# Patient Record
Sex: MALE | Race: WHITE | Employment: UNEMPLOYED | URBAN - NONMETROPOLITAN AREA
[De-identification: names, ages, dates, MRNs, and addresses within clinical notes are randomized per-mention and may not be internally consistent; named-entity substitution may affect disease eponyms.]

---

## 2019-06-13 ENCOUNTER — HOSPITAL ENCOUNTER (OUTPATIENT)
Dept: CARDIAC REHAB | Age: 64
Discharge: HOME | End: 2019-06-13

## 2019-08-07 ENCOUNTER — OFFICE VISIT (OUTPATIENT)
Dept: CARDIOLOGY CLINIC | Age: 64
End: 2019-08-07
Payer: COMMERCIAL

## 2019-08-07 VITALS
WEIGHT: 256 LBS | DIASTOLIC BLOOD PRESSURE: 70 MMHG | BODY MASS INDEX: 33.93 KG/M2 | HEIGHT: 73 IN | SYSTOLIC BLOOD PRESSURE: 124 MMHG | HEART RATE: 60 BPM

## 2019-08-07 DIAGNOSIS — I25.5 ISCHEMIC CARDIOMYOPATHY: Primary | ICD-10-CM

## 2019-08-07 PROBLEM — Z82.49 FAMILY HISTORY OF CARDIAC DISORDER: Status: ACTIVE | Noted: 2019-08-07

## 2019-08-07 PROBLEM — I10 ESSENTIAL (PRIMARY) HYPERTENSION: Status: ACTIVE | Noted: 2019-08-07

## 2019-08-07 PROBLEM — E78.5 HYPERLIPIDEMIA: Status: ACTIVE | Noted: 2019-08-07

## 2019-08-07 PROBLEM — Z86.79 HISTORY OF OTHER DISEASES OF THE CIRCULATORY SYSTEM, NOT ELSEWHERE CLASSIFIED: Status: ACTIVE | Noted: 2019-08-07

## 2019-08-07 PROBLEM — I42.9 CARDIOMYOPATHY (HCC): Status: ACTIVE | Noted: 2019-08-07

## 2019-08-07 PROBLEM — R94.39 ABNORMAL CARDIOVASCULAR STRESS TEST: Status: ACTIVE | Noted: 2019-08-07

## 2019-08-07 PROBLEM — Z95.1 POSTSURGICAL AORTOCORONARY BYPASS STATUS: Status: ACTIVE | Noted: 2019-08-07

## 2019-08-07 PROBLEM — C43.9 MELANOMA OF SKIN (HCC): Status: ACTIVE | Noted: 2019-08-07

## 2019-08-07 PROCEDURE — 99204 OFFICE O/P NEW MOD 45 MIN: CPT | Performed by: INTERNAL MEDICINE

## 2019-08-07 PROCEDURE — 1036F TOBACCO NON-USER: CPT | Performed by: INTERNAL MEDICINE

## 2019-08-07 PROCEDURE — G8427 DOCREV CUR MEDS BY ELIG CLIN: HCPCS | Performed by: INTERNAL MEDICINE

## 2019-08-07 PROCEDURE — G8419 CALC BMI OUT NRM PARAM NOF/U: HCPCS | Performed by: INTERNAL MEDICINE

## 2019-08-07 PROCEDURE — 3017F COLORECTAL CA SCREEN DOC REV: CPT | Performed by: INTERNAL MEDICINE

## 2019-08-07 PROCEDURE — G8598 ASA/ANTIPLAT THER USED: HCPCS | Performed by: INTERNAL MEDICINE

## 2019-08-07 RX ORDER — SPIRONOLACTONE 25 MG/1
25 TABLET ORAL DAILY
COMMUNITY
Start: 2019-07-22

## 2019-08-07 RX ORDER — SACUBITRIL AND VALSARTAN 97; 103 MG/1; MG/1
1 TABLET, FILM COATED ORAL 2 TIMES DAILY
COMMUNITY
Start: 2019-07-23

## 2019-08-07 RX ORDER — METOPROLOL SUCCINATE 50 MG/1
50 TABLET, EXTENDED RELEASE ORAL DAILY
Qty: 30 TABLET | Refills: 3 | Status: SHIPPED | OUTPATIENT
Start: 2019-08-07 | End: 2019-12-10 | Stop reason: SDUPTHER

## 2019-08-07 RX ORDER — PANTOPRAZOLE SODIUM 40 MG/1
40 TABLET, DELAYED RELEASE ORAL DAILY
COMMUNITY
Start: 2019-07-15 | End: 2019-09-03 | Stop reason: SDUPTHER

## 2019-08-07 RX ORDER — METOPROLOL SUCCINATE 25 MG/1
25 TABLET, EXTENDED RELEASE ORAL DAILY
COMMUNITY
Start: 2019-05-01 | End: 2019-08-07

## 2019-08-07 RX ORDER — TICAGRELOR 90 MG/1
90 TABLET ORAL
Status: ON HOLD | COMMUNITY
Start: 2019-07-17 | End: 2020-12-17 | Stop reason: SDUPTHER

## 2019-08-07 RX ORDER — ATORVASTATIN CALCIUM 20 MG/1
20 TABLET, FILM COATED ORAL DAILY
COMMUNITY
Start: 2019-07-17 | End: 2020-02-12 | Stop reason: SDUPTHER

## 2019-08-07 RX ORDER — FUROSEMIDE 20 MG/1
20 TABLET ORAL DAILY
COMMUNITY
Start: 2019-07-17

## 2019-08-26 ENCOUNTER — TELEPHONE (OUTPATIENT)
Dept: CARDIOLOGY CLINIC | Age: 64
End: 2019-08-26

## 2019-09-04 RX ORDER — PANTOPRAZOLE SODIUM 40 MG/1
40 TABLET, DELAYED RELEASE ORAL DAILY
Qty: 90 TABLET | Refills: 3 | Status: SHIPPED | OUTPATIENT
Start: 2019-09-04 | End: 2021-01-04 | Stop reason: SDUPTHER

## 2019-10-09 ENCOUNTER — TELEPHONE (OUTPATIENT)
Dept: CARDIOLOGY CLINIC | Age: 64
End: 2019-10-09

## 2019-12-11 RX ORDER — METOPROLOL SUCCINATE 50 MG/1
50 TABLET, EXTENDED RELEASE ORAL DAILY
Qty: 30 TABLET | Refills: 2 | Status: SHIPPED | OUTPATIENT
Start: 2019-12-11 | End: 2020-02-12 | Stop reason: SDUPTHER

## 2020-02-12 ENCOUNTER — OFFICE VISIT (OUTPATIENT)
Dept: CARDIOLOGY CLINIC | Age: 65
End: 2020-02-12
Payer: COMMERCIAL

## 2020-02-12 VITALS
BODY MASS INDEX: 36.23 KG/M2 | HEART RATE: 60 BPM | DIASTOLIC BLOOD PRESSURE: 70 MMHG | SYSTOLIC BLOOD PRESSURE: 110 MMHG | HEIGHT: 73 IN | WEIGHT: 273.4 LBS

## 2020-02-12 PROCEDURE — G8484 FLU IMMUNIZE NO ADMIN: HCPCS | Performed by: INTERNAL MEDICINE

## 2020-02-12 PROCEDURE — 99213 OFFICE O/P EST LOW 20 MIN: CPT | Performed by: INTERNAL MEDICINE

## 2020-02-12 PROCEDURE — 3017F COLORECTAL CA SCREEN DOC REV: CPT | Performed by: INTERNAL MEDICINE

## 2020-02-12 PROCEDURE — G8417 CALC BMI ABV UP PARAM F/U: HCPCS | Performed by: INTERNAL MEDICINE

## 2020-02-12 PROCEDURE — 1036F TOBACCO NON-USER: CPT | Performed by: INTERNAL MEDICINE

## 2020-02-12 PROCEDURE — G8427 DOCREV CUR MEDS BY ELIG CLIN: HCPCS | Performed by: INTERNAL MEDICINE

## 2020-02-12 RX ORDER — ISOSORBIDE MONONITRATE 60 MG/1
60 TABLET, EXTENDED RELEASE ORAL DAILY
COMMUNITY
End: 2020-12-10 | Stop reason: SDUPTHER

## 2020-02-12 RX ORDER — METOPROLOL SUCCINATE 50 MG/1
50 TABLET, EXTENDED RELEASE ORAL DAILY
Qty: 90 TABLET | Refills: 3 | Status: SHIPPED | OUTPATIENT
Start: 2020-02-12 | End: 2021-01-04 | Stop reason: SDUPTHER

## 2020-02-12 RX ORDER — ATORVASTATIN CALCIUM 20 MG/1
20 TABLET, FILM COATED ORAL DAILY
Qty: 90 TABLET | Refills: 3 | Status: ON HOLD | OUTPATIENT
Start: 2020-02-12 | End: 2020-12-16 | Stop reason: ALTCHOICE

## 2020-02-12 RX ORDER — HYDRALAZINE HYDROCHLORIDE 50 MG/1
50 TABLET, FILM COATED ORAL 2 TIMES DAILY
COMMUNITY
End: 2020-12-10 | Stop reason: SDUPTHER

## 2020-02-12 NOTE — PROGRESS NOTES
[address] [place] [address] [address] [place] [address]  Dept: [phone]  Dept Fax: [phone]  Loc: [phone]    Visit Date: 2/12/2020    Mr. Kaur Mcgee is a 59 y.o. male  who presented for:  Chief Complaint   Patient presents with    6 Month Follow-Up    Cardiomyopathy       HPI:   HPI   60 yo M ICM, CAD 2012 - CABG x 4 via stress test, HTN who presents for follow-up. EF 35-40% recently. He is on DAPT. No bleeding. No chest pain, angina, SERRANO, orthopnea, PND, sob at rest, palpitations, LE edema, or syncope. Can do all ADLs. Current Outpatient Medications:     hydrALAZINE (APRESOLINE) 50 MG tablet, Take 50 mg by mouth 2 times daily, Disp: , Rfl:     isosorbide mononitrate (IMDUR) 60 MG extended release tablet, Take 60 mg by mouth daily, Disp: , Rfl:     metoprolol succinate (TOPROL XL) 50 MG extended release tablet, Take 1 tablet by mouth daily, Disp: 30 tablet, Rfl: 2    pantoprazole (PROTONIX) 40 MG tablet, Take 1 tablet by mouth daily, Disp: 90 tablet, Rfl: 3    furosemide (LASIX) 20 MG tablet, Take 20 mg by mouth daily , Disp: , Rfl:     spironolactone (ALDACTONE) 25 MG tablet, Take 25 mg by mouth daily , Disp: , Rfl:     ENTRESTO  MG per tablet, 1 tablet 2 times daily , Disp: , Rfl:     BRILINTA 90 MG TABS tablet, Take 90 mg by mouth , Disp: , Rfl:     atorvastatin (LIPITOR) 20 MG tablet, Take 20 mg by mouth daily , Disp: , Rfl:     escitalopram (LEXAPRO) 20 MG tablet, Take 20 mg by mouth daily, Disp: , Rfl:     aspirin 81 MG tablet, Take 81 mg by mouth daily, Disp: , Rfl:     Multiple Vitamins-Minerals (THERAPEUTIC MULTIVITAMIN-MINERALS) tablet, Take 1 tablet by mouth daily, Disp: , Rfl:     Past Medical History  Jeniffer Diaz  has a past medical history of CAD (coronary artery disease), Depression, GERD (gastroesophageal reflux disease), Hypertension, and Melanoma ([facility] [address]     Social History  Jeniffer Diaz  reports that he has quit smoking. He has quit using smokeless tobacco. He reports current alcohol use. He reports that he does not use drugs. Family History  Marcelline Bloch family history includes Heart Disease in his maternal grandfather; Stroke in his maternal grandfather. There is no family history of bicuspid aortic valve, aneurysms, heart transplant, pacemakers, defibrillators, or sudden cardiac death. Past Surgical History   Past Surgical History:   Procedure Laterality Date    CARDIAC SURGERY      CORONARY ANGIOPLASTY WITH STENT PLACEMENT  05/2019    SKIN CANCER EXCISION         Review of Systems   Constitutional: Negative for chills and fever  HENT: Negative for congestion, sinus pressure, sneezing and sore throat. Eyes: Negative for pain, discharge, redness and itching. Respiratory: Negative for apnea, cough  Gastrointestinal: Negative for blood in stool, constipation, diarrhea   Endocrine: Negative for cold intolerance, heat intolerance, polydipsia. Genitourinary: Negative for dysuria, enuresis, flank pain and hematuria. Musculoskeletal: Negative for arthralgias, joint swelling and neck pain. Neurological: Negative for numbness and headaches. Psychiatric/Behavioral: Negative for agitation, confusion, decreased concentration and dysphoric mood. Objective:     /70   Pulse 60   Ht 6' 1\" (1.854 m)   Wt 273 lb 6.4 oz (124 kg)   BMI 36.07 kg/m²     Wt Readings from Last 3 Encounters:   02/12/20 273 lb 6.4 oz (124 kg)   08/07/19 256 lb (116.1 kg)     BP Readings from Last 3 Encounters:   02/12/20 110/70   08/07/19 124/70       Nursing note and vitals reviewed. Physical Exam   Constitutional: Oriented to person, place, and time. Appears well-developed and well-nourished. HENT:   Head: Normocephalic and atraumatic. Eyes: EOM are normal. Pupils are equal, round, and reactive to light. Neck: Normal range of motion. Neck supple. No JVD present.    Cardiovascular: Normal rate, regular rhythm, normal heart sounds and intact distal pulses. No murmur heard. Pulmonary/Chest: Effort normal and breath sounds normal. No respiratory distress. No wheezes. No rales. Abdominal: Soft. Bowel sounds are normal. No distension. There is no tenderness. Musculoskeletal: Normal range of motion. No edema. Neurological: Alert and oriented to person, place, and time. No cranial nerve deficit. Coordination normal.   Skin: Skin is warm and dry. Psychiatric: Normal mood and affect. No results found for: CKTOTAL, CKMB, CKMBINDEX    No results found for: WBC, RBC, HGB, HCT, MCV, MCH, MCHC, RDW, PLT, MPV    No results found for: NA, K, CL, CO2, BUN, LABALBU, CREATININE, CALCIUM, GFRAA, LABGLOM, GLUCOSE    No results found for: ALKPHOS, ALT, AST, PROT, BILITOT, BILIDIR, IBILI, LABALBU    No results found for: MG    No results found for: INR, PROTIME      No results found for: LABA1C    No results found for: TRIG, HDL, LDLCALC, LDLDIRECT, LABVLDL    No results found for: TSH      Testing Reviewed:      I have individually reviewed the cardiac test below:    ECHO: No results found for this or any previous visit. Assessment/Plan   Chronic LVSF, EF 35-40%, NYHA II  PCI of the LCX/OM, 5/2019 - DAPT  CABG x 4, 2012  HTN  Follows with  [facility], BP and HR well controlled, he is on OMT. Weight mild increase, but no significant volume on exam.  Continue DAPT. Discussed diet/exercise/BP/weight loss/health lifestyle choices/lipids; the patient understands the goals and will try to comply.     Disposition:  1 year         Electronically signed by Darvin Lawson MD   2/12/2020 at 8:20 AM

## 2020-11-24 ENCOUNTER — TELEPHONE (OUTPATIENT)
Dept: CARDIOLOGY CLINIC | Age: 65
End: 2020-11-24

## 2020-11-24 NOTE — TELEPHONE ENCOUNTER
Talked to patient's wife and she states he is more short of breath. He denies having a cough, fever or orthopnea. She states his weight is staying about the same. Appt scheduled on 12/4/2020 at [facility]. She confirmed appt date, time and location.

## 2020-12-04 ENCOUNTER — OFFICE VISIT (OUTPATIENT)
Dept: CARDIOLOGY CLINIC | Age: 65
End: 2020-12-04
Payer: MEDICARE

## 2020-12-04 VITALS
BODY MASS INDEX: 35.52 KG/M2 | HEART RATE: 58 BPM | TEMPERATURE: 98 F | SYSTOLIC BLOOD PRESSURE: 122 MMHG | HEIGHT: 73 IN | WEIGHT: 268 LBS | DIASTOLIC BLOOD PRESSURE: 78 MMHG

## 2020-12-04 PROCEDURE — 3017F COLORECTAL CA SCREEN DOC REV: CPT | Performed by: INTERNAL MEDICINE

## 2020-12-04 PROCEDURE — G8417 CALC BMI ABV UP PARAM F/U: HCPCS | Performed by: INTERNAL MEDICINE

## 2020-12-04 PROCEDURE — 99213 OFFICE O/P EST LOW 20 MIN: CPT | Performed by: INTERNAL MEDICINE

## 2020-12-04 PROCEDURE — 93000 ELECTROCARDIOGRAM COMPLETE: CPT | Performed by: INTERNAL MEDICINE

## 2020-12-04 PROCEDURE — G8484 FLU IMMUNIZE NO ADMIN: HCPCS | Performed by: INTERNAL MEDICINE

## 2020-12-04 PROCEDURE — 1036F TOBACCO NON-USER: CPT | Performed by: INTERNAL MEDICINE

## 2020-12-04 PROCEDURE — 4040F PNEUMOC VAC/ADMIN/RCVD: CPT | Performed by: INTERNAL MEDICINE

## 2020-12-04 PROCEDURE — 1123F ACP DISCUSS/DSCN MKR DOCD: CPT | Performed by: INTERNAL MEDICINE

## 2020-12-04 PROCEDURE — G8427 DOCREV CUR MEDS BY ELIG CLIN: HCPCS | Performed by: INTERNAL MEDICINE

## 2020-12-04 RX ORDER — NITROGLYCERIN 0.4 MG/1
0.4 TABLET SUBLINGUAL EVERY 5 MIN PRN
Qty: 25 TABLET | Refills: 3 | Status: SHIPPED | OUTPATIENT
Start: 2020-12-04 | End: 2021-12-27

## 2020-12-04 NOTE — PROGRESS NOTES
[place]  [address] [address]  Dept: [phone]  Dept Fax: [phone]  Loc: [phone]    Visit Date: 12/4/2020    Mr. Genevieve Dorado is a 72 y.o. male  who presented for:  Chief Complaint   Patient presents with    Follow-up    Shortness of Breath    Cardiomyopathy       HPI:   HPI   73 yo M ICM, CAD 2012 - CABG x 4, EF 35-40% who presents with SSCP, pressure, with exertion, and severe sob. This is new. Worsening. Occurring daily. Lasts 10 minutes, sometimes less. CPAP has helped. No swelling in the legs. Taking all the meds. No bleeding. Has hx of 2 stents in the LCX/OM 5/2019 at [facility]. No left arm pain, jaw pain. No diaphoresis. If he walks, 200 yards he notices the discomfort. Sitting down helps it stop. Not occurring at rest.  Never wakes him up from sleep. He was doing housework and had 3/10 discomfort, at its worst it is 6/10.  2 weeks of symptoms.       Current Outpatient Medications:     hydrALAZINE (APRESOLINE) 50 MG tablet, Take 50 mg by mouth 2 times daily, Disp: , Rfl:     isosorbide mononitrate (IMDUR) 60 MG extended release tablet, Take 60 mg by mouth daily, Disp: , Rfl:     metoprolol succinate (TOPROL XL) 50 MG extended release tablet, Take 1 tablet by mouth daily, Disp: 90 tablet, Rfl: 3    atorvastatin (LIPITOR) 20 MG tablet, Take 1 tablet by mouth daily, Disp: 90 tablet, Rfl: 3    pantoprazole (PROTONIX) 40 MG tablet, Take 1 tablet by mouth daily, Disp: 90 tablet, Rfl: 3    furosemide (LASIX) 20 MG tablet, Take 20 mg by mouth daily , Disp: , Rfl:     spironolactone (ALDACTONE) 25 MG tablet, Take 25 mg by mouth daily , Disp: , Rfl:     ENTRESTO  MG per tablet, 1 tablet 2 times daily , Disp: , Rfl:     BRILINTA 90 MG TABS tablet, Take 90 mg by mouth , Disp: , Rfl:     escitalopram (LEXAPRO) 20 MG tablet, Take 20 mg by mouth daily, Disp: , Rfl:     aspirin 81 MG tablet, Take 81 mg by mouth daily, Disp: , Rfl:     Multiple Vitamins-Minerals (THERAPEUTIC MULTIVITAMIN-MINERALS) tablet, Take 1 tablet by mouth daily, Disp: , Rfl:     Past Medical History  Nilsa Jiang  has a past medical history of CAD (coronary artery disease), Depression, GERD (gastroesophageal reflux disease), Hypertension, and Melanoma ([facility] [address] Social History  Nilsa Jiang  reports that he has quit smoking. He has quit using smokeless tobacco. He reports current alcohol use. He reports that he does not use drugs. Family History  Nilsa Jiang family history includes Heart Disease in his maternal grandfather; Stroke in his maternal grandfather. There is no family history of bicuspid aortic valve, aneurysms, heart transplant, pacemakers, defibrillators, or sudden cardiac death. Past Surgical History   Past Surgical History:   Procedure Laterality Date    CARDIAC SURGERY      CORONARY ANGIOPLASTY WITH STENT PLACEMENT  05/2019    SKIN CANCER EXCISION         Review of Systems   Constitutional: Negative for chills and fever  HENT: Negative for congestion, sinus pressure, sneezing and sore throat. Eyes: Negative for pain, discharge, redness and itching. Respiratory: Negative for apnea, cough  Gastrointestinal: Negative for blood in stool, constipation, diarrhea   Endocrine: Negative for cold intolerance, heat intolerance, polydipsia. Genitourinary: Negative for dysuria, enuresis, flank pain and hematuria. Musculoskeletal: Negative for arthralgias, joint swelling and neck pain. Neurological: Negative for numbness and headaches. Psychiatric/Behavioral: Negative for agitation, confusion, decreased concentration and dysphoric mood.      Objective:     /78   Pulse 58   Temp 98 °F (36.7 °C)   Ht 6' 1\" (1.854 m)   Wt 268 lb (121.6 kg)   BMI 35.36 kg/m²     Wt Readings from Last 3 Encounters:   12/04/20 268 lb (121.6 kg)   02/12/20 273 lb 6.4 oz (124 kg)   08/07/19 256 lb (116.1 kg)     BP Readings from Last 3 Encounters: 12/04/20 122/78   02/12/20 110/70   08/07/19 124/70       Nursing note and vitals reviewed. Physical Exam   Constitutional: Oriented to person, place, and time. Appears well-developed and well-nourished. HENT:   Head: Normocephalic and atraumatic. Eyes: EOM are normal. Pupils are equal, round, and reactive to light. Neck: Normal range of motion. Neck supple. No JVD present. Cardiovascular: Normal rate, regular rhythm, normal heart sounds and intact distal pulses. No murmur heard. Pulmonary/Chest: Effort normal and breath sounds normal. No respiratory distress. No wheezes. No rales. Abdominal: Soft. Bowel sounds are normal. No distension. There is no tenderness. Musculoskeletal: Normal range of motion. No edema. Neurological: Alert and oriented to person, place, and time. No cranial nerve deficit. Coordination normal.   Skin: Skin is warm and dry. Psychiatric: Normal mood and affect. No results found for: CKTOTAL, CKMB, CKMBINDEX    No results found for: WBC, RBC, HGB, HCT, MCV, MCH, MCHC, RDW, PLT, MPV    No results found for: NA, K, CL, CO2, BUN, LABALBU, CREATININE, CALCIUM, GFRAA, LABGLOM, GLUCOSE    No results found for: ALKPHOS, ALT, AST, PROT, BILITOT, BILIDIR, IBILI, LABALBU    No results found for: MG    No results found for: INR, PROTIME      No results found for: LABA1C    No results found for: TRIG, HDL, LDLCALC, LDLDIRECT, LABVLDL    No results found for: TSH      Testing Reviewed:      I have individually reviewed the cardiac test below:    ECHO: No results found for this or any previous visit. Assessment/Plan   Typical Angina - worsening, CCS III  Chronic LVSF, EF 35-40%, NYHA II  PCI of the LCX/OM, 5/2019 - DAPT  CABG x 4, 2012  HTN  Appears to have typical cardiac life-limiting discomfort, he is on OMT, BP and HR well controlled.   R/B/A of cardiac cath discussed due to high pre-test probability of progressive CAD, would forego stress test and proceed directly to cath.  NTG SL prn. The patient was advised on risk/benefits of the new Rx and he agreed to proceed with the medication(s). No heavy exertion. Discussed symptoms needing emergency care.         Disposition:  Cath         Electronically signed by Belinda Estrella MD   12/4/2020 at 8:34 AM EST

## 2020-12-08 ENCOUNTER — HOSPITAL ENCOUNTER (OUTPATIENT)
Age: 65
Discharge: HOME | End: 2020-12-08
Payer: MEDICARE

## 2020-12-08 PROCEDURE — U0003 INFECTIOUS AGENT DETECTION BY NUCLEIC ACID (DNA OR RNA); SEVERE ACUTE RESPIRATORY SYNDROME CORONAVIRUS 2 (SARS-COV-2) (CORONAVIRUS DISEASE [COVID-19]), AMPLIFIED PROBE TECHNIQUE, MAKING USE OF HIGH THROUGHPUT TECHNOLOGIES AS DESCRIBED BY CMS-2020-01-R: HCPCS

## 2020-12-10 LAB — SARS-COV-2: NOT DETECTED

## 2020-12-11 RX ORDER — ISOSORBIDE MONONITRATE 60 MG/1
60 TABLET, EXTENDED RELEASE ORAL DAILY
Qty: 90 TABLET | Refills: 1 | Status: SHIPPED | OUTPATIENT
Start: 2020-12-11 | End: 2021-06-18

## 2020-12-11 RX ORDER — HYDRALAZINE HYDROCHLORIDE 50 MG/1
50 TABLET, FILM COATED ORAL 2 TIMES DAILY
Qty: 180 TABLET | Refills: 1 | Status: SHIPPED | OUTPATIENT
Start: 2020-12-11

## 2020-12-15 ENCOUNTER — PREP FOR PROCEDURE (OUTPATIENT)
Dept: CARDIOLOGY | Age: 65
End: 2020-12-15

## 2020-12-15 RX ORDER — SODIUM CHLORIDE 9 MG/ML
50 INJECTION, SOLUTION INTRAVENOUS CONTINUOUS
Status: CANCELLED | OUTPATIENT
Start: 2020-12-15

## 2020-12-15 RX ORDER — ASPIRIN 325 MG
325 TABLET ORAL ONCE
Status: CANCELLED | OUTPATIENT
Start: 2020-12-15 | End: 2020-12-15

## 2020-12-15 RX ORDER — NITROGLYCERIN 0.4 MG/1
0.4 TABLET SUBLINGUAL EVERY 5 MIN PRN
Status: CANCELLED | OUTPATIENT
Start: 2020-12-15

## 2020-12-15 RX ORDER — SODIUM CHLORIDE 0.9 % (FLUSH) 0.9 %
10 SYRINGE (ML) INJECTION PRN
Status: CANCELLED | OUTPATIENT
Start: 2020-12-15

## 2020-12-15 RX ORDER — SODIUM CHLORIDE 0.9 % (FLUSH) 0.9 %
10 SYRINGE (ML) INJECTION EVERY 12 HOURS SCHEDULED
Status: CANCELLED | OUTPATIENT
Start: 2020-12-15

## 2020-12-15 RX ORDER — DIPHENHYDRAMINE HYDROCHLORIDE 50 MG/ML
50 INJECTION INTRAMUSCULAR; INTRAVENOUS ONCE
Status: CANCELLED | OUTPATIENT
Start: 2020-12-15 | End: 2020-12-15

## 2020-12-16 PROBLEM — I25.10 CAD IN NATIVE ARTERY: Status: ACTIVE | Noted: 2020-12-16

## 2020-12-17 ENCOUNTER — TELEPHONE (OUTPATIENT)
Dept: CARDIOLOGY CLINIC | Age: 65
End: 2020-12-17

## 2020-12-17 NOTE — TELEPHONE ENCOUNTER
[facility], dx: heart stent, dc to home in 2 weeks. No available appts within requested time frame. Please advise.

## 2021-01-04 ENCOUNTER — OFFICE VISIT (OUTPATIENT)
Dept: CARDIOLOGY CLINIC | Age: 66
End: 2021-01-04
Payer: MEDICARE

## 2021-01-04 VITALS
HEIGHT: 73 IN | HEART RATE: 55 BPM | SYSTOLIC BLOOD PRESSURE: 128 MMHG | DIASTOLIC BLOOD PRESSURE: 78 MMHG | WEIGHT: 271.8 LBS | BODY MASS INDEX: 36.02 KG/M2

## 2021-01-04 DIAGNOSIS — Z95.1 HX OF CABG: Primary | ICD-10-CM

## 2021-01-04 DIAGNOSIS — I25.5 ISCHEMIC CARDIOMYOPATHY: ICD-10-CM

## 2021-01-04 PROCEDURE — 1123F ACP DISCUSS/DSCN MKR DOCD: CPT | Performed by: INTERNAL MEDICINE

## 2021-01-04 PROCEDURE — 93000 ELECTROCARDIOGRAM COMPLETE: CPT | Performed by: INTERNAL MEDICINE

## 2021-01-04 PROCEDURE — 1036F TOBACCO NON-USER: CPT | Performed by: INTERNAL MEDICINE

## 2021-01-04 PROCEDURE — 3017F COLORECTAL CA SCREEN DOC REV: CPT | Performed by: INTERNAL MEDICINE

## 2021-01-04 PROCEDURE — 99212 OFFICE O/P EST SF 10 MIN: CPT | Performed by: INTERNAL MEDICINE

## 2021-01-04 PROCEDURE — G8417 CALC BMI ABV UP PARAM F/U: HCPCS | Performed by: INTERNAL MEDICINE

## 2021-01-04 PROCEDURE — G8484 FLU IMMUNIZE NO ADMIN: HCPCS | Performed by: INTERNAL MEDICINE

## 2021-01-04 PROCEDURE — G8427 DOCREV CUR MEDS BY ELIG CLIN: HCPCS | Performed by: INTERNAL MEDICINE

## 2021-01-04 PROCEDURE — 4040F PNEUMOC VAC/ADMIN/RCVD: CPT | Performed by: INTERNAL MEDICINE

## 2021-01-04 RX ORDER — PANTOPRAZOLE SODIUM 40 MG/1
40 TABLET, DELAYED RELEASE ORAL DAILY
Qty: 90 TABLET | Refills: 3 | Status: SHIPPED | OUTPATIENT
Start: 2021-01-04

## 2021-01-04 RX ORDER — METOPROLOL SUCCINATE 50 MG/1
50 TABLET, EXTENDED RELEASE ORAL DAILY
Qty: 90 TABLET | Refills: 3 | Status: SHIPPED | OUTPATIENT
Start: 2021-01-04 | End: 2022-02-14 | Stop reason: SDUPTHER

## 2021-01-04 NOTE — PROGRESS NOTES
[address] [address] [place] [address]  Dept: [phone]  Dept Fax: [phone]  Loc: [phone]    Visit Date: 1/4/2021    Mr. Jia Stratton is a 72 y.o. male  who presented for:  Chief Complaint   Patient presents with    Follow-Up from Hospital     s/p PCI; discuss Brilinta and switching to Plavix    Follow-up     discuss Entresto       HPI:   HPI   73 yo M s/p PCI of SVG to RI with JACQUE presents for follow-up. DAPT without bleeding. No further symptoms feels well. BP and HR well controlled. No chest pain, angina, SERRANO, orthopnea, PND, sob at rest, palpitations, LE edema, or syncope.                 Current Outpatient Medications:     BRILINTA 90 MG TABS tablet, Take 1 tablet by mouth 2 times daily, Disp: 60 tablet, Rfl: 5    melatonin 5 MG TABS tablet, Take 5 mg by mouth nightly, Disp: , Rfl:     hydrALAZINE (APRESOLINE) 50 MG tablet, Take 1 tablet by mouth 2 times daily, Disp: 180 tablet, Rfl: 1    isosorbide mononitrate (IMDUR) 60 MG extended release tablet, Take 1 tablet by mouth daily, Disp: 90 tablet, Rfl: 1    nitroGLYCERIN (NITROSTAT) 0.4 MG SL tablet, Place 1 tablet under the tongue every 5 minutes as needed for Chest pain, Disp: 25 tablet, Rfl: 3    metoprolol succinate (TOPROL XL) 50 MG extended release tablet, Take 1 tablet by mouth daily, Disp: 90 tablet, Rfl: 3    pantoprazole (PROTONIX) 40 MG tablet, Take 1 tablet by mouth daily, Disp: 90 tablet, Rfl: 3    furosemide (LASIX) 20 MG tablet, Take 20 mg by mouth daily , Disp: , Rfl:     spironolactone (ALDACTONE) 25 MG tablet, Take 25 mg by mouth daily , Disp: , Rfl:     ENTRESTO  MG per tablet, 1 tablet 2 times daily , Disp: , Rfl:     escitalopram (LEXAPRO) 20 MG tablet, Take 20 mg by mouth daily, Disp: , Rfl:     aspirin 81 MG tablet, Take 81 mg by mouth daily, Disp: , Rfl:   Multiple Vitamins-Minerals (THERAPEUTIC MULTIVITAMIN-MINERALS) tablet, Take 1 tablet by mouth daily, Disp: , Rfl:     Past Medical History  Genesis Adame  has a past medical history of CAD (coronary artery disease), Depression, GERD (gastroesophageal reflux disease), Hypertension, and Melanoma ([facility] [address] Social History  Genesis Adame  reports that he has quit smoking. He has quit using smokeless tobacco. He reports current alcohol use. He reports that he does not use drugs. Family History  Genesis Adame family history includes Heart Disease in his maternal grandfather; Stroke in his maternal grandfather. There is no family history of bicuspid aortic valve, aneurysms, heart transplant, pacemakers, defibrillators, or sudden cardiac death. Past Surgical History   Past Surgical History:   Procedure Laterality Date    CARDIAC SURGERY      CORONARY ANGIOPLASTY WITH STENT PLACEMENT  05/2019    DIAGNOSTIC CARDIAC CATH LAB PROCEDURE  12/16/2020    with PCI    SKIN CANCER EXCISION         Review of Systems   Constitutional: Negative for chills and fever  HENT: Negative for congestion, sinus pressure, sneezing and sore throat. Eyes: Negative for pain, discharge, redness and itching. Respiratory: Negative for apnea, cough  Gastrointestinal: Negative for blood in stool, constipation, diarrhea   Endocrine: Negative for cold intolerance, heat intolerance, polydipsia. Genitourinary: Negative for dysuria, enuresis, flank pain and hematuria. Musculoskeletal: Negative for arthralgias, joint swelling and neck pain. Neurological: Negative for numbness and headaches. Psychiatric/Behavioral: Negative for agitation, confusion, decreased concentration and dysphoric mood.      Objective:     /78   Pulse 55   Ht 6' 1\" (1.854 m)   Wt 271 lb 12.8 oz (123.3 kg)   BMI 35.86 kg/m²     Wt Readings from Last 3 Encounters:   01/04/21 271 lb 12.8 oz (123.3 kg)   12/16/20 260 lb (117.9 kg)   12/04/20 268 lb (121.6 kg) BP Readings from Last 3 Encounters:   01/04/21 128/78   12/17/20 134/63   12/04/20 122/78       Nursing note and vitals reviewed. Physical Exam   Constitutional: Oriented to person, place, and time. Appears well-developed and well-nourished. HENT:   Head: Normocephalic and atraumatic. Eyes: EOM are normal. Pupils are equal, round, and reactive to light. Neck: Normal range of motion. Neck supple. No JVD present. Cardiovascular: Normal rate, regular rhythm, normal heart sounds and intact distal pulses. No murmur heard. Pulmonary/Chest: Effort normal and breath sounds normal. No respiratory distress. No wheezes. No rales. Abdominal: Soft. Bowel sounds are normal. No distension. There is no tenderness. Musculoskeletal: Normal range of motion. No edema. Neurological: Alert and oriented to person, place, and time. No cranial nerve deficit. Coordination normal.   Skin: Skin is warm and dry. Psychiatric: Normal mood and affect.        No results found for: CKTOTAL, CKMB, CKMBINDEX    Lab Results   Component Value Date    WBC 6.4 12/16/2020    RBC 4.74 12/16/2020    HGB 14.8 12/16/2020    HCT 44.7 12/16/2020    MCV 94.3 12/16/2020    MCH 31.2 12/16/2020    MCHC 33.1 12/16/2020     12/16/2020    MPV 10.4 12/16/2020       Lab Results   Component Value Date     12/17/2020    K 4.6 12/17/2020    K 4.8 12/16/2020     12/17/2020    CO2 27 12/17/2020    BUN 14 12/17/2020    LABALBU 4.3 12/16/2020    CREATININE 1.1 12/17/2020    CALCIUM 9.2 12/17/2020    LABGLOM 67 12/17/2020    GLUCOSE 124 12/17/2020       Lab Results   Component Value Date    ALKPHOS 49 12/16/2020    ALT 13 12/16/2020    AST 15 12/16/2020    PROT 6.1 12/16/2020    BILITOT 0.4 12/16/2020    LABALBU 4.3 12/16/2020       No results found for: MG    Lab Results   Component Value Date    INR 1.01 12/16/2020         No results found for: LABA1C    No results found for: TRIG, HDL, LDLCALC, LDLDIRECT, LABVLDL

## 2021-03-03 ENCOUNTER — TELEPHONE (OUTPATIENT)
Dept: CARDIOLOGY CLINIC | Age: 66
End: 2021-03-03

## 2021-06-18 ENCOUNTER — OFFICE VISIT (OUTPATIENT)
Dept: CARDIOLOGY CLINIC | Age: 66
End: 2021-06-18
Payer: MEDICARE

## 2021-06-18 VITALS
BODY MASS INDEX: 35.65 KG/M2 | DIASTOLIC BLOOD PRESSURE: 82 MMHG | HEART RATE: 68 BPM | SYSTOLIC BLOOD PRESSURE: 130 MMHG | WEIGHT: 269 LBS | HEIGHT: 73 IN

## 2021-06-18 DIAGNOSIS — Z95.1 HX OF CABG: ICD-10-CM

## 2021-06-18 DIAGNOSIS — I25.5 ISCHEMIC CARDIOMYOPATHY: Primary | ICD-10-CM

## 2021-06-18 PROCEDURE — 4040F PNEUMOC VAC/ADMIN/RCVD: CPT | Performed by: INTERNAL MEDICINE

## 2021-06-18 PROCEDURE — 1123F ACP DISCUSS/DSCN MKR DOCD: CPT | Performed by: INTERNAL MEDICINE

## 2021-06-18 PROCEDURE — G8417 CALC BMI ABV UP PARAM F/U: HCPCS | Performed by: INTERNAL MEDICINE

## 2021-06-18 PROCEDURE — 1036F TOBACCO NON-USER: CPT | Performed by: INTERNAL MEDICINE

## 2021-06-18 PROCEDURE — 3017F COLORECTAL CA SCREEN DOC REV: CPT | Performed by: INTERNAL MEDICINE

## 2021-06-18 PROCEDURE — G8427 DOCREV CUR MEDS BY ELIG CLIN: HCPCS | Performed by: INTERNAL MEDICINE

## 2021-06-18 PROCEDURE — 99214 OFFICE O/P EST MOD 30 MIN: CPT | Performed by: INTERNAL MEDICINE

## 2021-06-18 RX ORDER — ISOSORBIDE MONONITRATE 120 MG/1
120 TABLET, EXTENDED RELEASE ORAL DAILY
Qty: 90 TABLET | Refills: 3 | Status: SHIPPED | OUTPATIENT
Start: 2021-06-18 | End: 2022-08-11 | Stop reason: SDUPTHER

## 2021-06-18 NOTE — PROGRESS NOTES
[address] [place] [address]  Dept: [phone]  Dept Fax: [phone]  Loc: [phone]    Visit Date: 6/18/2021    Mr. Nasrin Zelaya is a 72 y.o. male  who presented for:  Chief Complaint   Patient presents with    6 Month Follow-Up       HPI:   HPI   71 yo M s/p PCI of SVG to RI with JACQUE presents for follow-up. Did have an episode of chest pressure 1 month ago that resolved with NTG SL--felt more anxiety. Taking all meds. No bleeding. BP 150s. Yesterday was 120s. No chest pain, angina, SERRANO, orthopnea, PND, sob at rest, palpitations, LE edema, or syncope. [address] 1 yearly.         Current Outpatient Medications:     metoprolol succinate (TOPROL XL) 50 MG extended release tablet, Take 1 tablet by mouth daily, Disp: 90 tablet, Rfl: 3    pantoprazole (PROTONIX) 40 MG tablet, Take 1 tablet by mouth daily, Disp: 90 tablet, Rfl: 3    BRILINTA 90 MG TABS tablet, Take 1 tablet by mouth 2 times daily, Disp: 60 tablet, Rfl: 5    melatonin 5 MG TABS tablet, Take 5 mg by mouth nightly, Disp: , Rfl:     hydrALAZINE (APRESOLINE) 50 MG tablet, Take 1 tablet by mouth 2 times daily, Disp: 180 tablet, Rfl: 1    isosorbide mononitrate (IMDUR) 60 MG extended release tablet, Take 1 tablet by mouth daily, Disp: 90 tablet, Rfl: 1    nitroGLYCERIN (NITROSTAT) 0.4 MG SL tablet, Place 1 tablet under the tongue every 5 minutes as needed for Chest pain, Disp: 25 tablet, Rfl: 3    furosemide (LASIX) 20 MG tablet, Take 20 mg by mouth daily , Disp: , Rfl:     spironolactone (ALDACTONE) 25 MG tablet, Take 25 mg by mouth daily , Disp: , Rfl:     ENTRESTO  MG per tablet, 1 tablet 2 times daily , Disp: , Rfl:     escitalopram (LEXAPRO) 20 MG tablet, Take 20 mg by mouth daily, Disp: , Rfl:     aspirin 81 MG tablet, Take 81 mg by mouth daily, Disp: , Rfl:     Multiple Vitamins-Minerals (THERAPEUTIC MULTIVITAMIN-MINERALS) tablet, Take 1 tablet by mouth daily, Disp: , Rfl:     Past Medical History  Nahid Rangel  has a past medical history of CAD (coronary artery disease), Depression, GERD (gastroesophageal reflux disease), Hypertension, and Melanoma ([facility] [address] Social History  Nahid Rangel  reports that he has quit smoking. He has quit using smokeless tobacco. He reports current alcohol use. He reports that he does not use drugs. Family History  Nahid Rangel family history includes Heart Disease in his maternal grandfather; Stroke in his maternal grandfather. There is no family history of bicuspid aortic valve, aneurysms, heart transplant, pacemakers, defibrillators, or sudden cardiac death. Past Surgical History   Past Surgical History:   Procedure Laterality Date    CARDIAC SURGERY      CORONARY ANGIOPLASTY WITH STENT PLACEMENT  05/2019    DIAGNOSTIC CARDIAC CATH LAB PROCEDURE  12/16/2020    with PCI    SKIN CANCER EXCISION         Review of Systems   Constitutional: Negative for chills and fever  HENT: Negative for congestion, sinus pressure, sneezing and sore throat. Eyes: Negative for pain, discharge, redness and itching. Respiratory: Negative for apnea, cough  Gastrointestinal: Negative for blood in stool, constipation, diarrhea   Endocrine: Negative for cold intolerance, heat intolerance, polydipsia. Genitourinary: Negative for dysuria, enuresis, flank pain and hematuria. Musculoskeletal: Negative for arthralgias, joint swelling and neck pain. Neurological: Negative for numbness and headaches. Psychiatric/Behavioral: Negative for agitation, confusion, decreased concentration and dysphoric mood.      Objective:     BP (!) 150/82   Pulse 68   Ht 6' 1\" (1.854 m)   Wt 269 lb (122 kg)   BMI 35.49 kg/m²     Wt Readings from Last 3 Encounters:   06/18/21 269 lb (122 kg)   01/04/21 271 lb 12.8 oz (123.3 kg)   12/16/20 260 lb (117.9 kg)     BP Readings from Last 3 Encounters:   06/18/21 (!) 150/82   01/04/21 128/78   12/17/20 134/63       Nursing note and vitals reviewed. Physical Exam   Constitutional: Oriented to person, place, and time. Appears well-developed and well-nourished. HENT:   Head: Normocephalic and atraumatic. Eyes: EOM are normal. Pupils are equal, round, and reactive to light. Neck: Normal range of motion. Neck supple. No JVD present. Cardiovascular: Normal rate, regular rhythm, normal heart sounds and intact distal pulses. No murmur heard. Pulmonary/Chest: Effort normal and breath sounds normal. No respiratory distress. No wheezes. No rales. Abdominal: Soft. Bowel sounds are normal. No distension. There is no tenderness. Musculoskeletal: Normal range of motion. No edema. Neurological: Alert and oriented to person, place, and time. No cranial nerve deficit. Coordination normal.   Skin: Skin is warm and dry. Psychiatric: Normal mood and affect.        No results found for: CKTOTAL, CKMB, CKMBINDEX    Lab Results   Component Value Date    WBC 6.4 12/16/2020    RBC 4.74 12/16/2020    HGB 14.8 12/16/2020    HCT 44.7 12/16/2020    MCV 94.3 12/16/2020    MCH 31.2 12/16/2020    MCHC 33.1 12/16/2020     12/16/2020    MPV 10.4 12/16/2020       Lab Results   Component Value Date     12/17/2020    K 4.6 12/17/2020    K 4.8 12/16/2020     12/17/2020    CO2 27 12/17/2020    BUN 14 12/17/2020    LABALBU 4.3 12/16/2020    CREATININE 1.1 12/17/2020    CALCIUM 9.2 12/17/2020    LABGLOM 67 12/17/2020    GLUCOSE 124 12/17/2020       Lab Results   Component Value Date    ALKPHOS 49 12/16/2020    ALT 13 12/16/2020    AST 15 12/16/2020    PROT 6.1 12/16/2020    BILITOT 0.4 12/16/2020    LABALBU 4.3 12/16/2020       No results found for: MG    Lab Results   Component Value Date    INR 1.01 12/16/2020         No results found for: LABA1C    No results found for: TRIG, HDL, LDLCALC, LDLDIRECT, LABVLDL    No results found for: TSH      Testing Reviewed:      I have individually reviewed the cardiac test below:    ECHO: No results found for this or any previous visit. Assessment/Plan   S/p SVG-RI, 12/2020  Chronic LVSF, EF 35-40%, NYHA II  PCI of the LCX/OM, 5/2019 - DAPT  Occluded SVG-OM  CABG x 4, 2012  HTN  Increase Imdur to 120 mg ER q day. Repeat TTE on OMT. Check FLP and baseline labs. DAPT without bleeding. No issues with ADLs. Discussed diet/exercise/BP/weight loss/health lifestyle choices/lipids; the patient understands the goals and will try to comply.     Disposition:  1 year         Electronically signed by Deshawn Rondon MD   6/18/2021 at 8:45 AM EDT

## 2021-06-22 ENCOUNTER — TELEPHONE (OUTPATIENT)
Dept: CARDIOLOGY CLINIC | Age: 66
End: 2021-06-22

## 2021-07-19 ENCOUNTER — HOSPITAL ENCOUNTER (OUTPATIENT)
Dept: NON INVASIVE DIAGNOSTICS | Age: 66
Discharge: HOME | End: 2021-07-19
Payer: MEDICARE

## 2021-07-19 DIAGNOSIS — I25.5 ISCHEMIC CARDIOMYOPATHY: ICD-10-CM

## 2021-07-19 LAB
LV EF: 38 %
LVEF MODALITY: NORMAL

## 2021-07-19 PROCEDURE — 93306 TTE W/DOPPLER COMPLETE: CPT

## 2021-07-23 ENCOUNTER — TELEPHONE (OUTPATIENT)
Dept: CARDIOLOGY CLINIC | Age: 66
End: 2021-07-23

## 2021-07-23 NOTE — TELEPHONE ENCOUNTER
Pts wife Janine Bonilla calling for echo results. Conclusions      Summary   Moderate left ventricular dilation and moderately reduced systolic   function. Ejection fraction was estimated at 35-40%. The aortic valve was trileaflet, calcified, but preserved cuspal   separation. DOPPLER: Transaortic velocity was 2.3 m/s, mean gradient 13   mmHg, and HE 1.5 cm2. There was mild aortic regurgitation.

## 2021-12-27 RX ORDER — NITROGLYCERIN 0.4 MG/1
TABLET SUBLINGUAL
Qty: 25 TABLET | Refills: 0 | Status: SHIPPED | OUTPATIENT
Start: 2021-12-27 | End: 2022-06-03 | Stop reason: SDUPTHER

## 2022-01-19 PROBLEM — R06.02 SHORTNESS OF BREATH: Status: ACTIVE | Noted: 2022-01-19

## 2022-01-19 PROBLEM — K21.9 GASTROESOPHAGEAL REFLUX DISEASE: Status: ACTIVE | Noted: 2022-01-19

## 2022-01-19 PROBLEM — I50.20 SYSTOLIC HEART FAILURE (HCC): Status: ACTIVE | Noted: 2022-01-19

## 2022-02-14 DIAGNOSIS — I25.5 ISCHEMIC CARDIOMYOPATHY: ICD-10-CM

## 2022-02-14 DIAGNOSIS — Z95.1 HX OF CABG: ICD-10-CM

## 2022-02-14 RX ORDER — METOPROLOL SUCCINATE 50 MG/1
50 TABLET, EXTENDED RELEASE ORAL DAILY
Qty: 90 TABLET | Refills: 1 | Status: SHIPPED | OUTPATIENT
Start: 2022-02-14 | End: 2022-08-11 | Stop reason: SDUPTHER

## 2022-02-14 NOTE — TELEPHONE ENCOUNTER
Nelda Blancas called requesting a refill on the following medications:  Requested Prescriptions     Pending Prescriptions Disp Refills    metoprolol succinate (TOPROL XL) 50 MG extended release tablet 90 tablet 3     Sig: Take 1 tablet by mouth daily     Pharmacy verified:[facility]   . pv      Date of last visit:   Date of next visit (if applicable): Visit date not found

## 2022-06-03 ENCOUNTER — OFFICE VISIT (OUTPATIENT)
Dept: CARDIOLOGY CLINIC | Age: 67
End: 2022-06-03
Payer: MEDICARE

## 2022-06-03 VITALS
BODY MASS INDEX: 34.3 KG/M2 | HEART RATE: 57 BPM | DIASTOLIC BLOOD PRESSURE: 72 MMHG | WEIGHT: 260 LBS | SYSTOLIC BLOOD PRESSURE: 114 MMHG

## 2022-06-03 DIAGNOSIS — I25.5 ISCHEMIC CARDIOMYOPATHY: ICD-10-CM

## 2022-06-03 DIAGNOSIS — Z95.1 HX OF CABG: Primary | ICD-10-CM

## 2022-06-03 PROCEDURE — 1036F TOBACCO NON-USER: CPT | Performed by: INTERNAL MEDICINE

## 2022-06-03 PROCEDURE — 1123F ACP DISCUSS/DSCN MKR DOCD: CPT | Performed by: INTERNAL MEDICINE

## 2022-06-03 PROCEDURE — G8417 CALC BMI ABV UP PARAM F/U: HCPCS | Performed by: INTERNAL MEDICINE

## 2022-06-03 PROCEDURE — 93000 ELECTROCARDIOGRAM COMPLETE: CPT | Performed by: INTERNAL MEDICINE

## 2022-06-03 PROCEDURE — G8427 DOCREV CUR MEDS BY ELIG CLIN: HCPCS | Performed by: INTERNAL MEDICINE

## 2022-06-03 PROCEDURE — 99213 OFFICE O/P EST LOW 20 MIN: CPT | Performed by: INTERNAL MEDICINE

## 2022-06-03 PROCEDURE — 3017F COLORECTAL CA SCREEN DOC REV: CPT | Performed by: INTERNAL MEDICINE

## 2022-06-03 RX ORDER — CLOPIDOGREL BISULFATE 75 MG/1
75 TABLET ORAL DAILY
COMMUNITY

## 2022-06-03 RX ORDER — NITROGLYCERIN 0.4 MG/1
TABLET SUBLINGUAL
Qty: 25 TABLET | Refills: 3 | Status: SHIPPED | OUTPATIENT
Start: 2022-06-03

## 2022-06-03 NOTE — PROGRESS NOTES
[address] [place] [address]  Dept: [phone]  Dept Fax: [phone]  Loc: [phone]    Visit Date: 6/3/2022    Mr. Mikayla Melchor is a 77 y.o. male  who presented for:  CAD  CABG    HPI:   HPI   76 yo M s/p PCI of SVG to RI with JACQUE presents for follow-up. Problems sleeping but otherwise no issues. No chest pain, angina, SERRANO, orthopnea, PND, sob at rest, LE edema, or syncope. Meds without issues. No bleeding. He used NTG SL for tremors. Not true chest pain. He may feel as if anxiety is coming on. He cannot describe it. Usually, when he is bending over he feels the jitteriness. Usually rest helps after bending over. Current Outpatient Medications:     clopidogrel (PLAVIX) 75 MG tablet, Take 75 mg by mouth daily, Disp: , Rfl:     metoprolol succinate (TOPROL XL) 50 MG extended release tablet, Take 1 tablet by mouth daily, Disp: 90 tablet, Rfl: 1    nitroGLYCERIN (NITROSTAT) 0.4 MG SL tablet, DISSOLVE ONE TABLET UNDER THE TONGUE EVERY 5 MINUTES AS NEEDED FOR CHEST PAIN.   DO NOT EXCEED A TOTAL OF 3 DOSES IN 15 MINUTES, Disp: 25 tablet, Rfl: 0    isosorbide mononitrate (IMDUR) 120 MG extended release tablet, Take 1 tablet by mouth daily, Disp: 90 tablet, Rfl: 3    pantoprazole (PROTONIX) 40 MG tablet, Take 1 tablet by mouth daily, Disp: 90 tablet, Rfl: 3    melatonin 5 MG TABS tablet, Take 5 mg by mouth nightly, Disp: , Rfl:     hydrALAZINE (APRESOLINE) 50 MG tablet, Take 1 tablet by mouth 2 times daily, Disp: 180 tablet, Rfl: 1    furosemide (LASIX) 20 MG tablet, Take 20 mg by mouth daily , Disp: , Rfl:     spironolactone (ALDACTONE) 25 MG tablet, Take 25 mg by mouth daily , Disp: , Rfl:     ENTRESTO  MG per tablet, 1 tablet 2 times daily , Disp: , Rfl:     aspirin 81 MG tablet, Take 81 mg by mouth daily, Disp: , Rfl:     Multiple Vitamins-Minerals (THERAPEUTIC MULTIVITAMIN-MINERALS) tablet, Take 1 tablet by mouth daily, Disp: , Rfl:     Past Medical History  Kristin Collins  has a past medical history of CAD (coronary artery disease), Depression, GERD (gastroesophageal reflux disease), Hypertension, and Melanoma ([facility] [address] Social History  Kristin Collins  reports that he has quit smoking. He has quit using smokeless tobacco. He reports current alcohol use. He reports that he does not use drugs. Family History  Kristin Collins family history includes Heart Disease in his maternal grandfather; Stroke in his maternal grandfather. There is no family history of bicuspid aortic valve, aneurysms, heart transplant, pacemakers, defibrillators, or sudden cardiac death. Past Surgical History   Past Surgical History:   Procedure Laterality Date    CARDIAC SURGERY      CORONARY ANGIOPLASTY WITH STENT PLACEMENT  05/2019    DIAGNOSTIC CARDIAC CATH LAB PROCEDURE  12/16/2020    with PCI    SKIN CANCER EXCISION         Review of Systems   Constitutional: Negative for chills and fever  HENT: Negative for congestion, sinus pressure, sneezing and sore throat. Eyes: Negative for pain, discharge, redness and itching. Respiratory: Negative for apnea, cough  Gastrointestinal: Negative for blood in stool, constipation, diarrhea   Endocrine: Negative for cold intolerance, heat intolerance, polydipsia. Genitourinary: Negative for dysuria, enuresis, flank pain and hematuria. Musculoskeletal: Negative for arthralgias, joint swelling and neck pain. Neurological: Negative for numbness and headaches. Psychiatric/Behavioral: Negative for agitation, confusion, decreased concentration and dysphoric mood.      Objective:     /72   Pulse 57   Wt 260 lb (117.9 kg)   BMI 34.30 kg/m²     Wt Readings from Last 3 Encounters:   06/03/22 260 lb (117.9 kg)   06/18/21 269 lb (122 kg)   01/04/21 271 lb 12.8 oz (123.3 kg)     BP Readings from Last 3 Encounters:   06/03/22 114/72   06/18/21 130/82   01/04/21 128/78       Nursing note and vitals placed during the hospital encounter of 07/19/21    Echo 2D w doppler w color complete    Narrative  Transthoracic Echocardiography Report (TTE)    Demographics    Patient Name   Daphne Erickson Gender              Male  J    MR #           [id number]       Race                    Ethnicity    Account #      [de-identified]       Room Number    Accession      [id number]      Date of Study       07/19/2021  Number    Date of Birth  1955      Referring Physician Mirela Cornelius MD  Ysabel Loveischer,  ELLI    Age            72 year(s)      Garry Burt MD  Physician    Procedure    Type of Study    TTE procedure:ECHOCARDIOGRAM COMPLETE 2D W DOPPLER W COLOR. Procedure Date  Date: 07/19/2021 Start: 08:13 AM    Study Location: Echo Lab  Technical Quality: Poor visualization due to body habitus. Indications:Cardiomyopathy. Additional Medical History:GERD, Ex Smoker, Chest Pain, Coronary artery  disease, Stent, Cardiomyopathy, Hypertension, Hyperlipidemia, Melanoma. Patient Status: Routine    Height: 73 inches Weight: 269.01 pounds BSA: 2.44 m^2 BMI: 35.49 kg/m^2    BP: 130/82 mmHg    Allergies  - See Epic. Conclusions    Summary  Moderate left ventricular dilation and moderately reduced systolic  function. Ejection fraction was estimated at 35-40%. The aortic valve was trileaflet, calcified, but preserved cuspal  separation. DOPPLER: Transaortic velocity was 2.3 m/s, mean gradient 13  mmHg, and HE 1.5 cm2. There was mild aortic regurgitation. Signature    ----------------------------------------------------------------  Electronically signed by Mirela Cornelius MD (Interpreting  physician) on 07/20/2021 at 04:58 PM  ----------------------------------------------------------------    Findings    Mitral Valve  The mitral valve structure was normal with normal leaflet separation.   DOPPLER: The transmitral velocity was within the normal range with no  evidence for mitral stenosis. There was no evidence of mitral  regurgitation. Aortic Valve  The aortic valve was trileaflet, calcified, but preserved cuspal  separation. DOPPLER: Transaortic velocity was 2.3 m/s, mean gradient 13  mmHg, and HE 1.5 cm2. There was mild aortic regurgitation. Tricuspid Valve  The tricuspid valve structure was normal with normal leaflet separation. DOPPLER: There was no evidence of tricuspid stenosis. There was no  evidence of tricuspid regurgitation. Pulmonic Valve  The pulmonic valve leaflets were not well seen. DOPPLER: The transpulmonic  velocity was within the normal range with no evidence for regurgitation. Left Atrium  Left atrial size was normal.    Left Ventricle  Moderate left ventricular dilation and moderately reduced systolic  function. There was moderate global hypokinesis. Wall thickness was within normal limits. Ejection fraction was estimated at 35-40%. Right Atrium  Right atrial size was normal.    Right Ventricle  The right ventricular size was normal with normal systolic function and  wall thickness. Pericardial Effusion  The pericardium was normal in appearance with no evidence of a pericardial  effusion. Pleural Effusion  No evidence of pleural effusion. Aorta / Great Vessels  IVC size is within normal limits with normal respiratory phasic changes.     M-Mode/2D Measurements & Calculations    LV Diastolic    LV Systolic Dimension: 5 cm AV Cusp Separation: 1.7 cmAO  Dimension: 6.2  LV Volume Diastolic: 289 ml Root Dimension: 4 cmLA Area:  cm              LV Volume Systolic: 380 ml  70.3 cm^2  LV FS:19.4 %    LV EDV/LV EDV Index: 194  LV PW           ml/80 m^2LV ESV/LV ESV  Diastolic: 1.2  Index: 998 ml/48 m^2  cm              EF Calculated: 39.2 %       RV Diastolic Dimension: 3.4 cm  Septum  Diastolic: 1.2  LV Length: 9.1 cm           Ascending Aorta: 3.8 cm  cm                                          LA volume/Index: 65.6 ml  LVOT: 2.1 cm                /27m^2    LV Area  Diastolic: 55.5  cm^2  LV Area  Systolic: 85.0  cm^2    Doppler Measurements & Calculations    MV Peak E-Wave:     AV Peak Velocity: 233    LVOT Peak Velocity: 103 cm/s  76.6 cm/s           cm/s                     LVOT Mean Velocity: 69.1 cm/s  MV Peak A-Wave:     AV Peak Gradient: 21.72  LVOT Peak Gradient: 4  66.2 cm/s           mmHg                     mmHgLVOT Mean Gradient: 2  MV E/A Ratio: 1.16  AV Mean Velocity: 167    mmHg  MV Peak Gradient:   cm/s  2.35 mmHg           AV Mean Gradient: 14     TV Peak E-Wave: 68.1 cm/s  mmHg                     TV Peak A-Wave: 43.7 cm/s  MV Deceleration     AV VTI: 61.4 cm  Time: 254 msec      AV Area                  TV Peak Gradient: 1.86 mmHg  (Continuity):1.49 cm^2   TR Velocity:255 cm/s  TR Gradient:26.01 mmHg  MV E' Septal        LVOT VTI: 26.4 cm        PV Peak Velocity: 57.4 cm/s  Velocity: 10.7 cm/s AV P1/2t: 738 msec       PV Peak Gradient: 1.32 mmHg  MV A' Septal        IVRT: 113 msec  Velocity: 7.7 cm/s  MV E' Lateral  Velocity: 12.5 cm/s AV DVI (VTI): 0.43AV DVI  MV A' Lateral       (Vmax):0.44  Velocity: 11.9 cm/s  E/E' septal: 7.16  E/E' lateral: 6.13    [email or web address]  [id number]       Assessment/Plan   S/p SVG-RI, 12/2020  Chronic LVSF, EF 35-40%, NYHA II  PCI of the LCX/OM, 5/2019 - DAPT  Occluded SVG-OM  CABG x 4, 2012  HTN  Taking all meds, did take NTG SL prn, but not persistent and was an infrequent episode. Not truly chest pain. DAPT on going. He will manage his position changes--may be related to Imdur. He is otherwise on medical therapy. Compression stockings. No bleeding. FLP to be done. Discussed diet/exercise/BP/weight loss/health lifestyle choices/lipids; the patient understands the goals and will try to comply.     Disposition:  1 year         Electronically signed by Oj Correia MD   6/3/2022 at 10:55 AM EDT

## 2022-08-11 DIAGNOSIS — Z95.1 HX OF CABG: ICD-10-CM

## 2022-08-11 DIAGNOSIS — I25.5 ISCHEMIC CARDIOMYOPATHY: ICD-10-CM

## 2022-08-11 RX ORDER — ISOSORBIDE MONONITRATE 120 MG/1
120 TABLET, EXTENDED RELEASE ORAL DAILY
Qty: 90 TABLET | Refills: 3 | Status: SHIPPED | OUTPATIENT
Start: 2022-08-11

## 2022-08-11 RX ORDER — METOPROLOL SUCCINATE 50 MG/1
50 TABLET, EXTENDED RELEASE ORAL DAILY
Qty: 90 TABLET | Refills: 3 | Status: SHIPPED | OUTPATIENT
Start: 2022-08-11

## 2022-08-11 NOTE — TELEPHONE ENCOUNTER
Janene Doctor called requesting a refill on the following medications:  Requested Prescriptions     Pending Prescriptions Disp Refills    isosorbide mononitrate (IMDUR) 120 MG extended release tablet 90 tablet 3     Sig: Take 1 tablet by mouth in the morning. metoprolol succinate (TOPROL XL) 50 MG extended release tablet 90 tablet 1     Sig: Take 1 tablet by mouth in the morning.      Pharmacy verified:  .  [address] [facility] [address], [address] [place] [address]  N [phone] [facility] [phone]    Date of last visit: 06/03/2022  Date of next visit (if applicable): 40/56/3375

## 2022-11-23 ENCOUNTER — APPOINTMENT (OUTPATIENT)
Dept: GENERAL RADIOLOGY | Age: 67
DRG: 291 | End: 2022-11-23
Payer: MEDICARE

## 2022-11-23 ENCOUNTER — HOSPITAL ENCOUNTER (INPATIENT)
Age: 67
LOS: 3 days | Discharge: HOME | DRG: 291 | End: 2022-11-26
Attending: EMERGENCY MEDICINE | Admitting: STUDENT IN AN ORGANIZED HEALTH CARE EDUCATION/TRAINING PROGRAM
Payer: MEDICARE

## 2022-11-23 DIAGNOSIS — I50.9 ACUTE ON CHRONIC CONGESTIVE HEART FAILURE, UNSPECIFIED HEART FAILURE TYPE (HCC): Primary | ICD-10-CM

## 2022-11-23 LAB
ANION GAP SERPL CALCULATED.3IONS-SCNC: 10 MEQ/L (ref 8–16)
BASOPHILS # BLD: 1.2 %
BASOPHILS ABSOLUTE: 0.1 THOU/MM3 (ref 0–0.1)
BUN BLDV-MCNC: 13 MG/DL (ref 7–22)
CALCIUM SERPL-MCNC: 9.1 MG/DL (ref 8.5–10.5)
CHLORIDE BLD-SCNC: 103 MEQ/L (ref 98–111)
CO2: 26 MEQ/L (ref 23–33)
CREAT SERPL-MCNC: 1.1 MG/DL (ref 0.4–1.2)
EOSINOPHIL # BLD: 1.1 %
EOSINOPHILS ABSOLUTE: 0.1 THOU/MM3 (ref 0–0.4)
ERYTHROCYTE [DISTWIDTH] IN BLOOD BY AUTOMATED COUNT: 15.5 % (ref 11.5–14.5)
ERYTHROCYTE [DISTWIDTH] IN BLOOD BY AUTOMATED COUNT: 51.8 FL (ref 35–45)
GFR SERPL CREATININE-BSD FRML MDRD: > 60 ML/MIN/1.73M2
GLUCOSE BLD-MCNC: 109 MG/DL (ref 70–108)
HCT VFR BLD CALC: 44.7 % (ref 42–52)
HEMOGLOBIN: 14.7 GM/DL (ref 14–18)
IMMATURE GRANS (ABS): 0.04 THOU/MM3 (ref 0–0.07)
IMMATURE GRANULOCYTES: 0.6 %
LYMPHOCYTES # BLD: 15.4 %
LYMPHOCYTES ABSOLUTE: 1 THOU/MM3 (ref 1–4.8)
MCH RBC QN AUTO: 30.2 PG (ref 26–33)
MCHC RBC AUTO-ENTMCNC: 32.9 GM/DL (ref 32.2–35.5)
MCV RBC AUTO: 92 FL (ref 80–94)
MONOCYTES # BLD: 6.6 %
MONOCYTES ABSOLUTE: 0.4 THOU/MM3 (ref 0.4–1.3)
NUCLEATED RED BLOOD CELLS: 0 /100 WBC
OSMOLALITY CALCULATION: 278.2 MOSMOL/KG (ref 275–300)
PLATELET # BLD: 411 THOU/MM3 (ref 130–400)
PMV BLD AUTO: 10.7 FL (ref 9.4–12.4)
POTASSIUM REFLEX MAGNESIUM: 3.9 MEQ/L (ref 3.5–5.2)
PRO-BNP: 4255 PG/ML (ref 0–900)
RBC # BLD: 4.86 MILL/MM3 (ref 4.7–6.1)
SEG NEUTROPHILS: 75.1 %
SEGMENTED NEUTROPHILS ABSOLUTE COUNT: 5 THOU/MM3 (ref 1.8–7.7)
SODIUM BLD-SCNC: 139 MEQ/L (ref 135–145)
T4 FREE: 1.17 NG/DL (ref 0.93–1.76)
TROPONIN T: < 0.01 NG/ML
TSH SERPL DL<=0.05 MIU/L-ACNC: 3.69 UIU/ML (ref 0.4–4.2)
WBC # BLD: 6.6 THOU/MM3 (ref 4.8–10.8)

## 2022-11-23 PROCEDURE — 1200000003 HC TELEMETRY R&B

## 2022-11-23 PROCEDURE — 83880 ASSAY OF NATRIURETIC PEPTIDE: CPT

## 2022-11-23 PROCEDURE — 80048 BASIC METABOLIC PNL TOTAL CA: CPT

## 2022-11-23 PROCEDURE — 85025 COMPLETE CBC W/AUTO DIFF WBC: CPT

## 2022-11-23 PROCEDURE — 6360000002 HC RX W HCPCS: Performed by: EMERGENCY MEDICINE

## 2022-11-23 PROCEDURE — 93005 ELECTROCARDIOGRAM TRACING: CPT | Performed by: STUDENT IN AN ORGANIZED HEALTH CARE EDUCATION/TRAINING PROGRAM

## 2022-11-23 PROCEDURE — 36415 COLL VENOUS BLD VENIPUNCTURE: CPT

## 2022-11-23 PROCEDURE — 99223 1ST HOSP IP/OBS HIGH 75: CPT | Performed by: STUDENT IN AN ORGANIZED HEALTH CARE EDUCATION/TRAINING PROGRAM

## 2022-11-23 PROCEDURE — 71045 X-RAY EXAM CHEST 1 VIEW: CPT

## 2022-11-23 PROCEDURE — 99285 EMERGENCY DEPT VISIT HI MDM: CPT

## 2022-11-23 PROCEDURE — 96374 THER/PROPH/DIAG INJ IV PUSH: CPT

## 2022-11-23 PROCEDURE — 84439 ASSAY OF FREE THYROXINE: CPT

## 2022-11-23 PROCEDURE — 6360000002 HC RX W HCPCS

## 2022-11-23 PROCEDURE — 2580000003 HC RX 258

## 2022-11-23 PROCEDURE — 84484 ASSAY OF TROPONIN QUANT: CPT

## 2022-11-23 PROCEDURE — 84443 ASSAY THYROID STIM HORMONE: CPT

## 2022-11-23 RX ORDER — METOPROLOL SUCCINATE 50 MG/1
50 TABLET, EXTENDED RELEASE ORAL DAILY
Status: DISCONTINUED | OUTPATIENT
Start: 2022-11-24 | End: 2022-11-26 | Stop reason: HOSPADM

## 2022-11-23 RX ORDER — SODIUM CHLORIDE 0.9 % (FLUSH) 0.9 %
5-40 SYRINGE (ML) INJECTION EVERY 12 HOURS SCHEDULED
Status: DISCONTINUED | OUTPATIENT
Start: 2022-11-23 | End: 2022-11-26 | Stop reason: HOSPADM

## 2022-11-23 RX ORDER — FUROSEMIDE 10 MG/ML
40 INJECTION INTRAMUSCULAR; INTRAVENOUS ONCE
Status: COMPLETED | OUTPATIENT
Start: 2022-11-23 | End: 2022-11-23

## 2022-11-23 RX ORDER — HYDRALAZINE HYDROCHLORIDE 50 MG/1
50 TABLET, FILM COATED ORAL 2 TIMES DAILY
Status: DISCONTINUED | OUTPATIENT
Start: 2022-11-24 | End: 2022-11-26 | Stop reason: HOSPADM

## 2022-11-23 RX ORDER — FUROSEMIDE 10 MG/ML
40 INJECTION INTRAMUSCULAR; INTRAVENOUS DAILY
Status: DISCONTINUED | OUTPATIENT
Start: 2022-11-23 | End: 2022-11-26 | Stop reason: HOSPADM

## 2022-11-23 RX ORDER — CLONAZEPAM 1 MG/1
0.5 TABLET ORAL NIGHTLY
Status: DISCONTINUED | OUTPATIENT
Start: 2022-11-24 | End: 2022-11-26 | Stop reason: HOSPADM

## 2022-11-23 RX ORDER — ACETAMINOPHEN 650 MG/1
650 SUPPOSITORY RECTAL EVERY 6 HOURS PRN
Status: DISCONTINUED | OUTPATIENT
Start: 2022-11-23 | End: 2022-11-26 | Stop reason: HOSPADM

## 2022-11-23 RX ORDER — ONDANSETRON 2 MG/ML
4 INJECTION INTRAMUSCULAR; INTRAVENOUS EVERY 6 HOURS PRN
Status: DISCONTINUED | OUTPATIENT
Start: 2022-11-23 | End: 2022-11-26 | Stop reason: HOSPADM

## 2022-11-23 RX ORDER — SPIRONOLACTONE 25 MG/1
25 TABLET ORAL DAILY
Status: DISCONTINUED | OUTPATIENT
Start: 2022-11-24 | End: 2022-11-26 | Stop reason: HOSPADM

## 2022-11-23 RX ORDER — ACETAMINOPHEN 325 MG/1
650 TABLET ORAL EVERY 6 HOURS PRN
Status: DISCONTINUED | OUTPATIENT
Start: 2022-11-23 | End: 2022-11-26 | Stop reason: HOSPADM

## 2022-11-23 RX ORDER — ASPIRIN 81 MG/1
81 TABLET, CHEWABLE ORAL DAILY
Status: DISCONTINUED | OUTPATIENT
Start: 2022-11-24 | End: 2022-11-26 | Stop reason: HOSPADM

## 2022-11-23 RX ORDER — SODIUM CHLORIDE 9 MG/ML
INJECTION, SOLUTION INTRAVENOUS PRN
Status: DISCONTINUED | OUTPATIENT
Start: 2022-11-23 | End: 2022-11-26 | Stop reason: HOSPADM

## 2022-11-23 RX ORDER — CLONAZEPAM 0.5 MG/1
0.5 TABLET ORAL NIGHTLY PRN
COMMUNITY

## 2022-11-23 RX ORDER — SODIUM CHLORIDE 0.9 % (FLUSH) 0.9 %
5-40 SYRINGE (ML) INJECTION PRN
Status: DISCONTINUED | OUTPATIENT
Start: 2022-11-23 | End: 2022-11-26 | Stop reason: HOSPADM

## 2022-11-23 RX ORDER — LANOLIN ALCOHOL/MO/W.PET/CERES
4.5 CREAM (GRAM) TOPICAL NIGHTLY
Status: DISCONTINUED | OUTPATIENT
Start: 2022-11-24 | End: 2022-11-26 | Stop reason: HOSPADM

## 2022-11-23 RX ORDER — POLYETHYLENE GLYCOL 3350 17 G/17G
17 POWDER, FOR SOLUTION ORAL DAILY PRN
Status: DISCONTINUED | OUTPATIENT
Start: 2022-11-23 | End: 2022-11-26 | Stop reason: HOSPADM

## 2022-11-23 RX ORDER — MAGNESIUM SULFATE IN WATER 40 MG/ML
2000 INJECTION, SOLUTION INTRAVENOUS PRN
Status: DISCONTINUED | OUTPATIENT
Start: 2022-11-23 | End: 2022-11-26 | Stop reason: HOSPADM

## 2022-11-23 RX ORDER — CLOPIDOGREL BISULFATE 75 MG/1
75 TABLET ORAL DAILY
Status: DISCONTINUED | OUTPATIENT
Start: 2022-11-24 | End: 2022-11-26 | Stop reason: HOSPADM

## 2022-11-23 RX ORDER — PANTOPRAZOLE SODIUM 40 MG/1
40 TABLET, DELAYED RELEASE ORAL DAILY
Status: DISCONTINUED | OUTPATIENT
Start: 2022-11-24 | End: 2022-11-26 | Stop reason: HOSPADM

## 2022-11-23 RX ORDER — ONDANSETRON 4 MG/1
4 TABLET, ORALLY DISINTEGRATING ORAL EVERY 8 HOURS PRN
Status: DISCONTINUED | OUTPATIENT
Start: 2022-11-23 | End: 2022-11-26 | Stop reason: HOSPADM

## 2022-11-23 RX ORDER — ENOXAPARIN SODIUM 100 MG/ML
30 INJECTION SUBCUTANEOUS EVERY 12 HOURS
Status: DISCONTINUED | OUTPATIENT
Start: 2022-11-23 | End: 2022-11-26 | Stop reason: HOSPADM

## 2022-11-23 RX ADMIN — ENOXAPARIN SODIUM 30 MG: 100 INJECTION SUBCUTANEOUS at 21:52

## 2022-11-23 RX ADMIN — FUROSEMIDE 40 MG: 10 INJECTION, SOLUTION INTRAMUSCULAR; INTRAVENOUS at 17:58

## 2022-11-23 RX ADMIN — SODIUM CHLORIDE, PRESERVATIVE FREE 5 ML: 5 INJECTION INTRAVENOUS at 22:08

## 2022-11-23 RX ADMIN — FUROSEMIDE 40 MG: 10 INJECTION, SOLUTION INTRAMUSCULAR; INTRAVENOUS at 21:52

## 2022-11-23 ASSESSMENT — ENCOUNTER SYMPTOMS
CHEST TIGHTNESS: 0
BLOOD IN STOOL: 0
TROUBLE SWALLOWING: 0
ABDOMINAL PAIN: 0
COUGH: 0
SINUS PAIN: 0
SORE THROAT: 0
CONSTIPATION: 0
PHOTOPHOBIA: 0
EYE ITCHING: 0
SHORTNESS OF BREATH: 1
CHOKING: 0
EYE REDNESS: 0
EYE PAIN: 0
WHEEZING: 0
DIARRHEA: 0
STRIDOR: 0
VOMITING: 0
NAUSEA: 0
ABDOMINAL DISTENTION: 0
BACK PAIN: 0

## 2022-11-23 ASSESSMENT — PAIN - FUNCTIONAL ASSESSMENT
PAIN_FUNCTIONAL_ASSESSMENT: NONE - DENIES PAIN

## 2022-11-23 NOTE — ED TRIAGE NOTES
Pt presents to the ED with complaints of SOB. Pt states he was recently in [place] on a hunting trip and since coming home he has been feeling SOB. Pt is prescribed lasix and states he has been taking as prescribed. Pt has +3 pitting edema on lower extremities. Pt upper extremities are swollen. Pt has swelling under eyes.

## 2022-11-23 NOTE — ED PROVIDER NOTES
[place] ENCOUNTER          Pt Name: Martin Rangel  MRN: [id number]  [place] 1955  Date of evaluation: 11/23/2022  Treating Resident Physician: Robert Sheehan MD  Supervising Physician: Maida Monsalve DO    History obtained from chart review and the patient. CHIEF COMPLAINT       Chief Complaint   Patient presents with    Shortness of Breath           HISTORY OF PRESENT ILLNESS    HPI  Martin Rangel is a 79 y.o. male with PMH of CHF, CAD, s/p CABG and 4 stents who presents to the emergency department for evaluation of progressive shortness of breath over the past week. Per patient he recently returned from a hunting trip in [place] on Sunday where he was hunting for elk. Patient described during this time he had progressive dyspnea with exertion. Patient initially stated that he was compliant with his medications but then later admitted he was not completely compliant with his medications while he was hunting. Patient initially thought that shortness of breath was secondary to altitude change from being out in high altitude in [place] but patient has been back for 3 days and the symptoms are not resolved. He stated that he had decreased energy during the trip and was going to bed early every evening. Patient denied any chest pain, nausea, vomiting, bowel changes, urinary changes during this time. The patient has no other acute complaints at this time. REVIEW OF SYSTEMS   Review of Systems   Constitutional:  Positive for unexpected weight change. Negative for appetite change, chills, diaphoresis, fatigue and fever. HENT:  Negative for dental problem, drooling, ear discharge, ear pain, hearing loss, mouth sores, sinus pain, sneezing, sore throat and trouble swallowing. Eyes:  Negative for photophobia, pain, redness and itching. Respiratory:  Positive for shortness of breath.  Negative for cough, choking, chest tightness, wheezing and stridor. Cardiovascular:  Negative for chest pain, palpitations and leg swelling. Gastrointestinal:  Negative for abdominal distention, abdominal pain, blood in stool, constipation, diarrhea, nausea and vomiting. Endocrine: Negative for polydipsia, polyphagia and polyuria. Genitourinary:  Negative for difficulty urinating, dysuria, flank pain, genital sores, penile discharge, testicular pain and urgency. Musculoskeletal:  Negative for back pain, myalgias and neck pain. Skin:  Negative for pallor, rash and wound. Neurological:  Negative for dizziness, tremors, seizures, syncope, numbness and headaches. Psychiatric/Behavioral:  Negative for confusion, dysphoric mood, self-injury and suicidal ideas. The patient is not nervous/anxious and is not hyperactive. PAST MEDICAL AND SURGICAL HISTORY     Past Medical History:   Diagnosis Date    CAD (coronary artery disease)     Depression     GERD (gastroesophageal reflux disease)     Hypertension     Melanoma ([facility] [address]      Past Surgical History:   Procedure Laterality Date    CARDIAC SURGERY      CORONARY ANGIOPLASTY WITH STENT PLACEMENT  05/2019    DIAGNOSTIC CARDIAC CATH LAB PROCEDURE  12/16/2020    with PCI    SKIN CANCER EXCISION           MEDICATIONS   No current facility-administered medications for this encounter. Current Outpatient Medications:     clonazePAM (KLONOPIN) 0.5 MG tablet, Take 0.5 mg by mouth nightly as needed. , Disp: , Rfl:     isosorbide mononitrate (IMDUR) 120 MG extended release tablet, Take 1 tablet by mouth in the morning., Disp: 90 tablet, Rfl: 3    metoprolol succinate (TOPROL XL) 50 MG extended release tablet, Take 1 tablet by mouth in the morning., Disp: 90 tablet, Rfl: 3    clopidogrel (PLAVIX) 75 MG tablet, Take 75 mg by mouth daily, Disp: , Rfl:     nitroGLYCERIN (NITROSTAT) 0.4 MG SL tablet, DISSOLVE ONE TABLET UNDER THE TONGUE EVERY 5 MINUTES AS NEEDED FOR CHEST PAIN.   DO NOT EXCEED A TOTAL OF 3 DOSES IN 15 MINUTES, Disp: 25 tablet, Rfl: 3    pantoprazole (PROTONIX) 40 MG tablet, Take 1 tablet by mouth daily, Disp: 90 tablet, Rfl: 3    melatonin 5 MG TABS tablet, Take 5 mg by mouth nightly, Disp: , Rfl:     hydrALAZINE (APRESOLINE) 50 MG tablet, Take 1 tablet by mouth 2 times daily, Disp: 180 tablet, Rfl: 1    furosemide (LASIX) 20 MG tablet, Take 20 mg by mouth daily , Disp: , Rfl:     spironolactone (ALDACTONE) 25 MG tablet, Take 25 mg by mouth daily , Disp: , Rfl:     ENTRESTO  MG per tablet, 1 tablet 2 times daily , Disp: , Rfl:     aspirin 81 MG tablet, Take 81 mg by mouth daily, Disp: , Rfl:     Multiple Vitamins-Minerals (THERAPEUTIC MULTIVITAMIN-MINERALS) tablet, Take 1 tablet by mouth daily, Disp: , Rfl:       SOCIAL HISTORY     Social History     Social History Narrative    Not on file     Social History     Tobacco Use    Smoking status: Former    Smokeless tobacco: Former   Vaping Use    Vaping Use: Never used   Substance Use Topics    Alcohol use: Yes     Comment: few beers on weekends    Drug use: No         ALLERGIES     Allergies   Allergen Reactions    Lipitor [Atorvastatin]      Hard to walk         FAMILY HISTORY     Family History   Problem Relation Age of Onset    Heart Disease Maternal Grandfather     Stroke Maternal Grandfather          PREVIOUS RECORDS   Previous records reviewed:   PHYSICAL EXAM     ED Triage Vitals [11/23/22 1552]   BP Temp Temp Source Heart Rate Resp SpO2 Height Weight   -- 98.5 °F (36.9 °C) Oral 77 20 96 % 6' 1\" (1.854 m) 250 lb (113.4 kg)     Initial vital signs and nursing assessment reviewed and abnormal from bradycardia . Body mass index is 34.3 kg/m². Pulsoximetry is normal per my interpretation. Additional Vital Signs:  Vitals:    11/23/22 1840   BP:    Pulse: 56   Resp: 18   Temp:    SpO2: 97%       Physical Exam  Vitals and nursing note reviewed. Constitutional:       General: He is not in acute distress. Appearance: Normal appearance. He is obese. He is ill-appearing. He is not toxic-appearing or diaphoretic. HENT:      Head: Normocephalic and atraumatic. Right Ear: External ear normal.      Left Ear: External ear normal.      Nose: Nose normal. No congestion or rhinorrhea. Mouth/Throat:      Mouth: Mucous membranes are moist.      Pharynx: Oropharynx is clear. No oropharyngeal exudate or posterior oropharyngeal erythema. Eyes:      General: No scleral icterus. Right eye: No discharge. Left eye: No discharge. Extraocular Movements: Extraocular movements intact. Conjunctiva/sclera: Conjunctivae normal.      Pupils: Pupils are equal, round, and reactive to light. Comments: Swelling under eyes   Cardiovascular:      Rate and Rhythm: Regular rhythm. Bradycardia present. Pulses: Normal pulses. Heart sounds: Normal heart sounds. No murmur heard. No gallop. Pulmonary:      Effort: Pulmonary effort is normal. No respiratory distress. Breath sounds: No stridor. Examination of the right-lower field reveals rales. Examination of the left-lower field reveals rales. Rales present. No wheezing or rhonchi. Chest:      Chest wall: No tenderness. Abdominal:      General: Bowel sounds are normal. There is no distension. Palpations: Abdomen is soft. Tenderness: There is no abdominal tenderness. There is no right CVA tenderness, left CVA tenderness, guarding or rebound. Musculoskeletal:         General: No swelling, tenderness, deformity or signs of injury. Cervical back: Neck supple. No rigidity or tenderness. Right lower leg: Edema present. Left lower leg: Edema present. Comments: Diffuse swelling of extremities   Lymphadenopathy:      Cervical: No cervical adenopathy. Skin:     General: Skin is warm and dry. Coloration: Skin is not jaundiced or pale. Findings: No bruising, erythema, lesion or rash. Neurological:      General: No focal deficit present.       Mental Status: He is alert and oriented to person, place, and time. Mental status is at baseline. Psychiatric:         Mood and Affect: Mood normal.         Behavior: Behavior normal.         Thought Content: Thought content normal.         Judgment: Judgment normal.           MEDICAL DECISION MAKING   Initial Assessment:   49-year-old male, ED for evaluation of progressive shortness of breath. Initial pressure the patient he showing obvious swelling under his eyelids as well as in his upper extremities and lower extremities. Patient is speaking in complete sentences due to shortness of breath but SPO2 remaining stable. Patient's breath sounds had basilar rales and BNP was elevated with cephalization noted on CXR. All signs pointing to fluid overload secondary to CHF exacerbation. Troponin was negative patient denied any chest pain and no other cardiac symptoms other than shortness of breath noted decreasing likelihood of ACS. Also considered pneumothorax and pericardial tamponade but unlikely due to findings. No leukocytosis, consolidation, fever, tachycardia noted on exam making Pneumonia and sepsis unlikely. Patient was given an IV Lasix 40 mg to help improve symptoms. Shared decision-making was made with patient advising that he should be admitted for IV diuretics and observation until improvement. Patient agreed and patient was admitted to inpatient team.  Discussed case with admitting physician, patient care transferred.   Plan:   CBC, BMP, LFTs, BNP, troponin, ECG, CXR  40 mg IV Lasix  Admit patient to hospitalist        ED RESULTS   Laboratory results:  Labs Reviewed   BASIC METABOLIC PANEL W/ REFLEX TO MG FOR LOW K - Abnormal; Notable for the following components:       Result Value    Glucose 109 (*)     All other components within normal limits   BRAIN NATRIURETIC PEPTIDE - Abnormal; Notable for the following components:    Pro-BNP 4255.0 (*)     All other components within normal limits   CBC WITH AUTO DIFFERENTIAL - Abnormal; Notable for the following components:    RDW-CV 15.5 (*)     RDW-SD 51.8 (*)     Platelets 151 (*)     All other components within normal limits   TROPONIN   GLOMERULAR FILTRATION RATE, ESTIMATED   ANION GAP   OSMOLALITY       Radiologic studies results:  XR CHEST PORTABLE   Final Result   Cardiomegaly            **This report has been created using voice recognition software. It may contain minor errors which are inherent in voice recognition technology. **      Final report electronically signed by Dr. Yanely Clemons on 11/23/2022 4:20 PM          ED Medications administered this visit:   Medications   furosemide (LASIX) injection 40 mg (40 mg IntraVENous Given 11/23/22 2358)         ED COURSE        MEDICATION CHANGES     New Prescriptions    No medications on file         FINAL DISPOSITION     Final diagnoses:   Acute on chronic congestive heart failure, unspecified heart failure type (HCC)     Condition: condition: fair  Dispo: Admit to inpatient      This transcription was electronically signed. Parts of this transcriptions may have been dictated by use of voice recognition software and electronically transcribed, and parts may have been transcribed with the assistance of an ED scribe. The transcription may contain errors not detected in proofreading. Please refer to my supervising physician's documentation if my documentation differs.     Electronically Signed: Elizabeth Bear MD, 11/23/22, 7:56 PM        Elizabeth Bear MD  Resident  11/23/22 1806

## 2022-11-24 PROBLEM — I25.9 ISCHEMIC HEART DISEASE DUE TO CORONARY ARTERY OBSTRUCTION (HCC): Status: ACTIVE | Noted: 2022-11-24

## 2022-11-24 PROBLEM — F41.8 DEPRESSION WITH ANXIETY: Status: ACTIVE | Noted: 2022-11-24

## 2022-11-24 PROBLEM — G47.00 INSOMNIA: Status: ACTIVE | Noted: 2022-11-24

## 2022-11-24 PROBLEM — I24.0 ISCHEMIC HEART DISEASE DUE TO CORONARY ARTERY OBSTRUCTION (HCC): Status: ACTIVE | Noted: 2022-11-24

## 2022-11-24 PROBLEM — I50.23 ACUTE ON CHRONIC HFREF (HEART FAILURE WITH REDUCED EJECTION FRACTION) (HCC): Status: ACTIVE | Noted: 2022-11-24

## 2022-11-24 LAB
ANION GAP SERPL CALCULATED.3IONS-SCNC: 9 MEQ/L (ref 8–16)
BASOPHILS # BLD: 1.2 %
BASOPHILS ABSOLUTE: 0.1 THOU/MM3 (ref 0–0.1)
BUN BLDV-MCNC: 13 MG/DL (ref 7–22)
CALCIUM SERPL-MCNC: 9.2 MG/DL (ref 8.5–10.5)
CHLORIDE BLD-SCNC: 106 MEQ/L (ref 98–111)
CO2: 26 MEQ/L (ref 23–33)
CREAT SERPL-MCNC: 1 MG/DL (ref 0.4–1.2)
EKG ATRIAL RATE: 55 BPM
EKG P AXIS: 16 DEGREES
EKG P-R INTERVAL: 130 MS
EKG Q-T INTERVAL: 462 MS
EKG QRS DURATION: 122 MS
EKG QTC CALCULATION (BAZETT): 441 MS
EKG R AXIS: 54 DEGREES
EKG T AXIS: -27 DEGREES
EKG VENTRICULAR RATE: 55 BPM
EOSINOPHIL # BLD: 2 %
EOSINOPHILS ABSOLUTE: 0.1 THOU/MM3 (ref 0–0.4)
ERYTHROCYTE [DISTWIDTH] IN BLOOD BY AUTOMATED COUNT: 15.4 % (ref 11.5–14.5)
ERYTHROCYTE [DISTWIDTH] IN BLOOD BY AUTOMATED COUNT: 50.5 FL (ref 35–45)
GFR SERPL CREATININE-BSD FRML MDRD: > 60 ML/MIN/1.73M2
GLUCOSE BLD-MCNC: 142 MG/DL (ref 70–108)
HCT VFR BLD CALC: 43.1 % (ref 42–52)
HEMOGLOBIN: 14 GM/DL (ref 14–18)
IMMATURE GRANS (ABS): 0.05 THOU/MM3 (ref 0–0.07)
IMMATURE GRANULOCYTES: 0.8 %
LYMPHOCYTES # BLD: 19 %
LYMPHOCYTES ABSOLUTE: 1.2 THOU/MM3 (ref 1–4.8)
MAGNESIUM: 1.9 MG/DL (ref 1.6–2.4)
MCH RBC QN AUTO: 29.5 PG (ref 26–33)
MCHC RBC AUTO-ENTMCNC: 32.5 GM/DL (ref 32.2–35.5)
MCV RBC AUTO: 90.9 FL (ref 80–94)
MONOCYTES # BLD: 6.9 %
MONOCYTES ABSOLUTE: 0.4 THOU/MM3 (ref 0.4–1.3)
NUCLEATED RED BLOOD CELLS: 0 /100 WBC
PHOSPHORUS: 3.8 MG/DL (ref 2.4–4.7)
PLATELET # BLD: 388 THOU/MM3 (ref 130–400)
PMV BLD AUTO: 10.8 FL (ref 9.4–12.4)
POTASSIUM SERPL-SCNC: 3.9 MEQ/L (ref 3.5–5.2)
RBC # BLD: 4.74 MILL/MM3 (ref 4.7–6.1)
SEG NEUTROPHILS: 70.1 %
SEGMENTED NEUTROPHILS ABSOLUTE COUNT: 4.6 THOU/MM3 (ref 1.8–7.7)
SODIUM BLD-SCNC: 141 MEQ/L (ref 135–145)
TROPONIN T: < 0.01 NG/ML
TROPONIN T: < 0.01 NG/ML
WBC # BLD: 6.5 THOU/MM3 (ref 4.8–10.8)

## 2022-11-24 PROCEDURE — 6370000000 HC RX 637 (ALT 250 FOR IP)

## 2022-11-24 PROCEDURE — 93010 ELECTROCARDIOGRAM REPORT: CPT | Performed by: INTERNAL MEDICINE

## 2022-11-24 PROCEDURE — 84484 ASSAY OF TROPONIN QUANT: CPT

## 2022-11-24 PROCEDURE — 2580000003 HC RX 258

## 2022-11-24 PROCEDURE — 83735 ASSAY OF MAGNESIUM: CPT

## 2022-11-24 PROCEDURE — 36415 COLL VENOUS BLD VENIPUNCTURE: CPT

## 2022-11-24 PROCEDURE — 6360000002 HC RX W HCPCS

## 2022-11-24 PROCEDURE — 84100 ASSAY OF PHOSPHORUS: CPT

## 2022-11-24 PROCEDURE — 1200000003 HC TELEMETRY R&B

## 2022-11-24 PROCEDURE — 85025 COMPLETE CBC W/AUTO DIFF WBC: CPT

## 2022-11-24 PROCEDURE — 99232 SBSQ HOSP IP/OBS MODERATE 35: CPT | Performed by: PHYSICIAN ASSISTANT

## 2022-11-24 PROCEDURE — 80048 BASIC METABOLIC PNL TOTAL CA: CPT

## 2022-11-24 RX ORDER — POTASSIUM CHLORIDE 7.45 MG/ML
10 INJECTION INTRAVENOUS PRN
Status: DISCONTINUED | OUTPATIENT
Start: 2022-11-24 | End: 2022-11-25

## 2022-11-24 RX ORDER — POTASSIUM CHLORIDE 20 MEQ/1
20 TABLET, EXTENDED RELEASE ORAL PRN
Status: DISCONTINUED | OUTPATIENT
Start: 2022-11-24 | End: 2022-11-25

## 2022-11-24 RX ORDER — POTASSIUM CHLORIDE 20 MEQ/1
40 TABLET, EXTENDED RELEASE ORAL PRN
Status: DISCONTINUED | OUTPATIENT
Start: 2022-11-24 | End: 2022-11-25

## 2022-11-24 RX ADMIN — FUROSEMIDE 40 MG: 10 INJECTION, SOLUTION INTRAMUSCULAR; INTRAVENOUS at 08:21

## 2022-11-24 RX ADMIN — HYDRALAZINE HYDROCHLORIDE 50 MG: 50 TABLET, FILM COATED ORAL at 08:21

## 2022-11-24 RX ADMIN — PANTOPRAZOLE SODIUM 40 MG: 40 TABLET, DELAYED RELEASE ORAL at 05:46

## 2022-11-24 RX ADMIN — HYDRALAZINE HYDROCHLORIDE 50 MG: 50 TABLET, FILM COATED ORAL at 19:52

## 2022-11-24 RX ADMIN — ASPIRIN 81 MG 81 MG: 81 TABLET ORAL at 08:21

## 2022-11-24 RX ADMIN — SACUBITRIL AND VALSARTAN 2 TABLET: 49; 51 TABLET, FILM COATED ORAL at 19:52

## 2022-11-24 RX ADMIN — SODIUM CHLORIDE, PRESERVATIVE FREE 5 ML: 5 INJECTION INTRAVENOUS at 19:52

## 2022-11-24 RX ADMIN — SACUBITRIL AND VALSARTAN 2 TABLET: 49; 51 TABLET, FILM COATED ORAL at 09:41

## 2022-11-24 RX ADMIN — ENOXAPARIN SODIUM 30 MG: 100 INJECTION SUBCUTANEOUS at 08:22

## 2022-11-24 RX ADMIN — ENOXAPARIN SODIUM 30 MG: 100 INJECTION SUBCUTANEOUS at 19:50

## 2022-11-24 RX ADMIN — SPIRONOLACTONE 25 MG: 25 TABLET ORAL at 08:21

## 2022-11-24 RX ADMIN — CLONAZEPAM 0.5 MG: 1 TABLET ORAL at 19:51

## 2022-11-24 RX ADMIN — CLOPIDOGREL BISULFATE 75 MG: 75 TABLET ORAL at 08:21

## 2022-11-24 RX ADMIN — SODIUM CHLORIDE, PRESERVATIVE FREE 10 ML: 5 INJECTION INTRAVENOUS at 08:23

## 2022-11-24 RX ADMIN — Medication 4.5 MG: at 19:50

## 2022-11-24 NOTE — ED NOTES
Phone call placed to [address] [place] [address] approved patient transport to room 8A15 in stable condition.      Zachary Litten, LPN  66/29/75 [id number]

## 2022-11-24 NOTE — PROGRESS NOTES
Hospitalist Progress Note      Patient:  Abhijit Kahn    Unit/Bed:8A-15/015-A  YOB: 1955  MRN: [id number]   Acct: [de-identified]   PCP: PA Khan CNP  Date of Admission: 11/23/2022    Assessment/Plan:    HFrEF, acute on chronic: 2/2 missed doses of diuretics. Pt was in [facility] and did not take diuretics, gained 20 pounds he states. IV diuretics ordered. Limited echo ordered. Daily Weights. I&Os. Continue home medications. CAD s/p CABG: Cardiac Stress test in 2012:  [address] on 5/2019: 2 stents in LCx/OM. On ASA 81 mg and Plavix 75 mg  Essential HTN: BP controlled. Continue home medications. GERD: PPI   Depression & Anxiety: Continue home medications. Chief Complaint: Swelling of the legs    Initial H and P:-    Initial H&P \"Kush Dumont is a 79 y.o. male with PMHx of HFrEF and CAD who presents to [address] [place] [address] with shortness of breath. Patient states this past Thursday, 11/17/2022 he was in [facility]. He progressively started to have shortness of breath. He states that he has missed a few doses of his Lasix. He has gained 20 pounds since Thursday. He endorses orthopnea and PND. He denies any chest pain. He does not normally count how much fluid he takes in daily. States yesterday his stomach felt tight. His cardiologist is Dr. Ethan Hyde. He states he is having regular bowel movements. He denies any fever, chills, nausea, vomiting, diarrhea, constipation, hematuria, dysuria, and abdominal pain. ED course: In the ED he was given one-time dose 40 mg IV Lasix. Chest x-ray showed cardiomegaly with some cephalization. \"     Subjective (past 24 hours):   Patient was admitted to the night. Patient is up in the chair doing well. Patient was eager to get home. It was explained to patient that he needs to have further diuresis prior to being discharged.   Patient's legs have improved per patient. Past medical history, family history, social history and allergies reviewed again and is unchanged since admission. ROS (All review of systems completed. Pertinent positives noted. Otherwise All other systems reviewed and negative.)     Medications:  Reviewed    Infusion Medications    sodium chloride       Scheduled Medications    sodium chloride flush  5-40 mL IntraVENous 2 times per day    enoxaparin  30 mg SubCUTAneous Q12H    furosemide  40 mg IntraVENous Daily    aspirin  81 mg Oral Daily    clonazePAM  0.5 mg Oral Nightly    clopidogrel  75 mg Oral Daily    sacubitril-valsartan  2 tablet Oral BID    hydrALAZINE  50 mg Oral BID    melatonin  4.5 mg Oral Nightly    metoprolol succinate  50 mg Oral Daily    pantoprazole  40 mg Oral Daily    spironolactone  25 mg Oral Daily     PRN Meds: potassium chloride **OR** potassium alternative oral replacement **OR** potassium chloride, potassium chloride, sodium chloride flush, sodium chloride, ondansetron **OR** ondansetron, polyethylene glycol, acetaminophen **OR** acetaminophen, magnesium sulfate      Intake/Output Summary (Last 24 hours) at 11/24/2022 1202  Last data filed at 11/24/2022 0549  Gross per 24 hour   Intake 300 ml   Output 1900 ml   Net -1600 ml       Diet:  ADULT DIET; Regular; Low Sodium (2 gm); 2000 ml    Physical Exam:  /72   Pulse 62   Temp 97.4 °F (36.3 °C) (Oral)   Resp 18   Ht 6' 1\" (1.854 m)   Wt 260 lb (117.9 kg)   SpO2 95%   BMI 34.30 kg/m²   General appearance: No apparent distress, appears stated age and cooperative. HEENT: Pupils equal, round, and reactive to light. Conjunctivae/corneas clear. Neck: Supple, with full range of motion. No jugular venous distention. Trachea midline. Respiratory:  Normal respiratory effort. Clear to auscultation, bilaterally without Rales/Wheezes/Rhonchi. Cardiovascular: Regular rate and rhythm with normal S1/S2 without murmurs, rubs or gallops.   Abdomen: Soft, non-tender, non-distended with normal bowel sounds. Musculoskeletal: passive and active ROM x 4 extremities. 1+ pitting edema   Skin: Skin color, texture, turgor normal.  No rashes or lesions. Neurologic:  Neurovascularly intact without any focal sensory/motor deficits. Cranial nerves: II-XII intact, grossly non-focal.  Psychiatric: Alert and oriented, thought content appropriate, normal insight  Capillary Refill: Brisk,< 3 seconds   Peripheral Pulses: +2 palpable, equal bilaterally     Labs:   Recent Labs     11/23/22  1555 11/24/22  0145   WBC 6.6 6.5   HGB 14.7 14.0   HCT 44.7 43.1   * 388     Recent Labs     11/23/22  1555 11/24/22  0145    141   K 3.9 3.9    106   CO2 26 26   BUN 13 13   CREATININE 1.1 1.0   CALCIUM 9.1 9.2   PHOS  --  3.8     No results for input(s): AST, ALT, BILIDIR, BILITOT, ALKPHOS in the last 72 hours. No results for input(s): INR in the last 72 hours. No results for input(s): Paras Bigness in the last 72 hours. Microbiology:    Blood culture #1: No results found for: BC    Blood culture #2:No results found for: Kaylynn John    Organism:No results found for: ORG    No results found for: LABGRAM    MRSA culture only:No results found for: [facility]    Urine culture: No results found for: LABURIN    Respiratory culture: No results found for: CULTRESP    Aerobic and Anaerobic :  No results found for: LABAERO  No results found for: LABANAE    Urinalysis:    No results found for: Clarise Leaver, BACTERIA, RBCUA, BLOODU, SPECGRAV, [address]    Radiology:  XR CHEST PORTABLE   Final Result   Cardiomegaly            **This report has been created using voice recognition software. It may contain minor errors which are inherent in voice recognition technology. **      Final report electronically signed by Dr. Buzz Covarrubias on 11/23/2022 4:20 PM        XR CHEST PORTABLE    Result Date: 11/23/2022  PROCEDURE: XR CHEST PORTABLE CLINICAL INFORMATION: SOB .  TECHNIQUE: Portable upright COMPARISON: No prior study. FINDINGS: Patient is rotated. Heart is enlarged in size. Mediastinum is prominent. No infiltrates, effusions or vascular congestion. Midline sternotomy from presumed prior CABG. EKG leads overlie the chest.     Cardiomegaly **This report has been created using voice recognition software. It may contain minor errors which are inherent in voice recognition technology. ** Final report electronically signed by Dr. Skylar Walter on 11/23/2022 4:20 PM      Electronically signed by ASHLEY Linares on 11/24/2022 at 12:02 PM

## 2022-11-24 NOTE — PLAN OF CARE
Problem: Discharge Planning  Goal: Discharge to home or other facility with appropriate resources  Outcome: Progressing     Problem: ABCDS Injury Assessment  Goal: Absence of physical injury  Outcome: Progressing     Problem: Safety - Adult  Goal: Free from fall injury  Outcome: Progressing   Care plan reviewed with patient. Patient verbalize understanding of the plan of care and contribute to goal setting.

## 2022-11-24 NOTE — PROGRESS NOTES
Pharmacist Review and Automatic Dose Adjustment of Prophylactic Enoxaparin    Reviewed reason(s) for admission/hospital problem list    The reviewing pharmacist has made an adjustment to the ordered enoxaparin dose or converted to UFH per the approved [facility] protocol and table as identified below. Dominic Petersen is a 79 y.o. male. Recent Labs     11/23/22  1555   CREATININE 1.1       Estimated Creatinine Clearance: 88 mL/min (based on SCr of 1.1 mg/dL). Recent Labs     11/23/22  1555   HGB 14.7   HCT 44.7   *     No results for input(s): INR in the last 72 hours.     Height:   Ht Readings from Last 1 Encounters:   11/23/22 6' 1\" (1.854 m)     Weight:  Wt Readings from Last 1 Encounters:   11/23/22 260 lb (117.9 kg)               Plan: Based upon the patient's weight and renal function    Ordered: Enoxaparin 40mg Daily    Changed/converted to    New Order: Enoxaparin 30mg SUBQ BID      Thank you,  Marvin Encarnacion, [facility]  11/23/2022, 8:13 PM

## 2022-11-24 NOTE — H&P
Internal Medicine Resident History and Physical          Patient: Ari Lai  : 1955  MRN: [id number]     Acct: [de-identified]    PCP: PA Macias CNP  Date of Admission: 2022  Date of Service: Pt seen/examined on 22  and Admitted to Inpatient with expected LOS greater than two midnights due to medical therapy. Assessment and Plan:  #Acute on Chronic HFrEF with EF 35 to 40% on  2021 , NYHA Class I 2/2 medication noncompliance: Active  Presented with dyspnea on exertion, orthopnea, and paroxysmal nocturnal dyspnea,  2+ bilateral lower extremity pitting edema and further supported by the physical exam findings of bilateral basilar rales on auscultation. Pro-BNP on 22 was 4255, no baseline available. CXR on 22 demonstrated cardiomegaly. GDMT at home:  Metoprolol, , spironolactone, , and sacubitril/valsartan (Entresto), . Diuretics at home: furosemide, . Plan:   -Hold home medication Lasix 20 mg and start Lasix 40 mg daily  -Continue home medication Entresto, spironolactone, metoprolol  -Lipid panel  -Magnesium  -Order pro-BNP every third day  -Order Limited 2D Echocardiogam  -Strict I&Os, Daily weights, low-sodium diet with 2000 mL fluid restriction  -Keep magnesium > 2 potassium > 4  -Potassium replacement and magnesium replacement protocol in place. #Obstructive CAD s/p CABG x 4 on  : Stable  Cardiac Stress test in :  [facility] on 2019: 2 stents in LCx/OM. On ASA 81 mg and Plavix 75 mg  Plan:  -Continue home meds ASA 81 and Plavix 75 mg    # Controlled  Primary HTN: Active  BP: 145/75 mmHg. HR: 59 beats/min.   Takes hydralazine, metoprolol, Entresto, spironolactone, furosemide at home  Plan:   -Continue home medication hydralazine    #GERD  Plan:   Continue home medication Protonix 40 mg daily    #Depression/anxiety/difficulty sleeping  Plan:   Sitting home medication clozapine and melatonin      =======================================================================      Chief Complaint:  Swelling of the legs    History Of Present Illness:  Cris Caldwell is a 79 y.o. male with PMHx of HFrEF and CAD who presents to [address] [place] [address] with shortness of breath. Patient states this past Thursday, 11/17/2022 he was in [employer]. He progressively started to have shortness of breath. He states that he has missed a few doses of his Lasix. He has gained 20 pounds since Thursday. He endorses orthopnea and PND. He denies any chest pain. He does not normally count how much fluid he takes in daily. States yesterday his stomach felt tight. His cardiologist is Dr. Arturo Ramírez. He states he is having regular bowel movements. He denies any fever, chills, nausea, vomiting, diarrhea, constipation, hematuria, dysuria, and abdominal pain. ED course: In the ED he was given one-time dose 40 mg IV Lasix. Chest x-ray showed cardiomegaly with some cephalization. Past Medical History:        Diagnosis Date    CAD (coronary artery disease)     Depression     GERD (gastroesophageal reflux disease)     Hypertension     Melanoma ([facility] [address]        Past Surgical History:        Procedure Laterality Date    CARDIAC SURGERY      CORONARY ANGIOPLASTY WITH STENT PLACEMENT  05/2019    DIAGNOSTIC CARDIAC CATH LAB PROCEDURE  12/16/2020    with PCI    SKIN CANCER EXCISION         Medications Prior to Admission:   Prior to Admission medications    Medication Sig Start Date End Date Taking? Authorizing Provider   clonazePAM (KLONOPIN) 0.5 MG tablet Take 0.5 mg by mouth nightly as needed. Yes Historical Provider, MD   isosorbide mononitrate (IMDUR) 120 MG extended release tablet Take 1 tablet by mouth in the morning. 8/11/22   Isaac Santacruz MD   metoprolol succinate (TOPROL XL) 50 MG extended release tablet Take 1 tablet by mouth in the morning.  8/11/22   Isaac Santacruz MD   clopidogrel (PLAVIX) 75 MG tablet Take 75 mg by mouth daily    Historical Provider, MD   nitroGLYCERIN (NITROSTAT) 0.4 MG SL tablet DISSOLVE ONE TABLET UNDER THE TONGUE EVERY 5 MINUTES AS NEEDED FOR CHEST PAIN. DO NOT EXCEED A TOTAL OF 3 DOSES IN 15 MINUTES 6/3/22   Camryn Charles MD   pantoprazole (PROTONIX) 40 MG tablet Take 1 tablet by mouth daily 1/4/21   Camryn Charles MD   melatonin 5 MG TABS tablet Take 5 mg by mouth nightly    Historical Provider, MD   hydrALAZINE (APRESOLINE) 50 MG tablet Take 1 tablet by mouth 2 times daily 12/11/20   [facility], APRN - CNP   furosemide (LASIX) 20 MG tablet Take 20 mg by mouth daily  7/17/19   Historical Provider, MD   spironolactone (ALDACTONE) 25 MG tablet Take 25 mg by mouth daily  7/22/19   Historical Provider, MD   ENTRESTO  MG per tablet 1 tablet 2 times daily  7/23/19   Historical Provider, MD   aspirin 81 MG tablet Take 81 mg by mouth daily    Historical Provider, MD   Multiple Vitamins-Minerals (THERAPEUTIC MULTIVITAMIN-MINERALS) tablet Take 1 tablet by mouth daily    Historical Provider, MD       Allergies:  Lipitor [atorvastatin]    Social History:    The patient currently lives at home. Tobacco use:   reports that he has quit smoking. He has quit using smokeless tobacco.  Alcohol use:   reports current alcohol use. Drug use:  reports no history of drug use. Family History:   as follows:      Problem Relation Age of Onset    Heart Disease Maternal Grandfather     Stroke Maternal Grandfather        Review of Systems:   Pertinent positives and negatives as noted in the HPI. Otherwise complete ROS negative. Physical Exam:    /85   Pulse 56   Temp 98.5 °F (36.9 °C) (Oral)   Resp 18   Ht 6' 1\" (1.854 m)   Wt 260 lb (117.9 kg)   SpO2 97%   BMI 34.30 kg/m²       General appearance: No apparent distress, appears stated age. Eyes:  Pupils equal, round, and reactive to light. Conjunctivae/corneas clear.  Eyes appear to be swollen  HENT: Head normal in appearance. External nares normal.  Oral mucosa moist without lesions. Hearing grossly intact. Neck: Supple, with full range of motion. Trachea midline. No gross JVD appreciated. Respiratory:  Normal respiratory effort. Clear to auscultation, bilaterally without rales or wheezes or rhonchi. Mild rales  Cardiovascular: Normal rate, regular rhythm with normal S1/S2 without murmurs. 2+ pitting edema  Abdomen: Soft, non-tender, non-distended with normal bowel sounds. Musculoskeletal: No joint swelling or tenderness. Normal tone. No abnormal movements. Skin: Warm and dry. No rashes or lesions. Neurologic:  No focal sensory/motor deficits in the upper and lower extremities. Cranial nerves:  grossly non-focal 2-12. Psychiatric: Alert and oriented, normal insight and thought content. Capillary Refill: Brisk,< 3 seconds. Peripheral Pulses: +2 palpable, equal bilaterally. Labs:     Recent Labs     11/23/22  1555   WBC 6.6   HGB 14.7   HCT 44.7   *     Recent Labs     11/23/22  1555      K 3.9      CO2 26   BUN 13   CREATININE 1.1   CALCIUM 9.1     No results for input(s): AST, ALT, BILIDIR, BILITOT, ALKPHOS in the last 72 hours. No results for input(s): INR in the last 72 hours. Recent Labs     11/23/22  1555   TROPONINT < 0.010     No results for input(s): PROCAL in the last 72 hours. No results found for: Hernández No, BACTERIA, RBCUA, BLOODU, [address], [address]      Radiology:     XR CHEST PORTABLE   Final Result   Cardiomegaly            **This report has been created using voice recognition software. It may contain minor errors which are inherent in voice recognition technology. **      Final report electronically signed by Dr. Osiris Rainey on 11/23/2022 4:20 PM             EKG:  I have reviewed the EKG with the following interpretation: Sinus bradycardia      PT/OT Eval Status:  will be assessed  Diet: ADULT DIET; Regular;  Low Sodium (2 gm); 2000 ml  DVT prophylaxis: Lovenox  Code Status: Full Code  Disposition: admit to [facility]    Thank you Catheline Romberg, APRN - ELLI for the opportunity to be involved in this patient's care. Electronically signed by Irwin Rutledge DO PGY1 on 11/23/2022 at 7:37 PM.     Case discussed with Attending, Dr. Mariaelena Juarez.

## 2022-11-24 NOTE — ED NOTES
Patient resting in bed. Respirations easy and unlabored. No distress noted. Call light within reach.        Robi Parks RN  11/23/22 2001

## 2022-11-25 LAB
ANION GAP SERPL CALCULATED.3IONS-SCNC: 12 MEQ/L (ref 8–16)
BASOPHILS # BLD: 1.1 %
BASOPHILS ABSOLUTE: 0.1 THOU/MM3 (ref 0–0.1)
BUN BLDV-MCNC: 15 MG/DL (ref 7–22)
CALCIUM SERPL-MCNC: 9.3 MG/DL (ref 8.5–10.5)
CHLORIDE BLD-SCNC: 103 MEQ/L (ref 98–111)
CO2: 26 MEQ/L (ref 23–33)
CREAT SERPL-MCNC: 1 MG/DL (ref 0.4–1.2)
EOSINOPHIL # BLD: 2.3 %
EOSINOPHILS ABSOLUTE: 0.1 THOU/MM3 (ref 0–0.4)
ERYTHROCYTE [DISTWIDTH] IN BLOOD BY AUTOMATED COUNT: 14.8 % (ref 11.5–14.5)
ERYTHROCYTE [DISTWIDTH] IN BLOOD BY AUTOMATED COUNT: 49.7 FL (ref 35–45)
GFR SERPL CREATININE-BSD FRML MDRD: > 60 ML/MIN/1.73M2
GLUCOSE BLD-MCNC: 115 MG/DL (ref 70–108)
HCT VFR BLD CALC: 44.9 % (ref 42–52)
HEMOGLOBIN: 14.4 GM/DL (ref 14–18)
IMMATURE GRANS (ABS): 0.02 THOU/MM3 (ref 0–0.07)
IMMATURE GRANULOCYTES: 0.4 %
LYMPHOCYTES # BLD: 19.2 %
LYMPHOCYTES ABSOLUTE: 1.1 THOU/MM3 (ref 1–4.8)
MAGNESIUM: 1.8 MG/DL (ref 1.6–2.4)
MCH RBC QN AUTO: 29.3 PG (ref 26–33)
MCHC RBC AUTO-ENTMCNC: 32.1 GM/DL (ref 32.2–35.5)
MCV RBC AUTO: 91.4 FL (ref 80–94)
MONOCYTES # BLD: 6.4 %
MONOCYTES ABSOLUTE: 0.4 THOU/MM3 (ref 0.4–1.3)
NUCLEATED RED BLOOD CELLS: 0 /100 WBC
PHOSPHORUS: 4.1 MG/DL (ref 2.4–4.7)
PLATELET # BLD: 341 THOU/MM3 (ref 130–400)
PMV BLD AUTO: 10.8 FL (ref 9.4–12.4)
POTASSIUM SERPL-SCNC: 4 MEQ/L (ref 3.5–5.2)
RBC # BLD: 4.91 MILL/MM3 (ref 4.7–6.1)
SEG NEUTROPHILS: 70.6 %
SEGMENTED NEUTROPHILS ABSOLUTE COUNT: 4 THOU/MM3 (ref 1.8–7.7)
SODIUM BLD-SCNC: 141 MEQ/L (ref 135–145)
WBC # BLD: 5.6 THOU/MM3 (ref 4.8–10.8)

## 2022-11-25 PROCEDURE — 83735 ASSAY OF MAGNESIUM: CPT

## 2022-11-25 PROCEDURE — 2580000003 HC RX 258

## 2022-11-25 PROCEDURE — 84100 ASSAY OF PHOSPHORUS: CPT

## 2022-11-25 PROCEDURE — 1200000003 HC TELEMETRY R&B

## 2022-11-25 PROCEDURE — 93306 TTE W/DOPPLER COMPLETE: CPT

## 2022-11-25 PROCEDURE — 99233 SBSQ HOSP IP/OBS HIGH 50: CPT | Performed by: INTERNAL MEDICINE

## 2022-11-25 PROCEDURE — 6360000002 HC RX W HCPCS

## 2022-11-25 PROCEDURE — 6370000000 HC RX 637 (ALT 250 FOR IP)

## 2022-11-25 PROCEDURE — 85025 COMPLETE CBC W/AUTO DIFF WBC: CPT

## 2022-11-25 PROCEDURE — 80048 BASIC METABOLIC PNL TOTAL CA: CPT

## 2022-11-25 PROCEDURE — 36415 COLL VENOUS BLD VENIPUNCTURE: CPT

## 2022-11-25 PROCEDURE — 6370000000 HC RX 637 (ALT 250 FOR IP): Performed by: INTERNAL MEDICINE

## 2022-11-25 RX ORDER — ISOSORBIDE MONONITRATE 60 MG/1
120 TABLET, EXTENDED RELEASE ORAL DAILY
Status: DISCONTINUED | OUTPATIENT
Start: 2022-11-25 | End: 2022-11-26 | Stop reason: HOSPADM

## 2022-11-25 RX ADMIN — ENOXAPARIN SODIUM 30 MG: 100 INJECTION SUBCUTANEOUS at 20:11

## 2022-11-25 RX ADMIN — SACUBITRIL AND VALSARTAN 2 TABLET: 49; 51 TABLET, FILM COATED ORAL at 07:55

## 2022-11-25 RX ADMIN — ASPIRIN 81 MG 81 MG: 81 TABLET ORAL at 07:53

## 2022-11-25 RX ADMIN — SODIUM CHLORIDE, PRESERVATIVE FREE 10 ML: 5 INJECTION INTRAVENOUS at 07:55

## 2022-11-25 RX ADMIN — SPIRONOLACTONE 25 MG: 25 TABLET ORAL at 07:55

## 2022-11-25 RX ADMIN — HYDRALAZINE HYDROCHLORIDE 50 MG: 50 TABLET, FILM COATED ORAL at 20:07

## 2022-11-25 RX ADMIN — ENOXAPARIN SODIUM 30 MG: 100 INJECTION SUBCUTANEOUS at 07:54

## 2022-11-25 RX ADMIN — SACUBITRIL AND VALSARTAN 2 TABLET: 49; 51 TABLET, FILM COATED ORAL at 20:06

## 2022-11-25 RX ADMIN — FUROSEMIDE 40 MG: 10 INJECTION, SOLUTION INTRAMUSCULAR; INTRAVENOUS at 07:53

## 2022-11-25 RX ADMIN — CLONAZEPAM 0.5 MG: 1 TABLET ORAL at 20:06

## 2022-11-25 RX ADMIN — PANTOPRAZOLE SODIUM 40 MG: 40 TABLET, DELAYED RELEASE ORAL at 05:56

## 2022-11-25 RX ADMIN — ISOSORBIDE MONONITRATE 120 MG: 60 TABLET, EXTENDED RELEASE ORAL at 11:09

## 2022-11-25 RX ADMIN — CLOPIDOGREL BISULFATE 75 MG: 75 TABLET ORAL at 07:54

## 2022-11-25 RX ADMIN — HYDRALAZINE HYDROCHLORIDE 50 MG: 50 TABLET, FILM COATED ORAL at 07:55

## 2022-11-25 RX ADMIN — Medication 4.5 MG: at 20:05

## 2022-11-25 RX ADMIN — SODIUM CHLORIDE, PRESERVATIVE FREE 10 ML: 5 INJECTION INTRAVENOUS at 20:12

## 2022-11-25 NOTE — PLAN OF CARE
Problem: Discharge Planning  Goal: Discharge to home or other facility with appropriate resources  11/25/2022 1135 by Demetri Sheriff RN  Outcome: Progressing  Flowsheets (Taken 11/25/2022 1135)  Discharge to home or other facility with appropriate resources: Identify barriers to discharge with patient and caregiver  Note: Barriers identified. Patient will discharge home with family when medically stable     Problem: ABCDS Injury Assessment  Goal: Absence of physical injury  11/25/2022 1135 by Demetri Sheriff RN  Outcome: Progressing  Flowsheets (Taken 11/25/2022 1135)  Absence of Physical Injury: Implement safety measures based on patient assessment  Note: Safety measures implemented. Patient voices understanding     Problem: Safety - Adult  Goal: Free from fall injury  11/25/2022 1135 by Demetri Sheriff RN  Outcome: Progressing  Flowsheets (Taken 11/25/2022 1135)  Free From Fall Injury: Instruct family/caregiver on patient safety  Note: Patient educated on facility policy for patient safety. Care plan reviewed with patient. Patient verbalize understanding of the plan of care and contribute to goal setting.

## 2022-11-25 NOTE — CARE COORDINATION
11/25/22, 7:55 AM EST      [address]  Admitted: 11/23/2022  Hospital Day: 2    Location: 8A-15/015-A Reason for admit: Heart failure ([facility] [I50.9]  Acute on chronic congestive heart failure, unspecified heart failure type ([facility] [I50.9]    Past Medical History:   Diagnosis Date    CAD (coronary artery disease)     Depression     GERD (gastroesophageal reflux disease)     Hypertension     Melanoma ([facility]        Procedure: No.   Barriers to Discharge: Admitted from ER with SOB. Hx CHF, CAD and CABG and stents. Rales noted and BNP elevated. I&O. Daily wts. 40 mg IV Lasix daily. Aldactone. On Entresto . Echo ordered. PCP: PA Gutierrez - CNP    Readmission Risk              Risk of Unplanned Readmission:  14         Patient's Healthcare Decision Maker: Legal Next of Kin Spouse. Patient Goals/Plan/Treatment Preferences: Met with pt today. He is from home with spouse and typically independent. He has no home services. He does with a c-pap. He has a PCP, he drives, he is insured and no issues getting meds. No discharge needs anticipated. Transportation/Food Security/Housekeeping Addressed: No issues identified. 11/25/22, 11:10 AM EST    Patient goals/plan/ treatment preferences discussed by  and . Patient goals/plan/ treatment preferences reviewed with patient/ family. Patient/ family verbalize understanding of discharge plan and are in agreement with goal/plan/treatment preferences. Understanding was demonstrated using the teach back method. AVS provided by RN at time of discharge, which includes all necessary medical information pertaining to the patients current course of illness, treatment, post-discharge goals of care, and treatment preferences. Services At/After Discharge: None       IMM Letter  IMM Letter given to Patient/Family/Significant other/Guardian/POA/by[de-identified] Evelyn STACK Case Manager.   IMM Letter date given[de-identified] 11/25/22  IMM Letter time given[de-identified] 3754     XOOORLTCCC discharge in next 24 hours. No discharge needs expected.

## 2022-11-25 NOTE — PROGRESS NOTES
Pt was anointed   11/25/22 1818   Encounter Summary   Encounter Overview/Reason  Initial Encounter   Service Provided For: Patient and family together   Referral/Consult From: [facility]   Last Encounter  11/25/22  (Anointed)   Complexity of Encounter Low   Spiritual/Emotional needs   Type Spiritual Support   Rituals, Rites and Sacraments   Type Anointing

## 2022-11-25 NOTE — PROGRESS NOTES
Nutrition Education  Pt admitted with HF, Pt mentions he was  camping in [place] & missed a few doses of Lasix PTA. He also mentions he doesn't add salt to foods but pt & friends were taking turns cooking while camping & some of his friends do cook with salt. Wife sometimes adds salt while cooking. Pt reports good appetite , tends to eat at [employer] [facility] [employer]. Ht\" 6' wt: 247# 9.6oz (112.3kgm) BMI=33.58. Pt mentions had CABG in 2012 & received heart healthy diet education then. Educated  Pt & daughter & law on CHF ( low sodium 2000 milliliter fluid restriction diet)   Learners: Patient and Family  Readiness: Acceptance  Method: Explanation and Handout  Response: Verbalizes Understanding  Contact name and number provided.     Christian Doyle RD, LD  Contact Number: [phone]

## 2022-11-25 NOTE — PROGRESS NOTES
Hospitalist      Patient:  Tea Bah    Unit/Bed:8A-15/015-A  YOB: 1955  MRN: [id number]   Acct: [de-identified]   PCP: PA Flores CNP  Date of Admission: 11/23/2022    Assessment/Plan:    HFrEF, acute on chronic: 2/2 missed doses of diuretics. Pt was in [place] and did not take diuretics, gained 20 pounds he states. IV diuretics ordered. Limited echo ordered. Daily Weights. I&Os. Continue home medications. CAD s/p CABG: Cardiac Stress test in 2012:  [facility] on 5/2019: 2 stents in LCx/OM. On ASA 81 mg and Plavix 75 mg  Essential HTN: BP controlled. Continue home medications. GERD: PPI   Depression & Anxiety: Continue home medications. Chief Complaint: Swelling of the legs    Initial H and P:-    Initial H&P \"Kush Candelario is a 79 y.o. male with PMHx of HFrEF and CAD who presents to [facility] with shortness of breath. Patient states this past Thursday, 11/17/2022 he was in [place]. He progressively started to have shortness of breath. He states that he has missed a few doses of his Lasix. He has gained 20 pounds since Thursday. He endorses orthopnea and PND. He denies any chest pain. He does not normally count how much fluid he takes in daily. States yesterday his stomach felt tight. His cardiologist is Dr. Guy Juarez. He states he is having regular bowel movements. He denies any fever, chills, nausea, vomiting, diarrhea, constipation, hematuria, dysuria, and abdominal pain. ED course: In the ED he was given one-time dose 40 mg IV Lasix. Chest x-ray showed cardiomegaly with some cephalization. \"     11/24  Patient was admitted to the night. Patient is up in the chair doing well. Patient was eager to get home. It was explained to patient that he needs to have further diuresis prior to being discharged. Patient's legs have improved per patient.     11.25.2022 potassium 4.0 creatinine 1.0, magnesium 1.8, glucose 115, troponins negative x3, free T4 1.17, I's and O's -840. We will restart Imdur 120 mg tablet. Limited echo is pending. Past medical history, family history, social history and allergies reviewed again and is unchanged since admission. ROS (All review of systems completed. Pertinent positives noted. Otherwise All other systems reviewed and negative.)     Medications:  Reviewed    Infusion Medications    sodium chloride       Scheduled Medications    sodium chloride flush  5-40 mL IntraVENous 2 times per day    enoxaparin  30 mg SubCUTAneous Q12H    furosemide  40 mg IntraVENous Daily    aspirin  81 mg Oral Daily    clonazePAM  0.5 mg Oral Nightly    clopidogrel  75 mg Oral Daily    sacubitril-valsartan  2 tablet Oral BID    hydrALAZINE  50 mg Oral BID    melatonin  4.5 mg Oral Nightly    metoprolol succinate  50 mg Oral Daily    pantoprazole  40 mg Oral Daily    spironolactone  25 mg Oral Daily     PRN Meds: potassium chloride **OR** potassium alternative oral replacement **OR** potassium chloride, potassium chloride, sodium chloride flush, sodium chloride, ondansetron **OR** ondansetron, polyethylene glycol, acetaminophen **OR** acetaminophen, magnesium sulfate      Intake/Output Summary (Last 24 hours) at 11/25/2022 1001  Last data filed at 11/25/2022 0615  Gross per 24 hour   Intake 1260 ml   Output 500 ml   Net 760 ml       Diet:  ADULT DIET; Regular; Low Sodium (2 gm); 2000 ml    Physical Exam:  BP (!) 147/74   Pulse 58   Temp 98 °F (36.7 °C) (Oral)   Resp 16   Ht 6' (1.829 m)   Wt 247 lb 9.6 oz (112.3 kg)   SpO2 95%   BMI 33.58 kg/m²   General appearance: No apparent distress, appears stated age and cooperative. HEENT: Pupils equal, round, and reactive to light. Conjunctivae/corneas clear. Neck: Supple, with full range of motion. No jugular venous distention. Trachea midline. Respiratory:  Normal respiratory effort.  Clear to auscultation, bilaterally without Rales/Wheezes/Rhonchi. Cardiovascular: Regular rate and rhythm with normal S1/S2 without murmurs, rubs or gallops. Abdomen: Soft, non-tender, non-distended with normal bowel sounds. Musculoskeletal: passive and active ROM x 4 extremities. 1+ pitting edema   Skin: Skin color, texture, turgor normal.  No rashes or lesions. Neurologic:  Neurovascularly intact without any focal sensory/motor deficits. Cranial nerves: II-XII intact, grossly non-focal.  Psychiatric: Alert and oriented, thought content appropriate, normal insight  Capillary Refill: Brisk,< 3 seconds   Peripheral Pulses: +2 palpable, equal bilaterally     Labs:   Recent Labs     11/23/22  1555 11/24/22  0145 11/25/22  0554   WBC 6.6 6.5 5.6   HGB 14.7 14.0 14.4   HCT 44.7 43.1 44.9   * 388 341     Recent Labs     11/23/22  1555 11/24/22  0145 11/25/22  0554    141 141   K 3.9 3.9 4.0    106 103   CO2 26 26 26   BUN 13 13 15   CREATININE 1.1 1.0 1.0   CALCIUM 9.1 9.2 9.3   PHOS  --  3.8 4.1     No results for input(s): AST, ALT, BILIDIR, BILITOT, ALKPHOS in the last 72 hours. No results for input(s): INR in the last 72 hours. No results for input(s): Robert Trace in the last 72 hours. Microbiology:    Blood culture #1: No results found for: BC    Blood culture #2:No results found for: Chuck Miranda    Organism:No results found for: ORG    No results found for: LABGRAM    MRSA culture only:No results found for: [facility]    Urine culture: No results found for: LABURIN    Respiratory culture: No results found for: CULTRESP    Aerobic and Anaerobic :  No results found for: LABAERO  No results found for: LABANAE    Urinalysis:    No results found for: Pavel Bae, BACTERIA, RBCUA, BLOODU, SPECGRAV, [address]    Radiology:  XR CHEST PORTABLE   Final Result   Cardiomegaly            **This report has been created using voice recognition software. It may contain minor errors which are inherent in voice recognition technology. ** Final report electronically signed by Dr. Radha Burgos on 11/23/2022 4:20 PM        XR CHEST PORTABLE    Result Date: 11/23/2022  PROCEDURE: XR CHEST PORTABLE CLINICAL INFORMATION: SOB . TECHNIQUE: Portable upright COMPARISON: No prior study. FINDINGS: Patient is rotated. Heart is enlarged in size. Mediastinum is prominent. No infiltrates, effusions or vascular congestion. Midline sternotomy from presumed prior CABG. EKG leads overlie the chest.     Cardiomegaly **This report has been created using voice recognition software. It may contain minor errors which are inherent in voice recognition technology. ** Final report electronically signed by Dr. Radha Burgos on 11/23/2022 4:20 PM      Electronically signed by Phill Wilkinson DO on 11/25/2022 at 10:01 AM

## 2022-11-25 NOTE — PLAN OF CARE
Problem: Communication  Goal: The ability to communicate needs accurately and effectively will improve  Outcome: PROGRESSING AS EXPECTED   Educated pt on plan of care, scheduled/prn medications. Educated on elevation, heat/cold packs. Educated on NPO at midnight for possible procedure. Pt acknowledges understanding - needs reinforcement on elevation as he refuses d/t pain. Educated on use of call light. Pt able to make needs known.    Problem: Pain Management  Goal: Pain level will decrease to patient's comfort goal  Outcome: PROGRESSING AS EXPECTED   Pt complains of pain to BLE. Pt has prn pain medication available for pain control.    Problem: Discharge Planning  Goal: Discharge to home or other facility with appropriate resources  Outcome: Progressing     Problem: ABCDS Injury Assessment  Goal: Absence of physical injury  Outcome: Progressing     Problem: Safety - Adult  Goal: Free from fall injury  Outcome: Progressing   Care plan reviewed with patient. Patient  verbalize understanding of the plan of care and contribute to goal setting.

## 2022-11-26 VITALS
SYSTOLIC BLOOD PRESSURE: 123 MMHG | OXYGEN SATURATION: 96 % | HEIGHT: 72 IN | TEMPERATURE: 97.9 F | BODY MASS INDEX: 33.4 KG/M2 | WEIGHT: 246.6 LBS | HEART RATE: 55 BPM | DIASTOLIC BLOOD PRESSURE: 62 MMHG | RESPIRATION RATE: 16 BRPM

## 2022-11-26 LAB
ANION GAP SERPL CALCULATED.3IONS-SCNC: 10 MEQ/L (ref 8–16)
BASOPHILS # BLD: 0.9 %
BASOPHILS ABSOLUTE: 0.1 THOU/MM3 (ref 0–0.1)
BUN BLDV-MCNC: 16 MG/DL (ref 7–22)
CALCIUM SERPL-MCNC: 9.4 MG/DL (ref 8.5–10.5)
CHLORIDE BLD-SCNC: 104 MEQ/L (ref 98–111)
CO2: 25 MEQ/L (ref 23–33)
CREAT SERPL-MCNC: 1 MG/DL (ref 0.4–1.2)
EOSINOPHIL # BLD: 2.2 %
EOSINOPHILS ABSOLUTE: 0.1 THOU/MM3 (ref 0–0.4)
ERYTHROCYTE [DISTWIDTH] IN BLOOD BY AUTOMATED COUNT: 14.8 % (ref 11.5–14.5)
ERYTHROCYTE [DISTWIDTH] IN BLOOD BY AUTOMATED COUNT: 50.6 FL (ref 35–45)
GFR SERPL CREATININE-BSD FRML MDRD: > 60 ML/MIN/1.73M2
GLUCOSE BLD-MCNC: 112 MG/DL (ref 70–108)
HCT VFR BLD CALC: 47.1 % (ref 42–52)
HEMOGLOBIN: 15.3 GM/DL (ref 14–18)
IMMATURE GRANS (ABS): 0.02 THOU/MM3 (ref 0–0.07)
IMMATURE GRANULOCYTES: 0.3 %
LYMPHOCYTES # BLD: 18.2 %
LYMPHOCYTES ABSOLUTE: 1.2 THOU/MM3 (ref 1–4.8)
MAGNESIUM: 1.8 MG/DL (ref 1.6–2.4)
MCH RBC QN AUTO: 29.9 PG (ref 26–33)
MCHC RBC AUTO-ENTMCNC: 32.5 GM/DL (ref 32.2–35.5)
MCV RBC AUTO: 92.2 FL (ref 80–94)
MONOCYTES # BLD: 6.4 %
MONOCYTES ABSOLUTE: 0.4 THOU/MM3 (ref 0.4–1.3)
NUCLEATED RED BLOOD CELLS: 0 /100 WBC
PLATELET # BLD: 370 THOU/MM3 (ref 130–400)
PMV BLD AUTO: 10.5 FL (ref 9.4–12.4)
POTASSIUM SERPL-SCNC: 4.3 MEQ/L (ref 3.5–5.2)
PRO-BNP: 2694 PG/ML (ref 0–900)
RBC # BLD: 5.11 MILL/MM3 (ref 4.7–6.1)
SEG NEUTROPHILS: 72 %
SEGMENTED NEUTROPHILS ABSOLUTE COUNT: 4.6 THOU/MM3 (ref 1.8–7.7)
SODIUM BLD-SCNC: 139 MEQ/L (ref 135–145)
WBC # BLD: 6.4 THOU/MM3 (ref 4.8–10.8)

## 2022-11-26 PROCEDURE — 6370000000 HC RX 637 (ALT 250 FOR IP): Performed by: INTERNAL MEDICINE

## 2022-11-26 PROCEDURE — 6370000000 HC RX 637 (ALT 250 FOR IP)

## 2022-11-26 PROCEDURE — 83880 ASSAY OF NATRIURETIC PEPTIDE: CPT

## 2022-11-26 PROCEDURE — 85025 COMPLETE CBC W/AUTO DIFF WBC: CPT

## 2022-11-26 PROCEDURE — 2580000003 HC RX 258

## 2022-11-26 PROCEDURE — 83735 ASSAY OF MAGNESIUM: CPT

## 2022-11-26 PROCEDURE — 6360000002 HC RX W HCPCS

## 2022-11-26 PROCEDURE — 36415 COLL VENOUS BLD VENIPUNCTURE: CPT

## 2022-11-26 PROCEDURE — 80048 BASIC METABOLIC PNL TOTAL CA: CPT

## 2022-11-26 RX ADMIN — SACUBITRIL AND VALSARTAN 2 TABLET: 49; 51 TABLET, FILM COATED ORAL at 08:57

## 2022-11-26 RX ADMIN — FUROSEMIDE 40 MG: 10 INJECTION, SOLUTION INTRAMUSCULAR; INTRAVENOUS at 08:56

## 2022-11-26 RX ADMIN — ENOXAPARIN SODIUM 30 MG: 100 INJECTION SUBCUTANEOUS at 08:56

## 2022-11-26 RX ADMIN — SODIUM CHLORIDE, PRESERVATIVE FREE 10 ML: 5 INJECTION INTRAVENOUS at 08:59

## 2022-11-26 RX ADMIN — HYDRALAZINE HYDROCHLORIDE 50 MG: 50 TABLET, FILM COATED ORAL at 08:57

## 2022-11-26 RX ADMIN — ISOSORBIDE MONONITRATE 120 MG: 60 TABLET, EXTENDED RELEASE ORAL at 08:57

## 2022-11-26 RX ADMIN — CLOPIDOGREL BISULFATE 75 MG: 75 TABLET ORAL at 08:56

## 2022-11-26 RX ADMIN — PANTOPRAZOLE SODIUM 40 MG: 40 TABLET, DELAYED RELEASE ORAL at 06:03

## 2022-11-26 RX ADMIN — ASPIRIN 81 MG 81 MG: 81 TABLET ORAL at 08:56

## 2022-11-26 RX ADMIN — SPIRONOLACTONE 25 MG: 25 TABLET ORAL at 08:58

## 2022-11-26 NOTE — CARE COORDINATION
11/26/22, 2:00 PM EST    DISCHARGE ON GOING EVALUATION    [facility] day: 3  Location: 8A-15/015-A Reason for admit: Heart failure [facility]) [I50.9]  Acute on chronic congestive heart failure, unspecified heart failure type ([facility] [address] [I50.9]   Procedure:   11/25/22  Echo: EF 30 %  Barriers to Discharge: low EF per Dr Sonia Hauser; requires life vest.  PCP: PA Macias CNP  Readmission Risk Score: 8%  Patient Goals/Plan/Treatment Preferences:   Glen RAMIREZ called and updated this  CM that the pt will need Life vest.   Spoke with Anthony Campos at [facility] life vest; all required info faxed. Patient okay to be discharged home and to be fitted with life vest at home per Esperanza RAMIREZ. Home with spouse and declined needs. 11/26/22, 2:03 PM EST    Patient goals/plan/ treatment preferences discussed by  and . Patient goals/plan/ treatment preferences reviewed with patient/ family. Patient/ family verbalize understanding of discharge plan and are in agreement with goal/plan/treatment preferences. Understanding was demonstrated using the teach back method. AVS provided by RN at time of discharge, which includes all necessary medical information pertaining to the patients current course of illness, treatment, post-discharge goals of care, and treatment preferences. Services At/After Discharge: None       IMM Letter  IMM Letter given to Patient/Family/Significant other/Guardian/POA/by[de-identified] Evelyn STACK Case Manager.   IMM Letter date given[de-identified] 11/25/22  IMM Letter time given[de-identified] 9997

## 2022-11-26 NOTE — PROGRESS NOTES
Hospitalist Progress Note    Patient:  Mahsa Paredes      Unit/Bed:8A-15/015-A    YOB: 1955    MRN: [id number]       Acct: [de-identified]     PCP: PA Garland CNP    Date of Admission: 11/23/2022    Assessment/Plan:        Hospital Course:        Chief Complaint: ***      Subjective: 79 y.o. male admitted to the hospitalist service for ***      Medications:    Infusion Medications    sodium chloride       Scheduled Medications    isosorbide mononitrate  120 mg Oral Daily    sodium chloride flush  5-40 mL IntraVENous 2 times per day    enoxaparin  30 mg SubCUTAneous Q12H    furosemide  40 mg IntraVENous Daily    aspirin  81 mg Oral Daily    clonazePAM  0.5 mg Oral Nightly    clopidogrel  75 mg Oral Daily    sacubitril-valsartan  2 tablet Oral BID    hydrALAZINE  50 mg Oral BID    melatonin  4.5 mg Oral Nightly    metoprolol succinate  50 mg Oral Daily    pantoprazole  40 mg Oral Daily    spironolactone  25 mg Oral Daily     PRN Meds: sodium chloride flush, sodium chloride, ondansetron **OR** ondansetron, polyethylene glycol, acetaminophen **OR** acetaminophen, magnesium sulfate      Intake/Output Summary (Last 24 hours) at 11/26/2022 0933  Last data filed at 11/26/2022 0540  Gross per 24 hour   Intake 300 ml   Output 2050 ml   Net -1750 ml       Diet:  ADULT DIET; Regular;  Low Sodium (2 gm); 2000 ml    Review of Systems     Exam:  /69   Pulse 58   Temp 98 °F (36.7 °C) (Oral)   Resp 16   Ht 6' (1.829 m)   Wt 246 lb 9.6 oz (111.9 kg)   SpO2 92%   BMI 33.44 kg/m²     Physical Exam       Labs:   Recent Labs     11/24/22 0145 11/25/22  0554 11/26/22  0633   WBC 6.5 5.6 6.4   HGB 14.0 14.4 15.3   HCT 43.1 44.9 47.1    341 370     Recent Labs     11/24/22 0145 11/25/22  0554 11/26/22  0633    141 139   K 3.9 4.0 4.3    103 104   CO2 26 26 25   BUN 13 15 16   CREATININE 1.0 1.0 1.0   CALCIUM 9.2 9.3 9.4   PHOS 3.8 4.1  --      No results for input(s): AST, ALT, BILIDIR, BILITOT, ALKPHOS in the last 72 hours. No results for input(s): INR in the last 72 hours. No results for input(s): Lattie Edith in the last 72 hours. Urinalysis:    No results found for: Tangela Beans, BACTERIA, RBCUA, BLOODU, Viann Avinash, [address]    Radiology:  XR CHEST PORTABLE   Final Result   Cardiomegaly            **This report has been created using voice recognition software. It may contain minor errors which are inherent in voice recognition technology. **      Final report electronically signed by Dr. Keenan Dumont on 11/23/2022 4:20 PM          Diet: ADULT DIET; Regular;  Low Sodium (2 gm); 2000 ml    DVT prophylaxis: [] Lovenox                                 [] SCDs                                 [] SQ Heparin                                 [] Encourage ambulation           [] Already on Anticoagulation     Disposition:    [] Home       [] TCU       [] Rehab       [] Psych       [] SNF       [] [place]       [] Other-    Code Status: Full Code    PT/OT Eval: ***        Electronically signed by Lucero Amor PA-C on 11/26/2022 at 9:33 AM

## 2022-11-26 NOTE — PLAN OF CARE
Case discussed with Dr. Ravinder Carter regarding cardiology consultation. Patient reports he has follow up with Dr. Del Castillo Aus the first week of February. Dr. Ravinder Carter felt patient was appropriate for further outpatient work up but he does recommend that patient receive a life vest. Patient comfortable being discharged home prior to being fitted for the life vest.  Irving Peter assisting with arranging patient to be fitted for life vest at home. Indications are reduced ejection fraction estimated at 30% and moderately increased left ventricle size.      Electronically signed by Junior Ospina PA-C on 11/26/2022 at 12:50 PM

## 2022-11-29 ENCOUNTER — TELEPHONE (OUTPATIENT)
Dept: CARDIOLOGY CLINIC | Age: 67
End: 2022-11-29

## 2022-11-29 NOTE — TELEPHONE ENCOUNTER
Received IP CHF consult. Called to patient, wife answered.  Pt not available, will have pt call office back

## 2022-12-06 ENCOUNTER — OFFICE VISIT (OUTPATIENT)
Dept: CARDIOLOGY CLINIC | Age: 67
End: 2022-12-06
Payer: MEDICARE

## 2022-12-06 VITALS
SYSTOLIC BLOOD PRESSURE: 130 MMHG | BODY MASS INDEX: 33.4 KG/M2 | DIASTOLIC BLOOD PRESSURE: 78 MMHG | HEIGHT: 73 IN | HEART RATE: 60 BPM | WEIGHT: 252 LBS

## 2022-12-06 DIAGNOSIS — E78.5 HYPERLIPIDEMIA, UNSPECIFIED HYPERLIPIDEMIA TYPE: ICD-10-CM

## 2022-12-06 DIAGNOSIS — I50.20 HFREF (HEART FAILURE WITH REDUCED EJECTION FRACTION) (HCC): ICD-10-CM

## 2022-12-06 DIAGNOSIS — I10 PRIMARY HYPERTENSION: ICD-10-CM

## 2022-12-06 DIAGNOSIS — I50.23 ACUTE ON CHRONIC HFREF (HEART FAILURE WITH REDUCED EJECTION FRACTION) (HCC): ICD-10-CM

## 2022-12-06 DIAGNOSIS — I25.10 CAD IN NATIVE ARTERY: ICD-10-CM

## 2022-12-06 DIAGNOSIS — I25.5 ISCHEMIC CARDIOMYOPATHY: Primary | ICD-10-CM

## 2022-12-06 PROCEDURE — 3078F DIAST BP <80 MM HG: CPT | Performed by: NURSE PRACTITIONER

## 2022-12-06 PROCEDURE — 3074F SYST BP LT 130 MM HG: CPT | Performed by: NURSE PRACTITIONER

## 2022-12-06 PROCEDURE — 1111F DSCHRG MED/CURRENT MED MERGE: CPT | Performed by: NURSE PRACTITIONER

## 2022-12-06 PROCEDURE — 1036F TOBACCO NON-USER: CPT | Performed by: NURSE PRACTITIONER

## 2022-12-06 PROCEDURE — G8484 FLU IMMUNIZE NO ADMIN: HCPCS | Performed by: NURSE PRACTITIONER

## 2022-12-06 PROCEDURE — 3017F COLORECTAL CA SCREEN DOC REV: CPT | Performed by: NURSE PRACTITIONER

## 2022-12-06 PROCEDURE — 99213 OFFICE O/P EST LOW 20 MIN: CPT | Performed by: NURSE PRACTITIONER

## 2022-12-06 PROCEDURE — 1123F ACP DISCUSS/DSCN MKR DOCD: CPT | Performed by: NURSE PRACTITIONER

## 2022-12-06 PROCEDURE — G8417 CALC BMI ABV UP PARAM F/U: HCPCS | Performed by: NURSE PRACTITIONER

## 2022-12-06 PROCEDURE — G8427 DOCREV CUR MEDS BY ELIG CLIN: HCPCS | Performed by: NURSE PRACTITIONER

## 2022-12-06 NOTE — PROGRESS NOTES
[address] [address] [address] [address]  Dept: [phone]  Dept Fax: [phone]  Loc: [phone]    Visit Date: 12/6/2022    Primary Cardiologist: Samantha Ponce MD    Mr. Drooteo Lofton is a 79 y.o. male  who presented for: follow-up      HPI:   HPI   To ED 11/23/22 for SOB. Per ED note:    Per patient he recently returned from a hunting trip in [place] on Sunday where he was hunting for [employer]. Patient described during this time he had progressive dyspnea with exertion. Patient initially stated that he was compliant with his medications but then later admitted he was not completely compliant with his medications while he was hunting. Patient initially thought that shortness of breath was secondary to altitude change from being out in high altitude in [place] but patient has been back for 3 days and the symptoms are not resolved. He stated that he had decreased energy during the trip and was going to bed early every evening. Patient denied any chest pain, nausea, vomiting, bowel changes, urinary changes during this time. The patient has no other acute complaints at this time. Physical assessment (+) rales and peripheral edema. Rx with 40 IV Lasix and admitted. Echo 11/25/22: EF 30% - LV ordered at d/c. Last seen in office on 6/3/22 per Dr. Azul Gillespie. Per office note:  78 yo M s/p PCI of SVG to RI with JACQUE presents for follow-up. Problems sleeping but otherwise no issues. No chest pain, angina, SERRANO, orthopnea, PND, sob at rest, LE edema, or syncope. Meds without issues. No bleeding. He used NTG SL for tremors. Not true chest pain. He may feel as if anxiety is coming on. He cannot describe it. Usually, when he is bending over he feels the jitteriness. Usually rest helps after bending over.     Assessment/Plan   S/p SVG-RI, 12/2020  Chronic LVSF, EF 35-40%, NYHA II  PCI of the LCX/OM, 5/2019 - DAPT  Occluded SVG-OM  CABG x 4, 2012  HTN  Taking all meds, did take NTG SL prn, but not persistent and was an infrequent episode. Not truly chest pain. DAPT on going. He will manage his position changes--may be related to Imdur. He is otherwise on medical therapy. Compression stockings. No bleeding. FLP to be done. Discussed diet/exercise/BP/weight loss/health lifestyle choices/lipids; the patient understands the goals and will try to comply. Disposition: 1 year        Current Outpatient Medications:     clonazePAM (KLONOPIN) 0.5 MG tablet, Take 0.5 mg by mouth nightly as needed. , Disp: , Rfl:     isosorbide mononitrate (IMDUR) 120 MG extended release tablet, Take 1 tablet by mouth in the morning., Disp: 90 tablet, Rfl: 3    metoprolol succinate (TOPROL XL) 50 MG extended release tablet, Take 1 tablet by mouth in the morning., Disp: 90 tablet, Rfl: 3    clopidogrel (PLAVIX) 75 MG tablet, Take 75 mg by mouth daily, Disp: , Rfl:     nitroGLYCERIN (NITROSTAT) 0.4 MG SL tablet, DISSOLVE ONE TABLET UNDER THE TONGUE EVERY 5 MINUTES AS NEEDED FOR CHEST PAIN.   DO NOT EXCEED A TOTAL OF 3 DOSES IN 15 MINUTES, Disp: 25 tablet, Rfl: 3    pantoprazole (PROTONIX) 40 MG tablet, Take 1 tablet by mouth daily, Disp: 90 tablet, Rfl: 3    melatonin 5 MG TABS tablet, Take 5 mg by mouth nightly, Disp: , Rfl:     hydrALAZINE (APRESOLINE) 50 MG tablet, Take 1 tablet by mouth 2 times daily, Disp: 180 tablet, Rfl: 1    furosemide (LASIX) 20 MG tablet, Take 20 mg by mouth daily , Disp: , Rfl:     spironolactone (ALDACTONE) 25 MG tablet, Take 25 mg by mouth daily , Disp: , Rfl:     ENTRESTO  MG per tablet, 1 tablet 2 times daily , Disp: , Rfl:     aspirin 81 MG tablet, Take 81 mg by mouth daily, Disp: , Rfl:     Multiple Vitamins-Minerals (THERAPEUTIC MULTIVITAMIN-MINERALS) tablet, Take 1 tablet by mouth daily, Disp: , Rfl:     Past Medical History  Angel Gallagher  has a past medical history of CAD (coronary artery disease), Depression, GERD (gastroesophageal reflux disease), Hypertension, and Melanoma ([facility] [address] Social History  Jesica Vogt  reports that he has quit smoking. He has quit using smokeless tobacco. He reports current alcohol use. He reports that he does not use drugs. Family History  Jesica Vogt family history includes Heart Disease in his maternal grandfather; Stroke in his maternal grandfather. There is no family history of bicuspid aortic valve, aneurysms, heart transplant, pacemakers, defibrillators, or sudden cardiac death. Past Surgical History   Past Surgical History:   Procedure Laterality Date    CARDIAC SURGERY      CORONARY ANGIOPLASTY WITH STENT PLACEMENT  05/2019    DIAGNOSTIC CARDIAC CATH LAB PROCEDURE  12/16/2020    with PCI    SKIN CANCER EXCISION       Today's visit:   Subjective:  Swelling down - weight stable  Wearing the LifeVest - no shocks  No chest pain or pressure  No issues with breathing - stable  Tolerating meds -compliant  No lightheadedness or dizziness; no syncope or near syncope  No bleeding on Plavix and ASA  CHF clinic appt pending    Review of Systems   Constitutional: Negative for chills and fever  HENT: Negative for congestion, sinus pressure, sneezing and sore throat. Eyes: Negative for pain, discharge, redness and itching. Respiratory: Negative for PND, orthopnea, cough  Gastrointestinal: Negative for blood in stool, constipation, diarrhea   Endocrine: Negative for cold intolerance, heat intolerance, polydipsia. Genitourinary: Negative for dysuria, hematuria. Musculoskeletal: Negative for arthralgias, joint swelling and neck pain. Neurological: Negative for numbness and headaches. Psychiatric/Behavioral: Negative for agitation, confusion, decreased concentration and dysphoric mood.      Objective:     /78   Pulse 60   Ht 6' 1\" (1.854 m)   Wt 252 lb (114.3 kg)   BMI 33.25 kg/m²     Wt Readings from Last 3 Encounters:   12/06/22 252 lb (114.3 kg)   11/25/22 246 lb 9.6 oz (111.9 kg)   06/03/22 260 lb (117.9 kg)     BP Readings from Last 3 Encounters:   12/06/22 130/78   11/26/22 123/62   06/03/22 114/72       Nursing note and vitals reviewed. Physical Exam   Constitutional: Oriented to person, place, and time. Appears well-developed and well-nourished. Appears well, no distress  HENT:   Head: Normocephalic and atraumatic. Eyes: EOM are normal. Pupils are equal, round, and reactive to light. Neck: Normal range of motion. Neck supple. No JVD present. Cardiovascular: Normal rate, regular rhythm, normal heart sounds and intact distal pulses. (+) murmur heard 2nd ICS RSB. Wearing LV. Pulmonary/Chest: Effort normal and breath sounds normal. No respiratory distress. No wheezes. No rales. Abdominal: Soft. Bowel sounds are normal. No distension. There is no tenderness. Musculoskeletal: Normal range of motion. No edema. Neurological: Alert and oriented to person, place, and time. No cranial nerve deficit. Coordination normal.   Skin: Skin is warm and dry. Psychiatric: Normal mood and affect.        No results found for: CKTOTAL, CKMB, CKMBINDEX    Lab Results   Component Value Date/Time    WBC 6.4 11/26/2022 06:33 AM    RBC 5.11 11/26/2022 06:33 AM    HGB 15.3 11/26/2022 06:33 AM    HCT 47.1 11/26/2022 06:33 AM    MCV 92.2 11/26/2022 06:33 AM    MCH 29.9 11/26/2022 06:33 AM    MCHC 32.5 11/26/2022 06:33 AM     11/26/2022 06:33 AM    MPV 10.5 11/26/2022 06:33 AM       Lab Results   Component Value Date/Time     11/26/2022 06:33 AM    K 4.3 11/26/2022 06:33 AM    K 3.9 11/23/2022 03:55 PM     11/26/2022 06:33 AM    CO2 25 11/26/2022 06:33 AM    BUN 16 11/26/2022 06:33 AM    LABALBU 4.3 12/16/2020 11:49 AM    CREATININE 1.0 11/26/2022 06:33 AM    CALCIUM 9.4 11/26/2022 06:33 AM    LABGLOM >60 11/25/2022 06:01 PM    GLUCOSE 112 11/26/2022 06:33 AM       Lab Results   Component Value Date/Time    ALKPHOS 49 12/16/2020 11:49 AM    ALT 13 12/16/2020 11:49 AM    AST 15 12/16/2020 11:49 AM    PROT 6.1 2020 11:49 AM    BILITOT 0.4 2020 11:49 AM    LABALBU 4.3 2020 11:49 AM       Lab Results   Component Value Date/Time    MG 1.8 2022 06:33 AM       Lab Results   Component Value Date    INR 1.01 2020     No results found for: LABA1C    No results found for: TRIG, HDL, LDLCALC, LDLDIRECT, LABVLDL    Lab Results   Component Value Date/Time    TSH 3.690 2022 03:55 PM         Testing Reviewed:      I have individually reviewed the cardiac test below:    EK22 15:50:58 [facility] ROUTINE RECORD  Sinus bradycardia with Premature atrial complexes  Cannot rule out Anterior infarct (cited on or before 16-DEC-2020)  Abnormal ECG  When compared with ECG of 16-DEC-2020 11:57,  Premature atrial complexes are now Present  Confirmed by [facility] MD, [address] [facility] ([id number]) on 2022 3:44:13 PM      Echo: 22:  Summary   Technically difficult examination. Left Ventricular size is Moderately increased . Normal left ventricular wall thickness. There was severe global hypokinesis of the left ventricle. Systolic function was severely reduced. Ejection fraction is visually estimated at 30%. The left atrium is Mildly dilated. Mildly enlarged right atrium size. There is mild-to-moderate aortic stenosis with valve area of 1.3 sq cm. The maximum aortic valve gradient is 23 mmHg, the mean gradient is 12   mmHg, and the peak velocity is 2.4 m/s. Signature    ----------------------------------------------------------------   Electronically signed by Deyanira Man MD (Interpreting   physician) on 2022 at 06:33 PM    ECHO:  21  Summary  Moderate left ventricular dilation and moderately reduced systolic  function. Ejection fraction was estimated at 35-40%. The aortic valve was trileaflet, calcified, but preserved cuspal  separation. DOPPLER: Transaortic velocity was 2.3 m/s, mean gradient 13  mmHg, and HE 1.5 cm2.   There was mild aortic regurgitation. Signature  ----------------------------------------------------------------  Electronically signed by Yaneth Abad MD (Interpreting  physician) on 07/20/2021 at 04:58 PM      Cath: 12/16/20  DATE OF PROCEDURE:  12/16/2020     INDICATION:  CCS class III angina, on optimal medical therapy with  history of CABG and multivessel CAD with chest pain and refractory to  optimal medical therapy. DESCRIPTION OF PROCEDURE:  After informed consent was obtained from the  patient, he was brought to the cardiac catheterization laboratory and  prepped in sterile fashion. Right femoral artery was chosen as the  primary point of access. Preprocedure timeout was completed. After  infiltration of the right inguinal region with 2% lidocaine using  micropuncture and modified Seldinger technique under fluoroscopic  guidance and ultrasound guidance, I was able to insert a 5-Mosotho sheath  in the right femoral artery. We then performed diagnostic coronary  angiography using 5-Mosotho JL-4, 5-Mosotho JR-4, 5-Mosotho IM, and  5-Mosotho multipurpose catheters. CORONARY ANGIOGRAM:  LEFT MAIN:  Patent without any significant obstruction; however, the  distal left main has about 30% stenosis. LAD:   in the mid segment. LCX:  Gives rise to a large circumflex, large OM, has 40% stenosis prior  to the takeoff an occluded vein graft stump seen. The distal OMs are  patent. The AV groove branch is patent. There is a left PDA/OM2 that  is patent. There is a stent in the OM1 branch that is patent. RCA:   ostially. BYPASS ANGIOGRAPHY:  LIMA TO LAD:  Ostium, body, and anastomotic segments of the LIMA to LAD  are patent. The LAD beyond the anastomosis is patent. SVG TO OM:  Occluded. SVG TO RAMUS INTERMEDIUS:  Ostium and body of the vein grafts are  patent. The anastomotic segment of the vein graft has 99% total  occlusion with DONAVAN-1 flow at the distal ramus intermedius.      SVG to RCA:  Ostium and body have diffuse atheromatous disease that are  patent. There is haziness at the distal end of the bypass graft before  anastomosis into the PDA/PLV. INTERVENTION:  Given these findings on presentation, I elected to first  proceed with imaging guidance of the vein graft to the RCA. This  haziness was truly thrombotic and this will need to be stented as this  is a large territory and there is no option or supply to the inferior  wall. I exchanged out for a 6-Bolivian sheath to cannulate the RCA with a  6-Bolivian multipurpose guiding catheter without complication. Heparin IV  was given. ACT was confirmed to be above 250 seconds. I wired the  lesion with a Runthrough wire. I prepped OCT catheter per  's recommendations. I then did a pullback across the vein  graft demonstrating that there was no significant thrombus and this is  just atheromatous disease. There was narrowing of appropriately 50% at  the distal end of the vein graft, but this was not as significant as  what was seen in the ramus intermedius. Given the clinical presentation  of angina, the likely culprit is the ramus intermedius. Therefore, I  elected to stop all intervention of the RCA. At that point, I then  cannulated the vein graft with a 6-Bolivian JR-4 guiding catheter without  complication. I then wired the lesion using a Runthrough wire. I  direct stented the lesion with a 2.0 x 15 Resolute Neville JACQUE at nominal  pressure. I used a 2.25 NC balloon for the distal aspect of the vessel  up to 20 atmospheres and then proximally at the level of vein graft  anastomosis, I used a 3.0 x 8 NC balloon up to 12 atmospheres. Post-PCI  angioplasty demonstrated DONAVAN-3 flow without any perforation,  dissection, distal embolization, side branch loss, guide or  guidewire-induced trauma. No further intervention was undertaken. ESTIMATED BLOOD LOSS:  Less than 50 mL. IMMEDIATE COMPLICATIONS:  None. MEDICATIONS:  See EMR. ACCESS:  6-English Angio-Seal was used for hemostasis. SUMMARY:  Successful PCI of the SVG to ramus intermedius with one  drug-eluting stent; OCT of the SVG to RCA to confirm no thrombosis;  patent LAD; occluded SVG to OM. PLAN:  1. DAPT. 2.  Optimal medical therapy. 3.  Risk factor management. 4.  Routine access site care. 5.  Overnight observation. 6.  IV fluids. 7.  Guideline-directed therapy for CAD. 8.  Follow up with myself in two to four weeks postprocedure. All the above was explained to the patient and the patient's family. They are agreeable and amenable to the plan. Wellington Isidro MD   D: 12/20/2020       Assessment/Plan   S/p SVG-RI, 12/2020  ICMP with drop in EF to 30% 11/2022 after bought of non-compliance with meds while on hunting trip; additional cardiac stress during that time as active in high altitude. Now with LV. No shocks; compliant with meds; still working on compliance with diet and exercise. Hx Chronic LVSF, EF 35-40% 7/2021, NYHA II  PCI of the LCX/OM, 5/2019 - DAPT  Occluded SVG-OM  CABG x 4, 2012  HTN  Taking all meds, working on stricter diet compliance. No anginal sx, euvolemic on exam. Wearing LV consistently; no shocks. Tolerating meds. Discussed diet/exercise/BP/weight loss/health lifestyle choices/lipids; the patient understands the goals and will try to comply. Continue current medications as prescribed. Continue to weigh yourself daily, watch to take in no more than 2000 mg of salt a day. Try to drink 64 ounces of water a day but not more than a total of 2 liters of fluid per day. Call the office with any questions or concerns. Seek help by 911 if the lifevest gives you a shock. Follow-up with your PCP as scheduled. Call the CHF clinic to set up appointment. Follow-up with Dr. Timmy Bar in 3 months as scheduled or sooner if need.      Disposition: 3 months       Electronically signed by Garner Gottron, APRN - CNP   1/5/2023 at 10:55 AM EDT

## 2022-12-06 NOTE — PATIENT INSTRUCTIONS
Continue current medications as prescribed. Continue to weigh yourself daily, watch to take in no more than 2000 mg of salt a day. Try to drink 64 ounces of water a day but not more than a total of 2 liters of fluid per day. Call the office with any questions or concerns. Seek help by 911 if the lifevest gives you a shock. Follow-up with your PCP as scheduled. Call the CHF clinic to set up appointment. Follow-up with Dr. Rich Barry in 3 months as scheduled or sooner if need. You may receive a survey regarding the care you received during your visit. Your input is valuable to us. We encourage you to complete and return your survey. We hope you will choose us in the future for your healthcare needs.

## 2022-12-06 NOTE — PROGRESS NOTES
Hospital follow-up. Life vest in place. He denies having any chest pain. He does have intermittent shortness of breath.

## 2023-01-05 ENCOUNTER — TELEPHONE (OUTPATIENT)
Dept: CARDIOLOGY CLINIC | Age: 68
End: 2023-01-05

## 2023-01-05 NOTE — LETTER
[facility]  [facility]  [address]  [place] [address]  Phone: [phone]  Fax: [phone]    PA Alfaro CNP        January 5, 2023     [address] [facility] [address]      Dear Jet Stroud:    We missed seeing you for a scheduled appointment at [address] [facility] with PA Alfaro CNP on 1/5/2023. We're sorry you were unable to keep your appointment and hope that you are doing well. We ask that you please call 24 hours in advance if you are unable to make your appointment, so that we can give that time to another patient in need. We care about you and the management of your healthcare and want to make sure that you follow up as recommended. To provide quality care and timely appointments to all our patients, you may be dismissed from the practice if you do not show for three (3) scheduled appointments within a 12-month period. We would like to continue treating your healthcare needs. Please call the office to reschedule your appointment, if needed.      Sincerely,        PA Alfaro CNP

## 2023-01-10 DIAGNOSIS — Z95.1 HX OF CABG: ICD-10-CM

## 2023-01-10 RX ORDER — CLOPIDOGREL BISULFATE 75 MG/1
75 TABLET ORAL DAILY
Qty: 90 TABLET | Refills: 3 | Status: SHIPPED | OUTPATIENT
Start: 2023-01-10

## 2023-01-17 LAB
CHOLESTEROL/HDL RATIO: 5.3 RATIO
CHOLESTEROL: 139 MG/DL
HDLC SERPL-MCNC: 26 MG/DL
LDL CHOLESTEROL CALCULATED: 89 MG/DL
LDL/HDL RATIO: 3.4 RATIO
TRIGL SERPL-MCNC: 119 MG/DL
VLDLC SERPL CALC-MCNC: 24 MG/DL

## 2023-02-16 ENCOUNTER — OFFICE VISIT (OUTPATIENT)
Dept: CARDIOLOGY CLINIC | Age: 68
End: 2023-02-16
Payer: MEDICARE

## 2023-02-16 VITALS
SYSTOLIC BLOOD PRESSURE: 132 MMHG | HEART RATE: 68 BPM | HEIGHT: 73 IN | BODY MASS INDEX: 34.72 KG/M2 | DIASTOLIC BLOOD PRESSURE: 78 MMHG | WEIGHT: 262 LBS

## 2023-02-16 DIAGNOSIS — M79.89 SWELLING OF LOWER EXTREMITY: ICD-10-CM

## 2023-02-16 DIAGNOSIS — I25.5 ISCHEMIC CARDIOMYOPATHY: Primary | ICD-10-CM

## 2023-02-16 PROCEDURE — G8484 FLU IMMUNIZE NO ADMIN: HCPCS | Performed by: INTERNAL MEDICINE

## 2023-02-16 PROCEDURE — 3017F COLORECTAL CA SCREEN DOC REV: CPT | Performed by: INTERNAL MEDICINE

## 2023-02-16 PROCEDURE — 1123F ACP DISCUSS/DSCN MKR DOCD: CPT | Performed by: INTERNAL MEDICINE

## 2023-02-16 PROCEDURE — 3078F DIAST BP <80 MM HG: CPT | Performed by: INTERNAL MEDICINE

## 2023-02-16 PROCEDURE — 3075F SYST BP GE 130 - 139MM HG: CPT | Performed by: INTERNAL MEDICINE

## 2023-02-16 PROCEDURE — 99214 OFFICE O/P EST MOD 30 MIN: CPT | Performed by: INTERNAL MEDICINE

## 2023-02-16 PROCEDURE — G8417 CALC BMI ABV UP PARAM F/U: HCPCS | Performed by: INTERNAL MEDICINE

## 2023-02-16 PROCEDURE — G8427 DOCREV CUR MEDS BY ELIG CLIN: HCPCS | Performed by: INTERNAL MEDICINE

## 2023-02-16 PROCEDURE — 1036F TOBACCO NON-USER: CPT | Performed by: INTERNAL MEDICINE

## 2023-02-16 RX ORDER — POTASSIUM CHLORIDE 20 MEQ/1
40 TABLET, EXTENDED RELEASE ORAL DAILY
Qty: 180 TABLET | Refills: 1 | Status: SHIPPED | OUTPATIENT
Start: 2023-02-16

## 2023-02-16 RX ORDER — BUMETANIDE 1 MG/1
1 TABLET ORAL 2 TIMES DAILY
Qty: 30 TABLET | Refills: 3 | Status: SHIPPED | OUTPATIENT
Start: 2023-02-16

## 2023-02-16 NOTE — PROGRESS NOTES
[address] [address]  [place] [address]  Dept: [phone]  Dept Fax: [phone]  Loc: [phone]    Visit Date: 2/16/2023    Mr. Sumit Ramirez is a 79 y.o. male  who presented for:  Chief Complaint   Patient presents with    Follow-up     Swelling in eyes, neck pressure       HPI:   HPI   80 yo M s/p PCI of SVG to RI with JACQUE presents for follow-up. He has noted more andre-orbital swelling. He doubled his water pills but still not improving. He has noted more LE edema. BP stable. Urine output has reduced. He is trying not to eat fried foots. He goes out to eat 3/7 days per week. He is eating salty foods recurrently. He was admitted for CHF down to 240 lbs, but now up to 262 lbs. Current Outpatient Medications:     clopidogrel (PLAVIX) 75 MG tablet, Take 1 tablet by mouth daily, Disp: 90 tablet, Rfl: 3    clonazePAM (KLONOPIN) 0.5 MG tablet, Take 0.5 mg by mouth nightly as needed. , Disp: , Rfl:     isosorbide mononitrate (IMDUR) 120 MG extended release tablet, Take 1 tablet by mouth in the morning., Disp: 90 tablet, Rfl: 3    metoprolol succinate (TOPROL XL) 50 MG extended release tablet, Take 1 tablet by mouth in the morning., Disp: 90 tablet, Rfl: 3    nitroGLYCERIN (NITROSTAT) 0.4 MG SL tablet, DISSOLVE ONE TABLET UNDER THE TONGUE EVERY 5 MINUTES AS NEEDED FOR CHEST PAIN.   DO NOT EXCEED A TOTAL OF 3 DOSES IN 15 MINUTES, Disp: 25 tablet, Rfl: 3    pantoprazole (PROTONIX) 40 MG tablet, Take 1 tablet by mouth daily, Disp: 90 tablet, Rfl: 3    melatonin 5 MG TABS tablet, Take 5 mg by mouth nightly, Disp: , Rfl:     hydrALAZINE (APRESOLINE) 50 MG tablet, Take 1 tablet by mouth 2 times daily, Disp: 180 tablet, Rfl: 1    furosemide (LASIX) 20 MG tablet, Take 20 mg by mouth daily , Disp: , Rfl:     spironolactone (ALDACTONE) 25 MG tablet, Take 25 mg by mouth daily , Disp: , Rfl:     ENTRESTO  MG per tablet, 1 tablet 2 times daily , Disp: , Rfl:     aspirin 81 MG tablet, Take 81 mg by mouth daily, Disp: , Rfl:     Multiple Vitamins-Minerals (THERAPEUTIC MULTIVITAMIN-MINERALS) tablet, Take 1 tablet by mouth daily, Disp: , Rfl:     Past Medical History  Emil Renae  has a past medical history of CAD (coronary artery disease), Depression, GERD (gastroesophageal reflux disease), Hypertension, and Melanoma ([address] Social History  Emil Renae  reports that he has quit smoking. He has quit using smokeless tobacco. He reports current alcohol use. He reports that he does not use drugs. Family History  Emli Renae family history includes Heart Disease in his maternal grandfather; Stroke in his maternal grandfather. There is no family history of bicuspid aortic valve, aneurysms, heart transplant, pacemakers, defibrillators, or sudden cardiac death. Past Surgical History   Past Surgical History:   Procedure Laterality Date    CARDIAC SURGERY      CORONARY ANGIOPLASTY WITH STENT PLACEMENT  05/2019    DIAGNOSTIC CARDIAC CATH LAB PROCEDURE  12/16/2020    with PCI    SKIN CANCER EXCISION         Review of Systems   Constitutional: Negative for chills and fever  HENT: Negative for congestion, sinus pressure, sneezing and sore throat. Eyes: Negative for pain, discharge, redness and itching. Respiratory: Negative for apnea, cough  Gastrointestinal: Negative for blood in stool, constipation, diarrhea   Endocrine: Negative for cold intolerance, heat intolerance, polydipsia. Genitourinary: Negative for dysuria, enuresis, flank pain and hematuria. Musculoskeletal: Negative for arthralgias, joint swelling and neck pain. Neurological: Negative for numbness and headaches. Psychiatric/Behavioral: Negative for agitation, confusion, decreased concentration and dysphoric mood.      Objective:     /78   Pulse 68   Ht 6' 1\" (1.854 m)   Wt 262 lb (118.8 kg)   BMI 34.57 kg/m²     Wt Readings from Last 3 Encounters:   02/16/23 262 lb (118.8 kg)   12/06/22 252 lb (114.3 kg)   11/25/22 246 lb 9.6 oz (111.9 kg)     BP Readings from Last 3 Encounters:   02/16/23 132/78   12/06/22 130/78   11/26/22 123/62       Nursing note and vitals reviewed. Physical Exam   Constitutional: Oriented to person, place, and time. Appears well-developed and well-nourished. HENT:   Head: Normocephalic and atraumatic. Eyes: EOM are normal. Pupils are equal, round, and reactive to light. Periorbital edema bilaterally. Neck: Normal range of motion. Neck supple. No JVD present. Cardiovascular: Normal rate, regular rhythm, normal heart sounds and intact distal pulses. No murmur heard. Pulmonary/Chest: Effort normal and breath sounds normal. No respiratory distress. No wheezes. No rales. Abdominal: Soft. Bowel sounds are normal. No distension. There is no tenderness. Musculoskeletal: Normal range of motion. 4+ edema. Neurological: Alert and oriented to person, place, and time. No cranial nerve deficit. Coordination normal.   Skin: Skin is warm and dry. Psychiatric: Normal mood and affect.        No results found for: CKTOTAL, CKMB, CKMBINDEX    Lab Results   Component Value Date/Time    WBC 6.4 11/26/2022 06:33 AM    RBC 5.11 11/26/2022 06:33 AM    HGB 15.3 11/26/2022 06:33 AM    HCT 47.1 11/26/2022 06:33 AM    MCV 92.2 11/26/2022 06:33 AM    MCH 29.9 11/26/2022 06:33 AM    MCHC 32.5 11/26/2022 06:33 AM     11/26/2022 06:33 AM    MPV 10.5 11/26/2022 06:33 AM       Lab Results   Component Value Date/Time     11/26/2022 06:33 AM    K 4.3 11/26/2022 06:33 AM    K 3.9 11/23/2022 03:55 PM     11/26/2022 06:33 AM    CO2 25 11/26/2022 06:33 AM    BUN 16 11/26/2022 06:33 AM    LABALBU 4.3 12/16/2020 11:49 AM    CREATININE 1.0 11/26/2022 06:33 AM    CALCIUM 9.4 11/26/2022 06:33 AM    LABGLOM >60 11/25/2022 06:01 PM    GLUCOSE 112 11/26/2022 06:33 AM       Lab Results   Component Value Date/Time    ALKPHOS 49 12/16/2020 11:49 AM    ALT 13 12/16/2020 11:49 AM    AST 15 12/16/2020 11:49 AM    PROT 6.1 12/16/2020 11:49 AM    BILITOT 0.4 12/16/2020 11:49 AM    LABALBU 4.3 12/16/2020 11:49 AM       Lab Results   Component Value Date/Time    MG 1.8 11/26/2022 06:33 AM       Lab Results   Component Value Date    INR 1.01 12/16/2020         No results found for: LABA1C    Lab Results   Component Value Date/Time    TRIG 119 01/17/2023 08:37 AM    HDL 26 01/17/2023 08:37 AM    LDLCALC 89 01/17/2023 08:37 AM    LABVLDL 24 01/17/2023 08:37 AM       Lab Results   Component Value Date/Time    TSH 3.690 11/23/2022 03:55 PM         Testing Reviewed:      I have individually reviewed the cardiac test below:    ECHO: No results found for this or any previous visit. Assessment/Plan   Acute CHF, NYHA III  S/p SVG-RI, 12/2020  EF 30% with LifeVest  PCI of the LCX/OM, 5/2019 - DAPT  Occluded SVG-OM  CABG x 4, 2012  HTN  Switch to Bumex 1 mg BID, KCL 40 mEq, BMP 1 week, CHF clinic. Must cut salt/2L of less total per day. Wrap legs, elevate, monitor weights daily. Discussed diet/exercise/BP/weight loss/health lifestyle choices/lipids; the patient understands the goals and will try to comply.       Disposition:  3 months           Electronically signed by Sean Rosario MD   2/16/2023 at 12:27 PM EST

## 2023-02-23 ENCOUNTER — OFFICE VISIT (OUTPATIENT)
Dept: CARDIOLOGY CLINIC | Age: 68
End: 2023-02-23
Payer: MEDICARE

## 2023-02-23 VITALS
DIASTOLIC BLOOD PRESSURE: 60 MMHG | BODY MASS INDEX: 30.16 KG/M2 | HEART RATE: 59 BPM | WEIGHT: 228.6 LBS | OXYGEN SATURATION: 90 % | SYSTOLIC BLOOD PRESSURE: 108 MMHG

## 2023-02-23 DIAGNOSIS — I35.0 MODERATE AORTIC VALVE STENOSIS: ICD-10-CM

## 2023-02-23 DIAGNOSIS — Z95.1 HX OF CABG: ICD-10-CM

## 2023-02-23 DIAGNOSIS — I25.10 CAD IN NATIVE ARTERY: ICD-10-CM

## 2023-02-23 DIAGNOSIS — I50.22 CHF NYHA CLASS II, CHRONIC, SYSTOLIC (HCC): Primary | ICD-10-CM

## 2023-02-23 DIAGNOSIS — I25.5 ISCHEMIC CARDIOMYOPATHY: ICD-10-CM

## 2023-02-23 LAB
ANION GAP SERPL CALC-SCNC: 11 MEQ/L (ref 8–16)
BUN SERPL-MCNC: 22 MG/DL (ref 7–22)
CALCIUM SERPL-MCNC: 10.2 MG/DL (ref 8.5–10.5)
CHLORIDE SERPL-SCNC: 100 MEQ/L (ref 98–111)
CO2 SERPL-SCNC: 27 MEQ/L (ref 23–33)
CREAT SERPL-MCNC: 1.7 MG/DL (ref 0.4–1.2)
GFR SERPL CREATININE-BSD FRML MDRD: 44 ML/MIN/1.73M2
GLUCOSE SERPL-MCNC: 110 MG/DL (ref 70–108)
POTASSIUM SERPL-SCNC: 5.6 MEQ/L (ref 3.5–5.2)
SODIUM SERPL-SCNC: 138 MEQ/L (ref 135–145)

## 2023-02-23 PROCEDURE — 99215 OFFICE O/P EST HI 40 MIN: CPT | Performed by: NURSE PRACTITIONER

## 2023-02-23 PROCEDURE — 1036F TOBACCO NON-USER: CPT | Performed by: NURSE PRACTITIONER

## 2023-02-23 PROCEDURE — G8427 DOCREV CUR MEDS BY ELIG CLIN: HCPCS | Performed by: NURSE PRACTITIONER

## 2023-02-23 PROCEDURE — 3074F SYST BP LT 130 MM HG: CPT | Performed by: NURSE PRACTITIONER

## 2023-02-23 PROCEDURE — G8484 FLU IMMUNIZE NO ADMIN: HCPCS | Performed by: NURSE PRACTITIONER

## 2023-02-23 PROCEDURE — 3017F COLORECTAL CA SCREEN DOC REV: CPT | Performed by: NURSE PRACTITIONER

## 2023-02-23 PROCEDURE — 3078F DIAST BP <80 MM HG: CPT | Performed by: NURSE PRACTITIONER

## 2023-02-23 PROCEDURE — G8417 CALC BMI ABV UP PARAM F/U: HCPCS | Performed by: NURSE PRACTITIONER

## 2023-02-23 PROCEDURE — 1123F ACP DISCUSS/DSCN MKR DOCD: CPT | Performed by: NURSE PRACTITIONER

## 2023-02-23 RX ORDER — BUMETANIDE 1 MG/1
1 TABLET ORAL DAILY
Qty: 30 TABLET | Refills: 5 | Status: SHIPPED | OUTPATIENT
Start: 2023-02-23

## 2023-02-23 ASSESSMENT — ENCOUNTER SYMPTOMS
ABDOMINAL DISTENTION: 0
COUGH: 0
SHORTNESS OF BREATH: 0

## 2023-02-23 NOTE — PATIENT INSTRUCTIONS
You may receive a survey regarding the care you received during your visit. Your input is valuable to us. We encourage you to complete and return your survey. We hope you will choose us in the future for your healthcare needs.     Continue:  Continue current medications  Daily weights and record  Fluid restriction of 2 Liters per day  Limit sodium in diet to around 2179-6146 mg/day  Monitor BP  Activity as tolerated     Call the Heart Failure Clinic for any of the following symptoms: [phone]  Weight gain of 2-3 pounds in 1 day or 5 pounds in 1 week  Increased shortness of breath  Shortness of breath while laying down  Cough  Chest pain  Swelling in feet, ankles or legs  Tenderness or bloating in the abdomen  Fatigue   Decreased appetite or nausea   Confusion

## 2023-02-23 NOTE — PROGRESS NOTES
Heart Failure Clinic       Visit Date: 2/23/2023  Cardiologist:  Dr. Margarito Pelayo  Primary Care Physician: Dr. Renee Galvez, APRN - CNP  Referred by: Harlan Christian is a 79 y.o. male who presents today for:  Chief Complaint   Patient presents with    Congestive Heart Failure     New Patient       HPI:   Delia Green is a 79 y.o. male who presents to the office for a NEW patient visit in the heart failure clinic. Accompanied no one  Lives w/ wife  Retired [employer]    TYPE HF: HFrEF (30%) Down from 35-40% 7/2021)  Cause: ICM  Valves: Mod AS (HE 1.3, mean gradient 13 mmHg)  Device: Lifevest  HX: CAD s/p CABG (4V, 2012), s/p PCI (2019), HTN    Dry Wt:  225-230    Hospitalization:  11/2023 - CHF exac. Up 20#, was on hunting trip and didn't take diuretics. D/c at 240#    OV [place] 2/16 - wt up 262#. Changed to Bumex   Today: feeling better, breathing much improved. SERRANO gone - walked form parking lot w/ no issues. Down 24# in past week. Eating a lot more bananas and potatoes - has been on Entresto and Aldactone for years.       Patient has:  Chest Pain: no  SOB: better  Orthopnea/PND: no  DAVID: no  Edema: better  Fatigue: better  Abdominal bloating: no  Cough: no  Appetite: good  Home weight: down  Home blood pressure:     Past Medical History:   Diagnosis Date    CAD (coronary artery disease)     Depression     GERD (gastroesophageal reflux disease)     Hypertension     Melanoma ([address]      Past Surgical History:   Procedure Laterality Date    CARDIAC SURGERY      CORONARY ANGIOPLASTY WITH STENT PLACEMENT  05/2019    DIAGNOSTIC CARDIAC CATH LAB PROCEDURE  12/16/2020    with PCI    SKIN CANCER EXCISION       Family History   Problem Relation Age of Onset    Heart Disease Maternal Grandfather     Stroke Maternal Grandfather      Social History     Tobacco Use    Smoking status: Former    Smokeless tobacco: Former   Substance Use Topics    Alcohol use: Yes     Comment: few beers on weekends Current Outpatient Medications   Medication Sig Dispense Refill    bumetanide (BUMEX) 1 MG tablet Take 1 tablet by mouth 2 times daily 30 tablet 3    clopidogrel (PLAVIX) 75 MG tablet Take 1 tablet by mouth daily 90 tablet 3    clonazePAM (KLONOPIN) 0.5 MG tablet Take 0.5 mg by mouth nightly as needed. isosorbide mononitrate (IMDUR) 120 MG extended release tablet Take 1 tablet by mouth in the morning. 90 tablet 3    metoprolol succinate (TOPROL XL) 50 MG extended release tablet Take 1 tablet by mouth in the morning. 90 tablet 3    nitroGLYCERIN (NITROSTAT) 0.4 MG SL tablet DISSOLVE ONE TABLET UNDER THE TONGUE EVERY 5 MINUTES AS NEEDED FOR CHEST PAIN. DO NOT EXCEED A TOTAL OF 3 DOSES IN 15 MINUTES 25 tablet 3    pantoprazole (PROTONIX) 40 MG tablet Take 1 tablet by mouth daily 90 tablet 3    melatonin 5 MG TABS tablet Take 5 mg by mouth nightly      hydrALAZINE (APRESOLINE) 50 MG tablet Take 1 tablet by mouth 2 times daily 180 tablet 1    spironolactone (ALDACTONE) 25 MG tablet Take 25 mg by mouth daily       ENTRESTO  MG per tablet 1 tablet 2 times daily       aspirin 81 MG tablet Take 81 mg by mouth daily      Multiple Vitamins-Minerals (THERAPEUTIC MULTIVITAMIN-MINERALS) tablet Take 1 tablet by mouth daily       No current facility-administered medications for this visit. Allergies   Allergen Reactions    Lipitor [Atorvastatin]      Hard to walk       SUBJECTIVE:   Review of Systems   Constitutional:  Negative for activity change, appetite change and fatigue. Respiratory:  Negative for cough and shortness of breath. Cardiovascular:  Negative for chest pain, palpitations and leg swelling. Gastrointestinal:  Negative for abdominal distention. Neurological:  Negative for weakness, light-headedness and headaches. Hematological:  Negative for adenopathy. Psychiatric/Behavioral:  Negative for sleep disturbance.       OBJECTIVE:   Today's Vitals:  /60   Pulse 59   Wt 228 lb 9.6 oz (103.7 kg)   SpO2 90%   BMI 30.16 kg/m²     Physical Exam  Vitals reviewed. Constitutional:       General: He is not in acute distress. Appearance: Normal appearance. He is well-developed. He is not diaphoretic. HENT:      Head: Normocephalic and atraumatic. Eyes:      Conjunctiva/sclera: Conjunctivae normal.   Neck:      Comments: No JVD  Cardiovascular:      Rate and Rhythm: Normal rate and regular rhythm. Heart sounds: Normal heart sounds. No murmur heard. Comments: Lifevest on  Pulmonary:      Effort: Pulmonary effort is normal. No respiratory distress. Breath sounds: Normal breath sounds. No wheezing or rales. Abdominal:      General: Bowel sounds are normal. There is no distension. Palpations: Abdomen is soft. Tenderness: There is no abdominal tenderness. Musculoskeletal:         General: Normal range of motion. Cervical back: Normal range of motion and neck supple. Right lower leg: No edema. Left lower leg: No edema. Skin:     General: Skin is warm and dry. Capillary Refill: Capillary refill takes less than 2 seconds. Neurological:      Mental Status: He is alert and oriented to person, place, and time. Coordination: Coordination normal.   Psychiatric:         Behavior: Behavior normal.         Wt Readings from Last 3 Encounters:   02/23/23 228 lb 9.6 oz (103.7 kg)   02/16/23 262 lb (118.8 kg)   12/06/22 252 lb (114.3 kg)     BP Readings from Last 3 Encounters:   02/23/23 108/60   02/16/23 132/78   12/06/22 130/78     Pulse Readings from Last 3 Encounters:   02/23/23 59   02/16/23 68   12/06/22 60     Body mass index is 30.16 kg/m². ECHO:    Summary   Technically difficult examination. Left Ventricular size is Moderately increased . Normal left ventricular wall thickness. There was severe global hypokinesis of the left ventricle. Systolic function was severely reduced. Ejection fraction is visually estimated at 30%.    The left atrium is Mildly dilated. Mildly enlarged right atrium size. There is mild-to-moderate aortic stenosis with valve area of 1.3 sq cm. The maximum aortic valve gradient is 23 mmHg, the mean gradient is 12   mmHg, and the peak velocity is 2.4 m/s. Signature      ----------------------------------------------------------------   Electronically signed by Latia Hodges MD (Interpreting   physician) on 11/25/2022 at 06:33 PM   ----------------------------------------------------------------    CATH/STRESS:   SUMMARY:  Successful PCI of the SVG to ramus intermedius with one  drug-eluting stent; OCT of the SVG to RCA to confirm no thrombosis;  patent LAD; occluded SVG to OM. PLAN:  1. DAPT. 2.  Optimal medical therapy. 3.  Risk factor management. 4.  Routine access site care. 5.  Overnight observation. 6.  IV fluids. 7.  Guideline-directed therapy for CAD. 8.  Follow up with myself in two to four weeks postprocedure. All the above was explained to the patient and the patient's family. They are agreeable and amenable to the plan.            Latoya Caldwell MD     D: 12/20/2020 14:23:20      Results reviewed:  BNP: No results found for: BNP  CBC:   Lab Results   Component Value Date/Time    WBC 6.4 11/26/2022 06:33 AM    RBC 5.11 11/26/2022 06:33 AM    HGB 15.3 11/26/2022 06:33 AM    HCT 47.1 11/26/2022 06:33 AM     11/26/2022 06:33 AM     CMP:    Lab Results   Component Value Date/Time     02/22/2023 08:06 AM    K 6.3 02/22/2023 08:06 AM    K 3.9 11/23/2022 03:55 PM    CL 98 02/22/2023 08:06 AM    CO2 28 02/22/2023 08:06 AM    BUN 19 02/22/2023 08:06 AM    CREATININE 1.58 02/22/2023 08:06 AM    LABGLOM >60 11/25/2022 06:01 PM    GLUCOSE 103 02/22/2023 08:06 AM    CALCIUM 10.6 02/22/2023 08:06 AM     Hepatic Function Panel:    Lab Results   Component Value Date/Time    ALKPHOS 49 12/16/2020 11:49 AM    ALT 13 12/16/2020 11:49 AM    AST 15 12/16/2020 11:49 AM    PROT 6.1 12/16/2020 11:49 AM    BILITOT 0.4 12/16/2020 11:49 AM    LABALBU 4.3 12/16/2020 11:49 AM     Magnesium:    Lab Results   Component Value Date/Time    MG 2.4 02/22/2023 08:06 AM     PT/INR:    Lab Results   Component Value Date/Time    INR 1.01 12/16/2020 11:49 AM     Lipids:    Lab Results   Component Value Date/Time    TRIG 119 01/17/2023 08:37 AM    HDL 26 01/17/2023 08:37 AM    LDLCALC 89 01/17/2023 08:37 AM    LABVLDL 24 01/17/2023 08:37 AM       ASSESSMENT AND PLAN:      Diagnosis Orders   1. CHF NYHA class II, chronic, systolic (HCC)  Basic Metabolic Panel    Echo limited    Basic Metabolic Panel      2. Ischemic cardiomyopathy        3. CAD in native artery        4. Hx of CABG        5. Moderate aortic valve stenosis          Continue:  GDMT:   ACE/ARB/ARNI - Entresto 97/103 BID   BB - Toprol 50/day   SGLT2 -  none - consider adding  AA - Aldactone  Diuretic - Bumex 1 BID, Kcl 40/day - STOP Kcl  Vasodilator - Hydralazine 50 BID, Imdur 120/day  Other - Plavix    HFrEF (30%), NYHA II   ECHO 11/2022 & Cath 2020 w/ PCI  ICM  CAD s/p CABG (2012)  S/p PCI (last one 2020)  Mod AS - (HE 1.3, mean gradient 13 mmHg)  HTN    Stable, appears Euvolemic - down 24# past week, much improved. Labs yesterday w/ K+ 6.3 - Lasix changed to Bumex last week and Kcl 40/day added. Pt also eating more K+ foods. Has been on Entresto and Aldactone for few years. STOP Kcl   Also avoid bananas, potatoes  Repeat BMP stat today. May need Opal Mitts. BP/HR stable   Continue diet/fluid adherence  Continue daily wts. Continue Lifevest   Repeat ECHO next week to eval EF  Yearly surveillance ECHO for AS. F/U w/ Cardiology  F/U in clinic in 1 year or sooner if needed. ADDENDUM  Repeat labs showing K+ 5.6 - stopped Kcl today and pt to back off on K+ foods. Will hold on Lokelma/Kayexalate. Creat 1.7/BUN 22 = decrease Bumex to 1/day   Repeat BMP next week (here for ECHO)    Tolerating above noted HF meds, no ill side effects noted. Will continue to monitor kidney function and electrolytes. Will optimize as tolerated. Pt is compliant w/ medications. Total visit time of 45 minutes has been spent with patient on education of symptoms, management, medication, and plan of care; as well as review of chart: labs, ECHO, radiology reports, etc.   I personally spent more then 50% of the appt time face to face with the patient. Daily weights  Fluid restriction of 2 Liters per day  Limit sodium in diet to around 9283-0259 mg/day  Monitor BP  Activity as tolerated     Patient was instructed to call the [facility] for any changes in symptoms as noted in AVS.      No follow-ups on file. or sooner if needed     Patient given educational materials - see patient instructions. We discussed the importance of weighing oneself and recording daily. We also discussed the importance of a low sodium diet, higher sodium foods to avoid and better low sodium food options. Patient verbalizes understanding of plan of care using teach back method, and is agreeable to the treatment plan.        Electronically signed by PA Laird CNP on 2/23/2023 at 9:32 AM

## 2023-03-02 ENCOUNTER — HOSPITAL ENCOUNTER (OUTPATIENT)
Dept: NON INVASIVE DIAGNOSTICS | Age: 68
Discharge: HOME | End: 2023-03-02

## 2023-03-02 DIAGNOSIS — I50.22 CHF NYHA CLASS II, CHRONIC, SYSTOLIC (HCC): ICD-10-CM

## 2023-03-02 LAB
LV EF: 33 %
LVEF MODALITY: NORMAL

## 2023-03-03 ENCOUNTER — TELEPHONE (OUTPATIENT)
Dept: CARDIOLOGY CLINIC | Age: 68
End: 2023-03-03

## 2023-03-03 DIAGNOSIS — I35.0 MODERATE AORTIC VALVE STENOSIS: Primary | ICD-10-CM

## 2023-03-03 NOTE — TELEPHONE ENCOUNTER
Patient's wife is calling asking for the results of the echo. No results in patient's chart as of now. Patient is wearing a life vest and is hoping he can return it to [facility]. Please watch for results.

## 2023-03-03 NOTE — TELEPHONE ENCOUNTER
Per Mark Baeza CNP- please see ECHO results; rfer to Dr Kait Gray? Summary   Moderate to severely dilated left ventricular size and severely reduced   systolic function. There was severe global hypokinesis. Wall thickness was within normal limits. Ejection fraction was estimated at 30-35%. The aortic valve was trileaflet with increased thickness, reduced leaflet   mobility, and calcification. Consider complete aortic valve evaluation   with dobutamine if clinically indicated. IVC size is within normal limits with normal respiratory phasic changes.       Signature      ----------------------------------------------------------------   Electronically signed by Nicki Hu MD (Interpreting   physician) on 03/03/2023 at 01:32 PM   ----------------------------------------------------------------

## 2023-03-06 NOTE — TELEPHONE ENCOUNTER
Scheduled doubutamine stress echo on 3/28/23 at 9:30.   Spoke to wife on hippa and mailed instructions

## 2023-03-14 ENCOUNTER — TELEPHONE (OUTPATIENT)
Dept: CARDIOLOGY CLINIC | Age: 68
End: 2023-03-14

## 2023-03-14 DIAGNOSIS — I50.22 CHF NYHA CLASS II, CHRONIC, SYSTOLIC (HCC): Primary | ICD-10-CM

## 2023-03-14 RX ORDER — BUMETANIDE 1 MG/1
1 TABLET ORAL DAILY PRN
Qty: 30 TABLET | Refills: 1 | Status: SHIPPED | OUTPATIENT
Start: 2023-03-14

## 2023-03-14 RX ORDER — HYDRALAZINE HYDROCHLORIDE 25 MG/1
25 TABLET, FILM COATED ORAL 2 TIMES DAILY
Qty: 180 TABLET | Refills: 1 | Status: SHIPPED | OUTPATIENT
Start: 2023-03-14

## 2023-03-14 NOTE — TELEPHONE ENCOUNTER
Labs reviewed.  Improved but still little dry.  Spoke to wife BP has been running low (80-100s).  No energy  Decrease Hydralazine 25 BID  Start Jardiance 10/day - would like to continue optimization/discussed at length.  Pt/Wife agreeable.  Decrease Bumex to PRN - discussed use.   BMP in 2 weeks.

## 2023-03-23 DIAGNOSIS — I25.5 ISCHEMIC CARDIOMYOPATHY: ICD-10-CM

## 2023-03-23 DIAGNOSIS — Z95.1 HX OF CABG: ICD-10-CM

## 2023-03-23 NOTE — TELEPHONE ENCOUNTER
Jane Wang called requesting a refill on the following medications:  Requested Prescriptions     Pending Prescriptions Disp Refills    pantoprazole (PROTONIX) 40 MG tablet 90 tablet 3     Sig: Take 1 tablet by mouth daily     Pharmacy verified:  [facility]  [address] [facility], [address] [place] [address]  N [phone] Uziel [place] [phone]    Date of last visit: 02/16/2023  Date of next visit (if applicable): 0/58/7487

## 2023-03-24 RX ORDER — PANTOPRAZOLE SODIUM 40 MG/1
40 TABLET, DELAYED RELEASE ORAL DAILY
Qty: 90 TABLET | Refills: 3 | Status: SHIPPED | OUTPATIENT
Start: 2023-03-24

## 2023-03-28 ENCOUNTER — HOSPITAL ENCOUNTER (OUTPATIENT)
Dept: NON INVASIVE DIAGNOSTICS | Age: 68
Discharge: HOME | End: 2023-03-28
Payer: MEDICARE

## 2023-03-28 ENCOUNTER — HOSPITAL ENCOUNTER (OUTPATIENT)
Age: 68
Discharge: HOME | End: 2023-03-28
Payer: MEDICARE

## 2023-03-28 DIAGNOSIS — I50.22 CHF NYHA CLASS II, CHRONIC, SYSTOLIC (HCC): ICD-10-CM

## 2023-03-28 DIAGNOSIS — I35.0 MODERATE AORTIC VALVE STENOSIS: ICD-10-CM

## 2023-03-28 LAB
ANION GAP SERPL CALC-SCNC: 6 MEQ/L (ref 8–16)
BUN SERPL-MCNC: 21 MG/DL (ref 7–22)
CALCIUM SERPL-MCNC: 9.6 MG/DL (ref 8.5–10.5)
CHLORIDE SERPL-SCNC: 106 MEQ/L (ref 98–111)
CO2 SERPL-SCNC: 28 MEQ/L (ref 23–33)
CREAT SERPL-MCNC: 1.1 MG/DL (ref 0.4–1.2)
GFR SERPL CREATININE-BSD FRML MDRD: > 60 ML/MIN/1.73M2
GLUCOSE SERPL-MCNC: 90 MG/DL (ref 70–108)
POTASSIUM SERPL-SCNC: 5.2 MEQ/L (ref 3.5–5.2)
SODIUM SERPL-SCNC: 140 MEQ/L (ref 135–145)

## 2023-03-28 PROCEDURE — 36415 COLL VENOUS BLD VENIPUNCTURE: CPT

## 2023-03-28 PROCEDURE — 80048 BASIC METABOLIC PNL TOTAL CA: CPT

## 2023-03-28 PROCEDURE — 93017 CV STRESS TEST TRACING ONLY: CPT | Performed by: INTERNAL MEDICINE

## 2023-03-28 PROCEDURE — 6360000002 HC RX W HCPCS

## 2023-03-28 PROCEDURE — 93351 STRESS TTE COMPLETE: CPT

## 2023-03-30 ENCOUNTER — TELEPHONE (OUTPATIENT)
Dept: CARDIOLOGY CLINIC | Age: 68
End: 2023-03-30

## 2023-03-30 DIAGNOSIS — I50.22 CHF NYHA CLASS II, CHRONIC, SYSTOLIC (HCC): Primary | ICD-10-CM

## 2023-03-30 NOTE — TELEPHONE ENCOUNTER
Results of  Stress echo:   Findings are consistent with moderate aortic stenosis or pseudo severe   stenosis due to low EF.

## 2023-03-31 NOTE — TELEPHONE ENCOUNTER
Spoke with Wellington Calhoun. (Wife on HIPAA)    Wellington Calhoun voiced understanding. Wellington Calhoun is asking if any changes are needed due to pts blood work.

## 2023-04-03 NOTE — TELEPHONE ENCOUNTER
Called and spoke to wife, Andrew Boys is feeling great.  No more dizziness,  better off Jardiance  Taking Bumex PRN   Instructed to continue low K+ diet  Repeat BMP in 1 month

## 2023-04-04 RX ORDER — SPIRONOLACTONE 25 MG/1
25 TABLET ORAL DAILY
Qty: 30 TABLET | Refills: 1 | Status: SHIPPED | OUTPATIENT
Start: 2023-04-04

## 2023-04-04 NOTE — TELEPHONE ENCOUNTER
Abhijit Kahn called requesting a refill on the following medications:  Requested Prescriptions     Pending Prescriptions Disp Refills    spironolactone (ALDACTONE) 25 MG tablet 30 tablet      Sig: Take 1 tablet by mouth daily     Pharmacy verified:[facility]   . [facility]      Date of last visit:   Date of next visit (if applicable): 1/08/1129

## 2023-05-17 LAB
ANION GAP SERPL CALCULATED.3IONS-SCNC: 10 MEQ/L (ref 7–16)
BUN BLDV-MCNC: 14 MG/DL (ref 8–23)
CALCIUM SERPL-MCNC: 9.6 MG/DL (ref 8.5–10.5)
CHLORIDE BLD-SCNC: 102 MEQ/L (ref 95–107)
CO2: 28 MEQ/L (ref 19–31)
CREAT SERPL-MCNC: 1.13 MG/DL (ref 0.8–1.4)
EGFR IF NONAFRICAN AMERICAN: 71 ML/MIN/1.73
GLUCOSE: 115 MG/DL (ref 70–99)
POTASSIUM SERPL-SCNC: 4.8 MEQ/L (ref 3.5–5.4)
SODIUM BLD-SCNC: 140 MEQ/L (ref 133–146)

## 2023-05-30 RX ORDER — SPIRONOLACTONE 25 MG/1
25 TABLET ORAL DAILY
Qty: 30 TABLET | Refills: 0 | Status: SHIPPED | OUTPATIENT
Start: 2023-05-30

## 2023-05-30 NOTE — TELEPHONE ENCOUNTER
Baudilio Mckinney called requesting a refill on the following medications:  Requested Prescriptions     Pending Prescriptions Disp Refills    spironolactone (ALDACTONE) 25 MG tablet 30 tablet 1     Sig: Take 1 tablet by mouth daily     Pharmacy verified:  [facility]      Date of last visit: 02-16-23  Date of next visit (if applicable): 3/1/9686

## 2023-06-01 ENCOUNTER — OFFICE VISIT (OUTPATIENT)
Dept: CARDIOLOGY CLINIC | Age: 68
End: 2023-06-01
Payer: MEDICARE

## 2023-06-01 VITALS
WEIGHT: 232 LBS | DIASTOLIC BLOOD PRESSURE: 72 MMHG | HEART RATE: 60 BPM | SYSTOLIC BLOOD PRESSURE: 120 MMHG | BODY MASS INDEX: 30.75 KG/M2 | HEIGHT: 73 IN

## 2023-06-01 DIAGNOSIS — I25.5 ISCHEMIC CARDIOMYOPATHY: Primary | ICD-10-CM

## 2023-06-01 DIAGNOSIS — I35.0 MODERATE AORTIC VALVE STENOSIS: ICD-10-CM

## 2023-06-01 PROCEDURE — 3017F COLORECTAL CA SCREEN DOC REV: CPT | Performed by: INTERNAL MEDICINE

## 2023-06-01 PROCEDURE — 99214 OFFICE O/P EST MOD 30 MIN: CPT | Performed by: INTERNAL MEDICINE

## 2023-06-01 PROCEDURE — 1123F ACP DISCUSS/DSCN MKR DOCD: CPT | Performed by: INTERNAL MEDICINE

## 2023-06-01 PROCEDURE — G8417 CALC BMI ABV UP PARAM F/U: HCPCS | Performed by: INTERNAL MEDICINE

## 2023-06-01 PROCEDURE — 3074F SYST BP LT 130 MM HG: CPT | Performed by: INTERNAL MEDICINE

## 2023-06-01 PROCEDURE — 3078F DIAST BP <80 MM HG: CPT | Performed by: INTERNAL MEDICINE

## 2023-06-01 PROCEDURE — G8428 CUR MEDS NOT DOCUMENT: HCPCS | Performed by: INTERNAL MEDICINE

## 2023-06-01 PROCEDURE — 1036F TOBACCO NON-USER: CPT | Performed by: INTERNAL MEDICINE

## 2023-06-01 NOTE — PROGRESS NOTES
[address] [facility] [address] [facility] [place] [address]  Dept: [phone]  Dept Fax: [phone]  Loc: [phone]    Visit Date: 6/1/2023    Mr. Salvador Ba is a 79 y.o. male  who presented for:  Chief Complaint   Patient presents with    3 Month Follow-Up    Cardiomyopathy       HPI:   HPI   80 yo M s/p PCI of SVG to RI with JACQUE presents for follow-up. He has lost almost 30-40 lbs. No chest pain, angina, SERRANO, orthopnea, PND, sob at rest, palpitations, LE edema, or syncope. He feels better than prior. BP  well controlled. Current Outpatient Medications:     spironolactone (ALDACTONE) 25 MG tablet, Take 1 tablet by mouth daily, Disp: 30 tablet, Rfl: 0    pantoprazole (PROTONIX) 40 MG tablet, Take 1 tablet by mouth daily, Disp: 90 tablet, Rfl: 3    hydrALAZINE (APRESOLINE) 25 MG tablet, Take 1 tablet by mouth 2 times daily, Disp: 180 tablet, Rfl: 1    bumetanide (BUMEX) 1 MG tablet, Take 1 tablet by mouth daily as needed (for increased swelling or weight gain 3#/day), Disp: 30 tablet, Rfl: 1    clopidogrel (PLAVIX) 75 MG tablet, Take 1 tablet by mouth daily, Disp: 90 tablet, Rfl: 3    clonazePAM (KLONOPIN) 0.5 MG tablet, Take 1 tablet by mouth nightly as needed. , Disp: , Rfl:     isosorbide mononitrate (IMDUR) 120 MG extended release tablet, Take 1 tablet by mouth in the morning., Disp: 90 tablet, Rfl: 3    metoprolol succinate (TOPROL XL) 50 MG extended release tablet, Take 1 tablet by mouth in the morning., Disp: 90 tablet, Rfl: 3    nitroGLYCERIN (NITROSTAT) 0.4 MG SL tablet, DISSOLVE ONE TABLET UNDER THE TONGUE EVERY 5 MINUTES AS NEEDED FOR CHEST PAIN.   DO NOT EXCEED A TOTAL OF 3 DOSES IN 15 MINUTES, Disp: 25 tablet, Rfl: 3    melatonin 5 MG TABS tablet, Take 1 tablet by mouth nightly, Disp: , Rfl:     ENTRESTO  MG per tablet, 1 tablet 2 times daily, Disp: , Rfl:     aspirin 81 MG tablet, Take 1 tablet by mouth daily, Disp: ,

## 2023-06-05 ENCOUNTER — TELEPHONE (OUTPATIENT)
Dept: CARDIOLOGY CLINIC | Age: 68
End: 2023-06-05

## 2023-06-05 NOTE — TELEPHONE ENCOUNTER
Patient wife called in with c/o of upset stomach and throwing up since starting [address] by Dr Fay Nino. Stopped taking medication.

## 2023-06-21 RX ORDER — SPIRONOLACTONE 25 MG/1
TABLET ORAL
Qty: 90 TABLET | Refills: 3 | Status: SHIPPED | OUTPATIENT
Start: 2023-06-21

## 2023-08-07 ENCOUNTER — OFFICE VISIT (OUTPATIENT)
Dept: CARDIOLOGY CLINIC | Age: 68
End: 2023-08-07
Payer: MEDICARE

## 2023-08-07 VITALS
WEIGHT: 237 LBS | BODY MASS INDEX: 31.27 KG/M2 | SYSTOLIC BLOOD PRESSURE: 112 MMHG | DIASTOLIC BLOOD PRESSURE: 62 MMHG | OXYGEN SATURATION: 99 % | HEART RATE: 61 BPM

## 2023-08-07 DIAGNOSIS — Z95.1 HX OF CABG: ICD-10-CM

## 2023-08-07 DIAGNOSIS — I50.22 CHF NYHA CLASS II, CHRONIC, SYSTOLIC (HCC): Primary | ICD-10-CM

## 2023-08-07 DIAGNOSIS — I35.0 MODERATE AORTIC VALVE STENOSIS: ICD-10-CM

## 2023-08-07 DIAGNOSIS — I25.5 ISCHEMIC CARDIOMYOPATHY: ICD-10-CM

## 2023-08-07 PROCEDURE — 3078F DIAST BP <80 MM HG: CPT | Performed by: NURSE PRACTITIONER

## 2023-08-07 PROCEDURE — 3017F COLORECTAL CA SCREEN DOC REV: CPT | Performed by: NURSE PRACTITIONER

## 2023-08-07 PROCEDURE — 1036F TOBACCO NON-USER: CPT | Performed by: NURSE PRACTITIONER

## 2023-08-07 PROCEDURE — G8427 DOCREV CUR MEDS BY ELIG CLIN: HCPCS | Performed by: NURSE PRACTITIONER

## 2023-08-07 PROCEDURE — 99214 OFFICE O/P EST MOD 30 MIN: CPT | Performed by: NURSE PRACTITIONER

## 2023-08-07 PROCEDURE — 3074F SYST BP LT 130 MM HG: CPT | Performed by: NURSE PRACTITIONER

## 2023-08-07 PROCEDURE — G8417 CALC BMI ABV UP PARAM F/U: HCPCS | Performed by: NURSE PRACTITIONER

## 2023-08-07 PROCEDURE — 1123F ACP DISCUSS/DSCN MKR DOCD: CPT | Performed by: NURSE PRACTITIONER

## 2023-08-07 RX ORDER — METOPROLOL SUCCINATE 50 MG/1
50 TABLET, EXTENDED RELEASE ORAL DAILY
Qty: 90 TABLET | Refills: 3 | Status: SHIPPED | OUTPATIENT
Start: 2023-08-07

## 2023-08-07 RX ORDER — NITROGLYCERIN 0.4 MG/1
TABLET SUBLINGUAL
Qty: 25 TABLET | Refills: 3 | Status: SHIPPED | OUTPATIENT
Start: 2023-08-07

## 2023-08-07 RX ORDER — HYDRALAZINE HYDROCHLORIDE 25 MG/1
25 TABLET, FILM COATED ORAL 2 TIMES DAILY
Qty: 180 TABLET | Refills: 3 | Status: SHIPPED | OUTPATIENT
Start: 2023-08-07

## 2023-08-07 RX ORDER — SACUBITRIL AND VALSARTAN 97; 103 MG/1; MG/1
1 TABLET, FILM COATED ORAL 2 TIMES DAILY
Qty: 180 TABLET | Refills: 3 | Status: SHIPPED | OUTPATIENT
Start: 2023-08-07

## 2023-08-07 RX ORDER — ISOSORBIDE MONONITRATE 120 MG/1
120 TABLET, EXTENDED RELEASE ORAL DAILY
Qty: 90 TABLET | Refills: 3 | Status: SHIPPED | OUTPATIENT
Start: 2023-08-07

## 2023-08-07 ASSESSMENT — ENCOUNTER SYMPTOMS
SHORTNESS OF BREATH: 0
COUGH: 0
ABDOMINAL DISTENTION: 0

## 2023-08-07 NOTE — PATIENT INSTRUCTIONS
You may receive a survey regarding the care you received during your visit. Your input is valuable to us. We encourage you to complete and return your survey. We hope you will choose us in the future for your healthcare needs. Your nurses today were Glenis and TRRSP Tooling[employer].   Office hours:   Mon-Thurs 8-4:30  Friday 8-12  Phone: [phone]    Continue:  Continue current medications  Daily weights and record  Fluid restriction of 2 Liters per day  Limit sodium in diet to around 6995-5212 mg/day  Monitor BP  Activity as tolerated     Call the [address] for any of the following symptoms:   Weight gain of 2-3 pounds in 1 day or 5 pounds in 1 week  Increased shortness of breath  Shortness of breath while laying down  Cough  Chest pain  Swelling in feet, ankles or legs  Bloating in abdomen  Fatigue

## 2023-10-30 DIAGNOSIS — Z95.1 HX OF CABG: ICD-10-CM

## 2023-10-30 RX ORDER — NITROGLYCERIN 0.4 MG/1
TABLET SUBLINGUAL
Qty: 25 TABLET | Refills: 0 | Status: SHIPPED | OUTPATIENT
Start: 2023-10-30

## 2023-12-01 ENCOUNTER — TELEPHONE (OUTPATIENT)
Dept: CARDIOLOGY CLINIC | Age: 68
End: 2023-12-01

## 2023-12-01 LAB
ANION GAP SERPL CALCULATED.3IONS-SCNC: 11 MEQ/L (ref 7–16)
BUN BLDV-MCNC: 23 MG/DL (ref 8–23)
CALCIUM SERPL-MCNC: 9.9 MG/DL (ref 8.5–10.5)
CHLORIDE BLD-SCNC: 104 MEQ/L (ref 95–107)
CO2: 26 MEQ/L (ref 19–31)
CREAT SERPL-MCNC: 1.17 MG/DL (ref 0.8–1.4)
EGFR IF NONAFRICAN AMERICAN: 68 ML/MIN/1.73
GLUCOSE: 110 MG/DL (ref 70–99)
POTASSIUM SERPL-SCNC: 4.9 MEQ/L (ref 3.5–5.4)
SODIUM BLD-SCNC: 141 MEQ/L (ref 133–146)

## 2023-12-01 NOTE — TELEPHONE ENCOUNTER
----- Message from [facility], [facility] - CNP sent at 12/1/2023 12:35 PM EST -----  Labs look good. Continue meds the same.

## 2024-01-08 ENCOUNTER — OFFICE VISIT (OUTPATIENT)
Dept: CARDIOLOGY CLINIC | Age: 69
End: 2024-01-08

## 2024-01-08 VITALS
HEIGHT: 73 IN | DIASTOLIC BLOOD PRESSURE: 72 MMHG | WEIGHT: 225.4 LBS | SYSTOLIC BLOOD PRESSURE: 130 MMHG | HEART RATE: 77 BPM | BODY MASS INDEX: 29.87 KG/M2

## 2024-01-08 DIAGNOSIS — Z01.818 PRE-OP EXAM: ICD-10-CM

## 2024-01-08 DIAGNOSIS — R07.9 CHEST PAIN, UNSPECIFIED TYPE: Primary | ICD-10-CM

## 2024-01-08 DIAGNOSIS — I10 PRIMARY HYPERTENSION: ICD-10-CM

## 2024-01-08 DIAGNOSIS — I25.10 CAD IN NATIVE ARTERY: ICD-10-CM

## 2024-01-08 NOTE — PROGRESS NOTES
Pre op clearance for total shoulder replacment with Dr Watts at the end on 02/15. EKG completed today in office. Med list up to date and pharmacy verified. Denies chest pain, SOB, or palpitations.

## 2024-01-08 NOTE — PROGRESS NOTES
[facility] [place] [facility]  [facility] CARDIOLOGY  [address]  [address]  [facility] [address]  Dept: [phone]  Dept Fax: [phone]  Loc: [phone]    Visit Date: 1/8/2024    Mr. Perea is a 68 y.o. male  who presented for:  Chief Complaint   Patient presents with    Cardiac Clearance     Pre op clearance for total shoulder replacment with Dr Watts 02/15/2024         HPI:   HPI   67 yo M s/p PCI of SVG to RI with JACQUE presents for follow-up.  He needs shoulder surgery.  He cannot lift it above his head.  DAPT, no bleeding.  Has used some NTG SL 2 weeks ago.  He used 1.  He used it for tightness.  Has had it twice in the last 3 months.   No chest pain, angina, SERRANO, orthopnea, PND, sob at rest, palpitations, LE edema, or syncope.    Can do all ADLs.   He lost 12-15 lbs purposely.        Current Outpatient Medications:     nitroGLYCERIN (NITROSTAT) 0.4 MG SL tablet, DISSOLVE ONE TABLET UNDER THE TONGUE EVERY 5 MINUTES AS NEEDED FOR CHEST PAIN.  DO NOT EXCEED A TOTAL OF 3 DOSES IN 15 MINUTES, Disp: 25 tablet, Rfl: 0    sacubitril-valsartan (ENTRESTO)  MG per tablet, Take 1 tablet by mouth 2 times daily, Disp: 180 tablet, Rfl: 3    hydrALAZINE (APRESOLINE) 25 MG tablet, Take 1 tablet by mouth 2 times daily, Disp: 180 tablet, Rfl: 3    isosorbide mononitrate (IMDUR) 120 MG extended release tablet, Take 1 tablet by mouth daily, Disp: 90 tablet, Rfl: 3    metoprolol succinate (TOPROL XL) 50 MG extended release tablet, Take 1 tablet by mouth daily, Disp: 90 tablet, Rfl: 3    spironolactone (ALDACTONE) 25 MG tablet, Take 1 tablet by mouth once daily, Disp: 90 tablet, Rfl: 3    pantoprazole (PROTONIX) 40 MG tablet, Take 1 tablet by mouth daily, Disp: 90 tablet, Rfl: 3    bumetanide (BUMEX) 1 MG tablet, Take 1 tablet by mouth daily as needed (for increased swelling or weight gain 3#/day), Disp: 30 tablet, Rfl: 1    clopidogrel (PLAVIX) 75 MG tablet, Take 1 tablet by mouth daily,

## 2024-01-16 ENCOUNTER — HOSPITAL ENCOUNTER (OUTPATIENT)
Age: 69
Discharge: HOME | End: 2024-01-18
Attending: INTERNAL MEDICINE
Payer: MEDICARE

## 2024-01-16 ENCOUNTER — HOSPITAL ENCOUNTER (OUTPATIENT)
Dept: NUCLEAR MEDICINE | Age: 69
Discharge: HOME | End: 2024-01-16
Attending: INTERNAL MEDICINE
Payer: MEDICARE

## 2024-01-16 VITALS — WEIGHT: 225 LBS | HEIGHT: 73 IN | BODY MASS INDEX: 29.82 KG/M2

## 2024-01-16 DIAGNOSIS — R07.9 CHEST PAIN, UNSPECIFIED TYPE: ICD-10-CM

## 2024-01-16 PROCEDURE — 3430000000 HC RX DIAGNOSTIC RADIOPHARMACEUTICAL: Performed by: INTERNAL MEDICINE

## 2024-01-16 PROCEDURE — 6360000002 HC RX W HCPCS: Performed by: INTERNAL MEDICINE

## 2024-01-16 PROCEDURE — A9500 TC99M SESTAMIBI: HCPCS | Performed by: INTERNAL MEDICINE

## 2024-01-16 PROCEDURE — 78452 HT MUSCLE IMAGE SPECT MULT: CPT

## 2024-01-16 PROCEDURE — 93017 CV STRESS TEST TRACING ONLY: CPT

## 2024-01-16 RX ORDER — TETRAKIS(2-METHOXYISOBUTYLISOCYANIDE)COPPER(I) TETRAFLUOROBORATE 1 MG/ML
10.2 INJECTION, POWDER, LYOPHILIZED, FOR SOLUTION INTRAVENOUS
Status: COMPLETED | OUTPATIENT
Start: 2024-01-16 | End: 2024-01-16

## 2024-01-16 RX ORDER — REGADENOSON 0.08 MG/ML
0.4 INJECTION, SOLUTION INTRAVENOUS
Status: COMPLETED | OUTPATIENT
Start: 2024-01-16 | End: 2024-01-16

## 2024-01-16 RX ORDER — TETRAKIS(2-METHOXYISOBUTYLISOCYANIDE)COPPER(I) TETRAFLUOROBORATE 1 MG/ML
34 INJECTION, POWDER, LYOPHILIZED, FOR SOLUTION INTRAVENOUS
Status: COMPLETED | OUTPATIENT
Start: 2024-01-16 | End: 2024-01-16

## 2024-01-16 RX ADMIN — Medication 34 MILLICURIE: at 08:34

## 2024-01-16 RX ADMIN — REGADENOSON 0.4 MG: 0.08 INJECTION, SOLUTION INTRAVENOUS at 08:36

## 2024-01-16 RX ADMIN — Medication 10.2 MILLICURIE: at 07:44

## 2024-01-17 ENCOUNTER — TELEPHONE (OUTPATIENT)
Dept: CARDIOLOGY CLINIC | Age: 69
End: 2024-01-17

## 2024-01-17 LAB
ECHO BSA: 2.29 M2
NUC STRESS EJECTION FRACTION: 28 %
STRESS BASELINE DIAS BP: 61 MMHG
STRESS BASELINE HR: 66 BPM
STRESS BASELINE SYS BP: 110 MMHG
STRESS STAGE 1 BP: NORMAL MMHG
STRESS STAGE 1 DURATION: 1 MIN:SEC
STRESS STAGE 1 HR: 65 BPM
STRESS STAGE 2 BP: NORMAL MMHG
STRESS STAGE 2 DURATION: 1 MIN:SEC
STRESS STAGE 2 HR: 66 BPM
STRESS STAGE 3 BP: NORMAL MMHG
STRESS STAGE 3 DURATION: 1 MIN:SEC
STRESS STAGE 3 HR: 78 BPM
STRESS STAGE RECOVERY 1 BP: NORMAL MMHG
STRESS STAGE RECOVERY 1 DURATION: 1 MIN:SEC
STRESS STAGE RECOVERY 1 HR: 72 BPM
STRESS STAGE RECOVERY 2 BP: NORMAL MMHG
STRESS STAGE RECOVERY 2 DURATION: 1 MIN:SEC
STRESS STAGE RECOVERY 2 HR: 66 BPM
STRESS STAGE RECOVERY 3 BP: NORMAL MMHG
STRESS STAGE RECOVERY 3 DURATION: 1 MIN:SEC
STRESS STAGE RECOVERY 3 HR: 69 BPM
STRESS STAGE RECOVERY 4 BP: NORMAL MMHG
STRESS STAGE RECOVERY 4 DURATION: 1 MIN:SEC
STRESS STAGE RECOVERY 4 HR: 75 BPM
STRESS TARGET HR: 152 BPM
TID: 1.11

## 2024-01-17 NOTE — TELEPHONE ENCOUNTER
High risk but no other option to improve that risk   If he feels stable as compared to prior, proceed

## 2024-01-17 NOTE — TELEPHONE ENCOUNTER
Patient's wife Amy notified and voiced understanding.  They want to proceed with procedure.  Form out for Dr. Davis to sign.

## 2024-01-17 NOTE — TELEPHONE ENCOUNTER
Results of stress test:  tress Combined Conclusion: The study is positive for myocardial infarction in the inferior wall with andre-infarct ischemia. Findings suggest a moderate risk of cardiac events.    Stress Function: Left ventricular function post-stress is abnormal. Global function is severely reduced. Post-stress ejection fraction is 28%. The stress end diastolic cavity size is severely enlarged.    Perfusion Conclusion: TID ratio is 1.11.    ECG: Resting ECG demonstrates atrial fibrillation.    Stress Test: A pharmacological stress test was performed using lexiscan.     This was done for preop clearance for shoulder replacement on 2/15 with Dr. Watts.   Is patient clear??  Form in Dr. Davis's box.

## 2024-01-17 NOTE — TELEPHONE ENCOUNTER
Amy-spouse, on HIPAA called asking for results.   She is aware this has been sent to Dr. Davis to review.   She is asking for a call back with his recommendations at [phone].

## 2024-01-29 PROBLEM — R06.02 SHORTNESS OF BREATH: Status: RESOLVED | Noted: 2022-01-19 | Resolved: 2024-01-29

## 2024-01-29 PROBLEM — I50.20 SYSTOLIC HEART FAILURE (HCC): Status: RESOLVED | Noted: 2022-01-19 | Resolved: 2024-01-29

## 2024-01-29 PROBLEM — I50.9 HEART FAILURE (HCC): Status: RESOLVED | Noted: 2022-11-23 | Resolved: 2024-01-29

## 2024-01-29 PROBLEM — Z86.79 HISTORY OF OTHER DISEASES OF THE CIRCULATORY SYSTEM, NOT ELSEWHERE CLASSIFIED: Status: RESOLVED | Noted: 2019-08-07 | Resolved: 2024-01-29

## 2024-01-29 PROBLEM — R94.39 ABNORMAL CARDIOVASCULAR STRESS TEST: Status: RESOLVED | Noted: 2019-08-07 | Resolved: 2024-01-29

## 2024-01-31 ENCOUNTER — TELEPHONE (OUTPATIENT)
Dept: CARDIOLOGY CLINIC | Age: 69
End: 2024-01-31

## 2024-01-31 NOTE — TELEPHONE ENCOUNTER
Pt's wife (Amy), on HIPAA, wanted to cancel tmrw's appt. States pt was just here for an appt. Let her know that was with Dr. Davis for regular cardiology, and this appt was for the CHF clinic. She stated pt is having shoulder surgery in a couple weeks and that she would like to cancel this appt for the time being, and would call back when ready to r/s.

## 2024-02-09 ENCOUNTER — HOSPITAL ENCOUNTER (OUTPATIENT)
Age: 69
Discharge: HOME | End: 2024-02-09
Payer: MEDICARE

## 2024-02-09 LAB
ABO: NORMAL
ANTIBODY SCREEN: NORMAL
RH FACTOR: NORMAL

## 2024-02-09 PROCEDURE — 36415 COLL VENOUS BLD VENIPUNCTURE: CPT

## 2024-02-09 PROCEDURE — 86850 RBC ANTIBODY SCREEN: CPT

## 2024-02-09 PROCEDURE — 86901 BLOOD TYPING SEROLOGIC RH(D): CPT

## 2024-02-09 PROCEDURE — 86900 BLOOD TYPING SEROLOGIC ABO: CPT

## 2024-02-09 NOTE — H&P
Orthopedic H&P      CHIEF COMPLAINT: Left shoulder    HISTORY OF PRESENT ILLNESS:      The patient is a 68 y.o. male with known left glenohumeral arthritis.  Here today for surgical intervention.  Takes aspirin daily.    Past Medical History:    Past Medical History:   Diagnosis Date    Abnormal cardiovascular stress test 8/7/2019    CAD (coronary artery disease)     Depression     GERD (gastroesophageal reflux disease)     Hypertension     Melanoma (HCC)        Past Surgical History:    Past Surgical History:   Procedure Laterality Date    CARDIAC SURGERY  2010    open heart    CORONARY ANGIOPLASTY WITH STENT PLACEMENT  05/2019    DIAGNOSTIC CARDIAC CATH LAB PROCEDURE  12/16/2020    with PCI    SKIN CANCER EXCISION         Medications Prior to Admission:   Prior to Admission medications    Medication Sig Start Date End Date Taking? Authorizing Provider   nitroGLYCERIN (NITROSTAT) 0.4 MG SL tablet DISSOLVE ONE TABLET UNDER THE TONGUE EVERY 5 MINUTES AS NEEDED FOR CHEST PAIN.  DO NOT EXCEED A TOTAL OF 3 DOSES IN 15 MINUTES  Patient not taking: Reported on 2/7/2024 10/30/23   Mamadou Davis MD   sacubitril-valsartan (ENTRESTO)  MG per tablet Take 1 tablet by mouth 2 times daily 8/7/23   Harleen Vuong APRN - CNP   hydrALAZINE (APRESOLINE) 25 MG tablet Take 1 tablet by mouth 2 times daily 8/7/23   Harleen Vuong APRN - CNP   isosorbide mononitrate (IMDUR) 120 MG extended release tablet Take 1 tablet by mouth daily 8/7/23   Harleen Vuong APRN - CNP   metoprolol succinate (TOPROL XL) 50 MG extended release tablet Take 1 tablet by mouth daily 8/7/23   Harleen Vuong APRN - CNP   pantoprazole (PROTONIX) 40 MG tablet Take 1 tablet by mouth daily 3/24/23   Mamadou Davis MD   bumetanide (BUMEX) 1 MG tablet Take 1 tablet by mouth daily as needed (for increased swelling or weight gain 3#/day) 3/14/23   Harleen Vuong APRN - CNP   clopidogrel (PLAVIX) 75 MG tablet Take 1 tablet by mouth daily 1/10/23   Ryan

## 2024-02-12 ENCOUNTER — APPOINTMENT (OUTPATIENT)
Dept: GENERAL RADIOLOGY | Age: 69
DRG: 483 | End: 2024-02-12
Attending: ORTHOPAEDIC SURGERY
Payer: MEDICARE

## 2024-02-12 ENCOUNTER — ANESTHESIA (OUTPATIENT)
Dept: OPERATING ROOM | Age: 69
DRG: 483 | End: 2024-02-12
Payer: MEDICARE

## 2024-02-12 ENCOUNTER — ANESTHESIA EVENT (OUTPATIENT)
Dept: OPERATING ROOM | Age: 69
DRG: 483 | End: 2024-02-12
Payer: MEDICARE

## 2024-02-12 ENCOUNTER — HOSPITAL ENCOUNTER (INPATIENT)
Age: 69
LOS: 1 days | Discharge: HOME | DRG: 483 | End: 2024-02-13
Attending: ORTHOPAEDIC SURGERY | Admitting: ORTHOPAEDIC SURGERY
Payer: MEDICARE

## 2024-02-12 DIAGNOSIS — G89.18 POSTOPERATIVE PAIN: Primary | ICD-10-CM

## 2024-02-12 PROBLEM — M19.012 GLENOHUMERAL ARTHRITIS, LEFT: Status: ACTIVE | Noted: 2024-02-12

## 2024-02-12 LAB
ANION GAP SERPL CALC-SCNC: 16 MEQ/L (ref 8–16)
BUN SERPL-MCNC: 26 MG/DL (ref 7–22)
CALCIUM SERPL-MCNC: 9.5 MG/DL (ref 8.5–10.5)
CHLORIDE SERPL-SCNC: 106 MEQ/L (ref 98–111)
CO2 SERPL-SCNC: 20 MEQ/L (ref 23–33)
CREAT SERPL-MCNC: 1.4 MG/DL (ref 0.4–1.2)
CREAT UR-MCNC: 265.2 MG/DL
DEPRECATED RDW RBC AUTO: 50.5 FL (ref 35–45)
ERYTHROCYTE [DISTWIDTH] IN BLOOD BY AUTOMATED COUNT: 15 % (ref 11.5–14.5)
GFR SERPL CREATININE-BSD FRML MDRD: 55 ML/MIN/1.73M2
GLUCOSE SERPL-MCNC: 176 MG/DL (ref 70–108)
HCT VFR BLD AUTO: 53.9 % (ref 42–52)
HGB BLD-MCNC: 17.2 GM/DL (ref 14–18)
MAGNESIUM SERPL-MCNC: 2.1 MG/DL (ref 1.6–2.4)
MCH RBC QN AUTO: 29.6 PG (ref 26–33)
MCHC RBC AUTO-ENTMCNC: 31.9 GM/DL (ref 32.2–35.5)
MCV RBC AUTO: 92.8 FL (ref 80–94)
OSMOLALITY UR: 827 MOSMOL/KG (ref 250–750)
PHOSPHATE SERPL-MCNC: 2.3 MG/DL (ref 2.4–4.7)
PLATELET # BLD AUTO: 359 THOU/MM3 (ref 130–400)
PMV BLD AUTO: 10.9 FL (ref 9.4–12.4)
POTASSIUM SERPL-SCNC: 4.4 MEQ/L (ref 3.5–5.2)
RBC # BLD AUTO: 5.81 MILL/MM3 (ref 4.7–6.1)
SODIUM SERPL-SCNC: 142 MEQ/L (ref 135–145)
SODIUM UR-SCNC: 59 MEQ/L
UUN 24H UR-MCNC: 1175 MG/DL
WBC # BLD AUTO: 13.4 THOU/MM3 (ref 4.8–10.8)

## 2024-02-12 PROCEDURE — 2580000003 HC RX 258: Performed by: PHYSICIAN ASSISTANT

## 2024-02-12 PROCEDURE — 84540 ASSAY OF URINE/UREA-N: CPT

## 2024-02-12 PROCEDURE — 6360000002 HC RX W HCPCS

## 2024-02-12 PROCEDURE — 6360000002 HC RX W HCPCS: Performed by: ANESTHESIOLOGY

## 2024-02-12 PROCEDURE — 82570 ASSAY OF URINE CREATININE: CPT

## 2024-02-12 PROCEDURE — 51798 US URINE CAPACITY MEASURE: CPT

## 2024-02-12 PROCEDURE — 0RRK0JZ REPLACEMENT OF LEFT SHOULDER JOINT WITH SYNTHETIC SUBSTITUTE, OPEN APPROACH: ICD-10-PCS | Performed by: ORTHOPAEDIC SURGERY

## 2024-02-12 PROCEDURE — 3600000014 HC SURGERY LEVEL 4 ADDTL 15MIN: Performed by: ORTHOPAEDIC SURGERY

## 2024-02-12 PROCEDURE — 99214 OFFICE O/P EST MOD 30 MIN: CPT | Performed by: INTERNAL MEDICINE

## 2024-02-12 PROCEDURE — 83735 ASSAY OF MAGNESIUM: CPT

## 2024-02-12 PROCEDURE — 83935 ASSAY OF URINE OSMOLALITY: CPT

## 2024-02-12 PROCEDURE — 6370000000 HC RX 637 (ALT 250 FOR IP): Performed by: PHYSICIAN ASSISTANT

## 2024-02-12 PROCEDURE — 7100000000 HC PACU RECOVERY - FIRST 15 MIN: Performed by: ORTHOPAEDIC SURGERY

## 2024-02-12 PROCEDURE — 2720000010 HC SURG SUPPLY STERILE: Performed by: ORTHOPAEDIC SURGERY

## 2024-02-12 PROCEDURE — 7100000001 HC PACU RECOVERY - ADDTL 15 MIN: Performed by: ORTHOPAEDIC SURGERY

## 2024-02-12 PROCEDURE — 3700000001 HC ADD 15 MINUTES (ANESTHESIA): Performed by: ORTHOPAEDIC SURGERY

## 2024-02-12 PROCEDURE — 71045 X-RAY EXAM CHEST 1 VIEW: CPT

## 2024-02-12 PROCEDURE — 80048 BASIC METABOLIC PNL TOTAL CA: CPT

## 2024-02-12 PROCEDURE — 2709999900 HC NON-CHARGEABLE SUPPLY: Performed by: ORTHOPAEDIC SURGERY

## 2024-02-12 PROCEDURE — 6370000000 HC RX 637 (ALT 250 FOR IP): Performed by: INTERNAL MEDICINE

## 2024-02-12 PROCEDURE — 73030 X-RAY EXAM OF SHOULDER: CPT

## 2024-02-12 PROCEDURE — G0378 HOSPITAL OBSERVATION PER HR: HCPCS

## 2024-02-12 PROCEDURE — 93010 ELECTROCARDIOGRAM REPORT: CPT | Performed by: INTERNAL MEDICINE

## 2024-02-12 PROCEDURE — 93005 ELECTROCARDIOGRAM TRACING: CPT | Performed by: INTERNAL MEDICINE

## 2024-02-12 PROCEDURE — C1713 ANCHOR/SCREW BN/BN,TIS/BN: HCPCS | Performed by: ORTHOPAEDIC SURGERY

## 2024-02-12 PROCEDURE — 3600000004 HC SURGERY LEVEL 4 BASE: Performed by: ORTHOPAEDIC SURGERY

## 2024-02-12 PROCEDURE — 84300 ASSAY OF URINE SODIUM: CPT

## 2024-02-12 PROCEDURE — 84100 ASSAY OF PHOSPHORUS: CPT

## 2024-02-12 PROCEDURE — 85027 COMPLETE CBC AUTOMATED: CPT

## 2024-02-12 PROCEDURE — 3700000000 HC ANESTHESIA ATTENDED CARE: Performed by: ORTHOPAEDIC SURGERY

## 2024-02-12 PROCEDURE — 6360000002 HC RX W HCPCS: Performed by: PHYSICIAN ASSISTANT

## 2024-02-12 PROCEDURE — 2500000003 HC RX 250 WO HCPCS

## 2024-02-12 PROCEDURE — 64415 NJX AA&/STRD BRCH PLXS IMG: CPT | Performed by: ANESTHESIOLOGY

## 2024-02-12 PROCEDURE — C1776 JOINT DEVICE (IMPLANTABLE): HCPCS | Performed by: ORTHOPAEDIC SURGERY

## 2024-02-12 PROCEDURE — 36415 COLL VENOUS BLD VENIPUNCTURE: CPT

## 2024-02-12 DEVICE — IMPLANTABLE DEVICE: Type: IMPLANTABLE DEVICE | Site: SHOULDER | Status: FUNCTIONAL

## 2024-02-12 DEVICE — RALLY MV AB BONE CEMENT 40 GRAMS
Type: IMPLANTABLE DEVICE | Site: SHOULDER | Status: FUNCTIONAL
Brand: RALLY

## 2024-02-12 RX ORDER — HYDROCODONE BITARTRATE AND ACETAMINOPHEN 5; 325 MG/1; MG/1
2 TABLET ORAL EVERY 4 HOURS PRN
Status: DISCONTINUED | OUTPATIENT
Start: 2024-02-12 | End: 2024-02-13 | Stop reason: HOSPADM

## 2024-02-12 RX ORDER — METOPROLOL SUCCINATE 50 MG/1
50 TABLET, EXTENDED RELEASE ORAL DAILY
Status: DISCONTINUED | OUTPATIENT
Start: 2024-02-13 | End: 2024-02-12

## 2024-02-12 RX ORDER — ONDANSETRON 2 MG/ML
INJECTION INTRAMUSCULAR; INTRAVENOUS PRN
Status: DISCONTINUED | OUTPATIENT
Start: 2024-02-12 | End: 2024-02-12 | Stop reason: SDUPTHER

## 2024-02-12 RX ORDER — SODIUM CHLORIDE 0.9 % (FLUSH) 0.9 %
5-40 SYRINGE (ML) INJECTION EVERY 12 HOURS SCHEDULED
Status: DISCONTINUED | OUTPATIENT
Start: 2024-02-12 | End: 2024-02-13 | Stop reason: HOSPADM

## 2024-02-12 RX ORDER — NITROGLYCERIN 0.4 MG/1
0.4 TABLET SUBLINGUAL EVERY 5 MIN PRN
Status: DISCONTINUED | OUTPATIENT
Start: 2024-02-12 | End: 2024-02-13 | Stop reason: HOSPADM

## 2024-02-12 RX ORDER — SODIUM CHLORIDE 0.9 % (FLUSH) 0.9 %
5-40 SYRINGE (ML) INJECTION PRN
Status: DISCONTINUED | OUTPATIENT
Start: 2024-02-12 | End: 2024-02-12 | Stop reason: HOSPADM

## 2024-02-12 RX ORDER — GLUCAGON 1 MG/ML
1 KIT INJECTION PRN
Status: DISCONTINUED | OUTPATIENT
Start: 2024-02-12 | End: 2024-02-12 | Stop reason: HOSPADM

## 2024-02-12 RX ORDER — ONDANSETRON 2 MG/ML
4 INJECTION INTRAMUSCULAR; INTRAVENOUS
Status: DISCONTINUED | OUTPATIENT
Start: 2024-02-12 | End: 2024-02-12 | Stop reason: HOSPADM

## 2024-02-12 RX ORDER — MULTIVITAMIN WITH IRON
1 TABLET ORAL DAILY
Status: DISCONTINUED | OUTPATIENT
Start: 2024-02-12 | End: 2024-02-13 | Stop reason: HOSPADM

## 2024-02-12 RX ORDER — OXYCODONE HYDROCHLORIDE 5 MG/1
5 TABLET ORAL
Status: DISCONTINUED | OUTPATIENT
Start: 2024-02-12 | End: 2024-02-12 | Stop reason: HOSPADM

## 2024-02-12 RX ORDER — BUMETANIDE 1 MG/1
1 TABLET ORAL DAILY PRN
Status: DISCONTINUED | OUTPATIENT
Start: 2024-02-12 | End: 2024-02-13 | Stop reason: HOSPADM

## 2024-02-12 RX ORDER — DIPHENHYDRAMINE HYDROCHLORIDE 50 MG/ML
12.5 INJECTION INTRAMUSCULAR; INTRAVENOUS
Status: DISCONTINUED | OUTPATIENT
Start: 2024-02-12 | End: 2024-02-12 | Stop reason: HOSPADM

## 2024-02-12 RX ORDER — SODIUM CHLORIDE 9 MG/ML
INJECTION, SOLUTION INTRAVENOUS PRN
Status: DISCONTINUED | OUTPATIENT
Start: 2024-02-12 | End: 2024-02-13 | Stop reason: HOSPADM

## 2024-02-12 RX ORDER — SODIUM CHLORIDE 0.9 % (FLUSH) 0.9 %
5-40 SYRINGE (ML) INJECTION EVERY 12 HOURS SCHEDULED
Status: DISCONTINUED | OUTPATIENT
Start: 2024-02-12 | End: 2024-02-12 | Stop reason: HOSPADM

## 2024-02-12 RX ORDER — ONDANSETRON 2 MG/ML
4 INJECTION INTRAMUSCULAR; INTRAVENOUS EVERY 6 HOURS PRN
Status: DISCONTINUED | OUTPATIENT
Start: 2024-02-12 | End: 2024-02-13 | Stop reason: HOSPADM

## 2024-02-12 RX ORDER — PROPOFOL 10 MG/ML
INJECTION, EMULSION INTRAVENOUS PRN
Status: DISCONTINUED | OUTPATIENT
Start: 2024-02-12 | End: 2024-02-12 | Stop reason: SDUPTHER

## 2024-02-12 RX ORDER — SODIUM CHLORIDE 9 MG/ML
INJECTION, SOLUTION INTRAVENOUS PRN
Status: DISCONTINUED | OUTPATIENT
Start: 2024-02-12 | End: 2024-02-12 | Stop reason: HOSPADM

## 2024-02-12 RX ORDER — METOPROLOL TARTRATE 1 MG/ML
INJECTION, SOLUTION INTRAVENOUS PRN
Status: DISCONTINUED | OUTPATIENT
Start: 2024-02-12 | End: 2024-02-12 | Stop reason: SDUPTHER

## 2024-02-12 RX ORDER — LIDOCAINE HCL/PF 100 MG/5ML
SYRINGE (ML) INJECTION PRN
Status: DISCONTINUED | OUTPATIENT
Start: 2024-02-12 | End: 2024-02-12 | Stop reason: SDUPTHER

## 2024-02-12 RX ORDER — EPHEDRINE SULFATE/0.9% NACL/PF 50 MG/5 ML
SYRINGE (ML) INTRAVENOUS PRN
Status: DISCONTINUED | OUTPATIENT
Start: 2024-02-12 | End: 2024-02-12 | Stop reason: SDUPTHER

## 2024-02-12 RX ORDER — LABETALOL HYDROCHLORIDE 5 MG/ML
10 INJECTION INTRAVENOUS
Status: DISCONTINUED | OUTPATIENT
Start: 2024-02-12 | End: 2024-02-12 | Stop reason: HOSPADM

## 2024-02-12 RX ORDER — HYDROCODONE BITARTRATE AND ACETAMINOPHEN 5; 325 MG/1; MG/1
1 TABLET ORAL EVERY 4 HOURS PRN
Qty: 30 TABLET | Refills: 0 | Status: SHIPPED | OUTPATIENT
Start: 2024-02-12 | End: 2024-02-17

## 2024-02-12 RX ORDER — PHENYLEPHRINE HCL IN 0.9% NACL 1 MG/10 ML
SYRINGE (ML) INTRAVENOUS PRN
Status: DISCONTINUED | OUTPATIENT
Start: 2024-02-12 | End: 2024-02-12 | Stop reason: SDUPTHER

## 2024-02-12 RX ORDER — SODIUM CHLORIDE 9 MG/ML
INJECTION, SOLUTION INTRAVENOUS CONTINUOUS
Status: DISCONTINUED | OUTPATIENT
Start: 2024-02-12 | End: 2024-02-12 | Stop reason: HOSPADM

## 2024-02-12 RX ORDER — CLONAZEPAM 0.5 MG/1
0.5 TABLET ORAL NIGHTLY PRN
Status: DISCONTINUED | OUTPATIENT
Start: 2024-02-12 | End: 2024-02-13 | Stop reason: HOSPADM

## 2024-02-12 RX ORDER — SODIUM CHLORIDE 0.9 % (FLUSH) 0.9 %
5-40 SYRINGE (ML) INJECTION PRN
Status: DISCONTINUED | OUTPATIENT
Start: 2024-02-12 | End: 2024-02-13 | Stop reason: HOSPADM

## 2024-02-12 RX ORDER — ACETAMINOPHEN 325 MG/1
650 TABLET ORAL ONCE
Status: DISCONTINUED | OUTPATIENT
Start: 2024-02-12 | End: 2024-02-12 | Stop reason: HOSPADM

## 2024-02-12 RX ORDER — FENTANYL CITRATE 50 UG/ML
50 INJECTION, SOLUTION INTRAMUSCULAR; INTRAVENOUS EVERY 5 MIN PRN
Status: DISCONTINUED | OUTPATIENT
Start: 2024-02-12 | End: 2024-02-12 | Stop reason: HOSPADM

## 2024-02-12 RX ORDER — TRANEXAMIC ACID 100 MG/ML
INJECTION, SOLUTION INTRAVENOUS PRN
Status: DISCONTINUED | OUTPATIENT
Start: 2024-02-12 | End: 2024-02-12 | Stop reason: SDUPTHER

## 2024-02-12 RX ORDER — ROCURONIUM BROMIDE 10 MG/ML
INJECTION, SOLUTION INTRAVENOUS PRN
Status: DISCONTINUED | OUTPATIENT
Start: 2024-02-12 | End: 2024-02-12 | Stop reason: SDUPTHER

## 2024-02-12 RX ORDER — IPRATROPIUM BROMIDE AND ALBUTEROL SULFATE 2.5; .5 MG/3ML; MG/3ML
1 SOLUTION RESPIRATORY (INHALATION)
Status: DISCONTINUED | OUTPATIENT
Start: 2024-02-12 | End: 2024-02-12 | Stop reason: HOSPADM

## 2024-02-12 RX ORDER — CLOPIDOGREL BISULFATE 75 MG/1
75 TABLET ORAL DAILY
Status: DISCONTINUED | OUTPATIENT
Start: 2024-02-12 | End: 2024-02-13 | Stop reason: HOSPADM

## 2024-02-12 RX ORDER — HYDRALAZINE HYDROCHLORIDE 25 MG/1
25 TABLET, FILM COATED ORAL 2 TIMES DAILY
Status: DISCONTINUED | OUTPATIENT
Start: 2024-02-12 | End: 2024-02-13 | Stop reason: HOSPADM

## 2024-02-12 RX ORDER — DROPERIDOL 2.5 MG/ML
0.62 INJECTION, SOLUTION INTRAMUSCULAR; INTRAVENOUS
Status: DISCONTINUED | OUTPATIENT
Start: 2024-02-12 | End: 2024-02-12 | Stop reason: HOSPADM

## 2024-02-12 RX ORDER — MEPERIDINE HYDROCHLORIDE 25 MG/ML
12.5 INJECTION INTRAMUSCULAR; INTRAVENOUS; SUBCUTANEOUS ONCE
Status: DISCONTINUED | OUTPATIENT
Start: 2024-02-12 | End: 2024-02-12 | Stop reason: HOSPADM

## 2024-02-12 RX ORDER — MIDAZOLAM HYDROCHLORIDE 1 MG/ML
INJECTION INTRAMUSCULAR; INTRAVENOUS PRN
Status: DISCONTINUED | OUTPATIENT
Start: 2024-02-12 | End: 2024-02-12 | Stop reason: SDUPTHER

## 2024-02-12 RX ORDER — KETOROLAC TROMETHAMINE 30 MG/ML
INJECTION, SOLUTION INTRAMUSCULAR; INTRAVENOUS PRN
Status: DISCONTINUED | OUTPATIENT
Start: 2024-02-12 | End: 2024-02-12 | Stop reason: SDUPTHER

## 2024-02-12 RX ORDER — DEXAMETHASONE SODIUM PHOSPHATE 10 MG/ML
INJECTION, EMULSION INTRAMUSCULAR; INTRAVENOUS PRN
Status: DISCONTINUED | OUTPATIENT
Start: 2024-02-12 | End: 2024-02-12 | Stop reason: SDUPTHER

## 2024-02-12 RX ORDER — PANTOPRAZOLE SODIUM 40 MG/1
40 TABLET, DELAYED RELEASE ORAL
Status: DISCONTINUED | OUTPATIENT
Start: 2024-02-13 | End: 2024-02-13 | Stop reason: HOSPADM

## 2024-02-12 RX ORDER — ASPIRIN 81 MG/1
81 TABLET, CHEWABLE ORAL DAILY
Status: DISCONTINUED | OUTPATIENT
Start: 2024-02-13 | End: 2024-02-13 | Stop reason: HOSPADM

## 2024-02-12 RX ORDER — HYDRALAZINE HYDROCHLORIDE 20 MG/ML
10 INJECTION INTRAMUSCULAR; INTRAVENOUS
Status: DISCONTINUED | OUTPATIENT
Start: 2024-02-12 | End: 2024-02-12 | Stop reason: HOSPADM

## 2024-02-12 RX ORDER — DEXTROSE MONOHYDRATE 100 MG/ML
INJECTION, SOLUTION INTRAVENOUS CONTINUOUS PRN
Status: DISCONTINUED | OUTPATIENT
Start: 2024-02-12 | End: 2024-02-12 | Stop reason: HOSPADM

## 2024-02-12 RX ORDER — ISOSORBIDE MONONITRATE 60 MG/1
120 TABLET, EXTENDED RELEASE ORAL DAILY
Status: DISCONTINUED | OUTPATIENT
Start: 2024-02-13 | End: 2024-02-13 | Stop reason: HOSPADM

## 2024-02-12 RX ORDER — ONDANSETRON 4 MG/1
4 TABLET, ORALLY DISINTEGRATING ORAL EVERY 8 HOURS PRN
Status: DISCONTINUED | OUTPATIENT
Start: 2024-02-12 | End: 2024-02-13 | Stop reason: HOSPADM

## 2024-02-12 RX ORDER — HYDROCODONE BITARTRATE AND ACETAMINOPHEN 5; 325 MG/1; MG/1
1 TABLET ORAL EVERY 4 HOURS PRN
Status: DISCONTINUED | OUTPATIENT
Start: 2024-02-12 | End: 2024-02-13 | Stop reason: HOSPADM

## 2024-02-12 RX ORDER — METOPROLOL SUCCINATE 50 MG/1
50 TABLET, EXTENDED RELEASE ORAL DAILY
Status: DISCONTINUED | OUTPATIENT
Start: 2024-02-12 | End: 2024-02-13 | Stop reason: HOSPADM

## 2024-02-12 RX ORDER — ACETAMINOPHEN 325 MG/1
650 TABLET ORAL EVERY 6 HOURS SCHEDULED
Status: DISCONTINUED | OUTPATIENT
Start: 2024-02-12 | End: 2024-02-13 | Stop reason: HOSPADM

## 2024-02-12 RX ORDER — ROPIVACAINE HYDROCHLORIDE 5 MG/ML
INJECTION, SOLUTION EPIDURAL; INFILTRATION; PERINEURAL
Status: COMPLETED | OUTPATIENT
Start: 2024-02-12 | End: 2024-02-12

## 2024-02-12 RX ADMIN — ACETAMINOPHEN 650 MG: 325 TABLET ORAL at 17:15

## 2024-02-12 RX ADMIN — CEFAZOLIN 2000 MG: 10 INJECTION, POWDER, FOR SOLUTION INTRAVENOUS at 15:06

## 2024-02-12 RX ADMIN — MIDAZOLAM 2 MG: 1 INJECTION INTRAMUSCULAR; INTRAVENOUS at 07:28

## 2024-02-12 RX ADMIN — Medication 25 MG: at 08:17

## 2024-02-12 RX ADMIN — HYDRALAZINE HYDROCHLORIDE 25 MG: 25 TABLET ORAL at 21:16

## 2024-02-12 RX ADMIN — Medication 200 MCG: at 09:04

## 2024-02-12 RX ADMIN — HYDROCODONE BITARTRATE AND ACETAMINOPHEN 1 TABLET: 5; 325 TABLET ORAL at 21:15

## 2024-02-12 RX ADMIN — POTASSIUM & SODIUM PHOSPHATES POWDER PACK 280-160-250 MG 250 MG: 280-160-250 PACK at 21:15

## 2024-02-12 RX ADMIN — Medication 200 MCG: at 07:51

## 2024-02-12 RX ADMIN — Medication 1 TABLET: at 13:20

## 2024-02-12 RX ADMIN — ROCURONIUM BROMIDE 30 MG: 10 INJECTION INTRAVENOUS at 08:45

## 2024-02-12 RX ADMIN — SODIUM CHLORIDE, PRESERVATIVE FREE 10 ML: 5 INJECTION INTRAVENOUS at 21:16

## 2024-02-12 RX ADMIN — ROCURONIUM BROMIDE 20 MG: 10 INJECTION INTRAVENOUS at 09:33

## 2024-02-12 RX ADMIN — PROPOFOL 100 MG: 10 INJECTION, EMULSION INTRAVENOUS at 07:35

## 2024-02-12 RX ADMIN — ONDANSETRON 4 MG: 2 INJECTION INTRAMUSCULAR; INTRAVENOUS at 10:05

## 2024-02-12 RX ADMIN — SUGAMMADEX 200 MG: 100 INJECTION, SOLUTION INTRAVENOUS at 10:32

## 2024-02-12 RX ADMIN — METOPROLOL SUCCINATE 50 MG: 50 TABLET, EXTENDED RELEASE ORAL at 15:07

## 2024-02-12 RX ADMIN — POTASSIUM & SODIUM PHOSPHATES POWDER PACK 280-160-250 MG 250 MG: 280-160-250 PACK at 16:33

## 2024-02-12 RX ADMIN — ROPIVACAINE HYDROCHLORIDE 30 ML: 5 INJECTION, SOLUTION EPIDURAL; INFILTRATION; PERINEURAL at 07:28

## 2024-02-12 RX ADMIN — DEXAMETHASONE SODIUM PHOSPHATE 10 MG: 10 INJECTION, EMULSION INTRAMUSCULAR; INTRAVENOUS at 07:35

## 2024-02-12 RX ADMIN — ROCURONIUM BROMIDE 70 MG: 10 INJECTION INTRAVENOUS at 07:36

## 2024-02-12 RX ADMIN — Medication 200 MCG: at 07:35

## 2024-02-12 RX ADMIN — Medication 40 MG: at 07:35

## 2024-02-12 RX ADMIN — Medication 50 MCG: at 08:06

## 2024-02-12 RX ADMIN — Medication 10 MG: at 08:06

## 2024-02-12 RX ADMIN — Medication 2000 MG: at 07:47

## 2024-02-12 RX ADMIN — CLOPIDOGREL BISULFATE 75 MG: 75 TABLET ORAL at 13:20

## 2024-02-12 RX ADMIN — SODIUM CHLORIDE: 9 INJECTION, SOLUTION INTRAVENOUS at 06:33

## 2024-02-12 RX ADMIN — TRANEXAMIC ACID 1000 MG: 100 INJECTION, SOLUTION INTRAVENOUS at 10:04

## 2024-02-12 RX ADMIN — ACETAMINOPHEN 650 MG: 325 TABLET ORAL at 13:20

## 2024-02-12 RX ADMIN — KETOROLAC TROMETHAMINE 15 MG: 30 INJECTION INTRAMUSCULAR; INTRAVENOUS at 10:05

## 2024-02-12 RX ADMIN — Medication 5 MG: at 08:34

## 2024-02-12 RX ADMIN — Medication 10 MG: at 07:51

## 2024-02-12 RX ADMIN — METOPROLOL TARTRATE 5 MG: 5 INJECTION, SOLUTION INTRAVENOUS at 09:33

## 2024-02-12 NOTE — BRIEF OP NOTE
Brief Postoperative Note      Patient: Kush Perea  YOB: 1955  MRN: [id number]    Date of Procedure: 2/12/2024    Pre-Op Diagnosis Codes:     * Arthritis of left glenohumeral joint [M19.012]    Post-Op Diagnosis: Same       Procedure(s):  Left Anatomic Total Shoulder Arthroplasty    Surgeon(s):  Damon Watts DO    Assistant:  Physician Assistant: Benjamin Lakhani PA-C    Anesthesia: General    Estimated Blood Loss (mL): 300     Complications: None    Specimens:   * No specimens in log *    Implants:  Implant Name Type Inv. Item Serial No.  Lot No. LRB No. Used Action   CEMENT BONE 40GM M VISC AB [facility] - [id number]  CEMENT BONE 40GM M VISC AB [facility] AND [facility] [id number] Left 1 Implanted   COMPONENT MICHI LG 35 DEG LT SHLDR AEQUALIS PERFORM+ CORT[employer] - [id number]  COMPONENT MICHI LG 35 DEG LT SHLDR AEQUALIS PERFORM+ CORTILOC [id number] [facility]  Left 1 Implanted   STEM HUM L82MM 5C 137.5DEG ANG STD PTC [employer] ASCEND FLX - [id number]  STEM HUM L82MM 5C 137.5DEG ANG STD PTC AEQUALIS ASCEND FLX [id number] [employer] [facility]  Left 1 Implanted   HEAD HUM H17MM [id number] OFFSET 1.5MM SHLDR LO CO CHROM SFT - [id number]  HEAD HUM H17MM [id number] OFFSET 1.5MM SHLDR LO CO CHROM SFT [id number] [employer] [facility]  Left 1 Implanted         Drains: * No LDAs found *    Findings: Postop diagnosis confirmed      Electronically signed by Benjamin Lakhani PA-C on 2/12/2024 at 10:32 AM

## 2024-02-12 NOTE — DISCHARGE INSTRUCTIONS
Orthopedic Discharge Instructions:  Weight bearing status: No lifting, pushing or pulling   for the left arm. Keep dressing clean and dry.  Ok to remove sling at least 3x/day for elbow, wrist and hand exercises. Ok for gentle pendulum shoulder exercise only.   Do not remove dressings or splint  Ice (20 minutes on and off 1 hour) and elevate as needed to reduce swelling and throbbing pain.  Starting 3 days after surgery Ok to shower but no soaks or baths. The dressing is water proof but try keep dry as possible in shower. If it feels saturated removed it.   Advance diet as tolerated: begin with clear liquids.  Drink plenty of fluids.  Call the office or come to Emergency Room if signs of infection appear (hot, swollen, red, draining pus, fever)  Take medications as prescribed.  Wean off narcotics (percocet/norco) as soon as possible. Do not take tylenol if still taking narcotics.  Follow up with Dr. Watts in his office in 10-14 days after surgery. Call [phone] ext [phone] to schedule/confirm.

## 2024-02-12 NOTE — ANESTHESIA PRE PROCEDURE
Facility-Administered Medications   Medication Dose Route Frequency Provider Last Rate Last Admin   • 0.9 % sodium chloride infusion   IntraVENous Continuous Benjamin Lakhani PA-C 50 mL/hr at 02/12/24 0733 NoRateChange at 02/12/24 0733   • sodium chloride flush 0.9 % injection 5-40 mL  5-40 mL IntraVENous 2 times per day Benjamin Lakhani PA-C       • sodium chloride flush 0.9 % injection 5-40 mL  5-40 mL IntraVENous PRN Benjamin Lakahni PA-C       • 0.9 % sodium chloride infusion   IntraVENous PRN Benjamin Lakhani PA-C         Facility-Administered Medications Ordered in Other Encounters   Medication Dose Route Frequency Provider Last Rate Last Admin   • midazolam (VERSED) injection   IntraVENous PRN Carlos Alberto, Herrera, APRN - CRNA   2 mg at 02/12/24 0728   • propofol infusion   IntraVENous PRN Carlos Alberto, Herrera, APRN - CRNA   100 mg at 02/12/24 0735   • lidocaine (cardiac) (XYLOCAINE) injection   IntraVENous PRN Carlos Alberto, Herrera, APRN - CRNA   40 mg at 02/12/24 0735   • dexAMETHasone (PF) (DECADRON) injection   IntraVENous PRN Carlos Alberto, Herrera, APRN - CRNA   10 mg at 02/12/24 0735   • rocuronium (ZEMURON) injection   IntraVENous PRN Carlos Alberto, Herrera, APRN - CRNA   70 mg at 02/12/24 0736   • phenylephrine (NAKUL-SYNEPHRINE) 1 MG/10ML prefilled syringe (Push Dose)   IntraVENous PRN Carlos Alberto, Herrera, APRN - CRNA   50 mcg at 02/12/24 0806   • ePHEDrine injection   IntraVENous PRN Carlos Alberto, Herrera, APRN - CRNA   10 mg at 02/12/24 0806       Allergies:    Allergies   Allergen Reactions   • Lipitor [Atorvastatin]      Hard to walk       Problem List:    Patient Active Problem List   Diagnosis Code   • Cardiomyopathy (HCC) I42.9   • Primary hypertension I10   • Family history of cardiac disorder Z82.49   • Hyperlipidemia E78.5   • Melanoma of skin (HCC) C43.9   • Postsurgical aortocoronary bypass status Z95.1   • CAD in native artery I25.10   • Gastroesophageal reflux disease K21.9   • Acute on chronic HFrEF (heart failure

## 2024-02-12 NOTE — DISCHARGE SUMMARY
Orthopaedic Discharge Summary     Patient ID:  Kush Perea  [id number]  68 y.o.  1955    Admit date: 2/12/2024    Discharge date and time: 2/13/2024     Admitting Physician: Damon Watts DO     Discharge Physician: same    Admission Diagnoses: Arthritis of left glenohumeral joint [M19.012]  Glenohumeral arthritis, left [M19.012]    Discharge Diagnoses: Same    Admission Condition: Good    Discharged Condition: good    Indication for Admission: Status post left anatomic total shoulder arthroplasty    Surgical procedure: Left anatomic total shoulder arthroplasty    Consults: cardiology, hospitalist    Significant Diagnostic Studies: None    Hospital Course: Patient was admitted on 2/12/2024 status post left anatomic total shoulder arthroplasty.  Patient was noted to have rate controlled atrial fibrillation in PACU.  Anesthesia service recommended hospitalist consultation.  Upon further review patient was noted to have several runs of paroxysmal SVT.  The cardiology service was consulted on postoperative day 1.  He follows with cardiology Dr. Davis.  Throughout the postoperative period the patient did not complain of any chest pain, pressure or shortness of breath.  Following cardiology evaluation on 2/13/2024 the patient was deemed stable for discharge after adjusting some of his anticoagulation.  It was recommended that he have close follow-up with Dr. Davis.  Patient stable and ready for discharge the evening of 2/13/2024.    Disposition: Home    Patient Instructions:      Medication List        START taking these medications      HYDROcodone-acetaminophen 5-325 MG per tablet  Commonly known as: Norco  Take 1 tablet by mouth every 4 hours as needed for Pain for up to 5 days. Intended supply: 5 days. Take lowest dose possible to manage pain Max Daily Amount: 6 tablets            CONTINUE taking these medications      aspirin 81 MG tablet     bumetanide 1 MG tablet  Commonly known as: BUMEX  Take 1

## 2024-02-12 NOTE — ANESTHESIA PROCEDURE NOTES
Peripheral Block    Patient location during procedure: OR  Reason for block: post-op pain management and at surgeon's request  Start time: 2/12/2024 7:28 AM  Staffing  Performed: anesthesiologist   Anesthesiologist: Henry Viera DO  Performed by: Henry Viera DO  Authorized by: Henry Viera DO    Preanesthetic Checklist  Completed: patient identified, IV checked, site marked, risks and benefits discussed, surgical/procedural consents, equipment checked, pre-op evaluation, timeout performed, anesthesia consent given, oxygen available, monitors applied/VS acknowledged, fire risk safety assessment completed and verbalized and blood product R/B/A discussed and consented  Peripheral Block   Patient position: sitting  Prep: ChloraPrep  Provider prep: mask and sterile gloves  Patient monitoring: responsive to questions, IV access, frequent blood pressure checks and continuous pulse ox  Block type: Brachial plexus  Interscalene  Laterality: left  Injection technique: single-shot  Guidance: ultrasound guided    Needle   Needle type: short-bevel   Needle gauge: 22 G  Needle localization: anatomical landmarks and ultrasound guidance  Test dose: negative  Needle length: 5 cm  Assessment   Injection assessment: negative aspiration for heme, no paresthesia on injection, local visualized surrounding nerve on ultrasound and no intravascular symptoms  Slow fractionated injection: yes  Hemodynamics: stableno  Outcomes: uncomplicated    Medications Administered  ropivacaine (NAROPIN) injection 0.5% - Perineural   30 mL - 2/12/2024 7:28:00 AM

## 2024-02-12 NOTE — ANESTHESIA POSTPROCEDURE EVALUATION
Department of Anesthesiology  Postprocedure Note    Patient: Kush Perea  MRN: [id number]  YOB: 1955  Date of evaluation: 2/12/2024    Procedure Summary       Date: 02/12/24 Room / Location: [facility] OR [address] [place] [address] OR    Anesthesia Start: 0733 Anesthesia Stop: 1044    Procedure: Left Anatomic Total Shoulder Arthroplasty (Left) Diagnosis:       Arthritis of left glenohumeral joint      (Arthritis of left glenohumeral joint [M19.012])    Surgeons: Damon Watts DO Responsible Provider: Henry Viera DO    Anesthesia Type: General ASA Status: 4            Anesthesia Type: General    Jenny Phase I: Jenny Score: 10    Jenny Phase II:      Anesthesia Post Evaluation    Patient location during evaluation: PACU  Patient participation: complete - patient participated  Level of consciousness: awake  Airway patency: patent  Nausea & Vomiting: no nausea  Cardiovascular status: hemodynamically stable  Respiratory status: acceptable  Hydration status: stable  Pain management: adequate    No notable events documented.

## 2024-02-13 VITALS
HEIGHT: 73 IN | SYSTOLIC BLOOD PRESSURE: 98 MMHG | WEIGHT: 231 LBS | DIASTOLIC BLOOD PRESSURE: 61 MMHG | RESPIRATION RATE: 18 BRPM | BODY MASS INDEX: 30.62 KG/M2 | HEART RATE: 68 BPM | TEMPERATURE: 97.3 F | OXYGEN SATURATION: 98 %

## 2024-02-13 PROBLEM — M19.019 GLENOHUMERAL ARTHRITIS: Status: ACTIVE | Noted: 2024-02-13

## 2024-02-13 LAB
ANION GAP SERPL CALC-SCNC: 9 MEQ/L (ref 8–16)
BASOPHILS ABSOLUTE: 0.1 THOU/MM3 (ref 0–0.1)
BASOPHILS NFR BLD AUTO: 0.5 %
BUN SERPL-MCNC: 27 MG/DL (ref 7–22)
CALCIUM SERPL-MCNC: 8.9 MG/DL (ref 8.5–10.5)
CHLORIDE SERPL-SCNC: 105 MEQ/L (ref 98–111)
CO2 SERPL-SCNC: 24 MEQ/L (ref 23–33)
CREAT SERPL-MCNC: 1.3 MG/DL (ref 0.4–1.2)
DEPRECATED RDW RBC AUTO: 49.9 FL (ref 35–45)
EOSINOPHIL NFR BLD AUTO: 0.3 %
EOSINOPHILS ABSOLUTE: 0 THOU/MM3 (ref 0–0.4)
ERYTHROCYTE [DISTWIDTH] IN BLOOD BY AUTOMATED COUNT: 14.7 % (ref 11.5–14.5)
GFR SERPL CREATININE-BSD FRML MDRD: 60 ML/MIN/1.73M2
GLUCOSE SERPL-MCNC: 123 MG/DL (ref 70–108)
HCT VFR BLD AUTO: 46.9 % (ref 42–52)
HGB BLD-MCNC: 14.9 GM/DL (ref 14–18)
IMM GRANULOCYTES # BLD AUTO: 0.07 THOU/MM3 (ref 0–0.07)
IMM GRANULOCYTES NFR BLD AUTO: 0.6 %
LYMPHOCYTES ABSOLUTE: 1.2 THOU/MM3 (ref 1–4.8)
LYMPHOCYTES NFR BLD AUTO: 9.9 %
MCH RBC QN AUTO: 29.3 PG (ref 26–33)
MCHC RBC AUTO-ENTMCNC: 31.8 GM/DL (ref 32.2–35.5)
MCV RBC AUTO: 92.3 FL (ref 80–94)
MONOCYTES ABSOLUTE: 0.9 THOU/MM3 (ref 0.4–1.3)
MONOCYTES NFR BLD AUTO: 7.1 %
NEUTROPHILS NFR BLD AUTO: 81.6 %
NRBC BLD AUTO-RTO: 0 /100 WBC
PLATELET # BLD AUTO: 387 THOU/MM3 (ref 130–400)
PMV BLD AUTO: 11.3 FL (ref 9.4–12.4)
POTASSIUM SERPL-SCNC: 5.2 MEQ/L (ref 3.5–5.2)
RBC # BLD AUTO: 5.08 MILL/MM3 (ref 4.7–6.1)
SEGMENTED NEUTROPHILS ABSOLUTE COUNT: 9.9 THOU/MM3 (ref 1.8–7.7)
SODIUM SERPL-SCNC: 138 MEQ/L (ref 135–145)
WBC # BLD AUTO: 12.1 THOU/MM3 (ref 4.8–10.8)

## 2024-02-13 PROCEDURE — 80048 BASIC METABOLIC PNL TOTAL CA: CPT

## 2024-02-13 PROCEDURE — 97530 THERAPEUTIC ACTIVITIES: CPT

## 2024-02-13 PROCEDURE — 36415 COLL VENOUS BLD VENIPUNCTURE: CPT

## 2024-02-13 PROCEDURE — 6370000000 HC RX 637 (ALT 250 FOR IP): Performed by: PHYSICIAN ASSISTANT

## 2024-02-13 PROCEDURE — 6360000002 HC RX W HCPCS: Performed by: PHYSICIAN ASSISTANT

## 2024-02-13 PROCEDURE — 97165 OT EVAL LOW COMPLEX 30 MIN: CPT

## 2024-02-13 PROCEDURE — G0378 HOSPITAL OBSERVATION PER HR: HCPCS

## 2024-02-13 PROCEDURE — 1200000000 HC SEMI PRIVATE

## 2024-02-13 PROCEDURE — 97535 SELF CARE MNGMENT TRAINING: CPT

## 2024-02-13 PROCEDURE — 6370000000 HC RX 637 (ALT 250 FOR IP): Performed by: INTERNAL MEDICINE

## 2024-02-13 PROCEDURE — 2580000003 HC RX 258: Performed by: PHYSICIAN ASSISTANT

## 2024-02-13 PROCEDURE — 85025 COMPLETE CBC W/AUTO DIFF WBC: CPT

## 2024-02-13 PROCEDURE — 99223 1ST HOSP IP/OBS HIGH 75: CPT | Performed by: INTERNAL MEDICINE

## 2024-02-13 RX ADMIN — ISOSORBIDE MONONITRATE 120 MG: 60 TABLET, EXTENDED RELEASE ORAL at 10:03

## 2024-02-13 RX ADMIN — HYDROCODONE BITARTRATE AND ACETAMINOPHEN 1 TABLET: 5; 325 TABLET ORAL at 10:03

## 2024-02-13 RX ADMIN — POTASSIUM & SODIUM PHOSPHATES POWDER PACK 280-160-250 MG 250 MG: 280-160-250 PACK at 07:52

## 2024-02-13 RX ADMIN — PANTOPRAZOLE SODIUM 40 MG: 40 TABLET, DELAYED RELEASE ORAL at 05:09

## 2024-02-13 RX ADMIN — Medication 1 TABLET: at 07:52

## 2024-02-13 RX ADMIN — CLOPIDOGREL BISULFATE 75 MG: 75 TABLET ORAL at 07:51

## 2024-02-13 RX ADMIN — METOPROLOL SUCCINATE 50 MG: 50 TABLET, EXTENDED RELEASE ORAL at 07:52

## 2024-02-13 RX ADMIN — HYDRALAZINE HYDROCHLORIDE 25 MG: 25 TABLET ORAL at 10:03

## 2024-02-13 RX ADMIN — SODIUM CHLORIDE, PRESERVATIVE FREE 10 ML: 5 INJECTION INTRAVENOUS at 07:53

## 2024-02-13 RX ADMIN — HYDROCODONE BITARTRATE AND ACETAMINOPHEN 1 TABLET: 5; 325 TABLET ORAL at 03:37

## 2024-02-13 RX ADMIN — CEFAZOLIN 2000 MG: 10 INJECTION, POWDER, FOR SOLUTION INTRAVENOUS at 00:17

## 2024-02-13 RX ADMIN — ASPIRIN 81 MG 81 MG: 81 TABLET ORAL at 07:51

## 2024-02-13 NOTE — CARE COORDINATION
Case Management Assessment  Initial Evaluation    Date/Time of Evaluation: 2/13/2024 9:40 AM  Assessment Completed by: Tish Ann RN    If patient is discharged prior to next notation, then this note serves as note for discharge by case management.    Patient Name: Kush Perea                   YOB: 1955  Diagnosis: Arthritis of left glenohumeral joint [M19.012]  Glenohumeral arthritis, left [M19.012]                   Date / Time: 2/12/2024  5:44 AM  [facility]     Patient Admission Status: Observation   Readmission Risk Low 0-14, Mod 15-19), High > 20: No data recorded  Current PCP: Diann Vega APRN - CNP  PCP verified by CM? Yes    Chart Reviewed: Yes      History Provided by: Patient  Patient Orientation: Alert and Oriented    Patient Cognition: Alert    Hospitalization in the last 30 days (Readmission):  No    If yes, Readmission Assessment in CM Navigator will be completed.    Advance Directives:      Code Status: Full Code   Patient's Primary Decision Maker is: Legal Next of Kin      Discharge Planning:    Patient lives with: Spouse/Significant Other Type of Home: House  Primary Care Giver: Self  Patient Support Systems include: Spouse/Significant Other, Children, Family Members   Current Financial resources: Medicare, Other (Comment) ([employer])  Current community resources: None  Current services prior to admission: None            Current DME:              Type of Home Care services:  None    ADLS  Prior functional level: Independent in ADLs/IADLs  Current functional level: Assistance with the following:, Housework, Dressing    Family can provide assistance at DC: Yes  Would you like Case Management to discuss the discharge plan with any other family members/significant others, and if so, who? No  Plans to Return to Present Housing: Yes  Other Identified Issues/Barriers to RETURNING to current housing: none  Potential Assistance needed at discharge: Outpatient

## 2024-02-13 NOTE — PLAN OF CARE
Problem: Discharge Planning  Goal: Discharge to home or other facility with appropriate resources  Outcome: Adequate for Discharge     Problem: Pain  Goal: Verbalizes/displays adequate comfort level or baseline comfort level  Outcome: Adequate for Discharge     Problem: Safety - Adult  Goal: Free from fall injury  Outcome: Adequate for Discharge     Problem: Skin/Tissue Integrity  Goal: Absence of new skin breakdown  Description: 1.  Monitor for areas of redness and/or skin breakdown  2.  Assess vascular access sites hourly  3.  Every 4-6 hours minimum:  Change oxygen saturation probe site  4.  Every 4-6 hours:  If on nasal continuous positive airway pressure, respiratory therapy assess nares and determine need for appliance change or resting period.  Outcome: Adequate for Discharge     Problem: ABCDS Injury Assessment  Goal: Absence of physical injury  Outcome: Adequate for Discharge     Problem: Chronic Conditions and Co-morbidities  Goal: Patient's chronic conditions and co-morbidity symptoms are monitored and maintained or improved  Outcome: Adequate for Discharge     Problem: Cardiovascular - Adult  Goal: Maintains optimal cardiac output and hemodynamic stability  Outcome: Adequate for Discharge  Goal: Absence of cardiac dysrhythmias or at baseline  Outcome: Adequate for Discharge     Problem: Musculoskeletal - Adult  Goal: Return mobility to safest level of function  Outcome: Adequate for Discharge  Goal: Return ADL status to a safe level of function  Outcome: Adequate for Discharge     Problem: Metabolic/Fluid and Electrolytes - Adult  Goal: Electrolytes maintained within normal limits  Outcome: Adequate for Discharge

## 2024-02-13 NOTE — CONSULTS
[facility]'s  Consult    Patient's Name/Date of Birth: Kush Perea / 1955 (68 y.o.)    Date: February 13, 2024     Referring Provider: Damon Watts DO    CHIEF COMPLAINT: Shoulder surgery    CONSULT FOR: SVT/Afib    HPI: This is a pleasant 68 y.o. male presents with postoperative SVT.  Patient underwent left total shoulder arthroplasty secondary to left glenohumeral arthritis.  While in the PACU patient underwent SVT episodes which was asymptomatic.  Patient does have a past medical history of episodic stress-induced atrial fibrillation during stress test in January.  Patient also has a past medical history of CAD status post CABGx4 and PCI (SVG to RI 2020, LCX/OM 2019) currently on aspirin Plavix, HFrEF with an ejection fraction of 28% on Lexiscan on GDMT medication and diuretics.    Echo 3/6/23: DSE done for LF-LG aortic stenosis.   Baseline EF 40-45%, HE 0.80, 12 mmHg mean gradient, max gradient 21 mmHg,   Vmax 2.3 m/s   At 20 mcg/kg/min: HE ~1.0 cm2, mean gradient 14 mmHg, max gradient 24   mmHg, Vmax 2.5 m/s    Findings are consistent with moderate aortic stenosis or pseudo severe   stenosis due to low EF.    Lexiscan 1/17/24: Stress Combined Conclusion: The study is positive for myocardial infarction in the inferior wall with andre-infarct ischemia. Findings suggest a moderate risk of cardiac events.    Stress Function: Left ventricular function post-stress is abnormal. Global function is severely reduced. Post-stress ejection fraction is 28%. The stress end diastolic cavity size is severely enlarged.    Perfusion Conclusion: TID ratio is 1.11.    ECG: Resting ECG demonstrates atrial fibrillation.    Stress Test: A pharmacological stress test was performed using lexiscan.       All labs, EKG's, diagnostic testing and images as well as cardiac cath, stress testing were reviewed during this encounter    Past Medical History:   Diagnosis Date    Abnormal 
hours.  No results for input(s): \"CKTOTAL\", \"TROPONINI\" in the last 72 hours.    Urinalysis:    Lab Results   Component Value Date/Time    BLOODU neg 01/29/2024 10:13 AM    SPECGRAV 1.020 01/29/2024 10:13 AM    GLUCOSEU neg 01/29/2024 10:13 AM       Radiology:   XR SHOULDER LEFT (MIN 2 VIEWS)   Final Result   1. Left shoulder hemiarthroplasty. Postoperative changes of soft tissue gas and soft tissue edema noted            **This report has been created using voice recognition software.  It may contain minor errors which are inherent in voice recognition technology.**      Final report electronically signed by Dr Lissette Seymour on 2/12/2024 11:22 AM        XR SHOULDER LEFT (MIN 2 VIEWS)    Result Date: 2/12/2024  PROCEDURE: XR SHOULDER LEFT (MIN 2 VIEWS) CLINICAL INFORMATION: post shoulder replacement COMPARISON: 2/27/2012 TECHNIQUE: 2 views of the left shoulder FINDINGS: Left shoulder hemiarthroplasty. Postoperative changes of soft tissue gas and soft tissue edema noted No acute fracture or dislocation. Bone mineralization is unremarkable.     1. Left shoulder hemiarthroplasty. Postoperative changes of soft tissue gas and soft tissue edema noted **This report has been created using voice recognition software.  It may contain minor errors which are inherent in voice recognition technology.** Final report electronically signed by Dr Lissette Seymour on 2/12/2024 11:22 AM    EKG:  afib with PVC    Electronically signed by Edward Samano DO on 2/12/2024 at 12:27 PM

## 2024-02-13 NOTE — OP NOTE
[place] [id number]                                OPERATIVE REPORT    PATIENT NAME: GEOFFREY VILLANUEVA                  :        1955  MED REC NO:   [id number]                           ROOM:       0013  ACCOUNT NO:   [id number]                           ADMIT DATE: 2024  PROVIDER:     Damon Watts D.O.    DATE OF PROCEDURE:  2024    PREOPERATIVE DIAGNOSIS:  Left shoulder glenohumeral arthritis with small  partial tear of the supraspinatus and subscapularis.    POSTOPERATIVE DIAGNOSIS:  Left shoulder glenohumeral arthritis,  identifiable tears of the supraspinatus and subscapularis  intraoperatively.    OPERATION PERFORMED:  Left anatomic total shoulder arthroplasty.    SURGEON:  Damon Watts DO    ASSISTANT:  JENNIFER Rodgers, who assisted with positioning,  retraction, closure, and dressing application.    TYPE OF ANESTHESIA:  General and regional.    BLOOD LOSS:  300 mL.    IMPLANTS:  Tornier 5C Standard Flex Stem, 51 x 17 low offset head with  maximum offset pacing posterior and medial, large 35-degree augmented  CortiLoc glenoid.    INDICATION FOR OPERATION:  The patient is a 68-year-old male with  longstanding left shoulder pain.  He had severe glenohumeral arthrosis.   I recommended surgical intervention.  After appropriate medical  clearance, he elected to proceed with surgical intervention.    OPERATIVE SUMMARY:  The patient was met in the preop holding area and  the correct extremity was identified and marked.  Informed consent was  obtained.  A nerve block was performed per the Anesthesia team.  The  patient was escorted to the operative suite and general anesthesia was  administered.  He was prepped and draped in standard surgical fashion.   Time-out was taken; and the correct patient, procedure, and operative  site agreed upon by all who were present.  We made an incision from

## 2024-02-13 NOTE — PROGRESS NOTES
1039- pt to pacu. Pt diaphoretic. VSS. Pt reports sleep with fan. Fan provided. Pt denies pain, nausea, chest pain, pressure, SOB.    1046- pt restingi nbed eyes closed. Reports feeling groggy. Continues to deny pain, nausea CP, pressure of SOB. Pt reports tingling in middle and ring finger. Sensation present.    1054- pt resting in bed eyes closed.    1105- tele reviewed with anesthesia, orders received.  Pt continues to deny CP, pressure, SOB. Pt denies pain, nausea.     1118- pt resting in bed eyes closed. VSS. Pt continues to deny complaint.    1124- pt resting in bed eyes closed. Pt continues to deny complaint.    1134- report called to  nurse no answer.    1140- call to  nurse Owen @[phone]- no answer, will call back    1142- pt meets criteria for discharge for pacu    1145- call back to  nurse Owen no answer update called to sds, family sent to     1150- call to  nurse, no answer. Transport arrives to take pt to  13, call to  charge to find Owen to call for report.     1211- Call back to , report given to Owen.      
Follow all instructions given by your physician  Do not eat or drink anything after midnight prior to surgery(includes water, chewing gum, mints and ice chips)  Sips of water am of surgery with allowed medications  Do not use chewing tobacco after midnight prior to surgery  May brush teeth do not swallow water  Do not smoke, drink alcoholic beverages or use any illicit drugs for 24 hours prior to surgery  Bring insurance info and photo ID  Bring pertinent paperwork with you from Doctor or surgeons's office  Wear clean comfortable, loose-fitting clothing  No make-up, nail polish, jewelry, piercings, or contact lenses to be worn day of surgery  No glue on dentures morning of surgery; you will be asked to remove them for surgery. Case for glasses.  Shower the night before and the morning of surgery with cleansing soap provided or a liquid antibacterial soap, dry with new fresh clean towel after each shower, no lotions, creams or powder.  Clean sheets and pillowcase on bed night before surgery  Bring medications in original bottles, Bring rescue inhalers with you  Bring CPAP/BIPAP machine if you have one ( you may be charged if one is needed in recovery room )    Our pharmacy has a Meds to Beds program where they will deliver any new prescriptions you may have to your room before you leave. Our Pharmacy will clear it through your insurance; for example (same co pay). This enables you to take your new RX as soon as you need when you get home and avoids stop/wait delays on the way home.  Please have a form of payment with you and have someone designated as your Pharmacy contact with their phone # as you may not feel well or still be under the influence of anesthesia.    Please refer to the SSI-Surgical Site Infection Flyer you hopefully received in the mail-together we can prevent infections; signs and symptoms reviewed.  When discharged be sure you understand how to care for your wound and that you have the Dr./office 
If patient is being set up for therapy day of surgery, would like to use [facility] in [place].  
PAT Call Date: 2/7   Surgery Date: 2/15    Surgeon: Stefanie   Surgery: left total shoulder vs reverse    Any Isolation Precautions? No      Type of Isolation Precaution: Not Applicable   Is patient from a nursing home? No  Name of Nursing Home:   Any equipment assist needed for moving patient? No   Type of Equipment: Not Applicable  Patient last weight: 236#     Hard Copy on Chart  In EPIC Pending/Notes   Consent -   Within 30 days; signed, dated & timed by patient and physician  In tracker   [] On Arrival     [] Blood      [] DNR   H&P -   Within 30 days In tracker   [] Physician To Do     Clearance -        1/22  []Medical     [x]Cardiac Davis-high risk     [] Pulmonary    Orders -   Signed and Dated   In tracker   [] Physician To Do   Type & screen   Labs -   Within 3 months  1/22 cbc in tracker   11/30 1/29  [x] CBC-ok    [x] BMP-ok   [x] GFR   [] INR    [] PTT    [x] Urine-ok    [] Liver Enzymes    [x] MRSA Nasal      Others:     Radiology Studies -   Within 1 year  N/a  [] Chest X-Ray   [] MRI    [] CT    [] Vascular   [] US     Pulmonary -     [] DAVID   [] CPAP     Cardiac Workup -   Stress Test, Echo, Cath within 18 months  1/8    1/16  3/29    [x] EKG -cleared   [] Cath                 [x] Stress Test 28%                   [x] Echo/JASON   [] CABG   [] Holter Monitor      [] Pacemaker/ICD        Brand:        Where does patient have checked:         Last check:         Rep Notified:            
Patient educated on how to use incentive spirometer. Patient verbalized understanding and demonstrated proper use. Emphasized importance and usage of device, with coughing and deep breathing every 2 hours while awake.        
Patient educated on how to use incentive spirometer. Patient verbalized understanding and demonstrated proper use. Emphasized importance and usage of device, with coughing and deep breathing every 2 hours while awake.         
Patient oriented to Same Day department and admitted to Same Day Surgery room 15.   Patient verbalized approval for first name, last initial with physician name on unit whiteboard.     Plan of care reviewed with patient.   Patient room whiteboard filled out and discussed with patient and responsible adult.   Patient and responsible adult offered Same Day Welcome Packet to review.    Call light in reach.   Bed in lowest position, locked, with one bed rail up.   SCDs and warming blanket in place.  Appropriate arm bands on patient.   Bathroom offered.   All questions and concerns of patient addressed.        Meds to Beds:   Patient informed of [facility] Meds to Beds program during admission. Patient is agreeable to program.   Contact information for the pharmacy and the Meds to Beds program:   Name: Bill   Relationship to patient:patient   Phone number: in house           
Patient ready for discharge from hospital. AVS provided all questions and concerns addressed. Tele and iv d/c. Medication changes noted in AVS. Patient to be transported home via wife.   
Patient summary and cardiac clearance sent to anesthesia to review.    Dr Srivastava responded back \"OK\".  
[facility]  PHYSICAL THERAPY MISSED TREATMENT NOTE  [facility]    Date: 2024  Patient Name: Kush Perea        MRN: [id number]   : 1955  (68 y.o.)  Gender: male                REASON FOR MISSED TREATMENT:  Missed Treat.  Per communication with OTR, pt is IND-SUP with functional mobility tasks, denies needs for PT services. PT will sign off. Please re-order if change in status.       
Subjective:   No adverse events overnight.  Pain controlled. Numbness and tingling remain from the block, worse to the thumb. Afebrile. Hb 14.9.  Denies any chest pain, chest pressure, shortness of breath.    Objective:   /79   Pulse 79   Temp 97.5 °F (36.4 °C) (Oral)   Resp 17   Ht 1.854 m (6' 1\")   Wt 104.8 kg (231 lb)   SpO2 96%   BMI 30.48 kg/m²     Recent Labs     02/12/24  1358 02/13/24  0341   WBC 13.4* 12.1*   HGB 17.2 14.9   HCT 53.9* 46.9    387       Current Facility-Administered Medications: aspirin chewable tablet 81 mg, 81 mg, Oral, Daily  multivitamin 1 tablet, 1 tablet, Oral, Daily  clonazePAM (KLONOPIN) tablet 0.5 mg, 0.5 mg, Oral, Nightly PRN  clopidogrel (PLAVIX) tablet 75 mg, 75 mg, Oral, Daily  bumetanide (BUMEX) tablet 1 mg, 1 mg, Oral, Daily PRN  pantoprazole (PROTONIX) tablet 40 mg, 40 mg, Oral, QAM AC  [Held by provider] sacubitril-valsartan (ENTRESTO)  MG per tablet 1 tablet (Patient Supplied), 1 tablet, Oral, BID  hydrALAZINE (APRESOLINE) tablet 25 mg, 25 mg, Oral, BID  isosorbide mononitrate (IMDUR) extended release tablet 120 mg, 120 mg, Oral, Daily  nitroGLYCERIN (NITROSTAT) SL tablet 0.4 mg, 0.4 mg, SubLINGual, Q5 Min PRN  sodium chloride flush 0.9 % injection 5-40 mL, 5-40 mL, IntraVENous, 2 times per day  sodium chloride flush 0.9 % injection 5-40 mL, 5-40 mL, IntraVENous, PRN  0.9 % sodium chloride infusion, , IntraVENous, PRN  acetaminophen (TYLENOL) tablet 650 mg, 650 mg, Oral, 4 times per day  ondansetron (ZOFRAN-ODT) disintegrating tablet 4 mg, 4 mg, Oral, Q8H PRN **OR** ondansetron (ZOFRAN) injection 4 mg, 4 mg, IntraVENous, Q6H PRN  HYDROcodone-acetaminophen (NORCO) 5-325 MG per tablet 1 tablet, 1 tablet, Oral, Q4H PRN **OR** HYDROcodone-acetaminophen (NORCO) 5-325 MG per tablet 2 tablet, 2 tablet, Oral, Q4H PRN  metoprolol succinate (TOPROL XL) extended release tablet 50 mg, 50 mg, Oral, Daily  potassium & sodium phosphates (PHOS-NAK) 280-160-250 MG 
Assistance: Independent    Active : Yes  Occupation: Retired  Additional Comments: Indep with all ADLs and mobility without AD.    VISION:Corrected    HEARING:  WFL    COGNITION: WFL    RANGE OF MOTION:  Right Upper Extremity: WFL  Left Upper Extremity:  immobilized in bolster sling    STRENGTH:  Right Upper Extremity: WFL  Left Upper Extremity:  Not Tested    SENSATION:   WFL    ADL:   Grooming: Supervision.  In standing at the sink to wash hands/perform oral care  Upper Extremity Dressing: Minimal Assistance.  Education provided on kenji dressing technique to improve indep with donning and doffing of UB clothing articles. Pt requires moderate A to don without Active movement of left shoulder. Emphasis provided on no AROM at shoulder and to only move shoulder passively enough to clean underarm and to apply bolster sling.   Lower Extremity Dressing: Stand By Assistance.  For underwear and long pants.   Toileting: Supervision.     Toilet Transfer: Supervision.   .    BALANCE:  Sitting Balance:  Modified Independent.    Standing Balance: Supervision. For dynamic balance including reaching    BED MOBILITY:  Supine to Sit: Supervision      TRANSFERS:  Sit to Stand:  Supervision.    Stand to Sit: Supervision.      FUNCTIONAL MOBILITY:  Assistive Device: None  Assist Level:  Supervision.   Distance:  community distances in hallways.     STAIRS: Pt completes x4 steps with single hand rail at Supervision     Activity Tolerance:  Patient tolerance of  treatment: Good treatment tolerance      Modified Power Scale:  Not Applicable    Assessment:  Assessment: Kush Perea is a 68 y.o.male that presents with above new performance deficits secondary to  glenohumeral arthritis. Pt is requiring increased assistance for ADLs, functional mobility, ADL transfers compared to baseline level of function. Skilled OT services is warranted to improve above performance deficits and progress pt towards PLOF. Without OT pt is at

## 2024-02-13 NOTE — PLAN OF CARE
Problem: Discharge Planning  Goal: Discharge to home or other facility with appropriate resources  Outcome: Progressing  Flowsheets (Taken 2/13/2024 0056)  Discharge to home or other facility with appropriate resources:   Identify barriers to discharge with patient and caregiver   Arrange for needed discharge resources and transportation as appropriate   Identify discharge learning needs (meds, wound care, etc)     Problem: Pain  Goal: Verbalizes/displays adequate comfort level or baseline comfort level  Outcome: Progressing  Flowsheets (Taken 2/13/2024 0056)  Verbalizes/displays adequate comfort level or baseline comfort level:   Encourage patient to monitor pain and request assistance   Assess pain using appropriate pain scale   Administer analgesics based on type and severity of pain and evaluate response   Implement non-pharmacological measures as appropriate and evaluate response     Problem: Safety - Adult  Goal: Free from fall injury  Outcome: Progressing  Flowsheets (Taken 2/13/2024 0056)  Free From Fall Injury: Instruct family/caregiver on patient safety     Problem: Skin/Tissue Integrity  Goal: Absence of new skin breakdown  Description: 1.  Monitor for areas of redness and/or skin breakdown  2.  Assess vascular access sites hourly  3.  Every 4-6 hours minimum:  Change oxygen saturation probe site  4.  Every 4-6 hours:  If on nasal continuous positive airway pressure, respiratory therapy assess nares and determine need for appliance change or resting period.  Outcome: Progressing     Problem: ABCDS Injury Assessment  Goal: Absence of physical injury  Outcome: Progressing  Flowsheets (Taken 2/13/2024 0056)  Absence of Physical Injury: Implement safety measures based on patient assessment     Problem: Chronic Conditions and Co-morbidities  Goal: Patient's chronic conditions and co-morbidity symptoms are monitored and maintained or improved  Outcome: Progressing  Flowsheets (Taken 2/13/2024 0056)  Care Plan

## 2024-02-14 LAB
EKG Q-T INTERVAL: 430 MS
EKG QRS DURATION: 124 MS
EKG QTC CALCULATION (BAZETT): 495 MS
EKG R AXIS: 44 DEGREES
EKG T AXIS: -121 DEGREES
EKG VENTRICULAR RATE: 80 BPM

## 2024-02-21 ENCOUNTER — OFFICE VISIT (OUTPATIENT)
Dept: CARDIOLOGY CLINIC | Age: 69
End: 2024-02-21
Payer: MEDICARE

## 2024-02-21 VITALS
WEIGHT: 234 LBS | DIASTOLIC BLOOD PRESSURE: 62 MMHG | BODY MASS INDEX: 31.01 KG/M2 | HEART RATE: 64 BPM | SYSTOLIC BLOOD PRESSURE: 110 MMHG | HEIGHT: 73 IN

## 2024-02-21 DIAGNOSIS — Z95.1 HX OF CABG: ICD-10-CM

## 2024-02-21 DIAGNOSIS — I25.119 ATHEROSCLEROSIS OF NATIVE CORONARY ARTERY OF NATIVE HEART WITH ANGINA PECTORIS (HCC): ICD-10-CM

## 2024-02-21 DIAGNOSIS — D68.69 SECONDARY HYPERCOAGULABLE STATE (HCC): ICD-10-CM

## 2024-02-21 DIAGNOSIS — I48.0 PAROXYSMAL ATRIAL FIBRILLATION (HCC): Primary | ICD-10-CM

## 2024-02-21 PROCEDURE — G8484 FLU IMMUNIZE NO ADMIN: HCPCS | Performed by: NURSE PRACTITIONER

## 2024-02-21 PROCEDURE — 1036F TOBACCO NON-USER: CPT | Performed by: NURSE PRACTITIONER

## 2024-02-21 PROCEDURE — 3078F DIAST BP <80 MM HG: CPT | Performed by: NURSE PRACTITIONER

## 2024-02-21 PROCEDURE — 99214 OFFICE O/P EST MOD 30 MIN: CPT | Performed by: NURSE PRACTITIONER

## 2024-02-21 PROCEDURE — 3074F SYST BP LT 130 MM HG: CPT | Performed by: NURSE PRACTITIONER

## 2024-02-21 PROCEDURE — G8427 DOCREV CUR MEDS BY ELIG CLIN: HCPCS | Performed by: NURSE PRACTITIONER

## 2024-02-21 PROCEDURE — 1123F ACP DISCUSS/DSCN MKR DOCD: CPT | Performed by: NURSE PRACTITIONER

## 2024-02-21 PROCEDURE — 3017F COLORECTAL CA SCREEN DOC REV: CPT | Performed by: NURSE PRACTITIONER

## 2024-02-21 PROCEDURE — G8417 CALC BMI ABV UP PARAM F/U: HCPCS | Performed by: NURSE PRACTITIONER

## 2024-02-21 PROCEDURE — 1111F DSCHRG MED/CURRENT MED MERGE: CPT | Performed by: NURSE PRACTITIONER

## 2024-02-21 RX ORDER — NITROGLYCERIN 0.4 MG/1
TABLET SUBLINGUAL
Qty: 25 TABLET | Refills: 0 | Status: SHIPPED | OUTPATIENT
Start: 2024-02-21

## 2024-02-21 NOTE — PROGRESS NOTES
Follow-up.   He had shoulder surgery last Monday, they state he went into A-fib during surgery.   He denies having any shortness of breath, dizziness or palpitations.   He did have some of those symtoms prior to surgery but he felt it was because they held some of his medications.   He states now he feels good since back on his medications.   He states they stopped Plavix and put him on Eliquis and ASA.     
current medications as prescribed.    Ease back into your normal activities as directed by the Orthopedic surgeon.     See Dr. Andrews as scheduled regarding the atrial fib.     Follow-up with your PCP as scheduled.    Follow-up with Dr. Davis or Danisha CALLOWAY in 3 months as scheduled or sooner if need.     Electronically signed by PA Schwartz CNP   2/28/2024 at 11:44 AM EST

## 2024-02-21 NOTE — PATIENT INSTRUCTIONS
Your nurses today was SEDA Dill   Your provider today was BARRERA Raman  Phone number: [phone]        For referral please contact them by [phone] number      Continue current medications as prescribed.    Ease back into your normal activities as directed by the Orthopedic surgeon.     See Dr. Andrews as scheduled regarding the atrial fib.     Follow-up with your PCP as scheduled.    Follow-up with Dr. Davis or Danisha CALLOWAY in 3 months as scheduled or sooner if need.     You may receive a survey regarding the care you received during your visit.  Your input is valuable to us.  We encourage you to complete and return your survey.  We hope you will choose us in the future for your healthcare needs.

## 2024-03-05 NOTE — PATIENT INSTRUCTIONS
You may receive a survey regarding the care you received during your visit.  Your input is valuable to us.  We encourage you to complete and return your survey.  We hope you will choose us in the future for your healthcare needs.    Thank you for choosing [facility]!    Your Medical Assistant Today:  Vikki REYES      Your RN Today:  Diana REYES  Your Provider for Today: Dr. Andrews  Ph. [phone]              Start Amiodarone: 400 mg twice daily for 1 week, 200 mg twice daily for 1 week, then 200 mg daily thereafter (will go to pharmacy once signed by Dr Andrews)  JASON/ Cardioversion scheduled 3/14/24

## 2024-03-06 ENCOUNTER — OFFICE VISIT (OUTPATIENT)
Dept: CARDIOLOGY CLINIC | Age: 69
End: 2024-03-06
Payer: MEDICARE

## 2024-03-06 VITALS
OXYGEN SATURATION: 91 % | HEART RATE: 97 BPM | WEIGHT: 225 LBS | BODY MASS INDEX: 29.82 KG/M2 | DIASTOLIC BLOOD PRESSURE: 57 MMHG | HEIGHT: 73 IN | SYSTOLIC BLOOD PRESSURE: 92 MMHG

## 2024-03-06 DIAGNOSIS — I48.0 PAROXYSMAL ATRIAL FIBRILLATION (HCC): Primary | ICD-10-CM

## 2024-03-06 PROCEDURE — 1036F TOBACCO NON-USER: CPT | Performed by: INTERNAL MEDICINE

## 2024-03-06 PROCEDURE — 99214 OFFICE O/P EST MOD 30 MIN: CPT | Performed by: INTERNAL MEDICINE

## 2024-03-06 PROCEDURE — 1111F DSCHRG MED/CURRENT MED MERGE: CPT | Performed by: INTERNAL MEDICINE

## 2024-03-06 PROCEDURE — 3074F SYST BP LT 130 MM HG: CPT | Performed by: INTERNAL MEDICINE

## 2024-03-06 PROCEDURE — G8427 DOCREV CUR MEDS BY ELIG CLIN: HCPCS | Performed by: INTERNAL MEDICINE

## 2024-03-06 PROCEDURE — 3017F COLORECTAL CA SCREEN DOC REV: CPT | Performed by: INTERNAL MEDICINE

## 2024-03-06 PROCEDURE — 3078F DIAST BP <80 MM HG: CPT | Performed by: INTERNAL MEDICINE

## 2024-03-06 PROCEDURE — G8417 CALC BMI ABV UP PARAM F/U: HCPCS | Performed by: INTERNAL MEDICINE

## 2024-03-06 PROCEDURE — 1123F ACP DISCUSS/DSCN MKR DOCD: CPT | Performed by: INTERNAL MEDICINE

## 2024-03-06 PROCEDURE — G8484 FLU IMMUNIZE NO ADMIN: HCPCS | Performed by: INTERNAL MEDICINE

## 2024-03-06 PROCEDURE — 93000 ELECTROCARDIOGRAM COMPLETE: CPT | Performed by: INTERNAL MEDICINE

## 2024-03-06 RX ORDER — AMIODARONE HYDROCHLORIDE 200 MG/1
200 TABLET ORAL SEE ADMIN INSTRUCTIONS
Qty: 90 TABLET | Refills: 3 | Status: SHIPPED | OUTPATIENT
Start: 2024-03-06 | End: 2024-03-08 | Stop reason: SINTOL

## 2024-03-06 NOTE — PROGRESS NOTES
daily 180 tablet 3    hydrALAZINE (APRESOLINE) 25 MG tablet Take 1 tablet by mouth 2 times daily 180 tablet 3    isosorbide mononitrate (IMDUR) 120 MG extended release tablet Take 1 tablet by mouth daily 90 tablet 3    metoprolol succinate (TOPROL XL) 50 MG extended release tablet Take 1 tablet by mouth daily 90 tablet 3    pantoprazole (PROTONIX) 40 MG tablet Take 1 tablet by mouth daily 90 tablet 3    bumetanide (BUMEX) 1 MG tablet Take 1 tablet by mouth daily as needed (for increased swelling or weight gain 3#/day) 30 tablet 1    clonazePAM (KLONOPIN) 0.5 MG tablet Take 1 tablet by mouth nightly as needed.      aspirin 81 MG tablet Take 1 tablet by mouth daily      Multiple Vitamins-Minerals (THERAPEUTIC MULTIVITAMIN-MINERALS) tablet Take 1 tablet by mouth daily       No current facility-administered medications for this visit.       Physical Examination:  Vitals:    03/06/24 1127   BP: (!) 92/57   Pulse: 97   SpO2: 91%      Body mass index is 29.69 kg/m².   GENERAL: Alert and oriented. No distress.  EYES: No pallor or icterus.  ENT: No cyanosis.No thyromegaly or cervical LAP.  VESSELS: No jugular venous distension or carotid bruits.  HEART: Irregular S1/S2. No murmur, rub or gallop.  LUNGS: Clear to auscultation.  ABDOMEN: Soft and non-tender.   EXTREMITIES: No lower extremity edema. Left shoulder in splint.   NEUROLOGICAL: Grossly normal.     Laboratory And Diagnostic Data  I have personally reviewed and interpreted the results of the following diagnostic testing    Lab Results   Component Value Date    WBC 12.1 (H) 02/13/2024    HGB 14.9 02/13/2024    HCT 46.9 02/13/2024     02/13/2024    CHOL 136 01/29/2024    TRIG 136 01/29/2024    HDL 23 (L) 01/29/2024    ALT 13 12/16/2020    AST 15 12/16/2020     02/13/2024    K 5.2 02/13/2024     02/13/2024    CREATININE 1.3 (H) 02/13/2024    BUN 27 (H) 02/13/2024    CO2 24 02/13/2024    TSH 3.690 11/23/2022    INR 1.01 12/16/2020       Echocardiogram

## 2024-03-08 ENCOUNTER — TELEPHONE (OUTPATIENT)
Dept: CARDIOLOGY CLINIC | Age: 69
End: 2024-03-08

## 2024-03-08 NOTE — TELEPHONE ENCOUNTER
Wife states not tolerating amiodarone, dizzy, running into walls, fell x2  He's walking sideways     Impression:  Persistent atrial fibrillation.  [id number] score 4.  On apixaban.  Ischemic cardiomyopathy, LVEF 40 to 45%.  NYHA class II  CAD/CABG/PCI.  On aspirin and metoprolol.  Status post left shoulder replacement.      Assessment And Recommendations:  Patient reports having palpitation for a long time.  However postoperatively recently diagnosed to have atrial fibrillation.  Currently rate controlled on metoprolol.  Will benefit with rhythm control.  Discussed options.  He wants to try medication.  Start amiodarone load.  JASON-cardioversion next week.  Risk and benefit discussed.  Questions answered.           **This report has been created using voice recognition software. It may contain minor errors which are inherent in voice recognition technology.**         Electronically signed by January Andrews MD, MRCP, FACC, [facility] on 3/6/2024 at 11:59 AM

## 2024-03-08 NOTE — TELEPHONE ENCOUNTER
Discussed with Juliana, ok to stop amiodarone and ok to offer admission to hospital to start sotalol or offer ablation    Wife is tearful, wants to do ablation~ Ok to schedule?   Also is scheduled for CV/JASON next week Thursday, proceed with CV? Or cancel?  She will stop the amiodarone  med list updated

## 2024-03-11 RX ORDER — APIXABAN 5 MG/1
5 TABLET, FILM COATED ORAL 2 TIMES DAILY
Qty: 60 TABLET | Refills: 0 | OUTPATIENT
Start: 2024-03-11

## 2024-03-11 NOTE — TELEPHONE ENCOUNTER
Call to patient. Spoke with wife. Aware we can move forward with AF ablation. Will keep CV scheduled for 3/14/24.    Shoulder surgery was 2/12/24. 6 weeks after would be 3/25/24. Aware [facility] will be calling for ablation scheduling.     Scheduling sheet to [facility].

## 2024-03-13 ENCOUNTER — HOSPITAL ENCOUNTER (OUTPATIENT)
Dept: CT IMAGING | Age: 69
Discharge: HOME | End: 2024-03-13
Payer: MEDICARE

## 2024-03-13 DIAGNOSIS — R63.4 WEIGHT LOSS: ICD-10-CM

## 2024-03-13 DIAGNOSIS — R22.0 MASS OF SCALP: ICD-10-CM

## 2024-03-13 DIAGNOSIS — R41.3 MEMORY CHANGES: ICD-10-CM

## 2024-03-13 DIAGNOSIS — I25.5 ISCHEMIC CARDIOMYOPATHY: ICD-10-CM

## 2024-03-13 DIAGNOSIS — Z95.1 HX OF CABG: ICD-10-CM

## 2024-03-13 PROCEDURE — 70470 CT HEAD/BRAIN W/O & W/DYE: CPT

## 2024-03-13 PROCEDURE — 6360000004 HC RX CONTRAST MEDICATION: Performed by: FAMILY MEDICINE

## 2024-03-13 RX ADMIN — IOPAMIDOL 75 ML: 755 INJECTION, SOLUTION INTRAVENOUS at 09:02

## 2024-03-13 NOTE — PRE-PROCEDURE INSTRUCTIONS
Spoke with Patient  Procedure is scheduled for Thursday, admission time is 0900  Please arrive 15 minutes early  [facility]  NPO after midnight  Bring drivers license and insurance information  Wear comfortable clean clothes  Shower morning of and night before with liquid antibacterial soap  Remove jewelry     Leave valuables at home such as cash or credit cards  Bring medications in original bottles - do not take diabetic meds days of procedure.  It is OK to take BP and heart meds.  If you take ASA, plavix, effient or brilinta - please take AM of procedure  If your are on anticoagulants such as coumadin/warfarin, eliquis, xarelto or pradaxa - take blood thinners  Made aware of visitors limit to 2 at a time  Follow all instructions given by your physician  Please notify doctor office if you need to cancel or reschedule your procedure   needed at discha

## 2024-03-13 NOTE — TELEPHONE ENCOUNTER
Afib ablation   Patient is to hold Metoprolol 3 days,  Eliquis the evening prior  Please see medications- please advise

## 2024-03-14 ENCOUNTER — HOSPITAL ENCOUNTER (OUTPATIENT)
Age: 69
Discharge: HOME | End: 2024-03-16
Attending: INTERNAL MEDICINE
Payer: MEDICARE

## 2024-03-14 ENCOUNTER — ANESTHESIA EVENT (OUTPATIENT)
Age: 69
End: 2024-03-14
Payer: MEDICARE

## 2024-03-14 ENCOUNTER — ANESTHESIA (OUTPATIENT)
Age: 69
End: 2024-03-14
Payer: MEDICARE

## 2024-03-14 VITALS
DIASTOLIC BLOOD PRESSURE: 60 MMHG | WEIGHT: 217.59 LBS | BODY MASS INDEX: 28.84 KG/M2 | RESPIRATION RATE: 16 BRPM | SYSTOLIC BLOOD PRESSURE: 99 MMHG | TEMPERATURE: 98.3 F | HEART RATE: 60 BPM | OXYGEN SATURATION: 96 % | HEIGHT: 73 IN

## 2024-03-14 VITALS
SYSTOLIC BLOOD PRESSURE: 113 MMHG | RESPIRATION RATE: 11 BRPM | DIASTOLIC BLOOD PRESSURE: 69 MMHG | OXYGEN SATURATION: 96 % | HEART RATE: 57 BPM

## 2024-03-14 DIAGNOSIS — I48.0 PAROXYSMAL ATRIAL FIBRILLATION (HCC): Primary | ICD-10-CM

## 2024-03-14 DIAGNOSIS — I48.0 PAROXYSMAL ATRIAL FIBRILLATION (HCC): ICD-10-CM

## 2024-03-14 PROBLEM — I48.91 A-FIB (HCC): Status: ACTIVE | Noted: 2024-03-14

## 2024-03-14 LAB
APTT PPP: 39.3 SECONDS (ref 22–38)
DEPRECATED RDW RBC AUTO: 46.4 FL (ref 35–45)
ECHO BSA: 2.25 M2
ECHO BSA: 2.25 M2
ECHO MV REGURGITANT ALIASING (NYQUIST) VELOCITY: 76 CM/S
ECHO MV REGURGITANT PEAK GRADIENT: 71 MMHG
ECHO MV REGURGITANT PEAK VELOCITY: 4.2 M/S
ECHO MV REGURGITANT RADIUS PISA: 0.7 CM
ECHO MV REGURGITANT VTIA: 136 CM
EKG Q-T INTERVAL: 418 MS
EKG Q-T INTERVAL: 450 MS
EKG QRS DURATION: 118 MS
EKG QRS DURATION: 124 MS
EKG QTC CALCULATION (BAZETT): 460 MS
EKG QTC CALCULATION (BAZETT): 473 MS
EKG R AXIS: 28 DEGREES
EKG R AXIS: 81 DEGREES
EKG T AXIS: -6 DEGREES
EKG T AXIS: -75 DEGREES
EKG VENTRICULAR RATE: 63 BPM
EKG VENTRICULAR RATE: 77 BPM
ERYTHROCYTE [DISTWIDTH] IN BLOOD BY AUTOMATED COUNT: 14.4 % (ref 11.5–14.5)
HCT VFR BLD AUTO: 44.5 % (ref 42–52)
HGB BLD-MCNC: 14.2 GM/DL (ref 14–18)
INR PPP: 1.36 (ref 0.85–1.13)
MCH RBC QN AUTO: 28.7 PG (ref 26–33)
MCHC RBC AUTO-ENTMCNC: 31.9 GM/DL (ref 32.2–35.5)
MCV RBC AUTO: 89.9 FL (ref 80–94)
PLATELET # BLD AUTO: 447 THOU/MM3 (ref 130–400)
PMV BLD AUTO: 10.3 FL (ref 9.4–12.4)
RBC # BLD AUTO: 4.95 MILL/MM3 (ref 4.7–6.1)
WBC # BLD AUTO: 6.6 THOU/MM3 (ref 4.8–10.8)

## 2024-03-14 PROCEDURE — 92960 CARDIOVERSION ELECTRIC EXT: CPT

## 2024-03-14 PROCEDURE — 7100000011 HC PHASE II RECOVERY - ADDTL 15 MIN

## 2024-03-14 PROCEDURE — 93312 ECHO TRANSESOPHAGEAL: CPT

## 2024-03-14 PROCEDURE — 6370000000 HC RX 637 (ALT 250 FOR IP)

## 2024-03-14 PROCEDURE — 3700000000 HC ANESTHESIA ATTENDED CARE

## 2024-03-14 PROCEDURE — 6360000002 HC RX W HCPCS: Performed by: NURSE ANESTHETIST, CERTIFIED REGISTERED

## 2024-03-14 PROCEDURE — 93005 ELECTROCARDIOGRAM TRACING: CPT | Performed by: INTERNAL MEDICINE

## 2024-03-14 PROCEDURE — 85610 PROTHROMBIN TIME: CPT

## 2024-03-14 PROCEDURE — 2580000003 HC RX 258: Performed by: INTERNAL MEDICINE

## 2024-03-14 PROCEDURE — 7100000010 HC PHASE II RECOVERY - FIRST 15 MIN

## 2024-03-14 PROCEDURE — 2500000003 HC RX 250 WO HCPCS: Performed by: NURSE ANESTHETIST, CERTIFIED REGISTERED

## 2024-03-14 PROCEDURE — 85730 THROMBOPLASTIN TIME PARTIAL: CPT

## 2024-03-14 PROCEDURE — 85027 COMPLETE CBC AUTOMATED: CPT

## 2024-03-14 PROCEDURE — 36415 COLL VENOUS BLD VENIPUNCTURE: CPT

## 2024-03-14 PROCEDURE — 3700000001 HC ADD 15 MINUTES (ANESTHESIA)

## 2024-03-14 RX ORDER — SPIRONOLACTONE 25 MG/1
25 TABLET ORAL DAILY
COMMUNITY

## 2024-03-14 RX ORDER — SODIUM CHLORIDE 9 MG/ML
INJECTION, SOLUTION INTRAVENOUS CONTINUOUS
Status: DISCONTINUED | OUTPATIENT
Start: 2024-03-14 | End: 2024-03-17 | Stop reason: HOSPADM

## 2024-03-14 RX ORDER — LIDOCAINE HYDROCHLORIDE 20 MG/ML
INJECTION, SOLUTION INFILTRATION; PERINEURAL PRN
Status: DISCONTINUED | OUTPATIENT
Start: 2024-03-14 | End: 2024-03-14 | Stop reason: SDUPTHER

## 2024-03-14 RX ORDER — PROPOFOL 10 MG/ML
INJECTION, EMULSION INTRAVENOUS PRN
Status: DISCONTINUED | OUTPATIENT
Start: 2024-03-14 | End: 2024-03-14 | Stop reason: SDUPTHER

## 2024-03-14 RX ORDER — FENTANYL CITRATE 50 UG/ML
INJECTION, SOLUTION INTRAMUSCULAR; INTRAVENOUS PRN
Status: DISCONTINUED | OUTPATIENT
Start: 2024-03-14 | End: 2024-03-14 | Stop reason: SDUPTHER

## 2024-03-14 RX ADMIN — PROPOFOL 30 MG: 10 INJECTION, EMULSION INTRAVENOUS at 11:16

## 2024-03-14 RX ADMIN — PROPOFOL 100 MG: 10 INJECTION, EMULSION INTRAVENOUS at 11:04

## 2024-03-14 RX ADMIN — PROPOFOL 50 MG: 10 INJECTION, EMULSION INTRAVENOUS at 11:07

## 2024-03-14 RX ADMIN — PROPOFOL 100 MG: 10 INJECTION, EMULSION INTRAVENOUS at 11:01

## 2024-03-14 RX ADMIN — FENTANYL CITRATE 50 MCG: 50 INJECTION, SOLUTION INTRAMUSCULAR; INTRAVENOUS at 11:06

## 2024-03-14 RX ADMIN — PHENYLEPHRINE HYDROCHLORIDE 150 MCG: 10 INJECTION INTRAVENOUS at 11:04

## 2024-03-14 RX ADMIN — LIDOCAINE HYDROCHLORIDE 100 MG: 20 INJECTION, SOLUTION INFILTRATION; PERINEURAL at 11:01

## 2024-03-14 RX ADMIN — SODIUM CHLORIDE: 9 INJECTION, SOLUTION INTRAVENOUS at 10:26

## 2024-03-14 NOTE — ANESTHESIA POSTPROCEDURE EVALUATION
Department of Anesthesiology  Postprocedure Note    Patient: Kush Perea  MRN: [id number]  YOB: 1955  Date of evaluation: 3/14/2024    Procedure Summary       Date: 03/14/24 Room / Location: [facility] Cardiac Cath Lab; [facility] Noninvasive Cardiology    Anesthesia Start: 1054 Anesthesia Stop: 1122    Procedure: JASON W/ POSSIBLE CARDIOVERSION (PRN CONTRAST/BUBBLE/3D) Diagnosis: Paroxysmal atrial fibrillation (HCC)    Scheduled Providers: January Andrews MD Responsible Provider: Daniel Davis MD    Anesthesia Type: General ASA Status: 4            Anesthesia Type: General    Jenny Phase I:      Jenny Phase II:      Anesthesia Post Evaluation    Patient location during evaluation: bedside  Patient participation: complete - patient participated  Level of consciousness: awake  Airway patency: patent  Nausea & Vomiting: no vomiting  Cardiovascular status: hemodynamically stable  Respiratory status: acceptable  Hydration status: stable  Pain management: adequate        No notable events documented.

## 2024-03-14 NOTE — H&P
[facility]  HEART CENTER (EP)  [address] [facility] [facility] [address]  H&P and SEDATION/ANALGESIA     Patient demographics:  Date:   3/14/2024  Patient name: Kush Perea  YOB: 1955  Sex: male   MRN:   [id number]    Reason for admission or planned procedure:  JASON/DCCV    Code Status: Full Code    Consent:I have discussed with the patient and/or the patient representative the indication, alternatives, and the possible risks and/or complications of the planned procedure and the anesthesia methods. The patient and/or patient representative appear to understand and agree to proceed.      Brief clinical summary:  69/M with symptomatic AF for JASON/DCCV.     Past Medical History:  Past Medical History:   Diagnosis Date    Abnormal cardiovascular stress test 8/7/2019    CAD (coronary artery disease)     Depression     GERD (gastroesophageal reflux disease)     Hypertension     Melanoma (HCC)        Past Surgical History:  Past Surgical History:   Procedure Laterality Date    CARDIAC SURGERY  2010    open heart    CORONARY ANGIOPLASTY WITH STENT PLACEMENT  05/2019    DIAGNOSTIC CARDIAC CATH LAB PROCEDURE  12/16/2020    with PCI    SHOULDER ARTHROPLASTY Left 2/12/2024    Left Anatomic Total Shoulder Arthroplasty performed by Damon Watts DO at [facility] OR    SKIN CANCER EXCISION          Allergies:   Allergies as of 03/14/2024 - Fully Reviewed 03/12/2024   Allergen Reaction Noted    Lipitor [atorvastatin]  12/16/2020     Additional information:       Medications     Current Outpatient Medications:     escitalopram (LEXAPRO) 10 MG tablet, Take 1 tablet by mouth daily, Disp: 30 tablet, Rfl: 3    nitroGLYCERIN (NITROSTAT) 0.4 MG SL tablet, up to max of 3 total doses. If no relief after 1 dose, call 911., Disp: 25 tablet, Rfl: 0    apixaban (ELIQUIS) 5 MG TABS tablet, Take 1 tablet by mouth 2 times daily, Disp: 60 tablet, Rfl: 0    sacubitril-valsartan (ENTRESTO)  MG per

## 2024-03-14 NOTE — PROGRESS NOTES
0915 Patient admitted to 2E11  Ambulatory for JASON/cardioversion.  Patient NPO since 1730 yesterday, meds with sip of water. Patient accompanied by wife  Vital signs obtained.   Assessment and data collection intiated.   Oriented to room.  Policies and procedures for 2E explained.   All questions answered with no further questions at this time.   Fall prevention and safety precautions discussed with patient.          1047 To cath lab per bed, stable condition.      1130 Care taken over from cath lab  1200 Gag reflux positive ,taking water without difficulty.    1245 6Beats afib shown to Dr Andrews, states patient still can go home.  1300 Discharge instructions given, voices understanding.      1330 Up in room, tolerated activity well.

## 2024-03-14 NOTE — PROGRESS NOTES
0915 Patient admitted to 2E11  Ambulatory for JASON/cardioversion.  Patient NPO since 1730 yesterday, meds with sip of water. Patient accompanied by wife  Vital signs obtained.   Assessment and data collection intiated.   Oriented to room.  Policies and procedures for 2E explained.   All questions answered with no further questions at this time.   Fall prevention and safety precautions discussed with patient.          1047 To cath lab per bed, stable condition.      1130 Care taken over from cath lab  1200 Gag reflux positive ,taking water without difficulty.      *** Discharge instructions given, voices understanding.      ***Up in *** tolerated activity well.     ***Discharged per wheelchair, stable condition.

## 2024-03-14 NOTE — TELEPHONE ENCOUNTER
Procedure: Afib ablation   Date: 04.15.2024  Arrival Time: 10am  Meds to Hold: Eliquis the evening prior,  metoprolol 3 days prior  Please see Medications do you agree?      Instructions verbalized to the patient.  Instructions mailed to the patient.

## 2024-03-14 NOTE — DISCHARGE INSTRUCTIONS
concerns.     IF YOU REPORT TO AN EMERGENCY ROOM, DOCTOR'S OFFICE OR HOSPITAL WITHIN 24 HOURS AFTER YOUR PROCEDURE, BRING THIS SHEET AND YOUR AFTER VISIT SUMMARY WITH YOU AND GIVE IT TO THE PHYSICIAN OR NURSE ATTENDING YOU.

## 2024-03-14 NOTE — ANESTHESIA PRE PROCEDURE
Department of Anesthesiology  Preprocedure Note       Name:  Kush Perea   Age:  68 y.o.  :  1955                                          MRN:  [id number]         Date:  3/14/2024      Surgeon: * No surgeons listed *    Procedure: * No procedures listed *    Medications prior to admission:   Prior to Admission medications    Medication Sig Start Date End Date Taking? Authorizing Provider   spironolactone (ALDACTONE) 25 MG tablet Take 1 tablet by mouth daily   Yes Provider, MD Rob   escitalopram (LEXAPRO) 10 MG tablet Take 1 tablet by mouth daily  Patient not taking: Reported on 3/14/2024 3/12/24   Diann Vega APRN - CNP   nitroGLYCERIN (NITROSTAT) 0.4 MG SL tablet up to max of 3 total doses. If no relief after 1 dose, call 911. 24   Danisha Burnette APRN - CNP   apixaban (ELIQUIS) 5 MG TABS tablet Take 1 tablet by mouth 2 times daily 24   Edmundo Macdonald DO   sacubitril-valsartan (ENTRESTO)  MG per tablet Take 1 tablet by mouth 2 times daily 23   Harleen Vuong APRN - CNP   hydrALAZINE (APRESOLINE) 25 MG tablet Take 1 tablet by mouth 2 times daily 23   Harleen Vuong APRN - CNP   isosorbide mononitrate (IMDUR) 120 MG extended release tablet Take 1 tablet by mouth daily 23   Harleen Vuong APRN - CNP   metoprolol succinate (TOPROL XL) 50 MG extended release tablet Take 1 tablet by mouth daily 23   Harleen Vuong APRN - CNP   pantoprazole (PROTONIX) 40 MG tablet Take 1 tablet by mouth daily 3/24/23   Mamadou Davis MD   bumetanide (BUMEX) 1 MG tablet Take 1 tablet by mouth daily as needed (for increased swelling or weight gain 3#/day) 3/14/23   Harleen Vuong APRN - CNP   clonazePAM (KLONOPIN) 0.5 MG tablet Take 1 tablet by mouth nightly as needed.  Patient not taking: Reported on 3/12/2024    ProviderRob MD   aspirin 81 MG tablet Take 1 tablet by mouth daily    ProviderRob MD   Multiple Vitamins-Minerals (THERAPEUTIC

## 2024-03-15 RX ORDER — PANTOPRAZOLE SODIUM 40 MG/1
40 TABLET, DELAYED RELEASE ORAL DAILY
Qty: 90 TABLET | Refills: 0 | Status: SHIPPED | OUTPATIENT
Start: 2024-03-15

## 2024-03-25 DIAGNOSIS — Z95.1 HX OF CABG: ICD-10-CM

## 2024-03-27 RX ORDER — NITROGLYCERIN 0.4 MG/1
TABLET SUBLINGUAL
Qty: 25 TABLET | Refills: 3 | Status: SHIPPED | OUTPATIENT
Start: 2024-03-27

## 2024-04-12 ENCOUNTER — PREP FOR PROCEDURE (OUTPATIENT)
Dept: CARDIOLOGY | Age: 69
End: 2024-04-12

## 2024-04-12 RX ORDER — APIXABAN 5 MG/1
5 TABLET, FILM COATED ORAL 2 TIMES DAILY
Qty: 60 TABLET | Refills: 11 | Status: SHIPPED | OUTPATIENT
Start: 2024-04-12

## 2024-04-12 RX ORDER — SODIUM CHLORIDE 9 MG/ML
INJECTION, SOLUTION INTRAVENOUS PRN
Status: CANCELLED | OUTPATIENT
Start: 2024-04-12

## 2024-04-12 RX ORDER — SODIUM CHLORIDE 0.9 % (FLUSH) 0.9 %
5-40 SYRINGE (ML) INJECTION EVERY 12 HOURS SCHEDULED
Status: CANCELLED | OUTPATIENT
Start: 2024-04-12

## 2024-04-12 RX ORDER — SODIUM CHLORIDE 0.9 % (FLUSH) 0.9 %
5-40 SYRINGE (ML) INJECTION PRN
Status: CANCELLED | OUTPATIENT
Start: 2024-04-12

## 2024-04-15 ENCOUNTER — ANESTHESIA EVENT (OUTPATIENT)
Age: 69
End: 2024-04-15
Payer: MEDICARE

## 2024-04-15 ENCOUNTER — HOSPITAL ENCOUNTER (OUTPATIENT)
Age: 69
Setting detail: OUTPATIENT SURGERY
Discharge: HOME | End: 2024-04-15
Attending: INTERNAL MEDICINE | Admitting: INTERNAL MEDICINE
Payer: MEDICARE

## 2024-04-15 ENCOUNTER — ANESTHESIA (OUTPATIENT)
Age: 69
End: 2024-04-15
Payer: MEDICARE

## 2024-04-15 VITALS
HEIGHT: 73 IN | SYSTOLIC BLOOD PRESSURE: 141 MMHG | TEMPERATURE: 96.8 F | RESPIRATION RATE: 20 BRPM | HEART RATE: 68 BPM | DIASTOLIC BLOOD PRESSURE: 77 MMHG | OXYGEN SATURATION: 92 % | WEIGHT: 230 LBS | BODY MASS INDEX: 30.48 KG/M2

## 2024-04-15 DIAGNOSIS — I48.91 A-FIB (HCC): ICD-10-CM

## 2024-04-15 LAB
ABO: NORMAL
ACTIVATED CLOTTING TIME: 168 SECONDS (ref 1–150)
ACTIVATED CLOTTING TIME: 255 SECONDS (ref 1–150)
ACTIVATED CLOTTING TIME: 266 SECONDS (ref 1–150)
ACTIVATED CLOTTING TIME: 309 SECONDS (ref 1–150)
ACTIVATED CLOTTING TIME: 347 SECONDS (ref 1–150)
ACTIVATED CLOTTING TIME: 347 SECONDS (ref 1–150)
ANION GAP SERPL CALC-SCNC: 12 MEQ/L (ref 8–16)
ANTIBODY SCREEN: NORMAL
APTT PPP: 38.8 SECONDS (ref 22–38)
BUN SERPL-MCNC: 18 MG/DL (ref 7–22)
CALCIUM SERPL-MCNC: 9.1 MG/DL (ref 8.5–10.5)
CHLORIDE SERPL-SCNC: 105 MEQ/L (ref 98–111)
CO2 SERPL-SCNC: 24 MEQ/L (ref 23–33)
CREAT SERPL-MCNC: 1 MG/DL (ref 0.4–1.2)
DEPRECATED RDW RBC AUTO: 55.8 FL (ref 35–45)
ECHO BSA: 2.32 M2
ERYTHROCYTE [DISTWIDTH] IN BLOOD BY AUTOMATED COUNT: 16.7 % (ref 11.5–14.5)
GFR SERPL CREATININE-BSD FRML MDRD: 82 ML/MIN/1.73M2
GLUCOSE SERPL-MCNC: 109 MG/DL (ref 70–108)
HCT VFR BLD AUTO: 47.3 % (ref 42–52)
HGB BLD-MCNC: 14.9 GM/DL (ref 14–18)
INR PPP: 1.24 (ref 0.85–1.13)
MCH RBC QN AUTO: 29 PG (ref 26–33)
MCHC RBC AUTO-ENTMCNC: 31.5 GM/DL (ref 32.2–35.5)
MCV RBC AUTO: 92 FL (ref 80–94)
PLATELET # BLD AUTO: 396 THOU/MM3 (ref 130–400)
PMV BLD AUTO: 11 FL (ref 9.4–12.4)
POTASSIUM SERPL-SCNC: 5.2 MEQ/L (ref 3.5–5.2)
RBC # BLD AUTO: 5.14 MILL/MM3 (ref 4.7–6.1)
RH FACTOR: NORMAL
SODIUM SERPL-SCNC: 141 MEQ/L (ref 135–145)
WBC # BLD AUTO: 7.5 THOU/MM3 (ref 4.8–10.8)

## 2024-04-15 PROCEDURE — C1766 INTRO/SHEATH,STRBLE,NON-PEEL: HCPCS | Performed by: INTERNAL MEDICINE

## 2024-04-15 PROCEDURE — 93656 COMPRE EP EVAL ABLTJ ATR FIB: CPT | Performed by: INTERNAL MEDICINE

## 2024-04-15 PROCEDURE — 93010 ELECTROCARDIOGRAM REPORT: CPT | Performed by: INTERNAL MEDICINE

## 2024-04-15 PROCEDURE — 85730 THROMBOPLASTIN TIME PARTIAL: CPT

## 2024-04-15 PROCEDURE — 3700000001 HC ADD 15 MINUTES (ANESTHESIA): Performed by: INTERNAL MEDICINE

## 2024-04-15 PROCEDURE — 85027 COMPLETE CBC AUTOMATED: CPT

## 2024-04-15 PROCEDURE — 93657 TX L/R ATRIAL FIB ADDL: CPT | Performed by: INTERNAL MEDICINE

## 2024-04-15 PROCEDURE — 2720000010 HC SURG SUPPLY STERILE: Performed by: INTERNAL MEDICINE

## 2024-04-15 PROCEDURE — 36415 COLL VENOUS BLD VENIPUNCTURE: CPT

## 2024-04-15 PROCEDURE — 2580000003 HC RX 258: Performed by: STUDENT IN AN ORGANIZED HEALTH CARE EDUCATION/TRAINING PROGRAM

## 2024-04-15 PROCEDURE — 80048 BASIC METABOLIC PNL TOTAL CA: CPT

## 2024-04-15 PROCEDURE — C1894 INTRO/SHEATH, NON-LASER: HCPCS | Performed by: INTERNAL MEDICINE

## 2024-04-15 PROCEDURE — 86901 BLOOD TYPING SEROLOGIC RH(D): CPT

## 2024-04-15 PROCEDURE — 7100000000 HC PACU RECOVERY - FIRST 15 MIN: Performed by: INTERNAL MEDICINE

## 2024-04-15 PROCEDURE — 6360000002 HC RX W HCPCS: Performed by: NURSE ANESTHETIST, CERTIFIED REGISTERED

## 2024-04-15 PROCEDURE — 7100000001 HC PACU RECOVERY - ADDTL 15 MIN: Performed by: INTERNAL MEDICINE

## 2024-04-15 PROCEDURE — 7100000010 HC PHASE II RECOVERY - FIRST 15 MIN: Performed by: INTERNAL MEDICINE

## 2024-04-15 PROCEDURE — 3700000000 HC ANESTHESIA ATTENDED CARE: Performed by: INTERNAL MEDICINE

## 2024-04-15 PROCEDURE — C1760 CLOSURE DEV, VASC: HCPCS | Performed by: INTERNAL MEDICINE

## 2024-04-15 PROCEDURE — 93005 ELECTROCARDIOGRAM TRACING: CPT | Performed by: INTERNAL MEDICINE

## 2024-04-15 PROCEDURE — 86900 BLOOD TYPING SEROLOGIC ABO: CPT

## 2024-04-15 PROCEDURE — 86850 RBC ANTIBODY SCREEN: CPT

## 2024-04-15 PROCEDURE — C1759 CATH, INTRA ECHOCARDIOGRAPHY: HCPCS | Performed by: INTERNAL MEDICINE

## 2024-04-15 PROCEDURE — 7100000011 HC PHASE II RECOVERY - ADDTL 15 MIN: Performed by: INTERNAL MEDICINE

## 2024-04-15 PROCEDURE — 6370000000 HC RX 637 (ALT 250 FOR IP): Performed by: NURSE ANESTHETIST, CERTIFIED REGISTERED

## 2024-04-15 PROCEDURE — 85347 COAGULATION TIME ACTIVATED: CPT

## 2024-04-15 PROCEDURE — 2500000003 HC RX 250 WO HCPCS: Performed by: INTERNAL MEDICINE

## 2024-04-15 PROCEDURE — 2500000003 HC RX 250 WO HCPCS: Performed by: NURSE ANESTHETIST, CERTIFIED REGISTERED

## 2024-04-15 PROCEDURE — 93005 ELECTROCARDIOGRAM TRACING: CPT | Performed by: STUDENT IN AN ORGANIZED HEALTH CARE EDUCATION/TRAINING PROGRAM

## 2024-04-15 PROCEDURE — C1732 CATH, EP, DIAG/ABL, 3D/VECT: HCPCS | Performed by: INTERNAL MEDICINE

## 2024-04-15 PROCEDURE — C1730 CATH, EP, 19 OR FEW ELECT: HCPCS | Performed by: INTERNAL MEDICINE

## 2024-04-15 PROCEDURE — 2709999900 HC NON-CHARGEABLE SUPPLY: Performed by: INTERNAL MEDICINE

## 2024-04-15 PROCEDURE — G0269 OCCLUSIVE DEVICE IN VEIN ART: HCPCS | Performed by: INTERNAL MEDICINE

## 2024-04-15 PROCEDURE — 85610 PROTHROMBIN TIME: CPT

## 2024-04-15 RX ORDER — LIDOCAINE HYDROCHLORIDE 20 MG/ML
INJECTION, SOLUTION INTRAVENOUS PRN
Status: DISCONTINUED | OUTPATIENT
Start: 2024-04-15 | End: 2024-04-15 | Stop reason: SDUPTHER

## 2024-04-15 RX ORDER — PROTAMINE SULFATE 10 MG/ML
INJECTION, SOLUTION INTRAVENOUS PRN
Status: DISCONTINUED | OUTPATIENT
Start: 2024-04-15 | End: 2024-04-15 | Stop reason: SDUPTHER

## 2024-04-15 RX ORDER — PROPOFOL 10 MG/ML
INJECTION, EMULSION INTRAVENOUS PRN
Status: DISCONTINUED | OUTPATIENT
Start: 2024-04-15 | End: 2024-04-15 | Stop reason: SDUPTHER

## 2024-04-15 RX ORDER — ATROPINE SULFATE 0.1 MG/ML
INJECTION INTRAVENOUS PRN
Status: DISCONTINUED | OUTPATIENT
Start: 2024-04-15 | End: 2024-04-15 | Stop reason: SDUPTHER

## 2024-04-15 RX ORDER — SODIUM CHLORIDE 0.9 % (FLUSH) 0.9 %
5-40 SYRINGE (ML) INJECTION PRN
Status: DISCONTINUED | OUTPATIENT
Start: 2024-04-15 | End: 2024-04-15 | Stop reason: HOSPADM

## 2024-04-15 RX ORDER — MINERAL OIL AND WHITE PETROLATUM 150; 830 MG/G; MG/G
OINTMENT OPHTHALMIC PRN
Status: DISCONTINUED | OUTPATIENT
Start: 2024-04-15 | End: 2024-04-15 | Stop reason: SDUPTHER

## 2024-04-15 RX ORDER — EPHEDRINE SULFATE/0.9% NACL/PF 25 MG/5 ML
SYRINGE (ML) INTRAVENOUS PRN
Status: DISCONTINUED | OUTPATIENT
Start: 2024-04-15 | End: 2024-04-15 | Stop reason: SDUPTHER

## 2024-04-15 RX ORDER — MIDAZOLAM HYDROCHLORIDE 1 MG/ML
INJECTION INTRAMUSCULAR; INTRAVENOUS PRN
Status: DISCONTINUED | OUTPATIENT
Start: 2024-04-15 | End: 2024-04-15 | Stop reason: SDUPTHER

## 2024-04-15 RX ORDER — ROCURONIUM BROMIDE 10 MG/ML
INJECTION, SOLUTION INTRAVENOUS PRN
Status: DISCONTINUED | OUTPATIENT
Start: 2024-04-15 | End: 2024-04-15 | Stop reason: SDUPTHER

## 2024-04-15 RX ORDER — SODIUM CHLORIDE 9 MG/ML
INJECTION, SOLUTION INTRAVENOUS PRN
Status: DISCONTINUED | OUTPATIENT
Start: 2024-04-15 | End: 2024-04-15 | Stop reason: HOSPADM

## 2024-04-15 RX ORDER — FUROSEMIDE 10 MG/ML
INJECTION INTRAMUSCULAR; INTRAVENOUS PRN
Status: DISCONTINUED | OUTPATIENT
Start: 2024-04-15 | End: 2024-04-15 | Stop reason: SDUPTHER

## 2024-04-15 RX ORDER — SODIUM CHLORIDE 0.9 % (FLUSH) 0.9 %
5-40 SYRINGE (ML) INJECTION EVERY 12 HOURS SCHEDULED
Status: DISCONTINUED | OUTPATIENT
Start: 2024-04-15 | End: 2024-04-15 | Stop reason: HOSPADM

## 2024-04-15 RX ORDER — ONDANSETRON 2 MG/ML
INJECTION INTRAMUSCULAR; INTRAVENOUS PRN
Status: DISCONTINUED | OUTPATIENT
Start: 2024-04-15 | End: 2024-04-15 | Stop reason: SDUPTHER

## 2024-04-15 RX ORDER — FENTANYL CITRATE 50 UG/ML
INJECTION, SOLUTION INTRAMUSCULAR; INTRAVENOUS PRN
Status: DISCONTINUED | OUTPATIENT
Start: 2024-04-15 | End: 2024-04-15 | Stop reason: SDUPTHER

## 2024-04-15 RX ORDER — DEXAMETHASONE SODIUM PHOSPHATE 10 MG/ML
INJECTION, EMULSION INTRAMUSCULAR; INTRAVENOUS PRN
Status: DISCONTINUED | OUTPATIENT
Start: 2024-04-15 | End: 2024-04-15 | Stop reason: SDUPTHER

## 2024-04-15 RX ORDER — HEPARIN SODIUM 1000 [USP'U]/ML
INJECTION, SOLUTION INTRAVENOUS; SUBCUTANEOUS PRN
Status: DISCONTINUED | OUTPATIENT
Start: 2024-04-15 | End: 2024-04-15 | Stop reason: SDUPTHER

## 2024-04-15 RX ADMIN — ONDANSETRON 4 MG: 2 INJECTION INTRAMUSCULAR; INTRAVENOUS at 12:08

## 2024-04-15 RX ADMIN — MIDAZOLAM 2 MG: 1 INJECTION INTRAMUSCULAR; INTRAVENOUS at 11:46

## 2024-04-15 RX ADMIN — HEPARIN SODIUM 5000 UNITS: 1000 INJECTION INTRAVENOUS; SUBCUTANEOUS at 13:25

## 2024-04-15 RX ADMIN — HEPARIN SODIUM 3000 UNITS: 1000 INJECTION INTRAVENOUS; SUBCUTANEOUS at 14:07

## 2024-04-15 RX ADMIN — LIDOCAINE HYDROCHLORIDE 100 MG: 20 INJECTION, SOLUTION INTRAVENOUS at 11:49

## 2024-04-15 RX ADMIN — HEPARIN SODIUM 5000 UNITS: 1000 INJECTION INTRAVENOUS; SUBCUTANEOUS at 12:33

## 2024-04-15 RX ADMIN — HEPARIN SODIUM 5000 UNITS: 1000 INJECTION INTRAVENOUS; SUBCUTANEOUS at 13:47

## 2024-04-15 RX ADMIN — ATROPINE SULFATE 1 MG: 0.1 INJECTION, SOLUTION ENDOTRACHEAL; INTRAMUSCULAR; INTRAVENOUS; SUBCUTANEOUS at 14:40

## 2024-04-15 RX ADMIN — MINERAL OIL AND WHITE PETROLATUM 2 APPLICATOR: 150; 830 OINTMENT OPHTHALMIC at 12:08

## 2024-04-15 RX ADMIN — SUGAMMADEX 200 MG: 100 INJECTION, SOLUTION INTRAVENOUS at 14:44

## 2024-04-15 RX ADMIN — HEPARIN SODIUM 5000 UNITS: 1000 INJECTION INTRAVENOUS; SUBCUTANEOUS at 13:03

## 2024-04-15 RX ADMIN — SODIUM CHLORIDE 75 ML: 9 INJECTION, SOLUTION INTRAVENOUS at 11:11

## 2024-04-15 RX ADMIN — FUROSEMIDE 20 MG: 10 INJECTION, SOLUTION INTRAMUSCULAR; INTRAVENOUS at 14:44

## 2024-04-15 RX ADMIN — EPHEDRINE SULFATE 20 MG: 5 INJECTION INTRAVENOUS at 12:12

## 2024-04-15 RX ADMIN — PROTAMINE SULFATE 100 MG: 10 INJECTION, SOLUTION INTRAVENOUS at 14:32

## 2024-04-15 RX ADMIN — FENTANYL CITRATE 50 MCG: 50 INJECTION, SOLUTION INTRAMUSCULAR; INTRAVENOUS at 13:20

## 2024-04-15 RX ADMIN — FENTANYL CITRATE 50 MCG: 50 INJECTION, SOLUTION INTRAMUSCULAR; INTRAVENOUS at 13:59

## 2024-04-15 RX ADMIN — DEXAMETHASONE SODIUM PHOSPHATE 8 MG: 10 INJECTION, EMULSION INTRAMUSCULAR; INTRAVENOUS at 12:08

## 2024-04-15 RX ADMIN — FENTANYL CITRATE 100 MCG: 50 INJECTION, SOLUTION INTRAMUSCULAR; INTRAVENOUS at 11:49

## 2024-04-15 RX ADMIN — EPHEDRINE SULFATE 10 MG: 5 INJECTION INTRAVENOUS at 12:14

## 2024-04-15 RX ADMIN — ROCURONIUM BROMIDE 50 MG: 10 INJECTION INTRAVENOUS at 11:50

## 2024-04-15 RX ADMIN — SODIUM CHLORIDE: 9 INJECTION, SOLUTION INTRAVENOUS at 13:59

## 2024-04-15 RX ADMIN — EPHEDRINE SULFATE 20 MG: 5 INJECTION INTRAVENOUS at 12:17

## 2024-04-15 RX ADMIN — HEPARIN SODIUM 10000 UNITS: 1000 INJECTION INTRAVENOUS; SUBCUTANEOUS at 12:44

## 2024-04-15 RX ADMIN — PROPOFOL 200 MG: 10 INJECTION, EMULSION INTRAVENOUS at 11:49

## 2024-04-15 ASSESSMENT — PAIN - FUNCTIONAL ASSESSMENT: PAIN_FUNCTIONAL_ASSESSMENT: FACE, LEGS, ACTIVITY, CRY, AND CONSOLABILITY (FLACC)

## 2024-04-15 NOTE — PLAN OF CARE
Problem: Discharge Planning  Goal: Discharge to home or other facility with appropriate resources  4/15/2024 1245 by Yonny Morrissey, RN  Outcome: Adequate for Discharge  4/15/2024 1016 by Yonny Morrissey, RN  Outcome: Progressing     Problem: Pain  Goal: Verbalizes/displays adequate comfort level or baseline comfort level  Outcome: Adequate for Discharge     Problem: ABCDS Injury Assessment  Goal: Absence of physical injury  Outcome: Adequate for Discharge

## 2024-04-15 NOTE — PROCEDURES
lines delivered along the posterior wall. Touch up ablation within the box isolated the left atrial posterior wall. The esophageal temperature was monitored throughout the procedure using Digital Karma esophageal temperature monitoring system .  The ablation was stopped with 0.5 °C rise in esophageal temperature. The maximum recorded esophageal temperature during ablation was 48.4 °C while ablating close to right inferior pulmonary vein. High output pacing was performed to exclude phrenic nerve capture during ablation around right pulmonary veins. Programmed stimulation was performed. Atrial flutter with a cycle length of 180 ms induced during burst atrial pacing. The activation on coronary sinus catheter was proximal to distal. A high density activation map of right atrium was suggestive of typical (counter-clockwise( flutter. Extensive scar noted throughout the right atrium on voltage map.  The flutter terminated during catheter manipulation along the Cavotricuspid isthmus (CTI). The ablation catheter was positioned across the CTI at 6 o'clock position in [place]-45 degree projection. A linear ablation was performed along CTI in n contiguous point by point fashion till bidirectional block across the CTI was achieved.  The pulmonary vein isolation, left atrial posterior wall isolation and bidirectional block of the CTI was confirmed after 20 minutes of waiting.  Programmed stimulation was performed.  Atrial fibrillation induced during burst atrial pacing data pacing cycle length of 230 ms.  Cardioversion was performed by a 360 J synchronized shock.  Post ablation trans annular conduction time was 201 ms. Post ablation, sinus cycle length was 1076 ms,  ms, QRSd 127 ms, QT interval 494 ms, AH 70 ms, HV 65 ms, AV Wenckebach 420 ms and AV node /600 ms. Corrected sinus node recovery time was 547.    At the end of the procedure imaging with ICE showed no left atrial clots or pericardial effusion. Protamine was

## 2024-04-15 NOTE — PLAN OF CARE
Problem: Discharge Planning  Goal: Discharge to home or other facility with appropriate resources  Outcome: Progressing   Pt to have cardiac ablation today

## 2024-04-15 NOTE — ANESTHESIA PRE PROCEDURE
Department of Anesthesiology  Preprocedure Note       Name:  Kush Perea   Age:  68 y.o.  :  1955                                          MRN:  [id number]         Date:  4/15/2024      Surgeon: Surgeon(s):  January Andrews MD    Procedure: Procedure(s):  Ablation A-fib w complete ep study    Medications prior to admission:   Prior to Admission medications    Medication Sig Start Date End Date Taking? Authorizing Provider   ELIQUIS 5 MG TABS tablet Take 1 tablet by mouth twice daily 24   Danisha Burnette APRN - CNP   nitroGLYCERIN (NITROSTAT) 0.4 MG SL tablet up to max of 3 total doses. If no relief after 1 dose, call 911. 3/27/24   Danisha Burnette APRN - CNP   vitamin D (ERGOCALCIFEROL) 1.25 MG (89432 UT) CAPS capsule Take 1 capsule by mouth once a week 3/19/24   Diann Vega APRN - CNP   pantoprazole (PROTONIX) 40 MG tablet Take 1 tablet by mouth once daily 3/15/24   Danisha Burnette APRN - CNP   spironolactone (ALDACTONE) 25 MG tablet Take 1 tablet by mouth daily    Provider, MD Rob   escitalopram (LEXAPRO) 10 MG tablet Take 1 tablet by mouth daily  Patient not taking: Reported on 3/14/2024 3/12/24   Diann Vega APRN - CNP   sacubitril-valsartan (ENTRESTO)  MG per tablet Take 1 tablet by mouth 2 times daily 23   Harleen Vuong APRN - CNP   hydrALAZINE (APRESOLINE) 25 MG tablet Take 1 tablet by mouth 2 times daily 23   Harleen Vuong APRN - CNP   isosorbide mononitrate (IMDUR) 120 MG extended release tablet Take 1 tablet by mouth daily 23   Harleen Vuong APRN - CNP   metoprolol succinate (TOPROL XL) 50 MG extended release tablet Take 1 tablet by mouth daily 23   Harleen Vuong APRN - CNP   bumetanide (BUMEX) 1 MG tablet Take 1 tablet by mouth daily as needed (for increased swelling or weight gain 3#/day) 3/14/23   Harleen Vuong, APRN - CNP   clonazePAM (KLONOPIN) 0.5 MG tablet Take 1 tablet by mouth nightly as needed.  Patient not

## 2024-04-15 NOTE — BRIEF OP NOTE
Brief Postoperative Note    Date:   4/15/2024  Patient name: Kush Perea  YOB: 1955  Sex: male   MRN:   [id number]    PCP: Diann Vega APRN - CNP     Procedure:AF and AFL ablation.     Pre-Op Diagnosis: AF    Post-Op Diagnosis:  AF and AFL.     Surgeon: January Andrews MD, MRCP, FACC, FHRS    Assistant: Geraldine.    Anesthesia/sedation: General.     Estimated Blood Loss (mL): less than 50     Complications: None    Recommendations:  See orders in Epic.  Bed rest for 2 hours.  Watch access site/s for bleeding or swelling.  Hold pressure if bleeding or swelling.  Ambulate 2 hour after suture/sheath removal.  Remove Denis's catheter (if in place) once patient ambulates.  Stop IV fluids once patient starts eating.  Resume home medications as tonight.   Follow up in 4 weeks in EP clinic.           Electronically signed by January Andrews MD, FACC, FHRS on 4/15/2024 at 3:09 PM

## 2024-04-15 NOTE — FLOWSHEET NOTE
Patient admitted to 2E17  Ambulatory for cardiac ablation.  Patient NPO. Patient accompanied by family.  Vital signs obtained.   Assessment and data collection intiated.   Oriented to room.  Policies and procedures for 2E explained.   All questions answered with no further questions at this time.   Fall prevention and safety precautions discussed with patient.     Explained patients right to have family, representative or physician notified of their admission.  Patient has Declined for physician to be notified.  Patient has Declined for family/representative to be notified.

## 2024-04-15 NOTE — PROGRESS NOTES
1500: pt arrives to pacu. Pt on 8L simple mask. OPA in place and removed at bedside. VSS. Respirations unlabored. X3 cath sites; CDI. External ochoa in place  1507: pt placed on 3L nasal cannula  1515: post cath assessment complete  1524: pt resting in bed   1530: pt meets discharge criteria. Post cath assessment complete  1535: report called to 2E [facility] [place]. Pt transported to 2E

## 2024-04-15 NOTE — FLOWSHEET NOTE
Iv fluids stopped iv catheter removed. Telemetry off. Bilateral groin and neck right neck site stable. Pt has voided multiple times post op. Preparing for discharge to home post atrial fib ablation. Questions addressed.

## 2024-04-15 NOTE — FLOWSHEET NOTE
04/15/24 1745   AVS Reviewed   AVS & discharge instructions reviewed with patient and/or representative? Yes   Reviewed instructions with Patient;Other (name and relationship in comment)  (son)   Level of Understanding Questions answered;Verbalized understanding

## 2024-04-15 NOTE — FLOWSHEET NOTE
Received from pacu. Bilateral venous groin sites stable. Right venous internal jugular site stable. 0.9 normal saline cont. Oxygen at 3l/nc/min. Denies pain or needs external male urinary catheter in place. No urine noted. No family present. Resting with easy resp. Pt aware that he can urinate at any time. Pt aware to keep both legs still, not to cross his legs or lift his head.

## 2024-04-15 NOTE — H&P
[facility]  HEART CENTER (EP)  [address] [facility] [address] [facility] [address]  H&P and SEDATION/ANALGESIA     Patient demographics:  Date:   4/15/2024  Patient name: Kush Perea  YOB: 1955  Sex: male   MRN:   [id number]    Reason for admission or planned procedure:  Atrial fibrillation ablation.    Code Status: Full Code    Consent:The risk and benefits of Afib catheter ablation including bleeding, vascular complications, pericardial tamponade requiring emergency pericardiocentesis or emergency sternotomy and placement of pericardial drain or emergency sternotomy, phrenic nerve injury, pulmonary vein stenosis, atrio-esophageal fistula,  stroke, MI, death, potential exposure to radiation and recurrent atrial tachy-arrhythmia requiring further ablations and/or initiation of AAD therapy were discussed with the patient. He verbalized understanding of the discussion. His questions were answered. The patient wishes to proceed.        Brief clinical summary:  68/M with symptomatic persistent atrial fibrillation with HFrEF, improved after cardioversion, elected to have catheter ablation (versus AAD use) for rhythm control. Medcial HX: ICM dx 2021 (LVEF 35-40% => 30% => 40-45%), Atrial fibrillation dx  (2/12/2024) after left shoulder replacement => DCCV 3/14/24, symptomatic sinus bradycardia precluding use of AAD, CAD/CABG x4 (2012) and PCI-LCx/OM (2019) and SVG-RI (12/2020), moderate AS, hypertension, obesity and depression.     Past Medical History:  Past Medical History:   Diagnosis Date    Abnormal cardiovascular stress test 8/7/2019    CAD (coronary artery disease)     Depression     GERD (gastroesophageal reflux disease)     Hypertension     Melanoma (HCC)        Past Surgical History:  Past Surgical History:   Procedure Laterality Date    CARDIAC SURGERY  2010    open heart    CORONARY ANGIOPLASTY WITH STENT PLACEMENT  05/2019    DIAGNOSTIC CARDIAC CATH LAB PROCEDURE

## 2024-04-15 NOTE — DISCHARGE INSTRUCTIONS
strong relaxation. You will be able to hear, speak and follow instructions, but your memory and alertness will be decreased.     You will be able to swallow and breathe on your own. During sedation/analgesia your blood pressure, heart and breathing will be watched closely. After the procedure, you may not remember what was said or done.     You may have the following effects from the medication.  \"           Drowsiness, dizziness, sleepiness or confusion.  \"           Difficulty remembering or delayed reaction times.  \"           Loss of fine muscle control or difficulty with your balance especially while walking.  \"           Difficulty focusing or blurred vision.  You may not be aware of slight changes in your behavior and/or your reaction time because of the medication used during the procedure. Therefore you should follow these instructions.  \"           Have someone responsible help you with your care.  \"           Do not drive for 24 hours.  \"           Do not operate equipment for 24 hours (lawnmowers, power tools, kitchen accessories, stove).  \"           Do not drink any alcoholic beverages for a minimum of 24 hours.  \"           Do not make important personal, legal or business decisions for 24 hours.  \"           You may experience dizziness or lightheadedness. Move slowly and carefully, do not make sudden position changes.  \"           Drink extra amounts of fluids today.  \"           Increase your diet as tolerated (unless you have received specific instructions from your doctor).  \"           If you feel nauseated, continue with liquids until the nausea is gone.  \"           Notify your physician if you have not urinated within 8 hours after the procedure.  \"           Resume your medications unless otherwise instructed.     Contact your physician if you have any questions or concerns.     IF YOU REPORT TO AN EMERGENCY ROOM, DOCTOR'S OFFICE OR HOSPITAL WITHIN 24 HOURS AFTER YOUR PROCEDURE, BRING THIS

## 2024-04-16 ENCOUNTER — TELEPHONE (OUTPATIENT)
Dept: CARDIOLOGY CLINIC | Age: 69
End: 2024-04-16

## 2024-04-16 NOTE — TELEPHONE ENCOUNTER
..POD 1 Atrial fib ablation    Denies any bleeding/drainage/ hematoma at IJ and bilateral groin punctures   Has restarted eliquis     States he's been up every 10 minutes through the night to urinate, \"just a dribble\" each time.  Is getting uncomfortable with it.  States he had trouble urinating post ablation  Recommended he go to ED or urgent care~he might need cathed.  States he is going to try on his own one more time~if he is unsuccessful will go to [facility]

## 2024-04-16 NOTE — ANESTHESIA POSTPROCEDURE EVALUATION
Department of Anesthesiology  Postprocedure Note    Patient: Kush Perea  MRN: [id number]  YOB: 1955  Date of evaluation: 4/16/2024    Procedure Summary       Date: 04/15/24 Room / Location: [facility] CATH LAB  / [facility] CARDIAC CATH LAB    Anesthesia Start: 1145 Anesthesia Stop: 1503    Procedure: Ablation A-fib w complete ep study (Left) Diagnosis:       A-fib (HCC)      (A-fib (HCC) [I48.91])    Providers: January Andrews MD Responsible Provider: Daniel Marnio MD    Anesthesia Type: general ASA Status: 4            Anesthesia Type: No value filed.    Jenny Phase I: Jenny Score: 10    Jenny Phase II:      Anesthesia Post Evaluation    Patient location during evaluation: PACU  Patient participation: complete - patient participated  Level of consciousness: awake and alert  Airway patency: patent  Nausea & Vomiting: no nausea and no vomiting  Cardiovascular status: hemodynamically stable  Respiratory status: acceptable  Hydration status: euvolemic  Pain management: adequate    [facility]  POST-ANESTHESIA NOTE       Name:  Kush Perea                                         Age:  68 y.o.  MRN:  [id number]      Last Vitals:  BP (!) 141/77   Pulse 68   Temp 96.8 °F (36 °C) (Temporal)   Resp 20   Ht 1.854 m (6' 1\")   Wt 104.3 kg (230 lb)   SpO2 92%   BMI 30.34 kg/m²   No data found.    Level of Consciousness:  Awake    Respiratory:  Stable    Oxygen Saturation:  Stable    Cardiovascular:  Stable    Hydration:  Adequate    PONV:  Stable    Post-op Pain:  Adequate analgesia    Post-op Assessment:  No apparent anesthetic complications    Additional Follow-Up / Treatment / Comment:  None    DANIEL MARINO MD  April 16, 2024   7:18 PM      There were no known notable events for this encounter.

## 2024-04-17 LAB
EKG ATRIAL RATE: 44 BPM
EKG ATRIAL RATE: 70 BPM
EKG P AXIS: -5 DEGREES
EKG P AXIS: 54 DEGREES
EKG P-R INTERVAL: 184 MS
EKG P-R INTERVAL: 208 MS
EKG Q-T INTERVAL: 454 MS
EKG Q-T INTERVAL: 508 MS
EKG QRS DURATION: 122 MS
EKG QRS DURATION: 122 MS
EKG QTC CALCULATION (BAZETT): 434 MS
EKG QTC CALCULATION (BAZETT): 490 MS
EKG R AXIS: 110 DEGREES
EKG R AXIS: 112 DEGREES
EKG T AXIS: -117 DEGREES
EKG T AXIS: -57 DEGREES
EKG VENTRICULAR RATE: 44 BPM
EKG VENTRICULAR RATE: 70 BPM

## 2024-04-19 ENCOUNTER — HOSPITAL ENCOUNTER (EMERGENCY)
Age: 69
Discharge: HOME | End: 2024-04-19
Attending: EMERGENCY MEDICINE
Payer: MEDICARE

## 2024-04-19 VITALS
RESPIRATION RATE: 16 BRPM | DIASTOLIC BLOOD PRESSURE: 68 MMHG | BODY MASS INDEX: 30.48 KG/M2 | HEART RATE: 62 BPM | SYSTOLIC BLOOD PRESSURE: 137 MMHG | HEIGHT: 73 IN | WEIGHT: 230 LBS | OXYGEN SATURATION: 99 % | TEMPERATURE: 97.2 F

## 2024-04-19 DIAGNOSIS — R35.0 URINARY FREQUENCY: Primary | ICD-10-CM

## 2024-04-19 LAB
ALBUMIN SERPL BCP-MCNC: 3.7 GM/DL (ref 3.4–5)
ALP SERPL-CCNC: 63 U/L (ref 46–116)
ALT SERPL W P-5'-P-CCNC: 13 U/L (ref 14–63)
ANION GAP SERPL CALC-SCNC: 9 MEQ/L (ref 8–16)
AST SERPL W P-5'-P-CCNC: 13 U/L (ref 15–37)
BASOPHILS # BLD: 0.6 % (ref 0–3)
BASOPHILS ABSOLUTE: 0 THOU/MM3 (ref 0–0.1)
BILIRUB SERPL-MCNC: 1.2 MG/DL (ref 0.2–1)
BILIRUB UR QL STRIP.AUTO: NEGATIVE
BUN SERPL-MCNC: 20 MG/DL (ref 7–18)
CALCIUM SERPL-MCNC: 9.1 MG/DL (ref 8.5–10.1)
CHARACTER UR: CLEAR
CHLORIDE SERPL-SCNC: 107 MEQ/L (ref 98–107)
CO2 SERPL-SCNC: 28 MEQ/L (ref 21–32)
COLOR UR AUTO: YELLOW
CREAT SERPL-MCNC: 1.3 MG/DL (ref 0.6–1.3)
EOSINOPHILS ABSOLUTE: 0.2 THOU/MM3 (ref 0–0.5)
EOSINOPHILS RELATIVE PERCENT: 3.3 % (ref 0–4)
GFR SERPL CREATININE-BSD FRML MDRD: 60 ML/MIN/1.73M2
GLUCOSE SERPL-MCNC: 139 MG/DL (ref 74–106)
GLUCOSE UR QL STRIP.AUTO: NEGATIVE MG/DL
HCT VFR BLD CALC: 44.9 % (ref 42–52)
HEMOGLOBIN: 14.5 GM/DL (ref 14–18)
HGB UR STRIP.AUTO-MCNC: NEGATIVE MG/L
IMMATURE GRANS (ABS): 0.07 THOU/MM3 (ref 0–0.07)
IMMATURE GRANULOCYTES %: 1 %
KETONES UR QL STRIP.AUTO: ABNORMAL
LEUKOCYTE ESTERASE UR QL STRIP.AUTO: NEGATIVE
LYMPHOCYTES # BLD AUTO: 13.7 % (ref 15–47)
LYMPHOCYTES ABSOLUTE: 0.9 THOU/MM3 (ref 1–4.8)
MCH RBC QN AUTO: 29.2 PG (ref 26–32)
MCHC RBC AUTO-ENTMCNC: 32.3 GM/DL (ref 31–35)
MCV RBC AUTO: 90.5 FL (ref 80–94)
MONOCYTES: 0.3 THOU/MM3 (ref 0.3–1.3)
MONOCYTES: 4 % (ref 0–12)
NITRITE UR QL STRIP.AUTO: NEGATIVE
PDW BLD-RTO: 16.3 % (ref 11.5–14.9)
PH UR STRIP.AUTO: 6 [PH] (ref 5–9)
PLATELET # BLD AUTO: 368 THOU/MM3 (ref 130–400)
PMV BLD AUTO: 11 FL (ref 9.4–12.4)
POTASSIUM SERPL-SCNC: 4.1 MEQ/L (ref 3.5–5.1)
PROT SERPL-MCNC: 6.7 GM/DL (ref 6.4–8.2)
PROT UR STRIP.AUTO-MCNC: NEGATIVE MG/DL
RBC # BLD: 4.96 MILL/MM3 (ref 4.5–6.1)
REFLEX TO URINE C & S: ABNORMAL
SEG NEUTROPHILS: 77.8 % (ref 43–75)
SEGMENTED NEUTROPHILS ABSOLUTE COUNT: 5.1 THOU/MM3 (ref 1.8–7.7)
SODIUM SERPL-SCNC: 144 MEQ/L (ref 136–145)
SP GR UR STRIP.AUTO: 1.02 (ref 1–1.03)
UROBILINOGEN UR STRIP-ACNC: 2 EU/DL (ref 0–1)
WBC # BLD: 6.6 THOU/MM3 (ref 4.8–10.8)

## 2024-04-19 PROCEDURE — 85025 COMPLETE CBC W/AUTO DIFF WBC: CPT

## 2024-04-19 PROCEDURE — 80053 COMPREHEN METABOLIC PANEL: CPT

## 2024-04-19 PROCEDURE — 36415 COLL VENOUS BLD VENIPUNCTURE: CPT

## 2024-04-19 PROCEDURE — 81001 URINALYSIS AUTO W/SCOPE: CPT

## 2024-04-19 PROCEDURE — 99283 EMERGENCY DEPT VISIT LOW MDM: CPT

## 2024-04-19 RX ORDER — TAMSULOSIN HYDROCHLORIDE 0.4 MG/1
0.4 CAPSULE ORAL DAILY
Qty: 10 CAPSULE | Refills: 0 | Status: SHIPPED | OUTPATIENT
Start: 2024-04-19

## 2024-04-19 RX ORDER — SULFAMETHOXAZOLE AND TRIMETHOPRIM 800; 160 MG/1; MG/1
1 TABLET ORAL 2 TIMES DAILY
Qty: 14 TABLET | Refills: 0 | Status: SHIPPED | OUTPATIENT
Start: 2024-04-19 | End: 2024-04-26

## 2024-04-19 ASSESSMENT — PAIN - FUNCTIONAL ASSESSMENT
PAIN_FUNCTIONAL_ASSESSMENT: NONE - DENIES PAIN
PAIN_FUNCTIONAL_ASSESSMENT: NONE - DENIES PAIN

## 2024-04-19 NOTE — DISCHARGE INSTRUCTIONS
Take Flomax to help pass urine better.  Take Bactrim twice a day to treat possible infection in your bladder, prostate or urinary tract.  Monitor for worsening symptoms or progression.  Follow-up with primary care

## 2024-04-19 NOTE — ED NOTES
Pt pink, warm and dry, breathing with ease. Prescriptions explained, pt states understanding. AVS reviewed. Denies questions or concerns. Pt remains alert and oriented. Pt discharged in stable condition.

## 2024-04-19 NOTE — ED PROVIDER NOTES
tablet by mouth 2 times daily for 7 days, Disp-14 tablet, R-0Normal             (Please note that portions of this note were completed with a voice recognition program.  Efforts were made to edit the dictations but occasionally words are mis-transcribed.)    Douglas Cole MD (electronically signed)  Attending Emergency Physician                      Douglas Cole MD  04/19/24 6650

## 2024-04-22 ENCOUNTER — NURSE ONLY (OUTPATIENT)
Dept: CARDIOLOGY CLINIC | Age: 69
End: 2024-04-22

## 2024-04-22 NOTE — PROGRESS NOTES
S/p afib ablation     .Pt here post afib ablation for incision check.     Rt side of neck without any redness, hard knots and no drainage or swelling       Rt groin area without any hard knots , no swelling and no drainage    Lt side of groin I had DR Andrews assess. Area is open. Pt lt all of his bandages on and he never took them off prior to his visit today. Pt informed that he did bleed a lot after the procedure and they had to hold pressure for awhile     Area cleaned with chlora prep and bandaid applied. Informed pt to take the banaid off daily and keep an eye on the lt groin   No new orders   Does have a large amount of bruisin to the lower lt abd. ,groin and upper thigh region with no hard knots    Pt did resume his meds and said he feels pretty good

## 2024-05-22 ENCOUNTER — OFFICE VISIT (OUTPATIENT)
Dept: CARDIOLOGY CLINIC | Age: 69
End: 2024-05-22
Payer: MEDICARE

## 2024-05-22 VITALS
HEART RATE: 68 BPM | BODY MASS INDEX: 30.62 KG/M2 | SYSTOLIC BLOOD PRESSURE: 130 MMHG | WEIGHT: 231 LBS | DIASTOLIC BLOOD PRESSURE: 62 MMHG | HEIGHT: 73 IN

## 2024-05-22 VITALS
HEART RATE: 51 BPM | OXYGEN SATURATION: 98 % | BODY MASS INDEX: 30.62 KG/M2 | SYSTOLIC BLOOD PRESSURE: 146 MMHG | WEIGHT: 231 LBS | HEIGHT: 73 IN | DIASTOLIC BLOOD PRESSURE: 75 MMHG

## 2024-05-22 DIAGNOSIS — Z98.890 S/P ABLATION OF ATRIAL FIBRILLATION: Primary | ICD-10-CM

## 2024-05-22 DIAGNOSIS — I48.0 PAROXYSMAL ATRIAL FIBRILLATION (HCC): Primary | ICD-10-CM

## 2024-05-22 DIAGNOSIS — Z86.79 S/P ABLATION OF ATRIAL FIBRILLATION: Primary | ICD-10-CM

## 2024-05-22 DIAGNOSIS — I10 PRIMARY HYPERTENSION: ICD-10-CM

## 2024-05-22 DIAGNOSIS — I25.5 ISCHEMIC CARDIOMYOPATHY: ICD-10-CM

## 2024-05-22 DIAGNOSIS — Z95.1 HX OF CABG: ICD-10-CM

## 2024-05-22 DIAGNOSIS — I48.0 PAROXYSMAL ATRIAL FIBRILLATION (HCC): ICD-10-CM

## 2024-05-22 DIAGNOSIS — I25.10 CORONARY ARTERY DISEASE INVOLVING NATIVE CORONARY ARTERY OF NATIVE HEART WITHOUT ANGINA PECTORIS: ICD-10-CM

## 2024-05-22 PROCEDURE — 3017F COLORECTAL CA SCREEN DOC REV: CPT | Performed by: INTERNAL MEDICINE

## 2024-05-22 PROCEDURE — 99214 OFFICE O/P EST MOD 30 MIN: CPT | Performed by: NURSE PRACTITIONER

## 2024-05-22 PROCEDURE — 3078F DIAST BP <80 MM HG: CPT | Performed by: INTERNAL MEDICINE

## 2024-05-22 PROCEDURE — 1036F TOBACCO NON-USER: CPT | Performed by: INTERNAL MEDICINE

## 2024-05-22 PROCEDURE — 3017F COLORECTAL CA SCREEN DOC REV: CPT | Performed by: NURSE PRACTITIONER

## 2024-05-22 PROCEDURE — 99214 OFFICE O/P EST MOD 30 MIN: CPT | Performed by: INTERNAL MEDICINE

## 2024-05-22 PROCEDURE — 3077F SYST BP >= 140 MM HG: CPT | Performed by: INTERNAL MEDICINE

## 2024-05-22 PROCEDURE — G8427 DOCREV CUR MEDS BY ELIG CLIN: HCPCS | Performed by: INTERNAL MEDICINE

## 2024-05-22 PROCEDURE — G8427 DOCREV CUR MEDS BY ELIG CLIN: HCPCS | Performed by: NURSE PRACTITIONER

## 2024-05-22 PROCEDURE — 3075F SYST BP GE 130 - 139MM HG: CPT | Performed by: NURSE PRACTITIONER

## 2024-05-22 PROCEDURE — 3078F DIAST BP <80 MM HG: CPT | Performed by: NURSE PRACTITIONER

## 2024-05-22 PROCEDURE — 1123F ACP DISCUSS/DSCN MKR DOCD: CPT | Performed by: INTERNAL MEDICINE

## 2024-05-22 PROCEDURE — 93000 ELECTROCARDIOGRAM COMPLETE: CPT | Performed by: INTERNAL MEDICINE

## 2024-05-22 PROCEDURE — G8417 CALC BMI ABV UP PARAM F/U: HCPCS | Performed by: NURSE PRACTITIONER

## 2024-05-22 PROCEDURE — 1036F TOBACCO NON-USER: CPT | Performed by: NURSE PRACTITIONER

## 2024-05-22 PROCEDURE — G8417 CALC BMI ABV UP PARAM F/U: HCPCS | Performed by: INTERNAL MEDICINE

## 2024-05-22 PROCEDURE — 1123F ACP DISCUSS/DSCN MKR DOCD: CPT | Performed by: NURSE PRACTITIONER

## 2024-05-22 NOTE — PROGRESS NOTES
C/o 3 dizzy spells since ablation, one was while driving-he had to pull over  
mouth daily 10 capsule 0    ELIQUIS 5 MG TABS tablet Take 1 tablet by mouth twice daily 60 tablet 11    nitroGLYCERIN (NITROSTAT) 0.4 MG SL tablet up to max of 3 total doses. If no relief after 1 dose, call 911. 25 tablet 3    vitamin D (ERGOCALCIFEROL) 1.25 MG (28651 UT) CAPS capsule Take 1 capsule by mouth once a week 12 capsule 3    pantoprazole (PROTONIX) 40 MG tablet Take 1 tablet by mouth once daily 90 tablet 0    spironolactone (ALDACTONE) 25 MG tablet Take 1 tablet by mouth daily      sacubitril-valsartan (ENTRESTO)  MG per tablet Take 1 tablet by mouth 2 times daily 180 tablet 3    hydrALAZINE (APRESOLINE) 25 MG tablet Take 1 tablet by mouth 2 times daily 180 tablet 3    isosorbide mononitrate (IMDUR) 120 MG extended release tablet Take 1 tablet by mouth daily 90 tablet 3    metoprolol succinate (TOPROL XL) 50 MG extended release tablet Take 1 tablet by mouth daily 90 tablet 3    bumetanide (BUMEX) 1 MG tablet Take 1 tablet by mouth daily as needed (for increased swelling or weight gain 3#/day) 30 tablet 1    aspirin 81 MG tablet Take 1 tablet by mouth daily      Multiple Vitamins-Minerals (THERAPEUTIC MULTIVITAMIN-MINERALS) tablet Take 1 tablet by mouth daily      escitalopram (LEXAPRO) 10 MG tablet Take 1 tablet by mouth daily (Patient not taking: Reported on 3/14/2024) 30 tablet 3    clonazePAM (KLONOPIN) 0.5 MG tablet Take 1 tablet by mouth nightly as needed. (Patient not taking: Reported on 3/12/2024)       No current facility-administered medications for this visit.       Physical Examination:  Vitals:    05/22/24 0759   BP: (!) 146/75   Pulse: 51   SpO2: 98%        Body mass index is 30.48 kg/m².   GENERAL: Alert and oriented. No distress.  EYES: No pallor or icterus.  ENT: No cyanosis.No thyromegaly or cervical LAP.  VESSELS: No jugular venous distension or carotid bruits.  HEART: Irregular S1/S2. No murmur, rub or gallop.  LUNGS: Clear to auscultation.  ABDOMEN: Soft and non-tender.

## 2024-05-22 NOTE — PATIENT INSTRUCTIONS
Decrease the Toprol from 50 mg to 25 mg daily.    Continue other current medications as prescribed.    Call if still not feeling well the medication change or if blood pressure seeming to still want to run call the office.    Stay as active as you can.     Eat heart healthy diet.     Stay well hydrated; especially if out in the heat.     Could take Bumex as 0.5 mg daily instead of 1 mg as needed if you feel this is more convenient.     Follow-up with your PCP as scheduled.    Follow-up with Danisha CALLOWAY on 1/8/2025 as scheduled or sooner if need.

## 2024-05-22 NOTE — PROGRESS NOTES
3 month follow up.  Patient reports experiencing 3 episodes of dizzy spells within the last 4 days.  Patient states he has been feeling better than he has in 8 months since having an ablation.  Denies chest pain, palpitations, shortness of breath, and edema.

## 2024-05-22 NOTE — PROGRESS NOTES
[facility] PHYSICIANS [place] [facility]  [facility] CARDIOLOGY  [address]  [address]  [facility] [address]  Dept: [phone]  Dept Fax: [phone]  Loc: [phone]    Visit Date: 5/22/2024    Mr. Perea is a 68 y.o. male  who presented for: 3 month follow-up    Primary Cardiologist: Maamdou Davis MD    Chief Complaint   Patient presents with    Follow-up     3 month follow up.       HPI:   HPI   4/15/24: afib ablation    3/14/24 JASON with DC-CV: conversion to SR    Evaluation per Dr. Andrews 3/6/2024:  Impression:  Persistent atrial fibrillation.  [id number] score 4.  On apixaban.  Ischemic cardiomyopathy, LVEF 40 to 45%.  NYHA class II  CAD/CABG/PCI.  On aspirin and metoprolol.  Status post left shoulder replacement.      Assessment And Recommendations:  Patient reports having palpitation for a long time.  However postoperatively recently diagnosed to have atrial fibrillation.  Currently rate controlled on metoprolol.  Will benefit with rhythm control.  Discussed options.  He wants to try medication.  Start amiodarone load.  JASON-cardioversion next week.  Risk and benefit discussed.  Questions answered.      Last seen in office on 2/21/24 per writer. Per office note:  Assessment/Plan   Ischemic CHF, NYHA I  S/p SVG-RI, 12/2020  EF 30% with LifeVest  PCI of the LCX/OM, 5/2019 - DAPT  Occluded SVG-OM  CABG x 4, 2012  HTN  Moderate stenosis by DSE  Intermittent chest pain, CCS 2-3  Shoulder surgery (L); with afib post     Remains in atrial fib with ventricular rate controlled.  No bleeding on Eliquis.  No stroke symptoms.  No anginal symptoms or evidence of decompensated heart failure.  Discussed options of treatment with atrial fib.  Will refer to EP for further evaluation and definitive treatment.  Patient instructions:  Continue current medications as prescribed.   Ease back into your normal activities as directed by the Orthopedic surgeon.    See Dr. Andrews as scheduled regarding the atrial

## 2024-06-09 DIAGNOSIS — Z95.1 HX OF CABG: ICD-10-CM

## 2024-06-09 DIAGNOSIS — I25.5 ISCHEMIC CARDIOMYOPATHY: ICD-10-CM

## 2024-06-11 RX ORDER — PANTOPRAZOLE SODIUM 40 MG/1
40 TABLET, DELAYED RELEASE ORAL DAILY
Qty: 90 TABLET | Refills: 2 | Status: SHIPPED | OUTPATIENT
Start: 2024-06-11

## 2024-06-19 RX ORDER — SPIRONOLACTONE 25 MG/1
25 TABLET ORAL DAILY
Qty: 90 TABLET | Refills: 2 | Status: SHIPPED | OUTPATIENT
Start: 2024-06-19

## 2024-07-01 DIAGNOSIS — Z95.1 HX OF CABG: ICD-10-CM

## 2024-07-02 RX ORDER — NITROGLYCERIN 0.4 MG/1
TABLET SUBLINGUAL
Qty: 25 TABLET | Refills: 3 | Status: SHIPPED | OUTPATIENT
Start: 2024-07-02

## 2024-07-08 NOTE — TELEPHONE ENCOUNTER
Patient does not want to follow with chf clinic. Wife states dr. Davis instructed to start taking bumex one-half tab daily. Medication not updated

## 2024-07-09 RX ORDER — BUMETANIDE 1 MG/1
0.5 TABLET ORAL DAILY
Qty: 90 TABLET | Refills: 1 | Status: SHIPPED | OUTPATIENT
Start: 2024-07-09

## 2024-07-30 ENCOUNTER — TELEPHONE (OUTPATIENT)
Dept: CARDIOLOGY CLINIC | Age: 69
End: 2024-07-30

## 2024-07-30 DIAGNOSIS — I48.0 PAROXYSMAL ATRIAL FIBRILLATION (HCC): Primary | ICD-10-CM

## 2024-07-30 NOTE — TELEPHONE ENCOUNTER
Pts wife Amy calling, she is requesting an alternative to Eliquis. Pt has been having dizzy spells. They noticed them months ago when he first started the Eliquis, but thought they would give it time. He has continued with the spells, seem more frequent now. Asked her about BP, wife states BP is \"always fine.\"     They are okay with Coumadin as an alternative. Amy is checking with PCP to see if she will manage.    Okay to change?

## 2024-07-31 RX ORDER — WARFARIN SODIUM 5 MG/1
5 TABLET ORAL DAILY
Qty: 30 TABLET | Refills: 0 | Status: SHIPPED | OUTPATIENT
Start: 2024-07-31

## 2024-07-31 NOTE — TELEPHONE ENCOUNTER
Mamadou Davis MD   to Vikki Hoff RN       7/30/24  6:25 PM  Switch to Coumadin and refer to Coumadin clinic start 5 mg  Goal inr 2-3    Diann, can you please billie this pt, pt state she talked to the office and they said ok to manage

## 2024-08-05 ENCOUNTER — APPOINTMENT (OUTPATIENT)
Dept: CARDIOLOGY | Facility: CLINIC | Age: 69
End: 2024-08-05

## 2024-08-05 VITALS
HEART RATE: 46 BPM | WEIGHT: 268 LBS | SYSTOLIC BLOOD PRESSURE: 112 MMHG | HEIGHT: 73 IN | OXYGEN SATURATION: 97 % | BODY MASS INDEX: 35.52 KG/M2 | DIASTOLIC BLOOD PRESSURE: 65 MMHG

## 2024-08-05 DIAGNOSIS — R00.1 BRADYCARDIA: ICD-10-CM

## 2024-08-05 DIAGNOSIS — I48.0 PAROXYSMAL ATRIAL FIBRILLATION (MULTI): ICD-10-CM

## 2024-08-05 DIAGNOSIS — I25.5 ISCHEMIC CARDIOMYOPATHY: ICD-10-CM

## 2024-08-05 DIAGNOSIS — I50.20 HFREF (HEART FAILURE WITH REDUCED EJECTION FRACTION) (MULTI): Primary | ICD-10-CM

## 2024-08-05 RX ORDER — ASPIRIN 325 MG
50000 TABLET, DELAYED RELEASE (ENTERIC COATED) ORAL
COMMUNITY

## 2024-08-05 RX ORDER — NITROGLYCERIN 0.4 MG/1
0.4 TABLET SUBLINGUAL EVERY 5 MIN PRN
COMMUNITY

## 2024-08-05 RX ORDER — BUMETANIDE 1 MG/1
0.5 TABLET ORAL DAILY
COMMUNITY

## 2024-08-05 RX ORDER — SACUBITRIL AND VALSARTAN 97; 103 MG/1; MG/1
1 TABLET, FILM COATED ORAL 2 TIMES DAILY
COMMUNITY

## 2024-08-05 RX ORDER — HYDRALAZINE HYDROCHLORIDE 25 MG/1
25 TABLET, FILM COATED ORAL 2 TIMES DAILY
COMMUNITY

## 2024-08-05 RX ORDER — METOPROLOL SUCCINATE 50 MG/1
50 TABLET, EXTENDED RELEASE ORAL DAILY
COMMUNITY

## 2024-08-05 RX ORDER — ASPIRIN 81 MG/1
81 TABLET ORAL DAILY
COMMUNITY

## 2024-08-05 RX ORDER — SPIRONOLACTONE 25 MG/1
25 TABLET ORAL DAILY
COMMUNITY

## 2024-08-05 RX ORDER — BISMUTH SUBSALICYLATE 262 MG
1 TABLET,CHEWABLE ORAL DAILY
COMMUNITY

## 2024-08-05 RX ORDER — ISOSORBIDE MONONITRATE 120 MG/1
120 TABLET, EXTENDED RELEASE ORAL DAILY
Qty: 90 TABLET | Refills: 1 | Status: SHIPPED | OUTPATIENT
Start: 2024-08-05

## 2024-08-05 RX ORDER — ISOSORBIDE MONONITRATE 120 MG/1
120 TABLET, EXTENDED RELEASE ORAL DAILY
COMMUNITY

## 2024-08-05 RX ORDER — WARFARIN SODIUM 5 MG/1
5 TABLET ORAL EVERY EVENING
COMMUNITY

## 2024-08-05 RX ORDER — ESCITALOPRAM OXALATE 10 MG/1
10 TABLET ORAL DAILY
COMMUNITY

## 2024-08-05 RX ORDER — PANTOPRAZOLE SODIUM 40 MG/1
40 TABLET, DELAYED RELEASE ORAL
COMMUNITY

## 2024-08-05 NOTE — PATIENT INSTRUCTIONS
It was a pleasure seeing you today. Please contact myself or my team with any questions.     To reach Dr. Washington' office please call [phone] ([place]).   Fax: [phone]   To schedule an appointment call [phone]     If you have any questions or need cardiac medication refills, please call the Heart Failure office at [phone], option [phone]. You may also contact the  Heart Failure Nursing team via email at [email or web address] (Please include your name and date of birth).         1) Continue your current medications  2) I would recommend a holter monitor to look for any arrhythmias. I will reach out to Dr. Varma. Based on that, they may discuss a pacemaker.

## 2024-08-05 NOTE — PROGRESS NOTES
[facility] Advanced Heart Failure Clinic  Primary Care Physician: No primary care provider on file.  Referring Provider/Cardiologist: self (brother)   General Cardiologist: Kojo ([facility])     Date of Visit: 08/05/2024  4:40 PM EDT  Location of visit: [address]     HPI:   Mr. Meraz is a 68M with a PMHx sig for CAD s/p 4V CABG (2012) and PCI (LCx/OM; 5/2019), stage C systolic HF/ICM/HFrEF with moderate LV dysfunction, AF s/p RFA (PVI and CTI; 4/2024) on OAC therapy, HTN, dyslipidemia, and VIV using CPAP who returns to the Advanced Heart Failure clinic for ongoing evaluation and management of his cardiomyopathy.     Interval hx:  Here with his wife today. Since last seen he reports he was diagnosed with AF and underwent RFA (PVI and CTI) in April 2024. Since then he has noted 3 episodes of dizziness with near syncope. He is currently not driving due to these ongoing symptoms. His last episode was ~1 week ago. His beta blocker dose as decreased in the setting of ongoing bradycardia.     He currently denies chest pain, pressure, SOB/RAMOS, PND, orthopnea, or LE edema.       Hospitalizations: March 2024 Shoulder surgery, Cardioversion, Ablation         PM/SHx:   CAD s/p 4V CABG (2012) and PCI (LCx/OM; 5/2019), stage C systolic HF/ICM/HFrEF with moderate LV dysfunction, AF s/p RFA (PVI and CTI; 4/2024) on OAC therapy, HTN, dyslipidemia, VIV using CPAP    SocHx:   , lives with his wife in [place]  Works as a contractor  Former smoker (15 pack year hx), occasional ETOH, denies illicits    FamHx:   Brother-HTN, CHF  Mother-HTN, CVA  Sister-HTN, DM II  Father-bone and prostate cancer         Current Outpatient Medications   Medication Sig Dispense Refill    aspirin 81 mg EC tablet Take 1 tablet (81 mg) by mouth once daily.      bumetanide (Bumex) 1 mg tablet Take 0.5 tablets (0.5 mg) by mouth once daily.      cholecalciferol (Vitamin D-3) 50,000 unit capsule Take 1 capsule  "(50,000 Units) by mouth 1 (one) time per week.      escitalopram (Lexapro) 10 mg tablet Take 1 tablet (10 mg) by mouth once daily.      hydrALAZINE (Apresoline) 25 mg tablet Take 1 tablet (25 mg) by mouth 2 times a day.      isosorbide mononitrate ER (Imdur) 120 mg 24 hr tablet Take 1 tablet (120 mg) by mouth once daily. Do not crush or chew.      metoprolol succinate XL (Toprol-XL) 50 mg 24 hr tablet Take 1 tablet (50 mg) by mouth once daily. Do not crush or chew.      multivitamin tablet Take 1 tablet by mouth once daily.      nitroglycerin (Nitrostat) 0.4 mg SL tablet Place 1 tablet (0.4 mg) under the tongue every 5 minutes if needed for chest pain.      pantoprazole (ProtoNix) 40 mg EC tablet Take 1 tablet (40 mg) by mouth once daily in the morning. Take before meals. Do not crush, chew, or split.      sacubitriL-valsartan (Entresto)  mg tablet Take 1 tablet by mouth 2 times a day.      spironolactone (Aldactone) 25 mg tablet Take 1 tablet (25 mg) by mouth once daily.      warfarin (Coumadin) 5 mg tablet Take 1 tablet (5 mg) by mouth once daily in the evening. Take as directed per After Visit Summary.       No current facility-administered medications for this visit.       Allergies   Allergen Reactions    Eliquis [Apixaban] Dizziness    Jardiance [Empagliflozin] Dizziness         Visit Vitals  /65 (BP Location: Left arm, Patient Position: Sitting)   Pulse (!) 46   Ht 1.854 m (6' 1\")   Wt 122 kg (268 lb)   SpO2 97%   BMI 35.36 kg/m²   Smoking Status Former   BSA 2.51 m²        Physical Exam:  On exam Mr. Meraz appears his stated age, is alert and oriented x3, and in no acute distress. His sclera are anicteric and his oropharynx has moist mucous membranes. His neck is supple and without thyromegaly. The JVP is ~8 cm of water above the right atrium. His cardiac exam has a bradycardic irregular rhythm with occasional early systoles and distant heart sounds, normal S1, S2. No S3/4. There are no " murmurs. His lungs are clear to auscultation bilaterally and there is no dullness to percussion. His abdomen is soft, nontender with normoactive bowel sounds. There is no HJR or hepatomegaly. The extremities are warm and without edema. The skin is dry. There is no rash present. The distal pulses are 2+ in all four extremities. His mood and affect are appropriate for todays encounter.      Cardiac Labs/Diagnostics:  ECG (8/5/24):  Sinus bradycardia (HR 49), PACs, IVCD    BRIANA (3/29/23):    Left Ventricle: Moderately reduced left ventricular systolic function with a visually estimated EF of 35 - 40%. Left ventricle size is normal. Normal wall thickness. Unable to assess wall motion.     Right Ventricle: Moderately reduced systolic function.     Aortic Valve: Trileaflet valve. Mildly thickened left, right and noncoronary cusps. Moderately calcified right cusp. Mild regurgitation with an anteromedial eccentrically directed jet and may underestimate   severity.    Mitral Valve: Mildly thickened leaflet, at the anterior and posterior leaflets. Moderate regurgitation with a centrally directed jet.     Tricuspid Valve: Moderate regurgitation with an anteromedial eccentrically directed jet and may underestimate severity.     Left Atrium: No left atrial appendage thrombus noted.     Interatrial Septum: Agitated saline study was negative with and without provocation.     ECG (1/31/22):  Sinus bradycardia (HR 54), inferior infarct    Echo (7/20/21):  LVEF 35-40%, mild AI    Echo (2/3/20):  1. The left ventricular systolic function is mildly decreased with a 40-45% estimated ejection fraction.  2. Basal and mid inferior wall is abnormal.  3. Abnormal septal motion consistent with post-operative status.  4. Spectral Doppler shows an impaired relaxation pattern of left ventricular diastolic filling.  5. Moderately enlarged right ventricle.  6. Aortic valve sclerosis.  7. There is mild aortic valve regurgitation.  8. There is global  hypokinesis of the left ventricle with minor regional variations.    Echo (7/15/19):  LVEF 35-40%, LVIDD 6  No MR    ECG (5/2/19):  Sinus rhythm (HR 67), septal infarct,     Cardiac cath (5/21/19):  LAD: 99% mid stenosis  RI: 100% occluded  LCx: 75% mid stenosis  RCA: 100% occluded  LIMA to LAD: patent  SVG to OM: occluded  SVG to RI: patent  SVG to RCA: patent    Echo (4/9/19):  LVEF 25%  LVIDD 7.5  Mild MR      Impression/Plan:  Mr. Meraz is a 68M with a PMHx sig for CAD s/p 4V CABG (2012) and PCI (LCx/OM; 5/2019), stage C systolic HF/ICM/HFrEF with moderate LV dysfunction, AF s/p RFA (PVI and CTI; 4/2024) on OAC therapy, HTN, dyslipidemia, and VIV using CPAP who returns to the Advanced Heart Failure clinic for ongoing evaluation and management of his cardiomyopathy. At the current time he has functional class II symptoms and appears euvolemic on exam.     1) Stage C chronic systolic HF/ICM/HFrEF with moderate LV dysfunction (LVEF 35-40%; 3/2023) currently without an ICD   Stable HFrEF. No hospitalizations. Allergy to SGLT2.   -c/w metoprolol succinate 25 mg daily, entresto 97/103 mg bid, hydralazine 25 mg bid, isosorbide mononitrate 120 mg daily, spironolactone 25 mg daily, bumex 0.5 mg daily    2) Dizziness/bradycardia  3 episodes of near syncope after his recent AF RFA; has stopped driving as a result. ECG with sinus olu. Metoprolol previously decreased by his local EP.    -recommend a holter monitor locally; called and discussed with Dr. Varma        F/U: prn at /[facility]       ____________________________________________________________  Steven Washington DO  Section of Advanced Heart Failure and Cardiac Transplantation  Division of Cardiovascular Medicine  [facility]  [facility]

## 2024-08-06 ENCOUNTER — TELEPHONE (OUTPATIENT)
Dept: CARDIOLOGY CLINIC | Age: 69
End: 2024-08-06

## 2024-08-06 DIAGNOSIS — R00.1 BRADYCARDIA: Primary | ICD-10-CM

## 2024-08-06 NOTE — TELEPHONE ENCOUNTER
Patient having bradycardia issues with HR running in the 48-49.  Patient has been having some issues with weakness and dizziness.     Verbal Order from Dr. Davis- Hold Metoprolol for now and place 14 day event monitor.   Amy-wife, on HIPAA notified.   Medlist updated.     Order given to scheduling.     Please agree.

## 2024-08-12 ENCOUNTER — TELEPHONE (OUTPATIENT)
Age: 69
End: 2024-08-12

## 2024-08-12 NOTE — TELEPHONE ENCOUNTER
Called and spoke to patient's wife. Patient started Coumadin 5 mg daily on 7/31. Patient stopped Eliquis. Informed her that patient needs to be scheduled ASAP for INR check. Patient's wife is agreeable to come on 8/14 when they are coming to the hospital already.     Valerie Rodriguez, PharmD, BCPS  8/12/2024  12:08 PM

## 2024-08-12 NOTE — TELEPHONE ENCOUNTER
Referred to [facility] Medication Management Anticoagulation Clinic ([facility]) for Coumadin management by Dr. Davis for afib, goal INR 2.0-3.0, therapy duration indefinite, initial referral 08/12/24.    Patient is transitioning from Eliquis due to side effects of dizzy spells.     Called and spoke to patient's wife.  Patient started Coumadin 5 mg daily on 7/31.  Patient stopped Eliquis.  Informed her that patient needs to be scheduled ASAP for INR check.  Patient's wife is agreeable to come on 8/14 when they are coming to the hospital already.     Valerie Rodriguez, PharmD, BCPS  8/12/2024  12:05 PM

## 2024-08-14 ENCOUNTER — ANTI-COAG VISIT (OUTPATIENT)
Age: 69
End: 2024-08-14
Payer: MEDICARE

## 2024-08-14 DIAGNOSIS — Z51.81 ENCOUNTER FOR THERAPEUTIC DRUG MONITORING: ICD-10-CM

## 2024-08-14 DIAGNOSIS — I48.91 ATRIAL FIBRILLATION, UNSPECIFIED TYPE (HCC): Primary | ICD-10-CM

## 2024-08-14 DIAGNOSIS — Z79.01 LONG TERM (CURRENT) USE OF ANTICOAGULANTS: ICD-10-CM

## 2024-08-14 LAB — POC INR: 1.4 (ref 0.8–1.2)

## 2024-08-14 PROCEDURE — 85610 PROTHROMBIN TIME: CPT | Performed by: PHARMACIST

## 2024-08-14 PROCEDURE — 99203 OFFICE O/P NEW LOW 30 MIN: CPT | Performed by: PHARMACIST

## 2024-08-14 NOTE — PROGRESS NOTES
Medication Management Clinic  [facility]  AnticoagulationClinic  [phone] (phone)  [phone] (fax)     Patient presents to the Anticoagulation clinic today for assessment and initial dosing of warfarin.  Patient has a diagnosis of Afib and has been placed on Coumadin with a goal INR 2.0-3.0.  Pt started Coumadin 7/31/24 - Coumadin clinic received consult on 8/12/24.     Kush Perea was given full education today in the clinic including written materials, supplemental oral instructions, and all questions were answered.  Specifically, Kush was instructed to notfiy the Anticoagulation clinic immediately if there is any unusual bleeding, such as throwing or coughing up blood, bleeding from the nose, mouth, ears, or pink-red tinge in the urine or if the stools contain bright red blood or are black and tarry.       In addition, Kush was informed to notify the clinic about any and all medicine changes, including prescriptions, “over-the-counter” or nonprescription medicines, vitamins, herbs, supplements, creams, rubs, eye or ear drops, and injections, whether to be used short- or long-term, within 24 hours.  The patient was also instructed to let all other physicians and pharmacists know that warfarin was started as a double-check against drug interactions.  The patient was further instructed on the effects of vitamin K containing foods on warfarin and the importance of consistency was stressed.  Kush was also advised to avoid large amounts of alcohol, grapefruit juice or cranberry juice while on warfarin.      HPI:    Medication changes: none  Tablet strength per patient: 5 mg  Patient reported dosing regimen over last 1 week: none  Missed doses in the last 1-2 weeks: none  Extra doses in the last 1-2 weeks: none  Any problems with bleeding/bruising? Yes, bleeding with cuts  Any recent falls? No   Any signs or symptoms of DVT/PE or stroke? No  Alcohol use: occasional  Tobacco use:

## 2024-08-20 ENCOUNTER — ANTI-COAG VISIT (OUTPATIENT)
Age: 69
End: 2024-08-20
Payer: MEDICARE

## 2024-08-20 DIAGNOSIS — Z79.01 LONG TERM (CURRENT) USE OF ANTICOAGULANTS: ICD-10-CM

## 2024-08-20 DIAGNOSIS — I48.91 ATRIAL FIBRILLATION, UNSPECIFIED TYPE (HCC): Primary | ICD-10-CM

## 2024-08-20 DIAGNOSIS — Z51.81 ENCOUNTER FOR THERAPEUTIC DRUG MONITORING: ICD-10-CM

## 2024-08-20 LAB — POC INR: 1.3 (ref 0.8–1.2)

## 2024-08-20 PROCEDURE — 85610 PROTHROMBIN TIME: CPT

## 2024-08-20 PROCEDURE — 99212 OFFICE O/P EST SF 10 MIN: CPT

## 2024-08-20 NOTE — PROGRESS NOTES
Medication Management Clinic  [facility]  Anticoagulation Clinic  [phone] (phone)  [phone] (fax)    Mr. Kush Perea is a 68 y.o.  male with history of Afib who presents today for anticoagulation monitoring and adjustment.    Wife fills pill box and uses calendar to do so.   No missed or extra doses.  Patient denies s/s bleeding/bruising/swelling/SOB  No blood in urine or stool.  No dietary changes.  No changes in medication/OTC agents/Herbals.  No change in alcohol use or tobacco use.  No change in activity level.  Patient denies falls.  No vomiting/diarrhea or acute illness.   No Procedures scheduled in the future at this time.    Assessment:   Lab Results   Component Value Date    INR 1.30 (H) 08/20/2024    INR 1.40 (H) 08/14/2024    INR 1.24 (H) 04/15/2024     INR subtherapeutic   Recent Labs     08/20/24  1055   INR 1.30*      Plan:  Coumadin 10 mg x1 then Coumadin 7.5 mg x2 days.  Recheck INR in 3 days.   Patient reminded to call the Anticoagulation Clinic with signs or symptoms of bleeding or with any medication changes. Patient given instructions utilizing the teach back method.   Plan discussed and reviewed with Alicia Fragoso PharmD.    After visit summary printed and reviewed with patient.      Discharged ambulatory in no apparent distress.  Geraldine Alonzo PharmD 8/20/2024 11:45 AM     For Pharmacy Admin Tracking Only    Intervention Detail: Dose Adjustment: 1, reason: Therapy Optimization  Total # of Interventions Recommended: 1  Total # of Interventions Accepted: 1  Time Spent (min): 20

## 2024-08-23 ENCOUNTER — ANTI-COAG VISIT (OUTPATIENT)
Age: 69
End: 2024-08-23
Payer: MEDICARE

## 2024-08-23 ENCOUNTER — HOSPITAL ENCOUNTER (OUTPATIENT)
Age: 69
End: 2024-08-23
Attending: INTERNAL MEDICINE
Payer: MEDICARE

## 2024-08-23 DIAGNOSIS — Z51.81 ENCOUNTER FOR THERAPEUTIC DRUG MONITORING: ICD-10-CM

## 2024-08-23 DIAGNOSIS — Z79.01 LONG TERM (CURRENT) USE OF ANTICOAGULANTS: ICD-10-CM

## 2024-08-23 DIAGNOSIS — I48.91 ATRIAL FIBRILLATION, UNSPECIFIED TYPE (HCC): Primary | ICD-10-CM

## 2024-08-23 DIAGNOSIS — R00.1 BRADYCARDIA: ICD-10-CM

## 2024-08-23 LAB — POC INR: 1.4 (ref 0.8–1.2)

## 2024-08-23 PROCEDURE — 85610 PROTHROMBIN TIME: CPT

## 2024-08-23 PROCEDURE — 99212 OFFICE O/P EST SF 10 MIN: CPT

## 2024-08-23 PROCEDURE — 93270 REMOTE 30 DAY ECG REV/REPORT: CPT

## 2024-08-23 NOTE — PROGRESS NOTES
Medication Management Clinic  [facility]  Anticoagulation Clinic  [phone] (phone)  [phone] (fax)    Mr. Kush Perea is a 68 y.o.  male with history of Afib who presents today for anticoagulation monitoring and adjustment.    Patient verifies current dosing regimen and tablet strength.  No missed or extra doses.  Patient denies s/s bleeding/bruising/swelling/SOB  No blood in urine or stool.  No dietary changes. Has a salad a week currently, eats a lot of corn and tomatoes due to season.   No changes in medication/OTC agents/Herbals.  No change in alcohol use or tobacco use. Averages around 2 beers a week.   No change in activity level. 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   Patient denies falls.  Had a cold beginning of week.  No Procedures scheduled in the future at this time.    Assessment:   Lab Results   Component Value Date    INR 1.40 (H) 08/23/2024    INR 1.30 (H) 08/20/2024    INR 1.40 (H) 08/14/2024     INR subtherapeutic   Recent Labs     08/23/24  1008   INR 1.40*      Plan:  Coumadin 10 mg daily x3 days.  Recheck INR in 3 days.  Patient reminded to call the Anticoagulation Clinic with any signs or symptoms of bleeding or with any medication changes.  Patient given instructions utilizing the teach back method.   Plan discussed and reviewed with Alicia Fragoso PharmD.    After visit summary printed and reviewed with patient.      Discharged ambulatory in no apparent distress.  Geraldine Alonzo PharmD 8/23/2024 10:43 AM     For Pharmacy Admin Tracking Only    Intervention Detail: Dose Adjustment: 1, reason: Therapy Optimization  Total # of Interventions Recommended: 1  Total # of Interventions Accepted: 1  Time Spent (min): 20

## 2024-08-26 ENCOUNTER — ANTI-COAG VISIT (OUTPATIENT)
Age: 69
End: 2024-08-26
Payer: MEDICARE

## 2024-08-26 DIAGNOSIS — Z51.81 ENCOUNTER FOR THERAPEUTIC DRUG MONITORING: ICD-10-CM

## 2024-08-26 DIAGNOSIS — I48.91 ATRIAL FIBRILLATION, UNSPECIFIED TYPE (HCC): Primary | ICD-10-CM

## 2024-08-26 DIAGNOSIS — Z79.01 LONG TERM (CURRENT) USE OF ANTICOAGULANTS: ICD-10-CM

## 2024-08-26 LAB — POC INR: 1.4 (ref 0.8–1.2)

## 2024-08-26 PROCEDURE — 85610 PROTHROMBIN TIME: CPT | Performed by: PHARMACIST

## 2024-08-26 PROCEDURE — 99213 OFFICE O/P EST LOW 20 MIN: CPT | Performed by: PHARMACIST

## 2024-08-26 RX ORDER — WARFARIN SODIUM 5 MG/1
TABLET ORAL
Qty: 90 TABLET | Refills: 0 | Status: SHIPPED | OUTPATIENT
Start: 2024-08-26

## 2024-08-26 NOTE — PROGRESS NOTES
Medication Management Clinic  [facility]  Anticoagulation Clinic  [phone] (phone)  [phone] (fax)    Mr. Kush Perea is a 68 y.o.  male with history of Afib who presents today for anticoagulation monitoring and adjustment.    Patient verifies current dosing regimen and tablet strength.  No missed or extra doses.  Patient denies s/s bleeding/bruising/swelling/SOB  No blood in urine or stool.  No dietary changes.  No changes in medication/OTC agents/Herbals.  Takes Centrum Silver (which does contain vitamin K), but reports he has been taking this for years  No change in alcohol use or tobacco use.  No change in activity level.  Patient denies falls.  No vomiting/diarrhea or acute illness.   No Procedures scheduled in the future at this time.  Patient states that he needs more refills sent in to his pharmacy.  Patient states that since starting warfarin, he has had increased joint and back pain.    Assessment:   Lab Results   Component Value Date    INR 1.40 (H) 2024    INR 1.40 (H) 2024    INR 1.30 (H) 2024     INR subtherapeutic   Recent Labs     24  0821   INR 1.40*     Patient interview completed and discussed with pharmacist by Kunal CevallosD Candidate .    Plan:  Coumadin 15 mg today and tomorrow, then take Coumadin 12.5 mg on Wednesday.  Recheck INR in 3 day(s).  Patient reminded to call the Anticoagulation Clinic with any signs or symptoms of bleeding or with any medication changes.  Patient given instructions utilizing the teach back method.    After visit summary printed and reviewed with patient.      Discharged ambulatory in no apparent distress.    For Pharmacy Admin Tracking Only    Intervention Detail: Adherence Monitorin, Dose Adjustment: 1, reason: Therapy Optimization, and Refill(s) Provided  Total # of Interventions Recommended: 3  Total # of Interventions Accepted: 3  Time Spent (min): 20    Betsy You, KunalD,  [facility]  8/26/2024  8:53 AM

## 2024-08-29 ENCOUNTER — APPOINTMENT (OUTPATIENT)
Age: 69
End: 2024-08-29
Payer: MEDICARE

## 2024-08-30 ENCOUNTER — ANTI-COAG VISIT (OUTPATIENT)
Age: 69
End: 2024-08-30
Payer: MEDICARE

## 2024-08-30 DIAGNOSIS — Z79.01 LONG TERM (CURRENT) USE OF ANTICOAGULANTS: ICD-10-CM

## 2024-08-30 DIAGNOSIS — Z51.81 ENCOUNTER FOR THERAPEUTIC DRUG MONITORING: ICD-10-CM

## 2024-08-30 DIAGNOSIS — I48.91 ATRIAL FIBRILLATION, UNSPECIFIED TYPE (HCC): Primary | ICD-10-CM

## 2024-08-30 LAB — POC INR: 1.9 (ref 0.8–1.2)

## 2024-08-30 PROCEDURE — 85610 PROTHROMBIN TIME: CPT | Performed by: PHARMACIST

## 2024-08-30 PROCEDURE — 99211 OFF/OP EST MAY X REQ PHY/QHP: CPT | Performed by: PHARMACIST

## 2024-08-30 NOTE — PROGRESS NOTES
Medication Management Clinic  [facility]  Anticoagulation Clinic  [phone] (phone)  [phone] (fax)    Mr. Kush Perea is a 68 y.o.  male with history of Afib who presents today for anticoagulation monitoring and adjustment.    Patient verifies current dosing regimen and tablet strength.  No missed or extra doses. Missed 8/29  Patient denies s/s bleeding/bruising/swelling/SOB   No blood in urine or stool.  No dietary changes. 0-1 salad/wk baseline; had salad yesterday    No changes in medication/OTC agents/Herbals.  No change in alcohol use or tobacco use.  No change in activity level.  Patient denies falls.  No vomiting/diarrhea or acute illness.   No Procedures scheduled in the future at this time.    Assessment:   Lab Results   Component Value Date    INR 1.90 (H) 08/30/2024    INR 1.40 (H) 08/26/2024    INR 1.40 (H) 08/23/2024     INR subtherapeutic   Recent Labs     08/30/24  1013   INR 1.90*       Plan:  Coumadin 15mg today then Coumadin 12.5mg daily.  Recheck INR in 1 week(s).  Patient reminded to call the Anticoagulation Clinic with any signs or symptoms of bleeding or with any medication changes.  Patient given instructions utilizing the teach back method.        After visit summary printed and reviewed with patient.      Discharged ambulatory in no apparent distress.    Donnie Núñez [facility], PharmD, BCPS  Clinical Pharmacy Specialist  8/30/2024 10:40 AM    For Pharmacy Admin Tracking Only    Intervention Detail: Dose Adjustment: 1, reason: Therapy Optimization  Total # of Interventions Recommended: 1  Total # of Interventions Accepted: 1  Time Spent (min): 20

## 2024-09-04 RX ORDER — HYDRALAZINE HYDROCHLORIDE 25 MG/1
25 TABLET, FILM COATED ORAL 2 TIMES DAILY
Qty: 180 TABLET | Refills: 3 | Status: SHIPPED | OUTPATIENT
Start: 2024-09-04

## 2024-09-05 ENCOUNTER — ANTI-COAG VISIT (OUTPATIENT)
Age: 69
End: 2024-09-05
Payer: MEDICARE

## 2024-09-05 DIAGNOSIS — Z51.81 ENCOUNTER FOR THERAPEUTIC DRUG MONITORING: ICD-10-CM

## 2024-09-05 DIAGNOSIS — I48.91 ATRIAL FIBRILLATION, UNSPECIFIED TYPE (HCC): Primary | ICD-10-CM

## 2024-09-05 DIAGNOSIS — Z79.01 LONG TERM (CURRENT) USE OF ANTICOAGULANTS: ICD-10-CM

## 2024-09-05 LAB — POC INR: 2.2 (ref 0.8–1.2)

## 2024-09-05 PROCEDURE — 99211 OFF/OP EST MAY X REQ PHY/QHP: CPT

## 2024-09-05 PROCEDURE — 85610 PROTHROMBIN TIME: CPT

## 2024-09-05 NOTE — PROGRESS NOTES
Medication Management Clinic  [facility]  Anticoagulation Clinic  [phone] (phone)  [phone] (fax)    Mr. Kush Perea is a 68 y.o.  male with history of Afib who presents today for anticoagulation monitoring and adjustment.    Patient verifies current dosing regimen and tablet strength.  No missed or extra doses.  Patient denies s/s bleeding/bruising/swelling/SOB  No blood in urine or stool.  No dietary changes.  No changes in medication/OTC agents/Herbals.  No change in alcohol use or tobacco use.  No change in activity level.  Patient denies falls.  No vomiting/diarrhea or acute illness.   No Procedures scheduled in the future at this time.    Assessment:   Lab Results   Component Value Date    INR 2.20 (H) 09/05/2024    INR 1.90 (H) 08/30/2024    INR 1.40 (H) 08/26/2024     INR therapeutic   Recent Labs     09/05/24  0808   INR 2.20*     Plan:  Continue Coumadin 12.5 mg daily.  Recheck INR in 1 week(s).  Patient reminded to call the Anticoagulation Clinic with any signs or symptoms of bleeding or with any medication changes.  Patient given instructions utilizing the teach back method.    After visit summary printed and reviewed with patient.      Discharged ambulatory in no apparent distress.    For Pharmacy Admin Tracking Only    Time Spent (min): 20

## 2024-09-12 ENCOUNTER — ANTI-COAG VISIT (OUTPATIENT)
Age: 69
End: 2024-09-12
Payer: MEDICARE

## 2024-09-12 DIAGNOSIS — Z51.81 ENCOUNTER FOR THERAPEUTIC DRUG MONITORING: ICD-10-CM

## 2024-09-12 DIAGNOSIS — I48.91 ATRIAL FIBRILLATION, UNSPECIFIED TYPE (HCC): Primary | ICD-10-CM

## 2024-09-12 DIAGNOSIS — Z79.01 LONG TERM (CURRENT) USE OF ANTICOAGULANTS: ICD-10-CM

## 2024-09-12 LAB — POC INR: 3.3 (ref 0.8–1.2)

## 2024-09-12 PROCEDURE — 85610 PROTHROMBIN TIME: CPT

## 2024-09-12 PROCEDURE — 99212 OFFICE O/P EST SF 10 MIN: CPT

## 2024-09-19 ENCOUNTER — ANTI-COAG VISIT (OUTPATIENT)
Age: 69
End: 2024-09-19
Payer: MEDICARE

## 2024-09-19 DIAGNOSIS — Z51.81 ENCOUNTER FOR THERAPEUTIC DRUG MONITORING: ICD-10-CM

## 2024-09-19 DIAGNOSIS — Z79.01 LONG TERM (CURRENT) USE OF ANTICOAGULANTS: ICD-10-CM

## 2024-09-19 DIAGNOSIS — I48.91 ATRIAL FIBRILLATION, UNSPECIFIED TYPE (HCC): Primary | ICD-10-CM

## 2024-09-19 LAB — POC INR: 2.4 (ref 0.8–1.2)

## 2024-09-19 PROCEDURE — 99211 OFF/OP EST MAY X REQ PHY/QHP: CPT

## 2024-09-19 PROCEDURE — 85610 PROTHROMBIN TIME: CPT

## 2024-09-19 RX ORDER — WARFARIN SODIUM 5 MG/1
TABLET ORAL
Qty: 90 TABLET | Refills: 1 | Status: SHIPPED | OUTPATIENT
Start: 2024-09-19

## 2024-10-02 ENCOUNTER — TELEPHONE (OUTPATIENT)
Dept: CARDIOLOGY CLINIC | Age: 69
End: 2024-10-02

## 2024-10-02 DIAGNOSIS — R00.0 TACHYARRHYTHMIA: Primary | ICD-10-CM

## 2024-10-02 NOTE — TELEPHONE ENCOUNTER
Results of event monitor:  0.1 % Atrial fibrillation/flutter  6 % Ventricular ectopy - recurrent episodes of NSVT with maximum lasting up to 41 seconds  7 % Supraventricular ectopy  No high grade AVB or pauses - most severe AVB was 1st degree  No symptoms recorded during the monitoring period    Send to Danisha once she returns.

## 2024-10-03 ENCOUNTER — TELEPHONE (OUTPATIENT)
Dept: CARDIOLOGY CLINIC | Age: 69
End: 2024-10-03

## 2024-10-03 ENCOUNTER — ANTI-COAG VISIT (OUTPATIENT)
Age: 69
End: 2024-10-03
Payer: MEDICARE

## 2024-10-03 DIAGNOSIS — I48.91 ATRIAL FIBRILLATION, UNSPECIFIED TYPE (HCC): Primary | ICD-10-CM

## 2024-10-03 DIAGNOSIS — R53.83 FATIGUE, UNSPECIFIED TYPE: Primary | ICD-10-CM

## 2024-10-03 DIAGNOSIS — Z79.01 LONG TERM (CURRENT) USE OF ANTICOAGULANTS: ICD-10-CM

## 2024-10-03 DIAGNOSIS — Z51.81 ENCOUNTER FOR THERAPEUTIC DRUG MONITORING: ICD-10-CM

## 2024-10-03 LAB — POC INR: 1.9 (ref 0.8–1.2)

## 2024-10-03 PROCEDURE — 99211 OFF/OP EST MAY X REQ PHY/QHP: CPT

## 2024-10-03 PROCEDURE — 85610 PROTHROMBIN TIME: CPT
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
## 2024-10-03 NOTE — TELEPHONE ENCOUNTER
Wife called up concerned with patient taking coumadin  Reports he is not tolerating at all- tired, ache, fatigue  Reports he was on eliquis before- made him feel weird-dizzy  120/75 hr 65-75    Advise?

## 2024-10-05 LAB
A/G RATIO: 2.8 RATIO (ref 1–2.5)
ALBUMIN: 4.4 G/DL (ref 3.5–5.2)
ALK PHOSPHATASE: 62 U/L (ref 39–118)
ALT SERPL-CCNC: 10 U/L (ref 5–41)
AST SERPL-CCNC: 14 U/L (ref 9–50)
BASOPHILS ABSOLUTE: 0.09 K/UL (ref 0–0.2)
BASOPHILS RELATIVE PERCENT: 1 % (ref 0–2)
BILIRUB SERPL-MCNC: 0.9 MG/DL
BUN BLDV-MCNC: 22 MG/DL (ref 8–23)
CALCIUM SERPL-MCNC: 9.6 MG/DL (ref 8.6–10.5)
CHLORIDE BLD-SCNC: 103 MMOL/L (ref 96–107)
CO2: 24 MMOL/L (ref 18–32)
CREAT SERPL-MCNC: 1.5 MG/DL (ref 0.67–1.3)
EGFR IF NONAFRICAN AMERICAN: 50 ML/MIN/1.73M2
EOSINOPHILS ABSOLUTE: 0.12 K/UL (ref 0–0.8)
EOSINOPHILS RELATIVE PERCENT: 1.4 % (ref 0–5)
GLOBULIN: 1.6 G/DL (ref 1.8–3.8)
GLUCOSE: 125 MG/DL (ref 70–100)
HCT VFR BLD CALC: 49.4 % (ref 39–52)
HEMOGLOBIN: 15.8 G/DL (ref 13–18)
IMMATURE GRANS (ABS): 0.07 K/UL (ref 0–0.06)
IMMATURE GRANULOCYTES %: 0.8 % (ref 0–2)
LYMPHOCYTES ABSOLUTE: 0.95 K/UL (ref 0.9–5.2)
LYMPHOCYTES RELATIVE PERCENT: 10.8 % (ref 20–45)
MCH RBC QN AUTO: 27.3 PG (ref 26–32)
MCHC RBC AUTO-ENTMCNC: 32 G/DL (ref 32–35)
MCV RBC AUTO: 86 FL (ref 75–100)
MONOCYTES ABSOLUTE: 0.46 K/UL (ref 0.1–1)
MONOCYTES RELATIVE PERCENT: 5.2 % (ref 0–13)
NEUTROPHILS ABSOLUTE: 7.09 K/UL (ref 1.9–8)
NEUTROPHILS RELATIVE PERCENT: 80.8 % (ref 45–75)
PDW BLD-RTO: 17.3 % (ref 11.2–14.8)
PLATELET # BLD: 478 THOUS/CMM (ref 140–440)
POTASSIUM SERPL-SCNC: 4.7 MMOL/L (ref 3.5–5.4)
RBC # BLD: 5.78 MILL/CMM (ref 4.4–6.1)
SODIUM BLD-SCNC: 141 MMOL/L (ref 135–148)
TOTAL PROTEIN: 6 G/DL (ref 6–8.3)
WBC # BLD: 8.8 THDS/CMM (ref 3.6–11)

## 2024-10-08 RX ORDER — METOPROLOL SUCCINATE 25 MG/1
12.5 TABLET, EXTENDED RELEASE ORAL DAILY
Qty: 30 TABLET | Refills: 3 | Status: SHIPPED | OUTPATIENT
Start: 2024-10-08

## 2024-10-08 NOTE — TELEPHONE ENCOUNTER
Restart Toprol XL at 12.5 mg daily and continue to monitor heart rate and sx. Currently not on any rate control meds. I am seeing him in follow-up on 10/14. Please let him know.   Thank you.   Danisha Burnette, APRN - CNP

## 2024-10-14 ENCOUNTER — OFFICE VISIT (OUTPATIENT)
Dept: CARDIOLOGY CLINIC | Age: 69
End: 2024-10-14

## 2024-10-14 VITALS
WEIGHT: 238.4 LBS | SYSTOLIC BLOOD PRESSURE: 110 MMHG | DIASTOLIC BLOOD PRESSURE: 60 MMHG | HEART RATE: 76 BPM | BODY MASS INDEX: 32.29 KG/M2 | HEIGHT: 72 IN

## 2024-10-14 DIAGNOSIS — R06.02 SHORTNESS OF BREATH: ICD-10-CM

## 2024-10-14 DIAGNOSIS — R10.10 PAIN OF UPPER ABDOMEN: ICD-10-CM

## 2024-10-14 DIAGNOSIS — I48.0 PAROXYSMAL ATRIAL FIBRILLATION (HCC): ICD-10-CM

## 2024-10-14 DIAGNOSIS — R00.0 TACHYARRHYTHMIA: ICD-10-CM

## 2024-10-14 DIAGNOSIS — I25.5 ISCHEMIC CARDIOMYOPATHY: ICD-10-CM

## 2024-10-14 DIAGNOSIS — R06.09 DOE (DYSPNEA ON EXERTION): ICD-10-CM

## 2024-10-14 DIAGNOSIS — R19.5 ABNORMAL STOOL CALIBER: ICD-10-CM

## 2024-10-14 DIAGNOSIS — R63.5 WEIGHT GAIN: ICD-10-CM

## 2024-10-14 DIAGNOSIS — R68.89 DECREASED EXERCISE TOLERANCE: ICD-10-CM

## 2024-10-14 DIAGNOSIS — R14.0 ABDOMINAL BLOATING: ICD-10-CM

## 2024-10-14 DIAGNOSIS — R53.83 FATIGUE, UNSPECIFIED TYPE: ICD-10-CM

## 2024-10-14 DIAGNOSIS — I50.20 HFREF (HEART FAILURE WITH REDUCED EJECTION FRACTION) (HCC): Primary | ICD-10-CM

## 2024-10-14 RX ORDER — METOPROLOL SUCCINATE 25 MG/1
12.5 TABLET, EXTENDED RELEASE ORAL DAILY
Qty: 30 TABLET | Refills: 3 | Status: ON HOLD
Start: 2024-10-14

## 2024-10-14 RX ORDER — BUMETANIDE 1 MG/1
0.5 TABLET ORAL DAILY
Qty: 90 TABLET | Refills: 1 | Status: ON HOLD | OUTPATIENT
Start: 2024-10-14

## 2024-10-14 NOTE — PATIENT INSTRUCTIONS
Restart the Toprol XL at 12.5 mg daily to control the heart rate and rhythm a little better.     Take the Bumex as just 0.5 mg daily.     Try to drink at least 32 ounces of water a day.     Have the CT study done to check your abdomen.     See Dr. Davis to discuss the WATCHMAN as scheduled.     Continue other current medications as prescribed.    Stay as active as you can - rest as you need with the breathing.     Eat heart healthy diet.     Follow-up with your PCP as scheduled.    Follow-up with Dr. Davis as scheduled or sooner if need.     Follow-up with Danisha CALLOWAY on 1/8/2025 as scheduled.

## 2024-10-14 NOTE — PROGRESS NOTES
Rounded; normal tone bowel sounds; active. Semi-Soft with mild firmness over mid abdomen. Mild distension. There is no tenderness.   Musculoskeletal: Normal range of motion. No edema.   Neurological: Alert and oriented to person, place, and time. No cranial nerve deficit. Coordination normal.   Skin: Skin is warm and dry.   Psychiatric: Normal mood and affect.       No results found for: \"CKTOTAL\", \"CKMB\", \"CKMBINDEX\"    Lab Results   Component Value Date/Time    WBC 8.8 10/04/2024 10:15 AM    RBC 5.78 10/04/2024 10:15 AM    HGB 15.8 10/04/2024 10:15 AM    HCT 49.4 10/04/2024 10:15 AM    MCV 86 10/04/2024 10:15 AM    MCH 27.3 10/04/2024 10:15 AM    MCHC 32.0 10/04/2024 10:15 AM    RDW 17.3 10/04/2024 10:15 AM     10/04/2024 10:15 AM    MPV 11.0 2024 10:42 AM       Lab Results   Component Value Date/Time     10/04/2024 10:15 AM    K 4.7 10/04/2024 10:15 AM    K 3.9 2022 03:55 PM     10/04/2024 10:15 AM    CO2 24 10/04/2024 10:15 AM    BUN 22 10/04/2024 10:15 AM    CREATININE 1.50 10/04/2024 10:15 AM    CALCIUM 9.6 10/04/2024 10:15 AM    LABGLOM 60 2024 10:42 AM    LABGLOM 82 04/15/2024 10:30 AM    GLUCOSE 125 10/04/2024 10:15 AM       Lab Results   Component Value Date/Time    ALKPHOS 62 10/04/2024 10:15 AM    ALKPHOS 63 2024 10:42 AM    ALT 10 10/04/2024 10:15 AM    AST 14 10/04/2024 10:15 AM    BILITOT 0.9 10/04/2024 10:15 AM       Lab Results   Component Value Date/Time    MG 2.1 2024 01:58 PM       Lab Results   Component Value Date    INR 1.90 (H) 10/03/2024    INR 2.40 (H) 2024    INR 3.30 (H) 2024         No results found for: \"LABA1C\"    Lab Results   Component Value Date/Time    TRIG 136 2024 10:40 AM    HDL 23 2024 10:40 AM       Lab Results   Component Value Date/Time    TSH 3.690 2022 03:55 PM         Testing Reviewed:      I have individually reviewed the cardiac test below:  EK2024      EK2024    Event

## 2024-10-15 PROBLEM — R06.09 DOE (DYSPNEA ON EXERTION): Status: ACTIVE | Noted: 2024-10-14

## 2024-10-15 PROBLEM — R14.0 ABDOMINAL BLOATING: Status: ACTIVE | Noted: 2024-10-14

## 2024-10-15 PROBLEM — R63.5 WEIGHT GAIN: Status: ACTIVE | Noted: 2024-10-14

## 2024-10-15 PROBLEM — I50.20 HFREF (HEART FAILURE WITH REDUCED EJECTION FRACTION) (HCC): Status: ACTIVE | Noted: 2024-10-14

## 2024-10-17 ENCOUNTER — PREP FOR PROCEDURE (OUTPATIENT)
Dept: CARDIOLOGY | Age: 69
End: 2024-10-17

## 2024-10-17 ENCOUNTER — HOSPITAL ENCOUNTER (OUTPATIENT)
Dept: CT IMAGING | Age: 69
Discharge: HOME | End: 2024-10-17
Attending: NURSE PRACTITIONER
Payer: MEDICARE

## 2024-10-17 ENCOUNTER — ANTI-COAG VISIT (OUTPATIENT)
Age: 69
End: 2024-10-17
Payer: MEDICARE

## 2024-10-17 DIAGNOSIS — R19.5 ABNORMAL STOOL CALIBER: ICD-10-CM

## 2024-10-17 DIAGNOSIS — Z51.81 ENCOUNTER FOR THERAPEUTIC DRUG MONITORING: ICD-10-CM

## 2024-10-17 DIAGNOSIS — I48.91 ATRIAL FIBRILLATION, UNSPECIFIED TYPE (HCC): Primary | ICD-10-CM

## 2024-10-17 DIAGNOSIS — R14.0 ABDOMINAL BLOATING: ICD-10-CM

## 2024-10-17 DIAGNOSIS — R10.10 PAIN OF UPPER ABDOMEN: ICD-10-CM

## 2024-10-17 DIAGNOSIS — Z79.01 LONG TERM (CURRENT) USE OF ANTICOAGULANTS: ICD-10-CM

## 2024-10-17 LAB — POC INR: 2.4 (ref 0.8–1.2)

## 2024-10-17 PROCEDURE — 74176 CT ABD & PELVIS W/O CONTRAST: CPT

## 2024-10-17 PROCEDURE — 85610 PROTHROMBIN TIME: CPT

## 2024-10-17 PROCEDURE — 99211 OFF/OP EST MAY X REQ PHY/QHP: CPT

## 2024-10-17 RX ORDER — ASPIRIN 325 MG
325 TABLET ORAL ONCE
Status: CANCELLED | OUTPATIENT
Start: 2024-10-17 | End: 2024-10-17

## 2024-10-17 RX ORDER — SODIUM CHLORIDE 0.9 % (FLUSH) 0.9 %
5-40 SYRINGE (ML) INJECTION EVERY 12 HOURS SCHEDULED
Status: CANCELLED | OUTPATIENT
Start: 2024-10-17

## 2024-10-17 RX ORDER — SODIUM CHLORIDE 0.9 % (FLUSH) 0.9 %
5-40 SYRINGE (ML) INJECTION PRN
Status: CANCELLED | OUTPATIENT
Start: 2024-10-17

## 2024-10-17 RX ORDER — SODIUM CHLORIDE 9 MG/ML
INJECTION, SOLUTION INTRAVENOUS PRN
Status: CANCELLED | OUTPATIENT
Start: 2024-10-17

## 2024-10-17 RX ORDER — NITROGLYCERIN 0.4 MG/1
0.4 TABLET SUBLINGUAL EVERY 5 MIN PRN
Status: CANCELLED | OUTPATIENT
Start: 2024-10-17

## 2024-10-17 RX ORDER — SODIUM CHLORIDE 9 MG/ML
INJECTION, SOLUTION INTRAVENOUS CONTINUOUS
Status: CANCELLED | OUTPATIENT
Start: 2024-10-17

## 2024-10-17 NOTE — PROGRESS NOTES
Medication Management Clinic  [facility]  Anticoagulation Clinic  [phone] (phone)  [phone] (fax)    Mr. Kush Perea is a 69 y.o.  male with history of Afib who presents today for anticoagulation monitoring and adjustment.    Patient has been holding Coumadin for the past 2 days. He has a right heart catheterization tomorrow 10/18/24. Saw cardiology 10/14/24  Patient is having shortness of breath and abdominal bloating  Patient denies s/s bleeding/bruising  No blood in urine or stool.  No dietary changes.  No changes in medication/OTC agents/Herbals.  No change in alcohol use or tobacco use.  No change in activity level.  Patient denies falls.  No vomiting/diarrhea or acute illness.   Right heart catheterization tomorrow. Holding Coumadin 3 days prior.    Assessment:   Lab Results   Component Value Date    INR 2.40 (H) 10/17/2024    INR 1.90 (H) 10/03/2024    INR 2.40 (H) 09/19/2024     INR therapeutic   Recent Labs     10/17/24  1537   INR 2.40*     Plan:  HOLD Coumadin tonight then continue Coumadin 10 mg MoWeFr and 12.5 mg SuTuThSa.  Recheck INR in 1 week.  Patient reminded to call the Anticoagulation Clinic with any signs or symptoms of bleeding or with any medication changes.  Patient given instructions utilizing the teach back method.    After visit summary printed and reviewed with patient.      Discharged ambulatory in no apparent distress.    For Pharmacy Admin Tracking Only  Time Spent (min): 20

## 2024-10-18 ENCOUNTER — APPOINTMENT (OUTPATIENT)
Age: 69
DRG: 286 | End: 2024-10-18
Attending: INTERNAL MEDICINE
Payer: MEDICARE

## 2024-10-18 ENCOUNTER — APPOINTMENT (OUTPATIENT)
Dept: GENERAL RADIOLOGY | Age: 69
DRG: 286 | End: 2024-10-18
Attending: INTERNAL MEDICINE
Payer: MEDICARE

## 2024-10-18 ENCOUNTER — HOSPITAL ENCOUNTER (INPATIENT)
Age: 69
LOS: 6 days | Discharge: ANOTHER HOSPITAL | DRG: 286 | End: 2024-10-24
Attending: INTERNAL MEDICINE | Admitting: INTERNAL MEDICINE
Payer: MEDICARE

## 2024-10-18 ENCOUNTER — TELEPHONE (OUTPATIENT)
Dept: CARDIOLOGY CLINIC | Age: 69
End: 2024-10-18

## 2024-10-18 DIAGNOSIS — R06.09 DOE (DYSPNEA ON EXERTION): ICD-10-CM

## 2024-10-18 DIAGNOSIS — I50.21 ACUTE SYSTOLIC CHF (CONGESTIVE HEART FAILURE), NYHA CLASS 4 (HCC): Primary | ICD-10-CM

## 2024-10-18 DIAGNOSIS — I47.20 VENTRICULAR TACHYARRHYTHMIA (HCC): ICD-10-CM

## 2024-10-18 DIAGNOSIS — R14.0 ABDOMINAL BLOATING: ICD-10-CM

## 2024-10-18 DIAGNOSIS — I50.20 HFREF (HEART FAILURE WITH REDUCED EJECTION FRACTION) (HCC): ICD-10-CM

## 2024-10-18 DIAGNOSIS — I42.9 CARDIOMYOPATHY, UNSPECIFIED TYPE (HCC): ICD-10-CM

## 2024-10-18 DIAGNOSIS — R63.5 WEIGHT GAIN: ICD-10-CM

## 2024-10-18 PROBLEM — I50.9 LOW OUTPUT HEART FAILURE (HCC): Status: ACTIVE | Noted: 2024-10-18

## 2024-10-18 LAB
ABO: NORMAL
ANION GAP SERPL CALC-SCNC: 12 MEQ/L (ref 8–16)
ANTIBODY SCREEN: NORMAL
APTT PPP: 43.3 SECONDS (ref 22–38)
BDY SITE: ABNORMAL
BUN SERPL-MCNC: 34 MG/DL (ref 7–22)
CALCIUM SERPL-MCNC: 9.5 MG/DL (ref 8.5–10.5)
CHLORIDE SERPL-SCNC: 103 MEQ/L (ref 98–111)
CHOLEST SERPL-MCNC: 120 MG/DL (ref 100–199)
CO2 SERPL-SCNC: 26 MEQ/L (ref 23–33)
COLLECTED BY:: ABNORMAL
CREAT SERPL-MCNC: 1.4 MG/DL (ref 0.4–1.2)
DEPRECATED RDW RBC AUTO: 55.5 FL (ref 35–45)
ECHO BSA: 2.31 M2
EKG Q-T INTERVAL: 386 MS
EKG QRS DURATION: 118 MS
EKG QTC CALCULATION (BAZETT): 472 MS
EKG R AXIS: 94 DEGREES
EKG T AXIS: -71 DEGREES
EKG VENTRICULAR RATE: 90 BPM
ERYTHROCYTE [DISTWIDTH] IN BLOOD BY AUTOMATED COUNT: 19.1 % (ref 11.5–14.5)
GFR SERPL CREATININE-BSD FRML MDRD: 54 ML/MIN/1.73M2
GLUCOSE SERPL-MCNC: 113 MG/DL (ref 70–108)
HCT VFR BLD AUTO: 49.9 % (ref 42–52)
HDLC SERPL-MCNC: 21 MG/DL
HGB BLD-MCNC: 15.8 GM/DL (ref 14–18)
INR PPP: 1.93 (ref 0.85–1.13)
LDLC SERPL CALC-MCNC: 77 MG/DL
MCH RBC QN AUTO: 27.2 PG (ref 26–33)
MCHC RBC AUTO-ENTMCNC: 31.7 GM/DL (ref 32.2–35.5)
MCV RBC AUTO: 85.9 FL (ref 80–94)
PLATELET # BLD AUTO: 490 THOU/MM3 (ref 130–400)
PMV BLD AUTO: 11.5 FL (ref 9.4–12.4)
POTASSIUM SERPL-SCNC: 4.6 MEQ/L (ref 3.5–5.2)
RBC # BLD AUTO: 5.81 MILL/MM3 (ref 4.7–6.1)
RH FACTOR: NORMAL
SAO2 % BLD: 61 % (ref 94–97)
SODIUM SERPL-SCNC: 141 MEQ/L (ref 135–145)
TRIGL SERPL-MCNC: 108 MG/DL (ref 0–199)
WBC # BLD AUTO: 10.1 THOU/MM3 (ref 4.8–10.8)

## 2024-10-18 PROCEDURE — 6360000002 HC RX W HCPCS: Performed by: INTERNAL MEDICINE

## 2024-10-18 PROCEDURE — 2580000003 HC RX 258: Performed by: INTERNAL MEDICINE

## 2024-10-18 PROCEDURE — 86900 BLOOD TYPING SEROLOGIC ABO: CPT

## 2024-10-18 PROCEDURE — 36415 COLL VENOUS BLD VENIPUNCTURE: CPT

## 2024-10-18 PROCEDURE — 93503 INSERT/PLACE HEART CATHETER: CPT | Performed by: INTERNAL MEDICINE

## 2024-10-18 PROCEDURE — 7100000010 HC PHASE II RECOVERY - FIRST 15 MIN: Performed by: INTERNAL MEDICINE

## 2024-10-18 PROCEDURE — 93312 ECHO TRANSESOPHAGEAL: CPT

## 2024-10-18 PROCEDURE — 4A023N6 MEASUREMENT OF CARDIAC SAMPLING AND PRESSURE, RIGHT HEART, PERCUTANEOUS APPROACH: ICD-10-PCS | Performed by: INTERNAL MEDICINE

## 2024-10-18 PROCEDURE — 86850 RBC ANTIBODY SCREEN: CPT

## 2024-10-18 PROCEDURE — 7100000011 HC PHASE II RECOVERY - ADDTL 15 MIN: Performed by: INTERNAL MEDICINE

## 2024-10-18 PROCEDURE — 2500000003 HC RX 250 WO HCPCS: Performed by: INTERNAL MEDICINE

## 2024-10-18 PROCEDURE — 82810 BLOOD GASES O2 SAT ONLY: CPT

## 2024-10-18 PROCEDURE — 93451 RIGHT HEART CATH: CPT | Performed by: INTERNAL MEDICINE

## 2024-10-18 PROCEDURE — 2709999900 HC NON-CHARGEABLE SUPPLY: Performed by: INTERNAL MEDICINE

## 2024-10-18 PROCEDURE — C1887 CATHETER, GUIDING: HCPCS | Performed by: INTERNAL MEDICINE

## 2024-10-18 PROCEDURE — 71045 X-RAY EXAM CHEST 1 VIEW: CPT

## 2024-10-18 PROCEDURE — 6370000000 HC RX 637 (ALT 250 FOR IP): Performed by: INTERNAL MEDICINE

## 2024-10-18 PROCEDURE — 85730 THROMBOPLASTIN TIME PARTIAL: CPT

## 2024-10-18 PROCEDURE — 99291 CRITICAL CARE FIRST HOUR: CPT | Performed by: INTERNAL MEDICINE

## 2024-10-18 PROCEDURE — 85027 COMPLETE CBC AUTOMATED: CPT

## 2024-10-18 PROCEDURE — 80048 BASIC METABOLIC PNL TOTAL CA: CPT

## 2024-10-18 PROCEDURE — 36556 INSERT NON-TUNNEL CV CATH: CPT | Performed by: INTERNAL MEDICINE

## 2024-10-18 PROCEDURE — 93503 INSERT/PLACE HEART CATHETER: CPT

## 2024-10-18 PROCEDURE — 2000000000 HC ICU R&B

## 2024-10-18 PROCEDURE — 93005 ELECTROCARDIOGRAM TRACING: CPT | Performed by: PHYSICIAN ASSISTANT

## 2024-10-18 PROCEDURE — 93010 ELECTROCARDIOGRAM REPORT: CPT | Performed by: INTERNAL MEDICINE

## 2024-10-18 PROCEDURE — B24BZZ4 ULTRASONOGRAPHY OF HEART WITH AORTA, TRANSESOPHAGEAL: ICD-10-PCS | Performed by: INTERNAL MEDICINE

## 2024-10-18 PROCEDURE — 99152 MOD SED SAME PHYS/QHP 5/>YRS: CPT | Performed by: INTERNAL MEDICINE

## 2024-10-18 PROCEDURE — 80061 LIPID PANEL: CPT

## 2024-10-18 PROCEDURE — 86901 BLOOD TYPING SEROLOGIC RH(D): CPT

## 2024-10-18 PROCEDURE — 85610 PROTHROMBIN TIME: CPT

## 2024-10-18 PROCEDURE — C1894 INTRO/SHEATH, NON-LASER: HCPCS | Performed by: INTERNAL MEDICINE

## 2024-10-18 RX ORDER — ASPIRIN 81 MG/1
81 TABLET ORAL DAILY
Status: DISCONTINUED | OUTPATIENT
Start: 2024-10-19 | End: 2024-10-24 | Stop reason: HOSPADM

## 2024-10-18 RX ORDER — POLYETHYLENE GLYCOL 3350 17 G/17G
17 POWDER, FOR SOLUTION ORAL DAILY PRN
Status: DISCONTINUED | OUTPATIENT
Start: 2024-10-18 | End: 2024-10-24 | Stop reason: HOSPADM

## 2024-10-18 RX ORDER — SODIUM CHLORIDE 0.9 % (FLUSH) 0.9 %
5-40 SYRINGE (ML) INJECTION PRN
Status: DISCONTINUED | OUTPATIENT
Start: 2024-10-18 | End: 2024-10-18 | Stop reason: SDUPTHER

## 2024-10-18 RX ORDER — SODIUM CHLORIDE 0.9 % (FLUSH) 0.9 %
5-40 SYRINGE (ML) INJECTION EVERY 12 HOURS SCHEDULED
Status: DISCONTINUED | OUTPATIENT
Start: 2024-10-18 | End: 2024-10-18 | Stop reason: SDUPTHER

## 2024-10-18 RX ORDER — NOREPINEPHRINE BITARTRATE 0.06 MG/ML
1-100 INJECTION, SOLUTION INTRAVENOUS CONTINUOUS
Status: DISCONTINUED | OUTPATIENT
Start: 2024-10-18 | End: 2024-10-19

## 2024-10-18 RX ORDER — NITROGLYCERIN 0.4 MG/1
0.4 TABLET SUBLINGUAL EVERY 5 MIN PRN
Status: DISCONTINUED | OUTPATIENT
Start: 2024-10-18 | End: 2024-10-18

## 2024-10-18 RX ORDER — ASPIRIN 325 MG
325 TABLET ORAL ONCE
Status: DISCONTINUED | OUTPATIENT
Start: 2024-10-18 | End: 2024-10-18

## 2024-10-18 RX ORDER — SODIUM CHLORIDE 9 MG/ML
INJECTION, SOLUTION INTRAVENOUS PRN
Status: DISCONTINUED | OUTPATIENT
Start: 2024-10-18 | End: 2024-10-24 | Stop reason: HOSPADM

## 2024-10-18 RX ORDER — PANTOPRAZOLE SODIUM 40 MG/1
40 TABLET, DELAYED RELEASE ORAL DAILY
Status: DISCONTINUED | OUTPATIENT
Start: 2024-10-19 | End: 2024-10-24 | Stop reason: HOSPADM

## 2024-10-18 RX ORDER — SODIUM CHLORIDE 0.9 % (FLUSH) 0.9 %
5-40 SYRINGE (ML) INJECTION EVERY 12 HOURS SCHEDULED
Status: DISCONTINUED | OUTPATIENT
Start: 2024-10-18 | End: 2024-10-24 | Stop reason: HOSPADM

## 2024-10-18 RX ORDER — ACETAMINOPHEN 325 MG/1
650 TABLET ORAL EVERY 6 HOURS PRN
Status: DISCONTINUED | OUTPATIENT
Start: 2024-10-18 | End: 2024-10-24 | Stop reason: HOSPADM

## 2024-10-18 RX ORDER — SODIUM CHLORIDE 9 MG/ML
INJECTION, SOLUTION INTRAVENOUS PRN
Status: DISCONTINUED | OUTPATIENT
Start: 2024-10-18 | End: 2024-10-18

## 2024-10-18 RX ORDER — ONDANSETRON 4 MG/1
4 TABLET, ORALLY DISINTEGRATING ORAL EVERY 8 HOURS PRN
Status: DISCONTINUED | OUTPATIENT
Start: 2024-10-18 | End: 2024-10-24 | Stop reason: HOSPADM

## 2024-10-18 RX ORDER — ENOXAPARIN SODIUM 100 MG/ML
30 INJECTION SUBCUTANEOUS EVERY 12 HOURS
Status: DISCONTINUED | OUTPATIENT
Start: 2024-10-18 | End: 2024-10-19 | Stop reason: SDUPTHER

## 2024-10-18 RX ORDER — ERGOCALCIFEROL 1.25 MG/1
50000 CAPSULE, LIQUID FILLED ORAL WEEKLY
Status: DISCONTINUED | OUTPATIENT
Start: 2024-10-18 | End: 2024-10-24 | Stop reason: HOSPADM

## 2024-10-18 RX ORDER — MULTIVITAMIN WITH IRON
1 TABLET ORAL DAILY
Status: DISCONTINUED | OUTPATIENT
Start: 2024-10-19 | End: 2024-10-24 | Stop reason: HOSPADM

## 2024-10-18 RX ORDER — FENTANYL CITRATE 50 UG/ML
INJECTION, SOLUTION INTRAMUSCULAR; INTRAVENOUS PRN
Status: DISCONTINUED | OUTPATIENT
Start: 2024-10-18 | End: 2024-10-18 | Stop reason: HOSPADM

## 2024-10-18 RX ORDER — ACETAMINOPHEN 650 MG/1
650 SUPPOSITORY RECTAL EVERY 6 HOURS PRN
Status: DISCONTINUED | OUTPATIENT
Start: 2024-10-18 | End: 2024-10-24 | Stop reason: HOSPADM

## 2024-10-18 RX ORDER — SODIUM CHLORIDE 9 MG/ML
INJECTION, SOLUTION INTRAVENOUS CONTINUOUS
Status: DISCONTINUED | OUTPATIENT
Start: 2024-10-18 | End: 2024-10-18

## 2024-10-18 RX ORDER — MILRINONE LACTATE 0.2 MG/ML
.0625-.75 INJECTION, SOLUTION INTRAVENOUS CONTINUOUS
Status: DISCONTINUED | OUTPATIENT
Start: 2024-10-18 | End: 2024-10-22

## 2024-10-18 RX ORDER — ACETAMINOPHEN 325 MG/1
650 TABLET ORAL EVERY 4 HOURS PRN
Status: DISCONTINUED | OUTPATIENT
Start: 2024-10-18 | End: 2024-10-18 | Stop reason: SDUPTHER

## 2024-10-18 RX ORDER — SODIUM CHLORIDE 450 MG/100ML
INJECTION, SOLUTION INTRAVENOUS CONTINUOUS
Status: DISCONTINUED | OUTPATIENT
Start: 2024-10-18 | End: 2024-10-18

## 2024-10-18 RX ORDER — ATROPINE SULFATE 0.4 MG/ML
0.5 INJECTION, SOLUTION INTRAVENOUS
Status: ACTIVE | OUTPATIENT
Start: 2024-10-18 | End: 2024-10-19

## 2024-10-18 RX ORDER — ONDANSETRON 2 MG/ML
4 INJECTION INTRAMUSCULAR; INTRAVENOUS EVERY 6 HOURS PRN
Status: DISCONTINUED | OUTPATIENT
Start: 2024-10-18 | End: 2024-10-24 | Stop reason: HOSPADM

## 2024-10-18 RX ORDER — MIDAZOLAM HYDROCHLORIDE 1 MG/ML
INJECTION, SOLUTION INTRAMUSCULAR; INTRAVENOUS PRN
Status: DISCONTINUED | OUTPATIENT
Start: 2024-10-18 | End: 2024-10-18 | Stop reason: HOSPADM

## 2024-10-18 RX ORDER — SODIUM CHLORIDE 0.9 % (FLUSH) 0.9 %
5-40 SYRINGE (ML) INJECTION PRN
Status: DISCONTINUED | OUTPATIENT
Start: 2024-10-18 | End: 2024-10-24 | Stop reason: HOSPADM

## 2024-10-18 RX ORDER — ESCITALOPRAM OXALATE 10 MG/1
10 TABLET ORAL DAILY
Status: DISCONTINUED | OUTPATIENT
Start: 2024-10-19 | End: 2024-10-24 | Stop reason: HOSPADM

## 2024-10-18 RX ORDER — LIDOCAINE HCL/PF 100 MG/5ML
SYRINGE (ML) INJECTION PRN
Status: DISCONTINUED | OUTPATIENT
Start: 2024-10-18 | End: 2024-10-18 | Stop reason: HOSPADM

## 2024-10-18 RX ADMIN — BUMETANIDE 0.5 MG/HR: 0.25 INJECTION INTRAMUSCULAR; INTRAVENOUS at 17:23

## 2024-10-18 RX ADMIN — SODIUM CHLORIDE, PRESERVATIVE FREE 10 ML: 5 INJECTION INTRAVENOUS at 20:16

## 2024-10-18 RX ADMIN — ENOXAPARIN SODIUM 30 MG: 100 INJECTION SUBCUTANEOUS at 20:16

## 2024-10-18 RX ADMIN — ERGOCALCIFEROL 50000 UNITS: 1.25 CAPSULE ORAL at 19:01

## 2024-10-18 RX ADMIN — SODIUM CHLORIDE: 4.5 INJECTION, SOLUTION INTRAVENOUS at 12:43

## 2024-10-18 RX ADMIN — MILRINONE LACTATE 0.13 MCG/KG/MIN: 0.2 INJECTION, SOLUTION INTRAVENOUS at 17:01

## 2024-10-18 RX ADMIN — Medication 5 MCG/MIN: at 20:45

## 2024-10-18 NOTE — BRIEF OP NOTE
[facility]  Sedation/Analgesia Post Sedation Record    Pt Name: Kush Perea  Account number: [id number]  MRN: [id number]  YOB: 1955  Procedure Performed By: Mamadou Davis MD MD FACC, FSCAI, RPVI  Primary Care Physician: Diann Vega, APRN - CNP  Date: 10/18/2024    POST-PROCEDURE    Physicians/Assistants: Mamadou Davis MD, ELLA, [facility], RPVI    Procedure Performed: RHC    Sedation/Anesthesia: Versed/ Fentanyl and 2% xylocaine local anesthesia.      Estimated Blood Loss: < 50 ml.     Specimens Removed: None         Disposition of Specimen: N/A        Complications: No Immediate Complications.       Post-procedure Diagnosis/Findings:       Volume overload  CHF  Admit to ICU for SGC guided diuresis  Dr. London accepting      Electronically signed by Mamadou Davis MD on 10/18/24 at 2:30 PM EDT   Interventional Cardiology

## 2024-10-18 NOTE — PROGRESS NOTES
1130 Patient admitted to 2E12  Ambulatory for rt heart cath, JASON.  Patient NPO. Patient accompanied by wife, sister, son.  Vital signs obtained.   Assessment and data collection intiated.   Oriented to room.  Policies and procedures for 2E explained.   All questions answered with no further questions at this time.   Fall prevention and safety precautions discussed with patient.         1131 Care plan reviewed with patient and family.  Patient and family verbalize understanding of the plan of care and contribute to goal setting.       1257 Spoke with Anne in cath lab to relay information to Dr Davis, bun, creat, plt, INR, aFIB, ct SCAN RESULTS.   1330 Dr Davis in to see patient.   1332 To cath lab per bed, stable condition.     1425 Care taken over from cath lab, right brachial site stable . Patient reinstructed on bedrest.    1519 Cristiano, ICU nurse called report.   1540 To ICU -03 per bed with nurse, IV, oxygen and portable monitor, stable condition. Family has belongings, family in ICU waiting room, Nurse Cristiano aware family here.

## 2024-10-18 NOTE — PROCEDURES
PROCEDURE NOTE  Date: 10/18/2024   Name: Kush Perea  YOB: 1955    Procedures      SWAN SADAF CATHETER    Risks and benefits to the procedure were discussed.  Alternatives and their risks were discussed as well.    Indication:  decompensated heart failure    Complications:  none    Procedure:  After timeout was taken and informed consent was obtained, and after Cordis catheter was placed sterilely.  Patient was prepped and utilizing sterile gloves, sterile gown, sterile towels, in addition to mask and hair net.  Cordis sheath was attached.  Pennington-Sadaf was flushed through all 3 ports and verification of transducer occurred.  Pennington-Sadaf with placed in the sterile sheath.  Pennington-Sadaf catheter was advanced to 25 cm.  Balloon was inflated.  Pennington-Sadaf catheter was advanced watching waveforms from the right atrium to the right ventricle to the pulmonary artery.  Pressures were recorded by the nurse.  Catheter was placed in the pulmonary artery by waveforms and secured at 65 centimeters.  There were no complications.  Electronically signed by Kush London MD.

## 2024-10-18 NOTE — TELEPHONE ENCOUNTER
Please review CT abdomen pelvis-         IMPRESSION:  1. Hepatosplenomegaly with lobulated contour of the liver parenchyma suggestive  parenchymal liver disease. Characterization of the liver parenchyma is limited  without IV contrast. A questionable liver lesion in the right hepatic lobe  inferiorly measures 26 x 26 mm (series 601, image 31). Correlate with liver  function tests. Contrast-enhanced MRI of the liver utilizing a liver lesion  protocol may be obtained for further characterization.     2. Evaluation of the bowel is limited due to under distention. No bowel  obstruction is identified. Thickening of the rectosigmoid colon may be  artifactual related to under distention. No bowel obstruction is identified. No  free air is observed. Correlate with colonoscopy.     3. Mild prostatomegaly. Correlate with PSA levels. Bladder wall thickening may  be artifactual related to under distention. Correlate with urinalysis.     4. A 9 mm hyperdense cyst in the left kidney is incompletely characterized  without IV contrast (series 601, image 37). Further evaluation with renal  ultrasound may be considered. Chronic findings are discussed.        **This report has been created using voice recognition software.  It may contain  minor errors which are inherent in voice recognition technology.         BMP/CBC/Type and Screen/EKG

## 2024-10-18 NOTE — PROCEDURES
PROCEDURE NOTE  Date: 10/18/2024   Name: Kush Perea  YOB: 1955    Procedures      Central Line Placement: Risks and benefits to the procedure were discussed.  Alternatives and their risks were discussed as well.  Patient was placed in the attention position.  Patient was placed in Trendelenburg position.  Patient was prepped using Chlorhexidene prep.  Patient was draped utilizing sterile gloves, hair net, mask, sterile gown and sterile OR towels.  Maximum barrier precautions were utilized.  Utilizing the right subclavian approach, patient received 5 cc of 1% lidocaine.  Utilizing an introducer needle, it was placed subcutaneously advanced under the clavicle and leveled flat.  It was subsequently advanced toward the thoracic inlet, until venous return was obtained.  A guide wire was placed through the introducer needle.  The introducer needle was subsequently withdrawn leaving the guide wire in place.  Utilizing a scalpel, a small incision was made in the skin.  A punch dilator was placed over the guide wire and subsequently withdrawn leaving the guide wire in place.  A cordis catheter was subsequently placed over the guide wire.  The guide wire was subsequently withdrawn leaving this catheter in place.  All ports had good venous return and were subsequently flushed with saline.  The catheter was secured using 2.0 silk.  Secondary cleansing with chlorhexidine prep was subsequently placed. Tegaderm with bio- patch dressing was utilized for secondary securing and protection.  There were no complications.    EBL:   Less than 5 mL      Indication:  decompensated heart failure    Electronically signed by Kush London MD

## 2024-10-19 PROBLEM — I95.9 HYPOTENSION: Status: ACTIVE | Noted: 2024-10-19

## 2024-10-19 PROBLEM — I50.21 ACUTE SYSTOLIC CHF (CONGESTIVE HEART FAILURE), NYHA CLASS 4 (HCC): Status: ACTIVE | Noted: 2022-11-24

## 2024-10-19 LAB
ANION GAP SERPL CALC-SCNC: 14 MEQ/L (ref 8–16)
ANION GAP SERPL CALC-SCNC: 15 MEQ/L (ref 8–16)
APTT PPP: 45.2 SECONDS (ref 22–38)
APTT PPP: 64.4 SECONDS (ref 22–38)
BASE EXCESS BLDA CALC-SCNC: 3.3 MMOL/L (ref -2–3)
BASOPHILS ABSOLUTE: 0.2 THOU/MM3 (ref 0–0.1)
BASOPHILS NFR BLD AUTO: 1.5 %
BUN SERPL-MCNC: 27 MG/DL (ref 7–22)
BUN SERPL-MCNC: 31 MG/DL (ref 7–22)
CALCIUM SERPL-MCNC: 9.2 MG/DL (ref 8.5–10.5)
CALCIUM SERPL-MCNC: 9.4 MG/DL (ref 8.5–10.5)
CHLORIDE SERPL-SCNC: 101 MEQ/L (ref 98–111)
CHLORIDE SERPL-SCNC: 96 MEQ/L (ref 98–111)
CO2 SERPL-SCNC: 22 MEQ/L (ref 23–33)
CO2 SERPL-SCNC: 26 MEQ/L (ref 23–33)
COLLECTED BY:: ABNORMAL
CREAT SERPL-MCNC: 1.1 MG/DL (ref 0.4–1.2)
CREAT SERPL-MCNC: 1.3 MG/DL (ref 0.4–1.2)
DEPRECATED RDW RBC AUTO: 52.8 FL (ref 35–45)
DEPRECATED RDW RBC AUTO: 53.3 FL (ref 35–45)
DEVICE: ABNORMAL
ECHO BSA: 2.31 M2
EKG ATRIAL RATE: 65 BPM
EKG Q-T INTERVAL: 300 MS
EKG Q-T INTERVAL: 300 MS
EKG Q-T INTERVAL: 334 MS
EKG QRS DURATION: 126 MS
EKG QRS DURATION: 126 MS
EKG QRS DURATION: 164 MS
EKG QTC CALCULATION (BAZETT): 404 MS
EKG QTC CALCULATION (BAZETT): 485 MS
EKG QTC CALCULATION (BAZETT): 485 MS
EKG R AXIS: 38 DEGREES
EKG R AXIS: 54 DEGREES
EKG R AXIS: 66 DEGREES
EKG T AXIS: -134 DEGREES
EKG T AXIS: -60 DEGREES
EKG T AXIS: -74 DEGREES
EKG VENTRICULAR RATE: 109 BPM
EKG VENTRICULAR RATE: 127 BPM
EKG VENTRICULAR RATE: 157 BPM
EOSINOPHIL NFR BLD AUTO: 1.9 %
EOSINOPHILS ABSOLUTE: 0.2 THOU/MM3 (ref 0–0.4)
ERYTHROCYTE [DISTWIDTH] IN BLOOD BY AUTOMATED COUNT: 17.7 % (ref 11.5–14.5)
ERYTHROCYTE [DISTWIDTH] IN BLOOD BY AUTOMATED COUNT: 18.5 % (ref 11.5–14.5)
GFR SERPL CREATININE-BSD FRML MDRD: 59 ML/MIN/1.73M2
GFR SERPL CREATININE-BSD FRML MDRD: 73 ML/MIN/1.73M2
GLUCOSE SERPL-MCNC: 108 MG/DL (ref 70–108)
GLUCOSE SERPL-MCNC: 163 MG/DL (ref 70–108)
HCO3 BLDA-SCNC: 28 MMOL/L (ref 23–28)
HCT VFR BLD AUTO: 45.4 % (ref 42–52)
HCT VFR BLD AUTO: 47.9 % (ref 42–52)
HGB BLD-MCNC: 14.7 GM/DL (ref 14–18)
HGB BLD-MCNC: 15.5 GM/DL (ref 14–18)
IMM GRANULOCYTES # BLD AUTO: 0.1 THOU/MM3 (ref 0–0.07)
IMM GRANULOCYTES NFR BLD AUTO: 0.8 %
INR PPP: 1.84 (ref 0.85–1.13)
LYMPHOCYTES ABSOLUTE: 1.7 THOU/MM3 (ref 1–4.8)
LYMPHOCYTES NFR BLD AUTO: 13.3 %
MAGNESIUM SERPL-MCNC: 2 MG/DL (ref 1.6–2.4)
MAGNESIUM SERPL-MCNC: 2.1 MG/DL (ref 1.6–2.4)
MCH RBC QN AUTO: 27.4 PG (ref 26–33)
MCH RBC QN AUTO: 27.6 PG (ref 26–33)
MCHC RBC AUTO-ENTMCNC: 32.4 GM/DL (ref 32.2–35.5)
MCHC RBC AUTO-ENTMCNC: 32.4 GM/DL (ref 32.2–35.5)
MCV RBC AUTO: 84.6 FL (ref 80–94)
MCV RBC AUTO: 85.2 FL (ref 80–94)
MONOCYTES ABSOLUTE: 0.7 THOU/MM3 (ref 0.4–1.3)
MONOCYTES NFR BLD AUTO: 5.8 %
NEUTROPHILS ABSOLUTE: 9.9 THOU/MM3 (ref 1.8–7.7)
NEUTROPHILS NFR BLD AUTO: 76.7 %
NRBC BLD AUTO-RTO: 0 /100 WBC
PCO2 TEMP ADJ BLDMV: 40 MMHG (ref 41–51)
PH BLDMV: 7.44 [PH] (ref 7.31–7.41)
PHOSPHATE SERPL-MCNC: 4 MG/DL (ref 2.4–4.7)
PLATELET # BLD AUTO: 419 THOU/MM3 (ref 130–400)
PLATELET # BLD AUTO: 668 THOU/MM3 (ref 130–400)
PMV BLD AUTO: 11.4 FL (ref 9.4–12.4)
PMV BLD AUTO: 11.4 FL (ref 9.4–12.4)
PO2 BLDMV: 30 MMHG (ref 25–40)
POTASSIUM SERPL-SCNC: 3.6 MEQ/L (ref 3.5–5.2)
POTASSIUM SERPL-SCNC: 4.2 MEQ/L (ref 3.5–5.2)
RBC # BLD AUTO: 5.33 MILL/MM3 (ref 4.7–6.1)
RBC # BLD AUTO: 5.66 MILL/MM3 (ref 4.7–6.1)
SAO2 % BLDMV: 60 %
SITE: ABNORMAL
SODIUM SERPL-SCNC: 136 MEQ/L (ref 135–145)
SODIUM SERPL-SCNC: 138 MEQ/L (ref 135–145)
VENTILATION MODE VENT: ABNORMAL
WBC # BLD AUTO: 12.9 THOU/MM3 (ref 4.8–10.8)
WBC # BLD AUTO: 8.8 THOU/MM3 (ref 4.8–10.8)

## 2024-10-19 PROCEDURE — 93010 ELECTROCARDIOGRAM REPORT: CPT | Performed by: INTERNAL MEDICINE

## 2024-10-19 PROCEDURE — 36415 COLL VENOUS BLD VENIPUNCTURE: CPT

## 2024-10-19 PROCEDURE — 2000000000 HC ICU R&B

## 2024-10-19 PROCEDURE — 85610 PROTHROMBIN TIME: CPT

## 2024-10-19 PROCEDURE — 2500000003 HC RX 250 WO HCPCS: Performed by: STUDENT IN AN ORGANIZED HEALTH CARE EDUCATION/TRAINING PROGRAM

## 2024-10-19 PROCEDURE — 80048 BASIC METABOLIC PNL TOTAL CA: CPT

## 2024-10-19 PROCEDURE — 6360000002 HC RX W HCPCS: Performed by: INTERNAL MEDICINE

## 2024-10-19 PROCEDURE — 93325 DOPPLER ECHO COLOR FLOW MAPG: CPT | Performed by: INTERNAL MEDICINE

## 2024-10-19 PROCEDURE — 6360000002 HC RX W HCPCS: Performed by: STUDENT IN AN ORGANIZED HEALTH CARE EDUCATION/TRAINING PROGRAM

## 2024-10-19 PROCEDURE — 93312 ECHO TRANSESOPHAGEAL: CPT | Performed by: INTERNAL MEDICINE

## 2024-10-19 PROCEDURE — 2700000000 HC OXYGEN THERAPY PER DAY

## 2024-10-19 PROCEDURE — 99233 SBSQ HOSP IP/OBS HIGH 50: CPT | Performed by: INTERNAL MEDICINE

## 2024-10-19 PROCEDURE — 2580000003 HC RX 258: Performed by: INTERNAL MEDICINE

## 2024-10-19 PROCEDURE — 99232 SBSQ HOSP IP/OBS MODERATE 35: CPT | Performed by: NURSE PRACTITIONER

## 2024-10-19 PROCEDURE — 2500000003 HC RX 250 WO HCPCS

## 2024-10-19 PROCEDURE — 85730 THROMBOPLASTIN TIME PARTIAL: CPT

## 2024-10-19 PROCEDURE — 85027 COMPLETE CBC AUTOMATED: CPT

## 2024-10-19 PROCEDURE — 84100 ASSAY OF PHOSPHORUS: CPT

## 2024-10-19 PROCEDURE — 94761 N-INVAS EAR/PLS OXIMETRY MLT: CPT

## 2024-10-19 PROCEDURE — 6370000000 HC RX 637 (ALT 250 FOR IP)

## 2024-10-19 PROCEDURE — 82803 BLOOD GASES ANY COMBINATION: CPT

## 2024-10-19 PROCEDURE — 93005 ELECTROCARDIOGRAM TRACING: CPT | Performed by: STUDENT IN AN ORGANIZED HEALTH CARE EDUCATION/TRAINING PROGRAM

## 2024-10-19 PROCEDURE — 85025 COMPLETE CBC W/AUTO DIFF WBC: CPT

## 2024-10-19 PROCEDURE — 2580000003 HC RX 258: Performed by: STUDENT IN AN ORGANIZED HEALTH CARE EDUCATION/TRAINING PROGRAM

## 2024-10-19 PROCEDURE — 83735 ASSAY OF MAGNESIUM: CPT

## 2024-10-19 PROCEDURE — 93320 DOPPLER ECHO COMPLETE: CPT | Performed by: INTERNAL MEDICINE

## 2024-10-19 PROCEDURE — 6370000000 HC RX 637 (ALT 250 FOR IP): Performed by: INTERNAL MEDICINE

## 2024-10-19 RX ORDER — MIDAZOLAM HYDROCHLORIDE 1 MG/ML
2 INJECTION, SOLUTION INTRAMUSCULAR; INTRAVENOUS ONCE
Status: DISCONTINUED | OUTPATIENT
Start: 2024-10-19 | End: 2024-10-22

## 2024-10-19 RX ORDER — FENTANYL CITRATE 50 UG/ML
INJECTION, SOLUTION INTRAMUSCULAR; INTRAVENOUS
Status: DISCONTINUED
Start: 2024-10-19 | End: 2024-10-19 | Stop reason: WASHOUT

## 2024-10-19 RX ORDER — MAGNESIUM SULFATE IN WATER 40 MG/ML
2000 INJECTION, SOLUTION INTRAVENOUS ONCE
Status: COMPLETED | OUTPATIENT
Start: 2024-10-19 | End: 2024-10-19

## 2024-10-19 RX ORDER — HEPARIN SODIUM 10000 [USP'U]/100ML
5-30 INJECTION, SOLUTION INTRAVENOUS CONTINUOUS
Status: DISCONTINUED | OUTPATIENT
Start: 2024-10-19 | End: 2024-10-23

## 2024-10-19 RX ORDER — ADENOSINE 3 MG/ML
INJECTION, SOLUTION INTRAVENOUS
Status: DISPENSED
Start: 2024-10-19 | End: 2024-10-19

## 2024-10-19 RX ORDER — HEPARIN SODIUM 1000 [USP'U]/ML
2000 INJECTION, SOLUTION INTRAVENOUS; SUBCUTANEOUS PRN
Status: DISCONTINUED | OUTPATIENT
Start: 2024-10-19 | End: 2024-10-23

## 2024-10-19 RX ORDER — MIDAZOLAM HYDROCHLORIDE 1 MG/ML
INJECTION, SOLUTION INTRAMUSCULAR; INTRAVENOUS
Status: DISCONTINUED
Start: 2024-10-19 | End: 2024-10-19 | Stop reason: WASHOUT

## 2024-10-19 RX ORDER — ADENOSINE 3 MG/ML
6 INJECTION, SOLUTION INTRAVENOUS ONCE
Status: DISCONTINUED | OUTPATIENT
Start: 2024-10-19 | End: 2024-10-22

## 2024-10-19 RX ORDER — HEPARIN SODIUM 1000 [USP'U]/ML
4000 INJECTION, SOLUTION INTRAVENOUS; SUBCUTANEOUS PRN
Status: DISCONTINUED | OUTPATIENT
Start: 2024-10-19 | End: 2024-10-23

## 2024-10-19 RX ORDER — WARFARIN SODIUM 7.5 MG/1
15 TABLET ORAL ONCE
Status: COMPLETED | OUTPATIENT
Start: 2024-10-19 | End: 2024-10-19

## 2024-10-19 RX ORDER — HEPARIN SODIUM 1000 [USP'U]/ML
4000 INJECTION, SOLUTION INTRAVENOUS; SUBCUTANEOUS ONCE
Status: DISCONTINUED | OUTPATIENT
Start: 2024-10-19 | End: 2024-10-19

## 2024-10-19 RX ORDER — FENTANYL CITRATE 50 UG/ML
50 INJECTION, SOLUTION INTRAMUSCULAR; INTRAVENOUS ONCE
Status: DISCONTINUED | OUTPATIENT
Start: 2024-10-19 | End: 2024-10-22

## 2024-10-19 RX ADMIN — VASOPRESSIN 0.03 UNITS/MIN: 20 INJECTION INTRAVENOUS at 13:42

## 2024-10-19 RX ADMIN — MAGNESIUM SULFATE HEPTAHYDRATE 2000 MG: 40 INJECTION, SOLUTION INTRAVENOUS at 00:46

## 2024-10-19 RX ADMIN — Medication 1 TABLET: at 13:29

## 2024-10-19 RX ADMIN — AMIODARONE HYDROCHLORIDE 150 MG: 1.5 INJECTION, SOLUTION INTRAVENOUS at 00:52

## 2024-10-19 RX ADMIN — HEPARIN SODIUM 9 UNITS/KG/HR: 10000 INJECTION, SOLUTION INTRAVENOUS at 04:07

## 2024-10-19 RX ADMIN — ASPIRIN 81 MG: 81 TABLET, COATED ORAL at 13:29

## 2024-10-19 RX ADMIN — HEPARIN SODIUM 12 UNITS/KG/HR: 10000 INJECTION, SOLUTION INTRAVENOUS at 23:36

## 2024-10-19 RX ADMIN — AMIODARONE HYDROCHLORIDE 1 MG/MIN: 1.8 INJECTION, SOLUTION INTRAVENOUS at 01:02

## 2024-10-19 RX ADMIN — WARFARIN SODIUM 15 MG: 7.5 TABLET ORAL at 17:52

## 2024-10-19 RX ADMIN — HEPARIN SODIUM 2000 UNITS: 1000 INJECTION INTRAVENOUS; SUBCUTANEOUS at 11:15

## 2024-10-19 RX ADMIN — SODIUM CHLORIDE, PRESERVATIVE FREE 10 ML: 5 INJECTION INTRAVENOUS at 22:03

## 2024-10-19 RX ADMIN — SODIUM CHLORIDE: 9 INJECTION, SOLUTION INTRAVENOUS at 17:36

## 2024-10-19 RX ADMIN — ESCITALOPRAM OXALATE 10 MG: 10 TABLET ORAL at 16:38

## 2024-10-19 RX ADMIN — AMIODARONE HYDROCHLORIDE 0.5 MG/MIN: 1.8 INJECTION, SOLUTION INTRAVENOUS at 19:29

## 2024-10-19 RX ADMIN — BUMETANIDE 0.5 MG/HR: 0.25 INJECTION INTRAMUSCULAR; INTRAVENOUS at 17:49

## 2024-10-19 RX ADMIN — SODIUM CHLORIDE, PRESERVATIVE FREE 10 ML: 5 INJECTION INTRAVENOUS at 09:56

## 2024-10-19 RX ADMIN — PANTOPRAZOLE SODIUM 40 MG: 40 TABLET, DELAYED RELEASE ORAL at 05:19

## 2024-10-19 RX ADMIN — MILRINONE LACTATE 0.38 MCG/KG/MIN: 0.2 INJECTION, SOLUTION INTRAVENOUS at 19:26

## 2024-10-19 RX ADMIN — ONDANSETRON 4 MG: 4 TABLET, ORALLY DISINTEGRATING ORAL at 18:16

## 2024-10-19 RX ADMIN — VASOPRESSIN 0.03 UNITS/MIN: 20 INJECTION INTRAVENOUS at 03:45

## 2024-10-19 RX ADMIN — AMIODARONE HYDROCHLORIDE 0.5 MG/MIN: 1.8 INJECTION, SOLUTION INTRAVENOUS at 07:04

## 2024-10-19 RX ADMIN — MILRINONE LACTATE 0.12 MCG/KG/MIN: 0.2 INJECTION, SOLUTION INTRAVENOUS at 06:49

## 2024-10-19 NOTE — PLAN OF CARE
Problem: ABCDS Injury Assessment  Goal: Absence of physical injury  Outcome: Progressing     Problem: Discharge Planning  Goal: Discharge to home or other facility with appropriate resources  Outcome: Progressing  Flowsheets (Taken 10/18/2024 1600 by Damon Gonzalez RN)  Discharge to home or other facility with appropriate resources:   Identify barriers to discharge with patient and caregiver   Arrange for needed discharge resources and transportation as appropriate   Identify discharge learning needs (meds, wound care, etc)   Refer to discharge planning if patient needs post-hospital services based on physician order or complex needs related to functional status, cognitive ability or social support system     Problem: Safety - Adult  Goal: Free from fall injury  Outcome: Progressing     Problem: Respiratory - Adult  Goal: Achieves optimal ventilation and oxygenation  Outcome: Progressing     Problem: Cardiovascular - Adult  Goal: Maintains optimal cardiac output and hemodynamic stability  Outcome: Progressing  Goal: Absence of cardiac dysrhythmias or at baseline  Outcome: Progressing     Problem: Metabolic/Fluid and Electrolytes - Adult  Goal: Electrolytes maintained within normal limits  Outcome: Progressing  Goal: Hemodynamic stability and optimal renal function maintained  Outcome: Progressing  Goal: Glucose maintained within prescribed range  Outcome: Progressing     Problem: Hematologic - Adult  Goal: Maintains hematologic stability  Outcome: Progressing

## 2024-10-19 NOTE — H&P
[facility]  Sedation/Analgesia History & Physical    Pt Name: Kush Perea  Account number: [id number]  MRN: [id number]  YOB: 1955  Provider Performing Procedure: Mamadou Davis MD MD  Referring Provider: Mamadou Davis MD   Primary Care Physician: Diann Vega, APRN - CNP  Date: 10/18/2024    PRE-PROCEDURE    Code Status: FULL CODE  Brief History/Pre-Procedure Diagnosis:   SOB  CHF    Consent: : I have discussed with the patient risks, benefits, and alternatives (and relevant risks, benefits, and side effects related to alternatives or not receiving care), and likelihood of the success.   The patient and/or representative appear to understand and agree to proceed.  The discussion encompasses risks, benefits, and side effects related to the alternatives and the risks related to not receiving the proposed care, treatment, and services.     The indication, risks and benefits of the procedure and possible therapeutic consequences and alternatives were discussed with the patient. The patient was given the opportunity to ask questions and to have them answered to his/her satisfaction. Risks of the procedure include but are not limited to the following: Bleeding, hematoma including retroperitoneal hemmorhage, infection, pain and discomfort, injury to the aorta and other blood vessels, rhythm disturbance, low blood pressure, myocardial infarction, stroke, kidney damage/failure, myocardial perforation, allergic reactions to sedatives/contrast material, loss of pulse/vascular compromise requiring surgery, aneurysm/pseudoaneurysm formation, possible loss of a limb/hand/leg, needing blood transfusion, requiring emergent open heart surgery or emergent coronary intervention, the need for intubation/respiratory support, the requirement for defibrillation/cardioversion, and death. Alternatives to and omission of the suggested procedure were discussed. The patient had no further 
smoker with a 15 pack year smoking history, occasional alcohol intake, denies illicit drug use.  FHX: Hypertension, heart failure, DM 2, CVA  Allergies: Amiodarone, Jardiance, Lipitor  Medications:     sodium chloride      bumetanide (BUMEX) 12.5 mg in sodium chloride 0.9 % 125 mL infusion 0.5 mg/hr (10/18/24 1723)    milrinone 0.25 mcg/kg/min (10/18/24 1809)    norepinephrine 5 mcg/min (10/18/24 2045)      sodium chloride flush  5-40 mL IntraVENous 2 times per day    [START ON 10/19/2024] aspirin  81 mg Oral Daily    [START ON 10/19/2024] multivitamin  1 tablet Oral Daily    vitamin D  50,000 Units Oral Weekly    [START ON 10/19/2024] pantoprazole  40 mg Oral Daily    [START ON 10/19/2024] escitalopram  10 mg Oral Daily    enoxaparin  30 mg SubCUTAneous Q12H       Vital Signs:   T: 97.2: P: 88 RR: 18 B/P: 110/73: O2 Sat: 98% 2 L NC: I/O: Pending GCS: 15  Body mass index is 32.08 kg/m²..    General:   Obese appearing male laying in bed  HEENT:  normocephalic and atraumatic.  No scleral icterus. PERR  Neck: supple.  No Thyromegaly.  Lungs: clear to auscultation.  No retractions  Cardiac: RRR.  No JVD.  Abdomen: soft.  Nontender.  Extremities:  No clubbing, cyanosis, or edema x 4.    Vasculature: capillary refill < 3 seconds. Palpable dorsalis pedis pulses.  Skin:  warm and dry.  Psych:  Alert and oriented x3.  Affect appropriate  Lymph:  No supraclavicular adenopathy.  Neurologic:  No focal deficit. No seizures.    Data: (All radiographs, tracings, PFTs, and imaging are personally viewed and interpreted unless otherwise noted).   Sodium 141 potassium 4.6 chloride 103 CO2 26 BUN 34 creatinine 1.4 glucose 113 calcium 9.5  WBC 10.1 hemoglobin 15.8 hematocrit 49.9 platelet 490  CXR demonstrated right mid and lower lung atelectasis/infiltrate with moderate stable cardiomegaly.    CC time 35 minutes.  Time was discontiguous and does not include procedures.  Electronically signed by Gilbert Garcia MD PGY2

## 2024-10-19 NOTE — PROCEDURES
PROCEDURE NOTE  Date: 10/19/2024   Name: Kush Perea  YOB: 1955    Procedures    3 12 lead EKG completed. Results handed to RN

## 2024-10-19 NOTE — CARE COORDINATION
10/19/24 1877   Service Assessment   Patient Orientation Alert and Oriented   Cognition Alert   History Provided By Patient   Primary Caregiver Self   Accompanied By/Relationship 2 sons and DIL   Support Systems Spouse/Significant Other;Children;Family Members   Patient's Healthcare Decision Maker is: Legal Next of Kin  (States has HCPOA paperwork; wife is HCPOA)   PCP Verified by CM Yes   Prior Functional Level Independent in ADLs/IADLs   Current Functional Level Independent in ADLs/IADLs   Can patient return to prior living arrangement Yes   Ability to make needs known: Good   Family able to assist with home care needs: Yes   Would you like for me to discuss the discharge plan with any other family members/significant others, and if so, who? No   Financial Resources Medicare  ([employer] 2nd insurance)   Community Resources Other (Comment)  ([facility])   Social/Functional History   Active  Yes   Discharge Planning   Type of Residence House   Living Arrangements Spouse/Significant Other   Current Services Prior To Admission None   Potential Assistance Needed Home Care   DME Ordered? No   Potential Assistance Purchasing Medications No   Type of Home Care Services None   Patient expects to be discharged to: House   Services At/After Discharge   Confirm Follow Up Transport Family   Condition of Participation: Discharge Planning   The Plan for Transition of Care is related to the following treatment goals: \"Be able to breathe and walk out of here.\"     Patient Goals/Plan/Treatment Preferences: Home with wife. Did not use any DME PTA. Monitor for needs as course progresses.     If patient is discharged prior to next notation, then this note serves as note for discharge by case management.

## 2024-10-19 NOTE — PROGRESS NOTES
IntraVENous 2 times per day    aspirin  81 mg Oral Daily    multivitamin  1 tablet Oral Daily    vitamin D  50,000 Units Oral Weekly    pantoprazole  40 mg Oral Daily    escitalopram  10 mg Oral Daily      amiodarone 0.5 mg/min (10/19/24 0704)    VASOpressin 20 Units in sodium chloride 0.9 % 100 mL infusion 0.03 Units/min (10/19/24 0619)    heparin (PORCINE) Infusion 12 Units/kg/hr (10/19/24 1118)    sodium chloride      bumetanide (BUMEX) 12.5 mg in sodium chloride 0.9 % 125 mL infusion 0.5 mg/hr (10/19/24 0619)    milrinone 0.375 mcg/kg/min (10/19/24 1302)     heparin (porcine), 4,000 Units, PRN  heparin (porcine), 2,000 Units, PRN  sodium chloride flush, 5-40 mL, PRN  sodium chloride, , PRN  atropine, 0.5 mg, Once PRN  ondansetron, 4 mg, Q8H PRN   Or  ondansetron, 4 mg, Q6H PRN  polyethylene glycol, 17 g, Daily PRN  acetaminophen, 650 mg, Q6H PRN   Or  acetaminophen, 650 mg, Q6H PRN        Diagnostics:    Echo:   Left Ventricle Severely reduced left ventricular systolic function with a visually estimated EF of 10 -15%. Left ventricle is severely dilated. Normal wall thickness. Severe global hypokinesis present. Indeterminate diastolic function.   Left Atrium Left atrium is severely dilated. Normal sized appendage. No left atrial appendage thrombus noted.   Right Ventricle Right ventricle is severely dilated. Lead present in the right ventricle. Severely reduced systolic function.   Right Atrium Right atrium is severely dilated.   Aortic Valve Bicuspid valve with commisural fusion of the right and left cusps. Mild regurgitation. No stenosis.   Mitral Valve Severe annular dilation. Mild regurgitation. No stenosis noted.   Tricuspid Valve Severe annular dilation. Moderate regurgitation. No stenosis noted.   Pulmonic Valve The pulmonic valve visualization is suboptimal but appears to be functioning normally. No regurgitation. No stenosis noted.   Aorta Normal sized ascending aorta.   Pericardium No pericardial  effusion.       Stress: 1/16/24    Stress Combined Conclusion: The study is positive for myocardial infarction in the inferior wall with andre-infarct ischemia. Findings suggest a moderate risk of cardiac events.    Stress Function: Left ventricular function post-stress is abnormal. Global function is severely reduced. Post-stress ejection fraction is 28%. The stress end diastolic cavity size is severely enlarged.    Perfusion Conclusion: TID ratio is 1.11.    ECG: Resting ECG demonstrates atrial fibrillation.    Stress Test: A pharmacological stress test was performed using lexiscan.      Right Heart Cath: 10/18/24    Elevated intracardiac pressures    CI = 1.7 (low-output)      Left heart cath   12/20/2020  SUMMARY:  Successful PCI of the SVG to ramus intermedius with one  drug-eluting stent; OCT of the SVG to RCA to confirm no thrombosis;  patent LAD; occluded SVG to OM.     PLAN:  1.  DAPT.  2.  Optimal medical therapy.  3.  Risk factor management.  4.  Routine access site care.  5.  Overnight observation.  6.  IV fluids.  7.  Guideline-directed therapy for CAD.  8.  Follow up with myself in two to four weeks postprocedure.     All the above was explained to the patient and the patient's family.   They are agreeable and amenable to the plan.           EULA MARINO MD       Lab Data:    Cardiac Enzymes:  No results for input(s): \"CKTOTAL\", \"CKMB\", \"CKMBINDEX\", \"TROPONINI\" in the last 72 hours.    CBC:   Lab Results   Component Value Date/Time    WBC 8.8 10/19/2024 04:00 AM    RBC 5.33 10/19/2024 04:00 AM    HGB 14.7 10/19/2024 04:00 AM    HCT 45.4 10/19/2024 04:00 AM     10/19/2024 04:00 AM       CMP:    Lab Results   Component Value Date/Time     10/19/2024 12:05 AM    K 4.2 10/19/2024 12:05 AM    K 3.9 11/23/2022 03:55 PM     10/19/2024 12:05 AM    CO2 22 10/19/2024 12:05 AM    BUN 31 10/19/2024 12:05 AM    CREATININE 1.1 10/19/2024 12:05 AM    AGRATIO 2.8 10/04/2024 10:15 AM    LABGLOM 73

## 2024-10-19 NOTE — SIGNIFICANT EVENT
68 yo M w/PMHx of HTN, DAVID on CPAP, CAD s/p CABG x 4 (2012; PCI of the LCX/OM, 5/2019; S/p SVG-RI, 12/2020; Occluded SVG-OM), Moderate AS per DSE, Persistent A-fib s/p DCCV 3/14/24 -> PVI, PWI and CTI ablation (4/16/24), Symptomatic sinus bradycardia precluding use of AAD, and Ischemic CHF (NYHA II - III, EF 30% w/LifeVest; improved to 35 - 40% per JASON 3/2024) -> s/p JASON w/RHC showing CI 1.7 w/elevated intracardiac pressures (lower EF?) -> PAC placed for guided mgmt. w/Milrinone / Bumex gtt + Levophed gtt. -> called to bedside at ~0040 for HR in 160's, vagal attempt unsuccessful, labs still pending, STAT EKG ordered, Milrinone paused, Adenosine 6mg ordered -> appeared NCT on monitor, but EKG shows WCT, pt remained relatively Asx w/only mild flushing described -> w/H/o A-fib and LBBB / IVCD on previous EKGs, decided to proceed w/2g Mg f/b Amio bolus + gtt. (See Nursing note for additional details of multiple self-converting events prior to the Amio) -> pt HR slowed and he has maintained thus far in upper 90's - low 100's; discussed possible DCCV if HR goes back up despite Amio gtt -> agreeable if needed as he has had before. Pt is on Warfarin OP w/INR 1.93 and pharmacy currently on to assist dosing. Bumex gtt continued, Milrinone / Levo paused w/hopes to resume slowly once HR maintaining.       Initial EKG prior to adjusting mgmt:           Repeat EKG after Mg + Amio bolus given:           Electronically signed by Arthur Wild MD on 10/19/24 at 1:37 AM EDT

## 2024-10-19 NOTE — PROGRESS NOTES
0040 Patient in SVT rate of 160s. Dr. Wild called to bedside. Patient attached to defibrillator. Attempting vagal maneuvers- unsuccessful. EKG called.    0042 Milrinone paused per Dr. Wild at bedside.   Preparing to administer 2 grams of magnesium.    0044 EKG at bedside. 6mg of adenosine prepared.    0046 Patient self-converted at this time. New EKG obtained.    0049 Patient back in SVT rate of 160s. See orders for amiodarone. Allergies reviewed with provider and patient. Proceed with amiodarone administration. Pharmacy called and clarified.     0052 See MAR for start of amiodarone bolus.    0053 Levophed paused at this time.    0100 Dr. Wild discussing potential need for cardioversion with patient.    0102 Patient self-converted back to A-fib with frequent PVCs. No cardioversion at this time.    0105 New EKG obtained. Patient remains in a-fib on amiodarone drip. No further orders at this time.

## 2024-10-19 NOTE — PROGRESS NOTES
spoke with Dr. Mamadou Davis MD from cardiology service regarding management plan.  Patient family updated.  Kush Perea educated about my impression and plan. He verbalizes understanding.    Electronically signed by   Maliha Wren MD on 10/19/2024 at 12:34 PM

## 2024-10-19 NOTE — PROGRESS NOTES
Warfarin Pharmacy Consult Note    Kush Perea is a 69 y.o. male for whom pharmacy has been asked to manage warfarin therapy.     Consulting Provider: Dr. Garcia  Indication: Atrial fibrillation/Atrial flutter  Target INR: 2.0-3.0   Warfarin dose prior to admission: 10 mg MWF and 12.5 mg TuThSaSu  Outpatient warfarin provider: [facility] Medication Management    Recent Labs     10/18/24  1131 10/19/24  0005 10/19/24  0400   HGB 15.8 15.5 14.7   * 668* 419*     Recent Labs     10/17/24  1537 10/18/24  1131 10/19/24  0400   INR 2.40* 1.93* 1.84*     Concurrent anticoagulants/antiplatelets: aspirin, heparin drip  Significant warfarin drug-drug interactions: amiodarone drip, lexapro, multivitamin    Date INR Warfarin Dose   10/19/24 1.84 15 mg                                   INR will be monitored routinely until therapeutic INR is achieved.    Pharmacy will continue to follow. Thank you for the consult,     Carlos Cazares, KunalD, BCPS  10/19/2024  11:59 AM

## 2024-10-20 LAB
ANION GAP SERPL CALC-SCNC: 11 MEQ/L (ref 8–16)
ANION GAP SERPL CALC-SCNC: 13 MEQ/L (ref 8–16)
ANION GAP SERPL CALC-SCNC: 14 MEQ/L (ref 8–16)
APTT PPP: 40.7 SECONDS (ref 22–38)
APTT PPP: 58.6 SECONDS (ref 22–38)
APTT PPP: 63.6 SECONDS (ref 22–38)
APTT PPP: 63.8 SECONDS (ref 22–38)
BUN SERPL-MCNC: 23 MG/DL (ref 7–22)
BUN SERPL-MCNC: 24 MG/DL (ref 7–22)
BUN SERPL-MCNC: 24 MG/DL (ref 7–22)
CALCIUM SERPL-MCNC: 9.2 MG/DL (ref 8.5–10.5)
CALCIUM SERPL-MCNC: 9.3 MG/DL (ref 8.5–10.5)
CALCIUM SERPL-MCNC: 9.5 MG/DL (ref 8.5–10.5)
CHLORIDE SERPL-SCNC: 92 MEQ/L (ref 98–111)
CHLORIDE SERPL-SCNC: 95 MEQ/L (ref 98–111)
CHLORIDE SERPL-SCNC: 97 MEQ/L (ref 98–111)
CO2 SERPL-SCNC: 28 MEQ/L (ref 23–33)
CO2 SERPL-SCNC: 28 MEQ/L (ref 23–33)
CO2 SERPL-SCNC: 29 MEQ/L (ref 23–33)
CREAT SERPL-MCNC: 1.4 MG/DL (ref 0.4–1.2)
CREAT SERPL-MCNC: 1.5 MG/DL (ref 0.4–1.2)
CREAT SERPL-MCNC: 1.5 MG/DL (ref 0.4–1.2)
DEPRECATED RDW RBC AUTO: 52.3 FL (ref 35–45)
ERYTHROCYTE [DISTWIDTH] IN BLOOD BY AUTOMATED COUNT: 17.9 % (ref 11.5–14.5)
GFR SERPL CREATININE-BSD FRML MDRD: 50 ML/MIN/1.73M2
GFR SERPL CREATININE-BSD FRML MDRD: 50 ML/MIN/1.73M2
GFR SERPL CREATININE-BSD FRML MDRD: 54 ML/MIN/1.73M2
GLUCOSE SERPL-MCNC: 156 MG/DL (ref 70–108)
GLUCOSE SERPL-MCNC: 156 MG/DL (ref 70–108)
GLUCOSE SERPL-MCNC: 160 MG/DL (ref 70–108)
HCT VFR BLD AUTO: 46.3 % (ref 42–52)
HGB BLD-MCNC: 14.8 GM/DL (ref 14–18)
INR PPP: 1.64 (ref 0.85–1.13)
MAGNESIUM SERPL-MCNC: 1.9 MG/DL (ref 1.6–2.4)
MCH RBC QN AUTO: 27.3 PG (ref 26–33)
MCHC RBC AUTO-ENTMCNC: 32 GM/DL (ref 32.2–35.5)
MCV RBC AUTO: 85.4 FL (ref 80–94)
PHOSPHATE SERPL-MCNC: 4.7 MG/DL (ref 2.4–4.7)
PLATELET # BLD AUTO: 410 THOU/MM3 (ref 130–400)
PMV BLD AUTO: 11.4 FL (ref 9.4–12.4)
POTASSIUM SERPL-SCNC: 3.3 MEQ/L (ref 3.5–5.2)
POTASSIUM SERPL-SCNC: 3.3 MEQ/L (ref 3.5–5.2)
POTASSIUM SERPL-SCNC: 3.6 MEQ/L (ref 3.5–5.2)
RBC # BLD AUTO: 5.42 MILL/MM3 (ref 4.7–6.1)
SODIUM SERPL-SCNC: 134 MEQ/L (ref 135–145)
SODIUM SERPL-SCNC: 136 MEQ/L (ref 135–145)
SODIUM SERPL-SCNC: 137 MEQ/L (ref 135–145)
WBC # BLD AUTO: 9.3 THOU/MM3 (ref 4.8–10.8)

## 2024-10-20 PROCEDURE — 6370000000 HC RX 637 (ALT 250 FOR IP): Performed by: PHARMACIST

## 2024-10-20 PROCEDURE — 85610 PROTHROMBIN TIME: CPT

## 2024-10-20 PROCEDURE — 6370000000 HC RX 637 (ALT 250 FOR IP): Performed by: INTERNAL MEDICINE

## 2024-10-20 PROCEDURE — 85027 COMPLETE CBC AUTOMATED: CPT

## 2024-10-20 PROCEDURE — 36415 COLL VENOUS BLD VENIPUNCTURE: CPT

## 2024-10-20 PROCEDURE — 99233 SBSQ HOSP IP/OBS HIGH 50: CPT | Performed by: INTERNAL MEDICINE

## 2024-10-20 PROCEDURE — 36620 INSERTION CATHETER ARTERY: CPT

## 2024-10-20 PROCEDURE — 2500000003 HC RX 250 WO HCPCS: Performed by: STUDENT IN AN ORGANIZED HEALTH CARE EDUCATION/TRAINING PROGRAM

## 2024-10-20 PROCEDURE — 83735 ASSAY OF MAGNESIUM: CPT

## 2024-10-20 PROCEDURE — 6360000002 HC RX W HCPCS: Performed by: INTERNAL MEDICINE

## 2024-10-20 PROCEDURE — 84100 ASSAY OF PHOSPHORUS: CPT

## 2024-10-20 PROCEDURE — 2580000003 HC RX 258: Performed by: INTERNAL MEDICINE

## 2024-10-20 PROCEDURE — 80048 BASIC METABOLIC PNL TOTAL CA: CPT

## 2024-10-20 PROCEDURE — 2580000003 HC RX 258: Performed by: STUDENT IN AN ORGANIZED HEALTH CARE EDUCATION/TRAINING PROGRAM

## 2024-10-20 PROCEDURE — 2000000000 HC ICU R&B

## 2024-10-20 PROCEDURE — 6370000000 HC RX 637 (ALT 250 FOR IP)

## 2024-10-20 PROCEDURE — 99232 SBSQ HOSP IP/OBS MODERATE 35: CPT | Performed by: NURSE PRACTITIONER

## 2024-10-20 PROCEDURE — 03HY32Z INSERTION OF MONITORING DEVICE INTO UPPER ARTERY, PERCUTANEOUS APPROACH: ICD-10-PCS | Performed by: INTERNAL MEDICINE

## 2024-10-20 PROCEDURE — 6360000002 HC RX W HCPCS

## 2024-10-20 PROCEDURE — 6360000002 HC RX W HCPCS: Performed by: STUDENT IN AN ORGANIZED HEALTH CARE EDUCATION/TRAINING PROGRAM

## 2024-10-20 PROCEDURE — 85730 THROMBOPLASTIN TIME PARTIAL: CPT

## 2024-10-20 RX ORDER — DOPAMINE HYDROCHLORIDE 160 MG/100ML
1-20 INJECTION, SOLUTION INTRAVENOUS CONTINUOUS
Status: DISCONTINUED | OUTPATIENT
Start: 2024-10-20 | End: 2024-10-20

## 2024-10-20 RX ORDER — WARFARIN SODIUM 7.5 MG/1
15 TABLET ORAL ONCE
Status: COMPLETED | OUTPATIENT
Start: 2024-10-20 | End: 2024-10-20

## 2024-10-20 RX ORDER — ADENOSINE 3 MG/ML
6 INJECTION, SOLUTION INTRAVENOUS ONCE
Status: DISCONTINUED | OUTPATIENT
Start: 2024-10-20 | End: 2024-10-22

## 2024-10-20 RX ORDER — DOPAMINE HYDROCHLORIDE 160 MG/100ML
1-20 INJECTION, SOLUTION INTRAVENOUS CONTINUOUS
Status: DISCONTINUED | OUTPATIENT
Start: 2024-10-20 | End: 2024-10-22

## 2024-10-20 RX ORDER — POTASSIUM CHLORIDE 29.8 MG/ML
20 INJECTION INTRAVENOUS PRN
Status: DISCONTINUED | OUTPATIENT
Start: 2024-10-20 | End: 2024-10-24 | Stop reason: HOSPADM

## 2024-10-20 RX ORDER — POTASSIUM CHLORIDE 1500 MG/1
40 TABLET, EXTENDED RELEASE ORAL ONCE
Status: COMPLETED | OUTPATIENT
Start: 2024-10-20 | End: 2024-10-20

## 2024-10-20 RX ORDER — POTASSIUM CHLORIDE 7.45 MG/ML
10 INJECTION INTRAVENOUS PRN
Status: DISCONTINUED | OUTPATIENT
Start: 2024-10-20 | End: 2024-10-24 | Stop reason: HOSPADM

## 2024-10-20 RX ORDER — MAGNESIUM SULFATE 1 G/100ML
1000 INJECTION INTRAVENOUS ONCE
Status: COMPLETED | OUTPATIENT
Start: 2024-10-20 | End: 2024-10-20

## 2024-10-20 RX ADMIN — VASOPRESSIN 0.03 UNITS/MIN: 20 INJECTION INTRAVENOUS at 02:10

## 2024-10-20 RX ADMIN — AMIODARONE HYDROCHLORIDE 0.5 MG/MIN: 1.8 INJECTION, SOLUTION INTRAVENOUS at 06:48

## 2024-10-20 RX ADMIN — MAGNESIUM SULFATE HEPTAHYDRATE 1000 MG: 1 INJECTION, SOLUTION INTRAVENOUS at 17:57

## 2024-10-20 RX ADMIN — ONDANSETRON 4 MG: 2 INJECTION INTRAMUSCULAR; INTRAVENOUS at 05:17

## 2024-10-20 RX ADMIN — SODIUM CHLORIDE, PRESERVATIVE FREE 10 ML: 5 INJECTION INTRAVENOUS at 11:00

## 2024-10-20 RX ADMIN — MILRINONE LACTATE 0.38 MCG/KG/MIN: 0.2 INJECTION, SOLUTION INTRAVENOUS at 02:14

## 2024-10-20 RX ADMIN — HEPARIN SODIUM 2000 UNITS: 1000 INJECTION INTRAVENOUS; SUBCUTANEOUS at 00:25

## 2024-10-20 RX ADMIN — Medication 1 TABLET: at 10:00

## 2024-10-20 RX ADMIN — MILRINONE LACTATE 0.5 MCG/KG/MIN: 0.2 INJECTION, SOLUTION INTRAVENOUS at 09:59

## 2024-10-20 RX ADMIN — POTASSIUM CHLORIDE 40 MEQ: 1500 TABLET, EXTENDED RELEASE ORAL at 17:34

## 2024-10-20 RX ADMIN — WARFARIN SODIUM 15 MG: 7.5 TABLET ORAL at 17:34

## 2024-10-20 RX ADMIN — DOPAMINE HYDROCHLORIDE IN DEXTROSE 2.5 MCG/KG/MIN: 1.6 INJECTION, SOLUTION INTRAVENOUS at 12:31

## 2024-10-20 RX ADMIN — ESCITALOPRAM OXALATE 10 MG: 10 TABLET ORAL at 10:00

## 2024-10-20 RX ADMIN — POTASSIUM CHLORIDE 20 MEQ: 29.8 INJECTION, SOLUTION INTRAVENOUS at 07:32

## 2024-10-20 RX ADMIN — MILRINONE LACTATE 0.5 MCG/KG/MIN: 0.2 INJECTION, SOLUTION INTRAVENOUS at 17:12

## 2024-10-20 RX ADMIN — ASPIRIN 81 MG: 81 TABLET, COATED ORAL at 10:00

## 2024-10-20 RX ADMIN — AMIODARONE HYDROCHLORIDE 0.5 MG/MIN: 1.8 INJECTION, SOLUTION INTRAVENOUS at 23:09

## 2024-10-20 RX ADMIN — PANTOPRAZOLE SODIUM 40 MG: 40 TABLET, DELAYED RELEASE ORAL at 05:17

## 2024-10-20 RX ADMIN — POTASSIUM CHLORIDE 20 MEQ: 29.8 INJECTION, SOLUTION INTRAVENOUS at 06:41

## 2024-10-20 RX ADMIN — MILRINONE LACTATE 0.5 MCG/KG/MIN: 0.2 INJECTION, SOLUTION INTRAVENOUS at 23:10

## 2024-10-20 RX ADMIN — SODIUM CHLORIDE, PRESERVATIVE FREE 10 ML: 5 INJECTION INTRAVENOUS at 20:45

## 2024-10-20 RX ADMIN — BUMETANIDE 0.5 MG/HR: 0.25 INJECTION INTRAMUSCULAR; INTRAVENOUS at 18:57

## 2024-10-20 RX ADMIN — VASOPRESSIN 0.03 UNITS/MIN: 20 INJECTION INTRAVENOUS at 14:46

## 2024-10-20 NOTE — PROGRESS NOTES
Cardiology Progress Note      Patient:  Kush Perea  YOB: 1955  MRN: [id number]   Acct: [id number]  Admit Date:  10/18/2024  Primary Cardiologist: Mamadou Davis MD      Chief Complaint:   Pt presented for RHC to determine fluid status      Subjective (Events in last 24 hours):   Pt in bed   Sates feeling improved with breathing   Abd not as tight Le no swelling as before   He states he can actually fall asleep now    Tele AFB HR 79 occ pvc  BP stable but maintained with vasopressin 0.03 units/ min    Pt on amiodarone gtt DT NSVT / /AFB RVR last jagjit  (was on levophed at time- off now)     On bumex gtt 0.5 mg\hr  Milrinone gtt 0.375 mcg/kg/min    Last SG readings 1241  CO 4.76  CI 2.08  CVP 18  PA 42/19 (29) WD 19     Diuresed 3350 / overnight   So far today 2L off         10/20/24  Pt sleeping soundly   Per nursing staff - 6-8 beats NSVT overnight -- potassium 3.3 this am    Co 5.4  CI 2.44  PAP 44/31 (31)  WG 22  CVP 15    Bp supported with vasopressin 0.03 mcg/ min   BP running /    Milrinone 0.5 mcg/ min / hr   Bumex 0.5 mg\hr    Objective:   /64   Pulse 85   Temp 97.5 °F (36.4 °C) (Core)   Resp 23   Ht 1.829 m (6')   Wt 98.4 kg (216 lb 14.9 oz)   SpO2 95%   BMI 29.42 kg/m²        TELEMETRY: AFB  HR 79    Physical Exam:  General Appearance: alert and oriented to person, place and time, in no acute distress  Cardiovascular: irregular rhythm, normal S1 and S2, + murmurs, rubs, clicks, or gallops, distal pulses intact,  Pulmonary/Chest: clear to auscultation bilaterally- no wheezes, rales or rhonchi, normal air movement, no respiratory distress  Abdomen: soft, non-tender, non-distended, normal bowel sounds, no masses Extremities: no cyanosis, clubbing or edema, pulses present    Skin: warm and dry, no rash or erythema  Musculoskeletal: normal range of motion, no joint swelling, deformity or tenderness  Neurological: alert, oriented, normal speech, no focal findings or     Lab Results   Component Value Date/Time     10/20/2024 05:20 AM    K 3.3 10/20/2024 05:20 AM    K 3.9 11/23/2022 03:55 PM    CL 95 10/20/2024 05:20 AM    CO2 28 10/20/2024 05:20 AM    BUN 24 10/20/2024 05:20 AM    CREATININE 1.4 10/20/2024 05:20 AM    AGRATIO 2.8 10/04/2024 10:15 AM    LABGLOM 54 10/20/2024 05:20 AM    LABGLOM 82 04/15/2024 10:30 AM    GLUCOSE 156 10/20/2024 05:20 AM    GLUCOSE 125 10/04/2024 10:15 AM    CALCIUM 9.2 10/20/2024 05:20 AM       Hepatic Function Panel:    Lab Results   Component Value Date/Time    ALKPHOS 62 10/04/2024 10:15 AM    ALKPHOS 63 04/19/2024 10:42 AM    ALT 10 10/04/2024 10:15 AM    AST 14 10/04/2024 10:15 AM    BILITOT 0.9 10/04/2024 10:15 AM       Magnesium:    Lab Results   Component Value Date/Time    MG 1.9 10/20/2024 05:20 AM       PT/INR:    Lab Results   Component Value Date/Time    INR 1.64 10/20/2024 05:20 AM       HgBA1c:  No results found for: \"LABA1C\"    FLP:    Lab Results   Component Value Date/Time    TRIG 108 10/18/2024 11:31 AM    HDL 21 10/18/2024 11:31 AM       TSH:    Lab Results   Component Value Date/Time    TSH 3.690 11/23/2022 03:55 PM         Assessment:    Acute decompensated chf [facility] 4 / low CO failure   - continue milrinone   - continue bumex   - continue vasopressin for BP       ICDMP EF 10-15%   - worsened     NSVT / AFB RVR overnight   - on iv amiodarone   - on coumadin --> looking at LAAO procedure        Moderate AS by DSE     Hx AFB ablation 4/15/24   - hx CV 3/15/24    Hx CABG x4 2012  Last cath 12/2020  - Successful PCI of the SVG to ramus intermedius with one  drug-eluting stent; OCT of the SVG to RCA to confirm no thrombosis; patent LAD; occluded SVG to OM.        Plan:  Pt states feeling improved   Continue current gtts for now   Pt may need pump / transfer   Follow          Electronically signed by PA Gilliam CNP on 10/20/2024 at 9:42 AM

## 2024-10-20 NOTE — PROGRESS NOTES
CRITICAL CARE PROGRESS NOTE      Patient:  Kush Perea    Unit/Bed:4D-08/008-A  YOB: 1955  MRN: [id number]   PCP: Diann Vega APRN - CNP  Date of Admission: 10/18/2024  Chief Complaint:-Shortness of breath    Assessment and Plan:    Acute on chronic systolic HFrEF: TTE 10/18 EF 10-15%, LV severely dilated, global hypokinesis, RV severely dilated, severely reduced systolic function.  Aortic valve bicuspid.  Severe annular dilation MV, TV.  LA, RA severely dilated. S/p RHC 10/18, admitted to ICU for [place] guided diuresis.  Cardiac index 1.7.  CVC, [place] Sadaf placed 10/18.  Holding home Toprol  Telemetry  Strict I's and O's/daily weight/fluid restriction  Milrinone drip  Bumex 0.5 mg/h drip  Vasopressin pressor support; avoid levophed due to his arrhythmogenicity  Sustained ventricular tachycardia: Noted overnight unresponsive to mg/Amio bolus./19 normal sinus with periodic PVCs.  Dopamine drip started 10/19 due to poor cardiac index; caused patient to go into flutter; dopamine drip discontinued  K > 4 , Mg >2  Lifepak at bedside  Amiodarone drip  Persistent atrial fibrillation:  [id number] Score: 3 on warfarin.  S/p ablation 3/15/24  Heparin drip  Amiodarone drip  Hyponatremia: Etiology unknown, possibly due to acute on chronic HF R EF  Pending serum osmolality, urine studies  CKD: Holding home ARB in the setting of hypotension  Hypertension: Holding home antihypertensives in the setting of hypotension  Hyperlipidemia: Not on home statin, lipid panel WNL  DAVID: Not on home CPAP  Specified mood disorder: Escitalopram  GI prophylaxis: Protonix 40 mg p.o. daily  DVT prophylaxis: Heparin drip    INITIAL H AND P AND ICU COURSE:  69-year-old male with a past medical history of ischemic CHF NYHA I status post SVG-RI 12/2020 with EF 30% with LifeVest, CAD status post 4V CABG (2012) and PCI (LCx/OM; 5/2019), stage C systolic HF/ICM/HFrEF with moderate LV dysfunction, AF s/p RFA (PVI and CTI;

## 2024-10-20 NOTE — PLAN OF CARE
Problem: ABCDS Injury Assessment  Goal: Absence of physical injury  Outcome: Progressing     Problem: Discharge Planning  Goal: Discharge to home or other facility with appropriate resources  Outcome: Progressing     Problem: Safety - Adult  Goal: Free from fall injury  Outcome: Progressing     Problem: Respiratory - Adult  Goal: Achieves optimal ventilation and oxygenation  Outcome: Progressing     Problem: Cardiovascular - Adult  Goal: Maintains optimal cardiac output and hemodynamic stability  Outcome: Progressing  Goal: Absence of cardiac dysrhythmias or at baseline  Outcome: Progressing     Problem: Metabolic/Fluid and Electrolytes - Adult  Goal: Electrolytes maintained within normal limits  Outcome: Progressing  Goal: Hemodynamic stability and optimal renal function maintained  Outcome: Progressing  Goal: Glucose maintained within prescribed range  Outcome: Progressing     Problem: Hematologic - Adult  Goal: Maintains hematologic stability  Outcome: Progressing     Problem: Chronic Conditions and Co-morbidities  Goal: Patient's chronic conditions and co-morbidity symptoms are monitored and maintained or improved  Outcome: Progressing

## 2024-10-20 NOTE — PROGRESS NOTES
Warfarin Pharmacy Consult Note    Warfarin Indication: Atrial fibrillation/Atrial flutter  Target INR: 2.0-3.0  Dose prior to admission: 10mg MWF, 12.5mg TThSS    Recent Labs     10/18/24  1131 10/19/24  0005 10/19/24  0400   HGB 15.8 15.5 14.7   * 668* 419*     Recent Labs     10/18/24  1131 10/19/24  0400 10/20/24  0520   INR 1.93* 1.84* 1.64*     Concurrent anticoagulants/antiplatelets: aspirin, heparin drip  Significant warfarin drug-drug interactions: amiodarone drip, lexapro, multivitamin    Date INR Warfarin Dose   10/19/24 1.84 15 mg   10/20/24 1.64 15 mg                                Monitoring:                   INR will be monitored daily until therapeutic INR is achieved.    **Please contact pharmacy for discharge instructions when indicated**    Bharti Caban, Pharm.D., BCPS, BCCCP 10/20/2024 10:19 AM

## 2024-10-20 NOTE — FLOWSHEET NOTE
10/20/24 0600   Rhythm Interpretation   Pulse 86   Cardiac Monitor   Telemetry Box Number [id number]   Telemetry Monitor Alarm Parameters per unit standards   Anti-Embolism   Anti-Embolism Devices Pneumatic compression devices, below knee   Laterality Bilateral   Anti-Embolism Intervention Medication;Off (comment)   Treatment Team Notification   Reason for Communication Medication concern  (K 3.3, no replacement orders.)   Name of Team Member Notified Dr. Barraza   Treatment Team Role Resident   Method of Communication Face to face   Response See orders   Notification Time 0600

## 2024-10-20 NOTE — PROCEDURES
PROCEDURE NOTE  Date: 10/20/2024   Name: Kush Perea  YOB: 1955    Insert Arterial Line    Date/Time: 10/20/2024 2:39 PM    Performed by: Donnie Barraza DO  Authorized by: Donnie Barraza DO  Consent: Verbal consent obtained. Written consent not obtained.  Risks and benefits: risks, benefits and alternatives were discussed  Consent given by: patient  Patient understanding: patient states understanding of the procedure being performed  Patient consent: the patient's understanding of the procedure matches consent given  Procedure consent: procedure consent matches procedure scheduled  Relevant documents: relevant documents present and verified  Test results: test results available and properly labeled  Site marked: the operative site was marked  Imaging studies: imaging studies available  Patient identity confirmed: verbally with patient  Time out: Immediately prior to procedure a \"time out\" was called to verify the correct patient, procedure, equipment, support staff and site/side marked as required.  Preparation: Patient was prepped and draped in the usual sterile fashion.  Indications: hemodynamic monitoring  Location: left radial    Anesthesia:  Local Anesthetic: lidocaine 1% with epinephrine    Sedation:  Patient sedated: no    Yaya's test normal: yes  Needle gauge: 18  Seldinger technique: Seldinger technique used  Number of attempts: 1  Post-procedure: line sutured and dressing applied  Post-procedure CMS: normal  Patient tolerance: patient tolerated the procedure well with no immediate complications

## 2024-10-21 ENCOUNTER — APPOINTMENT (OUTPATIENT)
Dept: GENERAL RADIOLOGY | Age: 69
DRG: 286 | End: 2024-10-21
Attending: INTERNAL MEDICINE
Payer: MEDICARE

## 2024-10-21 LAB
ANION GAP SERPL CALC-SCNC: 11 MEQ/L (ref 8–16)
ANION GAP SERPL CALC-SCNC: 15 MEQ/L (ref 8–16)
ANION GAP SERPL CALC-SCNC: 9 MEQ/L (ref 8–16)
APTT PPP: 47.5 SECONDS (ref 22–38)
APTT PPP: 70.5 SECONDS (ref 22–38)
APTT PPP: 88.2 SECONDS (ref 22–38)
BASE EXCESS BLDA CALC-SCNC: 10.8 MMOL/L (ref -2–3)
BUN SERPL-MCNC: 20 MG/DL (ref 7–22)
CALCIUM SERPL-MCNC: 8.9 MG/DL (ref 8.5–10.5)
CALCIUM SERPL-MCNC: 9.2 MG/DL (ref 8.5–10.5)
CALCIUM SERPL-MCNC: 9.4 MG/DL (ref 8.5–10.5)
CHLORIDE SERPL-SCNC: 94 MEQ/L (ref 98–111)
CHLORIDE SERPL-SCNC: 96 MEQ/L (ref 98–111)
CHLORIDE SERPL-SCNC: 96 MEQ/L (ref 98–111)
CO2 SERPL-SCNC: 27 MEQ/L (ref 23–33)
CO2 SERPL-SCNC: 29 MEQ/L (ref 23–33)
CO2 SERPL-SCNC: 32 MEQ/L (ref 23–33)
COLLECTED BY:: ABNORMAL
CREAT SERPL-MCNC: 1.3 MG/DL (ref 0.4–1.2)
CREAT SERPL-MCNC: 1.4 MG/DL (ref 0.4–1.2)
CREAT SERPL-MCNC: 1.4 MG/DL (ref 0.4–1.2)
CREAT UR-MCNC: 30.2 MG/DL
DEPRECATED RDW RBC AUTO: 51.5 FL (ref 35–45)
DEVICE: ABNORMAL
ERYTHROCYTE [DISTWIDTH] IN BLOOD BY AUTOMATED COUNT: 17.2 % (ref 11.5–14.5)
GFR SERPL CREATININE-BSD FRML MDRD: 54 ML/MIN/1.73M2
GFR SERPL CREATININE-BSD FRML MDRD: 54 ML/MIN/1.73M2
GFR SERPL CREATININE-BSD FRML MDRD: 59 ML/MIN/1.73M2
GLUCOSE SERPL-MCNC: 136 MG/DL (ref 70–108)
GLUCOSE SERPL-MCNC: 151 MG/DL (ref 70–108)
GLUCOSE SERPL-MCNC: 153 MG/DL (ref 70–108)
HCO3 BLDA-SCNC: 36 MMOL/L (ref 23–28)
HCT VFR BLD AUTO: 42.7 % (ref 42–52)
HGB BLD-MCNC: 13.8 GM/DL (ref 14–18)
INR PPP: 1.79 (ref 0.85–1.13)
MAGNESIUM SERPL-MCNC: 1.8 MG/DL (ref 1.6–2.4)
MAGNESIUM SERPL-MCNC: 1.9 MG/DL (ref 1.6–2.4)
MCH RBC QN AUTO: 27 PG (ref 26–33)
MCHC RBC AUTO-ENTMCNC: 32.3 GM/DL (ref 32.2–35.5)
MCV RBC AUTO: 83.4 FL (ref 80–94)
ORIGINAL SAMPLE NUMBER: NORMAL
ORIGINAL SAMPLE NUMBER: NORMAL
OSMOLALITY SERPL: NORMAL MOSMOL/KG (ref 275–295)
OSMOLALITY UR: NORMAL MOSMOL/KG (ref 250–750)
PCO2 TEMP ADJ BLDMV: 48 MMHG (ref 41–51)
PH BLDMV: 7.48 [PH] (ref 7.31–7.41)
PHOSPHATE SERPL-MCNC: 4 MG/DL (ref 2.4–4.7)
PLATELET # BLD AUTO: 339 THOU/MM3 (ref 130–400)
PMV BLD AUTO: 11.3 FL (ref 9.4–12.4)
PO2 BLDMV: 37 MMHG (ref 25–40)
POTASSIUM SERPL-SCNC: 3.1 MEQ/L (ref 3.5–5.2)
POTASSIUM SERPL-SCNC: 3.7 MEQ/L (ref 3.5–5.2)
POTASSIUM SERPL-SCNC: 4 MEQ/L (ref 3.5–5.2)
RBC # BLD AUTO: 5.12 MILL/MM3 (ref 4.7–6.1)
REFERENCE LOCATION: NORMAL
REFERENCE LOCATION: NORMAL
REFERENCE RANGE: NORMAL
REFERENCE RANGE: NORMAL
SAO2 % BLDMV: 74 %
SITE: ABNORMAL
SODIUM SERPL-SCNC: 136 MEQ/L (ref 135–145)
SODIUM SERPL-SCNC: 136 MEQ/L (ref 135–145)
SODIUM SERPL-SCNC: 137 MEQ/L (ref 135–145)
SODIUM UR-SCNC: 117 MEQ/L
TEST RESULTS WITH UNITS: NORMAL
TEST RESULTS WITH UNITS: NORMAL
TEST(S) BEING PERFORMED: NORMAL
TEST(S) BEING PERFORMED: NORMAL
UUN 24H UR-MCNC: 178 MG/DL
VENTILATION MODE VENT: ABNORMAL
WBC # BLD AUTO: 7.5 THOU/MM3 (ref 4.8–10.8)

## 2024-10-21 PROCEDURE — 71045 X-RAY EXAM CHEST 1 VIEW: CPT

## 2024-10-21 PROCEDURE — 2000000000 HC ICU R&B

## 2024-10-21 PROCEDURE — 6360000002 HC RX W HCPCS: Performed by: INTERNAL MEDICINE

## 2024-10-21 PROCEDURE — 83735 ASSAY OF MAGNESIUM: CPT

## 2024-10-21 PROCEDURE — 94660 CPAP INITIATION&MGMT: CPT

## 2024-10-21 PROCEDURE — 6370000000 HC RX 637 (ALT 250 FOR IP): Performed by: INTERNAL MEDICINE

## 2024-10-21 PROCEDURE — 99232 SBSQ HOSP IP/OBS MODERATE 35: CPT | Performed by: NURSE PRACTITIONER

## 2024-10-21 PROCEDURE — 84100 ASSAY OF PHOSPHORUS: CPT

## 2024-10-21 PROCEDURE — 84540 ASSAY OF URINE/UREA-N: CPT

## 2024-10-21 PROCEDURE — 6370000000 HC RX 637 (ALT 250 FOR IP)

## 2024-10-21 PROCEDURE — 6370000000 HC RX 637 (ALT 250 FOR IP): Performed by: NURSE PRACTITIONER

## 2024-10-21 PROCEDURE — 2580000003 HC RX 258: Performed by: STUDENT IN AN ORGANIZED HEALTH CARE EDUCATION/TRAINING PROGRAM

## 2024-10-21 PROCEDURE — 6370000000 HC RX 637 (ALT 250 FOR IP): Performed by: PHARMACIST

## 2024-10-21 PROCEDURE — 85730 THROMBOPLASTIN TIME PARTIAL: CPT

## 2024-10-21 PROCEDURE — 6360000002 HC RX W HCPCS: Performed by: STUDENT IN AN ORGANIZED HEALTH CARE EDUCATION/TRAINING PROGRAM

## 2024-10-21 PROCEDURE — 6360000002 HC RX W HCPCS

## 2024-10-21 PROCEDURE — 36415 COLL VENOUS BLD VENIPUNCTURE: CPT

## 2024-10-21 PROCEDURE — 99291 CRITICAL CARE FIRST HOUR: CPT | Performed by: INTERNAL MEDICINE

## 2024-10-21 PROCEDURE — 82803 BLOOD GASES ANY COMBINATION: CPT

## 2024-10-21 PROCEDURE — 83930 ASSAY OF BLOOD OSMOLALITY: CPT

## 2024-10-21 PROCEDURE — 80048 BASIC METABOLIC PNL TOTAL CA: CPT

## 2024-10-21 PROCEDURE — 85610 PROTHROMBIN TIME: CPT

## 2024-10-21 PROCEDURE — 2500000003 HC RX 250 WO HCPCS: Performed by: STUDENT IN AN ORGANIZED HEALTH CARE EDUCATION/TRAINING PROGRAM

## 2024-10-21 PROCEDURE — 82570 ASSAY OF URINE CREATININE: CPT

## 2024-10-21 PROCEDURE — 85027 COMPLETE CBC AUTOMATED: CPT

## 2024-10-21 PROCEDURE — 84300 ASSAY OF URINE SODIUM: CPT

## 2024-10-21 PROCEDURE — 2580000003 HC RX 258: Performed by: INTERNAL MEDICINE

## 2024-10-21 PROCEDURE — 83935 ASSAY OF URINE OSMOLALITY: CPT

## 2024-10-21 RX ORDER — LANOLIN ALCOHOL/MO/W.PET/CERES
400 CREAM (GRAM) TOPICAL ONCE
Status: COMPLETED | OUTPATIENT
Start: 2024-10-21 | End: 2024-10-21

## 2024-10-21 RX ORDER — AMIODARONE HYDROCHLORIDE 200 MG/1
200 TABLET ORAL 2 TIMES DAILY
Status: DISCONTINUED | OUTPATIENT
Start: 2024-10-21 | End: 2024-10-24 | Stop reason: HOSPADM

## 2024-10-21 RX ORDER — MAGNESIUM SULFATE IN WATER 40 MG/ML
2000 INJECTION, SOLUTION INTRAVENOUS ONCE
Status: COMPLETED | OUTPATIENT
Start: 2024-10-21 | End: 2024-10-21

## 2024-10-21 RX ORDER — POTASSIUM CHLORIDE 1500 MG/1
40 TABLET, EXTENDED RELEASE ORAL
Status: DISCONTINUED | OUTPATIENT
Start: 2024-10-21 | End: 2024-10-22

## 2024-10-21 RX ORDER — MIDODRINE HYDROCHLORIDE 10 MG/1
20 TABLET ORAL
Status: DISCONTINUED | OUTPATIENT
Start: 2024-10-21 | End: 2024-10-24 | Stop reason: HOSPADM

## 2024-10-21 RX ORDER — WARFARIN SODIUM 7.5 MG/1
15 TABLET ORAL ONCE
Status: COMPLETED | OUTPATIENT
Start: 2024-10-21 | End: 2024-10-21

## 2024-10-21 RX ADMIN — AMIODARONE HYDROCHLORIDE 200 MG: 200 TABLET ORAL at 21:10

## 2024-10-21 RX ADMIN — AMIODARONE HYDROCHLORIDE 200 MG: 200 TABLET ORAL at 10:29

## 2024-10-21 RX ADMIN — VASOPRESSIN 0.03 UNITS/MIN: 20 INJECTION INTRAVENOUS at 13:49

## 2024-10-21 RX ADMIN — MIDODRINE HYDROCHLORIDE 20 MG: 10 TABLET ORAL at 16:56

## 2024-10-21 RX ADMIN — BUMETANIDE 0.5 MG/HR: 0.25 INJECTION INTRAMUSCULAR; INTRAVENOUS at 21:12

## 2024-10-21 RX ADMIN — WARFARIN SODIUM 15 MG: 7.5 TABLET ORAL at 16:56

## 2024-10-21 RX ADMIN — PANTOPRAZOLE SODIUM 40 MG: 40 TABLET, DELAYED RELEASE ORAL at 05:03

## 2024-10-21 RX ADMIN — SODIUM CHLORIDE, PRESERVATIVE FREE 10 ML: 5 INJECTION INTRAVENOUS at 21:10

## 2024-10-21 RX ADMIN — POTASSIUM CHLORIDE 20 MEQ: 29.8 INJECTION, SOLUTION INTRAVENOUS at 06:24

## 2024-10-21 RX ADMIN — AMIODARONE HYDROCHLORIDE 0.5 MG/MIN: 1.8 INJECTION, SOLUTION INTRAVENOUS at 05:05

## 2024-10-21 RX ADMIN — POTASSIUM CHLORIDE 20 MEQ: 29.8 INJECTION, SOLUTION INTRAVENOUS at 07:07

## 2024-10-21 RX ADMIN — HEPARIN SODIUM 20 UNITS/KG/HR: 10000 INJECTION, SOLUTION INTRAVENOUS at 05:09

## 2024-10-21 RX ADMIN — POTASSIUM CHLORIDE 40 MEQ: 1500 TABLET, EXTENDED RELEASE ORAL at 10:12

## 2024-10-21 RX ADMIN — VASOPRESSIN 0.03 UNITS/MIN: 20 INJECTION INTRAVENOUS at 02:19

## 2024-10-21 RX ADMIN — Medication 1 TABLET: at 08:46

## 2024-10-21 RX ADMIN — ASPIRIN 81 MG: 81 TABLET, COATED ORAL at 08:46

## 2024-10-21 RX ADMIN — Medication 400 MG: at 08:46

## 2024-10-21 RX ADMIN — MAGNESIUM SULFATE HEPTAHYDRATE 2000 MG: 40 INJECTION, SOLUTION INTRAVENOUS at 18:21

## 2024-10-21 RX ADMIN — ESCITALOPRAM OXALATE 10 MG: 10 TABLET ORAL at 08:46

## 2024-10-21 RX ADMIN — MILRINONE LACTATE 0.5 MCG/KG/MIN: 0.2 INJECTION, SOLUTION INTRAVENOUS at 05:04

## 2024-10-21 RX ADMIN — MILRINONE LACTATE 0.5 MCG/KG/MIN: 0.2 INJECTION, SOLUTION INTRAVENOUS at 12:31

## 2024-10-21 RX ADMIN — HEPARIN SODIUM 2000 UNITS: 1000 INJECTION INTRAVENOUS; SUBCUTANEOUS at 00:17

## 2024-10-21 RX ADMIN — HEPARIN SODIUM 20 UNITS/KG/HR: 10000 INJECTION, SOLUTION INTRAVENOUS at 17:42

## 2024-10-21 NOTE — PROGRESS NOTES
visually estimated EF of 10 -15%. Left ventricle is severely dilated. Normal wall thickness. Severe global hypokinesis present. Indeterminate diastolic function.   Left Atrium Left atrium is severely dilated. Normal sized appendage. No left atrial appendage thrombus noted.   Right Ventricle Right ventricle is severely dilated. Lead present in the right ventricle. Severely reduced systolic function.   Right Atrium Right atrium is severely dilated.   Aortic Valve Bicuspid valve with commisural fusion of the right and left cusps. Mild regurgitation. No stenosis.   Mitral Valve Severe annular dilation. Mild regurgitation. No stenosis noted.   Tricuspid Valve Severe annular dilation. Moderate regurgitation. No stenosis noted.   Pulmonic Valve The pulmonic valve visualization is suboptimal but appears to be functioning normally. No regurgitation. No stenosis noted.   Aorta Normal sized ascending aorta.   Pericardium No pericardial effusion.       Stress: 1/16/24    Stress Combined Conclusion: The study is positive for myocardial infarction in the inferior wall with andre-infarct ischemia. Findings suggest a moderate risk of cardiac events.    Stress Function: Left ventricular function post-stress is abnormal. Global function is severely reduced. Post-stress ejection fraction is 28%. The stress end diastolic cavity size is severely enlarged.    Perfusion Conclusion: TID ratio is 1.11.    ECG: Resting ECG demonstrates atrial fibrillation.    Stress Test: A pharmacological stress test was performed using lexiscan.      Right Heart Cath: 10/18/24    Elevated intracardiac pressures    CI = 1.7 (low-output)      Left heart cath   12/20/2020  SUMMARY:  Successful PCI of the SVG to ramus intermedius with one  drug-eluting stent; OCT of the SVG to RCA to confirm no thrombosis;  patent LAD; occluded SVG to OM.     PLAN:  1.  DAPT.  2.  Optimal medical therapy.  3.  Risk factor management.  4.  Routine access site care.  5.   prior echo     NSVT / AFB RVR overnight -- persistent AFB CVR   - on iv amiodarone   - on coumadin --> looking at LAAO procedure    - on heparin gtt      Moderate AS by DSE     Hx AFB ablation 4/15/24   - hx CV 3/15/24    Hx CABG x4 2012  Last cath 12/2020  - Successful PCI of the SVG to ramus intermedius with one  drug-eluting stent; OCT of the SVG to RCA to confirm no thrombosis; patent LAD; occluded SVG to OM.        Plan:  Pt states feeling much improved   Continue current gtts for now - will continue to try and wean milrinone   Pt may need pump / transfer to [facility]  Change to po amiodarone  Echo am -limited    Follow          Electronically signed by PA Gilliam - CNP on 10/21/2024 at 9:10 AM

## 2024-10-21 NOTE — PROGRESS NOTES
Warfarin Pharmacy Consult Note    Warfarin Indication: Atrial fibrillation/Atrial flutter  Target INR: 2.0-3.0  Dose prior to admission: 10mg MWF, 12.5mg TThSS    Recent Labs     10/19/24  0400 10/20/24  1300 10/21/24  0510   HGB 14.7 14.8 13.8*   * 410* 339     Recent Labs     10/19/24  0400 10/20/24  0520 10/21/24  0510   INR 1.84* 1.64* 1.79*     Concurrent anticoagulants/antiplatelets: aspirin, heparin drip  Significant warfarin drug-drug interactions: amiodarone drip, lexapro, multivitamin    Date INR Warfarin Dose   10/19/24 1.84 15 mg   10/20/24 1.64 15 mg   10/21/24 1.79 15 mg                           Monitoring:                   INR will be monitored daily until therapeutic INR is achieved.    **Please contact pharmacy for discharge instructions when indicated**    Bharti Caban, Pharm.D., BCPS, BCCCP 10/21/2024 7:37 AM

## 2024-10-21 NOTE — PROGRESS NOTES
CRITICAL CARE PROGRESS NOTE      Patient:  Kush Perea    Unit/Bed:4D-08/008-A  YOB: 1955  MRN: [id number]   PCP: Diann Vega APRN - CNP  Date of Admission: 10/18/2024  Chief Complaint:-Shortness of breath    Assessment and Plan:    Acute on chronic systolic HFrEF: TTE 10/18 EF 10-15%, LV severely dilated, global hypokinesis, RV severely dilated, severely reduced systolic function.  Aortic valve bicuspid.  Severe annular dilation MV, TV.  LA, RA severely dilated. S/p RHC 10/18, admitted to ICU for [place] guided diuresis.  Cardiac index 1.7.  CVC, [place] Sadaf placed 10/18.  Holding home Toprol. Cardiology following  Telemetry  Strict I's and O's/daily weight/fluid restriction  Milrinone drip, wean as tolerated  Bumex 0.5 mg/h drip  Vasopressin pressor support; avoid levophed due to arrhythmogenicity  Sustained ventricular tachycardia: Noted 10/19 overnight unresponsive to Mg/Amio bolus, 10/19 normal sinus with periodic PVCs.  Dopamine drip started 10/19 due to poor cardiac index; caused patient to go into flutter; dopamine drip discontinued  K > 4 , Mg >2  Lifepak at bedside  Amiodarone drip  Persistent atrial fibrillation:  [id number] Score: 3 on warfarin.  S/p ablation 3/15/24  Heparin drip  Amiodarone drip  TAHMINA: FeNA 38.3% intrinsic disease. Urine sodium 117, urine urea 117, urine nitrogen 178, urine creatinine 30.2.  Ongoing TAHMINA from concurrent Bumex drip.  Hypokalemia: Likely due to ongoing loop diuretic drip.  Electrolyte replacement as needed.  Hyponatremia, resolved: Etiology hypovolemic, due to renal losses. Possibly due to acute on chronic HFrEF,  urine sodium 117, urine urea 117, urine nitrogen 178, urine creatinine 30.2, pending urine osmolality, serum osmolality.  CKD: Holding home ARB in the setting of hypotension  Hypertension: Holding home antihypertensives in the setting of hypotension  Hyperlipidemia: Not on home statin, lipid panel WNL  DAVID: Not on home

## 2024-10-21 NOTE — FLOWSHEET NOTE
10/21/24 0640   Treatment Team Notification   Reason for Communication Evaluate   Name of Team Member Notified Dr. Davis/ELLI Meyer   Treatment Team Role Consulting Provider   Method of Communication Face to face   Response See orders   Notification Time 0640

## 2024-10-21 NOTE — PROGRESS NOTES
10/21/24 0907   Encounter Summary   Encounter Overview/Reason Attempted Encounter   Service Provided For Patient   Referral/Consult From Rounding   Support System Family members   Last Encounter  10/21/24  (N/R)   Complexity of Encounter Low   Begin Time 0902   End Time  0907   Total Time Calculated 5 min   Assessment/Intervention/Outcome   Assessment Unable to assess   Intervention Prayer (assurance of)/[place]     In my encounter with the 69  yr old patient, I attempted to see the patient in ICU, but the patient was unresponsive at this time. No family was present in the room.  I offered a prayer at the pt's side. A  will attempt to see the patient at a later time as a follow up.

## 2024-10-21 NOTE — PROGRESS NOTES
A/O.  Denies any chest pain or SOB.  Does c/o being tired- enc to rest as needed. 1020- Family at bedside- updated on pt condition and POC. 1200- Dr Davis informed of last hemodynamic numbers- orders received.  1228- Dopamine drip started.  1240-- Assisted up to bedside chair/2 staff.- did well with moving.  1333- Pt noted to be in Afib RVR-140--1335 Dr Davis at bedside. Pt trying vagal maneuver.  1342- Pt converted back to A-fib 80-90.  Denied feeling any chest pain with episode.  1820- Remains in chair- no complaints.

## 2024-10-21 NOTE — CARE COORDINATION
Case Management Assessment Initial Evaluation    Date/Time of Evaluation: 10/21/2024 3:06 PM  Assessment Completed by: Maryellen Nunez RN    If patient is discharged prior to next notation, then this note serves as note for discharge by case management.    Patient Name: Kush Perea                   YOB: 1955  Diagnosis: HFrEF (heart failure with reduced ejection fraction) (HCC) [I50.20]  SERRANO (dyspnea on exertion) [R06.09]  Weight gain [R63.5]  Abdominal bloating [R14.0]  Cardiomyopathy (HCC) [I42.9]  Low output heart failure (HCC) [I50.9]                   Date / Time: 10/18/2024 11:14 AM  Location: [address] [place] [address]     Patient Admission Status: Inpatient   Readmission Risk Low 0-14, Mod 15-19), High > 20: Readmission Risk Score: 11    Current PCP: Diann Vega APRN - CNP  Health Care Decision Makers:   Primary Decision Maker: Amy Perea - [facility] - [phone]    Additional Case Management Notes: Presented to 2E for scheduled cardiac cath. Found to have CHF with volume overload. Admitted to ICU for SGC guided diuresis and primacor drip. On 10/19 he went into Afib RVR rate 160's, vagal maneuver failed, levo stopped, amio bolus and drip initiated. Rate slowed to 100's, and later in morning on 10/19 was in 70's. On 10/20 was noted that had 6-8 beats non-sustained VTach overnight. Today, RN reports unsuccessful in attempts to wean primacor drip. Plan to continue to diurese. CI 1.9 today. Plan for ECHO tomorrow. May need primacor (Milrinone) pump; which would require transfer to tertiary center.     Sats 98% on 3L O2. Afebrile. Afib 70's. Ox4. Telemetry, SGC, n/v checks, wound care, SCDs. Bumex @ 0.5 mg/hr, heparin drip, primacor @ 0.5 mcg/kg/min, vasopressin @ 0.03 units/min, amio, asa, lexapro, midodrine 20 mg tid, prn zofran, protonix, K+, vit D, coumadin - pharmacy dosing, Electrolyte replacement protocols. BUN 34 - now 20, Creat 1.4, INR 1.93 - now 1.79.     Procedures:   10/18 Cardiac

## 2024-10-22 ENCOUNTER — APPOINTMENT (OUTPATIENT)
Age: 69
DRG: 286 | End: 2024-10-22
Attending: INTERNAL MEDICINE
Payer: MEDICARE

## 2024-10-22 ENCOUNTER — APPOINTMENT (OUTPATIENT)
Dept: GENERAL RADIOLOGY | Age: 69
DRG: 286 | End: 2024-10-22
Attending: INTERNAL MEDICINE
Payer: MEDICARE

## 2024-10-22 LAB
ANION GAP SERPL CALC-SCNC: 11 MEQ/L (ref 8–16)
ANION GAP SERPL CALC-SCNC: 13 MEQ/L (ref 8–16)
APTT PPP: 163.8 SECONDS (ref 22–38)
APTT PPP: 62.4 SECONDS (ref 22–38)
APTT PPP: 78.5 SECONDS (ref 22–38)
BASE EXCESS BLDA CALC-SCNC: 7.2 MMOL/L (ref -2–3)
BUN SERPL-MCNC: 20 MG/DL (ref 7–22)
BUN SERPL-MCNC: 20 MG/DL (ref 7–22)
CALCIUM SERPL-MCNC: 9.2 MG/DL (ref 8.5–10.5)
CALCIUM SERPL-MCNC: 9.3 MG/DL (ref 8.5–10.5)
CHLORIDE SERPL-SCNC: 93 MEQ/L (ref 98–111)
CHLORIDE SERPL-SCNC: 93 MEQ/L (ref 98–111)
CO2 SERPL-SCNC: 29 MEQ/L (ref 23–33)
CO2 SERPL-SCNC: 33 MEQ/L (ref 23–33)
COLLECTED BY:: ABNORMAL
CREAT SERPL-MCNC: 1.4 MG/DL (ref 0.4–1.2)
CREAT SERPL-MCNC: 1.4 MG/DL (ref 0.4–1.2)
DEPRECATED RDW RBC AUTO: 50.2 FL (ref 35–45)
DEVICE: ABNORMAL
ECHO AV CUSP MM: 1.7 CM
ECHO BSA: 2.33 M2
ECHO LA DIAMETER INDEX: 2.25 CM/M2
ECHO LA DIAMETER: 4.9 CM
ECHO LV EDV A2C: 200 ML
ECHO LV EDV A4C: 220 ML
ECHO LV EDV INDEX A4C: 101 ML/M2
ECHO LV EDV NDEX A2C: 92 ML/M2
ECHO LV EJECTION FRACTION A2C: 13 %
ECHO LV EJECTION FRACTION A4C: 12 %
ECHO LV EJECTION FRACTION BIPLANE: 15 % (ref 55–100)
ECHO LV ESV A2C: 175 ML
ECHO LV ESV A4C: 194 ML
ECHO LV ESV INDEX A2C: 80 ML/M2
ECHO LV ESV INDEX A4C: 89 ML/M2
ECHO LV FRACTIONAL SHORTENING: 6 % (ref 28–44)
ECHO LV INTERNAL DIMENSION DIASTOLE INDEX: 3.21 CM/M2
ECHO LV INTERNAL DIMENSION DIASTOLIC: 7 CM (ref 4.2–5.9)
ECHO LV INTERNAL DIMENSION SYSTOLIC INDEX: 3.03 CM/M2
ECHO LV INTERNAL DIMENSION SYSTOLIC: 6.6 CM
ECHO LV IVSD: 0.8 CM (ref 0.6–1)
ECHO LV MASS 2D: 244.4 G (ref 88–224)
ECHO LV MASS INDEX 2D: 112.1 G/M2 (ref 49–115)
ECHO LV POSTERIOR WALL DIASTOLIC: 0.8 CM (ref 0.6–1)
ECHO LV RELATIVE WALL THICKNESS RATIO: 0.23
ECHO RV INTERNAL DIMENSION: 3.1 CM
ECHO RV TAPSE: 1.1 CM (ref 1.7–?)
ERYTHROCYTE [DISTWIDTH] IN BLOOD BY AUTOMATED COUNT: 17.6 % (ref 11.5–14.5)
GFR SERPL CREATININE-BSD FRML MDRD: 54 ML/MIN/1.73M2
GFR SERPL CREATININE-BSD FRML MDRD: 54 ML/MIN/1.73M2
GLUCOSE SERPL-MCNC: 108 MG/DL (ref 70–108)
GLUCOSE SERPL-MCNC: 125 MG/DL (ref 70–108)
HCO3 BLDA-SCNC: 33 MMOL/L (ref 23–28)
HCT VFR BLD AUTO: 44.5 % (ref 42–52)
HGB BLD-MCNC: 14.6 GM/DL (ref 14–18)
INR PPP: 2.25 (ref 0.85–1.13)
MAGNESIUM SERPL-MCNC: 2 MG/DL (ref 1.6–2.4)
MCH RBC QN AUTO: 27.1 PG (ref 26–33)
MCHC RBC AUTO-ENTMCNC: 32.8 GM/DL (ref 32.2–35.5)
MCV RBC AUTO: 82.7 FL (ref 80–94)
PCO2 TEMP ADJ BLDMV: 47 MMHG (ref 41–51)
PH BLDMV: 7.45 [PH] (ref 7.31–7.41)
PHOSPHATE SERPL-MCNC: 3.7 MG/DL (ref 2.4–4.7)
PLATELET # BLD AUTO: 397 THOU/MM3 (ref 130–400)
PMV BLD AUTO: 11.4 FL (ref 9.4–12.4)
PO2 BLDMV: 32 MMHG (ref 25–40)
POTASSIUM SERPL-SCNC: 3.2 MEQ/L (ref 3.5–5.2)
POTASSIUM SERPL-SCNC: 3.7 MEQ/L (ref 3.5–5.2)
RBC # BLD AUTO: 5.38 MILL/MM3 (ref 4.7–6.1)
SAO2 % BLDMV: 64 %
SITE: ABNORMAL
SODIUM SERPL-SCNC: 135 MEQ/L (ref 135–145)
SODIUM SERPL-SCNC: 137 MEQ/L (ref 135–145)
VENTILATION MODE VENT: ABNORMAL
WBC # BLD AUTO: 8.7 THOU/MM3 (ref 4.8–10.8)

## 2024-10-22 PROCEDURE — 93321 DOPPLER ECHO F-UP/LMTD STD: CPT | Performed by: INTERNAL MEDICINE

## 2024-10-22 PROCEDURE — 6370000000 HC RX 637 (ALT 250 FOR IP)

## 2024-10-22 PROCEDURE — 2000000000 HC ICU R&B

## 2024-10-22 PROCEDURE — 6370000000 HC RX 637 (ALT 250 FOR IP): Performed by: INTERNAL MEDICINE

## 2024-10-22 PROCEDURE — 6360000002 HC RX W HCPCS: Performed by: NURSE PRACTITIONER

## 2024-10-22 PROCEDURE — 93308 TTE F-UP OR LMTD: CPT | Performed by: INTERNAL MEDICINE

## 2024-10-22 PROCEDURE — 6360000002 HC RX W HCPCS: Performed by: INTERNAL MEDICINE

## 2024-10-22 PROCEDURE — 83735 ASSAY OF MAGNESIUM: CPT

## 2024-10-22 PROCEDURE — 6360000002 HC RX W HCPCS: Performed by: STUDENT IN AN ORGANIZED HEALTH CARE EDUCATION/TRAINING PROGRAM

## 2024-10-22 PROCEDURE — 2700000000 HC OXYGEN THERAPY PER DAY

## 2024-10-22 PROCEDURE — 94660 CPAP INITIATION&MGMT: CPT

## 2024-10-22 PROCEDURE — 6370000000 HC RX 637 (ALT 250 FOR IP): Performed by: NURSE PRACTITIONER

## 2024-10-22 PROCEDURE — 85610 PROTHROMBIN TIME: CPT

## 2024-10-22 PROCEDURE — 85027 COMPLETE CBC AUTOMATED: CPT

## 2024-10-22 PROCEDURE — 5A09457 ASSISTANCE WITH RESPIRATORY VENTILATION, 24-96 CONSECUTIVE HOURS, CONTINUOUS POSITIVE AIRWAY PRESSURE: ICD-10-PCS | Performed by: INTERNAL MEDICINE

## 2024-10-22 PROCEDURE — 93308 TTE F-UP OR LMTD: CPT

## 2024-10-22 PROCEDURE — 84100 ASSAY OF PHOSPHORUS: CPT

## 2024-10-22 PROCEDURE — 2580000003 HC RX 258: Performed by: NURSE PRACTITIONER

## 2024-10-22 PROCEDURE — 82803 BLOOD GASES ANY COMBINATION: CPT

## 2024-10-22 PROCEDURE — 6360000002 HC RX W HCPCS

## 2024-10-22 PROCEDURE — 2580000003 HC RX 258: Performed by: INTERNAL MEDICINE

## 2024-10-22 PROCEDURE — 85730 THROMBOPLASTIN TIME PARTIAL: CPT

## 2024-10-22 PROCEDURE — 99291 CRITICAL CARE FIRST HOUR: CPT | Performed by: INTERNAL MEDICINE

## 2024-10-22 PROCEDURE — 94761 N-INVAS EAR/PLS OXIMETRY MLT: CPT

## 2024-10-22 PROCEDURE — 36415 COLL VENOUS BLD VENIPUNCTURE: CPT

## 2024-10-22 PROCEDURE — 71045 X-RAY EXAM CHEST 1 VIEW: CPT

## 2024-10-22 PROCEDURE — 99232 SBSQ HOSP IP/OBS MODERATE 35: CPT | Performed by: NURSE PRACTITIONER

## 2024-10-22 PROCEDURE — 87086 URINE CULTURE/COLONY COUNT: CPT

## 2024-10-22 PROCEDURE — 6370000000 HC RX 637 (ALT 250 FOR IP): Performed by: PHARMACIST

## 2024-10-22 PROCEDURE — 80048 BASIC METABOLIC PNL TOTAL CA: CPT

## 2024-10-22 PROCEDURE — 93325 DOPPLER ECHO COLOR FLOW MAPG: CPT | Performed by: INTERNAL MEDICINE

## 2024-10-22 RX ORDER — POTASSIUM CHLORIDE 1500 MG/1
20 TABLET, EXTENDED RELEASE ORAL
Status: DISCONTINUED | OUTPATIENT
Start: 2024-10-22 | End: 2024-10-24 | Stop reason: HOSPADM

## 2024-10-22 RX ORDER — MILRINONE LACTATE 0.2 MG/ML
.0625-.75 INJECTION, SOLUTION INTRAVENOUS CONTINUOUS
Status: DISCONTINUED | OUTPATIENT
Start: 2024-10-22 | End: 2024-10-23

## 2024-10-22 RX ORDER — SPIRONOLACTONE 25 MG/1
25 TABLET ORAL DAILY
Status: DISCONTINUED | OUTPATIENT
Start: 2024-10-22 | End: 2024-10-24 | Stop reason: HOSPADM

## 2024-10-22 RX ORDER — WARFARIN SODIUM 7.5 MG/1
7.5 TABLET ORAL
Status: COMPLETED | OUTPATIENT
Start: 2024-10-22 | End: 2024-10-22

## 2024-10-22 RX ORDER — BUMETANIDE 1 MG/1
1 TABLET ORAL 2 TIMES DAILY
Status: DISCONTINUED | OUTPATIENT
Start: 2024-10-22 | End: 2024-10-24 | Stop reason: HOSPADM

## 2024-10-22 RX ORDER — CALCIUM CARBONATE 500 MG/1
500 TABLET, CHEWABLE ORAL 3 TIMES DAILY PRN
Status: DISCONTINUED | OUTPATIENT
Start: 2024-10-22 | End: 2024-10-24 | Stop reason: HOSPADM

## 2024-10-22 RX ADMIN — SODIUM CHLORIDE, PRESERVATIVE FREE 10 ML: 5 INJECTION INTRAVENOUS at 08:47

## 2024-10-22 RX ADMIN — AMIODARONE HYDROCHLORIDE 200 MG: 200 TABLET ORAL at 08:47

## 2024-10-22 RX ADMIN — BUMETANIDE 0.5 MG/HR: 0.25 INJECTION INTRAMUSCULAR; INTRAVENOUS at 17:50

## 2024-10-22 RX ADMIN — ANTACID TABLETS 500 MG: 500 TABLET, CHEWABLE ORAL at 20:26

## 2024-10-22 RX ADMIN — POTASSIUM CHLORIDE 20 MEQ: 29.8 INJECTION, SOLUTION INTRAVENOUS at 05:11

## 2024-10-22 RX ADMIN — SODIUM CHLORIDE, PRESERVATIVE FREE 10 ML: 5 INJECTION INTRAVENOUS at 20:27

## 2024-10-22 RX ADMIN — ASPIRIN 81 MG: 81 TABLET, COATED ORAL at 08:47

## 2024-10-22 RX ADMIN — POTASSIUM CHLORIDE 20 MEQ: 1500 TABLET, EXTENDED RELEASE ORAL at 08:47

## 2024-10-22 RX ADMIN — HEPARIN SODIUM 20 UNITS/KG/HR: 10000 INJECTION, SOLUTION INTRAVENOUS at 04:19

## 2024-10-22 RX ADMIN — HEPARIN SODIUM 16 UNITS/KG/HR: 10000 INJECTION, SOLUTION INTRAVENOUS at 21:18

## 2024-10-22 RX ADMIN — MIDODRINE HYDROCHLORIDE 20 MG: 10 TABLET ORAL at 11:58

## 2024-10-22 RX ADMIN — WARFARIN SODIUM 7.5 MG: 7.5 TABLET ORAL at 17:48

## 2024-10-22 RX ADMIN — MILRINONE LACTATE 0.12 MCG/KG/MIN: 0.2 INJECTION, SOLUTION INTRAVENOUS at 23:19

## 2024-10-22 RX ADMIN — MIDODRINE HYDROCHLORIDE 20 MG: 10 TABLET ORAL at 17:48

## 2024-10-22 RX ADMIN — PANTOPRAZOLE SODIUM 40 MG: 40 TABLET, DELAYED RELEASE ORAL at 05:11

## 2024-10-22 RX ADMIN — MILRINONE LACTATE 0.5 MCG/KG/MIN: 0.2 INJECTION, SOLUTION INTRAVENOUS at 01:18

## 2024-10-22 RX ADMIN — Medication 1 TABLET: at 08:47

## 2024-10-22 RX ADMIN — MIDODRINE HYDROCHLORIDE 20 MG: 10 TABLET ORAL at 08:47

## 2024-10-22 RX ADMIN — BUMETANIDE 0.5 MG/HR: 0.25 INJECTION INTRAMUSCULAR; INTRAVENOUS at 08:57

## 2024-10-22 RX ADMIN — AMIODARONE HYDROCHLORIDE 200 MG: 200 TABLET ORAL at 20:26

## 2024-10-22 RX ADMIN — POTASSIUM CHLORIDE 20 MEQ: 29.8 INJECTION, SOLUTION INTRAVENOUS at 04:33

## 2024-10-22 RX ADMIN — ESCITALOPRAM OXALATE 10 MG: 10 TABLET ORAL at 08:47

## 2024-10-22 NOTE — PROGRESS NOTES
Warfarin Pharmacy Consult Note    Warfarin Indication: Atrial fibrillation/Atrial flutter  Target INR: 2.0-3.0  Dose prior to admission: 10mg MWF, 12.5mg TThSS    Recent Labs     10/20/24  1300 10/21/24  0510 10/22/24  0430   HGB 14.8 13.8* 14.6   * [phone]     Recent Labs     10/20/24  0520 10/21/24  0510 10/22/24  0350   INR 1.64* 1.79* 2.25*     Concurrent anticoagulants/antiplatelets: aspirin, heparin drip  Significant warfarin drug-drug interactions: amiodarone, escitalopram    Date INR Warfarin Dose   10/19/24 1.84 15 mg   10/20/24 1.64 15 mg   10/21/24 1.79 15 mg   10/22/24 2.25 7.5 mg                    Monitoring:                   INR will be monitored daily until therapeutic INR is achieved.    **Please contact pharmacy for discharge instructions when indicated**    Sweetie Griffin, PharmD, BCPS, BCCCP  10/22/2024 3:03 PM

## 2024-10-22 NOTE — PROGRESS NOTES
CRITICAL CARE PROGRESS NOTE      Patient:  Kush Perea    Unit/Bed:4D-08/008-A  YOB: 1955  MRN: [id number]   PCP: Diann Vega APRN - CNP  Date of Admission: 10/18/2024  Chief Complaint:-Shortness of breath    Assessment and Plan:    Acute on chronic systolic HFrEF: TTE 10/18 EF 10-15%, LV severely dilated, global hypokinesis, RV severely dilated, severely reduced systolic function.  Aortic valve bicuspid.  Severe annular dilation MV, TV.  LA, RA severely dilated. S/p RHC 10/18, admitted to ICU for [place] guided diuresis.  CVC, [place] Sadaf placed 10/18.  Holding home Toprol. Cardiology following  Telemetry  Strict I's and O's/daily weight/fluid restriction  Milrinone drip, wean as tolerated  Bumex 0.5 mg/h drip  Vasopressin pressor support; avoid levophed due to arrhythmogenicity  Titrate drips with goal PCWP of 12  Repeat limited echo  Sustained ventricular tachycardia: Noted 10/19 overnight unresponsive to Mg/Amio bolus, 10/19 normal sinus with periodic PVCs.  Dopamine drip started 10/19 due to poor cardiac index; caused patient to go into flutter; dopamine drip discontinued  K > 4 , Mg >2  Lifepak at bedside  P.o. amiodarone  Persistent atrial fibrillation:  [id number] Score: 3 on warfarin.  S/p ablation 3/15/24  Heparin drip  P.o. amiodarone  TAHMINA: FeNA 38.3% intrinsic disease. Urine sodium 117, urine urea 117, urine nitrogen 178, urine creatinine 30.2.  Ongoing TAHMINA from concurrent Bumex drip.  Hypokalemia: Likely due to ongoing loop diuretic drip.  Electrolyte replacement as needed.  Hyponatremia, resolved: Etiology hypovolemic, due to renal losses. Possibly due to acute on chronic HFrEF,  urine sodium 117, urine urea 117, urine nitrogen 178, urine creatinine 30.2, pending urine osmolality, serum osmolality.  CKD: Holding home ARB in the setting of hypotension  Hypertension: Holding home antihypertensives in the setting of hypotension  Hyperlipidemia: Not on home statin, lipid

## 2024-10-22 NOTE — PROGRESS NOTES
Cardiology Progress Note      Patient:  Kush Perea  YOB: 1955  MRN: [id number]   Acct: [id number]  Admit Date:  10/18/2024  Primary Cardiologist: Mamadou Davis MD      Chief Complaint:   Pt presented for RHC to determine fluid status      Subjective (Events in last 24 hours):   Pt in bed   Sates feeling improved with breathing   Abd not as tight Le no swelling as before   He states he can actually fall asleep now    Tele AFB HR 79 occ pvc  BP stable but maintained with vasopressin 0.03 units/ min    Pt on amiodarone gtt DT NSVT / /AFB RVR last jagjit  (was on levophed at time- off now)     On bumex gtt 0.5 mg\hr  Milrinone gtt 0.375 mcg/kg/min    Last SG readings 1241  CO 4.76  CI 2.08  CVP 18  PA 42/19 (29) WD 19     Diuresed 3350 / overnight   So far today 2L off         10/20/24  Pt sleeping soundly   Per nursing staff - 6-8 beats NSVT overnight -- potassium 3.3 this am    Co 5.4  CI 2.44  PAP 44/31 (31)  WG 22  CVP 15    Bp supported with vasopressin 0.03 mcg/ min   BP running /    Milrinone 0.5 mcg/ min / hr   Bumex 0.5 mg\hr        10/21/2024  Pt in bed states feeling much better   Has lost 16 pounds so far   Tele AFB HR 70-80's    Co dropped to 1.5 with decrease of milrinone from 0.5 to 0.375 -- back to 0.5 mcg/kg/min    Tele AFB CVR - NSVT none    Remains on vasopressin 0.03 mcg /min  Amio 0.5 mg  Bumex 0.5m g/hr    Did discuss with pt the possibility of needed milrinone pump - and transfer to another facility - he states he's ok if that what is needed     CVP 12  PAP 37/17   Ci 1.52     10/22/2024  Pt up in chair   Milrinone gtt off this am   Pressor of vasopressin turned off   Diurese for CVP 5    Pt has no cardiac co - he states he is feeling quite well   Echo pending     Tele AF CVR  BP stable       Objective:   /79   Pulse 71   Temp 97.2 °F (36.2 °C) (Core)   Resp 11   Ht 1.829 m (6')   Wt 95.3 kg (210 lb 1.6 oz)   SpO2 98%   BMI 28.49 kg/m²        TELEMETRY: AFB  estimated EF of 10 -15%. Left ventricle is severely dilated. Normal wall thickness. Severe global hypokinesis present. Indeterminate diastolic function.   Left Atrium Left atrium is severely dilated. Normal sized appendage. No left atrial appendage thrombus noted.   Right Ventricle Right ventricle is severely dilated. Lead present in the right ventricle. Severely reduced systolic function.   Right Atrium Right atrium is severely dilated.   Aortic Valve Bicuspid valve with commisural fusion of the right and left cusps. Mild regurgitation. No stenosis.   Mitral Valve Severe annular dilation. Mild regurgitation. No stenosis noted.   Tricuspid Valve Severe annular dilation. Moderate regurgitation. No stenosis noted.   Pulmonic Valve The pulmonic valve visualization is suboptimal but appears to be functioning normally. No regurgitation. No stenosis noted.   Aorta Normal sized ascending aorta.   Pericardium No pericardial effusion.       Stress: 1/16/24    Stress Combined Conclusion: The study is positive for myocardial infarction in the inferior wall with andre-infarct ischemia. Findings suggest a moderate risk of cardiac events.    Stress Function: Left ventricular function post-stress is abnormal. Global function is severely reduced. Post-stress ejection fraction is 28%. The stress end diastolic cavity size is severely enlarged.    Perfusion Conclusion: TID ratio is 1.11.    ECG: Resting ECG demonstrates atrial fibrillation.    Stress Test: A pharmacological stress test was performed using lexiscan.      Right Heart Cath: 10/18/24    Elevated intracardiac pressures    CI = 1.7 (low-output)      Left heart cath   12/20/2020  SUMMARY:  Successful PCI of the SVG to ramus intermedius with one  drug-eluting stent; OCT of the SVG to RCA to confirm no thrombosis;  patent LAD; occluded SVG to OM.     PLAN:  1.  DAPT.  2.  Optimal medical therapy.  3.  Risk factor management.  4.  Routine access site care.  5.  Overnight

## 2024-10-22 NOTE — FLOWSHEET NOTE
10/22/24 0345   Rhythm Interpretation   Pulse 77   Treatment Team Notification   Reason for Communication Evaluate   Name of Team Member Notified Dr. Garcia   Treatment Team Role Resident   Method of Communication Face to face   Response See orders   Notification Time 0345       Patient noted to have a few runs of vtach (8 and 9 beats).  Okay given to draw morning labs.

## 2024-10-23 ENCOUNTER — APPOINTMENT (OUTPATIENT)
Dept: GENERAL RADIOLOGY | Age: 69
DRG: 286 | End: 2024-10-23
Attending: INTERNAL MEDICINE
Payer: MEDICARE

## 2024-10-23 LAB
ANION GAP SERPL CALC-SCNC: 11 MEQ/L (ref 8–16)
ANION GAP SERPL CALC-SCNC: 9 MEQ/L (ref 8–16)
APTT PPP: 66.7 SECONDS (ref 22–38)
BASE EXCESS BLDA CALC-SCNC: 11.4 MMOL/L (ref -2–3)
BASE EXCESS BLDA CALC-SCNC: 9.5 MMOL/L (ref -2–3)
BUN SERPL-MCNC: 20 MG/DL (ref 7–22)
BUN SERPL-MCNC: 20 MG/DL (ref 7–22)
CALCIUM SERPL-MCNC: 9.3 MG/DL (ref 8.5–10.5)
CALCIUM SERPL-MCNC: 9.4 MG/DL (ref 8.5–10.5)
CHLORIDE SERPL-SCNC: 92 MEQ/L (ref 98–111)
CHLORIDE SERPL-SCNC: 95 MEQ/L (ref 98–111)
CO2 SERPL-SCNC: 31 MEQ/L (ref 23–33)
CO2 SERPL-SCNC: 34 MEQ/L (ref 23–33)
COLLECTED BY:: ABNORMAL
COLLECTED BY:: ABNORMAL
CREAT SERPL-MCNC: 1.3 MG/DL (ref 0.4–1.2)
CREAT SERPL-MCNC: 1.4 MG/DL (ref 0.4–1.2)
DEPRECATED RDW RBC AUTO: 51.1 FL (ref 35–45)
DEVICE: ABNORMAL
DEVICE: ABNORMAL
ERYTHROCYTE [DISTWIDTH] IN BLOOD BY AUTOMATED COUNT: 18.4 % (ref 11.5–14.5)
FIO2 ON VENT O2 ANALYZER: 1 %
GFR SERPL CREATININE-BSD FRML MDRD: 54 ML/MIN/1.73M2
GFR SERPL CREATININE-BSD FRML MDRD: 59 ML/MIN/1.73M2
GLUCOSE SERPL-MCNC: 115 MG/DL (ref 70–108)
GLUCOSE SERPL-MCNC: 145 MG/DL (ref 70–108)
HCO3 BLDA-SCNC: 35 MMOL/L (ref 23–28)
HCO3 BLDA-SCNC: 37 MMOL/L (ref 23–28)
HCT VFR BLD AUTO: 49.3 % (ref 42–52)
HGB BLD-MCNC: 16.2 GM/DL (ref 14–18)
INR PPP: 2.96 (ref 0.85–1.13)
MAGNESIUM SERPL-MCNC: 2 MG/DL (ref 1.6–2.4)
MCH RBC QN AUTO: 27.3 PG (ref 26–33)
MCHC RBC AUTO-ENTMCNC: 32.9 GM/DL (ref 32.2–35.5)
MCV RBC AUTO: 83 FL (ref 80–94)
PCO2 TEMP ADJ BLDMV: 49 MMHG (ref 41–51)
PCO2 TEMP ADJ BLDMV: 50 MMHG (ref 41–51)
PH BLDMV: 7.47 [PH] (ref 7.31–7.41)
PH BLDMV: 7.48 [PH] (ref 7.31–7.41)
PLATELET # BLD AUTO: 490 THOU/MM3 (ref 130–400)
PMV BLD AUTO: 11.1 FL (ref 9.4–12.4)
PO2 BLDMV: 33 MMHG (ref 25–40)
PO2 BLDMV: 34 MMHG (ref 25–40)
POTASSIUM SERPL-SCNC: 3.4 MEQ/L (ref 3.5–5.2)
POTASSIUM SERPL-SCNC: 4.3 MEQ/L (ref 3.5–5.2)
RBC # BLD AUTO: 5.94 MILL/MM3 (ref 4.7–6.1)
SAO2 % BLDMV: 66 %
SAO2 % BLDMV: 68 %
SITE: ABNORMAL
SITE: ABNORMAL
SODIUM SERPL-SCNC: 135 MEQ/L (ref 135–145)
SODIUM SERPL-SCNC: 137 MEQ/L (ref 135–145)
VENTILATION MODE VENT: ABNORMAL
VENTILATION MODE VENT: ABNORMAL
WBC # BLD AUTO: 10.4 THOU/MM3 (ref 4.8–10.8)

## 2024-10-23 PROCEDURE — 94660 CPAP INITIATION&MGMT: CPT

## 2024-10-23 PROCEDURE — 82803 BLOOD GASES ANY COMBINATION: CPT

## 2024-10-23 PROCEDURE — 6370000000 HC RX 637 (ALT 250 FOR IP): Performed by: INTERNAL MEDICINE

## 2024-10-23 PROCEDURE — 83735 ASSAY OF MAGNESIUM: CPT

## 2024-10-23 PROCEDURE — 94761 N-INVAS EAR/PLS OXIMETRY MLT: CPT

## 2024-10-23 PROCEDURE — 99291 CRITICAL CARE FIRST HOUR: CPT | Performed by: INTERNAL MEDICINE

## 2024-10-23 PROCEDURE — 71045 X-RAY EXAM CHEST 1 VIEW: CPT

## 2024-10-23 PROCEDURE — 6360000002 HC RX W HCPCS: Performed by: INTERNAL MEDICINE

## 2024-10-23 PROCEDURE — 2580000003 HC RX 258: Performed by: INTERNAL MEDICINE

## 2024-10-23 PROCEDURE — 6370000000 HC RX 637 (ALT 250 FOR IP): Performed by: NURSE PRACTITIONER

## 2024-10-23 PROCEDURE — 2000000000 HC ICU R&B

## 2024-10-23 PROCEDURE — 80048 BASIC METABOLIC PNL TOTAL CA: CPT

## 2024-10-23 PROCEDURE — 99232 SBSQ HOSP IP/OBS MODERATE 35: CPT | Performed by: NURSE PRACTITIONER

## 2024-10-23 PROCEDURE — 85027 COMPLETE CBC AUTOMATED: CPT

## 2024-10-23 PROCEDURE — 36415 COLL VENOUS BLD VENIPUNCTURE: CPT

## 2024-10-23 PROCEDURE — 85730 THROMBOPLASTIN TIME PARTIAL: CPT

## 2024-10-23 PROCEDURE — 6360000002 HC RX W HCPCS: Performed by: NURSE PRACTITIONER

## 2024-10-23 PROCEDURE — 6360000002 HC RX W HCPCS

## 2024-10-23 PROCEDURE — 2580000003 HC RX 258: Performed by: NURSE PRACTITIONER

## 2024-10-23 PROCEDURE — 2700000000 HC OXYGEN THERAPY PER DAY

## 2024-10-23 PROCEDURE — 85610 PROTHROMBIN TIME: CPT

## 2024-10-23 RX ORDER — MILRINONE LACTATE 0.2 MG/ML
.0625-.75 INJECTION, SOLUTION INTRAVENOUS CONTINUOUS
Status: DISCONTINUED | OUTPATIENT
Start: 2024-10-23 | End: 2024-10-23

## 2024-10-23 RX ORDER — MILRINONE LACTATE 0.2 MG/ML
.0625-.75 INJECTION, SOLUTION INTRAVENOUS CONTINUOUS
Status: DISCONTINUED | OUTPATIENT
Start: 2024-10-23 | End: 2024-10-24

## 2024-10-23 RX ADMIN — MIDODRINE HYDROCHLORIDE 20 MG: 10 TABLET ORAL at 11:45

## 2024-10-23 RX ADMIN — MIDODRINE HYDROCHLORIDE 20 MG: 10 TABLET ORAL at 16:29

## 2024-10-23 RX ADMIN — SODIUM CHLORIDE, PRESERVATIVE FREE 10 ML: 5 INJECTION INTRAVENOUS at 08:49

## 2024-10-23 RX ADMIN — POTASSIUM CHLORIDE 20 MEQ: 29.8 INJECTION, SOLUTION INTRAVENOUS at 08:00

## 2024-10-23 RX ADMIN — MILRINONE LACTATE 0.38 MCG/KG/MIN: 0.2 INJECTION, SOLUTION INTRAVENOUS at 20:56

## 2024-10-23 RX ADMIN — MILRINONE LACTATE 0.25 MCG/KG/MIN: 0.2 INJECTION, SOLUTION INTRAVENOUS at 20:38

## 2024-10-23 RX ADMIN — ASPIRIN 81 MG: 81 TABLET, COATED ORAL at 08:49

## 2024-10-23 RX ADMIN — POTASSIUM CHLORIDE 20 MEQ: 1500 TABLET, EXTENDED RELEASE ORAL at 08:49

## 2024-10-23 RX ADMIN — BUMETANIDE 0.5 MG/HR: 0.25 INJECTION INTRAMUSCULAR; INTRAVENOUS at 20:40

## 2024-10-23 RX ADMIN — ESCITALOPRAM OXALATE 10 MG: 10 TABLET ORAL at 08:49

## 2024-10-23 RX ADMIN — MILRINONE LACTATE 0.5 MCG/KG/MIN: 0.2 INJECTION, SOLUTION INTRAVENOUS at 10:17

## 2024-10-23 RX ADMIN — AMIODARONE HYDROCHLORIDE 200 MG: 200 TABLET ORAL at 08:49

## 2024-10-23 RX ADMIN — Medication 1 TABLET: at 08:49

## 2024-10-23 RX ADMIN — ACETAZOLAMIDE SODIUM 500 MG: 500 INJECTION, POWDER, LYOPHILIZED, FOR SOLUTION INTRAVENOUS at 15:51

## 2024-10-23 RX ADMIN — MIDODRINE HYDROCHLORIDE 20 MG: 10 TABLET ORAL at 08:49

## 2024-10-23 RX ADMIN — PANTOPRAZOLE SODIUM 40 MG: 40 TABLET, DELAYED RELEASE ORAL at 08:49

## 2024-10-23 RX ADMIN — POTASSIUM CHLORIDE 20 MEQ: 29.8 INJECTION, SOLUTION INTRAVENOUS at 07:12

## 2024-10-23 NOTE — PLAN OF CARE
Problem: ABCDS Injury Assessment  Goal: Absence of physical injury  10/23/2024 1258 by Hernan Gibson RN  Outcome: Progressing  10/23/2024 0242 by Sweetie Ann RN  Outcome: Progressing     Problem: Discharge Planning  Goal: Discharge to home or other facility with appropriate resources  10/23/2024 1258 by Hernan Gibson RN  Outcome: Progressing  10/23/2024 0242 by Sweetie Ann RN  Outcome: Progressing     Problem: Safety - Adult  Goal: Free from fall injury  10/23/2024 1258 by Hernan Gibson RN  Outcome: Progressing  10/23/2024 0242 by Sweetie Ann RN  Outcome: Progressing     Problem: Respiratory - Adult  Goal: Achieves optimal ventilation and oxygenation  10/23/2024 1258 by Hernan Gibson RN  Outcome: Progressing  10/23/2024 0242 by Sweetie Ann RN  Outcome: Progressing  Flowsheets (Taken 10/22/2024 1515 by Eden Thompson RCP)  Achieves optimal ventilation and oxygenation:   Assess for changes in respiratory status   Respiratory therapy support as indicated     Problem: Cardiovascular - Adult  Goal: Maintains optimal cardiac output and hemodynamic stability  10/23/2024 1258 by Hernan Gibson RN  Outcome: Progressing  10/23/2024 0242 by Sweetie Ann RN  Outcome: Progressing  Goal: Absence of cardiac dysrhythmias or at baseline  10/23/2024 1258 by Hernan Gibson RN  Outcome: Progressing  10/23/2024 0242 by Sweetie Ann RN  Outcome: Progressing     Problem: Metabolic/Fluid and Electrolytes - Adult  Goal: Electrolytes maintained within normal limits  10/23/2024 1258 by Hernan Gibson RN  Outcome: Progressing  10/23/2024 0242 by Sweetie Ann RN  Outcome: Progressing  Goal: Hemodynamic stability and optimal renal function maintained  10/23/2024 1258 by Hernan Gibson RN  Outcome: Progressing  10/23/2024 0242 by Sweetie Ann RN  Outcome: Progressing  Goal: Glucose maintained within prescribed range  10/23/2024 1258 by Hernan Gibson RN  Outcome:

## 2024-10-23 NOTE — PROGRESS NOTES
Cardiology Progress Note      Patient:  Kush Perea  YOB: 1955  MRN: [id number]   Acct: [id number]  Admit Date:  10/18/2024  Primary Cardiologist: Mamadou Davis MD      Chief Complaint:   Pt presented for RHC to determine fluid status      Subjective (Events in last 24 hours):   Pt in bed   Sates feeling improved with breathing   Abd not as tight Le no swelling as before   He states he can actually fall asleep now    Tele AFB HR 79 occ pvc  BP stable but maintained with vasopressin 0.03 units/ min    Pt on amiodarone gtt DT NSVT / /AFB RVR last jagjit  (was on levophed at time- off now)     On bumex gtt 0.5 mg\hr  Milrinone gtt 0.375 mcg/kg/min    Last SG readings 1241  CO 4.76  CI 2.08  CVP 18  PA 42/19 (29) WD 19     Diuresed 3350 / overnight   So far today 2L off         10/20/24  Pt sleeping soundly   Per nursing staff - 6-8 beats NSVT overnight -- potassium 3.3 this am    Co 5.4  CI 2.44  PAP 44/31 (31)  WG 22  CVP 15    Bp supported with vasopressin 0.03 mcg/ min   BP running /    Milrinone 0.5 mcg/ min / hr   Bumex 0.5 mg\hr        10/21/2024  Pt in bed states feeling much better   Has lost 16 pounds so far   Tele AFB HR 70-80's    Co dropped to 1.5 with decrease of milrinone from 0.5 to 0.375 -- back to 0.5 mcg/kg/min    Tele AFB CVR - NSVT none    Remains on vasopressin 0.03 mcg /min  Amio 0.5 mg  Bumex 0.5m g/hr    Did discuss with pt the possibility of needed milrinone pump - and transfer to another facility - he states he's ok if that what is needed     CVP 12  PAP 37/17   Ci 1.52       10/22/2024  Pt up in chair   Milrinone gtt off this am   Pressor of vasopressin turned off   Diurese for CVP 5    Pt has no cardiac co - he states he is feeling quite well   Echo pending     Tele AF CVR  BP stable       10/23/24  Pt in bed   Last jagjit had to restart milrinone gtt for CI 1.6  This am milrinone gtt up to 0.5 mcg/ kg/min  CI 1.9    CVP 4-5   PAP 32/12 (20)      Tele AFB CVR   BP

## 2024-10-23 NOTE — PLAN OF CARE
Problem: ABCDS Injury Assessment  Goal: Absence of physical injury  Outcome: Progressing     Problem: Discharge Planning  Goal: Discharge to home or other facility with appropriate resources  Outcome: Progressing     Problem: Safety - Adult  Goal: Free from fall injury  Outcome: Progressing     Problem: Respiratory - Adult  Goal: Achieves optimal ventilation and oxygenation  Outcome: Progressing  Flowsheets (Taken 10/22/2024 1515 by Eden Thompson RCP)  Achieves optimal ventilation and oxygenation:   Assess for changes in respiratory status   Respiratory therapy support as indicated     Problem: Cardiovascular - Adult  Goal: Maintains optimal cardiac output and hemodynamic stability  Outcome: Progressing  Goal: Absence of cardiac dysrhythmias or at baseline  Outcome: Progressing     Problem: Metabolic/Fluid and Electrolytes - Adult  Goal: Electrolytes maintained within normal limits  Outcome: Progressing  Goal: Hemodynamic stability and optimal renal function maintained  Outcome: Progressing  Goal: Glucose maintained within prescribed range  Outcome: Progressing     Problem: Hematologic - Adult  Goal: Maintains hematologic stability  Outcome: Progressing     Problem: Chronic Conditions and Co-morbidities  Goal: Patient's chronic conditions and co-morbidity symptoms are monitored and maintained or improved  Outcome: Progressing

## 2024-10-23 NOTE — PROGRESS NOTES
mg Oral Daily    potassium chloride  20 mEq Oral Daily with breakfast    amiodarone  200 mg Oral BID    midodrine  20 mg Oral TID     warfarin placeholder: dosing by pharmacy   Other RX Placeholder    sodium chloride flush  5-40 mL IntraVENous 2 times per day    aspirin  81 mg Oral Daily    multivitamin  1 tablet Oral Daily    vitamin D  50,000 Units Oral Weekly    pantoprazole  40 mg Oral Daily    escitalopram  10 mg Oral Daily     Continuous Infusions:   bumetanide (BUMEX) 12.5 mg in sodium chloride 0.9 % 125 mL infusion 0.5 mg/hr (10/23/24 0722)    milrinone 0.5 mcg/kg/min (10/23/24 0722)    heparin (PORCINE) Infusion 16 Units/kg/hr (10/23/24 0722)    sodium chloride 20 mL/hr at 10/23/24 0722       PHYSICAL EXAMINATION:  T: 97.7.  P: 71. RR: 11. B/P: 110/62. O2 Sat: 98.    I/O:     10/22: +1917 / -5700 = -3782  10/21: +3433 / -5775 = -2341  10/20: +2743 / -5250 = -2498  10/19: +2319 / -6100 = -3780  Body mass index is 27.78 kg/m².  Weight 94.8 kg  GCS:   15    General:   Acutely ill-appearing  male  HEENT:  normocephalic and atraumatic.  No scleral icterus. PERR  Neck: supple.  No Thyromegaly.  Lungs: clear to auscultation.  No retractions  Cardiac: RRR.  No JVD.  Abdomen: soft.  Nontender.  Extremities:  No clubbing, cyanosis, or edema x 4.    Vasculature: capillary refill < 3 seconds. Palpable dorsalis pedis pulses.  Skin:  warm and dry.  Scattered ecchymoses on bilateral arms  Psych:  Alert and oriented x3.  Affect appropriate  Lymph:  No supraclavicular adenopathy.  Neurologic:  No focal deficit. No seizures.    Data: (All radiographs, tracings, PFTs, and imaging are personally viewed and interpreted unless otherwise noted).   , K3.4, CL 96, CO2 to 31, 20 BUN, creatinine 1.3, AG 9.0, magnesium 2.0, glucose 115, calcium 9.3  CBC 10.4, Hb 16.2, hematocrit 49.3, MCV 83, platelets 490 hyponatremia resolved  Chest x-ray right mid lower lung atelectasis/infiltrates 10/18    Seen with  multidisciplinary ICU team .  Meets Continued ICU Level Care Criteria:    [x] Yes   [] No - Transfer Planned to listed location:  [] HOSPITALIST CONTACTED-      Case and plan discussed with Dr. Lonodn.  Electronically signed by Donnie PAEZ DO  CRITICAL CARE SPECIALIST  Patient seen by me including key components of medical care.  Case discussed with resident physician.  Weaning milrinone.  Suspect patient will need assessment for cardiac transplant vs LVAD.  Italicized bold font, if present,  represents changes to the note made by me.  CC time 35 minutes.  Time was discontiguous. Time does not include procedure. Time does include my direct assessment of the patient and coordination of care.  Time represents more than 50% of the time involved with patient care by the CC team.  Electronically signed by Kush London MD.

## 2024-10-23 NOTE — PROGRESS NOTES
Warfarin Pharmacy Consult Note    Warfarin Indication: Atrial fibrillation/Atrial flutter  Target INR: 2.0-3.0  Dose prior to admission: 10mg MWF, 12.5mg TThSS    Recent Labs     10/21/24  0510 10/22/24  0430 10/23/24  0500   HGB 13.8* 14.6 16.2    397 490*     Recent Labs     10/21/24  0510 10/22/24  0350 10/23/24  0500   INR 1.79* 2.25* 2.96*     Concurrent anticoagulants/antiplatelets: aspirin, Significant warfarin drug-drug interactions: amiodarone, escitalopram    Date INR Warfarin Dose   10/19/24 1.84 15 mg   10/20/24 1.64 15 mg   10/21/24 1.79 15 mg   10/22/24 2.25 7.5 mg   10/23/24 2.96 Hold                Stop heparin drip.    Monitoring:                   INR will be monitored daily until therapeutic INR is achieved.    **Please contact pharmacy for discharge instructions when indicated**    Sweetie Griffin, PharmD, BCPS, BCCCP  10/22/2024 3:03 PM

## 2024-10-23 NOTE — CARE COORDINATION
10/23/24, 2:35 PM EDT    DISCHARGE ON GOING EVALUATION    [facility] day: 5  Location: -08/008-A Reason for admit: HFrEF (heart failure with reduced ejection fraction) (HCC) [I50.20]  SERRANO (dyspnea on exertion) [R06.09]  Weight gain [R63.5]  Abdominal bloating [R14.0]  Cardiomyopathy (HCC) [I42.9]  Low output heart failure (HCC) [I50.9]     Procedures:   10/18 Cardiac Cath: Elevated intracardiac pressures; CI 1.7  10/19 SGC: Right subclavian  10/19 JASON: EF 10-15%; LV severely dilated; severe global hypokinesis; RV severely dilated, mild aortic and mitral regurg; mod tricuspid regurg; RA and LA severely dilated  10/22 Echo with EF 10-15%    Imaging since last note:   10/22 CXR: 1. Unchanged position of [place]-Sadaf catheter with distal tip overlying the   distal aspect of the right main pulmonary artery. Remaining examination is   unchanged.  10/23 CXR: No interval change     Barriers to Discharge: Echo yesterday still with EF 10-15%. Weaned off primacor yesterday and CI was 2. However, later last evening, CI dropped and primacor was restarted. Plan to try weaning primacor drip off again; if fails again, will plan to transfer to [facility]. Ochoa placed d/t retention.     Sats 97% on 1L O2. Afebrile. Afib 80's. Ox4. Telemetry, SGC, n/v checks, wound care, ochoa, SCDs. Bumex @ 0.5 mg/hr, primacor @ 0.375 mcg/kg/min, vasopressin @ 0.03 units/min, amio, asa, lexapro, midodrine 20 mg tid, prn zofran, protonix, K+, vit D, coumadin - pharmacy dosing, Electrolyte replacement protocols. Plan for 500 mg IV diamox x1 today. Urine sent for culture. Creat 1.4. INR 2.96.     PCP: Diann Vega, PA - CNP  Readmission Risk Score: 10.3    Patient Goals/Plan/Treatment Preferences: Home with wife. Monitor for needs as course progresses.

## 2024-10-24 ENCOUNTER — APPOINTMENT (OUTPATIENT)
Dept: RADIOLOGY | Facility: HOSPITAL | Age: 69
DRG: 001 | End: 2024-10-24
Payer: MEDICARE

## 2024-10-24 ENCOUNTER — APPOINTMENT (OUTPATIENT)
Age: 69
End: 2024-10-24
Payer: MEDICARE

## 2024-10-24 ENCOUNTER — APPOINTMENT (OUTPATIENT)
Dept: CARDIOLOGY | Facility: HOSPITAL | Age: 69
DRG: 001 | End: 2024-10-24
Payer: MEDICARE

## 2024-10-24 ENCOUNTER — APPOINTMENT (OUTPATIENT)
Dept: GENERAL RADIOLOGY | Age: 69
DRG: 286 | End: 2024-10-24
Attending: INTERNAL MEDICINE
Payer: MEDICARE

## 2024-10-24 ENCOUNTER — HOSPITAL ENCOUNTER (INPATIENT)
Facility: HOSPITAL | Age: 69
End: 2024-10-24
Attending: STUDENT IN AN ORGANIZED HEALTH CARE EDUCATION/TRAINING PROGRAM | Admitting: NURSE PRACTITIONER
Payer: MEDICARE

## 2024-10-24 VITALS
SYSTOLIC BLOOD PRESSURE: 124 MMHG | HEIGHT: 72 IN | DIASTOLIC BLOOD PRESSURE: 56 MMHG | RESPIRATION RATE: 21 BRPM | HEART RATE: 94 BPM | WEIGHT: 204.81 LBS | BODY MASS INDEX: 27.74 KG/M2 | OXYGEN SATURATION: 96 % | TEMPERATURE: 98.8 F

## 2024-10-24 DIAGNOSIS — I50.9 HEART FAILURE, UNSPECIFIED: ICD-10-CM

## 2024-10-24 DIAGNOSIS — I50.84 ACC/AHA STAGE D HEART FAILURE: ICD-10-CM

## 2024-10-24 DIAGNOSIS — Z86.79 HISTORY OF CHRONIC ATRIAL FIBRILLATION: ICD-10-CM

## 2024-10-24 DIAGNOSIS — I25.5 ISCHEMIC CARDIOMYOPATHY: ICD-10-CM

## 2024-10-24 DIAGNOSIS — Z95.811 LVAD (LEFT VENTRICULAR ASSIST DEVICE) PRESENT (MULTI): ICD-10-CM

## 2024-10-24 DIAGNOSIS — I73.9 PERIPHERAL VASCULAR DISEASE, UNSPECIFIED (CMS-HCC): ICD-10-CM

## 2024-10-24 DIAGNOSIS — I50.43 ACUTE ON CHRONIC COMBINED SYSTOLIC (CONGESTIVE) AND DIASTOLIC (CONGESTIVE) HEART FAILURE: ICD-10-CM

## 2024-10-24 DIAGNOSIS — Z01.818 ENCOUNTER FOR PRE-TRANSPLANT EVALUATION FOR HEART TRANSPLANT: ICD-10-CM

## 2024-10-24 DIAGNOSIS — I50.43 ACUTE ON CHRONIC HEART FAILURE WITH REDUCED EJECTION FRACTION AND DIASTOLIC DYSFUNCTION: Primary | ICD-10-CM

## 2024-10-24 DIAGNOSIS — I42.0 DILATED CARDIOMYOPATHY (MULTI): ICD-10-CM

## 2024-10-24 DIAGNOSIS — I71.40 ABDOMINAL AORTIC ANEURYSM, WITHOUT RUPTURE, UNSPECIFIED (CMS-HCC): ICD-10-CM

## 2024-10-24 DIAGNOSIS — I50.84 HEART FAILURE, ACC/AHA STAGE D: ICD-10-CM

## 2024-10-24 DIAGNOSIS — R09.89 OTHER SPECIFIED SYMPTOMS AND SIGNS INVOLVING THE CIRCULATORY AND RESPIRATORY SYSTEMS: ICD-10-CM

## 2024-10-24 DIAGNOSIS — R57.0 CARDIOGENIC SHOCK (MULTI): ICD-10-CM

## 2024-10-24 LAB
ANION GAP BLDMV CALCULATED.4IONS-SCNC: 7 MMO/L (ref 10–25)
ANION GAP SERPL CALC-SCNC: 13 MEQ/L (ref 8–16)
ANION GAP SERPL CALC-SCNC: 19 MEQ/L (ref 8–16)
BACTERIA UR CULT: NORMAL
BASE EXCESS BLDMV CALC-SCNC: 8.2 MMOL/L (ref -2–3)
BODY TEMPERATURE: 37 DEGREES CELSIUS
BUN SERPL-MCNC: 22 MG/DL (ref 7–22)
BUN SERPL-MCNC: 23 MG/DL (ref 7–22)
CA-I BLD ISE-SCNC: 1.11 MMOL/L (ref 1.12–1.32)
CA-I BLDMV-SCNC: 1.15 MMOL/L (ref 1.1–1.33)
CALCIUM SERPL-MCNC: 9.2 MG/DL (ref 8.5–10.5)
CALCIUM SERPL-MCNC: 9.8 MG/DL (ref 8.5–10.5)
CHLORIDE BLD-SCNC: 92 MMOL/L (ref 98–107)
CHLORIDE SERPL-SCNC: 93 MEQ/L (ref 98–111)
CHLORIDE SERPL-SCNC: 94 MEQ/L (ref 98–111)
CO2 SERPL-SCNC: 25 MEQ/L (ref 23–33)
CO2 SERPL-SCNC: 30 MEQ/L (ref 23–33)
CREAT SERPL-MCNC: 1.5 MG/DL (ref 0.4–1.2)
CREAT SERPL-MCNC: 1.6 MG/DL (ref 0.4–1.2)
DEPRECATED RDW RBC AUTO: 48.6 FL (ref 35–45)
ERYTHROCYTE [DISTWIDTH] IN BLOOD BY AUTOMATED COUNT: 18.4 % (ref 11.5–14.5)
GFR SERPL CREATININE-BSD FRML MDRD: 46 ML/MIN/1.73M2
GFR SERPL CREATININE-BSD FRML MDRD: 50 ML/MIN/1.73M2
GLUCOSE BLD-MCNC: 149 MG/DL (ref 74–99)
GLUCOSE SERPL-MCNC: 114 MG/DL (ref 70–108)
GLUCOSE SERPL-MCNC: 124 MG/DL (ref 70–108)
HCO3 BLDMV-SCNC: 32.8 MMOL/L (ref 22–26)
HCT VFR BLD AUTO: 50 % (ref 42–52)
HCT VFR BLD EST: 53 % (ref 41–52)
HGB BLD-MCNC: 16.5 GM/DL (ref 14–18)
HGB BLDMV-MCNC: 17.8 G/DL (ref 13.5–17.5)
INHALED O2 CONCENTRATION: 21 %
INR PPP: 3 (ref 0.85–1.13)
LACTATE BLDMV-SCNC: 0.9 MMOL/L (ref 0.4–2)
MAGNESIUM SERPL-MCNC: 2 MG/DL (ref 1.6–2.4)
MCH RBC QN AUTO: 27 PG (ref 26–33)
MCHC RBC AUTO-ENTMCNC: 33 GM/DL (ref 32.2–35.5)
MCV RBC AUTO: 81.7 FL (ref 80–94)
OXYHGB MFR BLDMV: 62.7 % (ref 45–75)
PCO2 BLDMV: 43 MM HG (ref 41–51)
PH BLDMV: 7.49 PH (ref 7.33–7.43)
PHOSPHATE SERPL-MCNC: 4.2 MG/DL (ref 2.4–4.7)
PLATELET # BLD AUTO: 576 THOU/MM3 (ref 130–400)
PMV BLD AUTO: 10.5 FL (ref 9.4–12.4)
PO2 BLDMV: 39 MM HG (ref 35–45)
POTASSIUM BLDMV-SCNC: 4 MMOL/L (ref 3.5–5.3)
POTASSIUM SERPL-SCNC: 3.7 MEQ/L (ref 3.5–5.2)
POTASSIUM SERPL-SCNC: 3.7 MEQ/L (ref 3.5–5.2)
RBC # BLD AUTO: 6.12 MILL/MM3 (ref 4.7–6.1)
SAO2 % BLDMV: 65 % (ref 45–75)
SODIUM BLDMV-SCNC: 128 MMOL/L (ref 136–145)
SODIUM SERPL-SCNC: 136 MEQ/L (ref 135–145)
SODIUM SERPL-SCNC: 138 MEQ/L (ref 135–145)
WBC # BLD AUTO: 11 THOU/MM3 (ref 4.8–10.8)

## 2024-10-24 PROCEDURE — 83880 ASSAY OF NATRIURETIC PEPTIDE: CPT | Performed by: NURSE PRACTITIONER

## 2024-10-24 PROCEDURE — 80048 BASIC METABOLIC PNL TOTAL CA: CPT

## 2024-10-24 PROCEDURE — 6370000000 HC RX 637 (ALT 250 FOR IP): Performed by: INTERNAL MEDICINE

## 2024-10-24 PROCEDURE — 6360000002 HC RX W HCPCS

## 2024-10-24 PROCEDURE — 83735 ASSAY OF MAGNESIUM: CPT

## 2024-10-24 PROCEDURE — 85018 HEMOGLOBIN: CPT | Performed by: NURSE PRACTITIONER

## 2024-10-24 PROCEDURE — 2580000003 HC RX 258: Performed by: INTERNAL MEDICINE

## 2024-10-24 PROCEDURE — 82607 VITAMIN B-12: CPT | Performed by: NURSE PRACTITIONER

## 2024-10-24 PROCEDURE — 83540 ASSAY OF IRON: CPT | Performed by: NURSE PRACTITIONER

## 2024-10-24 PROCEDURE — 84443 ASSAY THYROID STIM HORMONE: CPT | Performed by: NURSE PRACTITIONER

## 2024-10-24 PROCEDURE — 85610 PROTHROMBIN TIME: CPT

## 2024-10-24 PROCEDURE — 94761 N-INVAS EAR/PLS OXIMETRY MLT: CPT

## 2024-10-24 PROCEDURE — 6370000000 HC RX 637 (ALT 250 FOR IP): Performed by: PHARMACIST

## 2024-10-24 PROCEDURE — 93005 ELECTROCARDIOGRAM TRACING: CPT

## 2024-10-24 PROCEDURE — 93010 ELECTROCARDIOGRAM REPORT: CPT | Performed by: INTERNAL MEDICINE

## 2024-10-24 PROCEDURE — 71045 X-RAY EXAM CHEST 1 VIEW: CPT

## 2024-10-24 PROCEDURE — 2700000000 HC OXYGEN THERAPY PER DAY

## 2024-10-24 PROCEDURE — 36415 COLL VENOUS BLD VENIPUNCTURE: CPT | Performed by: NURSE PRACTITIONER

## 2024-10-24 PROCEDURE — 83735 ASSAY OF MAGNESIUM: CPT | Performed by: NURSE PRACTITIONER

## 2024-10-24 PROCEDURE — 82330 ASSAY OF CALCIUM: CPT

## 2024-10-24 PROCEDURE — 85027 COMPLETE CBC AUTOMATED: CPT

## 2024-10-24 PROCEDURE — 85025 COMPLETE CBC W/AUTO DIFF WBC: CPT | Performed by: NURSE PRACTITIONER

## 2024-10-24 PROCEDURE — 37799 UNLISTED PX VASCULAR SURGERY: CPT | Performed by: NURSE PRACTITIONER

## 2024-10-24 PROCEDURE — 84132 ASSAY OF SERUM POTASSIUM: CPT | Performed by: NURSE PRACTITIONER

## 2024-10-24 PROCEDURE — 82728 ASSAY OF FERRITIN: CPT | Performed by: NURSE PRACTITIONER

## 2024-10-24 PROCEDURE — 84100 ASSAY OF PHOSPHORUS: CPT | Performed by: NURSE PRACTITIONER

## 2024-10-24 PROCEDURE — 84439 ASSAY OF FREE THYROXINE: CPT | Performed by: NURSE PRACTITIONER

## 2024-10-24 PROCEDURE — 83036 HEMOGLOBIN GLYCOSYLATED A1C: CPT | Performed by: NURSE PRACTITIONER

## 2024-10-24 PROCEDURE — 99291 CRITICAL CARE FIRST HOUR: CPT | Performed by: INTERNAL MEDICINE

## 2024-10-24 PROCEDURE — 94660 CPAP INITIATION&MGMT: CPT

## 2024-10-24 PROCEDURE — 2020000001 HC ICU ROOM DAILY

## 2024-10-24 PROCEDURE — 99291 CRITICAL CARE FIRST HOUR: CPT | Performed by: NURSE PRACTITIONER

## 2024-10-24 PROCEDURE — 36415 COLL VENOUS BLD VENIPUNCTURE: CPT

## 2024-10-24 PROCEDURE — 83605 ASSAY OF LACTIC ACID: CPT | Performed by: NURSE PRACTITIONER

## 2024-10-24 PROCEDURE — 6370000000 HC RX 637 (ALT 250 FOR IP): Performed by: NURSE PRACTITIONER

## 2024-10-24 PROCEDURE — 84100 ASSAY OF PHOSPHORUS: CPT

## 2024-10-24 PROCEDURE — 82330 ASSAY OF CALCIUM: CPT | Performed by: NURSE PRACTITIONER

## 2024-10-24 PROCEDURE — 85610 PROTHROMBIN TIME: CPT | Performed by: NURSE PRACTITIONER

## 2024-10-24 PROCEDURE — 82746 ASSAY OF FOLIC ACID SERUM: CPT | Performed by: NURSE PRACTITIONER

## 2024-10-24 RX ORDER — ISOSORBIDE MONONITRATE 30 MG/1
120 TABLET, EXTENDED RELEASE ORAL DAILY
Status: DISCONTINUED | OUTPATIENT
Start: 2024-10-25 | End: 2024-10-25

## 2024-10-24 RX ORDER — MILRINONE LACTATE 0.2 MG/ML
.0625-.75 INJECTION, SOLUTION INTRAVENOUS CONTINUOUS
Status: DISCONTINUED | OUTPATIENT
Start: 2024-10-24 | End: 2024-10-24 | Stop reason: HOSPADM

## 2024-10-24 RX ORDER — PANTOPRAZOLE SODIUM 40 MG/1
40 TABLET, DELAYED RELEASE ORAL
Status: DISCONTINUED | OUTPATIENT
Start: 2024-10-25 | End: 2024-11-12

## 2024-10-24 RX ORDER — MILRINONE LACTATE 0.2 MG/ML
.0625-.75 INJECTION, SOLUTION INTRAVENOUS CONTINUOUS
Status: DISCONTINUED | OUTPATIENT
Start: 2024-10-24 | End: 2024-10-24

## 2024-10-24 RX ORDER — METOPROLOL SUCCINATE 25 MG/1
50 TABLET, EXTENDED RELEASE ORAL DAILY
Status: DISCONTINUED | OUTPATIENT
Start: 2024-10-25 | End: 2024-11-12

## 2024-10-24 RX ORDER — SPIRONOLACTONE 25 MG/1
25 TABLET ORAL DAILY
Status: DISCONTINUED | OUTPATIENT
Start: 2024-10-25 | End: 2024-11-12

## 2024-10-24 RX ORDER — NITROGLYCERIN 0.4 MG/1
0.4 TABLET SUBLINGUAL EVERY 5 MIN PRN
Status: DISCONTINUED | OUTPATIENT
Start: 2024-10-24 | End: 2024-11-12

## 2024-10-24 RX ORDER — HYDRALAZINE HYDROCHLORIDE 25 MG/1
25 TABLET, FILM COATED ORAL 2 TIMES DAILY
Status: DISCONTINUED | OUTPATIENT
Start: 2024-10-24 | End: 2024-10-25

## 2024-10-24 RX ORDER — BUMETANIDE 0.5 MG/1
0.5 TABLET ORAL DAILY
Status: DISCONTINUED | OUTPATIENT
Start: 2024-10-25 | End: 2024-10-25

## 2024-10-24 RX ORDER — ASPIRIN 325 MG
50000 TABLET, DELAYED RELEASE (ENTERIC COATED) ORAL
Status: DISCONTINUED | OUTPATIENT
Start: 2024-10-27 | End: 2024-11-12

## 2024-10-24 RX ORDER — ESCITALOPRAM OXALATE 20 MG/1
10 TABLET ORAL DAILY
Status: DISCONTINUED | OUTPATIENT
Start: 2024-10-25 | End: 2024-11-12

## 2024-10-24 RX ORDER — MILRINONE LACTATE 0.2 MG/ML
0.62 INJECTION, SOLUTION INTRAVENOUS CONTINUOUS
Status: DISCONTINUED | OUTPATIENT
Start: 2024-10-24 | End: 2024-10-25

## 2024-10-24 RX ORDER — ASPIRIN 81 MG/1
81 TABLET ORAL DAILY
Status: DISCONTINUED | OUTPATIENT
Start: 2024-10-25 | End: 2024-11-12

## 2024-10-24 RX ADMIN — WARFARIN SODIUM 12.5 MG: 10 TABLET ORAL at 19:44

## 2024-10-24 RX ADMIN — SODIUM CHLORIDE: 9 INJECTION, SOLUTION INTRAVENOUS at 01:35

## 2024-10-24 RX ADMIN — SODIUM CHLORIDE, PRESERVATIVE FREE 10 ML: 5 INJECTION INTRAVENOUS at 01:13

## 2024-10-24 RX ADMIN — MILRINONE LACTATE 0.5 MCG/KG/MIN: 0.2 INJECTION, SOLUTION INTRAVENOUS at 12:28

## 2024-10-24 RX ADMIN — MILRINONE LACTATE 0.5 MCG/KG/MIN: 0.2 INJECTION, SOLUTION INTRAVENOUS at 04:55

## 2024-10-24 RX ADMIN — SODIUM CHLORIDE, PRESERVATIVE FREE 10 ML: 5 INJECTION INTRAVENOUS at 10:00

## 2024-10-24 RX ADMIN — AMIODARONE HYDROCHLORIDE 200 MG: 200 TABLET ORAL at 01:14

## 2024-10-24 RX ADMIN — PANTOPRAZOLE SODIUM 40 MG: 40 TABLET, DELAYED RELEASE ORAL at 09:16

## 2024-10-24 RX ADMIN — MILRINONE LACTATE 0.62 MCG/KG/MIN: 0.2 INJECTION, SOLUTION INTRAVENOUS at 19:36

## 2024-10-24 RX ADMIN — MIDODRINE HYDROCHLORIDE 20 MG: 10 TABLET ORAL at 12:23

## 2024-10-24 RX ADMIN — AMIODARONE HYDROCHLORIDE 200 MG: 200 TABLET ORAL at 19:37

## 2024-10-24 RX ADMIN — MIDODRINE HYDROCHLORIDE 20 MG: 10 TABLET ORAL at 09:17

## 2024-10-24 RX ADMIN — AMIODARONE HYDROCHLORIDE 200 MG: 200 TABLET ORAL at 09:17

## 2024-10-24 RX ADMIN — Medication 1 TABLET: at 09:16

## 2024-10-24 RX ADMIN — ASPIRIN 81 MG: 81 TABLET, COATED ORAL at 09:17

## 2024-10-24 RX ADMIN — POTASSIUM CHLORIDE 20 MEQ: 1500 TABLET, EXTENDED RELEASE ORAL at 09:16

## 2024-10-24 RX ADMIN — MIDODRINE HYDROCHLORIDE 20 MG: 10 TABLET ORAL at 19:45

## 2024-10-24 RX ADMIN — ESCITALOPRAM OXALATE 10 MG: 10 TABLET ORAL at 09:17

## 2024-10-24 ASSESSMENT — PAIN - FUNCTIONAL ASSESSMENT: PAIN_FUNCTIONAL_ASSESSMENT: 0-10

## 2024-10-24 ASSESSMENT — ACTIVITIES OF DAILY LIVING (ADL)
BATHING: INDEPENDENT
DRESSING YOURSELF: INDEPENDENT
TOILETING: INDEPENDENT
LACK_OF_TRANSPORTATION: NO
PATIENT'S MEMORY ADEQUATE TO SAFELY COMPLETE DAILY ACTIVITIES?: YES
GROOMING: INDEPENDENT
WALKS IN HOME: INDEPENDENT
HEARING - LEFT EAR: FUNCTIONAL
FEEDING YOURSELF: INDEPENDENT
JUDGMENT_ADEQUATE_SAFELY_COMPLETE_DAILY_ACTIVITIES: YES
ASSISTIVE_DEVICE: WALKER
HEARING - RIGHT EAR: FUNCTIONAL
ADEQUATE_TO_COMPLETE_ADL: YES

## 2024-10-24 ASSESSMENT — COGNITIVE AND FUNCTIONAL STATUS - GENERAL
STANDING UP FROM CHAIR USING ARMS: A LITTLE
WALKING IN HOSPITAL ROOM: A LITTLE
MOVING TO AND FROM BED TO CHAIR: A LITTLE
MOBILITY SCORE: 19
DAILY ACTIVITIY SCORE: 24
PATIENT BASELINE BEDBOUND: NO
CLIMB 3 TO 5 STEPS WITH RAILING: A LOT

## 2024-10-24 ASSESSMENT — PAIN SCALES - GENERAL: PAINLEVEL_OUTOF10: 0 - NO PAIN

## 2024-10-24 NOTE — PROGRESS NOTES
Up to chair with 2 assist- did well with activity.  Denies any chest pain or SOB.  1500- Resting in chair with eyes closed- no distress noted.

## 2024-10-24 NOTE — DISCHARGE SUMMARY
grossly intact.   Neck: Supple, with full range of motion. Trachea midline.  No gross JVD appreciated.  Respiratory:  Normal respiratory effort. Clear to auscultation, bilaterally without rales or wheezes or rhonchi.  Cardiovascular: Normal rate, regular rhythm with normal S1/S2 without murmurs.  No lower extremity edema.   Abdomen: Soft, non-tender, non-distended with normal bowel sounds.  Musculoskeletal: There is no joint swelling or tenderness. Normal tone. No abnormal movements.   Skin: Warm and dry. No rashes or lesions.  Neurologic:  No focal sensory/motor deficits in the upper and lower extremities. Cranial nerves:  grossly non-focal 2-12.     Psychiatric: Alert and oriented, normal insight and thought content.   Capillary Refill: Brisk,< 3 seconds.  Peripheral Pulses: +2 palpable, equal bilaterally.       Labs: For convenience the most recent labs are provided:  CBC:    Lab Results   Component Value Date/Time    WBC 11.0 10/24/2024 06:00 AM    HGB 16.5 10/24/2024 06:00 AM    HCT 50.0 10/24/2024 06:00 AM     10/24/2024 06:00 AM     Renal:    Lab Results   Component Value Date/Time     10/24/2024 06:00 AM    K 3.7 10/24/2024 06:00 AM    K 4.3 10/23/2024 10:18 AM    CL 94 10/24/2024 06:00 AM    CO2 25 10/24/2024 06:00 AM    BUN 23 10/24/2024 06:00 AM    CREATININE 1.6 10/24/2024 06:00 AM    CALCIUM 9.8 10/24/2024 06:00 AM    PHOS 4.2 10/24/2024 01:16 AM     Liver:   Lab Results   Component Value Date/Time    AST 14 10/04/2024 10:15 AM    ALT 10 10/04/2024 10:15 AM         Significant Diagnostic Studies    Radiology:   XR CHEST PORTABLE   Final Result   Impression:   [place]-Sadaf catheter satisfactory.   Stable advanced cardiac silhouette enlargement.      This document has been electronically signed by: Bhaskar Ya MD on    10/24/2024 03:07 AM      XR CHEST PORTABLE   Final Result   1. No interval change.      This document has been electronically signed by: Wali Regan DO on    10/23/2024  was prepared at least partially through the use of voice recognition software. Although effort is taken to assure the accuracy of this document, it is possible that grammatical, syntax,  or spelling errors may occur.

## 2024-10-24 NOTE — PROGRESS NOTES
D/w patient and family  Continues to have low CI and cannot wean inotropes   Patient wants to transfer to [facility] in [place], [place] where his brother had heart transplant under the care of Dr Rock.    Will initiate transfer tonight  D/shazia RN and ICU team    Mamadou Davis MD  Interventional Cardiology

## 2024-10-24 NOTE — PROGRESS NOTES
Cardiology Progress Note      Patient:  Kush Perea  YOB: 1955  MRN: [id number]   Acct: [id number]  Admit Date:  10/18/2024  Primary Cardiologist: Mamadou Davis MD      Subjective (Events in last 24 hours):   Pt awake, alert. NAD. Denies cp or sob. Is fatigued.   D/w aptient about current situation. He is waiting on bed availability at  in [place].     Continues on midodrine 20 mg TID.     CO 3.92, CI 1.77  PAP 29/12/18  PAWP 12  CVP 6  SVRI 2299    Milrinone at 0.5, bumex 0.5, off pressors    Objective:   /63   Pulse 82   Temp 97.9 °F (36.6 °C) (Core)   Resp 16   Ht 1.829 m (6')   Wt 92.9 kg (204 lb 12.9 oz)   SpO2 93%   BMI 27.78 kg/m²      Vss--off pressors currently  TELEMETRY:afib cvr     Physical Exam:  General Appearance: alert and oriented to person, place and time, in no acute distress  Cardiovascular: normal rate, irregularly irregular rhythm, normal S1 and S2, no murmurs, rubs, clicks, or gallops, distal pulses intact, no carotid bruits, no JVD  Pulmonary/Chest: clear to auscultation bilaterally- no wheezes, rales or rhonchi, normal air movement, no respiratory distress  Abdomen: soft, non-tender, non-distended, normal bowel sounds, no masses   Extremities: no cyanosis, clubbing or edema, pulse   Skin: warm and dry, no rash or erythema  Neurological: alert, oriented, normal speech, no focal findings or movement disorder noted    Medications:    warfarin  12.5 mg Oral Once    [Held by provider] bumetanide  1 mg Oral BID    [Held by provider] spironolactone  25 mg Oral Daily    potassium chloride  20 mEq Oral Daily with breakfast    amiodarone  200 mg Oral BID    midodrine  20 mg Oral TID     warfarin placeholder: dosing by pharmacy   Other RX Placeholder    sodium chloride flush  5-40 mL IntraVENous 2 times per day    aspirin  81 mg Oral Daily    multivitamin  1 tablet Oral Daily    vitamin D  50,000 Units Oral Weekly    pantoprazole  40 mg Oral Daily     cusps. Mild regurgitation. No stenosis.   Mitral Valve Severe annular dilation. Mild regurgitation. No stenosis noted.   Tricuspid Valve Severe annular dilation. Moderate regurgitation. No stenosis noted.   Pulmonic Valve The pulmonic valve visualization is suboptimal but appears to be functioning normally. No regurgitation. No stenosis noted.   Aorta Normal sized ascending aorta.   Pericardium No pericardial effusion.         Stress: 1/16/24    Stress Combined Conclusion: The study is positive for myocardial infarction in the inferior wall with andre-infarct ischemia. Findings suggest a moderate risk of cardiac events.    Stress Function: Left ventricular function post-stress is abnormal. Global function is severely reduced. Post-stress ejection fraction is 28%. The stress end diastolic cavity size is severely enlarged.    Perfusion Conclusion: TID ratio is 1.11.    ECG: Resting ECG demonstrates atrial fibrillation.    Stress Test: A pharmacological stress test was performed using lexiscan.        Right Heart Cath: 10/18/24    Elevated intracardiac pressures    CI = 1.7 (low-output)        Left heart cath   12/20/2020  SUMMARY:  Successful PCI of the SVG to ramus intermedius with one  drug-eluting stent; OCT of the SVG to RCA to confirm no thrombosis;  patent LAD; occluded SVG to OM.     PLAN:  1.  DAPT.  2.  Optimal medical therapy.  3.  Risk factor management.  4.  Routine access site care.  5.  Overnight observation.  6.  IV fluids.  7.  Guideline-directed therapy for CAD.  8.  Follow up with myself in two to four weeks postprocedure.     All the above was explained to the patient and the patient's family.   They are agreeable and amenable to the plan.           EULA MARINO MD    Lab Data:    Cardiac Enzymes:  No results for input(s): \"CKTOTAL\", \"CKMB\", \"CKMBINDEX\", \"TROPONINI\" in the last 72 hours.    CBC:   Lab Results   Component Value Date/Time    WBC 11.0 10/24/2024 06:00 AM    RBC 6.12 10/24/2024 06:00

## 2024-10-24 NOTE — PLAN OF CARE
Problem: ABCDS Injury Assessment  Goal: Absence of physical injury  10/23/2024 2142 by Cheo Hutchison RN  Outcome: Progressing  Flowsheets (Taken 10/23/2024 2142)  Absence of Physical Injury: Implement safety measures based on patient assessment     Problem: Discharge Planning  Goal: Discharge to home or other facility with appropriate resources  10/23/2024 2142 by Cheo Hutchison RN  Outcome: Progressing  Flowsheets (Taken 10/18/2024 1600 by Damon Gonzalez, RN)  Discharge to home or other facility with appropriate resources:   Identify barriers to discharge with patient and caregiver   Arrange for needed discharge resources and transportation as appropriate   Identify discharge learning needs (meds, wound care, etc)   Refer to discharge planning if patient needs post-hospital services based on physician order or complex needs related to functional status, cognitive ability or social support system     Problem: Safety - Adult  Goal: Free from fall injury  10/23/2024 2142 by Cheo Hutchison RN  Outcome: Progressing  Flowsheets (Taken 10/23/2024 2142)  Free From Fall Injury:   Instruct family/caregiver on patient safety   Based on caregiver fall risk screen, instruct family/caregiver to ask for assistance with transferring infant if caregiver noted to have fall risk factors     Problem: Respiratory - Adult  Goal: Achieves optimal ventilation and oxygenation  10/23/2024 2142 by Cheo Hutchison RN  Outcome: Progressing  Flowsheets (Taken 10/22/2024 1515 by Eden Thompson RCP)  Achieves optimal ventilation and oxygenation:   Assess for changes in respiratory status   Respiratory therapy support as indicated     Problem: Cardiovascular - Adult  Goal: Maintains optimal cardiac output and hemodynamic stability  10/23/2024 2142 by Cheo Hutchison RN  Outcome: Progressing  Flowsheets (Taken 10/23/2024 2142)  Maintains optimal cardiac output and hemodynamic stability:   Monitor blood pressure and heart rate   Assess for signs of decreased

## 2024-10-24 NOTE — PROGRESS NOTES
CRITICAL CARE PROGRESS NOTE      Patient:  Kush Perea    Unit/Bed:4D-08/008-A  YOB: 1955  MRN: [id number]   PCP: Diann Vega APRN - CNP  Date of Admission: 10/18/2024  Chief Complaint:-Shortness of breath    Assessment and Plan:    Acute on chronic systolic HFrEF: TTE 10/18 EF 10-15%, LV severely dilated, global hypokinesis, RV severely dilated, severely reduced systolic function.  Aortic valve bicuspid.  Severe annular dilation MV, TV.  LA, RA severely dilated. S/p RHC 10/18, admitted to ICU for Townsend guided diuresis.  CVC, Townsend Sadaf placed 10/18.  Holding home Toprol. Cardiology following.  Trial off milrinone on 10/22, CI 1.6.  Repeat TTE 10/22 no change from 10/18 TTE.  Off vasopressin 10/22.  Despite multiple trials of weaning of milrinone, cardiac index has not improved past 1.6; HFrEF is unable to be managed medically and will require higher level of care  Telemetry  Strict I's and O's/daily weight/fluid restriction  Milrinone drip, wean as tolerated  Bumex 0.5 mg/h drip  Titrate drips with goal PCWP of 12  Patient to be transferred to [facility] with Dr. Rock for evaluation for need for cardiac transplant/LVAD  Sustained ventricular tachycardia: Noted 10/19 overnight unresponsive to Mg/Amio bolus, 10/19 normal sinus with periodic PVCs.  Dopamine drip started 10/19 due to poor cardiac index; caused patient to go into flutter; dopamine drip discontinued.   K > 4 , Mg >2  Lifepak at bedside  P.o. amiodarone  Persistent atrial fibrillation:  [id number] Score: 3 on warfarin.  S/p ablation 3/15/24  Heparin drip  P.o. amiodarone  Urinary retention: Etiology due to midodrine, prostatomegaly.  10/17 CTAP mildly enlarged prostate with mass effect on posterior wall of urinary bladder.  No history of BPH.  Not on home medication.  10/22 extreme pain with palpation suprapubic area, penis, patient had urinary hesitancy.  Straight cath x 600 cc with some blood clots noted, Denis  and imaging are personally viewed and interpreted unless otherwise noted).   BMP sodium 136, K3.7, CL 93, CO2 30, BUN 22, CR 1.5, AG 13, serum 1.1, EGFR 50, magnesium 2.0, phosphorus 4.2, calcium 9.2  WBC 11.0, hemoglobin 16.5, hematocrit 50.0, platelet 576   Elevations in CBC likely due to overdiuresis causing artificially elevated values   Chest x-ray right mid lower lung atelectasis/infiltrates 10/18    Seen with multidisciplinary ICU team .  Meets Continued ICU Level Care Criteria:    [x] Yes   [] No - Transfer Planned to listed location:  [] HOSPITALIST CONTACTED-      Case and plan discussed with Dr. London.  Electronically signed by Donnie PAEZ DO  CRITICAL CARE SPECIALIST

## 2024-10-24 NOTE — PROGRESS NOTES
Warfarin Pharmacy Consult Note    Warfarin Indication: Atrial fibrillation/Atrial flutter  Target INR: 2.0-3.0  Dose prior to admission: 10mg MWF, 12.5mg TThSS    Recent Labs     10/22/24  0430 10/23/24  0500 10/24/24  0600   HGB 14.6 16.2 16.5    490* 576*     Recent Labs     10/22/24  0350 10/23/24  0500 10/24/24  0600   INR 2.25* 2.96* 3.00*     Concurrent anticoagulants/antiplatelets: aspirin, Significant warfarin drug-drug interactions: amiodarone (new), escitalopram    Date INR Warfarin Dose   10/19/24 1.84 15 mg   10/20/24 1.64 15 mg   10/21/24 1.79 15 mg   10/22/24 2.25 7.5 mg   10/23/24 2.96 Hold    10/24/24 3.00 12.5 mg          Monitoring:                   INR will be monitored daily until therapeutic INR is achieved.    **Please contact pharmacy for discharge instructions when indicated**    Sweetie Griffin, PharmD, BCPS, BCCCP  10/24/2024 10:47 AM

## 2024-10-25 ENCOUNTER — APPOINTMENT (OUTPATIENT)
Dept: CARDIOLOGY | Facility: HOSPITAL | Age: 69
End: 2024-10-25
Payer: MEDICARE

## 2024-10-25 PROBLEM — K21.9 GERD (GASTROESOPHAGEAL REFLUX DISEASE): Status: ACTIVE | Noted: 2024-10-25

## 2024-10-25 PROBLEM — Z86.79 HISTORY OF CHRONIC ATRIAL FIBRILLATION: Status: ACTIVE | Noted: 2024-10-25

## 2024-10-25 PROBLEM — N17.9 AKI (ACUTE KIDNEY INJURY) (CMS-HCC): Status: ACTIVE | Noted: 2024-10-25

## 2024-10-25 PROBLEM — G47.33 OBSTRUCTIVE SLEEP APNEA: Status: ACTIVE | Noted: 2024-10-25

## 2024-10-25 PROBLEM — D72.829 LEUKOCYTOSIS: Status: ACTIVE | Noted: 2024-10-25

## 2024-10-25 PROBLEM — I25.5 ISCHEMIC CARDIOMYOPATHY: Status: ACTIVE | Noted: 2024-10-25

## 2024-10-25 PROBLEM — I50.84: Status: ACTIVE | Noted: 2024-10-25

## 2024-10-25 LAB
ALBUMIN SERPL BCP-MCNC: 4.4 G/DL (ref 3.4–5)
ALP SERPL-CCNC: 76 U/L (ref 33–136)
ALT SERPL W P-5'-P-CCNC: 13 U/L (ref 10–52)
ANION GAP BLDMV CALCULATED.4IONS-SCNC: 5 MMO/L (ref 10–25)
ANION GAP BLDMV CALCULATED.4IONS-SCNC: 7 MMO/L (ref 10–25)
ANION GAP BLDMV CALCULATED.4IONS-SCNC: 9 MMO/L (ref 10–25)
ANION GAP BLDMV CALCULATED.4IONS-SCNC: 9 MMO/L (ref 10–25)
ANION GAP SERPL CALC-SCNC: 15 MMOL/L (ref 10–20)
ANION GAP SERPL CALC-SCNC: 16 MMOL/L (ref 10–20)
ANION GAP SERPL CALC-SCNC: 19 MMOL/L (ref 10–20)
AORTIC VALVE MEAN GRADIENT: 10 MMHG
AORTIC VALVE PEAK VELOCITY: 2.01 M/S
APPEARANCE UR: CLEAR
APTT PPP: 53 SECONDS (ref 27–38)
AST SERPL W P-5'-P-CCNC: 15 U/L (ref 9–39)
AV PEAK GRADIENT: 16.2 MMHG
AVA (PEAK VEL): 2.32 CM2
AVA (VTI): 2.37 CM2
BASE EXCESS BLDMV CALC-SCNC: 4.6 MMOL/L (ref -2–3)
BASE EXCESS BLDMV CALC-SCNC: 5.1 MMOL/L (ref -2–3)
BASE EXCESS BLDMV CALC-SCNC: 7.5 MMOL/L (ref -2–3)
BASE EXCESS BLDMV CALC-SCNC: 8.3 MMOL/L (ref -2–3)
BASOPHILS # BLD AUTO: 0.15 X10*3/UL (ref 0–0.1)
BASOPHILS NFR BLD AUTO: 1 %
BILIRUB SERPL-MCNC: 2.3 MG/DL (ref 0–1.2)
BILIRUB UR STRIP.AUTO-MCNC: NEGATIVE MG/DL
BNP SERPL-MCNC: 1995 PG/ML (ref 0–99)
BODY TEMPERATURE: 37 DEGREES CELSIUS
BUN SERPL-MCNC: 28 MG/DL (ref 6–23)
BUN SERPL-MCNC: 29 MG/DL (ref 6–23)
BUN SERPL-MCNC: 31 MG/DL (ref 6–23)
CA-I BLD-SCNC: 1.08 MMOL/L (ref 1.1–1.33)
CA-I BLDMV-SCNC: 1.09 MMOL/L (ref 1.1–1.33)
CA-I BLDMV-SCNC: 1.15 MMOL/L (ref 1.1–1.33)
CA-I BLDMV-SCNC: 1.17 MMOL/L (ref 1.1–1.33)
CA-I BLDMV-SCNC: 1.18 MMOL/L (ref 1.1–1.33)
CALCIUM SERPL-MCNC: 10.1 MG/DL (ref 8.6–10.6)
CALCIUM SERPL-MCNC: 9.9 MG/DL (ref 8.6–10.6)
CALCIUM SERPL-MCNC: 9.9 MG/DL (ref 8.6–10.6)
CHLORIDE BLD-SCNC: 93 MMOL/L (ref 98–107)
CHLORIDE BLD-SCNC: 94 MMOL/L (ref 98–107)
CHLORIDE BLD-SCNC: 96 MMOL/L (ref 98–107)
CHLORIDE BLD-SCNC: 97 MMOL/L (ref 98–107)
CHLORIDE SERPL-SCNC: 90 MMOL/L (ref 98–107)
CHLORIDE SERPL-SCNC: 93 MMOL/L (ref 98–107)
CHLORIDE SERPL-SCNC: 93 MMOL/L (ref 98–107)
CO2 SERPL-SCNC: 29 MMOL/L (ref 21–32)
CO2 SERPL-SCNC: 30 MMOL/L (ref 21–32)
CO2 SERPL-SCNC: 31 MMOL/L (ref 21–32)
COLOR UR: YELLOW
CREAT SERPL-MCNC: 1.88 MG/DL (ref 0.5–1.3)
CREAT SERPL-MCNC: 2.01 MG/DL (ref 0.5–1.3)
CREAT SERPL-MCNC: 2.22 MG/DL (ref 0.5–1.3)
EGFRCR SERPLBLD CKD-EPI 2021: 31 ML/MIN/1.73M*2
EGFRCR SERPLBLD CKD-EPI 2021: 35 ML/MIN/1.73M*2
EGFRCR SERPLBLD CKD-EPI 2021: 38 ML/MIN/1.73M*2
EJECTION FRACTION: 18 %
EOSINOPHIL # BLD AUTO: 0.18 X10*3/UL (ref 0–0.7)
EOSINOPHIL NFR BLD AUTO: 1.2 %
ERYTHROCYTE [DISTWIDTH] IN BLOOD BY AUTOMATED COUNT: 17.3 % (ref 11.5–14.5)
EST. AVERAGE GLUCOSE BLD GHB EST-MCNC: 134 MG/DL
FERRITIN SERPL-MCNC: 76 NG/ML (ref 20–300)
FOLATE SERPL-MCNC: 23.5 NG/ML
GLUCOSE BLD-MCNC: 119 MG/DL (ref 74–99)
GLUCOSE BLD-MCNC: 125 MG/DL (ref 74–99)
GLUCOSE BLD-MCNC: 130 MG/DL (ref 74–99)
GLUCOSE BLD-MCNC: 239 MG/DL (ref 74–99)
GLUCOSE SERPL-MCNC: 112 MG/DL (ref 74–99)
GLUCOSE SERPL-MCNC: 132 MG/DL (ref 74–99)
GLUCOSE SERPL-MCNC: 210 MG/DL (ref 74–99)
GLUCOSE UR STRIP.AUTO-MCNC: NORMAL MG/DL
HBA1C MFR BLD: 6.3 %
HCO3 BLDMV-SCNC: 29.2 MMOL/L (ref 22–26)
HCO3 BLDMV-SCNC: 29.9 MMOL/L (ref 22–26)
HCO3 BLDMV-SCNC: 32 MMOL/L (ref 22–26)
HCO3 BLDMV-SCNC: 32.8 MMOL/L (ref 22–26)
HCT VFR BLD AUTO: 50.8 % (ref 41–52)
HCT VFR BLD EST: 49 % (ref 41–52)
HCT VFR BLD EST: 51 % (ref 41–52)
HCT VFR BLD EST: 51 % (ref 41–52)
HCT VFR BLD EST: 52 % (ref 41–52)
HGB BLD-MCNC: 17.5 G/DL (ref 13.5–17.5)
HGB BLDMV-MCNC: 16.4 G/DL (ref 13.5–17.5)
HGB BLDMV-MCNC: 16.9 G/DL (ref 13.5–17.5)
HGB BLDMV-MCNC: 17.1 G/DL (ref 13.5–17.5)
HGB BLDMV-MCNC: 17.3 G/DL (ref 13.5–17.5)
IMM GRANULOCYTES # BLD AUTO: 0.13 X10*3/UL (ref 0–0.7)
IMM GRANULOCYTES NFR BLD AUTO: 0.9 % (ref 0–0.9)
INHALED O2 CONCENTRATION: 21 %
INR PPP: 2.7 (ref 0.9–1.1)
IRON SATN MFR SERPL: 17 % (ref 25–45)
IRON SERPL-MCNC: 63 UG/DL (ref 35–150)
KETONES UR STRIP.AUTO-MCNC: NEGATIVE MG/DL
LACTATE BLDMV-SCNC: 0.6 MMOL/L (ref 0.4–2)
LACTATE BLDMV-SCNC: 0.7 MMOL/L (ref 0.4–2)
LACTATE BLDMV-SCNC: 0.8 MMOL/L (ref 0.4–2)
LACTATE BLDMV-SCNC: 2.1 MMOL/L (ref 0.4–2)
LACTATE SERPL-SCNC: 0.8 MMOL/L (ref 0.4–2)
LEFT ATRIUM VOLUME AREA LENGTH INDEX BSA: 41.5 ML/M2
LEFT VENTRICLE INTERNAL DIMENSION DIASTOLE: 7.7 CM (ref 3.5–6)
LEFT VENTRICULAR OUTFLOW TRACT DIAMETER: 2.6 CM
LEUKOCYTE ESTERASE UR QL STRIP.AUTO: NEGATIVE
LYMPHOCYTES # BLD AUTO: 1.6 X10*3/UL (ref 1.2–4.8)
LYMPHOCYTES NFR BLD AUTO: 11.1 %
MAGNESIUM SERPL-MCNC: 2.22 MG/DL (ref 1.6–2.4)
MCH RBC QN AUTO: 27.3 PG (ref 26–34)
MCHC RBC AUTO-ENTMCNC: 34.4 G/DL (ref 32–36)
MCV RBC AUTO: 79 FL (ref 80–100)
MONOCYTES # BLD AUTO: 0.97 X10*3/UL (ref 0.1–1)
MONOCYTES NFR BLD AUTO: 6.7 %
NEUTROPHILS # BLD AUTO: 11.38 X10*3/UL (ref 1.2–7.7)
NEUTROPHILS NFR BLD AUTO: 79.1 %
NITRITE UR QL STRIP.AUTO: NEGATIVE
NRBC BLD-RTO: 0 /100 WBCS (ref 0–0)
OXYHGB MFR BLDMV: 67.3 % (ref 45–75)
OXYHGB MFR BLDMV: 68.6 % (ref 45–75)
OXYHGB MFR BLDMV: 69.6 % (ref 45–75)
OXYHGB MFR BLDMV: 73.1 % (ref 45–75)
PCO2 BLDMV: 42 MM HG (ref 41–51)
PCO2 BLDMV: 43 MM HG (ref 41–51)
PH BLDMV: 7.45 PH (ref 7.33–7.43)
PH BLDMV: 7.45 PH (ref 7.33–7.43)
PH BLDMV: 7.48 PH (ref 7.33–7.43)
PH BLDMV: 7.49 PH (ref 7.33–7.43)
PH UR STRIP.AUTO: 7.5 [PH]
PHOSPHATE SERPL-MCNC: 4.5 MG/DL (ref 2.5–4.9)
PHOSPHATE SERPL-MCNC: 4.6 MG/DL (ref 2.5–4.9)
PHOSPHATE SERPL-MCNC: 4.8 MG/DL (ref 2.5–4.9)
PLATELET # BLD AUTO: 742 X10*3/UL (ref 150–450)
PO2 BLDMV: 42 MM HG (ref 35–45)
PO2 BLDMV: 43 MM HG (ref 35–45)
PO2 BLDMV: 44 MM HG (ref 35–45)
PO2 BLDMV: 47 MM HG (ref 35–45)
POTASSIUM BLDMV-SCNC: 3.4 MMOL/L (ref 3.5–5.3)
POTASSIUM BLDMV-SCNC: 3.5 MMOL/L (ref 3.5–5.3)
POTASSIUM BLDMV-SCNC: 3.6 MMOL/L (ref 3.5–5.3)
POTASSIUM BLDMV-SCNC: 3.7 MMOL/L (ref 3.5–5.3)
POTASSIUM SERPL-SCNC: 3.3 MMOL/L (ref 3.5–5.3)
POTASSIUM SERPL-SCNC: 3.4 MMOL/L (ref 3.5–5.3)
POTASSIUM SERPL-SCNC: 4.6 MMOL/L (ref 3.5–5.3)
PROT SERPL-MCNC: 6.8 G/DL (ref 6.4–8.2)
PROT UR STRIP.AUTO-MCNC: NEGATIVE MG/DL
PROTHROMBIN TIME: 30.5 SECONDS (ref 9.8–12.8)
RBC # BLD AUTO: 6.42 X10*6/UL (ref 4.5–5.9)
RBC # UR STRIP.AUTO: ABNORMAL /UL
RBC #/AREA URNS AUTO: >20 /HPF
RIGHT VENTRICLE PEAK SYSTOLIC PRESSURE: 21 MMHG
SAO2 % BLDMV: 69 % (ref 45–75)
SAO2 % BLDMV: 71 % (ref 45–75)
SAO2 % BLDMV: 72 % (ref 45–75)
SAO2 % BLDMV: 75 % (ref 45–75)
SODIUM BLDMV-SCNC: 128 MMOL/L (ref 136–145)
SODIUM BLDMV-SCNC: 130 MMOL/L (ref 136–145)
SODIUM BLDMV-SCNC: 130 MMOL/L (ref 136–145)
SODIUM BLDMV-SCNC: 131 MMOL/L (ref 136–145)
SODIUM SERPL-SCNC: 134 MMOL/L (ref 136–145)
SODIUM SERPL-SCNC: 134 MMOL/L (ref 136–145)
SODIUM SERPL-SCNC: 137 MMOL/L (ref 136–145)
SP GR UR STRIP.AUTO: 1.01
T4 FREE SERPL-MCNC: 1.44 NG/DL (ref 0.78–1.48)
TIBC SERPL-MCNC: 374 UG/DL (ref 240–445)
TRICUSPID ANNULAR PLANE SYSTOLIC EXCURSION: 1 CM
TSH SERPL-ACNC: 8.47 MIU/L (ref 0.44–3.98)
UIBC SERPL-MCNC: 311 UG/DL (ref 110–370)
UROBILINOGEN UR STRIP.AUTO-MCNC: ABNORMAL MG/DL
VIT B12 SERPL-MCNC: 768 PG/ML (ref 211–911)
WBC # BLD AUTO: 14.4 X10*3/UL (ref 4.4–11.3)
WBC #/AREA URNS AUTO: ABNORMAL /HPF

## 2024-10-25 PROCEDURE — 81001 URINALYSIS AUTO W/SCOPE: CPT | Performed by: NURSE PRACTITIONER

## 2024-10-25 PROCEDURE — 2500000002 HC RX 250 W HCPCS SELF ADMINISTERED DRUGS (ALT 637 FOR MEDICARE OP, ALT 636 FOR OP/ED)

## 2024-10-25 PROCEDURE — 2500000004 HC RX 250 GENERAL PHARMACY W/ HCPCS (ALT 636 FOR OP/ED)

## 2024-10-25 PROCEDURE — 2500000001 HC RX 250 WO HCPCS SELF ADMINISTERED DRUGS (ALT 637 FOR MEDICARE OP): Performed by: NURSE PRACTITIONER

## 2024-10-25 PROCEDURE — 93306 TTE W/DOPPLER COMPLETE: CPT

## 2024-10-25 PROCEDURE — 87081 CULTURE SCREEN ONLY: CPT | Performed by: NURSE PRACTITIONER

## 2024-10-25 PROCEDURE — 37799 UNLISTED PX VASCULAR SURGERY: CPT | Performed by: NURSE PRACTITIONER

## 2024-10-25 PROCEDURE — 2020000001 HC ICU ROOM DAILY

## 2024-10-25 PROCEDURE — 2500000004 HC RX 250 GENERAL PHARMACY W/ HCPCS (ALT 636 FOR OP/ED): Performed by: NURSE PRACTITIONER

## 2024-10-25 PROCEDURE — 2500000002 HC RX 250 W HCPCS SELF ADMINISTERED DRUGS (ALT 637 FOR MEDICARE OP, ALT 636 FOR OP/ED): Performed by: NURSE PRACTITIONER

## 2024-10-25 PROCEDURE — 2500000001 HC RX 250 WO HCPCS SELF ADMINISTERED DRUGS (ALT 637 FOR MEDICARE OP)

## 2024-10-25 PROCEDURE — 82330 ASSAY OF CALCIUM: CPT | Performed by: NURSE PRACTITIONER

## 2024-10-25 PROCEDURE — 99291 CRITICAL CARE FIRST HOUR: CPT

## 2024-10-25 PROCEDURE — 84132 ASSAY OF SERUM POTASSIUM: CPT | Performed by: NURSE PRACTITIONER

## 2024-10-25 PROCEDURE — 36415 COLL VENOUS BLD VENIPUNCTURE: CPT | Performed by: NURSE PRACTITIONER

## 2024-10-25 PROCEDURE — 99291 CRITICAL CARE FIRST HOUR: CPT | Performed by: STUDENT IN AN ORGANIZED HEALTH CARE EDUCATION/TRAINING PROGRAM

## 2024-10-25 PROCEDURE — 84132 ASSAY OF SERUM POTASSIUM: CPT

## 2024-10-25 PROCEDURE — 93306 TTE W/DOPPLER COMPLETE: CPT | Performed by: INTERNAL MEDICINE

## 2024-10-25 RX ORDER — POTASSIUM CHLORIDE 20 MEQ/1
40 TABLET, EXTENDED RELEASE ORAL ONCE
Status: COMPLETED | OUTPATIENT
Start: 2024-10-25 | End: 2024-10-25

## 2024-10-25 RX ORDER — MILRINONE LACTATE 0.2 MG/ML
0.12 INJECTION, SOLUTION INTRAVENOUS CONTINUOUS
Status: DISCONTINUED | OUTPATIENT
Start: 2024-10-25 | End: 2024-11-12

## 2024-10-25 RX ORDER — SACUBITRIL AND VALSARTAN 97; 103 MG/1; MG/1
1 TABLET, FILM COATED ORAL 2 TIMES DAILY
Qty: 180 TABLET | Refills: 0 | Status: SHIPPED | OUTPATIENT
Start: 2024-10-25

## 2024-10-25 RX ORDER — BUMETANIDE 0.25 MG/ML
2 INJECTION, SOLUTION INTRAMUSCULAR; INTRAVENOUS ONCE
Status: COMPLETED | OUTPATIENT
Start: 2024-10-25 | End: 2024-10-25

## 2024-10-25 RX ORDER — HYDRALAZINE HYDROCHLORIDE 50 MG/1
50 TABLET, FILM COATED ORAL 3 TIMES DAILY
Status: COMPLETED | OUTPATIENT
Start: 2024-10-25 | End: 2024-10-26

## 2024-10-25 RX ORDER — HYDRALAZINE HYDROCHLORIDE 25 MG/1
25 TABLET, FILM COATED ORAL ONCE
Status: COMPLETED | OUTPATIENT
Start: 2024-10-25 | End: 2024-10-25

## 2024-10-25 RX ORDER — AMOXICILLIN 250 MG
2 CAPSULE ORAL 2 TIMES DAILY
Status: DISCONTINUED | OUTPATIENT
Start: 2024-10-25 | End: 2024-11-12

## 2024-10-25 RX ORDER — ISOSORBIDE DINITRATE 10 MG/1
10 TABLET ORAL
Status: COMPLETED | OUTPATIENT
Start: 2024-10-25 | End: 2024-10-26

## 2024-10-25 RX ORDER — POTASSIUM CHLORIDE 20 MEQ/1
40 TABLET, EXTENDED RELEASE ORAL ONCE
Status: DISCONTINUED | OUTPATIENT
Start: 2024-10-25 | End: 2024-10-25

## 2024-10-25 RX ORDER — POLYETHYLENE GLYCOL 3350 17 G/17G
17 POWDER, FOR SOLUTION ORAL DAILY PRN
Status: DISCONTINUED | OUTPATIENT
Start: 2024-10-25 | End: 2024-11-12

## 2024-10-25 SDOH — SOCIAL STABILITY: SOCIAL INSECURITY: WITHIN THE LAST YEAR, HAVE YOU BEEN HUMILIATED OR EMOTIONALLY ABUSED IN OTHER WAYS BY YOUR PARTNER OR EX-PARTNER?: NO

## 2024-10-25 SDOH — ECONOMIC STABILITY: HOUSING INSECURITY: IN THE LAST 12 MONTHS, WAS THERE A TIME WHEN YOU WERE NOT ABLE TO PAY THE MORTGAGE OR RENT ON TIME?: NO

## 2024-10-25 SDOH — SOCIAL STABILITY: SOCIAL INSECURITY: WITHIN THE LAST YEAR, HAVE YOU BEEN AFRAID OF YOUR PARTNER OR EX-PARTNER?: NO

## 2024-10-25 SDOH — SOCIAL STABILITY: SOCIAL INSECURITY: DO YOU FEEL ANYONE HAS EXPLOITED OR TAKEN ADVANTAGE OF YOU FINANCIALLY OR OF YOUR PERSONAL PROPERTY?: NO

## 2024-10-25 SDOH — SOCIAL STABILITY: SOCIAL INSECURITY
WITHIN THE LAST YEAR, HAVE YOU BEEN KICKED, HIT, SLAPPED, OR OTHERWISE PHYSICALLY HURT BY YOUR PARTNER OR EX-PARTNER?: NO

## 2024-10-25 SDOH — ECONOMIC STABILITY: INCOME INSECURITY: IN THE PAST 12 MONTHS HAS THE ELECTRIC, GAS, OIL, OR WATER COMPANY THREATENED TO SHUT OFF SERVICES IN YOUR HOME?: NO

## 2024-10-25 SDOH — ECONOMIC STABILITY: FOOD INSECURITY: WITHIN THE PAST 12 MONTHS, THE FOOD YOU BOUGHT JUST DIDN'T LAST AND YOU DIDN'T HAVE MONEY TO GET MORE.: NEVER TRUE

## 2024-10-25 SDOH — ECONOMIC STABILITY: HOUSING INSECURITY: AT ANY TIME IN THE PAST 12 MONTHS, WERE YOU HOMELESS OR LIVING IN A SHELTER (INCLUDING NOW)?: NO

## 2024-10-25 SDOH — SOCIAL STABILITY: SOCIAL INSECURITY: HAS ANYONE EVER THREATENED TO HURT YOUR FAMILY OR YOUR PETS?: NO

## 2024-10-25 SDOH — SOCIAL STABILITY: SOCIAL INSECURITY: DO YOU FEEL UNSAFE GOING BACK TO THE PLACE WHERE YOU ARE LIVING?: NO

## 2024-10-25 SDOH — ECONOMIC STABILITY: TRANSPORTATION INSECURITY: IN THE PAST 12 MONTHS, HAS LACK OF TRANSPORTATION KEPT YOU FROM MEDICAL APPOINTMENTS OR FROM GETTING MEDICATIONS?: NO

## 2024-10-25 SDOH — SOCIAL STABILITY: SOCIAL INSECURITY
WITHIN THE LAST YEAR, HAVE YOU BEEN RAPED OR FORCED TO HAVE ANY KIND OF SEXUAL ACTIVITY BY YOUR PARTNER OR EX-PARTNER?: NO

## 2024-10-25 SDOH — ECONOMIC STABILITY: FOOD INSECURITY: WITHIN THE PAST 12 MONTHS, YOU WORRIED THAT YOUR FOOD WOULD RUN OUT BEFORE YOU GOT THE MONEY TO BUY MORE.: NEVER TRUE

## 2024-10-25 SDOH — SOCIAL STABILITY: SOCIAL INSECURITY: ARE YOU OR HAVE YOU BEEN THREATENED OR ABUSED PHYSICALLY, EMOTIONALLY, OR SEXUALLY BY ANYONE?: NO

## 2024-10-25 SDOH — SOCIAL STABILITY: SOCIAL INSECURITY: DOES ANYONE TRY TO KEEP YOU FROM HAVING/CONTACTING OTHER FRIENDS OR DOING THINGS OUTSIDE YOUR HOME?: NO

## 2024-10-25 SDOH — ECONOMIC STABILITY: FOOD INSECURITY: HOW HARD IS IT FOR YOU TO PAY FOR THE VERY BASICS LIKE FOOD, HOUSING, MEDICAL CARE, AND HEATING?: NOT HARD AT ALL

## 2024-10-25 SDOH — SOCIAL STABILITY: SOCIAL INSECURITY: ABUSE: ADULT

## 2024-10-25 SDOH — SOCIAL STABILITY: SOCIAL INSECURITY: ARE THERE ANY APPARENT SIGNS OF INJURIES/BEHAVIORS THAT COULD BE RELATED TO ABUSE/NEGLECT?: NO

## 2024-10-25 SDOH — SOCIAL STABILITY: SOCIAL INSECURITY: HAVE YOU HAD THOUGHTS OF HARMING ANYONE ELSE?: NO

## 2024-10-25 SDOH — SOCIAL STABILITY: SOCIAL INSECURITY: WERE YOU ABLE TO COMPLETE ALL THE BEHAVIORAL HEALTH SCREENINGS?: YES

## 2024-10-25 SDOH — ECONOMIC STABILITY: HOUSING INSECURITY: IN THE PAST 12 MONTHS, HOW MANY TIMES HAVE YOU MOVED WHERE YOU WERE LIVING?: 1

## 2024-10-25 SDOH — SOCIAL STABILITY: SOCIAL INSECURITY: HAVE YOU HAD ANY THOUGHTS OF HARMING ANYONE ELSE?: NO

## 2024-10-25 ASSESSMENT — PATIENT HEALTH QUESTIONNAIRE - PHQ9
2. FEELING DOWN, DEPRESSED OR HOPELESS: NOT AT ALL
SUM OF ALL RESPONSES TO PHQ9 QUESTIONS 1 & 2: 0
1. LITTLE INTEREST OR PLEASURE IN DOING THINGS: NOT AT ALL

## 2024-10-25 ASSESSMENT — LIFESTYLE VARIABLES
HOW OFTEN DO YOU HAVE A DRINK CONTAINING ALCOHOL: 2-4 TIMES A MONTH
HOW OFTEN DO YOU HAVE 6 OR MORE DRINKS ON ONE OCCASION: NEVER
HOW MANY STANDARD DRINKS CONTAINING ALCOHOL DO YOU HAVE ON A TYPICAL DAY: 1 OR 2
AUDIT-C TOTAL SCORE: 2
SKIP TO QUESTIONS 9-10: 1
AUDIT-C TOTAL SCORE: 2

## 2024-10-25 ASSESSMENT — ENCOUNTER SYMPTOMS
MUSCULOSKELETAL NEGATIVE: 1
NEUROLOGICAL NEGATIVE: 1
FREQUENCY: 1
EYES NEGATIVE: 1
FATIGUE: 1
ALLERGIC/IMMUNOLOGIC NEGATIVE: 1
PSYCHIATRIC NEGATIVE: 1
GASTROINTESTINAL NEGATIVE: 1
DYSURIA: 1
DIFFICULTY URINATING: 1
SHORTNESS OF BREATH: 1
HEMATOLOGIC/LYMPHATIC NEGATIVE: 1
ENDOCRINE NEGATIVE: 1

## 2024-10-25 ASSESSMENT — PAIN SCALES - GENERAL
PAINLEVEL_OUTOF10: 0 - NO PAIN

## 2024-10-25 ASSESSMENT — COGNITIVE AND FUNCTIONAL STATUS - GENERAL
TURNING FROM BACK TO SIDE WHILE IN FLAT BAD: A LITTLE
DAILY ACTIVITIY SCORE: 21
MOBILITY SCORE: 18
DRESSING REGULAR UPPER BODY CLOTHING: A LITTLE
CLIMB 3 TO 5 STEPS WITH RAILING: A LOT
TOILETING: A LITTLE
MOVING TO AND FROM BED TO CHAIR: A LITTLE
WALKING IN HOSPITAL ROOM: A LITTLE
HELP NEEDED FOR BATHING: A LITTLE
STANDING UP FROM CHAIR USING ARMS: A LITTLE

## 2024-10-25 ASSESSMENT — COLUMBIA-SUICIDE SEVERITY RATING SCALE - C-SSRS
2. HAVE YOU ACTUALLY HAD ANY THOUGHTS OF KILLING YOURSELF?: NO
1. IN THE PAST MONTH, HAVE YOU WISHED YOU WERE DEAD OR WISHED YOU COULD GO TO SLEEP AND NOT WAKE UP?: NO
6. HAVE YOU EVER DONE ANYTHING, STARTED TO DO ANYTHING, OR PREPARED TO DO ANYTHING TO END YOUR LIFE?: NO

## 2024-10-25 ASSESSMENT — PAIN - FUNCTIONAL ASSESSMENT
PAIN_FUNCTIONAL_ASSESSMENT: 0-10

## 2024-10-25 ASSESSMENT — ACTIVITIES OF DAILY LIVING (ADL): LACK_OF_TRANSPORTATION: NO

## 2024-10-25 NOTE — PROGRESS NOTES
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  He has been hemodynamically stabilized on inotropic therapy, but the team managing him at the external facility was unable to wean inotropes.  He will be evaluated for potentially reversible causes of his heart failure decompensation.  Left heart catheterization could not be obtained today, but is planned for 10/28/2024.  If no obstructive coronary lesion and continued challenges with weaning inotropes, adv HF rx may be needed    This critically ill patient continues to be at-risk for clinically significant deterioration / failure due to the above mentioned dysfunctional, unstable organ systems.  I have personally identified and managed all complex critical care issues to prevent aforementioned clinical deterioration.  Critical care time is spent at bedside and/or the immediate area and has included, but is not limited to, the review of diagnostic tests, labs, radiographs, serial assessments of hemodynamics, respiratory status, ventilatory management, and family updates.  Time spent in procedures and teaching are reported separately.    Critical care time: 45  minutes

## 2024-10-25 NOTE — INTERVAL H&P NOTE
H&P reviewed. The patient was examined and there are no changes to the H&P. Patient reports chest pain 1 week ago upon arrival to the hospital and it has since improved with diuresis. Reports RAMOS and mild orthopnea but believes he can lay flat for the procedure.

## 2024-10-25 NOTE — CONSULTS
Nutrition Initial Assessment:   Nutrition Assessment    Reason for Assessment: Provider consult order    Patient is a 69 y.o. male presenting to an OSH on 10/18 after a RHC.  Pt with SOB, fluid overload and KENYA.  Transferred to [facility] for advanced therapy evaluation.  Currently on bumex and milrinone drips.    Past medical history includes CAD s/p 4V CABG (2012) and PCI (LCx/OM; 5/2019), stage D systolic HF/ICM/HFrEF with LVEF 10-15%( 10/22/24 TTE), AF s/p RFA (PVI and CTI; 4/2024) on OAC therapy, HTN, dyslipidemia, depression, anxiety and VIV using CPAP     Nutrition History:  Food and Nutrient History: Met with patient.  He reports a poor to fair appetite and intake PTA for a few weeks to a few months.  Felt as though he was eating less at meals as he was getting full quickly. He had gained a lot of weight but it was fluid weight and says that his doctors took all of that fluid weight off of him and his weight is back down into his normal range.  He has not tried anything Boost/Ensure before and wants to try working on his meals post-cath first and then will think about Ensure or Boost.  Denies regular nausea issues.  He is regular with his bowels.  No troiuble chewing or swallowing.       Anthropometrics:  Height: 182.9 cm (6')   Weight: 93.5 kg (206 lb 2.1 oz)   BMI (Calculated): 27.95  IBW/kg (Dietitian Calculated): 81 kg  Percent of IBW: 115 %     Weight History:     10/24/24: 93.5kg  8/5/24: 122kg  1/31/22: 119kg    Says he is normally between 91kg-95.5kg.  Current weight falls within this range.  Concerned that he was 119kgs in 2022 and 122kg in August 2024 (fairly consistent) and now he is down 23% from this weight in two months.  Suspect mostly fluid losses but also fat/muscle losses.     Weight Change %:       Nutrition Focused Physical Exam Findings:  Pt says he feels that he has less muscle mass on him now than previously.  He used to be active and use heavy equipment when feeling well.  He can no longer  "perform these types of jobs.     Subcutaneous Fat Loss:   Orbital Fat Pads: Well nourished (slightly bulging fat pads)  Buccal Fat Pads: Well nourished (full, rounded cheeks)  Triceps: Mild-Moderate (less than ample fat tissue)  Muscle Wasting:  Temporalis: Mild-Moderate (slight depression)  Pectoralis (Clavicular Region): Mild-Moderate (some protrusion of clavicle)  Deltoid/Trapezius: Well nourished (rounded appearance at arm, shoulder, neck)  Quadriceps: Well nourished (well developed, well rounded)  Gastrocnemius: Mild-Moderate (not well developed muscle)  Edema:  Edema Location: B/L LE edema  Physical Findings:  Skin: Negative    Nutrition Significant Labs:  A1C:  Lab Results   Component Value Date    HGBA1C 6.3 (H) 10/24/2024   , BG POCT trend:    , Renal Lab Trend:   Results from last 7 days   Lab Units 10/24/24  2328   POTASSIUM mmol/L 4.6   PHOSPHORUS mg/dL 4.8   SODIUM mmol/L 134*   MAGNESIUM mg/dL 2.22   EGFR mL/min/1.73m*2 38*   BUN mg/dL 28*   CREATININE mg/dL 1.88*    , Vit D: No results found for: \"VITD25\"     Nutrition Specific Medications:  Scheduled medications  aspirin, 81 mg, oral, Daily  [START ON 10/27/2024] cholecalciferol, 50,000 Units, oral, Every Sunday  escitalopram, 10 mg, oral, Daily  hydrALAZINE, 25 mg, oral, BID  isosorbide dinitrate, 10 mg, oral, TID  [Held by provider] metoprolol succinate XL, 50 mg, oral, Daily  pantoprazole, 40 mg, oral, Daily before breakfast  [Held by provider] sacubitriL-valsartan 97 mg-103 mg, 1 tablet, oral, BID  sennosides-docusate sodium, 2 tablet, oral, BID  spironolactone, 25 mg, oral, Daily      Continuous medications  bumetanide, 1 mg/hr, Last Rate: 1 mg/hr (10/25/24 0934)  milrinone, 0.5 mcg/kg/min, Last Rate: 0.5 mcg/kg/min (10/25/24 0934)      PRN medications  PRN medications: alteplase, nitroglycerin, oxygen, polyethylene glycol     I/O:   Last BM Date: 10/24/24; Stool Appearance: Soft, Formed (10/24/24 2300)        Dietary Orders (From admission, " "onward)       Start     Ordered    10/25/24 0947  NPO Diet; Effective now  Diet effective now         10/25/24 0947    10/24/24 2342  May Participate in Room Service  ( ROOM SERVICE MAY PARTICIPATE)  Once        Question:  .  Answer:  Yes    10/24/24 2341                     Estimated Needs:   Total Energy Estimated Needs (kCal):  (9621-9926)  Method for Estimating Needs: MSJ= 1743  Total Protein Estimated Needs (g):  (100-115)  Method for Estimating Needs: 1.2-1.4 x 81kg            Nutrition Diagnosis   Malnutrition Diagnosis  Patient has Malnutrition Diagnosis: Yes  Diagnosis Status: New  Malnutrition Diagnosis: Moderate malnutrition related to acute disease or injury  As Evidenced by: pt reports a poor to fair PO intake for weeks to months PTA along with moderate to severe fat and muscle loss on physical exam; he is down in weight by 23% in two months-- mostly fluids but suspect also has a component of true fat/muscle loss        May be a higher degree of malnutrition but difficult to determine at this time.  Will monitor.     Nutrition Interventions/Recommendations         Nutrition Prescription:   Regular diet once pt is post-cath  Check Vitamin D level  Consider MVI with mineral supplement once daily.           Nutrition Interventions:    None at this time.  Pt would like to hold off on having anything like Ensure or Boost ordered.  RDN will monitor.        Nutrition Education:   Pt reports that he does not use the salt shaker.  His wife does the shopping and cooking and they do little to no processed foods.  They tend to eat a lot of meat-- he gets \"half a cow\" at a time from a local farmer.        Nutrition Monitoring and Evaluation   Food/Nutrient Related History Monitoring  Monitoring and Evaluation Plan: Energy intake  Criteria: PO diet to meet >75% estimated nutrition needs         Time Spent (min): 60 minutes            "

## 2024-10-25 NOTE — PROGRESS NOTES
[facility]  HFICU PROGRESS NOTE    Fahad Meraz/[id number]    Admit Date: 10/24/2024  Hospital Length of Stay: 1   ICU Length of Stay: 15h   Primary Service:   Primary HF Cardiologist:   Referring:    INTERVAL EVENTS / PERTINENT ROS:     Patient transferred from [facility] last evening with SWAN catheter in place and Milrinone gtt infusing at 0.625 mcg/kg/min. Telemetry shows atrial fibrillation with frequent ectopy (PVC's) and self-limiting VTACH on 0.625 mcg/kg/min Milrinone gtt. Milrinone gtt decreased to 0.5 mcg/kg/min in the setting of increased ectopy. He denies headache, dizziness, vision changes, chest pain, palpitations, shortness of breath, cough, ABD pain, ABD swelling, constipation, diarrhea, nausea, or vomiting.     Daily hemodynamics: B/P: 145/78 (97); CVP: 4; PAP: 39/17 (25); PCWP: 12; SVR: 1754; CI: 1.96 / CO: 4.24; SvO2: 71% on Milrinone @ 0.5 mcg/kg/min; PO hydralazine 25 mg BID; PO Isodil 10 mg TID; PO Spironolactone 25 mg daily; Bumex gtt  - Thermos re-evaluated (CI: 2.1 / CO: 4.5).     Plan:    - Increase hydralazine to 50 mg TID   - Hold diuresis for now given CVP of 4 and worsening creatinine  - Repeat afternoon renal function panel to determine diuresis plan  - Consider EP consult after ischemia evaluation  - Coronary angiogram scheduled for Monday 10/28 as priority (discussed with interventional cardiology)  - Continue with Q 6 hour hemodynamics.   - Complete Echocardiogram today     MEDICATIONS  Infusions:  bumetanide, Last Rate: 1 mg/hr (10/25/24 0934)  milrinone, Last Rate: 0.5 mcg/kg/min (10/25/24 0934)      Scheduled:  aspirin, 81 mg, Daily  [START ON 10/27/2024] cholecalciferol, 50,000 Units, Every Sunday  escitalopram, 10 mg, Daily  hydrALAZINE, 50 mg, TID  isosorbide dinitrate, 10 mg, TID  [Held by provider] metoprolol succinate XL, 50 mg, Daily  pantoprazole, 40 mg, Daily before breakfast  [Held by provider] sacubitriL-valsartan 97 mg-103 mg, 1  tablet, BID  sennosides-docusate sodium, 2 tablet, BID  spironolactone, 25 mg, Daily      PRN:  alteplase, 2 mg, PRN  nitroglycerin, 0.4 mg, q5 min PRN  oxygen, , Continuous PRN - O2/gases  polyethylene glycol, 17 g, Daily PRN      Invasive Hemodynamics:    Most Recent Range Past 24hrs   BP (Art) 107/59 Arterial Line BP 1  Min: 84/72  Max: 151/78   MAP(Art) 73 mmHg Arterial Line MAP 1 (mmHg)   Min: 46 mmHg  Max: 99 mmHg   RA/CVP   No data recorded   PA 29/16 PAP  Min: 22/10  Max: 62/30   PA(mean) 21 mmHg PAP (Mean)  Min: 15 mmHg  Max: 44 mmHg   PCWP 12 mmHg PCWP (mmHg)  Min: 12 mmHg  Max: 20 mmHg   CO 4.5 L/min CO (L/min)  Min: 4.2 L/min  Max: 4.6 L/min   CI 2.1 L/min/m2 CI (L/min/m2)  Min: 2 L/min/m2  Max: 2.1 L/min/m2   Mixed Venous 71 % SVO2 (%)  Min: 65 %  Max: 72 %   SVR  1754 (dyne*sec)/cm5 SVR (dyne*sec)/cm5  Min: 1083 (dyne*sec)/cm5  Max: 1754 (dyne*sec)/cm5    (dyne*sec)/cm5 PVR (dyne*sec)/cm5  Min: 72 (dyne*sec)/cm5  Max: 245 (dyne*sec)/cm5     PHYSICAL EXAM:   Visit Vitals  BP 96/56   Pulse 83   Temp 35.6 °C (96.1 °F)   Resp 13   Ht 1.829 m (6')   Wt 93.5 kg (206 lb 2.1 oz)   SpO2 93%   BMI 27.96 kg/m²   Smoking Status Former   BSA 2.18 m²       Wt Readings from Last 5 Encounters:   10/24/24 93.5 kg (206 lb 2.1 oz)   10/24/24 92.5 kg (204 lb)   08/05/24 122 kg (268 lb)   01/31/22 119 kg (262 lb)   02/18/20 123 kg (270 lb 2 oz)       INTAKE/OUTPUT:  I/O last 3 completed shifts:  In: 110.8 (1.2 mL/kg) [I.V.:110.8 (1.2 mL/kg)]  Out: 1155 (12.4 mL/kg) [Urine:1155 (0.3 mL/kg/hr)]  Weight: 93.5 kg      Physical Exam  HENT:      Head: Normocephalic.      Nose: Nose normal.      Mouth/Throat:      Mouth: Mucous membranes are moist.   Eyes:      Pupils: Pupils are equal, round, and reactive to light.   Neck:      Vascular: JVD present.   Cardiovascular:      Rate and Rhythm: Normal rate. Rhythm irregular.      Comments: Permanent Atrial Fibrillation  Pulmonary:      Effort: Pulmonary effort is normal.       "Breath sounds: Normal breath sounds.   Abdominal:      Palpations: Abdomen is soft.   Musculoskeletal:         General: Normal range of motion.      Cervical back: Normal range of motion.   Skin:     Capillary Refill: Capillary refill takes less than 2 seconds.   Neurological:      Mental Status: He is alert and oriented to person, place, and time.   Psychiatric:         Mood and Affect: Mood normal.         Behavior: Behavior normal.         Thought Content: Thought content normal.         Judgment: Judgment normal.         DATA:  CMP:  Results from last 7 days   Lab Units 10/25/24  1221 10/24/24  2328   SODIUM mmol/L 137 134*   POTASSIUM mmol/L 3.4* 4.6   CHLORIDE mmol/L 93* 90*   CO2 mmol/L 31 30   ANION GAP mmol/L 16 19   BUN mg/dL 29* 28*   CREATININE mg/dL 2.01* 1.88*   EGFR mL/min/1.73m*2 35* 38*   MAGNESIUM mg/dL  --  2.22   ALBUMIN g/dL 4.4 4.4   ALT U/L  --  13   AST U/L  --  15   BILIRUBIN TOTAL mg/dL  --  2.3*     CBC:  Results from last 7 days   Lab Units 10/24/24  2328   WBC AUTO x10*3/uL 14.4*   HEMOGLOBIN g/dL 17.5   HEMATOCRIT % 50.8   PLATELETS AUTO x10*3/uL 742*   MCV fL 79*     COAG:   Results from last 7 days   Lab Units 10/24/24  2328   INR  2.7*     ABO: No results found for: \"ABO\"  HEME/ENDO:  Results from last 7 days   Lab Units 10/24/24  2328   FERRITIN ng/mL 76   IRON SATURATION % 17*   TSH mIU/L 8.47*   HEMOGLOBIN A1C % 6.3*      CARDIAC:   Results from last 7 days   Lab Units 10/24/24  2328   BNP pg/mL 1,995*       ASSESSMENT AND PLAN:     Mr. Meraz is a 69 M with a PMHx sig for CAD s/p 4V CABG (2012) and PCI (LCx/OM; 5/2019), stage D systolic HF/ICM/HFrEF with LVEF 10-15%( 10/22/24 TTE), AF s/p RFA (PVI and CTI; 4/2024) on OAC therapy, HTN, dyslipidemia, depression, anxiety and VIV using CPAP who was admitted to OSH ICU, s/p RHC on 10/18/24.  Per patient, he had been experienced worsening SOB and fluids overload for 2-3 weeks. Patient was on milrinone drip and diurese with SG guided. " However his KENYA worsening, and not able to wean milrinone drip. Decision was made to transfer him to HF ICU [facility] [place] for possible advanced therapy consideration.      Neuro:     # Hx of Depression/Anxiety  - Serial neuro and pain assessments  - PO Tylenol PRN for pain  - PT/OT Consult, OOB to chair  - CAM ICU score every shift  - Sleep/wake cycle normalization  - c/w home Lexapro and Clonazepam     # Physical Status  -Overweight:  BMI: 27.9   -Reduced Mobility: acute decompensate heart failure and ICU stay      #Substance abuse  -Alcohol abuse/Alcohol dependence: NO   -Tobacco use/Nicotine Dependence: NO      Cardiovascular:     # Stage D, ICM, decompensate acute on chronic heart failure,  HFrEF 10-15% ( from 10/22/24 ECHO)   #Hx of CAD s/p 4V CABG 2012     -BRIANA (3/29/23): Left Ventricle: Moderately reduced left ventricular systolic function with a visually estimated EF of 35 - 40%.  - TTE (10/22/24): LVEF 10 -15%, right ventricle moderately dilated, moderately reduced systolic function. Mild MR  - admit weight (10/24): 93.5 KG   - admit BNP (10/24):  1995  - Home meds include Metoprolol succinate 25 mg daily, Entresto 97/103 mg bid, Hydralazine 25 mg bid, Isosorbide mononitrate 120 mg daily, Spironolactone 25 mg daily, Bumex 0.5 mg daily----allergy to SGLT2.  - ASA  - Decrease Milrinone drip to 0.5 mcg/kg/min, initiated OSH  - [facility] [place] # when arrival (10/25): Bp 96/50 (65), CVP 5, PA 36/20 (22), PCWP 18, CO/CI 4.4/2.1, SVR 1083, SVO2 65 % on Milrinone drip at 0.625 mcg/kg/min, hydral 25 mg TID.   - To wean Milrinone drip as able  - Stop Bumex drip, will spot diurese   - Increase hydralazine to 50 mg TID   - Will start Isordil 10 mg TID Am   - C/w Rodrigo 25 mg daily   - Hold BB   - Daily standing weights, 2gm sodium diet, 2L fluid restriction, strict I&Os     #CAD   -LHC (12/20/2020):  Successful PCI of the SVG to ramus intermedius with one drug-eluting stent; OCT of the SVG to RCA to confirm no thrombosis; patent LAD;  occluded SVG to OM.  -Meds: C/w ASA.  -Consider to add Lipitor      # Hx of AFib s/p RFA (PVI and CTI; 4/2024)  -NO Device noted  -Was on home Coumadin for 2 months, prior to that was on Eliquis,  patient reported with Dizziness while on Eliquis      # Dizziness/bradycardia  -Had 3 episodes of near syncope after his recent AF RFA; has stopped driving as a result. ECG with sinus olu. Metoprolol previously decreased by his local EP.    -Hold BB due to decompensate HF      #Electrolyte Disturbances  -Keep K>4, Mag >2     Pulmonary:   # PMH of VIV  -c/w home CPAP  -Monitor and maintain SpO2 > 92%     GI:  # H/x of  GERD  - Bowel regimen: Senna, Miralax PRN  - PPI     #  Bilirubinemia  - T maryuri (10/24): 2.3, ALT, AST: normal   - CT C/A/P (10/17/24): Hepatomegaly and hepatic steatosis are present. The liver has a lobulated contour. A questionable lesion in the right hepatic lobe inferiorly measures 26 mm.     # Urinary retention  - CT C/A/P (10/17/24): the prostate gland is mildly enlarged with mass effect on the posterior wall of the urinary bladder. The prostate gland measures 4.3 x 6.0 cm.   - C/o urinary difficulty, s/p FC OSH --> Removed prior admission        :  #KENYA/ CKD ?  in the setting of cardio-renal syndrome  -Baseline BUN/Cr: Unclear,  OSH ICU: Craetinine: 1.3-1.6   -Admit BUN/Cr (10/24): 28/1.88  -I/Os  -avoid hypotension and nephrotoxic agents     Heme:  #Anemia in the setting of iron deficiency  - Lab (10/24): H/H: 17.5/50.8,  Iron study: 63/374/17%, Ferritin: 76,  folate: 23.5, B12: 768   - NO iron replacement needed as H/H high     # Coagulopathy due to on home Coumadin  - INR  (10/24): 2.7, Hold home Coumadin   - To initiate heparin drip if INR <2      Endo:  #DM  - Euglycemic  - hgbA1c (10/24):  6.3     #Thyroid  -TSH (10/24):  8.47, T4: 1.44      ID:  # leukocytosis  - WBC (10/24): 14.4   -afebrile, nontoxic   -trend temps q4h       PHYSICAL AND OCCUPATIONAL THERAPY: Ordered    LINES:  PIVs    A-line (OSH) 10/20 - current  Right Subclavian Cordis / PA catheter (OSH) 10/18 - current    DVT: INR: 2.7 (home Warfarin now held) - Initiate heparin gtt when INR below 2.0  VAP BUNDLE: NA  CENTRAL LINE BUNDLE: Ordered   ULCER PPX: Pantoprazole 40 mg daily  GLYCEMIC CONTROL: NA - A1C: 6.3  BOWEL CARE: Senna / Miralax  INDWELLING CATHETER: NA  NUTRITION: Adult diet Cardiac; 70 gm fat; 2 - 3 grams Sodium; 1500 mL fluid      EMERGENCY CONTACT: Extended Emergency Contact Information  Primary Emergency Contact: KtTanvi  Home Phone: [phone]  Relation: Spouse  FAMILY UPDATE: Wife and son at the bedside  CODE STATUS: Full Code  DISPO: Maintain in the HFICU    Patient seen and assessed with Dr. Aakash STEEN personally spent 60 minutes of critical care time directly and personally managing the patient exclusive of separately billable procedures   _________________________________________________  Shon Polanco, APRN-CNP

## 2024-10-25 NOTE — CARE PLAN
Problem: Heart Failure  Goal: Improved gas exchange this shift  Outcome: Progressing  Goal: Improved urinary output this shift  Outcome: Progressing  Goal: Reduction in peripheral edema within 24 hours  Outcome: Progressing  Goal: Report improvement of dyspnea/breathlessness this shift  Outcome: Progressing  Goal: Weight from fluid excess reduced over 2-3 days, then stabilize  Outcome: Progressing  Goal: Increase self care and/or family involvement in 24 hours  Outcome: Progressing     Problem: Skin  Goal: Decreased wound size/increased tissue granulation at next dressing change  Outcome: Progressing  Goal: Participates in plan/prevention/treatment measures  Outcome: Progressing  Goal: Prevent/manage excess moisture  Outcome: Progressing  Goal: Prevent/minimize sheer/friction injuries  Outcome: Progressing  Goal: Promote/optimize nutrition  Outcome: Progressing  Goal: Promote skin healing  Outcome: Progressing   The patient's goals for the shift include  remain comfortable and safe    The clinical goals for the shift include PT to remain HDS during shift

## 2024-10-25 NOTE — H&P
[facility]  HFICU HISTORY AND PHYSICAL     Fahad Meraz/[id number]    Admit Date: 10/24/2024  Hospital Length of Stay: 1   ICU Length of Stay: 5h   Primary Service:   Primary HF Cardiologist:   Referring: Kojo ([facility]; [place])    HPI:     Mr. Meraz is a 69 M with a PMHx sig for CAD s/p 4V CABG (2012) and PCI (LCx/OM; 5/2019), stage D systolic HF/ICM/HFrEF with LVEF 10-15%( 10/22/24 TTE), AF s/p RFA (PVI and CTI; 4/2024) on OAC therapy, HTN, dyslipidemia, depression, anxiety and VIV using CPAP who was admitted to OSH ICU, s/p RHC on 10/18/24.  Per patient, he had been experienced worsening SOB and fluids overload for 2-3 weeks. Patient was on milrinone drip and diurese with SG guided. However his KENYA worsening, and not able to wean milrinone drip. [place] [facility] [place]     Of note, patient reports he was diagnosed with AF and underwent RFA (PVI and CTI) in April 2024. Since then he has noted 3 episodes of dizziness with near syncope. He is currently not driving due to these ongoing symptoms. His beta blocker dose as decreased in the setting of ongoing bradycardia. He has no ICD, he was wearing life vest at home. Hospitalizations: March 2024 Shoulder surgery, Cardioversion, Ablation.    Upon presentation to HFICU, patient awake , alert, slightly with hard of hearing. He denies of chest pain, SOB, denies of N/V/D, dizziness or lightheadedness.  Denies of fever, chills or sick contact.  He said the [facility] did take him ouf 30 pounds of fluids weight, He appears close to euvolemic.  BP  125/68 (86),  HR 80-90's, a fib with a lots ectopics.         Cardiac Tests:  Echo: 10/22/2024  Left Ventricle Severely reduced left ventricular systolic function with a visually estimated EF of 10 -15%. Left ventricle is severely dilated. Normal wall thickness. Severe global hypokinesis present. Indeterminate diastolic  function.   Left Atrium Left atrium is moderately dilated.   Right Ventricle Right ventricle is moderately dilated. Moderately reduced systolic function.   Right Atrium Right atrium is mildly dilated.   Aortic Valve Not well visualized.   Mitral Valve Valve structure is normal.   Tricuspid Valve Valve structure is normal.   Pulmonic Valve Valve structure is normal.   Aorta Normal sized aortic root.   IVC/Hepatic Veins IVC was not well visualized.   Pericardium No pericardial effusion.     10/19/2024  Left Ventricle Severely reduced left ventricular systolic function with a visually estimated EF of 10 -15%. Left ventricle is severely dilated. Normal wall thickness. Severe global hypokinesis present. Indeterminate diastolic function.   Left Atrium Left atrium is severely dilated. Normal sized appendage. No left atrial appendage thrombus noted.   Right Ventricle Right ventricle is severely dilated. Lead present in the right ventricle. Severely reduced systolic function.   Right Atrium Right atrium is severely dilated.   Aortic Valve Bicuspid valve with commisural fusion of the right and left cusps. Mild regurgitation. No stenosis.   Mitral Valve Severe annular dilation. Mild regurgitation. No stenosis noted.   Tricuspid Valve Severe annular dilation. Moderate regurgitation. No stenosis noted.   Pulmonic Valve The pulmonic valve visualization is suboptimal but appears to be functioning normally. No regurgitation. No stenosis noted.   Aorta Normal sized ascending aorta.   Pericardium No pericardial effusion.     Stress: 1/16/24  Stress Combined Conclusion: The study is positive for myocardial infarction in the inferior wall with ricky-infarct ischemia. Findings suggest a moderate risk of cardiac events.  Stress Function: Left ventricular function post-stress is abnormal. Global function is severely reduced. Post-stress ejection fraction is 28%. The stress end diastolic cavity size is severely enlarged.  Perfusion  Conclusion: TID ratio is 1.11.  ECG: Resting ECG demonstrates atrial fibrillation.  Stress Test: A pharmacological stress test was performed using lexiscan.    Right Heart Cath: 10/18/24  Elevated intracardiac pressures  CI = 1.7 (low-output)    Left heart cath 12/20/2020  SUMMARY: Successful PCI of the SVG to ramus intermedius with one  drug-eluting stent; OCT of the SVG to RCA to confirm no thrombosis;  patent LAD; occluded SVG to OM.    PM/SHx:   CAD s/p 4V CABG (2012) and PCI (LCx/OM; 5/2019), stage C systolic HF/ICM/HFrEF with moderate LV dysfunction, AF s/p RFA (PVI and CTI; 4/2024) on OAC therapy, HTN, dyslipidemia, VIV using CPAP     SocHx:   , lives with his wife in [place]  Works as a contractor  Former smoker (15 pack year hx), occasional ETOH, denies illicits     FamHx:   Brother-HTN, CHF  Mother-HTN, CVA  Sister-HTN, DM II  Father-bone and prostate cancer     Social History:  Social History     Socioeconomic History    Marital status:      Spouse name: Not on file    Number of children: Not on file    Years of education: Not on file    Highest education level: Not on file   Occupational History    Not on file   Tobacco Use    Smoking status: Former     Types: Cigarettes    Smokeless tobacco: Never   Substance and Sexual Activity    Alcohol use: Not on file    Drug use: Not on file    Sexual activity: Not on file   Other Topics Concern    Not on file   Social History Narrative    Not on file     Social Drivers of Health     Financial Resource Strain: Low Risk  (10/25/2024)    Overall Financial Resource Strain (CARDIA)     Difficulty of Paying Living Expenses: Not hard at all   Food Insecurity: No Food Insecurity (10/25/2024)    Hunger Vital Sign     Worried About Running Out of Food in the Last Year: Never true     Ran Out of Food in the Last Year: Never true   Transportation Needs: No Transportation Needs (10/25/2024)    PRAPARE - Transportation     Lack of Transportation (Medical):  No     Lack of Transportation (Non-Medical): No   Physical Activity: Sufficiently Active (1/29/2024)    Received from [facility]    Exercise Vital Sign     Days of Exercise per Week: 7 days     Minutes of Exercise per Session: 150+ min   Stress: Not on file   Social Connections: Not on file   Intimate Partner Violence: Not At Risk (10/25/2024)    Humiliation, Afraid, Rape, and Kick questionnaire     Fear of Current or Ex-Partner: No     Emotionally Abused: No     Physically Abused: No     Sexually Abused: No   Housing Stability: Low Risk  (10/25/2024)    Housing Stability Vital Sign     Unable to Pay for Housing in the Last Year: No     Number of Times Moved in the Last Year: 1     Homeless in the Last Year: No       Allergies:  Allergies   Allergen Reactions    Eliquis [Apixaban] Dizziness    Jardiance [Empagliflozin] Dizziness       Prior to Admission Meds:  Medications Prior to Admission   Medication Sig Dispense Refill Last Dose/Taking    aspirin 81 mg EC tablet Take 1 tablet (81 mg) by mouth once daily.       bumetanide (Bumex) 1 mg tablet Take 0.5 tablets (0.5 mg) by mouth once daily.       cholecalciferol (Vitamin D-3) 50,000 unit capsule Take 1 capsule (50,000 Units) by mouth 1 (one) time per week.       escitalopram (Lexapro) 10 mg tablet Take 1 tablet (10 mg) by mouth once daily.       hydrALAZINE (Apresoline) 25 mg tablet Take 1 tablet (25 mg) by mouth 2 times a day.       isosorbide mononitrate ER (Imdur) 120 mg 24 hr tablet Take 1 tablet (120 mg) by mouth once daily. Do not crush or chew.       metoprolol succinate XL (Toprol-XL) 50 mg 24 hr tablet Take 1 tablet (50 mg) by mouth once daily. Do not crush or chew.       multivitamin tablet Take 1 tablet by mouth once daily.       nitroglycerin (Nitrostat) 0.4 mg SL tablet Place 1 tablet (0.4 mg) under the tongue every 5 minutes if needed for chest pain.       pantoprazole (ProtoNix) 40 mg EC tablet Take 1 tablet (40 mg) by mouth  once daily in the morning. Take before meals. Do not crush, chew, or split.       sacubitriL-valsartan (Entresto)  mg tablet Take 1 tablet by mouth 2 times a day.       spironolactone (Aldactone) 25 mg tablet Take 1 tablet (25 mg) by mouth once daily.       warfarin (Coumadin) 5 mg tablet Take 1 tablet (5 mg) by mouth once daily in the evening. Take as directed per After Visit Summary.          Current Medications:  Infusions:  bumetanide, Last Rate: 0.5 mg/hr (10/25/24 0200)  milrinone, Last Rate: 0.625 mcg/kg/min (10/25/24 0321)      Scheduled:  aspirin, 81 mg, Daily  [Held by provider] bumetanide, 0.5 mg, Daily  [START ON 10/27/2024] cholecalciferol, 50,000 Units, Every Sunday  escitalopram, 10 mg, Daily  hydrALAZINE, 25 mg, BID  isosorbide dinitrate, 10 mg, TID  [Held by provider] isosorbide mononitrate ER, 120 mg, Daily  [Held by provider] metoprolol succinate XL, 50 mg, Daily  pantoprazole, 40 mg, Daily before breakfast  [Held by provider] sacubitriL-valsartan 97 mg-103 mg, 1 tablet, BID  sennosides-docusate sodium, 2 tablet, BID  spironolactone, 25 mg, Daily      PRN:  alteplase, 2 mg, PRN  nitroglycerin, 0.4 mg, q5 min PRN  oxygen, , Continuous PRN - O2/gases  polyethylene glycol, 17 g, Daily PRN          Invasive Hemodynamics:    Most Recent Range Past 24hrs   BP (Art) 132/65 Arterial Line BP 1  Min: 88/48  Max: 132/65   MAP(Art) 68 mmHg Arterial Line MAP 1 (mmHg)   Min: 61 mmHg  Max: 86 mmHg   RA/CVP   No data recorded   PA 61/28 PAP  Min: 30/12  Max: 61/28   PA(mean) 40 mmHg PAP (Mean)  Min: 20 mmHg  Max: 40 mmHg   PCWP 18 mmHg PCWP (mmHg)  Min: 18 mmHg  Max: 18 mmHg   CO 4.4 L/min CO (L/min)  Min: 4.4 L/min  Max: 4.4 L/min   CI 2.1 L/min/m2 CI (L/min/m2)  Min: 2.1 L/min/m2  Max: 2.1 L/min/m2   Mixed Venous 65 % SVO2 (%)  Min: 65 %  Max: 65 %   SVR  1083 (dyne*sec)/cm5 SVR (dyne*sec)/cm5  Min: 1083 (dyne*sec)/cm5  Max: 1083 (dyne*sec)/cm5   PVR 72 (dyne*sec)/cm5 PVR (dyne*sec)/cm5  Min: 72  (dyne*sec)/cm5  Max: 72 (dyne*sec)/cm5       PHYSICAL EXAM:   Visit Vitals  /89   Pulse 91   Temp 36.2 °C (97.2 °F) (Temporal)   Resp 16   Ht 1.829 m (6')   Wt 93.5 kg (206 lb 2.1 oz)   SpO2 91%   BMI 27.96 kg/m²   Smoking Status Former   BSA 2.18 m²       Wt Readings from Last 5 Encounters:   10/24/24 93.5 kg (206 lb 2.1 oz)   10/24/24 92.5 kg (204 lb)   08/05/24 122 kg (268 lb)   01/31/22 119 kg (262 lb)   02/18/20 123 kg (270 lb 2 oz)       INTAKE/OUTPUT:  No intake/output data recorded.     Physical Exam  Constitutional:       Appearance: Normal appearance.   HENT:      Head: Normocephalic and atraumatic.      Mouth/Throat:      Mouth: Mucous membranes are moist.   Eyes:      Extraocular Movements: Extraocular movements intact.      Pupils: Pupils are equal, round, and reactive to light.   Cardiovascular:      Rate and Rhythm: Tachycardia present. Rhythm irregular.      Heart sounds: Murmur heard.      Comments: 's, a fib.   Pulmonary:      Effort: Pulmonary effort is normal.      Breath sounds: Normal breath sounds.   Abdominal:      General: Bowel sounds are normal.      Palpations: Abdomen is soft.   Genitourinary:     Comments: Difficult to urinate, with difficulty and burning sensation  Musculoskeletal:      Cervical back: Normal range of motion and neck supple.      Right lower leg: Edema present.      Left lower leg: Edema present.      Comments: BLE 1 + edema, luke warm   Skin:     Capillary Refill: Capillary refill takes 2 to 3 seconds.      Findings: Bruising and lesion present.   Neurological:      General: No focal deficit present.      Mental Status: He is alert and oriented to person, place, and time.      Comments: Slightly hard of hearing    Psychiatric:         Mood and Affect: Mood normal.         Behavior: Behavior normal.         Review of Systems   Constitutional:  Positive for fatigue.   HENT: Negative.     Eyes: Negative.    Respiratory:  Positive for shortness of breath.   "  Cardiovascular:  Positive for leg swelling.   Gastrointestinal: Negative.    Endocrine: Negative.    Genitourinary:  Positive for difficulty urinating, dysuria and frequency.   Musculoskeletal: Negative.    Skin: Negative.    Allergic/Immunologic: Negative.    Neurological: Negative.    Hematological: Negative.    Psychiatric/Behavioral: Negative.         DATA:  CMP:  Recent Labs     10/24/24  2328   *   K 4.6   CL 90*   CO2 30   ANIONGAP 19   BUN 28*   CREATININE 1.88*   EGFR 38*   MG 2.22     Recent Labs     10/24/24  2328   ALBUMIN 4.4   ALT 13   AST 15   BILITOT 2.3*     CBC:  Recent Labs     10/24/24  2328   WBC 14.4*   HGB 17.5   HCT 50.8   *   MCV 79*     COAG:   Recent Labs     10/24/24  2328   INR 2.7*     ABO: No results for input(s): \"ABO\" in the last 60003 hours.  HEME/ENDO:  Recent Labs     10/24/24  2328   FERRITIN 76   IRONSAT 17*   TSH 8.47*   HGBA1C 6.3*      CARDIAC:   Recent Labs     10/24/24  2328   BNP 1,995*     Recent Labs     10/24/24  2332   LACMX 0.9   SO2MV 65   O2CMX 62.7     No results for input(s): \"TACROLIMUS\", \"SIROLIMUS\", \"CYCLOSPORINE\" in the last 49932 hours.      ASSESSMENT AND PLAN:    Mr. Meraz is a 69 M with a PMHx sig for CAD s/p 4V CABG (2012) and PCI (LCx/OM; 5/2019), stage D systolic HF/ICM/HFrEF with LVEF 10-15%( 10/22/24 TTE), AF s/p RFA (PVI and CTI; 4/2024) on OAC therapy, HTN, dyslipidemia, depression, anxiety and VIV using CPAP who was admitted to OSH ICU, s/p RHC on 10/18/24.  Per patient, he had been experienced worsening SOB and fluids overload for 2-3 weeks. Patient was on milrinone drip and diurese with SG guided. However his KENYA worsening, and not able to wean milrinone drip. [place] [facility] [place].     Neuro:    # Hx of Depression/Anxiety  - Serial neuro and pain assessments  - PO Tylenol PRN for pain  - PT/OT Consult, OOB to chair  - CAM ICU score every shift  - Sleep/wake " cycle normalization  - c/w home Lexapro and Clonazepam    # Physical Status  -Overweight:  BMI: 27.9   -Reduced Mobility: acute decompensate heart failure and ICU stay      #Substance abuse  -Alcohol abuse/Alcohol dependence: NO   -Tobacco use/Nicotine Dependence: NO     Cardiovascular:    # Stage D, ICM, decompensate acute on chronic heart failure,  HFrEF 10-15% ( from 10/22/24 ECHO)   #Hx of CAD s/p 4V CABG 2012    -BRIANA (3/29/23): Left Ventricle: Moderately reduced left ventricular systolic function with a visually estimated EF of 35 - 40%.  - TTE (10/22/24): LVEF 10 -15%, right ventricle moderately dilated, moderately reduced systolic function. Mild MR  - admit weight (10/24): 93.5 KG   - admit BNP (10/24):  1995  - Home meds include Metoprolol succinate 25 mg daily, Entresto 97/103 mg bid, Hydralazine 25 mg bid, Isosorbide mononitrate 120 mg daily, Spironolactone 25 mg daily, Bumex 0.5 mg daily----allergy to SGLT2.  - ASA  - C/w Milrinone drip at 0.625 mcg/kg/min, initiated OSH  - [facility] [place] ([place]): Bp 96/50 (65), CVP 5, PA 36/20 (22), PCWP 18, CO/CI 4.4/2.1, SVR 1083, SVO2 65 % on Milrinone drip at 0.625 mcg/kg/min, hydral 25 mg TID.   - To wean Milrinone drip as able  - Stop Bumex drip, will spot diurese   - C/w Hydral 25 mg TID  - Will start Isordil 10 mg TID Am   - C/w [place] 25 mg daily   - Hold BB   - Daily standing weights, 2gm sodium diet, 2L fluid restriction, strict I&Os    #CAD   -[facility] (12/20/2020):  Successful PCI of the SVG to ramus intermedius with one drug-eluting stent; OCT of the SVG to RCA to confirm no thrombosis; patent LAD; occluded SVG to OM.  -Meds: C/w ASA.  -Consider to add Lipitor      # Hx of AFib s/p RFA (PVI and CTI; 4/2024)  -NO Device noted  -Was on home Coumadin fo r2 months, prior to that was on Eliquis,  patient reported with Dizziness while on Eliquis     # Dizziness/bradycardia  -Had 3 episodes of near syncope after his recent AF RFA; has stopped driving as a result.  ECG with sinus olu. Metoprolol previously decreased by his local EP.    -Hold BB due to decompensate HF     #Electrolyte Disturbances  -Keep K>4, Mag >2    Pulmonary:   # PMH of VIV  -c/w home CPAP  -Monitor and maintain SpO2 > 92%    GI:  # H/x of  GERD  - Bowel regimen: Senna, Miralax PRN  - PPI    #  Bilirubinemia  - T maryuri (10/24): 2.3, ALT, AST: normal   - CT C/A/P (10/17/24): Hepatomegaly and hepatic steatosis are present. The liver has a lobulated contour. A questionable lesion in the right hepatic lobe inferiorly measures 26 mm.    # Urinary retention  - CT C/A/P (10/17/24): the prostate gland is mildly enlarged with mass effect on the posterior wall of the urinary bladder. The prostate gland measures 4.3 x 6.0 cm.   - C/o urinary difficulty, s/p FC OSH --> Removed prior admission      :  #KENYA/ CKD ?  in the setting of cardio-renal syndrome  -Baseline BUN/Cr: Unclear,  OSH ICU: Craetinine: 1.3-1.6   -Admit BUN/Cr (10/24): 28/1.88  -I/Os  -avoid hypotension and nephrotoxic agents    Heme:  #Anemia in the setting of iron deficiency  - Lab (10/24): H/H: 17.5/50.8,  Iron study: 63/374/17%, Ferritin: 76,  folate: 23.5, B12: 768   - NO iron replacement needed as H/H high    # Coagulopathy due to on home Coumadin  - INR  (10/24): 2.7, Hold home Coumadin   - To initiate heparin drip if INR <2      Endo:  #DM  - Euglycemic  - hgbA1c (10/24):  6.3    #Thyroid  -TSH (10/24):  8.47, T4: pending      ID:  # leukocytosis  - WBC (10/24): 14.4   -afebrile, nontoxic   -trend temps q4h    PHYSICAL AND OCCUPATIONAL THERAPY:    LINES:  PIVs   A line: OSH, 10/20/24  R subclavian  Cordis/SG:  OSH, 10/18/24      DVT:  PAS Sequence, Heparin drip if INR<2  VAP BUNDLE: NA   CENTRAL LINE BUNDLE: Ordered   ULCER PPX:  PPI  GLYCEMIC CONTROL:  BOWEL CARE: Senna, Miralax  INDWELLING CATHETER:  NA  NUTRITION: Adult diet Cardiac; 70 gm fat; 2 - 3 grams Sodium; 2000 mL fluid      EMERGENCY CONTACT: Extended Emergency Contact  Information  Primary Emergency Contact: Tanvi Meraz  Home Phone: [phone]  Relation: Spouse  FAMILY UPDATE:  CODE STATUS: Full Code  DISPO:   HF ICU    Dr. Finn aware of patient's admission     I personally spent 74 minutes of critical care time directly and personally managing the patient exclusive of separately billable procedures   _________________________________________________  BARBARA De

## 2024-10-25 NOTE — PROGRESS NOTES
Pt transferred to [facility] via [facility]. Pt transported  via stretcher with current meds: milrinone at .625, Bumex at 0.5 , and NS at 20 . Hendricks and art line intact and transduced. Pt VSS. Family in room at time of transport.

## 2024-10-25 NOTE — PLAN OF CARE
Problem: ABCDS Injury Assessment  Goal: Absence of physical injury  Outcome: Adequate for Discharge     Problem: Discharge Planning  Goal: Discharge to home or other facility with appropriate resources  Outcome: Adequate for Discharge     Problem: Safety - Adult  Goal: Free from fall injury  Outcome: Adequate for Discharge     Problem: Respiratory - Adult  Goal: Achieves optimal ventilation and oxygenation  Outcome: Adequate for Discharge     Problem: Cardiovascular - Adult  Goal: Maintains optimal cardiac output and hemodynamic stability  Outcome: Adequate for Discharge  Goal: Absence of cardiac dysrhythmias or at baseline  Outcome: Adequate for Discharge     Problem: Metabolic/Fluid and Electrolytes - Adult  Goal: Electrolytes maintained within normal limits  Outcome: Adequate for Discharge  Goal: Hemodynamic stability and optimal renal function maintained  Outcome: Adequate for Discharge  Goal: Glucose maintained within prescribed range  Outcome: Adequate for Discharge     Problem: Hematologic - Adult  Goal: Maintains hematologic stability  Outcome: Adequate for Discharge     Problem: Chronic Conditions and Co-morbidities  Goal: Patient's chronic conditions and co-morbidity symptoms are monitored and maintained or improved  Outcome: Adequate for Discharge

## 2024-10-26 ENCOUNTER — APPOINTMENT (OUTPATIENT)
Dept: RADIOLOGY | Facility: HOSPITAL | Age: 69
End: 2024-10-26
Payer: MEDICARE

## 2024-10-26 LAB
ALBUMIN SERPL BCP-MCNC: 4.2 G/DL (ref 3.4–5)
ALBUMIN SERPL BCP-MCNC: 4.4 G/DL (ref 3.4–5)
ANION GAP BLDMV CALCULATED.4IONS-SCNC: 8 MMO/L (ref 10–25)
ANION GAP BLDMV CALCULATED.4IONS-SCNC: 8 MMO/L (ref 10–25)
ANION GAP BLDMV CALCULATED.4IONS-SCNC: 9 MMO/L (ref 10–25)
ANION GAP BLDMV CALCULATED.4IONS-SCNC: 9 MMO/L (ref 10–25)
ANION GAP SERPL CALC-SCNC: 18 MMOL/L (ref 10–20)
ANION GAP SERPL CALC-SCNC: 19 MMOL/L (ref 10–20)
APTT PPP: 43 SECONDS (ref 27–38)
BASE EXCESS BLDMV CALC-SCNC: 3.3 MMOL/L (ref -2–3)
BASE EXCESS BLDMV CALC-SCNC: 4.1 MMOL/L (ref -2–3)
BASE EXCESS BLDMV CALC-SCNC: 4.1 MMOL/L (ref -2–3)
BASE EXCESS BLDMV CALC-SCNC: 5 MMOL/L (ref -2–3)
BODY TEMPERATURE: 37 DEGREES CELSIUS
BUN SERPL-MCNC: 33 MG/DL (ref 6–23)
BUN SERPL-MCNC: 35 MG/DL (ref 6–23)
CA-I BLDMV-SCNC: 1.11 MMOL/L (ref 1.1–1.33)
CA-I BLDMV-SCNC: 1.2 MMOL/L (ref 1.1–1.33)
CA-I BLDMV-SCNC: 1.23 MMOL/L (ref 1.1–1.33)
CA-I BLDMV-SCNC: 1.23 MMOL/L (ref 1.1–1.33)
CALCIUM SERPL-MCNC: 9.3 MG/DL (ref 8.6–10.6)
CALCIUM SERPL-MCNC: 9.8 MG/DL (ref 8.6–10.6)
CHLORIDE BLD-SCNC: 94 MMOL/L (ref 98–107)
CHLORIDE BLD-SCNC: 96 MMOL/L (ref 98–107)
CHLORIDE BLD-SCNC: 97 MMOL/L (ref 98–107)
CHLORIDE BLD-SCNC: 98 MMOL/L (ref 98–107)
CHLORIDE SERPL-SCNC: 95 MMOL/L (ref 98–107)
CHLORIDE SERPL-SCNC: 95 MMOL/L (ref 98–107)
CO2 SERPL-SCNC: 24 MMOL/L (ref 21–32)
CO2 SERPL-SCNC: 26 MMOL/L (ref 21–32)
CREAT SERPL-MCNC: 2.01 MG/DL (ref 0.5–1.3)
CREAT SERPL-MCNC: 2.03 MG/DL (ref 0.5–1.3)
EGFRCR SERPLBLD CKD-EPI 2021: 35 ML/MIN/1.73M*2
EGFRCR SERPLBLD CKD-EPI 2021: 35 ML/MIN/1.73M*2
ERYTHROCYTE [DISTWIDTH] IN BLOOD BY AUTOMATED COUNT: 16.8 % (ref 11.5–14.5)
GLUCOSE BLD-MCNC: 135 MG/DL (ref 74–99)
GLUCOSE BLD-MCNC: 136 MG/DL (ref 74–99)
GLUCOSE BLD-MCNC: 149 MG/DL (ref 74–99)
GLUCOSE BLD-MCNC: 159 MG/DL (ref 74–99)
GLUCOSE SERPL-MCNC: 114 MG/DL (ref 74–99)
GLUCOSE SERPL-MCNC: 131 MG/DL (ref 74–99)
HCO3 BLDMV-SCNC: 27.8 MMOL/L (ref 22–26)
HCO3 BLDMV-SCNC: 28.5 MMOL/L (ref 22–26)
HCO3 BLDMV-SCNC: 28.5 MMOL/L (ref 22–26)
HCO3 BLDMV-SCNC: 29.1 MMOL/L (ref 22–26)
HCT VFR BLD AUTO: 45.5 % (ref 41–52)
HCT VFR BLD EST: 47 % (ref 41–52)
HCT VFR BLD EST: 48 % (ref 41–52)
HCT VFR BLD EST: 49 % (ref 41–52)
HCT VFR BLD EST: 50 % (ref 41–52)
HGB BLD-MCNC: 16 G/DL (ref 13.5–17.5)
HGB BLDMV-MCNC: 15.6 G/DL (ref 13.5–17.5)
HGB BLDMV-MCNC: 16 G/DL (ref 13.5–17.5)
HGB BLDMV-MCNC: 16.2 G/DL (ref 13.5–17.5)
HGB BLDMV-MCNC: 16.6 G/DL (ref 13.5–17.5)
INHALED O2 CONCENTRATION: 21 %
INHALED O2 CONCENTRATION: 28 %
INR PPP: 2.1 (ref 0.9–1.1)
LACTATE BLDMV-SCNC: 0.7 MMOL/L (ref 0.4–2)
LACTATE BLDMV-SCNC: 1.7 MMOL/L (ref 0.4–2)
MAGNESIUM SERPL-MCNC: 2.49 MG/DL (ref 1.6–2.4)
MCH RBC QN AUTO: 27.1 PG (ref 26–34)
MCHC RBC AUTO-ENTMCNC: 35.2 G/DL (ref 32–36)
MCV RBC AUTO: 77 FL (ref 80–100)
NRBC BLD-RTO: 0 /100 WBCS (ref 0–0)
OXYHGB MFR BLDMV: 70.5 % (ref 45–75)
OXYHGB MFR BLDMV: 71.3 % (ref 45–75)
OXYHGB MFR BLDMV: 71.3 % (ref 45–75)
OXYHGB MFR BLDMV: 71.8 % (ref 45–75)
PCO2 BLDMV: 40 MM HG (ref 41–51)
PCO2 BLDMV: 41 MM HG (ref 41–51)
PH BLDMV: 7.44 PH (ref 7.33–7.43)
PH BLDMV: 7.45 PH (ref 7.33–7.43)
PH BLDMV: 7.45 PH (ref 7.33–7.43)
PH BLDMV: 7.47 PH (ref 7.33–7.43)
PHOSPHATE SERPL-MCNC: 4.4 MG/DL (ref 2.5–4.9)
PHOSPHATE SERPL-MCNC: 4.9 MG/DL (ref 2.5–4.9)
PLATELET # BLD AUTO: 614 X10*3/UL (ref 150–450)
PO2 BLDMV: 42 MM HG (ref 35–45)
PO2 BLDMV: 42 MM HG (ref 35–45)
PO2 BLDMV: 45 MM HG (ref 35–45)
PO2 BLDMV: 46 MM HG (ref 35–45)
POTASSIUM BLDMV-SCNC: 3.7 MMOL/L (ref 3.5–5.3)
POTASSIUM BLDMV-SCNC: 3.7 MMOL/L (ref 3.5–5.3)
POTASSIUM BLDMV-SCNC: 4.1 MMOL/L (ref 3.5–5.3)
POTASSIUM BLDMV-SCNC: 4.2 MMOL/L (ref 3.5–5.3)
POTASSIUM SERPL-SCNC: 4.4 MMOL/L (ref 3.5–5.3)
POTASSIUM SERPL-SCNC: 4.5 MMOL/L (ref 3.5–5.3)
PROTHROMBIN TIME: 23.9 SECONDS (ref 9.8–12.8)
RBC # BLD AUTO: 5.91 X10*6/UL (ref 4.5–5.9)
SAO2 % BLDMV: 73 % (ref 45–75)
SAO2 % BLDMV: 74 % (ref 45–75)
SODIUM BLDMV-SCNC: 128 MMOL/L (ref 136–145)
SODIUM BLDMV-SCNC: 129 MMOL/L (ref 136–145)
SODIUM BLDMV-SCNC: 130 MMOL/L (ref 136–145)
SODIUM BLDMV-SCNC: 130 MMOL/L (ref 136–145)
SODIUM SERPL-SCNC: 133 MMOL/L (ref 136–145)
SODIUM SERPL-SCNC: 135 MMOL/L (ref 136–145)
STAPHYLOCOCCUS SPEC CULT: NORMAL
UFH PPP CHRO-ACNC: 0.1 IU/ML
UFH PPP CHRO-ACNC: 0.1 IU/ML
WBC # BLD AUTO: 11.6 X10*3/UL (ref 4.4–11.3)

## 2024-10-26 PROCEDURE — 2500000001 HC RX 250 WO HCPCS SELF ADMINISTERED DRUGS (ALT 637 FOR MEDICARE OP)

## 2024-10-26 PROCEDURE — 84132 ASSAY OF SERUM POTASSIUM: CPT

## 2024-10-26 PROCEDURE — 84100 ASSAY OF PHOSPHORUS: CPT

## 2024-10-26 PROCEDURE — 71045 X-RAY EXAM CHEST 1 VIEW: CPT

## 2024-10-26 PROCEDURE — 83735 ASSAY OF MAGNESIUM: CPT

## 2024-10-26 PROCEDURE — 82435 ASSAY OF BLOOD CHLORIDE: CPT | Performed by: NURSE PRACTITIONER

## 2024-10-26 PROCEDURE — 85520 HEPARIN ASSAY: CPT

## 2024-10-26 PROCEDURE — 2500000004 HC RX 250 GENERAL PHARMACY W/ HCPCS (ALT 636 FOR OP/ED)

## 2024-10-26 PROCEDURE — 37799 UNLISTED PX VASCULAR SURGERY: CPT

## 2024-10-26 PROCEDURE — 99291 CRITICAL CARE FIRST HOUR: CPT

## 2024-10-26 PROCEDURE — 85027 COMPLETE CBC AUTOMATED: CPT

## 2024-10-26 PROCEDURE — 2020000001 HC ICU ROOM DAILY

## 2024-10-26 PROCEDURE — 99291 CRITICAL CARE FIRST HOUR: CPT | Performed by: STUDENT IN AN ORGANIZED HEALTH CARE EDUCATION/TRAINING PROGRAM

## 2024-10-26 PROCEDURE — 2500000001 HC RX 250 WO HCPCS SELF ADMINISTERED DRUGS (ALT 637 FOR MEDICARE OP): Performed by: NURSE PRACTITIONER

## 2024-10-26 PROCEDURE — 2500000002 HC RX 250 W HCPCS SELF ADMINISTERED DRUGS (ALT 637 FOR MEDICARE OP, ALT 636 FOR OP/ED): Performed by: NURSE PRACTITIONER

## 2024-10-26 PROCEDURE — 85610 PROTHROMBIN TIME: CPT

## 2024-10-26 PROCEDURE — 84132 ASSAY OF SERUM POTASSIUM: CPT | Performed by: NURSE PRACTITIONER

## 2024-10-26 PROCEDURE — 2500000004 HC RX 250 GENERAL PHARMACY W/ HCPCS (ALT 636 FOR OP/ED): Performed by: NURSE PRACTITIONER

## 2024-10-26 PROCEDURE — 71045 X-RAY EXAM CHEST 1 VIEW: CPT | Performed by: RADIOLOGY

## 2024-10-26 PROCEDURE — 36415 COLL VENOUS BLD VENIPUNCTURE: CPT | Performed by: NURSE PRACTITIONER

## 2024-10-26 PROCEDURE — 37799 UNLISTED PX VASCULAR SURGERY: CPT | Performed by: NURSE PRACTITIONER

## 2024-10-26 RX ORDER — HEPARIN SODIUM 10000 [USP'U]/100ML
0-4000 INJECTION, SOLUTION INTRAVENOUS CONTINUOUS
Status: DISPENSED | OUTPATIENT
Start: 2024-10-26 | End: 2024-11-04

## 2024-10-26 RX ORDER — DAPAGLIFLOZIN 10 MG/1
10 TABLET, FILM COATED ORAL EVERY 24 HOURS
Status: DISCONTINUED | OUTPATIENT
Start: 2024-10-26 | End: 2024-10-31

## 2024-10-26 ASSESSMENT — PAIN SCALES - GENERAL
PAINLEVEL_OUTOF10: 0 - NO PAIN

## 2024-10-26 ASSESSMENT — PAIN - FUNCTIONAL ASSESSMENT
PAIN_FUNCTIONAL_ASSESSMENT: 0-10

## 2024-10-26 NOTE — PROGRESS NOTES
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 He was hospitalized at an external facility with progressive volume overload and exertional dyspnea.  He has been hemodynamically stabilized on inotropic therapy, but the team managing him at the external facility was unable to wean inotropes.  He will be evaluated for potentially reversible causes of his heart failure decompensation.  Left heart catheterization  is planned for 10/28/2024.  If no obstructive coronary lesion and continued challenges with weaning inotropes, adv HF rx may be needed.     Trialling the addition of HF GDMT today.    This critically ill patient continues to be at-risk for clinically significant deterioration / failure due to the above mentioned dysfunctional, unstable organ systems.  I have personally identified and managed all complex critical care issues to prevent aforementioned clinical deterioration.  Critical care time is spent at bedside and/or the immediate area and has included, but is not limited to, the review of diagnostic tests, labs, radiographs, serial assessments of hemodynamics, respiratory status, ventilatory management, and family updates.  Time spent in procedures and teaching are reported separately.     Critical care time: 30   minutes

## 2024-10-26 NOTE — PROGRESS NOTES
[facility]  HFICU PROGRESS NOTE    Fahad Meraz/[id number]    Admit Date: 10/24/2024  Hospital Length of Stay: 2   ICU Length of Stay: 1d 14h   Primary Service:   Primary HF Cardiologist:   Referring:    INTERVAL EVENTS / PERTINENT ROS:     No acute events overnight. Telemetry reviewed showing atrial fibrillation with frequent PVC's. He appears euvolemic on physical exam. He denies headache, dizziness, vision changes, chest pain, palpitations, shortness of breath, cough, ABD pain, ABD swelling, constipation, diarrhea, nausea, or vomiting.     Daily hemodynamics: 10/26/24: B/P: 114/53 (70); CVP: 9; PAP: 39/20 (26); PCWP: 18; SVR: 922; CI: 2.5 / CO: 5.3; SvO2: 74% on IV Milrinone 0.5 mcg/kg/min; PO hydralazine 50 mg TID; PO Isosorbide 10 mg TID; PO Spironolactone 25 mg daily    Plan:    - Initiate dapagliflozin 10 mg daily  - Continue w/ hydralazine 50 mg TID and Isordil 10 TID -> Last doses at 2100 10/26, then hold for Entresto initiation tomorrow  - Initiate Entresto 24 / 26 mg BID -> First dose 10/27 @ 0900    - Initiate heparin gtt given INR down-trending to 2.1 -> low intensity protocol w/o PRN bolus  - Continue with Q 6 hour hemodynamics  - Recheck afternoon labs at 1400 hrs   - May consider EP consult post ischemic evaluation   - Coronary angiogram scheduled for Monday 10/28 as priority (discussed with interventional cardiology)    MEDICATIONS  Infusions:  [Held by provider] bumetanide, Last Rate: Stopped (10/25/24 1400)  heparin, Last Rate: 1,000 Units/hr (10/26/24 1127)  milrinone, Last Rate: 0.5 mcg/kg/min (10/26/24 0629)      Scheduled:  aspirin, 81 mg, Daily  [START ON 10/27/2024] cholecalciferol, 50,000 Units, Every Sunday  dapagliflozin propanediol, 10 mg, q24h  escitalopram, 10 mg, Daily  hydrALAZINE, 50 mg, TID  isosorbide dinitrate, 10 mg, TID  [Held by provider] metoprolol succinate XL, 50 mg, Daily  pantoprazole, 40 mg, Daily before breakfast  perflutren protein A  microsphere, 0.5 mL, Once in imaging  [START ON 10/27/2024] sacubitriL-valsartan, 1 tablet, BID  sennosides-docusate sodium, 2 tablet, BID  spironolactone, 25 mg, Daily  sulfur hexafluoride microsphr, 2 mL, Once in imaging      PRN:  alteplase, 2 mg, PRN  nitroglycerin, 0.4 mg, q5 min PRN  oxygen, , Continuous PRN - O2/gases  polyethylene glycol, 17 g, Daily PRN      Invasive Hemodynamics:    Most Recent Range Past 24hrs   BP (Art) 126/66 Arterial Line BP 1  Min: 83/68  Max: 142/76   MAP(Art) 82 mmHg Arterial Line MAP 1 (mmHg)   Min: 62 mmHg  Max: 95 mmHg   RA/CVP   No data recorded   PA 39/20 PAP  Min: 24/15  Max: 39/20   PA(mean) 26 mmHg PAP (Mean)  Min: 17 mmHg  Max: 31 mmHg   PCWP 18 mmHg PCWP (mmHg)  Min: 8 mmHg  Max: 18 mmHg   CO 5.3 L/min CO (L/min)  Min: 4.3 L/min  Max: 5.7 L/min   CI 2.5 L/min/m2 CI (L/min/m2)  Min: 2 L/min/m2  Max: 2.7 L/min/m2   Mixed Venous 74 % SVO2 (%)  Min: 69 %  Max: 75 %   SVR  922 (dyne*sec)/cm5 SVR (dyne*sec)/cm5  Min: 811 (dyne*sec)/cm5  Max: 1201 (dyne*sec)/cm5    (dyne*sec)/cm5 PVR (dyne*sec)/cm5  Min: 113 (dyne*sec)/cm5  Max: 199 (dyne*sec)/cm5     PHYSICAL EXAM:   Visit Vitals  BP 96/56   Pulse 92   Temp 36.4 °C (97.5 °F) (Temporal)   Resp 23   Ht 1.829 m (6')   Wt 89.5 kg (197 lb 5 oz)   SpO2 99%   BMI 26.76 kg/m²   Smoking Status Former   BSA 2.13 m²       Wt Readings from Last 5 Encounters:   10/26/24 89.5 kg (197 lb 5 oz)   10/24/24 92.5 kg (204 lb)   08/05/24 122 kg (268 lb)   01/31/22 119 kg (262 lb)   02/18/20 123 kg (270 lb 2 oz)       INTAKE/OUTPUT:  I/O last 3 completed shifts:  In: 850.9 (9.5 mL/kg) [P.O.:360; I.V.:490.9 (5.5 mL/kg)]  Out: 3605 (40.3 mL/kg) [Urine:3605 (1.1 mL/kg/hr)]  Weight: 89.5 kg      Physical Exam  HENT:      Head: Normocephalic.      Nose: Nose normal.      Mouth/Throat:      Mouth: Mucous membranes are moist.   Eyes:      Pupils: Pupils are equal, round, and reactive to light.   Neck:      Vascular: JVD present.   Cardiovascular:      " Rate and Rhythm: Normal rate. Rhythm irregular.      Comments: Permanent Atrial Fibrillation  Pulmonary:      Effort: Pulmonary effort is normal.      Breath sounds: Normal breath sounds.   Abdominal:      Palpations: Abdomen is soft.   Musculoskeletal:         General: Normal range of motion.      Cervical back: Normal range of motion.   Skin:     Capillary Refill: Capillary refill takes less than 2 seconds.   Neurological:      Mental Status: He is alert and oriented to person, place, and time.   Psychiatric:         Mood and Affect: Mood normal.         Behavior: Behavior normal.         Thought Content: Thought content normal.         Judgment: Judgment normal.       DATA:  CMP:  Results from last 7 days   Lab Units 10/26/24  0513 10/25/24  1740 10/25/24  1221 10/24/24  2328   SODIUM mmol/L 133* 134* 137 134*   POTASSIUM mmol/L 4.5 3.3* 3.4* 4.6   CHLORIDE mmol/L 95* 93* 93* 90*   CO2 mmol/L 24 29 31 30   ANION GAP mmol/L 19 15 16 19   BUN mg/dL 33* 31* 29* 28*   CREATININE mg/dL 2.03* 2.22* 2.01* 1.88*   EGFR mL/min/1.73m*2 35* 31* 35* 38*   MAGNESIUM mg/dL 2.49*  --   --  2.22   ALBUMIN g/dL 4.2 4.4 4.4 4.4   ALT U/L  --   --   --  13   AST U/L  --   --   --  15   BILIRUBIN TOTAL mg/dL  --   --   --  2.3*     CBC:  Results from last 7 days   Lab Units 10/26/24  0513 10/24/24  2328   WBC AUTO x10*3/uL 11.6* 14.4*   HEMOGLOBIN g/dL 16.0 17.5   HEMATOCRIT % 45.5 50.8   PLATELETS AUTO x10*3/uL 614* 742*   MCV fL 77* 79*     COAG:   Results from last 7 days   Lab Units 10/26/24  0513 10/24/24  2328   INR  2.1* 2.7*     ABO: No results found for: \"ABO\"  HEME/ENDO:  Results from last 7 days   Lab Units 10/24/24  2328   FERRITIN ng/mL 76   IRON SATURATION % 17*   TSH mIU/L 8.47*   HEMOGLOBIN A1C % 6.3*      CARDIAC:   Results from last 7 days   Lab Units 10/24/24  2328   BNP pg/mL 1,995*       ASSESSMENT AND PLAN:     Mr. Meraz is a 69 M with a PMHx sig for CAD s/p 4V CABG (2012) and PCI (LCx/OM; 5/2019), stage D " systolic HF/ICM/HFrEF with LVEF 10-15%( 10/22/24 TTE), AF s/p RFA (PVI and CTI; 4/2024) on OAC therapy, HTN, dyslipidemia, depression, anxiety and VIV using CPAP who was admitted to OSH ICU, s/p RHC on 10/18/24.  Per patient, he had been experienced worsening SOB and fluids overload for 2-3 weeks. Patient was on milrinone drip and diurese with SG guided. However his KENYA worsening, and not able to wean milrinone drip. Decision was made to transfer him to HF ICU [facility] for possible advanced therapy consideration.      Neuro:     # Hx of Depression/Anxiety  - Serial neuro and pain assessments  - PO Tylenol PRN for pain  - PT/OT Consult, OOB to chair  - CAM ICU score every shift  - Sleep/wake cycle normalization  - c/w home Lexapro and Clonazepam     # Physical Status  -Overweight:  BMI: 27.9   -Reduced Mobility: acute decompensate heart failure and ICU stay      #Substance abuse  -Alcohol abuse/Alcohol dependence: NO   -Tobacco use/Nicotine Dependence: NO      Cardiovascular:     # Stage D, ICM, decompensate acute on chronic heart failure,  HFrEF 10-15% ( from 10/22/24 ECHO)   #Hx of CAD s/p 4V CABG 2012     -BRIANA (3/29/23): Left Ventricle: Moderately reduced left ventricular systolic function with a visually estimated EF of 35 - 40%.  - TTE (10/22/24): LVEF 10 -15%, right ventricle moderately dilated, moderately reduced systolic function. Mild MR  - admit weight (10/24): 93.5 KG   - admit BNP (10/24):  1995  - Home meds include Metoprolol succinate 25 mg daily, Entresto 97/103 mg bid, Hydralazine 25 mg bid, Isosorbide mononitrate 120 mg daily, Spironolactone 25 mg daily, Bumex 0.5 mg daily----allergy to SGLT2.  - ASA  - Decrease Milrinone drip to 0.5 mcg/kg/min, initiated OSH  - [facility] [place] arrival (10/25): Bp 96/50 (65), CVP 5, PA 36/20 (22), PCWP 18, CO/CI 4.4/2.1, SVR 1083, SVO2 65 % on Milrinone drip at 0.625 mcg/kg/min, hydral 25 mg TID.   - Daily hemodynamics: 10/26: B/P: 114/53 (70); CVP: 9; PAP: 39/20 (26); PCWP:  18; SVR: 922; CI: 2.5 / CO: 5.3; SvO2: 74% on IV Milrinone 0.5 mcg/kg/min; PO hydralazine 50 mg TID; PO Isosorbide 10 mg TID; PO Spironolactone 25 mg daily  - To wean Milrinone drip as able  - Stop Bumex drip, will spot diurese   - C/w hydralazine to 50 mg TID (last dose 10/26 @ 2100)  - C/w start Isordil 10 mg TID Am (last dose 10/26 @ 2100)  - Initiate Entresto 24 / 26 mg BID on 10/27 - first dose at 0900    - Initiate dapagliflozin 10 mg daily   - C/w [place] 25 mg daily   - Hold BB   - Daily standing weights, 2gm sodium diet, 2L fluid restriction, strict I&Os     #CAD   -[facility] (12/20/2020):  Successful PCI of the SVG to ramus intermedius with one drug-eluting stent; OCT of the SVG to RCA to confirm no thrombosis; patent LAD; occluded SVG to OM.  -Meds: C/w ASA.  -Consider to add Lipitor      # Hx of AFib s/p RFA (PVI and CTI; 4/2024)  -NO Device noted  -Was on home Coumadin for 2 months, prior to that was on Eliquis,  patient reported with Dizziness while on Eliquis      # Dizziness/bradycardia  -Had 3 episodes of near syncope after his recent AF RFA; has stopped driving as a result. ECG with sinus olu. Metoprolol previously decreased by his local EP.    -Hold BB due to decompensate HF      #Electrolyte Disturbances  -Keep K>4, Mag >2     Pulmonary:   # PMH of VIV  -c/w home CPAP  -Monitor and maintain SpO2 > 92%     GI:  # H/x of  GERD  - Bowel regimen: Senna, Miralax PRN  - PPI     #  Bilirubinemia  - T maryuri (10/24): 2.3, ALT, AST: normal   - CT C/A/P (10/17/24): Hepatomegaly and hepatic steatosis are present. The liver has a lobulated contour. A questionable lesion in the right hepatic lobe inferiorly measures 26 mm.     # Urinary retention  - CT C/A/P (10/17/24): the prostate gland is mildly enlarged with mass effect on the posterior wall of the urinary bladder. The prostate gland measures 4.3 x 6.0 cm.   - C/o urinary difficulty, s/p FC OSH --> Removed prior admission        :  #KENYA/ CKD ?  in the setting  of cardio-renal syndrome  -Baseline BUN/Cr: Unclear,  OSH ICU: Craetinine: 1.3-1.6   -Admit BUN/Cr (10/24): 28/1.88  -I/Os  -avoid hypotension and nephrotoxic agents     Heme:  #Anemia in the setting of iron deficiency  - Lab (10/24): H/H: 17.5/50.8,  Iron study: 63/374/17%, Ferritin: 76,  folate: 23.5, B12: 768   - NO iron replacement needed as H/H high     # Coagulopathy due to on home Coumadin  - INR  (10/24): 2.7, Hold home Coumadin   - Initiated heparin gtt (A-fib) given INR decreasing - 10/26: 2.1 INR      Endo:  #DM  - Euglycemic  - hgbA1c (10/24):  6.3     #Thyroid  -TSH (10/24):  8.47, T4: 1.44      ID:  # leukocytosis - Improving  - WBC (10/26):  11.6  -afebrile, nontoxic   -trend temps q4h       PHYSICAL AND OCCUPATIONAL THERAPY: Ordered    LINES:  PIVs   A-line (OSH) 10/20 - current  Right Subclavian Cordis / PA catheter (OSH) 10/18 - current    DVT: INR: 2.1 (home Warfarin now held) - Initiated heparin gtt  VAP BUNDLE: NA  CENTRAL LINE BUNDLE: Ordered   ULCER PPX: Pantoprazole 40 mg daily  GLYCEMIC CONTROL: NA - A1C: 6.3  BOWEL CARE: Senna / Miralax  INDWELLING CATHETER: NA  NUTRITION: Adult diet Cardiac; 70 gm fat; 2 - 3 grams Sodium; 1500 mL fluid      EMERGENCY CONTACT: Extended Emergency Contact Information  Primary Emergency Contact: Tanvi Meraz  Home Phone: [phone]  Relation: Spouse  FAMILY UPDATE: Wife and son at the bedside  CODE STATUS: Full Code  DISPO: Maintain in the HFICU    Patient seen and assessed with Dr. Finn    I personally spent 60 minutes of critical care time directly and personally managing the patient exclusive of separately billable procedures   _________________________________________________  Shon Polanco, APRN-CNP

## 2024-10-27 ENCOUNTER — APPOINTMENT (OUTPATIENT)
Dept: RADIOLOGY | Facility: HOSPITAL | Age: 69
End: 2024-10-27
Payer: MEDICARE

## 2024-10-27 LAB
ALBUMIN SERPL BCP-MCNC: 4.4 G/DL (ref 3.4–5)
ANION GAP BLDMV CALCULATED.4IONS-SCNC: 4 MMO/L (ref 10–25)
ANION GAP BLDMV CALCULATED.4IONS-SCNC: 7 MMO/L (ref 10–25)
ANION GAP BLDMV CALCULATED.4IONS-SCNC: 8 MMO/L (ref 10–25)
ANION GAP BLDMV CALCULATED.4IONS-SCNC: 9 MMO/L (ref 10–25)
ANION GAP SERPL CALC-SCNC: 16 MMOL/L (ref 10–20)
APTT PPP: 60 SECONDS (ref 27–38)
BASE EXCESS BLDMV CALC-SCNC: 1.9 MMOL/L (ref -2–3)
BASE EXCESS BLDMV CALC-SCNC: 1.9 MMOL/L (ref -2–3)
BASE EXCESS BLDMV CALC-SCNC: 2.4 MMOL/L (ref -2–3)
BASE EXCESS BLDMV CALC-SCNC: 3.9 MMOL/L (ref -2–3)
BODY TEMPERATURE: 37 DEGREES CELSIUS
BUN SERPL-MCNC: 38 MG/DL (ref 6–23)
CA-I BLDMV-SCNC: 1.18 MMOL/L (ref 1.1–1.33)
CA-I BLDMV-SCNC: 1.22 MMOL/L (ref 1.1–1.33)
CA-I BLDMV-SCNC: 1.23 MMOL/L (ref 1.1–1.33)
CA-I BLDMV-SCNC: 1.26 MMOL/L (ref 1.1–1.33)
CALCIUM SERPL-MCNC: 9.6 MG/DL (ref 8.6–10.6)
CHLORIDE BLD-SCNC: 100 MMOL/L (ref 98–107)
CHLORIDE BLD-SCNC: 101 MMOL/L (ref 98–107)
CHLORIDE BLD-SCNC: 98 MMOL/L (ref 98–107)
CHLORIDE BLD-SCNC: 98 MMOL/L (ref 98–107)
CHLORIDE SERPL-SCNC: 94 MMOL/L (ref 98–107)
CO2 SERPL-SCNC: 25 MMOL/L (ref 21–32)
CREAT SERPL-MCNC: 2.05 MG/DL (ref 0.5–1.3)
EGFRCR SERPLBLD CKD-EPI 2021: 34 ML/MIN/1.73M*2
ERYTHROCYTE [DISTWIDTH] IN BLOOD BY AUTOMATED COUNT: 17 % (ref 11.5–14.5)
GLUCOSE BLD-MCNC: 120 MG/DL (ref 74–99)
GLUCOSE BLD-MCNC: 121 MG/DL (ref 74–99)
GLUCOSE BLD-MCNC: 153 MG/DL (ref 74–99)
GLUCOSE BLD-MCNC: 163 MG/DL (ref 74–99)
GLUCOSE SERPL-MCNC: 139 MG/DL (ref 74–99)
HCO3 BLDMV-SCNC: 26.4 MMOL/L (ref 22–26)
HCO3 BLDMV-SCNC: 26.6 MMOL/L (ref 22–26)
HCO3 BLDMV-SCNC: 26.6 MMOL/L (ref 22–26)
HCO3 BLDMV-SCNC: 29.2 MMOL/L (ref 22–26)
HCT VFR BLD AUTO: 46.9 % (ref 41–52)
HCT VFR BLD EST: 46 % (ref 41–52)
HCT VFR BLD EST: 47 % (ref 41–52)
HCT VFR BLD EST: 48 % (ref 41–52)
HCT VFR BLD EST: 48 % (ref 41–52)
HGB BLD-MCNC: 15.5 G/DL (ref 13.5–17.5)
HGB BLDMV-MCNC: 15.2 G/DL (ref 13.5–17.5)
HGB BLDMV-MCNC: 15.8 G/DL (ref 13.5–17.5)
HGB BLDMV-MCNC: 16 G/DL (ref 13.5–17.5)
HGB BLDMV-MCNC: 16 G/DL (ref 13.5–17.5)
INHALED O2 CONCENTRATION: 21 %
INHALED O2 CONCENTRATION: 28 %
INR PPP: 1.9 (ref 0.9–1.1)
LACTATE BLDMV-SCNC: 0.7 MMOL/L (ref 0.4–2)
LACTATE BLDMV-SCNC: 0.7 MMOL/L (ref 0.4–2)
LACTATE BLDMV-SCNC: 0.8 MMOL/L (ref 0.4–2)
LACTATE BLDMV-SCNC: 1.5 MMOL/L (ref 0.4–2)
MAGNESIUM SERPL-MCNC: 2.44 MG/DL (ref 1.6–2.4)
MCH RBC QN AUTO: 26.7 PG (ref 26–34)
MCHC RBC AUTO-ENTMCNC: 33 G/DL (ref 32–36)
MCV RBC AUTO: 81 FL (ref 80–100)
NRBC BLD-RTO: 0 /100 WBCS (ref 0–0)
OXYHGB MFR BLDMV: 71.1 % (ref 45–75)
OXYHGB MFR BLDMV: 71.7 % (ref 45–75)
OXYHGB MFR BLDMV: 72 % (ref 45–75)
OXYHGB MFR BLDMV: 72.4 % (ref 45–75)
PCO2 BLDMV: 38 MM HG (ref 41–51)
PCO2 BLDMV: 41 MM HG (ref 41–51)
PCO2 BLDMV: 41 MM HG (ref 41–51)
PCO2 BLDMV: 45 MM HG (ref 41–51)
PH BLDMV: 7.42 PH (ref 7.33–7.43)
PH BLDMV: 7.45 PH (ref 7.33–7.43)
PHOSPHATE SERPL-MCNC: 4.3 MG/DL (ref 2.5–4.9)
PLATELET # BLD AUTO: 616 X10*3/UL (ref 150–450)
PO2 BLDMV: 44 MM HG (ref 35–45)
PO2 BLDMV: 45 MM HG (ref 35–45)
PO2 BLDMV: 46 MM HG (ref 35–45)
PO2 BLDMV: 46 MM HG (ref 35–45)
POTASSIUM BLDMV-SCNC: 3.5 MMOL/L (ref 3.5–5.3)
POTASSIUM BLDMV-SCNC: 3.6 MMOL/L (ref 3.5–5.3)
POTASSIUM BLDMV-SCNC: 3.8 MMOL/L (ref 3.5–5.3)
POTASSIUM BLDMV-SCNC: 3.8 MMOL/L (ref 3.5–5.3)
POTASSIUM SERPL-SCNC: 4.1 MMOL/L (ref 3.5–5.3)
PROTHROMBIN TIME: 21.7 SECONDS (ref 9.8–12.8)
RBC # BLD AUTO: 5.81 X10*6/UL (ref 4.5–5.9)
SAO2 % BLDMV: 73 % (ref 45–75)
SAO2 % BLDMV: 74 % (ref 45–75)
SAO2 % BLDMV: 74 % (ref 45–75)
SAO2 % BLDMV: 75 % (ref 45–75)
SODIUM BLDMV-SCNC: 128 MMOL/L (ref 136–145)
SODIUM BLDMV-SCNC: 129 MMOL/L (ref 136–145)
SODIUM BLDMV-SCNC: 131 MMOL/L (ref 136–145)
SODIUM BLDMV-SCNC: 132 MMOL/L (ref 136–145)
SODIUM SERPL-SCNC: 131 MMOL/L (ref 136–145)
UFH PPP CHRO-ACNC: 0.2 IU/ML
UFH PPP CHRO-ACNC: 0.3 IU/ML
UFH PPP CHRO-ACNC: 0.3 IU/ML
WBC # BLD AUTO: 10.9 X10*3/UL (ref 4.4–11.3)

## 2024-10-27 PROCEDURE — 85027 COMPLETE CBC AUTOMATED: CPT

## 2024-10-27 PROCEDURE — 37799 UNLISTED PX VASCULAR SURGERY: CPT

## 2024-10-27 PROCEDURE — 82435 ASSAY OF BLOOD CHLORIDE: CPT | Performed by: NURSE PRACTITIONER

## 2024-10-27 PROCEDURE — 2500000002 HC RX 250 W HCPCS SELF ADMINISTERED DRUGS (ALT 637 FOR MEDICARE OP, ALT 636 FOR OP/ED): Performed by: NURSE PRACTITIONER

## 2024-10-27 PROCEDURE — 85520 HEPARIN ASSAY: CPT

## 2024-10-27 PROCEDURE — 2500000001 HC RX 250 WO HCPCS SELF ADMINISTERED DRUGS (ALT 637 FOR MEDICARE OP): Performed by: NURSE PRACTITIONER

## 2024-10-27 PROCEDURE — 99291 CRITICAL CARE FIRST HOUR: CPT

## 2024-10-27 PROCEDURE — 80069 RENAL FUNCTION PANEL: CPT

## 2024-10-27 PROCEDURE — 2500000001 HC RX 250 WO HCPCS SELF ADMINISTERED DRUGS (ALT 637 FOR MEDICARE OP)

## 2024-10-27 PROCEDURE — 2500000004 HC RX 250 GENERAL PHARMACY W/ HCPCS (ALT 636 FOR OP/ED)

## 2024-10-27 PROCEDURE — 85610 PROTHROMBIN TIME: CPT

## 2024-10-27 PROCEDURE — 83735 ASSAY OF MAGNESIUM: CPT

## 2024-10-27 PROCEDURE — 37799 UNLISTED PX VASCULAR SURGERY: CPT | Performed by: NURSE PRACTITIONER

## 2024-10-27 PROCEDURE — 99291 CRITICAL CARE FIRST HOUR: CPT | Performed by: STUDENT IN AN ORGANIZED HEALTH CARE EDUCATION/TRAINING PROGRAM

## 2024-10-27 PROCEDURE — 71045 X-RAY EXAM CHEST 1 VIEW: CPT

## 2024-10-27 PROCEDURE — 84132 ASSAY OF SERUM POTASSIUM: CPT | Performed by: NURSE PRACTITIONER

## 2024-10-27 PROCEDURE — 71045 X-RAY EXAM CHEST 1 VIEW: CPT | Performed by: RADIOLOGY

## 2024-10-27 PROCEDURE — 2500000004 HC RX 250 GENERAL PHARMACY W/ HCPCS (ALT 636 FOR OP/ED): Performed by: NURSE PRACTITIONER

## 2024-10-27 PROCEDURE — 2020000001 HC ICU ROOM DAILY

## 2024-10-27 RX ORDER — BUMETANIDE 0.25 MG/ML
1 INJECTION, SOLUTION INTRAMUSCULAR; INTRAVENOUS ONCE
Status: COMPLETED | OUTPATIENT
Start: 2024-10-27 | End: 2024-10-27

## 2024-10-27 ASSESSMENT — PAIN - FUNCTIONAL ASSESSMENT
PAIN_FUNCTIONAL_ASSESSMENT: 0-10

## 2024-10-27 ASSESSMENT — PAIN SCALES - GENERAL
PAINLEVEL_OUTOF10: 0 - NO PAIN

## 2024-10-27 NOTE — PROGRESS NOTES
[facility]  HFICU PROGRESS NOTE    Fahad Meraz/[id number]    Admit Date: 10/24/2024  Hospital Length of Stay: 3   ICU Length of Stay: 2d 15h   Primary Service:   Primary HF Cardiologist:   Referring:    INTERVAL EVENTS / PERTINENT ROS:     No acute events overnight. He denies headache, dizziness, vision changes, chest pain, palpitations, shortness of breath, cough, ABD pain, ABD swelling, constipation, diarrhea, nausea, or vomiting. Telemetry reviewed showing atrial fibrillation with frequent PVC's. He appears euvolemic on physical exam.  MAP's overnight between 65 - 75 mmHg. Cardiac support continues with Milrinone gtt at 0.5 mcg/kg/min. Chest Xray reviewed with stable appearance of pulmonary congestion and SWAN in appropriate position. He is having daily BM's. He made 1.3 liters of urine yesterday while holding diuresis. He received 1 mg Bumex IVP this morning for CVP of 11. No Heme / ID concerns.      Daily hemodynamics: 10/27/24: B/P: 124/66 (82); CVP: 3; PAP: 15/9 (11); PCWP: 9; SVR: 1201; CI: 2.5 / CO: 5.3; SvO2: 75% on IV Milrinone 0.5 mcg/kg/min; PO Entresto 24/26 mg BID; PO Spironolactone 25 mg daily; PO dapagliflozin 10 mg daily    Plan:    - Initiated Entresto 24 / 26 mg BID -> First dose 10/27 @ 0900    - Continue with Q 6 hour hemodynamics  - Recheck afternoon labs at 1800 hrs   - May consider EP consult post ischemic evaluation   - Coronary angiogram scheduled for Monday 10/28 as priority (discussed with interventional cardiology)  - NPO at midnight for coronary angiogram - Replace PA cath while in lab   - Genetic consult outpatient - collaborate with Dr. Washington    MEDICATIONS  Infusions:  heparin, Last Rate: 1,600 Units/hr (10/27/24 1200)  milrinone, Last Rate: 0.5 mcg/kg/min (10/27/24 1200)      Scheduled:  aspirin, 81 mg, Daily  cholecalciferol, 50,000 Units, Every Sunday  dapagliflozin propanediol, 10 mg, q24h  escitalopram, 10 mg, Daily  [Held by provider]  metoprolol succinate XL, 50 mg, Daily  pantoprazole, 40 mg, Daily before breakfast  perflutren protein A microsphere, 0.5 mL, Once in imaging  sacubitriL-valsartan, 1 tablet, BID  sennosides-docusate sodium, 2 tablet, BID  spironolactone, 25 mg, Daily  sulfur hexafluoride microsphr, 2 mL, Once in imaging      PRN:  alteplase, 2 mg, PRN  nitroglycerin, 0.4 mg, q5 min PRN  oxygen, , Continuous PRN - O2/gases  polyethylene glycol, 17 g, Daily PRN      Invasive Hemodynamics:    Most Recent Range Past 24hrs   BP (Art) 102/46 Arterial Line BP 1  Min: 95/54  Max: 135/75   MAP(Art) 62 mmHg Arterial Line MAP 1 (mmHg)   Min: 62 mmHg  Max: 93 mmHg   RA/CVP   No data recorded   PA 10/3 PAP  Min: 10/3  Max: 46/20   PA(mean) 6 mmHg PAP (Mean)  Min: 6 mmHg  Max: 30 mmHg   PCWP 9 mmHg PCWP (mmHg)  Min: 9 mmHg  Max: 9 mmHg   CO 5.3 L/min CO (L/min)  Min: 5.1 L/min  Max: 5.3 L/min   CI 2.5 L/min/m2 CI (L/min/m2)  Min: 2.4 L/min/m2  Max: 2.5 L/min/m2   Mixed Venous 74 % SVO2 (%)  Min: 73 %  Max: 74 %   SVR  1201 (dyne*sec)/cm5 SVR (dyne*sec)/cm5  Min: 946 (dyne*sec)/cm5  Max: 1201 (dyne*sec)/cm5   PVR 30 (dyne*sec)/cm5 PVR (dyne*sec)/cm5  Min: 30 (dyne*sec)/cm5  Max: 30 (dyne*sec)/cm5     PHYSICAL EXAM:   Visit Vitals  BP 96/56   Pulse 73   Temp 35.8 °C (96.4 °F) (Temporal)   Resp 15   Ht 1.829 m (6')   Wt 89.5 kg (197 lb 5 oz)   SpO2 96%   BMI 26.76 kg/m²   Smoking Status Former   BSA 2.13 m²       Wt Readings from Last 5 Encounters:   10/26/24 89.5 kg (197 lb 5 oz)   10/24/24 92.5 kg (204 lb)   08/05/24 122 kg (268 lb)   01/31/22 119 kg (262 lb)   02/18/20 123 kg (270 lb 2 oz)       INTAKE/OUTPUT:  I/O last 3 completed shifts:  In: 1446.9 (16.2 mL/kg) [P.O.:720; I.V.:726.9 (8.1 mL/kg)]  Out: 2100 (23.5 mL/kg) [Urine:2100 (0.7 mL/kg/hr)]  Weight: 89.5 kg      Physical Exam  HENT:      Head: Normocephalic.      Nose: Nose normal.      Mouth/Throat:      Mouth: Mucous membranes are moist.   Eyes:      Pupils: Pupils are equal, round,  "and reactive to light.   Neck:      Vascular: JVD present.   Cardiovascular:      Rate and Rhythm: Normal rate. Rhythm irregular.      Comments: Permanent Atrial Fibrillation  Pulmonary:      Effort: Pulmonary effort is normal.      Breath sounds: Normal breath sounds.   Abdominal:      Palpations: Abdomen is soft.   Musculoskeletal:         General: Normal range of motion.      Cervical back: Normal range of motion.   Skin:     Capillary Refill: Capillary refill takes less than 2 seconds.   Neurological:      Mental Status: He is alert and oriented to person, place, and time.   Psychiatric:         Mood and Affect: Mood normal.         Behavior: Behavior normal.         Thought Content: Thought content normal.         Judgment: Judgment normal.         DATA:  CMP:  Results from last 7 days   Lab Units 10/27/24  0141 10/26/24  1503 10/26/24  0513 10/25/24  1740 10/25/24  1221 10/24/24  2328   SODIUM mmol/L 131* 135* 133* 134* 137 134*   POTASSIUM mmol/L 4.1 4.4 4.5 3.3* 3.4* 4.6   CHLORIDE mmol/L 94* 95* 95* 93* 93* 90*   CO2 mmol/L 25 26 24 29 31 30   ANION GAP mmol/L 16 18 19 15 16 19   BUN mg/dL 38* 35* 33* 31* 29* 28*   CREATININE mg/dL 2.05* 2.01* 2.03* 2.22* 2.01* 1.88*   EGFR mL/min/1.73m*2 34* 35* 35* 31* 35* 38*   MAGNESIUM mg/dL 2.44*  --  2.49*  --   --  2.22   ALBUMIN g/dL 4.4 4.4 4.2 4.4 4.4 4.4   ALT U/L  --   --   --   --   --  13   AST U/L  --   --   --   --   --  15   BILIRUBIN TOTAL mg/dL  --   --   --   --   --  2.3*     CBC:  Results from last 7 days   Lab Units 10/27/24  0141 10/26/24  0513 10/24/24  2328   WBC AUTO x10*3/uL 10.9 11.6* 14.4*   HEMOGLOBIN g/dL 15.5 16.0 17.5   HEMATOCRIT % 46.9 45.5 50.8   PLATELETS AUTO x10*3/uL 616* 614* 742*   MCV fL 81 77* 79*     COAG:   Results from last 7 days   Lab Units 10/27/24  0141 10/26/24  0513 10/24/24  2328   INR  1.9* 2.1* 2.7*     ABO: No results found for: \"ABO\"  HEME/ENDO:  Results from last 7 days   Lab Units 10/24/24  2328   FERRITIN ng/mL " 76   IRON SATURATION % 17*   TSH mIU/L 8.47*   HEMOGLOBIN A1C % 6.3*      CARDIAC:   Results from last 7 days   Lab Units 10/24/24  2328   BNP pg/mL 1,995*       ASSESSMENT AND PLAN:     Mr. Meraz is a 69 M with a PMHx sig for CAD s/p 4V CABG (2012) and PCI (LCx/OM; 5/2019), stage D systolic HF/ICM/HFrEF with LVEF 10-15%( 10/22/24 TTE), AF s/p RFA (PVI and CTI; 4/2024) on OAC therapy, HTN, dyslipidemia, depression, anxiety and VIV using CPAP who was admitted to OSH ICU, s/p RHC on 10/18/24.  Per patient, he had been experienced worsening SOB and fluids overload for 2-3 weeks. Patient was on milrinone drip and diurese with SG guided. However his KENYA worsening, and not able to wean milrinone drip. Decision was made to transfer him to HF ICU [facility] [place] for possible advanced therapy consideration. 10/26: Tolerated afterload reduction with hydralazine / Isordil. 10/27: Discontinued hydralazine / Isordil and initiated 24 / 26 mg Entresto BID and tolerating.      Neuro:     # Hx of Depression/Anxiety  - Serial neuro and pain assessments  - PO Tylenol PRN for pain  - PT/OT Consult, OOB to chair  - CAM ICU score every shift  - Sleep/wake cycle normalization  - c/w home Lexapro and Clonazepam     # Physical Status  -Overweight:  BMI: 27.9   -Reduced Mobility: acute decompensate heart failure and ICU stay      #Substance abuse  -Alcohol abuse/Alcohol dependence: NO   -Tobacco use/Nicotine Dependence: NO      Cardiovascular:     # Stage D, ICM, decompensate acute on chronic heart failure,  HFrEF 10-15% ( from 10/22/24 ECHO)   #Hx of CAD s/p 4V CABG 2012     -BRIANA (3/29/23): Left Ventricle: Moderately reduced left ventricular systolic function with a visually estimated EF of 35 - 40%.  - TTE (10/22/24): LVEF 10 -15%, right ventricle moderately dilated, moderately reduced systolic function. Mild MR  - admit weight (10/24): 93.5 KG   - admit BNP (10/24):  1995  - Home meds include Metoprolol succinate 25 mg daily, Entresto 97/103 mg  bid, Hydralazine 25 mg bid, Isosorbide mononitrate 120 mg daily, Spironolactone 25 mg daily, Bumex 0.5 mg daily----allergy to SGLT2.  - ASA  - C/w Milrinone drip @ 0.5 mcg/kg/min, initiated OSH  - [facility] [place] # when arrival (10/25): Bp 96/50 (65), CVP 5, PA 36/20 (22), PCWP 18, CO/CI 4.4/2.1, SVR 1083, SVO2 65 % on Milrinone drip at 0.625 mcg/kg/min, hydral 25 mg TID.   - Daily hemodynamics: 10/26: B/P: 114/53 (70); CVP: 9; PAP: 39/20 (26); PCWP: 18; SVR: 922; CI: 2.5 / CO: 5.3; SvO2: 74% on IV Milrinone 0.5 mcg/kg/min; PO hydralazine 50 mg TID; PO Isosorbide 10 mg TID; PO Spironolactone 25 mg daily  - To wean Milrinone drip as able  - 1 mg Bumex IVP for spot diurese 10/27   - Discontinue hydralazine to 50 mg TID (last dose 10/26 @ 2100)  - Discontinue Isordil 10 mg TID Am (last dose 10/26 @ 2100)  - Initiate Entresto 24 / 26 mg BID on 10/27 - first dose at 0900    - Initiate dapagliflozin 10 mg daily 10/26  - C/w [place] 25 mg daily   - Hold BB   - Daily standing weights, 2gm sodium diet, 2L fluid restriction, strict I&Os     #CAD   -[facility] (12/20/2020):  Successful PCI of the SVG to ramus intermedius with one drug-eluting stent; OCT of the SVG to RCA to confirm no thrombosis; patent LAD; occluded SVG to OM.  -Meds: C/w ASA.  -Consider to add Lipitor      # Hx of AFib s/p RFA (PVI and CTI; 4/2024)  -NO Device noted  -Was on home Coumadin for 2 months, prior to that was on Eliquis,  patient reported with Dizziness while on Eliquis      # Dizziness/bradycardia  -Had 3 episodes of near syncope after his recent AF RFA; has stopped driving as a result. ECG with sinus olu. Metoprolol previously decreased by his local EP.    -Hold BB due to decompensate HF      #Electrolyte Disturbances  -Keep K>4, Mag >2     Pulmonary:   # PMH of VIV  -c/w home CPAP  -Monitor and maintain SpO2 > 92%     GI:  # H/x of  GERD  - Bowel regimen: Senna, Miralax PRN  - PPI     #  Bilirubinemia  - T maryuri (10/24): 2.3, ALT, AST: normal   - CT C/A/P  (10/17/24): Hepatomegaly and hepatic steatosis are present. The liver has a lobulated contour. A questionable lesion in the right hepatic lobe inferiorly measures 26 mm.     # Urinary retention  - CT C/A/P (10/17/24): the prostate gland is mildly enlarged with mass effect on the posterior wall of the urinary bladder. The prostate gland measures 4.3 x 6.0 cm.   - C/o urinary difficulty, s/p FC OSH --> Removed prior admission        :  #KENYA/ CKD ?  in the setting of cardio-renal syndrome  -Baseline BUN/Cr: Unclear,  OSH ICU: Creatinine: 1.3-1.6   -Admit BUN/Cr (10/24): 28/1.88  -I/Os  -avoid hypotension and nephrotoxic agents     Heme:  #Anemia in the setting of iron deficiency  - Lab (10/24): H/H: 17.5/50.8,  Iron study: 63/374/17%, Ferritin: 76,  folate: 23.5, B12: 768   - NO iron replacement needed as H/H high     # Coagulopathy due to on home Coumadin  - INR  (10/27): 1.9, Hold home Coumadin   - C/w heparin gtt (A-fib)      Endo:  #DM  - Euglycemic  - hgbA1c (10/24):  6.3     #Thyroid  -TSH (10/24):  8.47, T4: 1.44      ID:  # leukocytosis - Resolved  - WBC (10/26):  10.9  -afebrile, nontoxic   -trend temps q4h       PHYSICAL AND OCCUPATIONAL THERAPY: Ordered    LINES:  PIVs   A-line (OSH) 10/20 - current  Right Subclavian Cordis / PA catheter (OSH) 10/18 - current    DVT: INR: 2.1 (home Warfarin now held) - Initiated heparin gtt  VAP BUNDLE: NA  CENTRAL LINE BUNDLE: Ordered   ULCER PPX: Pantoprazole 40 mg daily  GLYCEMIC CONTROL: NA - A1C: 6.3  BOWEL CARE: Senna / Miralax  INDWELLING CATHETER: NA  NUTRITION: Adult diet Cardiac; 70 gm fat; 2 - 3 grams Sodium; 1500 mL fluid      EMERGENCY CONTACT: Extended Emergency Contact Information  Primary Emergency Contact: Tanvi Meraz  Home Phone: [phone]  Relation: Spouse  FAMILY UPDATE: Wife and son at the bedside  CODE STATUS: Full Code  DISPO: Maintain in the HFICU    Patient seen and assessed with Dr. Finn    I personally spent 60 minutes of critical care  time directly and personally managing the patient exclusive of separately billable procedures   _________________________________________________  MAYTE Alberto-ERNESTO

## 2024-10-27 NOTE — PROGRESS NOTES
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 He was hospitalized at an external facility with progressive volume overload and exertional dyspnea.  He has been hemodynamically stabilized on inotropic therapy, but the team managing him at the external facility was unable to wean inotropes.  He will be evaluated for potentially reversible causes of his heart failure decompensation.  Left heart catheterization  is planned for 10/28/2024.  If no obstructive coronary lesion and continued challenges with weaning inotropes, adv HF rx may be needed. We discussed this today.    His son also asked about a potential genetic component, cited an extensive family h/o heart failure. OP referral to Cardiogenetics.     Trialling the addition of HF GDMT with [facility]  today.     This critically ill patient continues to be at-risk for clinically significant deterioration / failure due to the above mentioned dysfunctional, unstable organ systems.  I have personally identified and managed all complex critical care issues to prevent aforementioned clinical deterioration.  Critical care time is spent at bedside and/or the immediate area and has included, but is not limited to, the review of diagnostic tests, labs, radiographs, serial assessments of hemodynamics, respiratory status, ventilatory management, and family updates.   Time spent in procedures and teaching are reported separately.     Critical care time: 35   minutes

## 2024-10-27 NOTE — CARE PLAN
The patient's goals for the shift include      The clinical goals for the shift include pt will remain HDS throughout the shift    Problem: Heart Failure  Goal: Improved gas exchange this shift  Outcome: Progressing  Goal: Improved urinary output this shift  Outcome: Progressing  Goal: Reduction in peripheral edema within 24 hours  Outcome: Progressing  Goal: Report improvement of dyspnea/breathlessness this shift  Outcome: Progressing  Goal: Weight from fluid excess reduced over 2-3 days, then stabilize  Outcome: Progressing  Goal: Increase self care and/or family involvement in 24 hours  Outcome: Progressing     Problem: Skin  Goal: Decreased wound size/increased tissue granulation at next dressing change  Outcome: Progressing  Goal: Participates in plan/prevention/treatment measures  Outcome: Progressing  Goal: Prevent/manage excess moisture  Outcome: Progressing  Goal: Prevent/minimize sheer/friction injuries  Outcome: Progressing  Goal: Promote/optimize nutrition  Outcome: Progressing  Goal: Promote skin healing  Outcome: Progressing

## 2024-10-27 NOTE — CARE PLAN
Problem: Heart Failure  Goal: Improved urinary output this shift  Outcome: Progressing  Goal: Report improvement of dyspnea/breathlessness this shift  Outcome: Progressing  Goal: Increase self care and/or family involvement in 24 hours  Outcome: Progressing     Problem: Skin  Goal: Decreased wound size/increased tissue granulation at next dressing change  Outcome: Progressing  Goal: Participates in plan/prevention/treatment measures  Outcome: Progressing  Goal: Prevent/manage excess moisture  Outcome: Progressing  Goal: Prevent/minimize sheer/friction injuries  Outcome: Progressing  Goal: Promote/optimize nutrition  Outcome: Progressing  Goal: Promote skin healing  Outcome: Progressing

## 2024-10-28 ENCOUNTER — APPOINTMENT (OUTPATIENT)
Dept: RADIOLOGY | Facility: HOSPITAL | Age: 69
End: 2024-10-28
Payer: MEDICARE

## 2024-10-28 ENCOUNTER — APPOINTMENT (OUTPATIENT)
Dept: RADIOLOGY | Facility: HOSPITAL | Age: 69
DRG: 001 | End: 2024-10-28
Payer: MEDICARE

## 2024-10-28 VITALS
TEMPERATURE: 96.6 F | HEART RATE: 80 BPM | WEIGHT: 197.31 LBS | BODY MASS INDEX: 26.73 KG/M2 | DIASTOLIC BLOOD PRESSURE: 56 MMHG | HEIGHT: 72 IN | OXYGEN SATURATION: 95 % | SYSTOLIC BLOOD PRESSURE: 96 MMHG | RESPIRATION RATE: 15 BRPM

## 2024-10-28 LAB
ALBUMIN SERPL BCP-MCNC: 4.1 G/DL (ref 3.4–5)
ALBUMIN SERPL BCP-MCNC: 4.3 G/DL (ref 3.4–5)
ANION GAP BLDMV CALCULATED.4IONS-SCNC: 7 MMO/L (ref 10–25)
ANION GAP BLDMV CALCULATED.4IONS-SCNC: 9 MMO/L (ref 10–25)
ANION GAP SERPL CALC-SCNC: 13 MMOL/L (ref 10–20)
ANION GAP SERPL CALC-SCNC: 17 MMOL/L (ref 10–20)
APTT PPP: 70 SECONDS (ref 27–38)
APTT PPP: 71 SECONDS (ref 27–38)
ATRIAL RATE: 111 BPM
BASE EXCESS BLDMV CALC-SCNC: -0.5 MMOL/L (ref -2–3)
BASE EXCESS BLDMV CALC-SCNC: -0.5 MMOL/L (ref -2–3)
BASE EXCESS BLDMV CALC-SCNC: 0.2 MMOL/L (ref -2–3)
BASE EXCESS BLDMV CALC-SCNC: 1.9 MMOL/L (ref -2–3)
BODY TEMPERATURE: 37 DEGREES CELSIUS
BUN SERPL-MCNC: 33 MG/DL (ref 6–23)
BUN SERPL-MCNC: 34 MG/DL (ref 6–23)
CA-I BLDMV-SCNC: 1.26 MMOL/L (ref 1.1–1.33)
CA-I BLDMV-SCNC: 1.27 MMOL/L (ref 1.1–1.33)
CA-I BLDMV-SCNC: 1.28 MMOL/L (ref 1.1–1.33)
CA-I BLDMV-SCNC: 1.28 MMOL/L (ref 1.1–1.33)
CALCIUM SERPL-MCNC: 8.9 MG/DL (ref 8.6–10.6)
CALCIUM SERPL-MCNC: 9.4 MG/DL (ref 8.6–10.6)
CHLORIDE BLD-SCNC: 101 MMOL/L (ref 98–107)
CHLORIDE BLD-SCNC: 99 MMOL/L (ref 98–107)
CHLORIDE SERPL-SCNC: 100 MMOL/L (ref 98–107)
CHLORIDE SERPL-SCNC: 98 MMOL/L (ref 98–107)
CO2 SERPL-SCNC: 24 MMOL/L (ref 21–32)
CO2 SERPL-SCNC: 25 MMOL/L (ref 21–32)
CREAT SERPL-MCNC: 1.61 MG/DL (ref 0.5–1.3)
CREAT SERPL-MCNC: 1.68 MG/DL (ref 0.5–1.3)
EGFRCR SERPLBLD CKD-EPI 2021: 44 ML/MIN/1.73M*2
EGFRCR SERPLBLD CKD-EPI 2021: 46 ML/MIN/1.73M*2
ERYTHROCYTE [DISTWIDTH] IN BLOOD BY AUTOMATED COUNT: 16.4 % (ref 11.5–14.5)
ERYTHROCYTE [DISTWIDTH] IN BLOOD BY AUTOMATED COUNT: 16.8 % (ref 11.5–14.5)
GLUCOSE BLD-MCNC: 127 MG/DL (ref 74–99)
GLUCOSE BLD-MCNC: 134 MG/DL (ref 74–99)
GLUCOSE BLD-MCNC: 156 MG/DL (ref 74–99)
GLUCOSE BLD-MCNC: 169 MG/DL (ref 74–99)
GLUCOSE SERPL-MCNC: 105 MG/DL (ref 74–99)
GLUCOSE SERPL-MCNC: 84 MG/DL (ref 74–99)
HCO3 BLDMV-SCNC: 24.8 MMOL/L (ref 22–26)
HCO3 BLDMV-SCNC: 24.8 MMOL/L (ref 22–26)
HCO3 BLDMV-SCNC: 25.4 MMOL/L (ref 22–26)
HCO3 BLDMV-SCNC: 27.3 MMOL/L (ref 22–26)
HCT VFR BLD AUTO: 46.6 % (ref 41–52)
HCT VFR BLD AUTO: 46.8 % (ref 41–52)
HCT VFR BLD EST: 48 % (ref 41–52)
HCT VFR BLD EST: 48 % (ref 41–52)
HCT VFR BLD EST: 49 % (ref 41–52)
HCT VFR BLD EST: 49 % (ref 41–52)
HGB BLD-MCNC: 15.6 G/DL (ref 13.5–17.5)
HGB BLD-MCNC: 15.9 G/DL (ref 13.5–17.5)
HGB BLDMV-MCNC: 15.9 G/DL (ref 13.5–17.5)
HGB BLDMV-MCNC: 16.1 G/DL (ref 13.5–17.5)
HGB BLDMV-MCNC: 16.2 G/DL (ref 13.5–17.5)
HGB BLDMV-MCNC: 16.3 G/DL (ref 13.5–17.5)
INHALED O2 CONCENTRATION: 21 %
INR PPP: 1.4 (ref 0.9–1.1)
INR PPP: 1.5 (ref 0.9–1.1)
LACTATE BLDMV-SCNC: 0.7 MMOL/L (ref 0.4–2)
LACTATE BLDMV-SCNC: 0.8 MMOL/L (ref 0.4–2)
LACTATE BLDMV-SCNC: 1.2 MMOL/L (ref 0.4–2)
LACTATE BLDMV-SCNC: 1.6 MMOL/L (ref 0.4–2)
MAGNESIUM SERPL-MCNC: 2.56 MG/DL (ref 1.6–2.4)
MAGNESIUM SERPL-MCNC: 2.65 MG/DL (ref 1.6–2.4)
MCH RBC QN AUTO: 27.4 PG (ref 26–34)
MCH RBC QN AUTO: 27.5 PG (ref 26–34)
MCHC RBC AUTO-ENTMCNC: 33.5 G/DL (ref 32–36)
MCHC RBC AUTO-ENTMCNC: 34 G/DL (ref 32–36)
MCV RBC AUTO: 81 FL (ref 80–100)
MCV RBC AUTO: 82 FL (ref 80–100)
NRBC BLD-RTO: 0 /100 WBCS (ref 0–0)
NRBC BLD-RTO: 0 /100 WBCS (ref 0–0)
OXYHGB MFR BLDMV: 68.7 % (ref 45–75)
OXYHGB MFR BLDMV: 70 % (ref 45–75)
OXYHGB MFR BLDMV: 70.2 % (ref 45–75)
OXYHGB MFR BLDMV: 73.6 % (ref 45–75)
PCO2 BLDMV: 42 MM HG (ref 41–51)
PCO2 BLDMV: 44 MM HG (ref 41–51)
PH BLDMV: 7.38 PH (ref 7.33–7.43)
PH BLDMV: 7.38 PH (ref 7.33–7.43)
PH BLDMV: 7.39 PH (ref 7.33–7.43)
PH BLDMV: 7.4 PH (ref 7.33–7.43)
PHOSPHATE SERPL-MCNC: 3.4 MG/DL (ref 2.5–4.9)
PHOSPHATE SERPL-MCNC: 3.9 MG/DL (ref 2.5–4.9)
PLATELET # BLD AUTO: 545 X10*3/UL (ref 150–450)
PLATELET # BLD AUTO: 584 X10*3/UL (ref 150–450)
PO2 BLDMV: 44 MM HG (ref 35–45)
PO2 BLDMV: 45 MM HG (ref 35–45)
PO2 BLDMV: 46 MM HG (ref 35–45)
PO2 BLDMV: 46 MM HG (ref 35–45)
POTASSIUM BLDMV-SCNC: 3.8 MMOL/L (ref 3.5–5.3)
POTASSIUM BLDMV-SCNC: 4.2 MMOL/L (ref 3.5–5.3)
POTASSIUM BLDMV-SCNC: 4.3 MMOL/L (ref 3.5–5.3)
POTASSIUM BLDMV-SCNC: 4.5 MMOL/L (ref 3.5–5.3)
POTASSIUM SERPL-SCNC: 3.9 MMOL/L (ref 3.5–5.3)
POTASSIUM SERPL-SCNC: 4.4 MMOL/L (ref 3.5–5.3)
PROTHROMBIN TIME: 16.3 SECONDS (ref 9.8–12.8)
PROTHROMBIN TIME: 16.5 SECONDS (ref 9.8–12.8)
Q ONSET: 218 MS
QRS COUNT: 18 BEATS
QRS DURATION: 130 MS
QT INTERVAL: 302 MS
QTC CALCULATION(BAZETT): 410 MS
QTC FREDERICIA: 370 MS
R AXIS: 134 DEGREES
RBC # BLD AUTO: 5.68 X10*6/UL (ref 4.5–5.9)
RBC # BLD AUTO: 5.81 X10*6/UL (ref 4.5–5.9)
SAO2 % BLDMV: 71 % (ref 45–75)
SAO2 % BLDMV: 72 % (ref 45–75)
SAO2 % BLDMV: 72 % (ref 45–75)
SAO2 % BLDMV: 76 % (ref 45–75)
SODIUM BLDMV-SCNC: 129 MMOL/L (ref 136–145)
SODIUM BLDMV-SCNC: 130 MMOL/L (ref 136–145)
SODIUM BLDMV-SCNC: 130 MMOL/L (ref 136–145)
SODIUM BLDMV-SCNC: 131 MMOL/L (ref 136–145)
SODIUM SERPL-SCNC: 134 MMOL/L (ref 136–145)
SODIUM SERPL-SCNC: 135 MMOL/L (ref 136–145)
T AXIS: -40 DEGREES
T OFFSET: 369 MS
UFH PPP CHRO-ACNC: 0.3 IU/ML
UFH PPP CHRO-ACNC: 0.4 IU/ML
VENTRICULAR RATE: 111 BPM
WBC # BLD AUTO: 9.4 X10*3/UL (ref 4.4–11.3)
WBC # BLD AUTO: 9.6 X10*3/UL (ref 4.4–11.3)

## 2024-10-28 PROCEDURE — 2500000004 HC RX 250 GENERAL PHARMACY W/ HCPCS (ALT 636 FOR OP/ED): Performed by: NURSE PRACTITIONER

## 2024-10-28 PROCEDURE — 85520 HEPARIN ASSAY: CPT

## 2024-10-28 PROCEDURE — 84132 ASSAY OF SERUM POTASSIUM: CPT

## 2024-10-28 PROCEDURE — 85027 COMPLETE CBC AUTOMATED: CPT

## 2024-10-28 PROCEDURE — 76937 US GUIDE VASCULAR ACCESS: CPT | Performed by: INTERNAL MEDICINE

## 2024-10-28 PROCEDURE — 2020000001 HC ICU ROOM DAILY

## 2024-10-28 PROCEDURE — 83735 ASSAY OF MAGNESIUM: CPT

## 2024-10-28 PROCEDURE — 2500000002 HC RX 250 W HCPCS SELF ADMINISTERED DRUGS (ALT 637 FOR MEDICARE OP, ALT 636 FOR OP/ED)

## 2024-10-28 PROCEDURE — 99153 MOD SED SAME PHYS/QHP EA: CPT | Performed by: INTERNAL MEDICINE

## 2024-10-28 PROCEDURE — 93460 R&L HRT ART/VENTRICLE ANGIO: CPT | Performed by: INTERNAL MEDICINE

## 2024-10-28 PROCEDURE — 2720000007 HC OR 272 NO HCPCS: Performed by: INTERNAL MEDICINE

## 2024-10-28 PROCEDURE — 93456 R HRT CORONARY ARTERY ANGIO: CPT | Performed by: INTERNAL MEDICINE

## 2024-10-28 PROCEDURE — 84132 ASSAY OF SERUM POTASSIUM: CPT | Performed by: NURSE PRACTITIONER

## 2024-10-28 PROCEDURE — 99152 MOD SED SAME PHYS/QHP 5/>YRS: CPT | Performed by: INTERNAL MEDICINE

## 2024-10-28 PROCEDURE — 37799 UNLISTED PX VASCULAR SURGERY: CPT

## 2024-10-28 PROCEDURE — 2500000001 HC RX 250 WO HCPCS SELF ADMINISTERED DRUGS (ALT 637 FOR MEDICARE OP): Performed by: NURSE PRACTITIONER

## 2024-10-28 PROCEDURE — 2500000004 HC RX 250 GENERAL PHARMACY W/ HCPCS (ALT 636 FOR OP/ED)

## 2024-10-28 PROCEDURE — 37799 UNLISTED PX VASCULAR SURGERY: CPT | Performed by: NURSE PRACTITIONER

## 2024-10-28 PROCEDURE — 2550000001 HC RX 255 CONTRASTS: Performed by: INTERNAL MEDICINE

## 2024-10-28 PROCEDURE — 99291 CRITICAL CARE FIRST HOUR: CPT

## 2024-10-28 PROCEDURE — 2500000004 HC RX 250 GENERAL PHARMACY W/ HCPCS (ALT 636 FOR OP/ED): Performed by: INTERNAL MEDICINE

## 2024-10-28 PROCEDURE — 2500000001 HC RX 250 WO HCPCS SELF ADMINISTERED DRUGS (ALT 637 FOR MEDICARE OP)

## 2024-10-28 PROCEDURE — 85018 HEMOGLOBIN: CPT | Performed by: NURSE PRACTITIONER

## 2024-10-28 PROCEDURE — 71045 X-RAY EXAM CHEST 1 VIEW: CPT | Performed by: RADIOLOGY

## 2024-10-28 PROCEDURE — B2151ZZ FLUOROSCOPY OF LEFT HEART USING LOW OSMOLAR CONTRAST: ICD-10-PCS | Performed by: INTERNAL MEDICINE

## 2024-10-28 PROCEDURE — C1894 INTRO/SHEATH, NON-LASER: HCPCS | Performed by: INTERNAL MEDICINE

## 2024-10-28 PROCEDURE — 85610 PROTHROMBIN TIME: CPT

## 2024-10-28 PROCEDURE — 93503 INSERT/PLACE HEART CATHETER: CPT | Performed by: INTERNAL MEDICINE

## 2024-10-28 PROCEDURE — C1751 CATH, INF, PER/CENT/MIDLINE: HCPCS | Performed by: INTERNAL MEDICINE

## 2024-10-28 PROCEDURE — 2500000002 HC RX 250 W HCPCS SELF ADMINISTERED DRUGS (ALT 637 FOR MEDICARE OP, ALT 636 FOR OP/ED): Performed by: NURSE PRACTITIONER

## 2024-10-28 PROCEDURE — 71045 X-RAY EXAM CHEST 1 VIEW: CPT

## 2024-10-28 PROCEDURE — 82435 ASSAY OF BLOOD CHLORIDE: CPT | Performed by: NURSE PRACTITIONER

## 2024-10-28 PROCEDURE — 85730 THROMBOPLASTIN TIME PARTIAL: CPT

## 2024-10-28 PROCEDURE — 4A023N8 MEASUREMENT OF CARDIAC SAMPLING AND PRESSURE, BILATERAL, PERCUTANEOUS APPROACH: ICD-10-PCS | Performed by: INTERNAL MEDICINE

## 2024-10-28 PROCEDURE — B2111ZZ FLUOROSCOPY OF MULTIPLE CORONARY ARTERIES USING LOW OSMOLAR CONTRAST: ICD-10-PCS | Performed by: INTERNAL MEDICINE

## 2024-10-28 PROCEDURE — 99291 CRITICAL CARE FIRST HOUR: CPT | Performed by: INTERNAL MEDICINE

## 2024-10-28 RX ORDER — POTASSIUM CHLORIDE 20 MEQ/1
40 TABLET, EXTENDED RELEASE ORAL ONCE
Status: COMPLETED | OUTPATIENT
Start: 2024-10-28 | End: 2024-10-28

## 2024-10-28 RX ORDER — LIDOCAINE HYDROCHLORIDE 10 MG/ML
INJECTION, SOLUTION EPIDURAL; INFILTRATION; INTRACAUDAL; PERINEURAL AS NEEDED
Status: DISCONTINUED | OUTPATIENT
Start: 2024-10-28 | End: 2024-10-28 | Stop reason: HOSPADM

## 2024-10-28 RX ORDER — FENTANYL CITRATE 50 UG/ML
INJECTION, SOLUTION INTRAMUSCULAR; INTRAVENOUS AS NEEDED
Status: DISCONTINUED | OUTPATIENT
Start: 2024-10-28 | End: 2024-10-28 | Stop reason: HOSPADM

## 2024-10-28 RX ORDER — MIDAZOLAM HYDROCHLORIDE 1 MG/ML
INJECTION, SOLUTION INTRAMUSCULAR; INTRAVENOUS AS NEEDED
Status: DISCONTINUED | OUTPATIENT
Start: 2024-10-28 | End: 2024-10-28 | Stop reason: HOSPADM

## 2024-10-28 RX ORDER — BISACODYL 10 MG/1
10 SUPPOSITORY RECTAL DAILY PRN
Status: DISCONTINUED | OUTPATIENT
Start: 2024-10-28 | End: 2024-11-12

## 2024-10-28 ASSESSMENT — PAIN - FUNCTIONAL ASSESSMENT
PAIN_FUNCTIONAL_ASSESSMENT: 0-10

## 2024-10-28 ASSESSMENT — PAIN SCALES - GENERAL
PAINLEVEL_OUTOF10: 0 - NO PAIN

## 2024-10-28 NOTE — POST-PROCEDURE NOTE
Physician Transition of Care Summary  Invasive Cardiovascular Lab    Procedure Date: 10/28/2024  Attending:    * Jed Lindsay - Primary  Resident/Fellow/Other Assistant: Surgeons and Role:     * Rodríguez Patten MD - Fellow     * Alpesh Nichols MD - Fellow    Indications:   Pre-op Diagnosis      * Ischemic cardiomyopathy [I25.5]     * History of chronic atrial fibrillation [Z86.79]    Post-procedure diagnosis:   Post-op Diagnosis     * Ischemic cardiomyopathy [I25.5]     * History of chronic atrial fibrillation [Z86.79]    Procedure(s):   Left & Right Heart Cath w Angiography & LV  87814 - NE R & L HRT CATH WINJX HRT ART& L VENTR IMG        Procedure Findings:   Coronary angiogram and grafts:   Right dominant system.   LM: Mild 20% distal disease.   LAD: Occluded with Patent LIMA   RI: Occluded  Lcx: Patent proximal Lcx stent and OM1 stent, otherwise mild non-obstructive disease  RCA: proximal 100% occlusion     Grafts:  LIMA-LAD: Patent   SVG-OM, SVG-RCA: Occluded  SVG-RI: Unable to engage but likely occluded      Right heart hemodynamics:  RA 17   RV 39/13, RVEDP 17   PA 37/22, mean PAP 28   CO/CI 5.2/2.5  PA sat 71%    Description of the Procedure:   Arterial Access: Right common femoral artery  Closure; Manual pressure     Venous: Previously placed swan sheath was exchanged to a new swan sheath over a short Jwire and was sutured in place.      Complications:   None    Stents/Implants:   Implants       No implant documentation for this case.            Anticoagulation/Antiplatelet Plan:   Per primary team    Estimated Blood Loss:   5 mL    Anesthesia: Moderate Sedation Anesthesia Staff: No anesthesia staff entered.    Any Specimen(s) Removed:   No specimens collected during this procedure.    Disposition:   HFICU      Electronically signed by: Rodríguez Patten MD, 10/28/2024 12:22 PM

## 2024-10-28 NOTE — PROGRESS NOTES
Occupational Therapy    Communication Note    Patient Name: Fahad Meraz  MRN: [id number]  Today's Date: 10/28/2024   Room: 11/11A    Discipline: Occupational Therapy      Missed Visit: Yes  Missed Visit Reason:  (Patient leaving unit for heart cath; will attempt OT next visit as appropriate.)      10/28/24 at 9:27 AM   Thuy Pop OT   Rehab Office: [phone]

## 2024-10-28 NOTE — PROGRESS NOTES
[facility]  HFICU PROGRESS NOTE    Fahad Meraz/[id number]    Admit Date: 10/24/2024  Hospital Length of Stay: 4   ICU Length of Stay: 3d 17h   Primary Service:   Primary HF Cardiologist: Dr. Washington  Referring:    INTERVAL EVENTS / PERTINENT ROS:     No acute events overnight. He denies headache, dizziness, vision changes, chest pain, palpitations, shortness of breath, cough, ABD pain, ABD swelling, diarrhea, nausea, or vomiting. He has not had a BM since Saturday. Telemetry reviewed showing atrial fibrillation with frequent PVC's. He appears euvolemic on physical exam.  MAP's overnight between 60 - 70 mmHg. Cardiac support continues with Milrinone gtt decreased to 0.375 mcg/kg/min this early afternoon. Chest Xray reviewed with stable appearance of pulmonary congestion and SWAN in appropriate position. Chest X-ray s/p PA catheter replacement in satisfactory position. Urine output over the previous 24 hours 3.4 liters after 1 mg Bumex IVP yesterday.     Daily hemodynamics: 10/28/24: B/P: 110/54 (71); CVP: 7; PAP: 36/17 (23); PCWP: 16; SVR: 914; CI: 2.6 / CO: 5.6; SvO2: 76% on IV Milrinone 0.5 mcg/kg/min; PO Entresto 24/26 mg BID; PO Spironolactone 25 mg daily; PO dapagliflozin 10 mg daily    Plan:    - Escalate bowel regimen  - Continue with Q 6 hour hemodynamics (1800 to see if tolerating lower dose of 0.375 mcg/kg/min milrinone)  - C/w Entresto @ 24 / 26 mg BID   - Hold diuresis today given CVP 7 and borderline blood pressures   - Genetic consult outpatient - collaborate with Dr. Washington   - Team to review coronary angiogram imaging with CT surgery and discuss utility of further imaging (e.g., cardiac MRI) and/or coronary interventions.   - Hold restarting heparin gtt today and will reassess tomorrow morning per Dr. Jonatan Neri  - ConsiderEP consult post ischemic evaluation       MEDICATIONS  Infusions:  heparin, Last Rate: Stopped (10/28/24 0947)  milrinone, Last Rate: 0.375  mcg/kg/min (10/28/24 1500)      Scheduled:  aspirin, 81 mg, Daily  cholecalciferol, 50,000 Units, Every Sunday  dapagliflozin propanediol, 10 mg, q24h  escitalopram, 10 mg, Daily  [Held by provider] metoprolol succinate XL, 50 mg, Daily  pantoprazole, 40 mg, Daily before breakfast  perflutren protein A microsphere, 0.5 mL, Once in imaging  potassium chloride CR, 40 mEq, Once  sacubitriL-valsartan, 1 tablet, BID  sennosides-docusate sodium, 2 tablet, BID  spironolactone, 25 mg, Daily  sulfur hexafluoride microsphr, 2 mL, Once in imaging      PRN:  alteplase, 2 mg, PRN  bisacodyl, 10 mg, Daily PRN  nitroglycerin, 0.4 mg, q5 min PRN  oxygen, , Continuous PRN - O2/gases  polyethylene glycol, 17 g, Daily PRN      Invasive Hemodynamics:    Most Recent Range Past 24hrs   BP (Art) 90/46 Arterial Line BP 1  Min: 90/46  Max: 121/59   MAP(Art) 59 mmHg Arterial Line MAP 1 (mmHg)   Min: 57 mmHg  Max: 78 mmHg   RA/CVP   No data recorded   PA 33/19 PAP  Min: 9/4  Max: 44/18   PA(mean) 24 mmHg PAP (Mean)  Min: 6 mmHg  Max: 30 mmHg   PCWP 16 mmHg PCWP (mmHg)  Min: 10 mmHg  Max: 19 mmHg   CO 5.6 L/min CO (L/min)  Min: 5.1 L/min  Max: 5.9 L/min   CI 2.62 L/min/m2 CI (L/min/m2)  Min: 2.4 L/min/m2  Max: 2.8 L/min/m2   Mixed Venous 76 % SVO2 (%)  Min: 72 %  Max: 76 %   SVR  914 (dyne*sec)/cm5 SVR (dyne*sec)/cm5  Min: 831 (dyne*sec)/cm5  Max: 1061 (dyne*sec)/cm5    (dyne*sec)/cm5 PVR (dyne*sec)/cm5  Min: 48 (dyne*sec)/cm5  Max: 136 (dyne*sec)/cm5     PHYSICAL EXAM:   Visit Vitals  BP 88/75   Pulse 80   Temp 36.3 °C (97.3 °F) (Temporal)   Resp 14   Ht 1.829 m (6')   Wt 92 kg (202 lb 13.2 oz)   SpO2 96%   BMI 27.51 kg/m²   Smoking Status Former   BSA 2.16 m²       Wt Readings from Last 5 Encounters:   10/28/24 92 kg (202 lb 13.2 oz)   10/24/24 92.5 kg (204 lb)   08/05/24 122 kg (268 lb)   01/31/22 119 kg (262 lb)   02/18/20 123 kg (270 lb 2 oz)       INTAKE/OUTPUT:  I/O last 3 completed shifts:  In: 1771.4 (19.8 mL/kg) [P.O.:720;  I.V.:1051.4 (11.7 mL/kg)]  Out: 4000 (44.7 mL/kg) [Urine:4000 (1.2 mL/kg/hr)]  Weight: 89.5 kg      Physical Exam  HENT:      Head: Normocephalic.      Nose: Nose normal.      Mouth/Throat:      Mouth: Mucous membranes are moist.   Eyes:      Pupils: Pupils are equal, round, and reactive to light.   Neck:      Vascular: JVD present.   Cardiovascular:      Rate and Rhythm: Normal rate. Rhythm irregular.      Comments: Permanent Atrial Fibrillation  Pulmonary:      Effort: Pulmonary effort is normal.      Breath sounds: Normal breath sounds.   Abdominal:      Palpations: Abdomen is soft.   Musculoskeletal:         General: Normal range of motion.      Cervical back: Normal range of motion.   Skin:     Capillary Refill: Capillary refill takes less than 2 seconds.   Neurological:      Mental Status: He is alert and oriented to person, place, and time.   Psychiatric:         Mood and Affect: Mood normal.         Behavior: Behavior normal.         Thought Content: Thought content normal.         Judgment: Judgment normal.         DATA:  CMP:  Results from last 7 days   Lab Units 10/28/24  0919 10/28/24  0618 10/27/24  0141 10/26/24  1503 10/26/24  0513 10/25/24  1740 10/25/24  1221 10/24/24  2328   SODIUM mmol/L 134* 135* 131* 135* 133* 134* 137 134*   POTASSIUM mmol/L 3.9 4.4 4.1 4.4 4.5 3.3* 3.4* 4.6   CHLORIDE mmol/L 100 98 94* 95* 95* 93* 93* 90*   CO2 mmol/L 25 24 25 26 24 29 31 30   ANION GAP mmol/L 13 17 16 18 19 15 16 19   BUN mg/dL 34* 33* 38* 35* 33* 31* 29* 28*   CREATININE mg/dL 1.68* 1.61* 2.05* 2.01* 2.03* 2.22* 2.01* 1.88*   EGFR mL/min/1.73m*2 44* 46* 34* 35* 35* 31* 35* 38*   MAGNESIUM mg/dL 2.56* 2.65* 2.44*  --  2.49*  --   --  2.22   ALBUMIN g/dL 4.1 4.3 4.4 4.4 4.2 4.4 4.4 4.4   ALT U/L  --   --   --   --   --   --   --  13   AST U/L  --   --   --   --   --   --   --  15   BILIRUBIN TOTAL mg/dL  --   --   --   --   --   --   --  2.3*     CBC:  Results from last 7 days   Lab Units 10/28/24  0919  "10/28/24  0618 10/27/24  0141 10/26/24  0513 10/24/24  2328   WBC AUTO x10*3/uL 9.6 9.4 10.9 11.6* 14.4*   HEMOGLOBIN g/dL 15.9 15.6 15.5 16.0 17.5   HEMATOCRIT % 46.8 46.6 46.9 45.5 50.8   PLATELETS AUTO x10*3/uL 584* 545* 616* 614* 742*   MCV fL 81 82 81 77* 79*     COAG:   Results from last 7 days   Lab Units 10/28/24  0919 10/28/24  0618 10/27/24  0141 10/26/24  0513 10/24/24  2328   INR  1.4* 1.5* 1.9* 2.1* 2.7*     ABO: No results found for: \"ABO\"  HEME/ENDO:  Results from last 7 days   Lab Units 10/24/24  2328   FERRITIN ng/mL 76   IRON SATURATION % 17*   TSH mIU/L 8.47*   HEMOGLOBIN A1C % 6.3*      CARDIAC:   Results from last 7 days   Lab Units 10/24/24  2328   BNP pg/mL 1,995*       ASSESSMENT AND PLAN:     Mr. Meraz is a 69 M with a PMHx sig for CAD s/p 4V CABG (2012) and PCI (LCx/OM; 5/2019), stage D systolic HF/ICM/HFrEF with LVEF 10-15%( 10/22/24 TTE), AF s/p RFA (PVI and CTI; 4/2024) on OAC therapy, HTN, dyslipidemia, depression, anxiety and VIV using CPAP who was admitted to OSH ICU, s/p RHC on 10/18/24.  Per patient, he had been experienced worsening SOB and fluids overload for 2-3 weeks. Patient was on milrinone drip and diurese with SG guided. However his KENYA worsening, and not able to wean milrinone drip. Decision was made to transfer him to HF ICU [facility] for possible advanced therapy consideration. 10/26: Tolerated afterload reduction with hydralazine / Isordil. 10/27: Discontinued hydralazine / Isordil and initiated 24 / 26 mg Entresto BID and tolerating. 10/28: Coronary angiogram: Right dominant system; LM: Mild 20% distal disease; LAD: Occluded with Patent LIMA; RI: Occluded; Lcx: Patent proximal Lcx stent and OM1 stent, otherwise mild non-obstructive disease; RCA: proximal 100% occlusion. Grafts: LIMA-LAD: Patent; SVG-OM, SVG-RCA: Occluded; SVG-RI: Unable to engage but likely occluded    Coronary angiogram results:     Procedure Findings:   Coronary angiogram and grafts:   Right dominant " system.   LM: Mild 20% distal disease.   LAD: Occluded with Patent LIMA   RI: Occluded  Lcx: Patent proximal Lcx stent and OM1 stent, otherwise mild non-obstructive disease  RCA: proximal 100% occlusion     Grafts:  LIMA-LAD: Patent   SVG-OM, SVG-RCA: Occluded  SVG-RI: Unable to engage but likely occluded      Right heart hemodynamics:  RA 17   RV 39/13, RVEDP 17   PA 37/22, mean PAP 28   CO/CI 5.2/2.5  PA sat 71%    Description of the Procedure:   Arterial Access: Right common femoral artery  Closure; Manual pressure     Venous: Previously placed swan sheath was exchanged to a new swan sheath over a short Jwire and was sutured in place.        Neuro:     # Hx of Depression/Anxiety  - Serial neuro and pain assessments  - PO Tylenol PRN for pain  - PT/OT Consult, OOB to chair  - CAM ICU score every shift  - Sleep/wake cycle normalization  - c/w home Lexapro and Clonazepam     # Physical Status  -Overweight:  BMI: 27.9   -Reduced Mobility: acute decompensate heart failure and ICU stay      #Substance abuse  -Alcohol abuse/Alcohol dependence: NO   -Tobacco use/Nicotine Dependence: NO      Cardiovascular:     # Stage D, ICM, decompensate acute on chronic heart failure,  HFrEF 10-15% ( from 10/22/24 ECHO)   #Hx of CAD s/p 4V CABG 2012     -BRIANA (3/29/23): Left Ventricle: Moderately reduced left ventricular systolic function with a visually estimated EF of 35 - 40%.  - TTE (10/22/24): LVEF 10 -15%, right ventricle moderately dilated, moderately reduced systolic function. Mild MR  - admit weight (10/24): 93.5 KG   - admit BNP (10/24):  1995  - Home meds include Metoprolol succinate 25 mg daily, Entresto 97/103 mg bid, Hydralazine 25 mg bid, Isosorbide mononitrate 120 mg daily, Spironolactone 25 mg daily, Bumex 0.5 mg daily----allergy to SGLT2.  - ASA  - C/w Milrinone drip @ 0.5 mcg/kg/min, initiated OSH  - SGC # when arrival (10/25): Bp 96/50 (65), CVP 5, PA 36/20 (22), PCWP 18, CO/CI 4.4/2.1, SVR 1083, SVO2 65 % on  Milrinone drip at 0.625 mcg/kg/min, hydral 25 mg TID.   - Daily hemodynamics: 10/26: B/P: 114/53 (70); CVP: 9; PAP: 39/20 (26); PCWP: 18; SVR: 922; CI: 2.5 / CO: 5.3; SvO2: 74% on IV Milrinone 0.5 mcg/kg/min; PO hydralazine 50 mg TID; PO Isosorbide 10 mg TID; PO Spironolactone 25 mg daily  - To wean Milrinone drip as able  - Spot diurese based on daily assessment   - Discontinue hydralazine to 50 mg TID (last dose 10/26 @ 2100)  - Discontinue Isordil 10 mg TID Am (last dose 10/26 @ 2100)  - Initiate Entresto 24 / 26 mg BID on 10/27 - first dose at 0900    - Initiate dapagliflozin 10 mg daily 10/26  - C/w Rodrigo 25 mg daily   - Hold BB   - Daily standing weights, 2gm sodium diet, 2L fluid restriction, strict I&Os     #CAD   -[facility] (12/20/2020):  Successful PCI of the SVG to ramus intermedius with one drug-eluting stent; OCT of the SVG to RCA to confirm no thrombosis; patent LAD; occluded SVG to OM.  -Meds: C/w ASA.  -Consider to add Lipitor      # Hx of AFib s/p RFA (PVI and CTI; 4/2024)  -NO Device noted  -Was on home Coumadin for 2 months, prior to that was on Eliquis,  patient reported with Dizziness while on Eliquis      # Dizziness/bradycardia  -Had 3 episodes of near syncope after his recent AF RFA; has stopped driving as a result. ECG with sinus olu. Metoprolol previously decreased by his local EP.    -Hold BB due to decompensate HF      #Electrolyte Disturbances  -Keep K>4, Mag >2     Pulmonary:   # PMH of VIV  -c/w home CPAP  -Monitor and maintain SpO2 > 92%     GI:  # H/x of  GERD  - Bowel regimen: Senna, Miralax PRN  - PPI     #  Bilirubinemia  - T maryuri (10/24): 2.3, ALT, AST: normal   - CT C/A/P (10/17/24): Hepatomegaly and hepatic steatosis are present. The liver has a lobulated contour. A questionable lesion in the right hepatic lobe inferiorly measures 26 mm.     # Urinary retention  - CT C/A/P (10/17/24): the prostate gland is mildly enlarged with mass effect on the posterior wall of the urinary  bladder. The prostate gland measures 4.3 x 6.0 cm.   - C/o urinary difficulty, s/p FC OSH --> Removed prior admission        :  #KENYA/ CKD ?  in the setting of cardio-renal syndrome  -Baseline BUN/Cr: Unclear,  OSH ICU: Creatinine: 1.3-1.6   -Admit BUN/Cr (10/24): 28/1.88  -Daily BUN/Creat: 10/28: 34 / 1.68   -I/Os  -avoid hypotension and nephrotoxic agents     Heme:  #Anemia in the setting of iron deficiency  - Lab (10/24): H/H: 17.5/50.8,  Iron study: 63/374/17%, Ferritin: 76,  folate: 23.5, B12: 768   - NO iron replacement needed as H/H high     # Coagulopathy due to on home Coumadin  - INR  (10/28): 1.4, Hold home Coumadin   - Holding heparin gtt (A-fib) s/p coronary angiogram per Dr. Jonatan Neri - will reassess 10/29 during rounds       Endo:  #DM  - Euglycemic  - hgbA1c (10/24):  6.3     #Thyroid  -TSH (10/24):  8.47, T4: 1.44      ID:  # leukocytosis - Resolved  - WBC (10/26):  9.6   -afebrile, nontoxic   -trend temps q4h       PHYSICAL AND OCCUPATIONAL THERAPY: Ordered    LINES:  PIVs   A-line (OSH) 10/20 - current  Right Subclavian Cordis / PA catheter (OSH) 10/18 - 10/28  Right subclavian cordis: Previously placed swan sheath was exchanged to a new swan sheath over a short Jwire and was sutured in place. 10/28 - current    DVT: INR: 1.4 (10/28) (home Warfarin now held) - Initiated heparin gtt (currently held)  VAP BUNDLE: NA  CENTRAL LINE BUNDLE: Ordered   ULCER PPX: Pantoprazole 40 mg daily  GLYCEMIC CONTROL: NA - A1C: 6.3  BOWEL CARE: Senna / Miralax  INDWELLING CATHETER: NA  NUTRITION: Adult diet Cardiac; 70 gm fat; 2 - 3 grams Sodium      EMERGENCY CONTACT: Extended Emergency Contact Information  Primary Emergency Contact: Tanvi Meraz  Home Phone: [phone]  Relation: Spouse  FAMILY UPDATE: Wife and son at the bedside  CODE STATUS: Full Code  DISPO: Maintain in the HFICU    Patient seen and assessed with Dr. Finn    I personally spent 60 minutes of critical care time directly and personally  managing the patient exclusive of separately billable procedures   _________________________________________________  BARBARA Alberto

## 2024-10-28 NOTE — PROGRESS NOTES
Physical Therapy                 Therapy Communication Note    Patient Name: Fahad Meraz  MRN: [id number]  Department: [facility] HFICU  Room: 11/11-A  Today's Date: 10/28/2024     Discipline: Physical Therapy    Missed Visit Reason: Missed Visit Reason:  (Pt on bedrest from cath lab.)    Missed Time: Attempt    Comment:

## 2024-10-28 NOTE — PROGRESS NOTES
HFICU Attending Note    Principal Problem:    Acute on chronic heart failure with reduced ejection fraction and diastolic dysfunction  Active Problems:    KENYA (acute kidney injury) (CMS-HCC)    History of chronic atrial fibrillation    Ischemic cardiomyopathy    GERD (gastroesophageal reflux disease)    Leukocytosis    Heart failure, ACC/AHA stage D    Obstructive sleep apnea    Worsening systolic function in a patient with known Ischemic CM   LHC showing occluded grafts   Will review images tomorrow with CT surgery   His LV is dilated and he appears to be inotropes dependent , will need to initiate advanced therapies consideration if he is agreable       This critically ill patient continues to be at-risk for clinically significant deterioration / failure due to the above mentioned dysfunctional, unstable organ systems.  I have personally identified and managed all complex critical care issues to prevent aforementioned clinical deterioration.  Critical care time is spent at bedside and/or the immediate area and has included, but is not limited to, the review of diagnostic tests, labs, radiographs, serial assessments of hemodynamics, respiratory status, ventilatory management, and family updates.  Time spent in procedures and teaching are reported separately.    Critical care time: 35____ minutes

## 2024-10-29 ENCOUNTER — APPOINTMENT (OUTPATIENT)
Dept: RADIOLOGY | Facility: HOSPITAL | Age: 69
End: 2024-10-29
Payer: MEDICARE

## 2024-10-29 LAB
ALBUMIN SERPL BCP-MCNC: 4.2 G/DL (ref 3.4–5)
ANION GAP BLDMV CALCULATED.4IONS-SCNC: 10 MMO/L (ref 10–25)
ANION GAP BLDMV CALCULATED.4IONS-SCNC: 8 MMO/L (ref 10–25)
ANION GAP SERPL CALC-SCNC: 16 MMOL/L (ref 10–20)
APTT PPP: 37 SECONDS (ref 27–38)
BASE EXCESS BLDMV CALC-SCNC: -0.5 MMOL/L (ref -2–3)
BASE EXCESS BLDMV CALC-SCNC: -0.7 MMOL/L (ref -2–3)
BASE EXCESS BLDMV CALC-SCNC: -1.3 MMOL/L (ref -2–3)
BASE EXCESS BLDMV CALC-SCNC: -2.5 MMOL/L (ref -2–3)
BODY TEMPERATURE: 37 DEGREES CELSIUS
BUN SERPL-MCNC: 32 MG/DL (ref 6–23)
CA-I BLDMV-SCNC: 1.26 MMOL/L (ref 1.1–1.33)
CA-I BLDMV-SCNC: 1.28 MMOL/L (ref 1.1–1.33)
CALCIUM SERPL-MCNC: 9.2 MG/DL (ref 8.6–10.6)
CHLORIDE BLD-SCNC: 102 MMOL/L (ref 98–107)
CHLORIDE BLD-SCNC: 102 MMOL/L (ref 98–107)
CHLORIDE BLD-SCNC: 103 MMOL/L (ref 98–107)
CHLORIDE BLD-SCNC: 103 MMOL/L (ref 98–107)
CHLORIDE SERPL-SCNC: 102 MMOL/L (ref 98–107)
CO2 SERPL-SCNC: 22 MMOL/L (ref 21–32)
CREAT SERPL-MCNC: 1.49 MG/DL (ref 0.5–1.3)
EGFRCR SERPLBLD CKD-EPI 2021: 50 ML/MIN/1.73M*2
ERYTHROCYTE [DISTWIDTH] IN BLOOD BY AUTOMATED COUNT: 16.9 % (ref 11.5–14.5)
GLUCOSE BLD-MCNC: 111 MG/DL (ref 74–99)
GLUCOSE BLD-MCNC: 114 MG/DL (ref 74–99)
GLUCOSE BLD-MCNC: 129 MG/DL (ref 74–99)
GLUCOSE BLD-MCNC: 174 MG/DL (ref 74–99)
GLUCOSE SERPL-MCNC: 114 MG/DL (ref 74–99)
HCO3 BLDMV-SCNC: 22.5 MMOL/L (ref 22–26)
HCO3 BLDMV-SCNC: 23.7 MMOL/L (ref 22–26)
HCO3 BLDMV-SCNC: 24.2 MMOL/L (ref 22–26)
HCO3 BLDMV-SCNC: 24.2 MMOL/L (ref 22–26)
HCT VFR BLD AUTO: 46 % (ref 41–52)
HCT VFR BLD EST: 46 % (ref 41–52)
HCT VFR BLD EST: 46 % (ref 41–52)
HCT VFR BLD EST: 47 % (ref 41–52)
HCT VFR BLD EST: 47 % (ref 41–52)
HGB BLD-MCNC: 15.3 G/DL (ref 13.5–17.5)
HGB BLDMV-MCNC: 15.2 G/DL (ref 13.5–17.5)
HGB BLDMV-MCNC: 15.4 G/DL (ref 13.5–17.5)
HGB BLDMV-MCNC: 15.8 G/DL (ref 13.5–17.5)
HGB BLDMV-MCNC: 15.8 G/DL (ref 13.5–17.5)
INHALED O2 CONCENTRATION: 21 %
INR PPP: 1.3 (ref 0.9–1.1)
LACTATE BLDMV-SCNC: 0.7 MMOL/L (ref 0.4–2)
LACTATE BLDMV-SCNC: 0.8 MMOL/L (ref 0.4–2)
LACTATE BLDMV-SCNC: 1 MMOL/L (ref 0.4–2)
LACTATE BLDMV-SCNC: 1.5 MMOL/L (ref 0.4–2)
MAGNESIUM SERPL-MCNC: 2.49 MG/DL (ref 1.6–2.4)
MCH RBC QN AUTO: 27.4 PG (ref 26–34)
MCHC RBC AUTO-ENTMCNC: 33.3 G/DL (ref 32–36)
MCV RBC AUTO: 82 FL (ref 80–100)
NRBC BLD-RTO: 0 /100 WBCS (ref 0–0)
OXYHGB MFR BLDMV: 69 % (ref 45–75)
OXYHGB MFR BLDMV: 70.4 % (ref 45–75)
OXYHGB MFR BLDMV: 71.5 % (ref 45–75)
OXYHGB MFR BLDMV: 72.7 % (ref 45–75)
PCO2 BLDMV: 39 MM HG (ref 41–51)
PCO2 BLDMV: 39 MM HG (ref 41–51)
PCO2 BLDMV: 40 MM HG (ref 41–51)
PCO2 BLDMV: 40 MM HG (ref 41–51)
PH BLDMV: 7.37 PH (ref 7.33–7.43)
PH BLDMV: 7.38 PH (ref 7.33–7.43)
PH BLDMV: 7.39 PH (ref 7.33–7.43)
PH BLDMV: 7.4 PH (ref 7.33–7.43)
PHOSPHATE SERPL-MCNC: 3.9 MG/DL (ref 2.5–4.9)
PLATELET # BLD AUTO: 594 X10*3/UL (ref 150–450)
PO2 BLDMV: 42 MM HG (ref 35–45)
PO2 BLDMV: 42 MM HG (ref 35–45)
PO2 BLDMV: 45 MM HG (ref 35–45)
PO2 BLDMV: 45 MM HG (ref 35–45)
POTASSIUM BLDMV-SCNC: 4 MMOL/L (ref 3.5–5.3)
POTASSIUM BLDMV-SCNC: 4.2 MMOL/L (ref 3.5–5.3)
POTASSIUM BLDMV-SCNC: 4.4 MMOL/L (ref 3.5–5.3)
POTASSIUM BLDMV-SCNC: 4.7 MMOL/L (ref 3.5–5.3)
POTASSIUM SERPL-SCNC: 4.6 MMOL/L (ref 3.5–5.3)
PROTHROMBIN TIME: 15 SECONDS (ref 9.8–12.8)
RBC # BLD AUTO: 5.58 X10*6/UL (ref 4.5–5.9)
SAO2 % BLDMV: 71 % (ref 45–75)
SAO2 % BLDMV: 72 % (ref 45–75)
SAO2 % BLDMV: 73 % (ref 45–75)
SAO2 % BLDMV: 75 % (ref 45–75)
SODIUM BLDMV-SCNC: 130 MMOL/L (ref 136–145)
SODIUM SERPL-SCNC: 135 MMOL/L (ref 136–145)
UFH PPP CHRO-ACNC: 0.2 IU/ML
UFH PPP CHRO-ACNC: 0.3 IU/ML
WBC # BLD AUTO: 10.7 X10*3/UL (ref 4.4–11.3)

## 2024-10-29 PROCEDURE — 2500000004 HC RX 250 GENERAL PHARMACY W/ HCPCS (ALT 636 FOR OP/ED)

## 2024-10-29 PROCEDURE — 85520 HEPARIN ASSAY: CPT

## 2024-10-29 PROCEDURE — 82947 ASSAY GLUCOSE BLOOD QUANT: CPT

## 2024-10-29 PROCEDURE — 37799 UNLISTED PX VASCULAR SURGERY: CPT

## 2024-10-29 PROCEDURE — 2500000001 HC RX 250 WO HCPCS SELF ADMINISTERED DRUGS (ALT 637 FOR MEDICARE OP): Performed by: NURSE PRACTITIONER

## 2024-10-29 PROCEDURE — 84132 ASSAY OF SERUM POTASSIUM: CPT

## 2024-10-29 PROCEDURE — 97166 OT EVAL MOD COMPLEX 45 MIN: CPT | Mod: GO

## 2024-10-29 PROCEDURE — 85027 COMPLETE CBC AUTOMATED: CPT

## 2024-10-29 PROCEDURE — 2500000002 HC RX 250 W HCPCS SELF ADMINISTERED DRUGS (ALT 637 FOR MEDICARE OP, ALT 636 FOR OP/ED): Performed by: NURSE PRACTITIONER

## 2024-10-29 PROCEDURE — 37799 UNLISTED PX VASCULAR SURGERY: CPT | Performed by: NURSE PRACTITIONER

## 2024-10-29 PROCEDURE — 84295 ASSAY OF SERUM SODIUM: CPT | Performed by: NURSE PRACTITIONER

## 2024-10-29 PROCEDURE — 71045 X-RAY EXAM CHEST 1 VIEW: CPT

## 2024-10-29 PROCEDURE — 71045 X-RAY EXAM CHEST 1 VIEW: CPT | Performed by: RADIOLOGY

## 2024-10-29 PROCEDURE — 97162 PT EVAL MOD COMPLEX 30 MIN: CPT | Mod: GP

## 2024-10-29 PROCEDURE — 99291 CRITICAL CARE FIRST HOUR: CPT | Performed by: INTERNAL MEDICINE

## 2024-10-29 PROCEDURE — 85610 PROTHROMBIN TIME: CPT

## 2024-10-29 PROCEDURE — 83735 ASSAY OF MAGNESIUM: CPT

## 2024-10-29 PROCEDURE — 2500000004 HC RX 250 GENERAL PHARMACY W/ HCPCS (ALT 636 FOR OP/ED): Performed by: STUDENT IN AN ORGANIZED HEALTH CARE EDUCATION/TRAINING PROGRAM

## 2024-10-29 PROCEDURE — 84132 ASSAY OF SERUM POTASSIUM: CPT | Performed by: NURSE PRACTITIONER

## 2024-10-29 PROCEDURE — 2500000001 HC RX 250 WO HCPCS SELF ADMINISTERED DRUGS (ALT 637 FOR MEDICARE OP)

## 2024-10-29 PROCEDURE — 97535 SELF CARE MNGMENT TRAINING: CPT | Mod: GO

## 2024-10-29 PROCEDURE — 97530 THERAPEUTIC ACTIVITIES: CPT | Mod: GP

## 2024-10-29 PROCEDURE — 2020000001 HC ICU ROOM DAILY

## 2024-10-29 RX ORDER — HYDRALAZINE HYDROCHLORIDE 25 MG/1
25 TABLET, FILM COATED ORAL 3 TIMES DAILY
Status: DISCONTINUED | OUTPATIENT
Start: 2024-10-29 | End: 2024-11-01

## 2024-10-29 RX ORDER — ISOSORBIDE DINITRATE 10 MG/1
10 TABLET ORAL
Status: DISCONTINUED | OUTPATIENT
Start: 2024-10-29 | End: 2024-10-30

## 2024-10-29 RX ORDER — BUMETANIDE 0.25 MG/ML
1 INJECTION, SOLUTION INTRAMUSCULAR; INTRAVENOUS ONCE
Status: COMPLETED | OUTPATIENT
Start: 2024-10-29 | End: 2024-10-29

## 2024-10-29 ASSESSMENT — PAIN - FUNCTIONAL ASSESSMENT
PAIN_FUNCTIONAL_ASSESSMENT: 0-10

## 2024-10-29 ASSESSMENT — COGNITIVE AND FUNCTIONAL STATUS - GENERAL
DRESSING REGULAR UPPER BODY CLOTHING: A LITTLE
DRESSING REGULAR LOWER BODY CLOTHING: A LOT
STANDING UP FROM CHAIR USING ARMS: A LITTLE
DAILY ACTIVITIY SCORE: 19
CLIMB 3 TO 5 STEPS WITH RAILING: A LITTLE
TOILETING: A LITTLE
WALKING IN HOSPITAL ROOM: A LITTLE
MOBILITY SCORE: 20
MOVING TO AND FROM BED TO CHAIR: A LITTLE
HELP NEEDED FOR BATHING: A LITTLE

## 2024-10-29 ASSESSMENT — PAIN SCALES - GENERAL
PAINLEVEL_OUTOF10: 0 - NO PAIN

## 2024-10-29 ASSESSMENT — ACTIVITIES OF DAILY LIVING (ADL)
ADL_ASSISTANCE: INDEPENDENT
HOME_MANAGEMENT_TIME_ENTRY: 10
ADL_ASSISTANCE: INDEPENDENT
BATHING_ASSISTANCE: MINIMAL

## 2024-10-29 NOTE — CARE PLAN
The clinical goals for the shift include pt will remain hds throughout the shift      Problem: Heart Failure  Goal: Reduction in peripheral edema within 24 hours  Outcome: Progressing  Goal: Increase self care and/or family involvement in 24 hours  Outcome: Progressing     Problem: Skin  Goal: Prevent/minimize sheer/friction injuries  Outcome: Progressing  Flowsheets (Taken 10/28/2024 6590)  Prevent/minimize sheer/friction injuries: Turn/reposition every 2 hours/use positioning/transfer devices

## 2024-10-29 NOTE — PROGRESS NOTES
Occupational Therapy    Evaluation/Treatment    Patient Name: Fahad Meraz  MRN: [id number]  Department: [facility] HFICU  Room: 11/11-A  Today's Date: 10/29/24  Time Calculation  Start Time: 1210  Stop Time: 1243  Time Calculation (min): 33 min     Assessment:  OT Assessment: Patient with deficits in ADLs/functional mobility, will benefit from OT services to address deficits and increase functional independence.  Prognosis: Excellent  Barriers to Discharge: Other (Comment) (medical acuity)  End of Session Communication: Bedside nurse  End of Session Patient Position: Up in chair, Alarm off, not on at start of session  OT Assessment Results: Decreased ADL status, Decreased endurance, Decreased functional mobility, Decreased IADLs  Prognosis: Excellent  Barriers to Discharge: Other (Comment) (medical acuity)  Plan:  Treatment Interventions: ADL retraining, Functional transfer training, Endurance training, Patient/family training, Equipment evaluation/education, Neuromuscular reeducation, Compensatory technique education  OT Frequency: 2 times per week  OT Discharge Recommendations: No OT needed after discharge  OT Recommended Transfer Status: Stand by assist  OT - OK to Discharge: Yes  Treatment Interventions: ADL retraining, Functional transfer training, Endurance training, Patient/family training, Equipment evaluation/education, Neuromuscular reeducation, Compensatory technique education    Subjective   General:   OT Received On: 10/29/24  General  Reason for Referral: ADHF, possible advanced therapy consideration  Past Medical History Relevant to Rehab: CAD s/p 4V CABG (2012) and PCI (LCx/OM; 5/2019), stage D systolic HF/ICM/HFrEF with LVEF 10-15%( 10/22/24 TTE), AF s/p RFA (PVI and CTI; 4/2024) on OAC therapy, HTN, dyslipidemia, depression, anxiety and VIV using CPAP  Family/Caregiver Present: Yes  Caregiver Feedback: patient's wife and son present and supportive  Co-Treatment: PT  Co-Treatment Reason: to maximize  patient's activity tolerance and timing for initiation of therapy services  Prior to Session Communication: Bedside nurse  Patient Position Received: Up in chair, Alarm off, not on at start of session  General Comment: patient pleasant and agreeable to therapy; [place], 0.375 Milrinone, Heparin gtt  Precautions:  Hearing/Visual Limitations: hearing is WFL  Medical Precautions: Cardiac precautions, Fall precautions    Vital Signs:  Pre: /69, HR 91, SpO2 99%  Post: BP 96/64, HR 84, SpO2 99%    Pain:  Pain Assessment  Pain Assessment: 0-10  0-10 (Numeric) Pain Score: 0 - No pain    Objective   Cognition:  Overall Cognitive Status: Within Functional Limits  Arousal/Alertness: Appropriate responses to stimuli  Orientation Level: Oriented X4 (except did not know exact date)  Following Commands: Follows all commands and directions without difficulty        Michael Agitation Sedation Scale  Michael Agitation Sedation Scale (RASS): Alert and calm  Home Living:  Type of Home: House  Lives With: Spouse  Home Adaptive Equipment:  (walking sticks, canes, FWW)  Home Layout: Able to live on main level with bedroom/bathroom  Home Access:  (2 ALVARADO with grab bar)  Bathroom Shower/Tub: Walk-in shower  Bathroom Equipment:  (grab bars, raised toilet seat, shower chair)  Prior Function:  Level of [place]: Independent with ADLs and functional transfers, Independent with homemaking with ambulation  Receives Help From: Family  ADL Assistance: Independent  Homemaking Assistance: Independent  Ambulatory Assistance: Independent  Vocational:  (raises and trains hunting dogs)  Hand Dominance: Right  Prior Function Comments: (+) drives shorter/local distances     ADL:  Eating Assistance: Independent  Eating Deficit: Setup  Grooming Assistance: Independent  Grooming Deficit:  (in sitting)  Bathing Assistance: Minimal  Bathing Deficit:  (anticipated)  UE Dressing Assistance: Stand by  LE Dressing Assistance: Moderate  Toileting Assistance  with Device: Stand by  Toileting Deficit:  (anticipated)  Activities of Daily Living: TREATMENT: LE Dressing  LE Dressing: Yes  Pants Level of Assistance: Moderate assistance, Setup  LE Dressing Where Assessed: Recliner  LE Dressing Comments: increased assistance for threading feet through pant legs, patient able to pull pants up over hips in standing with CGA for balance     Activity Tolerance:  Early Mobility/Exercise Safety Screen: Proceed with mobilization - No exclusion criteria met     Bed Mobility/Transfers: Bed Mobility  Bed Mobility: No    Transfers  Transfer: Yes  Transfer 1  Transfer From 1: Sit to, Stand to  Transfer to 1: Sit, Stand  Technique 1: Sit to stand, Stand to sit  Transfer Level of Assistance 1: Close supervision  Trials/Comments 1: x2 STS from chair; functional mobility household distances with no AD and CGA    Toilet Transfers  Toilet Transfers Comments: TREATMENT: patient completed toilet transfer in bathroom with min cue for use of grab bar and SBA, CGA for functional mobility in/out of bathroom without AD  Sitting Balance:  Static Sitting Balance  Static Sitting-Level of Assistance: Independent  Dynamic Sitting Balance  Dynamic Sitting-Level of Assistance: Distant supervision  Standing Balance:  Static Standing Balance  Static Standing-Level of Assistance: Close supervision  Dynamic Standing Balance  Dynamic Standing-Level of Assistance: Contact guard     Vision:Vision - Basic Assessment  Current Vision: Wears glasses all the time  Sensation:  Light Touch: No apparent deficits  Strength:  Strength Comments: (B)  WFL, (B) elbows >/= 3/5, (L) shoulder 3-/5, (R) shoulder at least 3-/5, not formally tested 2/2 [place]o     Extremities: ELENO JOHANSEN :  (shoulder 0-90 degrees 2/2 Crapo, distal joints WFL) and CLYDE TANG:  (shoulder ~0-90 degrees 2/2 previous shoulder replacement surgery, distal joints WFL)    Outcome Measures: [facility] Daily Activity  Putting on and taking off regular lower body  clothing: A lot  Bathing (including washing, rinsing, drying): A little  Putting on and taking off regular upper body clothing: A little  Toileting, which includes using toilet, bedpan or urinal: A little  Taking care of personal grooming such as brushing teeth: None  Eating Meals: None  Daily Activity - Total Score: 19    , Confusion Assessment Method-ICU (CAM-ICU)  Feature 1: Acute Onset or Fluctuating Course: Negative  Feature 3: Altered Level of Consciousness: Negative  Overall CAM-ICU: Negative  , and E = Exercise and Early Mobility  Early Mobility/Exercise Safety Screen: Proceed with mobilization - No exclusion criteria met  ICU Mobility Scale: Walking with assistance of 1 person    Education Documentation  Body Mechanics, taught by Thuy Pop, OT at 10/29/2024  1:40 PM.  Learner: Patient  Readiness: Acceptance  Method: Explanation  Response: Verbalizes Understanding  Comment: safe hand placement for transfers    ADL Training, taught by Thuy Pop OT at 10/29/2024  1:40 PM.  Learner: Patient  Readiness: Acceptance  Method: Explanation  Response: Verbalizes Understanding  Comment: safe hand placement for transfers    Education Comments  No comments found.    Goals:  Encounter Problems       Encounter Problems (Active)       ADLs       Patient with complete upper body dressing with independent level of assistance donning and doffing all UE clothes with no adaptive equipment. (Progressing)       Start:  10/29/24    Expected End:  11/12/24            Patient with complete lower body dressing with independent level of assistance donning and doffing all LE clothes  with PRN adaptive equipment. (Progressing)       Start:  10/29/24    Expected End:  11/12/24            Patient will complete daily grooming tasks with independent level of assistance and PRN adaptive equipment while standing. (Progressing)       Start:  10/29/24    Expected End:  11/12/24            Patient will complete toileting including  hygiene clothing management/hygiene with independent level of assistance. (Progressing)       Start:  10/29/24    Expected End:  11/12/24            Patient will perform basic IADLs/simulated household tasks with mod (I). (Progressing)       Start:  10/29/24    Expected End:  11/12/24               COGNITION/SAFETY       Patient will score WFL on standardized cognitive assessment within reasonable time frame (Progressing)       Start:  10/29/24    Expected End:  11/12/24               EXERCISE/STRENGTHENING       Patient will participate in >15 minutes of activity with <2 rest breaks to increase tolerance for ADL/functional mobility performance. (Progressing)       Start:  10/29/24    Expected End:  11/12/24               MOBILITY       Patient will perform Functional mobility Household distances/Community Distances with independent level of assistance and least restrictive device in order to improve safety and functional mobility. (Progressing)       Start:  10/29/24    Expected End:  11/12/24               TRANSFERS       Patient will perform bed mobility independent level of assistance in order to improve safety and independence with mobility (Progressing)       Start:  10/29/24    Expected End:  11/12/24            Patient will complete functional transfers with least restrictive device with independent level of assistance. (Progressing)       Start:  10/29/24    Expected End:  11/12/24               Thuy Pop OTR/L  Inpatient Occupational Therapist   Rehab Office: [phone]

## 2024-10-29 NOTE — PROGRESS NOTES
Physical Therapy    Physical Therapy Evaluation & Treatment    Patient Name: Fahad Meraz  MRN: [id number]  Department: [facility] HFICU  Room: 11/11-A  Today's Date: 10/29/2024   Time Calculation  Start Time: 1210  Stop Time: 1243  Time Calculation (min): 33 min    Assessment/Plan   PT Assessment  PT Assessment Results: Decreased strength, Decreased endurance, Impaired balance, Decreased mobility  Rehab Prognosis: Excellent  Barriers to Discharge: medical acuity  End of Session Communication: Bedside nurse  End of Session Patient Position: Up in chair, Alarm off, not on at start of session   IP OR SWING BED PT PLAN  Inpatient or Swing Bed: Inpatient  PT Plan  Treatment/Interventions: Bed mobility, Transfer training, Gait training, Stair training, Balance training, Strengthening, Endurance training, Range of motion, Therapeutic exercise, Therapeutic activity  PT Plan: Ongoing PT  PT Frequency: 4 times per week  PT Discharge Recommendations: Other (comment) (outpatient cardiac rehab)  PT Recommended Transfer Status: Contact guard  PT - OK to Discharge: Yes    Subjective     General Visit Information:  General  Reason for Referral: ADHF, possible advanced therapy consideration  Past Medical History Relevant to Rehab: CAD s/p 4V CABG (2012) and PCI (LCx/OM; 5/2019), stage D systolic HF/ICM/HFrEF with LVEF 10-15%( 10/22/24 TTE), AF s/p RFA (PVI and CTI; 4/2024) on OAC therapy, HTN, dyslipidemia, depression, anxiety and VIV using CPAP  Family/Caregiver Present: Yes  Caregiver Feedback: patient's wife and son present and supportive  Co-Treatment: OT  Co-Treatment Reason: to maximize patient's activity tolerance and timing around initiation of therapy services  Prior to Session Communication: Bedside nurse  Patient Position Received: Up in chair, Alarm off, not on at start of session  General Comment: Pt pleasant and motivated for therapy.    Home Living:  Home Living  Type of Home: House  Lives With: Spouse (wife who is  home and able to assist as needed)  Home Adaptive Equipment: Walker rolling or standard, Cane (walking sticks)  Home Layout: Able to live on main level with bedroom/bathroom  Home Access: Stairs to enter with rails  Entrance Stairs-Rails:  (grab bar)  Entrance Stairs-Number of Steps: 2  Bathroom Shower/Tub: Walk-in shower  Bathroom Toilet:  (raised toilet seat)  Bathroom Equipment: Grab bars in shower, Shower chair with back    Prior Level of Function:  Prior Function Per Pt/Caregiver Report  Level of [place]: Independent with ADLs and functional transfers, Independent with homemaking with ambulation  Receives Help From: Family  ADL Assistance: Independent  Homemaking Assistance: Independent  Ambulatory Assistance: Independent (Community ambulator, no AD; denies falls. 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)  Leisure: likes to train hunting dogs  Hand Dominance: Right  Prior Function Comments: (+) drives shorter/local distances    Precautions:  Precautions  Hearing/Visual Limitations: hearing is WFL  Medical Precautions: Cardiac precautions, Fall precautions    Lines/Tubes:   Telemetry   [place]-cassandra catheter   Heparin gtt   Milrinone .375 mcg     Vital Signs     Vitals Session Pre PT During PT Post PT   Heart Rate 89 90s-100 93   Resp 16  15   SpO2 100 >/= 96% 99   /69  96/64     Objective     Pain:  Pain Assessment  Pain Assessment: 0-10  0-10 (Numeric) Pain Score: 0 - No pain    Cognition:  Cognition  Overall Cognitive Status: Within Functional Limits  Arousal/Alertness: Appropriate responses to stimuli  Orientation Level: Oriented X4 (except pt did not know the exact date)  Following Commands: Follows all commands and directions without difficulty    General Assessments:     Activity Tolerance  Early  Mobility/Exercise Safety Screen: Proceed with mobilization - No exclusion criteria met  Rate of Perceived Dyspnea (RPD): Restin/10; With ambulation: 2/10  Rate of Perceived Exertion (RPE): Restin/20; With ambulation:     Sensation  Light Touch:  (Intact light touch sensation)    Strength  Strength Comments: B LE strength 5/5 throughout  Strength  Strength Comments: B LE strength 5/5 throughout    Static Sitting Balance  Static Sitting-Balance Support: Feet supported, No upper extremity supported  Static Sitting-Level of Assistance: Independent  Dynamic Sitting Balance  Dynamic Sitting-Balance Support: Feet supported, No upper extremity supported  Dynamic Sitting-Level of Assistance: Independent    Static Standing Balance  Static Standing-Balance Support: No upper extremity supported  Static Standing-Level of Assistance: Close supervision  Dynamic Standing Balance  Dynamic Standing-Balance Support: No upper extremity supported  Dynamic Standing-Level of Assistance: Contact guard    Functional Assessments:  Bed Mobility  Bed Mobility: No    Transfers  Transfer: Yes  Transfer 1  Technique 1: Sit to stand, Stand to sit  Transfer Level of Assistance 1: Close supervision  Trials/Comments 1: pt does not require use of UEs to complete transfers safely    Ambulation/Gait Training  Ambulation/Gait Training Performed: Yes  Ambulation/Gait Training 1  Surface 1: Level tile  Device 1: No device  Assistance 1: Contact guard  Quality of Gait 1: Forward flexed posture, Decreased step length, Wide base of support (rigid posture and decreased knee FLEX during swing phase)  Comments/Distance (ft) 1: 150 ft    Extremity/Trunk Assessments:  RLE   RLE : Within Functional Limits  LLE   LLE : Within Functional Limits    Treatments:     Therapeutic Activity  Therapeutic Activity Performed: Yes  Therapeutic Activity 1: Pt completed sit<->stand with no assistive device and close supervision x 2 trials.  Therapeutic Activity 2: Pt  ambulated 10 feet with no Assistive device and CGA with forward flexed posture and wide NILDA with decreased step length.    Outcome Measures:  [facility] Basic Mobility  Turning from your back to your side while in a flat bed without using bedrails: None  Moving from lying on your back to sitting on the side of a flat bed without using bedrails: None  Moving to and from bed to chair (including a wheelchair): A little  Standing up from a chair using your arms (e.g. wheelchair or bedside chair): A little  To walk in hospital room: A little  Climbing 3-5 steps with railing: A little  Basic Mobility - Total Score: 20    Confusion Assessment Method-ICU (CAM-ICU)  Feature 1: Acute Onset or Fluctuating Course: Negative  Overall CAM-ICU: Negative    FSS-ICU  Ambulation: Walks >/ or equal to 150 feet with minimal assistance x1  Rolling: Complete independence  Sitting: Complete independence  Transfer Sit-to-Stand: Supervision or set-up only  Transfer Supine-to-Sit: Complete independence  Total Score: 30    Early Mobility/Exercise Safety Screen: Proceed with mobilization - No exclusion criteria met  ICU Mobility Scale: Walking with assistance of 1 person [8]  E = Exercise and Early Mobility  Early Mobility/Exercise Safety Screen: Proceed with mobilization - No exclusion criteria met  ICU Mobility Scale: Walking with assistance of 1 person  [place]  Sitting Balance: Steady, safe  Arises: Able without using arms  Attempts to Arise: Able to arise, one attempt  Immediate Standing Balance (First 5 Seconds): Steady without walker or other support  Standing Balance: Narrow stance without support  Nudged: Steady without walker or other support  Eyes Closed: Steady  Turned 360 Degrees: Steadiness: Steady  Turned 360 Degrees: Continuity of Steps: Continuous  Sitting Down: Safe, smooth motion  Balance Score: 16  Initiation of Gait: No hesitancy  Step Height: R Swing Foot: Right foot complete clears floor  Step Length: R Swing Foot: Passes left  stance foot  Step Height: L Swing Foot: Left foot complete clears floor  Step Length: L Swing Foot: Passes right stance foot  Step Symmetry: Right and left step appear equal  Step Continuity: Steps appear continuous  Path: Mild/moderate deviation or uses walking aid  Trunk: No sway but flexion of knees or back or spreads arms out while walking  Walking Time: Heels apart  Gait Score: 9  Total Score: 25  Score indicates low risk of falls    Other Measures  5x Sit to Stand: completed from recliner without UE assist: 14/64 seconds, RPD 1/10, RPE 9/20.  6 min Walk Test: Deferred this date 2/2 limited time    Encounter Problems       Encounter Problems (Active)       Balance       Pt will score >45 on VEGA for safe community ambulation (Progressing)       Start:  10/29/24    Expected End:  11/12/24               Mobility       Patient will ambulate >1000 ft with LRAD and supervision With stable vitals and RPD </= 3/10 and RPE </= 13/20 for improved functional mobility.   (Progressing)       Start:  10/29/24    Expected End:  11/12/24            Pt will complete 6MWT with no assistive device and supervision and achieve >700 ft With stable vitals and RPD </= 3/10 and RPE </= 13/20 for improved functional mobility.   (Progressing)       Start:  10/29/24    Expected End:  11/12/24               PT Transfers       Patient will perform bed mobility indep (maintenance 2/2 prolonged hospital stay anticipated) (Progressing)       Start:  10/29/24    Expected End:  11/12/24            Patient will transfer sit to and from stand indep (Progressing)       Start:  10/29/24    Expected End:  11/12/24            Pt will complete 5XSTS from recliner without UE assist <12 seconds With stable vitals and RPD </= 3/10 and RPE </= 13/20 for improved functional mobility.   (Progressing)       Start:  10/29/24    Expected End:  11/12/24                 Education Documentation  Handouts, taught by Amanda Bain, PT at 10/29/2024  6:14  PM.  Learner: Patient  Readiness: Acceptance  Method: Explanation  Response: Verbalizes Understanding  Comment: RPD and RPE scales    Mobility Training, taught by Amanda Bain PT at 10/29/2024  6:14 PM.  Learner: Patient  Readiness: Acceptance  Method: Explanation  Response: Verbalizes Understanding  Comment: mobility precautions and importance    Education Comments  No comments found.    Signed by Amanda Bain DPT    - - -

## 2024-10-29 NOTE — PROGRESS NOTES
[facility]  HFICU PROGRESS NOTE    Fahad Meraz/[id number]    Admit Date: 10/24/2024  Hospital Length of Stay: 5   ICU Length of Stay: 4d 13h   Primary Service:   Primary HF Cardiologist: Dr. Washington  Referring:    INTERVAL EVENTS / PERTINENT ROS:     No acute events overnight. He denies headache, dizziness, vision changes, chest pain, palpitations, shortness of breath, cough, ABD pain, ABD swelling, diarrhea, nausea, or vomiting. He has not had a BM since Saturday. Telemetry reviewed showing atrial fibrillation with frequent PVC's. He appears euvolemic on physical exam.  MAP's overnight between 60 - 70 mmHg. Cardiac support continues with Milrinone gtt @ 0.375 mcg/kg/min. Chest Xray reviewed with stable appearance of pulmonary congestion and SWAN in appropriate position. Chest X-ray s/p PA catheter replacement in satisfactory position. Urine output over the previous 24 hours 1.8L, net negative 1.3L, was on diuretic holiday.      Plan:  - Continue with Q 6 hour hemodynamics   - will diurese today with Bumex 1 mg IV today and assess response   - will hold entresto and re-start hydralazine 25 mg TID and Isrodil 10 mg TID as pt likely will be undergoing advanced therapies eval (incase ends up getting surgery/VAD in near future)  - Genetic consult outpatient - collaborate with Dr. Washington   - Team to review coronary angiogram imaging with CT surgery and discuss utility of further imaging (e.g., cardiac MRI) and/or coronary interventions.   - Resumed heparin gtt today.         MEDICATIONS  Infusions:  heparin, Last Rate: 1,600 Units/hr (10/29/24 1200)  milrinone, Last Rate: 0.375 mcg/kg/min (10/29/24 1200)      Scheduled:  aspirin, 81 mg, Daily  cholecalciferol, 50,000 Units, Every Sunday  dapagliflozin propanediol, 10 mg, q24h  escitalopram, 10 mg, Daily  hydrALAZINE, 25 mg, TID  isosorbide dinitrate, 10 mg, TID  [Held by provider] metoprolol succinate XL, 50 mg, Daily  pantoprazole, 40  mg, Daily before breakfast  perflutren protein A microsphere, 0.5 mL, Once in imaging  sennosides-docusate sodium, 2 tablet, BID  spironolactone, 25 mg, Daily  sulfur hexafluoride microsphr, 2 mL, Once in imaging      PRN:  alteplase, 2 mg, PRN  bisacodyl, 10 mg, Daily PRN  nitroglycerin, 0.4 mg, q5 min PRN  oxygen, , Continuous PRN - O2/gases  polyethylene glycol, 17 g, Daily PRN      Invasive Hemodynamics:    Most Recent Range Past 24hrs   BP (Art) 111/62 Arterial Line BP 1  Min: 79/46  Max: 111/62   MAP(Art) 78 mmHg Arterial Line MAP 1 (mmHg)   Min: 56 mmHg  Max: 78 mmHg   RA/CVP   No data recorded   PA 35/17 PAP  Min: 28/10  Max: 44/24   PA(mean) 23 mmHg PAP (Mean)  Min: 16 mmHg  Max: 31 mmHg   PCWP 16 mmHg PCWP (mmHg)  Min: 8 mmHg  Max: 21 mmHg   CO 5.16 L/min CO (L/min)  Min: 4.5 L/min  Max: 6.2 L/min   CI 2.41 L/min/m2 CI (L/min/m2)  Min: 2.1 L/min/m2  Max: 2.9 L/min/m2   Mixed Venous 71 % SVO2 (%)  Min: 71 %  Max: 76 %   SVR  1116 (dyne*sec)/cm5 SVR (dyne*sec)/cm5  Min: 775 (dyne*sec)/cm5  Max: 1116 (dyne*sec)/cm5    (dyne*sec)/cm5 PVR (dyne*sec)/cm5  Min: 89 (dyne*sec)/cm5  Max: 180 (dyne*sec)/cm5     PHYSICAL EXAM:   Visit Vitals  /69 (BP Location: Right arm, Patient Position: Lying)   Pulse 75   Temp 36.1 °C (97 °F) (Temporal)   Resp 15   Ht 1.829 m (6')   Wt 91 kg (200 lb 9.9 oz)   SpO2 97%   BMI 27.21 kg/m²   Smoking Status Former   BSA 2.15 m²       Wt Readings from Last 5 Encounters:   10/29/24 91 kg (200 lb 9.9 oz)   10/24/24 92.5 kg (204 lb)   08/05/24 122 kg (268 lb)   01/31/22 119 kg (262 lb)   02/18/20 123 kg (270 lb 2 oz)       INTAKE/OUTPUT:  I/O last 3 completed shifts:  In: 818 (9 mL/kg) [P.O.:120; I.V.:698 (7.7 mL/kg)]  Out: 2705 (29.7 mL/kg) [Urine:2700 (0.8 mL/kg/hr); Blood:5]  Weight: 91 kg      Physical Exam  HENT:      Head: Normocephalic.      Nose: Nose normal.      Mouth/Throat:      Mouth: Mucous membranes are moist.   Eyes:      Pupils: Pupils are equal, round, and  reactive to light.   Neck:      Vascular: JVD present.   Cardiovascular:      Rate and Rhythm: Normal rate. Rhythm irregular.      Comments: Permanent Atrial Fibrillation  Pulmonary:      Effort: Pulmonary effort is normal.      Breath sounds: Normal breath sounds.   Abdominal:      Palpations: Abdomen is soft.   Musculoskeletal:         General: Normal range of motion.      Cervical back: Normal range of motion.   Skin:     Capillary Refill: Capillary refill takes less than 2 seconds.   Neurological:      Mental Status: He is alert and oriented to person, place, and time.   Psychiatric:         Mood and Affect: Mood normal.         Behavior: Behavior normal.         Thought Content: Thought content normal.         Judgment: Judgment normal.         DATA:  CMP:  Results from last 7 days   Lab Units 10/29/24  0454 10/28/24  0919 10/28/24  0618 10/27/24  0141 10/26/24  1503 10/26/24  0513 10/25/24  1740 10/25/24  1221 10/24/24  2328   SODIUM mmol/L 135* 134* 135* 131* 135* 133* 134* 137 134*   POTASSIUM mmol/L 4.6 3.9 4.4 4.1 4.4 4.5 3.3* 3.4* 4.6   CHLORIDE mmol/L 102 100 98 94* 95* 95* 93* 93* 90*   CO2 mmol/L 22 25 24 25 26 24 29 31 30   ANION GAP mmol/L 16 13 17 16 18 19 15 16 19   BUN mg/dL 32* 34* 33* 38* 35* 33* 31* 29* 28*   CREATININE mg/dL 1.49* 1.68* 1.61* 2.05* 2.01* 2.03* 2.22* 2.01* 1.88*   EGFR mL/min/1.73m*2 50* 44* 46* 34* 35* 35* 31* 35* 38*   MAGNESIUM mg/dL 2.49* 2.56* 2.65* 2.44*  --  2.49*  --   --  2.22   ALBUMIN g/dL 4.2 4.1 4.3 4.4 4.4 4.2 4.4 4.4 4.4   ALT U/L  --   --   --   --   --   --   --   --  13   AST U/L  --   --   --   --   --   --   --   --  15   BILIRUBIN TOTAL mg/dL  --   --   --   --   --   --   --   --  2.3*     CBC:  Results from last 7 days   Lab Units 10/29/24  0454 10/28/24  0919 10/28/24  0618 10/27/24  0141 10/26/24  0513 10/24/24  2328   WBC AUTO x10*3/uL 10.7 9.6 9.4 10.9 11.6* 14.4*   HEMOGLOBIN g/dL 15.3 15.9 15.6 15.5 16.0 17.5   HEMATOCRIT % 46.0 46.8 46.6 46.9 45.5  "50.8   PLATELETS AUTO x10*3/uL 594* 584* 545* 616* 614* 742*   MCV fL 82 81 82 81 77* 79*     COAG:   Results from last 7 days   Lab Units 10/29/24  0454 10/28/24  0919 10/28/24  0618 10/27/24  0141 10/26/24  0513 10/24/24  2328   INR  1.3* 1.4* 1.5* 1.9* 2.1* 2.7*     ABO: No results found for: \"ABO\"  HEME/ENDO:  Results from last 7 days   Lab Units 10/24/24  2328   FERRITIN ng/mL 76   IRON SATURATION % 17*   TSH mIU/L 8.47*   HEMOGLOBIN A1C % 6.3*      CARDIAC:   Results from last 7 days   Lab Units 10/24/24  2328   BNP pg/mL 1,995*       ASSESSMENT AND PLAN:     Mr. Meraz is a 69 M with a PMHx sig for CAD s/p 4V CABG (2012) and PCI (LCx/OM; 5/2019), stage D systolic HF/ICM/HFrEF with LVEF 10-15%( 10/22/24 TTE), AF s/p RFA (PVI and CTI; 4/2024) on OAC therapy, HTN, dyslipidemia, depression, anxiety and VIV using CPAP who was admitted to OSH ICU, s/p RHC on 10/18/24.  Per patient, he had been experienced worsening SOB and fluids overload for 2-3 weeks. Patient was on milrinone drip and diurese with SG guided. However his KENYA worsening, and not able to wean milrinone drip. Decision was made to transfer him to HF ICU [facility] [place] for possible advanced therapy consideration. 10/26: Tolerated afterload reduction with hydralazine / Isordil. 10/27: Discontinued hydralazine / Isordil and initiated 24 / 26 mg Entresto BID and tolerating. 10/28: Coronary angiogram: Right dominant system; LM: Mild 20% distal disease; LAD: Occluded with Patent LIMA; RI: Occluded; Lcx: Patent proximal Lcx stent and OM1 stent, otherwise mild non-obstructive disease; RCA: proximal 100% occlusion. Grafts: LIMA-LAD: Patent; SVG-OM, SVG-RCA: Occluded; SVG-RI: Unable to engage but likely occluded    Coronary angiogram results:     Procedure Findings:   Coronary angiogram and grafts:   Right dominant system.   LM: Mild 20% distal disease.   LAD: Occluded with Patent LIMA   RI: Occluded  Lcx: Patent proximal Lcx stent and OM1 stent, otherwise mild " non-obstructive disease  RCA: proximal 100% occlusion     Grafts:  LIMA-LAD: Patent   SVG-OM, SVG-RCA: Occluded  SVG-RI: Unable to engage but likely occluded      Right heart hemodynamics:  RA 17   RV 39/13, RVEDP 17   PA 37/22, mean PAP 28   CO/CI 5.2/2.5  PA sat 71%    Description of the Procedure:   Arterial Access: Right common femoral artery  Closure; Manual pressure     Venous: Previously placed swan sheath was exchanged to a new swan sheath over a short Jwire and was sutured in place.        Neuro:     # Hx of Depression/Anxiety  - Serial neuro and pain assessments  - PO Tylenol PRN for pain  - PT/OT Consult, OOB to chair  - CAM ICU score every shift  - Sleep/wake cycle normalization  - c/w home Lexapro and Clonazepam     # Physical Status  -Overweight:  BMI: 27.9   -Reduced Mobility: acute decompensate heart failure and ICU stay      #Substance abuse  -Alcohol abuse/Alcohol dependence: NO   -Tobacco use/Nicotine Dependence: NO      Cardiovascular:     # Stage D, ICM, decompensate acute on chronic heart failure,  HFrEF 10-15% ( from 10/22/24 ECHO)   #Hx of CAD s/p 4V CABG 2012     -BRIANA (3/29/23): Left Ventricle: Moderately reduced left ventricular systolic function with a visually estimated EF of 35 - 40%.  - TTE (10/22/24): LVEF 10 -15%, right ventricle moderately dilated, moderately reduced systolic function. Mild MR  - admit weight (10/24): 93.5 KG   - admit BNP (10/24):  1995  - Home meds include Metoprolol succinate 25 mg daily, Entresto 97/103 mg bid, Hydralazine 25 mg bid, Isosorbide mononitrate 120 mg daily, Spironolactone 25 mg daily, Bumex 0.5 mg daily----allergy to SGLT2.  - ASA  - C/w Milrinone drip @ 0.375 mcg/kg/min, initiated OSH  - SGC # on arrival (10/25): Bp 96/50 (65), CVP 5, PA 36/20 (22), PCWP 18, CO/CI 4.4/2.1, SVR 1083, SVO2 65 % on Milrinone drip at 0.625 mcg/kg/min, hydral 25 mg TID.   - Daily hemodynamics: 10/29: B/P: 102/72 (81); CVP 9, PAP: 35/17 (23); SVR: 1116; CI: 2.4/ CO: 5.2;  SvO2: 71% on IV Milrinone 0.375 mcg/kg/min; PO Entresto 24/26 mg bid; PO Spironolactone 25 mg daily  - To wean Milrinone drip as able  - will give 1 dose of bumex 1 mg IV today    - Stopped entresto 24/26 mg BID as likely will be going advanced therapy eval. Will restart hydralzine 25 mg TID and Isordil 10 mg TID   - cont dapagliflozin 10 mg daily.   - C/w Rodrigo 25 mg daily   - Hold BB   - Daily standing weights, 2gm sodium diet, 2L fluid restriction, strict I&Os  - EP consult before discharge to eval for ICD placement for primary prevention.   -Team to review coronary angiogram imaging with CT surgery and discuss utility of further imaging (e.g., cardiac MRI) and/or coronary interventions.      #CAD   -[facility] (12/20/2020):  Successful PCI of the SVG to ramus intermedius with one drug-eluting stent; OCT of the SVG to RCA to confirm no thrombosis; patent LAD; occluded SVG to OM.  -Meds: C/w ASA.  -Consider to add Lipitor      # Hx of AFib s/p RFA (PVI and CTI; 4/2024)  -NO Device noted  -Was on home Coumadin for 2 months, prior to that was on Eliquis,  patient reported with Dizziness while on Eliquis      # Dizziness/bradycardia  -Had 3 episodes of near syncope after his recent AF RFA; has stopped driving as a result. ECG with sinus olu. Metoprolol previously decreased by his local EP.    -Hold BB due to decompensate HF      #Electrolyte Disturbances  -Keep K>4, Mag >2     Pulmonary:   # PMH of VIV  -c/w home CPAP  -Monitor and maintain SpO2 > 92%     GI:  # H/x of  GERD  - Bowel regimen: Senna, Miralax PRN  - PPI     #  Bilirubinemia  - T maryuri (10/24): 2.3, ALT, AST: normal   - CT C/A/P (10/17/24): Hepatomegaly and hepatic steatosis are present. The liver has a lobulated contour. A questionable lesion in the right hepatic lobe inferiorly measures 26 mm.     # Urinary retention  - CT C/A/P (10/17/24): the prostate gland is mildly enlarged with mass effect on the posterior wall of the urinary bladder. The prostate  gland measures 4.3 x 6.0 cm.   - C/o urinary difficulty, s/p FC OSH --> Removed prior admission        :  #KENYA/ CKD ?  in the setting of cardio-renal syndrome  -Baseline BUN/Cr: Unclear,  OSH ICU: Creatinine: 1.3-1.6   -Admit BUN/Cr (10/24): 28/1.88  -Daily BUN/Creat: 10/28: 34 / 1.68   -I/Os  -avoid hypotension and nephrotoxic agents     Heme:  #Anemia in the setting of iron deficiency  - Lab (10/24): H/H: 17.5/50.8,  Iron study: 63/374/17%, Ferritin: 76,  folate: 23.5, B12: 768   - NO iron replacement needed as H/H high     # Coagulopathy due to on home Coumadin  - INR  (10/28): 1.4, Hold home Coumadin   - Holding heparin gtt (A-fib) s/p coronary angiogram per Dr. Jonatan Neri - will reassess 10/29 during rounds       Endo:  #DM  - Euglycemic  - hgbA1c (10/24):  6.3     #Thyroid  -TSH (10/24):  8.47, T4: 1.44      ID:  # leukocytosis - Resolved  - WBC (10/26):  9.6   -afebrile, nontoxic   -trend temps q4h       PHYSICAL AND OCCUPATIONAL THERAPY: Ordered    LINES:  PIVs   A-line (OSH) 10/20 - current  Right Subclavian Cordis / PA catheter (OSH) 10/18 - 10/28  Right subclavian cordis: Previously placed swan sheath was exchanged to a new swan sheath over a short Jwire and was sutured in place. 10/28 - current    DVT: INR: 1.4 (10/28) (home Warfarin now held) - Initiated heparin gtt  VAP BUNDLE: NA  CENTRAL LINE BUNDLE: Ordered   ULCER PPX: Pantoprazole 40 mg daily  GLYCEMIC CONTROL: NA - A1C: 6.3  BOWEL CARE: Senna / Miralax  INDWELLING CATHETER: NA  NUTRITION: Adult diet Cardiac; 70 gm fat; 2 - 3 grams Sodium      EMERGENCY CONTACT: Extended Emergency Contact Information  Primary Emergency Contact: Tanvi Meraz  Home Phone: [phone]  Relation: Spouse  FAMILY UPDATE:   CODE STATUS: Full Code  DISPO: Maintain in the HFICU    Patient seen and assessed with Dr. Jonatan Mcmahon MD, MS, FACP   Heart Failure Fellow,  [facility]

## 2024-10-30 ENCOUNTER — APPOINTMENT (OUTPATIENT)
Dept: RADIOLOGY | Facility: HOSPITAL | Age: 69
End: 2024-10-30
Payer: MEDICARE

## 2024-10-30 ENCOUNTER — HOSPITAL ENCOUNTER (INPATIENT)
Age: 69
End: 2024-10-30
Attending: THORACIC SURGERY (CARDIOTHORACIC VASCULAR SURGERY) | Admitting: THORACIC SURGERY (CARDIOTHORACIC VASCULAR SURGERY)
Payer: MEDICARE

## 2024-10-30 ENCOUNTER — DOCUMENTATION (OUTPATIENT)
Dept: TRANSPLANT | Facility: HOSPITAL | Age: 69
End: 2024-10-30
Payer: MEDICARE

## 2024-10-30 LAB
ALBUMIN SERPL BCP-MCNC: 4 G/DL (ref 3.4–5)
ANION GAP BLDMV CALCULATED.4IONS-SCNC: 8 MMO/L (ref 10–25)
ANION GAP BLDMV CALCULATED.4IONS-SCNC: 9 MMO/L (ref 10–25)
ANION GAP SERPL CALC-SCNC: 12 MMOL/L (ref 10–20)
APTT PPP: 68 SECONDS (ref 27–38)
BASE EXCESS BLDMV CALC-SCNC: -0.9 MMOL/L (ref -2–3)
BASE EXCESS BLDMV CALC-SCNC: -1.6 MMOL/L (ref -2–3)
BODY TEMPERATURE: 37 DEGREES CELSIUS
BUN SERPL-MCNC: 33 MG/DL (ref 6–23)
CA-I BLDMV-SCNC: 1.22 MMOL/L (ref 1.1–1.33)
CA-I BLDMV-SCNC: 1.26 MMOL/L (ref 1.1–1.33)
CA-I BLDMV-SCNC: 1.29 MMOL/L (ref 1.1–1.33)
CA-I BLDMV-SCNC: 1.29 MMOL/L (ref 1.1–1.33)
CALCIUM SERPL-MCNC: 9.6 MG/DL (ref 8.6–10.6)
CHLORIDE BLD-SCNC: 101 MMOL/L (ref 98–107)
CHLORIDE BLD-SCNC: 101 MMOL/L (ref 98–107)
CHLORIDE BLD-SCNC: 102 MMOL/L (ref 98–107)
CHLORIDE BLD-SCNC: 102 MMOL/L (ref 98–107)
CHLORIDE SERPL-SCNC: 102 MMOL/L (ref 98–107)
CO2 SERPL-SCNC: 23 MMOL/L (ref 21–32)
CREAT SERPL-MCNC: 1.43 MG/DL (ref 0.5–1.3)
EGFRCR SERPLBLD CKD-EPI 2021: 53 ML/MIN/1.73M*2
ERYTHROCYTE [DISTWIDTH] IN BLOOD BY AUTOMATED COUNT: 16.8 % (ref 11.5–14.5)
GLUCOSE BLD-MCNC: 106 MG/DL (ref 74–99)
GLUCOSE BLD-MCNC: 106 MG/DL (ref 74–99)
GLUCOSE BLD-MCNC: 122 MG/DL (ref 74–99)
GLUCOSE BLD-MCNC: 124 MG/DL (ref 74–99)
GLUCOSE SERPL-MCNC: 109 MG/DL (ref 74–99)
HCO3 BLDMV-SCNC: 23 MMOL/L (ref 22–26)
HCO3 BLDMV-SCNC: 23 MMOL/L (ref 22–26)
HCO3 BLDMV-SCNC: 23.7 MMOL/L (ref 22–26)
HCO3 BLDMV-SCNC: 24.3 MMOL/L (ref 22–26)
HCT VFR BLD AUTO: 44.8 % (ref 41–52)
HCT VFR BLD EST: 45 % (ref 41–52)
HCT VFR BLD EST: 45 % (ref 41–52)
HCT VFR BLD EST: 47 % (ref 41–52)
HCT VFR BLD EST: 48 % (ref 41–52)
HGB BLD-MCNC: 14.7 G/DL (ref 13.5–17.5)
HGB BLDMV-MCNC: 15 G/DL (ref 13.5–17.5)
HGB BLDMV-MCNC: 15 G/DL (ref 13.5–17.5)
HGB BLDMV-MCNC: 15.5 G/DL (ref 13.5–17.5)
HGB BLDMV-MCNC: 16 G/DL (ref 13.5–17.5)
INHALED O2 CONCENTRATION: 21 %
INR PPP: 1.3 (ref 0.9–1.1)
LACTATE BLDMV-SCNC: 0.6 MMOL/L (ref 0.4–2)
LACTATE BLDMV-SCNC: 0.7 MMOL/L (ref 0.4–2)
LACTATE BLDMV-SCNC: 0.9 MMOL/L (ref 0.4–2)
LACTATE BLDMV-SCNC: 1 MMOL/L (ref 0.4–2)
MAGNESIUM SERPL-MCNC: 2.27 MG/DL (ref 1.6–2.4)
MCH RBC QN AUTO: 26.9 PG (ref 26–34)
MCHC RBC AUTO-ENTMCNC: 32.8 G/DL (ref 32–36)
MCV RBC AUTO: 82 FL (ref 80–100)
NRBC BLD-RTO: 0 /100 WBCS (ref 0–0)
OXYHGB MFR BLDMV: 65.7 % (ref 45–75)
OXYHGB MFR BLDMV: 68.1 % (ref 45–75)
OXYHGB MFR BLDMV: 70.3 % (ref 45–75)
OXYHGB MFR BLDMV: 71.3 % (ref 45–75)
PCO2 BLDMV: 38 MM HG (ref 41–51)
PCO2 BLDMV: 38 MM HG (ref 41–51)
PCO2 BLDMV: 41 MM HG (ref 41–51)
PCO2 BLDMV: 41 MM HG (ref 41–51)
PH BLDMV: 7.37 PH (ref 7.33–7.43)
PH BLDMV: 7.38 PH (ref 7.33–7.43)
PH BLDMV: 7.39 PH (ref 7.33–7.43)
PH BLDMV: 7.39 PH (ref 7.33–7.43)
PHOSPHATE SERPL-MCNC: 3.8 MG/DL (ref 2.5–4.9)
PLATELET # BLD AUTO: 509 X10*3/UL (ref 150–450)
PO2 BLDMV: 40 MM HG (ref 35–45)
PO2 BLDMV: 43 MM HG (ref 35–45)
PO2 BLDMV: 43 MM HG (ref 35–45)
PO2 BLDMV: 44 MM HG (ref 35–45)
POTASSIUM BLDMV-SCNC: 4.1 MMOL/L (ref 3.5–5.3)
POTASSIUM BLDMV-SCNC: 4.7 MMOL/L (ref 3.5–5.3)
POTASSIUM BLDMV-SCNC: 5.1 MMOL/L (ref 3.5–5.3)
POTASSIUM BLDMV-SCNC: 5.3 MMOL/L (ref 3.5–5.3)
POTASSIUM SERPL-SCNC: 4 MMOL/L (ref 3.5–5.3)
PROTHROMBIN TIME: 14.7 SECONDS (ref 9.8–12.8)
RBC # BLD AUTO: 5.47 X10*6/UL (ref 4.5–5.9)
SAO2 % BLDMV: 68 % (ref 45–75)
SAO2 % BLDMV: 70 % (ref 45–75)
SAO2 % BLDMV: 72 % (ref 45–75)
SAO2 % BLDMV: 73 % (ref 45–75)
SODIUM BLDMV-SCNC: 128 MMOL/L (ref 136–145)
SODIUM BLDMV-SCNC: 128 MMOL/L (ref 136–145)
SODIUM BLDMV-SCNC: 130 MMOL/L (ref 136–145)
SODIUM BLDMV-SCNC: 130 MMOL/L (ref 136–145)
SODIUM SERPL-SCNC: 133 MMOL/L (ref 136–145)
UFH PPP CHRO-ACNC: 0.4 IU/ML
WBC # BLD AUTO: 11.1 X10*3/UL (ref 4.4–11.3)

## 2024-10-30 PROCEDURE — 2500000002 HC RX 250 W HCPCS SELF ADMINISTERED DRUGS (ALT 637 FOR MEDICARE OP, ALT 636 FOR OP/ED): Performed by: NURSE PRACTITIONER

## 2024-10-30 PROCEDURE — 85520 HEPARIN ASSAY: CPT

## 2024-10-30 PROCEDURE — 85027 COMPLETE CBC AUTOMATED: CPT

## 2024-10-30 PROCEDURE — 83735 ASSAY OF MAGNESIUM: CPT

## 2024-10-30 PROCEDURE — 2500000004 HC RX 250 GENERAL PHARMACY W/ HCPCS (ALT 636 FOR OP/ED): Performed by: NURSE PRACTITIONER

## 2024-10-30 PROCEDURE — 85610 PROTHROMBIN TIME: CPT

## 2024-10-30 PROCEDURE — 2500000001 HC RX 250 WO HCPCS SELF ADMINISTERED DRUGS (ALT 637 FOR MEDICARE OP)

## 2024-10-30 PROCEDURE — 36591 DRAW BLOOD OFF VENOUS DEVICE: CPT | Performed by: NURSE PRACTITIONER

## 2024-10-30 PROCEDURE — 37799 UNLISTED PX VASCULAR SURGERY: CPT

## 2024-10-30 PROCEDURE — 84132 ASSAY OF SERUM POTASSIUM: CPT | Performed by: NURSE PRACTITIONER

## 2024-10-30 PROCEDURE — 80069 RENAL FUNCTION PANEL: CPT

## 2024-10-30 PROCEDURE — 99291 CRITICAL CARE FIRST HOUR: CPT | Performed by: INTERNAL MEDICINE

## 2024-10-30 PROCEDURE — 37799 UNLISTED PX VASCULAR SURGERY: CPT | Performed by: NURSE PRACTITIONER

## 2024-10-30 PROCEDURE — 2500000001 HC RX 250 WO HCPCS SELF ADMINISTERED DRUGS (ALT 637 FOR MEDICARE OP): Performed by: NURSE PRACTITIONER

## 2024-10-30 PROCEDURE — 2500000004 HC RX 250 GENERAL PHARMACY W/ HCPCS (ALT 636 FOR OP/ED): Performed by: STUDENT IN AN ORGANIZED HEALTH CARE EDUCATION/TRAINING PROGRAM

## 2024-10-30 PROCEDURE — 99291 CRITICAL CARE FIRST HOUR: CPT

## 2024-10-30 PROCEDURE — 2500000004 HC RX 250 GENERAL PHARMACY W/ HCPCS (ALT 636 FOR OP/ED)

## 2024-10-30 PROCEDURE — 2020000001 HC ICU ROOM DAILY

## 2024-10-30 PROCEDURE — 71045 X-RAY EXAM CHEST 1 VIEW: CPT | Performed by: RADIOLOGY

## 2024-10-30 PROCEDURE — 85018 HEMOGLOBIN: CPT | Performed by: NURSE PRACTITIONER

## 2024-10-30 PROCEDURE — 71045 X-RAY EXAM CHEST 1 VIEW: CPT

## 2024-10-30 RX ORDER — ISOSORBIDE DINITRATE 10 MG/1
10 TABLET ORAL ONCE
Status: COMPLETED | OUTPATIENT
Start: 2024-10-30 | End: 2024-10-30

## 2024-10-30 RX ORDER — BUMETANIDE 0.25 MG/ML
1 INJECTION, SOLUTION INTRAMUSCULAR; INTRAVENOUS ONCE
Status: COMPLETED | OUTPATIENT
Start: 2024-10-30 | End: 2024-10-30

## 2024-10-30 RX ORDER — POTASSIUM CHLORIDE 20 MEQ/1
40 TABLET, EXTENDED RELEASE ORAL ONCE
Status: COMPLETED | OUTPATIENT
Start: 2024-10-30 | End: 2024-10-30

## 2024-10-30 RX ORDER — ISOSORBIDE DINITRATE 10 MG/1
20 TABLET ORAL
Status: DISCONTINUED | OUTPATIENT
Start: 2024-10-30 | End: 2024-11-12

## 2024-10-30 ASSESSMENT — PAIN - FUNCTIONAL ASSESSMENT
PAIN_FUNCTIONAL_ASSESSMENT: 0-10
PAIN_FUNCTIONAL_ASSESSMENT: 0-10

## 2024-10-30 ASSESSMENT — PAIN SCALES - GENERAL
PAINLEVEL_OUTOF10: 0 - NO PAIN
PAINLEVEL_OUTOF10: 0 - NO PAIN

## 2024-10-30 NOTE — PROGRESS NOTES
[facility]  HFICU PROGRESS NOTE    Fahad Meraz/[id number]    Admit Date: 10/24/2024  Hospital Length of Stay: 6   ICU Length of Stay: 5d 16h   Primary Service:   Primary HF Cardiologist: Dr. Washington  Referring:    INTERVAL EVENTS / PERTINENT ROS:     No acute events overnight. He denies headache, dizziness, vision changes, chest pain, palpitations, shortness of breath, cough, ABD pain, ABD swelling, diarrhea, nausea, or vomiting. . Telemetry reviewed showing atrial fibrillation with frequent PVC's. Mild hypervolemia on physical exam (JVP to jaw line).  MAP's overnight between 70 - 90 mmHg. Cardiac support continues with Milrinone gtt @ 0.25 mcg/kg/min. Milrinone gtt decreased from 0.375 to 0.25 mcg/kg/min this morning. Chest Xray reviewed with stable appearance of pulmonary congestion and SWAN location. Urine output over the previous 24 hours 2 Liters, net negative 800 mL. Coronary angiogram shows occluded grafts x 2 (SVG - RCA / SVG - OM); however, multidisciplinary team discussion, including cardiac surgery, is no revascularization options. Email sent to initiate advanced heart failure therapies evaluation.     Plan:  - Continue with Q 6 hour hemodynamics   - Diurese today with Bumex 1 mg IV today  - Continue hydralazine / Isordil and up-titrate as able to safely reduce SVR / increase cardiac output.   - Genetic consult outpatient - collaborate with Dr. Washington   - Initiate advanced heart failure therapies evaluation when consent is obtained.       MEDICATIONS  Infusions:  heparin, Last Rate: 1,800 Units/hr (10/30/24 0700)  milrinone, Last Rate: 0.25 mcg/kg/min (10/30/24 1314)      Scheduled:  aspirin, 81 mg, Daily  cholecalciferol, 50,000 Units, Every Sunday  dapagliflozin propanediol, 10 mg, q24h  escitalopram, 10 mg, Daily  hydrALAZINE, 25 mg, TID  isosorbide dinitrate, 20 mg, TID  [Held by provider] metoprolol succinate XL, 50 mg, Daily  pantoprazole, 40 mg, Daily before  breakfast  perflutren protein A microsphere, 0.5 mL, Once in imaging  sennosides-docusate sodium, 2 tablet, BID  spironolactone, 25 mg, Daily  sulfur hexafluoride microsphr, 2 mL, Once in imaging      PRN:  alteplase, 2 mg, PRN  bisacodyl, 10 mg, Daily PRN  nitroglycerin, 0.4 mg, q5 min PRN  oxygen, , Continuous PRN - O2/gases  polyethylene glycol, 17 g, Daily PRN      Invasive Hemodynamics:    Most Recent Range Past 24hrs   BP (Art) 111/62 No data recorded   MAP(Art) 78 mmHg No data recorded   RA/CVP   No data recorded   PA 35/20 PAP  Min: 25/8  Max: 41/19   PA(mean) 25 mmHg PAP (Mean)  Min: 15 mmHg  Max: 29 mmHg   PCWP 14 mmHg PCWP (mmHg)  Min: 14 mmHg  Max: 14 mmHg   CO 6 L/min CO (L/min)  Min: 4.14 L/min  Max: 6 L/min   CI 2.82 L/min/m2 CI (L/min/m2)  Min: 1.94 L/min/m2  Max: 2.82 L/min/m2   Mixed Venous 73 % SVO2 (%)  Min: 72 %  Max: 73 %   SVR  906 (dyne*sec)/cm5 SVR (dyne*sec)/cm5  Min: 906 (dyne*sec)/cm5  Max: 1096 (dyne*sec)/cm5    (dyne*sec)/cm5 PVR (dyne*sec)/cm5  Min: 120 (dyne*sec)/cm5  Max: 165 (dyne*sec)/cm5     PHYSICAL EXAM:   Visit Vitals  BP 92/65   Pulse 81   Temp 35.9 °C (96.6 °F) (Temporal)   Resp 14   Ht 1.829 m (6')   Wt 94 kg (207 lb 3.7 oz)   SpO2 95%   BMI 28.11 kg/m²   Smoking Status Former   BSA 2.19 m²       Wt Readings from Last 5 Encounters:   10/30/24 94 kg (207 lb 3.7 oz)   10/24/24 92.5 kg (204 lb)   08/05/24 122 kg (268 lb)   01/31/22 119 kg (262 lb)   02/18/20 123 kg (270 lb 2 oz)       INTAKE/OUTPUT:  I/O last 3 completed shifts:  In: 1319.1 (14 mL/kg) [P.O.:600; I.V.:719.1 (7.6 mL/kg)]  Out: 2675 (28.5 mL/kg) [Urine:2675 (0.8 mL/kg/hr)]  Weight: 94 kg      Physical Exam  HENT:      Head: Normocephalic.      Nose: Nose normal.      Mouth/Throat:      Mouth: Mucous membranes are moist.   Eyes:      Pupils: Pupils are equal, round, and reactive to light.   Neck:      Vascular: JVD present.   Cardiovascular:      Rate and Rhythm: Normal rate. Rhythm irregular.      Comments:  Permanent Atrial Fibrillation  Pulmonary:      Effort: Pulmonary effort is normal.      Breath sounds: Normal breath sounds.   Abdominal:      Palpations: Abdomen is soft.   Musculoskeletal:         General: Normal range of motion.      Cervical back: Normal range of motion.   Skin:     Capillary Refill: Capillary refill takes less than 2 seconds.   Neurological:      Mental Status: He is alert and oriented to person, place, and time.   Psychiatric:         Mood and Affect: Mood normal.         Behavior: Behavior normal.         Thought Content: Thought content normal.         Judgment: Judgment normal.         DATA:  CMP:  Results from last 7 days   Lab Units 10/30/24  0131 10/29/24  0454 10/28/24  0919 10/28/24  0618 10/27/24  0141 10/26/24  1503 10/26/24  0513 10/25/24  1740 10/25/24  1221 10/24/24  2328   SODIUM mmol/L 133* 135* 134* 135* 131* 135* 133* 134* 137 134*   POTASSIUM mmol/L 4.0 4.6 3.9 4.4 4.1 4.4 4.5 3.3* 3.4* 4.6   CHLORIDE mmol/L 102 102 100 98 94* 95* 95* 93* 93* 90*   CO2 mmol/L 23 22 25 24 25 26 24 29 31 30   ANION GAP mmol/L 12 16 13 17 16 18 19 15 16 19   BUN mg/dL 33* 32* 34* 33* 38* 35* 33* 31* 29* 28*   CREATININE mg/dL 1.43* 1.49* 1.68* 1.61* 2.05* 2.01* 2.03* 2.22* 2.01* 1.88*   EGFR mL/min/1.73m*2 53* 50* 44* 46* 34* 35* 35* 31* 35* 38*   MAGNESIUM mg/dL 2.27 2.49* 2.56* 2.65* 2.44*  --  2.49*  --   --  2.22   ALBUMIN g/dL 4.0 4.2 4.1 4.3 4.4 4.4 4.2 4.4 4.4 4.4   ALT U/L  --   --   --   --   --   --   --   --   --  13   AST U/L  --   --   --   --   --   --   --   --   --  15   BILIRUBIN TOTAL mg/dL  --   --   --   --   --   --   --   --   --  2.3*     CBC:  Results from last 7 days   Lab Units 10/30/24  0131 10/29/24  0454 10/28/24  0919 10/28/24  0618 10/27/24  0141 10/26/24  0513 10/24/24  2328   WBC AUTO x10*3/uL 11.1 10.7 9.6 9.4 10.9 11.6* 14.4*   HEMOGLOBIN g/dL 14.7 15.3 15.9 15.6 15.5 16.0 17.5   HEMATOCRIT % 44.8 46.0 46.8 46.6 46.9 45.5 50.8   PLATELETS AUTO x10*3/uL 509*  "594* 584* 545* 616* 614* 742*   MCV fL 82 82 81 82 81 77* 79*     COAG:   Results from last 7 days   Lab Units 10/30/24  0131 10/29/24  0454 10/28/24  0919 10/28/24  0618 10/27/24  0141 10/26/24  0513 10/24/24  2328   INR  1.3* 1.3* 1.4* 1.5* 1.9* 2.1* 2.7*     ABO: No results found for: \"ABO\"  HEME/ENDO:  Results from last 7 days   Lab Units 10/24/24  2328   FERRITIN ng/mL 76   IRON SATURATION % 17*   TSH mIU/L 8.47*   HEMOGLOBIN A1C % 6.3*      CARDIAC:   Results from last 7 days   Lab Units 10/24/24  2328   BNP pg/mL 1,995*       ASSESSMENT AND PLAN:     Mr. Meraz is a 69 M with a PMHx sig for CAD s/p 4V CABG (2012) and PCI (LCx/OM; 5/2019), stage D systolic HF/ICM/HFrEF with LVEF 10-15%( 10/22/24 TTE), AF s/p RFA (PVI and CTI; 4/2024) on OAC therapy, HTN, dyslipidemia, depression, anxiety and VIV using CPAP who was admitted to OSH ICU, s/p RHC on 10/18/24.  Per patient, he had been experienced worsening SOB and fluids overload for 2-3 weeks. Patient was on milrinone drip and diurese with SG guided. However his KENYA worsening, and not able to wean milrinone drip. Decision was made to transfer him to HF ICU [facility] for possible advanced therapy consideration. 10/26: Tolerated afterload reduction with hydralazine / Isordil. 10/27: Discontinued hydralazine / Isordil and initiated 24 / 26 mg Entresto BID and tolerating. 10/28: Coronary angiogram: Right dominant system; LM: Mild 20% distal disease; LAD: Occluded with Patent LIMA; RI: Occluded; Lcx: Patent proximal Lcx stent and OM1 stent, otherwise mild non-obstructive disease; RCA: proximal 100% occlusion. Grafts: LIMA-LAD: Patent; SVG-OM, SVG-RCA: Occluded; SVG-RI: Unable to engage but likely occluded. 10/29: Entresto discontinued and hydral / Isordil initiated 2/2 AHF therapies evaluation. 10/30: Email sent to initiate advanced heart failure evaluation. Down-titrated Milrinone gtt from 0.375 mcg/kg/min to 0.25 mcg/kg/min. Up-titrating hydralazine / Isordil for " afterload reduction.       Neuro:     # Hx of Depression/Anxiety  - Serial neuro and pain assessments  - PO Tylenol PRN for pain  - PT/OT Consult, OOB to chair  - CAM ICU score every shift  - Sleep/wake cycle normalization  - c/w home Lexapro and Clonazepam     # Physical Status  -Overweight:  BMI: 27.9   -Reduced Mobility: acute decompensated heart failure and ICU stay      #Substance abuse  -Alcohol abuse/Alcohol dependence: NO   -Tobacco use/Nicotine Dependence: NO      Cardiovascular:     # Stage D, ICM, decompensate acute on chronic heart failure,  HFrEF 10-15% ( from 10/22/24 ECHO)   #Hx of CAD s/p 4V CABG 2012     -BRIANA (3/29/23): Left Ventricle: Moderately reduced left ventricular systolic function with a visually estimated EF of 35 - 40%.  - TTE (10/22/24): LVEF 10 -15%, right ventricle moderately dilated, moderately reduced systolic function. Mild MR  - admit weight (10/24): 93.5 KG   - admit BNP (10/24):  1995  - Home meds include Metoprolol succinate 25 mg daily, Entresto 97/103 mg bid, Hydralazine 25 mg bid, Isosorbide mononitrate 120 mg daily, Spironolactone 25 mg daily, Bumex 0.5 mg daily----allergy to SGLT2.  - ASA  - C/w Milrinone drip @ 0.25 mcg/kg/min, initiated OSH  - [facility] # on arrival (10/25): Bp 96/50 (65), CVP 5, PA 36/20 (22), PCWP 18, CO/CI 4.4/2.1, SVR 1083, SVO2 65 % on Milrinone drip at 0.625 mcg/kg/min, hydral 25 mg TID.   - Daily hemodynamics: 10/29: B/P: 102/72 (81); CVP 9, PAP: 35/17 (23); SVR: 1116; CI: 2.4/ CO: 5.2; SvO2: 71% on IV Milrinone 0.375 mcg/kg/min; PO Entresto 24/26 mg bid; PO Spironolactone 25 mg daily  - To wean Milrinone drip as able (current dose 0.25 mcg/kg/min)  - Bumex 1 mg IV x 1 today    - Stopped entresto 24/26 mg BID as likely will be going advanced therapy eval.   - C/w hydralzine 25 mg TID  - Increase Isordil to 20 mg TID   - C/w dapagliflozin 10 mg daily.   - C/w Antonito 25 mg daily   - Hold BB   - Daily standing weights, 2gm sodium diet, 2L fluid restriction,  strict I&Os  - EP consult before discharge to eval for ICD placement for primary prevention.   - Coronary angiogram multidisciplinary discussion, including CT surgery, no revascularization options.  - Advanced Heart Failure Therapies Evaluation initiated 10/30/2024     #CAD   -[facility] [place] (12/20/2020):  Successful PCI of the SVG to ramus intermedius with one drug-eluting stent; OCT of the SVG to RCA to confirm no thrombosis; patent LAD; occluded SVG to OM.  -Meds: C/w ASA.  -Consider to add Lipitor      # Hx of AFib s/p RFA (PVI and CTI; 4/2024)  -NO Device noted  -Was on home Coumadin for 2 months, prior to that was on Eliquis,  patient reported with Dizziness while on Eliquis      # Dizziness/bradycardia  -Had 3 episodes of near syncope after his recent AF RFA; has stopped driving as a result. ECG with sinus olu. Metoprolol previously decreased by his local EP.    -Hold BB due to decompensate HF      #Electrolyte Disturbances  -Keep K>4, Mag >2     Pulmonary:   # PMH of VIV  -c/w home CPAP  -Monitor and maintain SpO2 > 92%     GI:  # H/x of  GERD  - Bowel regimen: Senna, Miralax PRN  - PPI     #  Bilirubinemia  - T maryuri (10/24): 2.3, ALT, AST: normal   - CT C/A/P (10/17/24): Hepatomegaly and hepatic steatosis are present. The liver has a lobulated contour. A questionable lesion in the right hepatic lobe inferiorly measures 26 mm.     # Urinary retention  - CT C/A/P (10/17/24): the prostate gland is mildly enlarged with mass effect on the posterior wall of the urinary bladder. The prostate gland measures 4.3 x 6.0 cm.   - C/o urinary difficulty, s/p FC OSH --> Removed prior admission        :  #KENYA/ CKD ?  in the setting of cardio-renal syndrome  -Baseline BUN/Cr: Unclear,  OSH ICU: Creatinine: 1.3-1.6   -Admit BUN/Cr (10/24): 28/1.88  -Daily BUN/Creat: 10/30: 33 / 1.43   -I/Os  -avoid hypotension and nephrotoxic agents     Heme:  #Anemia in the setting of iron deficiency  - Lab (10/24): H/H: 17.5/50.8,  Iron study:  63/374/17%, Ferritin: 76,  folate: 23.5, B12: 768   - NO iron replacement needed as H/H high     #Coagulopathy due to on home Coumadin  - INR  (10/30): 1.3, Hold home Coumadin   - Restarted Heparin gtt 10/29     Endo:  #DM  - Euglycemic  - hgbA1c (10/24):  6.3     #Thyroid  -TSH (10/24):  8.47, T4: 1.44      ID:  # leukocytosis - Resolved  - WBC (10/26):  9.6   -afebrile, nontoxic   -trend temps q4h       PHYSICAL AND OCCUPATIONAL THERAPY: Ordered    LINES:  PIVs   A-line (OSH) 10/20 - current  Right Subclavian Cordis / PA catheter (OSH) 10/18 - 10/28  Right subclavian cordis: Previously placed swan sheath was exchanged to a new swan sheath over a short Jwire and was sutured in place. 10/28 - current    DVT: INR: 1.3 (10/30) (home Warfarin now held) - Re-Initiated heparin gtt 10/29  VAP BUNDLE: NA  CENTRAL LINE BUNDLE: Ordered   ULCER PPX: Pantoprazole 40 mg daily  GLYCEMIC CONTROL: NA - A1C: 6.3  BOWEL CARE: Senna / Miralax  INDWELLING CATHETER: NA  NUTRITION: Adult diet Cardiac; 70 gm fat; 2 - 3 grams Sodium      EMERGENCY CONTACT: Extended Emergency Contact Information  Primary Emergency Contact: Tanvi Meraz Phone: [phone]  Relation: Spouse  FAMILY UPDATE:   CODE STATUS: Full Code  DISPO: Maintain in the HFICU    Patient seen and assessed with Dr. Jonatan moya    I personally spent 60 minutes of critical care time directly and personally managing the patient exclusive of separately billable procedures   _________________________________________________  MAYTE Alberto-CNP

## 2024-10-30 NOTE — PROGRESS NOTES
HFICU Attending Note    Principal Problem:    Acute on chronic heart failure with reduced ejection fraction and diastolic dysfunction  Active Problems:    KENYA (acute kidney injury) (CMS-HCC)    History of chronic atrial fibrillation    Ischemic cardiomyopathy    GERD (gastroesophageal reflux disease)    Leukocytosis    Heart failure, ACC/AHA stage D    Obstructive sleep apnea    Worsening systolic function in a patient with known Ischemic CM   LHC showing occluded grafts , no revascularization options   Will initiate advanced therapies evaluation   Will attempt to wean down inotropes as hemodynamics allow         This critically ill patient continues to be at-risk for clinically significant deterioration / failure due to the above mentioned dysfunctional, unstable organ systems.  I have personally identified and managed all complex critical care issues to prevent aforementioned clinical deterioration.  Critical care time is spent at bedside and/or the immediate area and has included, but is not limited to, the review of diagnostic tests, labs, radiographs, serial assessments of hemodynamics, respiratory status, ventilatory management, and family updates.  Time spent in procedures and teaching are reported separately.    Critical care time: 35____ minutes

## 2024-10-30 NOTE — CARE PLAN
The patient's goals for the shift include  to sleep    The clinical goals for the shift include stable hemodynamics with no decompensation      Problem: Heart Failure  Goal: Improved gas exchange this shift  Outcome: Progressing  Goal: Improved urinary output this shift  Outcome: Progressing  Goal: Reduction in peripheral edema within 24 hours  Outcome: Progressing  Goal: Report improvement of dyspnea/breathlessness this shift  Outcome: Progressing  Goal: Weight from fluid excess reduced over 2-3 days, then stabilize  Outcome: Progressing  Goal: Increase self care and/or family involvement in 24 hours  Outcome: Progressing     Problem: Skin  Goal: Decreased wound size/increased tissue granulation at next dressing change  Outcome: Progressing  Flowsheets (Taken 10/29/2024 2215)  Decreased wound size/increased tissue granulation at next dressing change:   Protective dressings over bony prominences   Promote sleep for wound healing  Goal: Participates in plan/prevention/treatment measures  Outcome: Progressing  Flowsheets (Taken 10/29/2024 2215)  Participates in plan/prevention/treatment measures:   Elevate heels   Increase activity/out of bed for meals  Goal: Prevent/manage excess moisture  Outcome: Progressing  Flowsheets (Taken 10/29/2024 2215)  Prevent/manage excess moisture:   Moisturize dry skin   Cleanse incontinence/protect with barrier cream   Follow provider orders for dressing changes   Monitor for/manage infection if present  Goal: Prevent/minimize sheer/friction injuries  Outcome: Progressing  Flowsheets (Taken 10/29/2024 2215)  Prevent/minimize sheer/friction injuries:   Use pull sheet   Increase activity/out of bed for meals   Turn/reposition every 2 hours/use positioning/transfer devices   HOB 30 degrees or less  Goal: Promote/optimize nutrition  Outcome: Progressing  Flowsheets (Taken 10/29/2024 2215)  Promote/optimize nutrition:   Consume > 50% meals/supplements   Offer water/supplements/favorite  foods   Monitor/record intake including meals  Goal: Promote skin healing  Outcome: Progressing  Flowsheets (Taken 10/29/2024 2215)  Promote skin healing:   Turn/reposition every 2 hours/use positioning/transfer devices   Protective dressings over bony prominences   Assess skin/pad under line(s)/device(s)   Rotate device position/do not position patient on device     Problem: Pain - Adult  Goal: Verbalizes/displays adequate comfort level or baseline comfort level  Outcome: Progressing     Problem: Safety - Adult  Goal: Free from fall injury  Outcome: Progressing     Problem: Discharge Planning  Goal: Discharge to home or other facility with appropriate resources  Outcome: Progressing     Problem: Chronic Conditions and Co-morbidities  Goal: Patient's chronic conditions and co-morbidity symptoms are monitored and maintained or improved  Outcome: Progressing     Problem: Fall/Injury  Goal: Not fall by end of shift  Outcome: Progressing  Goal: Be free from injury by end of the shift  Outcome: Progressing  Goal: Verbalize understanding of personal risk factors for fall in the hospital  Outcome: Progressing  Goal: Verbalize understanding of risk factor reduction measures to prevent injury from fall in the home  Outcome: Progressing  Goal: Use assistive devices by end of the shift  Outcome: Progressing  Goal: Pace activities to prevent fatigue by end of the shift  Outcome: Progressing

## 2024-10-31 ENCOUNTER — APPOINTMENT (OUTPATIENT)
Dept: RADIOLOGY | Facility: HOSPITAL | Age: 69
DRG: 001 | End: 2024-10-31
Payer: MEDICARE

## 2024-10-31 ENCOUNTER — DOCUMENTATION (OUTPATIENT)
Dept: TRANSPLANT | Facility: HOSPITAL | Age: 69
End: 2024-10-31
Payer: MEDICARE

## 2024-10-31 ENCOUNTER — LAB REQUISITION (OUTPATIENT)
Dept: LAB | Facility: CLINIC | Age: 69
DRG: 001 | End: 2024-10-31
Payer: MEDICARE

## 2024-10-31 ENCOUNTER — TELEPHONE (OUTPATIENT)
Dept: CARDIOLOGY CLINIC | Age: 69
End: 2024-10-31

## 2024-10-31 DIAGNOSIS — I50.9 HEART FAILURE, UNSPECIFIED: ICD-10-CM

## 2024-10-31 LAB
ABO GROUP (TYPE) IN BLOOD: NORMAL
ALBUMIN SERPL BCP-MCNC: 4.1 G/DL (ref 3.4–5)
ALBUMIN SERPL BCP-MCNC: 4.3 G/DL (ref 3.4–5)
ALP SERPL-CCNC: 76 U/L (ref 33–136)
ALT SERPL W P-5'-P-CCNC: 14 U/L (ref 10–52)
ANION GAP BLDMV CALCULATED.4IONS-SCNC: 10 MMO/L (ref 10–25)
ANION GAP BLDMV CALCULATED.4IONS-SCNC: 10 MMO/L (ref 10–25)
ANION GAP BLDMV CALCULATED.4IONS-SCNC: 11 MMO/L (ref 10–25)
ANION GAP SERPL CALC-SCNC: 14 MMOL/L (ref 10–20)
ANION GAP SERPL CALC-SCNC: 14 MMOL/L (ref 10–20)
APPEARANCE UR: CLEAR
APTT PPP: 84 SECONDS (ref 27–38)
APTT PPP: 85 SECONDS (ref 27–38)
AST SERPL W P-5'-P-CCNC: 16 U/L (ref 9–39)
B-HCG SERPL-ACNC: <3 MIU/ML
BASE EXCESS BLDMV CALC-SCNC: -1 MMOL/L (ref -2–3)
BASE EXCESS BLDMV CALC-SCNC: -1.6 MMOL/L (ref -2–3)
BASE EXCESS BLDMV CALC-SCNC: -2.6 MMOL/L (ref -2–3)
BILIRUB SERPL-MCNC: 1.4 MG/DL (ref 0–1.2)
BILIRUB UR STRIP.AUTO-MCNC: NEGATIVE MG/DL
BNP SERPL-MCNC: 755 PG/ML (ref 0–99)
BODY TEMPERATURE: 37 DEGREES CELSIUS
BUN SERPL-MCNC: 29 MG/DL (ref 6–23)
BUN SERPL-MCNC: 30 MG/DL (ref 6–23)
CA-I BLDMV-SCNC: 1.23 MMOL/L (ref 1.1–1.33)
CA-I BLDMV-SCNC: 1.25 MMOL/L (ref 1.1–1.33)
CA-I BLDMV-SCNC: 1.25 MMOL/L (ref 1.1–1.33)
CALCIUM SERPL-MCNC: 9.3 MG/DL (ref 8.6–10.6)
CALCIUM SERPL-MCNC: 9.4 MG/DL (ref 8.6–10.6)
CARDIOLIPIN IGA SERPL-ACNC: <0.5 APL U/ML
CARDIOLIPIN IGG SER IA-ACNC: <1.6 GPL U/ML
CARDIOLIPIN IGM SER IA-ACNC: 0.3 MPL U/ML
CHLORIDE BLD-SCNC: 100 MMOL/L (ref 98–107)
CHLORIDE BLD-SCNC: 100 MMOL/L (ref 98–107)
CHLORIDE BLD-SCNC: 102 MMOL/L (ref 98–107)
CHLORIDE SERPL-SCNC: 100 MMOL/L (ref 98–107)
CHLORIDE SERPL-SCNC: 101 MMOL/L (ref 98–107)
CMV IGG AVIDITY SERPL IA-RTO: REACTIVE %
CO2 SERPL-SCNC: 24 MMOL/L (ref 21–32)
CO2 SERPL-SCNC: 25 MMOL/L (ref 21–32)
COLOR UR: ABNORMAL
CREAT SERPL-MCNC: 1.29 MG/DL (ref 0.5–1.3)
CREAT SERPL-MCNC: 1.35 MG/DL (ref 0.5–1.3)
EBV EA IGG SER QL: NEGATIVE
EBV NA AB SER QL: POSITIVE
EBV VCA IGG SER IA-ACNC: POSITIVE
EBV VCA IGM SER IA-ACNC: NEGATIVE
EGFRCR SERPLBLD CKD-EPI 2021: 57 ML/MIN/1.73M*2
EGFRCR SERPLBLD CKD-EPI 2021: 60 ML/MIN/1.73M*2
ERYTHROCYTE [DISTWIDTH] IN BLOOD BY AUTOMATED COUNT: 16.9 % (ref 11.5–14.5)
GLUCOSE BLD-MCNC: 121 MG/DL (ref 74–99)
GLUCOSE BLD-MCNC: 130 MG/DL (ref 74–99)
GLUCOSE BLD-MCNC: 147 MG/DL (ref 74–99)
GLUCOSE SERPL-MCNC: 104 MG/DL (ref 74–99)
GLUCOSE SERPL-MCNC: 95 MG/DL (ref 74–99)
GLUCOSE UR STRIP.AUTO-MCNC: ABNORMAL MG/DL
HAV AB SER QL IA: REACTIVE
HBV CORE AB SER QL: NONREACTIVE
HBV SURFACE AB SER-ACNC: <3.1 MIU/ML
HBV SURFACE AG SERPL QL IA: NONREACTIVE
HCO3 BLDMV-SCNC: 21.8 MMOL/L (ref 22–26)
HCO3 BLDMV-SCNC: 23.5 MMOL/L (ref 22–26)
HCO3 BLDMV-SCNC: 23.7 MMOL/L (ref 22–26)
HCT VFR BLD AUTO: 43.6 % (ref 41–52)
HCT VFR BLD EST: 44 % (ref 41–52)
HCV AB SER QL: NONREACTIVE
HERPES SIMPLEX VIRUS 1 IGG: >8 INDEX
HERPES SIMPLEX VIRUS 2 IGG: <0.2 INDEX
HGB BLD-MCNC: 14.5 G/DL (ref 13.5–17.5)
HGB BLDMV-MCNC: 14.7 G/DL (ref 13.5–17.5)
HGB BLDMV-MCNC: 14.8 G/DL (ref 13.5–17.5)
HGB BLDMV-MCNC: 14.8 G/DL (ref 13.5–17.5)
HIV 1+2 AB+HIV1 P24 AG SERPL QL IA: NONREACTIVE
HLA CLS I TYP PNL BLD/T DONR HIGH RES: NORMAL
HLA RESULTS: NORMAL
HLA-A+B+C AB NFR SER: NORMAL %
HLA-DP+DQ+DR AB NFR SER: NORMAL %
HLA-DP2 QL: NORMAL
HLA-DQB1 HIGH RES: NORMAL
HLA-DRB1 HIGH RES: NORMAL
IGA SERPL-MCNC: 104 MG/DL (ref 70–400)
IGG SERPL-MCNC: 733 MG/DL (ref 700–1600)
IGG1 SER-MCNC: 656 MG/DL (ref 490–1140)
IGG2 SER-MCNC: 129 MG/DL (ref 150–640)
IGG3 SER-MCNC: 40 MG/DL (ref 11–85)
IGG4 SER-MCNC: 17 MG/DL (ref 3–200)
IGM SERPL-MCNC: 113 MG/DL (ref 40–230)
INHALED O2 CONCENTRATION: 21 %
INR PPP: 1.2 (ref 0.9–1.1)
INR PPP: 1.3 (ref 0.9–1.1)
KETONES UR STRIP.AUTO-MCNC: NEGATIVE MG/DL
LACTATE BLDMV-SCNC: 0.8 MMOL/L (ref 0.4–2)
LACTATE BLDMV-SCNC: 0.8 MMOL/L (ref 0.4–2)
LACTATE BLDMV-SCNC: 1.1 MMOL/L (ref 0.4–2)
LDH SERPL L TO P-CCNC: 187 U/L (ref 84–246)
LEUKOCYTE ESTERASE UR QL STRIP.AUTO: NEGATIVE
MAGNESIUM SERPL-MCNC: 2.37 MG/DL (ref 1.6–2.4)
MCH RBC QN AUTO: 27.4 PG (ref 26–34)
MCHC RBC AUTO-ENTMCNC: 33.3 G/DL (ref 32–36)
MCV RBC AUTO: 82 FL (ref 80–100)
MEV IGG SER QL IA: POSITIVE
MUMPS IGG ANTIBODY INDEX: >8 IA
MUV IGG SER IA-ACNC: POSITIVE
NITRITE UR QL STRIP.AUTO: NEGATIVE
NRBC BLD-RTO: 0 /100 WBCS (ref 0–0)
OXYHGB MFR BLDMV: 69.4 % (ref 45–75)
OXYHGB MFR BLDMV: 71 % (ref 45–75)
OXYHGB MFR BLDMV: 71.7 % (ref 45–75)
PCO2 BLDMV: 36 MM HG (ref 41–51)
PCO2 BLDMV: 38 MM HG (ref 41–51)
PCO2 BLDMV: 41 MM HG (ref 41–51)
PH BLDMV: 7.37 PH (ref 7.33–7.43)
PH BLDMV: 7.39 PH (ref 7.33–7.43)
PH BLDMV: 7.4 PH (ref 7.33–7.43)
PH UR STRIP.AUTO: 6 [PH]
PHOSPHATE SERPL-MCNC: 4.1 MG/DL (ref 2.5–4.9)
PLATELET # BLD AUTO: 527 X10*3/UL (ref 150–450)
PO2 BLDMV: 41 MM HG (ref 35–45)
PO2 BLDMV: 44 MM HG (ref 35–45)
PO2 BLDMV: 47 MM HG (ref 35–45)
POTASSIUM BLDMV-SCNC: 4.4 MMOL/L (ref 3.5–5.3)
POTASSIUM BLDMV-SCNC: 4.5 MMOL/L (ref 3.5–5.3)
POTASSIUM BLDMV-SCNC: 4.7 MMOL/L (ref 3.5–5.3)
POTASSIUM SERPL-SCNC: 4.1 MMOL/L (ref 3.5–5.3)
POTASSIUM SERPL-SCNC: 4.9 MMOL/L (ref 3.5–5.3)
PREALB SERPL-MCNC: 25.1 MG/DL (ref 18–40)
PROT SERPL-MCNC: 6.9 G/DL (ref 6.4–8.2)
PROT UR STRIP.AUTO-MCNC: NEGATIVE MG/DL
PROTHROMBIN TIME: 14 SECONDS (ref 9.8–12.8)
PROTHROMBIN TIME: 14.1 SECONDS (ref 9.8–12.8)
PSA SERPL-MCNC: 10.14 NG/ML
RBC # BLD AUTO: 5.29 X10*6/UL (ref 4.5–5.9)
RBC # UR STRIP.AUTO: NEGATIVE /UL
RH FACTOR (ANTIGEN D): NORMAL
RUBEOLA IGG ANTIBODY INDEX: 6.1 IA
RUBV IGG SERPL IA-ACNC: 6.7 IA
RUBV IGG SERPL QL IA: POSITIVE
SAO2 % BLDMV: 71 % (ref 45–75)
SAO2 % BLDMV: 73 % (ref 45–75)
SAO2 % BLDMV: 74 % (ref 45–75)
SODIUM BLDMV-SCNC: 129 MMOL/L (ref 136–145)
SODIUM BLDMV-SCNC: 129 MMOL/L (ref 136–145)
SODIUM BLDMV-SCNC: 130 MMOL/L (ref 136–145)
SODIUM SERPL-SCNC: 134 MMOL/L (ref 136–145)
SODIUM SERPL-SCNC: 135 MMOL/L (ref 136–145)
SP GR UR STRIP.AUTO: 1.01
T3 SERPL-MCNC: 69 NG/DL (ref 60–200)
T4 SERPL-MCNC: 10.2 UG/DL (ref 4.5–11.1)
TREPONEMA PALLIDUM IGG+IGM AB [PRESENCE] IN SERUM OR PLASMA BY IMMUNOASSAY: NONREACTIVE
TSH SERPL-ACNC: 4.39 MIU/L (ref 0.44–3.98)
UFH PPP CHRO-ACNC: 0.4 IU/ML
UROBILINOGEN UR STRIP.AUTO-MCNC: NORMAL MG/DL
VARICELLA ZOSTER IGG INDEX: 1.8 IA
VZV IGG SER QL IA: POSITIVE
WBC # BLD AUTO: 10.9 X10*3/UL (ref 4.4–11.3)

## 2024-10-31 PROCEDURE — 86765 RUBEOLA ANTIBODY: CPT

## 2024-10-31 PROCEDURE — 84436 ASSAY OF TOTAL THYROXINE: CPT

## 2024-10-31 PROCEDURE — 87522 HEPATITIS C REVRS TRNSCRPJ: CPT

## 2024-10-31 PROCEDURE — 83615 LACTATE (LD) (LDH) ENZYME: CPT

## 2024-10-31 PROCEDURE — 86780 TREPONEMA PALLIDUM: CPT

## 2024-10-31 PROCEDURE — 2500000004 HC RX 250 GENERAL PHARMACY W/ HCPCS (ALT 636 FOR OP/ED): Performed by: STUDENT IN AN ORGANIZED HEALTH CARE EDUCATION/TRAINING PROGRAM

## 2024-10-31 PROCEDURE — 71046 X-RAY EXAM CHEST 2 VIEWS: CPT

## 2024-10-31 PROCEDURE — 85027 COMPLETE CBC AUTOMATED: CPT

## 2024-10-31 PROCEDURE — 71250 CT THORAX DX C-: CPT

## 2024-10-31 PROCEDURE — 2500000004 HC RX 250 GENERAL PHARMACY W/ HCPCS (ALT 636 FOR OP/ED)

## 2024-10-31 PROCEDURE — 99291 CRITICAL CARE FIRST HOUR: CPT | Performed by: INTERNAL MEDICINE

## 2024-10-31 PROCEDURE — 37799 UNLISTED PX VASCULAR SURGERY: CPT

## 2024-10-31 PROCEDURE — 2500000001 HC RX 250 WO HCPCS SELF ADMINISTERED DRUGS (ALT 637 FOR MEDICARE OP)

## 2024-10-31 PROCEDURE — 86735 MUMPS ANTIBODY: CPT

## 2024-10-31 PROCEDURE — 86803 HEPATITIS C AB TEST: CPT

## 2024-10-31 PROCEDURE — 2500000001 HC RX 250 WO HCPCS SELF ADMINISTERED DRUGS (ALT 637 FOR MEDICARE OP): Performed by: NURSE PRACTITIONER

## 2024-10-31 PROCEDURE — 86704 HEP B CORE ANTIBODY TOTAL: CPT

## 2024-10-31 PROCEDURE — 36415 COLL VENOUS BLD VENIPUNCTURE: CPT

## 2024-10-31 PROCEDURE — 81003 URINALYSIS AUTO W/O SCOPE: CPT

## 2024-10-31 PROCEDURE — 86901 BLOOD TYPING SEROLOGIC RH(D): CPT

## 2024-10-31 PROCEDURE — 99291 CRITICAL CARE FIRST HOUR: CPT

## 2024-10-31 PROCEDURE — 86317 IMMUNOASSAY INFECTIOUS AGENT: CPT

## 2024-10-31 PROCEDURE — 2020000001 HC ICU ROOM DAILY

## 2024-10-31 PROCEDURE — 83880 ASSAY OF NATRIURETIC PEPTIDE: CPT

## 2024-10-31 PROCEDURE — 86664 EPSTEIN-BARR NUCLEAR ANTIGEN: CPT

## 2024-10-31 PROCEDURE — 84702 CHORIONIC GONADOTROPIN TEST: CPT

## 2024-10-31 PROCEDURE — 70355 PANORAMIC X-RAY OF JAWS: CPT

## 2024-10-31 PROCEDURE — 36591 DRAW BLOOD OFF VENOUS DEVICE: CPT | Performed by: NURSE PRACTITIONER

## 2024-10-31 PROCEDURE — 86645 CMV ANTIBODY IGM: CPT

## 2024-10-31 PROCEDURE — 86147 CARDIOLIPIN ANTIBODY EA IG: CPT

## 2024-10-31 PROCEDURE — 83735 ASSAY OF MAGNESIUM: CPT

## 2024-10-31 PROCEDURE — 86787 VARICELLA-ZOSTER ANTIBODY: CPT

## 2024-10-31 PROCEDURE — 86778 TOXOPLASMA ANTIBODY IGM: CPT

## 2024-10-31 PROCEDURE — 86481 TB AG RESPONSE T-CELL SUSP: CPT

## 2024-10-31 PROCEDURE — 84480 ASSAY TRIIODOTHYRONINE (T3): CPT

## 2024-10-31 PROCEDURE — 85305 CLOT INHIBIT PROT S TOTAL: CPT

## 2024-10-31 PROCEDURE — 87340 HEPATITIS B SURFACE AG IA: CPT

## 2024-10-31 PROCEDURE — 84132 ASSAY OF SERUM POTASSIUM: CPT

## 2024-10-31 PROCEDURE — 85520 HEPARIN ASSAY: CPT

## 2024-10-31 PROCEDURE — 82330 ASSAY OF CALCIUM: CPT | Performed by: NURSE PRACTITIONER

## 2024-10-31 PROCEDURE — 86708 HEPATITIS A ANTIBODY: CPT

## 2024-10-31 PROCEDURE — 85302 CLOT INHIBIT PROT C ANTIGEN: CPT

## 2024-10-31 PROCEDURE — 87389 HIV-1 AG W/HIV-1&-2 AB AG IA: CPT

## 2024-10-31 PROCEDURE — 84153 ASSAY OF PSA TOTAL: CPT

## 2024-10-31 PROCEDURE — 82784 ASSAY IGA/IGD/IGG/IGM EACH: CPT

## 2024-10-31 PROCEDURE — 84132 ASSAY OF SERUM POTASSIUM: CPT | Performed by: NURSE PRACTITIONER

## 2024-10-31 PROCEDURE — 2500000002 HC RX 250 W HCPCS SELF ADMINISTERED DRUGS (ALT 637 FOR MEDICARE OP, ALT 636 FOR OP/ED): Performed by: NURSE PRACTITIONER

## 2024-10-31 PROCEDURE — 84134 ASSAY OF PREALBUMIN: CPT

## 2024-10-31 PROCEDURE — 86706 HEP B SURFACE ANTIBODY: CPT

## 2024-10-31 PROCEDURE — 81379 HLA I TYPING COMPLETE HR: CPT | Mod: OUT

## 2024-10-31 PROCEDURE — 86695 HERPES SIMPLEX TYPE 1 TEST: CPT

## 2024-10-31 PROCEDURE — 85610 PROTHROMBIN TIME: CPT

## 2024-10-31 PROCEDURE — 71045 X-RAY EXAM CHEST 1 VIEW: CPT

## 2024-10-31 PROCEDURE — 71045 X-RAY EXAM CHEST 1 VIEW: CPT | Performed by: RADIOLOGY

## 2024-10-31 PROCEDURE — 86644 CMV ANTIBODY: CPT

## 2024-10-31 PROCEDURE — 86832 HLA CLASS I HIGH DEFIN QUAL: CPT | Mod: OUT

## 2024-10-31 PROCEDURE — 80069 RENAL FUNCTION PANEL: CPT | Mod: CCI

## 2024-10-31 PROCEDURE — 71250 CT THORAX DX C-: CPT | Performed by: RADIOLOGY

## 2024-10-31 PROCEDURE — 80307 DRUG TEST PRSMV CHEM ANLYZR: CPT

## 2024-10-31 PROCEDURE — 84443 ASSAY THYROID STIM HORMONE: CPT

## 2024-10-31 RX ORDER — POTASSIUM CHLORIDE 20 MEQ/1
20 TABLET, EXTENDED RELEASE ORAL ONCE
Status: COMPLETED | OUTPATIENT
Start: 2024-10-31 | End: 2024-11-01

## 2024-10-31 RX ORDER — BUMETANIDE 0.25 MG/ML
1 INJECTION, SOLUTION INTRAMUSCULAR; INTRAVENOUS ONCE
Status: COMPLETED | OUTPATIENT
Start: 2024-10-31 | End: 2024-10-31

## 2024-10-31 ASSESSMENT — PAIN SCALES - GENERAL
PAINLEVEL_OUTOF10: 0 - NO PAIN

## 2024-10-31 ASSESSMENT — PAIN - FUNCTIONAL ASSESSMENT
PAIN_FUNCTIONAL_ASSESSMENT: 0-10

## 2024-10-31 NOTE — TELEPHONE ENCOUNTER
Follow for decision on if patient needs WATCHMAN eval. Patient currently admitted with [facility].

## 2024-10-31 NOTE — PROGRESS NOTES
[facility]  HFICU PROGRESS NOTE    Fahad Meraz/[id number]    Admit Date: 10/24/2024  Hospital Length of Stay: 7   ICU Length of Stay: 6d 16h   Primary Service:   Primary HF Cardiologist: Dr. Washington  Referring:    INTERVAL EVENTS / PERTINENT ROS:     No acute events overnight. He denies headache, dizziness, vision changes, chest pain, palpitations, shortness of breath, cough, ABD pain, ABD swelling, diarrhea, nausea, or vomiting. . Telemetry reviewed showing atrial fibrillation with frequent PVC's. Mild hypervolemia on physical exam (JVP to jaw line).  MAP's overnight between 70 - 85 mmHg. Cardiac support continues with Milrinone gtt @ 0.375 mcg/kg/min. Milrinone gtt wean not tolerated yesterday at 0.25 mcg/kg/min (Low indices); therefore increased back to 0.375 mcg/kg/min. Chest Xray reviewed with stable appearance of pulmonary congestion and SWAN location. Urine output over the previous 24 hours 2 Liters, net negative ~ 800 mL. Weight is down 0.5 kgs. He is inotrope dependent at this point and consent obtained along with initiation of advanced heart failure therapies evaluation.      Plan:  - Continue with Q 6 hour hemodynamics   - Diurese today with Bumex 1 mg IV today  - Continue hydralazine / Isordil and up-titrate as able to safely reduce SVR / increase cardiac output.   - Genetic consult outpatient - collaborate with Dr. Washington   - Initiate advanced heart failure therapies evaluation  - HOLD Farxiga in preparation of advanced therapies (transplant / LVAD)         MEDICATIONS  Infusions:  heparin, Last Rate: 1,800 Units/hr (10/31/24 1513)  milrinone, Last Rate: 0.375 mcg/kg/min (10/31/24 1513)      Scheduled:  aspirin, 81 mg, Daily  cholecalciferol, 50,000 Units, Every Sunday  [Held by provider] dapagliflozin propanediol, 10 mg, q24h  escitalopram, 10 mg, Daily  hydrALAZINE, 25 mg, TID  isosorbide dinitrate, 20 mg, TID  [Held by provider] metoprolol succinate XL, 50 mg,  Daily  pantoprazole, 40 mg, Daily before breakfast  perflutren protein A microsphere, 0.5 mL, Once in imaging  sennosides-docusate sodium, 2 tablet, BID  spironolactone, 25 mg, Daily  sulfur hexafluoride microsphr, 2 mL, Once in imaging      PRN:  alteplase, 2 mg, PRN  bisacodyl, 10 mg, Daily PRN  nitroglycerin, 0.4 mg, q5 min PRN  oxygen, , Continuous PRN - O2/gases  polyethylene glycol, 17 g, Daily PRN      Invasive Hemodynamics:    Most Recent Range Past 24hrs   BP (Art) 111/62 No data recorded   MAP(Art) 78 mmHg No data recorded   RA/CVP   No data recorded   PA 35/11 PAP  Min: 22/10  Max: 47/23   PA(mean) 20 mmHg PAP (Mean)  Min: 15 mmHg  Max: 31 mmHg   PCWP 17 mmHg PCWP (mmHg)  Min: 11 mmHg  Max: 17 mmHg   CO 6 L/min CO (L/min)  Min: 5.3 L/min  Max: 6 L/min   CI 2.8 L/min/m2 CI (L/min/m2)  Min: 2.5 L/min/m2  Max: 2.8 L/min/m2   Mixed Venous 74 % SVO2 (%)  Min: 72 %  Max: 74 %   SVR  872 (dyne*sec)/cm5 SVR (dyne*sec)/cm5  Min: 872 (dyne*sec)/cm5  Max: 1036 (dyne*sec)/cm5   PVR 94 (dyne*sec)/cm5 PVR (dyne*sec)/cm5  Min: 94 (dyne*sec)/cm5  Max: 122 (dyne*sec)/cm5     PHYSICAL EXAM:   Visit Vitals  /57   Pulse 85   Temp 36.7 °C (98.1 °F)   Resp 21   Ht 1.829 m (6')   Wt 93.5 kg (206 lb 2.1 oz)   SpO2 94%   BMI 27.96 kg/m²   Smoking Status Former   BSA 2.18 m²       Wt Readings from Last 5 Encounters:   10/31/24 93.5 kg (206 lb 2.1 oz)   10/24/24 92.5 kg (204 lb)   08/05/24 122 kg (268 lb)   01/31/22 119 kg (262 lb)   02/18/20 123 kg (270 lb 2 oz)       INTAKE/OUTPUT:  I/O last 3 completed shifts:  In: 935.7 (10 mL/kg) [P.O.:490; I.V.:445.7 (4.8 mL/kg)]  Out: 2025 (21.7 mL/kg) [Urine:2025 (0.6 mL/kg/hr)]  Weight: 93.5 kg      Physical Exam  HENT:      Head: Normocephalic.      Nose: Nose normal.      Mouth/Throat:      Mouth: Mucous membranes are moist.   Eyes:      Pupils: Pupils are equal, round, and reactive to light.   Neck:      Vascular: JVD present.   Cardiovascular:      Rate and Rhythm: Normal rate.  Rhythm irregular.      Comments: Permanent Atrial Fibrillation  Pulmonary:      Effort: Pulmonary effort is normal.      Breath sounds: Normal breath sounds.   Abdominal:      Palpations: Abdomen is soft.   Musculoskeletal:         General: Normal range of motion.      Cervical back: Normal range of motion.   Skin:     Capillary Refill: Capillary refill takes less than 2 seconds.   Neurological:      Mental Status: He is alert and oriented to person, place, and time.   Psychiatric:         Mood and Affect: Mood normal.         Behavior: Behavior normal.         Thought Content: Thought content normal.         Judgment: Judgment normal.         DATA:  CMP:  Results from last 7 days   Lab Units 10/31/24  1249 10/31/24  0509 10/30/24  0131 10/29/24  0454 10/28/24  0919 10/28/24  0618 10/27/24  0141 10/26/24  1503 10/26/24  0513 10/25/24  1740 10/25/24  1221 10/24/24  2328   SODIUM mmol/L 134* 135* 133* 135* 134* 135* 131* 135* 133* 134*   < > 134*   POTASSIUM mmol/L 4.1 4.9 4.0 4.6 3.9 4.4 4.1 4.4 4.5 3.3*   < > 4.6   CHLORIDE mmol/L 100 101 102 102 100 98 94* 95* 95* 93*   < > 90*   CO2 mmol/L 24 25 23 22 25 24 25 26 24 29   < > 30   ANION GAP mmol/L 14 14 12 16 13 17 16 18 19 15   < > 19   BUN mg/dL 29* 30* 33* 32* 34* 33* 38* 35* 33* 31*   < > 28*   CREATININE mg/dL 1.29 1.35* 1.43* 1.49* 1.68* 1.61* 2.05* 2.01* 2.03* 2.22*   < > 1.88*   EGFR mL/min/1.73m*2 60* 57* 53* 50* 44* 46* 34* 35* 35* 31*   < > 38*   MAGNESIUM mg/dL  --  2.37 2.27 2.49* 2.56* 2.65* 2.44*  --  2.49*  --   --  2.22   ALBUMIN g/dL 4.3 4.1 4.0 4.2 4.1 4.3 4.4 4.4 4.2 4.4   < > 4.4   ALT U/L 14  --   --   --   --   --   --   --   --   --   --  13   AST U/L 16  --   --   --   --   --   --   --   --   --   --  15   BILIRUBIN TOTAL mg/dL 1.4*  --   --   --   --   --   --   --   --   --   --  2.3*    < > = values in this interval not displayed.     CBC:  Results from last 7 days   Lab Units 10/31/24  0509 10/30/24  0131 10/29/24  0454 10/28/24  0919  10/28/24  0618 10/27/24  0141 10/26/24  0513 10/24/24  2328   WBC AUTO x10*3/uL 10.9 11.1 10.7 9.6 9.4 10.9 11.6* 14.4*   HEMOGLOBIN g/dL 14.5 14.7 15.3 15.9 15.6 15.5 16.0 17.5   HEMATOCRIT % 43.6 44.8 46.0 46.8 46.6 46.9 45.5 50.8   PLATELETS AUTO x10*3/uL 527* 509* 594* 584* 545* 616* 614* 742*   MCV fL 82 82 82 81 82 81 77* 79*     COAG:   Results from last 7 days   Lab Units 10/31/24  1249 10/31/24  0509 10/30/24  0131 10/29/24  0454 10/28/24  0919 10/28/24  0618 10/27/24  0141 10/26/24  0513   INR  1.3* 1.2* 1.3* 1.3* 1.4* 1.5* 1.9* 2.1*     ABO:   ABO TYPE   Date Value Ref Range Status   10/31/2024 A  Final     HEME/ENDO:  Results from last 7 days   Lab Units 10/31/24  1249 10/24/24  2328   FERRITIN ng/mL  --  76   IRON SATURATION %  --  17*   TSH mIU/L 4.39* 8.47*   HEMOGLOBIN A1C %  --  6.3*      CARDIAC:   Results from last 7 days   Lab Units 10/31/24  1249 10/24/24  2328   LD U/L 187  --    BNP pg/mL 755* 1,995*       ASSESSMENT AND PLAN:     Mr. Meraz is a 69 M with a PMHx sig for CAD s/p 4V CABG (2012) and PCI (LCx/OM; 5/2019), stage D systolic HF/ICM/HFrEF with LVEF 10-15%( 10/22/24 TTE), AF s/p RFA (PVI and CTI; 4/2024) on OAC therapy, HTN, dyslipidemia, depression, anxiety and VIV using CPAP who was admitted to OSH ICU, s/p RHC on 10/18/24.  Per patient, he had been experienced worsening SOB and fluids overload for 2-3 weeks. Patient was on milrinone drip and diurese with SG guided. However his KENYA worsening, and not able to wean milrinone drip. Decision was made to transfer him to HF ICU [facility] for possible advanced therapy consideration. 10/26: Tolerated afterload reduction with hydralazine / Isordil. 10/27: Discontinued hydralazine / Isordil and initiated 24 / 26 mg Entresto BID and tolerating. 10/28: Coronary angiogram: Right dominant system; LM: Mild 20% distal disease; LAD: Occluded with Patent LIMA; RI: Occluded; Lcx: Patent proximal Lcx stent and OM1 stent, otherwise mild non-obstructive  disease; RCA: proximal 100% occlusion. Grafts: LIMA-LAD: Patent; SVG-OM, SVG-RCA: Occluded; SVG-RI: Unable to engage but likely occluded. 10/29: Entresto discontinued and hydral / Isordil initiated 2/2 AHF therapies evaluation. 10/30: Email sent to initiate advanced heart failure evaluation. Did not tolerate down-titration of Milrinone gtt from 0.375 mcg/kg/min to 0.25 mcg/kg/min.  Increased back to 0.375 mcg/kg/min 2/2 poor cardiac indices. 10/31: Up-titrating hydralazine / Isordil for afterload reduction.     Neuro:     # Hx of Depression/Anxiety  - Serial neuro and pain assessments  - PO Tylenol PRN for pain  - PT/OT Consult, OOB to chair  - CAM ICU score every shift  - Sleep/wake cycle normalization  - c/w home Lexapro and Clonazepam     # Physical Status  -Overweight:  BMI: 27.9   -Reduced Mobility: acute decompensated heart failure and ICU stay      #Substance abuse  -Alcohol abuse/Alcohol dependence: NO   -Tobacco use/Nicotine Dependence: NO      Cardiovascular:     # Stage D, ICM, decompensated acute on chronic heart failure,  HFrEF 10-15% ( from 10/22/24 ECHO)   #Hx of CAD s/p 4V CABG 2012     -BRIANA (3/29/23): Left Ventricle: Moderately reduced left ventricular systolic function with a visually estimated EF of 35 - 40%.  - TTE (10/22/24): LVEF 10 -15%, right ventricle moderately dilated, moderately reduced systolic function. Mild MR  - admit weight (10/24): 93.5 KG   - admit BNP (10/24):  1995  - Home meds include Metoprolol succinate 25 mg daily, Entresto 97/103 mg bid, Hydralazine 25 mg bid, Isosorbide mononitrate 120 mg daily, Spironolactone 25 mg daily, Bumex 0.5 mg daily----allergy to SGLT2.  - ASA  - C/w Milrinone drip @ 0.375 mcg/kg/min, initiated OSH  - SGC # on arrival (10/25): Bp 96/50 (65), CVP 5, PA 36/20 (22), PCWP 18, CO/CI 4.4/2.1, SVR 1083, SVO2 65 % on Milrinone drip at 0.625 mcg/kg/min, hydral 25 mg TID.   - Daily hemodynamics: 10/29: B/P: 102/72 (81); CVP 9, PAP: 35/17 (23); SVR: 1116; CI:  2.4/ CO: 5.2; SvO2: 71% on IV Milrinone 0.375 mcg/kg/min; PO Entresto 24/26 mg bid; PO Spironolactone 25 mg daily  - Continue Milrinone drip at 0.375 mcg/kg/min - Inotrope dependent   - Bumex 1 mg IV x 1 today (10/31)  - Stopped entresto 24/26 mg BID as likely will be going advanced therapy eval.   - Increase hydralzine to 50 mg TID  - Increase Isordil to 20 mg TID   - Discontinue dapagliflozin 10 mg daily in preparation for AHF therapies (LVAD / OHT)   - C/w Rodrigo 25 mg daily for now    - Hold BB   - Daily standing weights, 2gm sodium diet, 2L fluid restriction, strict I&Os  - Coronary angiogram multidisciplinary discussion, including CT surgery, no revascularization options.  - Advanced Heart Failure Therapies Evaluation initiated 10/30/2024     LVAD AND Transplant:   -Email sent on 10/30/2024 - consent on 10/31/2024  -Labs:      10/31/2024    -RHC:       10/25/2024  -LHC     10/25/2024  -CPET     NA  -ECHO     10/25/2024  -CT chest    10/31/2024  -US abdomen/aorta/iliac/IVC  PENDING  -Carotid US    PENDING  -SITA (lower)    PENDING  -2 view CXR:     PENDING  -Device interrogation   NA - No device  -PFTs:      Scheduled for 11/01/2024  -Colonoscopy/Occult stool  Last completed per OSH records: 12/01/2015 - Consult placed 10/31/2024  -LVAD/TX education:    10/30/2024  -CT surgery consult   Not completed  -Palliative consult   Consult placed 10/31/2024  -Nutrition consult    Consult placed 10/31/2024  -Dental consult/Orthopantogram  Consult placed / Orthopantogram ordered & PENDING  -Physical and Occupational Therapy  Following       #CAD   -C (12/20/2020):  Successful PCI of the SVG to ramus intermedius with one drug-eluting stent; OCT of the SVG to RCA to confirm no thrombosis; patent LAD; occluded SVG to OM.  -Meds: C/w ASA.     # Hx of AFib s/p RFA (PVI and CTI; 4/2024)  -NO Device noted  -Was on home Coumadin for 2 months, prior to that was on Eliquis,  patient reported with Dizziness while on Eliquis      #  Dizziness/bradycardia  -Had 3 episodes of near syncope after his recent AF RFA; has stopped driving as a result. ECG with sinus olu. Metoprolol previously decreased by his local EP.    -Hold BB due to decompensate HF      #Electrolyte Disturbances  -Keep K>4, Mag >2     Pulmonary:   # PMH of VIV  -c/w home CPAP  -Monitor and maintain SpO2 > 92%     GI:  # H/x of  GERD  - Bowel regimen: Senna, Miralax PRN  - PPI     #  Bilirubinemia  - T maryuri (10/24): 2.3, ALT, AST: normal   - CT C/A/P (10/17/24): Hepatomegaly and hepatic steatosis are present. The liver has a lobulated contour. A questionable lesion in the right hepatic lobe inferiorly measures 26 mm.     # Urinary retention  - CT C/A/P (10/17/24): the prostate gland is mildly enlarged with mass effect on the posterior wall of the urinary bladder. The prostate gland measures 4.3 x 6.0 cm.   - C/o urinary difficulty, s/p FC OSH --> Removed prior admission        :  #KENYA/ CKD ?  in the setting of cardio-renal syndrome  -Baseline BUN/Cr: Unclear,  OSH ICU: Creatinine: 1.3-1.6   -Admit BUN/Cr (10/24): 28/1.88  -Daily BUN/Creat: 10/31: 30 / 1.35   -I/Os  -avoid hypotension and nephrotoxic agents     Heme:  #Anemia in the setting of iron deficiency  - Lab (10/24): H/H: 17.5/50.8,  Iron study: 63/374/17%, Ferritin: 76,  folate: 23.5, B12: 768   - NO iron replacement needed as H/H high     #Coagulopathy due to on home Coumadin  - INR  (10/30): 1.3, Hold home Coumadin   - Restarted Heparin gtt 10/29     Endo:  #DM  - Euglycemic  - hgbA1c (10/24):  6.3     #Thyroid  -TSH (10/24):  8.47, T4: 1.44      ID:  # leukocytosis - Resolved  - WBC (10/26):  9.6   -afebrile, nontoxic   -trend temps q4h       PHYSICAL AND OCCUPATIONAL THERAPY: Ordered    LINES:  PIVs   A-line (OSH) 10/20 - current  Right Subclavian Cordis / PA catheter (OSH) 10/18 - 10/28  Right subclavian cordis: Previously placed swan sheath was exchanged to a new swan sheath over a short Jwire and was sutured in  place. 10/28 - current    DVT: INR: 1.3 (10/30) (home Warfarin now held) - Re-Initiated heparin gtt 10/29  VAP BUNDLE: NA  CENTRAL LINE BUNDLE: Ordered   ULCER PPX: Pantoprazole 40 mg daily  GLYCEMIC CONTROL: NA - A1C: 6.3  BOWEL CARE: Senna / Miralax  INDWELLING CATHETER: NA  NUTRITION: Adult diet Cardiac; 70 gm fat; 2 - 3 grams Sodium      EMERGENCY CONTACT: Extended Emergency Contact Information  Primary Emergency Contact: Tanvi Meraz  Home Phone: [phone]  Relation: Spouse  FAMILY UPDATE:   CODE STATUS: Full Code  DISPO: Maintain in the HFICU    Patient seen and assessed with Dr. Lomas    I personally spent 60 minutes of critical care time directly and personally managing the patient exclusive of separately billable procedures   _________________________________________________  Shon Polanco, APRN-CNP

## 2024-10-31 NOTE — PROGRESS NOTES
HFICU Attending Note    Principal Problem:    Acute on chronic heart failure with reduced ejection fraction and diastolic dysfunction  Active Problems:    KENYA (acute kidney injury) (CMS-HCC)    History of chronic atrial fibrillation    Ischemic cardiomyopathy    GERD (gastroesophageal reflux disease)    Leukocytosis    Heart failure, ACC/AHA stage D    Obstructive sleep apnea    Worsening systolic function in a patient with known Ischemic CM   LHC showing occluded grafts , no revascularization options   On milrinone at 0.375 with acceptable hemodynamics   Will be presented for LVAD /transplantation consideration on Tuesday , he is agreable to be considered for both   Conversation occurred with his family at his bedside           This critically ill patient continues to be at-risk for clinically significant deterioration / failure due to the above mentioned dysfunctional, unstable organ systems.  I have personally identified and managed all complex critical care issues to prevent aforementioned clinical deterioration.  Critical care time is spent at bedside and/or the immediate area and has included, but is not limited to, the review of diagnostic tests, labs, radiographs, serial assessments of hemodynamics, respiratory status, ventilatory management, and family updates.  Time spent in procedures and teaching are reported separately.    Critical care time: 35____ minutes

## 2024-11-01 ENCOUNTER — APPOINTMENT (OUTPATIENT)
Dept: RESPIRATORY THERAPY | Facility: HOSPITAL | Age: 69
End: 2024-11-01
Payer: MEDICARE

## 2024-11-01 ENCOUNTER — APPOINTMENT (OUTPATIENT)
Dept: RADIOLOGY | Facility: HOSPITAL | Age: 69
DRG: 001 | End: 2024-11-01
Payer: MEDICARE

## 2024-11-01 LAB
ALBUMIN SERPL BCP-MCNC: 4.1 G/DL (ref 3.4–5)
ALBUMIN SERPL BCP-MCNC: 4.1 G/DL (ref 3.4–5)
ANION GAP BLDMV CALCULATED.4IONS-SCNC: 10 MMO/L (ref 10–25)
ANION GAP BLDMV CALCULATED.4IONS-SCNC: 10 MMO/L (ref 10–25)
ANION GAP BLDMV CALCULATED.4IONS-SCNC: 11 MMO/L (ref 10–25)
ANION GAP BLDMV CALCULATED.4IONS-SCNC: 11 MMO/L (ref 10–25)
ANION GAP BLDMV CALCULATED.4IONS-SCNC: 9 MMO/L (ref 10–25)
ANION GAP SERPL CALC-SCNC: 14 MMOL/L (ref 10–20)
ANION GAP SERPL CALC-SCNC: 15 MMOL/L (ref 10–20)
APTT PPP: 61 SECONDS (ref 27–38)
BASE EXCESS BLDMV CALC-SCNC: -1.2 MMOL/L (ref -2–3)
BASE EXCESS BLDMV CALC-SCNC: -1.2 MMOL/L (ref -2–3)
BASE EXCESS BLDMV CALC-SCNC: -1.7 MMOL/L (ref -2–3)
BASE EXCESS BLDMV CALC-SCNC: -2.5 MMOL/L (ref -2–3)
BASE EXCESS BLDMV CALC-SCNC: 0.1 MMOL/L (ref -2–3)
BODY TEMPERATURE: 37 DEGREES CELSIUS
BUN SERPL-MCNC: 28 MG/DL (ref 6–23)
BUN SERPL-MCNC: 29 MG/DL (ref 6–23)
CA-I BLDMV-SCNC: 1.17 MMOL/L (ref 1.1–1.33)
CA-I BLDMV-SCNC: 1.18 MMOL/L (ref 1.1–1.33)
CA-I BLDMV-SCNC: 1.22 MMOL/L (ref 1.1–1.33)
CA-I BLDMV-SCNC: 1.26 MMOL/L (ref 1.1–1.33)
CA-I BLDMV-SCNC: 1.27 MMOL/L (ref 1.1–1.33)
CALCIUM SERPL-MCNC: 9.2 MG/DL (ref 8.6–10.6)
CALCIUM SERPL-MCNC: 9.3 MG/DL (ref 8.6–10.6)
CHLORIDE BLD-SCNC: 100 MMOL/L (ref 98–107)
CHLORIDE BLD-SCNC: 101 MMOL/L (ref 98–107)
CHLORIDE BLD-SCNC: 101 MMOL/L (ref 98–107)
CHLORIDE BLD-SCNC: 102 MMOL/L (ref 98–107)
CHLORIDE BLD-SCNC: 99 MMOL/L (ref 98–107)
CHLORIDE SERPL-SCNC: 101 MMOL/L (ref 98–107)
CHLORIDE SERPL-SCNC: 101 MMOL/L (ref 98–107)
CO2 SERPL-SCNC: 21 MMOL/L (ref 21–32)
CO2 SERPL-SCNC: 23 MMOL/L (ref 21–32)
CREAT SERPL-MCNC: 1.24 MG/DL (ref 0.5–1.3)
CREAT SERPL-MCNC: 1.3 MG/DL (ref 0.5–1.3)
EGFRCR SERPLBLD CKD-EPI 2021: 59 ML/MIN/1.73M*2
EGFRCR SERPLBLD CKD-EPI 2021: 63 ML/MIN/1.73M*2
ERYTHROCYTE [DISTWIDTH] IN BLOOD BY AUTOMATED COUNT: 16.7 % (ref 11.5–14.5)
GLUCOSE BLD-MCNC: 109 MG/DL (ref 74–99)
GLUCOSE BLD-MCNC: 117 MG/DL (ref 74–99)
GLUCOSE BLD-MCNC: 122 MG/DL (ref 74–99)
GLUCOSE BLD-MCNC: 127 MG/DL (ref 74–99)
GLUCOSE BLD-MCNC: 127 MG/DL (ref 74–99)
GLUCOSE SERPL-MCNC: 111 MG/DL (ref 74–99)
GLUCOSE SERPL-MCNC: 92 MG/DL (ref 74–99)
HCO3 BLDMV-SCNC: 21.7 MMOL/L (ref 22–26)
HCO3 BLDMV-SCNC: 22.7 MMOL/L (ref 22–26)
HCO3 BLDMV-SCNC: 23 MMOL/L (ref 22–26)
HCO3 BLDMV-SCNC: 23.6 MMOL/L (ref 22–26)
HCO3 BLDMV-SCNC: 24.7 MMOL/L (ref 22–26)
HCT VFR BLD AUTO: 41.6 % (ref 41–52)
HCT VFR BLD EST: 42 % (ref 41–52)
HCT VFR BLD EST: 42 % (ref 41–52)
HCT VFR BLD EST: 43 % (ref 41–52)
HCT VFR BLD EST: 43 % (ref 41–52)
HCT VFR BLD EST: 45 % (ref 41–52)
HCV RNA SERPL NAA+PROBE-ACNC: NOT DETECTED K[IU]/ML
HCV RNA SERPL NAA+PROBE-LOG IU: NORMAL {LOG_IU}/ML
HGB BLD-MCNC: 13.9 G/DL (ref 13.5–17.5)
HGB BLDMV-MCNC: 13.9 G/DL (ref 13.5–17.5)
HGB BLDMV-MCNC: 14 G/DL (ref 13.5–17.5)
HGB BLDMV-MCNC: 14.2 G/DL (ref 13.5–17.5)
HGB BLDMV-MCNC: 14.2 G/DL (ref 13.5–17.5)
HGB BLDMV-MCNC: 15 G/DL (ref 13.5–17.5)
HLA RESULTS: NORMAL
HLA-A+B+C AB NFR SER: NORMAL %
HLA-DP+DQ+DR AB NFR SER: NORMAL %
HOLD SPECIMEN: NORMAL
INHALED O2 CONCENTRATION: 21 %
INR PPP: 1.3 (ref 0.9–1.1)
LACTATE BLDMV-SCNC: 0.5 MMOL/L (ref 0.4–2)
LACTATE BLDMV-SCNC: 0.7 MMOL/L (ref 0.4–2)
LACTATE BLDMV-SCNC: 0.7 MMOL/L (ref 0.4–2)
LACTATE BLDMV-SCNC: 0.8 MMOL/L (ref 0.4–2)
LACTATE BLDMV-SCNC: 0.9 MMOL/L (ref 0.4–2)
MAGNESIUM SERPL-MCNC: 1.96 MG/DL (ref 1.6–2.4)
MAGNESIUM SERPL-MCNC: 2.11 MG/DL (ref 1.6–2.4)
MCH RBC QN AUTO: 27.2 PG (ref 26–34)
MCHC RBC AUTO-ENTMCNC: 33.4 G/DL (ref 32–36)
MCV RBC AUTO: 81 FL (ref 80–100)
NRBC BLD-RTO: 0 /100 WBCS (ref 0–0)
OXYHGB MFR BLDMV: 65.3 % (ref 45–75)
OXYHGB MFR BLDMV: 68.1 % (ref 45–75)
OXYHGB MFR BLDMV: 69 % (ref 45–75)
OXYHGB MFR BLDMV: 70.2 % (ref 45–75)
OXYHGB MFR BLDMV: 70.7 % (ref 45–75)
PCO2 BLDMV: 35 MM HG (ref 41–51)
PCO2 BLDMV: 35 MM HG (ref 41–51)
PCO2 BLDMV: 38 MM HG (ref 41–51)
PCO2 BLDMV: 39 MM HG (ref 41–51)
PCO2 BLDMV: 39 MM HG (ref 41–51)
PH BLDMV: 7.39 PH (ref 7.33–7.43)
PH BLDMV: 7.39 PH (ref 7.33–7.43)
PH BLDMV: 7.4 PH (ref 7.33–7.43)
PH BLDMV: 7.41 PH (ref 7.33–7.43)
PH BLDMV: 7.42 PH (ref 7.33–7.43)
PHOSPHATE SERPL-MCNC: 3.5 MG/DL (ref 2.5–4.9)
PHOSPHATE SERPL-MCNC: 3.5 MG/DL (ref 2.5–4.9)
PLATELET # BLD AUTO: 511 X10*3/UL (ref 150–450)
PO2 BLDMV: 39 MM HG (ref 35–45)
PO2 BLDMV: 41 MM HG (ref 35–45)
PO2 BLDMV: 42 MM HG (ref 35–45)
PO2 BLDMV: 42 MM HG (ref 35–45)
PO2 BLDMV: 43 MM HG (ref 35–45)
POTASSIUM BLDMV-SCNC: 3.7 MMOL/L (ref 3.5–5.3)
POTASSIUM BLDMV-SCNC: 4.2 MMOL/L (ref 3.5–5.3)
POTASSIUM BLDMV-SCNC: 4.3 MMOL/L (ref 3.5–5.3)
POTASSIUM BLDMV-SCNC: 4.4 MMOL/L (ref 3.5–5.3)
POTASSIUM BLDMV-SCNC: 4.6 MMOL/L (ref 3.5–5.3)
POTASSIUM SERPL-SCNC: 4.3 MMOL/L (ref 3.5–5.3)
POTASSIUM SERPL-SCNC: 4.3 MMOL/L (ref 3.5–5.3)
PROTHROMBIN TIME: 15.2 SECONDS (ref 9.8–12.8)
RBC # BLD AUTO: 5.11 X10*6/UL (ref 4.5–5.9)
SAO2 % BLDMV: 68 % (ref 45–75)
SAO2 % BLDMV: 70 % (ref 45–75)
SAO2 % BLDMV: 71 % (ref 45–75)
SAO2 % BLDMV: 72 % (ref 45–75)
SAO2 % BLDMV: 73 % (ref 45–75)
SODIUM BLDMV-SCNC: 129 MMOL/L (ref 136–145)
SODIUM BLDMV-SCNC: 129 MMOL/L (ref 136–145)
SODIUM BLDMV-SCNC: 130 MMOL/L (ref 136–145)
SODIUM SERPL-SCNC: 133 MMOL/L (ref 136–145)
SODIUM SERPL-SCNC: 134 MMOL/L (ref 136–145)
UFH PPP CHRO-ACNC: 0.3 IU/ML
WBC # BLD AUTO: 11.6 X10*3/UL (ref 4.4–11.3)

## 2024-11-01 PROCEDURE — 97116 GAIT TRAINING THERAPY: CPT | Mod: GP

## 2024-11-01 PROCEDURE — 85027 COMPLETE CBC AUTOMATED: CPT

## 2024-11-01 PROCEDURE — 71045 X-RAY EXAM CHEST 1 VIEW: CPT | Performed by: RADIOLOGY

## 2024-11-01 PROCEDURE — 86832 HLA CLASS I HIGH DEFIN QUAL: CPT

## 2024-11-01 PROCEDURE — 2500000004 HC RX 250 GENERAL PHARMACY W/ HCPCS (ALT 636 FOR OP/ED)

## 2024-11-01 PROCEDURE — 84132 ASSAY OF SERUM POTASSIUM: CPT

## 2024-11-01 PROCEDURE — 94726 PLETHYSMOGRAPHY LUNG VOLUMES: CPT | Performed by: INTERNAL MEDICINE

## 2024-11-01 PROCEDURE — 76770 US EXAM ABDO BACK WALL COMP: CPT

## 2024-11-01 PROCEDURE — 84132 ASSAY OF SERUM POTASSIUM: CPT | Performed by: NURSE PRACTITIONER

## 2024-11-01 PROCEDURE — 83735 ASSAY OF MAGNESIUM: CPT

## 2024-11-01 PROCEDURE — 97130 THER IVNTJ EA ADDL 15 MIN: CPT | Mod: GO

## 2024-11-01 PROCEDURE — 2500000001 HC RX 250 WO HCPCS SELF ADMINISTERED DRUGS (ALT 637 FOR MEDICARE OP)

## 2024-11-01 PROCEDURE — 2500000001 HC RX 250 WO HCPCS SELF ADMINISTERED DRUGS (ALT 637 FOR MEDICARE OP): Performed by: NURSE PRACTITIONER

## 2024-11-01 PROCEDURE — 2500000004 HC RX 250 GENERAL PHARMACY W/ HCPCS (ALT 636 FOR OP/ED): Performed by: NURSE PRACTITIONER

## 2024-11-01 PROCEDURE — 71045 X-RAY EXAM CHEST 1 VIEW: CPT

## 2024-11-01 PROCEDURE — 97530 THERAPEUTIC ACTIVITIES: CPT | Mod: GO

## 2024-11-01 PROCEDURE — 85520 HEPARIN ASSAY: CPT

## 2024-11-01 PROCEDURE — 85610 PROTHROMBIN TIME: CPT

## 2024-11-01 PROCEDURE — 99497 ADVNCD CARE PLAN 30 MIN: CPT

## 2024-11-01 PROCEDURE — 94729 DIFFUSING CAPACITY: CPT | Performed by: INTERNAL MEDICINE

## 2024-11-01 PROCEDURE — 83735 ASSAY OF MAGNESIUM: CPT | Performed by: NURSE PRACTITIONER

## 2024-11-01 PROCEDURE — 37799 UNLISTED PX VASCULAR SURGERY: CPT

## 2024-11-01 PROCEDURE — 99291 CRITICAL CARE FIRST HOUR: CPT | Performed by: NURSE PRACTITIONER

## 2024-11-01 PROCEDURE — 99291 CRITICAL CARE FIRST HOUR: CPT | Performed by: INTERNAL MEDICINE

## 2024-11-01 PROCEDURE — 2020000001 HC ICU ROOM DAILY

## 2024-11-01 PROCEDURE — 80069 RENAL FUNCTION PANEL: CPT

## 2024-11-01 PROCEDURE — 2500000002 HC RX 250 W HCPCS SELF ADMINISTERED DRUGS (ALT 637 FOR MEDICARE OP, ALT 636 FOR OP/ED): Performed by: NURSE PRACTITIONER

## 2024-11-01 PROCEDURE — 97129 THER IVNTJ 1ST 15 MIN: CPT | Mod: GO

## 2024-11-01 PROCEDURE — 82810 BLOOD GASES O2 SAT ONLY: CPT | Performed by: NURSE PRACTITIONER

## 2024-11-01 PROCEDURE — 99498 ADVNCD CARE PLAN ADDL 30 MIN: CPT

## 2024-11-01 PROCEDURE — 94010 BREATHING CAPACITY TEST: CPT | Performed by: INTERNAL MEDICINE

## 2024-11-01 PROCEDURE — 76770 US EXAM ABDO BACK WALL COMP: CPT | Performed by: RADIOLOGY

## 2024-11-01 PROCEDURE — 94726 PLETHYSMOGRAPHY LUNG VOLUMES: CPT

## 2024-11-01 PROCEDURE — 99222 1ST HOSP IP/OBS MODERATE 55: CPT | Performed by: PATHOLOGY

## 2024-11-01 PROCEDURE — 99223 1ST HOSP IP/OBS HIGH 75: CPT

## 2024-11-01 PROCEDURE — 2500000004 HC RX 250 GENERAL PHARMACY W/ HCPCS (ALT 636 FOR OP/ED): Performed by: STUDENT IN AN ORGANIZED HEALTH CARE EDUCATION/TRAINING PROGRAM

## 2024-11-01 PROCEDURE — 97110 THERAPEUTIC EXERCISES: CPT | Mod: GP

## 2024-11-01 RX ORDER — SODIUM CHLORIDE, SODIUM LACTATE, POTASSIUM CHLORIDE, CALCIUM CHLORIDE 600; 310; 30; 20 MG/100ML; MG/100ML; MG/100ML; MG/100ML
10 INJECTION, SOLUTION INTRAVENOUS CONTINUOUS
Status: DISPENSED | OUTPATIENT
Start: 2024-11-01 | End: 2024-11-02

## 2024-11-01 RX ORDER — HYDRALAZINE HYDROCHLORIDE 50 MG/1
50 TABLET, FILM COATED ORAL 3 TIMES DAILY
Status: DISCONTINUED | OUTPATIENT
Start: 2024-11-01 | End: 2024-11-01

## 2024-11-01 RX ORDER — LANOLIN ALCOHOL/MO/W.PET/CERES
400 CREAM (GRAM) TOPICAL DAILY
Status: DISCONTINUED | OUTPATIENT
Start: 2024-11-01 | End: 2024-11-01

## 2024-11-01 RX ORDER — BUMETANIDE 0.25 MG/ML
2 INJECTION, SOLUTION INTRAMUSCULAR; INTRAVENOUS ONCE
Status: COMPLETED | OUTPATIENT
Start: 2024-11-01 | End: 2024-11-01

## 2024-11-01 RX ORDER — LANOLIN ALCOHOL/MO/W.PET/CERES
800 CREAM (GRAM) TOPICAL 2 TIMES DAILY
Status: DISCONTINUED | OUTPATIENT
Start: 2024-11-02 | End: 2024-11-12

## 2024-11-01 RX ORDER — HYDRALAZINE HYDROCHLORIDE 50 MG/1
50 TABLET, FILM COATED ORAL 3 TIMES DAILY
Status: DISCONTINUED | OUTPATIENT
Start: 2024-11-01 | End: 2024-11-02

## 2024-11-01 ASSESSMENT — COGNITIVE AND FUNCTIONAL STATUS - GENERAL
MOBILITY SCORE: 21
DAILY ACTIVITIY SCORE: 20
TOILETING: A LITTLE
HELP NEEDED FOR BATHING: A LITTLE
DRESSING REGULAR LOWER BODY CLOTHING: A LOT
TOILETING: A LITTLE
CLIMB 3 TO 5 STEPS WITH RAILING: A LOT
MOVING TO AND FROM BED TO CHAIR: A LITTLE
DAILY ACTIVITIY SCORE: 19
DRESSING REGULAR UPPER BODY CLOTHING: A LITTLE
HELP NEEDED FOR BATHING: A LITTLE
DRESSING REGULAR LOWER BODY CLOTHING: A LITTLE
DRESSING REGULAR UPPER BODY CLOTHING: A LITTLE

## 2024-11-01 ASSESSMENT — PAIN SCALES - GENERAL
PAINLEVEL_OUTOF10: 0 - NO PAIN

## 2024-11-01 ASSESSMENT — PAIN - FUNCTIONAL ASSESSMENT
PAIN_FUNCTIONAL_ASSESSMENT: 0-10

## 2024-11-01 NOTE — PROGRESS NOTES
"ENCOUNTER    Visit Type Initial Visit  Location: [facility] - HFICU Bed 11    Barriers to Communication / Understanding: none    Accompanied By: wife Courtney, sister Chitra + her /Pt's KEVIN    Organ For Transplant: heart    Device For Implant: VAD    Ethnicity:  White    ADLs: independent    Instrumental ADLs: independent    Level of Activity: active, but would like to be more active, difficulty with stairs    DME: previously used CPAP, but lost a significant amount of weight and no longer needed it for ~ last 1 year    Knowledge of Health: hx of melanoma 14+ years ago    Why Do You Have End Stage Organ Disease: heart is \"wore out\", had open heart surgery 2012     Knowledge of Transplant / VAD:  Yes Patient Is Able To Make An Informed Decision    Patient Understands the Risks of Transplant / VAD  Yes Rejection  Yes Infection Yes Complications  Yes Death      Patient Understands Recovery and Follow-Up from Transplant / VAD  Yes Length Of State Yes Appointments  Yes Labs  Yes Rehabilitation    Patient Has Identified Goals of Transplant / VAD: \"get up and get back at it\"    Overall Compliance     Compliance With Medications: takes about 9 total  Managed By: weekly pillbox, wife was helping him with it and then eventually just took it over   Understanding Of Medication: good, wife knows for sure  Compliance With Appointments: gets text, puts on calendar for wife   How Does Patient Handle Health Problems: wait and see, go to ER if bad    Heart    Cardiac Rehab: completed ~ 2012     Fluid Restriction: 1.5L, sways from time to time     Anticoagulation Service: coumadin clinic [place], usually once a week    SOCIAL HISTORY    : no     Education: 12th grade    Literate: yes    Computer literate: yes, basics    Internet access: yes    Sources of Income: social security/family business    Patient's Current Employment    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, hard physical labor, 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, now " "does more errand running, office type tasks    Will patient have paid status from employment during recovery: yes    Spouse / SO Current Employment: retired from sales field, also receiving social security    Will spouse / SO have paid status from employment during recovery: receiving social security    Other Sources of Income: N/A    Does patient have financial concerns: no     Is patient able to meet current monthly expenses: yes    Resources: not receiving any SNAP, Section 8, Utility assist    Patient was provided information on transplant fundraising: Yes    Insurance: Medicare + Aetna supplement, had it for a while, no anticipated changes    FAMILY SYSTEM    : yes  How Lon years    Describe Relationship: positive  Spouse / SO Name: Courtney  Age: 70   Health: no issues  Other Caregiver Responsibilities: none, just has grandkids at their house when they want him   Spouse / Significant others reaction to donation: whatever he wants, she'll support it, \"he's the one that has to live with it\"    Children:  Oldest son Aj - 49 - works for [employer] - 1 hour from Pt's house- least contact, will be here tomorrow, stay through Thursday   [facility] 43 - few miles away - works - pretty regular contact  Nafisa - male - took over [employer] business - lives on 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, age 37   Giulia manuel - age 34 - works as manager [employer] home in [place] - has one daughter  MELVIN Alcaraz ( to their son Nafisa) - works in hospital     Parents:  Raised By Both Biological Parents, both     Siblings:  Chitra (and her ) at bedside - 3rd youngest -age 64 retired in 2018, live 1/2 mile from Pt, live on same road - no other major caregiving, normally drive, ride bike    Amauri 63 - just had heart transplant earlier this year also at   Sister Toshia youngest - 61   Viry \"hospital groupie\" - 66   Oldest Lindsay just turned - 70  All live in same town     Support & Recovery Plan:  Yes " Adequate    Primary Support:  Name: Courtney  Phone: [phone]   Age: 70  Relationship to Patient: wife  If employed, can they take time off work: N/A, retired  If so, is it paid time off: receiving social security  If not, will this impact your finances: N/A, no changes  Did they attend education classes: yes  Do they have other caregiver responsibilities (child or eldercare): no, watches grandkids sometimes but only when they want to  Do they have their own conditions which may prevent them from providing care for you (Medical, psychological, physical limitations): no  Are they available on short notice: yes, lives with Pt  Are they reliable: yes  Are they responsible: yes  Are they able to understand and process new information: yes  Do they have reliable transportation or will you allow them to use your vehicle: yes, drives and has her own personal vehicle  Are they currently involved in your care: yes    Secondary Support:  Name: Giulia Connolly  Phone: [phone]   Age: 34  Relationship to Patient: daughter  If employed, can they take time off work: yes, 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  If so, is it paid time off: some  If not, will this impact your finances: no  Did they attend education classes: no  Do they have other caregiver responsibilities (child or eldercare): yes, 1 daughter, but her  also assists  Do they have their own conditions which may prevent them from providing care for you (Medical, psychological, physical limitations): no  Are they available on short notice: yes  Are they reliable: yes  Are they responsible: yes  Are they able to understand and process new information: yes  Do they have reliable transportation or will you allow them to use your vehicle: yes, has DL and her own vehicle  Are they currently involved in your care: yes, some  Comments    Housing:  Adequate: owns home since 2010  Type of Home: 1 step to enter, 2 story, bedroom downstairs, bathroom downstairs  [facility]  Pets: Lots of outside coondogs, training dogs  Does Patient Feel Safe in Home: yes    Transportation:  Adequate  # Licensed Drivers in the Home: 2 - Pt and wife  Does Patient Drive: yes  # Reliable Vehicles: 2+  Does Patient use Public Transportation: no  Does Patient use Medical Transportation: no    MENTAL HEALTH  The patient reports their mood: states no formal dx, but has depression, on Lexapro, was off for a while, back on it ~ 1 year now, thinks it helps him    Reported Mental Health Diagnosis:   Family History of Mental Health Concerns: none known  What are patient psychosocial stressors: just health     Cognition: always had trouble with names, some brain fog lately, nothing concerning or significant to Pt/wife    Mental Health Meds  Sleep Meds: not anymore  Pain Meds: none  OTC Meds: baby aspirin  Past Medications: got dizzy with one, changed it up, better - on Eliquis     Counseling: no, but stated if needed it, he would probably go     IOP: none  Has patient ever been hospitalized for mental health reasons No    Suicide Assessment:  History of Suicide Ideation: no  History of Suicide Attempts: no  History of Suicidal Ideation in the past 3 months: no     Patient's Reported Trauma History: none    What are patient's coping behaviors: go out to the dog pen, hunting mushrooms, get out in the woods     Anabaptism / Spirituality: Shinto     Attitude toward interviewer: appropriate    Eye Contact Patient maintained good eye contact throughout appointment    Appearance The patient was neatly groomed, appropriately dressed and adequately nourished    Affect Appropriate    Thought Process Appropriate    Substance Use /Abuse History:     Current Tobacco User: NO  Former Tobacco User: used smokeless tobacco 15+ years ago    Current Alcohol User: yes, few beers on weekends  Former Alcohol User: yes, same as above, never daily, never requiring chemical dependency counseling    Current Illegal /  Unprescribed Drug User: NO  Former Illegal / Unprescribed Drug User: NO    Prescription Drug Abuse:  Has patient experienced feelings of addiction: NO  Has patient experienced symptoms of withdrawal: NO  Has patient experienced any side effects? e.g.  hallucinations or delusions: NO    LEGAL ISSUES:   None, reports no criminal hx    Citizenship: [place] citizen    Advance Directives:  States he has HPOA appointing wife, will bring to     IMPRESSION    Pt is 69 year old male being evaluated for heart transplant and LVAD on inpatient basis. Pt resides ~ 3 hrs from [facility] in [place], [place] with his wife Courtney of 41 years and many outside hunting dogs Pt trains as a hobby. 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 manual labor due to heart issues. Pt's wife is retired from a career in sales and both Pt and wife are receiving social security. Pt reports no financial concerns. Pt is insured via Medicare A with Aetna supplement with no anticipated changes going into 2025.    Pt's wife Courtney would be his primary support with 34 year-old daughter Giulia Mcdonald being secondary support. Jermaine Mcdonald works near Pt's home as a manager of a SNF with ability to take time off to assist Pt. Pt has 3 other adult children who all live locally including one son who is  to a nurse. Pt has 4 adult siblings all living within just a few miles of Pt and willing to assist including brother Amauri who underwent heart transplant earlier this year and is doing well.     Pt reports no formal mental health diagnosis, but that he does take Lexapro for depression and feels it helps him. Pt denied any history of counseling or psychiatry but inidcated willingness to participate if needed or was recommended. Pt has no history of hspitalizations related to mental health and denied any current or historic suicidal ideations. Pt's coping behaviors are spending  time outside including training his dogs, getting out in the woods, and hunting mushrooms. Pt denied any history of smoking cigarettes, but did use smokeless tobacco 15+ years ago, none currently. Pt describes current and fomer alcohol use as social in nature, having a few beers on the weekends at times, never daily or rising to level of requiring any substance abuse intervention. Pt denied ilicit substance use of any kind current or former. Pt denies any criminal hx.       PLAN    Pt is SIPAT 10 indicating good candidate for transplant and is a low psychosocial risk. Pt has robust support and good understanding of transplant and LVAD process, particularly in light of brother who recently underwent transplant. SW will continue to be available to Pt and family throughout evaluation process and present to selection committee when applicable.     JOE Rm  Transplant/LVAD

## 2024-11-01 NOTE — CONSULTS
[facility]   [facility]  INITIAL CONSULT NOTE     Consult requested by: Service: Heart failure    Reason for Consult: Requesting Colonoscopy     Admission Chief Complaint: shortness of breath and fluid overload      SUBJECTIVE     HPI: Fahad Meraz is a 69 y.o. male w/PMH of HFrEF (EF 10-15% 10/24), Afib s/p RFA on AC, CAD s/p 4V CABF (2012) and PCI (5/2019), HTN, HLD, Depression, and VIV on CPAP who is admitted for shortness of breath and volume overload.  GI service is consulted for colonoscopy evaluation.     Mr Meraz denies abdominal pain, change in bowel movements, melena, hematochezia, nausea, vomiting, fever, chills, dysphagia, odynophagia or jaundice. He has no family history of colon cancer.     Today, he is afebrile and HDS (on milrinone) with HR 80s and MAP 74. Pertinent labs include Na 134, K 4.3, Cr 1.3, Alk phos 76, ALT 14, AST 16, T bili 1.4, INR 1.3, WBC 11.6K, Hgb 13.9 and platelets 511K. CT a/p (OSH 10/17) reveals hepatomegaly and 26x26 mm right liver lesion.     ROS: Complete review of systems obtained, negative unless otherwise indicated above.     Allergies   Allergen Reactions    Eliquis [Apixaban] Dizziness    Jardiance [Empagliflozin] Dizziness     No past medical history on file.  Past Surgical History:   Procedure Laterality Date    CARDIAC CATHETERIZATION N/A 10/28/2024    Procedure: Left & Right Heart Cath w Angiography & LV;  Surgeon: Jed Lindsay MD;  Location: [place] [address] Cardiac Cath Lab;  Service: Cardiovascular;  Laterality: N/A;  Patient needs ischemia evaluation - Already has PA catheter in place     No family history on file.  Social History     Social History Narrative    Not on file     [unfilled]    Medications:  Scheduled medications  aspirin, 81 mg, oral, Daily  bumetanide, 2 mg, intravenous, Once  cholecalciferol, 50,000 Units, oral, Every Sunday  escitalopram, 10 mg, oral, Daily  hydrALAZINE, 25 mg, oral,  TID  isosorbide dinitrate, 20 mg, oral, TID  [Held by provider] metoprolol succinate XL, 50 mg, oral, Daily  pantoprazole, 40 mg, oral, Daily before breakfast  perflutren protein A microsphere, 0.5 mL, intravenous, Once in imaging  sennosides-docusate sodium, 2 tablet, oral, BID  spironolactone, 25 mg, oral, Daily  sulfur hexafluoride microsphr, 2 mL, intravenous, Once in imaging      Continuous medications  heparin, 0-4,000 Units/hr, Last Rate: 1,800 Units/hr (11/01/24 0400)  milrinone, 0.375 mcg/kg/min, Last Rate: 0.375 mcg/kg/min (11/01/24 0549)      PRN medications  PRN medications: alteplase, bisacodyl, nitroglycerin, oxygen, polyethylene glycol       EXAM     Vital signs:  [unfilled]    Physical Exam  Eyes:      General: No scleral icterus.     Pupils: Pupils are equal, round, and reactive to light.   Cardiovascular:      Rate and Rhythm: Normal rate and regular rhythm.      Pulses: Normal pulses.   Pulmonary:      Effort: Pulmonary effort is normal.   Abdominal:      General: Abdomen is flat. There is no distension.      Palpations: Abdomen is soft.      Tenderness: There is no abdominal tenderness. There is no guarding.   Skin:     General: Skin is warm.      Capillary Refill: Capillary refill takes less than 2 seconds.   Neurological:      General: No focal deficit present.      Mental Status: He is alert and oriented to person, place, and time. Mental status is at baseline.   Psychiatric:         Mood and Affect: Mood normal.         Thought Content: Thought content normal.         Judgment: Judgment normal.             DATA                                                                            Labs     Lab Results   Component Value Date    WBC 11.6 (H) 11/01/2024    WBC 10.9 10/31/2024    WBC 11.1 10/30/2024    HGB 13.9 11/01/2024    HGB 14.5 10/31/2024    HGB 14.7 10/30/2024    MCV 81 11/01/2024    MCV 82 10/31/2024    MCV 82 10/30/2024     (H) 11/01/2024     (H) 10/31/2024      (H) 10/30/2024       Lab Results   Component Value Date    GLUCOSE 111 (H) 11/01/2024    CALCIUM 9.2 11/01/2024     (L) 11/01/2024    K 4.3 11/01/2024    CO2 23 11/01/2024     11/01/2024    BUN 29 (H) 11/01/2024    CREATININE 1.30 11/01/2024       Lab Results   Component Value Date    ALT 14 10/31/2024    ALT 13 10/24/2024    AST 16 10/31/2024    AST 15 10/24/2024    ALKPHOS 76 10/31/2024    ALKPHOS 76 10/24/2024    BILITOT 1.4 (H) 10/31/2024    BILITOT 2.3 (H) 10/24/2024                                                                                  Imaging                                                                                        GI Procedures       ASSESSMENT / PLAN                  Assessment and Recommendations:   Fahad Meraz is a 69 y.o. male w/PMH of HFrEF (EF 10-15% 10/24), Afib s/p RFA on AC, CAD s/p 4V CABF (2012) and PCI (5/2019), HTN, HLD, Depression, and VIV on CPAP who is admitted for shortness of breath and volume overload.  GI service is consulted for colonoscopy evaluation.     #Advanced heart failure evaluation  #HFrEF  Mr Meraz is being evaluated for advanced heart failure therapies and requires colonoscopy as part of his evaluation. He had a colonoscopy done in December 2015 which revealed 3 colonic polyps (non adenomatous). He has no family history of colon cancer. He denies any GI symptoms and his abdominal exam is unrevealing.     #Liver lesion   His CT a/p (10/17) done at OSH revealed 2.6 x 2.6 cm lesion in the right hepatic lobe with hepatomegaly and peritoneal thickening. Reviewed liver lesion with in house radiologist who were unable to rule out non benign lesions due to non contrasted CT scan. Mr Meraz has no clear risk factors for liver malignancy.  His liver synthetic function is normal and despite having an enlarged spleen, he has no clear evidence of portal hypertension.     Plan:   - Will plan to do Colonoscopy on 11/4   - Please keep him on CLD on  11/3   - Please start bowel preparation (Golytely 4L) and administer on 11/3 per order set protocol   - Please stop heparin gtt @ 4 am on 11/4 prior to procedure   - Please order CT liver with contrast triple phase  to evaluate liver lesion       [facility] per primary team.   ------------------------------------------------------------------------  Jareth Vargas MD   Gastroenterology Fellow    After 5PM and on Weekends, please page on-call fellow.    To be discussed with service attending Dr. Dickey   Final recommendations pending attending attestation.

## 2024-11-01 NOTE — SIGNIFICANT EVENT
FICKS     11/01/24 1320   Vitals   Heart Rate 89   Resp 20   Hemodynamic Monitoring   PAP 48/26   PAP (Mean) 33 mmHg   CVP (mmHg) 14 mmHg   PCWP (mmHg) 15 mmHg   SVO2 (%) 68 %   ESTEBAN Yes   Thermo No   CO (L/min) 4.8 L/min   CI (L/min/m2) 2.22 L/min/m2   SVR (dyne*sec)/cm5 1333 (dyne*sec)/cm5   SVRI (dyne*sec)/cm5 2879 (dyne*sec)/cm5   PVR (dyne*sec)/cm5 317 (dyne*sec)/cm5   SV (mL) 56.5 mL   SVI 26.1 mL/m2   Medical Gas Therapy   SpO2 99 %

## 2024-11-01 NOTE — SIGNIFICANT EVENT
FICKS     11/01/24 1805   Vitals   Heart Rate 93   Resp 16   /66   MAP (mmHg) 82   Hemodynamic Monitoring   PAP 40/22   PAP (Mean) 28 mmHg   CVP (mmHg) 11 mmHg   PCWP (mmHg) 18 mmHg   SVO2 (%) 70 %   ESTEBAN Yes   Thermo No   CO (L/min) 4.9 L/min   CI (L/min/m2) 2.27 L/min/m2   SVR (dyne*sec)/cm5 1159 (dyne*sec)/cm5   SVRI (dyne*sec)/cm5 2503 (dyne*sec)/cm5   PVR (dyne*sec)/cm5 163 (dyne*sec)/cm5   SV (mL) 52.7 mL   SVI 24.4 mL/m2   Medical Gas Therapy   SpO2 95 %

## 2024-11-01 NOTE — PROGRESS NOTES
[facility]  HFICU PROGRESS NOTE    Fahad Meraz/[id number]    Admit Date: 10/24/2024  Hospital Length of Stay: 8   ICU Length of Stay: 7d 14h   Primary Service:   Primary HF Cardiologist: Dr. Washington  Referring:    INTERVAL EVENTS / PERTINENT ROS:     No acute events overnight. Continues with workup for advance therapies. Denies any c/o dyspnea, pain or discomfort. PFT's to be completed today. Remains on milrinone 0.375mcg/kg/min. Attempted to wean milrinone, did not tolerate does adjustment (low CI). Remains in Afib w/ PVC's. CVP remains elevated. Chest Xray reviewed. Continues w/ diuresis.      Plan:  - Diurese today with Bumex 2 mg IV today  - Increase hydralazine 50mg TID  - If CI improves with reduce in SVR, decrease milrinone 0.5mcg/kg/min  - Genetic consult outpatient - collaborate with Dr. Washington   - Initiate advanced heart failure therapies evaluation  - HOLD Farxiga in preparation of advanced therapies (transplant / LVAD)     MEDICATIONS  Infusions:  heparin, Last Rate: 1,800 Units/hr (11/01/24 1038)  lactated Ringer's, Last Rate: 10 mL/hr (11/01/24 1240)  milrinone, Last Rate: 0.375 mcg/kg/min (11/01/24 1240)      Scheduled:  aspirin, 81 mg, Daily  cholecalciferol, 50,000 Units, Every Sunday  escitalopram, 10 mg, Daily  hydrALAZINE, 50 mg, TID  isosorbide dinitrate, 20 mg, TID  [Held by provider] metoprolol succinate XL, 50 mg, Daily  pantoprazole, 40 mg, Daily before breakfast  perflutren protein A microsphere, 0.5 mL, Once in imaging  sennosides-docusate sodium, 2 tablet, BID  spironolactone, 25 mg, Daily  sulfur hexafluoride microsphr, 2 mL, Once in imaging      PRN:  alteplase, 2 mg, PRN  bisacodyl, 10 mg, Daily PRN  nitroglycerin, 0.4 mg, q5 min PRN  oxygen, , Continuous PRN - O2/gases  polyethylene glycol, 17 g, Daily PRN      Invasive Hemodynamics:    Most Recent Range Past 24hrs   BP (Art) 111/62 No data recorded   MAP(Art) 78 mmHg No data recorded   RA/CVP   No  data recorded   PA 48/26 PAP  Min: 29/18  Max: 55/37   PA(mean) 33 mmHg PAP (Mean)  Min: 22 mmHg  Max: 44 mmHg   PCWP 15 mmHg PCWP (mmHg)  Min: 14 mmHg  Max: 15 mmHg   CO 4.8 L/min CO (L/min)  Min: 4.8 L/min  Max: 6 L/min   CI 2.22 L/min/m2 CI (L/min/m2)  Min: 2.22 L/min/m2  Max: 2.8 L/min/m2   Mixed Venous 68 % SVO2 (%)  Min: 68 %  Max: 73 %   SVR  1333 (dyne*sec)/cm5 SVR (dyne*sec)/cm5  Min: 994 (dyne*sec)/cm5  Max: 1333 (dyne*sec)/cm5    (dyne*sec)/cm5 PVR (dyne*sec)/cm5  Min: 148 (dyne*sec)/cm5  Max: 317 (dyne*sec)/cm5     PHYSICAL EXAM:   Visit Vitals  /60   Pulse 89   Temp 36.3 °C (97.3 °F)   Resp 20   Ht 1.829 m (6')   Wt 93.5 kg (206 lb 2.1 oz) Comment: from this am   SpO2 99%   BMI 27.96 kg/m²   Smoking Status Former   BSA 2.18 m²       Wt Readings from Last 5 Encounters:   10/31/24 93.5 kg (206 lb 2.1 oz)   10/24/24 92.5 kg (204 lb)   08/05/24 122 kg (268 lb)   01/31/22 119 kg (262 lb)   02/18/20 123 kg (270 lb 2 oz)       INTAKE/OUTPUT:  I/O last 3 completed shifts:  In: 1787.5 (19.1 mL/kg) [P.O.:480; I.V.:1307.5 (14 mL/kg)]  Out: 2876 (30.8 mL/kg) [Urine:2875 (0.9 mL/kg/hr); Stool:1]  Weight: 93.5 kg      Physical Exam  HENT:      Head: Normocephalic.      Nose: Nose normal.      Mouth/Throat:      Mouth: Mucous membranes are moist.   Eyes:      Pupils: Pupils are equal, round, and reactive to light.   Neck:      Vascular: JVD present.   Cardiovascular:      Rate and Rhythm: Normal rate. Rhythm irregular.      Heart sounds: Murmur (holosystolic) heard.      Comments: Permanent Atrial Fibrillation  Pulmonary:      Effort: Pulmonary effort is normal.      Breath sounds: Normal breath sounds.      Comments: Rt subclavian SGC  Abdominal:      Palpations: Abdomen is soft.   Musculoskeletal:         General: Normal range of motion.      Cervical back: Normal range of motion.   Skin:     Capillary Refill: Capillary refill takes less than 2 seconds.   Neurological:      Mental Status: He is alert  and oriented to person, place, and time.   Psychiatric:         Mood and Affect: Mood normal.         Behavior: Behavior normal.         Thought Content: Thought content normal.         Judgment: Judgment normal.         DATA:  CMP:  Results from last 7 days   Lab Units 11/01/24  0317 10/31/24  1249 10/31/24  0509 10/30/24  0131 10/29/24  0454 10/28/24  0919 10/28/24  0618 10/27/24  0141 10/26/24  1503 10/26/24  0513   SODIUM mmol/L 134* 134* 135* 133* 135* 134* 135* 131* 135* 133*   POTASSIUM mmol/L 4.3 4.1 4.9 4.0 4.6 3.9 4.4 4.1 4.4 4.5   CHLORIDE mmol/L 101 100 101 102 102 100 98 94* 95* 95*   CO2 mmol/L 23 24 25 23 22 25 24 25 26 24   ANION GAP mmol/L 14 14 14 12 16 13 17 16 18 19   BUN mg/dL 29* 29* 30* 33* 32* 34* 33* 38* 35* 33*   CREATININE mg/dL 1.30 1.29 1.35* 1.43* 1.49* 1.68* 1.61* 2.05* 2.01* 2.03*   EGFR mL/min/1.73m*2 59* 60* 57* 53* 50* 44* 46* 34* 35* 35*   MAGNESIUM mg/dL 2.11  --  2.37 2.27 2.49* 2.56* 2.65* 2.44*  --  2.49*   ALBUMIN g/dL 4.1 4.3 4.1 4.0 4.2 4.1 4.3 4.4 4.4 4.2   ALT U/L  --  14  --   --   --   --   --   --   --   --    AST U/L  --  16  --   --   --   --   --   --   --   --    BILIRUBIN TOTAL mg/dL  --  1.4*  --   --   --   --   --   --   --   --      CBC:  Results from last 7 days   Lab Units 11/01/24  0317 10/31/24  0509 10/30/24  0131 10/29/24  0454 10/28/24  0919 10/28/24  0618 10/27/24  0141 10/26/24  0513   WBC AUTO x10*3/uL 11.6* 10.9 11.1 10.7 9.6 9.4 10.9 11.6*   HEMOGLOBIN g/dL 13.9 14.5 14.7 15.3 15.9 15.6 15.5 16.0   HEMATOCRIT % 41.6 43.6 44.8 46.0 46.8 46.6 46.9 45.5   PLATELETS AUTO x10*3/uL 511* 527* 509* 594* 584* 545* 616* 614*   MCV fL 81 82 82 82 81 82 81 77*     COAG:   Results from last 7 days   Lab Units 11/01/24  0317 10/31/24  1249 10/31/24  0509 10/30/24  0131 10/29/24  0454 10/28/24  0919 10/28/24  0618 10/27/24  0141   INR  1.3* 1.3* 1.2* 1.3* 1.3* 1.4* 1.5* 1.9*     ABO:   ABO TYPE   Date Value Ref Range Status   10/31/2024 A  Final      HEME/ENDO:  Results from last 7 days   Lab Units 10/31/24  1249   TSH mIU/L 4.39*      CARDIAC:   Results from last 7 days   Lab Units 10/31/24  1249   LD U/L 187   BNP pg/mL 755*       ASSESSMENT AND PLAN:     Mr. Meraz is a 69 M with a PMHx sig for CAD s/p 4V CABG (2012) and PCI (LCx/OM; 5/2019), stage D systolic HF/ICM/HFrEF with LVEF 10-15%( 10/22/24 TTE), AF s/p RFA (PVI and CTI; 4/2024) on OAC therapy, HTN, dyslipidemia, depression, anxiety and VIV using CPAP who was admitted to OSH ICU, s/p RHC on 10/18/24.  Per patient, he had been experienced worsening SOB and fluids overload for 2-3 weeks. Patient was on milrinone drip and diurese with SG guided. However his KENYA worsening, and not able to wean milrinone drip. Decision was made to transfer him to HF ICU [facility] for possible advanced therapy consideration. 10/26: Tolerated afterload reduction with hydralazine / Isordil. 10/27: Discontinued hydralazine / Isordil and initiated 24 / 26 mg Entresto BID and tolerating. 10/28: Coronary angiogram: Right dominant system; LM: Mild 20% distal disease; LAD: Occluded with Patent LIMA; RI: Occluded; Lcx: Patent proximal Lcx stent and OM1 stent, otherwise mild non-obstructive disease; RCA: proximal 100% occlusion. Grafts: LIMA-LAD: Patent; SVG-OM, SVG-RCA: Occluded; SVG-RI: Unable to engage but likely occluded. 10/29: Entresto discontinued and hydral / Isordil initiated 2/2 AHF therapies evaluation. 10/30: Email sent to initiate advanced heart failure evaluation. Did not tolerate down-titration of Milrinone gtt from 0.375 mcg/kg/min to 0.25 mcg/kg/min.  Increased back to 0.375 mcg/kg/min 2/2 poor cardiac indices. 10/31: Up-titrating hydralazine / Isordil for afterload reduction.     Neuro:     # Hx of Depression/Anxiety  - Serial neuro and pain assessments  - PO Tylenol PRN for pain  - PT/OT Consult, OOB to chair  - CAM ICU score every shift  - Sleep/wake cycle normalization  - c/w home Lexapro and Clonazepam     #  Physical Status  -Overweight:  BMI: 27.9   -Reduced Mobility: acute decompensated heart failure and ICU stay      #Substance abuse  -Alcohol abuse/Alcohol dependence: NO   -Tobacco use/Nicotine Dependence: NO      Cardiovascular:     # Stage D, ICM, decompensated acute on chronic heart failure,  HFrEF 10-15% ( from 10/22/24 ECHO)   #Hx of CAD s/p 4V CABG 2012     -BRIANA (3/29/23): Left Ventricle: Moderately reduced left ventricular systolic function with a visually estimated EF of 35 - 40%.  - TTE (10/22/24): LVEF 10 -15%, right ventricle moderately dilated, moderately reduced systolic function. Mild MR  - admit weight (10/24): 93.5 KG   - admit BNP (10/24):  1995  - Home meds include Metoprolol succinate 25 mg daily, Entresto 97/103 mg bid, Hydralazine 25 mg bid, Isosorbide mononitrate 120 mg daily, Spironolactone 25 mg daily, Bumex 0.5 mg daily----allergy to SGLT2.  - ASA  - C/w Milrinone drip @ 0.375 mcg/kg/min, initiated OSH  - SGC # on arrival (10/25): Bp 96/50 (65), CVP 5, PA 36/20 (22), PCWP 18, CO/CI 4.4/2.1, SVR 1083, SVO2 65 % on Milrinone drip at 0.625 mcg/kg/min, hydral 25 mg TID.   - Daily hemodynamics: 11/1: B/P: 113/83 (94); CVP 14, PAP: 48/26 (33); SVR: 1333; CI: 2.2/ CO: 4.8; SvO2: 68 on IV Milrinone 0.375 mcg/kg/min; Hydralazine 25mg PO TID; Isordil 20mg PO TID  - Continue Milrinone drip at 0.375 mcg/kg/min - Inotrope dependent   - Bumex 2 mg IV x 1 today (11/1)  - Stopped entresto 24/26 mg BID as likely will be going advanced therapy eval.   - Increase hydralzine to 50 mg TID  - c/w Isordil to 20 mg TID   - Discontinue dapagliflozin 10 mg daily in preparation for AHF therapies (LVAD / OHT)   - C/w Rodrigo 25 mg daily for now    - Hold BB   - Daily standing weights, 2gm sodium diet, 2L fluid restriction, strict I&Os  - Coronary angiogram multidisciplinary discussion, including CT surgery, no revascularization options.  - Advanced Heart Failure Therapies Evaluation initiated 10/30/2024     LVAD AND  Transplant:   -Email sent on 10/30/2024 - consent on 10/31/2024  -Labs:      10/31/2024    -RHC:       10/25/2024  -LHC     10/25/2024  -CPET     NA  -ECHO     10/25/2024    CT chest (10/31): Dilated ascending AO 4.3cm. Moderate calcifications of the aorta and branch vessels. Severe coronary calcifications. Moderate cardiomegaly. Moderate aortic valve calcifications. Lesion inferior rt hepatic lobe (4.8cm). SGC in right pulmonary artery. Main pulmonary artery mildly dilated.         -US abdomen/aorta/iliac/IVC  PENDING  -Carotid US    PENDING  -SITA (lower)    PENDING  -2 view CXR:     PENDING  -Device interrogation   NA - No device  -PFTs:      Complete, results pending  -Colonoscopy/Occult stool  Last completed per OSH records: 12/01/2015 - Consult placed 10/31/2024  -LVAD/TX education:    10/30/2024  -CT surgery consult   Not completed  -Palliative consult   Consult placed 10/31/2024  -Nutrition consult    Consult placed 10/31/2024  -Dental consult/Orthopantogram  Consult placed / Orthopantogram ordered & PENDING  -Physical and Occupational Therapy  Following       #CAD   -LHC (12/20/2020):  Successful PCI of the SVG to ramus intermedius with one drug-eluting stent; OCT of the SVG to RCA to confirm no thrombosis; patent LAD; occluded SVG to OM.  -Meds: C/w ASA.     # Hx of AFib s/p RFA (PVI and CTI; 4/2024)  -NO device noted  -Was on home Coumadin for 2 months, prior to that was on Eliquis,  patient reported with Dizziness while on Eliquis      # Dizziness/bradycardia  -Had 3 episodes of near syncope after his recent AF RFA; has stopped driving as a result. ECG with sinus olu. Metoprolol previously decreased by his local EP.    -Hold BB due to decompensate HF      #Electrolyte Disturbances  -Keep K>4, Mag >2     Pulmonary:   # PMH of VIV  -c/w home CPAP  -Monitor and maintain SpO2 > 92%     GI:  # H/x of  GERD  - Bowel regimen: Senna, Miralax PRN  - PPI     #  Bilirubinemia  - T maryuri (10/24): 2.3, ALT, AST:  normal   - CT C/A/P (10/17/24): Hepatomegaly and hepatic steatosis are present. The liver has a lobulated contour. A questionable lesion in the right hepatic lobe inferiorly measures 26 mm.     # Urinary retention  - CT C/A/P (10/17/24): the prostate gland is mildly enlarged with mass effect on the posterior wall of the urinary bladder. The prostate gland measures 4.3 x 6.0 cm.   - C/o urinary difficulty, s/p FC OSH --> Removed prior admission        :  #KENYA/ CKD ?  in the setting of cardio-renal syndrome  -Baseline BUN/Cr: Unclear,  OSH ICU: Creatinine: 1.3-1.6   -Admit BUN/Cr (10/24): 28/1.88  -Daily BUN/Creat: 11/1: 29 / 1.3  -I/Os  -avoid hypotension and nephrotoxic agents     Heme:  #Anemia in the setting of iron deficiency  - Lab (10/24): H/H: 17.5/50.8,  Iron study: 63/374/17%, Ferritin: 76,  folate: 23.5, B12: 768   - NO iron replacement needed as H/H high     #Coagulopathy due to on home Coumadin  - INR  (10/30): 1.3, Hold home Coumadin   - Restarted Heparin gtt 10/29  - Heparin Xa 0.3     Endo:  #DM  - Euglycemic  - hgbA1c (10/24):  6.3     #Thyroid  -TSH (10/24):  8.47, T4: 1.44      ID:  # leukocytosis - Resolved  - WBC (10/26):  9.6   -afebrile, nontoxic   -trend temps q4h       PHYSICAL AND OCCUPATIONAL THERAPY: Ordered    LINES:  PIVs   A-line (OSH) 10/20 - current  Right Subclavian Cordis / PA catheter (OSH) 10/18 - 10/28  Right subclavian cordis: Previously placed swan sheath was exchanged to a new swan sheath over a short Jwire and was sutured in place. 10/28 - current    DVT: INR: 1.3 (10/30) (home Warfarin now held) - Re-Initiated heparin gtt 10/29  VAP BUNDLE: NA  CENTRAL LINE BUNDLE: Ordered   ULCER PPX: Pantoprazole 40 mg daily  GLYCEMIC CONTROL: NA - A1C: 6.3  BOWEL CARE: Senna / Miralax  INDWELLING CATHETER: NA  NUTRITION: Adult diet Cardiac; 70 gm fat; 2 - 3 grams Sodium      EMERGENCY CONTACT: Extended Emergency Contact Information  Primary Emergency Contact: Tanvi Meraz  Home Phone:  [phone]  Relation: Spouse  FAMILY UPDATE:   CODE STATUS: Full Code  DISPO: Maintain in the HFICU    Patient seen and assessed with Dr. Cesilia STEEN personally spent 60 minutes of critical care time directly and personally managing the patient exclusive of separately billable procedures   _________________________________________________  Shon Polanco APRN-CNP

## 2024-11-01 NOTE — CONSULTS
Inpatient consult to Palliative Care  Consult performed by: BARBARA Nix  Consult ordered by: BARBARA Alberto          Palliative Medicine Consult  Complex medical decision making, symptom management, patient/family support    History obtained from chart review including ED note, H&P, patient's daily progress notes, review of lab/test results, and discussion with primary team and bedside RN.    Subjective    History of Present Illness  Mr. Fahad Meraz is a 69 M with a PMHx sig for CAD s/p 4V CABG (2012) and PCI (LCx/OM; 5/2019), stage D systolic HF/ICM/HFrEF with LVEF 10-15%( 10/22/24 TTE), AF s/p RFA (PVI and CTI; 4/2024) on OAC therapy, HTN, dyslipidemia, depression, anxiety and VIV using CPAP who was admitted to OSH ICU, s/p RHC on 10/18/24. Per patient, he had been experienced worsening SOB and fluids overload for 2-3 weeks. Patient was on milrinone drip and diurese with SG guided. However his KENYA worsening, and not able to wean milrinone drip. Decision was made to transfer him to HF ICU [facility] [place] possible advanced therapy consideration.     Introduction to Palliative Medicine  Met with pt at bedside, sister Lindsay and KEVIN Asher.    Patient alert and oriented, has capacity to make their own medical decisions at this time.     Staff present: Poornima Inman CNP.    Palliative Medicine was introduced as a specialty service for patients with serious illness to help with symptom management, improve quality of life, assist with goals of care conversations, navigate complex decision making, and provide support to patients and families. Support and empathy was provided throughout the encounter. Provided reflective listening and presence.     Symptoms  Pain: denies  Dyspnea: denies  Fatigue: denies  Insomnia: denies  Drowsiness: denies  Constipation: denies  Nausea: denies  Appetite: has been on and off, usually good though  Anxiety: somewhat about needing sx  Depression: denies    Palliative  "Medicine Social History:  The patient is  to spouse Ira for over 40 years, they have 3 children and 8 great/grandchildren. 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 Pt finds demarco in being with his grand kids, family, and working with the dogs. Pt loves the outdoors and hunting and traveling. Pt spoke about his travels to [place] and family questioned what pt was cooking for dinner- later finding out he served bear. Pt was chopping wood only a few days ago. Coping methods are his family and his own unique michelet. Denies any safety concerns.    Objective    Last Recorded Vitals  /61   Pulse 86   Temp 36.3 °C (97.3 °F)   Resp 13   Ht 1.829 m (6')   Wt 93.5 kg (206 lb 2.1 oz) Comment: from this am  SpO2 94%   BMI 27.96 kg/m²      Physical Exam  Constitutional:       Appearance: He is normal weight.   HENT:      Head: Normocephalic.      Mouth/Throat:      Pharynx: Oropharynx is clear.   Cardiovascular:      Rate and Rhythm: Rhythm irregular.      Pulses: Normal pulses.   Pulmonary:      Effort: Pulmonary effort is normal.      Breath sounds: Normal breath sounds.   Abdominal:      General: Bowel sounds are normal.      Palpations: Abdomen is soft.   Musculoskeletal:         General: Normal range of motion.      Right lower leg: Edema present.      Left lower leg: Edema present.   Skin:     General: Skin is warm and dry.      Comments: Scattered scabs on the arms bilaterally   Neurological:      General: No focal deficit present.      Mental Status: He is alert.   Psychiatric:         Attention and Perception: Attention normal.         Mood and Affect: Affect is tearful.         Speech: Speech normal.         Behavior: Behavior normal. Behavior is cooperative.         Thought Content: Thought content normal.         " Cognition and Memory: Cognition normal.         Judgment: Judgment normal.          Relevant Results  Results for orders placed or performed during the hospital encounter of 10/24/24 (from the past 24 hours)   BLOOD GAS MIXED VENOUS FULL PANEL   Result Value Ref Range    POCT pH, Mixed 7.39 7.33 - 7.43 pH    POCT pCO2, Mixed 36 (L) 41 - 51 mm Hg    POCT pO2, Mixed 44 35 - 45 mm Hg    POCT SO2, Mixed 73 45 - 75 %    POCT Oxy Hemoglobin, Mixed 71.0 45.0 - 75.0 %    POCT Hematocrit Calculated, Mixed 44.0 41.0 - 52.0 %    POCT Sodium, Mixed 129 (L) 136 - 145 mmol/L    POCT Potassium, Mixed 4.4 3.5 - 5.3 mmol/L    POCT Chloride, Mixed 102 98 - 107 mmol/L    POCT Ionized Calcium, Mixed 1.23 1.10 - 1.33 mmol/L    POCT Glucose, Mixed 130 (H) 74 - 99 mg/dL    POCT Lactate, Mixed 1.1 0.4 - 2.0 mmol/L    POCT Base Excess, Mixed -2.6 (L) -2.0 - 3.0 mmol/L    POCT HCO3 Calculated, Mixed 21.8 (L) 22.0 - 26.0 mmol/L    POCT Hemoglobin, Mixed 14.8 13.5 - 17.5 g/dL    POCT Anion Gap, Mixed 10 10 - 25 mmo/L    Patient Temperature 37.0 degrees Celsius    FiO2 21 %   Abo/Rh   Result Value Ref Range    ABO TYPE A     Rh TYPE POS    Anti-Cardiolipin Antibody (IgA, IgG, and IgM)   Result Value Ref Range    Anticardiolipin IgA <0.5 <20.0 APL U/mL    Anticardiolipin IgG <1.6 <20.0 GPL U/mL    Anticardiolipin IgM 0.3 <20.0 MPL U/mL   B-Type Natriuretic Peptide   Result Value Ref Range     (H) 0 - 99 pg/mL   Coagulation Screen   Result Value Ref Range    Protime 14.1 (H) 9.8 - 12.8 seconds    INR 1.3 (H) 0.9 - 1.1    aPTT 85 (H) 27 - 38 seconds   Comprehensive Metabolic Panel   Result Value Ref Range    Glucose 95 74 - 99 mg/dL    Sodium 134 (L) 136 - 145 mmol/L    Potassium 4.1 3.5 - 5.3 mmol/L    Chloride 100 98 - 107 mmol/L    Bicarbonate 24 21 - 32 mmol/L    Anion Gap 14 10 - 20 mmol/L    Urea Nitrogen 29 (H) 6 - 23 mg/dL    Creatinine 1.29 0.50 - 1.30 mg/dL    eGFR 60 (L) >60 mL/min/1.73m*2    Calcium 9.3 8.6 - 10.6 mg/dL     Albumin 4.3 3.4 - 5.0 g/dL    Alkaline Phosphatase 76 33 - 136 U/L    Total Protein 6.9 6.4 - 8.2 g/dL    AST 16 9 - 39 U/L    Bilirubin, Total 1.4 (H) 0.0 - 1.2 mg/dL    ALT 14 10 - 52 U/L   Cytomegalovirus IgG   Result Value Ref Range    Cytomegalovirus IgG Reactive (A) Nonreactive   Carlito-Barr Virus Antibody Panel   Result Value Ref Range    EBV VCA, IgG  Positive (A) Negative    EBV VCA, IgM  Negative Negative    EBV Early Antigen Antibody, IgG Negative Negative    EBV Nuclear Antigen Antibody, IgG Positive (A) Negative   Hepatitis A Antibody, Total   Result Value Ref Range    Hepatitis A  AB-Total Reactive (A) Nonreactive   Hepatitis B Core Antibody, Total   Result Value Ref Range    Hepatitis B Core AB- Total Nonreactive Nonreactive   Hepatitis B Surface Antibody   Result Value Ref Range    Hepatitis B Surface AB <3.1 <10.0 mIU/mL   Hepatitis B Surface Antigen   Result Value Ref Range    Hepatitis B Surface AG Nonreactive Nonreactive   Hepatitis C Antibody   Result Value Ref Range    Hepatitis C AB Nonreactive Nonreactive   HIV 1/2 Antigen/Antibody Screen with Reflex to Confirmation   Result Value Ref Range    HIV 1/2 Antigen/Antibody Screen with Reflex to Confirmation Nonreactive Nonreactive   HSV1 IgG and HSV2 IgG   Result Value Ref Range    HSV 1, IgG >8.0 (H) <0.9 INDEX    HSV 2, IgG <0.2 <0.9 INDEX   Human Chorionic Gonadotropin, Serum Quantitative   Result Value Ref Range    HCG, Beta-Quantitative <3 <5 mIU/mL   IgM   Result Value Ref Range    IgM 113 40 - 230 mg/dL   IgA   Result Value Ref Range    IgA 104 70 - 400 mg/dL   IgG Subclasses (1, 2, 3, and 4)   Result Value Ref Range    IgG 1 656 490 - 1,140 mg/dL    IgG 2 129 (L) 150 - 640 mg/dL    IgG 3 40 11 - 85 mg/dL    IgG 4 17 3 - 200 mg/dL    IgG 733 700 - 1,600 mg/dL   Lactate Dehydrogenase   Result Value Ref Range     84 - 246 U/L   Mumps Antibody, IgG   Result Value Ref Range    Mumps, IgG Positive (A) Negative    Mumps, IgG Index >8.0  (H) <=0.8 IA   Prealbumin   Result Value Ref Range    Prealbumin 25.1 18.0 - 40.0 mg/dL   Prostate Specific Antigen, Screen   Result Value Ref Range    Prostate Specific Antigen,Screen 10.14 (H) <=4.00 ng/mL   Rubella Antibody, IgG   Result Value Ref Range    Rubella, IgG Positive Negative    Rubella, IgG Index 6.7 <=0.7 IA IA   Rubeola Antibody, IgG   Result Value Ref Range    Rubeola, IgG Positive     Rubeola, IgG Index 6.1 (H) <=0.8 IA   Syphilis Screen with Reflex   Result Value Ref Range    Syphilis Total Ab Nonreactive Nonreactive   Thyroid Stimulating Hormone   Result Value Ref Range    Thyroid Stimulating Hormone 4.39 (H) 0.44 - 3.98 mIU/L   Triiodothyronine, Total   Result Value Ref Range    Triiodothyronine 69 60 - 200 ng/dL   Thyroxine, Total   Result Value Ref Range    Thyroxine 10.2 4.5 - 11.1 ug/dL   Varicella Zoster Antibody, IgG   Result Value Ref Range    Varicella Zoster, IgG Positive (A) Negative    Varicella Zoster, IgG Index 1.8 (H) <=0.8 IA   HLA New Non-Kidney Solid Organ Evaluation Panel   Result Value Ref Range    HLA-A,B,C High Resolution Typing      HLA-DRB1 High Resolution Typing      HLA-DQB1 High Resolution Typing      HLA-DPB1 High Resolution Typing      HLA Results Collection only     HLA Class I SP AB ID, HD      HLA Class II SP AB ID, HD     Urinalysis with Reflex Culture and Microscopic   Result Value Ref Range    Color, Urine Light-Yellow Light-Yellow, Yellow, Dark-Yellow    Appearance, Urine Clear Clear    Specific Gravity, Urine 1.009 1.005 - 1.035    pH, Urine 6.0 5.0, 5.5, 6.0, 6.5, 7.0, 7.5, 8.0    Protein, Urine NEGATIVE NEGATIVE, 10 (TRACE), 20 (TRACE) mg/dL    Glucose, Urine 300 (3+) (A) Normal mg/dL    Blood, Urine NEGATIVE NEGATIVE    Ketones, Urine NEGATIVE NEGATIVE mg/dL    Bilirubin, Urine NEGATIVE NEGATIVE    Urobilinogen, Urine Normal Normal mg/dL    Nitrite, Urine NEGATIVE NEGATIVE    Leukocyte Esterase, Urine NEGATIVE NEGATIVE   Extra Urine Gray Tube   Result  Value Ref Range    Extra Tube Hold for add-ons.    BLOOD GAS MIXED VENOUS FULL PANEL   Result Value Ref Range    POCT pH, Mixed 7.40 7.33 - 7.43 pH    POCT pCO2, Mixed 38 (L) 41 - 51 mm Hg    POCT pO2, Mixed 41 35 - 45 mm Hg    POCT SO2, Mixed 71 45 - 75 %    POCT Oxy Hemoglobin, Mixed 69.4 45.0 - 75.0 %    POCT Hematocrit Calculated, Mixed 44.0 41.0 - 52.0 %    POCT Sodium, Mixed 130 (L) 136 - 145 mmol/L    POCT Potassium, Mixed 4.7 3.5 - 5.3 mmol/L    POCT Chloride, Mixed 100 98 - 107 mmol/L    POCT Ionized Calcium, Mixed 1.25 1.10 - 1.33 mmol/L    POCT Glucose, Mixed 147 (H) 74 - 99 mg/dL    POCT Lactate, Mixed 0.8 0.4 - 2.0 mmol/L    POCT Base Excess, Mixed -1.0 -2.0 - 3.0 mmol/L    POCT HCO3 Calculated, Mixed 23.5 22.0 - 26.0 mmol/L    POCT Hemoglobin, Mixed 14.7 13.5 - 17.5 g/dL    POCT Anion Gap, Mixed 11 10 - 25 mmo/L    Patient Temperature 37.0 degrees Celsius    FiO2 21 %   BLOOD GAS MIXED VENOUS FULL PANEL   Result Value Ref Range    POCT pH, Mixed 7.39 7.33 - 7.43 pH    POCT pCO2, Mixed 38 (L) 41 - 51 mm Hg    POCT pO2, Mixed 43 35 - 45 mm Hg    POCT SO2, Mixed 73 45 - 75 %    POCT Oxy Hemoglobin, Mixed 70.7 45.0 - 75.0 %    POCT Hematocrit Calculated, Mixed 42.0 41.0 - 52.0 %    POCT Sodium, Mixed 130 (L) 136 - 145 mmol/L    POCT Potassium, Mixed 4.3 3.5 - 5.3 mmol/L    POCT Chloride, Mixed 101 98 - 107 mmol/L    POCT Ionized Calcium, Mixed 1.22 1.10 - 1.33 mmol/L    POCT Glucose, Mixed 127 (H) 74 - 99 mg/dL    POCT Lactate, Mixed 0.9 0.4 - 2.0 mmol/L    POCT Base Excess, Mixed -1.7 -2.0 - 3.0 mmol/L    POCT HCO3 Calculated, Mixed 23.0 22.0 - 26.0 mmol/L    POCT Hemoglobin, Mixed 14.0 13.5 - 17.5 g/dL    POCT Anion Gap, Mixed 10 10 - 25 mmo/L    Patient Temperature 37.0 degrees Celsius    FiO2 21 %   CBC   Result Value Ref Range    WBC 11.6 (H) 4.4 - 11.3 x10*3/uL    nRBC 0.0 0.0 - 0.0 /100 WBCs    RBC 5.11 4.50 - 5.90 x10*6/uL    Hemoglobin 13.9 13.5 - 17.5 g/dL    Hematocrit 41.6 41.0 - 52.0 %    MCV  81 80 - 100 fL    MCH 27.2 26.0 - 34.0 pg    MCHC 33.4 32.0 - 36.0 g/dL    RDW 16.7 (H) 11.5 - 14.5 %    Platelets 511 (H) 150 - 450 x10*3/uL   Renal function panel   Result Value Ref Range    Glucose 111 (H) 74 - 99 mg/dL    Sodium 134 (L) 136 - 145 mmol/L    Potassium 4.3 3.5 - 5.3 mmol/L    Chloride 101 98 - 107 mmol/L    Bicarbonate 23 21 - 32 mmol/L    Anion Gap 14 10 - 20 mmol/L    Urea Nitrogen 29 (H) 6 - 23 mg/dL    Creatinine 1.30 0.50 - 1.30 mg/dL    eGFR 59 (L) >60 mL/min/1.73m*2    Calcium 9.2 8.6 - 10.6 mg/dL    Phosphorus 3.5 2.5 - 4.9 mg/dL    Albumin 4.1 3.4 - 5.0 g/dL   Magnesium   Result Value Ref Range    Magnesium 2.11 1.60 - 2.40 mg/dL   Coagulation Screen   Result Value Ref Range    Protime 15.2 (H) 9.8 - 12.8 seconds    INR 1.3 (H) 0.9 - 1.1    aPTT 61 (H) 27 - 38 seconds   Heparin Assay, UFH   Result Value Ref Range    Heparin Unfractionated 0.3 See Comment Below for Therapeutic Ranges IU/mL   BLOOD GAS MIXED VENOUS FULL PANEL   Result Value Ref Range    POCT pH, Mixed 7.41 7.33 - 7.43 pH    POCT pCO2, Mixed 39 (L) 41 - 51 mm Hg    POCT pO2, Mixed 42 35 - 45 mm Hg    POCT SO2, Mixed 72 45 - 75 %    POCT Oxy Hemoglobin, Mixed 70.2 45.0 - 75.0 %    POCT Hematocrit Calculated, Mixed 43.0 41.0 - 52.0 %    POCT Sodium, Mixed 129 (L) 136 - 145 mmol/L    POCT Potassium, Mixed 4.4 3.5 - 5.3 mmol/L    POCT Chloride, Mixed 100 98 - 107 mmol/L    POCT Ionized Calcium, Mixed 1.26 1.10 - 1.33 mmol/L    POCT Glucose, Mixed 127 (H) 74 - 99 mg/dL    POCT Lactate, Mixed 0.7 0.4 - 2.0 mmol/L    POCT Base Excess, Mixed 0.1 -2.0 - 3.0 mmol/L    POCT HCO3 Calculated, Mixed 24.7 22.0 - 26.0 mmol/L    POCT Hemoglobin, Mixed 14.2 13.5 - 17.5 g/dL    POCT Anion Gap, Mixed 9 (L) 10 - 25 mmo/L    Patient Temperature 37.0 degrees Celsius    FiO2 21 %      XR chest 1 view  Narrative: Interpreted By:  Olvin Farias,   STUDY:  XR CHEST 1 VIEW; 10/30/2024 7:54 am      INDICATION:  Signs/Symptoms:ADHF.       COMPARISON:  10/29/2024.      ACCESSION NUMBER(S):  [id number]      ORDERING CLINICIAN:  EULA WRIGHT      FINDINGS:          CARDIOMEDIASTINAL SILHOUETTE:  Cardiomegaly versus pericardial effusion. Patient is status post  median sternotomy. Kimberly-Faviola catheter overlies the right main  pulmonary artery. LUNGS:  Lungs are clear of focal airspace disease.      ABDOMEN:  No remarkable upper abdominal findings.      BONES:  No acute osseous changes. Patient is status post left TSA.      Impression: 1.  Stable positioning of Kimberly-Faviola catheter with tip overlying the  right main pulmonary artery.          Signed by: Olvin Farias 10/31/2024 9:08 AM  Dictation workstation:   [id number]  XR chest 1 view  Narrative: Interpreted By:  Olvin Farias,   STUDY:  XR CHEST 1 VIEW; 10/29/2024 8:02 am      INDICATION:  Signs/Symptoms:PA catheter postition evaluation.      COMPARISON:  10/28/2024.      ACCESSION NUMBER(S):  [id number]      ORDERING CLINICIAN:  LANDON GARCES      FINDINGS:          CARDIOMEDIASTINAL SILHOUETTE:  Cardiomegaly versus pericardial effusion. Patient is status post  median sternotomy. Kimberly-Faviola catheter overlies the right main  pulmonary artery.      LUNGS:  Lungs are clear of focal airspace disease or edema.      ABDOMEN:  No remarkable upper abdominal findings.      BONES:  No acute osseous changes.      Impression: 1.  Kimberly-Faviola catheter overlies the right main pulmonary artery.  Cardiomegaly pericardial effusion. No focal airspace disease.          Signed by: Olvin Farias 10/31/2024 9:06 AM  Dictation workstation:   [id number]     Encounter Date: 10/24/24   Electrocardiogram, 12-lead PRN ACS symptoms   Result Value    Ventricular Rate 111    Atrial Rate 111    QRS Duration 130    QT Interval 302    QTC Calculation(Bazett) 410    R Axis 134    T Axis -40    QRS Count 18    Q Onset 218    T Offset 369    QTC Fredericia 370    Narrative    Atrial fibrillation with occasional Premature  ventricular complexes and Fusion complexes  Nonspecific intraventricular block  Cannot rule out Anteroseptal infarct , age undetermined  T wave abnormality, consider inferolateral ischemia  Abnormal ECG  No previous ECGs available  Confirmed by Lucas Kerns ([id number]) on 10/28/2024 12:58:57 PM        Allergies  Eliquis [apixaban] and Jardiance [empagliflozin]    Scheduled medications  aspirin, 81 mg, oral, Daily  cholecalciferol, 50,000 Units, oral, Every Sunday  escitalopram, 10 mg, oral, Daily  hydrALAZINE, 25 mg, oral, TID  isosorbide dinitrate, 20 mg, oral, TID  [Held by provider] metoprolol succinate XL, 50 mg, oral, Daily  pantoprazole, 40 mg, oral, Daily before breakfast  perflutren protein A microsphere, 0.5 mL, intravenous, Once in imaging  sennosides-docusate sodium, 2 tablet, oral, BID  spironolactone, 25 mg, oral, Daily  sulfur hexafluoride microsphr, 2 mL, intravenous, Once in imaging      Continuous medications  heparin, 0-4,000 Units/hr, Last Rate: 1,800 Units/hr (11/01/24 0831)  milrinone, 0.375 mcg/kg/min, Last Rate: 0.375 mcg/kg/min (11/01/24 0830)      PRN medications  PRN medications: alteplase, bisacodyl, nitroglycerin, oxygen, polyethylene glycol     Assessment/Plan    Mr. Fahad Meraz is a 69 M with a PMHx sig for CAD s/p 4V CABG (2012) and PCI (LCx/OM; 5/2019), stage D systolic HF/ICM/HFrEF with LVEF 10-15%( 10/22/24 TTE), AF s/p RFA (PVI and CTI; 4/2024) on OAC therapy, HTN, dyslipidemia, depression, anxiety and VIV using CPAP who was admitted to OSH ICU, s/p RHC on 10/18/24. Per patient, he had been experienced worsening SOB and fluids overload for 2-3 weeks. Patient was on milrinone drip and diurese with SG guided. However his KENYA worsening, and not able to wean milrinone drip. Decision [place] [facility] [place].     11/1: Palliative consulted for pt/family support and advanced therapies eval.     Palliative Performance Scale (PPS):  60    ----------------------------------------------------------------------------------------------------------------------------------------------------------------------------------------------------------------------------------------------------------------------------------------------------------------------------------------------------------------------  Advanced Care Planning  Patient and/or family consented to a voluntary Advanced Care Planning meeting.   Serious Illness Assessment and Counseling:  Life Limiting Disease: HFrEF and inotrope dependent, work up for cardiac advanced therapies posing threat to life or function.     Disease Specific Information Provided/Prognosis Discussed: Patient's current clinical condition, including diagnosis, prognosis, and management plan were discussed.   Counseling provided on HF/weakness, milrinone, and AT eval.    Understanding/Overall Impression: Patient expressing fair understanding of overall health status and severity of illness.     Goals/Hopes: Discussion ensued about patient's goals for their medical care going forward. Allowed patient time to talk about his/her current quality of life, disease course/progression, and symptom and treatment burden. Discussed his goal is for OHT vs the LVAD as he finds the OHT to be less maintenance. His brother also just had OHT in August.    Fears/Worries/Concerns: death    Patient's Perception of Functional Status: Good.    Minimal Acceptable Quality of Life/Maximal [place] Tolerable for the Possibility of More Time: Counseling provided on the concept of MAO/Maximal [place]. Patient expressing that they would never want to be in a health state where they were dependent on other's for ADLs/toileting needs, bed bound, lacking mobility and independence. Patient deems that this would not be an acceptable quality of life.  Pt is willing to undergo OHT for a chance for more time and improved QOL.     Advanced Directives:  Surrogate  "Health Care Decision Maker: Tanvi Meraz (Spouse)  [phone]   HPOA: not on file  Living will: not on file       Code Status: Decision to keep code status FULL CODE at this time.       I spent 54 minutes in providing separately identifiable ACP services with the patient and/or surrogate decision maker in a voluntary conversation discussing the patient's wishes and goals as detailed in the above note.   ----------------------------------------------------------------------------------------------------------------------------------------------------------------------------------------------------------------------------------------------------------------------------------------------------------------------------------------------------------------------    #Complex Medical Decision Making  #Goals of Care  #Advanced Care Planning  - Code status: FULL CODE  - Surrogate decision maker: Tanvi Meraz (Spouse)  [phone]   - Goals are mix of survival and time and improved quality of life    - 11/1: Preparedness Plan:  I had an extensive conversation with the patient in the presence of his sister and brother in law . Palliative care was introduced them and it was explained what a preparedness plan entails. All questions were addressed and answered. Patient gave a verbal understanding of the answers provided.     Goals/expectations of OHT/ LVAD implantation: more time, continue his \"bucket list\", improved QOL. Pt expressed he prefers the OHT as it is \"less maintenance\" and described LVAD as \"new machinery on an only part\", making it less desirable.     Hemodialysis: Patient verbalized understanding that preexisting renal disease or renal injury during/after OHT/VAD implantation puts patients at risk for dialysis dependent renal failure. PT EXPRESSED HE WOULD NEED TO THINK ABOUT POSSIBLY BEING ON HD FOR THE REMAINDER OF HIS TIME, IF THAT IS A QOL HE WOULD WANT.    ICH/stroke: Patient is aware of risk for bleeding or " stroke. PT HESITANT ABOUT A STROKE AND WORRIES ABOUT A DETRIMENTAL STROKE THAT COULD LEAVE HIM WORSE THAN BEFORE.    LVAD failure: Patient verbalized She/He would HAVE TO THINK ABOUT ACCEPTING heroic measures, compressions and intubation, in the event of LVAD failure.     LVAD infection/need for long term antibiotics: Patient is aware of the risk for infection of driveline, device, and blood. Pt is ACCEPTING to being treated with long term antibiotics if this were to occur .    Artifical nutrition and hydration: Patient would ACCEPT a long term feeding tube in the event complications would occur that would require placement.    Blood transfusions: Patient ACCEPTS receiving blood transfusions if needed.     Organ Donation: Patient verbalized she/he would LIKE to participate in organ donation in the event this would become a possibility.     Mechanical ventilation: Patient ACCEPTS TO undergo short term ventilation. She/He expressed she/he would ACCEPT long term mechanical ventilation/trach placement as this would greatly affect her/his quality of life.     Postoperative rehab plan: Patient states she/he has been informed about what her/his post op period would look like. PT IS AGREEABLE TO REHABILITATION PRIOR TO GOING HOME IF INDICATED.    Spiritual preferences: NONE. SCRIPTURE.    If device implantation over time does not help pt meet her/his goals of care and complications affect her/his view on quality of life, pt was asked if she/he would be comfortable in discontinuing therapy. Pt stated THAT IT DEPENDED ON THE SITUATION.      #HFrEF  - EF 10-15%  - Appears milrinone dependent.   - Advanced therapies work up in progress.  - Pt wishes for OHT vs LVAD as OHT aligns best with his lifestyle and goals. His brother just received a heart in August and is doing very well.  - Possible genetic component etiology?       #Psychosocial Support  #Anxiety  - Ongoing pt and family support.   - Brother Amauri is a great resource  for pt as he had OHT in August.   - Music Therapy- ORDERED  - Spiritual Care Support- NOTIFIED  - Pet Therapy- DECLINED      Plan of Care discussed with: Updated primary and bedside RN on goals of care decision, medication adjustments, and code status     Medical Decision Making was high level due to high complexity of problems, extensive data review, and high risk of management/treatment.     - HFrEF and inotrope dependent, work up for cardiac advanced therapies posing threat to life or function.   - Reviewed external notes from   - Reviews results from  which were used in decision making for   - Recommended the following tests:   - Assessment required independent historian: primary  - Independent interpretation of test: labs and imaging  - Discussion of management with: primary   - Drug therapy requiring intensive monitoring for toxicity: meds and renal impairment  - Decision regarding elective major surgery with identified patient or procedure risk factors:   - Decision regarding emergency major surgery: AT eval- pt hoping for OHT.  - Decision regarding hospitalization or escalation of hospital-level care:   - Decision not to resuscitate or to de-escalate care because of poor prognosis:   - Parenteral controlled substances: milrinone, hep    Thank you for allowing us to participate in the care of this patient. Palliative will continue to follow as needed. Palliative medicine is available Monday-Friday, 8a-6p. Please contact team with any questions or concerns.  Team pager [phone] (weekdays)  Elis Inman CNP (on EPIC secure chat)

## 2024-11-01 NOTE — PROGRESS NOTES
Occupational Therapy    Occupational Therapy Treatment    Name: Fahad Meraz  MRN: [id number]  Department: [facility] HFICU  Room: 11/11-A  Date: 11/01/24  Time Calculation  Start Time: 0958  Stop Time: 1036  Time Calculation (min): 38 min    Assessment:  End of Session Communication: Bedside nurse  End of Session Patient Position: Alarm off, not on at start of session (patient sitting EOB, RN in room preparing patient to go off floor for PFTs)  Plan:  Treatment Interventions: ADL retraining, Functional transfer training, Endurance training, Patient/family training, Equipment evaluation/education, Neuromuscular reeducation, Compensatory technique education  OT Frequency: 2 times per week  OT Discharge Recommendations: No OT needed after discharge  OT Recommended Transfer Status: Stand by assist  OT - OK to Discharge: Yes    Subjective   Previous Visit Info:  OT Last Visit  OT Received On: 11/01/24  General:  General  Reason for Referral: patient is now an advanced therapy work-up  Family/Caregiver Present: No  Prior to Session Communication: Bedside nurse  Patient Position Received: Bed, 3 rail up, Alarm off, not on at start of session  General Comment: patient pleasant and agreeable to OT  Precautions:  Medical Precautions: Cardiac precautions, Fall precautions  Precautions Comment: LVAD/OHT work-up    Pain Assessment:  Pain Assessment  Pain Assessment: 0-10  0-10 (Numeric) Pain Score: 0 - No pain     Objective   Cognition:  Overall Cognitive Status: Within Functional Limits  Arousal/Alertness: Appropriate responses to stimuli  Orientation Level: Oriented X4  Following Commands: Follows all commands and directions without difficulty     Bed Mobility/Transfers: Bed Mobility  Bed Mobility: Yes  Bed Mobility 1  Bed Mobility 1: Supine to sitting  Level of Assistance 1: Distant supervision  Bed Mobility Comments 1: HOB elevated 30 degrees    Transfers  Transfer: Yes  Transfer 1  Transfer From 1: Bed to, Stand to  Transfer  to 1: Stand, Bed  Technique 1: Sit to stand, Stand to sit  Transfer Level of Assistance 1: Close supervision  Trials/Comments 1: x3 STS during session     Therapy/Activity: Therapeutic Activity  Therapeutic Activity Performed: Yes  Therapeutic Activity 1: Patient sat EOB x30 minutes with (S) while completing MOCA and gri strength testing.     Cognitive Skill Development:  Cognitive Skill Development  Cognitive Skill Development Activity 1: Patient completed MOCA version 8.2  Domain breakdown:  Visuospatial/Executive: 2/5  Naming: 3/3  Attention: 4/6  Language: 1/3  Abstraction: 1/2  Delayed Recall: 3/5 (MIS=12/15)  Orientation: 6/6  Add 1 Point if </=12 yr Education: 1  Total: 21/30     (Severity ranges for MOCA 26-30 normal, 18-25 Mild Cognitive Impairment, 10-17 Moderate Cognitive Impairment, 10 or less Severe Cognitive Impairment).       Strength:  Strength  Strength Comments: Completed (B)  strength testing using dynamometer: (R)  = 30.3 kg (avg of 3 trials), (L)  = 34 kg (avg of 3 trials); patient's (B)  are within normal for males ages 65-69 however is at lower end of normal.    Outcome Measures:  [facility] Daily Activity  Putting on and taking off regular lower body clothing: A lot  Bathing (including washing, rinsing, drying): A little  Putting on and taking off regular upper body clothing: A little  Toileting, which includes using toilet, bedpan or urinal: A little  Taking care of personal grooming such as brushing teeth: None  Eating Meals: None  Daily Activity - Total Score: 19      , Confusion Assessment Method-ICU (CAM-ICU)  Feature 1: Acute Onset or Fluctuating Course: Negative  Feature 3: Altered Level of Consciousness: Negative  Overall CAM-ICU: Negative  , and E = Exercise and Early Mobility  ICU Mobility Scale: Standing    Education Documentation  Body Mechanics, taught by Thuy Pop OT at 11/1/2024 12:48 PM.  Learner: Patient  Readiness: Acceptance  Method:  Explanation  Response: Verbalizes Understanding    Education Comments  No comments found.    Goals:  Encounter Problems       Encounter Problems (Active)       ADLs       Patient with complete upper body dressing with independent level of assistance donning and doffing all UE clothes with no adaptive equipment. (Not Progressing)       Start:  10/29/24    Expected End:  11/12/24            Patient with complete lower body dressing with independent level of assistance donning and doffing all LE clothes  with PRN adaptive equipment. (Not Progressing)       Start:  10/29/24    Expected End:  11/12/24            Patient will complete daily grooming tasks with independent level of assistance and PRN adaptive equipment while standing. (Not Progressing)       Start:  10/29/24    Expected End:  11/12/24            Patient will complete toileting including hygiene clothing management/hygiene with independent level of assistance. (Not Progressing)       Start:  10/29/24    Expected End:  11/12/24            Patient will perform basic IADLs/simulated household tasks with mod (I). (Not Progressing)       Start:  10/29/24    Expected End:  11/12/24               COGNITION/SAFETY       Patient will score WFL on standardized cognitive assessment within reasonable time frame (Progressing)       Start:  10/29/24    Expected End:  11/12/24               EXERCISE/STRENGTHENING       Patient will participate in >15 minutes of activity with <2 rest breaks to increase tolerance for ADL/functional mobility performance. (Progressing)       Start:  10/29/24    Expected End:  11/12/24               MOBILITY       Patient will perform Functional mobility Household distances/Community Distances with independent level of assistance and least restrictive device in order to improve safety and functional mobility. (Not Progressing)       Start:  10/29/24    Expected End:  11/12/24               TRANSFERS       Patient will perform bed mobility  independent level of assistance in order to improve safety and independence with mobility (Progressing)       Start:  10/29/24    Expected End:  11/12/24            Patient will complete functional transfers with least restrictive device with independent level of assistance. (Progressing)       Start:  10/29/24    Expected End:  11/12/24               Thuy Pop OTR/L  Inpatient Occupational Therapist   Rehab Office: [phone]

## 2024-11-01 NOTE — PROGRESS NOTES
HFICU Attending Note    Principal Problem:    Acute on chronic heart failure with reduced ejection fraction and diastolic dysfunction  Active Problems:    KENYA (acute kidney injury) (CMS-HCC)    History of chronic atrial fibrillation    Ischemic cardiomyopathy    GERD (gastroesophageal reflux disease)    Leukocytosis    Heart failure, ACC/AHA stage D    Obstructive sleep apnea    Worsening systolic function in a patient with known Ischemic CM   LHC showing occluded grafts , no revascularization options   On milrinone at 0.375 with acceptable hemodynamics   Will be presented for LVAD /transplantation consideration on Tuesday , he is agreable to be considered for both   Appreciate the GI team's recommendations and noted findings on the CT scan he had   Will wait for their input on what further testing they recommend we perform to clarify liver findings             This critically ill patient continues to be at-risk for clinically significant deterioration / failure due to the above mentioned dysfunctional, unstable organ systems.  I have personally identified and managed all complex critical care issues to prevent aforementioned clinical deterioration.  Critical care time is spent at bedside and/or the immediate area and has included, but is not limited to, the review of diagnostic tests, labs, radiographs, serial assessments of hemodynamics, respiratory status, ventilatory management, and family updates.  Time spent in procedures and teaching are reported separately.    Critical care time: 35____ minutes

## 2024-11-02 ENCOUNTER — APPOINTMENT (OUTPATIENT)
Dept: RADIOLOGY | Facility: HOSPITAL | Age: 69
DRG: 001 | End: 2024-11-02
Payer: MEDICARE

## 2024-11-02 ENCOUNTER — APPOINTMENT (OUTPATIENT)
Dept: RADIOLOGY | Facility: HOSPITAL | Age: 69
End: 2024-11-02
Payer: MEDICARE

## 2024-11-02 LAB
ALBUMIN SERPL BCP-MCNC: 4.1 G/DL (ref 3.4–5)
ALBUMIN SERPL BCP-MCNC: 4.5 G/DL (ref 3.4–5)
ANION GAP BLDMV CALCULATED.4IONS-SCNC: 10 MMO/L (ref 10–25)
ANION GAP BLDMV CALCULATED.4IONS-SCNC: 11 MMO/L (ref 10–25)
ANION GAP SERPL CALC-SCNC: 15 MMOL/L (ref 10–20)
ANION GAP SERPL CALC-SCNC: 18 MMOL/L (ref 10–20)
APTT PPP: 50 SECONDS (ref 27–38)
BASE EXCESS BLDMV CALC-SCNC: -2.6 MMOL/L (ref -2–3)
BASE EXCESS BLDMV CALC-SCNC: 0.5 MMOL/L (ref -2–3)
BASE EXCESS BLDMV CALC-SCNC: 0.7 MMOL/L (ref -2–3)
BASE EXCESS BLDMV CALC-SCNC: 0.9 MMOL/L (ref -2–3)
BASE EXCESS BLDMV CALC-SCNC: 0.9 MMOL/L (ref -2–3)
BODY TEMPERATURE: 37 DEGREES CELSIUS
BUN SERPL-MCNC: 34 MG/DL (ref 6–23)
BUN SERPL-MCNC: 38 MG/DL (ref 6–23)
CA-I BLDMV-SCNC: 1.14 MMOL/L (ref 1.1–1.33)
CA-I BLDMV-SCNC: 1.24 MMOL/L (ref 1.1–1.33)
CA-I BLDMV-SCNC: 1.25 MMOL/L (ref 1.1–1.33)
CA-I BLDMV-SCNC: 1.27 MMOL/L (ref 1.1–1.33)
CA-I BLDMV-SCNC: 1.28 MMOL/L (ref 1.1–1.33)
CALCIUM SERPL-MCNC: 9.1 MG/DL (ref 8.6–10.6)
CALCIUM SERPL-MCNC: 9.6 MG/DL (ref 8.6–10.6)
CHLORIDE BLD-SCNC: 102 MMOL/L (ref 98–107)
CHLORIDE BLD-SCNC: 95 MMOL/L (ref 98–107)
CHLORIDE BLD-SCNC: 96 MMOL/L (ref 98–107)
CHLORIDE BLD-SCNC: 96 MMOL/L (ref 98–107)
CHLORIDE BLD-SCNC: 97 MMOL/L (ref 98–107)
CHLORIDE SERPL-SCNC: 94 MMOL/L (ref 98–107)
CHLORIDE SERPL-SCNC: 99 MMOL/L (ref 98–107)
CMV IGM SERPL-ACNC: 16.9 AU/ML
CO2 SERPL-SCNC: 24 MMOL/L (ref 21–32)
CO2 SERPL-SCNC: 24 MMOL/L (ref 21–32)
CREAT SERPL-MCNC: 1.45 MG/DL (ref 0.5–1.3)
CREAT SERPL-MCNC: 1.56 MG/DL (ref 0.5–1.3)
EGFRCR SERPLBLD CKD-EPI 2021: 48 ML/MIN/1.73M*2
EGFRCR SERPLBLD CKD-EPI 2021: 52 ML/MIN/1.73M*2
ERYTHROCYTE [DISTWIDTH] IN BLOOD BY AUTOMATED COUNT: 16.3 % (ref 11.5–14.5)
GLUCOSE BLD-MCNC: 106 MG/DL (ref 74–99)
GLUCOSE BLD-MCNC: 113 MG/DL (ref 74–99)
GLUCOSE BLD-MCNC: 136 MG/DL (ref 74–99)
GLUCOSE BLD-MCNC: 138 MG/DL (ref 74–99)
GLUCOSE BLD-MCNC: 150 MG/DL (ref 74–99)
GLUCOSE SERPL-MCNC: 111 MG/DL (ref 74–99)
GLUCOSE SERPL-MCNC: 125 MG/DL (ref 74–99)
HCO3 BLDMV-SCNC: 21.8 MMOL/L (ref 22–26)
HCO3 BLDMV-SCNC: 25.3 MMOL/L (ref 22–26)
HCO3 BLDMV-SCNC: 25.3 MMOL/L (ref 22–26)
HCO3 BLDMV-SCNC: 25.4 MMOL/L (ref 22–26)
HCO3 BLDMV-SCNC: 25.4 MMOL/L (ref 22–26)
HCT VFR BLD AUTO: 39.4 % (ref 41–52)
HCT VFR BLD EST: 41 % (ref 41–52)
HCT VFR BLD EST: 42 % (ref 41–52)
HCT VFR BLD EST: 44 % (ref 41–52)
HCT VFR BLD EST: 44 % (ref 41–52)
HCT VFR BLD EST: 46 % (ref 41–52)
HGB BLD-MCNC: 14 G/DL (ref 13.5–17.5)
HGB BLDMV-MCNC: 13.8 G/DL (ref 13.5–17.5)
HGB BLDMV-MCNC: 14.1 G/DL (ref 13.5–17.5)
HGB BLDMV-MCNC: 14.5 G/DL (ref 13.5–17.5)
HGB BLDMV-MCNC: 14.8 G/DL (ref 13.5–17.5)
HGB BLDMV-MCNC: 15.4 G/DL (ref 13.5–17.5)
INHALED O2 CONCENTRATION: 21 %
INR PPP: 1.3 (ref 0.9–1.1)
LACTATE BLDMV-SCNC: 0.4 MMOL/L (ref 0.4–2)
LACTATE BLDMV-SCNC: 0.5 MMOL/L (ref 0.4–2)
LACTATE BLDMV-SCNC: 0.7 MMOL/L (ref 0.4–2)
LACTATE BLDMV-SCNC: 0.9 MMOL/L (ref 0.4–2)
LACTATE BLDMV-SCNC: 1 MMOL/L (ref 0.4–2)
MAGNESIUM SERPL-MCNC: 2.06 MG/DL (ref 1.6–2.4)
MAGNESIUM SERPL-MCNC: 2.07 MG/DL (ref 1.6–2.4)
MCH RBC QN AUTO: 27.6 PG (ref 26–34)
MCHC RBC AUTO-ENTMCNC: 35.5 G/DL (ref 32–36)
MCV RBC AUTO: 78 FL (ref 80–100)
NIL(NEG) CONTROL SPOT COUNT: NORMAL
NRBC BLD-RTO: 0 /100 WBCS (ref 0–0)
OXYHGB MFR BLDMV: 57.2 % (ref 45–75)
OXYHGB MFR BLDMV: 65.7 % (ref 45–75)
OXYHGB MFR BLDMV: 66.2 % (ref 45–75)
OXYHGB MFR BLDMV: 70.1 % (ref 45–75)
OXYHGB MFR BLDMV: 70.8 % (ref 45–75)
PANEL A SPOT COUNT: 0
PANEL B SPOT COUNT: 0
PCO2 BLDMV: 36 MM HG (ref 41–51)
PCO2 BLDMV: 39 MM HG (ref 41–51)
PCO2 BLDMV: 39 MM HG (ref 41–51)
PCO2 BLDMV: 40 MM HG (ref 41–51)
PCO2 BLDMV: 41 MM HG (ref 41–51)
PH BLDMV: 7.39 PH (ref 7.33–7.43)
PH BLDMV: 7.4 PH (ref 7.33–7.43)
PH BLDMV: 7.41 PH (ref 7.33–7.43)
PH BLDMV: 7.42 PH (ref 7.33–7.43)
PH BLDMV: 7.42 PH (ref 7.33–7.43)
PHOSPHATE SERPL-MCNC: 4 MG/DL (ref 2.5–4.9)
PHOSPHATE SERPL-MCNC: 4.5 MG/DL (ref 2.5–4.9)
PLATELET # BLD AUTO: 562 X10*3/UL (ref 150–450)
PO2 BLDMV: 35 MM HG (ref 35–45)
PO2 BLDMV: 39 MM HG (ref 35–45)
PO2 BLDMV: 40 MM HG (ref 35–45)
PO2 BLDMV: 43 MM HG (ref 35–45)
PO2 BLDMV: 44 MM HG (ref 35–45)
POS CONTROL SPOT COUNT: NORMAL
POTASSIUM BLDMV-SCNC: 3.5 MMOL/L (ref 3.5–5.3)
POTASSIUM BLDMV-SCNC: 4.4 MMOL/L (ref 3.5–5.3)
POTASSIUM BLDMV-SCNC: 4.5 MMOL/L (ref 3.5–5.3)
POTASSIUM BLDMV-SCNC: 4.5 MMOL/L (ref 3.5–5.3)
POTASSIUM BLDMV-SCNC: 4.6 MMOL/L (ref 3.5–5.3)
POTASSIUM SERPL-SCNC: 4.4 MMOL/L (ref 3.5–5.3)
POTASSIUM SERPL-SCNC: 4.9 MMOL/L (ref 3.5–5.3)
PROTHROMBIN TIME: 15.1 SECONDS (ref 9.8–12.8)
RBC # BLD AUTO: 5.08 X10*6/UL (ref 4.5–5.9)
SAO2 % BLDMV: 59 % (ref 45–75)
SAO2 % BLDMV: 68 % (ref 45–75)
SAO2 % BLDMV: 68 % (ref 45–75)
SAO2 % BLDMV: 72 % (ref 45–75)
SAO2 % BLDMV: 73 % (ref 45–75)
SODIUM BLDMV-SCNC: 126 MMOL/L (ref 136–145)
SODIUM BLDMV-SCNC: 127 MMOL/L (ref 136–145)
SODIUM BLDMV-SCNC: 127 MMOL/L (ref 136–145)
SODIUM BLDMV-SCNC: 128 MMOL/L (ref 136–145)
SODIUM BLDMV-SCNC: 131 MMOL/L (ref 136–145)
SODIUM SERPL-SCNC: 131 MMOL/L (ref 136–145)
SODIUM SERPL-SCNC: 134 MMOL/L (ref 136–145)
T GONDII IGM SER-ACNC: <3 AU/ML
T-SPOT. TB INTERPRETATION: NEGATIVE
UFH PPP CHRO-ACNC: 0.1 IU/ML
UFH PPP CHRO-ACNC: 0.2 IU/ML
UFH PPP CHRO-ACNC: 0.2 IU/ML
UFH PPP CHRO-ACNC: 0.3 IU/ML
URATE SERPL-MCNC: 6.9 MG/DL (ref 4–7.5)
WBC # BLD AUTO: 10.8 X10*3/UL (ref 4.4–11.3)

## 2024-11-02 PROCEDURE — 2500000001 HC RX 250 WO HCPCS SELF ADMINISTERED DRUGS (ALT 637 FOR MEDICARE OP)

## 2024-11-02 PROCEDURE — 85520 HEPARIN ASSAY: CPT

## 2024-11-02 PROCEDURE — 2500000004 HC RX 250 GENERAL PHARMACY W/ HCPCS (ALT 636 FOR OP/ED): Performed by: NURSE PRACTITIONER

## 2024-11-02 PROCEDURE — 71045 X-RAY EXAM CHEST 1 VIEW: CPT | Performed by: RADIOLOGY

## 2024-11-02 PROCEDURE — 71045 X-RAY EXAM CHEST 1 VIEW: CPT

## 2024-11-02 PROCEDURE — 36415 COLL VENOUS BLD VENIPUNCTURE: CPT

## 2024-11-02 PROCEDURE — 2020000001 HC ICU ROOM DAILY

## 2024-11-02 PROCEDURE — 83605 ASSAY OF LACTIC ACID: CPT | Performed by: NURSE PRACTITIONER

## 2024-11-02 PROCEDURE — 84132 ASSAY OF SERUM POTASSIUM: CPT

## 2024-11-02 PROCEDURE — 83735 ASSAY OF MAGNESIUM: CPT | Performed by: NURSE PRACTITIONER

## 2024-11-02 PROCEDURE — 2500000002 HC RX 250 W HCPCS SELF ADMINISTERED DRUGS (ALT 637 FOR MEDICARE OP, ALT 636 FOR OP/ED): Performed by: NURSE PRACTITIONER

## 2024-11-02 PROCEDURE — 2550000001 HC RX 255 CONTRASTS: Performed by: INTERNAL MEDICINE

## 2024-11-02 PROCEDURE — 74160 CT ABDOMEN W/CONTRAST: CPT

## 2024-11-02 PROCEDURE — 85027 COMPLETE CBC AUTOMATED: CPT

## 2024-11-02 PROCEDURE — 36415 COLL VENOUS BLD VENIPUNCTURE: CPT | Performed by: NURSE PRACTITIONER

## 2024-11-02 PROCEDURE — 80069 RENAL FUNCTION PANEL: CPT

## 2024-11-02 PROCEDURE — 2500000001 HC RX 250 WO HCPCS SELF ADMINISTERED DRUGS (ALT 637 FOR MEDICARE OP): Performed by: NURSE PRACTITIONER

## 2024-11-02 PROCEDURE — 37799 UNLISTED PX VASCULAR SURGERY: CPT | Performed by: NURSE PRACTITIONER

## 2024-11-02 PROCEDURE — 74160 CT ABDOMEN W/CONTRAST: CPT | Performed by: RADIOLOGY

## 2024-11-02 PROCEDURE — 99291 CRITICAL CARE FIRST HOUR: CPT | Performed by: INTERNAL MEDICINE

## 2024-11-02 PROCEDURE — 84550 ASSAY OF BLOOD/URIC ACID: CPT | Performed by: NURSE PRACTITIONER

## 2024-11-02 PROCEDURE — 99291 CRITICAL CARE FIRST HOUR: CPT | Performed by: NURSE PRACTITIONER

## 2024-11-02 PROCEDURE — 2500000004 HC RX 250 GENERAL PHARMACY W/ HCPCS (ALT 636 FOR OP/ED): Performed by: STUDENT IN AN ORGANIZED HEALTH CARE EDUCATION/TRAINING PROGRAM

## 2024-11-02 PROCEDURE — 2500000004 HC RX 250 GENERAL PHARMACY W/ HCPCS (ALT 636 FOR OP/ED)

## 2024-11-02 PROCEDURE — 83735 ASSAY OF MAGNESIUM: CPT

## 2024-11-02 PROCEDURE — 84132 ASSAY OF SERUM POTASSIUM: CPT | Performed by: NURSE PRACTITIONER

## 2024-11-02 PROCEDURE — 85610 PROTHROMBIN TIME: CPT

## 2024-11-02 RX ORDER — BUMETANIDE 0.25 MG/ML
2.5 INJECTION, SOLUTION INTRAMUSCULAR; INTRAVENOUS ONCE
Status: COMPLETED | OUTPATIENT
Start: 2024-11-02 | End: 2024-11-02

## 2024-11-02 ASSESSMENT — PAIN - FUNCTIONAL ASSESSMENT
PAIN_FUNCTIONAL_ASSESSMENT: 0-10

## 2024-11-02 ASSESSMENT — PAIN SCALES - GENERAL
PAINLEVEL_OUTOF10: 0 - NO PAIN

## 2024-11-02 NOTE — PROGRESS NOTES
Physical Therapy    Physical Therapy Treatment    Patient Name: Fahad Meraz  MRN: [id number]  Department: [facility] HFICU    Room: 11/11-A  Today's Date: 11/1/2024  Time Calculation  Start Time: 1340  Stop Time: 1425  Time Calculation (min): 45 min         Assessment/Plan   PT Assessment  End of Session Communication: Bedside nurse  End of Session Patient Position: Up in chair, Alarm off, not on at start of session     PT Plan  Treatment/Interventions: Bed mobility, Transfer training, Gait training, Stair training, Balance training, Strengthening, Endurance training, Range of motion, Therapeutic exercise, Therapeutic activity  PT Plan: Ongoing PT  PT Frequency: 4 times per week  PT Discharge Recommendations: Other (comment) (outpatient cardiac rehab)  PT Recommended Transfer Status: Contact guard  PT - OK to Discharge: Yes      General Visit Information:   PT  Visit  PT Received On: 11/01/24  Response to Previous Treatment: Patient with no complaints from previous session.  General  Patient Position Received: Bed, 3 rail up, Alarm off, not on at start of session  General Comment: patient pleasant and agreeable to PT. Planned to complete 6MWT today; however, pt reporting having some R great toe pain, feels like the beginning of a gout flare up, and was ambulating slower due to this issue. Will defer 6MWT until he can walk his normal tommy, to obtain an accurate measure of distance. HF team NP alerted of toe pain.      Precautions:  Precautions  Hearing/Visual Limitations: hearing is WFL  Medical Precautions: Cardiac precautions, Fall precautions     Lines/Tubes:   Telemetry   [place]-cassandra catheter   Heparin gtt   Milrinone .375 mcg            Vital Signs       Vitals Session Pre PT During PT Post PT   Heart Rate 88 HR max 105 75   Resp 17   17   SpO2 92  95   /78   131/80         Objective  Pain:  Pain Assessment  Pain Assessment: 0-10  0-10 (Numeric) Pain Score: 3/10 R great toe      Cognition:  Cognition  Overall Cognitive Status: Within Functional Limits  Arousal/Alertness: Appropriate responses to stimuli  Orientation Level: Oriented X4   Following Commands: Follows all commands and directions without difficulty     General Assessments:  Activity Tolerance  Early Mobility/Exercise Safety Screen: Proceed with mobilization - No exclusion criteria met  Rate of Perceived Dyspnea (RPD): .5 /10  Rate of Perceived Exertion (RPE): 7/20        Static Sitting Balance  Static Sitting-Balance Support: Feet supported, No upper extremity supported  Static Sitting-Level of Assistance: Independent  Dynamic Sitting Balance  Dynamic Sitting-Balance Support: Feet supported, No upper extremity supported  Dynamic Sitting-Level of Assistance: Independent     Static Standing Balance  Static Standing-Balance Support: No upper extremity supported  Static Standing-Level of Assistance: Close supervision  Dynamic Standing Balance  Dynamic Standing-Balance Support: No upper extremity supported  Dynamic Standing-Level of Assistance: Contact guard     Functional Assessments:  Bed Mobility  Bed Mobility: Yes  Supine To Sit with close supervision     Transfers  Transfer: Yes  Transfer 1  Technique 1: Sit to stand, Stand to sit  Transfer Level of Assistance 1: Close supervision  Trials/Comments 1: pt does not require use of UEs to complete transfers safely     Ambulation/Gait Training  Ambulation/Gait Training Performed: Yes  Ambulation/Gait Training 1  Surface 1: Level tile  Device 1: No device, pt managed IV pole  Assistance 1: Contact guard  Quality of Gait 1: Forward flexed posture, Decreased step length, Wide base of support , slowed tommy with decr stance R LE 2/2 pt report of R great toe pain  Comments/Distance (ft) 1: 350 ft       Treatments:  Therapeutic Activity  Therapeutic Activity Performed: Yes  Therapeutic Exercise 1: warm up: AP x 15, LAQ x 15 ea  Therapeutic Exercise 2: Squat to chair level with  unilateral UE assist x 10 reps, 2 sets with focus on eccentric control     Outcome Measures:  [facility] Basic Mobility  Turning from your back to your side while in a flat bed without using bedrails: None  Moving from lying on your back to sitting on the side of a flat bed without using bedrails: None  Moving to and from bed to chair (including a wheelchair): A little  Standing up from a chair using your arms (e.g. wheelchair or bedside chair): A little  To walk in hospital room: A little  Climbing 3-5 steps with railing: A little  Basic Mobility - Total Score: 20     Confusion Assessment Method-ICU (CAM-ICU)  Feature 1: Acute Onset or Fluctuating Course: Negative  Overall CAM-ICU: Negative     FSS-ICU  Ambulation: Walks >/ or equal to 150 feet with minimal assistance x1  Rolling: Complete independence  Sitting: Complete independence  Transfer Sit-to-Stand: Supervision or set-up only  Transfer Supine-to-Sit: Complete independence  Total Score: 30     Early Mobility/Exercise Safety Screen: Proceed with mobilization - No exclusion criteria met  ICU Mobility Scale: Walking with assistance of 1 person [8]  E = Exercise and Early Mobility  Early Mobility/Exercise Safety Screen: Proceed with mobilization - No exclusion criteria met  ICU Mobility Scale: Walking with assistance of 1 person    Education Documentation  Mobility Training, taught by Robina Friedman, PT at 11/1/2024 10:21 PM.  Learner: Patient  Readiness: Acceptance  Method: Explanation  Response: Verbalizes Understanding    Education Comments  No comments found.        OP EDUCATION:       Encounter Problems       Encounter Problems (Active)       Balance       Pt will score >45 on VEGA for safe community ambulation (Progressing)       Start:  10/29/24    Expected End:  11/12/24               Mobility       Patient will ambulate >1000 ft with LRAD and supervision With stable vitals and RPD </= 3/10 and RPE </= 13/20 for improved functional mobility.   (Progressing)        Start:  10/29/24    Expected End:  11/12/24            Pt will complete 6MWT with no assistive device and supervision and achieve >700 ft With stable vitals and RPD </= 3/10 and RPE </= 13/20 for improved functional mobility.   (Progressing)       Start:  10/29/24    Expected End:  11/12/24               PT Transfers       Patient will perform bed mobility indep (maintenance 2/2 prolonged hospital stay anticipated) (Progressing)       Start:  10/29/24    Expected End:  11/12/24            Patient will transfer sit to and from stand indep (Progressing)       Start:  10/29/24    Expected End:  11/12/24            Pt will complete 5XSTS from recliner without UE assist <12 seconds With stable vitals and RPD </= 3/10 and RPE </= 13/20 for improved functional mobility.   (Progressing)       Start:  10/29/24    Expected End:  11/12/24               Pain - Adult

## 2024-11-02 NOTE — PROGRESS NOTES
[facility]  HFICU PROGRESS NOTE    Fahad Meraz/[id number]    Admit Date: 10/24/2024  Hospital Length of Stay: 9   ICU Length of Stay: 8d 11h   Primary Service:   Primary HF Cardiologist: Dr. Washington  Referring:    INTERVAL EVENTS / PERTINENT ROS:     No acute events overnight. Continues with workup for advance therapies. CT liver w/ contrast pending. Denies any c/o dyspnea, chest pain,  or discomfort. Intermittent rt toe pain he is concerned my be gout -- uric acid negative. Remains on milrinone 0.375mcg/kg/min. Telemetry reviewed, remains in Afib w/ PVC's. CVP remains elevated.      Plan:  - Bumex 2.5mg IV x 1  - c/w Hydra 50 TID / Isordil 20 TID  - c/w Milrinone 0.375mcg/lg/min until post CT w/ contrast  - Genetic consult outpatient - collaborate with Dr. Washington   - Initiate advanced heart failure therapies evaluation  - HOLD Farxiga in preparation of advanced therapies (transplant / LVAD)     MEDICATIONS  Infusions:  heparin, Last Rate: 2,000 Units/hr (11/02/24 0641)  lactated Ringer's, Last Rate: 10 mL/hr (11/02/24 0600)  milrinone, Last Rate: 0.375 mcg/kg/min (11/02/24 0635)      Scheduled:  aspirin, 81 mg, Daily  cholecalciferol, 50,000 Units, Every Sunday  escitalopram, 10 mg, Daily  hydrALAZINE, 50 mg, TID  isosorbide dinitrate, 20 mg, TID  magnesium oxide, 800 mg, BID  [Held by provider] metoprolol succinate XL, 50 mg, Daily  pantoprazole, 40 mg, Daily before breakfast  perflutren protein A microsphere, 0.5 mL, Once in imaging  sennosides-docusate sodium, 2 tablet, BID  spironolactone, 25 mg, Daily  sulfur hexafluoride microsphr, 2 mL, Once in imaging      PRN:  alteplase, 2 mg, PRN  bisacodyl, 10 mg, Daily PRN  nitroglycerin, 0.4 mg, q5 min PRN  oxygen, , Continuous PRN - O2/gases  polyethylene glycol, 17 g, Daily PRN      Invasive Hemodynamics:    Most Recent Range Past 24hrs   BP (Art) 111/62 No data recorded   MAP(Art) 78 mmHg No data recorded   RA/CVP   No data  recorded   PA 36/16 PAP  Min: 30/17  Max: 52/41   PA(mean) 23 mmHg PAP (Mean)  Min: 21 mmHg  Max: 44 mmHg   PCWP 17 mmHg PCWP (mmHg)  Min: 6 mmHg  Max: 18 mmHg   CO 6 L/min CO (L/min)  Min: 4.8 L/min  Max: 6 L/min   CI 2.8 L/min/m2 CI (L/min/m2)  Min: 2.22 L/min/m2  Max: 2.8 L/min/m2   Mixed Venous 73 % SVO2 (%)  Min: 68 %  Max: 73 %   SVR  874 (dyne*sec)/cm5 SVR (dyne*sec)/cm5  Min: 874 (dyne*sec)/cm5  Max: 1333 (dyne*sec)/cm5    (dyne*sec)/cm5 PVR (dyne*sec)/cm5  Min: 121 (dyne*sec)/cm5  Max: 317 (dyne*sec)/cm5     PHYSICAL EXAM:   Visit Vitals  /80   Pulse 96   Temp 36.5 °C (97.7 °F) (Temporal)   Resp 16   Ht 1.829 m (6')   Wt 91 kg (200 lb 9.9 oz)   SpO2 95%   BMI 27.21 kg/m²   Smoking Status Former   BSA 2.15 m²       Wt Readings from Last 5 Encounters:   11/02/24 91 kg (200 lb 9.9 oz)   10/24/24 92.5 kg (204 lb)   08/05/24 122 kg (268 lb)   01/31/22 119 kg (262 lb)   02/18/20 123 kg (270 lb 2 oz)       INTAKE/OUTPUT:  I/O last 3 completed shifts:  In: 1786 (19.6 mL/kg) [P.O.:480; I.V.:1306 (14.4 mL/kg)]  Out: 3000 (33 mL/kg) [Urine:3000 (0.9 mL/kg/hr)]  Weight: 91 kg      Physical Exam  HENT:      Head: Normocephalic.      Nose: Nose normal.      Mouth/Throat:      Mouth: Mucous membranes are moist.   Eyes:      Pupils: Pupils are equal, round, and reactive to light.   Neck:      Vascular: JVD present.   Cardiovascular:      Rate and Rhythm: Normal rate. Rhythm irregular.      Heart sounds: Murmur (holosystolic) heard.      Comments: Permanent Atrial Fibrillation  Pulmonary:      Effort: Pulmonary effort is normal.      Breath sounds: Normal breath sounds.      Comments: Rt subclavian SGC  Abdominal:      Palpations: Abdomen is soft.   Musculoskeletal:         General: Normal range of motion.      Cervical back: Normal range of motion.   Skin:     Capillary Refill: Capillary refill takes less than 2 seconds.   Neurological:      Mental Status: He is alert and oriented to person, place, and time.    Psychiatric:         Mood and Affect: Mood normal.         Behavior: Behavior normal.         Thought Content: Thought content normal.         Judgment: Judgment normal.         DATA:  CMP:  Results from last 7 days   Lab Units 11/02/24 0506 11/01/24  1533 11/01/24  0317 10/31/24  1249 10/31/24  0509 10/30/24  0131 10/29/24  0454 10/28/24  0919 10/28/24  0618 10/27/24  0141   SODIUM mmol/L 134* 133* 134* 134* 135* 133* 135* 134* 135* 131*   POTASSIUM mmol/L 4.4 4.3 4.3 4.1 4.9 4.0 4.6 3.9 4.4 4.1   CHLORIDE mmol/L 99 101 101 100 101 102 102 100 98 94*   CO2 mmol/L 24 21 23 24 25 23 22 25 24 25   ANION GAP mmol/L 15 15 14 14 14 12 16 13 17 16   BUN mg/dL 34* 28* 29* 29* 30* 33* 32* 34* 33* 38*   CREATININE mg/dL 1.45* 1.24 1.30 1.29 1.35* 1.43* 1.49* 1.68* 1.61* 2.05*   EGFR mL/min/1.73m*2 52* 63 59* 60* 57* 53* 50* 44* 46* 34*   MAGNESIUM mg/dL 2.07 1.96 2.11  --  2.37 2.27 2.49* 2.56* 2.65* 2.44*   ALBUMIN g/dL 4.1 4.1 4.1 4.3 4.1 4.0 4.2 4.1 4.3 4.4   ALT U/L  --   --   --  14  --   --   --   --   --   --    AST U/L  --   --   --  16  --   --   --   --   --   --    BILIRUBIN TOTAL mg/dL  --   --   --  1.4*  --   --   --   --   --   --      CBC:  Results from last 7 days   Lab Units 11/02/24  0506 11/01/24  0317 10/31/24  0509 10/30/24  0131 10/29/24  0454 10/28/24  0919 10/28/24  0618 10/27/24  0141   WBC AUTO x10*3/uL 10.8 11.6* 10.9 11.1 10.7 9.6 9.4 10.9   HEMOGLOBIN g/dL 14.0 13.9 14.5 14.7 15.3 15.9 15.6 15.5   HEMATOCRIT % 39.4* 41.6 43.6 44.8 46.0 46.8 46.6 46.9   PLATELETS AUTO x10*3/uL 562* 511* 527* 509* 594* 584* 545* 616*   MCV fL 78* 81 82 82 82 81 82 81     COAG:   Results from last 7 days   Lab Units 11/02/24  0506 11/01/24  0317 10/31/24  1249 10/31/24  0509 10/30/24  0131 10/29/24  0454 10/28/24  0919 10/28/24  0618   INR  1.3* 1.3* 1.3* 1.2* 1.3* 1.3* 1.4* 1.5*     ABO:   ABO TYPE   Date Value Ref Range Status   10/31/2024 A  Final     HEME/ENDO:  Results from last 7 days   Lab Units  10/31/24  1249   TSH mIU/L 4.39*      CARDIAC:   Results from last 7 days   Lab Units 10/31/24  1249   LD U/L 187   BNP pg/mL 755*       ASSESSMENT AND PLAN:     Mr. Meraz is a 69 M with a PMHx sig for CAD s/p 4V CABG (2012) and PCI (LCx/OM; 5/2019), stage D systolic HF/ICM/HFrEF with LVEF 10-15%( 10/22/24 TTE), AF s/p RFA (PVI and CTI; 4/2024) on OAC therapy, HTN, dyslipidemia, depression, anxiety and VIV using CPAP who was admitted to OSH ICU, s/p RHC on 10/18/24.  Per patient, he had been experienced worsening SOB and fluids overload for 2-3 weeks. Patient was on milrinone drip and diurese with SG guided. However his KENYA worsening, and not able to wean milrinone drip. Decision was made to transfer him to HF ICU [facility] for possible advanced therapy consideration. 10/26: Tolerated afterload reduction with hydralazine / Isordil. 10/27: Discontinued hydralazine / Isordil and initiated 24 / 26 mg Entresto BID and tolerating. 10/28: Coronary angiogram: Right dominant system; LM: Mild 20% distal disease; LAD: Occluded with Patent LIMA; RI: Occluded; Lcx: Patent proximal Lcx stent and OM1 stent, otherwise mild non-obstructive disease; RCA: proximal 100% occlusion. Grafts: LIMA-LAD: Patent; SVG-OM, SVG-RCA: Occluded; SVG-RI: Unable to engage but likely occluded. 10/29: Entresto discontinued and hydral / Isordil initiated 2/2 AHF therapies evaluation. 10/30: Email sent to initiate advanced heart failure evaluation. Did not tolerate down-titration of Milrinone gtt from 0.375 mcg/kg/min to 0.25 mcg/kg/min.  Increased back to 0.375 mcg/kg/min 2/2 poor cardiac indices. 10/31: Up-titrating hydralazine / Isordil for afterload reduction.     Neuro:     # Hx of Depression/Anxiety  - Serial neuro and pain assessments  - PO Tylenol PRN for pain  - PT/OT Consult, OOB to chair  - CAM ICU score every shift  - Sleep/wake cycle normalization  - c/w home Lexapro and Clonazepam     # Physical Status  -Overweight:  BMI: 27.9   -Reduced  Mobility: acute decompensated heart failure and ICU stay      #Substance abuse  -Alcohol abuse/Alcohol dependence: NO   -Tobacco use/Nicotine Dependence: NO      Cardiovascular:     # Stage D, ICM, decompensated acute on chronic heart failure,  HFrEF 10-15% ( from 10/22/24 ECHO)   #Hx of CAD s/p 4V CABG 2012   -BRIANA (3/29/23): Left Ventricle: Moderately reduced left ventricular systolic function with a visually estimated EF of 35 - 40%.  - TTE (10/22/24): LVEF 10 -15%, right ventricle moderately dilated, moderately reduced systolic function. Mild MR  - admit weight (10/24): 93.5 KG -->(11/2) 91kg  - admit BNP (10/24):  1995  - Home meds include Metoprolol succinate 25 mg daily, Entresto 97/103 mg bid, Hydralazine 25 mg bid, Isosorbide mononitrate 120 mg daily, Spironolactone 25 mg daily, Bumex 0.5 mg daily----allergy to SGLT2.  - ASA  - C/w Milrinone drip @ 0.375 mcg/kg/min, initiated OSH  - SGC # on arrival (10/25): /80 (89), CVP 11, PA 36/20 (26), PCWP 17, CO/CI 5.9/2.7, , SVO2 73% on Milrinone drip at 0.375 mcg/kg/min, hydral 50 mg TID.   - Daily hemodynamics: 11/2: B/P: 113/83 (94); CVP 14, PAP: 48/26 (33); SVR: 1333; CI: 2.2/ CO: 4.8; SvO2: 68 on IV Milrinone 0.375 mcg/kg/min; Hydralazine 25mg PO TID; Isordil 20mg PO TID  - Continue Milrinone drip at 0.375 mcg/kg/min - at this point appears to be Inotrope dependent. After CT liver w/ contrast is completed, will attempt to wean milrinone.   - Bumex 2.5 mg IV x 1 today (11/2)  - Stopped entresto 24/26 mg BID as likely will be going advanced therapy eval.   - Increase hydralzine to 50 mg TID  - c/w Isordil to 20 mg TID   - Discontinue dapagliflozin 10 mg daily in preparation for AHF therapies (LVAD / OHT)   - C/w Rodrigo 25 mg daily for now    - Hold BB   - Daily standing weights, 2gm sodium diet, 2L fluid restriction, strict I&Os  - Coronary angiogram multidisciplinary discussion, including CT surgery, no revascularization options.  - Advanced Heart  Failure Therapies Evaluation initiated 10/30/2024     LVAD AND Transplant:   -Email sent on 10/30/2024 - consent on 10/31/2024  -Labs:      10/31/2024    -RHC:       10/25/2024  -LHC     10/25/2024  -CPET     NA  -ECHO     10/25/2024    CT chest (10/31): Dilated ascending AO 4.3cm. Moderate calcifications of the aorta and branch vessels. Severe coronary calcifications. Moderate cardiomegaly. Moderate aortic valve calcifications. Lesion inferior rt hepatic lobe (4.8cm). SGC in right pulmonary artery. Main pulmonary artery mildly dilated.         -US abdomen/aorta/iliac/IVC  PENDING  -Carotid US    PENDING  -SITA (lower)    PENDING  -2 view CXR:     PENDING  -Device interrogation   NA - No device  -PFTs:      Complete, results pending  -Colonoscopy/Occult stool  Last completed per OSH records: 12/01/2015 - Consult placed 10/31/2024  -LVAD/TX education:    10/30/2024  -CT surgery consult   Not completed  -Palliative consult   Consult placed 10/31/2024  -Nutrition consult    Consult placed 10/31/2024  -Dental consult/Orthopantogram  Consult placed / Orthopantogram ordered & PENDING  -Physical and Occupational Therapy  Following       #CAD   -[facility] (12/20/2020):  Successful PCI of the SVG to ramus intermedius with one drug-eluting stent; OCT of the SVG to RCA to confirm no thrombosis; patent LAD; occluded SVG to OM.  -Meds: C/w ASA.     # Hx of AFib s/p RFA (PVI and CTI; 4/2024)  -NO device noted  -Was on home Coumadin for 2 months, prior to that was on Eliquis,  patient reported with Dizziness while on Eliquis      # Dizziness/bradycardia  -Had 3 episodes of near syncope after his recent AF RFA; has stopped driving as a result. ECG with sinus olu. Metoprolol previously decreased by his local EP.    -Hold BB due to decompensate HF      #Electrolyte Disturbances  -Keep K>4, Mag >2     Pulmonary:   # PMH of VIV  -c/w home CPAP  -Monitor and maintain SpO2 > 92%     GI:  # H/x of  GERD  - Bowel regimen: Senna, Miralax PRN  -  PPI     #  Bilirubinemia  - T maryuri (10/24): 2.3, ALT, AST: normal   - CT C/A/P (10/17/24): Hepatomegaly and hepatic steatosis are present. The liver has a lobulated contour. A questionable lesion in the right hepatic lobe inferiorly measures 26 mm.  - CT Liver w/ contrast (11/2) -- pending     # Urinary retention  - CT C/A/P (10/17/24): the prostate gland is mildly enlarged with mass effect on the posterior wall of the urinary bladder. The prostate gland measures 4.3 x 6.0 cm.   - C/o urinary difficulty, s/p FC OSH --> Removed prior admission        :  #KENYA/ CKD ?  in the setting of cardio-renal syndrome  -Baseline BUN/Cr: Unclear,  OSH ICU: Creatinine: 1.3-1.6   -Admit BUN/Cr (10/24): 28/1.88  -Daily BUN/Creat: 11/2: 34 / 1.45  -I/Os  -avoid hypotension and nephrotoxic agents     Heme:  #Anemia in the setting of iron deficiency  - Lab (10/24): H/H: 17.5/50.8,  Iron study: 63/374/17%, Ferritin: 76,  folate: 23.5, B12: 768   - NO iron replacement needed as H/H high     #Coagulopathy due to on home Coumadin  - INR  (10/30): 1.3, Hold home Coumadin   - Restarted Heparin gtt 10/29  - Heparin Xa 0.1     Endo:  #DM  - Euglycemic  - hgbA1c (10/24):  6.3     #Thyroid  -TSH (10/24):  8.47, T4: 1.44      ID:  # leukocytosis - Resolved  - WBC (10/26):  9.6   -afebrile, nontoxic   -trend temps q4h       PHYSICAL AND OCCUPATIONAL THERAPY: Ordered    LINES:  PIVs   A-line (OSH) 10/20 - current  Right Subclavian Cordis / PA catheter (OSH) 10/18 - 10/28  Right subclavian cordis: Previously placed swan sheath was exchanged to a new swan sheath over a short Jwire and was sutured in place. 10/28 - current    DVT: INR: 1.3 (11/2) (home Warfarin now held) - Re-Initiated heparin gtt 10/29  VAP BUNDLE: NA  CENTRAL LINE BUNDLE: Ordered   ULCER PPX: Pantoprazole 40 mg daily  GLYCEMIC CONTROL: NA - A1C: 6.3  BOWEL CARE: Senna / Miralax  INDWELLING CATHETER: NA  NUTRITION: Adult diet Cardiac; 70 gm fat; 2 - 3 grams Sodium      EMERGENCY  CONTACT: Extended Emergency Contact Information  Primary Emergency Contact: Tanvi Meraz Phone: [phone]  Relation: Spouse  FAMILY UPDATE:   CODE STATUS: Full Code  DISPO: Maintain in the HFICU    Patient seen and assessed with Dr. Cesilia STEEN personally spent 60 minutes of critical care time directly and personally managing the patient exclusive of separately billable procedures   _________________________________________________  Shon Polanco, APRN-CNP

## 2024-11-02 NOTE — PROGRESS NOTES
HFICU Attending Note    Principal Problem:    Acute on chronic heart failure with reduced ejection fraction and diastolic dysfunction  Active Problems:    KENYA (acute kidney injury) (CMS-HCC)    History of chronic atrial fibrillation    Ischemic cardiomyopathy    GERD (gastroesophageal reflux disease)    Leukocytosis    Heart failure, ACC/AHA stage D    Obstructive sleep apnea    Worsening systolic function in a patient with known Ischemic CM   LHC showing occluded grafts , no revascularization options   On milrinone at 0.375 with acceptable hemodynamics   Will be presented for LVAD /transplantation consideration on Tuesday , he is agreable to be considered for both   Appreciate the GI team's recommendations and noted findings on the CT scan   Recommended CTA   Colonoscopy on Monday             This critically ill patient continues to be at-risk for clinically significant deterioration / failure due to the above mentioned dysfunctional, unstable organ systems.  I have personally identified and managed all complex critical care issues to prevent aforementioned clinical deterioration.  Critical care time is spent at bedside and/or the immediate area and has included, but is not limited to, the review of diagnostic tests, labs, radiographs, serial assessments of hemodynamics, respiratory status, ventilatory management, and family updates.  Time spent in procedures and teaching are reported separately.    Critical care time: 35____ minutes

## 2024-11-03 ENCOUNTER — APPOINTMENT (OUTPATIENT)
Dept: RADIOLOGY | Facility: HOSPITAL | Age: 69
End: 2024-11-03
Payer: MEDICARE

## 2024-11-03 VITALS
WEIGHT: 201.72 LBS | RESPIRATION RATE: 14 BRPM | HEIGHT: 72 IN | DIASTOLIC BLOOD PRESSURE: 72 MMHG | HEART RATE: 82 BPM | BODY MASS INDEX: 27.32 KG/M2 | OXYGEN SATURATION: 99 % | SYSTOLIC BLOOD PRESSURE: 106 MMHG | TEMPERATURE: 96.8 F

## 2024-11-03 LAB
ALBUMIN SERPL BCP-MCNC: 4.1 G/DL (ref 3.4–5)
AMPHETAMINES SERPL QL SCN: NEGATIVE NG/ML
ANION GAP BLDMV CALCULATED.4IONS-SCNC: 11 MMO/L (ref 10–25)
ANION GAP BLDMV CALCULATED.4IONS-SCNC: 7 MMO/L (ref 10–25)
ANION GAP BLDMV CALCULATED.4IONS-SCNC: 9 MMO/L (ref 10–25)
ANION GAP SERPL CALC-SCNC: 17 MMOL/L (ref 10–20)
ANNOTATION COMMENT IMP: NORMAL
APTT PPP: 93 SECONDS (ref 27–38)
BARBITURATES SERPL QL SCN: NEGATIVE NG/ML
BASE EXCESS BLDMV CALC-SCNC: -2.4 MMOL/L (ref -2–3)
BASE EXCESS BLDMV CALC-SCNC: 1 MMOL/L (ref -2–3)
BASE EXCESS BLDMV CALC-SCNC: 1.2 MMOL/L (ref -2–3)
BENZODIAZ SERPL QL SCN: NEGATIVE NG/ML
BODY TEMPERATURE: 37 DEGREES CELSIUS
BUN SERPL-MCNC: 42 MG/DL (ref 6–23)
BUPRENORPHINE SERPL-MCNC: NEGATIVE NG/ML
CA-I BLDMV-SCNC: 1.1 MMOL/L (ref 1.1–1.33)
CA-I BLDMV-SCNC: 1.23 MMOL/L (ref 1.1–1.33)
CA-I BLDMV-SCNC: 1.27 MMOL/L (ref 1.1–1.33)
CALCIUM SERPL-MCNC: 9.3 MG/DL (ref 8.6–10.6)
CANNABINOIDS SERPL QL SCN: NEGATIVE NG/ML
CHLORIDE BLD-SCNC: 102 MMOL/L (ref 98–107)
CHLORIDE BLD-SCNC: 97 MMOL/L (ref 98–107)
CHLORIDE BLD-SCNC: 97 MMOL/L (ref 98–107)
CHLORIDE SERPL-SCNC: 97 MMOL/L (ref 98–107)
CO2 SERPL-SCNC: 23 MMOL/L (ref 21–32)
COCAINE SERPL QL SCN: NEGATIVE NG/ML
CREAT SERPL-MCNC: 1.41 MG/DL (ref 0.5–1.3)
EGFRCR SERPLBLD CKD-EPI 2021: 54 ML/MIN/1.73M*2
ERYTHROCYTE [DISTWIDTH] IN BLOOD BY AUTOMATED COUNT: 16.8 % (ref 11.5–14.5)
GLUCOSE BLD-MCNC: 127 MG/DL (ref 74–99)
GLUCOSE BLD-MCNC: 142 MG/DL (ref 74–99)
GLUCOSE BLD-MCNC: 80 MG/DL (ref 74–99)
GLUCOSE SERPL-MCNC: 116 MG/DL (ref 74–99)
HCO3 BLDMV-SCNC: 21.6 MMOL/L (ref 22–26)
HCO3 BLDMV-SCNC: 26 MMOL/L (ref 22–26)
HCO3 BLDMV-SCNC: 26.6 MMOL/L (ref 22–26)
HCT VFR BLD AUTO: 41.4 % (ref 41–52)
HCT VFR BLD EST: 41 % (ref 41–52)
HCT VFR BLD EST: 41 % (ref 41–52)
HCT VFR BLD EST: 43 % (ref 41–52)
HGB BLD-MCNC: 13.4 G/DL (ref 13.5–17.5)
HGB BLDMV-MCNC: 13.6 G/DL (ref 13.5–17.5)
HGB BLDMV-MCNC: 13.8 G/DL (ref 13.5–17.5)
HGB BLDMV-MCNC: 14.2 G/DL (ref 13.5–17.5)
INHALED O2 CONCENTRATION: 21 %
INR PPP: 1.4 (ref 0.9–1.1)
LACTATE BLDMV-SCNC: 0.4 MMOL/L (ref 0.4–2)
LACTATE BLDMV-SCNC: 0.5 MMOL/L (ref 0.4–2)
LACTATE BLDMV-SCNC: 1.3 MMOL/L (ref 0.4–2)
MAGNESIUM SERPL-MCNC: 2.22 MG/DL (ref 1.6–2.4)
MCH RBC QN AUTO: 27.3 PG (ref 26–34)
MCHC RBC AUTO-ENTMCNC: 32.4 G/DL (ref 32–36)
MCV RBC AUTO: 85 FL (ref 80–100)
METHADONE SERPL QL SCN: NEGATIVE NG/ML
METHAMPHET SERPL QL: NEGATIVE NG/ML
MGC ASCENT PFT - FEV1 - PRE: 3
MGC ASCENT PFT - FEV1 - PREDICTED: 3.32
MGC ASCENT PFT - FVC - PRE: 4
MGC ASCENT PFT - FVC - PREDICTED: 4.41
NRBC BLD-RTO: 0 /100 WBCS (ref 0–0)
OPIATES SERPL QL SCN: NEGATIVE NG/ML
OXYCODONE SERPL QL: NEGATIVE NG/ML
OXYHGB MFR BLDMV: 67.7 % (ref 45–75)
OXYHGB MFR BLDMV: 71.3 % (ref 45–75)
OXYHGB MFR BLDMV: 72.3 % (ref 45–75)
PCO2 BLDMV: 34 MM HG (ref 41–51)
PCO2 BLDMV: 42 MM HG (ref 41–51)
PCO2 BLDMV: 44 MM HG (ref 41–51)
PCP SERPL QL SCN: NEGATIVE NG/ML
PH BLDMV: 7.39 PH (ref 7.33–7.43)
PH BLDMV: 7.4 PH (ref 7.33–7.43)
PH BLDMV: 7.41 PH (ref 7.33–7.43)
PHOSPHATE SERPL-MCNC: 5 MG/DL (ref 2.5–4.9)
PLATELET # BLD AUTO: 504 X10*3/UL (ref 150–450)
PO2 BLDMV: 41 MM HG (ref 35–45)
PO2 BLDMV: 45 MM HG (ref 35–45)
PO2 BLDMV: 47 MM HG (ref 35–45)
POTASSIUM BLDMV-SCNC: 3.6 MMOL/L (ref 3.5–5.3)
POTASSIUM BLDMV-SCNC: 3.9 MMOL/L (ref 3.5–5.3)
POTASSIUM BLDMV-SCNC: 4.3 MMOL/L (ref 3.5–5.3)
POTASSIUM SERPL-SCNC: 4.4 MMOL/L (ref 3.5–5.3)
PROT C AG ACT/NOR PPP IA: 89 % (ref 63–153)
PROT S AG ACT/NOR PPP IA: 96 % (ref 84–134)
PROTHROMBIN TIME: 15.6 SECONDS (ref 9.8–12.8)
RBC # BLD AUTO: 4.9 X10*6/UL (ref 4.5–5.9)
SAO2 % BLDMV: 70 % (ref 45–75)
SAO2 % BLDMV: 73 % (ref 45–75)
SAO2 % BLDMV: 75 % (ref 45–75)
SODIUM BLDMV-SCNC: 126 MMOL/L (ref 136–145)
SODIUM BLDMV-SCNC: 129 MMOL/L (ref 136–145)
SODIUM BLDMV-SCNC: 131 MMOL/L (ref 136–145)
SODIUM SERPL-SCNC: 133 MMOL/L (ref 136–145)
UFH PPP CHRO-ACNC: 0.4 IU/ML
UFH PPP CHRO-ACNC: 0.5 IU/ML
WBC # BLD AUTO: 8.7 X10*3/UL (ref 4.4–11.3)

## 2024-11-03 PROCEDURE — 37799 UNLISTED PX VASCULAR SURGERY: CPT | Performed by: NURSE PRACTITIONER

## 2024-11-03 PROCEDURE — 99291 CRITICAL CARE FIRST HOUR: CPT | Performed by: INTERNAL MEDICINE

## 2024-11-03 PROCEDURE — 2500000004 HC RX 250 GENERAL PHARMACY W/ HCPCS (ALT 636 FOR OP/ED): Performed by: NURSE PRACTITIONER

## 2024-11-03 PROCEDURE — 2500000004 HC RX 250 GENERAL PHARMACY W/ HCPCS (ALT 636 FOR OP/ED)

## 2024-11-03 PROCEDURE — 83605 ASSAY OF LACTIC ACID: CPT | Performed by: NURSE PRACTITIONER

## 2024-11-03 PROCEDURE — 85520 HEPARIN ASSAY: CPT

## 2024-11-03 PROCEDURE — 80069 RENAL FUNCTION PANEL: CPT

## 2024-11-03 PROCEDURE — 71045 X-RAY EXAM CHEST 1 VIEW: CPT

## 2024-11-03 PROCEDURE — 36415 COLL VENOUS BLD VENIPUNCTURE: CPT

## 2024-11-03 PROCEDURE — 2500000001 HC RX 250 WO HCPCS SELF ADMINISTERED DRUGS (ALT 637 FOR MEDICARE OP): Performed by: NURSE PRACTITIONER

## 2024-11-03 PROCEDURE — 83735 ASSAY OF MAGNESIUM: CPT

## 2024-11-03 PROCEDURE — 85027 COMPLETE CBC AUTOMATED: CPT

## 2024-11-03 PROCEDURE — 2500000001 HC RX 250 WO HCPCS SELF ADMINISTERED DRUGS (ALT 637 FOR MEDICARE OP)

## 2024-11-03 PROCEDURE — 71045 X-RAY EXAM CHEST 1 VIEW: CPT | Performed by: RADIOLOGY

## 2024-11-03 PROCEDURE — 2500000002 HC RX 250 W HCPCS SELF ADMINISTERED DRUGS (ALT 637 FOR MEDICARE OP, ALT 636 FOR OP/ED): Performed by: NURSE PRACTITIONER

## 2024-11-03 PROCEDURE — 99291 CRITICAL CARE FIRST HOUR: CPT | Performed by: NURSE PRACTITIONER

## 2024-11-03 PROCEDURE — 2020000001 HC ICU ROOM DAILY

## 2024-11-03 PROCEDURE — 85610 PROTHROMBIN TIME: CPT

## 2024-11-03 PROCEDURE — 2500000004 HC RX 250 GENERAL PHARMACY W/ HCPCS (ALT 636 FOR OP/ED): Performed by: STUDENT IN AN ORGANIZED HEALTH CARE EDUCATION/TRAINING PROGRAM

## 2024-11-03 PROCEDURE — 84132 ASSAY OF SERUM POTASSIUM: CPT | Performed by: NURSE PRACTITIONER

## 2024-11-03 RX ORDER — POLYETHYLENE GLYCOL 3350, SODIUM CHLORIDE, SODIUM BICARBONATE, POTASSIUM CHLORIDE 420; 11.2; 5.72; 1.48 G/4L; G/4L; G/4L; G/4L
4000 POWDER, FOR SOLUTION ORAL ONCE
Status: COMPLETED | OUTPATIENT
Start: 2024-11-03 | End: 2024-11-03

## 2024-11-03 ASSESSMENT — PAIN - FUNCTIONAL ASSESSMENT
PAIN_FUNCTIONAL_ASSESSMENT: 0-10

## 2024-11-03 ASSESSMENT — PAIN SCALES - GENERAL
PAINLEVEL_OUTOF10: 0 - NO PAIN

## 2024-11-03 NOTE — PROGRESS NOTES
HFICU Attending Note    Principal Problem:    Acute on chronic heart failure with reduced ejection fraction and diastolic dysfunction  Active Problems:    KENYA (acute kidney injury) (CMS-HCC)    History of chronic atrial fibrillation    Ischemic cardiomyopathy    GERD (gastroesophageal reflux disease)    Leukocytosis    Heart failure, ACC/AHA stage D    Obstructive sleep apnea    Worsening systolic function in a patient with known Ischemic CM   LHC showing occluded grafts , no revascularization options   On milrinone at 0.375 with acceptable hemodynamics   Will be presented for LVAD /transplantation consideration on Tuesday , he is agreable to be considered for both   Appreciate the GI team's recommendations and noted findings on the CT scan   Recommended CTA , performed and results pending   Colonoscopy on Monday   Good hemodynamics today , will wean down milrinone to 0.25 if tolerated           This critically ill patient continues to be at-risk for clinically significant deterioration / failure due to the above mentioned dysfunctional, unstable organ systems.  I have personally identified and managed all complex critical care issues to prevent aforementioned clinical deterioration.  Critical care time is spent at bedside and/or the immediate area and has included, but is not limited to, the review of diagnostic tests, labs, radiographs, serial assessments of hemodynamics, respiratory status, ventilatory management, and family updates.  Time spent in procedures and teaching are reported separately.    Critical care time: 35____ minutes

## 2024-11-03 NOTE — PROGRESS NOTES
[facility]  HFICU PROGRESS NOTE    Fahad Meraz/[id number]    Admit Date: 10/24/2024  Hospital Length of Stay: 10   ICU Length of Stay: 9d 15h   Primary Service:   Primary HF Cardiologist: Dr. Washington  Referring:    INTERVAL EVENTS / PERTINENT ROS:     No acute events overnight. Continues with workup for advance therapies. Denies any c/o dyspnea or discomfort. Overall, feeling much better than on admit. Colonoscopy Monday, 11/4. Currently on CLD and will start bowel prep this evening. Discussed w/ GI service. Heparin gtt off @ 8am. Telemetry reviewed, remains in afib w/ PVC's. CO/CI improved with afterload reduction. Plan to reduce milrinone 0.25mcg/kg/min. Holding diuresis d/t bowel prep.      Plan:  - Hold diuresis  - c/w Hydra 75 TID / Isordil 20 TID  - Reduce Milrinone 0.25mcg/kg/min  - CLD/bowel prep this evening  - Stop Heparin gtt 8am (11/4) for colonoscopy  - Genetic consult outpatient - collaborate with Dr. Washington   - Initiate advanced heart failure therapies evaluation  - HOLD Farxiga in preparation of advanced therapies (transplant / LVAD)     MEDICATIONS  Infusions:  heparin, Last Rate: 24 Units/hr (11/03/24 1103)  milrinone, Last Rate: 0.375 mcg/kg/min (11/03/24 0941)      Scheduled:  aspirin, 81 mg, Daily  cholecalciferol, 50,000 Units, Every Sunday  escitalopram, 10 mg, Daily  hydrALAZINE, 75 mg, TID  isosorbide dinitrate, 20 mg, TID  magnesium oxide, 800 mg, BID  [Held by provider] metoprolol succinate XL, 50 mg, Daily  pantoprazole, 40 mg, Daily before breakfast  perflutren protein A microsphere, 0.5 mL, Once in imaging  polyethylene glycol-electrolytes, 4,000 mL, Once  sennosides-docusate sodium, 2 tablet, BID  spironolactone, 25 mg, Daily  sulfur hexafluoride microsphr, 2 mL, Once in imaging      PRN:  alteplase, 2 mg, PRN  bisacodyl, 10 mg, Daily PRN  nitroglycerin, 0.4 mg, q5 min PRN  oxygen, , Continuous PRN - O2/gases  polyethylene glycol, 17 g, Daily  PRN      Invasive Hemodynamics:    Most Recent Range Past 24hrs   BP (Art) 111/62 No data recorded   MAP(Art) 78 mmHg No data recorded   RA/CVP   No data recorded   PA 32/14 PAP  Min: 32/14  Max: 49/25   PA(mean) 20 mmHg PAP (Mean)  Min: 20 mmHg  Max: 44 mmHg   PCWP 13 mmHg PCWP (mmHg)  Min: 13 mmHg  Max: 20 mmHg   CO 6.6 L/min CO (L/min)  Min: 5.2 L/min  Max: 6.6 L/min   CI 3.1 L/min/m2 CI (L/min/m2)  Min: 2.4 L/min/m2  Max: 3.1 L/min/m2   Mixed Venous 75 % SVO2 (%)  Min: 68 %  Max: 75 %   SVR  771 (dyne*sec)/cm5 SVR (dyne*sec)/cm5  Min: 771 (dyne*sec)/cm5  Max: 1026 (dyne*sec)/cm5    (dyne*sec)/cm5 PVR (dyne*sec)/cm5  Min: 108 (dyne*sec)/cm5  Max: 163 (dyne*sec)/cm5     PHYSICAL EXAM:   Visit Vitals  /75   Pulse 87   Temp 36 °C (96.8 °F) (Temporal)   Resp 14   Ht 1.829 m (6')   Wt 91.5 kg (201 lb 11.5 oz)   SpO2 97%   BMI 27.36 kg/m²   Smoking Status Former   BSA 2.16 m²       Wt Readings from Last 5 Encounters:   11/03/24 91.5 kg (201 lb 11.5 oz)   10/24/24 92.5 kg (204 lb)   08/05/24 122 kg (268 lb)   01/31/22 119 kg (262 lb)   02/18/20 123 kg (270 lb 2 oz)       INTAKE/OUTPUT:  I/O last 3 completed shifts:  In: 1763.9 (19.3 mL/kg) [I.V.:1763.9 (19.3 mL/kg)]  Out: 3075 (33.6 mL/kg) [Urine:3075 (0.9 mL/kg/hr)]  Weight: 91.5 kg      Physical Exam  HENT:      Head: Normocephalic.      Nose: Nose normal.      Mouth/Throat:      Mouth: Mucous membranes are moist.   Eyes:      Pupils: Pupils are equal, round, and reactive to light.   Cardiovascular:      Rate and Rhythm: Normal rate. Rhythm irregular.      Heart sounds: Murmur (holosystolic) heard.      Comments: Permanent Atrial Fibrillation  Pulmonary:      Effort: Pulmonary effort is normal.      Breath sounds: Normal breath sounds.      Comments: Rt subclavian SGC  Abdominal:      Palpations: Abdomen is soft.   Musculoskeletal:         General: Normal range of motion.      Cervical back: Normal range of motion.   Skin:     Capillary Refill: Capillary  refill takes less than 2 seconds.   Neurological:      Mental Status: He is alert and oriented to person, place, and time.   Psychiatric:         Mood and Affect: Mood normal.         Behavior: Behavior normal.         Thought Content: Thought content normal.         Judgment: Judgment normal.         DATA:  CMP:  Results from last 7 days   Lab Units 11/03/24  0242 11/02/24  1658 11/02/24  0506 11/01/24  1533 11/01/24  0317 10/31/24  1249 10/31/24  0509 10/30/24  0131 10/29/24  0454 10/28/24  0919 10/28/24  0618   SODIUM mmol/L 133* 131* 134* 133* 134* 134* 135* 133* 135* 134* 135*   POTASSIUM mmol/L 4.4 4.9 4.4 4.3 4.3 4.1 4.9 4.0 4.6 3.9 4.4   CHLORIDE mmol/L 97* 94* 99 101 101 100 101 102 102 100 98   CO2 mmol/L 23 24 24 21 23 24 25 23 22 25 24   ANION GAP mmol/L 17 18 15 15 14 14 14 12 16 13 17   BUN mg/dL 42* 38* 34* 28* 29* 29* 30* 33* 32* 34* 33*   CREATININE mg/dL 1.41* 1.56* 1.45* 1.24 1.30 1.29 1.35* 1.43* 1.49* 1.68* 1.61*   EGFR mL/min/1.73m*2 54* 48* 52* 63 59* 60* 57* 53* 50* 44* 46*   MAGNESIUM mg/dL 2.22 2.06 2.07 1.96 2.11  --  2.37 2.27 2.49* 2.56* 2.65*   ALBUMIN g/dL 4.1 4.5 4.1 4.1 4.1 4.3 4.1 4.0 4.2 4.1 4.3   ALT U/L  --   --   --   --   --  14  --   --   --   --   --    AST U/L  --   --   --   --   --  16  --   --   --   --   --    BILIRUBIN TOTAL mg/dL  --   --   --   --   --  1.4*  --   --   --   --   --      CBC:  Results from last 7 days   Lab Units 11/03/24  0242 11/02/24  0506 11/01/24 0317 10/31/24  0509 10/30/24  0131 10/29/24  0454 10/28/24  0919 10/28/24  0618   WBC AUTO x10*3/uL 8.7 10.8 11.6* 10.9 11.1 10.7 9.6 9.4   HEMOGLOBIN g/dL 13.4* 14.0 13.9 14.5 14.7 15.3 15.9 15.6   HEMATOCRIT % 41.4 39.4* 41.6 43.6 44.8 46.0 46.8 46.6   PLATELETS AUTO x10*3/uL 504* 562* 511* 527* 509* 594* 584* 545*   MCV fL 85 78* 81 82 82 82 81 82     COAG:   Results from last 7 days   Lab Units 11/03/24  0242 11/02/24  0506 11/01/24  0317 10/31/24  1249 10/31/24  0509 10/30/24  0131 10/29/24  0454  "10/28/24  0919   INR  1.4* 1.3* 1.3* 1.3* 1.2* 1.3* 1.3* 1.4*     ABO:   No results found for: \"ABO\"    HEME/ENDO:  Results from last 7 days   Lab Units 10/31/24  1249   TSH mIU/L 4.39*      CARDIAC:   Results from last 7 days   Lab Units 10/31/24  1249   LD U/L 187   BNP pg/mL 755*       ASSESSMENT AND PLAN:     Mr. Meraz is a 69 M with a PMHx sig for CAD s/p 4V CABG (2012) and PCI (LCx/OM; 5/2019), stage D systolic HF/ICM/HFrEF with LVEF 10-15%( 10/22/24 TTE), AF s/p RFA (PVI and CTI; 4/2024) on OAC therapy, HTN, dyslipidemia, depression, anxiety and VIV using CPAP who was admitted to OSH ICU, s/p RHC on 10/18/24.  Per patient, he had been experienced worsening SOB and fluids overload for 2-3 weeks. Patient was on milrinone drip and diurese with SG guided. However his KENYA worsening, and not able to wean milrinone drip. Decision was made to transfer him to HF ICU [facility] for possible advanced therapy consideration. 10/26: Tolerated afterload reduction with hydralazine / Isordil. 10/27: Discontinued hydralazine / Isordil and initiated 24 / 26 mg Entresto BID and tolerating. 10/28: Coronary angiogram: Right dominant system; LM: Mild 20% distal disease; LAD: Occluded with Patent LIMA; RI: Occluded; Lcx: Patent proximal Lcx stent and OM1 stent, otherwise mild non-obstructive disease; RCA: proximal 100% occlusion. Grafts: LIMA-LAD: Patent; SVG-OM, SVG-RCA: Occluded; SVG-RI: Unable to engage but likely occluded. 10/29: Entresto discontinued and hydral / Isordil initiated 2/2 AHF therapies evaluation. 10/30: Email sent to initiate advanced heart failure evaluation. Did not tolerate down-titration of Milrinone gtt from 0.375 mcg/kg/min to 0.25 mcg/kg/min.  Increased back to 0.375 mcg/kg/min 2/2 poor cardiac indices. 10/31: Up-titrating hydralazine / Isordil for afterload reduction.     Neuro:     # Hx of Depression/Anxiety  - Serial neuro and pain assessments  - PO Tylenol PRN for pain  - PT/OT Consult, OOB to chair  - CAM " ICU score every shift  - Sleep/wake cycle normalization  - c/w home Lexapro and Clonazepam     # Physical Status  -Overweight:  BMI: 27.9   -Reduced Mobility: acute decompensated heart failure and ICU stay      #Substance abuse  -Alcohol abuse/Alcohol dependence: NO   -Tobacco use/Nicotine Dependence: NO      Cardiovascular:     # Stage D, ICM, decompensated acute on chronic heart failure,  HFrEF 10-15% ( from 10/22/24 ECHO)   #Hx of CAD s/p 4V CABG 2012   -BRIANA (3/29/23): Left Ventricle: Moderately reduced left ventricular systolic function with a visually estimated EF of 35 - 40%.  - TTE (10/22/24): LVEF 10 -15%, right ventricle moderately dilated, moderately reduced systolic function. Mild MR  - admit weight (10/24): 93.5 KG -->(11/3) 91.5kg  - admit BNP (10/24):  1995  - Home meds include Metoprolol succinate 25 mg daily, Entresto 97/103 mg bid, Hydralazine 25 mg bid, Isosorbide mononitrate 120 mg daily, Spironolactone 25 mg daily, Bumex 0.5 mg daily----allergy to SGLT2.  - ASA  - Reduce milrinone 0.25mcg/kg/min (11/3)  - SGC # on arrival (10/25): /80 (89), CVP 11, PA 36/20 (26), PCWP 17, CO/CI 5.9/2.7, , SVO2 73% on Milrinone drip at 0.375 mcg/kg/min, hydral 50 mg TID.   - Daily hemodynamics: 11/3: B/P: 91/62 (72); CVP 8, PAP: 33/15 (22); SVR: 771; CI: 6.6/ CO: 3.1; SvO2: 75 on IV Milrinone 0.375 mcg/kg/min; Hydralazine 75mg PO TID; Isordil 20mg PO TID  - Reduce  Milrinone drip at 0.25 mcg/kg/min - suspect he is Inotrope dependent, attempt wean w/ better afterload reduction.   - Holding diuresis (11/3) - CLD/bowel prep for colonoscopy 11/4  - Stopped entresto 24/26 mg BID as likely will be going advanced therapy eval.   - c/w hydralazine 75mg TID  - c/w Isordil to 20 mg TID   - Discontinue dapagliflozin 10 mg daily in preparation for AHF therapies (LVAD / OHT)   - C/w [place] 25 mg daily for now    - Hold BB   - Daily standing weights, 2gm sodium diet, 2L fluid restriction, strict I&Os  - Coronary  angiogram multidisciplinary discussion, including CT surgery, no revascularization options.  - Advanced Heart Failure Therapies Evaluation initiated 10/30/2024     LVAD AND Transplant:   -Email sent on 10/30/2024 - consent on 10/31/2024  -Labs:      10/31/2024    -RHC:       10/25/2024  -LHC     10/25/2024  -CPET     NA  -ECHO     10/25/2024    CT chest (10/31): Dilated ascending AO 4.3cm. Moderate calcifications of the aorta and branch vessels. Severe coronary calcifications. Moderate cardiomegaly. Moderate aortic valve calcifications. Lesion inferior rt hepatic lobe (4.8cm). SGC in right pulmonary artery. Main pulmonary artery mildly dilated.         -US abdomen/aorta/iliac/IVC  PENDING  -Carotid US    PENDING  -SITA (lower)    PENDING  -2 view CXR:     PENDING  -Device interrogation   NA - No device  -PFTs:      Complete, results pending  -Colonoscopy/Occult stool        Last completed per OSH: 12/1/15                                             Colonoscopy scheduled 11/4  -LVAD/TX education:    10/30/2024  -CT surgery consult   Not completed  -Palliative consult   Consult placed 10/31/2024  -Nutrition consult    Consult placed 10/31/2024  -Dental consult/Orthopantogram  Consult placed / Orthopantogram ordered & PENDING  -Physical and Occupational Therapy  Following       #CAD   -[facility] (12/20/2020):  Successful PCI of the SVG to ramus intermedius with one drug-eluting stent; OCT of the SVG to RCA to confirm no thrombosis; patent LAD; occluded SVG to OM.  -Meds: C/w ASA.     # Hx of AFib s/p RFA (PVI and CTI; 4/2024)  -NO device noted  -Was on home Coumadin for 2 months, prior to that was on Eliquis,  patient reported with Dizziness while on Eliquis      # Dizziness/bradycardia  -Had 3 episodes of near syncope after his recent AF RFA; has stopped driving as a result. ECG with sinus olu. Metoprolol previously decreased by his local EP.    -Hold BB due to decompensate HF      #Electrolyte Disturbances  -Keep K>4, Mag  >2     Pulmonary:   # PMH of VIV  -c/w home CPAP  -Monitor and maintain SpO2 > 92%     GI:  # H/x of  GERD  - Colonoscopy (11/4) for advanced therapies workup. CLD/ bowel prep started 11/3. Heparin gtt off 8am for procedure (GI requires heparin off 4hrs. Discussed with Dr. Berg).  - Bowel regimen: Senna, Miralax PRN  - PPI     #  Bilirubinemia  - T maryuri (10/24): 2.3, ALT, AST: normal   - CT C/A/P (10/17/24): Hepatomegaly and hepatic steatosis are present. The liver has a lobulated contour. A questionable lesion in the right hepatic lobe inferiorly measures 26 mm.  - CT Liver w/ contrast (11/2) -- Results pending     # Urinary retention  - CT C/A/P (10/17/24): the prostate gland is mildly enlarged with mass effect on the posterior wall of the urinary bladder. The prostate gland measures 4.3 x 6.0 cm.   - C/o urinary difficulty, s/p FC OSH --> Removed prior admission      :  #KENYA/ CKD ?  in the setting of cardio-renal syndrome  -Baseline BUN/Cr: Unclear,  OSH ICU: Creatinine: 1.3-1.6   -Admit BUN/Cr (10/24): 28/1.88  -Daily BUN/Creat: 11/3 42 / 1.4  -I/Os  -avoid hypotension and nephrotoxic agents     Heme:  #Anemia in the setting of iron deficiency  - Lab (10/24): H/H: 17.5/50.8,  Iron study: 63/374/17%, Ferritin: 76,  folate: 23.5, B12: 768   - NO iron replacement needed as H/H high     #Coagulopathy due to on home Coumadin  - INR  (10/30): 1.3, Hold home Coumadin   - Restarted Heparin gtt 10/29  - Hold heparin gtt 8a, 11/4 for colonoscopy. Discussed with GI 11/3  - Heparin Xa 0.4 (11/3)     Endo:  #DM  - Euglycemic  - hgbA1c (10/24):  6.3     #Thyroid  -TSH (10/24):  8.47, T4: 1.44      ID:  # leukocytosis - Resolved  - WBC (10/26):  9.6   -afebrile, nontoxic   -trend temps q4h       PHYSICAL AND OCCUPATIONAL THERAPY: Ordered    LINES:  PIVs   A-line (OSH) 10/20 - current  Right Subclavian Cordis / PA catheter (OSH) 10/18 - 10/28  Right subclavian cordis: Previously placed swan sheath was exchanged to a new  swan sheath over a short Jwire and was sutured in place. 10/28 - current    DVT: INR: 1.4 (11/3) (home Warfarin now held) - Re-Initiated heparin gtt 10/29  VAP BUNDLE: NA  CENTRAL LINE BUNDLE: Ordered   ULCER PPX: Pantoprazole 40 mg daily  GLYCEMIC CONTROL: NA - A1C: 6.3  BOWEL CARE: Senna / Miralax  INDWELLING CATHETER: NA  NUTRITION: Adult diet Clear Liquid      EMERGENCY CONTACT: Extended Emergency Contact Information  Primary Emergency Contact: Tanvi Meraz  Home Phone: [phone]  Relation: Spouse  Secondary Emergency Contact: Subhash Meraz  Mobile Phone: [phone]  Relation: Son  FAMILY UPDATE:   CODE STATUS: Full Code  DISPO: Maintain in the HFICU    Patient seen and assessed with Dr. Lomas    I personally spent 60 minutes of critical care time directly and personally managing the patient exclusive of separately billable procedures   _________________________________________________  Shon Polanco, APRN-CNP

## 2024-11-04 ENCOUNTER — APPOINTMENT (OUTPATIENT)
Dept: GASTROENTEROLOGY | Facility: HOSPITAL | Age: 69
DRG: 001 | End: 2024-11-04
Payer: MEDICARE

## 2024-11-04 ENCOUNTER — APPOINTMENT (OUTPATIENT)
Dept: VASCULAR MEDICINE | Facility: HOSPITAL | Age: 69
DRG: 001 | End: 2024-11-04
Payer: MEDICARE

## 2024-11-04 ENCOUNTER — APPOINTMENT (OUTPATIENT)
Dept: VASCULAR MEDICINE | Facility: HOSPITAL | Age: 69
End: 2024-11-04
Payer: MEDICARE

## 2024-11-04 ENCOUNTER — APPOINTMENT (OUTPATIENT)
Dept: RADIOLOGY | Facility: HOSPITAL | Age: 69
DRG: 001 | End: 2024-11-04
Payer: MEDICARE

## 2024-11-04 PROBLEM — N17.9 AKI (ACUTE KIDNEY INJURY) (CMS-HCC): Status: RESOLVED | Noted: 2024-10-25 | Resolved: 2024-11-04

## 2024-11-04 PROBLEM — D72.829 LEUKOCYTOSIS: Status: RESOLVED | Noted: 2024-10-25 | Resolved: 2024-11-04

## 2024-11-04 PROBLEM — K21.9 GERD (GASTROESOPHAGEAL REFLUX DISEASE): Status: RESOLVED | Noted: 2024-10-25 | Resolved: 2024-11-04

## 2024-11-04 PROBLEM — R57.0 CARDIOGENIC SHOCK (MULTI): Status: ACTIVE | Noted: 2024-11-04

## 2024-11-04 PROBLEM — I50.84: Status: RESOLVED | Noted: 2024-10-25 | Resolved: 2024-11-04

## 2024-11-04 LAB
ALBUMIN SERPL BCP-MCNC: 4.3 G/DL (ref 3.4–5)
ANION GAP BLDMV CALCULATED.4IONS-SCNC: 10 MMO/L (ref 10–25)
ANION GAP BLDMV CALCULATED.4IONS-SCNC: 11 MMO/L (ref 10–25)
ANION GAP BLDMV CALCULATED.4IONS-SCNC: 12 MMO/L (ref 10–25)
ANION GAP BLDMV CALCULATED.4IONS-SCNC: 9 MMO/L (ref 10–25)
ANION GAP SERPL CALC-SCNC: 16 MMOL/L (ref 10–20)
BASE EXCESS BLDA CALC-SCNC: -0.5 MMOL/L (ref -2–3)
BASE EXCESS BLDMV CALC-SCNC: 0.5 MMOL/L (ref -2–3)
BASE EXCESS BLDMV CALC-SCNC: 1.3 MMOL/L (ref -2–3)
BASE EXCESS BLDMV CALC-SCNC: 1.5 MMOL/L (ref -2–3)
BASE EXCESS BLDMV CALC-SCNC: 1.7 MMOL/L (ref -2–3)
BODY TEMPERATURE: 37 DEGREES CELSIUS
BUN SERPL-MCNC: 31 MG/DL (ref 6–23)
CA-I BLDMV-SCNC: 1.21 MMOL/L (ref 1.1–1.33)
CA-I BLDMV-SCNC: 1.23 MMOL/L (ref 1.1–1.33)
CA-I BLDMV-SCNC: 1.24 MMOL/L (ref 1.1–1.33)
CA-I BLDMV-SCNC: 1.28 MMOL/L (ref 1.1–1.33)
CALCIUM SERPL-MCNC: 9.5 MG/DL (ref 8.6–10.6)
CHLORIDE BLD-SCNC: 95 MMOL/L (ref 98–107)
CHLORIDE BLD-SCNC: 97 MMOL/L (ref 98–107)
CHLORIDE BLD-SCNC: 97 MMOL/L (ref 98–107)
CHLORIDE BLD-SCNC: 99 MMOL/L (ref 98–107)
CHLORIDE SERPL-SCNC: 95 MMOL/L (ref 98–107)
CO2 SERPL-SCNC: 26 MMOL/L (ref 21–32)
CREAT SERPL-MCNC: 1.16 MG/DL (ref 0.5–1.3)
EGFRCR SERPLBLD CKD-EPI 2021: 68 ML/MIN/1.73M*2
ERYTHROCYTE [DISTWIDTH] IN BLOOD BY AUTOMATED COUNT: 16.4 % (ref 11.5–14.5)
GLUCOSE BLD-MCNC: 102 MG/DL (ref 74–99)
GLUCOSE BLD-MCNC: 107 MG/DL (ref 74–99)
GLUCOSE BLD-MCNC: 111 MG/DL (ref 74–99)
GLUCOSE BLD-MCNC: 157 MG/DL (ref 74–99)
GLUCOSE SERPL-MCNC: 105 MG/DL (ref 74–99)
HCO3 BLDA-SCNC: 23.1 MMOL/L (ref 22–26)
HCO3 BLDMV-SCNC: 25.4 MMOL/L (ref 22–26)
HCO3 BLDMV-SCNC: 25.9 MMOL/L (ref 22–26)
HCO3 BLDMV-SCNC: 26.6 MMOL/L (ref 22–26)
HCO3 BLDMV-SCNC: 26.6 MMOL/L (ref 22–26)
HCT VFR BLD AUTO: 40.6 % (ref 41–52)
HCT VFR BLD EST: 41 % (ref 41–52)
HCT VFR BLD EST: 42 % (ref 41–52)
HCT VFR BLD EST: 42 % (ref 41–52)
HCT VFR BLD EST: 43 % (ref 41–52)
HGB BLD-MCNC: 13.8 G/DL (ref 13.5–17.5)
HGB BLDMV-MCNC: 13.7 G/DL (ref 13.5–17.5)
HGB BLDMV-MCNC: 13.9 G/DL (ref 13.5–17.5)
HGB BLDMV-MCNC: 14.1 G/DL (ref 13.5–17.5)
HGB BLDMV-MCNC: 14.4 G/DL (ref 13.5–17.5)
INHALED O2 CONCENTRATION: 21 %
LACTATE BLDMV-SCNC: 0.5 MMOL/L (ref 0.4–2)
LACTATE BLDMV-SCNC: 0.5 MMOL/L (ref 0.4–2)
LACTATE BLDMV-SCNC: 0.6 MMOL/L (ref 0.4–2)
LACTATE BLDMV-SCNC: 0.9 MMOL/L (ref 0.4–2)
MAGNESIUM SERPL-MCNC: 2.25 MG/DL (ref 1.6–2.4)
MCH RBC QN AUTO: 27.4 PG (ref 26–34)
MCHC RBC AUTO-ENTMCNC: 34 G/DL (ref 32–36)
MCV RBC AUTO: 81 FL (ref 80–100)
NRBC BLD-RTO: 0 /100 WBCS (ref 0–0)
OXYHGB MFR BLDA: 95.6 % (ref 94–98)
OXYHGB MFR BLDMV: 64 % (ref 45–75)
OXYHGB MFR BLDMV: 64.8 % (ref 45–75)
OXYHGB MFR BLDMV: 65.4 % (ref 45–75)
OXYHGB MFR BLDMV: 70.1 % (ref 45–75)
PCO2 BLDA: 34 MM HG (ref 38–42)
PCO2 BLDMV: 40 MM HG (ref 41–51)
PCO2 BLDMV: 41 MM HG (ref 41–51)
PCO2 BLDMV: 42 MM HG (ref 41–51)
PCO2 BLDMV: 43 MM HG (ref 41–51)
PH BLDA: 7.44 PH (ref 7.38–7.42)
PH BLDMV: 7.4 PH (ref 7.33–7.43)
PH BLDMV: 7.4 PH (ref 7.33–7.43)
PH BLDMV: 7.41 PH (ref 7.33–7.43)
PH BLDMV: 7.42 PH (ref 7.33–7.43)
PHOSPHATE SERPL-MCNC: 4.3 MG/DL (ref 2.5–4.9)
PLATELET # BLD AUTO: 584 X10*3/UL (ref 150–450)
PO2 BLDA: 87 MM HG (ref 85–95)
PO2 BLDMV: 39 MM HG (ref 35–45)
PO2 BLDMV: 39 MM HG (ref 35–45)
PO2 BLDMV: 40 MM HG (ref 35–45)
PO2 BLDMV: 43 MM HG (ref 35–45)
POTASSIUM BLDMV-SCNC: 4.3 MMOL/L (ref 3.5–5.3)
POTASSIUM BLDMV-SCNC: 4.4 MMOL/L (ref 3.5–5.3)
POTASSIUM BLDMV-SCNC: 4.4 MMOL/L (ref 3.5–5.3)
POTASSIUM BLDMV-SCNC: 4.5 MMOL/L (ref 3.5–5.3)
POTASSIUM SERPL-SCNC: 4.5 MMOL/L (ref 3.5–5.3)
RBC # BLD AUTO: 5.03 X10*6/UL (ref 4.5–5.9)
SAO2 % BLDA: 98 % (ref 94–100)
SAO2 % BLDMV: 65 % (ref 45–75)
SAO2 % BLDMV: 66 % (ref 45–75)
SAO2 % BLDMV: 67 % (ref 45–75)
SAO2 % BLDMV: 72 % (ref 45–75)
SODIUM BLDMV-SCNC: 127 MMOL/L (ref 136–145)
SODIUM BLDMV-SCNC: 128 MMOL/L (ref 136–145)
SODIUM BLDMV-SCNC: 130 MMOL/L (ref 136–145)
SODIUM BLDMV-SCNC: 131 MMOL/L (ref 136–145)
SODIUM SERPL-SCNC: 132 MMOL/L (ref 136–145)
UFH PPP CHRO-ACNC: 0.3 IU/ML
UFH PPP CHRO-ACNC: 0.5 IU/ML
WBC # BLD AUTO: 8.6 X10*3/UL (ref 4.4–11.3)

## 2024-11-04 PROCEDURE — 82805 BLOOD GASES W/O2 SATURATION: CPT | Performed by: STUDENT IN AN ORGANIZED HEALTH CARE EDUCATION/TRAINING PROGRAM

## 2024-11-04 PROCEDURE — 2500000001 HC RX 250 WO HCPCS SELF ADMINISTERED DRUGS (ALT 637 FOR MEDICARE OP)

## 2024-11-04 PROCEDURE — 36600 WITHDRAWAL OF ARTERIAL BLOOD: CPT

## 2024-11-04 PROCEDURE — 2500000001 HC RX 250 WO HCPCS SELF ADMINISTERED DRUGS (ALT 637 FOR MEDICARE OP): Performed by: NURSE PRACTITIONER

## 2024-11-04 PROCEDURE — 93979 VASCULAR STUDY: CPT | Performed by: STUDENT IN AN ORGANIZED HEALTH CARE EDUCATION/TRAINING PROGRAM

## 2024-11-04 PROCEDURE — 2020000001 HC ICU ROOM DAILY

## 2024-11-04 PROCEDURE — 37799 UNLISTED PX VASCULAR SURGERY: CPT | Performed by: NURSE PRACTITIONER

## 2024-11-04 PROCEDURE — 85027 COMPLETE CBC AUTOMATED: CPT

## 2024-11-04 PROCEDURE — 2500000004 HC RX 250 GENERAL PHARMACY W/ HCPCS (ALT 636 FOR OP/ED): Performed by: NURSE PRACTITIONER

## 2024-11-04 PROCEDURE — 2500000002 HC RX 250 W HCPCS SELF ADMINISTERED DRUGS (ALT 637 FOR MEDICARE OP, ALT 636 FOR OP/ED): Performed by: NURSE PRACTITIONER

## 2024-11-04 PROCEDURE — 71045 X-RAY EXAM CHEST 1 VIEW: CPT

## 2024-11-04 PROCEDURE — 2500000004 HC RX 250 GENERAL PHARMACY W/ HCPCS (ALT 636 FOR OP/ED)

## 2024-11-04 PROCEDURE — 88305 TISSUE EXAM BY PATHOLOGIST: CPT | Performed by: PATHOLOGY

## 2024-11-04 PROCEDURE — 84132 ASSAY OF SERUM POTASSIUM: CPT | Performed by: NURSE PRACTITIONER

## 2024-11-04 PROCEDURE — 85520 HEPARIN ASSAY: CPT

## 2024-11-04 PROCEDURE — 99291 CRITICAL CARE FIRST HOUR: CPT

## 2024-11-04 PROCEDURE — 99232 SBSQ HOSP IP/OBS MODERATE 35: CPT | Performed by: STUDENT IN AN ORGANIZED HEALTH CARE EDUCATION/TRAINING PROGRAM

## 2024-11-04 PROCEDURE — 93880 EXTRACRANIAL BILAT STUDY: CPT

## 2024-11-04 PROCEDURE — 93880 EXTRACRANIAL BILAT STUDY: CPT | Performed by: STUDENT IN AN ORGANIZED HEALTH CARE EDUCATION/TRAINING PROGRAM

## 2024-11-04 PROCEDURE — 37799 UNLISTED PX VASCULAR SURGERY: CPT

## 2024-11-04 PROCEDURE — 0DBK8ZX EXCISION OF ASCENDING COLON, VIA NATURAL OR ARTIFICIAL OPENING ENDOSCOPIC, DIAGNOSTIC: ICD-10-PCS | Performed by: STUDENT IN AN ORGANIZED HEALTH CARE EDUCATION/TRAINING PROGRAM

## 2024-11-04 PROCEDURE — 71045 X-RAY EXAM CHEST 1 VIEW: CPT | Performed by: RADIOLOGY

## 2024-11-04 PROCEDURE — 99291 CRITICAL CARE FIRST HOUR: CPT | Performed by: INTERNAL MEDICINE

## 2024-11-04 PROCEDURE — 93979 VASCULAR STUDY: CPT

## 2024-11-04 PROCEDURE — 83735 ASSAY OF MAGNESIUM: CPT

## 2024-11-04 PROCEDURE — 88305 TISSUE EXAM BY PATHOLOGIST: CPT | Mod: TC,SUR | Performed by: INTERNAL MEDICINE

## 2024-11-04 PROCEDURE — 45385 COLONOSCOPY W/LESION REMOVAL: CPT | Performed by: INTERNAL MEDICINE

## 2024-11-04 PROCEDURE — 84132 ASSAY OF SERUM POTASSIUM: CPT

## 2024-11-04 PROCEDURE — 74176 CT ABD & PELVIS W/O CONTRAST: CPT

## 2024-11-04 RX ORDER — HEPARIN SODIUM 10000 [USP'U]/100ML
0-4000 INJECTION, SOLUTION INTRAVENOUS CONTINUOUS
Status: DISCONTINUED | OUTPATIENT
Start: 2024-11-04 | End: 2024-11-12

## 2024-11-04 RX ORDER — FENTANYL CITRATE 50 UG/ML
50 INJECTION, SOLUTION INTRAMUSCULAR; INTRAVENOUS ONCE
Status: COMPLETED | OUTPATIENT
Start: 2024-11-04 | End: 2024-11-04

## 2024-11-04 RX ORDER — FENTANYL CITRATE 50 UG/ML
25 INJECTION, SOLUTION INTRAMUSCULAR; INTRAVENOUS ONCE
Status: COMPLETED | OUTPATIENT
Start: 2024-11-04 | End: 2024-11-04

## 2024-11-04 RX ORDER — FENTANYL CITRATE 50 UG/ML
100 INJECTION, SOLUTION INTRAMUSCULAR; INTRAVENOUS ONCE
Status: DISCONTINUED | OUTPATIENT
Start: 2024-11-04 | End: 2024-11-04

## 2024-11-04 RX ORDER — HYDRALAZINE HYDROCHLORIDE 100 MG/1
100 TABLET, FILM COATED ORAL 3 TIMES DAILY
Status: DISCONTINUED | OUTPATIENT
Start: 2024-11-04 | End: 2024-11-12

## 2024-11-04 RX ORDER — FENTANYL CITRATE 50 UG/ML
INJECTION, SOLUTION INTRAMUSCULAR; INTRAVENOUS
Status: COMPLETED
Start: 2024-11-04 | End: 2024-11-04

## 2024-11-04 RX ORDER — MIDAZOLAM HYDROCHLORIDE 1 MG/ML
4 INJECTION INTRAMUSCULAR; INTRAVENOUS ONCE
Status: DISCONTINUED | OUTPATIENT
Start: 2024-11-04 | End: 2024-11-04

## 2024-11-04 RX ORDER — MIDAZOLAM HYDROCHLORIDE 1 MG/ML
INJECTION INTRAMUSCULAR; INTRAVENOUS
Status: COMPLETED
Start: 2024-11-04 | End: 2024-11-04

## 2024-11-04 RX ORDER — MIDAZOLAM HYDROCHLORIDE 1 MG/ML
2 INJECTION INTRAMUSCULAR; INTRAVENOUS ONCE
Status: COMPLETED | OUTPATIENT
Start: 2024-11-04 | End: 2024-11-04

## 2024-11-04 ASSESSMENT — PAIN SCALES - GENERAL
PAINLEVEL_OUTOF10: 0 - NO PAIN
PAINLEVEL_OUTOF10: 0 - NO PAIN
PAINLEVEL_OUTOF10: 2
PAINLEVEL_OUTOF10: 0 - NO PAIN
PAINLEVEL_OUTOF10: 0 - NO PAIN

## 2024-11-04 ASSESSMENT — PAIN - FUNCTIONAL ASSESSMENT
PAIN_FUNCTIONAL_ASSESSMENT: 0-10

## 2024-11-04 NOTE — PROGRESS NOTES
Social Work Note   HFICU Treatment Plan: Patient was admitted on 10/24 here for advance therapy evaluation LVAD vs Heart transplant   - Additional information: Not a candidate for advance therapy  -Support System: Spouse, Tanvi  is listed as NOK   -Payee: Medicare and [employer] supplement    -Planned Disposition: Rehab recommendation - Outpatient cardiac rehab  - Social barriers to discharge: None noted at this time.   ANGELA Gilbert, LSW

## 2024-11-04 NOTE — PROGRESS NOTES
[facility]  [facility] [place]  CONSULT FOLLOW-UP     Reason For Consult  Requesting Colonoscopy     SUBJECTIVE     He is doing well this morning and has no complains. He finished the bowel preparation and says his last stool was liquid and clear.    EXAM     Last Recorded Vitals  Blood pressure 109/75, pulse 87, temperature 36.3 °C (97.3 °F), resp. rate 14, height 1.829 m (6'), weight 93.5 kg (206 lb 2.1 oz), SpO2 94%.      Intake/Output Summary (Last 24 hours) at 11/4/2024 1001  Last data filed at 11/4/2024 0900  Gross per 24 hour   Intake 4488.09 ml   Output 750 ml   Net 3738.09 ml       Physical Exam   Eyes:      General: No scleral icterus.     Pupils: Pupils are equal, round, and reactive to light.   Cardiovascular:      Rate and Rhythm: Normal rate and regular rhythm.      Pulses: Normal pulses.   Pulmonary:      Effort: Pulmonary effort is normal.   Abdominal:      General: Abdomen is flat. There is no distension.      Palpations: Abdomen is soft.      Tenderness: There is no abdominal tenderness. There is no guarding.   Skin:     General: Skin is warm.      Capillary Refill: Capillary refill takes less than 2 seconds.   Neurological:      General: No focal deficit present.      Mental Status: He is alert and oriented to person, place, and time. Mental status is at baseline.   Psychiatric:         Mood and Affect: Mood normal.         Thought Content: Thought content normal.         Judgment: Judgment normal.     OBJECTIVE                                                                              Medications             Current Facility-Administered Medications:     alteplase (Cathflo Activase) injection 2 mg, 2 mg, intra-catheter, PRN, BARBARA De    aspirin EC tablet 81 mg, 81 mg, oral, Daily, BARBARA De, 81 mg at 11/04/24 0831    bisacodyl (Dulcolax) suppository 10 mg, 10 mg, rectal, Daily PRN, BARBARA Alberto, 10 mg at 10/28/24  1232    cholecalciferol (Vitamin D-3) capsule 50,000 Units, 50,000 Units, oral, Every Sunday, BARBARA De, 50,000 Units at 11/03/24 0854    escitalopram (Lexapro) tablet 10 mg, 10 mg, oral, Daily, BARBARA De, 10 mg at 11/04/24 0834    hydrALAZINE (Apresoline) tablet 75 mg, 75 mg, oral, TID, MAYTE Taylor-CNP, 75 mg at 11/04/24 0831    isosorbide dinitrate (Isordil) tablet 20 mg, 20 mg, oral, TID, BARBARA Alberto, 20 mg at 11/04/24 0832    magnesium oxide (Mag-Ox) tablet 800 mg, 800 mg, oral, BID, BARBARA De, 800 mg at 11/04/24 0831    [Held by provider] metoprolol succinate XL (Toprol-XL) 24 hr tablet 50 mg, 50 mg, oral, Daily, BARBARA De    milrinone (Primacor) 20 mg in dextrose 5% 100 mL (0.2 mg/mL) infusion (premix), 0.125 mcg/kg/min, intravenous, Continuous, BARBARA Taylor, Last Rate: 3.47 mL/hr at 11/04/24 0400, 0.125 mcg/kg/min at 11/04/24 0400    nitroglycerin (Nitrostat) SL tablet 0.4 mg, 0.4 mg, sublingual, q5 min PRN, BARBARA De    oxygen (O2) therapy, , inhalation, Continuous PRN - O2/gases, BARBARA De    pantoprazole (ProtoNix) EC tablet 40 mg, 40 mg, oral, Daily before breakfast, BARBARA De, 40 mg at 11/03/24 0656    perflutren protein A microsphere (Optison) injection 0.5 mL, 0.5 mL, intravenous, Once in imaging, BARBARA Alberto    polyethylene glycol (Glycolax, Miralax) packet 17 g, 17 g, oral, Daily PRN, BARBARA De    sennosides-docusate sodium (Romina-Colace) 8.6-50 mg per tablet 2 tablet, 2 tablet, oral, BID, BARBARA De, 2 tablet at 11/03/24 0855    spironolactone (Aldactone) tablet 25 mg, 25 mg, oral, Daily, BARBARA De, 25 mg at 11/04/24 0831    sulfur hexafluoride microsphr (Lumason) injection 24.28 mg, 2 mL, intravenous, Once in imaging, BARBARA Alberto                                                                            Labs      Results for orders placed or performed during the hospital encounter of 10/24/24 (from the past 24 hours)   BLOOD GAS MIXED VENOUS FULL PANEL   Result Value Ref Range    POCT pH, Mixed 7.40 7.33 - 7.43 pH    POCT pCO2, Mixed 42 41 - 51 mm Hg    POCT pO2, Mixed 41 35 - 45 mm Hg    POCT SO2, Mixed 70 45 - 75 %    POCT Oxy Hemoglobin, Mixed 67.7 45.0 - 75.0 %    POCT Hematocrit Calculated, Mixed 41.0 41.0 - 52.0 %    POCT Sodium, Mixed 126 (L) 136 - 145 mmol/L    POCT Potassium, Mixed 4.3 3.5 - 5.3 mmol/L    POCT Chloride, Mixed 97 (L) 98 - 107 mmol/L    POCT Ionized Calcium, Mixed 1.23 1.10 - 1.33 mmol/L    POCT Glucose, Mixed 142 (H) 74 - 99 mg/dL    POCT Lactate, Mixed 1.3 0.4 - 2.0 mmol/L    POCT Base Excess, Mixed 1.0 -2.0 - 3.0 mmol/L    POCT HCO3 Calculated, Mixed 26.0 22.0 - 26.0 mmol/L    POCT Hemoglobin, Mixed 13.8 13.5 - 17.5 g/dL    POCT Anion Gap, Mixed 7 (L) 10 - 25 mmo/L    Patient Temperature 37.0 degrees Celsius    FiO2 21 %   BLOOD GAS MIXED VENOUS FULL PANEL   Result Value Ref Range    POCT pH, Mixed 7.41 7.33 - 7.43 pH    POCT pCO2, Mixed 34 (L) 41 - 51 mm Hg    POCT pO2, Mixed 45 35 - 45 mm Hg    POCT SO2, Mixed 73 45 - 75 %    POCT Oxy Hemoglobin, Mixed 71.3 45.0 - 75.0 %    POCT Hematocrit Calculated, Mixed 41.0 41.0 - 52.0 %    POCT Sodium, Mixed 131 (L) 136 - 145 mmol/L    POCT Potassium, Mixed 3.6 3.5 - 5.3 mmol/L    POCT Chloride, Mixed 102 98 - 107 mmol/L    POCT Ionized Calcium, Mixed 1.10 1.10 - 1.33 mmol/L    POCT Glucose, Mixed 80 74 - 99 mg/dL    POCT Lactate, Mixed 0.5 0.4 - 2.0 mmol/L    POCT Base Excess, Mixed -2.4 (L) -2.0 - 3.0 mmol/L    POCT HCO3 Calculated, Mixed 21.6 (L) 22.0 - 26.0 mmol/L    POCT Hemoglobin, Mixed 13.6 13.5 - 17.5 g/dL    POCT Anion Gap, Mixed 11 10 - 25 mmo/L    Patient Temperature 37.0 degrees Celsius    FiO2 21 %   BLOOD GAS MIXED VENOUS FULL PANEL   Result Value Ref Range    POCT pH, Mixed 7.40 7.33 - 7.43 pH    POCT pCO2, Mixed 41 41 - 51 mm Hg    POCT  pO2, Mixed 39 35 - 45 mm Hg    POCT SO2, Mixed 66 45 - 75 %    POCT Oxy Hemoglobin, Mixed 64.8 45.0 - 75.0 %    POCT Hematocrit Calculated, Mixed 43.0 41.0 - 52.0 %    POCT Sodium, Mixed 127 (L) 136 - 145 mmol/L    POCT Potassium, Mixed 4.4 3.5 - 5.3 mmol/L    POCT Chloride, Mixed 95 (L) 98 - 107 mmol/L    POCT Ionized Calcium, Mixed 1.21 1.10 - 1.33 mmol/L    POCT Glucose, Mixed 102 (H) 74 - 99 mg/dL    POCT Lactate, Mixed 0.6 0.4 - 2.0 mmol/L    POCT Base Excess, Mixed 0.5 -2.0 - 3.0 mmol/L    POCT HCO3 Calculated, Mixed 25.4 22.0 - 26.0 mmol/L    POCT Hemoglobin, Mixed 14.4 13.5 - 17.5 g/dL    POCT Anion Gap, Mixed 11 10 - 25 mmo/L    Patient Temperature 37.0 degrees Celsius    FiO2 21 %   CBC   Result Value Ref Range    WBC 8.6 4.4 - 11.3 x10*3/uL    nRBC 0.0 0.0 - 0.0 /100 WBCs    RBC 5.03 4.50 - 5.90 x10*6/uL    Hemoglobin 13.8 13.5 - 17.5 g/dL    Hematocrit 40.6 (L) 41.0 - 52.0 %    MCV 81 80 - 100 fL    MCH 27.4 26.0 - 34.0 pg    MCHC 34.0 32.0 - 36.0 g/dL    RDW 16.4 (H) 11.5 - 14.5 %    Platelets 584 (H) 150 - 450 x10*3/uL   Renal function panel   Result Value Ref Range    Glucose 105 (H) 74 - 99 mg/dL    Sodium 132 (L) 136 - 145 mmol/L    Potassium 4.5 3.5 - 5.3 mmol/L    Chloride 95 (L) 98 - 107 mmol/L    Bicarbonate 26 21 - 32 mmol/L    Anion Gap 16 10 - 20 mmol/L    Urea Nitrogen 31 (H) 6 - 23 mg/dL    Creatinine 1.16 0.50 - 1.30 mg/dL    eGFR 68 >60 mL/min/1.73m*2    Calcium 9.5 8.6 - 10.6 mg/dL    Phosphorus 4.3 2.5 - 4.9 mg/dL    Albumin 4.3 3.4 - 5.0 g/dL   Magnesium   Result Value Ref Range    Magnesium 2.25 1.60 - 2.40 mg/dL   Heparin Assay, UFH   Result Value Ref Range    Heparin Unfractionated 0.5 See Comment Below for Therapeutic Ranges IU/mL   BLOOD GAS MIXED VENOUS FULL PANEL   Result Value Ref Range    POCT pH, Mixed 7.41 7.33 - 7.43 pH    POCT pCO2, Mixed 42 41 - 51 mm Hg    POCT pO2, Mixed 39 35 - 45 mm Hg    POCT SO2, Mixed 65 45 - 75 %    POCT Oxy Hemoglobin, Mixed 64.0 45.0 - 75.0 %     POCT Hematocrit Calculated, Mixed 42.0 41.0 - 52.0 %    POCT Sodium, Mixed 128 (L) 136 - 145 mmol/L    POCT Potassium, Mixed 4.4 3.5 - 5.3 mmol/L    POCT Chloride, Mixed 97 (L) 98 - 107 mmol/L    POCT Ionized Calcium, Mixed 1.23 1.10 - 1.33 mmol/L    POCT Glucose, Mixed 111 (H) 74 - 99 mg/dL    POCT Lactate, Mixed 0.5 0.4 - 2.0 mmol/L    POCT Base Excess, Mixed 1.7 -2.0 - 3.0 mmol/L    POCT HCO3 Calculated, Mixed 26.6 (H) 22.0 - 26.0 mmol/L    POCT Hemoglobin, Mixed 14.1 13.5 - 17.5 g/dL    POCT Anion Gap, Mixed 9 (L) 10 - 25 mmo/L    Patient Temperature 37.0 degrees Celsius    FiO2 21 %                                                                            Imaging     11/2/2024 CT liver:  IMPRESSION:  1. 4.4 cm ovoid heterogeneous hypodense lesion inferiorly within  hepatic segments V/VI does not demonstrate change in appearance or  attenuation on serial postcontrast imaging and is felt most likely to  be a complex cyst. A couple additional too small to characterize  hypodense lesions are also likely cysts. No solid enhancing lesion is  evident. No morphologic evidence of cirrhosis.  2. Splenomegaly.  3. Incidental findings include pancreatic and renal simple and  complex attenuation lesions as well, for which initial follow-up by  contrast MRI is recommended on a routine outpatient basis.                                                                         GI Procedures     11/4/2024 colonoscopy:  Final report is pending.    2015 colonoscopy:      ASSESSMENT / PLAN     ASSESSMENT/PLAN:    Fahad Meraz is a 69 y.o. male w/ PMH of HFrEF (EF 10-15% 10/24/2024), Afib s/p RFA on AC, CAD s/p 4V CABG (2012) and PCI (5/2019), HTN, DLD, VIV on CPAP, and depression, who is admitted for shortness of breath and volume overload. GI service is consulted for colonoscopy evaluation.      He is being evaluated for advanced heart failure therapies and requires colonoscopy as part of his evaluation. He had a colonoscopy  done in December 2015 which revealed 3 colonic polyps (non adenomatous). He has no family history of colon cancer. He denies any GI symptoms and his abdominal exam is unrevealing. 11/4/2024 S/p colonoscopy w/ one polyp removed in the ascending colon (5-6 mm); additionally hemorrhoids.    Additionally, the patient had a CT liver to evaluate a liver lesion noted on outpatient CT w/o contrast, which on CT liver was described as a complex cyst. Incidentally a pancreatic and renal simple and complex attenuation lesions were noted that require MRI on an outpatient basis.     Plan:   - Pathology pending.  - Repeat surveillance colonoscopy in 7 years.  - Please make sure the patient has outpatient MRI to follow up on incidental pancreatic and renal lesions.    The above recommendations were communicated to the [facility] team.    Patient was seen and discussed with Dr. Dickey.    Gastroenterology will sign off.  During weekday hours of 7am-5pm please do not hesitate to contact me on [employer] Chat or page [phone], if there are any further questions between the weekday hours of 7am-5pm.   After hours, on weekends, and on holidays, please [place] [phone]. Thank you.    Honey Ramirez MD  PGY-5 Gastroenterology and Hepatology Fellow  [facility]

## 2024-11-04 NOTE — PROGRESS NOTES
[facility]  HFICU PROGRESS NOTE    Fahad Meraz/[id number]    Admit Date: 10/24/2024  Hospital Length of Stay: 11   ICU Length of Stay: 10d 14h   Primary Service:   Primary HF Cardiologist: Dr. Washington  Referring:    INTERVAL EVENTS / PERTINENT ROS:     No acute events overnight. Continues with workup for advance therapies. Denies any c/o dyspnea or discomfort. Overall, feeling much better than on admit. Colonoscopy today. Heparin gtt off @ 8am. Telemetry reviewed, remains in afib w/ PVC's.     Plan:  - Hold diuresis  - Up titrate hydral/iso as tolerated  - Colonoscopy today  - Have imaging studies completed   - Plan for discussion tomorrow at committee     MEDICATIONS  Infusions:  milrinone, Last Rate: 0.125 mcg/kg/min (11/04/24 0400)      Scheduled:  aspirin, 81 mg, Daily  cholecalciferol, 50,000 Units, Every Sunday  escitalopram, 10 mg, Daily  hydrALAZINE, 75 mg, TID  isosorbide dinitrate, 20 mg, TID  magnesium oxide, 800 mg, BID  [Held by provider] metoprolol succinate XL, 50 mg, Daily  pantoprazole, 40 mg, Daily before breakfast  perflutren protein A microsphere, 0.5 mL, Once in imaging  sennosides-docusate sodium, 2 tablet, BID  spironolactone, 25 mg, Daily  sulfur hexafluoride microsphr, 2 mL, Once in imaging      PRN:  alteplase, 2 mg, PRN  bisacodyl, 10 mg, Daily PRN  nitroglycerin, 0.4 mg, q5 min PRN  oxygen, , Continuous PRN - O2/gases  polyethylene glycol, 17 g, Daily PRN      Invasive Hemodynamics:    Most Recent Range Past 24hrs   BP (Art) 111/62 No data recorded   MAP(Art) 78 mmHg No data recorded   RA/CVP   No data recorded   PA 34/21 PAP  Min: 30/19  Max: 50/19   PA(mean) 25 mmHg PAP (Mean)  Min: 20 mmHg  Max: 37 mmHg   PCWP 15 mmHg PCWP (mmHg)  Min: 15 mmHg  Max: 15 mmHg   CO 4.5 L/min CO (L/min)  Min: 4.5 L/min  Max: 6.3 L/min   CI 2.1 L/min/m2 CI (L/min/m2)  Min: 2.1 L/min/m2  Max: 2.9 L/min/m2   Mixed Venous 74 % SVO2 (%)  Min: 70 %  Max: 74 %   SVR  1475  (dyne*sec)/cm5 SVR (dyne*sec)/cm5  Min: 1003 (dyne*sec)/cm5  Max: 1509 (dyne*sec)/cm5    (dyne*sec)/cm5 PVR (dyne*sec)/cm5  Min: 215 (dyne*sec)/cm5  Max: 216 (dyne*sec)/cm5     PHYSICAL EXAM:   Visit Vitals  BP 99/64   Pulse 86   Temp 36.3 °C (97.3 °F)   Resp 14   Ht 1.829 m (6')   Wt 93.5 kg (206 lb 2.1 oz)   SpO2 98%   BMI 27.96 kg/m²   Smoking Status Former   BSA 2.18 m²       Wt Readings from Last 5 Encounters:   11/04/24 93.5 kg (206 lb 2.1 oz)   10/24/24 92.5 kg (204 lb)   08/05/24 122 kg (268 lb)   01/31/22 119 kg (262 lb)   02/18/20 123 kg (270 lb 2 oz)       INTAKE/OUTPUT:  I/O last 3 completed shifts:  In: 4990.1 (53.4 mL/kg) [P.O.:4000; I.V.:990.1 (10.6 mL/kg)]  Out: 1175 (12.6 mL/kg) [Urine:1175 (0.3 mL/kg/hr)]  Weight: 93.5 kg      Physical Exam  HENT:      Head: Normocephalic.      Nose: Nose normal.      Mouth/Throat:      Mouth: Mucous membranes are moist.   Eyes:      Pupils: Pupils are equal, round, and reactive to light.   Cardiovascular:      Rate and Rhythm: Normal rate. Rhythm irregular.      Heart sounds: Murmur (holosystolic) heard.      Comments: Permanent Atrial Fibrillation  Pulmonary:      Effort: Pulmonary effort is normal.      Breath sounds: Normal breath sounds.      Comments: Rt subclavian SGC  Abdominal:      Palpations: Abdomen is soft.   Musculoskeletal:         General: Normal range of motion.      Cervical back: Normal range of motion.   Skin:     Capillary Refill: Capillary refill takes less than 2 seconds.   Neurological:      Mental Status: He is alert and oriented to person, place, and time.   Psychiatric:         Mood and Affect: Mood normal.         Behavior: Behavior normal.         Thought Content: Thought content normal.         Judgment: Judgment normal.         DATA:  CMP:  Results from last 7 days   Lab Units 11/04/24  0419 11/03/24  0242 11/02/24  1658 11/02/24  0506 11/01/24  1533 11/01/24  0317 10/31/24  1249 10/31/24  0509 10/30/24  0131 10/29/24  0454  "  SODIUM mmol/L 132* 133* 131* 134* 133* 134* 134* 135* 133* 135*   POTASSIUM mmol/L 4.5 4.4 4.9 4.4 4.3 4.3 4.1 4.9 4.0 4.6   CHLORIDE mmol/L 95* 97* 94* 99 101 101 100 101 102 102   CO2 mmol/L 26 23 24 24 21 23 24 25 23 22   ANION GAP mmol/L 16 17 18 15 15 14 14 14 12 16   BUN mg/dL 31* 42* 38* 34* 28* 29* 29* 30* 33* 32*   CREATININE mg/dL 1.16 1.41* 1.56* 1.45* 1.24 1.30 1.29 1.35* 1.43* 1.49*   EGFR mL/min/1.73m*2 68 54* 48* 52* 63 59* 60* 57* 53* 50*   MAGNESIUM mg/dL 2.25 2.22 2.06 2.07 1.96 2.11  --  2.37 2.27 2.49*   ALBUMIN g/dL 4.3 4.1 4.5 4.1 4.1 4.1 4.3 4.1 4.0 4.2   ALT U/L  --   --   --   --   --   --  14  --   --   --    AST U/L  --   --   --   --   --   --  16  --   --   --    BILIRUBIN TOTAL mg/dL  --   --   --   --   --   --  1.4*  --   --   --      CBC:  Results from last 7 days   Lab Units 11/04/24  0419 11/03/24  0242 11/02/24  0506 11/01/24  0317 10/31/24  0509 10/30/24  0131 10/29/24  0454   WBC AUTO x10*3/uL 8.6 8.7 10.8 11.6* 10.9 11.1 10.7   HEMOGLOBIN g/dL 13.8 13.4* 14.0 13.9 14.5 14.7 15.3   HEMATOCRIT % 40.6* 41.4 39.4* 41.6 43.6 44.8 46.0   PLATELETS AUTO x10*3/uL 584* 504* 562* 511* 527* 509* 594*   MCV fL 81 85 78* 81 82 82 82     COAG:   Results from last 7 days   Lab Units 11/03/24  0242 11/02/24  0506 11/01/24  0317 10/31/24  1249 10/31/24  0509 10/30/24  0131 10/29/24  0454   INR  1.4* 1.3* 1.3* 1.3* 1.2* 1.3* 1.3*     ABO:   No results found for: \"ABO\"    HEME/ENDO:  Results from last 7 days   Lab Units 10/31/24  1249   TSH mIU/L 4.39*      CARDIAC:   Results from last 7 days   Lab Units 10/31/24  1249   LD U/L 187   BNP pg/mL 755*       ASSESSMENT AND PLAN:     Mr. Meraz is a 69 M with a PMHx sig for CAD s/p 4V CABG (2012) and PCI (LCx/OM; 5/2019), stage D systolic HF/ICM/HFrEF with LVEF 10-15%( 10/22/24 TTE), AF s/p RFA (PVI and CTI; 4/2024) on OAC therapy, HTN, dyslipidemia, depression, anxiety and VIV using CPAP who was admitted to OSH ICU, s/p RHC on 10/18/24.  Per " patient, he had been experienced worsening SOB and fluids overload for 2-3 weeks. Patient was on milrinone drip and diurese with SG guided. However his KENYA worsening, and not able to wean milrinone drip. Decision was made [place] [facility] [place]. 10/26: Tolerated afterload reduction with hydralazine / Isordil. 10/27: Discontinued hydralazine / Isordil and initiated 24 / 26 mg Entresto BID and tolerating. 10/28: Coronary angiogram: Right dominant system; LM: Mild 20% distal disease; LAD: Occluded with Patent LIMA; RI: Occluded; Lcx: Patent proximal Lcx stent and OM1 stent, otherwise mild non-obstructive disease; RCA: proximal 100% occlusion. Grafts: LIMA-LAD: Patent; SVG-OM, SVG-RCA: Occluded; SVG-RI: Unable to engage but likely occluded. Advanced therapies evaluation was initiated on 10/30 with plans for presentation on 11/5. Attempted to wean milrinone - currently at 0.125 mcg/kg/min and uptitrating afterload reduction as tolerated.       Neuro:  # Hx of Depression/Anxiety  - Serial neuro and pain assessments  - PO Tylenol PRN for pain  - PT/OT Consult, OOB to chair  - CAM ICU score every shift  - Sleep/wake cycle normalization  - c/w home Lexapro and Clonazepam     # Physical Status  -Overweight:  BMI: 27.9   -Reduced Mobility: acute decompensated heart failure and ICU stay      #Substance abuse  -Alcohol abuse/Alcohol dependence: NO   -Tobacco use/Nicotine Dependence: NO      Cardiovascular:  # Stage D, ICM, decompensated acute on chronic heart failure,  HFrEF 10-15% ( from 10/22/24 ECHO)   #Hx of CAD s/p 4V CABG 2012   -BRIANA (3/29/23): Left Ventricle: Moderately reduced left ventricular systolic function with a visually estimated EF of 35 - 40%.  - TTE (10/22/24): LVEF 10 -15%, right ventricle moderately dilated, moderately reduced systolic function. Mild MR  - admit weight (10/24): 93.5 KG -->(11/3) 91.5kg  - admit BNP (10/24):  1995  - Home meds include  Metoprolol succinate 25 mg daily, Entresto 97/103 mg bid, Hydralazine 25 mg bid, Isosorbide mononitrate 120 mg daily, Spironolactone 25 mg daily, Bumex 0.5 mg daily----allergy to SGLT2.  - ASA  - Reduce milrinone 0.25mcg/kg/min (11/3)  - SGC # on arrival (10/25): /80 (89), CVP 11, PA 36/20 (26), PCWP 17, CO/CI 5.9/2.7, , SVO2 73% on Milrinone drip at 0.375 mcg/kg/min, hydral 50 mg TID.   - Daily hemodynamics: 11/4: MAP 84, CVP 11, PAP: 43/21 (29); SVR: 1475 CI: 4.5/ CO: 2.1; SvO2: 65% on IV Milrinone 0.125 mcg/kg/min; Hydralazine 75mg PO TID; Isordil 20mg PO TID  - C/w milrinone 0.125 mcg/kg/min  - Holding diuresis (11/3) - CLD/bowel prep for colonoscopy 11/4  - c/w hydralazine 75mg TID  - c/w Isordil to 20 mg TID   - Discontinue dapagliflozin 10 mg daily in preparation for AHF therapies (LVAD / OHT)   - C/w Rodrigo 25 mg daily for now    - Hold BB   - Daily standing weights, 2gm sodium diet, 2L fluid restriction, strict I&Os  - Coronary angiogram multidisciplinary discussion, including CT surgery, no revascularization options.  - Advanced Heart Failure Therapies Evaluation initiated 10/30/2024     LVAD AND Transplant:   -Email sent on 10/30/2024 - consent on 10/31/2024  -Labs:      10/31/2024    -RHC:       10/25/2024  -LHC     10/25/2024  -CPET     NA  -ECHO     10/25/2024    CT chest (10/31): Dilated ascending AO 4.3cm. Moderate calcifications of the aorta and branch vessels. Severe coronary calcifications. Moderate cardiomegaly. Moderate aortic valve calcifications. Lesion inferior rt hepatic lobe (4.8cm). SGC in right pulmonary artery. Main pulmonary artery mildly dilated.         -US abdomen/aorta/iliac/IVC  PENDING  -Carotid US    PENDING  -SITA (lower)    PENDING  -2 view CXR:     10/31/2024  -Device interrogation   NA - No device  -PFTs:      Complete 11/1  -Colonoscopy/Occult stool        Last completed per OSH: 12/1/15                                             Colonoscopy scheduled  11/4  -LVAD/TX education:    10/30/2024  -CT surgery consult   Not completed  -Palliative consult   Consult placed 10/31/2024  -Nutrition consult    Consult placed 10/31/2024  -Dental consult/Orthopantogram  10/31/2024 - cleared 11/4/2024  -Physical and Occupational Therapy  Following       #CAD   -[facility] (12/20/2020):  Successful PCI of the SVG to ramus intermedius with one drug-eluting stent; OCT of the SVG to RCA to confirm no thrombosis; patent LAD; occluded SVG to OM.  -Meds: C/w ASA.     # Hx of AFib s/p RFA (PVI and CTI; 4/2024)  -NO device noted  -Was on home Coumadin for 2 months, prior to that was on Eliquis,  patient reported with Dizziness while on Eliquis      # Dizziness/bradycardia  -Had 3 episodes of near syncope after his recent AF RFA; has stopped driving as a result. ECG with sinus olu. Metoprolol previously decreased by his local EP.    -Hold BB due to decompensate HF      #Electrolyte Disturbances  -Keep K>4, Mag >2     Pulmonary:   # PMH of VIV  -c/w home CPAP  -Monitor and maintain SpO2 > 92%     GI:  # H/x of  GERD  - Colonoscopy (11/4) for advanced therapies workup. CLD/ bowel prep started 11/3. Heparin gtt off 8am for procedure (GI requires heparin off 4hrs. Discussed with Dr. Berg).  - Bowel regimen: Senna, Miralax PRN  - PPI     #  Bilirubinemia  - T maryuri (10/24): 2.3, ALT, AST: normal   - CT C/A/P (10/17/24): Hepatomegaly and hepatic steatosis are present. The liver has a lobulated contour. A questionable lesion in the right hepatic lobe inferiorly measures 26 mm.  - CT Liver w/ contrast (11/2) Incidental findings include pancreatic and renal simple and complex attenuation lesions as well     # Urinary retention  - CT C/A/P (10/17/24): the prostate gland is mildly enlarged with mass effect on the posterior wall of the urinary bladder. The prostate gland measures 4.3 x 6.0 cm.   - C/o urinary difficulty, s/p FC OSH --> Removed prior admission      :  #KENYA/ CKD ?  in the setting of  cardio-renal syndrome  -Baseline BUN/Cr: Unclear,  OSH ICU: Creatinine: 1.3-1.6   -Admit BUN/Cr (10/24): 28/1.88  -Daily BUN/Creat: 11/4 31 / 1.16  -I/Os  -avoid hypotension and nephrotoxic agents     Heme:  #Anemia in the setting of iron deficiency  - Lab (10/24): H/H: 17.5/50.8,  Iron study: 63/374/17%, Ferritin: 76,  folate: 23.5, B12: 768   - NO iron replacement needed as H/H high     #Coagulopathy due to on home Coumadin  - INR  (10/30): 1.3, Hold home Coumadin   - Restarted Heparin gtt 10/29  - Hold heparin gtt 8a, 11/4 for colonoscopy. Discussed with GI 11/3  - Heparin Xa 0.4 (11/3)     Endo:  #DM  - Euglycemic  - hgbA1c (10/24):  6.3     #Thyroid  -TSH (10/24):  8.47, T4: 1.44      ID:  # leukocytosis - Resolved  - WBC (10/26):  9.6   - Afebrile, nontoxic   - Trend temps q4h       PHYSICAL AND OCCUPATIONAL THERAPY: Ordered    LINES:  PIVs   A-line (OSH) 10/20 - current  Right Subclavian Cordis / PA catheter (OSH) 10/18 - 10/28  Right subclavian cordis: Previously placed swan sheath was exchanged to a new swan sheath over a short Jwire and was sutured in place. 10/28 - current    DVT: Heparin gtt   VAP BUNDLE: NA  CENTRAL LINE BUNDLE: Ordered   ULCER PPX: Pantoprazole 40 mg daily  GLYCEMIC CONTROL: NA - A1C: 6.3  BOWEL CARE: Senna / Miralax  INDWELLING CATHETER: NA  NUTRITION: Adult diet Clear Liquid      EMERGENCY CONTACT: Extended Emergency Contact Information  Primary Emergency Contact: Tanvi Meraz  Home Phone: [phone]  Relation: Spouse  Secondary Emergency Contact: Subhash Meraz  Mobile Phone: [phone]  Relation: Son  FAMILY UPDATE: Family at bedside   CODE STATUS: Full Code  DISPO: Maintain in the HFICU    Patient seen and assessed with Dr. Romero    I personally spent 60 minutes of critical care time directly and personally managing the patient exclusive of separately billable procedures   _________________________________________________  MAYTE Morrison-ERNESTO

## 2024-11-05 ENCOUNTER — COMMITTEE REVIEW (OUTPATIENT)
Dept: TRANSPLANT | Facility: HOSPITAL | Age: 69
End: 2024-11-05

## 2024-11-05 ENCOUNTER — APPOINTMENT (OUTPATIENT)
Dept: CARDIOLOGY | Facility: HOSPITAL | Age: 69
End: 2024-11-05
Payer: MEDICARE

## 2024-11-05 ENCOUNTER — APPOINTMENT (OUTPATIENT)
Dept: RADIOLOGY | Facility: HOSPITAL | Age: 69
DRG: 001 | End: 2024-11-05
Payer: MEDICARE

## 2024-11-05 ENCOUNTER — APPOINTMENT (OUTPATIENT)
Dept: VASCULAR MEDICINE | Facility: HOSPITAL | Age: 69
DRG: 001 | End: 2024-11-05
Payer: MEDICARE

## 2024-11-05 ENCOUNTER — DOCUMENTATION (OUTPATIENT)
Dept: TRANSPLANT | Facility: HOSPITAL | Age: 69
End: 2024-11-05
Payer: MEDICARE

## 2024-11-05 LAB
ALBUMIN SERPL BCP-MCNC: 3.9 G/DL (ref 3.4–5)
ANION GAP BLDMV CALCULATED.4IONS-SCNC: 12 MMO/L (ref 10–25)
ANION GAP BLDMV CALCULATED.4IONS-SCNC: 9 MMO/L (ref 10–25)
ANION GAP BLDMV CALCULATED.4IONS-SCNC: 9 MMO/L (ref 10–25)
ANION GAP SERPL CALC-SCNC: 12 MMOL/L (ref 10–20)
BASE EXCESS BLDMV CALC-SCNC: 0 MMOL/L (ref -2–3)
BASE EXCESS BLDMV CALC-SCNC: 0.7 MMOL/L (ref -2–3)
BASE EXCESS BLDMV CALC-SCNC: 1.3 MMOL/L (ref -2–3)
BODY TEMPERATURE: 37 DEGREES CELSIUS
BUN SERPL-MCNC: 24 MG/DL (ref 6–23)
CA-I BLDMV-SCNC: 1.28 MMOL/L (ref 1.1–1.33)
CALCIUM SERPL-MCNC: 9.4 MG/DL (ref 8.6–10.6)
CHLORIDE BLD-SCNC: 100 MMOL/L (ref 98–107)
CHLORIDE BLD-SCNC: 100 MMOL/L (ref 98–107)
CHLORIDE BLD-SCNC: 101 MMOL/L (ref 98–107)
CHLORIDE SERPL-SCNC: 101 MMOL/L (ref 98–107)
CO2 SERPL-SCNC: 28 MMOL/L (ref 21–32)
CREAT SERPL-MCNC: 1.15 MG/DL (ref 0.5–1.3)
EGFRCR SERPLBLD CKD-EPI 2021: 69 ML/MIN/1.73M*2
EJECTION FRACTION APICAL 4 CHAMBER: 14.1
EJECTION FRACTION: 18 %
ERYTHROCYTE [DISTWIDTH] IN BLOOD BY AUTOMATED COUNT: 16.6 % (ref 11.5–14.5)
GLUCOSE BLD-MCNC: 113 MG/DL (ref 74–99)
GLUCOSE BLD-MCNC: 127 MG/DL (ref 74–99)
GLUCOSE BLD-MCNC: 137 MG/DL (ref 74–99)
GLUCOSE SERPL-MCNC: 133 MG/DL (ref 74–99)
HCO3 BLDMV-SCNC: 24.8 MMOL/L (ref 22–26)
HCO3 BLDMV-SCNC: 26 MMOL/L (ref 22–26)
HCO3 BLDMV-SCNC: 27.2 MMOL/L (ref 22–26)
HCT VFR BLD AUTO: 39.8 % (ref 41–52)
HCT VFR BLD EST: 40 % (ref 41–52)
HCT VFR BLD EST: 42 % (ref 41–52)
HCT VFR BLD EST: 42 % (ref 41–52)
HGB BLD-MCNC: 13.2 G/DL (ref 13.5–17.5)
HGB BLDMV-MCNC: 13.4 G/DL (ref 13.5–17.5)
HGB BLDMV-MCNC: 13.9 G/DL (ref 13.5–17.5)
HGB BLDMV-MCNC: 14 G/DL (ref 13.5–17.5)
INHALED O2 CONCENTRATION: 21 %
LACTATE BLDMV-SCNC: 0.6 MMOL/L (ref 0.4–2)
LACTATE BLDMV-SCNC: 0.8 MMOL/L (ref 0.4–2)
LACTATE BLDMV-SCNC: 1 MMOL/L (ref 0.4–2)
LEFT VENTRICLE INTERNAL DIMENSION DIASTOLE: 8.21 CM (ref 3.5–6)
LEFT VENTRICULAR OUTFLOW TRACT DIAMETER: 2.31 CM
MAGNESIUM SERPL-MCNC: 2.26 MG/DL (ref 1.6–2.4)
MCH RBC QN AUTO: 27.1 PG (ref 26–34)
MCHC RBC AUTO-ENTMCNC: 33.2 G/DL (ref 32–36)
MCV RBC AUTO: 82 FL (ref 80–100)
NRBC BLD-RTO: 0 /100 WBCS (ref 0–0)
OXYHGB MFR BLDMV: 63.9 % (ref 45–75)
OXYHGB MFR BLDMV: 66.7 % (ref 45–75)
OXYHGB MFR BLDMV: 69.6 % (ref 45–75)
PCO2 BLDMV: 40 MM HG (ref 41–51)
PCO2 BLDMV: 43 MM HG (ref 41–51)
PCO2 BLDMV: 47 MM HG (ref 41–51)
PH BLDMV: 7.37 PH (ref 7.33–7.43)
PH BLDMV: 7.39 PH (ref 7.33–7.43)
PH BLDMV: 7.4 PH (ref 7.33–7.43)
PHOSPHATE SERPL-MCNC: 4 MG/DL (ref 2.5–4.9)
PLATELET # BLD AUTO: 532 X10*3/UL (ref 150–450)
PO2 BLDMV: 40 MM HG (ref 35–45)
PO2 BLDMV: 41 MM HG (ref 35–45)
PO2 BLDMV: 44 MM HG (ref 35–45)
POTASSIUM BLDMV-SCNC: 4.1 MMOL/L (ref 3.5–5.3)
POTASSIUM BLDMV-SCNC: 4.6 MMOL/L (ref 3.5–5.3)
POTASSIUM BLDMV-SCNC: 4.7 MMOL/L (ref 3.5–5.3)
POTASSIUM SERPL-SCNC: 3.9 MMOL/L (ref 3.5–5.3)
RBC # BLD AUTO: 4.87 X10*6/UL (ref 4.5–5.9)
RIGHT VENTRICLE FREE WALL PEAK S': 6 CM/S
RIGHT VENTRICLE PEAK SYSTOLIC PRESSURE: 42.9 MMHG
SAO2 % BLDMV: 65 % (ref 45–75)
SAO2 % BLDMV: 68 % (ref 45–75)
SAO2 % BLDMV: 71 % (ref 45–75)
SODIUM BLDMV-SCNC: 131 MMOL/L (ref 136–145)
SODIUM BLDMV-SCNC: 132 MMOL/L (ref 136–145)
SODIUM BLDMV-SCNC: 132 MMOL/L (ref 136–145)
SODIUM SERPL-SCNC: 137 MMOL/L (ref 136–145)
TRICUSPID ANNULAR PLANE SYSTOLIC EXCURSION: 0.9 CM
UFH PPP CHRO-ACNC: 0.4 IU/ML
UFH PPP CHRO-ACNC: 0.5 IU/ML
WBC # BLD AUTO: 7.7 X10*3/UL (ref 4.4–11.3)

## 2024-11-05 PROCEDURE — 93308 TTE F-UP OR LMTD: CPT | Performed by: INTERNAL MEDICINE

## 2024-11-05 PROCEDURE — 85027 COMPLETE CBC AUTOMATED: CPT

## 2024-11-05 PROCEDURE — 2500000001 HC RX 250 WO HCPCS SELF ADMINISTERED DRUGS (ALT 637 FOR MEDICARE OP)

## 2024-11-05 PROCEDURE — 37799 UNLISTED PX VASCULAR SURGERY: CPT

## 2024-11-05 PROCEDURE — 93321 DOPPLER ECHO F-UP/LMTD STD: CPT

## 2024-11-05 PROCEDURE — 93325 DOPPLER ECHO COLOR FLOW MAPG: CPT | Performed by: INTERNAL MEDICINE

## 2024-11-05 PROCEDURE — 2500000002 HC RX 250 W HCPCS SELF ADMINISTERED DRUGS (ALT 637 FOR MEDICARE OP, ALT 636 FOR OP/ED)

## 2024-11-05 PROCEDURE — 93922 UPR/L XTREMITY ART 2 LEVELS: CPT | Performed by: SURGERY

## 2024-11-05 PROCEDURE — 2020000001 HC ICU ROOM DAILY

## 2024-11-05 PROCEDURE — 71045 X-RAY EXAM CHEST 1 VIEW: CPT

## 2024-11-05 PROCEDURE — 2500000004 HC RX 250 GENERAL PHARMACY W/ HCPCS (ALT 636 FOR OP/ED): Performed by: NURSE PRACTITIONER

## 2024-11-05 PROCEDURE — 37799 UNLISTED PX VASCULAR SURGERY: CPT | Performed by: NURSE PRACTITIONER

## 2024-11-05 PROCEDURE — 93321 DOPPLER ECHO F-UP/LMTD STD: CPT | Performed by: INTERNAL MEDICINE

## 2024-11-05 PROCEDURE — 99291 CRITICAL CARE FIRST HOUR: CPT

## 2024-11-05 PROCEDURE — 93922 UPR/L XTREMITY ART 2 LEVELS: CPT

## 2024-11-05 PROCEDURE — 84132 ASSAY OF SERUM POTASSIUM: CPT

## 2024-11-05 PROCEDURE — 2500000001 HC RX 250 WO HCPCS SELF ADMINISTERED DRUGS (ALT 637 FOR MEDICARE OP): Performed by: NURSE PRACTITIONER

## 2024-11-05 PROCEDURE — 2500000004 HC RX 250 GENERAL PHARMACY W/ HCPCS (ALT 636 FOR OP/ED)

## 2024-11-05 PROCEDURE — 97116 GAIT TRAINING THERAPY: CPT | Mod: GP

## 2024-11-05 PROCEDURE — 83735 ASSAY OF MAGNESIUM: CPT

## 2024-11-05 PROCEDURE — 71045 X-RAY EXAM CHEST 1 VIEW: CPT | Performed by: RADIOLOGY

## 2024-11-05 PROCEDURE — 84132 ASSAY OF SERUM POTASSIUM: CPT | Performed by: NURSE PRACTITIONER

## 2024-11-05 PROCEDURE — 99291 CRITICAL CARE FIRST HOUR: CPT | Performed by: INTERNAL MEDICINE

## 2024-11-05 PROCEDURE — 85520 HEPARIN ASSAY: CPT

## 2024-11-05 PROCEDURE — 97530 THERAPEUTIC ACTIVITIES: CPT | Mod: GP

## 2024-11-05 PROCEDURE — 2500000002 HC RX 250 W HCPCS SELF ADMINISTERED DRUGS (ALT 637 FOR MEDICARE OP, ALT 636 FOR OP/ED): Performed by: NURSE PRACTITIONER

## 2024-11-05 RX ORDER — POTASSIUM CHLORIDE 20 MEQ/1
20 TABLET, EXTENDED RELEASE ORAL ONCE
Status: COMPLETED | OUTPATIENT
Start: 2024-11-05 | End: 2024-11-05

## 2024-11-05 RX ORDER — POTASSIUM CHLORIDE 20 MEQ/1
40 TABLET, EXTENDED RELEASE ORAL ONCE
Status: COMPLETED | OUTPATIENT
Start: 2024-11-05 | End: 2024-11-05

## 2024-11-05 RX ORDER — BUMETANIDE 0.25 MG/ML
2.5 INJECTION, SOLUTION INTRAMUSCULAR; INTRAVENOUS ONCE
Status: COMPLETED | OUTPATIENT
Start: 2024-11-05 | End: 2024-11-05

## 2024-11-05 ASSESSMENT — PAIN SCALES - GENERAL
PAINLEVEL_OUTOF10: 0 - NO PAIN

## 2024-11-05 ASSESSMENT — COGNITIVE AND FUNCTIONAL STATUS - GENERAL
WALKING IN HOSPITAL ROOM: A LITTLE
STANDING UP FROM CHAIR USING ARMS: A LITTLE
CLIMB 3 TO 5 STEPS WITH RAILING: A LITTLE
MOVING TO AND FROM BED TO CHAIR: A LITTLE
TURNING FROM BACK TO SIDE WHILE IN FLAT BAD: A LITTLE
MOBILITY SCORE: 19

## 2024-11-05 ASSESSMENT — PAIN - FUNCTIONAL ASSESSMENT
PAIN_FUNCTIONAL_ASSESSMENT: 0-10

## 2024-11-05 NOTE — PROGRESS NOTES
HFICU Attending Note    Principal Problem:    Cardiogenic shock (Multi)  Active Problems:    Acute on chronic heart failure with reduced ejection fraction and diastolic dysfunction    Ischemic cardiomyopathy    History of chronic atrial fibrillation    Obstructive sleep apnea    69-year-old male with ischemic cardiomyopathy, left heart catheterization showing occluded grafts with no coronary revascularization options, who was admitted with shortness of breath and fluid retention consistent with acutely decompensated heart failure.  During his treatment course he was found to have hemodynamics consistent with cardiogenic shock and inotrope therapy was initiated.  To date multiple attempts have been made to try to wean the inotrope off, but have failed due to recurrent worsening hemodynamics.    On exam today he appears stable, he is conversant without distress, JVD is noted, extremities are warm.    He is currently undergoing evaluation for advanced heart failure therapies and he is due to be presented to the advanced heart failure therapeutics committee tomorrow morning.    This critically ill patient continues to be at-risk for clinically significant deterioration / failure due to the above mentioned dysfunctional, unstable organ systems.  I have personally identified and managed all complex critical care issues to prevent aforementioned clinical deterioration.  Critical care time is spent at bedside and/or the immediate area and has included, but is not limited to, the review of diagnostic tests, labs, radiographs, serial assessments of hemodynamics, respiratory status, ventilatory management, and family updates.  Time spent in procedures and teaching are reported separately.    Critical care time: __30__ minutes     Kenisha Romero MD, MPH  Advanced Heart Failure and Transplant Cardiology  [facility]  [facility]

## 2024-11-05 NOTE — CONSULTS
Reason For Consult  Paged for dental consult.    History Of Present Illness  Fahad Meraz is a 69 y.o. male presenting with necessary pre-operative clearance.       Past Medical History  He has no past medical history on file.    Surgical History  He has a past surgical history that includes Cardiac catheterization (N/A, 10/28/2024).     Social History  He reports that he has quit smoking. His smoking use included cigarettes. He has never used smokeless tobacco. No history on file for alcohol use and drug use.    Family History  No family history on file.     Allergies  Eliquis [apixaban] and Jardiance [empagliflozin]    Review of Systems  Please see encounter     Last Recorded Vitals  Blood pressure 106/72, pulse 84, temperature 35.9 °C (96.6 °F), resp. rate 17, height 1.829 m (6'), weight 93.5 kg (206 lb 2.1 oz), SpO2 98%.      Relevant Results  Interpreted By:  Rosita Beltran,   STUDY:  XR PANOREX  INDICATION:  Signs/Symptoms:LVAD / OHT evaluation  COMPARISON:  None.  ACCESSION NUMBER(S):  [id number]  ORDERING CLINICIAN:  LANDON GARCES  FINDINGS:  Single panoramic radiograph of the mandible was obtained. Edentulous patient. Punctate radiopaque densities projecting in the right aspect of the mouth of uncertain clinical significance and etiology. Bony defects projecting in the inferior aspect of the bilateral mandibular angles likely artifactual from technique. Paranasal sinuses are clear. There is no evidence of mandibular dislocation or fracture.  IMPRESSION:  Edentulous patient. Bony defects projecting in the inferior aspect of the bilateral mandibular angles likely artifactual from technique.  Signed by: Rosita Beltran 11/1/2024 10:19 AM  Dictation workstation:   [id number]     Assessment/Plan   Upon clinical examination, no abnormal findings were noted. Pt denied any mandible soreness or general pain. Pt is cleared for surgery.        I spent 10 minutes in the professional and overall care of this  patient.  Gladis Ibrahim, DMD

## 2024-11-05 NOTE — LETTER
November 8, 2024    Fahad Meraz  [address]  [facility] [address]      Dear Mr. Meraz:    Our multi-disciplinary transplant team completed a review of your medical records on 11/5/2024.  I regret to inform you that the decision was not to proceed with placing you on the [facility] ([facility]) waiting list for a Heart transplant.    Our transplant program consists of surgeons and medical doctors who provide coverage 365 days a year, 24 hours a day.     If you have any questions or concerns regarding your insurance coverage or billing issues, a  is available to speak with you.     It is important to keep us updated of any major changes in your medical condition, contact information and health insurance coverage.     Please don't hesitate to contact us at Dept: [phone] with any questions or concerns. We look forward to working with you through this process.      Sincerely,      Kenisha Romero MD MPH          The [facility] Toll-free Patient Services Line:  Your Resource for Organ Transplant Information    If you have a question regarding your own medical care, you always should call your transplant hospital first. However, for general organ transplant-related information, you should call the [facility] ([facility]) toll-free patient services line at [phone].  Anyone, including potential transplant candidates, candidates, recipients, family members, friends, living donors, and donor family members, can call this number to:    Talk about organ donation, living donation, the transplant process, the donation process, and transplant policies.  Get a free patient information kit with helpful booklets, waiting list and transplant information, and a list of all transplant hospitals.  Ask questions about the Organ Procurement and Transplantation Network (OPTN) web site (http://optn.transplant.hrsa.gov/), the [facility] Web site (http://unos.org/), or  the UNOS web site for living donors and transplant recipients. (http://www.transplantliving.org/).  Learn how [facility] and the OPTN can help you.  Talk about any concerns that you may have with a transplant hospital.    [facility] is a not-for-profit organization that provides the administrative services for the national OPTN under federal contract to the Health Resources and Services Administration (HRSA), an agency under the U.S. Department of Health and Human Services (HHS).    [facility] and the OPTN are responsible for:    Providing educational material for patients, the public, and professionals.  Raising awareness of the need for donated organs and tissue.  Writing organ transplant policy with help from transplant professionals, transplant patients, transplant candidates, donor families, living donors, and the public.  Coordinating organ procurement, matching, and placement.  Collecting information about every organ transplant and donation that occurs in the [place].    Remember, you should contact your transplant hospital directly if you have questions or concerns about your own medical care including medical records, work-up progress, and test results.    [facility] is not your transplant hospital, and staff at [facility] will not be able to transfer you to your transplant hospital, so keep your transplant hospital’s phone number handy.    However, while you research your transplant needs and learn as much as you can about transplantation and donation, we welcome your call to our toll-free patient services line at [phone].      [facility] PIL Final Rev 1-

## 2024-11-05 NOTE — COMMITTEE REVIEW
Presentation for Listing: Evaluation Date: 10/31/2024   Committee Review Date: 11/5/2024   Organ being evaluated for: Heart     Transplant Phase:  Evaluation   Transplant Status: Active     Referring Physician:   Dr. Lomas  Transplant Physician:   N/a    Primary Diagnosis:  ICM, Stage D Systolic HF/ HFrEF 10-15%    Committee Members:   Advanced Heart Failure and Transplant Cardiology Nakul Perkins; Romelia Lomas; Kenisha Romero; Olvin Drummond; Mikaela Winchester; Tyler Oropeza; Nathaniel Oswald; Lorenza Valerio   Cardiothoracic Surgery Mike Madrigal; Skyla Cobian   Financial Counseling and Assistance Services Tyler Schaefer   Intensive Care Thuy Colunga Dominique Anne   Quality Briana Liu    Herberth Reddy; Elisabet Garcia   Transplant Tyler Timmons; Carmen Israel; Vanita Moreland; Kavita Watters; Lakeshia Bonilla   [facility]       Committee Review Decision: Declined     Committee Discussion Details:   The candidate's evaluation was presented and discussed at the Heart Transplant Selection Committee meeting. After review of the candidate's diagnosis and the evaluations of the multidisciplinary team members, it was the consensus of the Selection Committee that the candidate does not meet Heart Selection Criteria at this time and is not a candidate for Heart transplant listing at our center.     Committee Recommendations:  Patient declined as Heart Transplant Candidate related to results from Evaluation. Patient had significant PAD, Elevated PSA level, Age approaching 70, as well as patient verbalized he would be more in favor of LVAD vs Transplant. Patient was approved for LVAD.     Lab Results   Component Value Date    HEPBSAB <3.1 10/31/2024         There is no immunization history on file for this patient.

## 2024-11-05 NOTE — PROGRESS NOTES
Pharmacist Pre-Transplant Candidate Screening Note     Current Facility-Administered Medications on File Prior to Visit   Medication Dose Route Frequency Provider Last Rate Last Admin    alteplase (Cathflo Activase) injection 2 mg  2 mg intra-catheter PRN BARBARA De        aspirin EC tablet 81 mg  81 mg oral Daily BARBARA De   81 mg at 11/04/24 0831    bisacodyl (Dulcolax) suppository 10 mg  10 mg rectal Daily PRN BARBARA Alberto   10 mg at 10/28/24 1232    cholecalciferol (Vitamin D-3) capsule 50,000 Units  50,000 Units oral Every Sunday BARBARA De   50,000 Units at 11/03/24 0854    escitalopram (Lexapro) tablet 10 mg  10 mg oral Daily BARBARA De   10 mg at 11/04/24 0834    [COMPLETED] fentaNYL PF (Sublimaze) injection 25 mcg  25 mcg intravenous Once BARBARA Morrison   25 mcg at 11/04/24 1600    [COMPLETED] fentaNYL PF (Sublimaze) injection 50 mcg  50 mcg intravenous Once BARBARA Morrison   50 mcg at 11/04/24 1600    heparin 25,000 Units in dextrose 5% 250 mL (100 Units/mL) infusion (premix)  0-4,000 Units/hr intravenous Continuous BARBARA Morrison 24 mL/hr at 11/05/24 0600 2,400 Units/hr at 11/05/24 0600    hydrALAZINE (Apresoline) tablet 100 mg  100 mg oral TID BARBARA Morrison   100 mg at 11/04/24 2003    isosorbide dinitrate (Isordil) tablet 20 mg  20 mg oral TID BARBARA Alberto   20 mg at 11/04/24 1908    magnesium oxide (Mag-Ox) tablet 800 mg  800 mg oral BID BARBARA De   800 mg at 11/04/24 2003    [Held by provider] metoprolol succinate XL (Toprol-XL) 24 hr tablet 50 mg  50 mg oral Daily BARBARA De        [COMPLETED] midazolam (Versed) injection 2 mg  2 mg intravenous Once BARBARA Morrison   2 mg at 11/04/24 1557    milrinone (Primacor) 20 mg in dextrose 5% 100 mL (0.2 mg/mL) infusion (premix)  0.125 mcg/kg/min intravenous Continuous BARBARA Taylor 3.47  mL/hr at 11/05/24 0600 0.125 mcg/kg/min at 11/05/24 0600    nitroglycerin (Nitrostat) SL tablet 0.4 mg  0.4 mg sublingual q5 min PRN BARBARA De        oxygen (O2) therapy   inhalation Continuous PRN - O2/gases BARBARA De        pantoprazole (ProtoNix) EC tablet 40 mg  40 mg oral Daily before breakfast BARBARA De   40 mg at 11/03/24 0656    perflutren protein A microsphere (Optison) injection 0.5 mL  0.5 mL intravenous Once in imaging BARBARA Alberto        polyethylene glycol (Glycolax, Miralax) packet 17 g  17 g oral Daily PRN BARBARA De        potassium chloride CR (Klor-Con M20) ER tablet 20 mEq  20 mEq oral Once BARBARA Morrison        sennosides-docusate sodium (Romina-Colace) 8.6-50 mg per tablet 2 tablet  2 tablet oral BID BARBARA De   2 tablet at 11/03/24 0855    spironolactone (Aldactone) tablet 25 mg  25 mg oral Daily BARBARA De   25 mg at 11/04/24 0831    sulfur hexafluoride microsphr (Lumason) injection 24.28 mg  2 mL intravenous Once in imaging BARBARA Alberto         Current Outpatient Medications on File Prior to Visit   Medication Sig Dispense Refill    aspirin 81 mg EC tablet Take 1 tablet (81 mg) by mouth once daily.      bumetanide (Bumex) 1 mg tablet Take 0.5 tablets (0.5 mg) by mouth once daily.      cholecalciferol (Vitamin D-3) 50,000 unit capsule Take 1 capsule (50,000 Units) by mouth 1 (one) time per week.      escitalopram (Lexapro) 10 mg tablet Take 1 tablet (10 mg) by mouth once daily.      hydrALAZINE (Apresoline) 25 mg tablet Take 1 tablet (25 mg) by mouth 2 times a day.      isosorbide mononitrate ER (Imdur) 120 mg 24 hr tablet Take 1 tablet (120 mg) by mouth once daily. Do not crush or chew.      metoprolol succinate XL (Toprol-XL) 50 mg 24 hr tablet Take 1 tablet (50 mg) by mouth once daily. Do not crush or chew.      multivitamin tablet Take 1 tablet by mouth once daily.      nitroglycerin  (Nitrostat) 0.4 mg SL tablet Place 1 tablet (0.4 mg) under the tongue every 5 minutes if needed for chest pain.      pantoprazole (ProtoNix) 40 mg EC tablet Take 1 tablet (40 mg) by mouth once daily in the morning. Take before meals. Do not crush, chew, or split.      sacubitriL-valsartan (Entresto)  mg tablet Take 1 tablet by mouth 2 times a day.      spironolactone (Aldactone) 25 mg tablet Take 1 tablet (25 mg) by mouth once daily.      warfarin (Coumadin) 5 mg tablet Take 1 tablet (5 mg) by mouth once daily in the evening. Take as directed per After Visit Summary.         The patient's reported medications have been reviewed. Based on the above medication list, there are no pharmacologic contraindications to heart transplantation.    Maureen Walls, PharmD, BCTXP  Clinical Pharmacy Specialist - Solid Organ Transplant

## 2024-11-05 NOTE — PROGRESS NOTES
HFICU Attending Note    Principal Problem:    Cardiogenic shock (Multi)  Active Problems:    Acute on chronic heart failure with reduced ejection fraction and diastolic dysfunction    Ischemic cardiomyopathy    History of chronic atrial fibrillation    Obstructive sleep apnea    69WM with Stage D HFrEF now stabilized with inotrope infusion. He has biventricular dyfunction. His case was presented to the Advanced heart failure therapeutics committee and he was found to possibly be a candidate for LVAD. Surgical evaluation for this ongoing.     On exam he is conversant and interactive, no LE edema, RRR.    Supportive care and evaluation for LVAD ongoing.    This critically ill patient continues to be at-risk for clinically significant deterioration / failure due to the above mentioned dysfunctional, unstable organ systems.  I have personally identified and managed all complex critical care issues to prevent aforementioned clinical deterioration.  Critical care time is spent at bedside and/or the immediate area and has included, but is not limited to, the review of diagnostic tests, labs, radiographs, serial assessments of hemodynamics, respiratory status, ventilatory management, and family updates.  Time spent in procedures and teaching are reported separately.    Critical care time: __35__ minutes     Kenisha Romero MD, MPH  Advanced Heart Failure and Transplant Cardiology  [facility]  [facility]

## 2024-11-05 NOTE — PROGRESS NOTES
Physical Therapy    Physical Therapy Treatment    Patient Name: Fahad Meraz  MRN: [id number]  Department: [facility] HFICU  Room: 11/11-A  Today's Date: 11/5/2024  Time Calculation  Start Time: 1424  Stop Time: 1448  Time Calculation (min): 24 min    Assessment/Plan   PT Assessment  Barriers to Discharge: medical acuity  End of Session Communication: Bedside nurse  End of Session Patient Position: Up in chair, Alarm off, not on at start of session     PT Plan  Treatment/Interventions: Bed mobility, Transfer training, Gait training, Stair training, Balance training, Strengthening, Endurance training, Range of motion, Therapeutic exercise, Therapeutic activity  PT Plan: Ongoing PT  PT Frequency: 4 times per week  PT Discharge Recommendations: Other (comment) (outpatient cardiac rehab)  PT Recommended Transfer Status: Contact guard  PT - OK to Discharge: Yes    General Visit Information:   PT  Visit  PT Received On: 11/05/24  Response to Previous Treatment: Patient with no complaints from previous session.  General  Family/Caregiver Present: Yes  Caregiver Feedback: Family present and supportive  Prior to Session Communication: Bedside nurse  Patient Position Received: Alarm off, not on at start of session, Up in chair  General Comment: Pt pleasant and cooperative with therapy. Motivated and did well with activity tolerance.    Subjective     Precautions:  Precautions  Medical Precautions: Cardiac precautions, Fall precautions    Objective     Lines/Tubes:   Telemetry   Heparin gtt   Milrinone .125 mcg     Vital Signs     Vitals Session Pre PT During PT Post PT   Heart Rate 81 120s 106   BP  150/81      Pain:  Pain Assessment  Pain Assessment: 0-10  0-10 (Numeric) Pain Score: 0 - No pain    Cognition:  Cognition  Overall Cognitive Status: Within Functional Limits  Arousal/Alertness: Appropriate responses to stimuli  Orientation Level: Oriented X4  Following Commands: Follows all commands and directions without  difficulty    Activity Tolerance:  Activity Tolerance  Early Mobility/Exercise Safety Screen: Proceed with mobilization - No exclusion criteria met    Treatments:  Therapeutic Exercise  Therapeutic Exercise Performed: Yes  Therapeutic Exercise Activity 1: Pt completed air squats x 10 reps x 2 sets with 1 minute standing rest break in between trials with cues for paired breathing. No UE support with supervision.    Ambulation/Gait Training  Ambulation/Gait Training Performed: Yes  Ambulation/Gait Training 1  Surface 1: Level tile  Device 1: No device  Assistance 1: Close supervision  Quality of Gait 1: Forward flexed posture (quick tommy)  Comments/Distance (ft) 1: 60 ft; 6MWT; 150 ft  Transfers  Transfer: Yes  Transfer 1  Transfer From 1: Sit to  Transfer to 1: Stand  Technique 1: Sit to stand  Transfer Level of Assistance 1: Close supervision  Trials/Comments 1: pt does not require use of UEs to complete transfer; x 3 trials  Transfers 2  Transfer From 2: Stand to  Transfer to 2: Sit  Technique 2: Stand to sit  Transfer Level of Assistance 2: Close supervision  Trials/Comments 2: x 3 trials; does not require use of UEs to complete transfer    Outcome Measures:  [facility] Basic Mobility  Turning from your back to your side while in a flat bed without using bedrails: None  Moving from lying on your back to sitting on the side of a flat bed without using bedrails: A little  Moving to and from bed to chair (including a wheelchair): A little  Standing up from a chair using your arms (e.g. wheelchair or bedside chair): A little  To walk in hospital room: A little  Climbing 3-5 steps with railing: A little  Basic Mobility - Total Score: 19    Confusion Assessment Method-ICU (CAM-ICU)  Feature 1: Acute Onset or Fluctuating Course: Negative  Overall CAM-ICU: Negative    FSS-ICU  Ambulation: Walks >/ or equal to 150 feet with supervision  Rolling: Complete independence  Sitting: Complete independence  Transfer Sit-to-Stand:  Supervision or set-up only  Transfer Supine-to-Sit: Complete independence  Total Score: 31    Early Mobility/Exercise Safety Screen: Proceed with mobilization - No exclusion criteria met  ICU Mobility Scale: Walking with assistance of 1 person [8]  E = Exercise and Early Mobility  Early Mobility/Exercise Safety Screen: Proceed with mobilization - No exclusion criteria met  ICU Mobility Scale: Walking with assistance of 1 person    Other Measures  6 min Walk Test: Completed in 6MWT in 85 ft hallway with no rest breaks and no distractions with no assistive device- ambulated total of 1,232 ft. RPD majority of the time 1/10, but peaked at 2/10 just after completion. Increase in PVCs with ambulation, but no symptoms.    Education Documentation  Mobility Training, taught by Amanda Bain PT at 11/5/2024  5:16 PM.  Learner: Patient  Readiness: Acceptance  Method: Explanation  Response: Verbalizes Understanding  Comment: mobility precautions    Education Comments  No comments found.      Encounter Problems       Encounter Problems (Active)       Balance       Pt will score >45 on VEGA for safe community ambulation (Progressing)       Start:  10/29/24    Expected End:  11/12/24               Mobility       Patient will ambulate >1000 ft with LRAD and supervision With stable vitals and RPD </= 3/10 and RPE </= 13/20 for improved functional mobility.   (Met)       Start:  10/29/24    Expected End:  11/12/24    Resolved:  11/05/24    Updated to: Patient will ambulate >2500 ft with LRAD and supervision With stable vitals and RPD </= 3/10 and RPE </= 13/20 for improved functional mobility.    Update reason: Progression         Pt will complete 6MWT with no assistive device and supervision and achieve >700 ft With stable vitals and RPD </= 3/10 and RPE </= 13/20 for improved functional mobility.   (Met)       Start:  10/29/24    Expected End:  11/12/24    Resolved:  11/05/24    Updated to: Pt will complete 6MWT with no  assistive device and supervision and achieve >1300 ft With stable vitals and RPD </= 3/10 and RPE </= 13/20 for improved functional mobility.    Update reason: Progression         Patient will ambulate >2500 ft with LRAD and supervision With stable vitals and RPD </= 3/10 and RPE </= 13/20 for improved functional mobility. (Progressing)       Start:  11/05/24    Expected End:  11/12/24                Pt will complete 6MWT with no assistive device and supervision and achieve >1300 ft With stable vitals and RPD </= 3/10 and RPE </= 13/20 for improved functional mobility. (Progressing)       Start:  11/05/24    Expected End:  11/12/24                   PT Transfers       Patient will perform bed mobility indep (maintenance 2/2 prolonged hospital stay anticipated) (Progressing)       Start:  10/29/24    Expected End:  11/12/24            Patient will transfer sit to and from stand indep (Progressing)       Start:  10/29/24    Expected End:  11/12/24            Pt will complete 5XSTS from recliner without UE assist <12 seconds With stable vitals and RPD </= 3/10 and RPE </= 13/20 for improved functional mobility.   (Progressing)       Start:  10/29/24    Expected End:  11/12/24               Pain - Adult            Signed by Amanda Bain DPT

## 2024-11-05 NOTE — LETTER
November 5, 2024    Fahad Meraz  [address]  [facility] [id number]      Dear Mr. Meraz:    Your evaluation for transplantation was completed by our multi-disciplinary transplant team on 11/5/2024. I regret to inform you that the decision was not to proceed with placing you on the [facility] ([facility]) waiting list for a heart transplant.    Issues were identified by the committee that would jeopardize a successful transplant outcome. The reason(s) leading to this decision are:    Significant PAD which increase risk of transplant surgery/outcomes.  Overall Age - Risks of transplantation/immunosuppression.  Patient choice- Voiced more in favor of LVAD vs Transplant due to quality of life.     A copy of this letter has been sent to your healthcare providers so they are also aware of our decision.  Please be in touch with your healthcare provider to make arrangements for your ongoing care.     If you have any questions regarding the decision, please call us at [phone].         Sincerely,      Kenisha Romero MD MPH    CC: No Recipients

## 2024-11-05 NOTE — PROGRESS NOTES
[facility]  HFICU PROGRESS NOTE    Fahad Meraz/[id number]    Admit Date: 10/24/2024  Hospital Length of Stay: 12   ICU Length of Stay: 11d 15h   Primary Service: HFICU   Primary HF Cardiologist: Dr. Washington  Referring: Dr Washington     INTERVAL EVENTS / PERTINENT ROS:     No acute events overnight. Patient discussed in advanced therapies committee.     Plan:  - ECHO to assess RV function   - Patient not approved for transplant, contingent for LVAD but needs to be seen by CTS first    MEDICATIONS  Infusions:  heparin, Last Rate: 2,400 Units/hr (11/05/24 0600)  milrinone, Last Rate: 0.125 mcg/kg/min (11/05/24 0600)      Scheduled:  aspirin, 81 mg, Daily  cholecalciferol, 50,000 Units, Every Sunday  escitalopram, 10 mg, Daily  hydrALAZINE, 100 mg, TID  isosorbide dinitrate, 20 mg, TID  magnesium oxide, 800 mg, BID  [Held by provider] metoprolol succinate XL, 50 mg, Daily  pantoprazole, 40 mg, Daily before breakfast  perflutren protein A microsphere, 0.5 mL, Once in imaging  sennosides-docusate sodium, 2 tablet, BID  spironolactone, 25 mg, Daily  sulfur hexafluoride microsphr, 2 mL, Once in imaging      PRN:  alteplase, 2 mg, PRN  bisacodyl, 10 mg, Daily PRN  nitroglycerin, 0.4 mg, q5 min PRN  oxygen, , Continuous PRN - O2/gases  polyethylene glycol, 17 g, Daily PRN      Invasive Hemodynamics:    Most Recent Range Past 24hrs   BP (Art) 111/62 No data recorded   MAP(Art) 78 mmHg No data recorded   RA/CVP   No data recorded   PA 33/20 PAP  Min: 28/13  Max: 53/33   PA(mean) 25 mmHg PAP (Mean)  Min: 20 mmHg  Max: 41 mmHg   PCWP 14 mmHg PCWP (mmHg)  Min: 12 mmHg  Max: 14 mmHg   CO 5 L/min CO (L/min)  Min: 5 L/min  Max: 6 L/min   CI 2.3 L/min/m2 CI (L/min/m2)  Min: 2.3 L/min/m2  Max: 2.8 L/min/m2   Mixed Venous 71 % SVO2 (%)  Min: 71 %  Max: 72 %   SVR  973 (dyne*sec)/cm5 SVR (dyne*sec)/cm5  Min: 948 (dyne*sec)/cm5  Max: 973 (dyne*sec)/cm5    (dyne*sec)/cm5 PVR (dyne*sec)/cm5  Min: 153  (dyne*sec)/cm5  Max: 187 (dyne*sec)/cm5     PHYSICAL EXAM:   Visit Vitals  /72   Pulse 84   Temp 35.9 °C (96.6 °F)   Resp 17   Ht 1.829 m (6')   Wt 93.5 kg (206 lb 2.1 oz)   SpO2 98%   BMI 27.96 kg/m²   Smoking Status Former   BSA 2.18 m²       Wt Readings from Last 5 Encounters:   11/04/24 93.5 kg (206 lb 2.1 oz)   10/24/24 92.5 kg (204 lb)   08/05/24 122 kg (268 lb)   01/31/22 119 kg (262 lb)   02/18/20 123 kg (270 lb 2 oz)       INTAKE/OUTPUT:  I/O last 3 completed shifts:  In: 4099.5 (43.8 mL/kg) [P.O.:3240; I.V.:859.5 (9.2 mL/kg)]  Out: 2575 (27.5 mL/kg) [Urine:2575 (0.8 mL/kg/hr)]  Weight: 93.5 kg      Physical Exam  HENT:      Head: Normocephalic.      Nose: Nose normal.      Mouth/Throat:      Mouth: Mucous membranes are moist.   Eyes:      Pupils: Pupils are equal, round, and reactive to light.   Cardiovascular:      Rate and Rhythm: Normal rate. Rhythm irregular.      Heart sounds: Murmur (holosystolic) heard.      Comments: Permanent Atrial Fibrillation  Pulmonary:      Effort: Pulmonary effort is normal.      Breath sounds: Normal breath sounds.      Comments: Rt subclavian SGC  Abdominal:      Palpations: Abdomen is soft.   Musculoskeletal:         General: Normal range of motion.      Cervical back: Normal range of motion.   Skin:     Capillary Refill: Capillary refill takes less than 2 seconds.   Neurological:      Mental Status: He is alert and oriented to person, place, and time.   Psychiatric:         Mood and Affect: Mood normal.         Behavior: Behavior normal.         Thought Content: Thought content normal.         Judgment: Judgment normal.         DATA:  CMP:  Results from last 7 days   Lab Units 11/05/24  0522 11/04/24  0419 11/03/24  0242 11/02/24  1658 11/02/24  0506 11/01/24  1533 11/01/24  0317 10/31/24  1249 10/31/24  0509 10/30/24  0131   SODIUM mmol/L 137 132* 133* 131* 134* 133* 134* 134* 135* 133*   POTASSIUM mmol/L 3.9 4.5 4.4 4.9 4.4 4.3 4.3 4.1 4.9 4.0   CHLORIDE mmol/L  "101 95* 97* 94* 99 101 101 100 101 102   CO2 mmol/L 28 26 23 24 24 21 23 24 25 23   ANION GAP mmol/L 12 16 17 18 15 15 14 14 14 12   BUN mg/dL 24* 31* 42* 38* 34* 28* 29* 29* 30* 33*   CREATININE mg/dL 1.15 1.16 1.41* 1.56* 1.45* 1.24 1.30 1.29 1.35* 1.43*   EGFR mL/min/1.73m*2 69 68 54* 48* 52* 63 59* 60* 57* 53*   MAGNESIUM mg/dL 2.26 2.25 2.22 2.06 2.07 1.96 2.11  --  2.37 2.27   ALBUMIN g/dL 3.9 4.3 4.1 4.5 4.1 4.1 4.1 4.3 4.1 4.0   ALT U/L  --   --   --   --   --   --   --  14  --   --    AST U/L  --   --   --   --   --   --   --  16  --   --    BILIRUBIN TOTAL mg/dL  --   --   --   --   --   --   --  1.4*  --   --      CBC:  Results from last 7 days   Lab Units 11/05/24  0522 11/04/24 0419 11/03/24 0242 11/02/24  0506 11/01/24  0317 10/31/24  0509 10/30/24  0131   WBC AUTO x10*3/uL 7.7 8.6 8.7 10.8 11.6* 10.9 11.1   HEMOGLOBIN g/dL 13.2* 13.8 13.4* 14.0 13.9 14.5 14.7   HEMATOCRIT % 39.8* 40.6* 41.4 39.4* 41.6 43.6 44.8   PLATELETS AUTO x10*3/uL 532* 584* 504* 562* 511* 527* 509*   MCV fL 82 81 85 78* 81 82 82     COAG:   Results from last 7 days   Lab Units 11/03/24 0242 11/02/24  0506 11/01/24  0317 10/31/24  1249 10/31/24  0509 10/30/24  0131   INR  1.4* 1.3* 1.3* 1.3* 1.2* 1.3*     ABO:   No results found for: \"ABO\"    HEME/ENDO:  Results from last 7 days   Lab Units 10/31/24  1249   TSH mIU/L 4.39*      CARDIAC:   Results from last 7 days   Lab Units 10/31/24  1249   LD U/L 187   BNP pg/mL 755*       ASSESSMENT AND PLAN:     Mr. Meraz is a 69 M with a PMHx sig for CAD s/p 4V CABG (2012) and PCI (LCx/OM; 5/2019), stage D systolic HF/ICM/HFrEF with LVEF 10-15%( 10/22/24 TTE), AF s/p RFA (PVI and CTI; 4/2024) on OAC therapy, HTN, dyslipidemia, depression, anxiety and VIV using CPAP who was admitted to OSH ICU, s/p RHC on 10/18/24.  Per patient, he had been experienced worsening SOB and fluids overload for 2-3 weeks. Patient was on milrinone drip and diurese with SG guided. However his KENYA worsening, and " not able to wean milrinone drip. Decision was made to transfer him to HF ICU [facility] for possible advanced therapy consideration. 10/26: Tolerated afterload reduction with hydralazine / Isordil. 10/27: Discontinued hydralazine / Isordil and initiated 24 / 26 mg Entresto BID and tolerating. 10/28: Coronary angiogram: Right dominant system; LM: Mild 20% distal disease; LAD: Occluded with Patent LIMA; RI: Occluded; Lcx: Patent proximal Lcx stent and OM1 stent, otherwise mild non-obstructive disease; RCA: proximal 100% occlusion. Grafts: LIMA-LAD: Patent; SVG-OM, SVG-RCA: Occluded; SVG-RI: Unable to engage but likely occluded. Advanced therapies evaluation was initiated on 10/30 with plans for presentation on 11/5. Attempted to wean milrinone - currently at 0.125 mcg/kg/min and uptitrating afterload reduction as tolerated. Discussed in advanced therapeutics committee on 11/5 and was denied for transplantation, and contingent for LVAd after being seen by CTS.       Neuro:  # Hx of Depression/Anxiety  - Serial neuro and pain assessments  - PO Tylenol PRN for pain  - PT/OT Consult, OOB to chair  - CAM ICU score every shift  - Sleep/wake cycle normalization  - c/w home Lexapro and Clonazepam     # Physical Status  -Overweight:  BMI: 27.9   -Reduced Mobility: acute decompensated heart failure and ICU stay      #Substance abuse  -Alcohol abuse/Alcohol dependence: NO   -Tobacco use/Nicotine Dependence: NO      Cardiovascular:  # Stage D, ICM, decompensated acute on chronic heart failure,  HFrEF 10-15% ( from 10/22/24 ECHO)   #Hx of CAD s/p 4V CABG 2012   -BRIANA (3/29/23): Left Ventricle: Moderately reduced left ventricular systolic function with a visually estimated EF of 35 - 40%.  - TTE (10/22/24): LVEF 10 -15%, right ventricle moderately dilated, moderately reduced systolic function. Mild MR  - Limited ECHO ordered to reassess RV function 11/5   - admit weight (10/24): 93.5 KG -->(11/3) 91.5kg  - admit BNP (10/24):  1995  - Home  meds include Metoprolol succinate 25 mg daily, Entresto 97/103 mg bid, Hydralazine 25 mg bid, Isosorbide mononitrate 120 mg daily, Spironolactone 25 mg daily, Bumex 0.5 mg daily----allergy to SGLT2.  - ASA  - Reduce milrinone 0.25mcg/kg/min (11/3)  - SGC # on arrival (10/25): /80 (89), CVP 11, PA 36/20 (26), PCWP 17, CO/CI 5.9/2.7, , SVO2 73% on Milrinone drip at 0.375 mcg/kg/min, hydral 50 mg TID.   - Daily hemodynamics: 11/5: MAP 76, CVP 10, PAP: 38/18 (25); SVR: 973 CO: 5.8/ CI: 2.7 ESTEBAN CO: 5/ CI: 2.3 THERMO; SvO2: 71% on IV Milrinone 0.125 mcg/kg/min; Hydralazine 100 mg PO TID; Isordil 20mg PO TID  - C/w milrinone 0.125 mcg/kg/min  - Holding diuresis   - c/w hydralazine 100 mg TID  - c/w Isordil to 20 mg TID   - Discontinue dapagliflozin 10 mg daily in preparation for AHF therapies (LVAD / OHT)   - C/w [place] 25 mg daily    - Hold BB   - Daily standing weights, 2gm sodium diet, 2L fluid restriction, strict I&Os  - Coronary angiogram multidisciplinary discussion, including CT surgery, no revascularization options.  - Advanced Heart Failure Therapies Evaluation initiated 10/30/2024  - Discussed in advanced therapeutics committee on 11/5 and was denied for transplantation, and contingent for LVAd after being seen by CTS    #CAD   -[facility] (12/20/2020):  Successful PCI of the SVG to ramus intermedius with one drug-eluting stent; OCT of the SVG to RCA to confirm no thrombosis; patent LAD; occluded SVG to OM.  -Meds: C/w ASA.     # Hx of AFib s/p RFA (PVI and CTI; 4/2024)  -NO device noted  -Was on home Coumadin for 2 months, prior to that was on Eliquis,  patient reported with Dizziness while on Eliquis      # Dizziness/bradycardia  -Had 3 episodes of near syncope after his recent AF RFA; has stopped driving as a result. ECG with sinus olu. Metoprolol previously decreased by his local EP.    -Hold BB due to decompensated HF      #Electrolyte Disturbances  -Keep K>4, Mag >2     Pulmonary:   # PMH of  VIV  -c/w home CPAP  -Monitor and maintain SpO2 > 92%     GI:  # H/x of  GERD  - Colonoscopy (11/4) - removed 1 polyp pending biopsy  - Bowel regimen: Senna, Miralax PRN  - PPI     #  Bilirubinemia  - T maryuri (10/24): 2.3, ALT, AST: normal   - CT C/A/P (10/17/24): Hepatomegaly and hepatic steatosis are present. The liver has a lobulated contour. A questionable lesion in the right hepatic lobe inferiorly measures 26 mm.  - CT Liver w/ contrast (11/2) Incidental findings include pancreatic and renal simple and complex attenuation lesions as well     # Urinary retention  - CT C/A/P (10/17/24): the prostate gland is mildly enlarged with mass effect on the posterior wall of the urinary bladder. The prostate gland measures 4.3 x 6.0 cm.   - C/o urinary difficulty, s/p FC OSH --> Removed prior admission      :  #KENYA/ CKD ?  in the setting of cardio-renal syndrome  -Baseline BUN/Cr: Unclear,  OSH ICU: Creatinine: 1.3-1.6   -Admit BUN/Cr (10/24): 28/1.88  -Daily BUN/Creat: 11/4 31 / 1.16  -I/Os  -avoid hypotension and nephrotoxic agents     Heme:  #Anemia in the setting of iron deficiency  - Lab (10/24): H/H: 17.5/50.8,  Iron study: 63/374/17%, Ferritin: 76,  folate: 23.5, B12: 768   - NO iron replacement needed as H/H high     #Coagulopathy due to on home Coumadin  - INR  (10/30): 1.3, Hold home Coumadin   - C/w Heparin gtt      Endo:  #DM  - Euglycemic  - hgbA1c (10/24):  6.3     #Thyroid  -TSH (10/24):  8.47, T4: 1.44      ID:  # leukocytosis - Resolved  - WBC (10/26):  9.6   - Afebrile, nontoxic   - Trend temps q4h       PHYSICAL AND OCCUPATIONAL THERAPY: Ordered    LINES:  PIVs   A-line (OSH) 10/20 - current  Right Subclavian Cordis / PA catheter (OSH) 10/18 - 10/28  Right subclavian cordis: Previously placed swan sheath was exchanged to a new swan sheath over a short Jwire and was sutured in place. 10/28 - current    DVT: Heparin gtt   VAP BUNDLE: NA  CENTRAL LINE BUNDLE: Ordered   ULCER PPX: Pantoprazole 40 mg  daily  GLYCEMIC CONTROL: NA - A1C: 6.3  BOWEL CARE: Senna / Miralax  INDWELLING CATHETER: NA  NUTRITION: Adult diet Regular; 2000 mL fluid      EMERGENCY CONTACT: Extended Emergency Contact Information  Primary Emergency Contact: Tanvi Meraz  Home Phone: [phone]  Relation: Spouse  Secondary Emergency Contact: KtSubhash  Mobile Phone: [phone]  Relation: Son  FAMILY UPDATE: Family at bedside   CODE STATUS: Full Code  DISPO: Maintain in the HFICU    Patient seen and assessed with Dr. Romero    I personally spent 60 minutes of critical care time directly and personally managing the patient exclusive of separately billable procedures   _________________________________________________  Thuy Colunga, APRN-CNP

## 2024-11-06 ENCOUNTER — DOCUMENTATION (OUTPATIENT)
Dept: TRANSPLANT | Facility: HOSPITAL | Age: 69
End: 2024-11-06
Payer: MEDICARE

## 2024-11-06 ENCOUNTER — APPOINTMENT (OUTPATIENT)
Dept: RADIOLOGY | Facility: HOSPITAL | Age: 69
DRG: 001 | End: 2024-11-06
Payer: MEDICARE

## 2024-11-06 PROBLEM — Z79.899 RECEIVING INOTROPIC MEDICATION: Status: ACTIVE | Noted: 2024-11-06

## 2024-11-06 PROBLEM — R57.0 CARDIOGENIC SHOCK (MULTI): Status: RESOLVED | Noted: 2024-11-04 | Resolved: 2024-11-06

## 2024-11-06 LAB
ALBUMIN SERPL BCP-MCNC: 4 G/DL (ref 3.4–5)
ALBUMIN SERPL BCP-MCNC: 4.2 G/DL (ref 3.4–5)
ANION GAP BLDMV CALCULATED.4IONS-SCNC: 10 MMO/L (ref 10–25)
ANION GAP BLDMV CALCULATED.4IONS-SCNC: 8 MMO/L (ref 10–25)
ANION GAP SERPL CALC-SCNC: 14 MMOL/L (ref 10–20)
ANION GAP SERPL CALC-SCNC: 16 MMOL/L (ref 10–20)
BASE EXCESS BLDMV CALC-SCNC: 1 MMOL/L (ref -2–3)
BASE EXCESS BLDMV CALC-SCNC: 1.2 MMOL/L (ref -2–3)
BODY TEMPERATURE: 37 DEGREES CELSIUS
BODY TEMPERATURE: 37 DEGREES CELSIUS
BUN SERPL-MCNC: 24 MG/DL (ref 6–23)
BUN SERPL-MCNC: 24 MG/DL (ref 6–23)
CA-I BLDMV-SCNC: 1.3 MMOL/L (ref 1.1–1.33)
CA-I BLDMV-SCNC: 1.31 MMOL/L (ref 1.1–1.33)
CALCIUM SERPL-MCNC: 10 MG/DL (ref 8.6–10.6)
CALCIUM SERPL-MCNC: 9.4 MG/DL (ref 8.6–10.6)
CHLORIDE BLD-SCNC: 100 MMOL/L (ref 98–107)
CHLORIDE BLD-SCNC: 101 MMOL/L (ref 98–107)
CHLORIDE SERPL-SCNC: 100 MMOL/L (ref 98–107)
CHLORIDE SERPL-SCNC: 100 MMOL/L (ref 98–107)
CO2 SERPL-SCNC: 23 MMOL/L (ref 21–32)
CO2 SERPL-SCNC: 26 MMOL/L (ref 21–32)
CREAT SERPL-MCNC: 1.32 MG/DL (ref 0.5–1.3)
CREAT SERPL-MCNC: 1.33 MG/DL (ref 0.5–1.3)
EGFRCR SERPLBLD CKD-EPI 2021: 58 ML/MIN/1.73M*2
EGFRCR SERPLBLD CKD-EPI 2021: 58 ML/MIN/1.73M*2
ERYTHROCYTE [DISTWIDTH] IN BLOOD BY AUTOMATED COUNT: 16.6 % (ref 11.5–14.5)
GLUCOSE BLD-MCNC: 100 MG/DL (ref 74–99)
GLUCOSE BLD-MCNC: 122 MG/DL (ref 74–99)
GLUCOSE SERPL-MCNC: 119 MG/DL (ref 74–99)
GLUCOSE SERPL-MCNC: 91 MG/DL (ref 74–99)
HCO3 BLDMV-SCNC: 26 MMOL/L (ref 22–26)
HCO3 BLDMV-SCNC: 26.6 MMOL/L (ref 22–26)
HCT VFR BLD AUTO: 40.1 % (ref 41–52)
HCT VFR BLD EST: 41 % (ref 41–52)
HCT VFR BLD EST: 42 % (ref 41–52)
HGB BLD-MCNC: 13.2 G/DL (ref 13.5–17.5)
HGB BLDMV-MCNC: 13.7 G/DL (ref 13.5–17.5)
HGB BLDMV-MCNC: 14.1 G/DL (ref 13.5–17.5)
INHALED O2 CONCENTRATION: 21 %
INHALED O2 CONCENTRATION: 21 %
LACTATE BLDMV-SCNC: 0.7 MMOL/L (ref 0.4–2)
LACTATE BLDMV-SCNC: 0.8 MMOL/L (ref 0.4–2)
MAGNESIUM SERPL-MCNC: 2.04 MG/DL (ref 1.6–2.4)
MCH RBC QN AUTO: 27.3 PG (ref 26–34)
MCHC RBC AUTO-ENTMCNC: 32.9 G/DL (ref 32–36)
MCV RBC AUTO: 83 FL (ref 80–100)
NRBC BLD-RTO: 0 /100 WBCS (ref 0–0)
OXYHGB MFR BLDMV: 63.1 % (ref 45–75)
OXYHGB MFR BLDMV: 69.5 % (ref 45–75)
PCO2 BLDMV: 41 MM HG (ref 41–51)
PCO2 BLDMV: 45 MM HG (ref 41–51)
PH BLDMV: 7.38 PH (ref 7.33–7.43)
PH BLDMV: 7.41 PH (ref 7.33–7.43)
PHOSPHATE SERPL-MCNC: 3.7 MG/DL (ref 2.5–4.9)
PHOSPHATE SERPL-MCNC: 3.8 MG/DL (ref 2.5–4.9)
PLATELET # BLD AUTO: 509 X10*3/UL (ref 150–450)
PO2 BLDMV: 39 MM HG (ref 35–45)
PO2 BLDMV: 45 MM HG (ref 35–45)
POTASSIUM BLDMV-SCNC: 4.5 MMOL/L (ref 3.5–5.3)
POTASSIUM BLDMV-SCNC: 4.8 MMOL/L (ref 3.5–5.3)
POTASSIUM SERPL-SCNC: 4.3 MMOL/L (ref 3.5–5.3)
POTASSIUM SERPL-SCNC: 4.5 MMOL/L (ref 3.5–5.3)
RBC # BLD AUTO: 4.84 X10*6/UL (ref 4.5–5.9)
SAO2 % BLDMV: 65 % (ref 45–75)
SAO2 % BLDMV: 71 % (ref 45–75)
SODIUM BLDMV-SCNC: 131 MMOL/L (ref 136–145)
SODIUM BLDMV-SCNC: 131 MMOL/L (ref 136–145)
SODIUM SERPL-SCNC: 134 MMOL/L (ref 136–145)
SODIUM SERPL-SCNC: 136 MMOL/L (ref 136–145)
UFH PPP CHRO-ACNC: 0.5 IU/ML
WBC # BLD AUTO: 8.5 X10*3/UL (ref 4.4–11.3)

## 2024-11-06 PROCEDURE — 2500000004 HC RX 250 GENERAL PHARMACY W/ HCPCS (ALT 636 FOR OP/ED): Performed by: NURSE PRACTITIONER

## 2024-11-06 PROCEDURE — 2500000004 HC RX 250 GENERAL PHARMACY W/ HCPCS (ALT 636 FOR OP/ED)

## 2024-11-06 PROCEDURE — 82435 ASSAY OF BLOOD CHLORIDE: CPT

## 2024-11-06 PROCEDURE — 84132 ASSAY OF SERUM POTASSIUM: CPT

## 2024-11-06 PROCEDURE — 84132 ASSAY OF SERUM POTASSIUM: CPT | Performed by: NURSE PRACTITIONER

## 2024-11-06 PROCEDURE — 83735 ASSAY OF MAGNESIUM: CPT

## 2024-11-06 PROCEDURE — 71045 X-RAY EXAM CHEST 1 VIEW: CPT

## 2024-11-06 PROCEDURE — 97116 GAIT TRAINING THERAPY: CPT | Mod: GP

## 2024-11-06 PROCEDURE — 2500000001 HC RX 250 WO HCPCS SELF ADMINISTERED DRUGS (ALT 637 FOR MEDICARE OP)

## 2024-11-06 PROCEDURE — 85027 COMPLETE CBC AUTOMATED: CPT

## 2024-11-06 PROCEDURE — 97530 THERAPEUTIC ACTIVITIES: CPT | Mod: GP

## 2024-11-06 PROCEDURE — 71045 X-RAY EXAM CHEST 1 VIEW: CPT | Performed by: RADIOLOGY

## 2024-11-06 PROCEDURE — 1200000002 HC GENERAL ROOM WITH TELEMETRY DAILY

## 2024-11-06 PROCEDURE — 2500000002 HC RX 250 W HCPCS SELF ADMINISTERED DRUGS (ALT 637 FOR MEDICARE OP, ALT 636 FOR OP/ED): Performed by: NURSE PRACTITIONER

## 2024-11-06 PROCEDURE — 37799 UNLISTED PX VASCULAR SURGERY: CPT | Performed by: NURSE PRACTITIONER

## 2024-11-06 PROCEDURE — 85520 HEPARIN ASSAY: CPT

## 2024-11-06 PROCEDURE — 99291 CRITICAL CARE FIRST HOUR: CPT | Performed by: INTERNAL MEDICINE

## 2024-11-06 PROCEDURE — 2500000001 HC RX 250 WO HCPCS SELF ADMINISTERED DRUGS (ALT 637 FOR MEDICARE OP): Performed by: NURSE PRACTITIONER

## 2024-11-06 PROCEDURE — 99291 CRITICAL CARE FIRST HOUR: CPT

## 2024-11-06 RX ORDER — COLCHICINE 0.6 MG/1
0.6 TABLET ORAL ONCE
Status: COMPLETED | OUTPATIENT
Start: 2024-11-06 | End: 2024-11-06

## 2024-11-06 RX ORDER — COLCHICINE 0.6 MG/1
1.2 TABLET ORAL ONCE
Status: COMPLETED | OUTPATIENT
Start: 2024-11-06 | End: 2024-11-06

## 2024-11-06 ASSESSMENT — PAIN DESCRIPTION - DESCRIPTORS
DESCRIPTORS: SHARP
DESCRIPTORS: SHARP

## 2024-11-06 ASSESSMENT — PAIN SCALES - GENERAL
PAINLEVEL_OUTOF10: 0 - NO PAIN
PAINLEVEL_OUTOF10: 4
PAINLEVEL_OUTOF10: 0 - NO PAIN
PAINLEVEL_OUTOF10: 0 - NO PAIN
PAINLEVEL_OUTOF10: 5 - MODERATE PAIN
PAINLEVEL_OUTOF10: 4
PAINLEVEL_OUTOF10: 5 - MODERATE PAIN
PAINLEVEL_OUTOF10: 0 - NO PAIN

## 2024-11-06 ASSESSMENT — COGNITIVE AND FUNCTIONAL STATUS - GENERAL
MOBILITY SCORE: 20
STANDING UP FROM CHAIR USING ARMS: A LITTLE
MOVING TO AND FROM BED TO CHAIR: A LITTLE
WALKING IN HOSPITAL ROOM: A LITTLE
CLIMB 3 TO 5 STEPS WITH RAILING: A LITTLE

## 2024-11-06 ASSESSMENT — PAIN - FUNCTIONAL ASSESSMENT
PAIN_FUNCTIONAL_ASSESSMENT: 0-10

## 2024-11-06 NOTE — PROGRESS NOTES
[facility]  HFICU PROGRESS NOTE    Fahad Meraz/[id number]    Admit Date: 10/24/2024  Hospital Length of Stay: 13   ICU Length of Stay: 12d 13h   Primary Service: HFICU   Primary HF Cardiologist: Dr. Washington  Referring: Dr Washington     INTERVAL EVENTS / PERTINENT ROS:     No acute events overnight. Dr Mclain agreeable to LVAD implantation.     Plan:  - Awaiting LVAD implant date   - Remove swan and transfer to floor     MEDICATIONS  Infusions:  heparin, Last Rate: 2,400 Units/hr (11/06/24 1000)  milrinone, Last Rate: 0.125 mcg/kg/min (11/06/24 1000)      Scheduled:  aspirin, 81 mg, Daily  cholecalciferol, 50,000 Units, Every Sunday  escitalopram, 10 mg, Daily  hydrALAZINE, 100 mg, TID  isosorbide dinitrate, 20 mg, TID  magnesium oxide, 800 mg, BID  [Held by provider] metoprolol succinate XL, 50 mg, Daily  pantoprazole, 40 mg, Daily before breakfast  perflutren protein A microsphere, 0.5 mL, Once in imaging  sennosides-docusate sodium, 2 tablet, BID  spironolactone, 25 mg, Daily  sulfur hexafluoride microsphr, 2 mL, Once in imaging      PRN:  alteplase, 2 mg, PRN  bisacodyl, 10 mg, Daily PRN  nitroglycerin, 0.4 mg, q5 min PRN  oxygen, , Continuous PRN - O2/gases  polyethylene glycol, 17 g, Daily PRN      Invasive Hemodynamics:    Most Recent Range Past 24hrs   BP (Art) 111/62 No data recorded   MAP(Art) 78 mmHg No data recorded   RA/CVP   No data recorded   PA 53/34 PAP  Min: 17/5  Max: 55/23   PA(mean) 38 mmHg PAP (Mean)  Min: 10 mmHg  Max: 39 mmHg   PCWP 12 mmHg PCWP (mmHg)  Min: 12 mmHg  Max: 14 mmHg   CO 5.6 L/min CO (L/min)  Min: 4.4 L/min  Max: 5.6 L/min   CI 2.6 L/min/m2 CI (L/min/m2)  Min: 2 L/min/m2  Max: 2.6 L/min/m2   Mixed Venous 71 % SVO2 (%)  Min: 68 %  Max: 71 %   SVR  1120 (dyne*sec)/cm5 SVR (dyne*sec)/cm5  Min: 1042 (dyne*sec)/cm5  Max: 1341 (dyne*sec)/cm5    (dyne*sec)/cm5 PVR (dyne*sec)/cm5  Min: 144 (dyne*sec)/cm5  Max: 164 (dyne*sec)/cm5     PHYSICAL  EXAM:   Visit Vitals  /76   Pulse 81   Temp 36.3 °C (97.3 °F)   Resp 15   Ht 1.829 m (6')   Wt 92.5 kg (203 lb 14.8 oz)   SpO2 100%   BMI 27.66 kg/m²   Smoking Status Former   BSA 2.17 m²       Wt Readings from Last 5 Encounters:   11/06/24 92.5 kg (203 lb 14.8 oz)   10/24/24 92.5 kg (204 lb)   08/05/24 122 kg (268 lb)   01/31/22 119 kg (262 lb)   02/18/20 123 kg (270 lb 2 oz)       INTAKE/OUTPUT:  I/O last 3 completed shifts:  In: 1003.2 (10.7 mL/kg) [I.V.:1003.2 (10.7 mL/kg)]  Out: 3125 (33.4 mL/kg) [Urine:3125 (0.9 mL/kg/hr)]  Weight: 93.5 kg      Physical Exam  HENT:      Head: Normocephalic.      Nose: Nose normal.      Mouth/Throat:      Mouth: Mucous membranes are moist.   Eyes:      Pupils: Pupils are equal, round, and reactive to light.   Cardiovascular:      Rate and Rhythm: Normal rate. Rhythm irregular.      Heart sounds: Murmur (holosystolic) heard.      Comments: Permanent Atrial Fibrillation  Pulmonary:      Effort: Pulmonary effort is normal.      Breath sounds: Normal breath sounds.      Comments: Rt subclavian SGC  Abdominal:      Palpations: Abdomen is soft.   Musculoskeletal:         General: Normal range of motion.      Cervical back: Normal range of motion.   Skin:     Capillary Refill: Capillary refill takes less than 2 seconds.   Neurological:      Mental Status: He is alert and oriented to person, place, and time.   Psychiatric:         Mood and Affect: Mood normal.         Behavior: Behavior normal.         Thought Content: Thought content normal.         Judgment: Judgment normal.         DATA:  CMP:  Results from last 7 days   Lab Units 11/06/24  0513 11/05/24  0522 11/04/24  0419 11/03/24  0242 11/02/24  1658 11/02/24  0506 11/01/24  1533 11/01/24  0317 10/31/24  1249 10/31/24  0509   SODIUM mmol/L 136 137 132* 133* 131* 134* 133* 134* 134* 135*   POTASSIUM mmol/L 4.3 3.9 4.5 4.4 4.9 4.4 4.3 4.3 4.1 4.9   CHLORIDE mmol/L 100 101 95* 97* 94* 99 101 101 100 101   CO2 mmol/L 26 28 26  "23 24 24 21 23 24 25   ANION GAP mmol/L 14 12 16 17 18 15 15 14 14 14   BUN mg/dL 24* 24* 31* 42* 38* 34* 28* 29* 29* 30*   CREATININE mg/dL 1.32* 1.15 1.16 1.41* 1.56* 1.45* 1.24 1.30 1.29 1.35*   EGFR mL/min/1.73m*2 58* 69 68 54* 48* 52* 63 59* 60* 57*   MAGNESIUM mg/dL 2.04 2.26 2.25 2.22 2.06 2.07 1.96 2.11  --  2.37   ALBUMIN g/dL 4.0 3.9 4.3 4.1 4.5 4.1 4.1 4.1 4.3 4.1   ALT U/L  --   --   --   --   --   --   --   --  14  --    AST U/L  --   --   --   --   --   --   --   --  16  --    BILIRUBIN TOTAL mg/dL  --   --   --   --   --   --   --   --  1.4*  --      CBC:  Results from last 7 days   Lab Units 11/06/24  0513 11/05/24  0522 11/04/24  0419 11/03/24 0242 11/02/24  0506 11/01/24  0317 10/31/24  0509   WBC AUTO x10*3/uL 8.5 7.7 8.6 8.7 10.8 11.6* 10.9   HEMOGLOBIN g/dL 13.2* 13.2* 13.8 13.4* 14.0 13.9 14.5   HEMATOCRIT % 40.1* 39.8* 40.6* 41.4 39.4* 41.6 43.6   PLATELETS AUTO x10*3/uL 509* 532* 584* 504* 562* 511* 527*   MCV fL 83 82 81 85 78* 81 82     COAG:   Results from last 7 days   Lab Units 11/03/24 0242 11/02/24  0506 11/01/24 0317 10/31/24  1249 10/31/24  0509   INR  1.4* 1.3* 1.3* 1.3* 1.2*     ABO:   No results found for: \"ABO\"    HEME/ENDO:  Results from last 7 days   Lab Units 10/31/24  1249   TSH mIU/L 4.39*      CARDIAC:   Results from last 7 days   Lab Units 10/31/24  1249   LD U/L 187   BNP pg/mL 755*       ASSESSMENT AND PLAN:     Mr. Meraz is a 69 M with a PMHx sig for CAD s/p 4V CABG (2012) and PCI (LCx/OM; 5/2019), stage D systolic HF/ICM/HFrEF with LVEF 10-15%( 10/22/24 TTE), AF s/p RFA (PVI and CTI; 4/2024) on OAC therapy, HTN, dyslipidemia, depression, anxiety and VIV using CPAP who was admitted to OSH ICU, s/p RHC on 10/18/24.  Per patient, he had been experienced worsening SOB and fluids overload for 2-3 weeks. Patient was on milrinone drip and diurese with SG guided. However his KENYA worsening, and not able to wean milrinone drip. Decision was made to transfer [place] HF [place] [facility] [place] possible advanced therapy consideration. 10/26: Tolerated afterload reduction with hydralazine / Isordil. 10/27: Discontinued hydralazine / Isordil and initiated 24 / 26 mg Entresto BID and tolerating. 10/28: Coronary angiogram: Right dominant system; LM: Mild 20% distal disease; LAD: Occluded with Patent LIMA; RI: Occluded; Lcx: Patent proximal Lcx stent and OM1 stent, otherwise mild non-obstructive disease; RCA: proximal 100% occlusion. Grafts: LIMA-LAD: Patent; SVG-OM, SVG-RCA: Occluded; SVG-RI: Unable to engage but likely occluded. Advanced therapies evaluation was initiated on 10/30 with plans for presentation on 11/5. Attempted to wean milrinone - currently at 0.125 mcg/kg/min and uptitrating afterload reduction as tolerated. Discussed in advanced therapeutics committee on 11/5 and was denied for transplantation, and approved for LVAD after Dr Mclain agreeable to surgery.       Neuro:  # Hx of Depression/Anxiety  - Serial neuro and pain assessments  - PO Tylenol PRN for pain  - PT/OT Consult, OOB to chair  - CAM ICU score every shift  - Sleep/wake cycle normalization  - c/w home Lexapro and Clonazepam     # Physical Status  -Overweight:  BMI: 27.9   -Reduced Mobility: acute decompensated heart failure and ICU stay      #Substance abuse  -Alcohol abuse/Alcohol dependence: NO   -Tobacco use/Nicotine Dependence: NO      Cardiovascular:  # Stage D, ICM, decompensated acute on chronic heart failure,  HFrEF 10-15% ( from 10/22/24 ECHO)   #Hx of CAD s/p 4V CABG 2012   -BRIANA (3/29/23): Left Ventricle: Moderately reduced left ventricular systolic function with a visually estimated EF of 35 - 40%.  - TTE (10/22/24): LVEF 10 -15%, right ventricle moderately dilated, moderately reduced systolic function. Mild MR  - Limited ECHO ordered to reassess RV function 11/5 - severe RVSF   - SGC # on arrival (10/25): /80 (89), CVP 11, PA 36/20 (26), PCWP 17, CO/CI 5.9/2.7, , SVO2 73% on Milrinone drip at 0.375  mcg/kg/min, hydral 50 mg TID.   - Daily hemodynamics: 11/6: MAP 84, CVP 6, PAP: 30/16 (22); PAWP 12, SVR: 1120 CO: 5.57/ CI: 2.58 ESTEBAN CO: 4.38/ CI: 2.03 THERMO; SvO2: 71% on IV Milrinone 0.125 mcg/kg/min; Hydralazine 100 mg PO TID; Isordil 20mg PO TID  - C/w milrinone 0.125 mcg/kg/min  - Holding diuresis   - c/w hydralazine 100 mg TID  - c/w Isordil to 20 mg TID    - C/w [place] 25 mg daily    - Daily standing weights, 2gm sodium diet, 2L fluid restriction, strict I&Os  - Coronary angiogram multidisciplinary discussion, including CT surgery, no revascularization options.  - Advanced Heart Failure Therapies Evaluation initiated 10/30/2024  - Discussed in advanced therapeutics committee on 11/5 and was denied for transplantation, and contingent for LVAD after being seen by CTS -> approved 11/6 per Dr Mclain agreeable to implantation     #CAD   -[facility] (12/20/2020):  Successful PCI of the SVG to ramus intermedius with one drug-eluting stent; OCT of the SVG to RCA to confirm no thrombosis; patent LAD; occluded SVG to OM.  -Meds: C/w ASA.     # Hx of AFib s/p RFA (PVI and CTI; 4/2024)  -NO device noted  -Was on home Coumadin for 2 months, prior to that was on Eliquis,  patient reported with Dizziness while on Eliquis      # Dizziness/bradycardia  -Had 3 episodes of near syncope after his recent AF RFA; has stopped driving as a result. ECG with sinus olu. Metoprolol previously decreased by his local EP.    -Hold BB due to decompensated HF      #Electrolyte Disturbances  -Keep K>4, Mag >2     Pulmonary:   # PMH of VIV  -c/w home CPAP  -Monitor and maintain SpO2 > 92%     GI:  # H/x of  GERD  - Colonoscopy (11/4) - removed 1 polyp pending biopsy  - Bowel regimen: Senna, Miralax PRN  - PPI     #  Bilirubinemia  - T maryuri (10/24): 2.3, ALT, AST: normal   - CT C/A/P (10/17/24): Hepatomegaly and hepatic steatosis are present. The liver has a lobulated contour. A questionable lesion in the right hepatic lobe inferiorly measures  26 mm.  - CT Liver w/ contrast (11/2) Incidental findings include pancreatic and renal simple and complex attenuation lesions as well     # Urinary retention  - CT C/A/P (10/17/24): the prostate gland is mildly enlarged with mass effect on the posterior wall of the urinary bladder. The prostate gland measures 4.3 x 6.0 cm.   - C/o urinary difficulty, s/p FC OSH --> Removed prior admission      :  #KENYA/ CKD ?  in the setting of cardio-renal syndrome  -Baseline BUN/Cr: Unclear,  OSH ICU: Creatinine: 1.3-1.6   -Admit BUN/Cr (10/24): 28/1.88  -I/Os  -avoid hypotension and nephrotoxic agents     Heme:  #Anemia in the setting of iron deficiency  - Lab (10/24): H/H: 17.5/50.8,  Iron study: 63/374/17%, Ferritin: 76,  folate: 23.5, B12: 768   - NO iron replacement needed as H/H high     #Coagulopathy due to on home Coumadin  - INR  (10/30): 1.3, Hold home Coumadin   - C/w Heparin gtt      Endo:  #DM  - Euglycemic  - hgbA1c (10/24):  6.3     #Thyroid  -TSH (10/24):  8.47, T4: 1.44      ID:  # leukocytosis - Resolved  - WBC (10/26):  9.6   - Afebrile, nontoxic   - Trend temps q4h       PHYSICAL AND OCCUPATIONAL THERAPY: Ordered    LINES:  PIVs   A-line (OSH) 10/20 - current  Right Subclavian Cordis / PA catheter (OSH) 10/18 - 10/28  Right subclavian cordis: Previously placed swan sheath was exchanged to a new swan sheath over a short Jwire and was sutured in place. 10/28 - current    DVT: Heparin gtt   VAP BUNDLE: NA  CENTRAL LINE BUNDLE: Ordered   ULCER PPX: Pantoprazole 40 mg daily  GLYCEMIC CONTROL: NA - A1C: 6.3  BOWEL CARE: Senna / Miralax  INDWELLING CATHETER: NA  NUTRITION: Adult diet Regular; 2000 mL fluid      EMERGENCY CONTACT: Extended Emergency Contact Information  Primary Emergency Contact: Tanvi Meraz  Home Phone: [phone]  Relation: Spouse  Secondary Emergency Contact: Subhash Meraz  Mobile Phone: [phone]  Relation: Son  FAMILY UPDATE: Family at bedside   CODE STATUS: Full Code  DISPO: Transfer to  floor     Patient seen and assessed with Dr. Romero    I personally spent 60 minutes of critical care time directly and personally managing the patient exclusive of separately billable procedures   _________________________________________________  Thuy Colunga, APRN-CNP

## 2024-11-06 NOTE — PROGRESS NOTES
Physical Therapy    Physical Therapy Treatment    Patient Name: Fahad Meraz  MRN: [id number]  Department: [facility] HFICU  Room: 11/11-A  Today's Date: 11/6/2024  Time Calculation  Start Time: 1636  Stop Time: 1703  Time Calculation (min): 27 min    Assessment/Plan   PT Assessment  Barriers to Discharge: medical acuity  End of Session Communication: Bedside nurse  End of Session Patient Position: Up in chair, Alarm off, not on at start of session     PT Plan  Treatment/Interventions: Bed mobility, Transfer training, Gait training, Stair training, Balance training, Strengthening, Endurance training, Range of motion, Therapeutic exercise, Therapeutic activity  PT Plan: Ongoing PT  PT Frequency: 4 times per week  PT Discharge Recommendations: Other (comment) (outpatient cardiac rehab)  PT Recommended Transfer Status: Contact guard  PT - OK to Discharge: Yes    General Visit Information:   PT  Visit  PT Received On: 11/06/24  Response to Previous Treatment: Patient with no complaints from previous session.  General  Family/Caregiver Present: Yes  Caregiver Feedback: Family in room and supportive  Prior to Session Communication: Bedside nurse  Patient Position Received: Up in chair, Alarm off, not on at start of session  General Comment: Pt pleasant and motivated for therapy.    Subjective     Precautions:  Precautions  Medical Precautions: Cardiac precautions    Lines/Tubes:   Telemetry   Heparin gtt   Milrinone .125 mcg     Vital Signs     Vitals Session Pre PT During PT Post PT   Heart Rate 85 90s-100s 107   Resp 13  15   SpO2 97  100   /75  129/88     Objective     Pain:  Pain Assessment  Pain Assessment: 0-10  0-10 (Numeric) Pain Score:  (pt reporting R toe gout pain- did not impact pt's participation)    Cognition:  Cognition  Overall Cognitive Status: Within Functional Limits  Arousal/Alertness: Appropriate responses to stimuli  Orientation Level: Oriented X4  Following Commands: Follows all commands and  directions without difficulty    Activity Tolerance:  Activity Tolerance  Early Mobility/Exercise Safety Screen: Proceed with mobilization - No exclusion criteria met    Treatments:     Therapeutic Activity  Therapeutic Activity Performed: Yes  Therapeutic Activity 1: Pt completed recumbent bike activity x 5 minutes at ~30-40 rpm with 5 R. Pt reporting minimal SOB with activity.    Ambulation/Gait Training  Ambulation/Gait Training Performed: Yes  Ambulation/Gait Training 1  Surface 1: Level tile  Device 1: No device  Assistance 1: Close supervision  Quality of Gait 1: Forward flexed posture, Wide base of support, Antalgic  Comments/Distance (ft) 1: 500 ft x 2 with bike activity in between  Transfers  Transfer: Yes  Transfer 1  Transfer From 1: Sit to  Transfer to 1: Stand  Technique 1: Sit to stand  Transfer Level of Assistance 1: Contact guard  Trials/Comments 1: x 2 trials  Transfers 2  Transfer From 2: Stand to  Transfer to 2: Sit  Technique 2: Stand to sit  Transfer Level of Assistance 2: Close supervision  Trials/Comments 2: x 2 trials    Outcome Measures:  [facility] Basic Mobility  Turning from your back to your side while in a flat bed without using bedrails: None  Moving from lying on your back to sitting on the side of a flat bed without using bedrails: None  Moving to and from bed to chair (including a wheelchair): A little  Standing up from a chair using your arms (e.g. wheelchair or bedside chair): A little  To walk in hospital room: A little  Climbing 3-5 steps with railing: A little  Basic Mobility - Total Score: 20    Confusion Assessment Method-ICU (CAM-ICU)  Feature 1: Acute Onset or Fluctuating Course: Negative  Overall CAM-ICU: Negative    FSS-ICU  Ambulation: Walks >/ or equal to 150 feet with minimal assistance x1  Rolling: Complete independence  Sitting: Complete independence  Transfer Sit-to-Stand: Supervision or set-up only  Transfer Supine-to-Sit: Complete independence  Total Score: 30    Early  Mobility/Exercise Safety Screen: Proceed with mobilization - No exclusion criteria met  ICU Mobility Scale: Walking with assistance of 1 person [8]  E = Exercise and Early Mobility  Early Mobility/Exercise Safety Screen: Proceed with mobilization - No exclusion criteria met  ICU Mobility Scale: Walking with assistance of 1 person    Education Documentation  Mobility Training, taught by Amanad Bain, PT at 11/6/2024  5:49 PM.  Learner: Patient  Readiness: Acceptance  Method: Explanation  Response: Verbalizes Understanding  Comment: mobility precautions, ambulating 2-3x/day    Education Comments  No comments found.      Encounter Problems       Encounter Problems (Active)       Balance       Pt will score >45 on VEGA for safe community ambulation (Progressing)       Start:  10/29/24    Expected End:  11/12/24               Mobility       Patient will ambulate >1000 ft with LRAD and supervision With stable vitals and RPD </= 3/10 and RPE </= 13/20 for improved functional mobility.   (Met)       Start:  10/29/24    Expected End:  11/12/24    Resolved:  11/05/24    Updated to: Patient will ambulate >2500 ft with LRAD and supervision With stable vitals and RPD </= 3/10 and RPE </= 13/20 for improved functional mobility.    Update reason: Progression         Pt will complete 6MWT with no assistive device and supervision and achieve >700 ft With stable vitals and RPD </= 3/10 and RPE </= 13/20 for improved functional mobility.   (Met)       Start:  10/29/24    Expected End:  11/12/24    Resolved:  11/05/24    Updated to: Pt will complete 6MWT with no assistive device and supervision and achieve >1300 ft With stable vitals and RPD </= 3/10 and RPE </= 13/20 for improved functional mobility.    Update reason: Progression         Patient will ambulate >2500 ft with LRAD and supervision With stable vitals and RPD </= 3/10 and RPE </= 13/20 for improved functional mobility. (Progressing)       Start:  11/05/24    Expected  End:  11/12/24                Pt will complete 6MWT with no assistive device and supervision and achieve >1300 ft With stable vitals and RPD </= 3/10 and RPE </= 13/20 for improved functional mobility. (Progressing)       Start:  11/05/24    Expected End:  11/12/24                   PT Transfers       Patient will perform bed mobility indep (maintenance 2/2 prolonged hospital stay anticipated) (Progressing)       Start:  10/29/24    Expected End:  11/12/24            Patient will transfer sit to and from stand indep (Progressing)       Start:  10/29/24    Expected End:  11/12/24            Pt will complete 5XSTS from recliner without UE assist <12 seconds With stable vitals and RPD </= 3/10 and RPE </= 13/20 for improved functional mobility.   (Progressing)       Start:  10/29/24    Expected End:  11/12/24               Pain - Adult            Signed by Amanda Bain DPT

## 2024-11-06 NOTE — COMMITTEE REVIEW
Presentation for Listing: Evaluation Date: 10/31/2024   Committee Review Date: 11/5/2024   Organ being evaluated for: Heart     Transplant Phase:  Evaluation   Transplant Status: Active     Referring Physician:   Dr. Lomas       Primary Diagnosis:   ICM, Stage D Systolic HF/ HFrEF 10-15%    Committee Members:   Advanced Heart Failure and Transplant Cardiology Nakul Perkins; Romelia Lomas; Kenisha Romero; Olvin Drummond; Mikaela Winchester; Tyler Oropeza; Nathaniel Oswald; Lorenza Valerio   Cardiothoracic Surgery Mike Madrigal; Skyla Cobian   Financial Counseling and Assistance Services Tyler Schaefer   Intensive Care Thuy Colunga   Nutrition Martine Sethi   Quality Briana Liu    Herberth Reddy; Elisabet Garcia   Transplant Tyler Timmons; Carmen Israel; Vanita Moreland; Kavita Watters; Lakeshia Bonilla   Transplant Surgery JasSuhas Ortiz       Committee Review Decision: Declined    Committee Discussion Details:   The candidate's evaluation was presented and discussed at the Advanced Heart Failure Therapeutics Committee. After review of the candidate's diagnosis and the evaluations of the multidisciplinary team members, it was the consensus of the Committee that the candidate does not meet Heart Selection Criteria at this time and is not a candidate for Heart transplant listing at our center.     Committee Recommendations:     Patient declined as Heart Transplant Candidate related to results from Evaluation. Patient had significant PAD, Elevated PSA level, Age approaching 70, as well as patient verbalized he would be more in favor of LVAD vs Transplant. Patient was approved for LVAD.     Approved for LVAD:  Barriers to transplant: PAD, PSA level, Age  INTERMACS profile: 2  Shared-care with another institution?: No  The patient meets at least one of the following indications for LVAD:  Stage D, NYHA class IV with a poor prognosis for two year survival:  YES  Poor performance on cardiopulmonary stress test (peak MVO2<15 mL/kg/min or <50% predicted for age/gender/weight): Not completed- Inpatient  Other diseases or conditions as noted in barriers to transplant above: NO   Poor quality of life and/or inability to perform normal daily functions: YES  Inotropic support (mention inotrope name): YES/(Milrinone)            Is the patient on temporary MCS (mention device type): NO                 Lab Results   Component Value Date    HEPBSAB <3.1 10/31/2024         There is no immunization history on file for this patient.

## 2024-11-06 NOTE — SIGNIFICANT EVENT
Closing swan numbers:  /69 (80), CVP 8, PAP 40/20 (28), PAWP 15, CO/CI ESTEBAN 4.4/2.05, CO/CI Thermo 4.93/2.28, SVR 1308, SVO2 65% on milrinone 0.125, hydralazine 100 mg, and isosorbide 20 mg

## 2024-11-07 LAB
ALBUMIN SERPL BCP-MCNC: 3.8 G/DL (ref 3.4–5)
ANION GAP SERPL CALC-SCNC: 15 MMOL/L (ref 10–20)
BUN SERPL-MCNC: 25 MG/DL (ref 6–23)
CALCIUM SERPL-MCNC: 9.6 MG/DL (ref 8.6–10.6)
CHLORIDE SERPL-SCNC: 101 MMOL/L (ref 98–107)
CO2 SERPL-SCNC: 24 MMOL/L (ref 21–32)
CREAT SERPL-MCNC: 1.33 MG/DL (ref 0.5–1.3)
EGFRCR SERPLBLD CKD-EPI 2021: 58 ML/MIN/1.73M*2
ERYTHROCYTE [DISTWIDTH] IN BLOOD BY AUTOMATED COUNT: 16.7 % (ref 11.5–14.5)
GLUCOSE SERPL-MCNC: 125 MG/DL (ref 74–99)
HCT VFR BLD AUTO: 39.7 % (ref 41–52)
HGB BLD-MCNC: 13.1 G/DL (ref 13.5–17.5)
MAGNESIUM SERPL-MCNC: 2.04 MG/DL (ref 1.6–2.4)
MCH RBC QN AUTO: 27.5 PG (ref 26–34)
MCHC RBC AUTO-ENTMCNC: 33 G/DL (ref 32–36)
MCV RBC AUTO: 83 FL (ref 80–100)
NRBC BLD-RTO: 0 /100 WBCS (ref 0–0)
PHOSPHATE SERPL-MCNC: 4 MG/DL (ref 2.5–4.9)
PLATELET # BLD AUTO: 535 X10*3/UL (ref 150–450)
POTASSIUM SERPL-SCNC: 4.3 MMOL/L (ref 3.5–5.3)
RBC # BLD AUTO: 4.76 X10*6/UL (ref 4.5–5.9)
SODIUM SERPL-SCNC: 136 MMOL/L (ref 136–145)
UFH PPP CHRO-ACNC: 0.5 IU/ML
WBC # BLD AUTO: 8.1 X10*3/UL (ref 4.4–11.3)

## 2024-11-07 PROCEDURE — 99233 SBSQ HOSP IP/OBS HIGH 50: CPT

## 2024-11-07 PROCEDURE — 85520 HEPARIN ASSAY: CPT

## 2024-11-07 PROCEDURE — 2500000004 HC RX 250 GENERAL PHARMACY W/ HCPCS (ALT 636 FOR OP/ED)

## 2024-11-07 PROCEDURE — 2500000001 HC RX 250 WO HCPCS SELF ADMINISTERED DRUGS (ALT 637 FOR MEDICARE OP)

## 2024-11-07 PROCEDURE — 36415 COLL VENOUS BLD VENIPUNCTURE: CPT

## 2024-11-07 PROCEDURE — 85027 COMPLETE CBC AUTOMATED: CPT

## 2024-11-07 PROCEDURE — 83735 ASSAY OF MAGNESIUM: CPT

## 2024-11-07 PROCEDURE — 1100000001 HC PRIVATE ROOM DAILY

## 2024-11-07 PROCEDURE — 99497 ADVNCD CARE PLAN 30 MIN: CPT

## 2024-11-07 PROCEDURE — 99232 SBSQ HOSP IP/OBS MODERATE 35: CPT | Performed by: INTERNAL MEDICINE

## 2024-11-07 PROCEDURE — 80069 RENAL FUNCTION PANEL: CPT

## 2024-11-07 PROCEDURE — 2500000002 HC RX 250 W HCPCS SELF ADMINISTERED DRUGS (ALT 637 FOR MEDICARE OP, ALT 636 FOR OP/ED)

## 2024-11-07 ASSESSMENT — PAIN - FUNCTIONAL ASSESSMENT: PAIN_FUNCTIONAL_ASSESSMENT: 0-10

## 2024-11-07 ASSESSMENT — COGNITIVE AND FUNCTIONAL STATUS - GENERAL
WALKING IN HOSPITAL ROOM: A LITTLE
CLIMB 3 TO 5 STEPS WITH RAILING: A LITTLE
TOILETING: A LITTLE
DAILY ACTIVITIY SCORE: 21
DRESSING REGULAR LOWER BODY CLOTHING: A LITTLE
STANDING UP FROM CHAIR USING ARMS: A LITTLE
MOBILITY SCORE: 20
DRESSING REGULAR UPPER BODY CLOTHING: A LITTLE
MOVING TO AND FROM BED TO CHAIR: A LITTLE

## 2024-11-07 ASSESSMENT — PAIN SCALES - GENERAL
PAINLEVEL_OUTOF10: 0 - NO PAIN
PAINLEVEL_OUTOF10: 0 - NO PAIN

## 2024-11-07 NOTE — CARE PLAN
The patient's goals for the shift include  to sleep    The clinical goals for the shift include pt will remain hemodynamically stable throughout the shift      Problem: Heart Failure  Goal: Improved gas exchange this shift  Outcome: Progressing  Goal: Improved urinary output this shift  Outcome: Progressing  Goal: Reduction in peripheral edema within 24 hours  Outcome: Progressing  Goal: Report improvement of dyspnea/breathlessness this shift  Outcome: Progressing  Goal: Weight from fluid excess reduced over 2-3 days, then stabilize  Outcome: Progressing  Goal: Increase self care and/or family involvement in 24 hours  Outcome: Progressing     Problem: Skin  Goal: Decreased wound size/increased tissue granulation at next dressing change  Outcome: Progressing  Flowsheets (Taken 10/31/2024 2229 by Samantha Lerma RN)  Decreased wound size/increased tissue granulation at next dressing change: Promote sleep for wound healing  Goal: Participates in plan/prevention/treatment measures  Outcome: Progressing  Flowsheets (Taken 10/31/2024 2229 by Samantha Lerma RN)  Participates in plan/prevention/treatment measures:   Elevate heels   Discuss with provider PT/OT consult   Increase activity/out of bed for meals  Goal: Prevent/manage excess moisture  Outcome: Progressing  Flowsheets (Taken 10/31/2024 2229 by Samantha Lerma, RN)  Prevent/manage excess moisture:   Cleanse incontinence/protect with barrier cream   Moisturize dry skin   Monitor for/manage infection if present  Goal: Prevent/minimize sheer/friction injuries  Outcome: Progressing  Flowsheets (Taken 11/6/2024 2309)  Prevent/minimize sheer/friction injuries:   Complete micro-shifts as needed if patient unable. Adjust patient position to relieve pressure points, not a full turn   Increase activity/out of bed for meals   Use pull sheet  Goal: Promote/optimize nutrition  Outcome: Progressing  Flowsheets (Taken 11/6/2024 2309)  Promote/optimize nutrition:   Consume > 50%  meals/supplements   Discuss with provider if NPO > 2 days   Monitor/record intake including meals  Goal: Promote skin healing  Outcome: Progressing  Flowsheets (Taken 10/31/2024 2229 by Samantha Lerma RN)  Promote skin healing:   Assess skin/pad under line(s)/device(s)   Ensure correct size (line/device) and apply per  instructions   Protective dressings over bony prominences   Rotate device position/do not position patient on device     Problem: Pain - Adult  Goal: Verbalizes/displays adequate comfort level or baseline comfort level  Outcome: Progressing     Problem: Safety - Adult  Goal: Free from fall injury  Outcome: Progressing     Problem: Discharge Planning  Goal: Discharge to home or other facility with appropriate resources  Outcome: Progressing     Problem: Chronic Conditions and Co-morbidities  Goal: Patient's chronic conditions and co-morbidity symptoms are monitored and maintained or improved  Outcome: Progressing     Problem: Fall/Injury  Goal: Not fall by end of shift  Outcome: Progressing  Goal: Be free from injury by end of the shift  Outcome: Progressing  Goal: Verbalize understanding of personal risk factors for fall in the hospital  Outcome: Progressing  Goal: Verbalize understanding of risk factor reduction measures to prevent injury from fall in the home  Outcome: Progressing  Goal: Use assistive devices by end of the shift  Outcome: Progressing  Goal: Pace activities to prevent fatigue by end of the shift  Outcome: Progressing

## 2024-11-07 NOTE — PROGRESS NOTES
Fahad Meraz is a 69 y.o. male on day 14 of admission presenting with Acute on chronic heart failure with reduced ejection fraction and diastolic dysfunction.      Palliative Medicine following for:  Complex medical decision making, symptom management, patient/family support    History obtained from chart review including ED note, H&P, patient's daily progress notes, review of lab/test results, and discussion with primary team and bedside RN.    Subjective    History of Present Illness  Mr. Fahad Meraz is a 69 M with a PMHx sig for CAD s/p 4V CABG (2012) and PCI (LCx/OM; 5/2019), stage D systolic HF/ICM/HFrEF with LVEF 10-15%( 10/22/24 TTE), AF s/p RFA (PVI and CTI; 4/2024) on OAC therapy, HTN, dyslipidemia, depression, anxiety and VIV using CPAP who was admitted to OSH ICU, s/p RHC on 10/18/24. Per patient, he had been experienced worsening SOB and fluids overload for 2-3 weeks. Patient was on milrinone drip and diurese with SG guided. However his KENYA worsening, and not able to wean milrinone drip. Decision was made to transfer him [place] ICU [facility] [place] possible advanced therapy consideration. Pt approved for LVAD.      Symptoms  Pain: Right toe being treated for gout  Dyspnea: only with walking long distances quickly  Fatigue: denies  Insomnia: denies  Drowsiness: denies  Constipation: denies  Nausea: denies  Appetite: great  Anxiety: about a date for the LVAD so his family can prepare  Depression: denies    Objective    Last Recorded Vitals  /66   Pulse 65   Temp 36.2 °C (97.2 °F)   Resp 20   Ht 1.829 m (6')   Wt 92.5 kg (203 lb 14.8 oz)   SpO2 98%   BMI 27.66 kg/m²      Physical Exam   Constitutional:       Appearance: He is normal weight.   HENT:      Head: Normocephalic.      Mouth/Throat:      Pharynx: Oropharynx is clear.   Cardiovascular:      Rate and Rhythm: Rhythm irregular.      Pulses: Normal pulses.   Pulmonary:      Effort: Pulmonary effort is normal.      Breath sounds: Normal breath  sounds.   Abdominal:      General: Bowel sounds are normal.      Palpations: Abdomen is soft.   Musculoskeletal:         General: Normal range of motion.    Skin:     General: Skin is warm and dry.      Comments: Scattered scabs on the arms bilaterally   Neurological:      General: No focal deficit present.      Mental Status: He is alert.   Psychiatric:         Attention and Perception: Attention normal.         Mood and Affect: Appropriate     Speech: Speech normal.         Behavior: Behavior normal. Behavior is cooperative.         Thought Content: Thought content normal.         Cognition and Memory: Cognition normal.         Judgment: Judgment normal.       Relevant Results   Results for orders placed or performed during the hospital encounter of 10/24/24 (from the past 24 hours)   CBC   Result Value Ref Range    WBC 8.1 4.4 - 11.3 x10*3/uL    nRBC 0.0 0.0 - 0.0 /100 WBCs    RBC 4.76 4.50 - 5.90 x10*6/uL    Hemoglobin 13.1 (L) 13.5 - 17.5 g/dL    Hematocrit 39.7 (L) 41.0 - 52.0 %    MCV 83 80 - 100 fL    MCH 27.5 26.0 - 34.0 pg    MCHC 33.0 32.0 - 36.0 g/dL    RDW 16.7 (H) 11.5 - 14.5 %    Platelets 535 (H) 150 - 450 x10*3/uL   Renal function panel   Result Value Ref Range    Glucose 125 (H) 74 - 99 mg/dL    Sodium 136 136 - 145 mmol/L    Potassium 4.3 3.5 - 5.3 mmol/L    Chloride 101 98 - 107 mmol/L    Bicarbonate 24 21 - 32 mmol/L    Anion Gap 15 10 - 20 mmol/L    Urea Nitrogen 25 (H) 6 - 23 mg/dL    Creatinine 1.33 (H) 0.50 - 1.30 mg/dL    eGFR 58 (L) >60 mL/min/1.73m*2    Calcium 9.6 8.6 - 10.6 mg/dL    Phosphorus 4.0 2.5 - 4.9 mg/dL    Albumin 3.8 3.4 - 5.0 g/dL   Magnesium   Result Value Ref Range    Magnesium 2.04 1.60 - 2.40 mg/dL   Heparin Assay, UFH   Result Value Ref Range    Heparin Unfractionated 0.5 See Comment Below for Therapeutic Ranges IU/mL        Allergies  Eliquis [apixaban] and Jardiance [empagliflozin]  Medications  Scheduled medications  aspirin, 81 mg, oral, Daily  cholecalciferol,  50,000 Units, oral, Every Sunday  escitalopram, 10 mg, oral, Daily  hydrALAZINE, 100 mg, oral, TID  isosorbide dinitrate, 20 mg, oral, TID  magnesium oxide, 800 mg, oral, BID  [Held by provider] metoprolol succinate XL, 50 mg, oral, Daily  pantoprazole, 40 mg, oral, Daily before breakfast  sennosides-docusate sodium, 2 tablet, oral, BID  spironolactone, 25 mg, oral, Daily      Continuous medications  heparin, 0-4,000 Units/hr, Last Rate: 24 Units/hr (11/07/24 1503)  milrinone, 0.125 mcg/kg/min, Last Rate: 0.125 mcg/kg/min (11/06/24 9728)      PRN medications  PRN medications: bisacodyl, nitroglycerin, oxygen, polyethylene glycol     Assessment/Plan    Mr. Fahad Meraz is a 69 M with a PMHx sig for CAD s/p 4V CABG (2012) and PCI (LCx/OM; 5/2019), stage D systolic HF/ICM/HFrEF with LVEF 10-15%( 10/22/24 TTE), AF s/p RFA (PVI and CTI; 4/2024) on OAC therapy, HTN, dyslipidemia, depression, anxiety and VIV using CPAP who was admitted to OSH ICU, s/p RHC on 10/18/24. Per patient, he had been experienced worsening SOB and fluids overload for 2-3 weeks. Patient was on milrinone drip and diurese with SG guided. However his KENYA worsening, and not able to wean milrinone drip. Decision [place] [facility] [place]. Pt approved for LVAD.     11/1: Palliative consulted for pt/family support and advanced therapies eval.     11/7: Supportive visit to discuss pt's feelings on LVAD approval (vs OHT) and coping and supports. Pt very positive and motivated. Pt reports feeling great mentally and physically yet anxious about when LVAD will be placed so family can prepare.    Palliative Performance Scale (PPS):  "70-80    ----------------------------------------------------------------------------------------------------------------------------------------------------------------------------------------------------------------------------------------------------------------------------------------------------------------------------------------------------------------------      I spent 20 minutes in providing separately identifiable ACP services with the patient and/or surrogate decision maker in a voluntary conversation discussing the patient's wishes and goals as detailed in the above note.   ----------------------------------------------------------------------------------------------------------------------------------------------------------------------------------------------------------------------------------------------------------------------------------------------------------------------------------------------------------------------       #Complex Medical Decision Making  #Goals of Care  #Advanced Care Planning  - Code status: FULL CODE  - Surrogate decision maker: Tanvi Meraz (Spouse)  [phone]   - Goals are mix of survival and time and improved quality of life     - 11/1: Preparedness Plan:  I had an extensive conversation with the patient in the presence of his sister and brother in law . Palliative care was introduced them and it was explained what a preparedness plan entails. All questions were addressed and answered. Patient gave a verbal understanding of the answers provided.      Goals/expectations of OHT/ LVAD implantation: more time, continue his \"bucket list\", improved QOL. Pt expressed he prefers the OHT as it is \"less maintenance\" and described LVAD as \"new machinery on an only part\", making it less desirable.      Hemodialysis: Patient verbalized understanding that preexisting renal disease or renal injury during/after OHT/VAD implantation puts patients at risk for dialysis dependent renal " "failure. PT EXPRESSED HE WOULD NEED TO THINK ABOUT POSSIBLY BEING ON HD FOR THE REMAINDER OF HIS TIME, IF THAT IS A QOL HE WOULD WANT.     ICH/stroke: Patient is aware of risk for bleeding or stroke. PT HESITANT ABOUT A STROKE AND WORRIES ABOUT A DETRIMENTAL STROKE THAT COULD LEAVE HIM WORSE THAN BEFORE.     LVAD failure: Patient verbalized She/He would HAVE TO THINK ABOUT ACCEPTING heroic measures, compressions and intubation, in the event of LVAD failure.      LVAD infection/need for long term antibiotics: Patient is aware of the risk for infection of driveline, device, and blood. Pt is ACCEPTING to being treated with long term antibiotics if this were to occur .     Artifical nutrition and hydration: Patient would ACCEPT a long term feeding tube in the event complications would occur that would require placement.     Blood transfusions: Patient ACCEPTS receiving blood transfusions if needed.      Organ Donation: Patient verbalized she/he would LIKE to participate in organ donation in the event this would become a possibility.      Mechanical ventilation: Patient ACCEPTS TO undergo short term ventilation. She/He expressed she/he would ACCEPT long term mechanical ventilation/trach placement as this would greatly affect her/his quality of life.      Postoperative rehab plan: Patient states she/he has been informed about what her/his post op period would look like. PT IS AGREEABLE TO REHABILITATION PRIOR TO GOING HOME IF INDICATED.     Spiritual preferences: NONE. SCRIPTURE.     If device implantation over time does not help pt meet her/his goals of care and complications affect her/his view on quality of life, pt was asked if she/he would be comfortable in discontinuing therapy. Pt stated THAT IT DEPENDED ON THE SITUATION.    11/7: Met with pt and family at bedside. Discussed approval for LVAD only and feelings r/t to this option. Pt states he is \"making do\" with LVAD option to help him reach his goal of a prolonged " life, being with his family longer, and improved QOL. Pt states that he currently is feeling great and realizes it's more than likely r/t to the medicine. Explains how he has been walking the unit without SOB.   Provided pt and family positive presence and ongoing motivation that he is doing everything right within his abilities.       #HFrEF  - EF 10-15%  - Appears milrinone dependent.   - Advanced therapies work up - not a candidate for OHT but is a candidate for LVAD.  - Pt wished for OHT vs LVAD as OHT aligns best with his lifestyle and goals. His brother just received a heart in August and is doing very well. Pt coping well and optimistic with LVAD opportunity.        #Psychosocial Support  #Anxiety  - Ongoing pt and family support.   - Music Therapy- ORDERED  - Spiritual Care Support- NOTIFIED  - Awaiting date for LVAD placement so family can prepare.        Plan of Care discussed with: Updated primary and bedside RN on goals of care decision, medication adjustments, and code status      Medical Decision Making was high level due to high complexity of problems, extensive data review, and high risk of management/treatment.     - HFrEF and inotrope dependent, work up for cardiac advanced therapies posing threat to life or function.   - Reviewed external notes from   - Reviews results from  which were used in decision making for   - Recommended the following tests:   - Assessment required independent historian: nursing.  - Independent interpretation of test: labs and imaging  - Discussion of management with: primary   - Drug therapy requiring intensive monitoring for toxicity: meds and renal impairment, heparin.  - Decision regarding elective major surgery with identified patient or procedure risk factors:   - Decision regarding emergency major surgery: agreeable to LVAD, realignment with his preferences  - Decision regarding hospitalization or escalation of hospital-level care:   - Decision not to resuscitate or  to de-escalate care because of poor prognosis:   - Parenteral controlled substances: milrinone, hep    Thank you for allowing us to care for this patient. Palliative Team will continue to follow as needed. Please contact team with any questions or concerns.   Team pager [phone] (weekdays)    MAYTE Nix-CNP

## 2024-11-07 NOTE — PROGRESS NOTES
Nutrition Follow Up Assessment  Nutrition Assessment         Patient is a 69 y.o. male who is day 14 of admission for Acute on chronic heart failure with reduced ejection fraction and diastolic dysfunction.     10/28 s/p Left & Right Heart Cath w Angiography & LV.     Nutrition History:  Food and Nutrient History: During visit pt reports he has a good appetite. Denies loss of appetite at this time. States he ate 100% of breakfast this morning. Reports is consuing x3 meals per day and >75% of meals. Endorses constipation. No other GI issues noted. Declines ONS at this time.    Anthropometrics:  Height: 182.9 cm (6')   Weight: 92.5 kg (203 lb 14.8 oz)   BMI (Calculated): 27.65  IBW/kg (Dietitian Calculated): 81 kg  Percent of IBW: 114 %       Admission Weight Trend:  Date/Time Weight   11/06/24 0800 92.5 kg (203 lb 14.8 oz)   11/04/24 0500 93.5 kg (206 lb 2.1 oz)   11/03/24 0600 91.5 kg (201 lb 11.5 oz)   11/02/24 0500 91 kg (200 lb 9.9 oz)   10/31/24 0600 93.5 kg (206 lb 2.1 oz)   10/30/24 0600 94 kg (207 lb 3.7 oz)   10/29/24 0600 91 kg (200 lb 9.9 oz)   10/28/24 0900 92 kg (202 lb 13.2 oz)   10/26/24 0800 89.5 kg (197 lb 5 oz)   10/26/24 0600 89.5 kg (197 lb 5 oz)   10/24/24 2330 93.5 kg (206 lb 2.1 oz)     Weight Change %:  Weight History / % Weight Change: Suspect wt fluctuations during admission due to fluid changs.    Nutrition Focused Physical Exam Findings:  defer: full NFPE completed on initial assessment.  Edema:  Edema:  (RUE/LUE non-pitting, RLE/LLE +1)  Physical Findings:  Skin: Positive (ecchymosis)    Nutrition Significant Labs:  CBC Trend:   Results from last 7 days   Lab Units 11/07/24  0738 11/06/24  0513 11/05/24  0522 11/04/24  0419   WBC AUTO x10*3/uL 8.1 8.5 7.7 8.6   RBC AUTO x10*6/uL 4.76 4.84 4.87 5.03   HEMOGLOBIN g/dL 13.1* 13.2* 13.2* 13.8   HEMATOCRIT % 39.7* 40.1* 39.8* 40.6*   MCV fL 83 83 82 81   PLATELETS AUTO x10*3/uL 535* 509* 532* 584*    , BMP Trend:   Results from last 7 days    Lab Units 11/07/24  0738 11/06/24  1245 11/06/24  0513 11/05/24  0522   GLUCOSE mg/dL 125* 91 119* 133*   CALCIUM mg/dL 9.6 10.0 9.4 9.4   SODIUM mmol/L 136 134* 136 137   POTASSIUM mmol/L 4.3 4.5 4.3 3.9   CO2 mmol/L 24 23 26 28   CHLORIDE mmol/L 101 100 100 101   BUN mg/dL 25* 24* 24* 24*   CREATININE mg/dL 1.33* 1.33* 1.32* 1.15   Renal Lab Trend:   Results from last 7 days   Lab Units 11/07/24  0738 11/06/24  1245 11/06/24 0513 11/05/24 0522   POTASSIUM mmol/L 4.3 4.5 4.3 3.9   PHOSPHORUS mg/dL 4.0 3.7 3.8 4.0   SODIUM mmol/L 136 134* 136 137   MAGNESIUM mg/dL 2.04  --  2.04 2.26   EGFR mL/min/1.73m*2 58* 58* 58* 69   BUN mg/dL 25* 24* 24* 24*   CREATININE mg/dL 1.33* 1.33* 1.32* 1.15        Nutrition Specific Medications:  Scheduled medications  aspirin, 81 mg, oral, Daily  cholecalciferol, 50,000 Units, oral, Every Sunday  escitalopram, 10 mg, oral, Daily  hydrALAZINE, 100 mg, oral, TID  isosorbide dinitrate, 20 mg, oral, TID  magnesium oxide, 800 mg, oral, BID  [Held by provider] metoprolol succinate XL, 50 mg, oral, Daily  pantoprazole, 40 mg, oral, Daily before breakfast  sennosides-docusate sodium, 2 tablet, oral, BID  spironolactone, 25 mg, oral, Daily      Continuous medications  heparin, 0-4,000 Units/hr, Last Rate: 2,400 Units/hr (11/06/24 2327)  milrinone, 0.125 mcg/kg/min, Last Rate: 0.125 mcg/kg/min (11/06/24 2327)      PRN medications  PRN medications: bisacodyl, nitroglycerin, oxygen, polyethylene glycol     I/O:   Last BM Date: 11/05/24; Stool Appearance: Liquid (11/04/24 0120)    Dietary Orders (From admission, onward)       Start     Ordered    11/07/24 0632  May Participate in Room Service  ( ROOM SERVICE MAY PARTICIPATE)  Once        Question:  .  Answer:  Yes    11/07/24 0631 11/07/24 0303  Adult diet Cardiac; 70 gm fat; 2 - 3 grams Sodium; 2000 mL fluid  Diet effective now        Question Answer Comment   Diet type Cardiac    Fat restriction: 70 gm fat    Sodium restriction: 2 - 3  grams Sodium    Dietary fluid restriction / 24h: 2000 mL fluid        11/07/24 0306                     Estimated Needs:   Total Energy Estimated Needs (kCal):  (6816-1777)  Method for Estimating Needs: BHM=8737 (SF 1.3)  Total Protein Estimated Needs (g):  (100-115)  Method for Estimating Needs: 1.2-1.4 x 81kg  Total Fluid Estimated Needs (mL):  (1mL/kcal or per team)           Nutrition Diagnosis   Malnutrition Diagnosis  Patient has Malnutrition Diagnosis: Yes  Diagnosis Status: Declining  Malnutrition Diagnosis: Moderate malnutrition related to acute disease or injury  As Evidenced by: pt reports a poor to fair PO intake for weeks to months PTA along with moderate to severe fat and muscle loss on physical exam; he is down in weight by 23% in two months-- mostly fluids but suspect also has a component of true fat/muscle loss            Nutrition Interventions/Recommendations         Nutrition Prescription:  Can continue with cardiac diet.  Fluid restriction per team.        Nutrition Interventions:   Food and/or Nutrient Delivery Interventions  Interventions: Meals and snacks, Medical food supplement  Goal: consume >75% of meals       Nutrition Education:   N/A       Nutrition Monitoring and Evaluation   Food/Nutrient Related History Monitoring  Monitoring and Evaluation Plan: Energy intake  Criteria: meet >75% of estimated energy needs    Body Composition/Growth/Weight History  Monitoring and Evaluation Plan: Weight  Criteria: stable weight    Biochemical Data, Medical Tests and Procedures  Monitoring and Evaluation Plan: Electrolyte/renal panel, Glucose/endocrine profile  Criteria: WNL              Time Spent (min): 45 minutes

## 2024-11-07 NOTE — PROGRESS NOTES
ADVANCED HEART FAILURE PROGRESS NOTE      HF Cardiologist: Dr. Washington   VAD Coordinator:     Planning VAD this admission     Subjective     HPI:  Fahad Meraz is a very pleasant 69 y.o. male w/ a PMHx sig for CAD s/p 4V CABG (2012) and PCI (LCx/OM; 5/2019), stage D systolic HF/ICM/HFrEF with LVEF 10-15%( 10/22/24 TTE), AF s/p RFA (PVI and CTI; 4/2024) on OAC therapy, HTN, dyslipidemia, depression, anxiety and VIV using CPAP who was admitted to OSH ICU, s/p RHC on 10/18/24.  Per patient, he had been experienced worsening SOB and fluids overload for 2-3 weeks. Patient was on milrinone drip and underwent SGC diuresis. However his KENYA worsened, and were not able to wean milrinone drip. Decision was made to transfer him to HF ICU [facility] for possible advanced therapy consideration. Advanced therapies evaluation was initiated on 10/30. In the meantime, HFICU attempted to wean milrinone - currently at 0.125 mcg/kg/min and uptitrating afterload reduction as tolerated. Discussed in advanced therapeutics committee on 11/5 and was denied for transplantation, and approved for LVAD after Dr Mclain agreeable to surgery. He was transferred to the floor on 11/6 to await VAD implantation.    Principal Problem:    Acute on chronic heart failure with reduced ejection fraction and diastolic dysfunction  Active Problems:    Ischemic cardiomyopathy    Receiving inotropic medication       LOS: 14 days     Interval Events:   NAEO, transferred to the floor last night from HFICU. He reports no SOB, RAMOS, chest pain, or orthopnea.      Objective   Vitals:   Vitals:    11/07/24 0008 11/07/24 0044 11/07/24 0416 11/07/24 0732   BP:  108/65 123/77 107/69   Pulse:  86 72 83   Resp:  18  20   Temp: 36.7 °C (98.1 °F) 36.4 °C (97.5 °F) 36.5 °C (97.7 °F) 36.4 °C (97.5 °F)   TempSrc:  Temporal     SpO2:  95% 96% 94%   Weight:       Height:         Wt Readings from Last 5 Encounters:   11/06/24 92.5 kg (203 lb 14.8 oz)   10/24/24 92.5 kg (204 lb)    08/05/24 122 kg (268 lb)   01/31/22 119 kg (262 lb)   02/18/20 123 kg (270 lb 2 oz)     Hemodynamic parameters for last 24 hours:  PAP: (36-53)/(17-34) 36/17  CVP:  [8 mmHg] 8 mmHg  PCWP:  [15 mmHg] 15 mmHg  CO:  [4.4 L/min] 4.4 L/min  CI:  [2.1 L/min/m2] 2.1 L/min/m2  Intake/Output for last 24 hours:    Intake/Output Summary (Last 24 hours) at 11/7/2024 0950  Last data filed at 11/6/2024 2300  Gross per 24 hour   Intake 512.05 ml   Output 540 ml   Net -27.95 ml      Physical exam:  Physical Exam  Constitutional:       Appearance: He is obese.   Cardiovascular:      Rate and Rhythm: Normal rate and regular rhythm.      Pulses: Normal pulses.      Heart sounds: Normal heart sounds. No murmur heard.     No friction rub. No gallop.   Pulmonary:      Effort: Pulmonary effort is normal. No respiratory distress.      Breath sounds: Normal breath sounds. No stridor. No wheezing or rales.   Abdominal:      General: Abdomen is flat. There is no distension.      Palpations: Abdomen is soft. There is no mass.      Tenderness: There is no abdominal tenderness. There is no guarding.   Musculoskeletal:      Right lower leg: No edema.      Left lower leg: No edema.   Neurological:      General: No focal deficit present.      Mental Status: He is alert and oriented to person, place, and time.   Psychiatric:         Mood and Affect: Mood normal.         Behavior: Behavior normal.        Driveline:      Labs:   @[facility]@    Imaging:   XR chest 1 view    Result Date: 11/7/2024  Interpreted By:  Tuan Platt, STUDY: XR CHEST 1 VIEW;  11/6/2024 9:04 am   INDICATION: Signs/Symptoms:SGC.   COMPARISON: Chest radiograph dated 11/6/2024   ACCESSION NUMBER(S): [id number]   ORDERING CLINICIAN: PAYAL KOHLER   FINDINGS: AP radiograph of the chest     The swans Faviola catheter is positioned within the main pulmonary artery. There has been previous median sternotomy.   The heart is enlarged and stable. Pulmonary aeration is similar previous  study. No new infiltrates, consolidations or effusions are noted.       1. No acute cardiopulmonary pathology       Signed by: Tuan Platt 11/7/2024 9:24 AM Dictation workstation:   [id number]    Transthoracic Echo (TTE) Limited    Result Date: 11/5/2024   [facility], [address] [place] [address]                Tel [phone] and Fax [phone] TRANSTHORACIC ECHOCARDIOGRAM REPORT  Patient Name:       RJ Webb Physician:    [id number] Mira Mayer MD Study Date:         11/5/2024           Ordering Provider:    [id number] PAYAL KOHLER MRN/PID:            [id number]            Fellow: Accession#:         [id number]        Nurse: Date of Birth/Age:  1955 / 69     Sonographer:          Maureen estrada RDCS Gender assigned at                     Additional Staff: Birth: Height:             182.88 cm           Admit Date:           10/24/2024 Weight:             93.44 kg            Admission Status:     Inpatient - STAT BSA / BMI:          2.16 m2 / 27.94     Encounter#:           [id number]                     kg/m2 Blood Pressure:     106/72 mmHg         Department Location:  [address] Study Type:    TRANSTHORACIC ECHO (TTE) LIMITED Diagnosis/ICD: Heart failure, unspecified-I50.9 Indication:    RV assessment CPT Code:      Echo Limited-09383; Doppler Limited-19597; Color Doppler-95572 Patient History: Pertinent History: Negative bubble peformed on prior echo; Known 15-20% EF;                    Cardiogenic shock; Acute on chronic heart failure with                    reduced EF and diastolic dysfunction; ICM; Hx of chronic                    A-Fib; VIV. Study Detail: The following Echo studies were performed: 2D, M-Mode, Doppler and               color flow. Technically  challenging study due to body habitus.               Definity used as a contrast agent for endocardial border               definition. Total contrast used for this procedure was 2 mL via IV               push.  PHYSICIAN INTERPRETATION: Left Ventricle: Left ventricular ejection fraction is severely decreased, calculated by Loera's biplane at 18%. There is severely increased eccentric left ventricular hypertrophy. There is global hypokinesis of the left ventricle with minor regional variations. The left ventricular cavity size is severely dilated. There is normal septal and normal posterior left ventricular wall thickness. Left ventricular diastolic filling was not assessed. There is no definite left ventricular thrombus visualized. Left Atrium: The left atrium is severely dilated. Right Ventricle: The right ventricle is severely enlarged. There is severely reduced right ventricular systolic function. A device is visualized in the right ventricle. Right Atrium: The right atrium is severely dilated. There is a device visualized in the right atrium. Aortic Valve: The aortic valve was not well visualized. There is mild aortic valve cusp calcification. Aortic valve regurgitation was not assessed. Mitral Valve: The mitral valve is normal in structure. Mitral valve regurgitation was not assessed. Tricuspid Valve: The tricuspid valve is structurally normal. There is mild tricuspid regurgitation. The Doppler estimated RVSP is mildly elevated at 42.9 mmHg. Pulmonic Valve: The pulmonic valve was not assessed. Pulmonic valve regurgitation was not assessed. Pericardium: Trivial pericardial effusion. Aorta: The aortic root is abnormal. The aortic root appears mildly dilated and measures 4.20 cm. There is mild dilatation of the ascending aorta. There is mild dilatation of the aortic root. Systemic Veins: The inferior vena cava appears dilated, with IVC inspiratory collapse less than 50%. In comparison to the previous  echocardiogram(s): Compared with the prior exam from 10/25/2024, there was already a severely dilated LV with severely reduced function. The RV remains dilated with severely reduced function. Valvular function not assessed on today's exam.  CONCLUSIONS:  1. Left ventricular ejection fraction is severely decreased, calculated by Loera's biplane at 18%.  2. There is global hypokinesis of the left ventricle with minor regional variations.  3. Left ventricular cavity size is severely dilated.  4. No left ventricular thrombus visualized.  5. There is severely increased eccentric left ventricular hypertrophy.  6. There is severely reduced right ventricular systolic function.  7. Severely enlarged right ventricle.  8. The left atrium is severely dilated.  9. The right atrium is severely dilated. 10. Mitral valve regurgitation was not assessed. 11. Mildly elevated right ventricular systolic pressure. 12. Mildly dilated aortic root. 13. Compared with the prior exam from 10/25/2024, there was already a severely dilated LV with severely reduced function. The RV remains dilated with severely reduced function. Valvular function not assessed on today's exam. QUANTITATIVE DATA SUMMARY:  2D MEASUREMENTS:           Normal Ranges: Ao Root d:       4.20 cm   (2.0-3.7cm) IVSd:            0.87 cm   (0.6-1.1cm) LVPWd:           0.78 cm   (0.6-1.1cm) LVIDd:           8.21 cm   (3.9-5.9cm) LVIDs:           8.04 cm LV Mass Index:   157 g/m2 LVEDV Index:     138 ml/m2 LV % FS          2.0 %  LA VOLUME:                  Normal Ranges: LA Volume Index: 59.9 ml/m2  RA VOLUME BY A/L METHOD:          Normal Ranges: RA Area A4C:             36.8 cm2  AORTA MEASUREMENTS:         Normal Ranges: Ao Sinus, d:        4.20 cm (2.1-3.5cm) Asc Ao, d:          4.20 cm (2.1-3.4cm)  LV SYSTOLIC FUNCTION BY 2D PLANIMETRY (MOD):                      Normal Ranges: EF-A4C View:    14 % (>=55%) EF-A2C View:    21 % EF-Biplane:     18 % LV EF Reported: 18 %   AORTIC VALVE:          Normal Ranges: LVOT Diameter: 2.31 cm (1.8-2.4cm)  RIGHT VENTRICLE: RV Basal 6.10 cm RV Mid   4.10 cm RV Major 9.1 cm TAPSE:   9.0 mm RV s'    0.06 m/s  TRICUSPID VALVE/RVSP:          Normal Ranges: Peak TR Velocity:     2.64 m/s RV Syst Pressure:     43 mmHg  (< 30mmHg) IVC Diam:             3.00 cm  AORTA: Asc Ao Diam 4.23 cm  [id number] Mira Mayer MD Electronically signed on 11/5/2024 at 2:36:55 PM  ** Final **       Notable Studies:   EKG:  Encounter Date: 10/24/24   Electrocardiogram, 12-lead PRN ACS symptoms   Result Value    Ventricular Rate 111    Atrial Rate 111    QRS Duration 130    QT Interval 302    QTC Calculation(Bazett) 410    R Axis 134    T Axis -40    QRS Count 18    Q Onset 218    T Offset 369    QTC Fredericia 370    Narrative    Atrial fibrillation with occasional Premature ventricular complexes and Fusion complexes  Nonspecific intraventricular block  Cannot rule out Anteroseptal infarct , age undetermined  T wave abnormality, consider inferolateral ischemia  Abnormal ECG  No previous ECGs available  Confirmed by Lucas Kerns ([id number]) on 10/28/2024 12:58:57 PM       Echo:  Transthoracic Echo (TTE) Limited    Result Date: 11/5/2024   [facility], [address] [place] [address]                Tel [phone] and Fax [phone] TRANSTHORACIC ECHOCARDIOGRAM REPORT  Patient Name:       RJ Webb Physician:    [id number] Mira Mayer MD Study Date:         11/5/2024           Ordering Provider:    [id number] PAYAL KOHLER MRN/PID:            [id number]            Fellow: Accession#:         [id number]        Nurse: Date of Birth/Age:  1955 / 69     Sonographer:          Maureen estrada                                     RDBLANQUITA Gender assigned at  M                   Additional Staff: Birth:  Height:             182.88 cm           Admit Date:           10/24/2024 Weight:             93.44 kg            Admission Status:     Inpatient - STAT BSA / BMI:          2.16 m2 / 27.94     Encounter#:           [id number]                     kg/m2 Blood Pressure:     106/72 mmHg         Department Location:  [address] Study Type:    TRANSTHORACIC ECHO (TTE) LIMITED Diagnosis/ICD: Heart failure, unspecified-I50.9 Indication:    RV assessment CPT Code:      Echo Limited-73715; Doppler Limited-39080; Color Doppler-47717 Patient History: Pertinent History: Negative bubble peformed on prior echo; Known 15-20% EF;                    Cardiogenic shock; Acute on chronic heart failure with                    reduced EF and diastolic dysfunction; ICM; Hx of chronic                    A-Fib; VIV. Study Detail: The following Echo studies were performed: 2D, M-Mode, Doppler and               color flow. Technically challenging study due to body habitus.               Definity used as a contrast agent for endocardial border               definition. Total contrast used for this procedure was 2 mL via IV               push.  PHYSICIAN INTERPRETATION: Left Ventricle: Left ventricular ejection fraction is severely decreased, calculated by Loera's biplane at 18%. There is severely increased eccentric left ventricular hypertrophy. There is global hypokinesis of the left ventricle with minor regional variations. The left ventricular cavity size is severely dilated. There is normal septal and normal posterior left ventricular wall thickness. Left ventricular diastolic filling was not assessed. There is no definite left ventricular thrombus visualized. Left Atrium: The left atrium is severely dilated. Right Ventricle: The right ventricle is severely enlarged. There is severely reduced right ventricular systolic function. A device is visualized in the right ventricle. Right Atrium: The right atrium is severely dilated. There is  a device visualized in the right atrium. Aortic Valve: The aortic valve was not well visualized. There is mild aortic valve cusp calcification. Aortic valve regurgitation was not assessed. Mitral Valve: The mitral valve is normal in structure. Mitral valve regurgitation was not assessed. Tricuspid Valve: The tricuspid valve is structurally normal. There is mild tricuspid regurgitation. The Doppler estimated RVSP is mildly elevated at 42.9 mmHg. Pulmonic Valve: The pulmonic valve was not assessed. Pulmonic valve regurgitation was not assessed. Pericardium: Trivial pericardial effusion. Aorta: The aortic root is abnormal. The aortic root appears mildly dilated and measures 4.20 cm. There is mild dilatation of the ascending aorta. There is mild dilatation of the aortic root. Systemic Veins: The inferior vena cava appears dilated, with IVC inspiratory collapse less than 50%. In comparison to the previous echocardiogram(s): Compared with the prior exam from 10/25/2024, there was already a severely dilated LV with severely reduced function. The RV remains dilated with severely reduced function. Valvular function not assessed on today's exam.  CONCLUSIONS:  1. Left ventricular ejection fraction is severely decreased, calculated by Loera's biplane at 18%.  2. There is global hypokinesis of the left ventricle with minor regional variations.  3. Left ventricular cavity size is severely dilated.  4. No left ventricular thrombus visualized.  5. There is severely increased eccentric left ventricular hypertrophy.  6. There is severely reduced right ventricular systolic function.  7. Severely enlarged right ventricle.  8. The left atrium is severely dilated.  9. The right atrium is severely dilated. 10. Mitral valve regurgitation was not assessed. 11. Mildly elevated right ventricular systolic pressure. 12. Mildly dilated aortic root. 13. Compared with the prior exam from 10/25/2024, there was already a severely dilated LV with  severely reduced function. The RV remains dilated with severely reduced function. Valvular function not assessed on today's exam. QUANTITATIVE DATA SUMMARY:  2D MEASUREMENTS:           Normal Ranges: Ao Root d:       4.20 cm   (2.0-3.7cm) IVSd:            0.87 cm   (0.6-1.1cm) LVPWd:           0.78 cm   (0.6-1.1cm) LVIDd:           8.21 cm   (3.9-5.9cm) LVIDs:           8.04 cm LV Mass Index:   157 g/m2 LVEDV Index:     138 ml/m2 LV % FS          2.0 %  LA VOLUME:                  Normal Ranges: LA Volume Index: 59.9 ml/m2  RA VOLUME BY A/L METHOD:          Normal Ranges: RA Area A4C:             36.8 cm2  AORTA MEASUREMENTS:         Normal Ranges: Ao Sinus, d:        4.20 cm (2.1-3.5cm) Asc Ao, d:          4.20 cm (2.1-3.4cm)  LV SYSTOLIC FUNCTION BY 2D PLANIMETRY (MOD):                      Normal Ranges: EF-A4C View:    14 % (>=55%) EF-A2C View:    21 % EF-Biplane:     18 % LV EF Reported: 18 %  AORTIC VALVE:          Normal Ranges: LVOT Diameter: 2.31 cm (1.8-2.4cm)  RIGHT VENTRICLE: RV Basal 6.10 cm RV Mid   4.10 cm RV Major 9.1 cm TAPSE:   9.0 mm RV s'    0.06 m/s  TRICUSPID VALVE/RVSP:          Normal Ranges: Peak TR Velocity:     2.64 m/s RV Syst Pressure:     43 mmHg  (< 30mmHg) IVC Diam:             3.00 cm  AORTA: Asc Ao Diam 4.23 cm  [id number] Mira Mayer MD Electronically signed on 11/5/2024 at 2:36:55 PM  ** Final **     Transthoracic Echo (TTE) Complete    Result Date: 10/25/2024   [facility], [address] [place] [address]                Tel [phone] and Fax [phone] TRANSTHORACIC ECHOCARDIOGRAM REPORT  Patient Name:      RJ Webb Physician:    [id number] Fawad hCicas MD Study Date:        10/25/2024           Ordering Provider:    [id number] LANDON GARCES MRN/PID:           [id number]             Fellow: Accession#:         [id number]         Nurse: Date of Birth/Age: 1955 / 69      Sonographer:          Nikkie estrada                                      RDCS Gender:            M                    Additional Staff: Height:            182.88 cm            Admit Date:           10/24/2024 Weight:            93.44 kg             Admission Status:     Inpatient -                                                               Routine BSA / BMI:         2.16 m2 / 27.94      Encounter#:           [id number]                    kg/m2 Blood Pressure:    113/62 mmHg          Department Location:  [address] Study Type:    TRANSTHORACIC ECHO (TTE) COMPLETE Diagnosis/ICD: Acute on chronic combined systolic (congestive) and diastolic                (congestive) heart failure (CHF)-I50.43; Ischemic                cardiomyopathy-I25.5 Indication:    Stage D heart failure CPT Code:      Echo Complete w Full Doppler-13908 Patient History: Pertinent History: CAD s/p 4V CABG and PCI, HF/ICM/HFrEF, HTN, DLD, VIV,                    bicuspid AV, afib. Study Detail: The following Echo studies were performed: 2D, M-Mode, Doppler and               color flow. Definity used as a contrast agent for endocardial               border definition and agitated saline used as a contrast agent for               intraseptal flow evaluation. Unable to obtain suprasternal notch               view. Patient's heart rhythm is atrial fibrillation.  PHYSICIAN INTERPRETATION: Left Ventricle: Left ventricular ejection fraction is severely decreased, by visual estimate at 15-20%. There is global hypokinesis of the left ventricle with minor regional variations. The left ventricular cavity size is severely dilated. Abnormal (paradoxical) septal motion consistent with post-operative status. Left ventricular diastolic filling was not assessed. There is no definite left ventricular thrombus visualized. Left Atrium: The left atrium is moderately  dilated. There is no evidence of a patent foramen ovale. A bubble study using agitated saline was performed. Bubble study is negative. Right Ventricle: The right ventricle is moderately enlarged. There is severely reduced right ventricular systolic function. A device is visualized in the right ventricle. Right Atrium: The right atrium is moderately dilated. There is a device visualized in the right atrium. Aortic Valve: The aortic valve is structurally normal. There is mild to moderate aortic valve cusp calcification. The aortic valve dimensionless index is 0.57. There is trace aortic valve regurgitation. The peak instantaneous gradient of the aortic valve is 16.2 mmHg. The mean gradient of the aortic valve is 10.0 mmHg. Mitral Valve: The mitral valve is normal in structure. There is mild mitral annular calcification. There is mild mitral valve regurgitation. Tricuspid Valve: The tricuspid valve is structurally normal. There is trace tricuspid regurgitation. The Doppler estimated RVSP is within normal limits at 21.0 mmHg. Pulmonic Valve: The pulmonic valve is structurally normal. There is no indication of pulmonic valve regurgitation. Pericardium: Trivial pericardial effusion. Aorta: The aortic root is normal. Systemic Veins: The inferior vena cava was not well visualized. In comparison to the previous echocardiogram(s): Compared with study dated 2/3/2020, LVEF is now severely reduced, Previoulsy reported LVEF is 40-45% with some regional hypokinesis. Primary team is aware because of a recent BRIANA which showed similar findinds.  CONCLUSIONS:  1. Left ventricular ejection fraction is severely decreased, by visual estimate at 15-20%.  2. There is global hypokinesis of the left ventricle with minor regional variations.  3. Left ventricular cavity size is severely dilated.  4. Abnormal septal motion consistent with post-operative status.  5. No left ventricular thrombus visualized.  6. There is severely reduced right  ventricular systolic function.  7. Moderately enlarged right ventricle.  8. The left atrium is moderately dilated.  9. The right atrium is moderately dilated. 10. Right ventricular systolic pressure is within normal limits. 11. There is no evidence of a patent foramen ovale. 12. Compared with study dated 2/3/2020, LVEF is now severely reduced, Previoulsy reported LVEF is 40-45% with some regional hypokinesis. Primary team is aware because of a recent BRIANA which showed similar findinds. QUANTITATIVE DATA SUMMARY:  2D MEASUREMENTS:          Normal Ranges: Ao Root d:       4.00 cm  (2.0-3.7cm) LAs:             5.00 cm  (2.7-4.0cm) IVSd:            0.90 cm  (0.6-1.1cm) LVPWd:           0.90 cm  (0.6-1.1cm) LVIDd:           7.70 cm  (3.9-5.9cm) LVIDs:           6.70 cm LV Mass Index:   155 g/m2 LV % FS          13.0 %  LA VOLUME:                    Normal Ranges: LA Vol A4C:        77.3 ml    (22+/-6mL/m2) LA Vol A2C:        97.8 ml LA Vol BP:         89.5 ml LA Vol Index A4C:  35.8ml/m2 LA Vol Index A2C:  45.3 ml/m2 LA Vol Index BP:   41.5 ml/m2 LA Area A4C:       25.0 cm2 LA Area A2C:       27.3 cm2 LA Major Axis A4C: 6.9 cm LA Major Axis A2C: 6.5 cm LA Volume Index:   41.5 ml/m2  RA VOLUME BY A/L METHOD:          Normal Ranges: RA Area A4C:             25.3 cm2  LV SYSTOLIC FUNCTION BY 2D PLANIMETRY (MOD):                      Normal Ranges: EF-Visual:      18 % LV EF Reported: 18 %  AORTIC VALVE:                      Normal Ranges: AoV Vmax:                2.01 m/s  (<=1.7m/s) AoV Peak P.2 mmHg (<20mmHg) AoV Mean PG:             10.0 mmHg (1.7-11.5mmHg) LVOT Max Jude:            1.12 m/s  (<=1.1m/s) AoV VTI:                 36.30 cm  (18-25cm) LVOT VTI:                20.70 cm LVOT Diameter:           2.60 cm   (1.8-2.4cm) AoV Area, VTI:           2.37 cm2  (2.5-5.5cm2) AoV Area,Vmax:           2.32 cm2  (2.5-4.5cm2) AoV Dimensionless Index: 0.57  RIGHT VENTRICLE: RV Basal 4.70 cm RV Mid   3.60 cm RV  Major 8.0 cm TAPSE:   10.1 mm  TRICUSPID VALVE/RVSP:          Normal Ranges: Peak TR Velocity:     2.12 m/s RV Syst Pressure:     21 mmHg  (< 30mmHg) IVC Diam:             1.90 cm  PULMONIC VALVE:          Normal Ranges: PV Accel Time:  85 msec  (>120ms) PV Max Jude:     0.9 m/s  (0.6-0.9m/s) PV Max P.9 mmHg  [id number] Fawad Chicas MD Electronically signed on 10/25/2024 at 3:00:23 PM  ** Final **       Meds:  Scheduled medications  aspirin, 81 mg, oral, Daily  cholecalciferol, 50,000 Units, oral, Every   escitalopram, 10 mg, oral, Daily  hydrALAZINE, 100 mg, oral, TID  isosorbide dinitrate, 20 mg, oral, TID  magnesium oxide, 800 mg, oral, BID  [Held by provider] metoprolol succinate XL, 50 mg, oral, Daily  pantoprazole, 40 mg, oral, Daily before breakfast  sennosides-docusate sodium, 2 tablet, oral, BID  spironolactone, 25 mg, oral, Daily      Continuous medications  heparin, 0-4,000 Units/hr, Last Rate: 2,400 Units/hr (24)  milrinone, 0.125 mcg/kg/min, Last Rate: 0.125 mcg/kg/min (24)      PRN medications  PRN medications: bisacodyl, nitroglycerin, oxygen, polyethylene glycol     Assessment/Plan   Assessment & Plan   Fahad Meraz is a very pleasant 69 y.o. male w/ a PMHx sig for CAD s/p 4V CABG () and PCI (LCx/OM; 2019), stage D systolic HF/ICM/HFrEF with LVEF 10-15%( 10/22/24 TTE), AF s/p RFA (PVI and CTI; 2024) on OAC therapy, HTN, dyslipidemia, depression, anxiety and VIV using CPAP who was admitted to OSH ICU, s/p RHC on 10/18/24.  Per patient, he had been experienced worsening SOB and fluids overload for 2-3 weeks. Patient was on milrinone drip and underwent SGC diuresis. However his KENYA worsened, and were not able to wean milrinone drip. Decision was made to transfer him to HF ICU [facility] for possible advanced therapy consideration. Advanced therapies evaluation was initiated on 10/30. In the meantime, HFICU attempted to wean milrinone - currently at 0.125 mcg/kg/min  and uptitrating afterload reduction as tolerated. Discussed in advanced therapeutics committee on 11/5 and was denied for transplantation, and approved for LVAD after Dr Mclain agreeable to surgery. He was transferred to the floor on 11/6 to await VAD implantation.    Neuro:  # Hx of Depression/Anxiety  - Serial neuro and pain assessments  - PO Tylenol PRN for pain  - PT/OT Consult, OOB to chair  - c/w home Lexapro and Clonazepam     # Physical Status  -Overweight:  BMI: 27.9   -Reduced Mobility: acute decompensated heart failure and ICU stay      #Substance abuse  -Alcohol abuse/Alcohol dependence: NO   -Tobacco use/Nicotine Dependence: NO      Cardiovascular:  # Stage D, ICM, decompensated acute on chronic heart failure,  HFrEF 10-15% ( from 10/22/24 ECHO)   #Hx of CAD s/p 4V CABG 2012   -BRIANA (3/29/23): Left Ventricle: Moderately reduced left ventricular systolic function with a visually estimated EF of 35 - 40%.  - TTE (10/22/24): LVEF 10 -15%, right ventricle moderately dilated, moderately reduced systolic function. Mild MR  - Limited ECHO ordered to reassess RV function 11/5 - severe RVSF   - SGC # on arrival (10/25): /80 (89), CVP 11, PA 36/20 (26), PCWP 17, CO/CI 5.9/2.7, , SVO2 73% on Milrinone drip at 0.375 mcg/kg/min, hydral 50 mg TID.   - Closing #s: 11/6: /69 (80), CVP 8, PAP 40/20 (28), PAWP 15, CO/CI ESTEBAN 4.4/2.05, CO/CI Thermo 4.93/2.28, SVR 1308, SVO2 65% on milrinone 0.125, hydralazine 100 mg, and isosorbide 20 mg mcg/kg/min.  - C/w milrinone 0.125 mcg/kg/min  - Holding diuresis  - c/w hydralazine 100 mg TID  - c/w Isordil to 20 mg TID    - C/w Rodrigo 25 mg daily    - Daily standing weights, 2gm sodium diet, 2L fluid restriction, strict I&Os  - Coronary angiogram multidisciplinary discussion, including CT surgery, no revascularization options.  - Advanced Heart Failure Therapies Evaluation initiated 10/30/2024  - Discussed in advanced therapeutics committee on 11/5 and was denied  for transplantation, and contingent for LVAD after being seen by CTS -> approved 11/6 per Dr Mclain agreeable to implantation with OR date on 11/12     #CAD   -[facility] (12/20/2020):  Successful PCI of the SVG to ramus intermedius with one drug-eluting stent; OCT of the SVG to RCA to confirm no thrombosis; patent LAD; occluded SVG to OM.  -Meds: C/w ASA.     # Hx of AFib s/p RFA (PVI and CTI; 4/2024)  -NO device noted  -Was on home Coumadin for 2 months, prior to that was on Eliquis,  patient reported with Dizziness while on Eliquis   - Continue heparin gtt pending surgery.     # Dizziness/bradycardia  -Had 3 episodes of near syncope after his recent AF RFA; has stopped driving as a result. ECG with sinus olu. Metoprolol previously decreased by his local EP.    -Hold BB due to decompensated HF      #Electrolyte Disturbances  -Keep K>4, Mag >2     Pulmonary:   # PMH of VIV  -c/w home CPAP  -Monitor and maintain SpO2 > 92%     GI:  # H/x of  GERD  - Colonoscopy (11/4) - removed 1 polyp pending biopsy  - Bowel regimen: Senna, Miralax PRN  - PPI     #  Bilirubinemia  - T maryuri (10/24): 2.3, ALT, AST: normal   - CT C/A/P (10/17/24): Hepatomegaly and hepatic steatosis are present. The liver has a lobulated contour. A questionable lesion in the right hepatic lobe inferiorly measures 26 mm.  - CT Liver w/ contrast (11/2) Incidental findings include pancreatic and renal simple and complex attenuation lesions as well     # Urinary retention  - CT C/A/P (10/17/24): the prostate gland is mildly enlarged with mass effect on the posterior wall of the urinary bladder. The prostate gland measures 4.3 x 6.0 cm.   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  -Baseline BUN/Cr: 1.3-1.6   -Admit BUN/Cr (10/24): 28/1.88, now improved  -I/Os  -avoid hypotension and nephrotoxic agents     Heme:  #Anemia in the setting of iron deficiency  - Lab (10/24): H/H:  17.5/50.8,  Iron study: 63/374/17%, Ferritin: 76,  folate: 23.5, B12: 768   - NO iron replacement needed as H/H high     #Coagulopathy due to on home Coumadin  - INR  (10/30): 1.3, Hold home Coumadin   - C/w Heparin gtt      Endo:  #DM  - Euglycemic  - hgbA1c (10/24):  6.3     #Thyroid  -TSH (10/24):  8.47, T4: 1.44      ID:  # leukocytosis - Resolved  - WBC (10/26):  9.6   - Afebrile, nontoxic   - Trend temps q4h        PHYSICAL AND OCCUPATIONAL THERAPY: Ordered     LINES:  PIVs   A-line (OSH) 10/20 - current  Right Subclavian Cordis / PA catheter (OSH) 10/18 - 10/28  Right subclavian cordis: Previously placed swan sheath was exchanged to a new swan sheath over a short Jwire and was sutured in place. 10/28 - current    PROPHYLAXIS:  - DVT: SCD's, Coumadin/Heparin   - GI:  Pantoprazole 40 mg daily    CODE STATUS: Full    DISPO PLANNING/ SOCIAL:  - Disposition expectation:  CTICU after LVAD implantation. OR date TBD  - Barriers to discharge: LVAD      Patient examined and discussed with attending physician Dr. Nick STEEN spent 30 minutes in the professional and overall care of this patient.     ____________________________________________________________  Michael Tiwari MD

## 2024-11-07 NOTE — CARE PLAN
Patient is admitted to hospital for a preoperative optimization for implanting a left ventricular assist device (LVAD). I discussed that LVAD therapy is a major cardiac surgery which has significant risks and benefits. The risks include, but are not limited to, right ventricular failure which may require mechanical circulatory support, bleeding, clotting, neurologic events, prolonged ICU stay, need for re-operation, respiratory or renal failure, pump failure, or death.  We discussed that there are several surgical approaches to implantation. The surgical team will pick the best approach suited for the patient. I have reviewed all pertinent surgical history, imaging, and other diagnostic testing. The patient had the opportunity to ask questions, which were answered. This assessment was provided to the Advanced Heart Failure committee to assist in their decision making for this patient. The patient is aware and in agreement. A separate surgical consent will be obtained prior to implant if the patient is deemed a candidate and wishes to proceed. I spoke to the patient in great details about the operation, its risks and complications and the patient agrees to proceed. I discussed the specifics of surgery with him.  I explained that due to the fact that he has had previous cardiac surgery the approach will most likely involve left thoracotomy with outflow graft to the descending thoracic aorta.    Thank you for involving me in his care

## 2024-11-07 NOTE — PROGRESS NOTES
HFICU Attending Note    Principal Problem:    Acute on chronic heart failure with reduced ejection fraction and diastolic dysfunction  Active Problems:    Ischemic cardiomyopathy    Receiving inotropic medication    69WM with Stage D HFrEF now stabilized with inotrope infusion. He has biventricular dyfunction. His case was presented to the Advanced heart failure therapeutics committee and he was found to possibly be a candidate for LVAD. Surgical evaluation for this ongoing.      On exam he is conversant and interactive, no LE edema, RRR.     Stable end organ function on inotropic support.    Patient met with Dr. Hill today.     Supportive care ongoing. Plan for LVAD likely next week.    This critically ill patient continues to be at-risk for clinically significant deterioration / failure due to the above mentioned dysfunctional, unstable organ systems.  I have personally identified and managed all complex critical care issues to prevent aforementioned clinical deterioration.  Critical care time is spent at bedside and/or the immediate area and has included, but is not limited to, the review of diagnostic tests, labs, radiographs, serial assessments of hemodynamics, respiratory status, ventilatory management, and family updates.  Time spent in procedures and teaching are reported separately.    Critical care time: __35__ minutes     Kenisha Romero MD, MPH  Advanced Heart Failure and Transplant Cardiology  [facility]  [facility]

## 2024-11-07 NOTE — CARE PLAN
The clinical goals for the shift include pt will remain hemodynamically stable throughout the shift

## 2024-11-08 LAB
ALBUMIN SERPL BCP-MCNC: 4.2 G/DL (ref 3.4–5)
ANION GAP SERPL CALC-SCNC: 14 MMOL/L (ref 10–20)
BUN SERPL-MCNC: 25 MG/DL (ref 6–23)
CALCIUM SERPL-MCNC: 9.8 MG/DL (ref 8.6–10.6)
CHLORIDE SERPL-SCNC: 102 MMOL/L (ref 98–107)
CO2 SERPL-SCNC: 23 MMOL/L (ref 21–32)
CREAT SERPL-MCNC: 1.34 MG/DL (ref 0.5–1.3)
EGFRCR SERPLBLD CKD-EPI 2021: 57 ML/MIN/1.73M*2
ERYTHROCYTE [DISTWIDTH] IN BLOOD BY AUTOMATED COUNT: 17 % (ref 11.5–14.5)
GLUCOSE SERPL-MCNC: 112 MG/DL (ref 74–99)
HCT VFR BLD AUTO: 40.1 % (ref 41–52)
HGB BLD-MCNC: 13 G/DL (ref 13.5–17.5)
MAGNESIUM SERPL-MCNC: 2.08 MG/DL (ref 1.6–2.4)
MCH RBC QN AUTO: 27.1 PG (ref 26–34)
MCHC RBC AUTO-ENTMCNC: 32.4 G/DL (ref 32–36)
MCV RBC AUTO: 84 FL (ref 80–100)
NRBC BLD-RTO: 0 /100 WBCS (ref 0–0)
PHOSPHATE SERPL-MCNC: 4 MG/DL (ref 2.5–4.9)
PLATELET # BLD AUTO: 494 X10*3/UL (ref 150–450)
POTASSIUM SERPL-SCNC: 4.4 MMOL/L (ref 3.5–5.3)
RBC # BLD AUTO: 4.79 X10*6/UL (ref 4.5–5.9)
SODIUM SERPL-SCNC: 135 MMOL/L (ref 136–145)
UFH PPP CHRO-ACNC: 0.4 IU/ML
WBC # BLD AUTO: 8.2 X10*3/UL (ref 4.4–11.3)

## 2024-11-08 PROCEDURE — 97116 GAIT TRAINING THERAPY: CPT | Mod: GP,CQ

## 2024-11-08 PROCEDURE — 99232 SBSQ HOSP IP/OBS MODERATE 35: CPT | Performed by: INTERNAL MEDICINE

## 2024-11-08 PROCEDURE — 85027 COMPLETE CBC AUTOMATED: CPT

## 2024-11-08 PROCEDURE — 2500000004 HC RX 250 GENERAL PHARMACY W/ HCPCS (ALT 636 FOR OP/ED)

## 2024-11-08 PROCEDURE — 2500000001 HC RX 250 WO HCPCS SELF ADMINISTERED DRUGS (ALT 637 FOR MEDICARE OP)

## 2024-11-08 PROCEDURE — 85520 HEPARIN ASSAY: CPT

## 2024-11-08 PROCEDURE — 83735 ASSAY OF MAGNESIUM: CPT

## 2024-11-08 PROCEDURE — 80069 RENAL FUNCTION PANEL: CPT

## 2024-11-08 PROCEDURE — 36415 COLL VENOUS BLD VENIPUNCTURE: CPT

## 2024-11-08 PROCEDURE — 2500000002 HC RX 250 W HCPCS SELF ADMINISTERED DRUGS (ALT 637 FOR MEDICARE OP, ALT 636 FOR OP/ED)

## 2024-11-08 PROCEDURE — 1100000001 HC PRIVATE ROOM DAILY

## 2024-11-08 ASSESSMENT — PAIN DESCRIPTION - DESCRIPTORS: DESCRIPTORS: ACHING

## 2024-11-08 ASSESSMENT — COGNITIVE AND FUNCTIONAL STATUS - GENERAL
TOILETING: A LITTLE
WALKING IN HOSPITAL ROOM: A LITTLE
DAILY ACTIVITIY SCORE: 21
CLIMB 3 TO 5 STEPS WITH RAILING: A LITTLE
MOBILITY SCORE: 20
DRESSING REGULAR LOWER BODY CLOTHING: A LITTLE
DRESSING REGULAR UPPER BODY CLOTHING: A LITTLE
WALKING IN HOSPITAL ROOM: A LITTLE
DRESSING REGULAR UPPER BODY CLOTHING: A LITTLE
TOILETING: A LITTLE
MOVING TO AND FROM BED TO CHAIR: A LITTLE
CLIMB 3 TO 5 STEPS WITH RAILING: A LITTLE
STANDING UP FROM CHAIR USING ARMS: A LITTLE
MOVING TO AND FROM BED TO CHAIR: A LITTLE
DRESSING REGULAR LOWER BODY CLOTHING: A LITTLE
MOBILITY SCORE: 20
CLIMB 3 TO 5 STEPS WITH RAILING: A LITTLE
MOBILITY SCORE: 20
WALKING IN HOSPITAL ROOM: A LITTLE
MOVING TO AND FROM BED TO CHAIR: A LITTLE
STANDING UP FROM CHAIR USING ARMS: A LITTLE
STANDING UP FROM CHAIR USING ARMS: A LITTLE
DAILY ACTIVITIY SCORE: 21

## 2024-11-08 ASSESSMENT — KANSAS CITY CARDIOMYOPATHY QUESTIONNAIRE (KCCQ12)
2. OVER THE PAST 2 WEEKS, HOW MANY TIMES DID YOU HAVE SWELLING IN YOUR FEET, ANKLES OR LEGS WHEN YOU WOKE UP IN THE MORNING: EVERY MORNING
4. OVER THE PAST 2 WEEKS, ON AVERAGE, HOW MANY TIMES HAS SHORTNESS OF BREATH LIMITED YOUR ABILITY TO DO WHAT YOU WANTED: AT LEAST ONCE A DAY
3. OVER THE PAST 2 WEEKS, ON AVERAGE, HOW MANY TIMES HAS FATIGUE LIMITED YOUR ABILITY TO DO WHAT YOU WANTED: AT LEAST ONCE A DAY
6. OVER THE PAST 2 WEEKS, HOW MUCH HAS YOUR HEART FAILURE LIMITED YOUR ENJOYMENT OF LIFE: IT HAS MODERATELY LIMITED MY ENJOYMENT OF LIFE
5. OVER THE PAST 2 WEEKS, ON AVERAGE, HOW MANY TIMES HAVE YOU BEEN FORCED TO SLEEP SITTING UP IN A CHAIR OR WITH AT LEAST 3 PILLOWS TO PROP YOU UP BECAUSE OF SHORTNESS OF BREATH?: EVERY NIGHT
1C. OVER THE PAST 2 WEEKS, HOW MUCH WERE YOU LIMITED BY HEART FAILURE SYMPTOMS (SHORTNESS OF BREATH OR FATIGUE) WHEN HURRYING OR JOGGING (AS IF TO CATCH A BUS): EXTREMELY LIMITED
8B. OVER THE PAST 2 WEEKS, ON AVERAGE, HOW HAS HEART FAILURE LIMITED YOU ABILITY TO WORK OR DO HOUSEHOLD CHORES: MODERATELY LIMITED
1B. OVER THE PAST 2 WEEKS, HOW MUCH WERE YOU LIMITED BY HEART FAILURE SYMPTOMS (SHORTNESS OF BREATH OR FATIGUE) WHEN WALKING 1 BLOCK ON LEVEL GROUND: QUITE A BIT LIMITED
7. IF YOU HAD TO SPEND THE REST OF YOUR LIFE WITH YOUR HEART FAILURE THE WAY IT IS RIGHT NOW, HOW WOULD YOU FEEL ABOUT THIS?: MOSTLY SATISFIED
8A. OVER THE PAST 2 WEEKS, ON AVERAGE, HOW HAS HEART FAILURE LIMITED YOU ABILITY TO DO HOBBIES OR RECREATIONAL ACTIVITIES: MODERATELY LIMITED
DID THE PATIENT COMPLETE A KCCQ FORM: YES
8C. OVER THE PAST 2 WEEKS, ON AVERAGE, HOW HAS HEART FAILURE LIMITED YOU ABILITY TO VISIT FAMILY AND FRIENDS OUR OF YOUR HOME: SLIGHTLY LIMITED
1A. OVER THE PAST 2 WEEKS, HOW MUCH WERE YOU LIMITED BY HEART FAILURE SYMPTOMS (SHORTNESS OF BREATH OR FATIGUE) WHEN SHOWERING OR BATHING: SLIGHTLY LIMITED

## 2024-11-08 ASSESSMENT — PAIN - FUNCTIONAL ASSESSMENT: PAIN_FUNCTIONAL_ASSESSMENT: 0-10

## 2024-11-08 ASSESSMENT — LIFESTYLE VARIABLES
CURRENT_SMOKER: NO
CURRENT_SMOKER_ECIG: NO

## 2024-11-08 ASSESSMENT — ACTIVITIES OF DAILY LIVING (ADL): EFFECT OF PAIN ON DAILY ACTIVITIES: DID NOT LIMIT PARTICIPATION

## 2024-11-08 ASSESSMENT — PAIN SCALES - GENERAL
PAINLEVEL_OUTOF10: 1
PAINLEVEL_OUTOF10: 0 - NO PAIN

## 2024-11-08 NOTE — PROGRESS NOTES
Physical Therapy    Physical Therapy Treatment    Patient Name: Fahad Meraz  MRN: [id number]  Department: [place] [address]  Room: [facility]  Today's Date: 11/8/2024  Time Calculation  Start Time: 1330  Stop Time: 1353  Time Calculation (min): 23 min         Assessment/Plan   PT Assessment  End of Session Communication: Bedside nurse  End of Session Patient Position: Up in chair, Alarm off, not on at start of session     PT Plan  Treatment/Interventions: Bed mobility, Transfer training, Gait training, Stair training, Balance training, Strengthening, Endurance training, Range of motion, Therapeutic exercise, Therapeutic activity  PT Plan: Ongoing PT  PT Frequency: 4 times per week  PT Discharge Recommendations: Other (comment) (outpatient cardiac rehab)  PT Recommended Transfer Status: Contact guard  PT - OK to Discharge: Yes      General Visit Information:   PT  Visit  PT Received On: 11/08/24  Response to Previous Treatment: Patient with no complaints from previous session.  General  Reason for Referral: patient is now an advanced therapy work-up  Past Medical History Relevant to Rehab: CAD s/p 4V CABG (2012) and PCI (LCx/OM; 5/2019), stage D systolic HF/ICM/HFrEF with LVEF 10-15%( 10/22/24 TTE), AF s/p RFA (PVI and CTI; 4/2024) on OAC therapy, HTN, dyslipidemia, depression, anxiety and VIV using CPAP  Family/Caregiver Present: Yes  Caregiver Feedback: Family in room and supportive  Prior to Session Communication: Bedside nurse  Patient Position Received: Up in chair, Alarm off, not on at start of session  General Comment: Pt pleasant and motivated for therapy.    Subjective   Precautions:  Precautions  Hearing/Visual Limitations: hearing is WFL  Medical Precautions: Cardiac precautions  Precautions Comment: LVAD/OHT work-up    Vital Signs (Past 2hrs)        Date/Time Vitals Session Patient Position Pulse Resp SpO2 BP MAP (mmHg)    11/08/24 1330 --  Sitting  85  --  95 %  105/66  --                         Objective    Pain:  Pain Assessment  Pain Assessment: 0-10  0-10 (Numeric) Pain Score: 1  Pain Type: Acute pain  Pain Location: Toe (Comment which one) (great toe 2/2 gout)  Pain Orientation: Left  Pain Descriptors: Aching  Pain Frequency: Intermittent  Pain Onset: Sudden  Clinical Progression: Rapidly improving  Effect of Pain on Daily Activities: did not limit participation  Pain Interventions: Medication (See MAR), Repositioned, Ambulation/increased activity  Cognition:  Cognition  Overall Cognitive Status: Within Functional Limits  Arousal/Alertness: Appropriate responses to stimuli  Orientation Level: Oriented X4     Postural Control:  Static Sitting Balance  Static Sitting-Balance Support: Feet supported, No upper extremity supported  Static Sitting-Level of Assistance: Independent  Dynamic Sitting Balance  Dynamic Sitting-Balance Support: Feet supported, No upper extremity supported  Dynamic Sitting-Level of Assistance: Independent  Static Standing Balance  Static Standing-Balance Support: No upper extremity supported  Static Standing-Level of Assistance: Close supervision  Dynamic Standing Balance  Dynamic Standing-Balance Support: No upper extremity supported  Dynamic Standing-Level of Assistance: Contact guard    Activity Tolerance:  Activity Tolerance  Endurance: Tolerates 10 - 20 min exercise with multiple rests  Treatments:     Therapeutic Activity  Therapeutic Activity Performed: Yes    Bed Mobility  Bed Mobility: No (Start and finish session in chair)    Ambulation/Gait Training  Ambulation/Gait Training Performed: Yes  Ambulation/Gait Training 1  Surface 1: Level tile  Device 1: IV Pole  Assistance 1: Close supervision  Quality of Gait 1: Inconsistent stride length, Forward flexed posture, Decreased step length  Comments/Distance (ft) 1: 750 ft (vitals stable throughout)  Transfers  Transfer: Yes  Transfer 1  Transfer From 1: Sit to  Transfer to 1: Stand  Technique 1: Sit to stand  Transfer Level of Assistance  1: Contact guard  Transfers 2  Transfer From 2: Stand to  Transfer to 2: Sit  Technique 2: Stand to sit  Transfer Level of Assistance 2: Close supervision    Outcome Measures:  [facility] Basic Mobility  Turning from your back to your side while in a flat bed without using bedrails: None  Moving from lying on your back to sitting on the side of a flat bed without using bedrails: None  Moving to and from bed to chair (including a wheelchair): A little  Standing up from a chair using your arms (e.g. wheelchair or bedside chair): A little  To walk in hospital room: A little  Climbing 3-5 steps with railing: A little  Basic Mobility - Total Score: 20    Education Documentation  Handouts, taught by Agusto Zurita PTA at 11/8/2024  2:09 PM.  Learner: Patient  Readiness: Acceptance  Method: Explanation  Response: Verbalizes Understanding    Mobility Training, taught by Agusto Zurita PTA at 11/8/2024  2:09 PM.  Learner: Patient  Readiness: Acceptance  Method: Explanation  Response: Verbalizes Understanding    Education Comments  No comments found.           Encounter Problems       Encounter Problems (Active)       Balance       Pt will score >45 on VEGA for safe community ambulation (Progressing)       Start:  10/29/24    Expected End:  11/12/24               Mobility       Patient will ambulate >1000 ft with LRAD and supervision With stable vitals and RPD </= 3/10 and RPE </= 13/20 for improved functional mobility.   (Met)       Start:  10/29/24    Expected End:  11/12/24    Resolved:  11/05/24    Updated to: Patient will ambulate >2500 ft with LRAD and supervision With stable vitals and RPD </= 3/10 and RPE </= 13/20 for improved functional mobility.    Update reason: Progression         Pt will complete 6MWT with no assistive device and supervision and achieve >700 ft With stable vitals and RPD </= 3/10 and RPE </= 13/20 for improved functional mobility.   (Met)       Start:  10/29/24    Expected End:  11/12/24    Resolved:   11/05/24    Updated to: Pt will complete 6MWT with no assistive device and supervision and achieve >1300 ft With stable vitals and RPD </= 3/10 and RPE </= 13/20 for improved functional mobility.    Update reason: Progression         Patient will ambulate >2500 ft with LRAD and supervision With stable vitals and RPD </= 3/10 and RPE </= 13/20 for improved functional mobility. (Progressing)       Start:  11/05/24    Expected End:  11/12/24                Pt will complete 6MWT with no assistive device and supervision and achieve >1300 ft With stable vitals and RPD </= 3/10 and RPE </= 13/20 for improved functional mobility. (Progressing)       Start:  11/05/24    Expected End:  11/12/24                   PT Transfers       Patient will perform bed mobility indep (maintenance 2/2 prolonged hospital stay anticipated) (Progressing)       Start:  10/29/24    Expected End:  11/12/24            Patient will transfer sit to and from stand indep (Progressing)       Start:  10/29/24    Expected End:  11/12/24            Pt will complete 5XSTS from recliner without UE assist <12 seconds With stable vitals and RPD </= 3/10 and RPE </= 13/20 for improved functional mobility.   (Progressing)       Start:  10/29/24    Expected End:  11/12/24               Pain - Adult

## 2024-11-08 NOTE — PROGRESS NOTES
ADVANCED HEART FAILURE PROGRESS NOTE      HF Cardiologist: Dr. Washington   VAD Coordinator:     Planning VAD this admission     Subjective     HPI:  Fahad Meraz is a very pleasant 69 y.o. male w/ a PMHx sig for CAD s/p 4V CABG (2012) and PCI (LCx/OM; 5/2019), stage D systolic HF/ICM/HFrEF with LVEF 10-15%( 10/22/24 TTE), AF s/p RFA (PVI and CTI; 4/2024) on OAC therapy, HTN, dyslipidemia, depression, anxiety and VIV using CPAP who was admitted to OSH ICU, s/p RHC on 10/18/24.  Per patient, he had been experienced worsening SOB and fluids overload for 2-3 weeks. Patient was on milrinone drip and underwent SGC diuresis. However his KENYA worsened, and were not able to wean milrinone drip. Decision was made to transfer him to HF ICU [facility] [place] possible advanced therapy consideration. Advanced therapies evaluation was initiated on 10/30. In the meantime, HFICU attempted to wean milrinone - currently at 0.125 mcg/kg/min and uptitrating afterload reduction as tolerated. Discussed in advanced therapeutics committee on 11/5 and was denied for transplantation, and approved for LVAD after Dr Mclain agreeable to surgery. He was transferred to the floor on 11/6 to await VAD implantation.    Principal Problem:    Acute on chronic heart failure with reduced ejection fraction and diastolic dysfunction  Active Problems:    Ischemic cardiomyopathy    Receiving inotropic medication       LOS: 15 days     Interval Events:   NAEO. He had a question regarding whether he would remain in afib post LVAD implantation, which was discussed with him and his family. Otherwise no new symptoms or concerns today    Plan: Transfer to ICU tomorrow for swan and optimization prior to LVAD implantation on Tuesday.      Objective   Vitals:   Vitals:    11/08/24 0348 11/08/24 0600 11/08/24 0757 11/08/24 1330   BP: 107/70  105/66 105/66   BP Location:    Left arm   Pulse: 79  85 85   Resp: 19      Temp: 36.4 °C (97.5 °F)  36.4 °C (97.5 °F)     TempSrc:       SpO2: 96%  95% 95%   Weight:  93.4 kg (205 lb 14.6 oz)     Height:         Wt Readings from Last 5 Encounters:   11/08/24 93.4 kg (205 lb 14.6 oz)   10/24/24 92.5 kg (204 lb)   08/05/24 122 kg (268 lb)   01/31/22 119 kg (262 lb)   02/18/20 123 kg (270 lb 2 oz)     Hemodynamic parameters for last 24 hours:     Intake/Output for last 24 hours:    Intake/Output Summary (Last 24 hours) at 11/8/2024 1523  Last data filed at 11/8/2024 0942  Gross per 24 hour   Intake 734 ml   Output 1700 ml   Net -966 ml      Physical exam:  Physical Exam  Constitutional:       Appearance: He is obese.   Cardiovascular:      Rate and Rhythm: Normal rate and regular rhythm.      Pulses: Normal pulses.      Heart sounds: Normal heart sounds. No murmur heard.     No friction rub. No gallop.   Pulmonary:      Effort: Pulmonary effort is normal. No respiratory distress.      Breath sounds: Normal breath sounds. No stridor. No wheezing or rales.   Abdominal:      General: Abdomen is flat. There is no distension.      Palpations: Abdomen is soft. There is no mass.      Tenderness: There is no abdominal tenderness. There is no guarding.   Musculoskeletal:      Right lower leg: No edema.      Left lower leg: No edema.   Neurological:      General: No focal deficit present.      Mental Status: He is alert and oriented to person, place, and time.   Psychiatric:         Mood and Affect: Mood normal.         Behavior: Behavior normal.        Driveline:      Labs:   @Fincon@    Imaging:   No results found.     Notable Studies:   EKG:  Encounter Date: 10/24/24   Electrocardiogram, 12-lead PRN ACS symptoms   Result Value    Ventricular Rate 111    Atrial Rate 111    QRS Duration 130    QT Interval 302    QTC Calculation(Bazett) 410    R Axis 134    T Axis -40    QRS Count 18    Q Onset 218    T Offset 369    QTC Fredericia 370    Narrative    Atrial fibrillation with occasional Premature ventricular complexes and Fusion  complexes  Nonspecific intraventricular block  Cannot rule out Anteroseptal infarct , age undetermined  T wave abnormality, consider inferolateral ischemia  Abnormal ECG  No previous ECGs available  Confirmed by Lucas Kerns ([id number]) on 10/28/2024 12:58:57 PM       Echo:  Transthoracic Echo (TTE) Limited    Result Date: 11/5/2024   [facility], [address] [place] [address]                Tel [phone] and Fax [phone] TRANSTHORACIC ECHOCARDIOGRAM REPORT  Patient Name:       RJ Webb Physician:    [id number] Mira Mayer MD Study Date:         11/5/2024           Ordering Provider:    [id number] PAYAL KOHLER MRN/PID:            [id number]            Fellow: Accession#:         [id number]        Nurse: Date of Birth/Age:  1955 / 69     Sonographer:          Maureen estrada RDCS Gender assigned at  M                   Additional Staff: Birth: Height:             182.88 cm           Admit Date:           10/24/2024 Weight:             93.44 kg            Admission Status:     Inpatient - STAT BSA / BMI:          2.16 m2 / 27.94     Encounter#:           [id number]                     kg/m2 Blood Pressure:     106/72 mmHg         Department Location:  [address] Study Type:    TRANSTHORACIC ECHO (TTE) LIMITED Diagnosis/ICD: Heart failure, unspecified-I50.9 Indication:    RV assessment CPT Code:      Echo Limited-04368; Doppler Limited-09171; Color Doppler-77001 Patient History: Pertinent History: Negative bubble peformed on prior echo; Known 15-20% EF;                    Cardiogenic shock; Acute on chronic heart failure with                    reduced EF and diastolic dysfunction; ICM; Hx of chronic                    A-Fib; VIV. Study Detail: The following Echo studies were  performed: 2D, M-Mode, Doppler and               color flow. Technically challenging study due to body habitus.               Definity used as a contrast agent for endocardial border               definition. Total contrast used for this procedure was 2 mL via IV               push.  PHYSICIAN INTERPRETATION: Left Ventricle: Left ventricular ejection fraction is severely decreased, calculated by Loera's biplane at 18%. There is severely increased eccentric left ventricular hypertrophy. There is global hypokinesis of the left ventricle with minor regional variations. The left ventricular cavity size is severely dilated. There is normal septal and normal posterior left ventricular wall thickness. Left ventricular diastolic filling was not assessed. There is no definite left ventricular thrombus visualized. Left Atrium: The left atrium is severely dilated. Right Ventricle: The right ventricle is severely enlarged. There is severely reduced right ventricular systolic function. A device is visualized in the right ventricle. Right Atrium: The right atrium is severely dilated. There is a device visualized in the right atrium. Aortic Valve: The aortic valve was not well visualized. There is mild aortic valve cusp calcification. Aortic valve regurgitation was not assessed. Mitral Valve: The mitral valve is normal in structure. Mitral valve regurgitation was not assessed. Tricuspid Valve: The tricuspid valve is structurally normal. There is mild tricuspid regurgitation. The Doppler estimated RVSP is mildly elevated at 42.9 mmHg. Pulmonic Valve: The pulmonic valve was not assessed. Pulmonic valve regurgitation was not assessed. Pericardium: Trivial pericardial effusion. Aorta: The aortic root is abnormal. The aortic root appears mildly dilated and measures 4.20 cm. There is mild dilatation of the ascending aorta. There is mild dilatation of the aortic root. Systemic Veins: The inferior vena cava appears dilated, with IVC  inspiratory collapse less than 50%. In comparison to the previous echocardiogram(s): Compared with the prior exam from 10/25/2024, there was already a severely dilated LV with severely reduced function. The RV remains dilated with severely reduced function. Valvular function not assessed on today's exam.  CONCLUSIONS:  1. Left ventricular ejection fraction is severely decreased, calculated by Loera's biplane at 18%.  2. There is global hypokinesis of the left ventricle with minor regional variations.  3. Left ventricular cavity size is severely dilated.  4. No left ventricular thrombus visualized.  5. There is severely increased eccentric left ventricular hypertrophy.  6. There is severely reduced right ventricular systolic function.  7. Severely enlarged right ventricle.  8. The left atrium is severely dilated.  9. The right atrium is severely dilated. 10. Mitral valve regurgitation was not assessed. 11. Mildly elevated right ventricular systolic pressure. 12. Mildly dilated aortic root. 13. Compared with the prior exam from 10/25/2024, there was already a severely dilated LV with severely reduced function. The RV remains dilated with severely reduced function. Valvular function not assessed on today's exam. QUANTITATIVE DATA SUMMARY:  2D MEASUREMENTS:           Normal Ranges: Ao Root d:       4.20 cm   (2.0-3.7cm) IVSd:            0.87 cm   (0.6-1.1cm) LVPWd:           0.78 cm   (0.6-1.1cm) LVIDd:           8.21 cm   (3.9-5.9cm) LVIDs:           8.04 cm LV Mass Index:   157 g/m2 LVEDV Index:     138 ml/m2 LV % FS          2.0 %  LA VOLUME:                  Normal Ranges: LA Volume Index: 59.9 ml/m2  RA VOLUME BY A/L METHOD:          Normal Ranges: RA Area A4C:             36.8 cm2  AORTA MEASUREMENTS:         Normal Ranges: Ao Sinus, d:        4.20 cm (2.1-3.5cm) Asc Ao, d:          4.20 cm (2.1-3.4cm)  LV SYSTOLIC FUNCTION BY 2D PLANIMETRY (MOD):                      Normal Ranges: EF-A4C View:    14 % (>=55%)  EF-A2C View:    21 % EF-Biplane:     18 % LV EF Reported: 18 %  AORTIC VALVE:          Normal Ranges: LVOT Diameter: 2.31 cm (1.8-2.4cm)  RIGHT VENTRICLE: RV Basal 6.10 cm RV Mid   4.10 cm RV Major 9.1 cm TAPSE:   9.0 mm RV s'    0.06 m/s  TRICUSPID VALVE/RVSP:          Normal Ranges: Peak TR Velocity:     2.64 m/s RV Syst Pressure:     43 mmHg  (< 30mmHg) IVC Diam:             3.00 cm  AORTA: Asc Ao Diam 4.23 cm  [id number] Mira Mayer MD Electronically signed on 11/5/2024 at 2:36:55 PM  ** Final **       Meds:  Scheduled medications  aspirin, 81 mg, oral, Daily  cholecalciferol, 50,000 Units, oral, Every Sunday  escitalopram, 10 mg, oral, Daily  hydrALAZINE, 100 mg, oral, TID  isosorbide dinitrate, 20 mg, oral, TID  magnesium oxide, 800 mg, oral, BID  [Held by provider] metoprolol succinate XL, 50 mg, oral, Daily  pantoprazole, 40 mg, oral, Daily before breakfast  sennosides-docusate sodium, 2 tablet, oral, BID  spironolactone, 25 mg, oral, Daily      Continuous medications  heparin, 0-4,000 Units/hr, Last Rate: 2,400 Units/hr (11/08/24 0654)  milrinone, 0.125 mcg/kg/min, Last Rate: 0.125 mcg/kg/min (11/08/24 0034)      PRN medications  PRN medications: bisacodyl, nitroglycerin, oxygen, polyethylene glycol     Assessment/Plan   Assessment & Plan   Fahad Meraz is a very pleasant 69 y.o. male w/ a PMHx sig for CAD s/p 4V CABG (2012) and PCI (LCx/OM; 5/2019), stage D systolic HF/ICM/HFrEF with LVEF 10-15%( 10/22/24 TTE), AF s/p RFA (PVI and CTI; 4/2024) on OAC therapy, HTN, dyslipidemia, depression, anxiety and VIV using CPAP who was admitted to OSH ICU, s/p RHC on 10/18/24.  Per patient, he had been experienced worsening SOB and fluids overload for 2-3 weeks. Patient was on milrinone drip and underwent SGC diuresis. However his KENYA worsened, and were not able to wean milrinone drip. Decision was made to transfer him to HF ICU [facility] [place] for possible advanced therapy consideration. Advanced therapies evaluation was initiated  on 10/30. In the meantime, HFICU attempted to wean milrinone - currently at 0.125 mcg/kg/min and uptitrating afterload reduction as tolerated. Discussed in advanced therapeutics committee on 11/5 and was denied for transplantation, and approved for LVAD after Dr Mclain agreeable to surgery. He was transferred to the floor on 11/6 to await VAD implantation.    Neuro:  # Hx of Depression/Anxiety  - Serial neuro and pain assessments  - PO Tylenol PRN for pain  - PT/OT Consult, OOB to chair  - c/w home Lexapro and Clonazepam     # Physical Status  -Overweight:  BMI: 27.9   -Reduced Mobility: acute decompensated heart failure and ICU stay      #Substance abuse  -Alcohol abuse/Alcohol dependence: NO   -Tobacco use/Nicotine Dependence: NO      Cardiovascular:  # Stage D, ICM, decompensated acute on chronic heart failure,  HFrEF 10-15% ( from 10/22/24 ECHO)   #Hx of CAD s/p 4V CABG 2012   -BRIANA (3/29/23): Left Ventricle: Moderately reduced left ventricular systolic function with a visually estimated EF of 35 - 40%.  - TTE (10/22/24): LVEF 10 -15%, right ventricle moderately dilated, moderately reduced systolic function. Mild MR  - Limited ECHO ordered to reassess RV function 11/5 - severe RVSF   - SGC # on arrival (10/25): /80 (89), CVP 11, PA 36/20 (26), PCWP 17, CO/CI 5.9/2.7, , SVO2 73% on Milrinone drip at 0.375 mcg/kg/min, hydral 50 mg TID.   - Closing #s: 11/6: /69 (80), CVP 8, PAP 40/20 (28), PAWP 15, CO/CI ESTEBAN 4.4/2.05, CO/CI Thermo 4.93/2.28, SVR 1308, SVO2 65% on milrinone 0.125, hydralazine 100 mg, and isosorbide 20 mg mcg/kg/min.  - C/w milrinone 0.125 mcg/kg/min  - Holding diuresis  - c/w hydralazine 100 mg TID  - c/w Isordil to 20 mg TID    - C/w Rodrgio 25 mg daily    - Daily standing weights, 2gm sodium diet, 2L fluid restriction, strict I&Os  - Coronary angiogram multidisciplinary discussion, including CT surgery, no revascularization options.  - Discussed in advanced therapeutics  committee on 11/5 and was denied for transplantation, and approved for LVAD implantation on Tuesday 11/12.  - Will plan to transfer to HFICU tomorrow for swan guided optimization.     #CAD   -[facility] (12/20/2020):  Successful PCI of the SVG to ramus intermedius with one drug-eluting stent; OCT of the SVG to RCA to confirm no thrombosis; patent LAD; occluded SVG to OM.  -Meds: C/w ASA.     # Hx of AFib s/p RFA (PVI and CTI; 4/2024)  -NO device noted  -Was on home Coumadin for 2 months, prior to that was on Eliquis,  patient reported with Dizziness while on Eliquis   - Continue heparin gtt pending surgery.     # Dizziness/bradycardia  -Had 3 episodes of near syncope after his recent AF RFA; has stopped driving as a result. ECG with sinus olu. Metoprolol previously decreased by his local EP.    -Hold BB due to decompensated HF      #Electrolyte Disturbances  -Keep K>4, Mag >2     Pulmonary:   # PMH of VIV  -c/w home CPAP  -Monitor and maintain SpO2 > 92%     GI:  # H/x of  GERD  - Colonoscopy (11/4) - removed 1 polyp pending biopsy  - Bowel regimen: Senna, Miralax PRN  - PPI     #  Bilirubinemia  - T maryuri (10/24): 2.3, ALT, AST: normal   - CT C/A/P (10/17/24): Hepatomegaly and hepatic steatosis are present. The liver has a lobulated contour. A questionable lesion in the right hepatic lobe inferiorly measures 26 mm.  - CT Liver w/ contrast (11/2) Incidental findings include pancreatic and renal simple and complex attenuation lesions as well     # Urinary retention  - CT C/A/P (10/17/24): the prostate gland is mildly enlarged with mass effect on the posterior wall of the urinary bladder. The prostate gland measures 4.3 x 6.0 cm.   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  -Baseline BUN/Cr: 1.3-1.6   -Admit BUN/Cr (10/24): 28/1.88, now improved  -I/Os  -avoid hypotension and nephrotoxic agents     Heme:  #Anemia in the setting of iron  deficiency  - Lab (10/24): H/H: 17.5/50.8,  Iron study: 63/374/17%, Ferritin: 76,  folate: 23.5, B12: 768   - NO iron replacement needed as H/H high     #Coagulopathy due to on home Coumadin  - INR  (10/30): 1.3, Hold home Coumadin   - C/w Heparin gtt      Endo:  #DM  - Euglycemic  - hgbA1c (10/24):  6.3     #Thyroid  -TSH (10/24):  8.47, T4: 1.44      ID:  # leukocytosis - Resolved  - WBC (10/26):  9.6   - Afebrile, nontoxic   - Trend temps q4h        PHYSICAL AND OCCUPATIONAL THERAPY: Ordered     LINES:  PIVs   A-line (OSH) 10/20 - current  Right Subclavian Cordis / PA catheter (OSH) 10/18 - 10/28  Right subclavian cordis: Previously placed swan sheath was exchanged to a new swan sheath over a short Jwire and was sutured in place. 10/28 - current    PROPHYLAXIS:  - DVT: SCD's, Coumadin/Heparin   - GI:  Pantoprazole 40 mg daily    CODE STATUS: Full    DISPO PLANNING/ SOCIAL:  - Disposition expectation:  CTICU after LVAD implantation. OR date TBD  - Barriers to discharge: LVAD      Patient examined and discussed with attending physician Dr. Romero     I spent 30 minutes in the professional and overall care of this patient.     ____________________________________________________________  Michael Tiwari MD

## 2024-11-08 NOTE — CARE PLAN
Problem: Pain - Adult  Goal: Verbalizes/displays adequate comfort level or baseline comfort level  Outcome: Progressing     Problem: Safety - Adult  Goal: Free from fall injury  Outcome: Progressing     Problem: Discharge Planning  Goal: Discharge to home or other facility with appropriate resources  Outcome: Progressing     Problem: Chronic Conditions and Co-morbidities  Goal: Patient's chronic conditions and co-morbidity symptoms are monitored and maintained or improved  Outcome: Progressing   The patient's goals for the shift include      The clinical goals for the shift include Pt. will not have any cardiac complaints for remainder of shift.    Over the shift, the patient did make progress toward the following goals.

## 2024-11-08 NOTE — PROGRESS NOTES
SW met with Pt at bedside on LT7 to complete pre-LVAD [facility] social work assessments including KCCQ12 and Pre-Euroqol. Pt had many family visitors who stepped out for completion of assessments, but indicated various of them would be around all day to keep Pt company. Pt stated he is feeling well going into anticipated LVAD implantation for next week. Pt did say he had some night terrors a few nights ago in the hospital, but this is not uncommon for him and he attributes more to being on many medications, not related to duress/stress. Pt had no questions for SW at this time. SW will continue to follow throughout LVAD admission and then see Pt on outpatient basis after discharge.     JOE Rm  Transplant/LVAD

## 2024-11-09 ENCOUNTER — APPOINTMENT (OUTPATIENT)
Dept: RADIOLOGY | Facility: HOSPITAL | Age: 69
DRG: 001 | End: 2024-11-09
Payer: MEDICARE

## 2024-11-09 LAB
ALBUMIN SERPL BCP-MCNC: 4.2 G/DL (ref 3.4–5)
ALBUMIN SERPL BCP-MCNC: 4.2 G/DL (ref 3.4–5)
ANION GAP BLDMV CALCULATED.4IONS-SCNC: 11 MMO/L (ref 10–25)
ANION GAP BLDMV CALCULATED.4IONS-SCNC: 12 MMO/L (ref 10–25)
ANION GAP SERPL CALC-SCNC: 14 MMOL/L (ref 10–20)
ANION GAP SERPL CALC-SCNC: 16 MMOL/L (ref 10–20)
BASE EXCESS BLDMV CALC-SCNC: 0.1 MMOL/L (ref -2–3)
BASE EXCESS BLDMV CALC-SCNC: 0.3 MMOL/L (ref -2–3)
BODY TEMPERATURE: 37 DEGREES CELSIUS
BODY TEMPERATURE: 37 DEGREES CELSIUS
BUN SERPL-MCNC: 24 MG/DL (ref 6–23)
BUN SERPL-MCNC: 25 MG/DL (ref 6–23)
CA-I BLDMV-SCNC: 1.26 MMOL/L (ref 1.1–1.33)
CA-I BLDMV-SCNC: 1.28 MMOL/L (ref 1.1–1.33)
CALCIUM SERPL-MCNC: 9.6 MG/DL (ref 8.6–10.6)
CALCIUM SERPL-MCNC: 9.9 MG/DL (ref 8.6–10.6)
CHLORIDE BLD-SCNC: 101 MMOL/L (ref 98–107)
CHLORIDE BLD-SCNC: 99 MMOL/L (ref 98–107)
CHLORIDE SERPL-SCNC: 101 MMOL/L (ref 98–107)
CHLORIDE SERPL-SCNC: 99 MMOL/L (ref 98–107)
CO2 SERPL-SCNC: 24 MMOL/L (ref 21–32)
CO2 SERPL-SCNC: 25 MMOL/L (ref 21–32)
CREAT SERPL-MCNC: 1.24 MG/DL (ref 0.5–1.3)
CREAT SERPL-MCNC: 1.45 MG/DL (ref 0.5–1.3)
EGFRCR SERPLBLD CKD-EPI 2021: 52 ML/MIN/1.73M*2
EGFRCR SERPLBLD CKD-EPI 2021: 63 ML/MIN/1.73M*2
ERYTHROCYTE [DISTWIDTH] IN BLOOD BY AUTOMATED COUNT: 16.7 % (ref 11.5–14.5)
ERYTHROCYTE [DISTWIDTH] IN BLOOD BY AUTOMATED COUNT: 16.8 % (ref 11.5–14.5)
GLUCOSE BLD MANUAL STRIP-MCNC: 100 MG/DL (ref 74–99)
GLUCOSE BLD-MCNC: 104 MG/DL (ref 74–99)
GLUCOSE BLD-MCNC: 118 MG/DL (ref 74–99)
GLUCOSE SERPL-MCNC: 101 MG/DL (ref 74–99)
GLUCOSE SERPL-MCNC: 93 MG/DL (ref 74–99)
HCO3 BLDMV-SCNC: 24.7 MMOL/L (ref 22–26)
HCO3 BLDMV-SCNC: 25.4 MMOL/L (ref 22–26)
HCT VFR BLD AUTO: 40.1 % (ref 41–52)
HCT VFR BLD AUTO: 41.4 % (ref 41–52)
HCT VFR BLD EST: 41 % (ref 41–52)
HCT VFR BLD EST: 41 % (ref 41–52)
HGB BLD-MCNC: 13.1 G/DL (ref 13.5–17.5)
HGB BLD-MCNC: 13.2 G/DL (ref 13.5–17.5)
HGB BLDMV-MCNC: 13.5 G/DL (ref 13.5–17.5)
HGB BLDMV-MCNC: 13.8 G/DL (ref 13.5–17.5)
INHALED O2 CONCENTRATION: 21 %
INHALED O2 CONCENTRATION: 21 %
LACTATE BLDMV-SCNC: 0.5 MMOL/L (ref 0.4–2)
LACTATE BLDMV-SCNC: 0.7 MMOL/L (ref 0.4–2)
MAGNESIUM SERPL-MCNC: 2.03 MG/DL (ref 1.6–2.4)
MAGNESIUM SERPL-MCNC: 2.07 MG/DL (ref 1.6–2.4)
MCH RBC QN AUTO: 27 PG (ref 26–34)
MCH RBC QN AUTO: 27.6 PG (ref 26–34)
MCHC RBC AUTO-ENTMCNC: 31.6 G/DL (ref 32–36)
MCHC RBC AUTO-ENTMCNC: 32.9 G/DL (ref 32–36)
MCV RBC AUTO: 84 FL (ref 80–100)
MCV RBC AUTO: 85 FL (ref 80–100)
NRBC BLD-RTO: 0 /100 WBCS (ref 0–0)
NRBC BLD-RTO: 0 /100 WBCS (ref 0–0)
OXYHGB MFR BLDMV: 63.4 % (ref 45–75)
OXYHGB MFR BLDMV: 68.9 % (ref 45–75)
PCO2 BLDMV: 39 MM HG (ref 41–51)
PCO2 BLDMV: 42 MM HG (ref 41–51)
PH BLDMV: 7.39 PH (ref 7.33–7.43)
PH BLDMV: 7.41 PH (ref 7.33–7.43)
PHOSPHATE SERPL-MCNC: 3.8 MG/DL (ref 2.5–4.9)
PHOSPHATE SERPL-MCNC: 3.9 MG/DL (ref 2.5–4.9)
PLATELET # BLD AUTO: 471 X10*3/UL (ref 150–450)
PLATELET # BLD AUTO: 533 X10*3/UL (ref 150–450)
PO2 BLDMV: 38 MM HG (ref 35–45)
PO2 BLDMV: 43 MM HG (ref 35–45)
POTASSIUM BLDMV-SCNC: 4.7 MMOL/L (ref 3.5–5.3)
POTASSIUM BLDMV-SCNC: 4.8 MMOL/L (ref 3.5–5.3)
POTASSIUM SERPL-SCNC: 4.4 MMOL/L (ref 3.5–5.3)
POTASSIUM SERPL-SCNC: 4.6 MMOL/L (ref 3.5–5.3)
RBC # BLD AUTO: 4.78 X10*6/UL (ref 4.5–5.9)
RBC # BLD AUTO: 4.86 X10*6/UL (ref 4.5–5.9)
SAO2 % BLDMV: 65 % (ref 45–75)
SAO2 % BLDMV: 70 % (ref 45–75)
SODIUM BLDMV-SCNC: 132 MMOL/L (ref 136–145)
SODIUM BLDMV-SCNC: 132 MMOL/L (ref 136–145)
SODIUM SERPL-SCNC: 134 MMOL/L (ref 136–145)
SODIUM SERPL-SCNC: 136 MMOL/L (ref 136–145)
UFH PPP CHRO-ACNC: 0.4 IU/ML
WBC # BLD AUTO: 7.5 X10*3/UL (ref 4.4–11.3)
WBC # BLD AUTO: 8.7 X10*3/UL (ref 4.4–11.3)

## 2024-11-09 PROCEDURE — 93503 INSERT/PLACE HEART CATHETER: CPT | Performed by: INTERNAL MEDICINE

## 2024-11-09 PROCEDURE — 2500000001 HC RX 250 WO HCPCS SELF ADMINISTERED DRUGS (ALT 637 FOR MEDICARE OP)

## 2024-11-09 PROCEDURE — 84100 ASSAY OF PHOSPHORUS: CPT

## 2024-11-09 PROCEDURE — 2500000002 HC RX 250 W HCPCS SELF ADMINISTERED DRUGS (ALT 637 FOR MEDICARE OP, ALT 636 FOR OP/ED)

## 2024-11-09 PROCEDURE — 82947 ASSAY GLUCOSE BLOOD QUANT: CPT

## 2024-11-09 PROCEDURE — 02HV33Z INSERTION OF INFUSION DEVICE INTO SUPERIOR VENA CAVA, PERCUTANEOUS APPROACH: ICD-10-PCS | Performed by: STUDENT IN AN ORGANIZED HEALTH CARE EDUCATION/TRAINING PROGRAM

## 2024-11-09 PROCEDURE — 2020000001 HC ICU ROOM DAILY

## 2024-11-09 PROCEDURE — 99232 SBSQ HOSP IP/OBS MODERATE 35: CPT | Performed by: INTERNAL MEDICINE

## 2024-11-09 PROCEDURE — 36415 COLL VENOUS BLD VENIPUNCTURE: CPT

## 2024-11-09 PROCEDURE — 85027 COMPLETE CBC AUTOMATED: CPT | Performed by: STUDENT IN AN ORGANIZED HEALTH CARE EDUCATION/TRAINING PROGRAM

## 2024-11-09 PROCEDURE — 2500000001 HC RX 250 WO HCPCS SELF ADMINISTERED DRUGS (ALT 637 FOR MEDICARE OP): Performed by: STUDENT IN AN ORGANIZED HEALTH CARE EDUCATION/TRAINING PROGRAM

## 2024-11-09 PROCEDURE — 71045 X-RAY EXAM CHEST 1 VIEW: CPT

## 2024-11-09 PROCEDURE — 37799 UNLISTED PX VASCULAR SURGERY: CPT | Performed by: STUDENT IN AN ORGANIZED HEALTH CARE EDUCATION/TRAINING PROGRAM

## 2024-11-09 PROCEDURE — 85027 COMPLETE CBC AUTOMATED: CPT

## 2024-11-09 PROCEDURE — 2500000004 HC RX 250 GENERAL PHARMACY W/ HCPCS (ALT 636 FOR OP/ED)

## 2024-11-09 PROCEDURE — 83735 ASSAY OF MAGNESIUM: CPT

## 2024-11-09 PROCEDURE — 71045 X-RAY EXAM CHEST 1 VIEW: CPT | Performed by: RADIOLOGY

## 2024-11-09 PROCEDURE — 2500000004 HC RX 250 GENERAL PHARMACY W/ HCPCS (ALT 636 FOR OP/ED): Performed by: STUDENT IN AN ORGANIZED HEALTH CARE EDUCATION/TRAINING PROGRAM

## 2024-11-09 PROCEDURE — 83735 ASSAY OF MAGNESIUM: CPT | Performed by: STUDENT IN AN ORGANIZED HEALTH CARE EDUCATION/TRAINING PROGRAM

## 2024-11-09 PROCEDURE — 85520 HEPARIN ASSAY: CPT

## 2024-11-09 PROCEDURE — 84132 ASSAY OF SERUM POTASSIUM: CPT | Performed by: STUDENT IN AN ORGANIZED HEALTH CARE EDUCATION/TRAINING PROGRAM

## 2024-11-09 RX ORDER — ACETAMINOPHEN 325 MG/1
650 TABLET ORAL EVERY 6 HOURS PRN
Status: DISCONTINUED | OUTPATIENT
Start: 2024-11-09 | End: 2024-11-12

## 2024-11-09 RX ORDER — MIDAZOLAM HYDROCHLORIDE 1 MG/ML
INJECTION INTRAMUSCULAR; INTRAVENOUS
Status: COMPLETED
Start: 2024-11-09 | End: 2024-11-09

## 2024-11-09 RX ORDER — FENTANYL CITRATE 50 UG/ML
25 INJECTION, SOLUTION INTRAMUSCULAR; INTRAVENOUS ONCE
Status: COMPLETED | OUTPATIENT
Start: 2024-11-09 | End: 2024-11-09

## 2024-11-09 RX ORDER — FENTANYL CITRATE 50 UG/ML
INJECTION, SOLUTION INTRAMUSCULAR; INTRAVENOUS
Status: COMPLETED
Start: 2024-11-09 | End: 2024-11-09

## 2024-11-09 RX ORDER — MIDAZOLAM HYDROCHLORIDE 1 MG/ML
1 INJECTION INTRAMUSCULAR; INTRAVENOUS ONCE
Status: COMPLETED | OUTPATIENT
Start: 2024-11-09 | End: 2024-11-09

## 2024-11-09 ASSESSMENT — COGNITIVE AND FUNCTIONAL STATUS - GENERAL
DAILY ACTIVITIY SCORE: 24
MOBILITY SCORE: 24

## 2024-11-09 ASSESSMENT — PAIN - FUNCTIONAL ASSESSMENT
PAIN_FUNCTIONAL_ASSESSMENT: 0-10
PAIN_FUNCTIONAL_ASSESSMENT: 0-10

## 2024-11-09 ASSESSMENT — PAIN SCALES - GENERAL
PAINLEVEL_OUTOF10: 0 - NO PAIN
PAINLEVEL_OUTOF10: 0 - NO PAIN

## 2024-11-09 NOTE — PROGRESS NOTES
HFICU Attending Note    Principal Problem:    Acute on chronic heart failure with reduced ejection fraction and diastolic dysfunction  Active Problems:    Ischemic cardiomyopathy    Receiving inotropic medication    Transferred back to HFICU for optimization prior to LVAD later this week. On exam he is stable, extremities warm without edema, he is alert and oriented. Plan is to proceed with placement of neck sheath and swan catheter.    Kenisha Romero MD, MPH  Advanced Heart Failure and Transplant Cardiology  [facility]

## 2024-11-09 NOTE — CARE PLAN
The patient's goals for the shift include      The clinical goals for the shift include Pt will sleep a minimum of 4 hours by the end of this shift      Problem: Heart Failure  Goal: Improved gas exchange this shift  Outcome: Progressing  Goal: Improved urinary output this shift  Outcome: Progressing  Goal: Reduction in peripheral edema within 24 hours  Outcome: Progressing  Goal: Report improvement of dyspnea/breathlessness this shift  Outcome: Progressing  Goal: Weight from fluid excess reduced over 2-3 days, then stabilize  Outcome: Progressing  Goal: Increase self care and/or family involvement in 24 hours  Outcome: Progressing

## 2024-11-09 NOTE — H&P
[facility]  HFICU HISTORY AND PHYSICAL     Fahad Meraz/[id number]    Admit Date: 10/24/2024  Hospital Length of Stay: 16   ICU Length of Stay: 5m   Primary Service: HFICU  Primary HF Cardiologist: Dr. Washington    HPI:   Fahad Meraz is a very pleasant 69 y.o. male w/ a PMHx sig for CAD s/p 4V CABG (2012) and PCI (LCx/OM; 5/2019), stage D systolic HF/ICM/HFrEF with LVEF 10-15%( 10/22/24 TTE), AF s/p RFA (PVI and CTI; 4/2024) on OAC therapy, HTN, dyslipidemia, depression, anxiety and VIV using CPAP who was admitted to OSH ICU, s/p C on 10/18/24.  Per patient, he had been experienced worsening SOB and fluids overload for 2-3 weeks. Patient was on milrinone drip and underwent SGC diuresis. However his KENYA worsened, and were not able to wean milrinone drip. Decision was made to transfer him [place] HF [place] [facility] [place] possible advanced therapy consideration. Advanced therapies evaluation was initiated on 10/30. In the meantime, HFICU attempted to wean milrinone - currently at 0.125 mcg/kg/min and uptitrating afterload reduction as tolerated. Discussed in advanced therapeutics committee on 11/5 and was denied for transplantation, and approved for LVAD after Dr Mclain agreeable to surgery. He was transferred to the floor on 11/6 to await VAD implantation. On the floor, he was met by Dr. Hill regarding planning for VAD. Current plan is for OR on Tuesday 11/12. He was transferred back to the HFICU on 11/9 for Dickson placement and optimization prior to OR.    On arrival to HFICU, the patient was in good spirits and denied any concerns such as dyspnea or chest pain. Vitals on arrival were BP 95/67, HR 95, R 15, SpO2 99% RA, T AF. A Dickson-Faviola catheter was placed at 82 cm, however coiling in the RA was observed on CXR. The catheter was then retracted to 30 cm with an RA tracing observed, then advanced with slight counterclockwise torque, then saw expected progression of RV then PA waveform. The  catheter was locked at 62 cm.    Cardiac Tests:  TTE limited 11/5/2024   1. Left ventricular ejection fraction is severely decreased, calculated by Loera's biplane at 18%.   2. There is global hypokinesis of the left ventricle with minor regional variations.   3. Left ventricular cavity size is severely dilated.   4. No left ventricular thrombus visualized.   5. There is severely increased eccentric left ventricular hypertrophy.   6. There is severely reduced right ventricular systolic function.   7. Severely enlarged right ventricle.   8. The left atrium is severely dilated.   9. The right atrium is severely dilated.  10. Mitral valve regurgitation was not assessed.  11. Mildly elevated right ventricular systolic pressure.  12. Mildly dilated aortic root.  13. Compared with the prior exam from 10/25/2024, there was already a severely dilated LV with severely reduced function. The RV remains dilated with severely reduced function. Valvular function not assessed on today's exam.    TTE 10/25/2024   1. Left ventricular ejection fraction is severely decreased, by visual estimate at 15-20%.   2. There is global hypokinesis of the left ventricle with minor regional variations.   3. Left ventricular cavity size is severely dilated.   4. Abnormal septal motion consistent with post-operative status.   5. No left ventricular thrombus visualized.   6. There is severely reduced right ventricular systolic function.   7. Moderately enlarged right ventricle.   8. The left atrium is moderately dilated.   9. The right atrium is moderately dilated.  10. Right ventricular systolic pressure is within normal limits.  11. There is no evidence of a patent foramen ovale.  12. Compared with study dated 2/3/2020, LVEF is now severely reduced, Previoulsy reported LVEF is 40-45% with some regional hypokinesis. Primary team is aware because of a recent BRIANA which showed similar findinds.    Stress: 1/16/24  Stress Combined Conclusion: The  study is positive for myocardial infarction in the inferior wall with ricky-infarct ischemia. Findings suggest a moderate risk of cardiac events.  Stress Function: Left ventricular function post-stress is abnormal. Global function is severely reduced. Post-stress ejection fraction is 28%. The stress end diastolic cavity size is severely enlarged.  Perfusion Conclusion: TID ratio is 1.11.  ECG: Resting ECG demonstrates atrial fibrillation.  Stress Test: A pharmacological stress test was performed using lexiscan.    Right Heart Cath: 10/18/24  Elevated intracardiac pressures  CI = 1.7 (low-output)    Left heart cath 12/20/2020  SUMMARY: Successful PCI of the SVG to ramus intermedius with one  drug-eluting stent; OCT of the SVG to RCA to confirm no thrombosis;  patent LAD; occluded SVG to OM.     PM/SHx:   CAD s/p 4V CABG (2012) and PCI (LCx/OM; 5/2019), stage C systolic HF/ICM/HFrEF with moderate LV dysfunction, AF s/p RFA (PVI and CTI; 4/2024) on OAC therapy, HTN, dyslipidemia, VIV using CPAP     SocHx:   , lives with his wife in [place]  Works as a contractor  Former smoker (15 pack year hx), occasional ETOH, denies illicits     FamHx:   Brother-HTN, CHF  Mother-HTN, CVA  Sister-HTN, DM II  Father-bone and prostate cancer     Past Surgical History:  Past Surgical History:   Procedure Laterality Date    CARDIAC CATHETERIZATION N/A 10/28/2024    Procedure: Left & Right Heart Cath w Angiography & LV;  Surgeon: Jed Lindsay MD;  Location: [place] [address] Cardiac Cath Lab;  Service: Cardiovascular;  Laterality: N/A;  Patient needs ischemia evaluation - Already has PA catheter in place       Family History:  No family history on file.    Social History:  Social History     Socioeconomic History    Marital status:      Spouse name: Not on file    Number of children: Not on file    Years of education: Not on file    Highest education level: Not on file   Occupational History    Not on file   Tobacco Use     Smoking status: Former     Types: Cigarettes    Smokeless tobacco: Never   Substance and Sexual Activity    Alcohol use: Not on file    Drug use: Not on file    Sexual activity: Not on file   Other Topics Concern    Not on file   Social History Narrative    Not on file     Social Drivers of Health     Financial Resource Strain: Low Risk  (10/25/2024)    Overall Financial Resource Strain (CARDIA)     Difficulty of Paying Living Expenses: Not hard at all   Food Insecurity: No Food Insecurity (10/25/2024)    Hunger Vital Sign     Worried About Running Out of Food in the Last Year: Never true     Ran Out of Food in the Last Year: Never true   Transportation Needs: No Transportation Needs (10/25/2024)    PRAPARE - Transportation     Lack of Transportation (Medical): No     Lack of Transportation (Non-Medical): No   Physical Activity: Sufficiently Active (1/29/2024)    Received from [facility]    Exercise Vital Sign     Days of Exercise per Week: 7 days     Minutes of Exercise per Session: 150+ min   Stress: Not on file   Social Connections: Not on file   Intimate Partner Violence: Not At Risk (10/25/2024)    Humiliation, Afraid, Rape, and Kick questionnaire     Fear of Current or Ex-Partner: No     Emotionally Abused: No     Physically Abused: No     Sexually Abused: No   Housing Stability: Low Risk  (10/25/2024)    Housing Stability Vital Sign     Unable to Pay for Housing in the Last Year: No     Number of Times Moved in the Last Year: 1     Homeless in the Last Year: No       Allergies:  Allergies   Allergen Reactions    Eliquis [Apixaban] Dizziness    Jardiance [Empagliflozin] Dizziness       Prior to Admission Meds:  Medications Prior to Admission   Medication Sig Dispense Refill Last Dose/Taking    aspirin 81 mg EC tablet Take 1 tablet (81 mg) by mouth once daily.       bumetanide (Bumex) 1 mg tablet Take 0.5 tablets (0.5 mg) by mouth once daily.       cholecalciferol (Vitamin D-3) 50,000  unit capsule Take 1 capsule (50,000 Units) by mouth 1 (one) time per week.       escitalopram (Lexapro) 10 mg tablet Take 1 tablet (10 mg) by mouth once daily.       hydrALAZINE (Apresoline) 25 mg tablet Take 1 tablet (25 mg) by mouth 2 times a day.       isosorbide mononitrate ER (Imdur) 120 mg 24 hr tablet Take 1 tablet (120 mg) by mouth once daily. Do not crush or chew.       metoprolol succinate XL (Toprol-XL) 50 mg 24 hr tablet Take 1 tablet (50 mg) by mouth once daily. Do not crush or chew.       multivitamin tablet Take 1 tablet by mouth once daily.       nitroglycerin (Nitrostat) 0.4 mg SL tablet Place 1 tablet (0.4 mg) under the tongue every 5 minutes if needed for chest pain.       pantoprazole (ProtoNix) 40 mg EC tablet Take 1 tablet (40 mg) by mouth once daily in the morning. Take before meals. Do not crush, chew, or split.       sacubitriL-valsartan (Entresto)  mg tablet Take 1 tablet by mouth 2 times a day.       spironolactone (Aldactone) 25 mg tablet Take 1 tablet (25 mg) by mouth once daily.       warfarin (Coumadin) 5 mg tablet Take 1 tablet (5 mg) by mouth once daily in the evening. Take as directed per After Visit Summary.          Current Medications:  Infusions:  heparin, Last Rate: 2,400 Units/hr (11/09/24 0229)  milrinone, Last Rate: 0.125 mcg/kg/min (11/09/24 0845)      Scheduled:  aspirin, 81 mg, Daily  cholecalciferol, 50,000 Units, Every Sunday  escitalopram, 10 mg, Daily  hydrALAZINE, 100 mg, TID  isosorbide dinitrate, 20 mg, TID  magnesium oxide, 800 mg, BID  [Held by provider] metoprolol succinate XL, 50 mg, Daily  pantoprazole, 40 mg, Daily before breakfast  sennosides-docusate sodium, 2 tablet, BID  spironolactone, 25 mg, Daily      PRN:  bisacodyl, 10 mg, Daily PRN  nitroglycerin, 0.4 mg, q5 min PRN  oxygen, , Continuous PRN - O2/gases  polyethylene glycol, 17 g, Daily PRN          Invasive Hemodynamics:    Most Recent Range Past 24hrs   BP (Art) 111/62 No data recorded    MAP(Art) 78 mmHg No data recorded   RA/CVP   No data recorded   PA 36/17 No data recorded   PA(mean) 25 mmHg No data recorded   PCWP 15 mmHg No data recorded   CO 4.4 L/min No data recorded   CI 2.1 L/min/m2 No data recorded   Mixed Venous 65 % No data recorded   SVR  1120 (dyne*sec)/cm5 No data recorded    (dyne*sec)/cm5 No data recorded     MCS:   Heart Mate III:     Most Recent Range Past 24hrs   Flow   No data recorded   Speed   No data recorded   Power   No data recorded   PI   No data recorded     ECMO:     Most Recent Range Past 24hrs   Flow   No data recorded   Speed   No data recorded   Sweep   No data recorded     Impella:      Most Recent Range Past 24hrs   Performance Level   No data recorded   Flow (L/min)   No data recorded   Motor Current   No data recorded   Placement Signal    Placement OK could not be evaluated. This SmartLink does not work with rows of the type: Custom List   Purge (mmHg)   No data recorded   Purge rate (mL/hr)   No data recorded     VENT:    Most Recent Range Past 24hrs   Mode      FiO2   No data recorded   Rate   No data recorded   Vt    No data recorded   PEEP   No data recorded     PHYSICAL EXAM:   Visit Vitals  /77   Pulse 76   Temp 34.8 °C (94.6 °F) (Temporal)   Resp 18   Ht 1.829 m (6')   Wt 93.4 kg (206 lb)   SpO2 97%   BMI 27.94 kg/m²   Smoking Status Former   BSA 2.18 m²       Wt Readings from Last 5 Encounters:   11/09/24 93.4 kg (206 lb)   10/24/24 92.5 kg (204 lb)   08/05/24 122 kg (268 lb)   01/31/22 119 kg (262 lb)   02/18/20 123 kg (270 lb 2 oz)       INTAKE/OUTPUT:  I/O last 3 completed shifts:  In: 2044 (22 mL/kg) [P.O.:1910; I.V.:134 (1.4 mL/kg)]  Out: 2925 (31.5 mL/kg) [Urine:2925 (0.9 mL/kg/hr)]  Weight: 92.9 kg      Physical Exam  GEN: Lying in hospital bed, NAD  HEENT: MMM, anicteric sclera  CV: RRR, nl S1/2, no mrg  RESP: Breathing comfortably in RA, CTAB  ABD: Soft, NTND  EXT: WWP, trace LE edema  NEURO: AOx3, no FND  PSYCH: appropriate  affect      DATA:  CMP:  Recent Labs     11/09/24  0748 11/08/24  0729 11/07/24  0738 11/06/24  1245 11/06/24  0513 11/05/24  0522 11/04/24  0419 11/03/24  0242 11/02/24  1658 11/02/24  0506 11/01/24  1533    135* 136 134* 136 137 132* 133* 131* 134* 133*   K 4.4 4.4 4.3 4.5 4.3 3.9 4.5 4.4 4.9 4.4 4.3    102 101 100 100 101 95* 97* 94* 99 101   CO2 25 23 24 23 26 28 26 23 24 24 21   ANIONGAP 14 14 15 16 14 12 16 17 18 15 15   BUN 24* 25* 25* 24* 24* 24* 31* 42* 38* 34* 28*   CREATININE 1.45* 1.34* 1.33* 1.33* 1.32* 1.15 1.16 1.41* 1.56* 1.45* 1.24   EGFR 52* 57* 58* 58* 58* 69 68 54* 48* 52* 63   MG 2.03 2.08 2.04  --  2.04 2.26 2.25 2.22 2.06 2.07 1.96     Recent Labs     11/09/24  0748 11/08/24  0729 11/07/24  0738 11/06/24  1245 11/06/24  0513 11/05/24  0522 11/04/24  0419 11/03/24  0242 11/01/24  0317 10/31/24  1249 10/25/24  1221 10/24/24  2328   ALBUMIN 4.2 4.2 3.8 4.2 4.0 3.9 4.3 4.1   < > 4.3   < > 4.4   ALT  --   --   --   --   --   --   --   --   --  14  --  13   AST  --   --   --   --   --   --   --   --   --  16  --  15   BILITOT  --   --   --   --   --   --   --   --   --  1.4*  --  2.3*    < > = values in this interval not displayed.     CBC:  Recent Labs     11/09/24  0748 11/08/24  0729 11/07/24  0738 11/06/24  0513 11/05/24  0522 11/04/24  0419 11/03/24  0242 11/02/24  0506   WBC 7.5 8.2 8.1 8.5 7.7 8.6 8.7 10.8   HGB 13.2* 13.0* 13.1* 13.2* 13.2* 13.8 13.4* 14.0   HCT 40.1* 40.1* 39.7* 40.1* 39.8* 40.6* 41.4 39.4*   * 494* 535* 509* 532* 584* 504* 562*   MCV 84 84 83 83 82 81 85 78*     COAG:   Recent Labs     11/03/24  0242 11/02/24  0506 11/01/24  0317 10/31/24  1249 10/31/24  0509 10/30/24  0131 10/29/24  0454 10/28/24  0919   INR 1.4* 1.3* 1.3* 1.3* 1.2* 1.3* 1.3* 1.4*     ABO:   Recent Labs     10/31/24  1249   ABO A     HEME/ENDO:  Recent Labs     10/31/24  1249 10/24/24  2328   FERRITIN  --  76   IRONSAT  --  17*   TSH 4.39* 8.47*   HGBA1C  --  6.3*      CARDIAC:   Recent  "Labs     10/31/24  1249 10/24/24  2328     --    * 1,995*     Recent Labs     11/06/24  1245 11/06/24  0513 11/05/24  2322 11/05/24  1741 11/05/24  0522   LACMX 0.7 0.8 1.0 0.8 0.6   SO2MV 65 71 68 65 71   O2CMX 63.1 69.5 66.7 63.9 69.6     No results for input(s): \"TACROLIMUS\", \"SIROLIMUS\", \"CYCLOSPORINE\" in the last 36305 hours.      ASSESSMENT AND PLAN:   Fahad Meraz is a very pleasant 69 y.o. male w/ a PMHx sig for CAD s/p 4V CABG (2012) and PCI (LCx/OM; 5/2019), stage D systolic HF/ICM/HFrEF with LVEF 10-15%( 10/22/24 TTE), AF s/p RFA (PVI and CTI; 4/2024) on OAC therapy, HTN, dyslipidemia, depression, anxiety and VIV using CPAP who was admitted to OSH ICU, s/p RHC on 10/18/24.  Per patient, he had been experienced worsening SOB and fluids overload for 2-3 weeks. Patient was on milrinone drip and underwent SGC diuresis. However his KENYA worsened, and were not able to wean milrinone drip. Decision was made to transfer him to HF ICU [facility] for possible advanced therapy consideration. Advanced therapies evaluation was initiated on 10/30. In the meantime, HFICU attempted to wean milrinone - currently at 0.125 mcg/kg/min and uptitrating afterload reduction as tolerated. Discussed in advanced therapeutics committee on 11/5 and was denied for transplantation, and approved for LVAD after Dr Mclain agreeable to surgery. He was transferred to the floor on 11/6 to await VAD implantation.     Neuro:  # Hx of Depression/Anxiety  - Serial neuro and pain assessments  - PO Tylenol PRN for pain  - PT/OT Consult, OOB to chair  - c/w home Lexapro and Clonazepam     #Substance abuse  -Alcohol abuse/Alcohol dependence: NO   -Tobacco use/Nicotine Dependence: NO      Cardiovascular:  # Stage D, ICM, decompensated acute on chronic heart failure,  HFrEF 10-15% ( from 10/22/24 ECHO)   #Hx of CAD s/p 4V CABG 2012   -BRIANA (3/29/23): Left Ventricle: Moderately reduced left ventricular systolic function with a visually " estimated EF of 35 - 40%.  - TTE (10/22/24): LVEF 10 -15%, right ventricle moderately dilated, moderately reduced systolic function. Mild MR  - Limited ECHO ordered to reassess RV function 11/5 - severe RVSF   - SGC # on arrival (11/9): TBD  - Follow up CXR tonight, then obtain full SGC measurements to make decision on diuresis/afterload reduction  - C/w milrinone 0.125 mcg/kg/min  - c/w hydralazine 100 mg TID  - c/w Isordil to 20 mg TID    - C/w Rodrigo 25 mg daily    - Daily standing weights, 2gm sodium diet, 2L fluid restriction, strict I&Os  - Coronary angiogram multidisciplinary discussion, including CT surgery, no revascularization options.  - Discussed in advanced therapeutics committee on 11/5 and was denied for transplantation, and approved for LVAD implantation on Tuesday 11/12.     #CAD   -LHC (12/20/2020):  Successful PCI of the SVG to ramus intermedius with one drug-eluting stent; OCT of the SVG to RCA to confirm no thrombosis; patent LAD; occluded SVG to OM.  -Meds: C/w ASA.     # Hx of AFib s/p RFA (PVI and CTI; 4/2024)  -NO device noted  -Was on home Coumadin for 2 months, prior to that was on Eliquis,  patient reported with Dizziness while on Eliquis   - Continue heparin gtt pending surgery.     # Dizziness/bradycardia  -Had 3 episodes of near syncope after his recent AF RFA; has stopped driving as a result. ECG with sinus olu. Metoprolol previously decreased by his local EP.    -Hold BB due to decompensated HF      #Electrolyte Disturbances  -Keep K>4, Mag >2     Pulmonary:   # PMH of VIV  -c/w home CPAP  -Monitor and maintain SpO2 > 92%     GI:  # H/x of  GERD  - Colonoscopy (11/4) - removed 1 polyp pending biopsy  - Bowel regimen: Senna, Miralax PRN  - PPI     #  Bilirubinemia  - T maryuri (10/31): 1.4, ALT, AST: normal   - CT C/A/P (10/17/24): Hepatomegaly and hepatic steatosis are present. The liver has a lobulated contour. A questionable lesion in the right hepatic lobe inferiorly measures 26  mm.  - CT Liver w/ contrast (11/2) Incidental findings include pancreatic and renal simple and complex attenuation lesions as well     # Urinary retention  - CT C/A/P (10/17/24): the prostate gland is mildly enlarged with mass effect on the posterior wall of the urinary bladder. The prostate gland measures 4.3 x 6.0 cm.   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  -Baseline BUN/Cr: 1.3-1.6   -Admit BUN/Cr (10/24): 28/1.88, now improved  -I/Os  -avoid hypotension and nephrotoxic agents     Heme:  #Anemia in the setting of iron deficiency  - Lab (10/24): H/H: 17.5/50.8,  Iron study: 63/374/17%, Ferritin: 76,  folate: 23.5, B12: 768   - NO iron replacement needed as H/H high     Endo:  #DM  - Euglycemic  - hgbA1c (10/24):  6.3     #Thyroid  -TSH (10/24):  8.47, T4: 1.44      ID:  # leukocytosis - Resolved  - WBC (10/26):  9.6   - Afebrile, nontoxic   - Trend temps q4h        PHYSICAL AND OCCUPATIONAL THERAPY: Ordered     LINES:  PIVs   A-line (OSH) 10/20 - current  Right Subclavian Cordis / PA catheter (OSH) 10/18 - 10/28  RIJ PA catheter - 11/9-current     PROPHYLAXIS:  - DVT: SCD's, Coumadin/Heparin   - GI:  Pantoprazole 40 mg daily     CODE STATUS: Full     DISPO PLANNING/ SOCIAL:  - Disposition expectation:  CTICU after LVAD implantation. OR date 11/12  - Barriers to discharge: LVAD    Patient seen and assessed with Dr. Romero    I personally spent 60 minutes of critical care time directly and personally managing the patient exclusive of separately billable procedures   _________________________________________________  Christ Stanford MD

## 2024-11-10 ENCOUNTER — APPOINTMENT (OUTPATIENT)
Dept: RADIOLOGY | Facility: HOSPITAL | Age: 69
DRG: 001 | End: 2024-11-10
Payer: MEDICARE

## 2024-11-10 VITALS
DIASTOLIC BLOOD PRESSURE: 76 MMHG | BODY MASS INDEX: 27.71 KG/M2 | HEIGHT: 72 IN | HEART RATE: 87 BPM | SYSTOLIC BLOOD PRESSURE: 93 MMHG | OXYGEN SATURATION: 96 % | WEIGHT: 204.59 LBS | RESPIRATION RATE: 13 BRPM | TEMPERATURE: 96.3 F

## 2024-11-10 LAB
ALBUMIN SERPL BCP-MCNC: 4 G/DL (ref 3.4–5)
ALBUMIN SERPL BCP-MCNC: 4.1 G/DL (ref 3.4–5)
ANION GAP BLDMV CALCULATED.4IONS-SCNC: 10 MMO/L (ref 10–25)
ANION GAP BLDMV CALCULATED.4IONS-SCNC: 8 MMO/L (ref 10–25)
ANION GAP BLDMV CALCULATED.4IONS-SCNC: 8 MMO/L (ref 10–25)
ANION GAP BLDMV CALCULATED.4IONS-SCNC: 9 MMO/L (ref 10–25)
ANION GAP SERPL CALC-SCNC: 14 MMOL/L (ref 10–20)
ANION GAP SERPL CALC-SCNC: 14 MMOL/L (ref 10–20)
BASE EXCESS BLDMV CALC-SCNC: 0.7 MMOL/L (ref -2–3)
BASE EXCESS BLDMV CALC-SCNC: 1.5 MMOL/L (ref -2–3)
BASE EXCESS BLDMV CALC-SCNC: 2.8 MMOL/L (ref -2–3)
BASE EXCESS BLDMV CALC-SCNC: 3.7 MMOL/L (ref -2–3)
BODY TEMPERATURE: 37 DEGREES CELSIUS
BUN SERPL-MCNC: 24 MG/DL (ref 6–23)
BUN SERPL-MCNC: 26 MG/DL (ref 6–23)
CA-I BLDMV-SCNC: 1.21 MMOL/L (ref 1.1–1.33)
CA-I BLDMV-SCNC: 1.27 MMOL/L (ref 1.1–1.33)
CA-I BLDMV-SCNC: 1.28 MMOL/L (ref 1.1–1.33)
CA-I BLDMV-SCNC: 1.31 MMOL/L (ref 1.1–1.33)
CALCIUM SERPL-MCNC: 9.8 MG/DL (ref 8.6–10.6)
CALCIUM SERPL-MCNC: 9.8 MG/DL (ref 8.6–10.6)
CHLORIDE BLD-SCNC: 100 MMOL/L (ref 98–107)
CHLORIDE BLD-SCNC: 100 MMOL/L (ref 98–107)
CHLORIDE BLD-SCNC: 101 MMOL/L (ref 98–107)
CHLORIDE BLD-SCNC: 104 MMOL/L (ref 98–107)
CHLORIDE SERPL-SCNC: 100 MMOL/L (ref 98–107)
CHLORIDE SERPL-SCNC: 99 MMOL/L (ref 98–107)
CO2 SERPL-SCNC: 25 MMOL/L (ref 21–32)
CO2 SERPL-SCNC: 26 MMOL/L (ref 21–32)
CREAT SERPL-MCNC: 1.2 MG/DL (ref 0.5–1.3)
CREAT SERPL-MCNC: 1.4 MG/DL (ref 0.5–1.3)
EGFRCR SERPLBLD CKD-EPI 2021: 54 ML/MIN/1.73M*2
EGFRCR SERPLBLD CKD-EPI 2021: 65 ML/MIN/1.73M*2
ERYTHROCYTE [DISTWIDTH] IN BLOOD BY AUTOMATED COUNT: 16.9 % (ref 11.5–14.5)
GLUCOSE BLD-MCNC: 108 MG/DL (ref 74–99)
GLUCOSE BLD-MCNC: 111 MG/DL (ref 74–99)
GLUCOSE BLD-MCNC: 123 MG/DL (ref 74–99)
GLUCOSE BLD-MCNC: 147 MG/DL (ref 74–99)
GLUCOSE SERPL-MCNC: 115 MG/DL (ref 74–99)
GLUCOSE SERPL-MCNC: 129 MG/DL (ref 74–99)
HCO3 BLDMV-SCNC: 25.9 MMOL/L (ref 22–26)
HCO3 BLDMV-SCNC: 26 MMOL/L (ref 22–26)
HCO3 BLDMV-SCNC: 27.2 MMOL/L (ref 22–26)
HCO3 BLDMV-SCNC: 28.5 MMOL/L (ref 22–26)
HCT VFR BLD AUTO: 41.2 % (ref 41–52)
HCT VFR BLD EST: 38 % (ref 41–52)
HCT VFR BLD EST: 41 % (ref 41–52)
HCT VFR BLD EST: 41 % (ref 41–52)
HCT VFR BLD EST: 42 % (ref 41–52)
HGB BLD-MCNC: 13.2 G/DL (ref 13.5–17.5)
HGB BLDMV-MCNC: 12.5 G/DL (ref 13.5–17.5)
HGB BLDMV-MCNC: 13.6 G/DL (ref 13.5–17.5)
HGB BLDMV-MCNC: 13.8 G/DL (ref 13.5–17.5)
HGB BLDMV-MCNC: 14.1 G/DL (ref 13.5–17.5)
INHALED O2 CONCENTRATION: 21 %
LACTATE BLDMV-SCNC: 0.7 MMOL/L (ref 0.4–2)
LACTATE BLDMV-SCNC: 0.8 MMOL/L (ref 0.4–2)
LACTATE BLDMV-SCNC: 0.8 MMOL/L (ref 0.4–2)
LACTATE BLDMV-SCNC: 1.6 MMOL/L (ref 0.4–2)
MAGNESIUM SERPL-MCNC: 2.22 MG/DL (ref 1.6–2.4)
MAGNESIUM SERPL-MCNC: 2.47 MG/DL (ref 1.6–2.4)
MCH RBC QN AUTO: 27.1 PG (ref 26–34)
MCHC RBC AUTO-ENTMCNC: 32 G/DL (ref 32–36)
MCV RBC AUTO: 85 FL (ref 80–100)
NRBC BLD-RTO: 0 /100 WBCS (ref 0–0)
OXYHGB MFR BLDMV: 63.4 % (ref 45–75)
OXYHGB MFR BLDMV: 67.4 % (ref 45–75)
OXYHGB MFR BLDMV: 69.7 % (ref 45–75)
OXYHGB MFR BLDMV: 71.8 % (ref 45–75)
PCO2 BLDMV: 39 MM HG (ref 41–51)
PCO2 BLDMV: 40 MM HG (ref 41–51)
PCO2 BLDMV: 43 MM HG (ref 41–51)
PCO2 BLDMV: 43 MM HG (ref 41–51)
PH BLDMV: 7.39 PH (ref 7.33–7.43)
PH BLDMV: 7.43 PH (ref 7.33–7.43)
PH BLDMV: 7.43 PH (ref 7.33–7.43)
PH BLDMV: 7.44 PH (ref 7.33–7.43)
PHOSPHATE SERPL-MCNC: 3.7 MG/DL (ref 2.5–4.9)
PHOSPHATE SERPL-MCNC: 3.9 MG/DL (ref 2.5–4.9)
PLATELET # BLD AUTO: 495 X10*3/UL (ref 150–450)
PO2 BLDMV: 41 MM HG (ref 35–45)
PO2 BLDMV: 43 MM HG (ref 35–45)
PO2 BLDMV: 43 MM HG (ref 35–45)
PO2 BLDMV: 44 MM HG (ref 35–45)
POTASSIUM BLDMV-SCNC: 4.3 MMOL/L (ref 3.5–5.3)
POTASSIUM BLDMV-SCNC: 4.4 MMOL/L (ref 3.5–5.3)
POTASSIUM BLDMV-SCNC: 4.8 MMOL/L (ref 3.5–5.3)
POTASSIUM BLDMV-SCNC: 4.9 MMOL/L (ref 3.5–5.3)
POTASSIUM SERPL-SCNC: 4.2 MMOL/L (ref 3.5–5.3)
POTASSIUM SERPL-SCNC: 4.8 MMOL/L (ref 3.5–5.3)
RBC # BLD AUTO: 4.87 X10*6/UL (ref 4.5–5.9)
SAO2 % BLDMV: 65 % (ref 45–75)
SAO2 % BLDMV: 69 % (ref 45–75)
SAO2 % BLDMV: 71 % (ref 45–75)
SAO2 % BLDMV: 74 % (ref 45–75)
SODIUM BLDMV-SCNC: 132 MMOL/L (ref 136–145)
SODIUM BLDMV-SCNC: 134 MMOL/L (ref 136–145)
SODIUM SERPL-SCNC: 134 MMOL/L (ref 136–145)
SODIUM SERPL-SCNC: 135 MMOL/L (ref 136–145)
UFH PPP CHRO-ACNC: 0.5 IU/ML
WBC # BLD AUTO: 7.7 X10*3/UL (ref 4.4–11.3)

## 2024-11-10 PROCEDURE — 71045 X-RAY EXAM CHEST 1 VIEW: CPT

## 2024-11-10 PROCEDURE — 84132 ASSAY OF SERUM POTASSIUM: CPT | Performed by: STUDENT IN AN ORGANIZED HEALTH CARE EDUCATION/TRAINING PROGRAM

## 2024-11-10 PROCEDURE — 2500000001 HC RX 250 WO HCPCS SELF ADMINISTERED DRUGS (ALT 637 FOR MEDICARE OP): Performed by: STUDENT IN AN ORGANIZED HEALTH CARE EDUCATION/TRAINING PROGRAM

## 2024-11-10 PROCEDURE — 83735 ASSAY OF MAGNESIUM: CPT | Performed by: STUDENT IN AN ORGANIZED HEALTH CARE EDUCATION/TRAINING PROGRAM

## 2024-11-10 PROCEDURE — 71045 X-RAY EXAM CHEST 1 VIEW: CPT | Performed by: RADIOLOGY

## 2024-11-10 PROCEDURE — 2500000002 HC RX 250 W HCPCS SELF ADMINISTERED DRUGS (ALT 637 FOR MEDICARE OP, ALT 636 FOR OP/ED): Performed by: STUDENT IN AN ORGANIZED HEALTH CARE EDUCATION/TRAINING PROGRAM

## 2024-11-10 PROCEDURE — 2020000001 HC ICU ROOM DAILY

## 2024-11-10 PROCEDURE — 37799 UNLISTED PX VASCULAR SURGERY: CPT | Performed by: STUDENT IN AN ORGANIZED HEALTH CARE EDUCATION/TRAINING PROGRAM

## 2024-11-10 PROCEDURE — 2500000004 HC RX 250 GENERAL PHARMACY W/ HCPCS (ALT 636 FOR OP/ED): Performed by: NURSE PRACTITIONER

## 2024-11-10 PROCEDURE — 85520 HEPARIN ASSAY: CPT | Performed by: STUDENT IN AN ORGANIZED HEALTH CARE EDUCATION/TRAINING PROGRAM

## 2024-11-10 PROCEDURE — 85027 COMPLETE CBC AUTOMATED: CPT | Performed by: STUDENT IN AN ORGANIZED HEALTH CARE EDUCATION/TRAINING PROGRAM

## 2024-11-10 PROCEDURE — 99291 CRITICAL CARE FIRST HOUR: CPT | Performed by: INTERNAL MEDICINE

## 2024-11-10 PROCEDURE — 2500000004 HC RX 250 GENERAL PHARMACY W/ HCPCS (ALT 636 FOR OP/ED): Performed by: STUDENT IN AN ORGANIZED HEALTH CARE EDUCATION/TRAINING PROGRAM

## 2024-11-10 RX ORDER — MAGNESIUM SULFATE HEPTAHYDRATE 40 MG/ML
2 INJECTION, SOLUTION INTRAVENOUS ONCE
Status: COMPLETED | OUTPATIENT
Start: 2024-11-10 | End: 2024-11-10

## 2024-11-10 RX ORDER — POTASSIUM CHLORIDE 20 MEQ/1
20 TABLET, EXTENDED RELEASE ORAL ONCE
Status: COMPLETED | OUTPATIENT
Start: 2024-11-10 | End: 2024-11-10

## 2024-11-10 RX ORDER — FUROSEMIDE 10 MG/ML
40 INJECTION INTRAMUSCULAR; INTRAVENOUS ONCE
Status: COMPLETED | OUTPATIENT
Start: 2024-11-10 | End: 2024-11-10

## 2024-11-10 ASSESSMENT — PAIN - FUNCTIONAL ASSESSMENT
PAIN_FUNCTIONAL_ASSESSMENT: 0-10

## 2024-11-10 ASSESSMENT — PAIN SCALES - GENERAL
PAINLEVEL_OUTOF10: 0 - NO PAIN

## 2024-11-10 NOTE — CARE PLAN
CO and CI have improved overnight. Milrinone working effectively at increased dose. Heparin remains therapeutic. Multiple runs of VT over night longest 19 beats. Provider aware 2g mag administered.    Problem: Heart Failure  Goal: Improved gas exchange this shift  Outcome: Progressing  Flowsheets (Taken 11/10/2024 0638)  Improved gas exchange this shift: Assist with pulmonary hygiene and secretion clearance

## 2024-11-10 NOTE — PROGRESS NOTES
[facility]  HFICU PROGRESS NOTE    Fahad Meraz/[id number]    Admit Date: 10/24/2024  Hospital Length of Stay: 17   ICU Length of Stay: 20h   Primary Service: HFICU  Primary HF Cardiologist: Dr. Washington    INTERVAL EVENTS / PERTINENT ROS:   - SGC placed yesterday, borderline CI 2.1, CVP 12-13  - milrinone increased to 0.25 overnight, resultant increase in ectopy/NSVT, but remains HDS and no sustained VT  - this AM, IV lasix 40 mg was given for goal NN 1-2L  - he feels well overall but does feel intermittent palpitations, denies lightheadedness, dyspnea, chest pain    MEDICATIONS  Infusions:  heparin, Last Rate: 2,400 Units/hr (11/10/24 0600)  milrinone, Last Rate: 0.25 mcg/kg/min (11/10/24 0600)      Scheduled:  aspirin, 81 mg, Daily  cholecalciferol, 50,000 Units, Every Sunday  escitalopram, 10 mg, Daily  hydrALAZINE, 100 mg, TID  isosorbide dinitrate, 20 mg, TID  magnesium oxide, 800 mg, BID  [Held by provider] metoprolol succinate XL, 50 mg, Daily  pantoprazole, 40 mg, Daily before breakfast  sennosides-docusate sodium, 2 tablet, BID  spironolactone, 25 mg, Daily      PRN:  acetaminophen, 650 mg, q6h PRN  bisacodyl, 10 mg, Daily PRN  nitroglycerin, 0.4 mg, q5 min PRN  oxygen, , Continuous PRN - O2/gases  polyethylene glycol, 17 g, Daily PRN      Invasive Hemodynamics:    Most Recent Range Past 24hrs   BP (Art) 111/62 No data recorded   MAP(Art) 78 mmHg No data recorded   RA/CVP   No data recorded   PA 37/24 PAP  Min: 27/16  Max: 52/27   PA(mean) 30 mmHg PAP (Mean)  Min: 20 mmHg  Max: 36 mmHg   PCWP 13 mmHg PCWP (mmHg)  Min: 13 mmHg  Max: 30 mmHg   CO 6.1 L/min CO (L/min)  Min: 4.6 L/min  Max: 6.3 L/min   CI 2.8 L/min/m2 CI (L/min/m2)  Min: 2.1 L/min/m2  Max: 2.9 L/min/m2   Mixed Venous 65 % No data recorded   SVR  1300 (dyne*sec)/cm5 SVR (dyne*sec)/cm5  Min: 1300 (dyne*sec)/cm5  Max: 1300 (dyne*sec)/cm5   PVR 88 (dyne*sec)/cm5 PVR (dyne*sec)/cm5  Min: 88 (dyne*sec)/cm5  Max: 88  (dyne*sec)/cm5     MCS:   Heart Mate III:     Most Recent Range Past 24hrs   Flow   No data recorded   Speed   No data recorded   Power      PI   No data recorded     ECMO:     Most Recent Range Past 24hrs   Flow   No data recorded   Speed   No data recorded   Sweep   No data recorded     Impella:      Most Recent Range Past 24hrs   Performance Level   No data recorded   Flow (L/min)   No data recorded   Motor Current   No data recorded   Placement Signal    Placement OK could not be evaluated. This SmartLink does not work with rows of the type: Custom List   Purge (mmHg)   No data recorded   Purge rate (mL/hr)   No data recorded     VENT:    Most Recent Range Past 24hrs   Mode      FiO2   No data recorded   Rate   No data recorded   Vt    No data recorded   PEEP   No data recorded     PHYSICAL EXAM:   Visit Vitals  /71   Pulse 92   Temp 36.5 °C (97.7 °F) (Temporal)   Resp 11   Ht 1.829 m (6')   Wt 92.8 kg (204 lb 9.4 oz)   SpO2 99%   BMI 27.75 kg/m²   Smoking Status Former   BSA 2.17 m²       Wt Readings from Last 5 Encounters:   11/10/24 92.8 kg (204 lb 9.4 oz)   10/24/24 92.5 kg (204 lb)   08/05/24 122 kg (268 lb)   01/31/22 119 kg (262 lb)   02/18/20 123 kg (270 lb 2 oz)       INTAKE/OUTPUT:  I/O last 3 completed shifts:  In: 1002.6 (10.8 mL/kg) [P.O.:100; I.V.:902.6 (9.7 mL/kg)]  Out: 3400 (36.6 mL/kg) [Urine:3400 (1 mL/kg/hr)]  Weight: 92.8 kg      Physical Exam  GEN: Lying in hospital bed, NAD  HEENT: MMM, anicteric sclera.  Right internal jugular SGC in place  CV: Irregular rhythm, nl S1/2, no mrg  RESP: Breathing comfortably in RA, CTAB  ABD: Soft, NTND  EXT: WWP, no LE edema  NEURO: AOx3, no FND  PSYCH: appropriate affect    DATA:  CMP:  Results from last 7 days   Lab Units 11/10/24  0436 11/09/24  1826 11/09/24  0748 11/08/24  0729 11/07/24  0738 11/06/24  1245 11/06/24  0513 11/05/24  0522 11/04/24  0419   SODIUM mmol/L 135* 134* 136 135* 136 134* 136 137 132*   POTASSIUM mmol/L 4.2 4.6 4.4 4.4 4.3  "4.5 4.3 3.9 4.5   CHLORIDE mmol/L 100 99 101 102 101 100 100 101 95*   CO2 mmol/L 25 24 25 23 24 23 26 28 26   ANION GAP mmol/L 14 16 14 14 15 16 14 12 16   BUN mg/dL 24* 25* 24* 25* 25* 24* 24* 24* 31*   CREATININE mg/dL 1.20 1.24 1.45* 1.34* 1.33* 1.33* 1.32* 1.15 1.16   EGFR mL/min/1.73m*2 65 63 52* 57* 58* 58* 58* 69 68   MAGNESIUM mg/dL 2.47* 2.07 2.03 2.08 2.04  --  2.04 2.26 2.25   ALBUMIN g/dL 4.1 4.2 4.2 4.2 3.8 4.2 4.0 3.9 4.3     CBC:  Results from last 7 days   Lab Units 11/10/24  0436 11/09/24  1826 11/09/24  0748 11/08/24  0729 11/07/24  0738 11/06/24  0513 11/05/24  0522 11/04/24  0419   WBC AUTO x10*3/uL 7.7 8.7 7.5 8.2 8.1 8.5 7.7 8.6   HEMOGLOBIN g/dL 13.2* 13.1* 13.2* 13.0* 13.1* 13.2* 13.2* 13.8   HEMATOCRIT % 41.2 41.4 40.1* 40.1* 39.7* 40.1* 39.8* 40.6*   PLATELETS AUTO x10*3/uL 495* 533* 471* 494* 535* 509* 532* 584*   MCV fL 85 85 84 84 83 83 82 81     COAG:     ABO: No results found for: \"ABO\"  HEME/ENDO:     CARDIAC:       No lab exists for component: \"CK\", \"CKMBP\"    ASSESSMENT AND PLAN:   Fahad Meraz is a very pleasant 69 y.o. male w/ a PMHx sig for CAD s/p 4V CABG (2012) and PCI (LCx/OM; 5/2019), stage D systolic HF/ICM/HFrEF with LVEF 10-15%( 10/22/24 TTE), AF s/p RFA (PVI and CTI; 4/2024) on OAC therapy, HTN, dyslipidemia, depression, anxiety and VIV using CPAP who was admitted to OSH ICU, s/p RHC on 10/18/24.  Per patient, he had been experienced worsening SOB and fluids overload for 2-3 weeks. Patient was on milrinone drip and underwent SGC diuresis. However his KENYA worsened, and were not able to wean milrinone drip. [place] [facility] [place]. Advanced therapies evaluation was initiated on 10/30. In the meantime, HFICU attempted to wean milrinone - currently at 0.125 mcg/kg/min and uptitrating afterload reduction as tolerated. Discussed in advanced therapeutics committee on 11/5 and was denied for transplantation, " and approved for LVAD after Dr Mclain agreeable to surgery. He was transferred to the floor on 11/6 to await VAD implantation.    Neuro:  # Hx of Depression/Anxiety  - Serial neuro and pain assessments  - PO Tylenol PRN for pain  - PT/OT Consult, OOB to chair  - c/w home Lexapro and Clonazepam     #Substance abuse  -Alcohol abuse/Alcohol dependence: NO   -Tobacco use/Nicotine Dependence: NO      Cardiovascular:  # Stage D, ICM, decompensated acute on chronic heart failure,  HFrEF 10-15% ( from 10/22/24 ECHO)   #Hx of CAD s/p 4V CABG 2012   -BRIANA (3/29/23): Left Ventricle: Moderately reduced left ventricular systolic function with a visually estimated EF of 35 - 40%.  - TTE (10/22/24): LVEF 10 -15%, right ventricle moderately dilated, moderately reduced systolic function. Mild MR  - Limited ECHO ordered to reassess RV function 11/5 - severe RVSF   - SGC # on arrival (11/9): /72, MAP 82, CVP 12, PA 52/27 (36), PCWP 30, CO 4.6, CI 2.1, PVR 88, SVR 1300  - Decrease milrinone back to 0.125 mcg/kg/min given increased ectopy/NSVT  - c/w hydralazine 100 mg TID  - c/w Isordil to 20 mg TID    - C/w Darrow 25 mg daily   - Continue holding home metoprolol  - Daily standing weights, 2gm sodium diet, 2L fluid restriction, strict I&Os  - Volume management:  --- CVP 13 and PA diastolic 17 this a.m.  --- Goal net -1 to 1.5 L today  --- IV Lasix 40 mg as needed (received x 1 dose this morning)  - Coronary angiogram multidisciplinary discussion, including CT surgery, no revascularization options.  - Discussed in advanced therapeutics committee on 11/5 and was denied for transplantation, and approved for LVAD implantation on Tuesday 11/12.     #CAD   -[facility] (12/20/2020):  Successful PCI of the SVG to ramus intermedius with one drug-eluting stent; OCT of the SVG to RCA to confirm no thrombosis; patent LAD; occluded SVG to OM.  -Meds: C/w ASA.     # Hx of AFib s/p RFA (PVI and CTI; 4/2024)  -NO device noted  -Was on home Coumadin for  2 months, prior to that was on Eliquis,  patient reported with Dizziness while on Eliquis   - Continue heparin gtt pending surgery.     # Dizziness/bradycardia  -Had 3 episodes of near syncope after his recent AF RFA; has stopped driving as a result. ECG with sinus olu. Metoprolol previously decreased by his local EP.    -Hold BB due to decompensated HF      #Electrolyte Disturbances  -Keep K>4, Mag >2     Pulmonary:   # PMH of VIV  -c/w home CPAP  -Monitor and maintain SpO2 > 92%     GI:  # H/x of  GERD  - Colonoscopy (11/4) - removed 1 polyp pending biopsy  - Bowel regimen: Senna, Miralax PRN  - PPI     #  Bilirubinemia  - T maryuri (10/31): 1.4, ALT, AST: normal   - CT C/A/P (10/17/24): Hepatomegaly and hepatic steatosis are present. The liver has a lobulated contour. A questionable lesion in the right hepatic lobe inferiorly measures 26 mm.  - CT Liver w/ contrast (11/2) Incidental findings include pancreatic and renal simple and complex attenuation lesions as well     # Urinary retention  - CT C/A/P (10/17/24): the prostate gland is mildly enlarged with mass effect on the posterior wall of the urinary bladder. The prostate gland measures 4.3 x 6.0 cm.   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  -Baseline BUN/Cr: 1.3-1.6   -Admit BUN/Cr (10/24): 28/1.88, now improved  -I/Os  -avoid hypotension and nephrotoxic agents     Heme:  #Anemia in the setting of iron deficiency  - Lab (10/24): H/H: 17.5/50.8,  Iron study: 63/374/17%, Ferritin: 76,  folate: 23.5, B12: 768   - NO iron replacement needed as H/H high     Endo:  #DM  - Euglycemic  - hgbA1c (10/24):  6.3     #Thyroid  -TSH (10/24):  8.47, T4: 1.44      ID:  # leukocytosis - Resolved  - Afebrile, nontoxic   - Trend temps q4h        PHYSICAL AND OCCUPATIONAL THERAPY: Ordered     LINES:  PIVs   A-line (OSH) 10/20 - current  Right Subclavian Cordis / PA catheter (OSH) 10/18 -  10/28  JADIEL PA catheter - 11/9-current     PROPHYLAXIS:  - DVT: SCD's, Coumadin/Heparin   - GI:  Pantoprazole 40 mg daily     CODE STATUS: Full     DISPO PLANNING/ SOCIAL:  - Disposition expectation:  CTICU after LVAD implantation. OR date 11/12  - Barriers to discharge: LVAD     Patient seen and assessed with Dr. Romero    I personally spent 60 minutes of critical care time directly and personally managing the patient exclusive of separately billable procedures   _________________________________________________  Christ Stanford MD

## 2024-11-11 ENCOUNTER — ANESTHESIA EVENT (OUTPATIENT)
Dept: OPERATING ROOM | Facility: HOSPITAL | Age: 69
End: 2024-11-11
Payer: MEDICARE

## 2024-11-11 ENCOUNTER — APPOINTMENT (OUTPATIENT)
Dept: RADIOLOGY | Facility: HOSPITAL | Age: 69
DRG: 001 | End: 2024-11-11
Payer: MEDICARE

## 2024-11-11 PROBLEM — I21.9 MI (MYOCARDIAL INFARCTION) (MULTI): Status: ACTIVE | Noted: 2024-11-11

## 2024-11-11 PROBLEM — I50.9 CHF (CONGESTIVE HEART FAILURE): Status: ACTIVE | Noted: 2024-11-11

## 2024-11-11 PROBLEM — K21.9 GASTROESOPHAGEAL REFLUX DISEASE: Status: ACTIVE | Noted: 2024-11-11

## 2024-11-11 PROBLEM — N17.9 ACUTE KIDNEY INJURY (NONTRAUMATIC) (CMS-HCC): Status: ACTIVE | Noted: 2024-11-11

## 2024-11-11 PROBLEM — I10 HTN (HYPERTENSION): Status: ACTIVE | Noted: 2024-11-11

## 2024-11-11 PROBLEM — I25.10 CAD (CORONARY ARTERY DISEASE): Status: ACTIVE | Noted: 2024-11-11

## 2024-11-11 LAB
ABO GROUP (TYPE) IN BLOOD: NORMAL
ALBUMIN SERPL BCP-MCNC: 4.1 G/DL (ref 3.4–5)
ANION GAP BLDMV CALCULATED.4IONS-SCNC: 10 MMO/L (ref 10–25)
ANION GAP BLDMV CALCULATED.4IONS-SCNC: 12 MMO/L (ref 10–25)
ANION GAP BLDMV CALCULATED.4IONS-SCNC: 6 MMO/L (ref 10–25)
ANION GAP BLDMV CALCULATED.4IONS-SCNC: 9 MMO/L (ref 10–25)
ANION GAP SERPL CALC-SCNC: 15 MMOL/L (ref 10–20)
ANTIBODY SCREEN: NORMAL
BASE EXCESS BLDMV CALC-SCNC: -2.8 MMOL/L (ref -2–3)
BASE EXCESS BLDMV CALC-SCNC: 0.5 MMOL/L (ref -2–3)
BASE EXCESS BLDMV CALC-SCNC: 1.7 MMOL/L (ref -2–3)
BASE EXCESS BLDMV CALC-SCNC: 1.9 MMOL/L (ref -2–3)
BODY TEMPERATURE: 37 DEGREES CELSIUS
BUN SERPL-MCNC: 26 MG/DL (ref 6–23)
CA-I BLDMV-SCNC: 1.19 MMOL/L (ref 1.1–1.33)
CA-I BLDMV-SCNC: 1.22 MMOL/L (ref 1.1–1.33)
CA-I BLDMV-SCNC: 1.29 MMOL/L (ref 1.1–1.33)
CA-I BLDMV-SCNC: 1.31 MMOL/L (ref 1.1–1.33)
CALCIUM SERPL-MCNC: 10.1 MG/DL (ref 8.6–10.6)
CHLORIDE BLD-SCNC: 100 MMOL/L (ref 98–107)
CHLORIDE BLD-SCNC: 103 MMOL/L (ref 98–107)
CHLORIDE BLD-SCNC: 106 MMOL/L (ref 98–107)
CHLORIDE BLD-SCNC: 99 MMOL/L (ref 98–107)
CHLORIDE SERPL-SCNC: 98 MMOL/L (ref 98–107)
CO2 SERPL-SCNC: 27 MMOL/L (ref 21–32)
CREAT SERPL-MCNC: 1.52 MG/DL (ref 0.5–1.3)
EGFRCR SERPLBLD CKD-EPI 2021: 49 ML/MIN/1.73M*2
ERYTHROCYTE [DISTWIDTH] IN BLOOD BY AUTOMATED COUNT: 17 % (ref 11.5–14.5)
GLUCOSE BLD-MCNC: 102 MG/DL (ref 74–99)
GLUCOSE BLD-MCNC: 119 MG/DL (ref 74–99)
GLUCOSE BLD-MCNC: 122 MG/DL (ref 74–99)
GLUCOSE BLD-MCNC: 135 MG/DL (ref 74–99)
GLUCOSE SERPL-MCNC: 113 MG/DL (ref 74–99)
HCO3 BLDMV-SCNC: 21.9 MMOL/L (ref 22–26)
HCO3 BLDMV-SCNC: 25.4 MMOL/L (ref 22–26)
HCO3 BLDMV-SCNC: 25.8 MMOL/L (ref 22–26)
HCO3 BLDMV-SCNC: 26.6 MMOL/L (ref 22–26)
HCT VFR BLD AUTO: 42 % (ref 41–52)
HCT VFR BLD EST: 35 % (ref 41–52)
HCT VFR BLD EST: 40 % (ref 41–52)
HCT VFR BLD EST: 41 % (ref 41–52)
HCT VFR BLD EST: 43 % (ref 41–52)
HGB BLD-MCNC: 13.3 G/DL (ref 13.5–17.5)
HGB BLDMV-MCNC: 11.5 G/DL (ref 13.5–17.5)
HGB BLDMV-MCNC: 13.3 G/DL (ref 13.5–17.5)
HGB BLDMV-MCNC: 13.7 G/DL (ref 13.5–17.5)
HGB BLDMV-MCNC: 14.2 G/DL (ref 13.5–17.5)
INHALED O2 CONCENTRATION: 21 %
LABORATORY COMMENT REPORT: NORMAL
LACTATE BLDMV-SCNC: 0.8 MMOL/L (ref 0.4–2)
LACTATE BLDMV-SCNC: 0.8 MMOL/L (ref 0.4–2)
LACTATE BLDMV-SCNC: 0.9 MMOL/L (ref 0.4–2)
LACTATE BLDMV-SCNC: 1.1 MMOL/L (ref 0.4–2)
MAGNESIUM SERPL-MCNC: 2.14 MG/DL (ref 1.6–2.4)
MCH RBC QN AUTO: 26.6 PG (ref 26–34)
MCHC RBC AUTO-ENTMCNC: 31.7 G/DL (ref 32–36)
MCV RBC AUTO: 84 FL (ref 80–100)
NRBC BLD-RTO: 0 /100 WBCS (ref 0–0)
OXYHGB MFR BLDMV: 65.4 % (ref 45–75)
OXYHGB MFR BLDMV: 65.6 % (ref 45–75)
OXYHGB MFR BLDMV: 67.7 % (ref 45–75)
OXYHGB MFR BLDMV: 68.2 % (ref 45–75)
PATH REPORT.FINAL DX SPEC: NORMAL
PATH REPORT.GROSS SPEC: NORMAL
PATH REPORT.TOTAL CANCER: NORMAL
PCO2 BLDMV: 37 MM HG (ref 41–51)
PCO2 BLDMV: 38 MM HG (ref 41–51)
PCO2 BLDMV: 41 MM HG (ref 41–51)
PCO2 BLDMV: 41 MM HG (ref 41–51)
PH BLDMV: 7.38 PH (ref 7.33–7.43)
PH BLDMV: 7.4 PH (ref 7.33–7.43)
PH BLDMV: 7.42 PH (ref 7.33–7.43)
PH BLDMV: 7.44 PH (ref 7.33–7.43)
PHOSPHATE SERPL-MCNC: 4.4 MG/DL (ref 2.5–4.9)
PLATELET # BLD AUTO: 495 X10*3/UL (ref 150–450)
PO2 BLDMV: 40 MM HG (ref 35–45)
PO2 BLDMV: 41 MM HG (ref 35–45)
PO2 BLDMV: 42 MM HG (ref 35–45)
PO2 BLDMV: 43 MM HG (ref 35–45)
POTASSIUM BLDMV-SCNC: 4.1 MMOL/L (ref 3.5–5.3)
POTASSIUM BLDMV-SCNC: 4.7 MMOL/L (ref 3.5–5.3)
POTASSIUM BLDMV-SCNC: 4.8 MMOL/L (ref 3.5–5.3)
POTASSIUM BLDMV-SCNC: 5 MMOL/L (ref 3.5–5.3)
POTASSIUM SERPL-SCNC: 5.1 MMOL/L (ref 3.5–5.3)
RBC # BLD AUTO: 5 X10*6/UL (ref 4.5–5.9)
RH FACTOR (ANTIGEN D): NORMAL
SAO2 % BLDMV: 67 % (ref 45–75)
SAO2 % BLDMV: 67 % (ref 45–75)
SAO2 % BLDMV: 70 % (ref 45–75)
SAO2 % BLDMV: 70 % (ref 45–75)
SODIUM BLDMV-SCNC: 130 MMOL/L (ref 136–145)
SODIUM BLDMV-SCNC: 130 MMOL/L (ref 136–145)
SODIUM BLDMV-SCNC: 131 MMOL/L (ref 136–145)
SODIUM BLDMV-SCNC: 136 MMOL/L (ref 136–145)
SODIUM SERPL-SCNC: 135 MMOL/L (ref 136–145)
UFH PPP CHRO-ACNC: 0.5 IU/ML
WBC # BLD AUTO: 10.1 X10*3/UL (ref 4.4–11.3)

## 2024-11-11 PROCEDURE — 84132 ASSAY OF SERUM POTASSIUM: CPT | Performed by: STUDENT IN AN ORGANIZED HEALTH CARE EDUCATION/TRAINING PROGRAM

## 2024-11-11 PROCEDURE — 71045 X-RAY EXAM CHEST 1 VIEW: CPT

## 2024-11-11 PROCEDURE — 2020000001 HC ICU ROOM DAILY

## 2024-11-11 PROCEDURE — 99232 SBSQ HOSP IP/OBS MODERATE 35: CPT | Performed by: INTERNAL MEDICINE

## 2024-11-11 PROCEDURE — 2500000001 HC RX 250 WO HCPCS SELF ADMINISTERED DRUGS (ALT 637 FOR MEDICARE OP): Performed by: STUDENT IN AN ORGANIZED HEALTH CARE EDUCATION/TRAINING PROGRAM

## 2024-11-11 PROCEDURE — 2500000002 HC RX 250 W HCPCS SELF ADMINISTERED DRUGS (ALT 637 FOR MEDICARE OP, ALT 636 FOR OP/ED): Performed by: STUDENT IN AN ORGANIZED HEALTH CARE EDUCATION/TRAINING PROGRAM

## 2024-11-11 PROCEDURE — 37799 UNLISTED PX VASCULAR SURGERY: CPT | Performed by: STUDENT IN AN ORGANIZED HEALTH CARE EDUCATION/TRAINING PROGRAM

## 2024-11-11 PROCEDURE — 99291 CRITICAL CARE FIRST HOUR: CPT | Performed by: INTERNAL MEDICINE

## 2024-11-11 PROCEDURE — 86901 BLOOD TYPING SEROLOGIC RH(D): CPT | Performed by: STUDENT IN AN ORGANIZED HEALTH CARE EDUCATION/TRAINING PROGRAM

## 2024-11-11 PROCEDURE — 85520 HEPARIN ASSAY: CPT | Performed by: STUDENT IN AN ORGANIZED HEALTH CARE EDUCATION/TRAINING PROGRAM

## 2024-11-11 PROCEDURE — 85027 COMPLETE CBC AUTOMATED: CPT | Performed by: STUDENT IN AN ORGANIZED HEALTH CARE EDUCATION/TRAINING PROGRAM

## 2024-11-11 PROCEDURE — 86923 COMPATIBILITY TEST ELECTRIC: CPT

## 2024-11-11 PROCEDURE — 71045 X-RAY EXAM CHEST 1 VIEW: CPT | Performed by: RADIOLOGY

## 2024-11-11 PROCEDURE — 2500000004 HC RX 250 GENERAL PHARMACY W/ HCPCS (ALT 636 FOR OP/ED): Performed by: STUDENT IN AN ORGANIZED HEALTH CARE EDUCATION/TRAINING PROGRAM

## 2024-11-11 PROCEDURE — 2500000001 HC RX 250 WO HCPCS SELF ADMINISTERED DRUGS (ALT 637 FOR MEDICARE OP)

## 2024-11-11 PROCEDURE — 83735 ASSAY OF MAGNESIUM: CPT | Performed by: STUDENT IN AN ORGANIZED HEALTH CARE EDUCATION/TRAINING PROGRAM

## 2024-11-11 RX ORDER — VANCOMYCIN HYDROCHLORIDE 1 G/20ML
INJECTION, POWDER, LYOPHILIZED, FOR SOLUTION INTRAVENOUS DAILY PRN
Status: DISCONTINUED | OUTPATIENT
Start: 2024-11-11 | End: 2024-11-12

## 2024-11-11 RX ORDER — VANCOMYCIN HYDROCHLORIDE 500 MG/100ML
500 INJECTION, SOLUTION INTRAVENOUS DAILY PRN
Status: DISCONTINUED | OUTPATIENT
Start: 2024-11-12 | End: 2024-11-12

## 2024-11-11 RX ORDER — TRAZODONE HYDROCHLORIDE 50 MG/1
25 TABLET ORAL NIGHTLY PRN
Status: DISCONTINUED | OUTPATIENT
Start: 2024-11-11 | End: 2024-11-12

## 2024-11-11 RX ORDER — VANCOMYCIN HYDROCHLORIDE 1 G/200ML
1000 INJECTION, SOLUTION INTRAVENOUS DAILY PRN
Status: DISCONTINUED | OUTPATIENT
Start: 2024-11-12 | End: 2024-11-12

## 2024-11-11 SDOH — HEALTH STABILITY: MENTAL HEALTH: CURRENT SMOKER: 0

## 2024-11-11 ASSESSMENT — PAIN DESCRIPTION - ORIENTATION: ORIENTATION: LEFT

## 2024-11-11 ASSESSMENT — PAIN SCALES - GENERAL
PAINLEVEL_OUTOF10: 0 - NO PAIN
PAINLEVEL_OUTOF10: 3

## 2024-11-11 ASSESSMENT — PAIN - FUNCTIONAL ASSESSMENT
PAIN_FUNCTIONAL_ASSESSMENT: 0-10

## 2024-11-11 ASSESSMENT — PAIN DESCRIPTION - LOCATION: LOCATION: CHEST

## 2024-11-11 NOTE — ANESTHESIA PREPROCEDURE EVALUATION
Patient: Fahad Meraz    Procedure Information       Date/Time: 11/12/24 0650    Procedure: HEARTMATE 3 INSERTION W/ THORACOTOMY - HEARTMATE 3 INSERTION W/ THORACOTOMY    Location: [facility] OR 18 / Virtual [facility] OR    Surgeons: Suhas Hill MD PhD            Relevant Problems   Anesthesia  Anxiety related to waking up with ETT in place postoperatively      Cardiac  S/p 4-vessel CABG in 2012 and PCI to LCx and OM in 2019  Atrial fibrillation s/pRFA 4/2024   (+) CAD (coronary artery disease)   (+) CHF (congestive heart failure)   (+) HTN (hypertension)   (+) MI (myocardial infarction) (Multi)      Pulmonary   (+) Obstructive sleep apnea      Neuro (within normal limits)      GI   (+) Gastroesophageal reflux disease      /Renal   (+) Acute kidney injury (nontraumatic) (CMS-HCC)      Liver (within normal limits)      Endocrine (within normal limits)      Hematology (within normal limits)      Musculoskeletal (within normal limits)      HEENT (within normal limits)      ID (within normal limits)      GYN (within normal limits)      Circulatory   (+) Acute on chronic heart failure with reduced ejection fraction and diastolic dysfunction   (+) Ischemic cardiomyopathy      Cardiac and Vasculature   (+) History of chronic atrial fibrillation     TTE 11/5/2024  CONCLUSIONS:   1. Left ventricular ejection fraction is severely decreased, calculated by Loera's biplane at 18%.   2. There is global hypokinesis of the left ventricle with minor regional variations.   3. Left ventricular cavity size is severely dilated.   4. No left ventricular thrombus visualized.   5. There is severely increased eccentric left ventricular hypertrophy.   6. There is severely reduced right ventricular systolic function.   7. Severely enlarged right ventricle.   8. The left atrium is severely dilated.   9. The right atrium is severely dilated.  10. Mitral valve regurgitation was not assessed.  11. Mildly elevated right ventricular  systolic pressure.  12. Mildly dilated aortic root.  13. Compared with the prior exam from 10/25/2024, there was already a severely dilated LV with severely reduced function. The RV remains dilated with severely reduced function. Valvular function not assessed on today's exam.    [facility]/C 10/28/24  CONCLUSIONS:   1. Diffuse severe native coronary artery disease.   2. Patent proximal LCx and ostial-prox OM 1 ARIELLE.   3. Patent LIMA-LAD. Other grafts are occluded: SVG-OM, SVG-RI, SVG- RCA.   4. Elevated right and left sided filling pressures with preserved cardiac output and index while on ionotropic support with 0.375 mcg/kg/min of milrinone .   5. Further work-up and management per advanced heart failure team.    R/L Carotid US  CONCLUSIONS:  Right Carotid: Findings are consistent with less than 50% stenosis of the right proximal internal carotid artery. Right external carotid artery appears patent with no evidence of stenosis. The right vertebral artery is patent with antegrade flow. No evidence of hemodynamically significant stenosis in the right subclavian artery.  Left Carotid: Findings are consistent with less than 50% stenosis of the left proximal internal carotid artery. Left external carotid artery appears patent with no evidence of stenosis. The mid left vertebral artery demonstrates no flow. The origin of the vertebral artery appears patent. No evidence of hemodynamically significant stenosis in the left subclavian artery.    Stress test 1/16/24  Stress Combined Conclusion: The study is positive for myocardial infarction in the inferior wall with ricky-infarct ischemia. Findings suggest a moderate risk of cardiac events.  Stress Function: Left ventricular function post-stress is abnormal. Global function is severely reduced. Post-stress ejection fraction is 28%. The stress end diastolic cavity size is severely enlarged.  Perfusion Conclusion: TID ratio is 1.11.  ECG: Resting ECG demonstrates atrial  fibrillation.  Stress Test: A pharmacological stress test was performed using lexiscan.     Clinical information reviewed:    Allergies   Problems              NPO Detail:  No data recorded     Physical Exam    Airway  Mallampati: II  TM distance: >3 FB  Neck ROM: full     Cardiovascular   Rhythm: regular  Rate: normal     Dental   (+) upper dentures, lower dentures     Pulmonary   Breath sounds clear to auscultation     Abdominal - normal exam           Anesthesia Plan    History of general anesthesia?: yes  History of complications of general anesthesia?: no    ASA 4     general   (Plan GA by MESFIN, art line, PA cath, BRIANA.)  The patient is not a current smoker.  Patient was previously instructed to abstain from smoking on day of procedure.  Patient did not smoke on day of procedure.    intravenous induction   Postoperative administration of opioids is intended.  Trial extubation is planned.  Anesthetic plan and risks discussed with patient and spouse.  Use of blood products discussed with patient and spouse who consented to blood products.

## 2024-11-11 NOTE — PROGRESS NOTES
[facility]  HFICU PROGRESS NOTE    Fahad Meraz/[id number]    Admit Date: 10/24/2024  Hospital Length of Stay: 18   ICU Length of Stay: 2d 3h   Primary Service: HFICU  Primary HF Cardiologist: Dr. [place]       INTERVAL EVENTS / PERTINENT ROS:   No acute events overnight. Tele with Multiple rounds of NSVT and PVCs, milrinone now 0.125 gtt and hemodynamics stable. Pt reports mild LUQ pain, not related to exertion, with no associated SOB, diaphoresis or nausea/dizziness. He reports it started when he extended his L arm across the table to grab the TV remote. Appear MSK, better during rounds. will monitor.     Plan:  Plan for LVAD tomorrow. NPO after midnight.   Holding diuretics today as CVP 6 and slight bump in Cr to 1.5 today after lasix 40 mg IV yesterday    MEDICATIONS  Infusions:  heparin, Last Rate: 2,400 Units/hr (11/11/24 1300)  milrinone, Last Rate: 0.125 mcg/kg/min (11/11/24 1300)      Scheduled:  [Held by provider] aspirin, 81 mg, Daily  cholecalciferol, 50,000 Units, Every Sunday  escitalopram, 10 mg, Daily  hydrALAZINE, 100 mg, TID  isosorbide dinitrate, 20 mg, TID  magnesium oxide, 800 mg, BID  [Held by provider] metoprolol succinate XL, 50 mg, Daily  pantoprazole, 40 mg, Daily before breakfast  sennosides-docusate sodium, 2 tablet, BID  spironolactone, 25 mg, Daily      PRN:  acetaminophen, 650 mg, q6h PRN  bisacodyl, 10 mg, Daily PRN  nitroglycerin, 0.4 mg, q5 min PRN  oxygen, , Continuous PRN - O2/gases  polyethylene glycol, 17 g, Daily PRN      Invasive Hemodynamics:    Most Recent Range Past 24hrs   BP (Art) 111/62 No data recorded   MAP(Art) 78 mmHg No data recorded   RA/CVP  6 mmHg No data recorded   PA 39/18 PAP  Min: 33/14  Max: 51/26   PA(mean) 25 mmHg PAP (Mean)  Min: 21 mmHg  Max: 35 mmHg   PCWP 14 mmHg PCWP (mmHg)  Min: 14 mmHg  Max: 14 mmHg   CO 4.9 L/min CO (L/min)  Min: 4.9 L/min  Max: 6 L/min   CI 2.3 L/min/m2 CI (L/min/m2)  Min: 2.3 L/min/m2  Max: 2.8  L/min/m2   Mixed Venous 67 % SVO2 (%)  Min: 67 %  Max: 70 %   SVR  1068 (dyne*sec)/cm5 SVR (dyne*sec)/cm5  Min: 921 (dyne*sec)/cm5  Max: 1178 (dyne*sec)/cm5    (dyne*sec)/cm5 PVR (dyne*sec)/cm5  Min: 170 (dyne*sec)/cm5  Max: 170 (dyne*sec)/cm5     MCS: NONE     PHYSICAL EXAM:   Visit Vitals  /71   Pulse 87   Temp 35.1 °C (95.2 °F)   Resp 16   Ht 1.829 m (6')   Wt 92.6 kg (204 lb 2.3 oz)   SpO2 96%   BMI 27.69 kg/m²   Smoking Status Former   BSA 2.17 m²       Wt Readings from Last 5 Encounters:   11/11/24 92.6 kg (204 lb 2.3 oz)   10/24/24 92.5 kg (204 lb)   08/05/24 122 kg (268 lb)   01/31/22 119 kg (262 lb)   02/18/20 123 kg (270 lb 2 oz)       INTAKE/OUTPUT:  I/O last 3 completed shifts:  In: 1321.1 (14.3 mL/kg) [P.O.:240; I.V.:1081.1 (11.7 mL/kg)]  Out: 4200 (45.4 mL/kg) [Urine:4200 (1.3 mL/kg/hr)]  Weight: 92.6 kg      Physical Exam  Constitutional:       Appearance: Normal appearance. He is normal weight. He is not ill-appearing.   Eyes:      Extraocular Movements: Extraocular movements intact.      Conjunctiva/sclera: Conjunctivae normal.   Cardiovascular:      Rate and Rhythm: Normal rate and regular rhythm.      Heart sounds: Murmur heard.   Pulmonary:      Effort: Pulmonary effort is normal.      Breath sounds: Normal breath sounds.   Abdominal:      General: Abdomen is flat.      Palpations: Abdomen is soft.   Musculoskeletal:      Right lower leg: Edema present.      Left lower leg: Edema present.   Neurological:      General: No focal deficit present.      Mental Status: He is alert and oriented to person, place, and time. Mental status is at baseline.   Psychiatric:         Mood and Affect: Mood normal.         Behavior: Behavior normal.         Thought Content: Thought content normal.         DATA:  CMP:  Results from last 7 days   Lab Units 11/11/24  0523 11/10/24  1808 11/10/24  0436 11/09/24  1826 11/09/24  0748 11/08/24  0729 11/07/24  0738 11/06/24  1245 11/06/24  0513 11/05/24  0522  "  SODIUM mmol/L 135* 134* 135* 134* 136 135* 136 134* 136 137   POTASSIUM mmol/L 5.1 4.8 4.2 4.6 4.4 4.4 4.3 4.5 4.3 3.9   CHLORIDE mmol/L 98 99 100 99 101 102 101 100 100 101   CO2 mmol/L 27 26 25 24 25 23 24 23 26 28   ANION GAP mmol/L 15 14 14 16 14 14 15 16 14 12   BUN mg/dL 26* 26* 24* 25* 24* 25* 25* 24* 24* 24*   CREATININE mg/dL 1.52* 1.40* 1.20 1.24 1.45* 1.34* 1.33* 1.33* 1.32* 1.15   EGFR mL/min/1.73m*2 49* 54* 65 63 52* 57* 58* 58* 58* 69   MAGNESIUM mg/dL 2.14 2.22 2.47* 2.07 2.03 2.08 2.04  --  2.04 2.26   ALBUMIN g/dL 4.1 4.0 4.1 4.2 4.2 4.2 3.8 4.2 4.0 3.9     CBC:  Results from last 7 days   Lab Units 11/11/24  0523 11/10/24  0436 11/09/24  1826 11/09/24  0748 11/08/24  0729 11/07/24  0738 11/06/24  0513 11/05/24  0522   WBC AUTO x10*3/uL 10.1 7.7 8.7 7.5 8.2 8.1 8.5 7.7   HEMOGLOBIN g/dL 13.3* 13.2* 13.1* 13.2* 13.0* 13.1* 13.2* 13.2*   HEMATOCRIT % 42.0 41.2 41.4 40.1* 40.1* 39.7* 40.1* 39.8*   PLATELETS AUTO x10*3/uL 495* 495* 533* 471* 494* 535* 509* 532*   MCV fL 84 85 85 84 84 83 83 82     COAG:     ABO: No results found for: \"ABO\"  HEME/ENDO:     CARDIAC:       No lab exists for component: \"CK\", \"CKMBP\"    ASSESSMENT AND PLAN:     Fahad Meraz is a very pleasant 69 y.o. male w/ a PMHx sig for CAD s/p 4V CABG (2012) and PCI (LCx/OM; 5/2019), stage D systolic HF/ICM/HFrEF with LVEF 10-15%( 10/22/24 TTE), AF s/p RFA (PVI and CTI; 4/2024) on OAC therapy, HTN, dyslipidemia, depression, anxiety and VIV using CPAP who was admitted to OSH ICU, s/p RHC on 10/18/24.  Per patient, he had been experienced worsening SOB and fluids overload for 2-3 weeks. Patient was on milrinone drip and underwent SGC diuresis. However his KENYA worsened, and were not able to wean milrinone drip. Decision was made to transfer him to HF ICU [facility] for possible advanced therapy consideration. Advanced therapies evaluation was initiated on 10/30. In the meantime, HFICU attempted to wean milrinone - currently at 0.125 " mcg/kg/min and uptitrating afterload reduction as tolerated. Discussed in advanced therapeutics committee on 11/5 and was denied for transplantation, and approved for LVAD after Dr Mclain agreeable to surgery. He was transferred to the floor on 11/6 to await VAD implantation. Now readmitted to HFICU as of 11/09 for pre-OR swan guided optimization. LVAD surgery planned for 11/12.      Neuro:  # Hx of Depression/Anxiety  - Serial neuro and pain assessments  - PO Tylenol PRN for pain  - PT/OT Consult, OOB to chair  - c/w home Lexapro and Clonazepam     #Substance abuse  -Alcohol abuse/Alcohol dependence: NO   -Tobacco use/Nicotine Dependence: NO      Cardiovascular:  # Stage D, ICM, decompensated acute on chronic heart failure,  HFrEF 10-15% ( from 10/22/24 ECHO)   #Hx of CAD s/p 4V CABG 2012   -BRIANA (3/29/23): Left Ventricle: Moderately reduced left ventricular systolic function with a visually estimated EF of 35 - 40%.  - TTE (10/22/24): LVEF 10 -15%, right ventricle moderately dilated, moderately reduced systolic function. Mild MR  - Limited ECHO ordered to reassess RV function 11/5 - severe RVSF   - SGC # on arrival (11/9): /72, MAP 82, CVP 12, PA 52/27 (36), PCWP 30, CO 4.6, CI 2.1, PVR 88, SVR 1300  - Decrease milrinone back to 0.125 mcg/kg/min given increased ectopy/NSVT  - c/w hydralazine 100 mg TID ---> hold 11/12 am for surgery   - c/w Isordil to 20 mg TID  ---> hold 11/12 am for surgery   - C/w Rodrigo 25 mg daily   - Continue holding home metoprolol  - Daily standing weights, 2gm sodium diet, 2L fluid restriction, strict I&Os  - Volume management:  --- CVP 6, PA diastolic 18 and PCWP 14 this morning.   --- holding diuretics today   - Coronary angiogram multidisciplinary discussion, including CT surgery, no revascularization options.  - Discussed in advanced therapeutics committee on 11/5 and was denied for transplantation, and approved for LVAD implantation on Tuesday 11/12.     #CAD   -[facility]  (12/20/2020):  Successful PCI of the SVG to ramus intermedius with one drug-eluting stent; OCT of the SVG to RCA to confirm no thrombosis; patent LAD; occluded SVG to OM.  -Meds: C/w ASA.---> hold 11/12 am for surgery      # Hx of AFib s/p RFA (PVI and CTI; 4/2024)  -NO device noted  -Was on home Coumadin for 2 months, prior to that was on Eliquis,  patient reported with Dizziness while on Eliquis   - Continue heparin gtt pending surgery.     # Dizziness/bradycardia  -Had 3 episodes of near syncope after his recent AF RFA; has stopped driving as a result. ECG with sinus olu. Metoprolol previously decreased by his local EP.    -Hold BB due to decompensated HF      #Electrolyte Disturbances  -Keep K>4, Mag >2     Pulmonary:   # PMH of VIV  -c/w home CPAP  -Monitor and maintain SpO2 > 92%     GI:  # H/x of  GERD  - Colonoscopy (11/4) - removed 1 polyp pending biopsy  - Bowel regimen: Senna, Miralax PRN  - PPI     #  Bilirubinemia  - T maryuri (10/31): 1.4, ALT, AST: normal   - CT C/A/P (10/17/24): Hepatomegaly and hepatic steatosis are present. The liver has a lobulated contour. A questionable lesion in the right hepatic lobe inferiorly measures 26 mm.  - CT Liver w/ contrast (11/2) Incidental findings include pancreatic and renal simple and complex attenuation lesions as well     # Urinary retention  - CT C/A/P (10/17/24): the prostate gland is mildly enlarged with mass effect on the posterior wall of the urinary bladder. The prostate gland measures 4.3 x 6.0 cm.   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  -Baseline BUN/Cr: 1.3-1.6   -Admit BUN/Cr (10/24): 28/1.88, now improved and recently in 1.2-1.5 range   -I/Os  -avoid hypotension and nephrotoxic agents     Heme: none   - Lab (10/24): H/H: 17.5/50.8,  Iron study: 63/374/17%, Ferritin: 76,  folate: 23.5, B12: 768   - NO iron replacement needed as H/H high     Endo:  #DM  -  Euglycemic  - hgbA1c (10/24):  6.3     #Thyroid  -TSH (10/24):  8.47, T4: 1.44      ID:  # leukocytosis - Resolved  - Afebrile, nontoxic   - Trend temps q4h        PHYSICAL AND OCCUPATIONAL THERAPY: Ordered     LINES:  PIVs   A-line (OSH) 10/20 - current  Right Subclavian Cordis / PA catheter (OSH) 10/18 - 10/28  RIJ PA catheter - 11/9-current     PROPHYLAXIS:  - DVT: SCD's, Coumadin/Heparin   - GI:  Pantoprazole 40 mg daily     CODE STATUS: Full     DISPO PLANNING/ SOCIAL:  - Disposition expectation:  CTICU after LVAD implantation. OR date 11/12  - Barriers to discharge: LVAD    NUTRITION: Adult diet Cardiac; 70 gm fat; 2 - 3 grams Sodium; 2000 mL fluid  NPO Diet; Effective midnight      EMERGENCY CONTACT: Extended Emergency Contact Information  Primary Emergency Contact: Tanvi Meraz  Home Phone: [phone]  Relation: Spouse  Secondary Emergency Contact: Subhash Meraz  Mobile Phone: [phone]  Relation: Son  FAMILY UPDATE: at bedside  CODE STATUS: Full Code    Patient seen and assessed with Dr. Abril Mcmahon MD, MS, FACP   Heart Failure Fellow,  [facility]      We reviewed rhc from today, cvp remains less than 8, PA pressures are controlled, CI >2.2, he appears optimized for surgery, plan for DT LVAD tomorrow, renal function is acceptable    I saw and evaluated the patient. I personally obtained the key and critical portions of the history and physical exam or was physically present for key and critical portions performed by the resident/fellow. I reviewed the resident/fellow's documentation and discussed the patient with the resident/fellow. I agree with the resident/fellow's medical decision making as documented in the note.      HFICU Attending Note        This critically ill patient continues to be at-risk for clinically significant deterioration / failure due to the above mentioned dysfunctional, unstable organ systems.  I have personally identified and managed all complex critical care issues  to prevent aforementioned clinical deterioration.  Critical care time is spent at bedside and/or the immediate area and has included, but is not limited to, the review of diagnostic tests, labs, radiographs, serial assessments of hemodynamics, respiratory status, ventilatory management, and family updates.  Time spent in procedures and teaching are reported separately.     Critical care time: ____ minutes

## 2024-11-11 NOTE — CARE PLAN
The patient's goals for the shift include      The clinical goals for the shift include pt will remain HDS for the remainder of the shift      Problem: Heart Failure  Goal: Improved gas exchange this shift  Outcome: Progressing  Goal: Improved urinary output this shift  Outcome: Progressing  Goal: Reduction in peripheral edema within 24 hours  Outcome: Progressing  Goal: Report improvement of dyspnea/breathlessness this shift  Outcome: Progressing  Goal: Weight from fluid excess reduced over 2-3 days, then stabilize  Outcome: Progressing  Goal: Increase self care and/or family involvement in 24 hours  Outcome: Progressing     Problem: Skin  Goal: Decreased wound size/increased tissue granulation at next dressing change  Outcome: Progressing  Goal: Participates in plan/prevention/treatment measures  Outcome: Progressing  Goal: Prevent/manage excess moisture  Outcome: Progressing  Goal: Prevent/minimize sheer/friction injuries  Outcome: Progressing  Goal: Promote/optimize nutrition  Outcome: Progressing  Goal: Promote skin healing  Outcome: Progressing     Problem: Pain - Adult  Goal: Verbalizes/displays adequate comfort level or baseline comfort level  Outcome: Progressing     Problem: Safety - Adult  Goal: Free from fall injury  Outcome: Progressing     Problem: Discharge Planning  Goal: Discharge to home or other facility with appropriate resources  Outcome: Progressing     Problem: Chronic Conditions and Co-morbidities  Goal: Patient's chronic conditions and co-morbidity symptoms are monitored and maintained or improved  Outcome: Progressing     Problem: Fall/Injury  Goal: Not fall by end of shift  Outcome: Progressing  Goal: Be free from injury by end of the shift  Outcome: Progressing  Goal: Verbalize understanding of personal risk factors for fall in the hospital  Outcome: Progressing  Goal: Verbalize understanding of risk factor reduction measures to prevent injury from fall in the home  Outcome:  Progressing  Goal: Use assistive devices by end of the shift  Outcome: Progressing  Goal: Pace activities to prevent fatigue by end of the shift  Outcome: Progressing

## 2024-11-12 ENCOUNTER — APPOINTMENT (OUTPATIENT)
Dept: RADIOLOGY | Facility: HOSPITAL | Age: 69
DRG: 001 | End: 2024-11-12
Payer: MEDICARE

## 2024-11-12 ENCOUNTER — HOSPITAL ENCOUNTER (INPATIENT)
Dept: OPERATING ROOM | Facility: HOSPITAL | Age: 69
Discharge: HOME | DRG: 001 | End: 2024-11-12
Payer: MEDICARE

## 2024-11-12 ENCOUNTER — ANESTHESIA (OUTPATIENT)
Dept: OPERATING ROOM | Facility: HOSPITAL | Age: 69
End: 2024-11-12
Payer: MEDICARE

## 2024-11-12 ENCOUNTER — APPOINTMENT (OUTPATIENT)
Dept: CARDIOLOGY | Facility: HOSPITAL | Age: 69
DRG: 001 | End: 2024-11-12
Payer: MEDICARE

## 2024-11-12 LAB
ACT BLD: 118 SEC (ref 82–174)
ACT BLD: 131 SEC (ref 82–174)
ACT BLD: 137 SEC (ref 82–174)
ACT BLD: 348 SEC (ref 82–174)
ACT BLD: 374 SEC (ref 82–174)
ACT BLD: 384 SEC (ref 82–174)
ACT BLD: 407 SEC (ref 82–174)
ACT BLD: 467 SEC (ref 82–174)
ACT BLD: 495 SEC (ref 82–174)
ALBUMIN SERPL BCP-MCNC: 3.7 G/DL (ref 3.4–5)
ALBUMIN SERPL BCP-MCNC: 3.8 G/DL (ref 3.4–5)
ALBUMIN SERPL BCP-MCNC: 3.9 G/DL (ref 3.4–5)
ALP SERPL-CCNC: 59 U/L (ref 33–136)
ALT SERPL W P-5'-P-CCNC: 15 U/L (ref 10–52)
ANION GAP BLDA CALCULATED.4IONS-SCNC: 11 MMO/L (ref 10–25)
ANION GAP BLDA CALCULATED.4IONS-SCNC: 12 MMO/L (ref 10–25)
ANION GAP BLDA CALCULATED.4IONS-SCNC: 13 MMO/L (ref 10–25)
ANION GAP BLDA CALCULATED.4IONS-SCNC: 13 MMO/L (ref 10–25)
ANION GAP BLDA CALCULATED.4IONS-SCNC: 14 MMO/L (ref 10–25)
ANION GAP BLDA CALCULATED.4IONS-SCNC: 15 MMO/L (ref 10–25)
ANION GAP BLDA CALCULATED.4IONS-SCNC: 16 MMO/L (ref 10–25)
ANION GAP BLDA CALCULATED.4IONS-SCNC: 17 MMO/L (ref 10–25)
ANION GAP BLDA CALCULATED.4IONS-SCNC: 17 MMO/L (ref 10–25)
ANION GAP BLDMV CALCULATED.4IONS-SCNC: 12 MMO/L (ref 10–25)
ANION GAP BLDMV CALCULATED.4IONS-SCNC: 13 MMO/L (ref 10–25)
ANION GAP BLDMV CALCULATED.4IONS-SCNC: 14 MMO/L (ref 10–25)
ANION GAP BLDMV CALCULATED.4IONS-SCNC: 9 MMO/L (ref 10–25)
ANION GAP BLDV CALCULATED.4IONS-SCNC: 13 MMOL/L (ref 10–25)
ANION GAP SERPL CALC-SCNC: 13 MMOL/L (ref 10–20)
ANION GAP SERPL CALC-SCNC: 19 MMOL/L (ref 10–20)
APTT PPP: 33 SECONDS (ref 27–38)
AST SERPL W P-5'-P-CCNC: 37 U/L (ref 9–39)
BASE EXCESS BLDA CALC-SCNC: -1.3 MMOL/L (ref -2–3)
BASE EXCESS BLDA CALC-SCNC: -1.3 MMOL/L (ref -2–3)
BASE EXCESS BLDA CALC-SCNC: -1.5 MMOL/L (ref -2–3)
BASE EXCESS BLDA CALC-SCNC: -1.8 MMOL/L (ref -2–3)
BASE EXCESS BLDA CALC-SCNC: -2.2 MMOL/L (ref -2–3)
BASE EXCESS BLDA CALC-SCNC: -2.4 MMOL/L (ref -2–3)
BASE EXCESS BLDA CALC-SCNC: -2.5 MMOL/L (ref -2–3)
BASE EXCESS BLDA CALC-SCNC: -2.5 MMOL/L (ref -2–3)
BASE EXCESS BLDA CALC-SCNC: -2.6 MMOL/L (ref -2–3)
BASE EXCESS BLDA CALC-SCNC: -3.2 MMOL/L (ref -2–3)
BASE EXCESS BLDA CALC-SCNC: -3.5 MMOL/L (ref -2–3)
BASE EXCESS BLDA CALC-SCNC: -3.5 MMOL/L (ref -2–3)
BASE EXCESS BLDA CALC-SCNC: 0.7 MMOL/L (ref -2–3)
BASE EXCESS BLDA CALC-SCNC: 2 MMOL/L (ref -2–3)
BASE EXCESS BLDMV CALC-SCNC: -1.8 MMOL/L (ref -2–3)
BASE EXCESS BLDMV CALC-SCNC: -2 MMOL/L (ref -2–3)
BASE EXCESS BLDMV CALC-SCNC: -2.2 MMOL/L (ref -2–3)
BASE EXCESS BLDMV CALC-SCNC: 0.1 MMOL/L (ref -2–3)
BASE EXCESS BLDV CALC-SCNC: -2.3 MMOL/L (ref -2–3)
BILIRUB DIRECT SERPL-MCNC: 0.3 MG/DL (ref 0–0.3)
BILIRUB SERPL-MCNC: 0.9 MG/DL (ref 0–1.2)
BLOOD EXPIRATION DATE: NORMAL
BODY TEMPERATURE: 37 DEGREES CELSIUS
BUN SERPL-MCNC: 24 MG/DL (ref 6–23)
BUN SERPL-MCNC: 24 MG/DL (ref 6–23)
CA-I BLD-SCNC: 1.17 MMOL/L (ref 1.1–1.33)
CA-I BLDA-SCNC: 1.07 MMOL/L (ref 1.1–1.33)
CA-I BLDA-SCNC: 1.08 MMOL/L (ref 1.1–1.33)
CA-I BLDA-SCNC: 1.11 MMOL/L (ref 1.1–1.33)
CA-I BLDA-SCNC: 1.17 MMOL/L (ref 1.1–1.33)
CA-I BLDA-SCNC: 1.17 MMOL/L (ref 1.1–1.33)
CA-I BLDA-SCNC: 1.19 MMOL/L (ref 1.1–1.33)
CA-I BLDA-SCNC: 1.2 MMOL/L (ref 1.1–1.33)
CA-I BLDA-SCNC: 1.21 MMOL/L (ref 1.1–1.33)
CA-I BLDA-SCNC: 1.21 MMOL/L (ref 1.1–1.33)
CA-I BLDA-SCNC: 1.22 MMOL/L (ref 1.1–1.33)
CA-I BLDA-SCNC: 1.23 MMOL/L (ref 1.1–1.33)
CA-I BLDA-SCNC: 1.23 MMOL/L (ref 1.1–1.33)
CA-I BLDA-SCNC: 1.24 MMOL/L (ref 1.1–1.33)
CA-I BLDA-SCNC: 1.24 MMOL/L (ref 1.1–1.33)
CA-I BLDA-SCNC: 1.3 MMOL/L (ref 1.1–1.33)
CA-I BLDA-SCNC: 1.32 MMOL/L (ref 1.1–1.33)
CA-I BLDMV-SCNC: 1.2 MMOL/L (ref 1.1–1.33)
CA-I BLDMV-SCNC: 1.2 MMOL/L (ref 1.1–1.33)
CA-I BLDMV-SCNC: 1.21 MMOL/L (ref 1.1–1.33)
CA-I BLDMV-SCNC: 1.25 MMOL/L (ref 1.1–1.33)
CA-I BLDV-SCNC: 1.18 MMOL/L (ref 1.1–1.33)
CALCIUM SERPL-MCNC: 9.5 MG/DL (ref 8.6–10.6)
CALCIUM SERPL-MCNC: 9.6 MG/DL (ref 8.6–10.6)
CFT FORM KAOLIN IND BLD RES TEG: 1.2 MIN (ref 0.8–2.1)
CFT FORM KAOLIN IND BLD RES TEG: 1.2 MIN (ref 0.8–2.1)
CFT FORM KAOLIN IND BLD RES TEG: 1.3 MIN (ref 0.8–2.1)
CHLORIDE BLD-SCNC: 101 MMOL/L (ref 98–107)
CHLORIDE BLD-SCNC: 102 MMOL/L (ref 98–107)
CHLORIDE BLD-SCNC: 103 MMOL/L (ref 98–107)
CHLORIDE BLD-SCNC: 104 MMOL/L (ref 98–107)
CHLORIDE BLDA-SCNC: 100 MMOL/L (ref 98–107)
CHLORIDE BLDA-SCNC: 101 MMOL/L (ref 98–107)
CHLORIDE BLDA-SCNC: 101 MMOL/L (ref 98–107)
CHLORIDE BLDA-SCNC: 102 MMOL/L (ref 98–107)
CHLORIDE BLDA-SCNC: 103 MMOL/L (ref 98–107)
CHLORIDE BLDA-SCNC: 97 MMOL/L (ref 98–107)
CHLORIDE BLDA-SCNC: 97 MMOL/L (ref 98–107)
CHLORIDE BLDA-SCNC: 98 MMOL/L (ref 98–107)
CHLORIDE BLDA-SCNC: 99 MMOL/L (ref 98–107)
CHLORIDE BLDA-SCNC: 99 MMOL/L (ref 98–107)
CHLORIDE BLDV-SCNC: 98 MMOL/L (ref 98–107)
CHLORIDE SERPL-SCNC: 101 MMOL/L (ref 98–107)
CHLORIDE SERPL-SCNC: 101 MMOL/L (ref 98–107)
CLOT ANGLE.KAOLIN INDUCED BLD RES TEG: 73 DEG (ref 63–78)
CLOT ANGLE.KAOLIN INDUCED BLD RES TEG: 74 DEG (ref 63–78)
CLOT ANGLE.KAOLIN INDUCED BLD RES TEG: 74 DEG (ref 63–78)
CLOT INIT KAO IND P HEP NEUT BLD RES TEG: 11.2 MIN (ref 4.6–9.1)
CLOT INIT KAO IND P HEP NEUT BLD RES TEG: 6.9 MIN (ref 4.6–9.1)
CLOT INIT KAO IND P HEP NEUT BLD RES TEG: 7.7 MIN (ref 4.3–8.3)
CLOT INIT KAO IND P HEP NEUT BLD RES TEG: 8.5 MIN (ref 4.3–8.3)
CLOT INIT KAO IND P HEP NEUT BLD RES TEG: 8.5 MIN (ref 4.3–8.3)
CLOT INIT KAO IND P HEP NEUT BLD RES TEG: 9.9 MIN (ref 4.6–9.1)
CO2 SERPL-SCNC: 23 MMOL/L (ref 21–32)
CO2 SERPL-SCNC: 26 MMOL/L (ref 21–32)
COHGB MFR BLDA: 1.2 %
COHGB MFR BLDA: 1.5 %
COHGB MFR BLDA: 1.5 %
COHGB MFR BLDA: 1.6 %
COHGB MFR BLDA: 1.7 %
COHGB MFR BLDA: 2.2 %
COHGB MFR BLDV: 1.6 %
CREAT SERPL-MCNC: 1.33 MG/DL (ref 0.5–1.3)
CREAT SERPL-MCNC: 1.73 MG/DL (ref 0.5–1.3)
DISPENSE STATUS: NORMAL
DO-HGB MFR BLDA: 0 % (ref 0–5)
DO-HGB MFR BLDA: 0 % (ref 0–5)
DO-HGB MFR BLDA: 0.2 % (ref 0–5)
DO-HGB MFR BLDA: 0.5 % (ref 0–5)
DO-HGB MFR BLDA: 0.9 % (ref 0–5)
DO-HGB MFR BLDA: 1.2 % (ref 0–5)
DO-HGB MFR BLDA: 2 % (ref 0–5)
DO-HGB MFR BLDA: 3.3 % (ref 0–5)
EGFRCR SERPLBLD CKD-EPI 2021: 42 ML/MIN/1.73M*2
EGFRCR SERPLBLD CKD-EPI 2021: 58 ML/MIN/1.73M*2
ERYTHROCYTE [DISTWIDTH] IN BLOOD BY AUTOMATED COUNT: 16.8 % (ref 11.5–14.5)
ERYTHROCYTE [DISTWIDTH] IN BLOOD BY AUTOMATED COUNT: 16.8 % (ref 11.5–14.5)
ERYTHROCYTE [DISTWIDTH] IN BLOOD BY AUTOMATED COUNT: 16.9 % (ref 11.5–14.5)
FIBRINOGEN BLD CALC-MCNC: 487 MG/DL (ref 278–581)
FIBRINOGEN BLD CALC-MCNC: 518 MG/DL (ref 278–581)
FIBRINOGEN BLD CALC-MCNC: 529 MG/DL (ref 278–581)
FIBRINOGEN PPP-MCNC: 301 MG/DL (ref 200–400)
GLUCOSE BLD MANUAL STRIP-MCNC: 160 MG/DL (ref 74–99)
GLUCOSE BLD-MCNC: 112 MG/DL (ref 74–99)
GLUCOSE BLD-MCNC: 167 MG/DL (ref 74–99)
GLUCOSE BLD-MCNC: 188 MG/DL (ref 74–99)
GLUCOSE BLD-MCNC: 230 MG/DL (ref 74–99)
GLUCOSE BLDA-MCNC: 109 MG/DL (ref 74–99)
GLUCOSE BLDA-MCNC: 147 MG/DL (ref 74–99)
GLUCOSE BLDA-MCNC: 147 MG/DL (ref 74–99)
GLUCOSE BLDA-MCNC: 150 MG/DL (ref 74–99)
GLUCOSE BLDA-MCNC: 159 MG/DL (ref 74–99)
GLUCOSE BLDA-MCNC: 160 MG/DL (ref 74–99)
GLUCOSE BLDA-MCNC: 169 MG/DL (ref 74–99)
GLUCOSE BLDA-MCNC: 175 MG/DL (ref 74–99)
GLUCOSE BLDA-MCNC: 183 MG/DL (ref 74–99)
GLUCOSE BLDA-MCNC: 200 MG/DL (ref 74–99)
GLUCOSE BLDA-MCNC: 204 MG/DL (ref 74–99)
GLUCOSE BLDA-MCNC: 210 MG/DL (ref 74–99)
GLUCOSE BLDA-MCNC: 220 MG/DL (ref 74–99)
GLUCOSE BLDA-MCNC: 229 MG/DL (ref 74–99)
GLUCOSE BLDA-MCNC: 232 MG/DL (ref 74–99)
GLUCOSE BLDA-MCNC: 233 MG/DL (ref 74–99)
GLUCOSE BLDV-MCNC: 159 MG/DL (ref 74–99)
GLUCOSE SERPL-MCNC: 111 MG/DL (ref 74–99)
GLUCOSE SERPL-MCNC: 152 MG/DL (ref 74–99)
HCO3 BLDA-SCNC: 21.6 MMOL/L (ref 22–26)
HCO3 BLDA-SCNC: 21.7 MMOL/L (ref 22–26)
HCO3 BLDA-SCNC: 21.7 MMOL/L (ref 22–26)
HCO3 BLDA-SCNC: 22.1 MMOL/L (ref 22–26)
HCO3 BLDA-SCNC: 22.1 MMOL/L (ref 22–26)
HCO3 BLDA-SCNC: 22.4 MMOL/L (ref 22–26)
HCO3 BLDA-SCNC: 22.5 MMOL/L (ref 22–26)
HCO3 BLDA-SCNC: 22.9 MMOL/L (ref 22–26)
HCO3 BLDA-SCNC: 23.1 MMOL/L (ref 22–26)
HCO3 BLDA-SCNC: 23.2 MMOL/L (ref 22–26)
HCO3 BLDA-SCNC: 23.2 MMOL/L (ref 22–26)
HCO3 BLDA-SCNC: 24 MMOL/L (ref 22–26)
HCO3 BLDA-SCNC: 24.3 MMOL/L (ref 22–26)
HCO3 BLDA-SCNC: 24.8 MMOL/L (ref 22–26)
HCO3 BLDA-SCNC: 25.4 MMOL/L (ref 22–26)
HCO3 BLDA-SCNC: 27.8 MMOL/L (ref 22–26)
HCO3 BLDMV-SCNC: 23.1 MMOL/L (ref 22–26)
HCO3 BLDMV-SCNC: 23.7 MMOL/L (ref 22–26)
HCO3 BLDMV-SCNC: 23.7 MMOL/L (ref 22–26)
HCO3 BLDMV-SCNC: 24.7 MMOL/L (ref 22–26)
HCO3 BLDV-SCNC: 24.6 MMOL/L (ref 22–26)
HCT VFR BLD AUTO: 38.9 % (ref 41–52)
HCT VFR BLD AUTO: 39.5 % (ref 41–52)
HCT VFR BLD AUTO: 42.9 % (ref 41–52)
HCT VFR BLD EST: 37 % (ref 41–52)
HCT VFR BLD EST: 37 % (ref 41–52)
HCT VFR BLD EST: 38 % (ref 41–52)
HCT VFR BLD EST: 39 % (ref 41–52)
HCT VFR BLD EST: 39 % (ref 41–52)
HCT VFR BLD EST: 40 % (ref 41–52)
HCT VFR BLD EST: 41 % (ref 41–52)
HCT VFR BLD EST: 42 % (ref 41–52)
HCT VFR BLD EST: 43 % (ref 41–52)
HCT VFR BLD EST: 44 % (ref 41–52)
HGB BLD-MCNC: 12.8 G/DL (ref 13.5–17.5)
HGB BLD-MCNC: 12.9 G/DL (ref 13.5–17.5)
HGB BLD-MCNC: 14.1 G/DL (ref 13.5–17.5)
HGB BLDA-MCNC: 12.2 G/DL (ref 13.5–17.5)
HGB BLDA-MCNC: 12.4 G/DL (ref 13.5–17.5)
HGB BLDA-MCNC: 12.4 G/DL (ref 13.5–17.5)
HGB BLDA-MCNC: 12.5 G/DL (ref 13.5–17.5)
HGB BLDA-MCNC: 12.5 G/DL (ref 13.5–17.5)
HGB BLDA-MCNC: 12.6 G/DL (ref 13.5–17.5)
HGB BLDA-MCNC: 12.8 G/DL (ref 13.5–17.5)
HGB BLDA-MCNC: 12.8 G/DL (ref 13.5–17.5)
HGB BLDA-MCNC: 13 G/DL (ref 13.5–17.5)
HGB BLDA-MCNC: 13 G/DL (ref 13.5–17.5)
HGB BLDA-MCNC: 13.1 G/DL (ref 13.5–17.5)
HGB BLDA-MCNC: 13.3 G/DL (ref 13.5–17.5)
HGB BLDA-MCNC: 13.5 G/DL (ref 13.5–17.5)
HGB BLDA-MCNC: 13.7 G/DL (ref 13.5–17.5)
HGB BLDA-MCNC: 13.9 G/DL (ref 13.5–17.5)
HGB BLDA-MCNC: 13.9 G/DL (ref 13.5–17.5)
HGB BLDA-MCNC: 14 G/DL (ref 13.5–17.5)
HGB BLDA-MCNC: 14 G/DL (ref 13.5–17.5)
HGB BLDA-MCNC: 14.1 G/DL (ref 13.5–17.5)
HGB BLDA-MCNC: 14.6 G/DL (ref 13.5–17.5)
HGB BLDMV-MCNC: 12.5 G/DL (ref 13.5–17.5)
HGB BLDMV-MCNC: 12.8 G/DL (ref 13.5–17.5)
HGB BLDMV-MCNC: 13.9 G/DL (ref 13.5–17.5)
HGB BLDMV-MCNC: 14.4 G/DL (ref 13.5–17.5)
HGB BLDV-MCNC: 12.6 G/DL (ref 13.5–17.5)
HLA CLS I TYP PNL BLD/T DONR HIGH RES: NORMAL
HLA RESULTS: NORMAL
HLA RESULTS: NORMAL
HLA-A+B+C AB NFR SER: NORMAL %
HLA-DP+DQ+DR AB NFR SER: NORMAL %
HLA-DP2 QL: NORMAL
HLA-DQB1 HIGH RES: NORMAL
HLA-DRB1 HIGH RES: NORMAL
INHALED O2 CONCENTRATION: 100 %
INHALED O2 CONCENTRATION: 21 %
INHALED O2 CONCENTRATION: 30 %
INHALED O2 CONCENTRATION: 40 %
INHALED O2 CONCENTRATION: 40 %
INHALED O2 CONCENTRATION: 50 %
INHALED O2 CONCENTRATION: 75 %
INHALED O2 CONCENTRATION: 75 %
INHALED O2 CONCENTRATION: 90 %
INHALED O2 CONCENTRATION: 90 %
INHALED O2 CONCENTRATION: 95 %
INHALED O2 CONCENTRATION: 98 %
INR PPP: 1.4 (ref 0.9–1.1)
LACTATE BLDA-SCNC: 0.8 MMOL/L (ref 0.4–2)
LACTATE BLDA-SCNC: 0.8 MMOL/L (ref 0.4–2)
LACTATE BLDA-SCNC: 0.9 MMOL/L (ref 0.4–2)
LACTATE BLDA-SCNC: 0.9 MMOL/L (ref 0.4–2)
LACTATE BLDA-SCNC: 1.1 MMOL/L (ref 0.4–2)
LACTATE BLDA-SCNC: 1.4 MMOL/L (ref 0.4–2)
LACTATE BLDA-SCNC: 1.7 MMOL/L (ref 0.4–2)
LACTATE BLDA-SCNC: 2.1 MMOL/L (ref 0.4–2)
LACTATE BLDA-SCNC: 2.3 MMOL/L (ref 0.4–2)
LACTATE BLDA-SCNC: 2.7 MMOL/L (ref 0.4–2)
LACTATE BLDA-SCNC: 3.4 MMOL/L (ref 0.4–2)
LACTATE BLDA-SCNC: 3.7 MMOL/L (ref 0.4–2)
LACTATE BLDA-SCNC: 3.9 MMOL/L (ref 0.4–2)
LACTATE BLDA-SCNC: 4 MMOL/L (ref 0.4–2)
LACTATE BLDA-SCNC: 4.3 MMOL/L (ref 0.4–2)
LACTATE BLDA-SCNC: 4.3 MMOL/L (ref 0.4–2)
LACTATE BLDMV-SCNC: 0.8 MMOL/L (ref 0.4–2)
LACTATE BLDMV-SCNC: 3.4 MMOL/L (ref 0.4–2)
LACTATE BLDMV-SCNC: 3.6 MMOL/L (ref 0.4–2)
LACTATE BLDMV-SCNC: 4.1 MMOL/L (ref 0.4–2)
LACTATE BLDV-SCNC: 1 MMOL/L (ref 0.4–2)
LDH SERPL L TO P-CCNC: 324 U/L (ref 84–246)
MA KAOLIN BLD RES TEG: 67 MM (ref 52–69)
MA KAOLIN BLD RES TEG: 68 MM (ref 52–69)
MA KAOLIN BLD RES TEG: 68 MM (ref 52–69)
MA KAOLIN+TF BLD RES TEG: 68 MM (ref 52–70)
MA KAOLIN+TF BLD RES TEG: 69 MM (ref 52–70)
MA KAOLIN+TF BLD RES TEG: 69 MM (ref 52–70)
MA TF IND+IIB-IIIA INH BLD RES TEG: 27 MM (ref 15–32)
MA TF IND+IIB-IIIA INH BLD RES TEG: 28 MM (ref 15–32)
MA TF IND+IIB-IIIA INH BLD RES TEG: 29 MM (ref 15–32)
MAGNESIUM SERPL-MCNC: 2.02 MG/DL (ref 1.6–2.4)
MAGNESIUM SERPL-MCNC: 3.1 MG/DL (ref 1.6–2.4)
MCH RBC QN AUTO: 27.2 PG (ref 26–34)
MCH RBC QN AUTO: 27.3 PG (ref 26–34)
MCH RBC QN AUTO: 27.8 PG (ref 26–34)
MCHC RBC AUTO-ENTMCNC: 32.4 G/DL (ref 32–36)
MCHC RBC AUTO-ENTMCNC: 32.9 G/DL (ref 32–36)
MCHC RBC AUTO-ENTMCNC: 33.2 G/DL (ref 32–36)
MCV RBC AUTO: 82 FL (ref 80–100)
MCV RBC AUTO: 84 FL (ref 80–100)
MCV RBC AUTO: 84 FL (ref 80–100)
METHGB MFR BLDA: 0.2 % (ref 0–1.5)
METHGB MFR BLDA: 0.3 % (ref 0–1.5)
METHGB MFR BLDA: 0.4 % (ref 0–1.5)
METHGB MFR BLDA: 0.4 % (ref 0–1.5)
METHGB MFR BLDA: 0.5 % (ref 0–1.5)
METHGB MFR BLDA: 0.7 % (ref 0–1.5)
METHGB MFR BLDV: 0.1 % (ref 0–1.5)
NRBC BLD-RTO: 0 /100 WBCS (ref 0–0)
OXYHGB MFR BLDA: 94.8 % (ref 94–98)
OXYHGB MFR BLDA: 94.8 % (ref 94–98)
OXYHGB MFR BLDA: 96.1 % (ref 94–98)
OXYHGB MFR BLDA: 96.1 % (ref 94–98)
OXYHGB MFR BLDA: 96.2 % (ref 94–98)
OXYHGB MFR BLDA: 96.4 % (ref 94–98)
OXYHGB MFR BLDA: 96.4 % (ref 94–98)
OXYHGB MFR BLDA: 96.9 % (ref 94–98)
OXYHGB MFR BLDA: 97 % (ref 94–98)
OXYHGB MFR BLDA: 97.1 % (ref 94–98)
OXYHGB MFR BLDA: 97.5 % (ref 94–98)
OXYHGB MFR BLDA: 97.6 % (ref 94–98)
OXYHGB MFR BLDA: 97.7 % (ref 94–98)
OXYHGB MFR BLDA: 97.7 % (ref 94–98)
OXYHGB MFR BLDA: 98 % (ref 94–98)
OXYHGB MFR BLDA: 98.1 % (ref 94–98)
OXYHGB MFR BLDA: 98.1 % (ref 94–98)
OXYHGB MFR BLDA: 98.2 % (ref 94–98)
OXYHGB MFR BLDMV: 70.1 % (ref 45–75)
OXYHGB MFR BLDMV: 75.8 % (ref 45–75)
OXYHGB MFR BLDMV: 77.6 % (ref 45–75)
OXYHGB MFR BLDMV: 80.6 % (ref 45–75)
OXYHGB MFR BLDV: 81.9 % (ref 45–75)
PCO2 BLDA: 34 MM HG (ref 38–42)
PCO2 BLDA: 35 MM HG (ref 38–42)
PCO2 BLDA: 35 MM HG (ref 38–42)
PCO2 BLDA: 37 MM HG (ref 38–42)
PCO2 BLDA: 37 MM HG (ref 38–42)
PCO2 BLDA: 39 MM HG (ref 38–42)
PCO2 BLDA: 39 MM HG (ref 38–42)
PCO2 BLDA: 40 MM HG (ref 38–42)
PCO2 BLDA: 41 MM HG (ref 38–42)
PCO2 BLDA: 43 MM HG (ref 38–42)
PCO2 BLDA: 46 MM HG (ref 38–42)
PCO2 BLDA: 47 MM HG (ref 38–42)
PCO2 BLDA: 51 MM HG (ref 38–42)
PCO2 BLDMV: 39 MM HG (ref 41–51)
PCO2 BLDMV: 39 MM HG (ref 41–51)
PCO2 BLDMV: 43 MM HG (ref 41–51)
PCO2 BLDMV: 44 MM HG (ref 41–51)
PCO2 BLDV: 50 MM HG (ref 41–51)
PH BLDA: 7.28 PH (ref 7.38–7.42)
PH BLDA: 7.34 PH (ref 7.38–7.42)
PH BLDA: 7.36 PH (ref 7.38–7.42)
PH BLDA: 7.37 PH (ref 7.38–7.42)
PH BLDA: 7.38 PH (ref 7.38–7.42)
PH BLDA: 7.38 PH (ref 7.38–7.42)
PH BLDA: 7.39 PH (ref 7.38–7.42)
PH BLDA: 7.4 PH (ref 7.38–7.42)
PH BLDA: 7.41 PH (ref 7.38–7.42)
PH BLDA: 7.41 PH (ref 7.38–7.42)
PH BLDMV: 7.34 PH (ref 7.33–7.43)
PH BLDMV: 7.35 PH (ref 7.33–7.43)
PH BLDMV: 7.38 PH (ref 7.33–7.43)
PH BLDMV: 7.41 PH (ref 7.33–7.43)
PH BLDV: 7.3 PH (ref 7.33–7.43)
PHOSPHATE SERPL-MCNC: 3.7 MG/DL (ref 2.5–4.9)
PHOSPHATE SERPL-MCNC: 4.2 MG/DL (ref 2.5–4.9)
PLATELET # BLD AUTO: 421 X10*3/UL (ref 150–450)
PLATELET # BLD AUTO: 485 X10*3/UL (ref 150–450)
PLATELET # BLD AUTO: 486 X10*3/UL (ref 150–450)
PO2 BLDA: 100 MM HG (ref 85–95)
PO2 BLDA: 109 MM HG (ref 85–95)
PO2 BLDA: 118 MM HG (ref 85–95)
PO2 BLDA: 131 MM HG (ref 85–95)
PO2 BLDA: 145 MM HG (ref 85–95)
PO2 BLDA: 251 MM HG (ref 85–95)
PO2 BLDA: 254 MM HG (ref 85–95)
PO2 BLDA: 272 MM HG (ref 85–95)
PO2 BLDA: 281 MM HG (ref 85–95)
PO2 BLDA: 289 MM HG (ref 85–95)
PO2 BLDA: 296 MM HG (ref 85–95)
PO2 BLDA: 422 MM HG (ref 85–95)
PO2 BLDA: 437 MM HG (ref 85–95)
PO2 BLDA: 82 MM HG (ref 85–95)
PO2 BLDA: 90 MM HG (ref 85–95)
PO2 BLDA: 93 MM HG (ref 85–95)
PO2 BLDMV: 45 MM HG (ref 35–45)
PO2 BLDMV: 48 MM HG (ref 35–45)
PO2 BLDMV: 50 MM HG (ref 35–45)
PO2 BLDMV: 56 MM HG (ref 35–45)
PO2 BLDV: 55 MM HG (ref 35–45)
POTASSIUM BLDA-SCNC: 3.8 MMOL/L (ref 3.5–5.3)
POTASSIUM BLDA-SCNC: 3.9 MMOL/L (ref 3.5–5.3)
POTASSIUM BLDA-SCNC: 4.1 MMOL/L (ref 3.5–5.3)
POTASSIUM BLDA-SCNC: 4.3 MMOL/L (ref 3.5–5.3)
POTASSIUM BLDA-SCNC: 4.4 MMOL/L (ref 3.5–5.3)
POTASSIUM BLDA-SCNC: 4.7 MMOL/L (ref 3.5–5.3)
POTASSIUM BLDA-SCNC: 4.9 MMOL/L (ref 3.5–5.3)
POTASSIUM BLDA-SCNC: 4.9 MMOL/L (ref 3.5–5.3)
POTASSIUM BLDA-SCNC: 5 MMOL/L (ref 3.5–5.3)
POTASSIUM BLDA-SCNC: 5.2 MMOL/L (ref 3.5–5.3)
POTASSIUM BLDMV-SCNC: 3.7 MMOL/L (ref 3.5–5.3)
POTASSIUM BLDMV-SCNC: 3.8 MMOL/L (ref 3.5–5.3)
POTASSIUM BLDMV-SCNC: 3.8 MMOL/L (ref 3.5–5.3)
POTASSIUM BLDMV-SCNC: 4.3 MMOL/L (ref 3.5–5.3)
POTASSIUM BLDV-SCNC: 4.9 MMOL/L (ref 3.5–5.3)
POTASSIUM SERPL-SCNC: 4.2 MMOL/L (ref 3.5–5.3)
POTASSIUM SERPL-SCNC: 4.5 MMOL/L (ref 3.5–5.3)
PRODUCT BLOOD TYPE: 6200
PRODUCT BLOOD TYPE: 6200
PRODUCT BLOOD TYPE: 7300
PRODUCT CODE: NORMAL
PROT SERPL-MCNC: 6.1 G/DL (ref 6.4–8.2)
PROTHROMBIN TIME: 15.5 SECONDS (ref 9.8–12.8)
RBC # BLD AUTO: 4.7 X10*6/UL (ref 4.5–5.9)
RBC # BLD AUTO: 4.72 X10*6/UL (ref 4.5–5.9)
RBC # BLD AUTO: 5.08 X10*6/UL (ref 4.5–5.9)
SAO2 % BLDA: 100 % (ref 94–100)
SAO2 % BLDA: 97 % (ref 94–100)
SAO2 % BLDA: 98 % (ref 94–100)
SAO2 % BLDA: 99 % (ref 94–100)
SAO2 % BLDMV: 72 % (ref 45–75)
SAO2 % BLDMV: 78 % (ref 45–75)
SAO2 % BLDMV: 79 % (ref 45–75)
SAO2 % BLDMV: 83 % (ref 45–75)
SAO2 % BLDV: 83 % (ref 45–75)
SODIUM BLDA-SCNC: 131 MMOL/L (ref 136–145)
SODIUM BLDA-SCNC: 132 MMOL/L (ref 136–145)
SODIUM BLDA-SCNC: 133 MMOL/L (ref 136–145)
SODIUM BLDA-SCNC: 134 MMOL/L (ref 136–145)
SODIUM BLDMV-SCNC: 133 MMOL/L (ref 136–145)
SODIUM BLDMV-SCNC: 134 MMOL/L (ref 136–145)
SODIUM BLDMV-SCNC: 135 MMOL/L (ref 136–145)
SODIUM BLDMV-SCNC: 135 MMOL/L (ref 136–145)
SODIUM BLDV-SCNC: 131 MMOL/L (ref 136–145)
SODIUM SERPL-SCNC: 135 MMOL/L (ref 136–145)
SODIUM SERPL-SCNC: 139 MMOL/L (ref 136–145)
TEST COMMENT: ABNORMAL
UFH PPP CHRO-ACNC: 0.4 IU/ML
UNIT ABO: NORMAL
UNIT NUMBER: NORMAL
UNIT RH: NORMAL
UNIT VOLUME: 180
UNIT VOLUME: 269
UNIT VOLUME: 299
WBC # BLD AUTO: 21.3 X10*3/UL (ref 4.4–11.3)
WBC # BLD AUTO: 25.4 X10*3/UL (ref 4.4–11.3)
WBC # BLD AUTO: 8.9 X10*3/UL (ref 4.4–11.3)

## 2024-11-12 PROCEDURE — 2500000004 HC RX 250 GENERAL PHARMACY W/ HCPCS (ALT 636 FOR OP/ED): Performed by: STUDENT IN AN ORGANIZED HEALTH CARE EDUCATION/TRAINING PROGRAM

## 2024-11-12 PROCEDURE — 82330 ASSAY OF CALCIUM: CPT | Performed by: NURSE PRACTITIONER

## 2024-11-12 PROCEDURE — 3600000017 HC OR TIME - EACH INCREMENTAL 1 MINUTE - PROCEDURE LEVEL SIX: Performed by: THORACIC SURGERY (CARDIOTHORACIC VASCULAR SURGERY)

## 2024-11-12 PROCEDURE — 2720000007 HC OR 272 NO HCPCS: Performed by: THORACIC SURGERY (CARDIOTHORACIC VASCULAR SURGERY)

## 2024-11-12 PROCEDURE — P9017 PLASMA 1 DONOR FRZ W/IN 8 HR: HCPCS

## 2024-11-12 PROCEDURE — 85384 FIBRINOGEN ACTIVITY: CPT

## 2024-11-12 PROCEDURE — 84075 ASSAY ALKALINE PHOSPHATASE: CPT | Performed by: STUDENT IN AN ORGANIZED HEALTH CARE EDUCATION/TRAINING PROGRAM

## 2024-11-12 PROCEDURE — 99291 CRITICAL CARE FIRST HOUR: CPT | Performed by: NURSE PRACTITIONER

## 2024-11-12 PROCEDURE — 87081 CULTURE SCREEN ONLY: CPT | Performed by: NURSE PRACTITIONER

## 2024-11-12 PROCEDURE — P9045 ALBUMIN (HUMAN), 5%, 250 ML: HCPCS | Mod: JZ

## 2024-11-12 PROCEDURE — 74018 RADEX ABDOMEN 1 VIEW: CPT

## 2024-11-12 PROCEDURE — 83735 ASSAY OF MAGNESIUM: CPT | Performed by: STUDENT IN AN ORGANIZED HEALTH CARE EDUCATION/TRAINING PROGRAM

## 2024-11-12 PROCEDURE — P9016 RBC LEUKOCYTES REDUCED: HCPCS

## 2024-11-12 PROCEDURE — 3600000012 HC PERFUSION TIME - EACH INCREMENTAL 1 MINUTE: Performed by: THORACIC SURGERY (CARDIOTHORACIC VASCULAR SURGERY)

## 2024-11-12 PROCEDURE — 85347 COAGULATION TIME ACTIVATED: CPT

## 2024-11-12 PROCEDURE — 2500000004 HC RX 250 GENERAL PHARMACY W/ HCPCS (ALT 636 FOR OP/ED): Mod: JZ | Performed by: NURSE PRACTITIONER

## 2024-11-12 PROCEDURE — 84132 ASSAY OF SERUM POTASSIUM: CPT | Performed by: STUDENT IN AN ORGANIZED HEALTH CARE EDUCATION/TRAINING PROGRAM

## 2024-11-12 PROCEDURE — 3600000005 HC OR TIME - INITIAL BASE CHARGE - PROCEDURE LEVEL FIVE: Performed by: THORACIC SURGERY (CARDIOTHORACIC VASCULAR SURGERY)

## 2024-11-12 PROCEDURE — 82947 ASSAY GLUCOSE BLOOD QUANT: CPT

## 2024-11-12 PROCEDURE — 99233 SBSQ HOSP IP/OBS HIGH 50: CPT | Performed by: REGISTERED NURSE

## 2024-11-12 PROCEDURE — 5A02216 ASSISTANCE WITH CARDIAC OUTPUT USING OTHER PUMP, CONTINUOUS: ICD-10-PCS | Performed by: THORACIC SURGERY (CARDIOTHORACIC VASCULAR SURGERY)

## 2024-11-12 PROCEDURE — 85027 COMPLETE CBC AUTOMATED: CPT | Performed by: STUDENT IN AN ORGANIZED HEALTH CARE EDUCATION/TRAINING PROGRAM

## 2024-11-12 PROCEDURE — 93010 ELECTROCARDIOGRAM REPORT: CPT | Performed by: INTERNAL MEDICINE

## 2024-11-12 PROCEDURE — 94645 CONT INHLJ TX EACH ADDL HOUR: CPT

## 2024-11-12 PROCEDURE — 83615 LACTATE (LD) (LDH) ENZYME: CPT | Performed by: NURSE PRACTITIONER

## 2024-11-12 PROCEDURE — 37799 UNLISTED PX VASCULAR SURGERY: CPT | Performed by: STUDENT IN AN ORGANIZED HEALTH CARE EDUCATION/TRAINING PROGRAM

## 2024-11-12 PROCEDURE — 84132 ASSAY OF SERUM POTASSIUM: CPT | Performed by: NURSE PRACTITIONER

## 2024-11-12 PROCEDURE — 3600000011 HC PERFUSION TIME - INITIAL BASE CHARGE: Performed by: THORACIC SURGERY (CARDIOTHORACIC VASCULAR SURGERY)

## 2024-11-12 PROCEDURE — 2500000004 HC RX 250 GENERAL PHARMACY W/ HCPCS (ALT 636 FOR OP/ED)

## 2024-11-12 PROCEDURE — C1768 GRAFT, VASCULAR: HCPCS | Performed by: THORACIC SURGERY (CARDIOTHORACIC VASCULAR SURGERY)

## 2024-11-12 PROCEDURE — 02HA0QZ INSERTION OF IMPLANTABLE HEART ASSIST SYSTEM INTO HEART, OPEN APPROACH: ICD-10-PCS | Performed by: THORACIC SURGERY (CARDIOTHORACIC VASCULAR SURGERY)

## 2024-11-12 PROCEDURE — 85027 COMPLETE CBC AUTOMATED: CPT | Performed by: NURSE PRACTITIONER

## 2024-11-12 PROCEDURE — 99292 CRITICAL CARE ADDL 30 MIN: CPT | Performed by: ANESTHESIOLOGY

## 2024-11-12 PROCEDURE — 71045 X-RAY EXAM CHEST 1 VIEW: CPT

## 2024-11-12 PROCEDURE — 82375 ASSAY CARBOXYHB QUANT: CPT

## 2024-11-12 PROCEDURE — 2020000001 HC ICU ROOM DAILY

## 2024-11-12 PROCEDURE — 93005 ELECTROCARDIOGRAM TRACING: CPT

## 2024-11-12 PROCEDURE — C1889 IMPLANT/INSERT DEVICE, NOC: HCPCS | Performed by: THORACIC SURGERY (CARDIOTHORACIC VASCULAR SURGERY)

## 2024-11-12 PROCEDURE — 2500000005 HC RX 250 GENERAL PHARMACY W/O HCPCS: Performed by: THORACIC SURGERY (CARDIOTHORACIC VASCULAR SURGERY)

## 2024-11-12 PROCEDURE — 93750 INTERROGATION VAD IN PERSON: CPT | Performed by: REGISTERED NURSE

## 2024-11-12 PROCEDURE — 2500000004 HC RX 250 GENERAL PHARMACY W/ HCPCS (ALT 636 FOR OP/ED): Performed by: THORACIC SURGERY (CARDIOTHORACIC VASCULAR SURGERY)

## 2024-11-12 PROCEDURE — 94644 CONT INHLJ TX 1ST HOUR: CPT

## 2024-11-12 PROCEDURE — 94002 VENT MGMT INPAT INIT DAY: CPT

## 2024-11-12 PROCEDURE — A4312 CATH W/O BAG 2-WAY SILICONE: HCPCS | Performed by: THORACIC SURGERY (CARDIOTHORACIC VASCULAR SURGERY)

## 2024-11-12 PROCEDURE — P9037 PLATE PHERES LEUKOREDU IRRAD: HCPCS

## 2024-11-12 PROCEDURE — 99291 CRITICAL CARE FIRST HOUR: CPT | Performed by: ANESTHESIOLOGY

## 2024-11-12 PROCEDURE — 84132 ASSAY OF SERUM POTASSIUM: CPT

## 2024-11-12 PROCEDURE — 94003 VENT MGMT INPAT SUBQ DAY: CPT

## 2024-11-12 PROCEDURE — 74022 RADEX COMPL AQT ABD SERIES: CPT | Performed by: RADIOLOGY

## 2024-11-12 PROCEDURE — 2500000004 HC RX 250 GENERAL PHARMACY W/ HCPCS (ALT 636 FOR OP/ED): Mod: JZ

## 2024-11-12 PROCEDURE — 31500 INSERT EMERGENCY AIRWAY: CPT | Mod: GC | Performed by: EMERGENCY MEDICINE

## 2024-11-12 PROCEDURE — 2500000005 HC RX 250 GENERAL PHARMACY W/O HCPCS

## 2024-11-12 PROCEDURE — A4649 SURGICAL SUPPLIES: HCPCS | Performed by: THORACIC SURGERY (CARDIOTHORACIC VASCULAR SURGERY)

## 2024-11-12 PROCEDURE — 85610 PROTHROMBIN TIME: CPT | Performed by: NURSE PRACTITIONER

## 2024-11-12 PROCEDURE — 83050 HGB METHEMOGLOBIN QUAN: CPT

## 2024-11-12 PROCEDURE — 88304 TISSUE EXAM BY PATHOLOGIST: CPT | Performed by: PATHOLOGY

## 2024-11-12 PROCEDURE — 83735 ASSAY OF MAGNESIUM: CPT | Performed by: NURSE PRACTITIONER

## 2024-11-12 PROCEDURE — 2500000005 HC RX 250 GENERAL PHARMACY W/O HCPCS: Performed by: NURSE PRACTITIONER

## 2024-11-12 PROCEDURE — 80069 RENAL FUNCTION PANEL: CPT | Performed by: STUDENT IN AN ORGANIZED HEALTH CARE EDUCATION/TRAINING PROGRAM

## 2024-11-12 PROCEDURE — 3600000010 HC OR TIME - EACH INCREMENTAL 1 MINUTE - PROCEDURE LEVEL FIVE: Performed by: THORACIC SURGERY (CARDIOTHORACIC VASCULAR SURGERY)

## 2024-11-12 PROCEDURE — 2780000003 HC OR 278 NO HCPCS: Performed by: THORACIC SURGERY (CARDIOTHORACIC VASCULAR SURGERY)

## 2024-11-12 PROCEDURE — 3700000002 HC GENERAL ANESTHESIA TIME - EACH INCREMENTAL 1 MINUTE: Performed by: THORACIC SURGERY (CARDIOTHORACIC VASCULAR SURGERY)

## 2024-11-12 PROCEDURE — 2500000005 HC RX 250 GENERAL PHARMACY W/O HCPCS: Performed by: ANESTHESIOLOGY

## 2024-11-12 PROCEDURE — 3600000018 HC OR TIME - INITIAL BASE CHARGE - PROCEDURE LEVEL SIX: Performed by: THORACIC SURGERY (CARDIOTHORACIC VASCULAR SURGERY)

## 2024-11-12 PROCEDURE — 36430 TRANSFUSION BLD/BLD COMPNT: CPT

## 2024-11-12 PROCEDURE — 2500000002 HC RX 250 W HCPCS SELF ADMINISTERED DRUGS (ALT 637 FOR MEDICARE OP, ALT 636 FOR OP/ED)

## 2024-11-12 PROCEDURE — P9047 ALBUMIN (HUMAN), 25%, 50ML: HCPCS | Mod: JZ | Performed by: THORACIC SURGERY (CARDIOTHORACIC VASCULAR SURGERY)

## 2024-11-12 PROCEDURE — 0752T DGTZ GLS MCRSCP SLD LVL III: CPT | Mod: TC,SUR | Performed by: THORACIC SURGERY (CARDIOTHORACIC VASCULAR SURGERY)

## 2024-11-12 PROCEDURE — 71045 X-RAY EXAM CHEST 1 VIEW: CPT | Performed by: RADIOLOGY

## 2024-11-12 PROCEDURE — 31500 INSERT EMERGENCY AIRWAY: CPT | Performed by: EMERGENCY MEDICINE

## 2024-11-12 PROCEDURE — 85384 FIBRINOGEN ACTIVITY: CPT | Performed by: NURSE PRACTITIONER

## 2024-11-12 PROCEDURE — 85520 HEPARIN ASSAY: CPT | Performed by: STUDENT IN AN ORGANIZED HEALTH CARE EDUCATION/TRAINING PROGRAM

## 2024-11-12 PROCEDURE — 3700000001 HC GENERAL ANESTHESIA TIME - INITIAL BASE CHARGE: Performed by: THORACIC SURGERY (CARDIOTHORACIC VASCULAR SURGERY)

## 2024-11-12 DEVICE — HEARTMATE III LVAS KIT,  W/POCKET CONTROLLER: Type: IMPLANTABLE DEVICE | Site: HEART | Status: FUNCTIONAL

## 2024-11-12 DEVICE — PATCH, FELT, 1 X 6 IN, EPTFE: Type: IMPLANTABLE DEVICE | Site: HEART | Status: FUNCTIONAL

## 2024-11-12 RX ORDER — MAGNESIUM SULFATE HEPTAHYDRATE 500 MG/ML
INJECTION, SOLUTION INTRAMUSCULAR; INTRAVENOUS AS NEEDED
Status: DISCONTINUED | OUTPATIENT
Start: 2024-11-12 | End: 2024-11-12

## 2024-11-12 RX ORDER — FAMOTIDINE 10 MG/ML
INJECTION INTRAVENOUS AS NEEDED
Status: DISCONTINUED | OUTPATIENT
Start: 2024-11-12 | End: 2024-11-12

## 2024-11-12 RX ORDER — EPINEPHRINE IN 0.9 % SOD CHLOR 4MG/250ML
0.01 PLASTIC BAG, INJECTION (ML) INTRAVENOUS CONTINUOUS
Status: DISCONTINUED | OUTPATIENT
Start: 2024-11-12 | End: 2024-11-16

## 2024-11-12 RX ORDER — FLUCONAZOLE 2 MG/ML
400 INJECTION, SOLUTION INTRAVENOUS EVERY 24 HOURS
Status: COMPLETED | OUTPATIENT
Start: 2024-11-13 | End: 2024-11-14

## 2024-11-12 RX ORDER — ALBUMIN HUMAN 50 G/1000ML
25 SOLUTION INTRAVENOUS ONCE
Status: COMPLETED | OUTPATIENT
Start: 2024-11-12 | End: 2024-11-12

## 2024-11-12 RX ORDER — ROCURONIUM BROMIDE 10 MG/ML
INJECTION, SOLUTION INTRAVENOUS AS NEEDED
Status: DISCONTINUED | OUTPATIENT
Start: 2024-11-12 | End: 2024-11-12

## 2024-11-12 RX ORDER — FLUCONAZOLE 2 MG/ML
400 INJECTION, SOLUTION INTRAVENOUS ONCE
Status: DISCONTINUED | OUTPATIENT
Start: 2024-11-12 | End: 2024-11-12

## 2024-11-12 RX ORDER — MIDAZOLAM HYDROCHLORIDE 1 MG/ML
INJECTION INTRAMUSCULAR; INTRAVENOUS AS NEEDED
Status: DISCONTINUED | OUTPATIENT
Start: 2024-11-12 | End: 2024-11-12

## 2024-11-12 RX ORDER — HYDROMORPHONE HYDROCHLORIDE 0.2 MG/ML
0.2 INJECTION INTRAMUSCULAR; INTRAVENOUS; SUBCUTANEOUS
Status: DISCONTINUED | OUTPATIENT
Start: 2024-11-12 | End: 2024-11-13

## 2024-11-12 RX ORDER — VANCOMYCIN HYDROCHLORIDE 1 G/20ML
INJECTION, POWDER, LYOPHILIZED, FOR SOLUTION INTRAVENOUS DAILY PRN
Status: DISCONTINUED | OUTPATIENT
Start: 2024-11-12 | End: 2024-11-14

## 2024-11-12 RX ORDER — OXYCODONE HYDROCHLORIDE 5 MG/1
10 TABLET ORAL EVERY 4 HOURS PRN
Status: DISCONTINUED | OUTPATIENT
Start: 2024-11-12 | End: 2024-11-17

## 2024-11-12 RX ORDER — ONDANSETRON 4 MG/1
4 TABLET, FILM COATED ORAL EVERY 8 HOURS PRN
Status: DISCONTINUED | OUTPATIENT
Start: 2024-11-12 | End: 2024-11-23

## 2024-11-12 RX ORDER — ESCITALOPRAM OXALATE 10 MG/1
10 TABLET ORAL DAILY
Status: DISCONTINUED | OUTPATIENT
Start: 2024-11-13 | End: 2024-12-03 | Stop reason: HOSPADM

## 2024-11-12 RX ORDER — PROPOFOL 10 MG/ML
0-50 INJECTION, EMULSION INTRAVENOUS CONTINUOUS
Status: DISCONTINUED | OUTPATIENT
Start: 2024-11-12 | End: 2024-11-14

## 2024-11-12 RX ORDER — VANCOMYCIN HYDROCHLORIDE 1 G/20ML
INJECTION, POWDER, LYOPHILIZED, FOR SOLUTION INTRAVENOUS AS NEEDED
Status: DISCONTINUED | OUTPATIENT
Start: 2024-11-12 | End: 2024-11-12

## 2024-11-12 RX ORDER — ONDANSETRON HYDROCHLORIDE 2 MG/ML
4 INJECTION, SOLUTION INTRAVENOUS EVERY 8 HOURS PRN
Status: DISCONTINUED | OUTPATIENT
Start: 2024-11-12 | End: 2024-12-03 | Stop reason: HOSPADM

## 2024-11-12 RX ORDER — ROCURONIUM BROMIDE 10 MG/ML
100 INJECTION, SOLUTION INTRAVENOUS ONCE
Status: COMPLETED | OUTPATIENT
Start: 2024-11-12 | End: 2024-11-12

## 2024-11-12 RX ORDER — FUROSEMIDE 10 MG/ML
INJECTION INTRAMUSCULAR; INTRAVENOUS AS NEEDED
Status: DISCONTINUED | OUTPATIENT
Start: 2024-11-12 | End: 2024-11-12

## 2024-11-12 RX ORDER — ALBUMIN HUMAN 50 G/1000ML
SOLUTION INTRAVENOUS
Status: COMPLETED
Start: 2024-11-12 | End: 2024-11-12

## 2024-11-12 RX ORDER — PROTAMINE SULFATE 10 MG/ML
INJECTION, SOLUTION INTRAVENOUS AS NEEDED
Status: DISCONTINUED | OUTPATIENT
Start: 2024-11-12 | End: 2024-11-12

## 2024-11-12 RX ORDER — CEFAZOLIN 1 G/1
INJECTION, POWDER, FOR SOLUTION INTRAVENOUS AS NEEDED
Status: DISCONTINUED | OUTPATIENT
Start: 2024-11-12 | End: 2024-11-12

## 2024-11-12 RX ORDER — NOREPINEPHRINE BITARTRATE 0.03 MG/ML
INJECTION, SOLUTION INTRAVENOUS CONTINUOUS PRN
Status: DISCONTINUED | OUTPATIENT
Start: 2024-11-12 | End: 2024-11-12

## 2024-11-12 RX ORDER — PHENYLEPHRINE HCL IN 0.9% NACL 0.4MG/10ML
SYRINGE (ML) INTRAVENOUS
Status: DISPENSED
Start: 2024-11-12 | End: 2024-11-13

## 2024-11-12 RX ORDER — DIPHENHYDRAMINE HYDROCHLORIDE 50 MG/ML
INJECTION INTRAMUSCULAR; INTRAVENOUS AS NEEDED
Status: DISCONTINUED | OUTPATIENT
Start: 2024-11-12 | End: 2024-11-12

## 2024-11-12 RX ORDER — LIDOCAINE HCL/PF 100 MG/5ML
SYRINGE (ML) INTRAVENOUS AS NEEDED
Status: DISCONTINUED | OUTPATIENT
Start: 2024-11-12 | End: 2024-11-12

## 2024-11-12 RX ORDER — SODIUM CHLORIDE, SODIUM LACTATE, POTASSIUM CHLORIDE, CALCIUM CHLORIDE 600; 310; 30; 20 MG/100ML; MG/100ML; MG/100ML; MG/100ML
5 INJECTION, SOLUTION INTRAVENOUS CONTINUOUS
Status: DISCONTINUED | OUTPATIENT
Start: 2024-11-12 | End: 2024-11-14 | Stop reason: SDUPTHER

## 2024-11-12 RX ORDER — HEPARIN SODIUM 1000 [USP'U]/ML
INJECTION, SOLUTION INTRAVENOUS; SUBCUTANEOUS AS NEEDED
Status: DISCONTINUED | OUTPATIENT
Start: 2024-11-12 | End: 2024-11-12 | Stop reason: HOSPADM

## 2024-11-12 RX ORDER — POLYETHYLENE GLYCOL 3350 17 G/17G
17 POWDER, FOR SOLUTION ORAL DAILY
Status: DISCONTINUED | OUTPATIENT
Start: 2024-11-12 | End: 2024-11-13

## 2024-11-12 RX ORDER — ESMOLOL HYDROCHLORIDE 10 MG/ML
INJECTION INTRAVENOUS AS NEEDED
Status: DISCONTINUED | OUTPATIENT
Start: 2024-11-12 | End: 2024-11-12

## 2024-11-12 RX ORDER — AMOXICILLIN 250 MG
2 CAPSULE ORAL 2 TIMES DAILY
Status: DISCONTINUED | OUTPATIENT
Start: 2024-11-12 | End: 2024-11-17

## 2024-11-12 RX ORDER — SODIUM CHLORIDE, SODIUM LACTATE, POTASSIUM CHLORIDE, CALCIUM CHLORIDE 600; 310; 30; 20 MG/100ML; MG/100ML; MG/100ML; MG/100ML
20 INJECTION, SOLUTION INTRAVENOUS CONTINUOUS
Status: DISCONTINUED | OUTPATIENT
Start: 2024-11-12 | End: 2024-11-14 | Stop reason: SDUPTHER

## 2024-11-12 RX ORDER — NALOXONE HYDROCHLORIDE 0.4 MG/ML
0.2 INJECTION, SOLUTION INTRAMUSCULAR; INTRAVENOUS; SUBCUTANEOUS EVERY 5 MIN PRN
Status: DISCONTINUED | OUTPATIENT
Start: 2024-11-12 | End: 2024-11-23

## 2024-11-12 RX ORDER — PROPOFOL 10 MG/ML
INJECTION, EMULSION INTRAVENOUS CONTINUOUS PRN
Status: DISCONTINUED | OUTPATIENT
Start: 2024-11-12 | End: 2024-11-12

## 2024-11-12 RX ORDER — CALCIUM CHLORIDE INJECTION 100 MG/ML
INJECTION, SOLUTION INTRAVENOUS
Status: DISPENSED
Start: 2024-11-12 | End: 2024-11-13

## 2024-11-12 RX ORDER — FLUCONAZOLE 2 MG/ML
INJECTION, SOLUTION INTRAVENOUS AS NEEDED
Status: DISCONTINUED | OUTPATIENT
Start: 2024-11-12 | End: 2024-11-12

## 2024-11-12 RX ORDER — PANTOPRAZOLE SODIUM 40 MG/1
40 TABLET, DELAYED RELEASE ORAL
Status: DISCONTINUED | OUTPATIENT
Start: 2024-11-13 | End: 2024-11-12

## 2024-11-12 RX ORDER — NAPROXEN SODIUM 220 MG/1
81 TABLET, FILM COATED ORAL DAILY
Status: DISCONTINUED | OUTPATIENT
Start: 2024-11-12 | End: 2024-12-03 | Stop reason: HOSPADM

## 2024-11-12 RX ORDER — CALCIUM CHLORIDE INJECTION 100 MG/ML
INJECTION, SOLUTION INTRAVENOUS AS NEEDED
Status: DISCONTINUED | OUTPATIENT
Start: 2024-11-12 | End: 2024-11-12

## 2024-11-12 RX ORDER — ACETAMINOPHEN 325 MG/1
650 TABLET ORAL EVERY 6 HOURS
Status: DISCONTINUED | OUTPATIENT
Start: 2024-11-12 | End: 2024-11-16

## 2024-11-12 RX ORDER — EPINEPHRINE IN 0.9 % SOD CHLOR 4MG/250ML
PLASTIC BAG, INJECTION (ML) INTRAVENOUS CONTINUOUS PRN
Status: DISCONTINUED | OUTPATIENT
Start: 2024-11-12 | End: 2024-11-12

## 2024-11-12 RX ORDER — OXYCODONE HYDROCHLORIDE 5 MG/1
5 TABLET ORAL EVERY 4 HOURS PRN
Status: DISCONTINUED | OUTPATIENT
Start: 2024-11-12 | End: 2024-11-17

## 2024-11-12 RX ORDER — FENTANYL CITRATE 50 UG/ML
INJECTION, SOLUTION INTRAMUSCULAR; INTRAVENOUS AS NEEDED
Status: DISCONTINUED | OUTPATIENT
Start: 2024-11-12 | End: 2024-11-12

## 2024-11-12 RX ORDER — PANTOPRAZOLE SODIUM 40 MG/10ML
40 INJECTION, POWDER, LYOPHILIZED, FOR SOLUTION INTRAVENOUS
Status: DISCONTINUED | OUTPATIENT
Start: 2024-11-13 | End: 2024-11-14

## 2024-11-12 RX ORDER — VANCOMYCIN/0.9 % SOD CHLORIDE 1.5G/250ML
1500 PLASTIC BAG, INJECTION (ML) INTRAVENOUS EVERY 24 HOURS
Status: COMPLETED | OUTPATIENT
Start: 2024-11-13 | End: 2024-11-14

## 2024-11-12 RX ORDER — INDOMETHACIN 25 MG/1
CAPSULE ORAL AS NEEDED
Status: DISCONTINUED | OUTPATIENT
Start: 2024-11-12 | End: 2024-11-12

## 2024-11-12 RX ORDER — LIDOCAINE HYDROCHLORIDE 20 MG/ML
INJECTION, SOLUTION INFILTRATION; PERINEURAL
Status: DISPENSED
Start: 2024-11-12 | End: 2024-11-13

## 2024-11-12 RX ORDER — GLYCOPYRROLATE 0.2 MG/ML
INJECTION INTRAMUSCULAR; INTRAVENOUS AS NEEDED
Status: DISCONTINUED | OUTPATIENT
Start: 2024-11-12 | End: 2024-11-12

## 2024-11-12 RX ORDER — CALCIUM GLUCONATE 20 MG/ML
INJECTION, SOLUTION INTRAVENOUS
Status: DISPENSED
Start: 2024-11-12 | End: 2024-11-13

## 2024-11-12 RX ORDER — SODIUM CHLORIDE, SODIUM GLUCONATE, SODIUM ACETATE, POTASSIUM CHLORIDE AND MAGNESIUM CHLORIDE 30; 37; 368; 526; 502 MG/100ML; MG/100ML; MG/100ML; MG/100ML; MG/100ML
INJECTION, SOLUTION INTRAVENOUS CONTINUOUS PRN
Status: DISCONTINUED | OUTPATIENT
Start: 2024-11-12 | End: 2024-11-12

## 2024-11-12 RX ORDER — SODIUM CHLORIDE 0.9 G/100ML
IRRIGANT IRRIGATION AS NEEDED
Status: DISCONTINUED | OUTPATIENT
Start: 2024-11-12 | End: 2024-11-12 | Stop reason: HOSPADM

## 2024-11-12 RX ORDER — MILRINONE LACTATE 0.2 MG/ML
0-.75 INJECTION, SOLUTION INTRAVENOUS CONTINUOUS
Status: DISCONTINUED | OUTPATIENT
Start: 2024-11-12 | End: 2024-11-16

## 2024-11-12 RX ORDER — VANCOMYCIN HYDROCHLORIDE 1 G/20ML
INJECTION, POWDER, LYOPHILIZED, FOR SOLUTION INTRAVENOUS AS NEEDED
Status: DISCONTINUED | OUTPATIENT
Start: 2024-11-12 | End: 2024-11-12 | Stop reason: HOSPADM

## 2024-11-12 RX ORDER — NOREPINEPHRINE BITARTRATE/D5W 8 MG/250ML
0-1 PLASTIC BAG, INJECTION (ML) INTRAVENOUS CONTINUOUS
Status: DISCONTINUED | OUTPATIENT
Start: 2024-11-12 | End: 2024-11-13

## 2024-11-12 RX ORDER — CEFAZOLIN SODIUM 2 G/100ML
2 INJECTION, SOLUTION INTRAVENOUS EVERY 8 HOURS
Status: COMPLETED | OUTPATIENT
Start: 2024-11-12 | End: 2024-11-14

## 2024-11-12 RX ORDER — ETOMIDATE 2 MG/ML
INJECTION INTRAVENOUS AS NEEDED
Status: DISCONTINUED | OUTPATIENT
Start: 2024-11-12 | End: 2024-11-12

## 2024-11-12 ASSESSMENT — PAIN - FUNCTIONAL ASSESSMENT
PAIN_FUNCTIONAL_ASSESSMENT: CPOT (CRITICAL CARE PAIN OBSERVATION TOOL)
PAIN_FUNCTIONAL_ASSESSMENT: 0-10
PAIN_FUNCTIONAL_ASSESSMENT: CPOT (CRITICAL CARE PAIN OBSERVATION TOOL)
PAIN_FUNCTIONAL_ASSESSMENT: 0-10

## 2024-11-12 ASSESSMENT — PAIN SCALES - GENERAL
PAINLEVEL_OUTOF10: 0 - NO PAIN

## 2024-11-12 NOTE — PROGRESS NOTES
ADVANCED HEART FAILURE LVAD PROGRESS NOTE      VAD Cardiologist: Padmini     Type of VAD: HM3  Implant Date: 11/12/24  Reason for VAD: ICM  Intent: Long-Term (Significant PAD, age, patient preference)      Subjective     HPI:    Fahad Meraz is a very pleasant 69 y.o. male w/ a PMHx sig for CAD s/p 4V CABG (2012) and PCI (LCx/OM; 5/2019), stage D systolic HF/ICM/HFrEF with LVEF 10-15%( 10/22/24 TTE), AF s/p RFA (PVI and CTI; 4/2024) on OAC therapy, HTN, dyslipidemia, depression, anxiety and VIV using CPAP who was admitted to OSH ICU, s/p RHC on 10/18/24.  Per patient, he had been experienced worsening SOB and fluids overload for 2-3 weeks. Patient was on milrinone drip and underwent SGC diuresis. However his KENYA worsened, and were not able to wean milrinone drip. Decision was made to transfer him to HF ICU [facility] for possible advanced therapy consideration. Advanced therapies evaluation was initiated on 10/30. Discussed in advanced therapeutics committee on 11/5 and was denied for transplantation, and approved for LVAD (significant PAD, Elevated PSA level, Age approaching 70, as well as patient verbalized he would be more in favor of LVAD vs Transplant). He was transferred to the floor on 11/6 to await VAD implantation. Readmitted to HFICU as of 11/09 for pre-OR swan guided optimization. Now presents to CTICU s/p LVAD HM3 implant on 11/12/24 with Dr. Mclain. OR Course/Issues: pulseless VT requiring defib x 1 pre- CPB.         Principal Problem:    Acute on chronic heart failure with reduced ejection fraction and diastolic dysfunction  Active Problems:    Ischemic cardiomyopathy    Receiving inotropic medication    HTN (hypertension)    CAD (coronary artery disease)    MI (myocardial infarction) (Multi)    CHF (congestive heart failure)    Gastroesophageal reflux disease    Acute kidney injury (nontraumatic) (CMS-HCC)       LOS: 19 days     Interval Events:   Patient seen and examined at bedside immediately  postop. Patient is supported on epi, milrinone, levo, and vaso with adequate biventricular filling pressures. Go gear (black backup bag, messenger bag, and vest) delivered to CTICU. Initial VAD interrogation only showing implant low flows. Arrived to CTICU at 5000 RPMs and low limit set at 4600.     NYHA class pre-VAD implant: IV    EPINEPHrine, 0.03 mcg/kg/min, Last Rate: 0.03 mcg/kg/min (11/12/24 1309)  epoprostenol, 0.05 mcg/kg/min (Ideal)  insulin regular infusion for cardiac surgery, 0-15 Units/hr, Last Rate: 3 Units/hr (11/12/24 1744)  lactated Ringer's, 30 mL/hr, Last Rate: 30 mL/hr (11/12/24 1304)  lactated Ringer's, 5 mL/hr, Last Rate: 5 mL/hr (11/12/24 1303)  milrinone, 0-0.75 mcg/kg/min, Last Rate: 0.125 mcg/kg/min (11/12/24 1303)  norepinephrine, 0-1 mcg/kg/min, Last Rate: 0.02 mcg/kg/min (11/12/24 1304)  propofol, 0-50 mcg/kg/min, Last Rate: 20 mcg/kg/min (11/12/24 1813)  vasopressin, 0-0.03 Units/min, Last Rate: 0.03 Units/min (11/12/24 1812)       PAP: (21-49)/(7-26) 37/21  CVP:  [3 mmHg-14 mmHg] 11 mmHg  CO:  [4.9 L/min-6.4 L/min] 6.4 L/min  CI:  [2.3 L/min/m2-3 L/min/m2] 3 L/min/m2      Objective   Vitals:   Vitals:    11/12/24 1704 11/12/24 1719 11/12/24 1745 11/12/24 1800   BP: (!) 65/54 97/65 98/68    BP Location:       Patient Position:       Pulse: (!) 130 (!) 127 (!) 128 (!) 124   Resp: 16 16 16 16   Temp: 37 °C (98.6 °F) 36.9 °C (98.4 °F) 37 °C (98.6 °F)    TempSrc: Core Core Temporal    SpO2: 91% 100% 100% 100%   Weight:       Height:         Wt Readings from Last 5 Encounters:   11/12/24 93 kg (205 lb 0.4 oz)   10/24/24 92.5 kg (204 lb)   08/05/24 122 kg (268 lb)   01/31/22 119 kg (262 lb)   02/18/20 123 kg (270 lb 2 oz)     Hemodynamic parameters for last 24 hours:  PAP: (21-49)/(7-26) 37/21  CVP:  [3 mmHg-14 mmHg] 11 mmHg  CO:  [4.9 L/min-6.4 L/min] 6.4 L/min  CI:  [2.3 L/min/m2-3 L/min/m2] 3 L/min/m2  Intake/Output for last 24 hours:    Intake/Output Summary (Last 24 hours) at 11/12/2024  1809  Last data filed at 11/12/2024 1749  Gross per 24 hour   Intake 5795.77 ml   Output 2710 ml   Net 3085.77 ml      Vent settings:  Vent Mode: Volume control/assist control  FiO2 (%):  [50 %] 50 %  S RR:  [16] 16  S VT:  [620 mL] 620 mL  PEEP/CPAP (cm H2O):  [8 cm H20] 8 cm H20  MAP (cm H2O):  [10] 10    Physical exam:  Physical Exam  Constitutional:       Comments: Intubated, sedated    Cardiovascular:      Comments: LVAD hum heard on ascultation  Driveline clean, no bleeding. Site appropriately dressed with tegaderm.   Pulmonary:      Breath sounds: Normal breath sounds. No wheezing or rhonchi.   Abdominal:      General: Abdomen is flat.      Palpations: Abdomen is soft.   Musculoskeletal:      Right lower leg: No edema.      Left lower leg: No edema.        Driveline:   CDI w/ tegaderm in place      Labs:   CMP:  Recent Labs     11/12/24  1257 11/12/24  0220 11/11/24  0523 11/10/24  1808 11/10/24  0436 11/09/24  1826 11/09/24  0748 11/08/24  0729    135* 135* 134* 135* 134* 136 135*   K 4.2 4.5 5.1 4.8 4.2 4.6 4.4 4.4    101 98 99 100 99 101 102   CO2 23 26 27 26 25 24 25 23   ANIONGAP 19 13 15 14 14 16 14 14   BUN 24* 24* 26* 26* 24* 25* 24* 25*   CREATININE 1.73* 1.33* 1.52* 1.40* 1.20 1.24 1.45* 1.34*   EGFR 42* 58* 49* 54* 65 63 52* 57*   MG 3.10* 2.02 2.14 2.22 2.47* 2.07 2.03 2.08     Recent Labs     11/12/24  1257 11/12/24  0220 11/11/24  0523 11/10/24  1808 11/10/24  0436 11/09/24  1826 11/09/24  0748 11/08/24  0729 11/01/24  0317 10/31/24  1249 10/25/24  1221 10/24/24  2328   ALBUMIN 3.8 3.9 4.1 4.0 4.1 4.2 4.2 4.2   < > 4.3   < > 4.4   ALT  --   --   --   --   --   --   --   --   --  14  --  13   AST  --   --   --   --   --   --   --   --   --  16  --  15   BILITOT  --   --   --   --   --   --   --   --   --  1.4*  --  2.3*    < > = values in this interval not displayed.     CBC:  Recent Labs     11/12/24  1257 11/12/24  0220 11/11/24  0523 11/10/24  0436 11/09/24  1826 11/09/24  0748  "11/08/24  0729 11/07/24  0738   WBC 25.4* 8.9 10.1 7.7 8.7 7.5 8.2 8.1   HGB 14.1 12.9* 13.3* 13.2* 13.1* 13.2* 13.0* 13.1*   HCT 42.9 38.9* 42.0 41.2 41.4 40.1* 40.1* 39.7*   * 421 495* 495* 533* 471* 494* 535*   MCV 84 82 84 85 85 84 84 83     COAG:   Recent Labs     11/12/24  1257 11/12/24  0220 11/11/24  0523 11/10/24  0436 11/09/24  0748 11/08/24  0729 11/03/24  0726 11/03/24  0242 11/02/24  0507 11/02/24  0506 11/01/24  0317 10/31/24  1249 10/31/24  0509 10/30/24  0131 10/29/24  1454 10/29/24  0454   INR 1.4*  --   --   --   --   --   --  1.4*  --  1.3* 1.3* 1.3* 1.2* 1.3*  --  1.3*   HAUF  --  0.4 0.5 0.5 0.4 0.4   < > 0.4   < >  --  0.3  --  0.4 0.4   < >  --    FIBRINOGEN 301  --   --   --   --   --   --   --   --   --   --   --   --   --   --   --     < > = values in this interval not displayed.     ABO:   Recent Labs     11/11/24  1742   ABO A     HEME/ENDO:   Recent Labs     10/31/24  1249 10/24/24  2328   FERRITIN  --  76   IRONSAT  --  17*   TSH 4.39* 8.47*   HGBA1C  --  6.3*     CARDIAC:   Recent Labs     11/12/24  1307 10/31/24  1249 10/24/24  2328   * 187  --    BNP  --  755* 1,995*     No results for input(s): \"CHOL\", \"LDLF\", \"LDLCALC\", \"HDL\", \"TRIG\" in the last 47916 hours.  TOX:  Recent Labs     10/31/24  1249   AMPHETAMINE Negative     MICRO: No results for input(s): \"ESR\", \"CRP\", \"PROCAL\" in the last 99333 hours.  No results found for the last 90 days.        Imaging:   XR chest 1 view    Result Date: 11/12/2024  Interpreted By:  Tuan Platt and Omar Mahmoud STUDY: XR CHEST 1 VIEW;  11/12/2024 1:38 pm   INDICATION: Signs/Symptoms:Post op cardiac surgery.     COMPARISON: Chest x-ray 11/11/2024 7:55 a.m., CT chest abdomen pelvis 11/04/2024   ACCESSION NUMBER(S): [id number]   ORDERING CLINICIAN: ANETA MANNING   FINDINGS: AP radiograph of the chest was provided.   Interval demonstration of postsurgical changes of LVAD placement. Postsurgical changes from median sternotomy " surgical clips overlying left hilum. Right IJ pulmonary arterial catheter tip projects over the distal right main pulmonary artery. Interval placement of an enteric tube with tip projecting over the expected location of the gastroesophageal junction. Interval placement of left chest tubes. Partially visualized left shoulder arthroplasty.   CARDIOMEDIASTINAL SILHOUETTE: The heart is enlarged. Cardiomediastinal silhouette is stable in size and configuration.   LUNGS: Mild perihilar interstitial opacities, grossly stable as compared to prior. The bilateral costophrenic angles are clear, within the limitations of the newly placed LVAD obscured the left lower lung field. There is no evidence of a pneumothorax. There is no new focal consolidation.   ABDOMEN: No remarkable upper abdominal findings.   BONES: No acute osseous changes.       1. Postsurgical changes and medical devices as described above. Recommend advancement of enteric tube. 2. Stable mild pulmonary interstitial edema.   I personally reviewed the images/study and I agree with the findings as stated by Zack Ortiz MD (PGY-2). This study was interpreted at [facility], [place], [place].   MACRO: None   Signed by: Tuan Platt 11/12/2024 3:19 PM Dictation workstation:   [id number]    XR chest 1 view    Result Date: 11/11/2024  Interpreted By:  Tuan Platt and Omar Mahmoud STUDY: XR CHEST 1 VIEW;  11/11/2024 8:02 am   INDICATION: Signs/Symptoms:SGC monitoring.     COMPARISON: Chest x-ray 11/10/2024, CT chest 1124   ACCESSION NUMBER(S): [id number]   ORDERING CLINICIAN: KHALIDA UP   FINDINGS: AP radiograph of the chest was provided.   Patient is mildly rotated to the right which limits evaluation and comparison.   Postoperative findings from median sternotomy. Right IJ approach pulmonary arterial catheter with tip projecting over a proximal right lower lobe pulmonary artery, advanced as compared to prior.    CARDIOMEDIASTINAL SILHOUETTE: Heart is moderately enlarged. Small pericardial effusion not excluded. Cardiomediastinal silhouette is stable in size and configuration.   LUNGS: Mild pulmonary vascular congestion within the right lung is noted. No acute pulmonary consolidations or effusions are demonstrated.   ABDOMEN: No remarkable upper abdominal findings.   BONES: No acute osseous changes.       1. Interval advancement of a pulmonary arterial catheter with tip now projecting over a proximal right lower lobe pulmonary artery. 2. Similar enlargement of the cardiomediastinal silhouette. 3. No acute cardiopulmonary process.   I personally reviewed the images/study and I agree with the findings as stated by Zack Ortiz MD (PGY-2). This study was interpreted at [facility], [place], [place].   MACRO: None   Signed by: Tuan Platt 11/11/2024 2:18 PM Dictation workstation:   [id number]      Notable Studies:   EKG:  Encounter Date: 10/24/24   Electrocardiogram, 12-lead PRN ACS symptoms   Result Value    Ventricular Rate 111    Atrial Rate 111    QRS Duration 130    QT Interval 302    QTC Calculation(Bazett) 410    R Axis 134    T Axis -40    QRS Count 18    Q Onset 218    T Offset 369    QTC Fredericia 370    Narrative    Atrial fibrillation with occasional Premature ventricular complexes and Fusion complexes  Nonspecific intraventricular block  Cannot rule out Anteroseptal infarct , age undetermined  T wave abnormality, consider inferolateral ischemia  Abnormal ECG  No previous ECGs available  Confirmed by Lucas Kerns ([id number]) on 10/28/2024 12:58:57 PM       Echo:  Transthoracic Echo (TTE) Limited    Result Date: 11/5/2024   [facility], [address] [place] [address]                Tel [phone] and Fax [phone] TRANSTHORACIC ECHOCARDIOGRAM REPORT  Patient Name:       RJ LEIJA     Reading Physician:    [id number] Mira Mayer                                                                MD Study Date:         11/5/2024           Ordering Provider:    [id number] PAYAL KOHLER MRN/PID:            [id number]            Fellow: Accession#:         [id number]        Nurse: Date of Birth/Age:  1955 / 69     Sonographer:          Maureen estrada RDCS Gender assigned at  M                   Additional Staff: Birth: Height:             182.88 cm           Admit Date:           10/24/2024 Weight:             93.44 kg            Admission Status:     Inpatient - STAT BSA / BMI:          2.16 m2 / 27.94     Encounter#:           [id number]                     kg/m2 Blood Pressure:     106/72 mmHg         Department Location:  [address] Study Type:    TRANSTHORACIC ECHO (TTE) LIMITED Diagnosis/ICD: Heart failure, unspecified-I50.9 Indication:    RV assessment CPT Code:      Echo Limited-36451; Doppler Limited-68263; Color Doppler-98612 Patient History: Pertinent History: Negative bubble peformed on prior echo; Known 15-20% EF;                    Cardiogenic shock; Acute on chronic heart failure with                    reduced EF and diastolic dysfunction; ICM; Hx of chronic                    A-Fib; VIV. Study Detail: The following Echo studies were performed: 2D, M-Mode, Doppler and               color flow. Technically challenging study due to body habitus.               Definity used as a contrast agent for endocardial border               definition. Total contrast used for this procedure was 2 mL via IV               push.  PHYSICIAN INTERPRETATION: Left Ventricle: Left ventricular ejection fraction is severely decreased, calculated by Loera's biplane at 18%. There is severely increased eccentric left ventricular hypertrophy. There is global hypokinesis of the left ventricle with minor regional variations. The left ventricular cavity size is  severely dilated. There is normal septal and normal posterior left ventricular wall thickness. Left ventricular diastolic filling was not assessed. There is no definite left ventricular thrombus visualized. Left Atrium: The left atrium is severely dilated. Right Ventricle: The right ventricle is severely enlarged. There is severely reduced right ventricular systolic function. A device is visualized in the right ventricle. Right Atrium: The right atrium is severely dilated. There is a device visualized in the right atrium. Aortic Valve: The aortic valve was not well visualized. There is mild aortic valve cusp calcification. Aortic valve regurgitation was not assessed. Mitral Valve: The mitral valve is normal in structure. Mitral valve regurgitation was not assessed. Tricuspid Valve: The tricuspid valve is structurally normal. There is mild tricuspid regurgitation. The Doppler estimated RVSP is mildly elevated at 42.9 mmHg. Pulmonic Valve: The pulmonic valve was not assessed. Pulmonic valve regurgitation was not assessed. Pericardium: Trivial pericardial effusion. Aorta: The aortic root is abnormal. The aortic root appears mildly dilated and measures 4.20 cm. There is mild dilatation of the ascending aorta. There is mild dilatation of the aortic root. Systemic Veins: The inferior vena cava appears dilated, with IVC inspiratory collapse less than 50%. In comparison to the previous echocardiogram(s): Compared with the prior exam from 10/25/2024, there was already a severely dilated LV with severely reduced function. The RV remains dilated with severely reduced function. Valvular function not assessed on today's exam.  CONCLUSIONS:  1. Left ventricular ejection fraction is severely decreased, calculated by Loera's biplane at 18%.  2. There is global hypokinesis of the left ventricle with minor regional variations.  3. Left ventricular cavity size is severely dilated.  4. No left ventricular thrombus visualized.  5.  There is severely increased eccentric left ventricular hypertrophy.  6. There is severely reduced right ventricular systolic function.  7. Severely enlarged right ventricle.  8. The left atrium is severely dilated.  9. The right atrium is severely dilated. 10. Mitral valve regurgitation was not assessed. 11. Mildly elevated right ventricular systolic pressure. 12. Mildly dilated aortic root. 13. Compared with the prior exam from 10/25/2024, there was already a severely dilated LV with severely reduced function. The RV remains dilated with severely reduced function. Valvular function not assessed on today's exam. QUANTITATIVE DATA SUMMARY:  2D MEASUREMENTS:           Normal Ranges: Ao Root d:       4.20 cm   (2.0-3.7cm) IVSd:            0.87 cm   (0.6-1.1cm) LVPWd:           0.78 cm   (0.6-1.1cm) LVIDd:           8.21 cm   (3.9-5.9cm) LVIDs:           8.04 cm LV Mass Index:   157 g/m2 LVEDV Index:     138 ml/m2 LV % FS          2.0 %  LA VOLUME:                  Normal Ranges: LA Volume Index: 59.9 ml/m2  RA VOLUME BY A/L METHOD:          Normal Ranges: RA Area A4C:             36.8 cm2  AORTA MEASUREMENTS:         Normal Ranges: Ao Sinus, d:        4.20 cm (2.1-3.5cm) Asc Ao, d:          4.20 cm (2.1-3.4cm)  LV SYSTOLIC FUNCTION BY 2D PLANIMETRY (MOD):                      Normal Ranges: EF-A4C View:    14 % (>=55%) EF-A2C View:    21 % EF-Biplane:     18 % LV EF Reported: 18 %  AORTIC VALVE:          Normal Ranges: LVOT Diameter: 2.31 cm (1.8-2.4cm)  RIGHT VENTRICLE: RV Basal 6.10 cm RV Mid   4.10 cm RV Major 9.1 cm TAPSE:   9.0 mm RV s'    0.06 m/s  TRICUSPID VALVE/RVSP:          Normal Ranges: Peak TR Velocity:     2.64 m/s RV Syst Pressure:     43 mmHg  (< 30mmHg) IVC Diam:             3.00 cm  AORTA: Asc Ao Diam 4.23 cm  [id number] Mira Mayer MD Electronically signed on 11/5/2024 at 2:36:55 PM  ** Final **       Meds:  Scheduled medications  acetaminophen, 650 mg, oral, q6h  aspirin, 81 mg, oral, Daily  calcium  chloride, , ,   calcium gluconate, , ,   ceFAZolin, 2 g, intravenous, q8h  [START ON 11/13/2024] escitalopram, 10 mg, oral, Daily  [START ON 11/13/2024] fluconazole, 400 mg, intravenous, q24h  insulin regular, 0-15 Units, intravenous, Once  lidocaine, , ,   [START ON 11/13/2024] pantoprazole, 40 mg, oral, Daily before breakfast   Or  [START ON 11/13/2024] pantoprazole, 40 mg, intravenous, Daily before breakfast  phenylephrine HCl in 0.9% NaCl, , ,   phenylephrine HCl in 0.9% NaCl, , ,   polyethylene glycol, 17 g, oral, Daily  sennosides-docusate sodium, 2 tablet, oral, BID  [START ON 11/13/2024] vancomycin, 1,500 mg, intravenous, q24h      Continuous medications  EPINEPHrine, 0.03 mcg/kg/min, Last Rate: 0.03 mcg/kg/min (11/12/24 1309)  epoprostenol, 0.05 mcg/kg/min (Ideal)  insulin regular infusion for cardiac surgery, 0-15 Units/hr, Last Rate: 3 Units/hr (11/12/24 1744)  lactated Ringer's, 30 mL/hr, Last Rate: 30 mL/hr (11/12/24 1304)  lactated Ringer's, 5 mL/hr, Last Rate: 5 mL/hr (11/12/24 1303)  milrinone, 0-0.75 mcg/kg/min, Last Rate: 0.125 mcg/kg/min (11/12/24 1303)  norepinephrine, 0-1 mcg/kg/min, Last Rate: 0.02 mcg/kg/min (11/12/24 1304)  propofol, 0-50 mcg/kg/min, Last Rate: 20 mcg/kg/min (11/12/24 1625)  vasopressin, 0-0.03 Units/min, Last Rate: 0.03 Units/min (11/12/24 1310)      PRN medications  PRN medications: alteplase, calcium chloride, calcium gluconate, HYDROmorphone, insulin regular, lidocaine, naloxone, ondansetron **OR** ondansetron, oxyCODONE, oxyCODONE, oxygen, oxygen, phenylephrine HCl in 0.9% NaCl, phenylephrine HCl in 0.9% NaCl, vancomycin     Assessment/Plan   Assessment & Plan     # Stage D NYHA IV, ICM, decompensated acute on chronic heart failure, HFrEF 10-15% s/p LVAD HM3 implant 11/12/24   #RV dysfunction     Intra-op BRIANA: EF 15-20%, dilated RV     Heart Mate III interrogation   Most Recent   Flow 4.2   Speed 5000   Power 3.6   PI 3.4   MAP (mmHg): 70    - Please maintain MAP 70-80,  okay to wean levo/vaso as tolerated. Would not wean epi/milrinone/iEpo  - AC start per CT surgery   - Patient has primary indication for ASA (CAD, ICM) -> appropriate for continued ASA use once cleared for AC   - Please continue daily driveline dressing changes w/ picture in chart   - Would not adjust speed at this time as patient has adequate biventricular filling pressures, if CVP/PA rises and CI drops can consider speed increase however he would require a limited echo following to assess if RV is tolerating. Please do not adjust speed without guidance from HF team.   - Not appropriate for GDMT in acute postop period   - Continue excellent care per CTICU     Discussed with Dr. Oropeza   ____________________________________________________________  Stacie Davis, MSN, AGACN-BC  Advanced Heart Failure LVAD Nurse Practitioner   For floor patients please page HHVI team after 5pm- [phone]  LVAD 24/7 emergency pager [phone]

## 2024-11-12 NOTE — PROCEDURES
Intubation    Date/Time: 11/12/2024 6:03 PM    Performed by: Antonio Gongora MD  Authorized by: Montana Armas MD    Consent:     Consent obtained:  Verbal and emergent situation  Universal protocol:     Procedure explained and questions answered to patient or proxy's satisfaction: yes      Relevant documents present and verified: yes      Test results available: yes      Imaging studies available: yes      Immediately prior to procedure, a time out was called: yes      Patient identity confirmed:  Verbally with patient, arm band and provided demographic data  Pre-procedure details:     Indications comment:  Tube exchange    Patient status: sedated.    Look externally: no concerns      Mouth opening - incisor distance:  3 or more finger widths    Hyoid-mental distance: 3 or more finger widths      Hyoid-thyroid distance: 2 or more finger widths      Mallampati score:  I    Obstruction: none      Neck mobility: normal      Pharmacologic strategy: RSI      Induction agents:  Propofol    Paralytics:  Rocuronium  Procedure details:     Preoxygenation: ventilator.    Number of attempts:  1  Successful intubation attempt details:     Intubation method:  Oral    Intubation technique: video assisted      Laryngoscope blade:  Hypercurved    Bougie needed: cook catheter.      Grade view: II      Tube size (mm):  8.0    Tube type:  Cuffed    Tube visualized through cords: yes    Placement assessment:     ETT at teeth/gumline (cm):  23    Tube secured with:  ETT lao    Breath sounds:  Reduced on left and equal    Placement verification: chest rise, colorimetric ETCO2, direct visualization, equal breath sounds and tube exhalation      CXR findings:  Appropriate position  Post-procedure details:     Procedure completion:  Tolerated well, no immediate complications

## 2024-11-12 NOTE — ANESTHESIA POSTPROCEDURE EVALUATION
Patient: Fahad Meraz    Procedure Summary       Date: 11/12/24 Room / Location: [facility] OR 18 / [facility] OR    Anesthesia Start: 0718 Anesthesia Stop: 1259    Procedure: HEARTMATE 3 INSERTION W/ THORACOTOMY Diagnosis:       Acute on chronic combined systolic (congestive) and diastolic (congestive) heart failure      Ischemic cardiomyopathy      (Heart Failure)      (Cardiomyopathy)    Surgeons: Suhas Hill MD PhD Responsible Provider: Fred Ortiz MD    Anesthesia Type: general ASA Status: 4            Anesthesia Type: general    Vitals Value Taken Time   BP 95/58 11/12/24 1257   Temp 36.8 °C (98.2 °F) 11/12/24 1257   Pulse 128 11/12/24 1636   Resp 16 11/12/24 1636   SpO2 97 % 11/12/24 1636   Vitals shown include unfiled device data.    Anesthesia Post Evaluation    Patient location during evaluation: ICU  Patient participation: complete - patient cannot participate  Level of consciousness: sedated  Pain management: adequate  Airway patency: patent  Cardiovascular status: acceptable  Respiratory status: acceptable  Hydration status: acceptable  Postoperative Nausea and Vomiting: none  Comments: Intubated and sedated.    There were no known notable events for this encounter.

## 2024-11-12 NOTE — CARE PLAN
Problem: Heart Failure  Goal: Improved gas exchange this shift  Outcome: Progressing  Goal: Improved urinary output this shift  Outcome: Progressing  Goal: Reduction in peripheral edema within 24 hours  Outcome: Progressing  Goal: Report improvement of dyspnea/breathlessness this shift  Outcome: Progressing  Goal: Weight from fluid excess reduced over 2-3 days, then stabilize  Outcome: Progressing  Goal: Increase self care and/or family involvement in 24 hours  Outcome: Progressing     Problem: Skin  Goal: Decreased wound size/increased tissue granulation at next dressing change  Outcome: Progressing  Goal: Participates in plan/prevention/treatment measures  Outcome: Progressing  Goal: Prevent/manage excess moisture  Outcome: Progressing  Goal: Prevent/minimize sheer/friction injuries  Outcome: Progressing  Goal: Promote/optimize nutrition  Outcome: Progressing  Goal: Promote skin healing  Outcome: Progressing     Problem: Pain - Adult  Goal: Verbalizes/displays adequate comfort level or baseline comfort level  Outcome: Progressing     Problem: Safety - Adult  Goal: Free from fall injury  Outcome: Progressing     Problem: Discharge Planning  Goal: Discharge to home or other facility with appropriate resources  Outcome: Progressing     Problem: Chronic Conditions and Co-morbidities  Goal: Patient's chronic conditions and co-morbidity symptoms are monitored and maintained or improved  Outcome: Progressing     Problem: Fall/Injury  Goal: Not fall by end of shift  Outcome: Progressing  Goal: Be free from injury by end of the shift  Outcome: Progressing  Goal: Verbalize understanding of personal risk factors for fall in the hospital  Outcome: Progressing  Goal: Verbalize understanding of risk factor reduction measures to prevent injury from fall in the home  Outcome: Progressing  Goal: Use assistive devices by end of the shift  Outcome: Progressing  Goal: Pace activities to prevent fatigue by end of the shift  Outcome:  Progressing

## 2024-11-12 NOTE — PROGRESS NOTES
Physical Therapy                 Therapy Communication Note    Patient Name: Fahad Meraz  MRN: [id number]  Department: [facility]  Room: [facility]*  Today's Date: 11/12/2024     Discipline: Physical Therapy    Missed Visit Reason: Missed Visit Reason:  (Pt off the floor at ths time in OR for LVAD.)    Missed Time: Attempt    Comment:

## 2024-11-12 NOTE — ANESTHESIA PROCEDURE NOTES
Airway  Date/Time: 11/12/2024 8:08 AM  Urgency: elective    Airway not difficult    Staffing  Performed: AUDREY and SHELBIE   Authorized by: Fred Ortiz MD    Performed by: SHELBIE Cheng  Patient location during procedure: OR    Indications and Patient Condition  Indications for airway management: anesthesia and airway protection  Sedation level: deep  Patient position: sniffing  MILS not maintained throughout  Mask difficulty assessment: 2 - vent by mask + OA or adjuvant +/- NMBA    Final Airway Details  Final airway type: endotracheal airway      Successful airway: ETT - double lumen left  Cuffed: yes   Successful intubation technique: video laryngoscopy  Facilitating devices/methods: intubating stylet  Endotracheal tube insertion site: oral  Blade: Connie  Blade size: #4  ETT DL size (fr): 39  Cormack-Lehane Classification: grade I - full view of glottis  Placement verified by: chest auscultation, bronchoscopy and capnometry   Measured from: lips  ETT to lips (cm): 30  Number of attempts at approach: 1

## 2024-11-12 NOTE — ANESTHESIA PROCEDURE NOTES
Peripheral IV  Date/Time: 11/12/2024 8:10 AM      Placement  Needle size: 16 G  Laterality: left  Location: hand  Site prep: alcohol  Technique: anatomical landmarks  Attempts: 1

## 2024-11-12 NOTE — CONSULTS
Vancomycin Dosing by Pharmacy- INITIAL    Fahad Meraz is a 69 y.o. year old male who Pharmacy has been consulted for vancomycin dosing for surgical prophylaxis. Based on the patient's indication and renal status this patient will be dosed based on a goal AUC of 400-600.     Renal function is currently stable. Patient baseline appears to be ~1.2 mg/dL per chart review.    Visit Vitals  BP 95/58   Pulse (!) 124   Temp 36.8 °C (98.2 °F) (Skin)   Resp 16        Lab Results   Component Value Date    CREATININE 1.33 (H) 2024    CREATININE 1.52 (H) 2024    CREATININE 1.40 (H) 11/10/2024    CREATININE 1.20 11/10/2024        Patient weight is as follows:   Vitals:    24 0500   Weight: 93 kg (205 lb 0.4 oz)       Cultures:  No results found for the encounter in last 14 days.        I/O last 3 completed shifts:  In: 1648.6 (17.7 mL/kg) [P.O.:720; I.V.:928.6 (10 mL/kg)]  Out: 2350 (25.3 mL/kg) [Urine:2350 (0.7 mL/kg/hr)]  Weight: 93 kg   I/O during current shift:  I/O this shift:  In: 4149 [Blood:1989; IV Piggyback:2160]  Out: 1325 [Urine:740; Blood:500; Chest Tube:85]    Temp (24hrs), Av.6 °C (97.8 °F), Min:36 °C (96.8 °F), Max:36.8 °C (98.2 °F)         Assessment/Plan     Patient received 1500 mg IV x1 in OR on 11/12 AM.   Will initiate vancomycin maintenance,  1500 mg every 24 hours.    This dosing regimen is predicted by [employer] to result in the following pharmacokinetic parameters:    Loading dose: N/A  Regimen: 1500 mg IV every 24 hours.  Start time: 07:33 on 2024  Exposure target: AUC24 (range)400-600 mg/L.hr   VBC14-12: 450 mg/L.hr  AUC24,ss: 507 mg/L.hr  Probability of AUC24 > 400: 75 %  Ctrough,ss: 15.1 mg/L  Probability of Ctrough,ss > 20: 27 %      Follow-up level will not be ordered at this time as planned for 48 hour dosing.   Will continue to monitor renal function daily while on vancomycin and order serum creatinine at least every 48 hours if not already ordered.  Follow for  continued vancomycin needs, clinical response, and signs/symptoms of toxicity.       Steven Chase, PharmD

## 2024-11-12 NOTE — ANESTHESIA PROCEDURE NOTES
Arterial Line:    Date/Time: 11/12/2024 7:51 AM    Staffing  Performed: AUDREY and attending   Authorized by: Fred Ortiz MD    Performed by: SHELBIE Cheng    An arterial line was placed. Procedure performed using ultrasound guidance.in the OR for the following indication(s): continuous blood pressure monitoring and blood sampling needed.    A 20 gauge (size), 10 cm (length), Angiocath (type) catheter was placed into the Right brachial artery, secured by suture,   Seldinger technique used.  Events:  patient tolerated procedure well with no complications.

## 2024-11-12 NOTE — H&P
CTICU History & Physical    Subjective   HPI:  69 M with PMHx of CAD s/p CABG x 4 (2012) and PCI (LCx/OM; 5/2019), stage D ICM HFrEF LVEF 10-15%, AF s/p RFA (PVI and CTI; 4/2024), HTN, dyslipidemia, pre-T2DM A1c 6.3, depression, anxiety, and VIV using CPAP who was admitted to OS s/p RHC on 10/18/24 with decompensated heart failure and KENYA. He was referred to [facility] HFICU 10/24/24 and was medically optomized and presented to [employer] [facility] 11/5 who approved destination therapy LVAD.  He is now admitted to the CTICU s/p Heartmate 3 implant with the outflow to the mid descending thoracic aorta via left thoracotomy 11/12/24 with Dr. Mclain and Dr. Madrigal.         Cardiac Testing:     TTE: 11/5/24  1. Left ventricular ejection fraction is severely decreased, calculated by Loera's biplane at 18%.  2. There is global hypokinesis of the left ventricle with minor regional variations.  3. Left ventricular cavity size is severely dilated.  4. No left ventricular thrombus visualized.  5. There is severely increased eccentric left ventricular hypertrophy.  6. There is severely reduced right ventricular systolic function.  7. Severely enlarged right ventricle.  8. The left atrium is severely dilated.  9. The right atrium is severely dilated.  10. Mitral valve regurgitation was not assessed.  11. Mildly elevated right ventricular systolic pressure.  12. Mildly dilated aortic root.  13. Compared with the prior exam from 10/25/2024, there was already a severely dilated LV with severely reduced function. The RV remains dilated with severely reduced function. Valvular function not assessed on today's exam.    LHC:  Coronary angiogram: Right dominant system; LM: Mild 20% distal disease; LAD: Occluded with Patent LIMA; RI: Occluded; Lcx: Patent proximal Lcx stent and OM1 stent, otherwise mild non-obstructive disease; RCA: proximal 100% occlusion. Grafts: LIMA-LAD: Patent; SVG-OM, SVG-RCA: Occluded; SVG-RI: Unable  to engage but likely occluded.      Procedure/Surgeon: Abu Angel   Frontliner/Anesthesia: Angel   Out of OR Time (document on ventilator card): 1245     OR Course/Issues: pulseless VT requiring defib x 1 pre- CPB.       CPB time: 74 min   Echo Pre/Post: LVEF 15-20% with mild AI and mod MR, TR pre & post, dilated RV   Chest Tubes/Drains: 1 left plural &1 mediastinal CT    Temporary wires location/setting: none       Fluids  Crystalloid: 1500cc  Colloid: none   Cellsaver: 1750cc  Products: FFP x 1, PLTs x 2   EBL: 500cc  UOP: 470cc     Anesthesia  Intubation: Elective Alvarez Grade 1 view, easy intubation with RO   Intravenous Access: RIJ introducer and PAC, Right brachial A-line    Regional anesthesia: no  Benzodiazepine dose/last administration: midazolam 6 mg total  Opioid dose/last administration: fentanyl 1000mcg total  NMB dose/last administration: 150 mg rocuronium total, last 20 mg @ 1214  TOF/ reversal given: no   Antibiotic time: cefaz 1215, vanco 1.5 g @ 0733, fluconazole 400 mg @0733  Temperature on admission to ICU: 36.8    No past medical history on file.  Past Surgical History:   Procedure Laterality Date    CARDIAC CATHETERIZATION N/A 10/28/2024    Procedure: Left & Right Heart Cath w Angiography & LV;  Surgeon: Jed Lindsay MD;  Location: [place] [address] Cardiac Cath Lab;  Service: Cardiovascular;  Laterality: N/A;  Patient needs ischemia evaluation - Already has PA catheter in place     Medications Prior to Admission   Medication Sig Dispense Refill Last Dose/Taking    aspirin 81 mg EC tablet Take 1 tablet (81 mg) by mouth once daily.       bumetanide (Bumex) 1 mg tablet Take 0.5 tablets (0.5 mg) by mouth once daily.       cholecalciferol (Vitamin D-3) 50,000 unit capsule Take 1 capsule (50,000 Units) by mouth 1 (one) time per week.       escitalopram (Lexapro) 10 mg tablet Take 1 tablet (10 mg) by mouth once daily.       hydrALAZINE (Apresoline) 25 mg tablet Take 1 tablet (25 mg) by mouth 2 times a  day.       isosorbide mononitrate ER (Imdur) 120 mg 24 hr tablet Take 1 tablet (120 mg) by mouth once daily. Do not crush or chew.       metoprolol succinate XL (Toprol-XL) 50 mg 24 hr tablet Take 1 tablet (50 mg) by mouth once daily. Do not crush or chew.       multivitamin tablet Take 1 tablet by mouth once daily.       nitroglycerin (Nitrostat) 0.4 mg SL tablet Place 1 tablet (0.4 mg) under the tongue every 5 minutes if needed for chest pain.       pantoprazole (ProtoNix) 40 mg EC tablet Take 1 tablet (40 mg) by mouth once daily in the morning. Take before meals. Do not crush, chew, or split.       sacubitriL-valsartan (Entresto)  mg tablet Take 1 tablet by mouth 2 times a day.       spironolactone (Aldactone) 25 mg tablet Take 1 tablet (25 mg) by mouth once daily.       warfarin (Coumadin) 5 mg tablet Take 1 tablet (5 mg) by mouth once daily in the evening. Take as directed per After Visit Summary.        Eliquis [apixaban] and Jardiance [empagliflozin]  Social History     Tobacco Use    Smoking status: Former     Types: Cigarettes    Smokeless tobacco: Never     No family history on file.    Review of Systems:  Uto, intubated & sedated s/p GETA     Objective   Vitals:  Most Recent:  Vitals:    11/12/24 0600   BP: 98/58   Pulse: 84   Resp: 15   Temp:    SpO2: (!) 89%       24hr Min/Max:  Temp  Min: 35.1 °C (95.2 °F)  Max: 36.8 °C (98.2 °F)  Pulse  Min: 79  Max: 98  BP  Min: 86/57  Max: 137/69  Resp  Min: 10  Max: 20  SpO2  Min: 89 %  Max: 97 %    I/O:  I/O last 2 completed shifts:  In: 1318.9 (14.2 mL/kg) [P.O.:720; I.V.:598.9 (6.4 mL/kg)]  Out: 1700 (18.3 mL/kg) [Urine:1700 (0.8 mL/kg/hr)]  Weight: 93 kg     LDA:  Introducer 11/09/24 Internal jugular Right (Active)   Placement Date/Time: 11/09/24 1300   Location: Internal jugular  Orientation: Right  Placement Verified: X-ray   Number of days: 2       Arterial Line 11/12/24 Right Brachial (Active)   Placement Date/Time: 11/12/24 (c) 0751   Size: 20 G   Orientation: Right  Location: Brachial  Securement Method: Sutured  Patient Tolerance: Tolerated well   Number of days: 0       Pulmonary Artery Catheter Internal jugular (Active)   Placement Date/Time: 11/09/24 1300   PAC Line Length (cm): 110  Location: Internal jugular  External Length (cm): 79 cm  Securement Method: Sutured;Stat lock;Transparent dressing   Number of days: 2       ETT  39 Fr (Active)   Placement Date/Time: 11/12/24 (c) 0808   Mask Ventilation: Vent by mask + OA or adjuvant +/- NMBA  Technique: Video laryngoscopy  ETT Type: ETT - double lumen left  Double Lumen Tube Size: 39 Fr  Cuffed: Yes  Laryngoscope: Connie  Blade Size: 4  Lo...   Number of days: 0       Urethral Catheter Temperature probe 16 Fr. (Active)   Placement Date/Time: 11/12/24 0815   Placed by: CHIP GLASER  Hand Hygiene Completed: Yes  Catheter Type: Temperature probe  Tube Size (Fr.): 16 Fr.  Catheter Balloon Size: 10 mL  Urine Returned: Yes   Number of days: 0       Physical Exam:   - CONSTITUTION: critically il s/p LVAD implant   - NEUROLOGIC: sedate s/p GETA  - CARDIOVASCULAR: sinus tach 120's, periph pulses biphasic to doppler, cap refill 2 sec   - RESPIRATORY: unlabored mechanical breath sounds   - GI: hypoactive BS, NT, ND   - : molina draining dilute urine   - EXTREMITIES: no edema   - SKIN: warm, dry   - PSYCHIATRIC: uto     Lab Review:  Results from last 7 days   Lab Units 11/12/24  0220   WBC AUTO x10*3/uL 8.9   HEMOGLOBIN g/dL 12.9*   HEMATOCRIT % 38.9*   PLATELETS AUTO x10*3/uL 421     Results from last 7 days   Lab Units 11/12/24  0220   SODIUM mmol/L 135*   POTASSIUM mmol/L 4.5   CHLORIDE mmol/L 101   CO2 mmol/L 26   BUN mg/dL 24*   CREATININE mg/dL 1.33*   CALCIUM mg/dL 9.5   GLUCOSE mg/dL 111*     Results from last 7 days   Lab Units 11/12/24  0220   MAGNESIUM mg/dL 2.02     Results from last 7 days   Lab Units 11/12/24  1157   POCT PH, ARTERIAL pH 7.40   POCT PCO2, ARTERIAL mm Hg 35*   POCT PO2, ARTERIAL mm  Hg 272*   POCT HCO3 CALCULATED, ARTERIAL mmol/L 21.7*   POCT BASE EXCESS, ARTERIAL mmol/L -2.6*       Most recent labs and imaging reviewed, CXR with expected post-op changesm PA cath in right main PA     Daily Risk Screen  Intubated: 11/12 s/p GETA   Central line: 11/09 for hemodynamic monitoring   Gallo: 11/12 for strict I/O monitoring     Assessment/Plan   69 M with PMHx of CAD s/p CABG x 4 (2012) and PCI (LCx/OM; 5/2019), stage D ICM HFrEF LVEF 10-15%, AF s/p RFA (PVI and CTI; 4/2024), HTN, pre-T2DM A1c 6.3, depression, anxiety, and VIV using CPAP who was admitted to OSH s/p RHC on 10/18/24 with decompensated heart failure and KENYA. He was referred to [facility] HFICU 10/24/24 and was medically optomized and presented to [employer] [facility] 11/5 who approved destination therapy LVAD.  He is now admitted to the CTICU s/p Heartmate 3 implant with the outflow to the mid descending thoracic aorta via left thoracotomy 11/12/24 with Dr. Mclain and Dr. Madrigal.         NEURO:  PMH of anxiety & depression. Patient is intubated and sedated on propofol infusion. Acute post operative pain.   -->  - Serial neuro and pain assessments   - Continue propofol until NMB reversal, then daily sedation vacation at minimum   - Consider dexmedetomidine for extubation as he reports PTSD from extubation following CABG in the past   - NMB reversal when normothermic and hemodynamically stable.    - Scheduled Tylenol   - PRN oxycodone  - PRN dilaudid for pain   - PT Consult, OOB to chair as tolerated, chair position if not tolerated   - CAM ICU score qshift  - Sleep/wake cycle hygiene     CV:  Patient has a history of CAD s/p CABG x 4 (2012) and PCI (LCx/OM; 5/2019), stage D ICM HFrEF LVEF 10-15%, AF s/p RFA (PVI and CTI; 4/2024), HTN, and is now status post HM3 LVAD via left thoracotomy 11/12 with Jas Briseno. Pre/Post EF: 15-20% &stably dilated RV. He arrived to CTICU on epi 0.03, milrinone 0.125, I Epo 0.05, NE 0.04,  and vaso 0.03; there are no epicardial wires -->  - Maintain goal MAP 70-80mmHg, avoid sustained MAPs > 90mmHg    - Mixed venous and CI Q4H  - Volume resuscitate as clinically indicated  - Daily weights per LVAD prtocol   - Dressing changes daily until dry, then weekly per protocol   - Resume ASA 81mg daily   - Hold pre-op hydral, isosorbide, entresto, metoprolol xl 50mg, spironolactone.    - Clarify who no satin use PTA (OSH lipid were low normal but he has hx of CAD/CABG,ICM)      PULM:  No history of pulmonary disease.  Currently intubated with mechanical ventilation and adequate gas exchange. Chest tubes 1 left plural and 1 mediastinal with low output & no air leaks. CXR with expected post op changes, PA catheter pulled back 3cm from right main PA. He was admitted to CTICU with RO in situ.  -->  - Daily CXR  - Exchange RO to single lumen when hemodynamically stable and hemostatic.    - Continue inh epoprostenol for RV unloading overnight.   - Continue mechanical ventilation overnight, eval for SBT in AM pending course    - Wean FiO2 maintaining SpO2 >92%.   - IS q1h and OOB to chair when extubated  - Chest tubes to wall suction.    GI:  PMH of GERD ob PPI PTA.  OG in place.-->  - Continue PPI for GERD, LVAD bleeding risk   - NPO, will perform bedside swallow eval post extubation   - Colace/senna BID and miralax BID    Malnutrition Diagnosis Status: Declining  Malnutrition Diagnosis: Moderate malnutrition related to acute disease or injury  As Evidenced by: pt reports a poor to fair PO intake for weeks to months PTA along with moderate to severe fat and muscle loss on physical exam; he is down in weight by 23% in two months-- mostly fluids but suspect also has a component of true fat/muscle loss  I agree with the dietitian's malnutrition diagnosis.     :  CSA-KENYA Risk Score pre-op 6, ICU admit 6.  He did have KENYA on CKD in recent weeks that improved to 1.3 pre-op (baseline creatinine 1.3 - 1.6). Creatinine  1.7 post-op. Molina in place and making adequate UOP. -->  - Continue molina catheter for strict I/Os.  - Goal UOP 0.5ml/kg/hr  - RFP as clinically indicated  - Replete electrolytes per CTICU protocol     ENDO:  PMH of preDM with recent A1c: 6.3.  Now with stress hyperglycemia on insulin infusion ricky-op -->  - Maintain BG <180, insulin per CTICU protocol  - transition insulin infusion to SSI when dosing stabilizes      HEME:  Acute blood loss and mild coagulopathy ricky-op, post-op labs notable for Hgb 14.1, PLTs 485 and INR 1.4 after receiving Plts 1 unit and FFP 2 units in the OR.-->    - Monitor drain output volume and characteristics  - CBC, coags, and fibrinogen post op and as clinically indicated  - Start SQH tomorrow   - Enteral warfarin timing TBD per surgeon preference (PT previously attributed dizziness to apixaban)   - SCDs for DVT prophylaxis.  - Last type and screen: 11/11     ID:  Afebrile, no current indications of infection. MRSA screen pending. -->  - Trend temp q4h  - Periop cefazolin, vanco, fluconazole x 48hrs per LVAD protocol      Skin:  No active skin issues.  - preventative Mepilex dressings in place on sacrum and heels  - change preventative Mepilex weekly or more frequently as indicated (when moist/soiled)   - every shift skin assessment per nursing and weekly ICU skin rounds  - moisture barrier to be applied with ricky care  - active skin problems addressed with nursing on daily rounds     Proph:  SCDs  PPI     G:  Line  Right IJ MAC w Minimac placed 11/9  Right brachial a-line placed 11/12    F: Family: updated at bedside.     A,B,C,D,E,F,G: reviewed    I personally spent 95 minutes of critical care time directly and personally managing the patient exclusive of separately billable procedures.     Dispo: CTICU care for now.    CTICU TEAM PHONE [phone]

## 2024-11-12 NOTE — OP NOTE
OPERATIVE REPORT     Date: 2024  OR Location: [facility] OR    Name: Fahad Meraz, : 1955, Age: 69 y.o., MRN: [id number], Sex: male    Diagnosis  Pre-op Diagnosis      * Acute on chronic combined systolic (congestive) and diastolic (congestive) heart failure [I50.43]     * Ischemic cardiomyopathy [I25.5]  Principal Problem:    Acute on chronic heart failure with reduced ejection fraction and diastolic dysfunction  Active Problems:    Ischemic cardiomyopathy    Receiving inotropic medication    HTN (hypertension)    CAD (coronary artery disease)    MI (myocardial infarction) (Multi)    CHF (congestive heart failure)    Gastroesophageal reflux disease    Acute kidney injury (nontraumatic) (CMS-HCC)   Post-op Diagnosis     * Acute on chronic combined systolic (congestive) and diastolic (congestive) heart failure [I50.43]     * Ischemic cardiomyopathy [I25.5]  Principal Problem:    Acute on chronic heart failure with reduced ejection fraction and diastolic dysfunction  Active Problems:    Ischemic cardiomyopathy    Receiving inotropic medication    HTN (hypertension)    CAD (coronary artery disease)    MI (myocardial infarction) (Multi)    CHF (congestive heart failure)    Gastroesophageal reflux disease    Acute kidney injury (nontraumatic) (CMS-[facility])       Procedures  HEARTMATE 3 INSERTION W/ THORACOTOMY  47551 - MS INSJ VENTR ASSIST DEV IMPLTABLE ICORP 1 VNTRC      Surgeons   Suhas Hill MD PhD  Mike Madrigal provided full assistance throughout the procedure.  He is presence was essential at this procedure is particularly complicated.  We performed every step of the procedure together.    Resident/Fellow/Other Assistant:  Juvenal Torres  No appropriately qualified resident was available to assist with the procedure    Procedure Summary  Anesthesia:   General double-lumen endotracheal       Anesthesia Staff:   Anesthesiologist: Fred Ortiz MD  C-AA: Steven  KmenttSHELBIE  Perfusionist: Herberth Garcia; Hunter Timmons; Kaden Courtney  AUDREY: Ashley Seals    Specimen: No specimens collected     Staff:   Circulator: Kandice Trujillo RN; Gonzalez Sherwood RN  Relief Circulator: Cornelia Bernard RN  Scrub Person: Alma Delia De; Cornelia Bernard RN; Kacie Purcell      Findings: Some pleural adhesions encountered    Indications: Fahad Meraz is an 69 y.o. male with hx of endstage heart failure secondary to ischemic cardiomyopathy. He underwent previous CABG through a sternotomy. He was discussed at the advanced heart failure therapeutics committee meeting. The risks benefits and alternatives were discussed with the patient and include but are not limited to, bleeding, infection, injury to other structures, need for re-operation, arrhythmia, respiratory failure, prolonged intubation, stroke, and death.  These were discussed with the patient in detail, all questions were answered and informed consent was obtained.      Procedure Details: 5 Zimbabwean sheaths were inserted in the right femoral artery and left femoral vein respectively.  The patient was positioned and the left lateral position for left thoracotomy access.  The patient was prepped and draped from the neck all the way down to the lower legs.  Left posterior lateral thoracotomy was performed.  The enhanced axis rib resection was performed.  Some pleural adhesions were identified and divided.  The left ventricular apex was identified with echo guidance.  Small incision was made in the adherent pericardium overlying the site for LVAD inflow insertion.  The descending thoracic aorta was also clearly identified and the site for cannulation as well as anastomosis of the outflow graft was identified.    Heparin was administered and cannulation for cardiopulmonary bypass was undertaken using a 20 Zimbabwean EO per cannula inserted between 230 pursestring sutures in the proximal descending thoracic aorta and venous  cannulation using long 24 Lao venous cannula inserted through the left femoral vein all the way up into the SVC under echo guidance.  Cardiopulmonary bypass was commenced uneventfully.    The LVAD inflow was performed using the traditional LVAD cuff and 9 pledgeted horizontal mattress 2-0 Ethibond sutures.  These were secured using cor knot.  The LV apex was then cored and the LVAD inflow inserted and secured.    The outflow graft was then cut to length and anastomosed using partial-occlusion clamp end-to-side using continuous 4-0 Prolene sutures onto the mid descending thoracic aorta.    The LVAD driveline was tunneled exiting the left upper quadrant.  This was corrected and the LVAD was started.  The LVAD was de-aired.  Cardiopulmonary bypass was weaned gradually and the LVAD flows was increased.  Satisfactory flows were achieved.    Hemostasis was secured.  2 drains were inserted in the left pleural space.  Multiple pericostal sutures using 5 Ethibond were inserted and the thoracotomy was closed in layers.    The patient remained stable, and was transferred to the Cardiothoracic Intensive Care Unit in stable cardiorespiratory condition.  I was available for all aspects of the procedure preoperatively and postoperatively.     Suhas Hill MD PhD  Cardiac Surgery

## 2024-11-12 NOTE — CONSULTS
Vancomycin Dosing by Pharmacy- INITIAL    Fahad Meraz is a 69 y.o. year old male who Pharmacy has been consulted for vancomycin dosing for pre-LVAD surgical prophylaxis. Based on the patient's indication and renal status this patient will be dosed based on 1x dose.    Renal function is currently stable.    Visit Vitals  BP 91/71   Pulse 82   Temp 36.4 °C (97.5 °F)   Resp 14        Lab Results   Component Value Date    CREATININE 1.52 (H) 2024    CREATININE 1.40 (H) 11/10/2024    CREATININE 1.20 11/10/2024    CREATININE 1.24 2024        Patient weight is as follows:   Vitals:    24 0600   Weight: 92.6 kg (204 lb 2.3 oz)       Cultures:  No results found for the encounter in last 14 days.        I/O last 3 completed shifts:  In: 1934.5 (20.9 mL/kg) [P.O.:960; I.V.:974.5 (10.5 mL/kg)]  Out: 3250 (35.1 mL/kg) [Urine:3250 (1 mL/kg/hr)]  Weight: 92.6 kg   I/O during current shift:  I/O this shift:  In: 217 [I.V.:217]  Out: -     Temp (24hrs), Av.2 °C (97.2 °F), Min:35.1 °C (95.2 °F), Max:36.9 °C (98.4 °F)         Assessment/Plan     Patient will be given a 1x dose of 1500 mg prior to LVAD procedure. Confirmed with team they only wanted pharmacy to dose 1x order.    Follow-up level will not be needed unledd team decides to continue vanco beyond 1x dose  Will continue to monitor renal function daily while on vancomycin and order serum creatinine at least every 48 hours if not already ordered.  Follow for continued vancomycin needs, clinical response, and signs/symptoms of toxicity.       Benjamin Montalvo, PharmD

## 2024-11-13 ENCOUNTER — APPOINTMENT (OUTPATIENT)
Dept: RADIOLOGY | Facility: HOSPITAL | Age: 69
DRG: 001 | End: 2024-11-13
Payer: MEDICARE

## 2024-11-13 ENCOUNTER — APPOINTMENT (OUTPATIENT)
Dept: CARDIOLOGY | Facility: HOSPITAL | Age: 69
DRG: 001 | End: 2024-11-13
Payer: MEDICARE

## 2024-11-13 LAB
ALBUMIN SERPL BCP-MCNC: 3.7 G/DL (ref 3.4–5)
ALBUMIN SERPL BCP-MCNC: 4.2 G/DL (ref 3.4–5)
ALP SERPL-CCNC: 50 U/L (ref 33–136)
ALT SERPL W P-5'-P-CCNC: 11 U/L (ref 10–52)
ANION GAP BLDA CALCULATED.4IONS-SCNC: 10 MMO/L (ref 10–25)
ANION GAP BLDA CALCULATED.4IONS-SCNC: 11 MMO/L (ref 10–25)
ANION GAP BLDA CALCULATED.4IONS-SCNC: 12 MMO/L (ref 10–25)
ANION GAP BLDA CALCULATED.4IONS-SCNC: 12 MMO/L (ref 10–25)
ANION GAP BLDA CALCULATED.4IONS-SCNC: 14 MMO/L (ref 10–25)
ANION GAP BLDMV CALCULATED.4IONS-SCNC: 10 MMO/L (ref 10–25)
ANION GAP BLDMV CALCULATED.4IONS-SCNC: 11 MMO/L (ref 10–25)
ANION GAP BLDMV CALCULATED.4IONS-SCNC: 12 MMO/L (ref 10–25)
ANION GAP BLDMV CALCULATED.4IONS-SCNC: 9 MMO/L (ref 10–25)
ANION GAP SERPL CALC-SCNC: 14 MMOL/L (ref 10–20)
ANION GAP SERPL CALC-SCNC: 16 MMOL/L (ref 10–20)
AST SERPL W P-5'-P-CCNC: 42 U/L (ref 9–39)
BASE EXCESS BLDA CALC-SCNC: -0.4 MMOL/L (ref -2–3)
BASE EXCESS BLDA CALC-SCNC: -0.7 MMOL/L (ref -2–3)
BASE EXCESS BLDA CALC-SCNC: -1.1 MMOL/L (ref -2–3)
BASE EXCESS BLDA CALC-SCNC: -1.4 MMOL/L (ref -2–3)
BASE EXCESS BLDA CALC-SCNC: -2.4 MMOL/L (ref -2–3)
BASE EXCESS BLDMV CALC-SCNC: -1.1 MMOL/L (ref -2–3)
BASE EXCESS BLDMV CALC-SCNC: -1.3 MMOL/L (ref -2–3)
BASE EXCESS BLDMV CALC-SCNC: 0.1 MMOL/L (ref -2–3)
BASE EXCESS BLDMV CALC-SCNC: 0.2 MMOL/L (ref -2–3)
BASE EXCESS BLDMV CALC-SCNC: 1 MMOL/L (ref -2–3)
BASE EXCESS BLDMV CALC-SCNC: 1 MMOL/L (ref -2–3)
BILIRUB DIRECT SERPL-MCNC: 0.4 MG/DL (ref 0–0.3)
BILIRUB SERPL-MCNC: 1.1 MG/DL (ref 0–1.2)
BLOOD EXPIRATION DATE: NORMAL
BODY TEMPERATURE: 37 DEGREES CELSIUS
BUN SERPL-MCNC: 27 MG/DL (ref 6–23)
BUN SERPL-MCNC: 33 MG/DL (ref 6–23)
CA-I BLD-SCNC: 1.21 MMOL/L (ref 1.1–1.33)
CA-I BLD-SCNC: 1.22 MMOL/L (ref 1.1–1.33)
CA-I BLDA-SCNC: 1.19 MMOL/L (ref 1.1–1.33)
CA-I BLDA-SCNC: 1.22 MMOL/L (ref 1.1–1.33)
CA-I BLDA-SCNC: 1.23 MMOL/L (ref 1.1–1.33)
CA-I BLDA-SCNC: 1.24 MMOL/L (ref 1.1–1.33)
CA-I BLDA-SCNC: 1.25 MMOL/L (ref 1.1–1.33)
CA-I BLDMV-SCNC: 1.2 MMOL/L (ref 1.1–1.33)
CA-I BLDMV-SCNC: 1.22 MMOL/L (ref 1.1–1.33)
CA-I BLDMV-SCNC: 1.22 MMOL/L (ref 1.1–1.33)
CA-I BLDMV-SCNC: 1.25 MMOL/L (ref 1.1–1.33)
CALCIUM SERPL-MCNC: 9.4 MG/DL (ref 8.6–10.6)
CALCIUM SERPL-MCNC: 9.5 MG/DL (ref 8.6–10.6)
CHLORIDE BLD-SCNC: 101 MMOL/L (ref 98–107)
CHLORIDE BLD-SCNC: 102 MMOL/L (ref 98–107)
CHLORIDE BLD-SCNC: 102 MMOL/L (ref 98–107)
CHLORIDE BLD-SCNC: 103 MMOL/L (ref 98–107)
CHLORIDE BLD-SCNC: 104 MMOL/L (ref 98–107)
CHLORIDE BLD-SCNC: 105 MMOL/L (ref 98–107)
CHLORIDE BLDA-SCNC: 101 MMOL/L (ref 98–107)
CHLORIDE BLDA-SCNC: 102 MMOL/L (ref 98–107)
CHLORIDE BLDA-SCNC: 103 MMOL/L (ref 98–107)
CHLORIDE BLDA-SCNC: 104 MMOL/L (ref 98–107)
CHLORIDE BLDA-SCNC: 106 MMOL/L (ref 98–107)
CHLORIDE SERPL-SCNC: 102 MMOL/L (ref 98–107)
CHLORIDE SERPL-SCNC: 102 MMOL/L (ref 98–107)
CO2 SERPL-SCNC: 23 MMOL/L (ref 21–32)
CO2 SERPL-SCNC: 25 MMOL/L (ref 21–32)
CREAT SERPL-MCNC: 1.66 MG/DL (ref 0.5–1.3)
CREAT SERPL-MCNC: 1.7 MG/DL (ref 0.5–1.3)
DISPENSE STATUS: NORMAL
EGFRCR SERPLBLD CKD-EPI 2021: 43 ML/MIN/1.73M*2
EGFRCR SERPLBLD CKD-EPI 2021: 44 ML/MIN/1.73M*2
EJECTION FRACTION: 15 %
EJECTION FRACTION: 18 %
ERYTHROCYTE [DISTWIDTH] IN BLOOD BY AUTOMATED COUNT: 17.1 % (ref 11.5–14.5)
ERYTHROCYTE [DISTWIDTH] IN BLOOD BY AUTOMATED COUNT: 17.2 % (ref 11.5–14.5)
GLUCOSE BLD MANUAL STRIP-MCNC: 133 MG/DL (ref 74–99)
GLUCOSE BLD MANUAL STRIP-MCNC: 133 MG/DL (ref 74–99)
GLUCOSE BLD MANUAL STRIP-MCNC: 140 MG/DL (ref 74–99)
GLUCOSE BLD-MCNC: 130 MG/DL (ref 74–99)
GLUCOSE BLD-MCNC: 132 MG/DL (ref 74–99)
GLUCOSE BLD-MCNC: 136 MG/DL (ref 74–99)
GLUCOSE BLD-MCNC: 150 MG/DL (ref 74–99)
GLUCOSE BLD-MCNC: 152 MG/DL (ref 74–99)
GLUCOSE BLD-MCNC: 153 MG/DL (ref 74–99)
GLUCOSE BLDA-MCNC: 132 MG/DL (ref 74–99)
GLUCOSE BLDA-MCNC: 134 MG/DL (ref 74–99)
GLUCOSE BLDA-MCNC: 134 MG/DL (ref 74–99)
GLUCOSE BLDA-MCNC: 142 MG/DL (ref 74–99)
GLUCOSE BLDA-MCNC: 160 MG/DL (ref 74–99)
GLUCOSE SERPL-MCNC: 129 MG/DL (ref 74–99)
GLUCOSE SERPL-MCNC: 156 MG/DL (ref 74–99)
HCO3 BLDA-SCNC: 21.4 MMOL/L (ref 22–26)
HCO3 BLDA-SCNC: 22.3 MMOL/L (ref 22–26)
HCO3 BLDA-SCNC: 22.4 MMOL/L (ref 22–26)
HCO3 BLDA-SCNC: 22.8 MMOL/L (ref 22–26)
HCO3 BLDA-SCNC: 23.4 MMOL/L (ref 22–26)
HCO3 BLDMV-SCNC: 23.5 MMOL/L (ref 22–26)
HCO3 BLDMV-SCNC: 23.7 MMOL/L (ref 22–26)
HCO3 BLDMV-SCNC: 24.3 MMOL/L (ref 22–26)
HCO3 BLDMV-SCNC: 25.4 MMOL/L (ref 22–26)
HCO3 BLDMV-SCNC: 26 MMOL/L (ref 22–26)
HCO3 BLDMV-SCNC: 26 MMOL/L (ref 22–26)
HCT VFR BLD AUTO: 36.3 % (ref 41–52)
HCT VFR BLD AUTO: 39.3 % (ref 41–52)
HCT VFR BLD EST: 36 % (ref 41–52)
HCT VFR BLD EST: 37 % (ref 41–52)
HCT VFR BLD EST: 37 % (ref 41–52)
HCT VFR BLD EST: 38 % (ref 41–52)
HCT VFR BLD EST: 40 % (ref 41–52)
HCT VFR BLD EST: 40 % (ref 41–52)
HCT VFR BLD EST: 41 % (ref 41–52)
HCT VFR BLD EST: 41 % (ref 41–52)
HGB BLD-MCNC: 12.3 G/DL (ref 13.5–17.5)
HGB BLD-MCNC: 13 G/DL (ref 13.5–17.5)
HGB BLDA-MCNC: 12.4 G/DL (ref 13.5–17.5)
HGB BLDA-MCNC: 12.5 G/DL (ref 13.5–17.5)
HGB BLDA-MCNC: 12.7 G/DL (ref 13.5–17.5)
HGB BLDA-MCNC: 12.8 G/DL (ref 13.5–17.5)
HGB BLDA-MCNC: 13.6 G/DL (ref 13.5–17.5)
HGB BLDMV-MCNC: 12.1 G/DL (ref 13.5–17.5)
HGB BLDMV-MCNC: 12.3 G/DL (ref 13.5–17.5)
HGB BLDMV-MCNC: 12.8 G/DL (ref 13.5–17.5)
HGB BLDMV-MCNC: 13.4 G/DL (ref 13.5–17.5)
HGB BLDMV-MCNC: 13.4 G/DL (ref 13.5–17.5)
HGB BLDMV-MCNC: 13.5 G/DL (ref 13.5–17.5)
INHALED O2 CONCENTRATION: 40 %
INR PPP: 1.4 (ref 0.9–1.1)
LACTATE BLDA-SCNC: 1 MMOL/L (ref 0.4–2)
LACTATE BLDA-SCNC: 1.2 MMOL/L (ref 0.4–2)
LACTATE BLDA-SCNC: 2.3 MMOL/L (ref 0.4–2)
LACTATE BLDMV-SCNC: 0.9 MMOL/L (ref 0.4–2)
LACTATE BLDMV-SCNC: 0.9 MMOL/L (ref 0.4–2)
LACTATE BLDMV-SCNC: 1 MMOL/L (ref 0.4–2)
LACTATE BLDMV-SCNC: 1.1 MMOL/L (ref 0.4–2)
LACTATE BLDMV-SCNC: 1.6 MMOL/L (ref 0.4–2)
LACTATE BLDMV-SCNC: 2.2 MMOL/L (ref 0.4–2)
LDH SERPL L TO P-CCNC: 276 U/L (ref 84–246)
LEFT VENTRICLE INTERNAL DIMENSION DIASTOLE: 6.9 CM (ref 3.5–6)
MAGNESIUM SERPL-MCNC: 2.41 MG/DL (ref 1.6–2.4)
MAGNESIUM SERPL-MCNC: 2.51 MG/DL (ref 1.6–2.4)
MCH RBC QN AUTO: 27.4 PG (ref 26–34)
MCH RBC QN AUTO: 27.6 PG (ref 26–34)
MCHC RBC AUTO-ENTMCNC: 33.1 G/DL (ref 32–36)
MCHC RBC AUTO-ENTMCNC: 33.9 G/DL (ref 32–36)
MCV RBC AUTO: 82 FL (ref 80–100)
MCV RBC AUTO: 83 FL (ref 80–100)
NRBC BLD-RTO: 0 /100 WBCS (ref 0–0)
NRBC BLD-RTO: 0 /100 WBCS (ref 0–0)
OXYHGB MFR BLDA: 95.6 % (ref 94–98)
OXYHGB MFR BLDA: 96 % (ref 94–98)
OXYHGB MFR BLDA: 96.2 % (ref 94–98)
OXYHGB MFR BLDA: 96.8 % (ref 94–98)
OXYHGB MFR BLDA: 97.2 % (ref 94–98)
OXYHGB MFR BLDMV: 66.8 % (ref 45–75)
OXYHGB MFR BLDMV: 68.1 % (ref 45–75)
OXYHGB MFR BLDMV: 68.2 % (ref 45–75)
OXYHGB MFR BLDMV: 71 % (ref 45–75)
OXYHGB MFR BLDMV: 71.4 % (ref 45–75)
OXYHGB MFR BLDMV: 72.7 % (ref 45–75)
PCO2 BLDA: 30 MM HG (ref 38–42)
PCO2 BLDA: 33 MM HG (ref 38–42)
PCO2 BLDA: 33 MM HG (ref 38–42)
PCO2 BLDA: 36 MM HG (ref 38–42)
PCO2 BLDA: 36 MM HG (ref 38–42)
PCO2 BLDMV: 33 MM HG (ref 41–51)
PCO2 BLDMV: 40 MM HG (ref 41–51)
PCO2 BLDMV: 42 MM HG (ref 41–51)
PCO2 BLDMV: 43 MM HG (ref 41–51)
PH BLDA: 7.41 PH (ref 7.38–7.42)
PH BLDA: 7.42 PH (ref 7.38–7.42)
PH BLDA: 7.42 PH (ref 7.38–7.42)
PH BLDA: 7.44 PH (ref 7.38–7.42)
PH BLDA: 7.48 PH (ref 7.38–7.42)
PH BLDMV: 7.37 PH (ref 7.33–7.43)
PH BLDMV: 7.38 PH (ref 7.33–7.43)
PH BLDMV: 7.38 PH (ref 7.33–7.43)
PH BLDMV: 7.4 PH (ref 7.33–7.43)
PH BLDMV: 7.4 PH (ref 7.33–7.43)
PH BLDMV: 7.46 PH (ref 7.33–7.43)
PHOSPHATE SERPL-MCNC: 3.8 MG/DL (ref 2.5–4.9)
PHOSPHATE SERPL-MCNC: 5.5 MG/DL (ref 2.5–4.9)
PLATELET # BLD AUTO: 391 X10*3/UL (ref 150–450)
PLATELET # BLD AUTO: 515 X10*3/UL (ref 150–450)
PO2 BLDA: 101 MM HG (ref 85–95)
PO2 BLDA: 115 MM HG (ref 85–95)
PO2 BLDA: 122 MM HG (ref 85–95)
PO2 BLDA: 86 MM HG (ref 85–95)
PO2 BLDA: 96 MM HG (ref 85–95)
PO2 BLDMV: 40 MM HG (ref 35–45)
PO2 BLDMV: 41 MM HG (ref 35–45)
PO2 BLDMV: 41 MM HG (ref 35–45)
PO2 BLDMV: 43 MM HG (ref 35–45)
PO2 BLDMV: 44 MM HG (ref 35–45)
PO2 BLDMV: 45 MM HG (ref 35–45)
POTASSIUM BLDA-SCNC: 4.5 MMOL/L (ref 3.5–5.3)
POTASSIUM BLDA-SCNC: 4.7 MMOL/L (ref 3.5–5.3)
POTASSIUM BLDA-SCNC: 5.1 MMOL/L (ref 3.5–5.3)
POTASSIUM BLDA-SCNC: 5.2 MMOL/L (ref 3.5–5.3)
POTASSIUM BLDA-SCNC: 5.2 MMOL/L (ref 3.5–5.3)
POTASSIUM BLDMV-SCNC: 4.2 MMOL/L (ref 3.5–5.3)
POTASSIUM BLDMV-SCNC: 4.8 MMOL/L (ref 3.5–5.3)
POTASSIUM BLDMV-SCNC: 4.9 MMOL/L (ref 3.5–5.3)
POTASSIUM BLDMV-SCNC: 5 MMOL/L (ref 3.5–5.3)
POTASSIUM BLDMV-SCNC: 5 MMOL/L (ref 3.5–5.3)
POTASSIUM BLDMV-SCNC: 5.4 MMOL/L (ref 3.5–5.3)
POTASSIUM SERPL-SCNC: 4.8 MMOL/L (ref 3.5–5.3)
POTASSIUM SERPL-SCNC: 5.2 MMOL/L (ref 3.5–5.3)
PRODUCT BLOOD TYPE: 6200
PRODUCT CODE: NORMAL
PROT SERPL-MCNC: 5.5 G/DL (ref 6.4–8.2)
PROTHROMBIN TIME: 15.6 SECONDS (ref 9.8–12.8)
RBC # BLD AUTO: 4.45 X10*6/UL (ref 4.5–5.9)
RBC # BLD AUTO: 4.75 X10*6/UL (ref 4.5–5.9)
RIGHT VENTRICLE FREE WALL PEAK S': 6.64 CM/S
RIGHT VENTRICLE PEAK SYSTOLIC PRESSURE: 23.4 MMHG
SAO2 % BLDA: 100 % (ref 94–100)
SAO2 % BLDA: 100 % (ref 94–100)
SAO2 % BLDA: 99 % (ref 94–100)
SAO2 % BLDMV: 69 % (ref 45–75)
SAO2 % BLDMV: 70 % (ref 45–75)
SAO2 % BLDMV: 70 % (ref 45–75)
SAO2 % BLDMV: 73 % (ref 45–75)
SAO2 % BLDMV: 73 % (ref 45–75)
SAO2 % BLDMV: 74 % (ref 45–75)
SODIUM BLDA-SCNC: 132 MMOL/L (ref 136–145)
SODIUM BLDA-SCNC: 133 MMOL/L (ref 136–145)
SODIUM BLDA-SCNC: 134 MMOL/L (ref 136–145)
SODIUM BLDMV-SCNC: 132 MMOL/L (ref 136–145)
SODIUM BLDMV-SCNC: 132 MMOL/L (ref 136–145)
SODIUM BLDMV-SCNC: 133 MMOL/L (ref 136–145)
SODIUM BLDMV-SCNC: 134 MMOL/L (ref 136–145)
SODIUM SERPL-SCNC: 136 MMOL/L (ref 136–145)
SODIUM SERPL-SCNC: 136 MMOL/L (ref 136–145)
UNIT ABO: NORMAL
UNIT NUMBER: NORMAL
UNIT RH: NORMAL
UNIT VOLUME: 192
UNIT VOLUME: 211
UNIT VOLUME: 217
UNIT VOLUME: 225
WBC # BLD AUTO: 15.6 X10*3/UL (ref 4.4–11.3)
WBC # BLD AUTO: 19.3 X10*3/UL (ref 4.4–11.3)

## 2024-11-13 PROCEDURE — C8924 2D TTE W OR W/O FOL W/CON,FU: HCPCS

## 2024-11-13 PROCEDURE — 2500000005 HC RX 250 GENERAL PHARMACY W/O HCPCS: Performed by: STUDENT IN AN ORGANIZED HEALTH CARE EDUCATION/TRAINING PROGRAM

## 2024-11-13 PROCEDURE — 82947 ASSAY GLUCOSE BLOOD QUANT: CPT

## 2024-11-13 PROCEDURE — 2500000004 HC RX 250 GENERAL PHARMACY W/ HCPCS (ALT 636 FOR OP/ED): Performed by: NURSE PRACTITIONER

## 2024-11-13 PROCEDURE — 83735 ASSAY OF MAGNESIUM: CPT | Performed by: NURSE PRACTITIONER

## 2024-11-13 PROCEDURE — 2500000004 HC RX 250 GENERAL PHARMACY W/ HCPCS (ALT 636 FOR OP/ED)

## 2024-11-13 PROCEDURE — 85610 PROTHROMBIN TIME: CPT | Performed by: NURSE PRACTITIONER

## 2024-11-13 PROCEDURE — 2500000004 HC RX 250 GENERAL PHARMACY W/ HCPCS (ALT 636 FOR OP/ED): Performed by: STUDENT IN AN ORGANIZED HEALTH CARE EDUCATION/TRAINING PROGRAM

## 2024-11-13 PROCEDURE — 85027 COMPLETE CBC AUTOMATED: CPT | Performed by: NURSE PRACTITIONER

## 2024-11-13 PROCEDURE — 37799 UNLISTED PX VASCULAR SURGERY: CPT | Performed by: NURSE PRACTITIONER

## 2024-11-13 PROCEDURE — 2500000002 HC RX 250 W HCPCS SELF ADMINISTERED DRUGS (ALT 637 FOR MEDICARE OP, ALT 636 FOR OP/ED): Performed by: NURSE PRACTITIONER

## 2024-11-13 PROCEDURE — 74018 RADEX ABDOMEN 1 VIEW: CPT

## 2024-11-13 PROCEDURE — 84132 ASSAY OF SERUM POTASSIUM: CPT | Performed by: NURSE PRACTITIONER

## 2024-11-13 PROCEDURE — 82330 ASSAY OF CALCIUM: CPT | Performed by: NURSE PRACTITIONER

## 2024-11-13 PROCEDURE — 74018 RADEX ABDOMEN 1 VIEW: CPT | Performed by: RADIOLOGY

## 2024-11-13 PROCEDURE — 99233 SBSQ HOSP IP/OBS HIGH 50: CPT

## 2024-11-13 PROCEDURE — 71045 X-RAY EXAM CHEST 1 VIEW: CPT | Performed by: RADIOLOGY

## 2024-11-13 PROCEDURE — 93308 TTE F-UP OR LMTD: CPT | Performed by: INTERNAL MEDICINE

## 2024-11-13 PROCEDURE — 93750 INTERROGATION VAD IN PERSON: CPT | Performed by: INTERNAL MEDICINE

## 2024-11-13 PROCEDURE — 99291 CRITICAL CARE FIRST HOUR: CPT | Performed by: NURSE PRACTITIONER

## 2024-11-13 PROCEDURE — 99291 CRITICAL CARE FIRST HOUR: CPT

## 2024-11-13 PROCEDURE — 93750 INTERROGATION VAD IN PERSON: CPT

## 2024-11-13 PROCEDURE — 93325 DOPPLER ECHO COLOR FLOW MAPG: CPT | Performed by: INTERNAL MEDICINE

## 2024-11-13 PROCEDURE — 99291 CRITICAL CARE FIRST HOUR: CPT | Performed by: INTERNAL MEDICINE

## 2024-11-13 PROCEDURE — 2500000001 HC RX 250 WO HCPCS SELF ADMINISTERED DRUGS (ALT 637 FOR MEDICARE OP): Performed by: NURSE PRACTITIONER

## 2024-11-13 PROCEDURE — 93321 DOPPLER ECHO F-UP/LMTD STD: CPT | Performed by: INTERNAL MEDICINE

## 2024-11-13 PROCEDURE — 2020000001 HC ICU ROOM DAILY

## 2024-11-13 PROCEDURE — 84100 ASSAY OF PHOSPHORUS: CPT | Performed by: NURSE PRACTITIONER

## 2024-11-13 PROCEDURE — 82040 ASSAY OF SERUM ALBUMIN: CPT | Performed by: NURSE PRACTITIONER

## 2024-11-13 PROCEDURE — 82565 ASSAY OF CREATININE: CPT | Performed by: NURSE PRACTITIONER

## 2024-11-13 PROCEDURE — 94645 CONT INHLJ TX EACH ADDL HOUR: CPT

## 2024-11-13 PROCEDURE — 71045 X-RAY EXAM CHEST 1 VIEW: CPT

## 2024-11-13 PROCEDURE — 83615 LACTATE (LD) (LDH) ENZYME: CPT | Performed by: NURSE PRACTITIONER

## 2024-11-13 PROCEDURE — 99232 SBSQ HOSP IP/OBS MODERATE 35: CPT | Performed by: INTERNAL MEDICINE

## 2024-11-13 RX ORDER — INSULIN LISPRO 100 [IU]/ML
0-15 INJECTION, SOLUTION INTRAVENOUS; SUBCUTANEOUS EVERY 4 HOURS
Status: DISCONTINUED | OUTPATIENT
Start: 2024-11-13 | End: 2024-11-16

## 2024-11-13 RX ORDER — BUMETANIDE 0.25 MG/ML
2 INJECTION, SOLUTION INTRAMUSCULAR; INTRAVENOUS ONCE
Status: COMPLETED | OUTPATIENT
Start: 2024-11-13 | End: 2024-11-13

## 2024-11-13 RX ORDER — HYDROMORPHONE HYDROCHLORIDE 0.2 MG/ML
0.2 INJECTION INTRAMUSCULAR; INTRAVENOUS; SUBCUTANEOUS EVERY 2 HOUR PRN
Status: DISCONTINUED | OUTPATIENT
Start: 2024-11-13 | End: 2024-11-15

## 2024-11-13 RX ORDER — POLYETHYLENE GLYCOL 3350 17 G/17G
17 POWDER, FOR SOLUTION ORAL 2 TIMES DAILY
Status: DISCONTINUED | OUTPATIENT
Start: 2024-11-13 | End: 2024-11-17

## 2024-11-13 RX ORDER — HEPARIN SODIUM 5000 [USP'U]/ML
5000 INJECTION, SOLUTION INTRAVENOUS; SUBCUTANEOUS EVERY 8 HOURS
Status: DISCONTINUED | OUTPATIENT
Start: 2024-11-13 | End: 2024-11-19

## 2024-11-13 RX ORDER — DEXMEDETOMIDINE HYDROCHLORIDE 4 UG/ML
0-1.5 INJECTION, SOLUTION INTRAVENOUS CONTINUOUS
Status: DISCONTINUED | OUTPATIENT
Start: 2024-11-13 | End: 2024-11-14

## 2024-11-13 ASSESSMENT — PAIN - FUNCTIONAL ASSESSMENT
PAIN_FUNCTIONAL_ASSESSMENT: CPOT (CRITICAL CARE PAIN OBSERVATION TOOL)
PAIN_FUNCTIONAL_ASSESSMENT: 0-10
PAIN_FUNCTIONAL_ASSESSMENT: 0-10

## 2024-11-13 ASSESSMENT — PAIN SCALES - GENERAL
PAINLEVEL_OUTOF10: 0 - NO PAIN
PAINLEVEL_OUTOF10: 10 - WORST POSSIBLE PAIN
PAINLEVEL_OUTOF10: 10 - WORST POSSIBLE PAIN

## 2024-11-13 ASSESSMENT — PAIN DESCRIPTION - LOCATION: LOCATION: CHEST

## 2024-11-13 NOTE — PROGRESS NOTES
Fahad Meraz is a 69 y.o. male on day 20 of admission presenting with Acute on chronic heart failure with reduced ejection fraction and diastolic dysfunction.    Subjective   Patient discussed in morning handover, patient examined and chart reviewed. Meds, labs and radiology reviewed during rapid and full interdisciplinary rounds this morning.    Surgeon: Jas Briseno  DOS: Nov. 12, 2024  Procedure: HMIII Implantation (via L thoracotomy)     Airway: grade I - full view of glottis   EF:  LVEF 15-20% with mild AI and mod MR, TR pre & post, dilated RV     FULL Code:  Emergency Sternotomy: N; L mini-thoracotomy    HPI:   10/18/24 - Patient initially presented to an outside hospital  with decompensated heart failure and KENYA.   10/24/24 - Patient transferred to [facility]  for medical optimization and evaluation for advanced heart failure therapies.    11/5/2024 -  Advanced Therapeutics Committee approved destination therapy LVAD.  He is now admitted to the CTICU s/p Heartmate 3 implant with the outflow to the mid descending thoracic aorta via left thoracotomy 11/12/24 with Dr. Mclain and Dr. Madrigal.     PMH:  CAD - s/p CABG x 4 (2012) and PCI (LCx/OM; 5/2019)  Stage D ICM HFrEF LVEF 10-15%  AF s/p RFA (PVI and CTI; 4/2024)  HTN  Dyslipidemia  pre-T2DM (A1c 6.3)  VIV using CPAP   Depression  Anxiety    Course in Hospital:  11/12 - Intra-op -  episode of pulseless VT requiring defib x 1 prior to being placed on CPB. In the ICU (post-op), significant bleeding from venous cannulation site requiring transfusion    Overnight: Required volume administration and ongoing inotropic and vasopressor support. LVAD flows stable.         Objective     Last Recorded Vitals  Blood pressure 98/68, pulse (!) 125, temperature 37.4 °C (99.3 °F), resp. rate 16, height 1.829 m (6'), weight 94.1 kg (207 lb 7.3 oz), SpO2 98%.    Invasive Hemodynamics:    Most Recent Range Past 24hrs   BP (Art) 82/66 Arterial Line BP 1  Min: 82/66   Max: 105/76   MAP(Art) 73 mmHg Arterial Line MAP 1 (mmHg)   Min: 71 mmHg  Max: 86 mmHg   RA/CVP   No data recorded   PA 29/22 PAP  Min: 25/18  Max: 39/24   PA(mean) 24 mmHg PAP (Mean)  Min: 19 mmHg  Max: 62 mmHg   CO 5.5 L/min CO (L/min)  Min: 5.5 L/min  Max: 6.4 L/min   CI 2.6 L/min/m2 CI (L/min/m2)  Min: 2.6 L/min/m2  Max: 3 L/min/m2   Mixed Venous 72 % No data recorded   SVR  1269 (dyne*sec)/cm5 SVR (dyne*sec)/cm5  Min: 787 (dyne*sec)/cm5  Max: 1269 (dyne*sec)/cm5     Intake/Output last 3 Shifts:  I/O last 3 completed shifts:  In: 7566.8 (80.4 mL/kg) [I.V.:1717.8 (18.3 mL/kg); Blood:3339; IV Piggyback:2510]  Out: 3480 (37 mL/kg) [Urine:2620 (0.8 mL/kg/hr); Blood:500; Chest Tube:360]  Weight: 94.1 kg     Physical Exam  Constitutional:       General: He is sleeping.      Interventions: He is sedated and intubated.   Eyes:      Pupils: Pupils are equal, round, and reactive to light.   Cardiovascular:      Rate and Rhythm: Regular rhythm. Tachycardia present.      Comments: Milrinone: 0.125  Epi: 0.03  Vaso: 0.02  I-Epo: 0.03    Lines:  RIJ CVL and PAC  Right brachial A-line  R femoral shealth  Pulmonary:      Effort: He is intubated.      Comments: Vent Mode: Volume control/assist control  FiO2 (%):  [40 %-50 %] 40 %  S RR:  [16] 16  S VT:  [620 mL] 620 mL  PEEP/CPAP (cm H2O):  [8 cm H20] 8 cm H20  MAP (cm H2O):  [] 13  Abdominal:      General: Bowel sounds are decreased.      Palpations: Abdomen is soft.      Tenderness: There is no abdominal tenderness (patient sedatec).   Genitourinary:     Comments: Gallo in situ: clear urine  Neurological:      Comments: RASS -4 - -5 on propofol       MCS:   Heart Mate III:     Most Recent Range Past 24hrs   Flow 4.2 Pump Flow  Min: 4.1  Max: 4.4   Speed 5000 Speed RPM  Min: 500  Max: 5050   Power 3.4 Cortes  Min: 3.4  Max: 3.6   PI 3.1 Pulse Index  Min: 2.9  Max: 3.5   No PI event and stable power overnight.    Assessment/Plan     ICU Liberation Bundle:  A-Analgesia:  Tylenol and opioids at lowest effective dose  B-SBT: Will lighten sedation and assess neuro  C-RASS Target: 0 - -1  D-CAM: to be assessed on SAT (pending)  E-Mobilization Plan: stage I-II today  F - Family Last Update: to be update by the team    Daily Checklist:  HOB > 30 degrees: achieved   Feeding: NPO will re-assess for feeds later today if not going to be extubated  Thromboprophylaxis: Heparin and SCDs  Ulcer Prophylaxis: PPI  Glucose Control: Target 110-180; < 180 achieved; no hypoglycemia  Line to removed: Remove R femoral sheath. The indications and risks/benefits of non-violent/non self-destructive restraints were discussed.   Bowel Care: Bowel regime ordered  De-escalation of Antibiotics: Day 1 of 2 of routine anti-microbial prophylaxis    Plan:  Continue with chemical RVAD support; will obtain TTE today; will likely need to maintain I-Epo and will increase milrinone if BP permits  Start wake and weaning from the vent;  ASA to start today; look to start warfarin in next 24-48 hours.  No change to LVAD parameter for today.    DISPO: CTICU    I spent 53 minutes in the professional and overall care of this patient.    This critically ill patient continues to be at-risk for clinically significant deterioration / failure due to the above mentioned dysfunctional, unstable organ systems.  I have personally identified and managed all complex critical care issues to prevent aforementioned clinical deterioration.  Critical care time is spent at bedside and/or the immediate area and has included, but is not limited to, the review of diagnostic tests, labs, radiographs, serial assessments of hemodynamics, respiratory status, ventilatory management, and family updates.  Time spent in procedures and teaching are reported separately.    Zak Aguilar MD

## 2024-11-13 NOTE — NURSING NOTE
11/12/24 1418   HeartMate Equipment Tracking/Serial Numbers   Battery Charger [id number]   Battery Clip 1 [id number]   Battery Clip 2 [id number]   Battery Clip 3 [id number]   Battery Clip 4 [id number]   Rechargeable Battery 1 [id number]   Rechargeable Battery 2 [id number]   Rechargeable Battery 3 [id number]   Rechargeable Battery 4 [id number]   Rechargeable Battery 5 [id number]   Rechargeable Battery 6 [id number]   Rechargeable Battery 7 [id number]   Rechargeable Battery 8 [id number]   Programmed Backup  [id number]   Primary Controller [id number]   Mobile Power Unit [id number]        11/12/24 1420   HeartMate Equipment Transfer   Transfer From OR   Transfer To [facility]   Battery Charger Yes   Battery Clip 1 Yes   Battery Clip 2 Yes   Battery Clip 3 Yes   Battery Clip 4 Yes   Rechargeable Battery 1 Yes   Rechargeable Battery 2 Yes   Rechargeable Battery 3 Yes   Rechargeable Battery 4 Yes   Rechargeable Battery 5 Yes   Rechargeable Battery 6 Yes   Rechargeable Battery 7 Yes   Rechargeable Battery 8 Yes   Backup Battery 2 No   Go Gear Consolidated Bag Yes   Go Gear Accessory Kit Yes   Go Gear Vest Yes   Programmed Backup  Yes   Primary Controller Yes   Mobile Power Unit Yes   Black Emergency Red Tag Bag No   Shower Bag No   Apil Cuff No   Outflow graft No   Modular cable Yes      Patient would like to know if the lipid and alt can be added on?

## 2024-11-13 NOTE — PROGRESS NOTES
ADVANCED HEART FAILURE LVAD PROGRESS NOTE      VAD Cardiologist: Padmini     Type of VAD: HM3  Implant Date: 11/12/24  Reason for VAD: ICM  Intent: Long-Term (Significant PAD, age, patient preference)      Subjective     HPI:    Fahad Meraz is a very pleasant 69 y.o. male w/ a PMHx sig for CAD s/p 4V CABG (2012) and PCI (LCx/OM; 5/2019), stage D systolic HF/ICM/HFrEF with LVEF 10-15%( 10/22/24 TTE), AF s/p RFA (PVI and CTI; 4/2024) on OAC therapy, HTN, dyslipidemia, depression, anxiety and VIV using CPAP who was admitted to OSH ICU, s/p RHC on 10/18/24.  Per patient, he had been experienced worsening SOB and fluids overload for 2-3 weeks. Patient was on milrinone drip and underwent SGC diuresis. However his KENYA worsened, and were not able to wean milrinone drip. Decision was made [place] [facility] [place] consideration. Advanced therapies evaluation was initiated on 10/30. Discussed in advanced therapeutics committee on 11/5 and was denied for transplantation, and approved for LVAD (significant PAD, Elevated PSA level, Age approaching 70, as well as patient verbalized he would be more in favor of LVAD vs Transplant). He was transferred to the floor on 11/6 to await VAD implantation. Readmitted to HFICU as of 11/09 for pre-OR swan guided optimization. Now presents to CTICU s/p LVAD HM3 implant on 11/12/24 with Dr. Mclain. OR Course/Issues: pulseless VT requiring defib x 1 pre- CPB.         Principal Problem:    Acute on chronic heart failure with reduced ejection fraction and diastolic dysfunction  Active Problems:    Ischemic cardiomyopathy    Receiving inotropic medication    HTN (hypertension)    CAD (coronary artery disease)    MI (myocardial infarction) (Multi)    CHF (congestive heart failure)    Gastroesophageal reflux disease    Acute kidney injury (nontraumatic) (CMS-HCC)       LOS: 20 days     Interval Events:   Patient seen and examined at bedside this morning  while getting TTE. Patient is supported on epi, milrinone, and vaso. He was weaned off of levophed yesterday. Initial VAD interrogation only showing implant low flows. Arrived to CTICU at 5000 RPMs and low limit set at 4600.     Daily hemodynamics: CVP 10, PA 29/22 (24), CO/CI 4.7/2.2, SVR 1100 on Corey 0.03, milrinone 0.125, vaso 0.02, epi 0.03.     NYHA class pre-VAD implant: IV    dexmedeTOMIDine, 0-1.5 mcg/kg/hr, Last Rate: 0.5 mcg/kg/hr (11/13/24 1056)  EPINEPHrine, 0.03 mcg/kg/min, Last Rate: 0.03 mcg/kg/min (11/13/24 0734)  epoprostenol, 0.03 mcg/kg/min (Ideal), Last Rate: 0.03 mcg/kg/min (11/13/24 0332)  insulin regular infusion for cardiac surgery, 0-15 Units/hr, Last Rate: 1 Units/hr (11/13/24 0942)  lactated Ringer's, 20 mL/hr, Last Rate: 20 mL/hr (11/13/24 0756)  lactated Ringer's, 5 mL/hr, Last Rate: 5 mL/hr (11/13/24 0600)  milrinone, 0-0.75 mcg/kg/min, Last Rate: 0.125 mcg/kg/min (11/13/24 0916)  propofol, 0-50 mcg/kg/min, Last Rate: Stopped (11/13/24 1057)       PAP: (25-42)/(14-25) 33/22  CVP:  [7 mmHg-14 mmHg] 11 mmHg  CO:  [4.8 L/min-6.4 L/min] 4.8 L/min  CI:  [2.2 L/min/m2-3 L/min/m2] 2.2 L/min/m2      Objective   Vitals:   Vitals:    11/13/24 0845 11/13/24 0900 11/13/24 1000 11/13/24 1054   BP:       BP Location:       Patient Position:       Pulse: 108 (!) 118 (!) 126 95   Resp: 16 16 16 16   Temp:       TempSrc:       SpO2: 99% 99% 99% 99%   Weight:       Height:         Wt Readings from Last 5 Encounters:   11/13/24 96.4 kg (212 lb 8.4 oz)   10/24/24 92.5 kg (204 lb)   08/05/24 122 kg (268 lb)   01/31/22 119 kg (262 lb)   02/18/20 123 kg (270 lb 2 oz)     Hemodynamic parameters for last 24 hours:  PAP: (25-42)/(14-25) 33/22  CVP:  [7 mmHg-14 mmHg] 11 mmHg  CO:  [4.8 L/min-6.4 L/min] 4.8 L/min  CI:  [2.2 L/min/m2-3 L/min/m2] 2.2 L/min/m2  Intake/Output for last 24 hours:    Intake/Output Summary (Last 24 hours) at 11/13/2024 1100  Last data filed at 11/13/2024 1000  Gross per 24 hour   Intake  7093.95 ml   Output 3110 ml   Net 3983.95 ml      Vent settings:  Vent Mode: Volume control/assist control  FiO2 (%):  [40 %-50 %] 40 %  S RR:  [16] 16  S VT:  [620 mL] 620 mL  PEEP/CPAP (cm H2O):  [8 cm H20] 8 cm H20  MAP (cm H2O):  [] 13    Physical exam:  Physical Exam  Constitutional:       Comments: Intubated, sedated    HENT:      Head: Normocephalic and atraumatic.   Cardiovascular:      Rate and Rhythm: Tachycardia present.      Comments: LVAD hum heard on ascultation  Driveline clean, no bleeding. Site appropriately dressed with tegaderm.   Pulmonary:      Breath sounds: Normal breath sounds. No wheezing or rhonchi.   Abdominal:      General: Abdomen is flat. There is no distension.      Palpations: Abdomen is soft. There is no mass.      Tenderness: There is no abdominal tenderness. There is no guarding.   Musculoskeletal:      Right lower leg: No edema.      Left lower leg: No edema.        Driveline:   CDI w/ tegaderm in place      Labs:   CMP:  Recent Labs     11/13/24  0008 11/13/24  0007 11/12/24  1257 11/12/24  0220 11/11/24  0523 11/10/24  1808 11/10/24  0436 11/09/24  1826 11/09/24  0748   NA  --  136 139 135* 135* 134* 135* 134* 136   K  --  4.8 4.2 4.5 5.1 4.8 4.2 4.6 4.4   CL  --  102 101 101 98 99 100 99 101   CO2  --  25 23 26 27 26 25 24 25   ANIONGAP  --  14 19 13 15 14 14 16 14   BUN  --  27* 24* 24* 26* 26* 24* 25* 24*   CREATININE  --  1.70* 1.73* 1.33* 1.52* 1.40* 1.20 1.24 1.45*   EGFR  --  43* 42* 58* 49* 54* 65 63 52*   MG 2.51*  --  3.10* 2.02 2.14 2.22 2.47* 2.07 2.03     Recent Labs     11/13/24  0007 11/12/24  1307 11/12/24  1257 11/12/24  0220 11/11/24  0523 11/10/24  1808 11/10/24  0436 11/09/24  1826 11/01/24  0317 10/31/24  1249 10/25/24  1221 10/24/24  2328   ALBUMIN 4.2 3.7 3.8 3.9 4.1 4.0 4.1 4.2   < > 4.3   < > 4.4   ALT  --  15  --   --   --   --   --   --   --  14  --  13   AST  --  37  --   --   --   --   --   --   --  16  --  15   BILITOT  --  0.9  --   --   --    "--   --   --   --  1.4*  --  2.3*    < > = values in this interval not displayed.     CBC:  Recent Labs     11/13/24  0008 11/12/24  1851 11/12/24  1257 11/12/24  0220 11/11/24  0523 11/10/24  0436 11/09/24  1826 11/09/24  0748   WBC 19.3* 21.3* 25.4* 8.9 10.1 7.7 8.7 7.5   HGB 13.0* 12.8* 14.1 12.9* 13.3* 13.2* 13.1* 13.2*   HCT 39.3* 39.5* 42.9 38.9* 42.0 41.2 41.4 40.1*   * 486* 485* 421 495* 495* 533* 471*   MCV 83 84 84 82 84 85 85 84     COAG:   Recent Labs     11/13/24  0008 11/12/24  1257 11/12/24  0220 11/11/24  0523 11/10/24  0436 11/09/24  0748 11/08/24  0729 11/03/24  0726 11/03/24  0242 11/02/24  0507 11/02/24  0506 11/01/24  0317 10/31/24  1249 10/31/24  0509 10/30/24  0131   INR 1.4* 1.4*  --   --   --   --   --   --  1.4*  --  1.3* 1.3* 1.3* 1.2* 1.3*   HAUF  --   --  0.4 0.5 0.5 0.4 0.4   < > 0.4   < >  --  0.3  --  0.4 0.4   FIBRINOGEN  --  301  --   --   --   --   --   --   --   --   --   --   --   --   --     < > = values in this interval not displayed.     ABO:   Recent Labs     11/11/24 1742   ABO A     HEME/ENDO:   Recent Labs     10/31/24  1249 10/24/24  2328   FERRITIN  --  76   IRONSAT  --  17*   TSH 4.39* 8.47*   HGBA1C  --  6.3*     CARDIAC:   Recent Labs     11/13/24  0008 11/12/24  1307 10/31/24  1249 10/24/24  2328   * 324* 187  --    BNP  --   --  755* 1,995*     No results for input(s): \"CHOL\", \"LDLF\", \"LDLCALC\", \"HDL\", \"TRIG\" in the last 39389 hours.  TOX:  Recent Labs     10/31/24  1249   AMPHETAMINE Negative     MICRO: No results for input(s): \"ESR\", \"CRP\", \"PROCAL\" in the last 07232 hours.  No results found for the last 90 days.        Imaging:   XR abdomen 1 view    Result Date: 11/13/2024  Interpreted By:  Tuan Platt  and Cristhian Garcia STUDY: XR ABDOMEN 1 VIEW; XR CHEST 1 VIEW;  11/12/2024 6:42 pm; 11/12/2024 7:13 pm; 11/12/2024 7:04 pm   INDICATION: Signs/Symptoms:OG tube assessment; Signs/Symptoms:Feeding tube; Signs/Symptoms:Et tube placement.   " COMPARISON: Chest radiograph 7:04 p.m. same day, abdominal radiograph same day 6:42 p.m.   ACCESSION NUMBER(S): [id number]; [id number]; [id number]   ORDERING CLINICIAN: ANETA MANNING; JAMEL LEDESMA; SHAYY PUENTE   FINDINGS: Chest radiograph 7:04 p.m. same day, and abdominal radiograph same day 6:42 p.m. were dictated together due to proximity of the exams.   AP radiographs of the chest and abdomen were provided. Endotracheal tube noted with tip projecting approximately 5.9 cm above the carlo. Postsurgical changes of LVAD placement. Postsurgical changes of median sternotomy. Right IJ [place]-Faviola catheter with tip projecting over the expected position of the right main pulmonary artery. On the most recent abdominal radiograph, enteric tube seen coursing below the level diaphragm with tip tip projecting over the expected position of the gastroesophageal junction. The side-hole of the enteric tube is within the lower thoracic esophagus. Heart is enlarged. Cardiomediastinal silhouette is stable in size.   No evidence of pneumothorax. Bilateral costophrenic angles are clear. No focal consolidation. Bilateral mild pulmonary interstitial edema.   Nonobstructive bowel gas pattern. Limited evaluation of pneumoperitoneum on supine imaging, however no gross evidence of free air is noted.   Vizualized lungs are clear.   Osseous structures demonstrate no acute bony changes.       1. Enteric tube seen coursing below the level diaphragm with tip projecting over the expected position of the distal gastroesophageal junction. The side-hole of the enteric tube projects over the distal thoracic esophagus. Repositioning of the enteric tube can be considered based on patient position. 2. Additional medical devices as described above. 3. No pneumothorax, focal consolidation, or pleural effusion. Mild pulmonary interstitial edema. 4. Nonobstructive bowel gas pattern.   I personally reviewed the images/study and I agree with the  findings as stated by Jose Morrow MD. This study was interpreted at [facility], [place], [place].   MACRO: None   Signed by: Tuan Platt 11/13/2024 10:53 AM Dictation workstation:   [id number]    XR chest 1 view    Result Date: 11/13/2024  Interpreted By:  Tuan Platt  and Cristhian Garcia STUDY: XR ABDOMEN 1 VIEW; XR CHEST 1 VIEW;  11/12/2024 6:42 pm; 11/12/2024 7:13 pm; 11/12/2024 7:04 pm   INDICATION: Signs/Symptoms:OG tube assessment; Signs/Symptoms:Feeding tube; Signs/Symptoms:Et tube placement.   COMPARISON: Chest radiograph 7:04 p.m. same day, abdominal radiograph same day 6:42 p.m.   ACCESSION NUMBER(S): [id number]; [id number]; [id number]   ORDERING CLINICIAN: ANETA MANNING; JAMEL LEDESMA; SHAYY PUENTE   FINDINGS: Chest radiograph 7:04 p.m. same day, and abdominal radiograph same day 6:42 p.m. were dictated together due to proximity of the exams.   AP radiographs of the chest and abdomen were provided. Endotracheal tube noted with tip projecting approximately 5.9 cm above the carlo. Postsurgical changes of LVAD placement. Postsurgical changes of median sternotomy. Right IJ [place]-Faviola catheter with tip projecting over the expected position of the right main pulmonary artery. On the most recent abdominal radiograph, enteric tube seen coursing below the level diaphragm with tip tip projecting over the expected position of the gastroesophageal junction. The side-hole of the enteric tube is within the lower thoracic esophagus. Heart is enlarged. Cardiomediastinal silhouette is stable in size.   No evidence of pneumothorax. Bilateral costophrenic angles are clear. No focal consolidation. Bilateral mild pulmonary interstitial edema.   Nonobstructive bowel gas pattern. Limited evaluation of pneumoperitoneum on supine imaging, however no gross evidence of free air is noted.   Vizualized lungs are clear.   Osseous structures demonstrate no acute bony changes.       1.  Enteric tube seen coursing below the level diaphragm with tip projecting over the expected position of the distal gastroesophageal junction. The side-hole of the enteric tube projects over the distal thoracic esophagus. Repositioning of the enteric tube can be considered based on patient position. 2. Additional medical devices as described above. 3. No pneumothorax, focal consolidation, or pleural effusion. Mild pulmonary interstitial edema. 4. Nonobstructive bowel gas pattern.   I personally reviewed the images/study and I agree with the findings as stated by Jose Morrow MD. This study was interpreted at [facility], [place], [place].   MACRO: None   Signed by: Tuan Platt 11/13/2024 10:53 AM Dictation workstation:   [id number]    XR abdomen 1 view    Result Date: 11/13/2024  Interpreted By:  Tuan Platt and Nakamoto Kent STUDY: XR ABDOMEN 1 VIEW; XR CHEST 1 VIEW;  11/12/2024 6:42 pm; 11/12/2024 7:13 pm; 11/12/2024 7:04 pm   INDICATION: Signs/Symptoms:OG tube assessment; Signs/Symptoms:Feeding tube; Signs/Symptoms:Et tube placement.   COMPARISON: Chest radiograph 7:04 p.m. same day, abdominal radiograph same day 6:42 p.m.   ACCESSION NUMBER(S): [id number]; [id number]; [id number]   ORDERING CLINICIAN: ANETA MANNING; JAMEL PUENTE   FINDINGS: Chest radiograph 7:04 p.m. same day, and abdominal radiograph same day 6:42 p.m. were dictated together due to proximity of the exams.   AP radiographs of the chest and abdomen were provided. Endotracheal tube noted with tip projecting approximately 5.9 cm above the carlo. Postsurgical changes of LVAD placement. Postsurgical changes of median sternotomy. Right IJ [place]-Faviola catheter with tip projecting over the expected position of the right main pulmonary artery. On the most recent abdominal radiograph, enteric tube seen coursing below the level diaphragm with tip tip projecting over the expected position of the  gastroesophageal junction. The side-hole of the enteric tube is within the lower thoracic esophagus. Heart is enlarged. Cardiomediastinal silhouette is stable in size.   No evidence of pneumothorax. Bilateral costophrenic angles are clear. No focal consolidation. Bilateral mild pulmonary interstitial edema.   Nonobstructive bowel gas pattern. Limited evaluation of pneumoperitoneum on supine imaging, however no gross evidence of free air is noted.   Vizualized lungs are clear.   Osseous structures demonstrate no acute bony changes.       1. Enteric tube seen coursing below the level diaphragm with tip projecting over the expected position of the distal gastroesophageal junction. The side-hole of the enteric tube projects over the distal thoracic esophagus. Repositioning of the enteric tube can be considered based on patient position. 2. Additional medical devices as described above. 3. No pneumothorax, focal consolidation, or pleural effusion. Mild pulmonary interstitial edema. 4. Nonobstructive bowel gas pattern.   I personally reviewed the images/study and I agree with the findings as stated by Jose Morrow MD. This study was interpreted at [facility], [place], [place].   MACRO: None   Signed by: Tuan Platt 11/13/2024 10:53 AM Dictation workstation:   [id number]    Transthoracic Echo (TTE) Limited    Result Date: 11/13/2024   [facility], [address] [place] [address]                Tel [phone] and Fax [phone] TRANSTHORACIC ECHOCARDIOGRAM REPORT  Patient Name:       RJ Webb Physician:    [id number] Raciel Casarez MD Study Date:         11/13/2024          Ordering Provider:    [id number] JO CASTILLO MRN/PID:            [id number]            Fellow: Accession#:         [id number]        Nurse: Date of  Birth/Age:  1955 / 69     Sonographer:          Jackosn estrada                                     BLANQUITA Gender assigned at  M                   Additional Staff: Birth: Height:             182.88 cm           Admit Date: Weight:             93.90 kg            Admission Status:     Inpatient - STAT BSA / BMI:          2.16 m2 / 28.07     Encounter#:           [id number]                     kg/m2 Blood Pressure:     94/74 mmHg          Department Location:  [facility] Study Type:    TRANSTHORACIC ECHO (TTE) LIMITED Diagnosis/ICD: Acute on chronic combined systolic (congestive) and diastolic                (congestive) heart failure (CHF)-I50.43 Indication:    eval RV fx CPT Code:      Echo Limited-61968; Doppler Limited-70559; Color Doppler-83346 Patient History: Pertinent History: CAD s/p CABGx4 in 2012 and PCI 2019 w/ ICM LVEF 10-15%, AF                    s/p RFA & PVI, HTN, CHF exacerbation, 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 Study Detail: The following Echo studies were performed: 2D, M-Mode, Doppler and               color flow. Technically challenging study due to body habitus,               patient lying in supine position, postoperative dressings,               prominent lung artifact and poor acoustic windows. The patient is               intubated. Definity used as a contrast agent for endocardial               border definition. Total contrast used for this procedure was 2.0               mL via IV push.  PHYSICIAN INTERPRETATION: Left Ventricle: Left ventricular ejection fraction is severely decreased, by visual estimate at 15%. There is eccentric left ventricular hypertrophy. There is global hypokinesis of the left ventricle with minor regional variations. The left ventricular cavity size is moderate to severely dilated. Left ventricular diastolic filling was not assessed. Left Atrium: The left atrium is  moderately dilated. Right Ventricle: The right ventricle is mild to moderately enlarged. There is severely reduced right ventricular systolic function. A device is visualized in the right ventricle. Right Atrium: The right atrium is moderately dilated. There is a device visualized in the right atrium. Aortic Valve: The aortic valve was not well visualized. There is mild to moderate aortic valve cusp calcification. There is mild aortic valve regurgitation. Mitral Valve: The mitral valve is normal in structure. There is mild mitral valve regurgitation. Tricuspid Valve: The tricuspid valve is structurally normal. There is mild tricuspid regurgitation. Pulmonic Valve: The pulmonic valve is not well visualized. There is trace pulmonic valve regurgitation. Pericardium: There is no pericardial effusion noted. Aorta: The aortic root is abnormal. There is mild dilatation of the aortic root. Pulmonary Artery: The tricuspid regurgitant velocity is 1.83 m/s, and with an estimated right atrial pressure of 10 mmHg, the estimated pulmonary artery pressure is normal with the RVSP at 23.4 mmHg. Systemic Veins: The inferior vena cava appears moderately dilated, on vent. In comparison to the previous echocardiogram(s): Compared with study dated 11/5/2024, no significant change.  LEFT VENTRICULAR ASSIST DEVICE: LVAD: The patient has a(n) HeartMate 3 LVAD device present. Inflow Cannula: The inflow cannula is visualized in the left ventricular apex.  CONCLUSIONS:  1. Poorly visualized anatomical structures due to suboptimal image quality.  2. Left ventricular cavity size is moderate to severely dilated.  3. Left ventricular ejection fraction is severely decreased, by visual estimate at 15%.  4. There is global hypokinesis of the left ventricle with minor regional variations.  5. There is severely reduced right ventricular systolic function.  6. Mild to moderately enlarged right ventricle.  7. The left atrium is moderately dilated.  8.  The right atrium is moderately dilated.  9. Mild aortic valve regurgitation. 10. The inferior vena cava appears moderately dilated, on vent. QUANTITATIVE DATA SUMMARY:  2D MEASUREMENTS:          Normal Ranges: IVSd:            1.00 cm  (0.6-1.1cm) LVPWd:           1.20 cm  (0.6-1.1cm) LVIDd:           6.90 cm  (3.9-5.9cm) LVIDs:           6.60 cm LV Mass Index:   164 g/m2 LV % FS          4.3 %  AORTA MEASUREMENTS:         Normal Ranges: Ao Sinus, d:        4.00 cm (2.1-3.5cm)  LV SYSTOLIC FUNCTION BY 2D PLANIMETRY (MOD):                      Normal Ranges: EF-Visual:      15 % LV EF Reported: 15 %  RIGHT VENTRICLE: RV s' 0.07 m/s  TRICUSPID VALVE/RVSP:          Normal Ranges: Peak TR Velocity:     1.83 m/s RV Syst Pressure:     23 mmHg  (< 30mmHg) IVC Diam:             2.70 cm  [id number] Raciel Casarez MD Electronically signed on 11/13/2024 at 10:25:04 AM  ** Final **     XR chest 1 view    Result Date: 11/12/2024  Interpreted By:  Tuan Platt and Omar Mahmoud STUDY: XR CHEST 1 VIEW;  11/12/2024 1:38 pm   INDICATION: Signs/Symptoms:Post op cardiac surgery.     COMPARISON: Chest x-ray 11/11/2024 7:55 a.m., CT chest abdomen pelvis 11/04/2024   ACCESSION NUMBER(S): [id number]   ORDERING CLINICIAN: ANETA MANNING   FINDINGS: AP radiograph of the chest was provided.   Interval demonstration of postsurgical changes of LVAD placement. Postsurgical changes from median sternotomy surgical clips overlying left hilum. Right IJ pulmonary arterial catheter tip projects over the distal right main pulmonary artery. Interval placement of an enteric tube with tip projecting over the expected location of the gastroesophageal junction. Interval placement of left chest tubes. Partially visualized left shoulder arthroplasty.   CARDIOMEDIASTINAL SILHOUETTE: The heart is enlarged. Cardiomediastinal silhouette is stable in size and configuration.   LUNGS: Mild perihilar interstitial opacities, grossly stable as compared to prior. The  bilateral costophrenic angles are clear, within the limitations of the newly placed LVAD obscured the left lower lung field. There is no evidence of a pneumothorax. There is no new focal consolidation.   ABDOMEN: No remarkable upper abdominal findings.   BONES: No acute osseous changes.       1. Postsurgical changes and medical devices as described above. Recommend advancement of enteric tube. 2. Stable mild pulmonary interstitial edema.   I personally reviewed the images/study and I agree with the findings as stated by Zack Ortiz MD (PGY-2). This study was interpreted at [facility], [place], [place].   MACRO: None   Signed by: Tuan Platt 11/12/2024 3:19 PM Dictation workstation:   [id number]      Notable Studies:   EKG:  Encounter Date: 10/24/24   Electrocardiogram, 12-lead PRN ACS symptoms   Result Value    Ventricular Rate 111    Atrial Rate 111    QRS Duration 130    QT Interval 302    QTC Calculation(Bazett) 410    R Axis 134    T Axis -40    QRS Count 18    Q Onset 218    T Offset 369    QTC Fredericia 370    Narrative    Atrial fibrillation with occasional Premature ventricular complexes and Fusion complexes  Nonspecific intraventricular block  Cannot rule out Anteroseptal infarct , age undetermined  T wave abnormality, consider inferolateral ischemia  Abnormal ECG  No previous ECGs available  Confirmed by Lucas Kerns ([id number]) on 10/28/2024 12:58:57 PM       Echo:  Transthoracic Echo (TTE) Limited    Result Date: 11/13/2024   [facility] [address] [place] [address]                Tel [phone] and Fax [phone] TRANSTHORACIC ECHOCARDIOGRAM REPORT  Patient Name:       RJ Webb Physician:    [id number] Raciel Casarez MD Study Date:         11/13/2024          Ordering Provider:    [id number] JO CASTILLO  MRN/PID:            [id number]            Fellow: Accession#:         [id number]        Nurse: Date of Birth/Age:  1955 / 69     Sonographer:          Jackson estrada RDCS Gender assigned at  M                   Additional Staff: Birth: Height:             182.88 cm           Admit Date: Weight:             93.90 kg            Admission Status:     Inpatient - STAT BSA / BMI:          2.16 m2 / 28.07     Encounter#:           [id number]                     kg/m2 Blood Pressure:     94/74 mmHg          Department Location:  [facility] Study Type:    TRANSTHORACIC ECHO (TTE) LIMITED Diagnosis/ICD: Acute on chronic combined systolic (congestive) and diastolic                (congestive) heart failure (CHF)-I50.43 Indication:    eval RV fx CPT Code:      Echo Limited-48835; Doppler Limited-03985; Color Doppler-03514 Patient History: Pertinent History: CAD s/p CABGx4 in 2012 and PCI 2019 w/ ICM LVEF 10-15%, AF                    s/p RFA & PVI, HTN, CHF exacerbation, 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 Study Detail: The following Echo studies were performed: 2D, M-Mode, Doppler and               color flow. Technically challenging study due to body habitus,               patient lying in supine position, postoperative dressings,               prominent lung artifact and poor acoustic windows. The patient is               intubated. Definity used as a contrast agent for endocardial               border definition. Total contrast used for this procedure was 2.0               mL via IV push.  PHYSICIAN INTERPRETATION: Left Ventricle: Left ventricular ejection fraction is severely decreased, by visual estimate at 15%. There is eccentric left ventricular hypertrophy. There is global hypokinesis of the left ventricle with minor regional variations. The left ventricular cavity size is moderate to  severely dilated. Left ventricular diastolic filling was not assessed. Left Atrium: The left atrium is moderately dilated. Right Ventricle: The right ventricle is mild to moderately enlarged. There is severely reduced right ventricular systolic function. A device is visualized in the right ventricle. Right Atrium: The right atrium is moderately dilated. There is a device visualized in the right atrium. Aortic Valve: The aortic valve was not well visualized. There is mild to moderate aortic valve cusp calcification. There is mild aortic valve regurgitation. Mitral Valve: The mitral valve is normal in structure. There is mild mitral valve regurgitation. Tricuspid Valve: The tricuspid valve is structurally normal. There is mild tricuspid regurgitation. Pulmonic Valve: The pulmonic valve is not well visualized. There is trace pulmonic valve regurgitation. Pericardium: There is no pericardial effusion noted. Aorta: The aortic root is abnormal. There is mild dilatation of the aortic root. Pulmonary Artery: The tricuspid regurgitant velocity is 1.83 m/s, and with an estimated right atrial pressure of 10 mmHg, the estimated pulmonary artery pressure is normal with the RVSP at 23.4 mmHg. Systemic Veins: The inferior vena cava appears moderately dilated, on vent. In comparison to the previous echocardiogram(s): Compared with study dated 11/5/2024, no significant change.  LEFT VENTRICULAR ASSIST DEVICE: LVAD: The patient has a(n) HeartMate 3 LVAD device present. Inflow Cannula: The inflow cannula is visualized in the left ventricular apex.  CONCLUSIONS:  1. Poorly visualized anatomical structures due to suboptimal image quality.  2. Left ventricular cavity size is moderate to severely dilated.  3. Left ventricular ejection fraction is severely decreased, by visual estimate at 15%.  4. There is global hypokinesis of the left ventricle with minor regional variations.  5. There is severely reduced right ventricular systolic  function.  6. Mild to moderately enlarged right ventricle.  7. The left atrium is moderately dilated.  8. The right atrium is moderately dilated.  9. Mild aortic valve regurgitation. 10. The inferior vena cava appears moderately dilated, on vent. QUANTITATIVE DATA SUMMARY:  2D MEASUREMENTS:          Normal Ranges: IVSd:            1.00 cm  (0.6-1.1cm) LVPWd:           1.20 cm  (0.6-1.1cm) LVIDd:           6.90 cm  (3.9-5.9cm) LVIDs:           6.60 cm LV Mass Index:   164 g/m2 LV % FS          4.3 %  AORTA MEASUREMENTS:         Normal Ranges: Ao Sinus, d:        4.00 cm (2.1-3.5cm)  LV SYSTOLIC FUNCTION BY 2D PLANIMETRY (MOD):                      Normal Ranges: EF-Visual:      15 % LV EF Reported: 15 %  RIGHT VENTRICLE: RV s' 0.07 m/s  TRICUSPID VALVE/RVSP:          Normal Ranges: Peak TR Velocity:     1.83 m/s RV Syst Pressure:     23 mmHg  (< 30mmHg) IVC Diam:             2.70 cm  [id number] Raciel Casarez MD Electronically signed on 11/13/2024 at 10:25:04 AM  ** Final **     Transthoracic Echo (TTE) Limited    Result Date: 11/5/2024   [facility], [address] [place] [address]                Tel [phone] and Fax [phone] TRANSTHORACIC ECHOCARDIOGRAM REPORT  Patient Name:       RJ Webb Physician:    [id number] Mira Mayer MD Study Date:         11/5/2024           Ordering Provider:    [id number] PAYAL KOHLER MRN/PID:            [id number]            Fellow: Accession#:         [id number]        Nurse: Date of Birth/Age:  1955 / 69     Sonographer:          Maureen estrada                                     BLANQUITA Gender assigned at  M                   Additional Staff: Birth: Height:             182.88 cm           Admit Date:           10/24/2024 Weight:             93.44 kg            Admission Status:      Inpatient - STAT BSA / BMI:          2.16 m2 / 27.94     Encounter#:           [id number]                     kg/m2 Blood Pressure:     106/72 mmHg         [address] Study Type:    TRANSTHORACIC ECHO (TTE) LIMITED Diagnosis/ICD: Heart failure, unspecified-I50.9 Indication:    RV assessment CPT Code:      Echo Limited-91761; Doppler Limited-98496; Color Doppler-[id number] Patient History: Pertinent History: Negative bubble peformed on prior echo; Known 15-20% EF;                    Cardiogenic shock; Acute on chronic heart failure with                    reduced EF and diastolic dysfunction; ICM; Hx of chronic                    A-Fib; VIV. Study Detail: The following Echo studies were performed: 2D, M-Mode, Doppler and               color flow. Technically challenging study due to body habitus.               Definity used as a contrast agent for endocardial border               definition. Total contrast used for this procedure was 2 mL via IV               push.  PHYSICIAN INTERPRETATION: Left Ventricle: Left ventricular ejection fraction is severely decreased, calculated by Loera's biplane at 18%. There is severely increased eccentric left ventricular hypertrophy. There is global hypokinesis of the left ventricle with minor regional variations. The left ventricular cavity size is severely dilated. There is normal septal and normal posterior left ventricular wall thickness. Left ventricular diastolic filling was not assessed. There is no definite left ventricular thrombus visualized. Left Atrium: The left atrium is severely dilated. Right Ventricle: The right ventricle is severely enlarged. There is severely reduced right ventricular systolic function. A device is visualized in the right ventricle. Right Atrium: The right atrium is severely dilated. There is a device visualized in the right atrium. Aortic Valve: The aortic valve was not well visualized. There is mild aortic valve cusp  calcification. Aortic valve regurgitation was not assessed. Mitral Valve: The mitral valve is normal in structure. Mitral valve regurgitation was not assessed. Tricuspid Valve: The tricuspid valve is structurally normal. There is mild tricuspid regurgitation. The Doppler estimated RVSP is mildly elevated at 42.9 mmHg. Pulmonic Valve: The pulmonic valve was not assessed. Pulmonic valve regurgitation was not assessed. Pericardium: Trivial pericardial effusion. Aorta: The aortic root is abnormal. The aortic root appears mildly dilated and measures 4.20 cm. There is mild dilatation of the ascending aorta. There is mild dilatation of the aortic root. Systemic Veins: The inferior vena cava appears dilated, with IVC inspiratory collapse less than 50%. In comparison to the previous echocardiogram(s): Compared with the prior exam from 10/25/2024, there was already a severely dilated LV with severely reduced function. The RV remains dilated with severely reduced function. Valvular function not assessed on today's exam.  CONCLUSIONS:  1. Left ventricular ejection fraction is severely decreased, calculated by Loera's biplane at 18%.  2. There is global hypokinesis of the left ventricle with minor regional variations.  3. Left ventricular cavity size is severely dilated.  4. No left ventricular thrombus visualized.  5. There is severely increased eccentric left ventricular hypertrophy.  6. There is severely reduced right ventricular systolic function.  7. Severely enlarged right ventricle.  8. The left atrium is severely dilated.  9. The right atrium is severely dilated. 10. Mitral valve regurgitation was not assessed. 11. Mildly elevated right ventricular systolic pressure. 12. Mildly dilated aortic root. 13. Compared with the prior exam from 10/25/2024, there was already a severely dilated LV with severely reduced function. The RV remains dilated with severely reduced function. Valvular function not assessed on today's exam.  QUANTITATIVE DATA SUMMARY:  2D MEASUREMENTS:           Normal Ranges: Ao Root d:       4.20 cm   (2.0-3.7cm) IVSd:            0.87 cm   (0.6-1.1cm) LVPWd:           0.78 cm   (0.6-1.1cm) LVIDd:           8.21 cm   (3.9-5.9cm) LVIDs:           8.04 cm LV Mass Index:   157 g/m2 LVEDV Index:     138 ml/m2 LV % FS          2.0 %  LA VOLUME:                  Normal Ranges: LA Volume Index: 59.9 ml/m2  RA VOLUME BY A/L METHOD:          Normal Ranges: RA Area A4C:             36.8 cm2  AORTA MEASUREMENTS:         Normal Ranges: Ao Sinus, d:        4.20 cm (2.1-3.5cm) Asc Ao, d:          4.20 cm (2.1-3.4cm)  LV SYSTOLIC FUNCTION BY 2D PLANIMETRY (MOD):                      Normal Ranges: EF-A4C View:    14 % (>=55%) EF-A2C View:    21 % EF-Biplane:     18 % LV EF Reported: 18 %  AORTIC VALVE:          Normal Ranges: LVOT Diameter: 2.31 cm (1.8-2.4cm)  RIGHT VENTRICLE: RV Basal 6.10 cm RV Mid   4.10 cm RV Major 9.1 cm TAPSE:   9.0 mm RV s'    0.06 m/s  TRICUSPID VALVE/RVSP:          Normal Ranges: Peak TR Velocity:     2.64 m/s RV Syst Pressure:     43 mmHg  (< 30mmHg) IVC Diam:             3.00 cm  AORTA: Asc Ao Diam 4.23 cm  [id number] Mira Mayer MD Electronically signed on 11/5/2024 at 2:36:55 PM  ** Final **       Meds:  Scheduled medications  acetaminophen, 650 mg, oral, q6h  aspirin, 81 mg, oral, Daily  ceFAZolin, 2 g, intravenous, q8h  escitalopram, 10 mg, oral, Daily  fluconazole, 400 mg, intravenous, q24h  heparin (porcine), 5,000 Units, subcutaneous, q8h  pantoprazole, 40 mg, intravenous, Daily before breakfast  polyethylene glycol, 17 g, oral, BID  sennosides-docusate sodium, 2 tablet, oral, BID  vancomycin, 1,500 mg, intravenous, q24h      Continuous medications  dexmedeTOMIDine, 0-1.5 mcg/kg/hr, Last Rate: 0.5 mcg/kg/hr (11/13/24 1056)  EPINEPHrine, 0.03 mcg/kg/min, Last Rate: 0.03 mcg/kg/min (11/13/24 0734)  epoprostenol, 0.03 mcg/kg/min (Ideal), Last Rate: 0.03 mcg/kg/min (11/13/24 0332)  insulin regular infusion  for cardiac surgery, 0-15 Units/hr, Last Rate: 1 Units/hr (11/13/24 0942)  lactated Ringer's, 20 mL/hr, Last Rate: 20 mL/hr (11/13/24 0756)  lactated Ringer's, 5 mL/hr, Last Rate: 5 mL/hr (11/13/24 0600)  milrinone, 0-0.75 mcg/kg/min, Last Rate: 0.125 mcg/kg/min (11/13/24 0916)  propofol, 0-50 mcg/kg/min, Last Rate: Stopped (11/13/24 1057)      PRN medications  PRN medications: alteplase, HYDROmorphone, insulin regular, naloxone, ondansetron **OR** ondansetron, oxyCODONE, oxyCODONE, oxygen, oxygen, vancomycin     Assessment/Plan   Assessment & Plan     # Stage D NYHA IV, ICM, decompensated acute on chronic heart failure, HFrEF 10-15% s/p LVAD HM3 implant 11/12/24   #RV dysfunction     Intra-op BRIANA: EF 15-20%, dilated RV     Heart Mate III interrogation   Most Recent   Flow 4.2   Speed 5000   Power 3.4   PI 3.2   MAP (mmHg): 70    - Please maintain MAP 70-80. Plan is to extubate today. Continue current support and increase milrinone if needed to wean Corey.  - Maintain net even today from a volume perspective.  - AC start per CT surgery   - Patient has primary indication for ASA (CAD, ICM) -> on ASA 81 mg daily.  - Please continue daily driveline dressing changes w/ picture in chart   - Would not adjust speed at this time as patient has adequate biventricular filling pressures due to LVAD outflow cannulation in descending aorta.  - Not appropriate for GDMT in acute postop period   - Continue excellent care per CTICU     Discussed with Dr. Oropeza   ____________________________________________________________  Michael Tiwari MD       HFICU Attending Note        This critically ill patient continues to be at-risk for clinically significant deterioration / failure due to the above mentioned dysfunctional, unstable organ systems.  I have personally identified and managed all complex critical care issues to prevent aforementioned clinical deterioration.  Critical care time is spent at bedside and/or the immediate area and  has included, but is not limited to, the review of diagnostic tests, labs, radiographs, serial assessments of hemodynamics, respiratory status, ventilatory management, and family updates.  Time spent in procedures and teaching are reported separately.     Critical care time: __35__ minutes      Patient underwent LVAD placement yesterday overall remained hemodynamically stable overnight bleeding no worse than expected likewise his rhythm has been stable although he does have a bit of sinus tachycardia today, A-line shows good pulsatility and he was able to come off of the vasopressors we will work towards extubation over the next 24 hours anticipate beginning diuresis sometime tomorrow, start subcutaneous heparin but hold off on systemic anticoagulation at this time.  We will continue to run his VAD at a low speed for now given his right ventricular dysfunction as well as his implant in the descending aorta        Procedure note for VAD Interrogation:  Procedure: I interrogated the VAD.      IMPRESSION:  Results are c/w adequate VAD function.        I saw and evaluated the patient. I personally obtained the key and critical portions of the history and physical exam or was physically present for key and critical portions performed by the resident/fellow. I reviewed the resident/fellow's documentation and discussed the patient with the resident/fellow. I agree with the resident/fellow's medical decision making as documented in the note.

## 2024-11-13 NOTE — CARE PLAN
Problem: Safety - Medical Restraint  Goal: Remains free of injury from restraints (Restraint for Interference with Medical Device)  Outcome: Not Progressing  Goal: Free from restraint(s) (Restraint for Interference with Medical Device)  Outcome: Not Progressing   Intubated and sedated

## 2024-11-13 NOTE — CARE PLAN
Patient intubated, sedated, paralyzed.   Problem: Heart Failure  Goal: Improved gas exchange this shift  11/12/2024 1929 by Tia Colon RN  Outcome: Progressing  11/12/2024 1929 by Tia Colon RN  Outcome: Progressing  Goal: Improved urinary output this shift  11/12/2024 1929 by Tia Colon RN  Outcome: Progressing  11/12/2024 1929 by Tia Colon RN  Outcome: Progressing  Goal: Reduction in peripheral edema within 24 hours  11/12/2024 1929 by Tia Colon RN  Outcome: Progressing  11/12/2024 1929 by Tia Colon RN  Outcome: Progressing  Goal: Report improvement of dyspnea/breathlessness this shift  11/12/2024 1929 by Tia Colon RN  Outcome: Progressing  11/12/2024 1929 by Tia Colon RN  Outcome: Progressing  Goal: Weight from fluid excess reduced over 2-3 days, then stabilize  11/12/2024 1929 by Tia Colon RN  Outcome: Progressing  11/12/2024 1929 by Tia Colon RN  Outcome: Progressing  Goal: Increase self care and/or family involvement in 24 hours  11/12/2024 1929 by Tia Colon RN  Outcome: Progressing  11/12/2024 1929 by Tia Colon RN  Outcome: Progressing     Problem: Skin  Goal: Decreased wound size/increased tissue granulation at next dressing change  11/12/2024 1929 by Tia Colon RN  Outcome: Progressing  11/12/2024 1929 by Tia Colon RN  Outcome: Progressing  Goal: Participates in plan/prevention/treatment measures  11/12/2024 1929 by Tia Colon RN  Outcome: Progressing  11/12/2024 1929 by Tia Colon RN  Outcome: Progressing  Goal: Prevent/manage excess moisture  11/12/2024 1929 by Tia Colon RN  Outcome: Progressing  11/12/2024 1929 by Tia Colon RN  Outcome: Progressing  Goal: Prevent/minimize sheer/friction injuries  11/12/2024 1929 by Tia Colon RN  Outcome: Progressing  11/12/2024 1929 by Tia Colon RN  Outcome: Progressing  Goal: Promote/optimize nutrition  11/12/2024 1929 by Tia Shoda, RN  Outcome: Progressing  11/12/2024 1929 by Tia Colon,  RN  Outcome: Progressing  Goal: Promote skin healing  11/12/2024 1929 by Tia Colon RN  Outcome: Progressing  11/12/2024 1929 by Tia Colon RN  Outcome: Progressing     Problem: Pain - Adult  Goal: Verbalizes/displays adequate comfort level or baseline comfort level  11/12/2024 1929 by Tia Colon RN  Outcome: Progressing  11/12/2024 1929 by Tia Colon RN  Outcome: Progressing     Problem: Safety - Adult  Goal: Free from fall injury  11/12/2024 1929 by Tia Colon RN  Outcome: Progressing  11/12/2024 1929 by Tia Colon RN  Outcome: Progressing     Problem: Discharge Planning  Goal: Discharge to home or other facility with appropriate resources  11/12/2024 1929 by Tia Colon RN  Outcome: Progressing  11/12/2024 1929 by Tia Colon RN  Outcome: Progressing     Problem: Chronic Conditions and Co-morbidities  Goal: Patient's chronic conditions and co-morbidity symptoms are monitored and maintained or improved  11/12/2024 1929 by Tia Colon RN  Outcome: Progressing  11/12/2024 1929 by Tia Colon RN  Outcome: Progressing     Problem: Fall/Injury  Goal: Not fall by end of shift  11/12/2024 1929 by Tia Colon RN  Outcome: Progressing  11/12/2024 1929 by Tia Colon RN  Outcome: Progressing  Goal: Be free from injury by end of the shift  11/12/2024 1929 by Tia Colon RN  Outcome: Progressing  11/12/2024 1929 by Tia Colon RN  Outcome: Progressing  Goal: Verbalize understanding of personal risk factors for fall in the hospital  11/12/2024 1929 by Tia Colon RN  Outcome: Progressing  11/12/2024 1929 by Tia Colon RN  Outcome: Progressing  Goal: Verbalize understanding of risk factor reduction measures to prevent injury from fall in the home  11/12/2024 1929 by Tia Colon RN  Outcome: Progressing  11/12/2024 1929 by Tia Colon RN  Outcome: Progressing  Goal: Use assistive devices by end of the shift  11/12/2024 1929 by Tia Colon RN  Outcome: Progressing  11/12/2024 1929 by  Tia Colon RN  Outcome: Progressing  Goal: Pace activities to prevent fatigue by end of the shift  11/12/2024 1929 by Tia Colon RN  Outcome: Progressing  11/12/2024 1929 by Tia Colon RN  Outcome: Progressing     Problem: Safety - Medical Restraint  Goal: Remains free of injury from restraints (Restraint for Interference with Medical Device)  Outcome: Progressing  Goal: Free from restraint(s) (Restraint for Interference with Medical Device)  Outcome: Progressing     Problem: Ventricular Assisted Device (VAD)  Goal: Hemodynamic stability, correction of coagulopathy, lab value stability  Outcome: Progressing  Goal: Wean vasopressors/nitric  Outcome: Progressing  Goal: Wean ventilator  Outcome: Progressing  Goal: Extubation  Outcome: Progressing  Goal: Stable metal status  Outcome: Progressing  Goal: Pulmonary toileting, incentive spirometry  Outcome: Progressing  Goal: Nutrition  Outcome: Progressing  Goal: Mobility/OT/PT  Outcome: Progressing  Goal: Rubber ball  Outcome: Progressing  Goal: ROM  Outcome: Progressing  Goal: Sitting  Outcome: Progressing  Goal: Walk  Outcome: Progressing  Goal: Involve in VAD awareness, dressing change  Outcome: Progressing  Goal: AICD On/Off  Outcome: Progressing     Problem: Meds/Post-op Pain  Goal: Pain controlled to tolerate pain level  Outcome: Progressing  Goal: Tolerates prescribed medication  Outcome: Progressing     Problem: DVT/VTE Prevention/Activity  Goal: No decrease in circulation/sensation  Outcome: Progressing  Goal: Prevent skin breakdown  Outcome: Progressing  Goal: Return to preop oxygenation status  Outcome: Progressing  Goal: Tolerates optimal activity  Outcome: Progressing  Goal: Increase self care and/or family involvement in 24 hrs.  Outcome: Progressing

## 2024-11-13 NOTE — PROGRESS NOTES
CTICU Attending Admission Note      Fahad Meraz is a 69 y.o. male with PMH relevant for CAD s/p CABGx4 in 2012 and PCI 2019 w/ ICM LVEF 10-15%, AF s/p RFA & PVI, HTN, Depression, Anxiety, VIV on CPAP. Admit to [facility] 10/24 for CHF exacerbation. He was approved for DT LVAD and he presents to CTICU following HM3 implantation w/ outflow to mid-desc aorta via left thoracotomy w/ Dr. Hill and Dr. Madrigal    Postop BRIANA LVEF 20%, mild AI, MR, TR. Dilated and mild RV   Resuscitation with 1500cc crystalloid, 1750cc CS, FFPx1, Plt X2.      Exam:  Gen: intubated, sedated  CV: regular rate, rhythm, tachycardic  Pulm: intubated, mechanically ventilated  Abd: soft, ND  Ext: warm and well-perfused      Assessment/Plan     Fahad Meraz was seen and examined upon admission to the CTICU. This includes review of the history, physical, ICU flow sheets, laboratory and radiographic data, medication administration record and other current clinical data.  I have seen the patient independently and reviewed the plan with the house staff, advanced practice provider, nursing team, respiratory therapy, primary service, consultants, and patient and/or family members.  This note serves to document my findings and time spent. My key findings, diagnoses and plans for ICU management today include the following:    Problem List:  Patient Active Problem List   Diagnosis    Acute on chronic heart failure with reduced ejection fraction and diastolic dysfunction    History of chronic atrial fibrillation    Ischemic cardiomyopathy    Obstructive sleep apnea    Receiving inotropic medication    HTN (hypertension)    CAD (coronary artery disease)    MI (myocardial infarction) (Multi)    CHF (congestive heart failure)    Gastroesophageal reflux disease    Acute kidney injury (nontraumatic) (CMS-[facility])        ICU Diagnoses requiring active management:  - Acute postprocedural respiratory failure  - Acute postoperative cardiac insufficiency requiring  inotropic support, expected postoperative finding  - Hypotension requiring vasopressor support  - Anemia of blood loss  - Perioperative hyperglycemia  - Acute postoperative pain syndrome    Attending Recommendation:     NEURO:  #Pain/sedation  - Fentanyl and propofol/dex to tolerate endotracheal tube  - Wean sedation and wake as hemodynamics allow  - Multimodal analgesia as needed  #Delirium   - Minimize sedation, reorient PRN    CV:  #S/p HM3 implantation 11/12 w/ outflow in mid-aorta  #ICM w/ prior CAD s/p CABGx4 & PCI  #Hx of AF s/p RFA, PVI  - Continue central venous catheter and arterial line   - Continue pulmonary artery catheter, q4h cardiac index   - Continue inotropic support to maintain CI>2.2 with Epinephrine and Milrinone, will wean this as able based upon cardiac output and perfusion  - iEPO 0.05  - Gentle fluid resuscitation to support cardiac output  - Norepi/vaso PRN for MAP>65    PULM:  #Acute postprocedural respiratory failure  - Continue mechanical ventilation and wean settings as able to minimal  - ETT exchange from RO to SLT once hemostatic  - Continue supplemental oxygen to maintain SpO2>92%, wean as tolerated  - Encourage pulmonary toilet and incentive spirometry once extubated    GI:  #Nutrition  - NPO for now, orogastric tube for meds  - Dobhoff tube for feeding tomorrow if not extubated  - Bowel regimen  - Stress ulcer ppx    RENAL:  #Fluid and electrolyte abnormalities  - Monitor UOP with strict I/O while ICU  - CTM BMP with goal K > 4, Mg > 2 while in ICU    ENDO:  #Hyperglycemia  - Insulin drip for perioperative hyperglycemia, switch to SSI for goal BG < 180 as able    ID:  - Perioperative antibiotic coverage per surgical protocol    HEME:  #Perioperative anemia  #Perioperative coagulopathy  - Continue to monitor chest tube output and remove per CTICU protocol  - Venous oozing noted from return bypass cannula site, manual pressure held, RBCx1 in ICU w/ adequate hemostasis noted  -  Coumadin / Heparin timing per surgeon  #Ppx  - HSQ POD1    MSK/SKIN:  - Continue daily evaluation for prevention of SSI/PI    PPX:  - SCD, SQH POD1  - PPI for SUP    TLD:  - CVC, Arterial Line, PAC, Gallo, Chest Tubes      Montana Armas MD, MS    Division of Critical Care Medicine  Department of Anesthesiology      Patient's condition remains critical, requires frequent assessment and interventions, and/or is unstable with conditions that pose a significant threat to life or risk of prolonged impairment. There are 1 or more vital organ systems, documented above, involved with risk of life-threatening deterioration. I have spent: 90 minutes discontinuously at the bedside providing critical care. This time does not represent time-performing procedures.

## 2024-11-13 NOTE — PROGRESS NOTES
Occupational Therapy                 Therapy Communication Note    Patient Name: Fahad Meraz  MRN: [id number]  Department: [facility] CTICU  Room: 06/06-A  Today's Date: 11/13/2024     Discipline: Occupational Therapy    Missed Visit Reason: Missed Visit Reason:  (Pt intubated and sedated at this time. Hold OT.)    Missed Time: Attempt

## 2024-11-13 NOTE — PROGRESS NOTES
1/1 CTICU    LVAD  @ [place] [facility] --> 11/12 LVAD    DC PLAN  TBD - Care Transitions is following to develop a safe & supportive discharge plan in collaboration with multidisciplinary team (&) patient/family/significant others.      PAYOR   Medicare A/B   [employer] Supplement    PT/OT   ( ) 11/13 - (intubated/sedated. Awaiting to when medically appropriate)    COMPLETED  (X) Daily ongoing review of patient via chart and/or (M-F) IDT rounds    Raine Juarez (LSW, MSW)

## 2024-11-13 NOTE — PROGRESS NOTES
Fahad Meraz is a 69 y.o. male on day 20 of admission presenting with Acute on chronic heart failure with reduced ejection fraction and diastolic dysfunction.    Palliative Medicine following for:  Complex medical decision making, symptom management, patient/family support    History obtained from chart review including ED note, H&P, patient's daily progress notes, review of lab/test results, and discussion with primary team and bedside RN.    Subjective    History of Present Illness  Mr Fahad Meraz is a 69 M with PMHx of CAD s/p CABG x 4 (2012) and PCI (LCx/OM; 5/2019), stage D ICM HFrEF LVEF 10-15%, AF s/p RFA (PVI and CTI; 4/2024), HTN, dyslipidemia, pre-T2DM A1c 6.3, depression, anxiety, and VIV using CPAP who was admitted to OSH s/p RHC on 10/18/24 with decompensated heart failure and KENYA. He was referred to [facility]ICU 10/24/24 and was medically optomized and presented to [facility] 11/5 who approved destination therapy LVAD.  He is now admitted to the CTICU s/p Heartmate 3 implant with the outflow to the mid descending thoracic aorta via left thoracotomy 11/12/24 with Dr. Mclain and Dr. Madrigal.    11/12 - Intra-op, episode of pulseless VT requiring defib x 1 prior to being placed on CPB. In the ICU (post-op), significant bleeding from venous cannulation site requiring transfusion. Required volume administration and ongoing inotropic and vasopressor support with LVAD stable flows.       Symptoms  Pt unable to contribute.    Objective    Last Recorded Vitals  BP 98/68   Pulse (!) 120   Temp 37.2 °C (99 °F) (Core)   Resp 19   Ht 1.829 m (6')   Wt 96.4 kg (212 lb 8.4 oz)   SpO2 97%   BMI 28.82 kg/m²      Physical Exam  Constitutional:       Interventions: He is sedated and intubated.   HENT:      Head: Atraumatic.   Cardiovascular:      Rate and Rhythm: Normal rate.      Comments: Lvad hum  Pulmonary:      Effort: He is intubated.      Breath sounds: Rhonchi present.    Abdominal:      Palpations: Abdomen is soft.   Musculoskeletal:         General: Swelling present.   Skin:     General: Skin is warm and dry.      Coloration: Skin is pale.          Relevant Results   Results for orders placed or performed during the hospital encounter of 10/24/24 (from the past 24 hours)   Calcium, Ionized   Result Value Ref Range    POCT Calcium, Ionized 1.17 1.1 - 1.33 mmol/L   Magnesium   Result Value Ref Range    Magnesium 3.10 (H) 1.60 - 2.40 mg/dL   Coagulation Screen   Result Value Ref Range    Protime 15.5 (H) 9.8 - 12.8 seconds    INR 1.4 (H) 0.9 - 1.1    aPTT 33 27 - 38 seconds   Fibrinogen   Result Value Ref Range    Fibrinogen 301 200 - 400 mg/dL   CBC   Result Value Ref Range    WBC 25.4 (H) 4.4 - 11.3 x10*3/uL    nRBC 0.0 0.0 - 0.0 /100 WBCs    RBC 5.08 4.50 - 5.90 x10*6/uL    Hemoglobin 14.1 13.5 - 17.5 g/dL    Hematocrit 42.9 41.0 - 52.0 %    MCV 84 80 - 100 fL    MCH 27.8 26.0 - 34.0 pg    MCHC 32.9 32.0 - 36.0 g/dL    RDW 16.8 (H) 11.5 - 14.5 %    Platelets 485 (H) 150 - 450 x10*3/uL   Renal Function Panel   Result Value Ref Range    Glucose 152 (H) 74 - 99 mg/dL    Sodium 139 136 - 145 mmol/L    Potassium 4.2 3.5 - 5.3 mmol/L    Chloride 101 98 - 107 mmol/L    Bicarbonate 23 21 - 32 mmol/L    Anion Gap 19 10 - 20 mmol/L    Urea Nitrogen 24 (H) 6 - 23 mg/dL    Creatinine 1.73 (H) 0.50 - 1.30 mg/dL    eGFR 42 (L) >60 mL/min/1.73m*2    Calcium 9.6 8.6 - 10.6 mg/dL    Phosphorus 3.7 2.5 - 4.9 mg/dL    Albumin 3.8 3.4 - 5.0 g/dL   Lactate Dehydrogenase   Result Value Ref Range     (H) 84 - 246 U/L   Hepatic function panel   Result Value Ref Range    Albumin 3.7 3.4 - 5.0 g/dL    Bilirubin, Total 0.9 0.0 - 1.2 mg/dL    Bilirubin, Direct 0.3 0.0 - 0.3 mg/dL    Alkaline Phosphatase 59 33 - 136 U/L    ALT 15 10 - 52 U/L    AST 37 9 - 39 U/L    Total Protein 6.1 (L) 6.4 - 8.2 g/dL   Blood Gas Arterial Full Panel   Result Value Ref Range    POCT pH, Arterial 7.37 (L) 7.38 - 7.42 pH     POCT pCO2, Arterial 39 38 - 42 mm Hg    POCT pO2, Arterial 131 (H) 85 - 95 mm Hg    POCT SO2, Arterial 100 94 - 100 %    POCT Oxy Hemoglobin, Arterial 97.1 94.0 - 98.0 %    POCT Hematocrit Calculated, Arterial 44.0 41.0 - 52.0 %    POCT Sodium, Arterial 133 (L) 136 - 145 mmol/L    POCT Potassium, Arterial 3.8 3.5 - 5.3 mmol/L    POCT Chloride, Arterial 101 98 - 107 mmol/L    POCT Ionized Calcium, Arterial 1.23 1.10 - 1.33 mmol/L    POCT Glucose, Arterial 169 (H) 74 - 99 mg/dL    POCT Lactate, Arterial 3.7 (H) 0.4 - 2.0 mmol/L    POCT Base Excess, Arterial -2.5 (L) -2.0 - 3.0 mmol/L    POCT HCO3 Calculated, Arterial 22.5 22.0 - 26.0 mmol/L    POCT Hemoglobin, Arterial 14.6 13.5 - 17.5 g/dL    POCT Anion Gap, Arterial 13 10 - 25 mmo/L    Patient Temperature 37.0 degrees Celsius    FiO2 50 %   BLOOD GAS MIXED VENOUS FULL PANEL   Result Value Ref Range    POCT pH, Mixed 7.34 7.33 - 7.43 pH    POCT pCO2, Mixed 44 41 - 51 mm Hg    POCT pO2, Mixed 56 (H) 35 - 45 mm Hg    POCT SO2, Mixed 83 (H) 45 - 75 %    POCT Oxy Hemoglobin, Mixed 80.6 (H) 45.0 - 75.0 %    POCT Hematocrit Calculated, Mixed 43.0 41.0 - 52.0 %    POCT Sodium, Mixed 135 (L) 136 - 145 mmol/L    POCT Potassium, Mixed 3.8 3.5 - 5.3 mmol/L    POCT Chloride, Mixed 101 98 - 107 mmol/L    POCT Ionized Calcium, Mixed 1.20 1.10 - 1.33 mmol/L    POCT Glucose, Mixed 167 (H) 74 - 99 mg/dL    POCT Lactate, Mixed 3.6 (H) 0.4 - 2.0 mmol/L    POCT Base Excess, Mixed -2.2 (L) -2.0 - 3.0 mmol/L    POCT HCO3 Calculated, Mixed 23.7 22.0 - 26.0 mmol/L    POCT Hemoglobin, Mixed 14.4 13.5 - 17.5 g/dL    POCT Anion Gap, Mixed 14 10 - 25 mmo/L    Patient Temperature 37.0 degrees Celsius    FiO2 50 %   BLOOD GAS ARTERIAL FULL PANEL   Result Value Ref Range    POCT pH, Arterial 7.40 7.38 - 7.42 pH    POCT pCO2, Arterial 37 (L) 38 - 42 mm Hg    POCT pO2, Arterial 100 (H) 85 - 95 mm Hg    POCT SO2, Arterial 99 94 - 100 %    POCT Oxy Hemoglobin, Arterial 96.2 94.0 - 98.0 %    POCT  Hematocrit Calculated, Arterial 42.0 41.0 - 52.0 %    POCT Sodium, Arterial 134 (L) 136 - 145 mmol/L    POCT Potassium, Arterial 3.9 3.5 - 5.3 mmol/L    POCT Chloride, Arterial 102 98 - 107 mmol/L    POCT Ionized Calcium, Arterial 1.21 1.10 - 1.33 mmol/L    POCT Glucose, Arterial 200 (H) 74 - 99 mg/dL    POCT Lactate, Arterial 3.9 (H) 0.4 - 2.0 mmol/L    POCT Base Excess, Arterial -1.5 -2.0 - 3.0 mmol/L    POCT HCO3 Calculated, Arterial 22.9 22.0 - 26.0 mmol/L    POCT Hemoglobin, Arterial 14.1 13.5 - 17.5 g/dL    POCT Anion Gap, Arterial 13 10 - 25 mmo/L    Patient Temperature 37.0 degrees Celsius    FiO2 40 %   BLOOD GAS MIXED VENOUS FULL PANEL   Result Value Ref Range    POCT pH, Mixed 7.35 7.33 - 7.43 pH    POCT pCO2, Mixed 43 41 - 51 mm Hg    POCT pO2, Mixed 50 (H) 35 - 45 mm Hg    POCT SO2, Mixed 78 (H) 45 - 75 %    POCT Oxy Hemoglobin, Mixed 75.8 (H) 45.0 - 75.0 %    POCT Hematocrit Calculated, Mixed 42.0 41.0 - 52.0 %    POCT Sodium, Mixed 135 (L) 136 - 145 mmol/L    POCT Potassium, Mixed 3.7 3.5 - 5.3 mmol/L    POCT Chloride, Mixed 102 98 - 107 mmol/L    POCT Ionized Calcium, Mixed 1.20 1.10 - 1.33 mmol/L    POCT Glucose, Mixed 188 (H) 74 - 99 mg/dL    POCT Lactate, Mixed 3.4 (H) 0.4 - 2.0 mmol/L    POCT Base Excess, Mixed -2.0 -2.0 - 3.0 mmol/L    POCT HCO3 Calculated, Mixed 23.7 22.0 - 26.0 mmol/L    POCT Hemoglobin, Mixed 13.9 13.5 - 17.5 g/dL    POCT Anion Gap, Mixed 13 10 - 25 mmo/L    Patient Temperature 37.0 degrees Celsius    FiO2 40 %   Prepare Platelets: 1 Units   Result Value Ref Range    PRODUCT CODE [id number]     Unit Number [id number]     Unit ABO A     Unit RH POS     Dispense Status RE     Blood Expiration Date 11/13/2024 11:59:00 PM EST     PRODUCT BLOOD TYPE 6200     UNIT VOLUME 299    BLOOD GAS ARTERIAL FULL PANEL   Result Value Ref Range    POCT pH, Arterial 7.40 7.38 - 7.42 pH    POCT pCO2, Arterial 35 (L) 38 - 42 mm Hg    POCT pO2, Arterial 118 (H) 85 - 95 mm Hg    POCT SO2,  Arterial 100 94 - 100 %    POCT Oxy Hemoglobin, Arterial 97.0 94.0 - 98.0 %    POCT Hematocrit Calculated, Arterial 41.0 41.0 - 52.0 %    POCT Sodium, Arterial 134 (L) 136 - 145 mmol/L    POCT Potassium, Arterial 3.9 3.5 - 5.3 mmol/L    POCT Chloride, Arterial 100 98 - 107 mmol/L    POCT Ionized Calcium, Arterial 1.21 1.10 - 1.33 mmol/L    POCT Glucose, Arterial 232 (H) 74 - 99 mg/dL    POCT Lactate, Arterial 4.3 (HH) 0.4 - 2.0 mmol/L    POCT Base Excess, Arterial -2.5 (L) -2.0 - 3.0 mmol/L    POCT HCO3 Calculated, Arterial 21.7 (L) 22.0 - 26.0 mmol/L    POCT Hemoglobin, Arterial 13.5 13.5 - 17.5 g/dL    POCT Anion Gap, Arterial 16 10 - 25 mmo/L    Patient Temperature 37.0 degrees Celsius    FiO2 50 %   CBC   Result Value Ref Range    WBC 21.3 (H) 4.4 - 11.3 x10*3/uL    nRBC 0.0 0.0 - 0.0 /100 WBCs    RBC 4.70 4.50 - 5.90 x10*6/uL    Hemoglobin 12.8 (L) 13.5 - 17.5 g/dL    Hematocrit 39.5 (L) 41.0 - 52.0 %    MCV 84 80 - 100 fL    MCH 27.2 26.0 - 34.0 pg    MCHC 32.4 32.0 - 36.0 g/dL    RDW 16.8 (H) 11.5 - 14.5 %    Platelets 486 (H) 150 - 450 x10*3/uL   BLOOD GAS MIXED VENOUS FULL PANEL   Result Value Ref Range    POCT pH, Mixed 7.38 7.33 - 7.43 pH    POCT pCO2, Mixed 39 (L) 41 - 51 mm Hg    POCT pO2, Mixed 48 (H) 35 - 45 mm Hg    POCT SO2, Mixed 79 (H) 45 - 75 %    POCT Oxy Hemoglobin, Mixed 77.6 (H) 45.0 - 75.0 %    POCT Hematocrit Calculated, Mixed 38.0 (L) 41.0 - 52.0 %    POCT Sodium, Mixed 134 (L) 136 - 145 mmol/L    POCT Potassium, Mixed 3.8 3.5 - 5.3 mmol/L    POCT Chloride, Mixed 103 98 - 107 mmol/L    POCT Ionized Calcium, Mixed 1.21 1.10 - 1.33 mmol/L    POCT Glucose, Mixed 230 (H) 74 - 99 mg/dL    POCT Lactate, Mixed 4.1 (HH) 0.4 - 2.0 mmol/L    POCT Base Excess, Mixed -1.8 -2.0 - 3.0 mmol/L    POCT HCO3 Calculated, Mixed 23.1 22.0 - 26.0 mmol/L    POCT Hemoglobin, Mixed 12.8 (L) 13.5 - 17.5 g/dL    POCT Anion Gap, Mixed 12 10 - 25 mmo/L    Patient Temperature 37.0 degrees Celsius    FiO2 50 %   BLOOD  GAS ARTERIAL FULL PANEL   Result Value Ref Range    POCT pH, Arterial 7.41 7.38 - 7.42 pH    POCT pCO2, Arterial 34 (L) 38 - 42 mm Hg    POCT pO2, Arterial 145 (H) 85 - 95 mm Hg    POCT SO2, Arterial 100 94 - 100 %    POCT Oxy Hemoglobin, Arterial 97.6 94.0 - 98.0 %    POCT Hematocrit Calculated, Arterial 40.0 (L) 41.0 - 52.0 %    POCT Sodium, Arterial 133 (L) 136 - 145 mmol/L    POCT Potassium, Arterial 3.9 3.5 - 5.3 mmol/L    POCT Chloride, Arterial 103 98 - 107 mmol/L    POCT Ionized Calcium, Arterial 1.22 1.10 - 1.33 mmol/L    POCT Glucose, Arterial 233 (H) 74 - 99 mg/dL    POCT Lactate, Arterial 4.3 (HH) 0.4 - 2.0 mmol/L    POCT Base Excess, Arterial -2.4 (L) -2.0 - 3.0 mmol/L    POCT HCO3 Calculated, Arterial 21.6 (L) 22.0 - 26.0 mmol/L    POCT Hemoglobin, Arterial 13.3 (L) 13.5 - 17.5 g/dL    POCT Anion Gap, Arterial 12 10 - 25 mmo/L    Patient Temperature 37.0 degrees Celsius    FiO2 50 %   POCT GLUCOSE   Result Value Ref Range    POCT Glucose 160 (H) 74 - 99 mg/dL   BLOOD GAS MIXED VENOUS FULL PANEL   Result Value Ref Range    POCT pH, Mixed 7.37 7.33 - 7.43 pH    POCT pCO2, Mixed 42 41 - 51 mm Hg    POCT pO2, Mixed 45 35 - 45 mm Hg    POCT SO2, Mixed 74 45 - 75 %    POCT Oxy Hemoglobin, Mixed 72.7 45.0 - 75.0 %    POCT Hematocrit Calculated, Mixed 40.0 (L) 41.0 - 52.0 %    POCT Sodium, Mixed 134 (L) 136 - 145 mmol/L    POCT Potassium, Mixed 4.2 3.5 - 5.3 mmol/L    POCT Chloride, Mixed 102 98 - 107 mmol/L    POCT Ionized Calcium, Mixed 1.22 1.10 - 1.33 mmol/L    POCT Glucose, Mixed 152 (H) 74 - 99 mg/dL    POCT Lactate, Mixed 2.2 (H) 0.4 - 2.0 mmol/L    POCT Base Excess, Mixed -1.1 -2.0 - 3.0 mmol/L    POCT HCO3 Calculated, Mixed 24.3 22.0 - 26.0 mmol/L    POCT Hemoglobin, Mixed 13.4 (L) 13.5 - 17.5 g/dL    POCT Anion Gap, Mixed 12 10 - 25 mmo/L    Patient Temperature 37.0 degrees Celsius    FiO2 40 %   BLOOD GAS ARTERIAL FULL PANEL   Result Value Ref Range    POCT pH, Arterial 7.42 7.38 - 7.42 pH    POCT  pCO2, Arterial 36 (L) 38 - 42 mm Hg    POCT pO2, Arterial 122 (H) 85 - 95 mm Hg    POCT SO2, Arterial 100 94 - 100 %    POCT Oxy Hemoglobin, Arterial 97.2 94.0 - 98.0 %    POCT Hematocrit Calculated, Arterial 41.0 41.0 - 52.0 %    POCT Sodium, Arterial 132 (L) 136 - 145 mmol/L    POCT Potassium, Arterial 4.5 3.5 - 5.3 mmol/L    POCT Chloride, Arterial 102 98 - 107 mmol/L    POCT Ionized Calcium, Arterial 1.24 1.10 - 1.33 mmol/L    POCT Glucose, Arterial 160 (H) 74 - 99 mg/dL    POCT Lactate, Arterial 2.3 (H) 0.4 - 2.0 mmol/L    POCT Base Excess, Arterial -0.7 -2.0 - 3.0 mmol/L    POCT HCO3 Calculated, Arterial 23.4 22.0 - 26.0 mmol/L    POCT Hemoglobin, Arterial 13.6 13.5 - 17.5 g/dL    POCT Anion Gap, Arterial 11 10 - 25 mmo/L    Patient Temperature 37.0 degrees Celsius    FiO2 40 %   Calcium, Ionized   Result Value Ref Range    POCT Calcium, Ionized 1.21 1.1 - 1.33 mmol/L   Renal Function Panel   Result Value Ref Range    Glucose 156 (H) 74 - 99 mg/dL    Sodium 136 136 - 145 mmol/L    Potassium 4.8 3.5 - 5.3 mmol/L    Chloride 102 98 - 107 mmol/L    Bicarbonate 25 21 - 32 mmol/L    Anion Gap 14 10 - 20 mmol/L    Urea Nitrogen 27 (H) 6 - 23 mg/dL    Creatinine 1.70 (H) 0.50 - 1.30 mg/dL    eGFR 43 (L) >60 mL/min/1.73m*2    Calcium 9.5 8.6 - 10.6 mg/dL    Phosphorus 3.8 2.5 - 4.9 mg/dL    Albumin 4.2 3.4 - 5.0 g/dL   Magnesium   Result Value Ref Range    Magnesium 2.51 (H) 1.60 - 2.40 mg/dL   CBC   Result Value Ref Range    WBC 19.3 (H) 4.4 - 11.3 x10*3/uL    nRBC 0.0 0.0 - 0.0 /100 WBCs    RBC 4.75 4.50 - 5.90 x10*6/uL    Hemoglobin 13.0 (L) 13.5 - 17.5 g/dL    Hematocrit 39.3 (L) 41.0 - 52.0 %    MCV 83 80 - 100 fL    MCH 27.4 26.0 - 34.0 pg    MCHC 33.1 32.0 - 36.0 g/dL    RDW 17.1 (H) 11.5 - 14.5 %    Platelets 515 (H) 150 - 450 x10*3/uL   Protime-INR   Result Value Ref Range    Protime 15.6 (H) 9.8 - 12.8 seconds    INR 1.4 (H) 0.9 - 1.1   Lactate Dehydrogenase   Result Value Ref Range     (H) 84 - 246  U/L   POCT GLUCOSE   Result Value Ref Range    POCT Glucose 133 (H) 74 - 99 mg/dL   BLOOD GAS MIXED VENOUS FULL PANEL   Result Value Ref Range    POCT pH, Mixed 7.40 7.33 - 7.43 pH    POCT pCO2, Mixed 42 41 - 51 mm Hg    POCT pO2, Mixed 43 35 - 45 mm Hg    POCT SO2, Mixed 73 45 - 75 %    POCT Oxy Hemoglobin, Mixed 71.4 45.0 - 75.0 %    POCT Hematocrit Calculated, Mixed 41.0 41.0 - 52.0 %    POCT Sodium, Mixed 133 (L) 136 - 145 mmol/L    POCT Potassium, Mixed 4.8 3.5 - 5.3 mmol/L    POCT Chloride, Mixed 101 98 - 107 mmol/L    POCT Ionized Calcium, Mixed 1.25 1.10 - 1.33 mmol/L    POCT Glucose, Mixed 130 (H) 74 - 99 mg/dL    POCT Lactate, Mixed 1.6 0.4 - 2.0 mmol/L    POCT Base Excess, Mixed 1.0 -2.0 - 3.0 mmol/L    POCT HCO3 Calculated, Mixed 26.0 22.0 - 26.0 mmol/L    POCT Hemoglobin, Mixed 13.5 13.5 - 17.5 g/dL    POCT Anion Gap, Mixed 11 10 - 25 mmo/L    Patient Temperature 37.0 degrees Celsius    FiO2 40 %   BLOOD GAS MIXED VENOUS FULL PANEL   Result Value Ref Range    POCT pH, Mixed 7.40 7.33 - 7.43 pH    POCT pCO2, Mixed 42 41 - 51 mm Hg    POCT pO2, Mixed 41 35 - 45 mm Hg    POCT SO2, Mixed 70 45 - 75 %    POCT Oxy Hemoglobin, Mixed 68.2 45.0 - 75.0 %    POCT Hematocrit Calculated, Mixed 40.0 (L) 41.0 - 52.0 %    POCT Sodium, Mixed 132 (L) 136 - 145 mmol/L    POCT Potassium, Mixed 5.0 3.5 - 5.3 mmol/L    POCT Chloride, Mixed 102 98 - 107 mmol/L    POCT Ionized Calcium, Mixed 1.25 1.10 - 1.33 mmol/L    POCT Glucose, Mixed 150 (H) 74 - 99 mg/dL    POCT Lactate, Mixed 0.9 0.4 - 2.0 mmol/L    POCT Base Excess, Mixed 1.0 -2.0 - 3.0 mmol/L    POCT HCO3 Calculated, Mixed 26.0 22.0 - 26.0 mmol/L    POCT Hemoglobin, Mixed 13.4 (L) 13.5 - 17.5 g/dL    POCT Anion Gap, Mixed 9 (L) 10 - 25 mmo/L    Patient Temperature 37.0 degrees Celsius    FiO2 40 %   BLOOD GAS ARTERIAL FULL PANEL   Result Value Ref Range    POCT pH, Arterial 7.44 (H) 7.38 - 7.42 pH    POCT pCO2, Arterial 33 (L) 38 - 42 mm Hg    POCT pO2, Arterial 115  (H) 85 - 95 mm Hg    POCT SO2, Arterial 100 94 - 100 %    POCT Oxy Hemoglobin, Arterial 96.8 94.0 - 98.0 %    POCT Hematocrit Calculated, Arterial 38.0 (L) 41.0 - 52.0 %    POCT Sodium, Arterial 132 (L) 136 - 145 mmol/L    POCT Potassium, Arterial 4.7 3.5 - 5.3 mmol/L    POCT Chloride, Arterial 104 98 - 107 mmol/L    POCT Ionized Calcium, Arterial 1.19 1.10 - 1.33 mmol/L    POCT Glucose, Arterial 134 (H) 74 - 99 mg/dL    POCT Lactate, Arterial 1.0 0.4 - 2.0 mmol/L    POCT Base Excess, Arterial -1.1 -2.0 - 3.0 mmol/L    POCT HCO3 Calculated, Arterial 22.4 22.0 - 26.0 mmol/L    POCT Hemoglobin, Arterial 12.8 (L) 13.5 - 17.5 g/dL    POCT Anion Gap, Arterial 10 10 - 25 mmo/L    Patient Temperature 37.0 degrees Celsius    FiO2 40 %   Transthoracic Echo (TTE) Limited   Result Value Ref Range    LV EF 15 %    RV free wall pk S' 6.64 cm/s    LVIDd 6.90 cm    RVSP 23.4 mmHg   POCT GLUCOSE   Result Value Ref Range    POCT Glucose 133 (H) 74 - 99 mg/dL        Allergies  Eliquis [apixaban] and Jardiance [empagliflozin]  Medications  Scheduled medications  acetaminophen, 650 mg, oral, q6h  aspirin, 81 mg, oral, Daily  ceFAZolin, 2 g, intravenous, q8h  escitalopram, 10 mg, oral, Daily  fluconazole, 400 mg, intravenous, q24h  heparin (porcine), 5,000 Units, subcutaneous, q8h  pantoprazole, 40 mg, intravenous, Daily before breakfast  polyethylene glycol, 17 g, oral, BID  sennosides-docusate sodium, 2 tablet, oral, BID  vancomycin, 1,500 mg, intravenous, q24h      Continuous medications  dexmedeTOMIDine, 0-1.5 mcg/kg/hr, Last Rate: 0.4 mcg/kg/hr (11/13/24 1129)  EPINEPHrine, 0.03 mcg/kg/min, Last Rate: 0.03 mcg/kg/min (11/13/24 7960)  epoprostenol, 0.03 mcg/kg/min (Ideal), Last Rate: 0.03 mcg/kg/min (11/13/24 6368)  insulin regular infusion for cardiac surgery, 0-15 Units/hr, Last Rate: 1 Units/hr (11/13/24 6874)  lactated Ringer's, 20 mL/hr, Last Rate: 20 mL/hr (11/13/24 5124)  lactated Ringer's, 5 mL/hr, Last Rate: 5 mL/hr  (11/13/24 0600)  milrinone, 0-0.75 mcg/kg/min, Last Rate: 0.125 mcg/kg/min (11/13/24 0916)  propofol, 0-50 mcg/kg/min, Last Rate: Stopped (11/13/24 1057)      PRN medications  PRN medications: alteplase, HYDROmorphone, insulin regular, naloxone, ondansetron **OR** ondansetron, oxyCODONE, oxyCODONE, oxygen, oxygen, vancomycin     Assessment/Plan    Mr Fahad Meraz is a 69 M with PMHx of CAD s/p CABG x 4 (2012) and PCI (LCx/OM; 5/2019), stage D ICM HFrEF LVEF 10-15%, AF s/p RFA (PVI and CTI; 4/2024), HTN, dyslipidemia, pre-T2DM A1c 6.3, depression, anxiety, and VIV using CPAP who was admitted to OSH s/p RHC on 10/18/24 with decompensated heart failure and KENYA. He was referred to [facility]ICU 10/24/24 and was medically optomized and presented to [facility] [employer] [facility] 11/5 who approved destination therapy LVAD.  He is now admitted to the CTICU s/p Heartmate 3 implant with the outflow to the mid descending thoracic aorta via left thoracotomy 11/12/24 with Dr. Mclain and Dr. Madrigal.    11/12 - Intra-op, episode of pulseless VT requiring defib x 1 prior to being placed on CPB. In the ICU (post-op), significant bleeding from venous cannulation site requiring transfusion. Required volume administration and ongoing inotropic and vasopressor support with LVAD stable flows.     11/1: Palliative consulted for pt/family support and advanced therapies eval.      11/7: Supportive visit to discuss pt's feelings on LVAD approval (vs OHT) and coping and supports. Pt very positive and motivated. Pt reports feeling great mentally and physically yet anxious about when LVAD will be placed so family can prepare.    11/13: Palliative rounded on pt s/p LVAD placement 11/12. Pt intubated/sedated, no family present. Plan to wean as medically appropriate.        Palliative Performance Scale (PPS):  10    ----------------------------------------------------------------------------------------------------------------------------------------------------------------------------------------------------------------------------------------------------------------------------------------------------------------------------------------------------------------------      I spent  minutes in providing separately identifiable ACP services with the patient and/or surrogate decision maker in a voluntary conversation discussing the patient's wishes and goals as detailed in the above note.   ----------------------------------------------------------------------------------------------------------------------------------------------------------------------------------------------------------------------------------------------------------------------------------------------------------------------------------------------------------------------    #Complex Medical Decision Making   #Goals of Care  #Advance Care Planning   - Code status: FULL CODE  - Surrogate decision maker: Tanvi Meraz (Spouse)  [phone]   - Goals are mix of survival and time and improved quality of life      #Psychosocial Support  - Ongoing palliative team support.  - Music Therapy  - Spiritual Care Support    Plan of Care discussed with: Updated primary and bedside RN on goals of care decision, medication adjustments, and code status     Medical Decision Making was high level due to high complexity of problems, extensive data review, and high risk of management/treatment.     - HFrEF and inotrope dependent, work up for cardiac advanced therapies, s/p LVAD 11/12 with pulseless VT, posing threat to life or function.   - Reviewed external notes from   - Reviews results from  which were used in decision making for   - Recommended the following tests:   - Assessment required independent historian: primary and nusring  - Independent interpretation of  test:  - Discussion of management with:   - Drug therapy requiring intensive monitoring for toxicity: vanco, insulin,   - Decision regarding elective major surgery with identified patient or procedure risk factors:   - Decision regarding emergency major surgery:   - Decision regarding hospitalization or escalation of hospital-level care:   - Decision not to resuscitate or to de-escalate care because of poor prognosis:   - Parenteral controlled substances: pressors, inotrope.    Thank you for allowing us to care for this patient. Palliative Team will continue to follow as needed. Please contact team with any questions or concerns.   Team pager [phone] (weekdays)    MAYTE Nix-CNP

## 2024-11-13 NOTE — PROGRESS NOTES
CTICU Progress Note  Fahad Meraz/[id number]    Admit Date: 10/24/2024  Hospital Length of Stay: 20   ICU Length of Stay: 18h   CT SURGEON:     SUBJECTIVE:   iEPO wean to 0.03. CI 1.8 but svo2 70 with normal lactate and CVP.     MEDICATIONS  Infusions:  dexmedeTOMIDine, Last Rate: 0.2 mcg/kg/hr (11/13/24 0600)  EPINEPHrine, Last Rate: 0.03 mcg/kg/min (11/13/24 0734)  epoprostenol, Last Rate: 0.03 mcg/kg/min (11/13/24 0332)  insulin regular infusion for cardiac surgery, Last Rate: 2 Units/hr (11/13/24 0600)  lactated Ringer's, Last Rate: 30 mL/hr (11/13/24 0600)  lactated Ringer's, Last Rate: 5 mL/hr (11/13/24 0600)  milrinone, Last Rate: 0.125 mcg/kg/min (11/13/24 0600)  norepinephrine, Last Rate: Stopped (11/13/24 0600)  propofol, Last Rate: 30 mcg/kg/min (11/13/24 0600)  vasopressin, Last Rate: 0.02 Units/min (11/13/24 0600)      Scheduled:  acetaminophen, 650 mg, q6h  aspirin, 81 mg, Daily  ceFAZolin, 2 g, q8h  escitalopram, 10 mg, Daily  fluconazole, 400 mg, q24h  insulin regular, 0-15 Units, Once  pantoprazole, 40 mg, Daily before breakfast  polyethylene glycol, 17 g, Daily  sennosides-docusate sodium, 2 tablet, BID  vancomycin, 1,500 mg, q24h      PRN:  alteplase, 2 mg, PRN  HYDROmorphone, 0.2 mg, q15 min PRN  insulin regular, 0-15 Units, PRN  naloxone, 0.2 mg, q5 min PRN  ondansetron, 4 mg, q8h PRN   Or  ondansetron, 4 mg, q8h PRN  oxyCODONE, 10 mg, q4h PRN  oxyCODONE, 5 mg, q4h PRN  oxygen, , Continuous PRN - O2/gases  oxygen, , Continuous PRN - O2/gases  vancomycin, , Daily PRN        PHYSICAL EXAM:   Visit Vitals  BP 98/68   Pulse (!) 125   Temp 37.4 °C (99.3 °F)   Resp 16   Ht 1.829 m (6')   Wt 94.1 kg (207 lb 7.3 oz)   SpO2 98%   BMI 28.14 kg/m²   Smoking Status Former   BSA 2.19 m²     Wt Readings from Last 5 Encounters:   11/13/24 94.1 kg (207 lb 7.3 oz)   10/24/24 92.5 kg (204 lb)   08/05/24 122 kg (268 lb)   01/31/22 119 kg (262 lb)   02/18/20 123 kg (270 lb 2 oz)     INTAKE/OUTPUT:  I/O last 3  completed shifts:  In: 7566.8 (80.4 mL/kg) [I.V.:1717.8 (18.3 mL/kg); Blood:3339; IV Piggyback:2510]  Out: 3480 (37 mL/kg) [Urine:2620 (0.8 mL/kg/hr); Blood:500; Chest Tube:360]  Weight: 94.1 kg          Vent settings:  Vent Mode: Assist control/Volume control plus  FiO2 (%):  [40 %-50 %] 40 %  S RR:  [16] 16  S VT:  [620 mL] 620 mL  PEEP/CPAP (cm H2O):  [8 cm H20] 8 cm H20  MAP (cm H2O):  [] 13    Physical Exam:   - CONSTITUTION: intubated male in ICU bed with LVAD  - NEUROLOGIC: sedated. Moving extremities with SAT  - CARDIOVASCULAR: sinus tachy with PVCs  - RESPIRATORY: intubated. Clear with no secretions. Chest tubes with serosang output, no airleaks  - GI: soft, hypoactive BS  - : molina with dark yellow urine  - EXTREMITIES: R fem arterial sheath. R foot signals intact but foot slightly coolers. All other signals present.   - SKIN: intact  - PSYCHIATRIC: CARLENE    Daily Risk Screen  Intubated: plan to WTE  Central line: vasoactive meds  Molina: strict I/Os    Images: Personally reviewed    Invasive Hemodynamics:    Most Recent Range Past 24hrs   BP (Art) 82/66 Arterial Line BP 1  Min: 82/66  Max: 105/76   MAP(Art) 73 mmHg Arterial Line MAP 1 (mmHg)   Min: 71 mmHg  Max: 86 mmHg   RA/CVP   No data recorded   PA 29/22 PAP  Min: 25/18  Max: 39/24   PA(mean) 24 mmHg PAP (Mean)  Min: 19 mmHg  Max: 62 mmHg   CO 5.5 L/min CO (L/min)  Min: 5.5 L/min  Max: 6.4 L/min   CI 2.6 L/min/m2 CI (L/min/m2)  Min: 2.6 L/min/m2  Max: 3 L/min/m2   Mixed Venous 72 % No data recorded   SVR  1269 (dyne*sec)/cm5 SVR (dyne*sec)/cm5  Min: 787 (dyne*sec)/cm5  Max: 1269 (dyne*sec)/cm5         Assessment/Plan   69 M with PMHx of CAD s/p CABG x 4 (2012) and PCI (LCx/OM; 5/2019), stage D ICM HFrEF LVEF 10-15%, AF s/p RFA (PVI and CTI; 4/2024), HTN, pre-T2DM A1c 6.3, depression, anxiety, and VIV using CPAP who was admitted to OSH s/p RHC on 10/18/24 with decompensated heart failure and KENYA. He was referred to [facility]ICU 10/24/24 and was  medically optomized and presented to [employer] [facility] 11/5 who approved destination therapy LVAD.  [facility] with the outflow to the mid descending thoracic aorta via left thoracotomy 11/12/24 with Dr. Mclain and Dr. Madrigal.         NEURO:  PMH of anxiety & depression. Patient is intubated and sedated on propofol infusion but will follow commands with SAT. Acute post operative pain acceptably controlled.   -->  - Serial neuro and pain assessments   - Wean propofol and start dexmedetomidine for extubation as he reports PTSD from extubation following CABG in the past   - Scheduled Tylenol   - PRN oxycodone  - PRN dilaudid for pain   - Qtc appropriate pre-LVAD. Will resume lexapro after fluc stops  - PT Consult, OOB to chair as tolerated once off flat bedrest  - CAM ICU score qshift  - Sleep/wake cycle hygiene     CV:  Patient has a history of CAD s/p CABG x 4 (2012) and PCI (LCx/OM; 5/2019), stage D ICM HFrEF LVEF 10-15%, AF s/p RFA (PVI and CTI; 4/2024), HTN, and is now status post HM3 LVAD via left thoracotomy 11/12 with Jas Ortiz and Kaitlin. Pre/Post EF: 15-20% &stably dilated RV. Remains on epi 0.03, milrinone 0.125, I Epo 0.03 with CVP 9-12 and normal PA pressure. CI 1.8 overnight but with normal SVO2 and lactate. Hypotension improving, now off levo and vaso. Sinus tachy with PVCs. No PPM or pacer wires.  -->  - Maintain goal MAP 70-80mmHg, avoid sustained MAPs > 90mmHg    - Mixed venous and CI Q4H  - ECHO today--> severe RV dysfunction, mild to mod enlarged RV. Leaving iEPO with plan to keep through extubation  - continue epi for now  - will increase milrinone to 0.25 mcg/kg/min if remains off pressors  - Daily weights per LVAD prtocol   - Dressing changes daily until dry, then weekly per protocol   - Resume ASA 81mg daily for CAD history  - Hold pre-op hydral, isosorbide, entresto, metoprolol xl 50mg, spironolactone.    - Clarify who no satin use  PTA (OSH lipid were low normal but he has hx of CAD/CABG,ICM).      PULM:  No history of pulmonary disease.  Currently intubated with mechanical ventilation and adequate gas exchange. Chest tubes 1 left plural and 1 mediastinal with low output & no air leaks. Appropriate oxygenation and ventilation on minimal support. -->  - Daily CXR  - SBT but holding on extubation until able to sit up after sheath removal  - Wean FiO2 maintaining SpO2 >92%.   - IS q1h and OOB to chair when extubated  - Chest tubes to wall suction.     GI:  PMH of GERD ob PPI PTA. Malnutrition. OG in place.-->  - Continue PPI for GERD, LVAD bleeding risk   - NPO, will perform bedside swallow eval post extubation   - Colace/senna BID and miralax BID  - send LFT in AM     :  CSA-KENYA Risk Score pre-op 6, ICU admit 6.  He did have KENYA on CKD in recent weeks that improved to 1.3 pre-op (baseline creatinine 1.3 - 1.6). Creatinine stable and making appropriate urine. CVP normal but dilated RV on ECHO. Appears euvolemic on exam. -->  - Continue molina catheter for strict I/Os.  - Goal UOP 0.5ml/kg/hr  - RFP as clinically indicated  - Replete electrolytes per CTICU protocol  - goal even today. Will continue to evaluate volume status     ENDO:  PMH of preDM with recent A1c: 6.3.  Hyperglycemia improving on insulin infusion.  -->  - Maintain BG <180, insulin per CTICU protocol  - transition insulin infusion to SSI     HEME:  Acute blood loss anemia stable. -->    - Monitor drain output volume and characteristics  - CBC in AM  - Start SQH  - Holding on starting coumadin per multidisciplinary discussion today. (PT previously attributed dizziness to apixaban)   - SCDs for DVT prophylaxis.  - Last type and screen: 11/11, resend in AM     ID:  Afebrile, no current indications of infection. MRSA screen pending. -->  - Trend temp q4h  - Periop cefazolin, vanco, fluconazole x 48hrs per LVAD protocol      Skin:  No active skin issues.  - preventative Mepilex  dressings in place on sacrum and heels  - change preventative Mepilex weekly or more frequently as indicated (when moist/soiled)   - every shift skin assessment per nursing and weekly ICU skin rounds  - moisture barrier to be applied with ricky care  - active skin problems addressed with nursing on daily rounds     Proph:  SCDs  PPI  SQH     G:  Line  Right IJ MAC w PA cath placed 11/9  Right brachial a-line placed 11/12  Right fem arterial sheath --> dc  PIVs  Gallo 11/12    F: Family: updated at bedside.     A,B,C,D,E,F,G: reviewed     I personally spent 70 minutes of critical care time directly and personally managing the patient exclusive of separately billable procedures.     Dispo: CTICU care for now.    CTICU TEAM PHONE [phone]  Seen with Dr. Aguilar

## 2024-11-13 NOTE — SIGNIFICANT EVENT
Right femoral arterial sheath removed. 45 minutes of pressure held and sand bag placed after. Right foot cooler than left but all signals present. Flat bedrest x6 hour and groin checks. RN aware.

## 2024-11-13 NOTE — NURSING NOTE
Patient given black travel bag, size large wearable vest, and right consolidated bag. Equipment left at bedside. Serial numbers placed in VAD flowsheet.     Nursing aware.

## 2024-11-13 NOTE — PROGRESS NOTES
Physical Therapy                 Therapy Communication Note    Patient Name: Fahad Meraz  MRN: [id number]  Department: [facility] CTICU  Room: 06/06-A  Today's Date: 11/13/2024     Discipline: Physical Therapy    Missed Visit Reason:  (Pt remains intubated/sedated at this time.  Will hold PT and re-attempt once medically appropriate.)    Missed Time: Attempt

## 2024-11-14 ENCOUNTER — APPOINTMENT (OUTPATIENT)
Dept: RADIOLOGY | Facility: HOSPITAL | Age: 69
DRG: 001 | End: 2024-11-14
Payer: MEDICARE

## 2024-11-14 LAB
ABO GROUP (TYPE) IN BLOOD: NORMAL
ALBUMIN SERPL BCP-MCNC: 3.5 G/DL (ref 3.4–5)
ALBUMIN SERPL BCP-MCNC: 3.8 G/DL (ref 3.4–5)
ALP SERPL-CCNC: 57 U/L (ref 33–136)
ALT SERPL W P-5'-P-CCNC: 7 U/L (ref 10–52)
ANION GAP BLDA CALCULATED.4IONS-SCNC: 12 MMO/L (ref 10–25)
ANION GAP BLDA CALCULATED.4IONS-SCNC: 13 MMO/L (ref 10–25)
ANION GAP BLDA CALCULATED.4IONS-SCNC: 8 MMO/L (ref 10–25)
ANION GAP BLDA CALCULATED.4IONS-SCNC: 9 MMO/L (ref 10–25)
ANION GAP BLDA CALCULATED.4IONS-SCNC: 9 MMO/L (ref 10–25)
ANION GAP BLDMV CALCULATED.4IONS-SCNC: 12 MMO/L (ref 10–25)
ANION GAP BLDMV CALCULATED.4IONS-SCNC: 13 MMO/L (ref 10–25)
ANION GAP BLDMV CALCULATED.4IONS-SCNC: 7 MMO/L (ref 10–25)
ANION GAP BLDMV CALCULATED.4IONS-SCNC: 8 MMO/L (ref 10–25)
ANION GAP BLDMV CALCULATED.4IONS-SCNC: 9 MMO/L (ref 10–25)
ANION GAP SERPL CALC-SCNC: 15 MMOL/L (ref 10–20)
ANION GAP SERPL CALC-SCNC: 17 MMOL/L (ref 10–20)
ANTIBODY SCREEN: NORMAL
APPARATUS: ABNORMAL
AST SERPL W P-5'-P-CCNC: 40 U/L (ref 9–39)
BASE EXCESS BLDA CALC-SCNC: -0.7 MMOL/L (ref -2–3)
BASE EXCESS BLDA CALC-SCNC: -1.8 MMOL/L (ref -2–3)
BASE EXCESS BLDA CALC-SCNC: -2.5 MMOL/L (ref -2–3)
BASE EXCESS BLDA CALC-SCNC: -2.6 MMOL/L (ref -2–3)
BASE EXCESS BLDA CALC-SCNC: 0.5 MMOL/L (ref -2–3)
BASE EXCESS BLDMV CALC-SCNC: -0.5 MMOL/L (ref -2–3)
BASE EXCESS BLDMV CALC-SCNC: -0.7 MMOL/L (ref -2–3)
BASE EXCESS BLDMV CALC-SCNC: -3 MMOL/L (ref -2–3)
BASE EXCESS BLDMV CALC-SCNC: -3.3 MMOL/L (ref -2–3)
BASE EXCESS BLDMV CALC-SCNC: 1.5 MMOL/L (ref -2–3)
BILIRUB DIRECT SERPL-MCNC: 0.3 MG/DL (ref 0–0.3)
BILIRUB SERPL-MCNC: 0.9 MG/DL (ref 0–1.2)
BLOOD EXPIRATION DATE: NORMAL
BODY TEMPERATURE: 37 DEGREES CELSIUS
BUN SERPL-MCNC: 39 MG/DL (ref 6–23)
BUN SERPL-MCNC: 44 MG/DL (ref 6–23)
CA-I BLD-SCNC: 1.25 MMOL/L (ref 1.1–1.33)
CA-I BLD-SCNC: 1.25 MMOL/L (ref 1.1–1.33)
CA-I BLDA-SCNC: 1.19 MMOL/L (ref 1.1–1.33)
CA-I BLDA-SCNC: 1.24 MMOL/L (ref 1.1–1.33)
CA-I BLDA-SCNC: 1.24 MMOL/L (ref 1.1–1.33)
CA-I BLDA-SCNC: 1.28 MMOL/L (ref 1.1–1.33)
CA-I BLDA-SCNC: 1.29 MMOL/L (ref 1.1–1.33)
CA-I BLDMV-SCNC: 1.24 MMOL/L (ref 1.1–1.33)
CA-I BLDMV-SCNC: 1.25 MMOL/L (ref 1.1–1.33)
CA-I BLDMV-SCNC: 1.26 MMOL/L (ref 1.1–1.33)
CALCIUM SERPL-MCNC: 9.1 MG/DL (ref 8.6–10.6)
CALCIUM SERPL-MCNC: 9.6 MG/DL (ref 8.6–10.6)
CHLORIDE BLD-SCNC: 103 MMOL/L (ref 98–107)
CHLORIDE BLD-SCNC: 104 MMOL/L (ref 98–107)
CHLORIDE BLD-SCNC: 105 MMOL/L (ref 98–107)
CHLORIDE BLD-SCNC: 105 MMOL/L (ref 98–107)
CHLORIDE BLD-SCNC: 106 MMOL/L (ref 98–107)
CHLORIDE BLDA-SCNC: 102 MMOL/L (ref 98–107)
CHLORIDE BLDA-SCNC: 103 MMOL/L (ref 98–107)
CHLORIDE BLDA-SCNC: 104 MMOL/L (ref 98–107)
CHLORIDE BLDA-SCNC: 106 MMOL/L (ref 98–107)
CHLORIDE BLDA-SCNC: 107 MMOL/L (ref 98–107)
CHLORIDE SERPL-SCNC: 102 MMOL/L (ref 98–107)
CHLORIDE SERPL-SCNC: 102 MMOL/L (ref 98–107)
CO2 SERPL-SCNC: 23 MMOL/L (ref 21–32)
CO2 SERPL-SCNC: 25 MMOL/L (ref 21–32)
CREAT SERPL-MCNC: 1.78 MG/DL (ref 0.5–1.3)
CREAT SERPL-MCNC: 1.86 MG/DL (ref 0.5–1.3)
DISPENSE STATUS: NORMAL
EGFRCR SERPLBLD CKD-EPI 2021: 39 ML/MIN/1.73M*2
EGFRCR SERPLBLD CKD-EPI 2021: 41 ML/MIN/1.73M*2
ERYTHROCYTE [DISTWIDTH] IN BLOOD BY AUTOMATED COUNT: 17.2 % (ref 11.5–14.5)
ERYTHROCYTE [DISTWIDTH] IN BLOOD BY AUTOMATED COUNT: 17.2 % (ref 11.5–14.5)
GLUCOSE BLD MANUAL STRIP-MCNC: 126 MG/DL (ref 74–99)
GLUCOSE BLD MANUAL STRIP-MCNC: 136 MG/DL (ref 74–99)
GLUCOSE BLD-MCNC: 107 MG/DL (ref 74–99)
GLUCOSE BLD-MCNC: 119 MG/DL (ref 74–99)
GLUCOSE BLD-MCNC: 120 MG/DL (ref 74–99)
GLUCOSE BLD-MCNC: 130 MG/DL (ref 74–99)
GLUCOSE BLD-MCNC: 84 MG/DL (ref 74–99)
GLUCOSE BLDA-MCNC: 111 MG/DL (ref 74–99)
GLUCOSE BLDA-MCNC: 121 MG/DL (ref 74–99)
GLUCOSE BLDA-MCNC: 131 MG/DL (ref 74–99)
GLUCOSE BLDA-MCNC: 87 MG/DL (ref 74–99)
GLUCOSE BLDA-MCNC: 95 MG/DL (ref 74–99)
GLUCOSE SERPL-MCNC: 114 MG/DL (ref 74–99)
GLUCOSE SERPL-MCNC: 95 MG/DL (ref 74–99)
HCO3 BLDA-SCNC: 21.4 MMOL/L (ref 22–26)
HCO3 BLDA-SCNC: 21.7 MMOL/L (ref 22–26)
HCO3 BLDA-SCNC: 22.1 MMOL/L (ref 22–26)
HCO3 BLDA-SCNC: 23.4 MMOL/L (ref 22–26)
HCO3 BLDA-SCNC: 24.6 MMOL/L (ref 22–26)
HCO3 BLDMV-SCNC: 21.3 MMOL/L (ref 22–26)
HCO3 BLDMV-SCNC: 22 MMOL/L (ref 22–26)
HCO3 BLDMV-SCNC: 24.2 MMOL/L (ref 22–26)
HCO3 BLDMV-SCNC: 24.2 MMOL/L (ref 22–26)
HCO3 BLDMV-SCNC: 25.9 MMOL/L (ref 22–26)
HCT VFR BLD AUTO: 35.2 % (ref 41–52)
HCT VFR BLD AUTO: 37.9 % (ref 41–52)
HCT VFR BLD EST: 33 % (ref 41–52)
HCT VFR BLD EST: 34 % (ref 41–52)
HCT VFR BLD EST: 35 % (ref 41–52)
HCT VFR BLD EST: 36 % (ref 41–52)
HCT VFR BLD EST: 37 % (ref 41–52)
HCT VFR BLD EST: 39 % (ref 41–52)
HCT VFR BLD EST: 39 % (ref 41–52)
HGB BLD-MCNC: 11.3 G/DL (ref 13.5–17.5)
HGB BLD-MCNC: 12.6 G/DL (ref 13.5–17.5)
HGB BLDA-MCNC: 11 G/DL (ref 13.5–17.5)
HGB BLDA-MCNC: 12.1 G/DL (ref 13.5–17.5)
HGB BLDA-MCNC: 12.1 G/DL (ref 13.5–17.5)
HGB BLDA-MCNC: 12.3 G/DL (ref 13.5–17.5)
HGB BLDA-MCNC: 13.1 G/DL (ref 13.5–17.5)
HGB BLDMV-MCNC: 11.2 G/DL (ref 13.5–17.5)
HGB BLDMV-MCNC: 11.6 G/DL (ref 13.5–17.5)
HGB BLDMV-MCNC: 11.9 G/DL (ref 13.5–17.5)
HGB BLDMV-MCNC: 12.1 G/DL (ref 13.5–17.5)
HGB BLDMV-MCNC: 13 G/DL (ref 13.5–17.5)
INHALED O2 CONCENTRATION: 40 %
INHALED O2 CONCENTRATION: 80 %
INR PPP: 1.4 (ref 0.9–1.1)
LACTATE BLDA-SCNC: 0.7 MMOL/L (ref 0.4–2)
LACTATE BLDA-SCNC: 1 MMOL/L (ref 0.4–2)
LACTATE BLDA-SCNC: 1.1 MMOL/L (ref 0.4–2)
LACTATE BLDMV-SCNC: 0.6 MMOL/L (ref 0.4–2)
LACTATE BLDMV-SCNC: 0.7 MMOL/L (ref 0.4–2)
LACTATE BLDMV-SCNC: 0.8 MMOL/L (ref 0.4–2)
LACTATE BLDMV-SCNC: 0.9 MMOL/L (ref 0.4–2)
LACTATE BLDMV-SCNC: 1 MMOL/L (ref 0.4–2)
LDH SERPL L TO P-CCNC: 351 U/L (ref 84–246)
MAGNESIUM SERPL-MCNC: 2.31 MG/DL (ref 1.6–2.4)
MAGNESIUM SERPL-MCNC: 2.49 MG/DL (ref 1.6–2.4)
MCH RBC QN AUTO: 27 PG (ref 26–34)
MCH RBC QN AUTO: 27.5 PG (ref 26–34)
MCHC RBC AUTO-ENTMCNC: 32.1 G/DL (ref 32–36)
MCHC RBC AUTO-ENTMCNC: 33.2 G/DL (ref 32–36)
MCV RBC AUTO: 83 FL (ref 80–100)
MCV RBC AUTO: 84 FL (ref 80–100)
NRBC BLD-RTO: 0 /100 WBCS (ref 0–0)
NRBC BLD-RTO: 0 /100 WBCS (ref 0–0)
OXYHGB MFR BLDA: 93.9 % (ref 94–98)
OXYHGB MFR BLDA: 94.5 % (ref 94–98)
OXYHGB MFR BLDA: 95.2 % (ref 94–98)
OXYHGB MFR BLDA: 95.5 % (ref 94–98)
OXYHGB MFR BLDA: 96.8 % (ref 94–98)
OXYHGB MFR BLDMV: 60.2 % (ref 45–75)
OXYHGB MFR BLDMV: 63.7 % (ref 45–75)
OXYHGB MFR BLDMV: 68.2 % (ref 45–75)
OXYHGB MFR BLDMV: 70.8 % (ref 45–75)
OXYHGB MFR BLDMV: 71.1 % (ref 45–75)
PCO2 BLDA: 33 MM HG (ref 38–42)
PCO2 BLDA: 34 MM HG (ref 38–42)
PCO2 BLDA: 35 MM HG (ref 38–42)
PCO2 BLDA: 36 MM HG (ref 38–42)
PCO2 BLDA: 37 MM HG (ref 38–42)
PCO2 BLDMV: 36 MM HG (ref 41–51)
PCO2 BLDMV: 38 MM HG (ref 41–51)
PCO2 BLDMV: 39 MM HG (ref 41–51)
PCO2 BLDMV: 39 MM HG (ref 41–51)
PCO2 BLDMV: 40 MM HG (ref 41–51)
PH BLDA: 7.4 PH (ref 7.38–7.42)
PH BLDA: 7.42 PH (ref 7.38–7.42)
PH BLDA: 7.43 PH (ref 7.38–7.42)
PH BLDMV: 7.37 PH (ref 7.33–7.43)
PH BLDMV: 7.38 PH (ref 7.33–7.43)
PH BLDMV: 7.39 PH (ref 7.33–7.43)
PH BLDMV: 7.4 PH (ref 7.33–7.43)
PH BLDMV: 7.43 PH (ref 7.33–7.43)
PHOSPHATE SERPL-MCNC: 5.5 MG/DL (ref 2.5–4.9)
PHOSPHATE SERPL-MCNC: 5.9 MG/DL (ref 2.5–4.9)
PLATELET # BLD AUTO: 341 X10*3/UL (ref 150–450)
PLATELET # BLD AUTO: 413 X10*3/UL (ref 150–450)
PO2 BLDA: 115 MM HG (ref 85–95)
PO2 BLDA: 78 MM HG (ref 85–95)
PO2 BLDA: 79 MM HG (ref 85–95)
PO2 BLDA: 93 MM HG (ref 85–95)
PO2 BLDA: 95 MM HG (ref 85–95)
PO2 BLDMV: 38 MM HG (ref 35–45)
PO2 BLDMV: 40 MM HG (ref 35–45)
PO2 BLDMV: 43 MM HG (ref 35–45)
PO2 BLDMV: 45 MM HG (ref 35–45)
PO2 BLDMV: 46 MM HG (ref 35–45)
POTASSIUM BLDA-SCNC: 4.3 MMOL/L (ref 3.5–5.3)
POTASSIUM BLDA-SCNC: 4.9 MMOL/L (ref 3.5–5.3)
POTASSIUM BLDA-SCNC: 4.9 MMOL/L (ref 3.5–5.3)
POTASSIUM BLDA-SCNC: 5 MMOL/L (ref 3.5–5.3)
POTASSIUM BLDA-SCNC: 5.6 MMOL/L (ref 3.5–5.3)
POTASSIUM BLDMV-SCNC: 4.5 MMOL/L (ref 3.5–5.3)
POTASSIUM BLDMV-SCNC: 4.6 MMOL/L (ref 3.5–5.3)
POTASSIUM BLDMV-SCNC: 4.7 MMOL/L (ref 3.5–5.3)
POTASSIUM BLDMV-SCNC: 4.8 MMOL/L (ref 3.5–5.3)
POTASSIUM BLDMV-SCNC: 5.3 MMOL/L (ref 3.5–5.3)
POTASSIUM SERPL-SCNC: 4.9 MMOL/L (ref 3.5–5.3)
POTASSIUM SERPL-SCNC: 5.6 MMOL/L (ref 3.5–5.3)
PRODUCT BLOOD TYPE: 6200
PRODUCT CODE: NORMAL
PROT SERPL-MCNC: 5.8 G/DL (ref 6.4–8.2)
PROTHROMBIN TIME: 15.5 SECONDS (ref 9.8–12.8)
RBC # BLD AUTO: 4.19 X10*6/UL (ref 4.5–5.9)
RBC # BLD AUTO: 4.59 X10*6/UL (ref 4.5–5.9)
RH FACTOR (ANTIGEN D): NORMAL
SAO2 % BLDA: 97 % (ref 94–100)
SAO2 % BLDA: 97 % (ref 94–100)
SAO2 % BLDA: 98 % (ref 94–100)
SAO2 % BLDA: 99 % (ref 94–100)
SAO2 % BLDA: 99 % (ref 94–100)
SAO2 % BLDMV: 61 % (ref 45–75)
SAO2 % BLDMV: 65 % (ref 45–75)
SAO2 % BLDMV: 70 % (ref 45–75)
SAO2 % BLDMV: 73 % (ref 45–75)
SAO2 % BLDMV: 73 % (ref 45–75)
SODIUM BLDA-SCNC: 131 MMOL/L (ref 136–145)
SODIUM BLDA-SCNC: 132 MMOL/L (ref 136–145)
SODIUM BLDA-SCNC: 132 MMOL/L (ref 136–145)
SODIUM BLDA-SCNC: 133 MMOL/L (ref 136–145)
SODIUM BLDA-SCNC: 133 MMOL/L (ref 136–145)
SODIUM BLDMV-SCNC: 131 MMOL/L (ref 136–145)
SODIUM BLDMV-SCNC: 132 MMOL/L (ref 136–145)
SODIUM BLDMV-SCNC: 133 MMOL/L (ref 136–145)
SODIUM BLDMV-SCNC: 134 MMOL/L (ref 136–145)
SODIUM BLDMV-SCNC: 136 MMOL/L (ref 136–145)
SODIUM SERPL-SCNC: 136 MMOL/L (ref 136–145)
SODIUM SERPL-SCNC: 137 MMOL/L (ref 136–145)
STAPHYLOCOCCUS SPEC CULT: NORMAL
UNIT ABO: NORMAL
UNIT NUMBER: NORMAL
UNIT RH: NORMAL
UNIT VOLUME: 350
WBC # BLD AUTO: 16.9 X10*3/UL (ref 4.4–11.3)
WBC # BLD AUTO: 18 X10*3/UL (ref 4.4–11.3)
XM INTEP: NORMAL

## 2024-11-14 PROCEDURE — 94003 VENT MGMT INPAT SUBQ DAY: CPT

## 2024-11-14 PROCEDURE — 93750 INTERROGATION VAD IN PERSON: CPT

## 2024-11-14 PROCEDURE — 83735 ASSAY OF MAGNESIUM: CPT | Performed by: NURSE PRACTITIONER

## 2024-11-14 PROCEDURE — 82248 BILIRUBIN DIRECT: CPT | Performed by: NURSE PRACTITIONER

## 2024-11-14 PROCEDURE — 2500000005 HC RX 250 GENERAL PHARMACY W/O HCPCS: Performed by: STUDENT IN AN ORGANIZED HEALTH CARE EDUCATION/TRAINING PROGRAM

## 2024-11-14 PROCEDURE — 84100 ASSAY OF PHOSPHORUS: CPT | Performed by: NURSE PRACTITIONER

## 2024-11-14 PROCEDURE — 99291 CRITICAL CARE FIRST HOUR: CPT | Performed by: NURSE PRACTITIONER

## 2024-11-14 PROCEDURE — 99233 SBSQ HOSP IP/OBS HIGH 50: CPT | Performed by: REGISTERED NURSE

## 2024-11-14 PROCEDURE — 2500000004 HC RX 250 GENERAL PHARMACY W/ HCPCS (ALT 636 FOR OP/ED): Performed by: NURSE PRACTITIONER

## 2024-11-14 PROCEDURE — 93750 INTERROGATION VAD IN PERSON: CPT | Performed by: REGISTERED NURSE

## 2024-11-14 PROCEDURE — 84132 ASSAY OF SERUM POTASSIUM: CPT | Performed by: NURSE PRACTITIONER

## 2024-11-14 PROCEDURE — 2500000002 HC RX 250 W HCPCS SELF ADMINISTERED DRUGS (ALT 637 FOR MEDICARE OP, ALT 636 FOR OP/ED)

## 2024-11-14 PROCEDURE — 2500000004 HC RX 250 GENERAL PHARMACY W/ HCPCS (ALT 636 FOR OP/ED): Performed by: STUDENT IN AN ORGANIZED HEALTH CARE EDUCATION/TRAINING PROGRAM

## 2024-11-14 PROCEDURE — 97164 PT RE-EVAL EST PLAN CARE: CPT | Mod: GP

## 2024-11-14 PROCEDURE — 94645 CONT INHLJ TX EACH ADDL HOUR: CPT

## 2024-11-14 PROCEDURE — 71045 X-RAY EXAM CHEST 1 VIEW: CPT | Performed by: RADIOLOGY

## 2024-11-14 PROCEDURE — 71045 X-RAY EXAM CHEST 1 VIEW: CPT

## 2024-11-14 PROCEDURE — 97530 THERAPEUTIC ACTIVITIES: CPT | Mod: GO

## 2024-11-14 PROCEDURE — 2500000001 HC RX 250 WO HCPCS SELF ADMINISTERED DRUGS (ALT 637 FOR MEDICARE OP): Performed by: NURSE PRACTITIONER

## 2024-11-14 PROCEDURE — 2500000002 HC RX 250 W HCPCS SELF ADMINISTERED DRUGS (ALT 637 FOR MEDICARE OP, ALT 636 FOR OP/ED): Performed by: STUDENT IN AN ORGANIZED HEALTH CARE EDUCATION/TRAINING PROGRAM

## 2024-11-14 PROCEDURE — 2500000004 HC RX 250 GENERAL PHARMACY W/ HCPCS (ALT 636 FOR OP/ED)

## 2024-11-14 PROCEDURE — 97168 OT RE-EVAL EST PLAN CARE: CPT | Mod: GO

## 2024-11-14 PROCEDURE — 85027 COMPLETE CBC AUTOMATED: CPT | Performed by: NURSE PRACTITIONER

## 2024-11-14 PROCEDURE — 99291 CRITICAL CARE FIRST HOUR: CPT

## 2024-11-14 PROCEDURE — 37799 UNLISTED PX VASCULAR SURGERY: CPT | Performed by: NURSE PRACTITIONER

## 2024-11-14 PROCEDURE — 2020000001 HC ICU ROOM DAILY

## 2024-11-14 PROCEDURE — 85610 PROTHROMBIN TIME: CPT | Performed by: NURSE PRACTITIONER

## 2024-11-14 PROCEDURE — 86901 BLOOD TYPING SEROLOGIC RH(D): CPT | Performed by: NURSE PRACTITIONER

## 2024-11-14 PROCEDURE — 2500000005 HC RX 250 GENERAL PHARMACY W/O HCPCS: Performed by: NURSE PRACTITIONER

## 2024-11-14 PROCEDURE — 82330 ASSAY OF CALCIUM: CPT | Performed by: NURSE PRACTITIONER

## 2024-11-14 PROCEDURE — 97530 THERAPEUTIC ACTIVITIES: CPT | Mod: GP

## 2024-11-14 PROCEDURE — 82947 ASSAY GLUCOSE BLOOD QUANT: CPT

## 2024-11-14 PROCEDURE — 83615 LACTATE (LD) (LDH) ENZYME: CPT | Performed by: NURSE PRACTITIONER

## 2024-11-14 RX ORDER — SODIUM CHLORIDE, SODIUM LACTATE, POTASSIUM CHLORIDE, CALCIUM CHLORIDE 600; 310; 30; 20 MG/100ML; MG/100ML; MG/100ML; MG/100ML
5 INJECTION, SOLUTION INTRAVENOUS CONTINUOUS
Status: DISCONTINUED | OUTPATIENT
Start: 2024-11-14 | End: 2024-11-17

## 2024-11-14 RX ORDER — WARFARIN 3 MG/1
3 TABLET ORAL DAILY
Status: DISCONTINUED | OUTPATIENT
Start: 2024-11-14 | End: 2024-11-16

## 2024-11-14 RX ORDER — SODIUM CHLORIDE, SODIUM LACTATE, POTASSIUM CHLORIDE, CALCIUM CHLORIDE 600; 310; 30; 20 MG/100ML; MG/100ML; MG/100ML; MG/100ML
20 INJECTION, SOLUTION INTRAVENOUS CONTINUOUS
Status: DISCONTINUED | OUTPATIENT
Start: 2024-11-14 | End: 2024-11-17

## 2024-11-14 RX ORDER — BUMETANIDE 0.25 MG/ML
2 INJECTION, SOLUTION INTRAMUSCULAR; INTRAVENOUS ONCE
Status: COMPLETED | OUTPATIENT
Start: 2024-11-14 | End: 2024-11-14

## 2024-11-14 RX ORDER — HYDRALAZINE HYDROCHLORIDE 20 MG/ML
10 INJECTION INTRAMUSCULAR; INTRAVENOUS EVERY 4 HOURS PRN
Status: DISCONTINUED | OUTPATIENT
Start: 2024-11-14 | End: 2024-11-19

## 2024-11-14 RX ORDER — DEXTROSE 50 % IN WATER (D50W) INTRAVENOUS SYRINGE
12.5 ONCE
Status: COMPLETED | OUTPATIENT
Start: 2024-11-14 | End: 2024-11-14

## 2024-11-14 ASSESSMENT — PAIN SCALES - GENERAL
PAINLEVEL_OUTOF10: 0 - NO PAIN
PAINLEVEL_OUTOF10: 0 - NO PAIN
PAINLEVEL_OUTOF10: 8
PAINLEVEL_OUTOF10: 7
PAINLEVEL_OUTOF10: 0 - NO PAIN
PAINLEVEL_OUTOF10: 7
PAINLEVEL_OUTOF10: 0 - NO PAIN

## 2024-11-14 ASSESSMENT — COGNITIVE AND FUNCTIONAL STATUS - GENERAL
MOVING FROM LYING ON BACK TO SITTING ON SIDE OF FLAT BED WITH BEDRAILS: A LOT
HELP NEEDED FOR BATHING: A LOT
WALKING IN HOSPITAL ROOM: A LOT
TURNING FROM BACK TO SIDE WHILE IN FLAT BAD: A LOT
DRESSING REGULAR UPPER BODY CLOTHING: A LITTLE
PERSONAL GROOMING: A LITTLE
EATING MEALS: A LITTLE
MOBILITY SCORE: 11
CLIMB 3 TO 5 STEPS WITH RAILING: TOTAL
DRESSING REGULAR LOWER BODY CLOTHING: A LOT
DAILY ACTIVITIY SCORE: 16
MOVING TO AND FROM BED TO CHAIR: A LOT
STANDING UP FROM CHAIR USING ARMS: A LOT
TOILETING: A LITTLE

## 2024-11-14 ASSESSMENT — PAIN DESCRIPTION - LOCATION
LOCATION: RIB CAGE

## 2024-11-14 ASSESSMENT — ACTIVITIES OF DAILY LIVING (ADL)
ADL_ASSISTANCE: INDEPENDENT
ADL_ASSISTANCE: INDEPENDENT
BATHING_ASSISTANCE: MODERATE

## 2024-11-14 ASSESSMENT — PAIN - FUNCTIONAL ASSESSMENT
PAIN_FUNCTIONAL_ASSESSMENT: 0-10
PAIN_FUNCTIONAL_ASSESSMENT: CPOT (CRITICAL CARE PAIN OBSERVATION TOOL)
PAIN_FUNCTIONAL_ASSESSMENT: 0-10
PAIN_FUNCTIONAL_ASSESSMENT: 0-10

## 2024-11-14 ASSESSMENT — PAIN DESCRIPTION - ORIENTATION
ORIENTATION: LEFT
ORIENTATION: LEFT

## 2024-11-14 NOTE — PROGRESS NOTES
ADVANCED HEART FAILURE LVAD PROGRESS NOTE      VAD Cardiologist: Padmini     Type of VAD: HM3  Implant Date: 11/12/24  Reason for VAD: ICM  Intent: Long-Term (Significant PAD, age, patient preference)      Subjective     HPI:    Fahad Meraz is a very pleasant 69 y.o. male w/ a PMHx sig for CAD s/p 4V CABG (2012) and PCI (LCx/OM; 5/2019), stage D systolic HF/ICM/HFrEF with LVEF 10-15%( 10/22/24 TTE), AF s/p RFA (PVI and CTI; 4/2024) on OAC therapy, HTN, dyslipidemia, depression, anxiety and VIV using CPAP who was admitted to OSH ICU, s/p RHC on 10/18/24.  Per patient, he had been experienced worsening SOB and fluids overload for 2-3 weeks. Patient was on milrinone drip and underwent SGC diuresis. However his KENYA worsened, and were not able to wean milrinone drip. Decision was made to transfer him to HF ICU [facility] for possible advanced therapy consideration. Advanced therapies evaluation was initiated on 10/30. Discussed in advanced therapeutics committee on 11/5 and was denied for transplantation, and approved for LVAD (significant PAD, Elevated PSA level, Age approaching 70, as well as patient verbalized he would be more in favor of LVAD vs Transplant). He was transferred to the floor on 11/6 to await VAD implantation. Readmitted to HFICU as of 11/09 for pre-OR swan guided optimization. Now presents to CTICU s/p LVAD HM3 implant on 11/12/24 with Dr. Mclain. OR Course/Issues: pulseless VT requiring defib x 1 pre- CPB.         Principal Problem:    Acute on chronic heart failure with reduced ejection fraction and diastolic dysfunction  Active Problems:    Ischemic cardiomyopathy    Receiving inotropic medication    HTN (hypertension)    CAD (coronary artery disease)    MI (myocardial infarction) (Multi)    CHF (congestive heart failure)    Gastroesophageal reflux disease    Acute kidney injury (nontraumatic) (CMS-HCC)       LOS: 21 days     Interval Events:   Extubated to HFNC this morning. Remains on epi and  milrinone with adequate biventricular filling pressures.     NYHA class pre-VAD implant: IV    EPINEPHrine, 0.03 mcg/kg/min, Last Rate: 0.03 mcg/kg/min (11/14/24 0600)  epoprostenol, 0.03 mcg/kg/min (Ideal), Last Rate: 0.03 mcg/kg/min (11/14/24 0317)  lactated Ringer's, 20 mL/hr, Last Rate: 20 mL/hr (11/14/24 0600)  lactated Ringer's, 5 mL/hr, Last Rate: 5 mL/hr (11/14/24 0600)  milrinone, 0-0.75 mcg/kg/min, Last Rate: 0.25 mcg/kg/min (11/14/24 1056)       PAP: (27-44)/(19-25) 27/21  CVP:  [8 mmHg-20 mmHg] 8 mmHg  CO:  [4.8 L/min-5.4 L/min] 5.4 L/min  CI:  [2.2 L/min/m2-2.5 L/min/m2] 2.5 L/min/m2      Objective   Vitals:   Vitals:    11/14/24 0900 11/14/24 1000 11/14/24 1047 11/14/24 1100   BP:       BP Location:       Patient Position:       Pulse: (!) 130 (!) 128 (!) 120 (!) 130   Resp: 18 23 22 19   Temp:       TempSrc:       SpO2: 94% 95% 96% 96%   Weight:       Height:         Wt Readings from Last 5 Encounters:   11/14/24 96.5 kg (212 lb 11.9 oz)   10/24/24 92.5 kg (204 lb)   08/05/24 122 kg (268 lb)   01/31/22 119 kg (262 lb)   02/18/20 123 kg (270 lb 2 oz)     Hemodynamic parameters for last 24 hours:  PAP: (27-44)/(19-25) 27/21  CVP:  [8 mmHg-20 mmHg] 8 mmHg  CO:  [4.8 L/min-5.4 L/min] 5.4 L/min  CI:  [2.2 L/min/m2-2.5 L/min/m2] 2.5 L/min/m2  Intake/Output for last 24 hours:    Intake/Output Summary (Last 24 hours) at 11/14/2024 1147  Last data filed at 11/14/2024 1100  Gross per 24 hour   Intake 2251.84 ml   Output 2115 ml   Net 136.84 ml      Vent settings:  Vent Mode: Assist control/Volume control plus  FiO2 (%):  [40 %-80 %] 80 %  S RR:  [16] 16  S VT:  [620 mL] 620 mL  PEEP/CPAP (cm H2O):  [0 cm H20-8 cm H20] 5 cm H20  IN SUP:  [0 cm H20-10 cm H20] 5 cm H20    Physical exam:  Physical Exam  Constitutional:       Appearance: Normal appearance.   HENT:      Head: Normocephalic and atraumatic.      Mouth/Throat:      Mouth: Mucous membranes are moist.      Pharynx: Oropharynx is clear.   Cardiovascular:       Rate and Rhythm: Tachycardia present.      Comments: LVAD hum heard on ascultation  Driveline clean, no bleeding. Site appropriately dressed with tegaderm.   Pulmonary:      Breath sounds: Normal breath sounds. No wheezing or rhonchi.   Abdominal:      General: Abdomen is flat. There is no distension.      Palpations: Abdomen is soft. There is no mass.      Tenderness: There is no abdominal tenderness. There is no guarding.   Musculoskeletal:      Right lower leg: No edema.      Left lower leg: No edema.   Neurological:      Mental Status: He is alert.        Driveline:   CDI w/ tegaderm in place      Labs:   CMP:  Recent Labs     11/14/24  0118 11/13/24  1228 11/13/24  0008 11/13/24  0007 11/12/24  1257 11/12/24  0220 11/11/24  0523 11/10/24  1808 11/10/24  0436    136  --  136 139 135* 135* 134* 135*   K 5.6* 5.2  --  4.8 4.2 4.5 5.1 4.8 4.2    102  --  102 101 101 98 99 100   CO2 25 23  --  25 23 26 27 26 25   ANIONGAP 15 16  --  14 19 13 15 14 14   BUN 39* 33*  --  27* 24* 24* 26* 26* 24*   CREATININE 1.86* 1.66*  --  1.70* 1.73* 1.33* 1.52* 1.40* 1.20   EGFR 39* 44*  --  43* 42* 58* 49* 54* 65   MG 2.49* 2.41* 2.51*  --  3.10* 2.02 2.14 2.22 2.47*     Recent Labs     11/14/24  0118 11/13/24  1228 11/13/24  0007 11/12/24  1307 11/12/24  1257 11/12/24  0220 11/11/24  0523 11/10/24  1808 11/01/24  0317 10/31/24  1249 10/25/24  1221 10/24/24  2328   ALBUMIN 3.8 3.7 4.2 3.7 3.8 3.9 4.1 4.0   < > 4.3   < > 4.4   ALT 7* 11  --  15  --   --   --   --   --  14  --  13   AST 40* 42*  --  37  --   --   --   --   --  16  --  15   BILITOT 0.9 1.1  --  0.9  --   --   --   --   --  1.4*  --  2.3*    < > = values in this interval not displayed.     CBC:  Recent Labs     11/14/24  0118 11/13/24  1228 11/13/24  0008 11/12/24  1851 11/12/24  1257 11/12/24  0220 11/11/24  0523 11/10/24  0436   WBC 18.0* 15.6* 19.3* 21.3* 25.4* 8.9 10.1 7.7   HGB 12.6* 12.3* 13.0* 12.8* 14.1 12.9* 13.3* 13.2*   HCT 37.9* 36.3*  "39.3* 39.5* 42.9 38.9* 42.0 41.2    391 515* 486* 485* 421 495* 495*   MCV 83 82 83 84 84 82 84 85     COAG:   Recent Labs     11/14/24 0118 11/13/24  0008 11/12/24  1257 11/12/24  0220 11/11/24  0523 11/10/24  0436 11/09/24  0748 11/08/24  0729 11/03/24  0726 11/03/24  0242 11/02/24  0507 11/02/24  0506 11/01/24  0317 10/31/24  1249 10/31/24  0509   INR 1.4* 1.4* 1.4*  --   --   --   --   --   --  1.4*  --  1.3* 1.3* 1.3* 1.2*   HAUF  --   --   --  0.4 0.5 0.5 0.4 0.4   < > 0.4   < >  --  0.3  --  0.4   FIBRINOGEN  --   --  301  --   --   --   --   --   --   --   --   --   --   --   --     < > = values in this interval not displayed.     ABO:   Recent Labs     11/14/24 0118   ABO A     HEME/ENDO:   Recent Labs     10/31/24  1249 10/24/24  2328   FERRITIN  --  76   IRONSAT  --  17*   TSH 4.39* 8.47*   HGBA1C  --  6.3*     CARDIAC:   Recent Labs     11/14/24 0118 11/13/24  0008 11/12/24  1307 10/31/24  1249 10/24/24  2328   * 276* 324* 187  --    BNP  --   --   --  755* 1,995*     No results for input(s): \"CHOL\", \"LDLF\", \"LDLCALC\", \"HDL\", \"TRIG\" in the last 07217 hours.  TOX:  Recent Labs     10/31/24  1249   AMPHETAMINE Negative     MICRO: No results for input(s): \"ESR\", \"CRP\", \"PROCAL\" in the last 54086 hours.  No results found for the last 90 days.        Imaging:   XR chest 1 view    Result Date: 11/13/2024  Interpreted By:  Tuan Platt and Omar Mahmoud STUDY: XR CHEST 1 VIEW;  11/13/2024 4:41 am   INDICATION: Signs/Symptoms:Post op cardiac surgery.     COMPARISON: Chest x-ray 11/12/2024 6:15 p.m., CT chest 11/04/2024   ACCESSION NUMBER(S): [id number]   ORDERING CLINICIAN: ANETA MANNING   FINDINGS: AP radiograph of the chest was provided.   Patient is mildly rotated to the right which limits evaluation and comparison.   Postoperative findings from median sternotomy. Endotracheal tube tip projects approximately 6.2 cm from the carlo. Right IJ approach pulmonary arterial catheter tip " projects over the distal right main pulmonary artery. Again seen LVAD projecting over the left lower thorax. 2 left chest tubes. Enteric tube tip projects over the gastric fundus with the proximal side port likely above the gastroesophageal junction. Again seen left shoulder arthroplasty.   CARDIOMEDIASTINAL SILHOUETTE: There is moderate cardiomegaly. Cardiomediastinal silhouette is stable in size and configuration.   LUNGS: No pneumothorax. Decrease in diffuse interstitial opacities. There is no new focal consolidation. The bilateral costophrenic angles are clear.   ABDOMEN: No remarkable upper abdominal findings.   BONES: No acute osseous changes.       1. Similar positioning of an enteric tube with maximal side-port likely above the gastroesophageal junction. Consider advancement of the enteric tube. Stable positioning of other medical devices as described above. 2. Decrease in pulmonary interstitial edema. There are no new cardiopulmonary abnormalities.   I personally reviewed the images/study and I agree with the findings as stated by Zack Ortiz MD (PGY-2). This study was interpreted at [place], [place].   MACRO: None   Signed by: Tuan Platt 11/13/2024 2:00 PM Dictation workstation:   [id number]    XR abdomen 1 view    Result Date: 11/13/2024  Interpreted By:  Tuan Platt and Omar Mahmoud STUDY: XR ABDOMEN 1 VIEW;  11/13/2024 9:16 am   INDICATION: Signs/Symptoms:OG positioning.     COMPARISON: X-ray abdomen 11/12/2024   ACCESSION NUMBER(S): [id number]   ORDERING CLINICIAN: JO CASTILLO   FINDINGS: Enteric tube tip projects over the expected location of the gastric fundus, suggestion of a proximal side-port projects near the gastroesophageal junction. LVAD overlying the left lower chest. Median sternotomy wires again seen. Partially visualized pulmonary arterial catheter.   Nonobstructive bowel gas pattern. Limited evaluation of pneumoperitoneum on  supine imaging, however no gross evidence of free air is noted.   Visualized lungs are clear.   Osseous structures demonstrate no acute bony changes.       Enteric tube tip projects over the gastric fundus with proximal side port near the gastroesophageal junction. Recommend advancement.   I personally reviewed the images/study and I agree with the findings as stated by Zack Ortiz MD (PGY-2). This study was interpreted at [place], [place].   MACRO: None   Signed by: Tuan Platt 11/13/2024 11:17 AM Dictation workstation:   [id number]    XR abdomen 1 view    Result Date: 11/13/2024  Interpreted By:  Tuan Platt  and Cristhian Garcia STUDY: XR ABDOMEN 1 VIEW; XR CHEST 1 VIEW;  11/12/2024 6:42 pm; 11/12/2024 7:13 pm; 11/12/2024 7:04 pm   INDICATION: Signs/Symptoms:OG tube assessment; Signs/Symptoms:Feeding tube; Signs/Symptoms:Et tube placement.   COMPARISON: Chest radiograph 7:04 p.m. same day, abdominal radiograph same day 6:42 p.m.   ACCESSION NUMBER(S): [id number]; [id number]; [id number]   ORDERING CLINICIAN: ANETA MANNING; JAMEL LEDESMA; SHAYY PUENTE   FINDINGS: Chest radiograph 7:04 p.m. same day, and abdominal radiograph same day 6:42 p.m. were dictated together due to proximity of the exams.   AP radiographs of the chest and abdomen were provided. Endotracheal tube noted with tip projecting approximately 5.9 cm above the carlo. Postsurgical changes of LVAD placement. Postsurgical changes of median sternotomy. Right IJ [place]-Faviola catheter with tip projecting over the expected position of the right main pulmonary artery. On the most recent abdominal radiograph, enteric tube seen coursing below the level diaphragm with tip tip projecting over the expected position of the gastroesophageal junction. The side-hole of the enteric tube is within the lower thoracic esophagus. Heart is enlarged. Cardiomediastinal silhouette is stable in size.   No evidence of  pneumothorax. Bilateral costophrenic angles are clear. No focal consolidation. Bilateral mild pulmonary interstitial edema.   Nonobstructive bowel gas pattern. Limited evaluation of pneumoperitoneum on supine imaging, however no gross evidence of free air is noted.   Vizualized lungs are clear.   Osseous structures demonstrate no acute bony changes.       1. Enteric tube seen coursing below the level diaphragm with tip projecting over the expected position of the distal gastroesophageal junction. The side-hole of the enteric tube projects over the distal thoracic esophagus. Repositioning of the enteric tube can be considered based on patient position. 2. Additional medical devices as described above. 3. No pneumothorax, focal consolidation, or pleural effusion. Mild pulmonary interstitial edema. 4. Nonobstructive bowel gas pattern.   I personally reviewed the images/study and I agree with the findings as stated by Jose Morrow MD. This study was interpreted at [facility], [place], [place].   MACRO: None   Signed by: Tuan Platt 11/13/2024 10:53 AM Dictation workstation:   [id number]    XR chest 1 view    Result Date: 11/13/2024  Interpreted By:  Tuan Platt and Nakamoto Kent STUDY: XR ABDOMEN 1 VIEW; XR CHEST 1 VIEW;  11/12/2024 6:42 pm; 11/12/2024 7:13 pm; 11/12/2024 7:04 pm   INDICATION: Signs/Symptoms:OG tube assessment; Signs/Symptoms:Feeding tube; Signs/Symptoms:Et tube placement.   COMPARISON: Chest radiograph 7:04 p.m. same day, abdominal radiograph same day 6:42 p.m.   ACCESSION NUMBER(S): [id number]; [id number]; [id number]   ORDERING CLINICIAN: ANETA PUENTE   FINDINGS: Chest radiograph 7:04 p.m. same day, and abdominal radiograph same day 6:42 p.m. were dictated together due to proximity of the exams.   AP radiographs of the chest and abdomen were provided. Endotracheal tube noted with tip projecting approximately 5.9 cm above  the carlo. Postsurgical changes of LVAD placement. Postsurgical changes of median sternotomy. Right IJ [place]-Faviola catheter with tip projecting over the expected position of the right main pulmonary artery. On the most recent abdominal radiograph, enteric tube seen coursing below the level diaphragm with tip tip projecting over the expected position of the gastroesophageal junction. The side-hole of the enteric tube is within the lower thoracic esophagus. Heart is enlarged. Cardiomediastinal silhouette is stable in size.   No evidence of pneumothorax. Bilateral costophrenic angles are clear. No focal consolidation. Bilateral mild pulmonary interstitial edema.   Nonobstructive bowel gas pattern. Limited evaluation of pneumoperitoneum on supine imaging, however no gross evidence of free air is noted.   Vizualized lungs are clear.   Osseous structures demonstrate no acute bony changes.       1. Enteric tube seen coursing below the level diaphragm with tip projecting over the expected position of the distal gastroesophageal junction. The side-hole of the enteric tube projects over the distal thoracic esophagus. Repositioning of the enteric tube can be considered based on patient position. 2. Additional medical devices as described above. 3. No pneumothorax, focal consolidation, or pleural effusion. Mild pulmonary interstitial edema. 4. Nonobstructive bowel gas pattern.   I personally reviewed the images/study and I agree with the findings as stated by Jose Morrow MD. This study was interpreted at [facility], [place], [place].   MACRO: None   Signed by: Tuan Platt 11/13/2024 10:53 AM Dictation workstation:   [id number]    XR abdomen 1 view    Result Date: 11/13/2024  Interpreted By:  Tuan Platt and Nakamoto Kent STUDY: XR ABDOMEN 1 VIEW; XR CHEST 1 VIEW;  11/12/2024 6:42 pm; 11/12/2024 7:13 pm; 11/12/2024 7:04 pm   INDICATION: Signs/Symptoms:OG tube assessment;  Signs/Symptoms:Feeding tube; Signs/Symptoms:Et tube placement.   COMPARISON: Chest radiograph 7:04 p.m. same day, abdominal radiograph same day 6:42 p.m.   ACCESSION NUMBER(S): [id number]; [id number]; [id number]   ORDERING CLINICIAN: ANETA MANNING; JAMEL LEDESMA; SHAYY PUENTE   FINDINGS: Chest radiograph 7:04 p.m. same day, and abdominal radiograph same day 6:42 p.m. were dictated together due to proximity of the exams.   AP radiographs of the chest and abdomen were provided. Endotracheal tube noted with tip projecting approximately 5.9 cm above the carlo. Postsurgical changes of LVAD placement. Postsurgical changes of median sternotomy. Right IJ [place]-Faviola catheter with tip projecting over the expected position of the right main pulmonary artery. On the most recent abdominal radiograph, enteric tube seen coursing below the level diaphragm with tip tip projecting over the expected position of the gastroesophageal junction. The side-hole of the enteric tube is within the lower thoracic esophagus. Heart is enlarged. Cardiomediastinal silhouette is stable in size.   No evidence of pneumothorax. Bilateral costophrenic angles are clear. No focal consolidation. Bilateral mild pulmonary interstitial edema.   Nonobstructive bowel gas pattern. Limited evaluation of pneumoperitoneum on supine imaging, however no gross evidence of free air is noted.   Vizualized lungs are clear.   Osseous structures demonstrate no acute bony changes.       1. Enteric tube seen coursing below the level diaphragm with tip projecting over the expected position of the distal gastroesophageal junction. The side-hole of the enteric tube projects over the distal thoracic esophagus. Repositioning of the enteric tube can be considered based on patient position. 2. Additional medical devices as described above. 3. No pneumothorax, focal consolidation, or pleural effusion. Mild pulmonary interstitial edema. 4. Nonobstructive bowel gas  pattern.   I personally reviewed the images/study and I agree with the findings as stated by Jose Morrow MD. This study was interpreted at [facility], [place], [place].   MACRO: None   Signed by: Tuan Platt 11/13/2024 10:53 AM Dictation workstation:   [id number]    Transthoracic Echo (TTE) Limited    Result Date: 11/13/2024   [facility], [address] [place] [address]                Tel [phone] and Fax [phone] TRANSTHORACIC ECHOCARDIOGRAM REPORT  Patient Name:       RJ LEIJA     Reading Physician:    [id number] Raciel Casarez MD Study Date:         11/13/2024          Ordering Provider:    [id number] JO CASTILLO MRN/PID:            [id number]            Fellow: Accession#:         [id number]        Nurse: Date of Birth/Age:  1955 / 69     Sonographer:          Jackson estrada RDCS Gender assigned at  M                   Additional Staff: Birth: Height:             182.88 cm           Admit Date: Weight:             93.90 kg            Admission Status:     Inpatient - STAT BSA / BMI:          2.16 m2 / 28.07     Encounter#:           [id number]                     kg/m2 Blood Pressure:     94/74 mmHg          Department Location:  [facility] Study Type:    TRANSTHORACIC ECHO (TTE) LIMITED Diagnosis/ICD: Acute on chronic combined systolic (congestive) and diastolic                (congestive) heart failure (CHF)-I50.43 Indication:    eval RV fx CPT Code:      Echo Limited-00669; Doppler Limited-88850; Color Doppler-47149 Patient History: Pertinent History: CAD s/p CABGx4 in 2012 and PCI 2019 w/ ICM LVEF 10-15%, AF                    s/p RFA & PVI, HTN, CHF exacerbation, 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 Study Detail: The following Echo studies were performed: 2D, M-Mode, Doppler and               color flow. Technically challenging study due to body habitus,               patient lying in supine position, postoperative dressings,               prominent lung artifact and poor acoustic windows. The patient is               intubated. Definity used as a contrast agent for endocardial               border definition. Total contrast used for this procedure was 2.0               mL via IV push.  PHYSICIAN INTERPRETATION: Left Ventricle: Left ventricular ejection fraction is severely decreased, by visual estimate at 15%. There is eccentric left ventricular hypertrophy. There is global hypokinesis of the left ventricle with minor regional variations. The left ventricular cavity size is moderate to severely dilated. Left ventricular diastolic filling was not assessed. Left Atrium: The left atrium is moderately dilated. Right Ventricle: The right ventricle is mild to moderately enlarged. There is severely reduced right ventricular systolic function. A device is visualized in the right ventricle. Right Atrium: The right atrium is moderately dilated. There is a device visualized in the right atrium. Aortic Valve: The aortic valve was not well visualized. There is mild to moderate aortic valve cusp calcification. There is mild aortic valve regurgitation. Mitral Valve: The mitral valve is normal in structure. There is mild mitral valve regurgitation. Tricuspid Valve: The tricuspid valve is structurally normal. There is mild tricuspid regurgitation. Pulmonic Valve: The pulmonic valve is not well visualized. There is trace pulmonic valve regurgitation. Pericardium: There is no pericardial effusion noted. Aorta: The aortic root is abnormal. There is mild dilatation of the aortic root. Pulmonary Artery: The tricuspid regurgitant velocity is 1.83 m/s, and with an estimated right atrial pressure of 10 mmHg, the  estimated pulmonary artery pressure is normal with the RVSP at 23.4 mmHg. Systemic Veins: The inferior vena cava appears moderately dilated, on vent. In comparison to the previous echocardiogram(s): Compared with study dated 11/5/2024, no significant change.  LEFT VENTRICULAR ASSIST DEVICE: LVAD: The patient has a(n) HeartMate 3 LVAD device present. Inflow Cannula: The inflow cannula is visualized in the left ventricular apex.  CONCLUSIONS:  1. Poorly visualized anatomical structures due to suboptimal image quality.  2. Left ventricular cavity size is moderate to severely dilated.  3. Left ventricular ejection fraction is severely decreased, by visual estimate at 15%.  4. There is global hypokinesis of the left ventricle with minor regional variations.  5. There is severely reduced right ventricular systolic function.  6. Mild to moderately enlarged right ventricle.  7. The left atrium is moderately dilated.  8. The right atrium is moderately dilated.  9. Mild aortic valve regurgitation. 10. The inferior vena cava appears moderately dilated, on vent. QUANTITATIVE DATA SUMMARY:  2D MEASUREMENTS:          Normal Ranges: IVSd:            1.00 cm  (0.6-1.1cm) LVPWd:           1.20 cm  (0.6-1.1cm) LVIDd:           6.90 cm  (3.9-5.9cm) LVIDs:           6.60 cm LV Mass Index:   164 g/m2 LV % FS          4.3 %  AORTA MEASUREMENTS:         Normal Ranges: Ao Sinus, d:        4.00 cm (2.1-3.5cm)  LV SYSTOLIC FUNCTION BY 2D PLANIMETRY (MOD):                      Normal Ranges: EF-Visual:      15 % LV EF Reported: 15 %  RIGHT VENTRICLE: RV s' 0.07 m/s  TRICUSPID VALVE/RVSP:          Normal Ranges: Peak TR Velocity:     1.83 m/s RV Syst Pressure:     23 mmHg  (< 30mmHg) IVC Diam:             2.70 cm  [id number] Raciel Casarez MD Electronically signed on 11/13/2024 at 10:25:04 AM  ** Final **     XR chest 1 view    Result Date: 11/12/2024  Interpreted By:  Tuan Platt  and Angel [place] STUDY: XR CHEST 1 VIEW;  11/12/2024 1:38 pm    INDICATION: Signs/Symptoms:Post op cardiac surgery.     COMPARISON: Chest x-ray 11/11/2024 7:55 a.m., CT chest abdomen pelvis 11/04/2024   ACCESSION NUMBER(S): [id number]   ORDERING CLINICIAN: ANETA MANNING   FINDINGS: AP radiograph of the chest was provided.   Interval demonstration of postsurgical changes of LVAD placement. Postsurgical changes from median sternotomy surgical clips overlying left hilum. Right IJ pulmonary arterial catheter tip projects over the distal right main pulmonary artery. Interval placement of an enteric tube with tip projecting over the expected location of the gastroesophageal junction. Interval placement of left chest tubes. Partially visualized left shoulder arthroplasty.   CARDIOMEDIASTINAL SILHOUETTE: The heart is enlarged. Cardiomediastinal silhouette is stable in size and configuration.   LUNGS: Mild perihilar interstitial opacities, grossly stable as compared to prior. The bilateral costophrenic angles are clear, within the limitations of the newly placed LVAD obscured the left lower lung field. There is no evidence of a pneumothorax. There is no new focal consolidation.   ABDOMEN: No remarkable upper abdominal findings.   BONES: No acute osseous changes.       1. Postsurgical changes and medical devices as described above. Recommend advancement of enteric tube. 2. Stable mild pulmonary interstitial edema.   I personally reviewed the images/study and I agree with the findings as stated by Zack Ortiz MD (PGY-2). This study was interpreted at [facility], [place], [place].   MACRO: None   Signed by: Tuan Platt 11/12/2024 3:19 PM Dictation workstation:   [id number]      Notable Studies:   EKG:  Encounter Date: 10/24/24   Electrocardiogram, 12-lead PRN ACS symptoms   Result Value    Ventricular Rate 111    Atrial Rate 111    QRS Duration 130    QT Interval 302    QTC Calculation(Bazett) 410    R Axis 134    T Axis -40    QRS Count 18    Q Onset  218    T Offset 369    QTC Fredericia 370    Narrative    Atrial fibrillation with occasional Premature ventricular complexes and Fusion complexes  Nonspecific intraventricular block  Cannot rule out Anteroseptal infarct , age undetermined  T wave abnormality, consider inferolateral ischemia  Abnormal ECG  No previous ECGs available  Confirmed by Lucas Kerns ([id number]) on 10/28/2024 12:58:57 PM       Echo:  Transthoracic Echo (TTE) Limited    Result Date: 11/13/2024   [facility], [address] [place] [address]                Tel [phone] and Fax [phone] TRANSTHORACIC ECHOCARDIOGRAM REPORT  Patient Name:       RJ Webb Physician:    [id number] Raciel Casarez MD Study Date:         11/13/2024          Ordering Provider:    [id number] JO CASTILLO MRN/PID:            [id number]            Fellow: Accession#:         [id number]        Nurse: Date of Birth/Age:  1955 / 69     Sonographer:          Jackson estrada RDCS Gender assigned at  M                   Additional Staff: Birth: Height:             182.88 cm           Admit Date: Weight:             93.90 kg            Admission Status:     Inpatient - STAT BSA / BMI:          2.16 m2 / 28.07     Encounter#:           [id number]                     kg/m2 Blood Pressure:     94/74 mmHg          Department Location:  [facility] Study Type:    TRANSTHORACIC ECHO (TTE) LIMITED Diagnosis/ICD: Acute on chronic combined systolic (congestive) and diastolic                (congestive) heart failure (CHF)-I50.43 Indication:    eval RV fx CPT Code:      Echo Limited-30000; Doppler Limited-23183; Color Doppler-81201 Patient History: Pertinent History: CAD s/p CABGx4 in 2012 and PCI 2019 w/ ICM LVEF 10-15%, AF                    s/p RFA &  PVI, HTN, CHF exacerbation, 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 Study Detail: The following Echo studies were performed: 2D, M-Mode, Doppler and               color flow. Technically challenging study due to body habitus,               patient lying in supine position, postoperative dressings,               prominent lung artifact and poor acoustic windows. The patient is               intubated. Definity used as a contrast agent for endocardial               border definition. Total contrast used for this procedure was 2.0               mL via IV push.  PHYSICIAN INTERPRETATION: Left Ventricle: Left ventricular ejection fraction is severely decreased, by visual estimate at 15%. There is eccentric left ventricular hypertrophy. There is global hypokinesis of the left ventricle with minor regional variations. The left ventricular cavity size is moderate to severely dilated. Left ventricular diastolic filling was not assessed. Left Atrium: The left atrium is moderately dilated. Right Ventricle: The right ventricle is mild to moderately enlarged. There is severely reduced right ventricular systolic function. A device is visualized in the right ventricle. Right Atrium: The right atrium is moderately dilated. There is a device visualized in the right atrium. Aortic Valve: The aortic valve was not well visualized. There is mild to moderate aortic valve cusp calcification. There is mild aortic valve regurgitation. Mitral Valve: The mitral valve is normal in structure. There is mild mitral valve regurgitation. Tricuspid Valve: The tricuspid valve is structurally normal. There is mild tricuspid regurgitation. Pulmonic Valve: The pulmonic valve is not well visualized. There is trace pulmonic valve regurgitation. Pericardium: There is no pericardial effusion noted. Aorta: The aortic root is abnormal. There is mild dilatation of the aortic root. Pulmonary Artery: The  tricuspid regurgitant velocity is 1.83 m/s, and with an estimated right atrial pressure of 10 mmHg, the estimated pulmonary artery pressure is normal with the RVSP at 23.4 mmHg. Systemic Veins: The inferior vena cava appears moderately dilated, on vent. In comparison to the previous echocardiogram(s): Compared with study dated 11/5/2024, no significant change.  LEFT VENTRICULAR ASSIST DEVICE: LVAD: The patient has a(n) HeartMate 3 LVAD device present. Inflow Cannula: The inflow cannula is visualized in the left ventricular apex.  CONCLUSIONS:  1. Poorly visualized anatomical structures due to suboptimal image quality.  2. Left ventricular cavity size is moderate to severely dilated.  3. Left ventricular ejection fraction is severely decreased, by visual estimate at 15%.  4. There is global hypokinesis of the left ventricle with minor regional variations.  5. There is severely reduced right ventricular systolic function.  6. Mild to moderately enlarged right ventricle.  7. The left atrium is moderately dilated.  8. The right atrium is moderately dilated.  9. Mild aortic valve regurgitation. 10. The inferior vena cava appears moderately dilated, on vent. QUANTITATIVE DATA SUMMARY:  2D MEASUREMENTS:          Normal Ranges: IVSd:            1.00 cm  (0.6-1.1cm) LVPWd:           1.20 cm  (0.6-1.1cm) LVIDd:           6.90 cm  (3.9-5.9cm) LVIDs:           6.60 cm LV Mass Index:   164 g/m2 LV % FS          4.3 %  AORTA MEASUREMENTS:         Normal Ranges: Ao Sinus, d:        4.00 cm (2.1-3.5cm)  LV SYSTOLIC FUNCTION BY 2D PLANIMETRY (MOD):                      Normal Ranges: EF-Visual:      15 % LV EF Reported: 15 %  RIGHT VENTRICLE: RV s' 0.07 m/s  TRICUSPID VALVE/RVSP:          Normal Ranges: Peak TR Velocity:     1.83 m/s RV Syst Pressure:     23 mmHg  (< 30mmHg) IVC Diam:             2.70 cm  [id number] Raciel Casarez MD Electronically signed on 11/13/2024 at 10:25:04 AM  ** Final **     Transthoracic Echo (TTE)  Limited    Result Date: 11/5/2024   [facility], [address] [place] [address]                Tel [phone] and Fax [phone] TRANSTHORACIC ECHOCARDIOGRAM REPORT  Patient Name:       RJ Webb Physician:    [id number] Mira Maeyr MD Study Date:         11/5/2024           Ordering Provider:    [id number] PAYAL KOHLER MRN/PID:            [id number]            Fellow: Accession#:         [id number]        Nurse: Date of Birth/Age:  1955 / 69     Sonographer:          Maureen estrada                                     RDBLANQUITA Gender assigned at                     Additional Staff: Birth: Height:             182.88 cm           Admit Date:           10/24/2024 Weight:             93.44 kg            Admission Status:     Inpatient - STAT BSA / BMI:          2.16 m2 / 27.94     Encounter#:           [id number]                     kg/m2 Blood Pressure:     106/72 mmHg         Department Location:  [address] Study Type:    TRANSTHORACIC ECHO (TTE) LIMITED Diagnosis/ICD: Heart failure, unspecified-I50.9 Indication:    RV assessment CPT Code:      Echo Limited-29970; Doppler Limited-27513; Color Doppler-97583 Patient History: Pertinent History: Negative bubble peformed on prior echo; Known 15-20% EF;                    Cardiogenic shock; Acute on chronic heart failure with                    reduced EF and diastolic dysfunction; ICM; Hx of chronic                    A-Fib; VIV. Study Detail: The following Echo studies were performed: 2D, M-Mode, Doppler and               color flow. Technically challenging study due to body habitus.               Definity used as a contrast agent for endocardial border               definition. Total contrast used for this procedure was 2 mL via IV               push.  PHYSICIAN  INTERPRETATION: Left Ventricle: Left ventricular ejection fraction is severely decreased, calculated by Loera's biplane at 18%. There is severely increased eccentric left ventricular hypertrophy. There is global hypokinesis of the left ventricle with minor regional variations. The left ventricular cavity size is severely dilated. There is normal septal and normal posterior left ventricular wall thickness. Left ventricular diastolic filling was not assessed. There is no definite left ventricular thrombus visualized. Left Atrium: The left atrium is severely dilated. Right Ventricle: The right ventricle is severely enlarged. There is severely reduced right ventricular systolic function. A device is visualized in the right ventricle. Right Atrium: The right atrium is severely dilated. There is a device visualized in the right atrium. Aortic Valve: The aortic valve was not well visualized. There is mild aortic valve cusp calcification. Aortic valve regurgitation was not assessed. Mitral Valve: The mitral valve is normal in structure. Mitral valve regurgitation was not assessed. Tricuspid Valve: The tricuspid valve is structurally normal. There is mild tricuspid regurgitation. The Doppler estimated RVSP is mildly elevated at 42.9 mmHg. Pulmonic Valve: The pulmonic valve was not assessed. Pulmonic valve regurgitation was not assessed. Pericardium: Trivial pericardial effusion. Aorta: The aortic root is abnormal. The aortic root appears mildly dilated and measures 4.20 cm. There is mild dilatation of the ascending aorta. There is mild dilatation of the aortic root. Systemic Veins: The inferior vena cava appears dilated, with IVC inspiratory collapse less than 50%. In comparison to the previous echocardiogram(s): Compared with the prior exam from 10/25/2024, there was already a severely dilated LV with severely reduced function. The RV remains dilated with severely reduced function. Valvular function not assessed on  today's exam.  CONCLUSIONS:  1. Left ventricular ejection fraction is severely decreased, calculated by Loera's biplane at 18%.  2. There is global hypokinesis of the left ventricle with minor regional variations.  3. Left ventricular cavity size is severely dilated.  4. No left ventricular thrombus visualized.  5. There is severely increased eccentric left ventricular hypertrophy.  6. There is severely reduced right ventricular systolic function.  7. Severely enlarged right ventricle.  8. The left atrium is severely dilated.  9. The right atrium is severely dilated. 10. Mitral valve regurgitation was not assessed. 11. Mildly elevated right ventricular systolic pressure. 12. Mildly dilated aortic root. 13. Compared with the prior exam from 10/25/2024, there was already a severely dilated LV with severely reduced function. The RV remains dilated with severely reduced function. Valvular function not assessed on today's exam. QUANTITATIVE DATA SUMMARY:  2D MEASUREMENTS:           Normal Ranges: Ao Root d:       4.20 cm   (2.0-3.7cm) IVSd:            0.87 cm   (0.6-1.1cm) LVPWd:           0.78 cm   (0.6-1.1cm) LVIDd:           8.21 cm   (3.9-5.9cm) LVIDs:           8.04 cm LV Mass Index:   157 g/m2 LVEDV Index:     138 ml/m2 LV % FS          2.0 %  LA VOLUME:                  Normal Ranges: LA Volume Index: 59.9 ml/m2  RA VOLUME BY A/L METHOD:          Normal Ranges: RA Area A4C:             36.8 cm2  AORTA MEASUREMENTS:         Normal Ranges: Ao Sinus, d:        4.20 cm (2.1-3.5cm) Asc Ao, d:          4.20 cm (2.1-3.4cm)  LV SYSTOLIC FUNCTION BY 2D PLANIMETRY (MOD):                      Normal Ranges: EF-A4C View:    14 % (>=55%) EF-A2C View:    21 % EF-Biplane:     18 % LV EF Reported: 18 %  AORTIC VALVE:          Normal Ranges: LVOT Diameter: 2.31 cm (1.8-2.4cm)  RIGHT VENTRICLE: RV Basal 6.10 cm RV Mid   4.10 cm RV Major 9.1 cm TAPSE:   9.0 mm RV s'    0.06 m/s  TRICUSPID VALVE/RVSP:          Normal Ranges: Peak  TR Velocity:     2.64 m/s RV Syst Pressure:     43 mmHg  (< 30mmHg) IVC Diam:             3.00 cm  AORTA: Asc Ao Diam 4.23 cm  [id number] Mira Mayer MD Electronically signed on 11/5/2024 at 2:36:55 PM  ** Final **       Meds:  Scheduled medications  acetaminophen, 650 mg, oral, q6h  aspirin, 81 mg, oral, Daily  ceFAZolin, 2 g, intravenous, q8h  escitalopram, 10 mg, oral, Daily  heparin (porcine), 5,000 Units, subcutaneous, q8h  insulin lispro, 0-15 Units, subcutaneous, q4h  pantoprazole, 40 mg, intravenous, Daily before breakfast  polyethylene glycol, 17 g, oral, BID  sennosides-docusate sodium, 2 tablet, oral, BID  warfarin, 3 mg, oral, Daily      Continuous medications  EPINEPHrine, 0.03 mcg/kg/min, Last Rate: 0.03 mcg/kg/min (11/14/24 0600)  epoprostenol, 0.03 mcg/kg/min (Ideal), Last Rate: 0.03 mcg/kg/min (11/14/24 0317)  lactated Ringer's, 20 mL/hr, Last Rate: 20 mL/hr (11/14/24 0600)  lactated Ringer's, 5 mL/hr, Last Rate: 5 mL/hr (11/14/24 0600)  milrinone, 0-0.75 mcg/kg/min, Last Rate: 0.25 mcg/kg/min (11/14/24 1056)      PRN medications  PRN medications: alteplase, hydrALAZINE, HYDROmorphone, naloxone, ondansetron **OR** ondansetron, oxyCODONE, oxyCODONE, oxygen, vancomycin     Assessment/Plan   Assessment & Plan     # Stage D NYHA IV, ICM, decompensated acute on chronic heart failure, HFrEF 10-15% s/p LVAD HM3 implant 11/12/24   #RV dysfunction     Intra-op BRIANA: EF 15-20%, dilated RV     Heart Mate III interrogation   Most Recent   Flow 4.1   Speed 5000   Power 3.4   PI 3.6   MAP (mmHg): 70    - Please maintain MAP 70-80  - Maintain net even today from a volume perspective. Okay for spot diuresis.   - Coumadin start this evening. Would not recommend increase in Coumadin for 48-72 hours.   - Patient has primary indication for ASA (CAD, ICM) -> on ASA 81 mg daily.  - Please continue daily driveline dressing changes w/ picture in chart   - Would not adjust speed at this time as patient has adequate biventricular  filling pressures as well as LVAD outflow cannulation in descending aorta.  - Once patient only inotropic support is milrinone he is appropriate to start sildenafil & digoxin for RV support   - Not appropriate for GDMT in acute postop period   - Continue excellent care per [facility]ICU     Seen and discussed with Dr. Oropeza   ____________________________________________________________  Stacie Davis, MSN, [facility]-BC  Advanced Heart Failure LVAD Nurse Practitioner   For floor patients please page HHVI team after 5pm- [phone]  LVAD 24/7 emergency pager [phone]

## 2024-11-14 NOTE — PROGRESS NOTES
Spiritual Care Visit    Clinical Encounter Type  Visited With: Patient not available, Health care provider (Pt unable to engage at this time. Pt's wife stepped away at time of this visit.)  Routine Visit: Follow-up  Continue Visiting: Yes  Crisis Visit: Critical care

## 2024-11-14 NOTE — PROGRESS NOTES
Occupational Therapy    Re-Evaluation and Treatment    Patient Name: Fahad Meraz  MRN: [id number]  Today's Date: 11/14/2024  Room: 06/06  Time Calculation  Start Time: 1341  Stop Time: 1416  Time Calculation (min): 35 min    Assessment  IP OT Assessment  OT Assessment: Pt pleasant and engaged in therapy session. However, cognitive impairment and derceased strength and endurance impact level of functioning. Would benefit from continued skilled therapy to maximize safety and level of independence.  Prognosis: Good  Barriers to Discharge: Other (Comment) (medical acuity)  Evaluation/Treatment Tolerance: Patient tolerated treatment well  Medical Staff Made Aware: Yes  End of Session Communication: Bedside nurse  End of Session Patient Position: Up in chair, Alarm off, not on at start of session  Plan:  Inpatient Plan  Treatment Interventions: ADL retraining, Functional transfer training, UE strengthening/ROM, Endurance training, Cognitive reorientation, Neuromuscular reeducation, Compensatory technique education, Equipment evaluation/education  OT Frequency: 3 times per week  OT Discharge Recommendations: Other (Comment) (recommendation pending progress in therapy and cognitive status)  Equipment Recommended upon Discharge: Wheeled walker  OT Recommended Transfer Status: Moderate assist  OT - OK to Discharge: Yes  OT Assessment  OT Assessment Results: Decreased ADL status, Decreased safe judgment during ADL, Decreased cognition, Decreased endurance, Decreased functional mobility  Prognosis: Good  Barriers to Discharge: Other (Comment) (medical acuity)  Evaluation/Treatment Tolerance: Patient tolerated treatment well  Medical Staff Made Aware: Yes    Subjective   Current Problem:  1. Acute on chronic heart failure with reduced ejection fraction and diastolic dysfunction  Transthoracic Echo (TTE) Complete    Transthoracic Echo (TTE) Complete    Vascular US carotid artery duplex bilateral    Vascular US aorta iliac  duplex limited    Vascular US carotid artery duplex bilateral    Vascular US aorta iliac duplex limited    Colonoscopy Diagnostic    Colonoscopy Diagnostic    Surgical Pathology Exam    Surgical Pathology Exam    Transthoracic Echo (TTE) Limited    Transthoracic Echo (TTE) Limited    Vascular US Ankle Brachial Index (SITA) Without Exercise    Vascular US Ankle Brachial Index (SITA) Without Exercise    Central Line    Central Line    Transthoracic Echo (TTE) Limited    Transthoracic Echo (TTE) Limited    CANCELED: Vascular US lower extremity arterial duplex bilateral with SITA    CANCELED: Vascular US lower extremity arterial duplex bilateral with SITA      2. Ischemic cardiomyopathy  Case Request Cath Lab: Left & Right Heart Cath w Angiography & LV    Case Request Cath Lab: Left & Right Heart Cath w Angiography & LV    Transthoracic Echo (TTE) Complete    Transthoracic Echo (TTE) Complete    Cardiac Catheterization Procedure    Cardiac Catheterization Procedure    Surgical Pathology Exam    Surgical Pathology Exam    Surgical Pathology Exam    Surgical Pathology Exam      3. History of chronic atrial fibrillation  Case Request Cath Lab: Left & Right Heart Cath w Angiography & LV    Case Request Cath Lab: Left & Right Heart Cath w Angiography & LV    Cardiac Catheterization Procedure    Cardiac Catheterization Procedure    Surgical Pathology Exam    Surgical Pathology Exam      4. ACC/AHA stage D heart failure  Vascular US carotid artery duplex bilateral    Vascular US aorta iliac duplex limited    Vascular US carotid artery duplex bilateral    Vascular US aorta iliac duplex limited    Surgical Pathology Exam    Surgical Pathology Exam    Vascular US Ankle Brachial Index (SITA) Without Exercise    Vascular US Ankle Brachial Index (SITA) Without Exercise    CANCELED: Vascular US lower extremity arterial duplex bilateral with SITA    CANCELED: Vascular US lower extremity arterial duplex bilateral with SITA      5. Encounter  for pre-transplant evaluation for heart transplant  Vascular US carotid artery duplex bilateral    Vascular US aorta iliac duplex limited    Vascular US carotid artery duplex bilateral    Vascular US aorta iliac duplex limited    Surgical Pathology Exam    Surgical Pathology Exam    Vascular US Ankle Brachial Index (SITA) Without Exercise    Vascular US Ankle Brachial Index (SITA) Without Exercise    CANCELED: Vascular US lower extremity arterial duplex bilateral with SITA    CANCELED: Vascular US lower extremity arterial duplex bilateral with SITA      6. Other specified symptoms and signs involving the circulatory and respiratory systems  Vascular US carotid artery duplex bilateral    Vascular US aorta iliac duplex limited    Vascular US carotid artery duplex bilateral    Vascular US aorta iliac duplex limited    Surgical Pathology Exam    Surgical Pathology Exam    Vascular US Ankle Brachial Index (SITA) Without Exercise    Vascular US Ankle Brachial Index (SITA) Without Exercise    CANCELED: Vascular US lower extremity arterial duplex bilateral with SITA    CANCELED: Vascular US lower extremity arterial duplex bilateral with SITA      7. Abdominal aortic aneurysm, without rupture, unspecified (CMS-HCC)  Vascular US aorta iliac duplex limited    Surgical Pathology Exam    Surgical Pathology Exam      8. Cardiogenic shock (Multi)  Cardiac Catheterization Procedure      9. Peripheral vascular disease, unspecified (CMS-HCC)  Vascular US Ankle Brachial Index (SITA) Without Exercise      10. Heart failure, unspecified  Transthoracic Echo (TTE) Limited      11. Heart failure, ACC/AHA stage D  Anesthesia Intraoperative Transesophageal Echocardiogram    Anesthesia Intraoperative Transesophageal Echocardiogram      12. Acute on chronic combined systolic (congestive) and diastolic (congestive) heart failure  Surgical Pathology Exam    Surgical Pathology Exam    Intubation    Intubation      13. Dilated cardiomyopathy (Multi)   Anesthesia Intraoperative Transesophageal Echocardiogram        General:  Reason for Referral: s/p HM3 implantation  Past Medical History Relevant to Rehab: CAD s/p 4V CABG (2012) and PCI (LCx/OM; 5/2019), stage D systolic HF/ICM/HFrEF with LVEF 10-15%( 10/22/24 TTE), AF s/p RFA (PVI and CTI; 4/2024) on OAC therapy, HTN, dyslipidemia, depression, anxiety and VIV using CPAP  Co-Treatment: PT  Co-Treatment Reason: to maximize pt safety and mobility post extubation  Prior to Session Communication: Bedside nurse  Patient Position Received: Bed, 3 rail up, Alarm off, not on at start of session  Family/Caregiver Present: No  General Comment: Pt pleasant and willing to engage in OT reeval but confused throughout session. Engaged in LVAD education, bed mobility, and transfer to chair with arms with mod-mod x2 A throughout (Epi 0.03, milrinone 0.25 AirVo 80% FiO2, Flow 10 L/min. HM3- Flow 4.0, Speed 5000, PI 3.7, Power 3.5)   Precautions:  Medical Precautions: Cardiac precautions, Fall precautions, Oxygen therapy device and L/min, Chest tube (x2 CT)  Precautions Comment: MAP 70-80, SpO2>92  Vital Signs:  Vital Signs (Past 2hrs)        Date/Time Vitals Session Patient Position Pulse Resp SpO2 BP MAP (mmHg)    11/14/24 1341 Pre OT  Lying  106  20  97 %  92/59  69     11/14/24 1400 --  --  122  22  94 %  --  --     11/14/24 1416 Post OT  Sitting  128  22  94 %  102/71  83                   Pain:  Pain Assessment  Pain Assessment: 0-10  0-10 (Numeric) Pain Score: 0 - No pain  Lines/Tubes/Drains:  Introducer 11/09/24 Internal jugular Right (Active)   Number of days: 5       Arterial Line 11/12/24 Right Brachial (Active)   Number of days: 2       Pulmonary Artery Catheter Internal jugular (Active)   Number of days: 5       Urethral Catheter Temperature probe 16 Fr. (Active)   Number of days: 2       Chest Tube 1 Left Pleural (Active)   Number of days: 2       Chest Tube 2 Left Pleural (Active)   Number of days: 2         Objective    Cognition:  Overall Cognitive Status: Impaired  Orientation Level: Disoriented to place, Disoriented to time (Year thought 1925 or 2025, knew location with options)  Following Commands: Follows one step commands with increased time  Cognition Comments: Pt confused throughout session with intermittent inappropriate responses to prompts. Retained teaching of the name of his drive line from LVAD. Difficulty following commands for positioning and movements, frequently required multiple rewording to comprehend commands. Pt however pleasant throughout and compliant with understood commands.  Insight: Mild  Impulsive: Mildly           Home Living:  Type of Home: House  Lives With: Spouse  Home Adaptive Equipment: Walker rolling or standard, Cane (information from 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, pt unreliable source)  Home Layout: Able to live on main level with bedroom/bathroom  Home Access: Stairs to enter with rails  Entrance Stairs-Number of Steps: 2  Bathroom Shower/Tub: Walk-in shower  Bathroom Equipment: Grab bars in shower, Shower chair with back   Prior Function:  Level of Winder: Independent with ADLs and functional transfers, Independent with homemaking with ambulation  Receives Help From: Family  ADL Assistance: Independent  Homemaking Assistance: Independent  Ambulatory Assistance: Independent  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)  Leisure: likes to train hunting dogs (from 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)  Prior Function Comments: (+) drives, (-) falls  IADL History:     ADL:  Eating Assistance: Stand by  Eating Deficit: Supervision/safety, Verbal cueing, Increased time to complete  Grooming Assistance: Stand by  Grooming Deficit: Supervision/safety, Steadying, Increased time to complete, Verbal cueing  Bathing Assistance: Moderate  Bathing Deficit: Steadying, Supervision/safety, Left lower leg including foot, Right lower leg including foot, Left upper leg, Right upper leg, Increased time to complete , Verbal  cueing  UE Dressing Assistance: Minimal  UE Dressing Deficit: Increased time to complete, Verbal cueing, Supervision/safety  LE Dressing Assistance: Moderate  LE Dressing Deficit: Steadying, Verbal cueing, Supervision/safety, Increased time to complete, Tie shoes, Don/doff R sock, Don/doff L sock, Thread RLE into pants, Thread LLE into pants, Thread RLE into underwear, Thread LLE into underwear, Pull up over hips, Don/doff R shoe, Don/doff L shoe, Fasteners  Toileting Assistance with Device: Moderate  Toileting Deficit: Steadying, Supervison/safety, Increased time to complete, Clothing management up, Clothing management down  Activity Tolerance:  Endurance: Tolerates 10 - 20 min exercise with multiple rests  Early Mobility/Exercise Safety Screen: Proceed with mobilization - No exclusion criteria met  Balance:  Dynamic Sitting Balance  Dynamic Sitting-Balance Support: No upper extremity supported  Dynamic Sitting-Level of Assistance: Close supervision  Dynamic Sitting-Comments: Pt able to reposition and sustain dynamic sitting at EOB  Static Sitting Balance  Static Sitting-Balance Support: No upper extremity supported  Static Sitting-Level of Assistance: Close supervision  Bed Mobility/Transfers: Bed Mobility/Transfers: Bed Mobility  Bed Mobility: Yes  Bed Mobility 1  Bed Mobility 1: Supine to sitting  Level of Assistance 1: Maximum assistance, +2  Bed Mobility Comments 1: Pt initiated LE movement to EOB, required max a x2 and use of draw sheet for full postioning and trunk elevation   and Transfers  Transfer: Yes  Transfer 1  Transfer From 1: Sit to, Stand to  Transfer to 1: Stand, Sit  Technique 1: Sit to stand, Stand to sit  Transfer Level of Assistance 1: Arm in arm assistance, Moderate assistance, Moderate verbal cues  Trials/Comments 1: mod Ax2  for stability, able to attain full stand  Transfers 2  Transfer From 2: Bed to  Transfer to 2: Chair with arms  Technique 2: Stand pivot  Transfer Level of Assistance  2: Arm in arm assistance, Moderate assistance, +2, Maximum verbal cues  Trials/Comments 2: pt required max verbal cues for initiation of leg movement and positioning in transfer. Mod A x2 with arm in arm for stability, sequencing  IADL's:      Vision: Vision - Basic Assessment  Current Vision: Wears glasses all the time   and    Sensation:  Light Touch: No apparent deficits  Strength:  Strength Comments: BUE at least 3+/5 as observed through use in bed mobility and transfers  Perception:  Inattention/Neglect: Appears intact  Coordination:  Movements are Fluid and Coordinated: Yes   Hand Function:  Hand Function  Gross Grasp: Functional    Outcome Measures: [facility] Daily Activity  Putting on and taking off regular lower body clothing: A lot  Bathing (including washing, rinsing, drying): A lot  Putting on and taking off regular upper body clothing: A little  Toileting, which includes using toilet, bedpan or urinal: A little  Taking care of personal grooming such as brushing teeth: A little  Eating Meals: A little  Daily Activity - Total Score: 16        ICU Mobility Screen  Early Mobility/Exercise Safety Screen: Proceed with mobilization - No exclusion criteria met,     OT Adult Other Outcome Measures  5x Sit to Stand: completed from recliner without UE assist: 14/64 seconds, RPD 1/10, RPE 9/20.    Education Documentation  Precautions, taught by WAYLON Billingsley at 11/14/2024  2:46 PM.  Learner: Patient  Readiness: Acceptance  Method: Explanation  Response: Verbalizes Understanding, Demonstrated Understanding, Needs Reinforcement    Body Mechanics, taught by WAYLON Billingsley at 11/14/2024  2:46 PM.  Learner: Patient  Readiness: Acceptance  Method: Explanation  Response: Verbalizes Understanding, Demonstrated Understanding, Needs Reinforcement    ADL Training, taught by WAYLON Billingsley at 11/14/2024  2:46 PM.  Learner: Patient  Readiness: Acceptance  Method: Explanation  Response: Verbalizes  Understanding, Demonstrated Understanding, Needs Reinforcement    Education Comments  No comments found.        Goals:   Encounter Problems       Encounter Problems (Active)       ADLs       Patient with complete upper body dressing with independent level of assistance donning and doffing all UE clothes with no adaptive equipment. (Not Progressing)       Start:  10/29/24    Expected End:  11/28/24            Patient with complete lower body dressing with independent level of assistance donning and doffing all LE clothes  with PRN adaptive equipment. (Not Progressing)       Start:  10/29/24    Expected End:  11/28/24            Patient will complete daily grooming tasks with independent level of assistance and PRN adaptive equipment while standing. (Not Progressing)       Start:  10/29/24    Expected End:  11/28/24            Patient will complete toileting including hygiene clothing management/hygiene with independent level of assistance. (Not Progressing)       Start:  10/29/24    Expected End:  11/28/24            Patient will perform basic IADLs/simulated household tasks with mod (I). (Not Progressing)       Start:  10/29/24    Expected End:  11/28/24               COGNITION/SAFETY       Patient will score WFL on standardized cognitive assessment within reasonable time frame (Progressing)       Start:  10/29/24    Expected End:  11/28/24               EXERCISE/STRENGTHENING       Patient will participate in >15 minutes of activity with <2 rest breaks to increase tolerance for ADL/functional mobility performance. (Progressing)       Start:  10/29/24    Expected End:  11/28/24               EXERCISE/STRENGTHENING       Pt will demo functional bimanual manipulation coordination and strength skills as evidenced by ability to switch LVAD wires to battery with min A       Start:  11/14/24    Expected End:  11/28/24               MOBILITY       Patient will perform Functional mobility Household distances/Community  Distances with independent level of assistance and least restrictive device in order to improve safety and functional mobility. (Not Progressing)       Start:  10/29/24    Expected End:  11/28/24               TRANSFERS       Patient will perform bed mobility independent level of assistance in order to improve safety and independence with mobility (Progressing)       Start:  10/29/24    Expected End:  11/28/24            Patient will complete functional transfers with least restrictive device with independent level of assistance. (Progressing)       Start:  10/29/24    Expected End:  11/28/24                   Treatment Completed on Re-Evaluation    Therapy/Activity:     Therapeutic Activity  Therapeutic Activity Performed: Yes  Therapeutic Activity 1: Pt educated on LVAD components. Use of teach-back method to ensure understanding, retention  Therapeutic Activity 2: Pt engaged in x1 additional static stand, noted feeling shaky. Pt mod A x2 with arm in arm for stability         11/14/24 at 3:02 PM   WAYLON KIRKLAND   Rehab Office: [phone]

## 2024-11-14 NOTE — PROGRESS NOTES
Fahad Meraz is a 69 y.o. male on day 21 of admission presenting with Acute on chronic heart failure with reduced ejection fraction and diastolic dysfunction.    Subjective   Patient discussed in morning handover, patient examined and chart reviewed. Meds, labs and radiology reviewed during rapid and full interdisciplinary rounds this morning. Co-rounded with HF this morning    Surgeon: Jas Briseno  DOS: Nov. 12, 2024  Procedure: HMIII Implantation (via L thoracotomy)     Airway: grade I - full view of glottis   EF:  LVEF 15-20% with mild AI and mod MR, TR pre & post, dilated RV     FULL Code:  Emergency Sternotomy: N; L mini-thoracotomy    HPI:   10/18/24 - Patient initially presented to an outside hospital  with decompensated heart failure and KENYA.   10/24/24 - Patient transferred to [facility] HFICU  for medical optimization and evaluation for advanced heart failure therapies.    11/5/2024 -  [employer] Committee approved destination therapy LVAD.  He is now admitted to the CTICU s/p Heartmate 3 implant with the outflow to the mid descending thoracic aorta via left thoracotomy 11/12/24 with Dr. Mclain and Dr. Madrigal.     PMH:  CAD - s/p CABG x 4 (2012) and PCI (LCx/OM; 5/2019)  Stage D ICM HFrEF LVEF 10-15%  AF s/p RFA (PVI and CTI; 4/2024)  HTN  Dyslipidemia  pre-T2DM (A1c 6.3)  VIV using CPAP   Depression  Anxiety    Course in Hospital:  11/12 - Intra-op -  episode of pulseless VT requiring defib x 1 prior to being placed on CPB. In the ICU (post-op), significant bleeding from venous cannulation site requiring transfusion    Overnight: Femoral sheath remove yesterday afternoon.  LVAD flows stable.     Objective     Last Recorded Vitals  Blood pressure 98/68, pulse (!) 120, temperature 37.8 °C (100 °F), temperature source Core, resp. rate 17, height 1.829 m (6'), weight 96.5 kg (212 lb 11.9 oz), SpO2 99%.    Invasive Hemodynamics:    Most Recent Range Past 24hrs   BP (Art) 93/70 Arterial  Line BP 1  Min: 80/59  Max: 101/77   MAP(Art) 78 mmHg Arterial Line MAP 1 (mmHg)   Min: 66 mmHg  Max: 87 mmHg   RA/CVP   No data recorded   PA 32/24 PAP  Min: 29/22  Max: 44/22   PA(mean) 27 mmHg PAP (Mean)  Min: 23 mmHg  Max: 32 mmHg   CO 5.4 L/min CO (L/min)  Min: 4.8 L/min  Max: 5.4 L/min   CI 2.5 L/min/m2 CI (L/min/m2)  Min: 2.2 L/min/m2  Max: 2.5 L/min/m2   Mixed Venous 72 % No data recorded   SVR  926 (dyne*sec)/cm5 SVR (dyne*sec)/cm5  Min: 917 (dyne*sec)/cm5  Max: 1105 (dyne*sec)/cm5     Intake/Output last 3 Shifts:  I/O last 3 completed shifts:  In: 3946.8 (40.9 mL/kg) [I.V.:2796.8 (29 mL/kg); IV Piggyback:1150]  Out: 2820 (29.2 mL/kg) [Urine:2175 (0.6 mL/kg/hr); Emesis/NG output:100; Chest Tube:545]  Weight: 96.5 kg     Physical Exam  Constitutional:       Interventions: He is intubated.   Eyes:      Pupils: Pupils are equal, round, and reactive to light.   Cardiovascular:      Rate and Rhythm: Regular rhythm. Tachycardia present.      Comments: Milrinone: 0.25  Epi: 0.03  Vaso: 0.02  I-Epo: 0.03    Lines:  RIJ CVL and PAC  Right brachial A-line  Pulmonary:      Effort: He is intubated.      Comments: Vent Mode: Pressure support  FiO2 (%):  [40 %] 40 %  S RR:  [16] 16  S VT:  [620 mL] 620 mL  PEEP/CPAP (cm H2O):  [0 cm H20-8 cm H20] 5 cm H20  MT SUP:  [0 cm H20-10 cm H20] 5 cm H20  MAP (cm H2O):  [13] 13  Abdominal:      General: Bowel sounds are decreased.      Palpations: Abdomen is soft.      Tenderness: There is no abdominal tenderness (patient sedatec).   Genitourinary:     Comments: Gallo in situ: clear urine  Skin:     Comments: No active issues   Neurological:      Mental Status: He is alert.      Comments: RASS 0 - -1  CAM-ICU: negative   Psychiatric:         Behavior: Behavior is cooperative.     MCS:   Heart Mate III:     Most Recent Range Past 24hrs   Flow 4.1 Pump Flow  Min: 3.8  Max: 4.2   Speed 5000 Speed RPM  Min: 5000  Max: 5050   Power 3.4 Cortes  Min: 3.3  Max: 3.5   PI 3.4 Pulse Index   Min: 3.1  Max: 4.3   No PI event and stable power overnight. LDH stable    Assessment/Plan     ICU Liberation Bundle:  A-Analgesia: Tylenol and opioids at lowest effective dose  B-SBT: Passed and planning to extubate  C-RASS Target: 0 - -1  D-CAM: to be assessed on SAT (pending)  E-Mobilization Plan: stage III to OOBTC today  F - Family Last Update: to be update by the team    Daily Checklist:  HOB > 30 degrees: achieved   Feeding: NPO for extubation  Thromboprophylaxis: Heparin and SCDs  Ulcer Prophylaxis: PPI  Glucose Control: Target 110-180; < 180 achieved; no hypoglycemia  Line to removed: None at present  Bowel Care: Bowel regime ordered  De-escalation of Antibiotics: Day 2 of 2 of routine anti-microbial prophylaxis    Plan:  Continue with chemical RVAD support with maintain on I-Epo and will increase milrinone today. Will start wean epi.  ASA; start warfarin today  No change to LVAD parameter for today  Continue with diuresis today    DISPO: CTICU    I spent 49 minutes in the professional and overall care of this patient.    This critically ill patient continues to be at-risk for clinically significant deterioration / failure due to the above mentioned dysfunctional, unstable organ systems.  I have personally identified and managed all complex critical care issues to prevent aforementioned clinical deterioration.  Critical care time is spent at bedside and/or the immediate area and has included, but is not limited to, the review of diagnostic tests, labs, radiographs, serial assessments of hemodynamics, respiratory status, ventilatory management, and family updates.  Time spent in procedures and teaching are reported separately.    Zak Aguilar MD

## 2024-11-14 NOTE — CONSULTS
Nutrition Follow Up Assessment:   Nutrition Assessment    Reason for Assessment: Dietitian discretion (follow up eval)    Pt presented to CTICU following LVAD placement 11/12.       Nutrition History:  Food and Nutrient History: Per IDT rounds, pt was just extubated. Pending swallow eval. Appeared that pt was eating well prio to procedure.         Nutrition Intake (last 3 days)    Date/Time Diet Type Percent Meals Eaten (%) Appetite Fluid Restrictions Nutritional Supplements Nutritional Supplement Intake   11/14/24 0800 NPO -- -- -- -- --   11/13/24 2000 NPO -- -- -- -- --   11/12/24 2000 NPO -- -- -- -- --   11/11/24 1600 Cardiac 100 Good 2000 None --   11/11/24 1200 Cardiac 100 Good 2000 None --   11/11/24 0800 Cardiac 100 Good 2000 None --       Anthropometrics:  Height: 182.9 cm (6')   Weight: 96.5 kg (212 lb 11.9 oz)   BMI (Calculated): 28.85  IBW/kg (Dietitian Calculated): 81 kg  Percent of IBW: 114 %       Weight History:   Weight         11/11/2024  0600 11/12/2024  0500 11/13/2024  0600 11/13/2024  0800 11/14/2024  0556    Weight: 92.6 kg (204 lb 2.3 oz) 93 kg (205 lb 0.4 oz) 94.1 kg (207 lb 7.3 oz) 96.4 kg (212 lb 8.4 oz) 96.5 kg (212 lb 11.9 oz)             Weight Change %:  Weight History / % Weight Change: Says he is normally between 91kg-95.5kg -- post up up ~4 kgs from pre-op status, fluid vs added mass from LVAD device    Nutrition Focused Physical Exam Findings:  Edema:  Edema: +1 trace  Edema Location: generalized  Physical Findings:  Skin:  (left upper flank & groin surg sites)    Nutrition Significant Labs:  CBC Trend:   Results from last 7 days   Lab Units 11/14/24  0118 11/13/24  1228 11/13/24  0008 11/12/24  1851   WBC AUTO x10*3/uL 18.0* 15.6* 19.3* 21.3*   RBC AUTO x10*6/uL 4.59 4.45* 4.75 4.70   HEMOGLOBIN g/dL 12.6* 12.3* 13.0* 12.8*   HEMATOCRIT % 37.9* 36.3* 39.3* 39.5*   MCV fL 83 82 83 84   PLATELETS AUTO x10*3/uL 413 391 515* 486*    , A1C:  Lab Results   Component Value Date     "HGBA1C 6.3 (H) 10/24/2024   , BG POCT trend:   Results from last 7 days   Lab Units 11/13/24  1121 11/13/24  0929 11/13/24  0310 11/12/24  2151 11/09/24  0804   POCT GLUCOSE mg/dL 140* 133* 133* 160* 100*    , Liver Function Trend:   Results from last 7 days   Lab Units 11/14/24  0118 11/13/24  1228 11/12/24  1307   ALK PHOS U/L 57 50 59   AST U/L 40* 42* 37   ALT U/L 7* 11 15   BILIRUBIN TOTAL mg/dL 0.9 1.1 0.9    , Renal Lab Trend:   Results from last 7 days   Lab Units 11/14/24  0118 11/13/24  1228 11/13/24  0008 11/13/24  0007 11/12/24  1257   POTASSIUM mmol/L 5.6* 5.2  --  4.8 4.2   PHOSPHORUS mg/dL 5.9* 5.5*  --  3.8 3.7   SODIUM mmol/L 136 136  --  136 139   MAGNESIUM mg/dL 2.49* 2.41*   < >  --  3.10*   EGFR mL/min/1.73m*2 39* 44*  --  43* 42*   BUN mg/dL 39* 33*  --  27* 24*   CREATININE mg/dL 1.86* 1.66*  --  1.70* 1.73*    < > = values in this interval not displayed.    , Lipid Panel: No results found for: \"CHOL\", \"HDL\", \"CHHDL\", \"LDLF\", \"VLDL\", \"TRIG\" , Vit D: No results found for: \"VITD25\"     Nutrition Specific Medications:  Scheduled medications  acetaminophen, 650 mg, oral, q6h  aspirin, 81 mg, oral, Daily  ceFAZolin, 2 g, intravenous, q8h  escitalopram, 10 mg, oral, Daily  heparin (porcine), 5,000 Units, subcutaneous, q8h  insulin lispro, 0-15 Units, subcutaneous, q4h  pantoprazole, 40 mg, intravenous, Daily before breakfast  polyethylene glycol, 17 g, oral, BID  sennosides-docusate sodium, 2 tablet, oral, BID  warfarin, 3 mg, oral, Daily      Continuous medications  EPINEPHrine, 0.03 mcg/kg/min, Last Rate: 0.03 mcg/kg/min (11/14/24 0600)  epoprostenol, 0.03 mcg/kg/min (Ideal), Last Rate: 0.03 mcg/kg/min (11/14/24 0317)  lactated Ringer's, 20 mL/hr, Last Rate: 20 mL/hr (11/14/24 0600)  lactated Ringer's, 5 mL/hr, Last Rate: 5 mL/hr (11/14/24 0600)  milrinone, 0-0.75 mcg/kg/min, Last Rate: 0.25 mcg/kg/min (11/14/24 1056)      PRN medications  PRN medications: alteplase, hydrALAZINE, HYDROmorphone, " naloxone, ondansetron **OR** ondansetron, oxyCODONE, oxyCODONE, oxygen, vancomycin      I/O:   Last BM Date: 11/11/24; Stool Appearance: Unable to assess (11/13/24 0830)    Dietary Orders (From admission, onward)       Start     Ordered    11/12/24 1306  May Participate in Room Service  ( ROOM SERVICE MAY PARTICIPATE)  Once        Question:  .  Answer:  Yes    11/12/24 1305    11/12/24 1257  NPO Diet; Effective now  Diet effective now         11/12/24 1256    11/12/24 1257  Oral nutritional supplements - Jonatan  Until discontinued        Comments: Twice a day.   Question Answer Comment   Deliver with Breakfast    Deliver with Lunch    Select supplement: Jonatan        11/12/24 1256                     Estimated Needs:   Total Energy Estimated Needs (kCal):  (9081-7507)  Method for Estimating Needs: DLC=3818 (SF 1.3)  Total Protein Estimated Needs (g):  (100-115)  Method for Estimating Needs: 1.2-1.4 x 81kg  Total Fluid Estimated Needs (mL):  (1mL/kcal or per team)           Nutrition Diagnosis   Malnutrition Diagnosis  Patient has Malnutrition Diagnosis: Yes  Diagnosis Status: Ongoing  Malnutrition Diagnosis: Moderate malnutrition related to acute disease or injury  As Evidenced by: pt reports a poor to fair PO intake for weeks to months PTA along with moderate to severe fat and muscle loss on physical exam; he is down in weight by 23% in two months-- mostly fluids but suspect also has a component of true fat/muscle loss         Nutrition Interventions/Recommendations         Nutrition Prescription:  Individualized Nutrition Prescription Provided for : diet + ONS prn        Nutrition Interventions:   Goal: 1) resme liberalized regular diet order once he passes swallow evaluation    Goal: 2) Pt has been declining ONS this hospital staty, may be of benefit for post-op healing & recovery > rec resume upon pt request         Nutrition Monitoring and Evaluation   Food/Nutrient Related History Monitoring  Monitoring and  Evaluation Plan: Energy intake  Criteria: meet >75% of estimated energy needs                        Time Spent (min): 30 minutes

## 2024-11-14 NOTE — PROGRESS NOTES
CTICU Progress Note  Fahad Meraz/[id number]    Admit Date: 10/24/2024  Hospital Length of Stay: 21   ICU Length of Stay: 1d 18h   CT SURGEON:     SUBJECTIVE:   PSV 5/5 overnight with RASS -1 on dexmedetomidine 1  Vaso actives: iEPO 0.03, epi 0.03, milrinone 0.125, hydral 10mg IV x 1 for MAP > 90  CI 2.5 SvO2 73 lactate 1.1      MEDICATIONS  Infusions:  dexmedeTOMIDine, Last Rate: 1 mcg/kg/hr (11/14/24 0600)  EPINEPHrine, Last Rate: 0.03 mcg/kg/min (11/14/24 0600)  epoprostenol, Last Rate: 0.03 mcg/kg/min (11/14/24 0317)  lactated Ringer's, Last Rate: 20 mL/hr (11/14/24 0600)  lactated Ringer's, Last Rate: 5 mL/hr (11/14/24 0600)  milrinone, Last Rate: 0.125 mcg/kg/min (11/14/24 0600)  propofol, Last Rate: Stopped (11/13/24 1057)      Scheduled:  acetaminophen, 650 mg, q6h  aspirin, 81 mg, Daily  ceFAZolin, 2 g, q8h  escitalopram, 10 mg, Daily  fluconazole, 400 mg, q24h  heparin (porcine), 5,000 Units, q8h  insulin lispro, 0-15 Units, q4h  pantoprazole, 40 mg, Daily before breakfast  polyethylene glycol, 17 g, BID  sennosides-docusate sodium, 2 tablet, BID  vancomycin, 1,500 mg, q24h      PRN:  alteplase, 2 mg, PRN  hydrALAZINE, 10 mg, q4h PRN  HYDROmorphone, 0.2 mg, q2h PRN  naloxone, 0.2 mg, q5 min PRN  ondansetron, 4 mg, q8h PRN   Or  ondansetron, 4 mg, q8h PRN  oxyCODONE, 10 mg, q4h PRN  oxyCODONE, 5 mg, q4h PRN  oxygen, , Continuous PRN - O2/gases  vancomycin, , Daily PRN        PHYSICAL EXAM:   Visit Vitals  BP 98/68   Pulse (!) 122   Temp 37.6 °C (99.7 °F)   Resp 18   Ht 1.829 m (6')   Wt 96.5 kg (212 lb 11.9 oz)   SpO2 98%   BMI 28.85 kg/m²   Smoking Status Former   BSA 2.21 m²     Wt Readings from Last 5 Encounters:   11/14/24 96.5 kg (212 lb 11.9 oz)   10/24/24 92.5 kg (204 lb)   08/05/24 122 kg (268 lb)   01/31/22 119 kg (262 lb)   02/18/20 123 kg (270 lb 2 oz)     INTAKE/OUTPUT:  I/O last 3 completed shifts:  In: 3946.8 (40.9 mL/kg) [I.V.:2796.8 (29 mL/kg); IV Piggyback:1150]  Out: 2820 (29.2 mL/kg)  [Urine:2175 (0.6 mL/kg/hr); Emesis/NG output:100; Chest Tube:545]  Weight: 96.5 kg          Vent settings:  Vent Mode: Pressure support  FiO2 (%):  [40 %] 40 %  S RR:  [16] 16  S VT:  [620 mL] 620 mL  PEEP/CPAP (cm H2O):  [5 cm H20-8 cm H20] 5 cm H20  NM SUP:  [5 cm H20-10 cm H20] 5 cm H20  MAP (cm H2O):  [13] 13    Physical Exam:   - CONSTITUTION: intubated male in ICU bed with LVAD  - NEUROLOGIC: no focal deficits appreciated   - CARDIOVASCULAR: sinus tachy with PVCs  - RESPIRATORY: mechanical breath sounds. Chest tubes with serosang output, no airleaks  - GI: soft, + BS  - : molina with dark yellow urine  - EXTREMITIES: trace edema    - SKIN: intact  - PSYCHIATRIC: calm    Daily Risk Screen  Intubated: 11/2, WTE today   Central line: 11/9 hemodynamic monitoring & vasoactive meds  Molina: strict 11/12 for strict I/Os    Images: TTE reviewed and noted for reduced RV function.      Invasive Hemodynamics:    Most Recent Range Past 24hrs   BP (Art) 96/67 Arterial Line BP 1  Min: 80/59  Max: 101/77   MAP(Art) 78 mmHg Arterial Line MAP 1 (mmHg)   Min: 66 mmHg  Max: 87 mmHg   RA/CVP   No data recorded   PA 31/22 PAP  Min: 29/22  Max: 44/22   PA(mean) 25 mmHg PAP (Mean)  Min: 23 mmHg  Max: 32 mmHg   CO 5.4 L/min CO (L/min)  Min: 4.8 L/min  Max: 5.4 L/min   CI 2.5 L/min/m2 CI (L/min/m2)  Min: 2.2 L/min/m2  Max: 2.5 L/min/m2   Mixed Venous 72 % No data recorded   SVR  926 (dyne*sec)/cm5 SVR (dyne*sec)/cm5  Min: 917 (dyne*sec)/cm5  Max: 1152 (dyne*sec)/cm5         Assessment/Plan   69 M with PMHx of CAD s/p CABG x 4 (2012) and PCI (LCx/OM; 5/2019), stage D ICM HFrEF LVEF 10-15%, AF s/p RFA (PVI and CTI; 4/2024), HTN, pre-T2DM A1c 6.3, depression, anxiety, and VIV using CPAP who was admitted to OSH s/p RHC on 10/18/24 with decompensated heart failure and KENYA. [facility]24[facility]24 and was medically optomized and presented to [employer] [facility] 11/5 who approved destination therapy LVAD.  He is  now admitted to the CTICU s/p Heartmate 3 implant with the outflow to the mid descending thoracic aorta via left thoracotomy 11/12/24 with Dr. Mclain and Dr. Madrigal.         NEURO:  PMH of anxiety & depression. Patient is intubated and sedated on dexmedetomidine infusion with RASS -1.  Acute post operative pain acceptably controlled.     - Serial neuro and pain assessments   - Continue dexmedetomidine as needed pending extubation    - Continue ATC Tylenol   - PRN oxycodone  - PRN dilaudid for BT pain   - Continue home escitalopram 10mg daily   - PT Consult, OOB to chair as tolerated    - CAM ICU score qshift  - Sleep/wake cycle hygiene     CV:  Patient has a history of CAD s/p CABG x 4 (2012) and PCI (LCx/OM; 5/2019), stage D ICM HFrEF LVEF 10-15%, AF s/p RFA (PVI and CTI; 4/2024), HTN, and is now status post HM3 LVAD via left thoracotomy 11/12 with Jas Ortiz and Kaitlin. Pre/Post EF: 15-20% &stably dilated RV. Remains on epi 0.03, milrinone 0.125, I Epo 0.03 with CVP 9-12 and normal PA pressure. CI 1.8 overnight but with normal SVO2 and lactate. Hypotension improving, now off levo and vaso. Sinus tachy with PVCs. No PPM or pacer wires.    - Maintain goal MAP 70-80mmHg, avoid sustained MAPs > 90mmHg    - Mixed venous and CI Q4H   - Increase milrinone to 0.25 mcg/kg/min if remains off pressors  - Continue epi 0.03, eval for weaning after increasing milrinone   - Daily weights per LVAD prtocol   - Dressing changes daily until dry, then weekly per protocol   - Continue ASA 81mg daily for CAD history  - Hold pre-op hydral, isosorbide, entresto, metoprolol xl 50mg, spironolactone.    - Clarify who no satin use PTA (OSH lipid were low normal but he has hx of CAD/CABG,ICM).      PULM:  No history of pulmonary disease.  Currently intubated with mechanical ventilation and adequate gas exchange. Chest tubes 1 left plural and 1 mediastinal with low output & no air leaks. Appropriate oxygenation and ventilation on minimal  support   - Daily CXR  - WTE this morning   - Wean FiO2 maintaining SpO2 >92%.   - IS q1h and OOB to chair when extubated  - Chest tubes to wall suction.     GI:  PMH of GERD ob PPI PTA. Malnutrition. OG in place.  LFT's normal.    - Continue PPI for GERD, LVAD bleeding risk   - NPO, will perform bedside swallow eval post extubation   - Colace/senna BID and miralax BID  - Daily LFT's while monitoring for venous congestion      :  CSA-KENYA Risk Score pre-op 6, ICU admit 6.  He did have KENYA on CKD in recent weeks that improved to 1.3 pre-op (baseline creatinine 1.3 - 1.6). Creatinine stable and making appropriate urine. CVP normal but dilated RV on ECHO. Appears euvolemic on exam.   - Continue molina catheter for strict I/Os.  - Goal UOP 0.5ml/kg/hr  - RFP as clinically indicated  - Replete electrolytes per CTICU protocol  - Goal even today. Will continue to evaluate volume status     ENDO:  PMH of pre-DM with recent A1c: 6.3.  Hyperglycemia improving on insulin infusion.     - Maintain BG <180, insulin per CTICU protocol  - Continue SSI     HEME:  Acute blood loss anemia stable    - Monitor drain output volume and characteristics  - CBC in AM  - Start warfarin 3mg daily (11/14 -).  (PT previously attributed dizziness to apixaban)   - Continue SQH, SCDs for DVT prophylaxis.  - Last type and screen: 11/14     ID:  Afebrile, no current indications of infection. MRSA screen pending. -->  - Trend temp q4h  - Periop cefazolin, vanco, fluconazole x 48hrs per LVAD protocol      Skin:  No active skin issues.  - preventative Mepilex dressings in place on sacrum and heels  - change preventative Mepilex weekly or more frequently as indicated (when moist/soiled)   - every shift skin assessment per nursing and weekly ICU skin rounds  - moisture barrier to be applied with ricky care  - active skin problems addressed with nursing on daily rounds     Proph:  SCDs  PPI  SQH     G:  Line  Right IJ MAC w PA cath placed 11/9  Right  brachial a-line placed 11/12, eval for radial A-line in next 24 hrs   PIVs  Gallo 11/12    F: Family: updated at bedside.     A,B,C,D,E,F,G: reviewed     I personally spent 55 minutes of critical care time directly and personally managing the patient exclusive of separately billable procedures.     Dispo: CTICU care for now.    CTICU TEAM PHONE [phone]

## 2024-11-14 NOTE — PROGRESS NOTES
Physical Therapy    Physical Therapy Re-Evaluation & Treatment    Patient Name: Fahad Meraz  MRN: [id number]  Department: [facility]  Room: 06/06-A  Today's Date: 11/14/2024   Time Calculation  Start Time: 1340  Stop Time: 1418  Time Calculation (min): 38 min    Assessment/Plan   PT Assessment  PT Assessment Results: Decreased strength, Decreased endurance, Impaired balance, Decreased mobility, Decreased cognition  Rehab Prognosis: Good  Barriers to Discharge: medical acuity  Evaluation/Treatment Tolerance: Patient tolerated treatment well  Medical Staff Made Aware: Yes  End of Session Communication: Bedside nurse  Assessment Comment: Pt demonstrated ability to complete bed mobility, sit<>stand transfer and bed>chair transfer.  Varied assistance provided throughout session.  Pt confused requiring increased commands.  Vitals stable.  End of Session Patient Position: Up in chair, Alarm off, not on at start of session   IP OR SWING BED PT PLAN  Inpatient or Swing Bed: Inpatient  PT Plan  Treatment/Interventions: Bed mobility, Transfer training, Gait training, Balance training, Neuromuscular re-education, Strengthening, Endurance training, Therapeutic exercise, Therapeutic activity  PT Plan: Ongoing PT  PT Frequency: Daily  PT Discharge Recommendations: Other (comment) (Minimal mobility this date, will continue to follow while in house to determine best D/C location.)  PT Recommended Transfer Status: Assist x1, Assistive device  PT - OK to Discharge: Yes    Subjective     General Visit Information:  General  Reason for Referral: PT re-eval: s/p HM3 implantation  Past Medical History Relevant to Rehab: CAD s/p 4V CABG (2012) and PCI (LCx/OM; 5/2019), stage D systolic HF/ICM/HFrEF with LVEF 10-15%( 10/22/24 TTE), AF s/p RFA (PVI and CTI; 4/2024) on OAC therapy, HTN, dyslipidemia, depression, anxiety and VIV using CPAP  Missed Visit: No  Family/Caregiver Present: No  Co-Treatment: OT  Co-Treatment Reason: to maximize pt  safety and mobility post extubation  Prior to Session Communication: Bedside nurse  Patient Position Received: Bed, 3 rail up, Alarm off, not on at start of session  Preferred Learning Style: auditory, verbal  General Comment: Pt awake, alert and willing to participate in therapy session  Home Living:  Home Living  Type of Home: House  Lives With: Spouse  Home Adaptive Equipment: Walker rolling or standard, Cane  Home Layout: Able to live on main level with bedroom/bathroom  Home Access: Stairs to enter with rails  Entrance Stairs-Rails:  (GB)  Entrance Stairs-Number of Steps: 2  Bathroom Shower/Tub: Walk-in shower  Bathroom Toilet:  (Raised toilet seat)  Bathroom Equipment: Grab bars in shower, Shower chair with back  Home Living Comments: Obtained from 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  Prior Level of Function:  Prior Function Per Pt/Caregiver Report  Level of [place]: Independent with ADLs and functional transfers, Independent with homemaking with ambulation  Receives Help From: Family  ADL Assistance: Independent  Homemaking Assistance: Independent  Ambulatory Assistance: Independent  Vocational: Self employed  Prior Function Comments: (+) drives, (-) falls (Obtained from 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)  Precautions:  Precautions  Hearing/Visual Limitations: hearing is WFL  Medical Precautions: Cardiac precautions, Fall precautions, Oxygen therapy device and L/min, Chest tube  Precautions Comment: MAP 70-80, SpO2>92     11/14/24 1342   Vital Signs   Vitals Session Pre PT   Heart Rate (!) 133   Resp 19   SpO2 97 %   BP 95/64   MAP (mmHg) 75   BP Method Arterial line   Patient Position Lying      11/14/24 1418   Vital Signs   Vitals Session Post PT   Heart Rate (!) 135   Resp 23   SpO2 94 %   /71   MAP (mmHg) 81   BP Method Arterial line   Patient Position Sitting   Vital Signs Comment Vitals stable throughout session     Objective   Pain:  Pain Assessment  Pain Assessment: 0-10  0-10 (Numeric) Pain Score: 0 - No  pain  Cognition:  Cognition  Overall Cognitive Status: Impaired  Arousal/Alertness: Appropriate responses to stimuli  Orientation Level: Disoriented to place, Disoriented to time (Year thought 1925 or 2025, knew location with options)  Following Commands: Follows one step commands with increased time  Cognition Comments: Pt confused throughout session with intermittent inappropriate responses to prompts.. Pt however pleasant throughout and compliant with understanding of commands.    General Assessments:  General Observation  General Observation: Tele, jayson, walt, CVP, CT x2, molina, airvo (80%, 10L0< .03 iEPO. (LVAD drive line secure start/during/end of sesssion;  LVAD pre/post: flow 3.9/3.9, speed: 5000/5000, PI: 3.8/4.4, PP: 3.4/3.4)     Activity Tolerance  Endurance: Tolerates 10 - 20 min exercise with multiple rests  Early Mobility/Exercise Safety Screen: Proceed with mobilization - No exclusion criteria met    Sensation  Light Touch: No apparent deficits    Strength  Strength Comments: At least 3/5 shown through functional mobility  Coordination  Movements are Fluid and Coordinated: Yes    Postural Control  Postural Control: Within Functional Limits    Static Sitting Balance  Static Sitting-Balance Support: Bilateral upper extremity supported, Feet supported  Static Sitting-Level of Assistance: Contact guard    Static Standing Balance  Static Standing-Balance Support: Bilateral upper extremity supported  Static Standing-Level of Assistance: Minimum assistance (x2)  Static Standing-Comment/Number of Minutes: B arm in arm  Functional Assessments:  ADL  ADL's Addressed:  (Refer to OTs note for additional information)    Bed Mobility  Bed Mobility: Yes  Bed Mobility 1  Bed Mobility 1: Supine to sitting  Level of Assistance 1: Maximum assistance, +2  Bed Mobility Comments 1: HOB elevated, cues for sequencing, draw sheet utilized    Transfers  Transfer: Yes  Transfer 1  Transfer From 1: Sit to, Stand to  Transfer to  1: Sit, Stand  Technique 1: Sit to stand, Stand to sit  Transfer Device 1:  (B arm in arm)  Transfer Level of Assistance 1: Minimum assistance, +2  Trials/Comments 1: Cues for hand placement  Transfers 2  Transfer From 2: Bed to  Transfer to 2: Chair with arms  Technique 2: Lateral, To right  Transfer Device 2:  (B arm in arm)  Transfer Level of Assistance 2: Moderate assistance, +2  Trials/Comments 2: ~4-5 lateral steps to complete transfer (Assistance with advancement of LEs (utilized verbal and tactile cues).  Minimal foot clearance)    Ambulation/Gait Training  Ambulation/Gait Training Performed: No (Refer to transfer section for additional information)    Stairs  Stairs: No  Extremity/Trunk Assessments:    RLE   RLE : Within Functional Limits  LLE   LLE : Within Functional Limits  Treatments:  Therapeutic Activity  Therapeutic Activity Performed: Yes  Therapeutic Activity 1: Increased time for skilled ICU line/tube management for safe funcational mobility.  Therapeutic Activity 2: Education provided on LVAD prior to OOB mobility (increased time to complete, good retention).  Pt sat EOB ~6 min prior to OOB mobility with CGA.  Pt demonstrating good seated posture, no instability.  x2 sit<>stand transfers from EOB.  Pt able to remain in static standing ~30 sec with minAx2, B arm in arm with knees blocked.  Outcome Measures:  [facility] Basic Mobility  Turning from your back to your side while in a flat bed without using bedrails: A lot  Moving from lying on your back to sitting on the side of a flat bed without using bedrails: A lot  Moving to and from bed to chair (including a wheelchair): A lot  Standing up from a chair using your arms (e.g. wheelchair or bedside chair): A lot  To walk in hospital room: A lot  Climbing 3-5 steps with railing: Total  Basic Mobility - Total Score: 11    FSS-ICU  Ambulation: Unable to attempt due to weakness  Rolling: Maximal assistance (performs 25% - 49% of task)  Sitting: Supervision  or set-up only  Transfer Sit-to-Stand: Moderate assistance (performs 50 - 74% of task)  Transfer Supine-to-Sit: Maximal assistance (performs 25% - 49% of task)  Total Score: 12    Early Mobility/Exercise Safety Screen: Proceed with mobilization - No exclusion criteria met  ICU Mobility Scale: Transferring bed to chair [5]    Encounter Problems       Encounter Problems (Active)       Balance       Pt will score >45 on VEGA for safe community ambulation (Progressing)       Start:  10/29/24    Expected End:  11/28/24               Mobility       Patient will ambulate >1000 ft with LRAD and supervision With stable vitals and RPD </= 3/10 and RPE </= 13/20 for improved functional mobility.   (Met)       Start:  10/29/24    Expected End:  11/12/24    Resolved:  11/05/24    Updated to: Patient will ambulate >2500 ft with LRAD and supervision With stable vitals and RPD </= 3/10 and RPE </= 13/20 for improved functional mobility.    Update reason: Progression         Pt will complete 6MWT with no assistive device and supervision and achieve >700 ft With stable vitals and RPD </= 3/10 and RPE </= 13/20 for improved functional mobility.   (Met)       Start:  10/29/24    Expected End:  11/12/24    Resolved:  11/05/24    Updated to: Pt will complete 6MWT with no assistive device and supervision and achieve >1300 ft With stable vitals and RPD </= 3/10 and RPE </= 13/20 for improved functional mobility.    Update reason: Progression         Patient will ambulate >2500 ft with LRAD and supervision With stable vitals and RPD </= 3/10 and RPE </= 13/20 for improved functional mobility. (Progressing)       Start:  11/05/24    Expected End:  11/28/24                Pt will complete 6MWT with no assistive device and supervision and achieve >1300 ft With stable vitals and RPD </= 3/10 and RPE </= 13/20 for improved functional mobility. (Progressing)       Start:  11/05/24    Expected End:  11/28/24                   PT Transfers        Patient will perform bed mobility indep (maintenance 2/2 prolonged hospital stay anticipated) (Progressing)       Start:  10/29/24    Expected End:  11/28/24            Patient will transfer sit to and from stand indep (Progressing)       Start:  10/29/24    Expected End:  11/28/24            Pt will complete 5XSTS from recliner without UE assist <12 seconds With stable vitals and RPD </= 3/10 and RPE </= 13/20 for improved functional mobility.   (Progressing)       Start:  10/29/24    Expected End:  11/28/24               Pain - Adult              Education Documentation  Precautions, taught by Scarlett Bhandari, PT at 11/14/2024  3:45 PM.  Learner: Patient  Readiness: Acceptance  Method: Explanation, Demonstration  Response: Needs Reinforcement    Body Mechanics, taught by Scarlett Bhandari PT at 11/14/2024  3:45 PM.  Learner: Patient  Readiness: Acceptance  Method: Explanation, Demonstration  Response: Needs Reinforcement    Mobility Training, taught by Scarlett Bhandari PT at 11/14/2024  3:45 PM.  Learner: Patient  Readiness: Acceptance  Method: Explanation, Demonstration  Response: Needs Reinforcement    Education Comments  No comments found.

## 2024-11-15 ENCOUNTER — APPOINTMENT (OUTPATIENT)
Dept: CARDIOLOGY | Facility: HOSPITAL | Age: 69
DRG: 001 | End: 2024-11-15
Payer: MEDICARE

## 2024-11-15 ENCOUNTER — APPOINTMENT (OUTPATIENT)
Dept: RADIOLOGY | Facility: HOSPITAL | Age: 69
DRG: 001 | End: 2024-11-15
Payer: MEDICARE

## 2024-11-15 LAB
ALBUMIN SERPL BCP-MCNC: 3.6 G/DL (ref 3.4–5)
ALBUMIN SERPL BCP-MCNC: 3.7 G/DL (ref 3.4–5)
ALBUMIN SERPL BCP-MCNC: 3.7 G/DL (ref 3.4–5)
ALP SERPL-CCNC: 53 U/L (ref 33–136)
ALT SERPL W P-5'-P-CCNC: 5 U/L (ref 10–52)
ANION GAP BLDA CALCULATED.4IONS-SCNC: 10 MMO/L (ref 10–25)
ANION GAP BLDMV CALCULATED.4IONS-SCNC: 10 MMO/L (ref 10–25)
ANION GAP BLDMV CALCULATED.4IONS-SCNC: 10 MMO/L (ref 10–25)
ANION GAP BLDMV CALCULATED.4IONS-SCNC: 11 MMO/L (ref 10–25)
ANION GAP SERPL CALC-SCNC: 16 MMOL/L (ref 10–20)
ANION GAP SERPL CALC-SCNC: 17 MMOL/L (ref 10–20)
AST SERPL W P-5'-P-CCNC: 29 U/L (ref 9–39)
BASE EXCESS BLDA CALC-SCNC: -2 MMOL/L (ref -2–3)
BASE EXCESS BLDMV CALC-SCNC: -0.4 MMOL/L (ref -2–3)
BASE EXCESS BLDMV CALC-SCNC: -0.7 MMOL/L (ref -2–3)
BASE EXCESS BLDMV CALC-SCNC: -1.2 MMOL/L (ref -2–3)
BILIRUB DIRECT SERPL-MCNC: 0.3 MG/DL (ref 0–0.3)
BILIRUB SERPL-MCNC: 1.1 MG/DL (ref 0–1.2)
BODY TEMPERATURE: 37 DEGREES CELSIUS
BUN SERPL-MCNC: 49 MG/DL (ref 6–23)
BUN SERPL-MCNC: 53 MG/DL (ref 6–23)
CA-I BLD-SCNC: 1.23 MMOL/L (ref 1.1–1.33)
CA-I BLD-SCNC: 1.26 MMOL/L (ref 1.1–1.33)
CA-I BLDA-SCNC: 1.23 MMOL/L (ref 1.1–1.33)
CA-I BLDMV-SCNC: 1.14 MMOL/L (ref 1.1–1.33)
CA-I BLDMV-SCNC: 1.23 MMOL/L (ref 1.1–1.33)
CA-I BLDMV-SCNC: 1.24 MMOL/L (ref 1.1–1.33)
CALCIUM SERPL-MCNC: 9.1 MG/DL (ref 8.6–10.6)
CALCIUM SERPL-MCNC: 9.2 MG/DL (ref 8.6–10.6)
CHLORIDE BLD-SCNC: 102 MMOL/L (ref 98–107)
CHLORIDE BLD-SCNC: 102 MMOL/L (ref 98–107)
CHLORIDE BLD-SCNC: 104 MMOL/L (ref 98–107)
CHLORIDE BLDA-SCNC: 104 MMOL/L (ref 98–107)
CHLORIDE SERPL-SCNC: 100 MMOL/L (ref 98–107)
CHLORIDE SERPL-SCNC: 99 MMOL/L (ref 98–107)
CO2 SERPL-SCNC: 24 MMOL/L (ref 21–32)
CO2 SERPL-SCNC: 25 MMOL/L (ref 21–32)
CREAT SERPL-MCNC: 1.85 MG/DL (ref 0.5–1.3)
CREAT SERPL-MCNC: 1.91 MG/DL (ref 0.5–1.3)
EGFRCR SERPLBLD CKD-EPI 2021: 37 ML/MIN/1.73M*2
EGFRCR SERPLBLD CKD-EPI 2021: 39 ML/MIN/1.73M*2
EJECTION FRACTION: 13 %
ERYTHROCYTE [DISTWIDTH] IN BLOOD BY AUTOMATED COUNT: 16.7 % (ref 11.5–14.5)
ERYTHROCYTE [DISTWIDTH] IN BLOOD BY AUTOMATED COUNT: 17.1 % (ref 11.5–14.5)
GLUCOSE BLD MANUAL STRIP-MCNC: 121 MG/DL (ref 74–99)
GLUCOSE BLD MANUAL STRIP-MCNC: 122 MG/DL (ref 74–99)
GLUCOSE BLD MANUAL STRIP-MCNC: 128 MG/DL (ref 74–99)
GLUCOSE BLD MANUAL STRIP-MCNC: 138 MG/DL (ref 74–99)
GLUCOSE BLD MANUAL STRIP-MCNC: 99 MG/DL (ref 74–99)
GLUCOSE BLD-MCNC: 100 MG/DL (ref 74–99)
GLUCOSE BLD-MCNC: 115 MG/DL (ref 74–99)
GLUCOSE BLD-MCNC: 118 MG/DL (ref 74–99)
GLUCOSE BLDA-MCNC: 120 MG/DL (ref 74–99)
GLUCOSE SERPL-MCNC: 108 MG/DL (ref 74–99)
GLUCOSE SERPL-MCNC: 115 MG/DL (ref 74–99)
HCO3 BLDA-SCNC: 22.2 MMOL/L (ref 22–26)
HCO3 BLDMV-SCNC: 24.1 MMOL/L (ref 22–26)
HCO3 BLDMV-SCNC: 24.2 MMOL/L (ref 22–26)
HCO3 BLDMV-SCNC: 24.3 MMOL/L (ref 22–26)
HCT VFR BLD AUTO: 32.9 % (ref 41–52)
HCT VFR BLD AUTO: 33.8 % (ref 41–52)
HCT VFR BLD EST: 31 % (ref 41–52)
HCT VFR BLD EST: 33 % (ref 41–52)
HGB BLD-MCNC: 10.7 G/DL (ref 13.5–17.5)
HGB BLD-MCNC: 10.9 G/DL (ref 13.5–17.5)
HGB BLDA-MCNC: 11.1 G/DL (ref 13.5–17.5)
HGB BLDMV-MCNC: 10.3 G/DL (ref 13.5–17.5)
HGB BLDMV-MCNC: 11 G/DL (ref 13.5–17.5)
HGB BLDMV-MCNC: 11 G/DL (ref 13.5–17.5)
INHALED O2 CONCENTRATION: 36 %
INHALED O2 CONCENTRATION: 70 %
INR PPP: 1.4 (ref 0.9–1.1)
LACTATE BLDA-SCNC: 0.7 MMOL/L (ref 0.4–2)
LACTATE BLDMV-SCNC: 0.5 MMOL/L (ref 0.4–2)
LACTATE BLDMV-SCNC: 0.6 MMOL/L (ref 0.4–2)
LACTATE BLDMV-SCNC: 0.6 MMOL/L (ref 0.4–2)
LDH SERPL L TO P-CCNC: 301 U/L (ref 84–246)
LEFT VENTRICLE INTERNAL DIMENSION DIASTOLE: 5.9 CM (ref 3.5–6)
MAGNESIUM SERPL-MCNC: 2.09 MG/DL (ref 1.6–2.4)
MAGNESIUM SERPL-MCNC: 2.21 MG/DL (ref 1.6–2.4)
MCH RBC QN AUTO: 27.2 PG (ref 26–34)
MCH RBC QN AUTO: 27.4 PG (ref 26–34)
MCHC RBC AUTO-ENTMCNC: 32.2 G/DL (ref 32–36)
MCHC RBC AUTO-ENTMCNC: 32.5 G/DL (ref 32–36)
MCV RBC AUTO: 84 FL (ref 80–100)
MCV RBC AUTO: 85 FL (ref 80–100)
NRBC BLD-RTO: 0 /100 WBCS (ref 0–0)
NRBC BLD-RTO: 0 /100 WBCS (ref 0–0)
OXYHGB MFR BLDA: 95.2 % (ref 94–98)
OXYHGB MFR BLDMV: 59.7 % (ref 45–75)
OXYHGB MFR BLDMV: 61.6 % (ref 45–75)
OXYHGB MFR BLDMV: 62.8 % (ref 45–75)
PCO2 BLDA: 35 MM HG (ref 38–42)
PCO2 BLDMV: 38 MM HG (ref 41–51)
PCO2 BLDMV: 40 MM HG (ref 41–51)
PCO2 BLDMV: 43 MM HG (ref 41–51)
PH BLDA: 7.41 PH (ref 7.38–7.42)
PH BLDMV: 7.36 PH (ref 7.33–7.43)
PH BLDMV: 7.39 PH (ref 7.33–7.43)
PH BLDMV: 7.41 PH (ref 7.33–7.43)
PHOSPHATE SERPL-MCNC: 4.2 MG/DL (ref 2.5–4.9)
PHOSPHATE SERPL-MCNC: 5.1 MG/DL (ref 2.5–4.9)
PLATELET # BLD AUTO: 276 X10*3/UL (ref 150–450)
PLATELET # BLD AUTO: 283 X10*3/UL (ref 150–450)
PO2 BLDA: 83 MM HG (ref 85–95)
PO2 BLDMV: 38 MM HG (ref 35–45)
PO2 BLDMV: 39 MM HG (ref 35–45)
PO2 BLDMV: 41 MM HG (ref 35–45)
POTASSIUM BLDA-SCNC: 4.5 MMOL/L (ref 3.5–5.3)
POTASSIUM BLDMV-SCNC: 3.9 MMOL/L (ref 3.5–5.3)
POTASSIUM BLDMV-SCNC: 4.3 MMOL/L (ref 3.5–5.3)
POTASSIUM BLDMV-SCNC: 4.5 MMOL/L (ref 3.5–5.3)
POTASSIUM SERPL-SCNC: 4.6 MMOL/L (ref 3.5–5.3)
POTASSIUM SERPL-SCNC: 4.7 MMOL/L (ref 3.5–5.3)
PROT SERPL-MCNC: 5.4 G/DL (ref 6.4–8.2)
PROTHROMBIN TIME: 15.4 SECONDS (ref 9.8–12.8)
RBC # BLD AUTO: 3.94 X10*6/UL (ref 4.5–5.9)
RBC # BLD AUTO: 3.98 X10*6/UL (ref 4.5–5.9)
SAO2 % BLDA: 98 % (ref 94–100)
SAO2 % BLDMV: 62 % (ref 45–75)
SAO2 % BLDMV: 63 % (ref 45–75)
SAO2 % BLDMV: 65 % (ref 45–75)
SODIUM BLDA-SCNC: 132 MMOL/L (ref 136–145)
SODIUM BLDMV-SCNC: 132 MMOL/L (ref 136–145)
SODIUM BLDMV-SCNC: 133 MMOL/L (ref 136–145)
SODIUM BLDMV-SCNC: 134 MMOL/L (ref 136–145)
SODIUM SERPL-SCNC: 135 MMOL/L (ref 136–145)
SODIUM SERPL-SCNC: 136 MMOL/L (ref 136–145)
WBC # BLD AUTO: 13 X10*3/UL (ref 4.4–11.3)
WBC # BLD AUTO: 14.7 X10*3/UL (ref 4.4–11.3)

## 2024-11-15 PROCEDURE — 97530 THERAPEUTIC ACTIVITIES: CPT | Mod: GP

## 2024-11-15 PROCEDURE — 2500000001 HC RX 250 WO HCPCS SELF ADMINISTERED DRUGS (ALT 637 FOR MEDICARE OP): Performed by: NURSE PRACTITIONER

## 2024-11-15 PROCEDURE — 99233 SBSQ HOSP IP/OBS HIGH 50: CPT | Performed by: REGISTERED NURSE

## 2024-11-15 PROCEDURE — 84132 ASSAY OF SERUM POTASSIUM: CPT | Performed by: NURSE PRACTITIONER

## 2024-11-15 PROCEDURE — 2500000004 HC RX 250 GENERAL PHARMACY W/ HCPCS (ALT 636 FOR OP/ED): Performed by: NURSE PRACTITIONER

## 2024-11-15 PROCEDURE — 94645 CONT INHLJ TX EACH ADDL HOUR: CPT

## 2024-11-15 PROCEDURE — 99291 CRITICAL CARE FIRST HOUR: CPT

## 2024-11-15 PROCEDURE — 37799 UNLISTED PX VASCULAR SURGERY: CPT | Performed by: NURSE PRACTITIONER

## 2024-11-15 PROCEDURE — 82947 ASSAY GLUCOSE BLOOD QUANT: CPT

## 2024-11-15 PROCEDURE — 2500000002 HC RX 250 W HCPCS SELF ADMINISTERED DRUGS (ALT 637 FOR MEDICARE OP, ALT 636 FOR OP/ED)

## 2024-11-15 PROCEDURE — 83735 ASSAY OF MAGNESIUM: CPT | Performed by: NURSE PRACTITIONER

## 2024-11-15 PROCEDURE — 83615 LACTATE (LD) (LDH) ENZYME: CPT | Performed by: NURSE PRACTITIONER

## 2024-11-15 PROCEDURE — 93325 DOPPLER ECHO COLOR FLOW MAPG: CPT | Performed by: INTERNAL MEDICINE

## 2024-11-15 PROCEDURE — 2500000004 HC RX 250 GENERAL PHARMACY W/ HCPCS (ALT 636 FOR OP/ED): Performed by: STUDENT IN AN ORGANIZED HEALTH CARE EDUCATION/TRAINING PROGRAM

## 2024-11-15 PROCEDURE — 93750 INTERROGATION VAD IN PERSON: CPT

## 2024-11-15 PROCEDURE — 99291 CRITICAL CARE FIRST HOUR: CPT | Performed by: NURSE PRACTITIONER

## 2024-11-15 PROCEDURE — 97530 THERAPEUTIC ACTIVITIES: CPT | Mod: GO

## 2024-11-15 PROCEDURE — 93321 DOPPLER ECHO F-UP/LMTD STD: CPT | Performed by: INTERNAL MEDICINE

## 2024-11-15 PROCEDURE — 71045 X-RAY EXAM CHEST 1 VIEW: CPT | Performed by: RADIOLOGY

## 2024-11-15 PROCEDURE — 85610 PROTHROMBIN TIME: CPT | Performed by: NURSE PRACTITIONER

## 2024-11-15 PROCEDURE — 71045 X-RAY EXAM CHEST 1 VIEW: CPT

## 2024-11-15 PROCEDURE — 93750 INTERROGATION VAD IN PERSON: CPT | Performed by: INTERNAL MEDICINE

## 2024-11-15 PROCEDURE — 2020000001 HC ICU ROOM DAILY

## 2024-11-15 PROCEDURE — 85027 COMPLETE CBC AUTOMATED: CPT | Performed by: NURSE PRACTITIONER

## 2024-11-15 PROCEDURE — 2500000005 HC RX 250 GENERAL PHARMACY W/O HCPCS: Performed by: STUDENT IN AN ORGANIZED HEALTH CARE EDUCATION/TRAINING PROGRAM

## 2024-11-15 PROCEDURE — 99291 CRITICAL CARE FIRST HOUR: CPT | Performed by: INTERNAL MEDICINE

## 2024-11-15 PROCEDURE — 82330 ASSAY OF CALCIUM: CPT | Performed by: NURSE PRACTITIONER

## 2024-11-15 PROCEDURE — 93325 DOPPLER ECHO COLOR FLOW MAPG: CPT

## 2024-11-15 PROCEDURE — 93750 INTERROGATION VAD IN PERSON: CPT | Performed by: REGISTERED NURSE

## 2024-11-15 PROCEDURE — 80076 HEPATIC FUNCTION PANEL: CPT | Performed by: NURSE PRACTITIONER

## 2024-11-15 PROCEDURE — 93308 TTE F-UP OR LMTD: CPT | Performed by: INTERNAL MEDICINE

## 2024-11-15 RX ORDER — BUMETANIDE 0.25 MG/ML
2 INJECTION, SOLUTION INTRAMUSCULAR; INTRAVENOUS ONCE
Status: COMPLETED | OUTPATIENT
Start: 2024-11-15 | End: 2024-11-15

## 2024-11-15 ASSESSMENT — COGNITIVE AND FUNCTIONAL STATUS - GENERAL
TOILETING: A LOT
EATING MEALS: A LITTLE
MOVING FROM LYING ON BACK TO SITTING ON SIDE OF FLAT BED WITH BEDRAILS: A LOT
MOBILITY SCORE: 13
MOVING TO AND FROM BED TO CHAIR: A LITTLE
DRESSING REGULAR UPPER BODY CLOTHING: A LOT
DRESSING REGULAR LOWER BODY CLOTHING: A LOT
STANDING UP FROM CHAIR USING ARMS: A LOT
HELP NEEDED FOR BATHING: A LOT
WALKING IN HOSPITAL ROOM: A LITTLE
CLIMB 3 TO 5 STEPS WITH RAILING: TOTAL
DAILY ACTIVITIY SCORE: 14
TURNING FROM BACK TO SIDE WHILE IN FLAT BAD: A LOT
PERSONAL GROOMING: A LITTLE

## 2024-11-15 ASSESSMENT — PAIN - FUNCTIONAL ASSESSMENT
PAIN_FUNCTIONAL_ASSESSMENT: 0-10

## 2024-11-15 ASSESSMENT — PAIN SCALES - GENERAL
PAINLEVEL_OUTOF10: 0 - NO PAIN
PAINLEVEL_OUTOF10: 0 - NO PAIN
PAINLEVEL_OUTOF10: 6
PAINLEVEL_OUTOF10: 0 - NO PAIN
PAINLEVEL_OUTOF10: 0 - NO PAIN
PAINLEVEL_OUTOF10: 9
PAINLEVEL_OUTOF10: 3
PAINLEVEL_OUTOF10: 7
PAINLEVEL_OUTOF10: 0 - NO PAIN
PAINLEVEL_OUTOF10: 0 - NO PAIN

## 2024-11-15 ASSESSMENT — PAIN DESCRIPTION - ORIENTATION
ORIENTATION: LEFT

## 2024-11-15 ASSESSMENT — PAIN DESCRIPTION - LOCATION
LOCATION: RIB CAGE

## 2024-11-15 ASSESSMENT — PAIN SCALES - PAIN ASSESSMENT IN ADVANCED DEMENTIA (PAINAD): TOTALSCORE: MEDICATION (SEE MAR);REPOSITIONED

## 2024-11-15 NOTE — PROGRESS NOTES
Made supportive visit to pt and his family with Poornima Inman CNP and Rev. Maite Garza from palliative care. Pt slightly confused, and complaining of pain, but looking good sitting in a chair on Airvo.  Pall care team can continue to support as needed throughout hospital stay.      JOE Allen

## 2024-11-15 NOTE — PROGRESS NOTES
Occupational Therapy    Occupational Therapy Treatment    Name: Fahad Meraz  MRN: [id number]  : 1955  Date: 11/15/24  Room:       Time Calculation  Start Time: 918  Stop Time: 1000  Time Calculation (min): 42 min    Assessment:  OT Assessment: Pt plesant and engaged in session, however, demo'd cognitive impariment, slowed movement patterns, decreased strength and endurance, and required mod-max cue throughout for sequencing of movements. Would benefit from continued skilled therapy to maximize safety and level of independence.  Prognosis: Good  Barriers to Discharge: Other (Comment) (medical acuity)  Evaluation/Treatment Tolerance: Patient tolerated treatment well  Medical Staff Made Aware: Yes  End of Session Communication: Bedside nurse  End of Session Patient Position: Up in chair, Alarm off, not on at start of session  Plan:  Treatment Interventions: ADL retraining, Functional transfer training, UE strengthening/ROM, Endurance training, Cognitive reorientation, Equipment evaluation/education, Neuromuscular reeducation, Fine motor coordination activities, Compensatory technique education  OT Frequency: 3 times per week  OT Discharge Recommendations: High intensity level of continued care  Equipment Recommended upon Discharge: Wheeled walker  OT Recommended Transfer Status: Moderate assist  OT - OK to Discharge: Yes    Subjective   General:  OT Last Visit  OT Received On: 11/15/24  Reason for Referral: s/p HM3 implantation  Past Medical History Relevant to Rehab: CAD s/p 4V CABG () and PCI (LCx/OM; 2019), stage D systolic HF/ICM/HFrEF with LVEF 10-15%( 10/22/24 TTE), AF s/p RFA (PVI and CTI; 2024) on OAC therapy, HTN, dyslipidemia, depression, anxiety and VIV using CPAP  Prior to Session Communication: Bedside nurse  Patient Position Received: Bed, 3 rail up, Alarm off, not on at start of session  General Comment: Pt awake and willing to participate in therapy session. Engaged in bed  mobility, transfer to recliner, and LVAD education (Epi 0.01, milrinone 0.25, AirVo 70% FiO2, 10 L/min. HM3 flow 4.2, speed 5100, PI 3.5, Power 3.5)   Precautions:  Medical Precautions: Cardiac precautions, Fall precautions, Oxygen therapy device and L/min  Precautions Comment: MAP 70-80, SpO2>92  Vitals:     11/15/24 0918 11/15/24 1000   Vital Signs   Vitals Session Pre OT Post OT   Heart Rate (!) 129 (!) 139  (Simultaneous filing. User may not have seen previous data.)   Resp 16 22  (Simultaneous filing. User may not have seen previous data.)   SpO2 93 % 94 %  (Simultaneous filing. User may not have seen previous data.)   BP 85/63 92/69   MAP (mmHg) 71 76   BP Method Arterial line  --    Patient Position Lying Sitting   Vital Signs Comment Vitals stable throughout session  --      Lines/Tubes/Drains:  Introducer 11/09/24 Internal jugular Right (Active)   Number of days: 5       Arterial Line 11/12/24 Right Brachial (Active)   Number of days: 3       Ventricular Assist Device HeartMate 3 (Active)   Number of days: 3       Pulmonary Artery Catheter Internal jugular (Active)   Number of days: 5       Urethral Catheter Temperature probe 16 Fr. (Active)   Number of days: 3       Cognition:  Overall Cognitive Status: Impaired  Arousal/Alertness: Delayed responses to stimuli  Orientation Level: Disoriented to situation (oriented x3, stated had a bypass done)  Following Commands: Follows one step commands with increased time  Cognition Comments: Pt overall slow in responses to prompts verbally and with initiation of movement. Retained teaching of drive line from previous session. Educated on switching from HM3 to LVAD batteries, did not seem to understand importance of use. Demo'd difficulty following directions and with any rapid movements.  Insight: Moderate    Pain Assessment:  Pain Assessment  Pain Assessment: 0-10  0-10 (Numeric) Pain Score: 0 - No pain     Objective     Therapy/Activity:      Therapeutic  Activity  Therapeutic Activity Performed: Yes  Therapeutic Activity 1: Pt engaged in bed mobility supine to sitting requiring mod A to initiate log roll and for trunk elevation. Max cues for initiation and sequencing  Therapeutic Activity 2: Pt engaged in stand pivot transfer from bed to recliner with min A for stability during steps. Required CGA for stand, mod cues for sequencing  Therapeutic Activity 3: Pt educated on switching from HM3 to LVAD battery, given verbal explaination, examples, and then completing the movements himself. Pt demo'd difficulty with sequencing and connecting cords to battery, requiring mod A throughout and max cues. Will require reinforcement.  Therapeutic Activity 4: Pt engaged in second stand with CGA, use of FWW, and mod verbal cues to attain full stand. Sustained for ~1 minute.       Outcome Measures:  [facility] Daily Activity  Putting on and taking off regular lower body clothing: A lot  Bathing (including washing, rinsing, drying): A lot  Putting on and taking off regular upper body clothing: A lot  Toileting, which includes using toilet, bedpan or urinal: A lot  Taking care of personal grooming such as brushing teeth: A little  Eating Meals: A little  Daily Activity - Total Score: 14     Education Documentation  Precautions, taught by WAYLON Billingsley at 11/15/2024 10:17 AM.  Learner: Patient  Readiness: Acceptance  Method: Explanation  Response: Verbalizes Understanding, Needs Reinforcement, Demonstrated Understanding    Body Mechanics, taught by WAYLON Billingsley at 11/15/2024 10:17 AM.  Learner: Patient  Readiness: Acceptance  Method: Explanation  Response: Verbalizes Understanding, Needs Reinforcement, Demonstrated Understanding    ADL Training, taught by WAYLON Billingsley at 11/15/2024 10:17 AM.  Learner: Patient  Readiness: Acceptance  Method: Explanation  Response: Verbalizes Understanding, Needs Reinforcement, Demonstrated Understanding    Precautions, taught by  WAYLON Billingsley at 11/14/2024  2:46 PM.  Learner: Patient  Readiness: Acceptance  Method: Explanation  Response: Verbalizes Understanding, Demonstrated Understanding, Needs Reinforcement    Body Mechanics, taught by WAYLON Billingsley at 11/14/2024  2:46 PM.  Learner: Patient  Readiness: Acceptance  Method: Explanation  Response: Verbalizes Understanding, Demonstrated Understanding, Needs Reinforcement    ADL Training, taught by WAYLON Billingsley at 11/14/2024  2:46 PM.  Learner: Patient  Readiness: Acceptance  Method: Explanation  Response: Verbalizes Understanding, Demonstrated Understanding, Needs Reinforcement    Education Comments  No comments found.        Goals:  Encounter Problems       Encounter Problems (Active)       ADLs       Patient with complete upper body dressing with independent level of assistance donning and doffing all UE clothes with no adaptive equipment. (Not Progressing)       Start:  10/29/24    Expected End:  11/28/24            Patient with complete lower body dressing with independent level of assistance donning and doffing all LE clothes  with PRN adaptive equipment. (Not Progressing)       Start:  10/29/24    Expected End:  11/28/24            Patient will complete daily grooming tasks with independent level of assistance and PRN adaptive equipment while standing. (Not Progressing)       Start:  10/29/24    Expected End:  11/28/24            Patient will complete toileting including hygiene clothing management/hygiene with independent level of assistance. (Not Progressing)       Start:  10/29/24    Expected End:  11/28/24            Patient will perform basic IADLs/simulated household tasks with mod (I). (Not Progressing)       Start:  10/29/24    Expected End:  11/28/24               COGNITION/SAFETY       Patient will score WFL on standardized cognitive assessment within reasonable time frame (Progressing)       Start:  10/29/24    Expected End:  11/28/24                EXERCISE/STRENGTHENING       Patient will participate in >15 minutes of activity with <2 rest breaks to increase tolerance for ADL/functional mobility performance. (Progressing)       Start:  10/29/24    Expected End:  11/28/24               EXERCISE/STRENGTHENING       Pt will demo functional bimanual manipulation coordination and strength skills as evidenced by ability to switch LVAD wires to battery with min A       Start:  11/14/24    Expected End:  11/28/24               MOBILITY       Patient will perform Functional mobility Household distances/Community Distances with independent level of assistance and least restrictive device in order to improve safety and functional mobility. (Not Progressing)       Start:  10/29/24    Expected End:  11/28/24               TRANSFERS       Patient will perform bed mobility independent level of assistance in order to improve safety and independence with mobility (Progressing)       Start:  10/29/24    Expected End:  11/28/24            Patient will complete functional transfers with least restrictive device with independent level of assistance. (Progressing)       Start:  10/29/24    Expected End:  11/28/24                     11/15/24 at 11:18 AM   ELYSIA CARMONA, S-OT   [phone]

## 2024-11-15 NOTE — PROGRESS NOTES
CTICU Progress Note  Fahad Meraz/[id number]    Admit Date: 10/24/2024  Hospital Length of Stay: 22   ICU Length of Stay: 2d 18h   CT SURGEON:     SUBJECTIVE:   Vaso actives: iEPO 0.03, epi 0.01, milrinone 0.25   CI 2.9 SvO2 65 lactate 1.1      Ramp study TTE this morning, RPM's increased 5000 -> 5100 with preserved aortic valve opening and stable septal positioning.      MEDICATIONS  Infusions:  EPINEPHrine, Last Rate: 0.01 mcg/kg/min (11/15/24 0654)  epoprostenol, Last Rate: 0.02 mcg/kg/min (11/15/24 0721)  lactated Ringer's, Last Rate: 20 mL/hr (11/15/24 0400)  lactated Ringer's, Last Rate: 5 mL/hr (11/15/24 0654)  milrinone, Last Rate: 0.25 mcg/kg/min (11/15/24 0654)      Scheduled:  acetaminophen, 650 mg, q6h  aspirin, 81 mg, Daily  escitalopram, 10 mg, Daily  heparin (porcine), 5,000 Units, q8h  insulin lispro, 0-15 Units, q4h  polyethylene glycol, 17 g, BID  sennosides-docusate sodium, 2 tablet, BID  warfarin, 3 mg, Daily      PRN:  alteplase, 2 mg, PRN  hydrALAZINE, 10 mg, q4h PRN  HYDROmorphone, 0.2 mg, q2h PRN  naloxone, 0.2 mg, q5 min PRN  ondansetron, 4 mg, q8h PRN   Or  ondansetron, 4 mg, q8h PRN  oxyCODONE, 10 mg, q4h PRN  oxyCODONE, 5 mg, q4h PRN  oxygen, , Continuous PRN - O2/gases        PHYSICAL EXAM:   Visit Vitals  /71   Pulse (!) 132   Temp 37.1 °C (98.8 °F) (Core)   Resp 16   Ht 1.829 m (6')   Wt 97.5 kg (214 lb 15.2 oz)   SpO2 95%   BMI 29.15 kg/m²   Smoking Status Former   BSA 2.23 m²     Wt Readings from Last 5 Encounters:   11/15/24 97.5 kg (214 lb 15.2 oz)   10/24/24 92.5 kg (204 lb)   08/05/24 122 kg (268 lb)   01/31/22 119 kg (262 lb)   02/18/20 123 kg (270 lb 2 oz)     INTAKE/OUTPUT:  I/O last 3 completed shifts:  In: 3215.6 (33 mL/kg) [P.O.:600; I.V.:1755.6 (18 mL/kg); NG/GT:60; IV Piggyback:800]  Out: 4260 (43.7 mL/kg) [Urine:3705 (1.1 mL/kg/hr); Chest Tube:555]  Weight: 97.5 kg          Vent settings:  Vent Mode: Assist control/Volume control plus  FiO2 (%):  [70 %-80 %] 70  %  S RR:  [16] 16  S VT:  [620 mL] 620 mL  PEEP/CPAP (cm H2O):  [0 cm H20-8 cm H20] 5 cm H20  VT SUP:  [0 cm H20-5 cm H20] 5 cm H20    Physical Exam:   - CONSTITUTION: NAD   - NEUROLOGIC: intact, non focal OOB to chair for much of the day   - CARDIOVASCULAR: sinus tachy   - RESPIRATORY: Unlabored, diminished bases. Chest tubes with serosang output, no airleaks  - GI: soft, + BS  - : molina with dark yellow urine  - EXTREMITIES: trace edema    - SKIN: intact  - PSYCHIATRIC: calm    Daily Risk Screen  Intubated: No    Central line: 11/9 hemodynamic monitoring & vasoactive meds  Molina: strict 11/12 for strict I/Os, remove today     Images: TTE reviewed and noted for stable reduced RV function, aortic valve opening.      Invasive Hemodynamics:    Most Recent Range Past 24hrs   BP (Art) 90/63 Arterial Line BP 1  Min: 77/56  Max: 101/76   MAP(Art) 73 mmHg Arterial Line MAP 1 (mmHg)   Min: 64 mmHg  Max: 84 mmHg   RA/CVP   No data recorded   PA 33/18 PAP  Min: 27/18  Max: 44/28   PA(mean) 25 mmHg PAP (Mean)  Min: 21 mmHg  Max: 35 mmHg   CO 6.21 L/min CO (L/min)  Min: 5.3 L/min  Max: 6.5 L/min   CI 2.9 L/min/m2 CI (L/min/m2)  Min: 2.5 L/min/m2  Max: 3 L/min/m2   Mixed Venous 72 % No data recorded   SVR  773 (dyne*sec)/cm5 SVR (dyne*sec)/cm5  Min: 707 (dyne*sec)/cm5  Max: 915 (dyne*sec)/cm5         Assessment/Plan   69 M with PMHx of CAD s/p CABG x 4 (2012) and PCI (LCx/OM; 5/2019), stage D ICM HFrEF LVEF 10-15%, AF s/p RFA (PVI and CTI; 4/2024), HTN, pre-T2DM A1c 6.3, depression, anxiety, and VIV using CPAP who was admitted to [facility] s/p [facility] on 10/18/24 with decompensated heart failure and KENYA. He was referred to [facility] HFICU 10/24/24 and was medically optomized and presented to [employer] [facility] 11/5 who approved destination therapy LVAD.  He is now admitted to the CTICU s/p Heartmate 3 implant with the outflow to the mid descending thoracic aorta via left thoracotomy 11/12/24 with Dr. Mclain and Dr. Madrigal.          NEURO:  PMH of anxiety & depression.  Acute post operative pain acceptably controlled. CAM neg.    - Serial neuro and pain assessments   - Continue ATC Tylenol   - PRN oxycodone  - Continue home escitalopram 10mg daily   - PT Consult, OOB to chair as tolerated    - CAM ICU score qshift  - Sleep/wake cycle hygiene     CV:  Patient has a history of CAD s/p CABG x 4 (2012) and PCI (LCx/OM; 5/2019), stage D ICM HFrEF LVEF 10-15%, AF s/p RFA (PVI and CTI; 4/2024), HTN, and is now status post HM3 LVAD via left thoracotomy 11/12 with Abu Angel and Elgudin. Pre/Post EF: 15-20% &stably dilated RV. Remains on epi 0.03, milrinone 0.125, I Epo 0.03 with CVP 9-12 and normal PA pressure. CI 1.8 overnight but with normal SVO2 and lactate. Hypotension improving, now off levo and vaso. Sinus tachy with PVCs. No PPM or pacer wires.    - Maintain goal MAP 70-80mmHg, avoid sustained MAPs > 90mmHg    - Mixed venous and CI Q4H   - Continue milrinone 0.25 mcg/kg/min    - Continue epi 0.01, eval for discontinuing after I epo wean   - Wean I Epo pending stable filling pressures and CO/CI    - Daily weights per LVAD prtocol   - Dressing changes daily until dry, then weekly per protocol   - Continue ASA 81mg daily    - Hold pre-op hydral, isosorbide, entresto, metoprolol xl 50mg, spironolactone.    - Clarify who no satin use PTA (OSH lipid were low normal but he has hx of CAD/CABG,ICM).      PULM:  No history of pulmonary disease.  Currently intubated with mechanical ventilation and adequate gas exchange. Chest tubes 1 left plural and 1 mediastinal with low output & no air leaks. Appropriate oxygenation and ventilation on minimal support   - Daily CXR  - Continue HFNC for I Epo delivery   - Wean FiO2 maintaining SpO2 >92%.   - IS q1h and OOB to chair when extubated     GI:  PMH of GERD ob PPI PTA. Malnutrition.  LFT's normal.    - Continue PPI for GERD, LVAD bleeding risk   - ADAT    - Colace/senna BID and miralax BID  - Daily LFT's  while monitoring for venous congestion      :  CSA-KENYA Risk Score pre-op 6, ICU admit 6.  He did have KENYA on CKD in recent weeks that improved to 1.3 pre-op (baseline creatinine 1.3 - 1.6). Creatinine stable 1.7-1.8 post op.  He is net neg 737 in last 24 hrs with bumex 2mg x 2. He appears grossly euvolemic on exam.   - Continue molina catheter for strict I/Os.  - RFP as clinically indicated  - Replete electrolytes per CTICU protocol  - Goal even to slightly negative today.      ENDO:  PMH of pre-DM with recent A1c: 6.3.      - Maintain BG <180, insulin per CTICU protocol  - Continue SSI     HEME:  Acute blood loss anemia stable    - Monitor drain output volume and characteristics  - CBC in AM  - Continue warfarin 3mg daily (11/14 -).  (PT previously attributed dizziness to apixaban)   - Continue SQH, SCDs for DVT prophylaxis.  - Last type and screen: 11/14     ID:  Afebrile, no current indications of infection.  S/P Periop cefazolin, vanco, fluconazole x 48hrs per LVAD protocol   - Monitor      Skin:  No active skin issues.  - preventative Mepilex dressings in place on sacrum and heels  - change preventative Mepilex weekly or more frequently as indicated (when moist/soiled)   - every shift skin assessment per nursing and weekly ICU skin rounds  - moisture barrier to be applied with ricky care  - active skin problems addressed with nursing on daily rounds     Proph:  SCDs  PPI  SQH     G:  Line  Right IJ MAC w PA cath placed 11/9  Right brachial a-line placed 11/12, eval for radial A-line in next 24 hrs   PIVs  Molina 11/12, remove today     F: Family: updated at bedside.     A,B,C,D,E,F,G: reviewed     I personally spent 50 minutes of critical care time directly and personally managing the patient exclusive of separately billable procedures.     Dispo: CTICU care for now, transfer to HFICU soon   CTICU TEAM PHONE [phone]

## 2024-11-15 NOTE — PROGRESS NOTES
Spiritual Care Visit    Clinical Encounter Type  Visited With: Patient and family together  Routine Visit: Follow-up  Continue Visiting: Yes  Crisis Visit: Critical care     Visited patient and family with Palliative CNP and SW to support patient and family after patient's recent surgery. Patient seemed confused at times but very pleasant. Provided non-anxious, supportive presence. Will continue to follow.

## 2024-11-15 NOTE — CARE PLAN
The patient's goals for the shift include ambulate with PT/OT.    Problem: Ventricular Assisted Device (VAD)  Goal: Hemodynamic stability, correction of coagulopathy, lab value stability  Outcome: Progressing  Goal: Stable metal status  Outcome: Progressing  Goal: Pulmonary toileting, incentive spirometry  Outcome: Progressing  Goal: Nutrition  Outcome: Progressing  Goal: Mobility/OT/PT  Outcome: Progressing  Goal: ROM  Outcome: Progressing  Goal: Sitting  Outcome: Progressing  Goal: Walk  Outcome: Progressing  Goal: Involve in VAD awareness, dressing change  Outcome: Progressing       The clinical goals for the shift include Pt will remain HDS and wean epo.

## 2024-11-15 NOTE — CARE PLAN
Problem: Skin  Goal: Decreased wound size/increased tissue granulation at next dressing change  Outcome: Progressing  Goal: Participates in plan/prevention/treatment measures  Outcome: Progressing  Goal: Prevent/manage excess moisture  Outcome: Progressing  Flowsheets (Taken 11/15/2024 0429)  Prevent/manage excess moisture:   Cleanse incontinence/protect with barrier cream   Monitor for/manage infection if present  Goal: Prevent/minimize sheer/friction injuries  Outcome: Progressing  Flowsheets (Taken 11/15/2024 0149)  Prevent/minimize sheer/friction injuries:   Complete micro-shifts as needed if patient unable. Adjust patient position to relieve pressure points, not a full turn   Increase activity/out of bed for meals   HOB 30 degrees or less  Goal: Promote/optimize nutrition  Outcome: Progressing  Goal: Promote skin healing  Outcome: Progressing   The patient's goals for the shift include      The clinical goals for the shift include Pt will remain HDS

## 2024-11-15 NOTE — PROGRESS NOTES
Physical Therapy    Physical Therapy Treatment    Patient Name: Fahad Meraz  MRN: [id number]  Department[place] [facility] [place] [facility]  Room: 06/06-A  Today's Date: 11/15/2024  Time Calculation  Start Time: 1137  Stop Time: 1211  Time Calculation (min): 34 min    Assessment/Plan   PT Assessment  PT Assessment Results: Decreased strength, Decreased endurance, Impaired balance, Decreased mobility, Decreased cognition  Rehab Prognosis: Good  Barriers to Discharge: medical acuity  Evaluation/Treatment Tolerance: Patient tolerated treatment well  Medical Staff Made Aware: Yes  End of Session Communication: Bedside nurse  End of Session Patient Position: Bed, 3 rail up, Alarm off, not on at start of session  PT Plan  Inpatient/Swing Bed or Outpatient: Inpatient  PT Plan  Treatment/Interventions: Bed mobility, Transfer training, Gait training, Balance training, Neuromuscular re-education, Strengthening, Endurance training, Therapeutic exercise, Therapeutic activity  PT Plan: Ongoing PT  PT Frequency: Daily  PT Discharge Recommendations: High intensity level of continued care  PT Recommended Transfer Status: Assist x1, Assistive device  PT - OK to Discharge: Yes    General Visit Information:   PT  Visit  PT Received On: 11/15/24  Response to Previous Treatment: Patient with no complaints from previous session.  General  Missed Visit: No  Family/Caregiver Present: No  Co-Treatment:  (Rehab tech)  Prior to Session Communication: Bedside nurse  Patient Position Received: Up in chair, Alarm off, not on at start of session  Preferred Learning Style: auditory, verbal  General Comment: Pt awake, alert and willing to participate in PT session.  Pt completed sit<>stand transfer, ambulation with FWW and bed mobility.  Varied assistance throughout session.  Increased time for LVAD education. Vitals stable throughout. (Gallo, tele, jayson, airvo (70%, 10L), iEPO .01, epi .01, mil .25)    Subjective   Precautions:  Precautions  Medical Precautions:  Cardiac precautions, Fall precautions, Oxygen therapy device and L/min  Precautions Comment: MAP 70-80, SpO2>92     11/15/24 1137 11/15/24 1211   Vital Signs   Vitals Session Pre PT Post PT   Heart Rate (!) 133 (!) 138   Resp 21 20   SpO2  --  93 %   BP 85/58 85/67   MAP (mmHg) 68 74   BP Method Arterial line Arterial line   Patient Position Sitting Lying   Vital Signs Comment  --  Vitals stable throughout session     Objective   Pain:  Pain Assessment  Pain Assessment: 0-10  0-10 (Numeric) Pain Score: 0 - No pain  Cognition:  Cognition  Overall Cognitive Status: Impaired  Arousal/Alertness: Appropriate responses to stimuli  Orientation Level:  (AO x3)  Following Commands: Follows one step commands with repetition  Activity Tolerance:  Activity Tolerance  Endurance: Tolerates 10 - 20 min exercise with multiple rests  Early Mobility/Exercise Safety Screen: Proceed with mobilization - No exclusion criteria met  Treatments:  Therapeutic Activity  Therapeutic Activity Performed: Yes  Therapeutic Activity 1: Increased time for skilled ICU line/tube management for safe functional mobility  Therapeutic Activity 2: Increased time educating pt on LVAD transfer from monitor<>battery.  Pt able to assist with putting the clip--battery together and untwisting the black and white cords.  Pt demonstrating good recall with driveline securement. (Fine motor deficits and difficulty focusing on tasks.)  Therapeutic Activity 3: Pt sat EOB ~6 min prior to bed mobility demonstrating good seated posture, only requiring CGA for assist.    Bed Mobility  Bed Mobility: Yes  Bed Mobility 1  Bed Mobility 1: Sitting to supine  Level of Assistance 1: Minimum assistance (x1)  Bed Mobility Comments 1: HOB slightly elevated, assistance with advancing LEs onto the bed  Bed Mobility 2  Bed Mobility  2:  (Boost towards HOB)  Level of Assistance 2: Dependent, +2  Bed Mobility Comments 2: HOB flat, draw sheet utilized    Ambulation/Gait  Training  Ambulation/Gait Training Performed: Yes  Ambulation/Gait Training 1  Surface 1: Level tile  Device 1: Rolling walker  Assistance 1:  (CGA-minAx1)  Quality of Gait 1:  (Short steps, minimal foot clearance, forward flexed posture)  Comments/Distance (ft) 1: ~10ft forward/backwars  - cues to stand closer to walker and stay within walker. cues to stand fully upright d/t noted slight knee flexion.  limited distance d/t noted fatigue (RT present to assist with vent managemetnt)  Transfers  Transfer: Yes  Transfer 1  Transfer From 1: Sit to  Transfer to 1: Stand  Technique 1: Sit to stand  Transfer Device 1: Walker  Transfer Level of Assistance 1: Minimum assistance, +2  Trials/Comments 1: Cues for hand placement  Transfers 2  Transfer From 2: Stand to  Transfer to 2: Sit  Technique 2: Stand to sit  Transfer Device 2: Walker  Transfer Level of Assistance 2: Contact guard    Stairs  Stairs: No    Outcome Measures:  [facility] Basic Mobility  Turning from your back to your side while in a flat bed without using bedrails: A lot  Moving from lying on your back to sitting on the side of a flat bed without using bedrails: A lot  Moving to and from bed to chair (including a wheelchair): A little  Standing up from a chair using your arms (e.g. wheelchair or bedside chair): A lot  To walk in hospital room: A little  Climbing 3-5 steps with railing: Total  Basic Mobility - Total Score: 13    FSS-ICU  Ambulation: Walks <50 feet with any assistance x1 or walks any distance with assistance x2 people  Rolling: Moderate assistance (performs 50 - 74% of task)  Sitting: Supervision or set-up only  Transfer Sit-to-Stand: Moderate assistance (performs 50 - 74% of task)  Transfer Supine-to-Sit: Moderate assistance (performs 50 - 74% of task)  Total Score: 15      Early Mobility/Exercise Safety Screen: Proceed with mobilization - No exclusion criteria met  ICU Mobility Scale: Walking with assistance of 2 or more people [7]    Education  Documentation  Precautions, taught by Scarlett Bhandari, PT at 11/15/2024  2:54 PM.  Learner: Patient  Readiness: Acceptance  Method: Demonstration, Explanation  Response: Demonstrated Understanding    Body Mechanics, taught by Scarlett Bhandari, PT at 11/15/2024  2:54 PM.  Learner: Patient  Readiness: Acceptance  Method: Demonstration, Explanation  Response: Demonstrated Understanding    Mobility Training, taught by Scarlett Bhandari, PT at 11/15/2024  2:54 PM.  Learner: Patient  Readiness: Acceptance  Method: Demonstration, Explanation  Response: Demonstrated Understanding    Education Comments  No comments found.    EDUCATION:       Encounter Problems       Encounter Problems (Active)       Balance       Pt will score >45 on VEGA for safe community ambulation (Progressing)       Start:  10/29/24    Expected End:  11/28/24               Mobility       Patient will ambulate >1000 ft with LRAD and supervision With stable vitals and RPD </= 3/10 and RPE </= 13/20 for improved functional mobility.   (Met)       Start:  10/29/24    Expected End:  11/12/24    Resolved:  11/05/24    Updated to: Patient will ambulate >2500 ft with LRAD and supervision With stable vitals and RPD </= 3/10 and RPE </= 13/20 for improved functional mobility.    Update reason: Progression         Pt will complete 6MWT with no assistive device and supervision and achieve >700 ft With stable vitals and RPD </= 3/10 and RPE </= 13/20 for improved functional mobility.   (Met)       Start:  10/29/24    Expected End:  11/12/24    Resolved:  11/05/24    Updated to: Pt will complete 6MWT with no assistive device and supervision and achieve >1300 ft With stable vitals and RPD </= 3/10 and RPE </= 13/20 for improved functional mobility.    Update reason: Progression         Patient will ambulate >2500 ft with LRAD and supervision With stable vitals and RPD </= 3/10 and RPE </= 13/20 for improved functional mobility. (Progressing)       Start:  11/05/24     Expected End:  11/28/24                Pt will complete 6MWT with no assistive device and supervision and achieve >1300 ft With stable vitals and RPD </= 3/10 and RPE </= 13/20 for improved functional mobility. (Progressing)       Start:  11/05/24    Expected End:  11/28/24                   PT Transfers       Patient will perform bed mobility indep (maintenance 2/2 prolonged hospital stay anticipated) (Progressing)       Start:  10/29/24    Expected End:  11/28/24            Patient will transfer sit to and from stand indep (Progressing)       Start:  10/29/24    Expected End:  11/28/24            Pt will complete 5XSTS from recliner without UE assist <12 seconds With stable vitals and RPD </= 3/10 and RPE </= 13/20 for improved functional mobility.   (Progressing)       Start:  10/29/24    Expected End:  11/28/24               Pain - Adult

## 2024-11-15 NOTE — PROGRESS NOTES
Fahad Meraz is a 69 y.o. male on day 22 of admission presenting with Acute on chronic heart failure with reduced ejection fraction and diastolic dysfunction.    Subjective   Patient discussed in morning handover, patient examined and chart reviewed. Meds, labs and radiology reviewed during rapid and full interdisciplinary rounds this morning. Co-rounded with HF this morning.    Surgeon: Jas Briseno  DOS: Nov. 12, 2024  Procedure: HMIII Implantation (via L thoracotomy)     Airway: grade I - full view of glottis   EF:  LVEF 15-20% with mild AI and mod MR, TR pre & post, dilated RV     FULL Code:  Emergency Sternotomy: N; L mini-thoracotomy    HPI:   10/18/24 - Patient initially presented to an outside hospital  with decompensated heart failure and KENYA.   10/24/24 - Patient transferred to [facility] HFICU  for medical optimization and evaluation for advanced heart failure therapies.    11/5/2024 -  [employer] approved destination therapy LVAD.  He is now admitted to the CTICU s/p Heartmate 3 implant with the outflow to the mid descending thoracic aorta via left thoracotomy 11/12/24 with Dr. Mclain and Dr. Madrigal.     PMH:  CAD - s/p CABG x 4 (2012) and PCI (LCx/OM; 5/2019)  Stage D ICM HFrEF LVEF 10-15%  AF s/p RFA (PVI and CTI; 4/2024)  HTN  Dyslipidemia  pre-T2DM (A1c 6.3)  VIV using CPAP   Depression  Anxiety    Course in Hospital:  11/12 - Intra-op -  episode of pulseless VT requiring defib x 1 prior to being placed on CPB. In the ICU (post-op), significant bleeding from venous cannulation site requiring transfusion  11/14 - Extubated    Overnight: LVAD flows stable.     Objective     Last Recorded Vitals  Blood pressure 107/71, pulse (!) 129, temperature 37.1 °C (98.8 °F), temperature source Core, resp. rate 17, height 1.829 m (6'), weight 97.5 kg (214 lb 15.2 oz), SpO2 94%.    Invasive Hemodynamics:    Most Recent Range Past 24hrs   BP (Art) 90/63 Arterial Line BP 1  Min: 77/56   Max: 101/76   MAP(Art) 73 mmHg Arterial Line MAP 1 (mmHg)   Min: 64 mmHg  Max: 84 mmHg   RA/CVP   No data recorded   PA 34/19 PAP  Min: 27/18  Max: 44/28   PA(mean) 25 mmHg PAP (Mean)  Min: 21 mmHg  Max: 35 mmHg   CO 6.21 L/min CO (L/min)  Min: 5.3 L/min  Max: 6.5 L/min   CI 2.9 L/min/m2 CI (L/min/m2)  Min: 2.5 L/min/m2  Max: 3 L/min/m2   Mixed Venous 72 % No data recorded   SVR  773 (dyne*sec)/cm5 SVR (dyne*sec)/cm5  Min: 707 (dyne*sec)/cm5  Max: 915 (dyne*sec)/cm5     Intake/Output last 3 Shifts:  I/O last 3 completed shifts:  In: 3215.6 (33 mL/kg) [P.O.:600; I.V.:1755.6 (18 mL/kg); NG/GT:60; IV Piggyback:800]  Out: 4260 (43.7 mL/kg) [Urine:3705 (1.1 mL/kg/hr); Chest Tube:555]  Weight: 97.5 kg     Physical Exam  Constitutional:       Interventions: He is intubated.   Eyes:      Pupils: Pupils are equal, round, and reactive to light.   Cardiovascular:      Rate and Rhythm: Regular rhythm. Tachycardia present.      Comments: Milrinone: 0.25  Epi: 0.01    Lines:  RIJ CVL and PAC  Right brachial A-line  Pulmonary:      Effort: He is intubated.      Comments: Hi-venus Nasal cannula:   I-Epo: 0.02  Abdominal:      General: Bowel sounds are decreased.      Palpations: Abdomen is soft.      Tenderness: There is no abdominal tenderness (patient sedatec).   Genitourinary:     Comments: Gallo in situ: clear urine  -700 ml/ 24 hours  Skin:     Comments: No active issues   Neurological:      Mental Status: He is alert.      Comments: RASS 0  CAM-ICU: negative   Psychiatric:         Behavior: Behavior is cooperative.     MCS:   Heart Mate III:     Most Recent Range Past 24hrs   Flow 4 Pump Flow  Min: 3.9  Max: 4.2   Speed 5000 Speed RPM  Min: 5000  Max: 5050   Power 3.3 Cortes  Min: 3.3  Max: 3.6   PI 3.4 Pulse Index  Min: 3.1  Max: 3.8   No PI event and stable power overnight. LDH stable    Assessment/Plan     ICU Liberation Bundle:  A-Analgesia: Tylenol and opioids at lowest effective dose  B-SBT: Passed and planning to  extubate  C-RASS Target: 0 - -1  D-CAM: to be assessed on SAT (pending)  E-Mobilization Plan: stage V to OOBTC today  F - Family Last Update: to be update by the team    Daily Checklist:  HOB > 30 degrees: achieved   Feeding: NPO for extubation  Thromboprophylaxis: Heparin and SCDs  Ulcer Prophylaxis: PPI  Glucose Control: Target 110-180; < 180 achieved; no hypoglycemia  Line to removed: None at present  Bowel Care: Bowel regime ordered  De-escalation of Antibiotics: Day 2 of 2 of routine anti-microbial prophylaxis    Plan:  Continue with chemical RVAD support; will start slow wean of I-Epo and maintain milrinone today. Will wean epi. off today  ASA; continue warfarin today  Plan for ramp study today  Hold on diuresis today    DISPO: CTICU    I spent 51 minutes in the professional and overall care of this patient.    This critically ill patient continues to be at-risk for clinically significant deterioration / failure due to the above mentioned dysfunctional, unstable organ systems.  I have personally identified and managed all complex critical care issues to prevent aforementioned clinical deterioration.  Critical care time is spent at bedside and/or the immediate area and has included, but is not limited to, the review of diagnostic tests, labs, radiographs, serial assessments of hemodynamics, respiratory status, ventilatory management, and family updates.  Time spent in procedures and teaching are reported separately.    Zak Aguilar MD

## 2024-11-15 NOTE — PROGRESS NOTES
HFICU Attending Note    Principal Problem:    Acute on chronic heart failure with reduced ejection fraction and diastolic dysfunction  Active Problems:    Ischemic cardiomyopathy    Receiving inotropic medication    HTN (hypertension)    CAD (coronary artery disease)    MI (myocardial infarction) (Multi)    CHF (congestive heart failure)    Gastroesophageal reflux disease    Acute kidney injury (nontraumatic) (CMS-[facility], located within [facility])    Multidisciplinary rounds with CTICU attending and team   Mr Meraz is s/p LVAD placement on 11/12/2024 for end stage ischemic cardiomyopathy   Outflow graft implanted in the descending aorta   RV dysfunction pre LVAD implantation hence speed was kept low at 5000 and also to allow for intermittent AV opening in light of the outflow graft being in the descending aorta   Limited echocardiogram performed at the bedside   RV appears dilated   Mild MR and Mild TR   AV opening slightly with almost every beat , speed was slightly increased up to 5100   Appears euvolemic /dry on examination   Will perform another echocardiogram next week and optimize speed further   No significant LVAD alarms noted on device interrogation       This critically ill patient continues to be at-risk for clinically significant deterioration / failure due to the above mentioned dysfunctional, unstable organ systems.  I have personally identified and managed all complex critical care issues to prevent aforementioned clinical deterioration.  Critical care time is spent at bedside and/or the immediate area and has included, but is not limited to, the review of diagnostic tests, labs, radiographs, serial assessments of hemodynamics, respiratory status, ventilatory management, and family updates.  Time spent in procedures and teaching are reported separately.    Critical care time: _35___ minutes

## 2024-11-15 NOTE — CARE PLAN
The patient's goals for the shift include      The clinical goals for the shift include pt will remain HDS for the remainder of the shift, patient will be net negative at least 500ml.  Problem: Heart Failure  Goal: Reduction in peripheral edema within 24 hours  Outcome: Progressing  Flowsheets (Taken 11/15/2024 0727)  Reduction in peripheral edema within 24 hours:   SCDs/elevate extremities   Monitor edema/skin/mucous membrane integrity  Note: +1 generalized edema, no worsening. Diuresing well     Problem: Skin  Goal: Decreased wound size/increased tissue granulation at next dressing change  Outcome: Progressing       Problem: Heart Failure  Problem: Heart Failure  Goal: Improved urinary output this shift  Outcome: Progressing  Flowsheets (Taken 11/15/2024 0727)  Improved urinary output this shift:   Med administration/monitor effect   Monitor intake/output and daily weight   Monitor serum electrolytes/replace per order  Note: Good response to diuresis (2g Bumex). Pt is -500ml for the shift.      Goal: Weight from fluid excess reduced over 2-3 days, then stabilize  Outcome: Not Progressing  Goal: Increase self care and/or family involvement in 24 hours  Outcome: Not Progressing     Problem: Skin  Goal: Participates in plan/prevention/treatment measures  Outcome: Not Progressing  Flowsheets (Taken 10/31/2024 2229 by Samantha Lerma RN)  Participates in plan/prevention/treatment measures:   Elevate heels   Discuss with provider PT/OT consult   Increase activity/out of bed for meals  Note: Pt intermittently more confused/forgetful, requiring reorientation and reminding of situation. Blanchable redness to buttocks, continuing to encourage turns

## 2024-11-15 NOTE — PROGRESS NOTES
ADVANCED HEART FAILURE LVAD PROGRESS NOTE      VAD Cardiologist: Padmini     Type of VAD: HM3  Implant Date: 11/12/24  Reason for VAD: ICM  Intent: Long-Term (Significant PAD, age, patient preference)      Subjective     HPI:    Fahad Meraz is a very pleasant 69 y.o. male w/ a PMHx sig for CAD s/p 4V CABG (2012) and PCI (LCx/OM; 5/2019), stage D systolic HF/ICM/HFrEF with LVEF 10-15%( 10/22/24 TTE), AF s/p RFA (PVI and CTI; 4/2024) on OAC therapy, HTN, dyslipidemia, depression, anxiety and VIV using CPAP who was admitted to OSH ICU, s/p RHC on 10/18/24.  Per patient, he had been experienced worsening SOB and fluids overload for 2-3 weeks. Patient was on milrinone drip and underwent SGC diuresis. However his KENYA worsened, and were not able to wean milrinone drip. Decision [place] [facility] [place]. Advanced therapies evaluation was initiated on 10/30. Discussed in advanced therapeutics committee on 11/5 and was denied for transplantation, and approved for LVAD (significant PAD, Elevated PSA level, Age approaching 70, as well as patient verbalized he would be more in favor of LVAD vs Transplant). He was transferred to the floor on 11/6 to await VAD implantation. Readmitted to HFICU as of 11/09 for pre-OR swan guided optimization. Now presents to CTICU s/p LVAD HM3 implant on 11/12/24 with Dr. Mclain. OR Course/Issues: pulseless VT requiring defib x 1 pre- CPB.         Principal Problem:    Acute on chronic heart failure with reduced ejection fraction and diastolic dysfunction  Active Problems:    Ischemic cardiomyopathy    Receiving inotropic medication    HTN (hypertension)    CAD (coronary artery disease)    MI (myocardial infarction) (Multi)    CHF (congestive heart failure)    Gastroesophageal reflux disease    Acute kidney injury (nontraumatic) (CMS-HCC)       LOS: 22 days     Interval Events:   Remains on epi and milrinone with adequate  biventricular filling pressures. Weaning epi.     RAMP study this morning under TTE, LVAD speed increased from 9465-1517 RPM. AV remains opening every beat with increased speed.     NYHA class pre-VAD implant: IV    EPINEPHrine, 0.01 mcg/kg/min, Last Rate: 0.01 mcg/kg/min (11/15/24 0900)  epoprostenol, 0.02 mcg/kg/min (Ideal), Last Rate: 0.02 mcg/kg/min (11/15/24 0721)  lactated Ringer's, 20 mL/hr, Last Rate: 20 mL/hr (11/15/24 0900)  lactated Ringer's, 5 mL/hr, Last Rate: 5 mL/hr (11/15/24 0900)  milrinone, 0-0.75 mcg/kg/min, Last Rate: 0.25 mcg/kg/min (11/15/24 0900)       PAP: (27-44)/(15-28) 36/21  CVP:  [6 mmHg-24 mmHg] 13 mmHg  CO:  [5.64 L/min-6.5 L/min] 5.64 L/min  CI:  [2.64 L/min/m2-3 L/min/m2] 2.64 L/min/m2      Objective   Vitals:   Vitals:    11/15/24 0721 11/15/24 0800 11/15/24 0826 11/15/24 0900   BP:       Pulse: (!) 132 (!) 129 (!) 124 102   Resp: 16 17 14 16   Temp:  37.1 °C (98.8 °F)     TempSrc:  Core     SpO2: 95% 94% 94% 95%   Weight:       Height:         Wt Readings from Last 5 Encounters:   11/15/24 97.5 kg (214 lb 15.2 oz)   10/24/24 92.5 kg (204 lb)   08/05/24 122 kg (268 lb)   01/31/22 119 kg (262 lb)   02/18/20 123 kg (270 lb 2 oz)     Hemodynamic parameters for last 24 hours:  PAP: (27-44)/(15-28) 36/21  CVP:  [6 mmHg-24 mmHg] 13 mmHg  CO:  [5.64 L/min-6.5 L/min] 5.64 L/min  CI:  [2.64 L/min/m2-3 L/min/m2] 2.64 L/min/m2  Intake/Output for last 24 hours:    Intake/Output Summary (Last 24 hours) at 11/15/2024 0941  Last data filed at 11/15/2024 0900  Gross per 24 hour   Intake 1777.77 ml   Output 2720 ml   Net -942.23 ml      Vent settings:  FiO2 (%):  [70 %-80 %] 70 %    Physical exam:  Physical Exam  Constitutional:       General: He is not in acute distress.     Appearance: Normal appearance. He is not diaphoretic.   HENT:      Head: Normocephalic and atraumatic.      Mouth/Throat:      Mouth: Mucous membranes are moist.      Pharynx: Oropharynx is clear.   Eyes:      Extraocular  Movements: Extraocular movements intact.      Pupils: Pupils are equal, round, and reactive to light.   Cardiovascular:      Rate and Rhythm: Tachycardia present.      Comments: LVAD hum heard on ascultation  Driveline clean, no bleeding. Site appropriately dressed with tegaderm.   Pulmonary:      Breath sounds: Normal breath sounds. No wheezing or rhonchi.      Comments: On HFNC  Abdominal:      General: Abdomen is flat. There is no distension.      Palpations: Abdomen is soft. There is no mass.      Tenderness: There is no abdominal tenderness. There is no guarding.   Musculoskeletal:      Right lower leg: No edema.      Left lower leg: No edema.   Neurological:      Mental Status: He is alert.        Driveline:          Labs:   CMP:  Recent Labs     11/15/24  0020 11/14/24  1224 11/14/24  0118 11/13/24  1228 11/13/24  0008 11/13/24  0007 11/12/24  1257 11/12/24  0220 11/11/24  0523    137 136 136  --  136 139 135* 135*   K 4.7 4.9 5.6* 5.2  --  4.8 4.2 4.5 5.1    102 102 102  --  102 101 101 98   CO2 24 23 25 23  --  25 23 26 27   ANIONGAP 17 17 15 16  --  14 19 13 15   BUN 49* 44* 39* 33*  --  27* 24* 24* 26*   CREATININE 1.85* 1.78* 1.86* 1.66*  --  1.70* 1.73* 1.33* 1.52*   EGFR 39* 41* 39* 44*  --  43* 42* 58* 49*   MG 2.21 2.31 2.49* 2.41* 2.51*  --  3.10* 2.02 2.14     Recent Labs     11/15/24  0021 11/15/24  0020 11/14/24  1224 11/14/24  0118 11/13/24  1228 11/13/24  0007 11/12/24  1307 11/12/24  1257 11/01/24  0317 10/31/24  1249 10/25/24  1221 10/24/24  2328   ALBUMIN 3.7 3.7 3.5 3.8 3.7 4.2 3.7 3.8   < > 4.3   < > 4.4   ALT 5*  --   --  7* 11  --  15  --   --  14  --  13   AST 29  --   --  40* 42*  --  37  --   --  16  --  15   BILITOT 1.1  --   --  0.9 1.1  --  0.9  --   --  1.4*  --  2.3*    < > = values in this interval not displayed.     CBC:  Recent Labs     11/15/24  0020 11/14/24  1224 11/14/24  0118 11/13/24  1228 11/13/24  0008 11/12/24  1851 11/12/24  1257 11/12/24  0220   WBC  "14.7* 16.9* 18.0* 15.6* 19.3* 21.3* 25.4* 8.9   HGB 10.9* 11.3* 12.6* 12.3* 13.0* 12.8* 14.1 12.9*   HCT 33.8* 35.2* 37.9* 36.3* 39.3* 39.5* 42.9 38.9*    341 413 391 515* 486* 485* 421   MCV 85 84 83 82 83 84 84 82     COAG:   Recent Labs     11/15/24  0020 11/14/24  0118 11/13/24  0008 11/12/24  1257 11/12/24  0220 11/11/24  0523 11/10/24  0436 11/09/24  0748 11/08/24  0729 11/03/24  0726 11/03/24  0242 11/02/24  0507 11/02/24  0506 11/01/24  0317 10/31/24  1249   INR 1.4* 1.4* 1.4* 1.4*  --   --   --   --   --   --  1.4*  --  1.3* 1.3* 1.3*   HAUF  --   --   --   --  0.4 0.5 0.5 0.4 0.4   < > 0.4   < >  --  0.3  --    FIBRINOGEN  --   --   --  301  --   --   --   --   --   --   --   --   --   --   --     < > = values in this interval not displayed.     ABO:   Recent Labs     11/14/24 0118   ABO A     HEME/ENDO:   Recent Labs     10/31/24  1249 10/24/24  2328   FERRITIN  --  76   IRONSAT  --  17*   TSH 4.39* 8.47*   HGBA1C  --  6.3*     CARDIAC:   Recent Labs     11/15/24  0020 11/14/24  0118 11/13/24  0008 11/12/24  1307 10/31/24  1249 10/24/24  2328   * 351* 276* 324* 187  --    BNP  --   --   --   --  755* 1,995*     No results for input(s): \"CHOL\", \"LDLF\", \"LDLCALC\", \"HDL\", \"TRIG\" in the last 55478 hours.  TOX:  Recent Labs     10/31/24  1249   AMPHETAMINE Negative     MICRO: No results for input(s): \"ESR\", \"CRP\", \"PROCAL\" in the last 81024 hours.  No results found for the last 90 days.        Imaging:   XR chest 1 view    Result Date: 11/14/2024  Interpreted By:  Tuan Platt, STUDY: XR CHEST 1 VIEW;  11/14/2024 8:07 am   INDICATION: Signs/Symptoms:s/p LVAD implant, assess lung fields, devices.   COMPARISON: Chest radiograph dated 11/13/2024   ACCESSION NUMBER(S): [id number]   ORDERING CLINICIAN: ANETA MANNING   FINDINGS: AP radiograph of the chest   The endotracheal tube is above the carlo in satisfactory position. The swans Faviola catheter is positioned within the right pulmonary artery. " The enteric tube traverses the esophagus. LVAD projects over lying the left lower thorax. Chest tubes are noted.   There is moderate cardiomegaly stable in comparison to previous study. Left lower lobe opacity is noted. Overall pulmonary aeration is similar previous study.       1. Left lower lobe opacity persists similar previous study 2. The pulmonary vascular distribution is similar.       Signed by: Tuan Platt 11/14/2024 12:33 PM Dictation workstation:   [id number]      Notable Studies:   EKG:  Encounter Date: 10/24/24   Electrocardiogram, 12-lead PRN ACS symptoms   Result Value    Ventricular Rate 111    Atrial Rate 111    QRS Duration 130    QT Interval 302    QTC Calculation(Bazett) 410    R Axis 134    T Axis -40    QRS Count 18    Q Onset 218    T Offset 369    QTC Fredericia 370    Narrative    Atrial fibrillation with occasional Premature ventricular complexes and Fusion complexes  Nonspecific intraventricular block  Cannot rule out Anteroseptal infarct , age undetermined  T wave abnormality, consider inferolateral ischemia  Abnormal ECG  No previous ECGs available  Confirmed by Lucas Kerns ([id number]) on 10/28/2024 12:58:57 PM       Echo:  Transthoracic Echo (TTE) Limited    Result Date: 11/13/2024   [facility], [address] [place] [address]                Tel [phone] and Fax [phone] TRANSTHORACIC ECHOCARDIOGRAM REPORT  Patient Name:       RJ Webb Physician:    [id number] Raciel Casarez MD Study Date:         11/13/2024          Ordering Provider:    [id number] JO CASTILLO MRN/PID:            [id number]            Fellow: Accession#:         [id number]        Nurse: Date of Birth/Age:  1955 / 69     Sonographer:          Jackson estrada                                     BLANQUITA Gender assigned at  M                    Additional Staff: Birth: Height:             182.88 cm           Admit Date: Weight:             93.90 kg            Admission Status:     Inpatient - STAT BSA / BMI:          2.16 m2 / 28.07     Encounter#:           [id number]                     kg/m2 Blood Pressure:     94/74 mmHg          Department Location:  [facility] Study Type:    TRANSTHORACIC ECHO (TTE) LIMITED Diagnosis/ICD: Acute on chronic combined systolic (congestive) and diastolic                (congestive) heart failure (CHF)-I50.43 Indication:    eval RV fx CPT Code:      Echo Limited-74381; Doppler Limited-25155; Color Doppler-55735 Patient History: Pertinent History: CAD s/p CABGx4 in 2012 and PCI 2019 w/ ICM LVEF 10-15%, AF                    s/p RFA & PVI, HTN, CHF exacerbation, 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 Study Detail: The following Echo studies were performed: 2D, M-Mode, Doppler and               color flow. Technically challenging study due to body habitus,               patient lying in supine position, postoperative dressings,               prominent lung artifact and poor acoustic windows. The patient is               intubated. Definity used as a contrast agent for endocardial               border definition. Total contrast used for this procedure was 2.0               mL via IV push.  PHYSICIAN INTERPRETATION: Left Ventricle: Left ventricular ejection fraction is severely decreased, by visual estimate at 15%. There is eccentric left ventricular hypertrophy. There is global hypokinesis of the left ventricle with minor regional variations. The left ventricular cavity size is moderate to severely dilated. Left ventricular diastolic filling was not assessed. Left Atrium: The left atrium is moderately dilated. Right Ventricle: The right ventricle is mild to moderately enlarged. There is severely reduced right ventricular systolic function. A device is  visualized in the right ventricle. Right Atrium: The right atrium is moderately dilated. There is a device visualized in the right atrium. Aortic Valve: The aortic valve was not well visualized. There is mild to moderate aortic valve cusp calcification. There is mild aortic valve regurgitation. Mitral Valve: The mitral valve is normal in structure. There is mild mitral valve regurgitation. Tricuspid Valve: The tricuspid valve is structurally normal. There is mild tricuspid regurgitation. Pulmonic Valve: The pulmonic valve is not well visualized. There is trace pulmonic valve regurgitation. Pericardium: There is no pericardial effusion noted. Aorta: The aortic root is abnormal. There is mild dilatation of the aortic root. Pulmonary Artery: The tricuspid regurgitant velocity is 1.83 m/s, and with an estimated right atrial pressure of 10 mmHg, the estimated pulmonary artery pressure is normal with the RVSP at 23.4 mmHg. Systemic Veins: The inferior vena cava appears moderately dilated, on vent. In comparison to the previous echocardiogram(s): Compared with study dated 11/5/2024, no significant change.  LEFT VENTRICULAR ASSIST DEVICE: LVAD: The patient has a(n) HeartMate 3 LVAD device present. Inflow Cannula: The inflow cannula is visualized in the left ventricular apex.  CONCLUSIONS:  1. Poorly visualized anatomical structures due to suboptimal image quality.  2. Left ventricular cavity size is moderate to severely dilated.  3. Left ventricular ejection fraction is severely decreased, by visual estimate at 15%.  4. There is global hypokinesis of the left ventricle with minor regional variations.  5. There is severely reduced right ventricular systolic function.  6. Mild to moderately enlarged right ventricle.  7. The left atrium is moderately dilated.  8. The right atrium is moderately dilated.  9. Mild aortic valve regurgitation. 10. The inferior vena cava appears moderately dilated, on vent. QUANTITATIVE DATA  SUMMARY:  2D MEASUREMENTS:          Normal Ranges: IVSd:            1.00 cm  (0.6-1.1cm) LVPWd:           1.20 cm  (0.6-1.1cm) LVIDd:           6.90 cm  (3.9-5.9cm) LVIDs:           6.60 cm LV Mass Index:   164 g/m2 LV % FS          4.3 %  AORTA MEASUREMENTS:         Normal Ranges: Ao Sinus, d:        4.00 cm (2.1-3.5cm)  LV SYSTOLIC FUNCTION BY 2D PLANIMETRY (MOD):                      Normal Ranges: EF-Visual:      15 % LV EF Reported: 15 %  RIGHT VENTRICLE: RV s' 0.07 m/s  TRICUSPID VALVE/RVSP:          Normal Ranges: Peak TR Velocity:     1.83 m/s RV Syst Pressure:     23 mmHg  (< 30mmHg) IVC Diam:             2.70 cm  [id number] Raciel Casarez MD Electronically signed on 11/13/2024 at 10:25:04 AM  ** Final **     Transthoracic Echo (TTE) Limited    Result Date: 11/5/2024   [facility], [address] [place] [address]                Tel [phone] and Fax [phone] TRANSTHORACIC ECHOCARDIOGRAM REPORT  Patient Name:       RJ Webb Physician:    [id number] Mira Mayer MD Study Date:         11/5/2024           Ordering Provider:    [id number] PAYAL KOHLER MRN/PID:            [id number]            Fellow: Accession#:         [id number]        Nurse: Date of Birth/Age:  1955 / 69     Sonographer:          Maureen estrada                                     RD Gender assigned at                     Additional Staff: Birth: Height:             182.88 cm           Admit Date:           10/24/2024 Weight:             93.44 kg            Admission Status:     Inpatient - STAT BSA / BMI:          2.16 m2 / 27.94     Encounter#:           [id number]                     kg/m2 Blood Pressure:     106/72 mmHg         Department Location:  [address] Study Type:    TRANSTHORACIC ECHO (TTE) LIMITED Diagnosis/ICD: Heart failure,  unspecified-I50.9 Indication:    RV assessment CPT Code:      Echo Limited-70393; Doppler Limited-04248; Color Doppler-17090 Patient History: Pertinent History: Negative bubble peformed on prior echo; Known 15-20% EF;                    Cardiogenic shock; Acute on chronic heart failure with                    reduced EF and diastolic dysfunction; ICM; Hx of chronic                    A-Fib; VIV. Study Detail: The following Echo studies were performed: 2D, M-Mode, Doppler and               color flow. Technically challenging study due to body habitus.               Definity used as a contrast agent for endocardial border               definition. Total contrast used for this procedure was 2 mL via IV               push.  PHYSICIAN INTERPRETATION: Left Ventricle: Left ventricular ejection fraction is severely decreased, calculated by Loera's biplane at 18%. There is severely increased eccentric left ventricular hypertrophy. There is global hypokinesis of the left ventricle with minor regional variations. The left ventricular cavity size is severely dilated. There is normal septal and normal posterior left ventricular wall thickness. Left ventricular diastolic filling was not assessed. There is no definite left ventricular thrombus visualized. Left Atrium: The left atrium is severely dilated. Right Ventricle: The right ventricle is severely enlarged. There is severely reduced right ventricular systolic function. A device is visualized in the right ventricle. Right Atrium: The right atrium is severely dilated. There is a device visualized in the right atrium. Aortic Valve: The aortic valve was not well visualized. There is mild aortic valve cusp calcification. Aortic valve regurgitation was not assessed. Mitral Valve: The mitral valve is normal in structure. Mitral valve regurgitation was not assessed. Tricuspid Valve: The tricuspid valve is structurally normal. There is mild tricuspid regurgitation. The Doppler  estimated RVSP is mildly elevated at 42.9 mmHg. Pulmonic Valve: The pulmonic valve was not assessed. Pulmonic valve regurgitation was not assessed. Pericardium: Trivial pericardial effusion. Aorta: The aortic root is abnormal. The aortic root appears mildly dilated and measures 4.20 cm. There is mild dilatation of the ascending aorta. There is mild dilatation of the aortic root. Systemic Veins: The inferior vena cava appears dilated, with IVC inspiratory collapse less than 50%. In comparison to the previous echocardiogram(s): Compared with the prior exam from 10/25/2024, there was already a severely dilated LV with severely reduced function. The RV remains dilated with severely reduced function. Valvular function not assessed on today's exam.  CONCLUSIONS:  1. Left ventricular ejection fraction is severely decreased, calculated by Loera's biplane at 18%.  2. There is global hypokinesis of the left ventricle with minor regional variations.  3. Left ventricular cavity size is severely dilated.  4. No left ventricular thrombus visualized.  5. There is severely increased eccentric left ventricular hypertrophy.  6. There is severely reduced right ventricular systolic function.  7. Severely enlarged right ventricle.  8. The left atrium is severely dilated.  9. The right atrium is severely dilated. 10. Mitral valve regurgitation was not assessed. 11. Mildly elevated right ventricular systolic pressure. 12. Mildly dilated aortic root. 13. Compared with the prior exam from 10/25/2024, there was already a severely dilated LV with severely reduced function. The RV remains dilated with severely reduced function. Valvular function not assessed on today's exam. QUANTITATIVE DATA SUMMARY:  2D MEASUREMENTS:           Normal Ranges: Ao Root d:       4.20 cm   (2.0-3.7cm) IVSd:            0.87 cm   (0.6-1.1cm) LVPWd:           0.78 cm   (0.6-1.1cm) LVIDd:           8.21 cm   (3.9-5.9cm) LVIDs:           8.04 cm LV Mass Index:   157  g/m2 LVEDV Index:     138 ml/m2 LV % FS          2.0 %  LA VOLUME:                  Normal Ranges: LA Volume Index: 59.9 ml/m2  RA VOLUME BY A/L METHOD:          Normal Ranges: RA Area A4C:             36.8 cm2  AORTA MEASUREMENTS:         Normal Ranges: Ao Sinus, d:        4.20 cm (2.1-3.5cm) Asc Ao, d:          4.20 cm (2.1-3.4cm)  LV SYSTOLIC FUNCTION BY 2D PLANIMETRY (MOD):                      Normal Ranges: EF-A4C View:    14 % (>=55%) EF-A2C View:    21 % EF-Biplane:     18 % LV EF Reported: 18 %  AORTIC VALVE:          Normal Ranges: LVOT Diameter: 2.31 cm (1.8-2.4cm)  RIGHT VENTRICLE: RV Basal 6.10 cm RV Mid   4.10 cm RV Major 9.1 cm TAPSE:   9.0 mm RV s'    0.06 m/s  TRICUSPID VALVE/RVSP:          Normal Ranges: Peak TR Velocity:     2.64 m/s RV Syst Pressure:     43 mmHg  (< 30mmHg) IVC Diam:             3.00 cm  AORTA: Asc Ao Diam 4.23 cm  [id number] Mira Mayer MD Electronically signed on 11/5/2024 at 2:36:55 PM  ** Final **       Meds:  Scheduled medications  acetaminophen, 650 mg, oral, q6h  aspirin, 81 mg, oral, Daily  escitalopram, 10 mg, oral, Daily  heparin (porcine), 5,000 Units, subcutaneous, q8h  insulin lispro, 0-15 Units, subcutaneous, q4h  polyethylene glycol, 17 g, oral, BID  sennosides-docusate sodium, 2 tablet, oral, BID  warfarin, 3 mg, oral, Daily      Continuous medications  EPINEPHrine, 0.01 mcg/kg/min, Last Rate: 0.01 mcg/kg/min (11/15/24 0900)  epoprostenol, 0.02 mcg/kg/min (Ideal), Last Rate: 0.02 mcg/kg/min (11/15/24 0721)  lactated Ringer's, 20 mL/hr, Last Rate: 20 mL/hr (11/15/24 0900)  lactated Ringer's, 5 mL/hr, Last Rate: 5 mL/hr (11/15/24 0900)  milrinone, 0-0.75 mcg/kg/min, Last Rate: 0.25 mcg/kg/min (11/15/24 0900)      PRN medications  PRN medications: alteplase, hydrALAZINE, HYDROmorphone, naloxone, ondansetron **OR** ondansetron, oxyCODONE, oxyCODONE, oxygen     Assessment/Plan   Assessment & Plan     # Stage D NYHA IV, ICM, decompensated acute on chronic heart failure,  HFrEF 10-15% s/p LVAD HM3 implant 11/12/24   #RV dysfunction     Intra-op BRIANA: EF 15-20%, dilated RV     Heart Mate III interrogation   Most Recent   Flow 4   Speed 5100   Power 3.3   PI 3.4   MAP (mmHg): 81    - Please maintain MAP 70-80  - Maintain net even today from a volume perspective. Okay for spot diuresis.   - Coumadin started yesterday. Would not recommend increase in Coumadin for 48-72 hours.   - Patient has primary indication for ASA (CAD, ICM) -> on ASA 81 mg daily.  - Please continue daily driveline dressing changes w/ picture in chart   - RAMP study this morning under TTE, LVAD speed increased from 2660-9328 RPM.  - Once patient only inotropic support is milrinone he is appropriate to start sildenafil & digoxin for RV support   - Not appropriate for GDMT in acute postop period   - Continue excellent care per CTICU     Seen and discussed with Dr. Jonatan Welsh, APRN Student     NP Attestation: I was present with the NP student who participated in the documentation of this note. I have personally seen and re-examined the patient and performed the medical decision-making components (assessment and plan of care). I have reviewed the NP student documentation and verified the findings in the note as written with additions or exceptions as stated in the body of this note.    ____________________________________________________________  Stacie Davis, MSN, [facility]  Advanced Heart Failure LVAD Nurse Practitioner   For floor patients please page HHVI team after 5pm- [phone]  LVAD 24/7 emergency pager [phone]

## 2024-11-16 ENCOUNTER — APPOINTMENT (OUTPATIENT)
Dept: RADIOLOGY | Facility: HOSPITAL | Age: 69
DRG: 001 | End: 2024-11-16
Payer: MEDICARE

## 2024-11-16 PROBLEM — R41.0 DELIRIUM: Status: ACTIVE | Noted: 2024-11-16

## 2024-11-16 LAB
ALBUMIN SERPL BCP-MCNC: 3.5 G/DL (ref 3.4–5)
ALBUMIN SERPL BCP-MCNC: 3.6 G/DL (ref 3.4–5)
ALP SERPL-CCNC: 51 U/L (ref 33–136)
ALT SERPL W P-5'-P-CCNC: 4 U/L (ref 10–52)
ANION GAP BLDMV CALCULATED.4IONS-SCNC: 10 MMO/L (ref 10–25)
ANION GAP BLDMV CALCULATED.4IONS-SCNC: 10 MMO/L (ref 10–25)
ANION GAP BLDMV CALCULATED.4IONS-SCNC: 6 MMO/L (ref 10–25)
ANION GAP BLDMV CALCULATED.4IONS-SCNC: 8 MMO/L (ref 10–25)
ANION GAP BLDMV CALCULATED.4IONS-SCNC: 9 MMO/L (ref 10–25)
ANION GAP BLDMV CALCULATED.4IONS-SCNC: 9 MMO/L (ref 10–25)
ANION GAP SERPL CALC-SCNC: 14 MMOL/L (ref 10–20)
ANION GAP SERPL CALC-SCNC: 15 MMOL/L (ref 10–20)
AST SERPL W P-5'-P-CCNC: 24 U/L (ref 9–39)
BASE EXCESS BLDMV CALC-SCNC: 0.4 MMOL/L (ref -2–3)
BASE EXCESS BLDMV CALC-SCNC: 1 MMOL/L (ref -2–3)
BASE EXCESS BLDMV CALC-SCNC: 1.3 MMOL/L (ref -2–3)
BASE EXCESS BLDMV CALC-SCNC: 1.5 MMOL/L (ref -2–3)
BASE EXCESS BLDMV CALC-SCNC: 1.6 MMOL/L (ref -2–3)
BASE EXCESS BLDMV CALC-SCNC: 2.9 MMOL/L (ref -2–3)
BILIRUB DIRECT SERPL-MCNC: 0.3 MG/DL (ref 0–0.3)
BILIRUB SERPL-MCNC: 1 MG/DL (ref 0–1.2)
BODY TEMPERATURE: 37 DEGREES CELSIUS
BUN SERPL-MCNC: 49 MG/DL (ref 6–23)
BUN SERPL-MCNC: 51 MG/DL (ref 6–23)
CA-I BLD-SCNC: 1.22 MMOL/L (ref 1.1–1.33)
CA-I BLD-SCNC: 1.25 MMOL/L (ref 1.1–1.33)
CA-I BLDMV-SCNC: 1.17 MMOL/L (ref 1.1–1.33)
CA-I BLDMV-SCNC: 1.18 MMOL/L (ref 1.1–1.33)
CA-I BLDMV-SCNC: 1.21 MMOL/L (ref 1.1–1.33)
CA-I BLDMV-SCNC: 1.21 MMOL/L (ref 1.1–1.33)
CA-I BLDMV-SCNC: 1.22 MMOL/L (ref 1.1–1.33)
CA-I BLDMV-SCNC: 1.27 MMOL/L (ref 1.1–1.33)
CALCIUM SERPL-MCNC: 8.9 MG/DL (ref 8.6–10.6)
CALCIUM SERPL-MCNC: 9 MG/DL (ref 8.6–10.6)
CHLORIDE BLD-SCNC: 101 MMOL/L (ref 98–107)
CHLORIDE BLD-SCNC: 101 MMOL/L (ref 98–107)
CHLORIDE BLD-SCNC: 103 MMOL/L (ref 98–107)
CHLORIDE BLD-SCNC: 103 MMOL/L (ref 98–107)
CHLORIDE BLD-SCNC: 104 MMOL/L (ref 98–107)
CHLORIDE BLD-SCNC: 99 MMOL/L (ref 98–107)
CHLORIDE SERPL-SCNC: 98 MMOL/L (ref 98–107)
CHLORIDE SERPL-SCNC: 98 MMOL/L (ref 98–107)
CO2 SERPL-SCNC: 25 MMOL/L (ref 21–32)
CO2 SERPL-SCNC: 26 MMOL/L (ref 21–32)
CREAT SERPL-MCNC: 1.5 MG/DL (ref 0.5–1.3)
CREAT SERPL-MCNC: 1.73 MG/DL (ref 0.5–1.3)
EGFRCR SERPLBLD CKD-EPI 2021: 42 ML/MIN/1.73M*2
EGFRCR SERPLBLD CKD-EPI 2021: 50 ML/MIN/1.73M*2
ERYTHROCYTE [DISTWIDTH] IN BLOOD BY AUTOMATED COUNT: 16.1 % (ref 11.5–14.5)
ERYTHROCYTE [DISTWIDTH] IN BLOOD BY AUTOMATED COUNT: 16.5 % (ref 11.5–14.5)
GLUCOSE BLD MANUAL STRIP-MCNC: 122 MG/DL (ref 74–99)
GLUCOSE BLD-MCNC: 101 MG/DL (ref 74–99)
GLUCOSE BLD-MCNC: 103 MG/DL (ref 74–99)
GLUCOSE BLD-MCNC: 107 MG/DL (ref 74–99)
GLUCOSE BLD-MCNC: 109 MG/DL (ref 74–99)
GLUCOSE BLD-MCNC: 131 MG/DL (ref 74–99)
GLUCOSE BLD-MCNC: 99 MG/DL (ref 74–99)
GLUCOSE SERPL-MCNC: 109 MG/DL (ref 74–99)
GLUCOSE SERPL-MCNC: 93 MG/DL (ref 74–99)
HCO3 BLDMV-SCNC: 25.8 MMOL/L (ref 22–26)
HCO3 BLDMV-SCNC: 25.9 MMOL/L (ref 22–26)
HCO3 BLDMV-SCNC: 26 MMOL/L (ref 22–26)
HCO3 BLDMV-SCNC: 26 MMOL/L (ref 22–26)
HCO3 BLDMV-SCNC: 26.6 MMOL/L (ref 22–26)
HCO3 BLDMV-SCNC: 28.5 MMOL/L (ref 22–26)
HCT VFR BLD AUTO: 32.8 % (ref 41–52)
HCT VFR BLD AUTO: 32.9 % (ref 41–52)
HCT VFR BLD EST: 30 % (ref 41–52)
HCT VFR BLD EST: 32 % (ref 41–52)
HCT VFR BLD EST: 32 % (ref 41–52)
HCT VFR BLD EST: 33 % (ref 41–52)
HGB BLD-MCNC: 10.5 G/DL (ref 13.5–17.5)
HGB BLD-MCNC: 10.8 G/DL (ref 13.5–17.5)
HGB BLDMV-MCNC: 10.1 G/DL (ref 13.5–17.5)
HGB BLDMV-MCNC: 10.6 G/DL (ref 13.5–17.5)
HGB BLDMV-MCNC: 10.7 G/DL (ref 13.5–17.5)
HGB BLDMV-MCNC: 10.9 G/DL (ref 13.5–17.5)
HGB BLDMV-MCNC: 11 G/DL (ref 13.5–17.5)
HGB BLDMV-MCNC: 11 G/DL (ref 13.5–17.5)
INHALED O2 CONCENTRATION: 21 %
INHALED O2 CONCENTRATION: 28 %
INHALED O2 CONCENTRATION: 36 %
INR PPP: 1.5 (ref 0.9–1.1)
LACTATE BLDMV-SCNC: 0.4 MMOL/L (ref 0.4–2)
LACTATE BLDMV-SCNC: 0.5 MMOL/L (ref 0.4–2)
LACTATE BLDMV-SCNC: 0.6 MMOL/L (ref 0.4–2)
LACTATE BLDMV-SCNC: 0.7 MMOL/L (ref 0.4–2)
LDH SERPL L TO P-CCNC: 266 U/L (ref 84–246)
MAGNESIUM SERPL-MCNC: 2.03 MG/DL (ref 1.6–2.4)
MAGNESIUM SERPL-MCNC: 2.08 MG/DL (ref 1.6–2.4)
MCH RBC QN AUTO: 27.1 PG (ref 26–34)
MCH RBC QN AUTO: 27.6 PG (ref 26–34)
MCHC RBC AUTO-ENTMCNC: 32 G/DL (ref 32–36)
MCHC RBC AUTO-ENTMCNC: 32.8 G/DL (ref 32–36)
MCV RBC AUTO: 84 FL (ref 80–100)
MCV RBC AUTO: 85 FL (ref 80–100)
NRBC BLD-RTO: 0 /100 WBCS (ref 0–0)
NRBC BLD-RTO: 0 /100 WBCS (ref 0–0)
OXYHGB MFR BLDMV: 50.5 % (ref 45–75)
OXYHGB MFR BLDMV: 55.9 % (ref 45–75)
OXYHGB MFR BLDMV: 58.5 % (ref 45–75)
OXYHGB MFR BLDMV: 61.9 % (ref 45–75)
OXYHGB MFR BLDMV: 63.1 % (ref 45–75)
OXYHGB MFR BLDMV: 66.9 % (ref 45–75)
PCO2 BLDMV: 38 MM HG (ref 41–51)
PCO2 BLDMV: 39 MM HG (ref 41–51)
PCO2 BLDMV: 42 MM HG (ref 41–51)
PCO2 BLDMV: 44 MM HG (ref 41–51)
PCO2 BLDMV: 45 MM HG (ref 41–51)
PCO2 BLDMV: 47 MM HG (ref 41–51)
PH BLDMV: 7.37 PH (ref 7.33–7.43)
PH BLDMV: 7.39 PH (ref 7.33–7.43)
PH BLDMV: 7.39 PH (ref 7.33–7.43)
PH BLDMV: 7.4 PH (ref 7.33–7.43)
PH BLDMV: 7.43 PH (ref 7.33–7.43)
PH BLDMV: 7.44 PH (ref 7.33–7.43)
PHOSPHATE SERPL-MCNC: 3.1 MG/DL (ref 2.5–4.9)
PHOSPHATE SERPL-MCNC: 3.3 MG/DL (ref 2.5–4.9)
PLATELET # BLD AUTO: 262 X10*3/UL (ref 150–450)
PLATELET # BLD AUTO: 270 X10*3/UL (ref 150–450)
PO2 BLDMV: 35 MM HG (ref 35–45)
PO2 BLDMV: 35 MM HG (ref 35–45)
PO2 BLDMV: 36 MM HG (ref 35–45)
PO2 BLDMV: 38 MM HG (ref 35–45)
PO2 BLDMV: 38 MM HG (ref 35–45)
PO2 BLDMV: 43 MM HG (ref 35–45)
POTASSIUM BLDMV-SCNC: 3.8 MMOL/L (ref 3.5–5.3)
POTASSIUM BLDMV-SCNC: 3.9 MMOL/L (ref 3.5–5.3)
POTASSIUM BLDMV-SCNC: 3.9 MMOL/L (ref 3.5–5.3)
POTASSIUM BLDMV-SCNC: 4 MMOL/L (ref 3.5–5.3)
POTASSIUM BLDMV-SCNC: 4 MMOL/L (ref 3.5–5.3)
POTASSIUM BLDMV-SCNC: 4.3 MMOL/L (ref 3.5–5.3)
POTASSIUM SERPL-SCNC: 4.2 MMOL/L (ref 3.5–5.3)
POTASSIUM SERPL-SCNC: 4.2 MMOL/L (ref 3.5–5.3)
PROT SERPL-MCNC: 5.5 G/DL (ref 6.4–8.2)
PROTHROMBIN TIME: 16.4 SECONDS (ref 9.8–12.8)
RBC # BLD AUTO: 3.87 X10*6/UL (ref 4.5–5.9)
RBC # BLD AUTO: 3.92 X10*6/UL (ref 4.5–5.9)
SAO2 % BLDMV: 52 % (ref 45–75)
SAO2 % BLDMV: 58 % (ref 45–75)
SAO2 % BLDMV: 60 % (ref 45–75)
SAO2 % BLDMV: 64 % (ref 45–75)
SAO2 % BLDMV: 64 % (ref 45–75)
SAO2 % BLDMV: 69 % (ref 45–75)
SODIUM BLDMV-SCNC: 132 MMOL/L (ref 136–145)
SODIUM BLDMV-SCNC: 132 MMOL/L (ref 136–145)
SODIUM BLDMV-SCNC: 133 MMOL/L (ref 136–145)
SODIUM BLDMV-SCNC: 135 MMOL/L (ref 136–145)
SODIUM SERPL-SCNC: 134 MMOL/L (ref 136–145)
SODIUM SERPL-SCNC: 134 MMOL/L (ref 136–145)
WBC # BLD AUTO: 10.2 X10*3/UL (ref 4.4–11.3)
WBC # BLD AUTO: 11.4 X10*3/UL (ref 4.4–11.3)

## 2024-11-16 PROCEDURE — 71045 X-RAY EXAM CHEST 1 VIEW: CPT

## 2024-11-16 PROCEDURE — 99291 CRITICAL CARE FIRST HOUR: CPT

## 2024-11-16 PROCEDURE — 82947 ASSAY GLUCOSE BLOOD QUANT: CPT

## 2024-11-16 PROCEDURE — 93750 INTERROGATION VAD IN PERSON: CPT | Performed by: INTERNAL MEDICINE

## 2024-11-16 PROCEDURE — 84132 ASSAY OF SERUM POTASSIUM: CPT | Performed by: NURSE PRACTITIONER

## 2024-11-16 PROCEDURE — 2500000001 HC RX 250 WO HCPCS SELF ADMINISTERED DRUGS (ALT 637 FOR MEDICARE OP): Performed by: NURSE PRACTITIONER

## 2024-11-16 PROCEDURE — 2500000005 HC RX 250 GENERAL PHARMACY W/O HCPCS: Performed by: NURSE PRACTITIONER

## 2024-11-16 PROCEDURE — 2500000004 HC RX 250 GENERAL PHARMACY W/ HCPCS (ALT 636 FOR OP/ED): Performed by: NURSE PRACTITIONER

## 2024-11-16 PROCEDURE — 83735 ASSAY OF MAGNESIUM: CPT | Performed by: NURSE PRACTITIONER

## 2024-11-16 PROCEDURE — 36620 INSERTION CATHETER ARTERY: CPT | Performed by: NURSE PRACTITIONER

## 2024-11-16 PROCEDURE — 37799 UNLISTED PX VASCULAR SURGERY: CPT | Performed by: NURSE PRACTITIONER

## 2024-11-16 PROCEDURE — 99291 CRITICAL CARE FIRST HOUR: CPT | Performed by: INTERNAL MEDICINE

## 2024-11-16 PROCEDURE — 71045 X-RAY EXAM CHEST 1 VIEW: CPT | Performed by: RADIOLOGY

## 2024-11-16 PROCEDURE — 82248 BILIRUBIN DIRECT: CPT | Performed by: NURSE PRACTITIONER

## 2024-11-16 PROCEDURE — 82330 ASSAY OF CALCIUM: CPT | Performed by: NURSE PRACTITIONER

## 2024-11-16 PROCEDURE — 81001 URINALYSIS AUTO W/SCOPE: CPT | Performed by: NURSE PRACTITIONER

## 2024-11-16 PROCEDURE — 2020000001 HC ICU ROOM DAILY

## 2024-11-16 PROCEDURE — 85610 PROTHROMBIN TIME: CPT | Performed by: NURSE PRACTITIONER

## 2024-11-16 PROCEDURE — 83615 LACTATE (LD) (LDH) ENZYME: CPT | Performed by: NURSE PRACTITIONER

## 2024-11-16 PROCEDURE — 2500000004 HC RX 250 GENERAL PHARMACY W/ HCPCS (ALT 636 FOR OP/ED): Performed by: EMERGENCY MEDICINE

## 2024-11-16 PROCEDURE — 87040 BLOOD CULTURE FOR BACTERIA: CPT | Performed by: NURSE PRACTITIONER

## 2024-11-16 PROCEDURE — 99291 CRITICAL CARE FIRST HOUR: CPT | Performed by: NURSE PRACTITIONER

## 2024-11-16 PROCEDURE — 2500000004 HC RX 250 GENERAL PHARMACY W/ HCPCS (ALT 636 FOR OP/ED): Performed by: STUDENT IN AN ORGANIZED HEALTH CARE EDUCATION/TRAINING PROGRAM

## 2024-11-16 PROCEDURE — 85027 COMPLETE CBC AUTOMATED: CPT | Performed by: NURSE PRACTITIONER

## 2024-11-16 PROCEDURE — 84100 ASSAY OF PHOSPHORUS: CPT | Performed by: NURSE PRACTITIONER

## 2024-11-16 PROCEDURE — 97530 THERAPEUTIC ACTIVITIES: CPT | Mod: GP

## 2024-11-16 RX ORDER — HALOPERIDOL 5 MG/ML
5 INJECTION INTRAMUSCULAR ONCE
Status: COMPLETED | OUTPATIENT
Start: 2024-11-16 | End: 2024-11-16

## 2024-11-16 RX ORDER — VANCOMYCIN HYDROCHLORIDE 1 G/20ML
INJECTION, POWDER, LYOPHILIZED, FOR SOLUTION INTRAVENOUS DAILY PRN
Status: DISCONTINUED | OUTPATIENT
Start: 2024-11-16 | End: 2024-11-19

## 2024-11-16 RX ORDER — HALOPERIDOL 5 MG/ML
2 INJECTION INTRAMUSCULAR ONCE
Status: COMPLETED | OUTPATIENT
Start: 2024-11-16 | End: 2024-11-16

## 2024-11-16 RX ORDER — DEXMEDETOMIDINE HYDROCHLORIDE 4 UG/ML
0-1.5 INJECTION, SOLUTION INTRAVENOUS CONTINUOUS
Status: DISCONTINUED | OUTPATIENT
Start: 2024-11-16 | End: 2024-11-17

## 2024-11-16 RX ORDER — ACETAMINOPHEN 500 MG
5 TABLET ORAL NIGHTLY
Status: DISCONTINUED | OUTPATIENT
Start: 2024-11-16 | End: 2024-11-19

## 2024-11-16 RX ORDER — ACETAMINOPHEN 325 MG/1
975 TABLET ORAL EVERY 8 HOURS
Status: DISPENSED | OUTPATIENT
Start: 2024-11-16 | End: 2024-11-20

## 2024-11-16 RX ORDER — HALOPERIDOL 5 MG/ML
5 INJECTION INTRAMUSCULAR ONCE
Status: DISCONTINUED | OUTPATIENT
Start: 2024-11-16 | End: 2024-11-16

## 2024-11-16 RX ORDER — DIGOXIN 125 MCG
125 TABLET ORAL DAILY
Status: DISCONTINUED | OUTPATIENT
Start: 2024-11-16 | End: 2024-11-20

## 2024-11-16 RX ORDER — VANCOMYCIN HYDROCHLORIDE
1250 EVERY 24 HOURS
Status: DISCONTINUED | OUTPATIENT
Start: 2024-11-16 | End: 2024-11-17 | Stop reason: DRUGHIGH

## 2024-11-16 RX ORDER — BISACODYL 10 MG/1
10 SUPPOSITORY RECTAL ONCE
Status: COMPLETED | OUTPATIENT
Start: 2024-11-16 | End: 2024-11-16

## 2024-11-16 RX ORDER — WARFARIN 2 MG/1
4 TABLET ORAL DAILY
Status: DISCONTINUED | OUTPATIENT
Start: 2024-11-16 | End: 2024-11-19

## 2024-11-16 RX ORDER — MILRINONE LACTATE 0.2 MG/ML
0.12 INJECTION, SOLUTION INTRAVENOUS CONTINUOUS
Status: DISCONTINUED | OUTPATIENT
Start: 2024-11-16 | End: 2024-11-23

## 2024-11-16 RX ORDER — EPINEPHRINE IN 0.9 % SOD CHLOR 4MG/250ML
0.01 PLASTIC BAG, INJECTION (ML) INTRAVENOUS CONTINUOUS
Status: DISCONTINUED | OUTPATIENT
Start: 2024-11-16 | End: 2024-11-18

## 2024-11-16 ASSESSMENT — COGNITIVE AND FUNCTIONAL STATUS - GENERAL
STANDING UP FROM CHAIR USING ARMS: A LITTLE
TURNING FROM BACK TO SIDE WHILE IN FLAT BAD: A LITTLE
MOVING TO AND FROM BED TO CHAIR: A LITTLE
MOBILITY SCORE: 15
WALKING IN HOSPITAL ROOM: A LOT
CLIMB 3 TO 5 STEPS WITH RAILING: TOTAL
MOVING FROM LYING ON BACK TO SITTING ON SIDE OF FLAT BED WITH BEDRAILS: A LITTLE

## 2024-11-16 ASSESSMENT — PAIN DESCRIPTION - DESCRIPTORS
DESCRIPTORS: ACHING
DESCRIPTORS: ACHING

## 2024-11-16 ASSESSMENT — PAIN SCALES - GENERAL
PAINLEVEL_OUTOF10: 0 - NO PAIN
PAINLEVEL_OUTOF10: 7
PAINLEVEL_OUTOF10: 0 - NO PAIN
PAINLEVEL_OUTOF10: 0 - NO PAIN
PAINLEVEL_OUTOF10: 3
PAINLEVEL_OUTOF10: 3
PAINLEVEL_OUTOF10: 7

## 2024-11-16 ASSESSMENT — PAIN DESCRIPTION - LOCATION
LOCATION: RIB CAGE
LOCATION: CHEST
LOCATION: RIB CAGE

## 2024-11-16 ASSESSMENT — PAIN DESCRIPTION - ORIENTATION
ORIENTATION: LEFT
ORIENTATION: LEFT;POSTERIOR
ORIENTATION: LEFT

## 2024-11-16 ASSESSMENT — PAIN - FUNCTIONAL ASSESSMENT
PAIN_FUNCTIONAL_ASSESSMENT: CPOT (CRITICAL CARE PAIN OBSERVATION TOOL)
PAIN_FUNCTIONAL_ASSESSMENT: 0-10
PAIN_FUNCTIONAL_ASSESSMENT: 0-10
PAIN_FUNCTIONAL_ASSESSMENT: CPOT (CRITICAL CARE PAIN OBSERVATION TOOL)
PAIN_FUNCTIONAL_ASSESSMENT: 0-10

## 2024-11-16 NOTE — PROCEDURES
"Arterial Puncture/Cannulation    Date/Time: 11/16/2024 12:35 PM    Performed by: BARBARA Resendiz  Authorized by: BARBARA Resendiz  Consent: Verbal consent obtained.  Risks and benefits: risks, benefits and alternatives were discussed  Consent given by: patient  Patient understanding: patient states understanding of the procedure being performed  Patient consent: the patient's understanding of the procedure matches consent given  Procedure consent: procedure consent matches procedure scheduled  Test results: test results available and properly labeled  Required items: required blood products, implants, devices, and special equipment available  Patient identity confirmed: arm band  Time out: Immediately prior to procedure a \"time out\" was called to verify the correct patient, procedure, equipment, support staff and site/side marked as required.  Preparation: Patient was prepped and draped in the usual sterile fashion.  Local anesthesia used: yes  Anesthesia: local infiltration    Anesthesia:  Local anesthesia used: yes  Local Anesthetic: lidocaine 1% without epinephrine    Sedation:  Patient sedated: no    Patient tolerance: patient tolerated the procedure well with no immediate complications  Comments: Arterial line placed to left radial artery under ultrasound guidance by Thomas Hawkins RN APRN student with my assistance and supervision.                "

## 2024-11-16 NOTE — PROGRESS NOTES
CTICU Progress Note  Fahad Meraz/[id number]    Admit Date: 10/24/2024  Hospital Length of Stay: 23   ICU Length of Stay: 3d 22h   CT SURGEON: Sergio    SUBJECTIVE:   Vaso actives: epi 0.01, milrinone 0.25   CI 2.28 SvO2 64 lactate 0.5    Yesterday RPMs increased to 5100, inhaled epoprostenol stopped, TTE with severely reduced RV function, molina removed  Overnight he slept briefly but confused this morning, remains tachycardic    MEDICATIONS  Infusions:  lactated Ringer's, Last Rate: 20 mL/hr (11/15/24 1800)  lactated Ringer's, Last Rate: 5 mL/hr (11/15/24 1800)  milrinone, Last Rate: 0.25 mcg/kg/min (11/16/24 0900)      Scheduled:  acetaminophen, 650 mg, q6h  aspirin, 81 mg, Daily  escitalopram, 10 mg, Daily  heparin (porcine), 5,000 Units, q8h  insulin lispro, 0-15 Units, q4h  polyethylene glycol, 17 g, BID  sennosides-docusate sodium, 2 tablet, BID  warfarin, 3 mg, Daily      PRN:  alteplase, 2 mg, PRN  hydrALAZINE, 10 mg, q4h PRN  naloxone, 0.2 mg, q5 min PRN  ondansetron, 4 mg, q8h PRN   Or  ondansetron, 4 mg, q8h PRN  oxyCODONE, 10 mg, q4h PRN  oxyCODONE, 5 mg, q4h PRN  oxygen, , Continuous PRN - O2/gases        PHYSICAL EXAM:   Visit Vitals  BP 85/67 (BP Location: Right arm)   Pulse 104   Temp 37 °C (98.6 °F) (Core)   Resp 13   Ht 1.829 m (6')   Wt 94.5 kg (208 lb 5.4 oz)   SpO2 98%   BMI 28.26 kg/m²   Smoking Status Former   BSA 2.19 m²     Wt Readings from Last 5 Encounters:   11/16/24 94.5 kg (208 lb 5.4 oz)   10/24/24 92.5 kg (204 lb)   08/05/24 122 kg (268 lb)   01/31/22 119 kg (262 lb)   02/18/20 123 kg (270 lb 2 oz)     INTAKE/OUTPUT:  I/O last 3 completed shifts:  In: 2319 (24.5 mL/kg) [P.O.:1320; I.V.:999 (10.6 mL/kg)]  Out: 3360 (35.6 mL/kg) [Urine:3335 (1 mL/kg/hr); Chest Tube:25]  Weight: 94.5 kg          Vent settings:       Physical Exam:   - CONSTITUTION: NAD  - NEUROLOGIC: intact, non focal, more confused than yesterday, Alert to self only  - CARDIOVASCULAR: sinus tachy with frequent  PVCs  - RESPIRATORY: Unlabored, diminished bases.   - GI: soft, + BS  - : external catheter  - EXTREMITIES: trace edema    - SKIN: intact  - PSYCHIATRIC: calm and cooperative    Daily Risk Screen  Intubated: No    Central line: 11/9 hemodynamic monitoring & vasoactive meds  Gallo: No    Images: TTE reviewed and noted for stable reduced RV function, aortic valve opening.      Invasive Hemodynamics:    Most Recent Range Past 24hrs   BP (Art) 86/59 Arterial Line BP 1  Min: 85/72  Max: 107/71   MAP(Art) 63 mmHg Arterial Line MAP 1 (mmHg)   Min: 63 mmHg  Max: 91 mmHg   RA/CVP   No data recorded   PA 38/17 PAP  Min: 30/17  Max: 42/23   PA(mean) 24 mmHg PAP (Mean)  Min: 20 mmHg  Max: 32 mmHg   CO 4.87 L/min CO (L/min)  Min: 4.86 L/min  Max: 5.6 L/min   CI 2.28 L/min/m2 CI (L/min/m2)  Min: 2.27 L/min/m2  Max: 2.6 L/min/m2   Mixed Venous 64 % SVO2 (%)  Min: 62 %  Max: 69 %   SVR  1133 (dyne*sec)/cm5 SVR (dyne*sec)/cm5  Min: 903 (dyne*sec)/cm5  Max: 1193 (dyne*sec)/cm5         Assessment/Plan   69 M with PMHx of CAD s/p CABG x 4 (2012) and PCI (LCx/OM; 5/2019), stage D ICM HFrEF LVEF 10-15%, AF s/p RFA (PVI and CTI; 4/2024), HTN, pre-T2DM A1c 6.3, depression, anxiety, and VIV using CPAP who was admitted to OSH s/p RHC on 10/18/24 with decompensated heart failure and KENYA. He was referred to [facility] HFICU 10/24/24 and was medically optomized and presented to [employer] [facility] 11/5 who approved destination therapy LVAD.  He is now admitted to the CTICU s/p Heartmate 3 implant with the outflow to the mid descending thoracic aorta via left thoracotomy 11/12/24 with Dr. Mclain and Dr. Madrigal.         NEURO:  PMH of anxiety & depression.  Acute post operative pain acceptably controlled. CAM positive for confusion   - Serial neuro and pain assessments   - Continue scheduled Tylenol   - PRN oxycodone, minimize narcotics as able   - Continue home escitalopram 10mg daily   - PT Consult, OOB to chair as tolerated,  ambulate today  - CAM ICU score qshift  - Sleep/wake cycle hygiene  - Start melatonin 10mg at bedtime for sleep     CV:  Patient has a history of CAD s/p CABG x 4 (2012) and PCI (LCx/OM; 5/2019), stage D ICM HFrEF LVEF 10-15%, AF s/p RFA (PVI and CTI; 4/2024), HTN, and is now status post HM3 LVAD via left thoracotomy 11/12 with Abu Angel and Kaitlin. Pre/Post EF: 15-20% &  dilated RV. Remains on epi 0.01, milrinone 0.25,  with CVP 11 and PA Pressure 38/19. Sinus tachy with PVCs. No PPM or pacer wires.    - Maintain goal MAP 70-80mmHg, avoid sustained MAPs > 90mmHg    - Mixed venous and CI Q4H   - Continue milrinone 0.25 mcg/kg/min    - Discontinue epinephrine infusion -> CI 1.9 and SvO2 52% with trial off epi this morning, resumed epi 0.01  - Start digoxin 0.125mg daily per heart failure recommendations  - Consider starting sildenafil tomorrow now that epinephrine is off  - Daily weights per LVAD protocol   - Dressing changes daily until dry, then weekly per protocol   - Continue ASA 81mg daily    - Hold pre-op hydral, isosorbide, entresto, metoprolol xl 50mg, spironolactone.    - Clarify who no satin use PTA (OSH lipid were low normal but he has hx of CAD/CABG,ICM).      PULM:  No history of pulmonary disease. Adequate oxygenation on room air  - Daily CXR  - Wean FiO2 maintaining SpO2 >92%.   - IS q1h and encourage OOB and ambulation today     GI:  PMH of GERD ob PPI PTA. Malnutrition with poor PO intake pre and post-op.  No post-op BM yet,       - Continue PPI for GERD, LVAD bleeding risk   - Continue adult regular diet with supplements  - Colace/senna BID and miralax BID  - Bisacodyl suppository x 1 today  - Daily LFT's while monitoring for venous congestion      :  CSA-KENYA Risk Score pre-op 6, ICU admit 6.  He did have KENYA on CKD in recent weeks that improved to 1.3 pre-op (baseline creatinine 1.3 - 1.6). Creatinine stable 1.7-1.8 post op.  Last 24 hours net negative 425mL. Mild hyponatremia today 134.  He  appears grossly euvolemic on exam.   - RFP as clinically indicated  - Replete electrolytes per CTICU protocol  - Goal even to slightly negative today, bumex if indicated.      ENDO:  PMH of pre-DM with recent A1c: 6.3.      - Goal BG <180  - Stop SSI / Q4 BS checks as he has not required insulin in last 3 days     HEME:  Acute blood loss anemia stable    - CBC in AM, stable over last 24 hours  - Increase warfarin 3 -> 4mg daily (11/16 - ) for goal INR 2-3.  (PT previously attributed dizziness to apixaban)   - Continue SQH, SCDs for DVT prophylaxis.  - Last type and screen: 11/14     ID:  Afebrile, no leukocytosis.  S/P Periop cefazolin, vanco, fluconazole x 48hrs per LVAD protocol   - Monitor   - Send surveillance cultures in setting of delirium, though suspect infectious source less likely     Skin:  No active skin issues.  - preventative Mepilex dressings in place on sacrum and heels  - change preventative Mepilex weekly or more frequently as indicated (when moist/soiled)   - every shift skin assessment per nursing and weekly ICU skin rounds  - moisture barrier to be applied with ricky care  - active skin problems addressed with nursing on daily rounds     Proph:  SCDs  PPI  SQH     G:  Line  Remove PA catheter today, leave cordis while on milrinone  Right brachial a-line placed 11/12, replace with radial today  PIVs    F: Family: updated at bedside.     A,B,C,D,E,F,G: reviewed     Dispo: Transfer to HFICU when bed available  CTICU TEAM PHONE [phone]    MAYTE Brooke Student  [facility]      I have independently reviewed data and examined the patient.  The above A&P includes my edits.    I personally spent 40 minutes of critical care time directly and personally managing the patient exclusive of separately billable procedures or teaching.      MAYTE Caceres

## 2024-11-16 NOTE — PROGRESS NOTES
ADVANCED HEART FAILURE LVAD PROGRESS NOTE      VAD Cardiologist: Padmini     Type of VAD: HM3  Implant Date: 11/12/24  Reason for VAD: ICM  Intent: Long-Term (Significant PAD, age, patient preference)      Subjective     HPI:  Fahad Meraz is a very pleasant 69 y.o. male w/ a PMHx sig for CAD s/p 4V CABG (2012) and PCI (LCx/OM; 5/2019), stage D systolic HF/ICM/HFrEF with LVEF 10-15%( 10/22/24 TTE), AF s/p RFA (PVI and CTI; 4/2024) on OAC therapy, HTN, dyslipidemia, depression, anxiety and VIV using CPAP who was admitted to OSH ICU, s/p RHC on 10/18/24.  Per patient, he had been experienced worsening SOB and fluids overload for 2-3 weeks. Patient was on milrinone drip and underwent SGC diuresis. However his KENYA worsened, and were not able to wean milrinone drip. Decision was made to transfer him to HF ICU [facility] for possible advanced therapy consideration. Advanced therapies evaluation was initiated on 10/30. Discussed in advanced therapeutics committee on 11/5 and was denied for transplantation, and approved for LVAD (significant PAD, Elevated PSA level, Age approaching 70, as well as patient verbalized he would be more in favor of LVAD vs Transplant). He was transferred to the floor on 11/6 to await VAD implantation. Readmitted to HFICU as of 11/09 for pre-OR swan guided optimization. Now presents to CTICU s/p LVAD HM3 implant on 11/12/24 with Dr. Mclain. OR Course/Issues: pulseless VT requiring defib x 1 pre- CPB.      Principal Problem:    Acute on chronic heart failure with reduced ejection fraction and diastolic dysfunction  Active Problems:    Ischemic cardiomyopathy    Receiving inotropic medication    HTN (hypertension)    CAD (coronary artery disease)    MI (myocardial infarction) (Multi)    CHF (congestive heart failure)    Gastroesophageal reflux disease    Acute kidney injury (nontraumatic) (CMS-HCC)    Delirium       LOS: 23 days     Interval Events:   Remains on milrinone with adequate  biventricular filling pressures this AM. Epi wean this AM and reduction in CI to 1.9 and SvO2 52% with trial off epi this morning, resumed epi 0.01 and started on digoxin.     More confused this AM. Hold narcotics, discontinue/switch lines, pan culture and look for infection sources.    Plan to transfer to HFICU.     NYHA class pre-VAD implant: IV    EPINEPHrine, 0.01 mcg/kg/min, Last Rate: 0.01 mcg/kg/min (11/16/24 1145)  lactated Ringer's, 20 mL/hr, Last Rate: 20 mL/hr (11/15/24 1800)  lactated Ringer's, 5 mL/hr, Last Rate: 5 mL/hr (11/15/24 1800)  milrinone, 0.25 mcg/kg/min, Last Rate: 0.25 mcg/kg/min (11/16/24 1145)       PAP: (30-43)/(13-27) 43/26  CVP:  [7 mmHg-21 mmHg] 21 mmHg  CO:  [4.86 L/min-5.6 L/min] 4.87 L/min  CI:  [2.27 L/min/m2-2.6 L/min/m2] 2.28 L/min/m2      Objective   Vitals:   Vitals:    11/16/24 1000 11/16/24 1100 11/16/24 1200 11/16/24 1300   BP:       BP Location:       Pulse: 104 95 (!) 119 (!) 131   Resp: 13 15 17 16   Temp:   37.4 °C (99.3 °F)    TempSrc:   Core    SpO2: 98% 93% 100% 98%   Weight:       Height:         Wt Readings from Last 5 Encounters:   11/16/24 94.5 kg (208 lb 5.4 oz)   10/24/24 92.5 kg (204 lb)   08/05/24 122 kg (268 lb)   01/31/22 119 kg (262 lb)   02/18/20 123 kg (270 lb 2 oz)     Hemodynamic parameters for last 24 hours:  PAP: (30-43)/(13-27) 43/26  CVP:  [7 mmHg-21 mmHg] 21 mmHg  CO:  [4.86 L/min-5.6 L/min] 4.87 L/min  CI:  [2.27 L/min/m2-2.6 L/min/m2] 2.28 L/min/m2  Intake/Output for last 24 hours:    Intake/Output Summary (Last 24 hours) at 11/16/2024 1337  Last data filed at 11/16/2024 1200  Gross per 24 hour   Intake 1366.3 ml   Output 1660 ml   Net -293.7 ml      Vent settings:       Physical exam:  Physical Exam  Constitutional:       General: He is not in acute distress.     Appearance: Normal appearance. He is not diaphoretic.   HENT:      Head: Normocephalic and atraumatic.      Mouth/Throat:      Mouth: Mucous membranes are moist.      Pharynx:  Oropharynx is clear.   Eyes:      Extraocular Movements: Extraocular movements intact.      Pupils: Pupils are equal, round, and reactive to light.   Cardiovascular:      Rate and Rhythm: Tachycardia present.      Comments: LVAD hum heard on ascultation  Driveline clean, no bleeding. Site appropriately dressed with tegaderm.   Pulmonary:      Breath sounds: Normal breath sounds. No wheezing or rhonchi.      Comments: On 2L NC  Abdominal:      General: Abdomen is flat. There is no distension.      Palpations: Abdomen is soft. There is no mass.      Tenderness: There is no abdominal tenderness. There is no guarding.   Musculoskeletal:      Right lower leg: No edema.      Left lower leg: No edema.   Neurological:      General: No focal deficit present.      Mental Status: He is alert.        Driveline:          Labs:   CMP:  Recent Labs     11/16/24  0128 11/15/24  1255 11/15/24  0020 11/14/24  1224 11/14/24  0118 11/13/24  1228 11/13/24  0008 11/13/24  0007 11/12/24  1257   * 135* 136 137 136 136  --  136 139   K 4.2 4.6 4.7 4.9 5.6* 5.2  --  4.8 4.2   CL 98 99 100 102 102 102  --  102 101   CO2 26 25 24 23 25 23  --  25 23   ANIONGAP 14 16 17 17 15 16  --  14 19   BUN 51* 53* 49* 44* 39* 33*  --  27* 24*   CREATININE 1.73* 1.91* 1.85* 1.78* 1.86* 1.66*  --  1.70* 1.73*   EGFR 42* 37* 39* 41* 39* 44*  --  43* 42*   MG 2.08 2.09 2.21 2.31 2.49* 2.41* 2.51*  --  3.10*     Recent Labs     11/16/24  0128 11/15/24  1255 11/15/24  0021 11/15/24  0020 11/14/24  1224 11/14/24  0118 11/13/24  1228 11/13/24  0007 11/12/24  1307 11/01/24  0317 10/31/24  1249 10/25/24  1221 10/24/24  2328   ALBUMIN 3.6 3.6 3.7 3.7 3.5 3.8 3.7 4.2 3.7   < > 4.3   < > 4.4   ALT 4*  --  5*  --   --  7* 11  --  15  --  14  --  13   AST 24  --  29  --   --  40* 42*  --  37  --  16  --  15   BILITOT 1.0  --  1.1  --   --  0.9 1.1  --  0.9  --  1.4*  --  2.3*    < > = values in this interval not displayed.     CBC:  Recent Labs     11/16/24  0128  "11/15/24  1255 11/15/24  0020 11/14/24  1224 11/14/24  0118 11/13/24  1228 11/13/24  0008 11/12/24  1851   WBC 11.4* 13.0* 14.7* 16.9* 18.0* 15.6* 19.3* 21.3*   HGB 10.5* 10.7* 10.9* 11.3* 12.6* 12.3* 13.0* 12.8*   HCT 32.8* 32.9* 33.8* 35.2* 37.9* 36.3* 39.3* 39.5*    276 283 341 413 391 515* 486*   MCV 85 84 85 84 83 82 83 84     COAG:   Recent Labs     11/16/24  0128 11/15/24  0020 11/14/24  0118 11/13/24  0008 11/12/24  1257 11/12/24  0220 11/11/24  0523 11/10/24  0436 11/09/24  0748 11/08/24  0729 11/03/24  0726 11/03/24  0242 11/02/24  0507 11/02/24  0506 11/01/24  0317   INR 1.5* 1.4* 1.4* 1.4* 1.4*  --   --   --   --   --   --  1.4*  --  1.3* 1.3*   HAUF  --   --   --   --   --  0.4 0.5 0.5 0.4 0.4   < > 0.4   < >  --  0.3   FIBRINOGEN  --   --   --   --  301  --   --   --   --   --   --   --   --   --   --     < > = values in this interval not displayed.     ABO:   Recent Labs     11/14/24 0118   ABO A     HEME/ENDO:   Recent Labs     10/31/24  1249 10/24/24  2328   FERRITIN  --  76   IRONSAT  --  17*   TSH 4.39* 8.47*   HGBA1C  --  6.3*     CARDIAC:   Recent Labs     11/16/24  0128 11/15/24  0020 11/14/24  0118 11/13/24  0008 11/12/24  1307 10/31/24  1249 10/24/24  2328   * 301* 351* 276* 324* 187  --    BNP  --   --   --   --   --  755* 1,995*     No results for input(s): \"CHOL\", \"LDLF\", \"LDLCALC\", \"HDL\", \"TRIG\" in the last 85873 hours.  TOX:  Recent Labs     10/31/24  1249   AMPHETAMINE Negative     MICRO: No results for input(s): \"ESR\", \"CRP\", \"PROCAL\" in the last 78272 hours.  No results found for the last 90 days.        Imaging:   XR chest 1 view    Result Date: 11/16/2024  Interpreted By:  Lilly Sagastume [facility], STUDY: XR CHEST 1 VIEW; 11/16/2024 3:47 am   INDICATION: Signs/Symptoms:s/p LVAD implant, assess lung fields, devices.   COMPARISON: Radiograph dated 11/15/2024   ACCESSION NUMBER(S): [id number]   ORDERING CLINICIAN: ANETA MANNING   FINDINGS: Right IJ approach Cranston-Faviola " catheter is in place with the tip projecting over the proximal right main pulmonary artery. LVAD is unchanged in position.   Status post median sternotomy. The cardiac silhouette size is significantly enlarged, unchanged.   Persistent bibasilar pleural effusion and bibasilar atelectasis and mild perihilar edema, unchanged. No sizable pneumothorax.   No acute osseous abnormality.       No significant interval change in bilateral pleural effusions with bibasilar atelectasis and mild perihilar congestion/edema.     Signed by: Elvis Sagastume 11/16/2024 10:28 AM Dictation workstation:   [id number]    XR chest 1 view    Result Date: 11/15/2024  Interpreted By:  Tuan Platt, STUDY: XR CHEST 1 VIEW;  11/15/2024 3:29 am   INDICATION: Signs/Symptoms:s/p LVAD implant, assess lung fields, devices.   COMPARISON: Chest radiograph dated 11/14/2024   ACCESSION NUMBER(S): [id number]   ORDERING CLINICIAN: ANETA MANNING   FINDINGS: AP radiograph of the chest   The swans Faviola catheter is within the distal right pulmonary artery. VD projects overlying the left lower thorax.   Sternal sutures are noted. Vascular clips overlie the mediastinum. There is moderately severe cardiomegaly. Opacity obscures the left diaphragm and costophrenic sulcus.       1. Pulmonary aeration is similar to the prior study Opacity within left lower lobe persists obscuring the left diaphragm laterally and the costophrenic sulcus       Signed by: Tuan Platt 11/15/2024 12:27 PM Dictation workstation:   [id number]    Transthoracic Echo (TTE) Limited    Result Date: 11/15/2024   [facility], [address] [place] [address]                Tel [phone] and Fax [phone] TRANSTHORACIC ECHOCARDIOGRAM REPORT  Patient Name:       RJ Webb Physician:    [id number] Fawad Chicas MD Study Date:         11/15/2024          Ordering Provider:    Mary Alice ALLAN                                                                NIGEL MRN/PID:            [id number]            Fellow:               [id number] Aramis Fitzpatrick MD Accession#:         [id number]        Nurse: Date of Birth/Age:  1955 / 69     Sonographer:          Jackson estrada RDCS Gender assigned at  M                   Additional Staff: Birth: Height:             182.88 cm           Admit Date: Weight:             97.07 kg            Admission Status:     Inpatient - STAT BSA / BMI:          2.19 m2 / 29.02     Encounter#:           [id number]                     kg/m2 Blood Pressure:     107/71 mmHg         Department Location:  [facility] Study Type:    TRANSTHORACIC ECHO (TTE) LIMITED Diagnosis/ICD: Presence of heart assist device-Z95.811 Indication:    LVAD ramp study CPT Code:      Echo Limited-22969; Doppler Limited-41121; Color Doppler-23012 Patient History: Pertinent History: S/p CABG x 4 (2012) and PCI (LCx/OM; 5/2019), Stage D ICM                    HFrEF LVEF 10-15%, AF s/p RFA (PVI and CTI; 4/2024), HTN,                    Dyslipidemia, 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 Study Detail: The following Echo studies were performed: 2D, M-Mode, Doppler and               color flow. Technically challenging study due to poor acoustic               windows, prominent lung artifact, body habitus and patient lying               in supine position.  PHYSICIAN INTERPRETATION: Left Ventricle: The left ventricular systolic function is severely decreased, with a visually estimated ejection fraction of 10-15%. There is global hypokinesis of the left ventricle with minor regional variations. The left ventricular cavity size is severely dilated. There is normal septal and normal posterior left ventricular wall thickness.  Abnormal (paradoxical) septal motion consistent with post-operative status. Left ventricular diastolic filling cannot be determined, due to left ventricular assist device. Left Atrium: The left atrium is mildly dilated. Right Ventricle: The right ventricle is severely enlarged. There is severely reduced right ventricular systolic function. A device is visualized in the right ventricle. Right Atrium: The right atrium is severely dilated. There is a device visualized in the right atrium. Aortic Valve: The aortic valve is structurally normal. There is mild to moderate aortic valve cusp calcification. There is mild aortic valve regurgitation. Mitral Valve: The mitral valve is normal in structure. There is mild mitral annular calcification. There is mild mitral valve regurgitation. Tricuspid Valve: The tricuspid valve is structurally normal. There is mild tricuspid regurgitation. The right ventricular systolic pressure is unable to be estimated. Pulmonic Valve: The pulmonic valve is not well visualized. Pulmonic valve regurgitation was not assessed. Pericardium: Trivial pericardial effusion. Aorta: The aortic root is abnormal. There is mild dilatation of the ascending aorta. There is mild dilatation of the aortic root. In comparison to the previous echocardiogram(s): Compared with study dated 11/13/2024, no significant change.  LEFT VENTRICULAR ASSIST DEVICE: Study Type: This study was performed while LVAD settings were optimized. LVAD: The patient has a(n) HeartMate 3 LVAD device present. Inflow Cannula: The inflow cannula is visualized in the left ventricular apex. Outflow Cannula: Visualization of the outflow cannula is technically difficult. AV Leaflet Mobility: . The aortic valve appears to be opening every 1 beats.  CONCLUSIONS:  1. The left ventricular systolic function is severely decreased, with a visually estimated ejection fraction of 10-15%.  2. There is global hypokinesis of the left ventricle with minor  regional variations.  3. Left ventricular diastolic filling cannot be determined, due to left ventricular assist device.  4. Left ventricular cavity size is severely dilated.  5. Abnormal septal motion consistent with post-operative status.  6. There is severely reduced right ventricular systolic function.  7. Severely enlarged right ventricle.  8. The left atrium is mildly dilated.  9. The right atrium is severely dilated. 10. Mild aortic valve regurgitation. 11. . The aortic valve appears to be opening every 1 beats. 12. Compared with study dated 11/13/2024, no significant change. QUANTITATIVE DATA SUMMARY:  2D MEASUREMENTS:          Normal Ranges: IVSd:            1.00 cm  (0.6-1.1cm) LVPWd:           1.00 cm  (0.6-1.1cm) LVIDd:           5.90 cm  (3.9-5.9cm) LV Mass Index:   109 g/m2  LA VOLUME:                   Normal Ranges: LA Vol A4C:        86.2 ml   (22+/-6mL/m2) LA Vol Index A4C:  39.3ml/m2 LA Area A4C:       24.2 cm2 LA Major Axis A4C: 5.8 cm  AORTA MEASUREMENTS:         Normal Ranges: Ao Sinus, d:        4.30 cm (2.1-3.5cm)  LV SYSTOLIC FUNCTION BY 2D PLANIMETRY (MOD):                      Normal Ranges: EF-Visual:      13 % LV EF Reported: 13 %  [id number] Fawad Chicas MD Electronically signed on 11/15/2024 at 12:09:54 PM  ** Final **       Notable Studies:   EKG:  Encounter Date: 10/24/24   Electrocardiogram, 12-lead PRN ACS symptoms   Result Value    Ventricular Rate 111    Atrial Rate 111    QRS Duration 130    QT Interval 302    QTC Calculation(Bazett) 410    R Axis 134    T Axis -40    QRS Count 18    Q Onset 218    T Offset 369    QTC Fredericia 370    Narrative    Atrial fibrillation with occasional Premature ventricular complexes and Fusion complexes  Nonspecific intraventricular block  Cannot rule out Anteroseptal infarct , age undetermined  T wave abnormality, consider inferolateral ischemia  Abnormal ECG  No previous ECGs available  Confirmed by Lucas Kerns ([id number]) on 10/28/2024 12:58:57 PM        Echo:  Transthoracic Echo (TTE) Limited    Result Date: 11/15/2024   [facility], [address] [place] [address]                Tel [phone] and Fax [phone] TRANSTHORACIC ECHOCARDIOGRAM REPORT  Patient Name:       RJ LEIJA     Tracey Physician:    [id number] Fawad Chicas MD Study Date:         11/15/2024          Ordering Provider:    [id number] ANETA MANNING MRN/PID:            [id number]            Fellow:               [id number] Aramis Fitzpatrick MD Accession#:         [id number]        Nurse: Date of Birth/Age:  1955 / 69     Sonographer:          Jackson estrada RDCS Gender assigned at  M                   Additional Staff: Birth: Height:             182.88 cm           Admit Date: Weight:             97.07 kg            Admission Status:     Inpatient - STAT BSA / BMI:          2.19 m2 / 29.02     Encounter#:           [id number]                     kg/m2 Blood Pressure:     107/71 mmHg         Department Location:  [facility] Study Type:    TRANSTHORACIC ECHO (TTE) LIMITED Diagnosis/ICD: Presence of heart assist device-Z95.811 Indication:    LVAD ramp study CPT Code:      Echo Limited-20273; Doppler Limited-68023; Color Doppler-36319 Patient History: Pertinent History: S/p CABG x 4 (2012) and PCI (LCx/OM; 5/2019), Stage D ICM                    HFrEF LVEF 10-15%, AF s/p RFA (PVI and CTI; 4/2024), HTN,                    Dyslipidemia, 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 Study Detail: The following Echo studies were performed: 2D, M-Mode, Doppler and               color flow. Technically challenging study due to poor acoustic               windows, prominent  lung artifact, body habitus and patient lying               in supine position.  PHYSICIAN INTERPRETATION: Left Ventricle: The left ventricular systolic function is severely decreased, with a visually estimated ejection fraction of 10-15%. There is global hypokinesis of the left ventricle with minor regional variations. The left ventricular cavity size is severely dilated. There is normal septal and normal posterior left ventricular wall thickness. Abnormal (paradoxical) septal motion consistent with post-operative status. Left ventricular diastolic filling cannot be determined, due to left ventricular assist device. Left Atrium: The left atrium is mildly dilated. Right Ventricle: The right ventricle is severely enlarged. There is severely reduced right ventricular systolic function. A device is visualized in the right ventricle. Right Atrium: The right atrium is severely dilated. There is a device visualized in the right atrium. Aortic Valve: The aortic valve is structurally normal. There is mild to moderate aortic valve cusp calcification. There is mild aortic valve regurgitation. Mitral Valve: The mitral valve is normal in structure. There is mild mitral annular calcification. There is mild mitral valve regurgitation. Tricuspid Valve: The tricuspid valve is structurally normal. There is mild tricuspid regurgitation. The right ventricular systolic pressure is unable to be estimated. Pulmonic Valve: The pulmonic valve is not well visualized. Pulmonic valve regurgitation was not assessed. Pericardium: Trivial pericardial effusion. Aorta: The aortic root is abnormal. There is mild dilatation of the ascending aorta. There is mild dilatation of the aortic root. In comparison to the previous echocardiogram(s): Compared with study dated 11/13/2024, no significant change.  LEFT VENTRICULAR ASSIST DEVICE: Study Type: This study was performed while LVAD settings were optimized. LVAD: The patient has a(n) HeartMate 3 LVAD  device present. Inflow Cannula: The inflow cannula is visualized in the left ventricular apex. Outflow Cannula: Visualization of the outflow cannula is technically difficult. AV Leaflet Mobility: . The aortic valve appears to be opening every 1 beats.  CONCLUSIONS:  1. The left ventricular systolic function is severely decreased, with a visually estimated ejection fraction of 10-15%.  2. There is global hypokinesis of the left ventricle with minor regional variations.  3. Left ventricular diastolic filling cannot be determined, due to left ventricular assist device.  4. Left ventricular cavity size is severely dilated.  5. Abnormal septal motion consistent with post-operative status.  6. There is severely reduced right ventricular systolic function.  7. Severely enlarged right ventricle.  8. The left atrium is mildly dilated.  9. The right atrium is severely dilated. 10. Mild aortic valve regurgitation. 11. . The aortic valve appears to be opening every 1 beats. 12. Compared with study dated 11/13/2024, no significant change. QUANTITATIVE DATA SUMMARY:  2D MEASUREMENTS:          Normal Ranges: IVSd:            1.00 cm  (0.6-1.1cm) LVPWd:           1.00 cm  (0.6-1.1cm) LVIDd:           5.90 cm  (3.9-5.9cm) LV Mass Index:   109 g/m2  LA VOLUME:                   Normal Ranges: LA Vol A4C:        86.2 ml   (22+/-6mL/m2) LA Vol Index A4C:  39.3ml/m2 LA Area A4C:       24.2 cm2 LA Major Axis A4C: 5.8 cm  AORTA MEASUREMENTS:         Normal Ranges: Ao Sinus, d:        4.30 cm (2.1-3.5cm)  LV SYSTOLIC FUNCTION BY 2D PLANIMETRY (MOD):                      Normal Ranges: EF-Visual:      13 % LV EF Reported: 13 %  [id number] Fawad Chicas MD Electronically signed on 11/15/2024 at 12:09:54 PM  ** Final **     Transthoracic Echo (TTE) Limited    Result Date: 11/13/2024   [facility], [address] [place] [address]                Tel [phone] and Fax [phone] TRANSTHORACIC ECHOCARDIOGRAM REPORT   Patient Name:       RJ LEIJA     Tracey Physician:    [id number] Raciel Casarez MD Study Date:         11/13/2024          Ordering Provider:    [id number] JO HAWK                                                               JONATHAN MRN/PID:            [id number]            Fellow: Accession#:         [id number]        Nurse: Date of Birth/Age:  1955 / 69     Sonographer:          Jackson estrada RDCS Gender assigned at  M                   Additional Staff: Birth: Height:             182.88 cm           Admit Date: Weight:             93.90 kg            Admission Status:     Inpatient - STAT BSA / BMI:          2.16 m2 / 28.07     Encounter#:           [id number]                     kg/m2 Blood Pressure:     94/74 mmHg          Department Location:  [facility] Study Type:    TRANSTHORACIC ECHO (TTE) LIMITED Diagnosis/ICD: Acute on chronic combined systolic (congestive) and diastolic                (congestive) heart failure (CHF)-I50.43 Indication:    eval RV fx CPT Code:      Echo Limited-27613; Doppler Limited-75865; Color Doppler-37592 Patient History: Pertinent History: CAD s/p CABGx4 in 2012 and PCI 2019 w/ ICM LVEF 10-15%, AF                    s/p RFA & PVI, HTN, CHF exacerbation, 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 Study Detail: The following Echo studies were performed: 2D, M-Mode, Doppler and               color flow. Technically challenging study due to body habitus,               patient lying in supine position, postoperative dressings,               prominent lung artifact and poor acoustic windows. The patient is               intubated. Definity used as a contrast agent for endocardial               border definition. Total contrast used for this procedure was 2.0               mL via IV push.  PHYSICIAN  INTERPRETATION: Left Ventricle: Left ventricular ejection fraction is severely decreased, by visual estimate at 15%. There is eccentric left ventricular hypertrophy. There is global hypokinesis of the left ventricle with minor regional variations. The left ventricular cavity size is moderate to severely dilated. Left ventricular diastolic filling was not assessed. Left Atrium: The left atrium is moderately dilated. Right Ventricle: The right ventricle is mild to moderately enlarged. There is severely reduced right ventricular systolic function. A device is visualized in the right ventricle. Right Atrium: The right atrium is moderately dilated. There is a device visualized in the right atrium. Aortic Valve: The aortic valve was not well visualized. There is mild to moderate aortic valve cusp calcification. There is mild aortic valve regurgitation. Mitral Valve: The mitral valve is normal in structure. There is mild mitral valve regurgitation. Tricuspid Valve: The tricuspid valve is structurally normal. There is mild tricuspid regurgitation. Pulmonic Valve: The pulmonic valve is not well visualized. There is trace pulmonic valve regurgitation. Pericardium: There is no pericardial effusion noted. Aorta: The aortic root is abnormal. There is mild dilatation of the aortic root. Pulmonary Artery: The tricuspid regurgitant velocity is 1.83 m/s, and with an estimated right atrial pressure of 10 mmHg, the estimated pulmonary artery pressure is normal with the RVSP at 23.4 mmHg. Systemic Veins: The inferior vena cava appears moderately dilated, on vent. In comparison to the previous echocardiogram(s): Compared with study dated 11/5/2024, no significant change.  LEFT VENTRICULAR ASSIST DEVICE: LVAD: The patient has a(n) HeartMate 3 LVAD device present. Inflow Cannula: The inflow cannula is visualized in the left ventricular apex.  CONCLUSIONS:  1. Poorly visualized anatomical structures due to suboptimal image quality.  2.  Left ventricular cavity size is moderate to severely dilated.  3. Left ventricular ejection fraction is severely decreased, by visual estimate at 15%.  4. There is global hypokinesis of the left ventricle with minor regional variations.  5. There is severely reduced right ventricular systolic function.  6. Mild to moderately enlarged right ventricle.  7. The left atrium is moderately dilated.  8. The right atrium is moderately dilated.  9. Mild aortic valve regurgitation. 10. The inferior vena cava appears moderately dilated, on vent. QUANTITATIVE DATA SUMMARY:  2D MEASUREMENTS:          Normal Ranges: IVSd:            1.00 cm  (0.6-1.1cm) LVPWd:           1.20 cm  (0.6-1.1cm) LVIDd:           6.90 cm  (3.9-5.9cm) LVIDs:           6.60 cm LV Mass Index:   164 g/m2 LV % FS          4.3 %  AORTA MEASUREMENTS:         Normal Ranges: Ao Sinus, d:        4.00 cm (2.1-3.5cm)  LV SYSTOLIC FUNCTION BY 2D PLANIMETRY (MOD):                      Normal Ranges: EF-Visual:      15 % LV EF Reported: 15 %  RIGHT VENTRICLE: RV s' 0.07 m/s  TRICUSPID VALVE/RVSP:          Normal Ranges: Peak TR Velocity:     1.83 m/s RV Syst Pressure:     23 mmHg  (< 30mmHg) IVC Diam:             2.70 cm  [id number] Raciel Casarez MD Electronically signed on 11/13/2024 at 10:25:04 AM  ** Final **     Transthoracic Echo (TTE) Limited    Result Date: 11/5/2024   [facility], [address] [place] [address]                Tel [phone] and Fax [phone] TRANSTHORACIC ECHOCARDIOGRAM REPORT  Patient Name:       RJ Webb Physician:    [id number] Mira Mayer MD Study Date:         11/5/2024           Ordering Provider:    [id number] PAYAL KOHLER MRN/PID:            [id number]            Fellow: Accession#:         [id number]        Nurse: Date of Birth/Age:  1955 / 69     Sonographer:           Maureen Mooring                     years                                     [facility] Gender assigned at  M                   Additional Staff: Birth: Height:             182.88 cm           Admit Date:           10/24/2024 Weight:             93.44 kg            Admission Status:     Inpatient - STAT BSA / BMI:          2.16 m2 / 27.94     Encounter#:           [id number]                     kg/m2 Blood Pressure:     106/72 mmHg         [address] Study Type:    TRANSTHORACIC ECHO (TTE) LIMITED Diagnosis/ICD: Heart failure, unspecified-I50.9 Indication:    RV assessment CPT Code:      Echo Limited-31928; Doppler Limited-33503; Color Doppler-35619 Patient History: Pertinent History: Negative bubble peformed on prior echo; Known 15-20% EF;                    Cardiogenic shock; Acute on chronic heart failure with                    reduced EF and diastolic dysfunction; ICM; Hx of chronic                    A-Fib; VIV. Study Detail: The following Echo studies were performed: 2D, M-Mode, Doppler and               color flow. Technically challenging study due to body habitus.               Definity used as a contrast agent for endocardial border               definition. Total contrast used for this procedure was 2 mL via IV               push.  PHYSICIAN INTERPRETATION: Left Ventricle: Left ventricular ejection fraction is severely decreased, calculated by Loera's biplane at 18%. There is severely increased eccentric left ventricular hypertrophy. There is global hypokinesis of the left ventricle with minor regional variations. The left ventricular cavity size is severely dilated. There is normal septal and normal posterior left ventricular wall thickness. Left ventricular diastolic filling was not assessed. There is no definite left ventricular thrombus visualized. Left Atrium: The left atrium is severely dilated. Right Ventricle: The right ventricle is severely enlarged. There is severely  reduced right ventricular systolic function. A device is visualized in the right ventricle. Right Atrium: The right atrium is severely dilated. There is a device visualized in the right atrium. Aortic Valve: The aortic valve was not well visualized. There is mild aortic valve cusp calcification. Aortic valve regurgitation was not assessed. Mitral Valve: The mitral valve is normal in structure. Mitral valve regurgitation was not assessed. Tricuspid Valve: The tricuspid valve is structurally normal. There is mild tricuspid regurgitation. The Doppler estimated RVSP is mildly elevated at 42.9 mmHg. Pulmonic Valve: The pulmonic valve was not assessed. Pulmonic valve regurgitation was not assessed. Pericardium: Trivial pericardial effusion. Aorta: The aortic root is abnormal. The aortic root appears mildly dilated and measures 4.20 cm. There is mild dilatation of the ascending aorta. There is mild dilatation of the aortic root. Systemic Veins: The inferior vena cava appears dilated, with IVC inspiratory collapse less than 50%. In comparison to the previous echocardiogram(s): Compared with the prior exam from 10/25/2024, there was already a severely dilated LV with severely reduced function. The RV remains dilated with severely reduced function. Valvular function not assessed on today's exam.  CONCLUSIONS:  1. Left ventricular ejection fraction is severely decreased, calculated by Loear's biplane at 18%.  2. There is global hypokinesis of the left ventricle with minor regional variations.  3. Left ventricular cavity size is severely dilated.  4. No left ventricular thrombus visualized.  5. There is severely increased eccentric left ventricular hypertrophy.  6. There is severely reduced right ventricular systolic function.  7. Severely enlarged right ventricle.  8. The left atrium is severely dilated.  9. The right atrium is severely dilated. 10. Mitral valve regurgitation was not assessed. 11. Mildly elevated right  ventricular systolic pressure. 12. Mildly dilated aortic root. 13. Compared with the prior exam from 10/25/2024, there was already a severely dilated LV with severely reduced function. The RV remains dilated with severely reduced function. Valvular function not assessed on today's exam. QUANTITATIVE DATA SUMMARY:  2D MEASUREMENTS:           Normal Ranges: Ao Root d:       4.20 cm   (2.0-3.7cm) IVSd:            0.87 cm   (0.6-1.1cm) LVPWd:           0.78 cm   (0.6-1.1cm) LVIDd:           8.21 cm   (3.9-5.9cm) LVIDs:           8.04 cm LV Mass Index:   157 g/m2 LVEDV Index:     138 ml/m2 LV % FS          2.0 %  LA VOLUME:                  Normal Ranges: LA Volume Index: 59.9 ml/m2  RA VOLUME BY A/L METHOD:          Normal Ranges: RA Area A4C:             36.8 cm2  AORTA MEASUREMENTS:         Normal Ranges: Ao Sinus, d:        4.20 cm (2.1-3.5cm) Asc Ao, d:          4.20 cm (2.1-3.4cm)  LV SYSTOLIC FUNCTION BY 2D PLANIMETRY (MOD):                      Normal Ranges: EF-A4C View:    14 % (>=55%) EF-A2C View:    21 % EF-Biplane:     18 % LV EF Reported: 18 %  AORTIC VALVE:          Normal Ranges: LVOT Diameter: 2.31 cm (1.8-2.4cm)  RIGHT VENTRICLE: RV Basal 6.10 cm RV Mid   4.10 cm RV Major 9.1 cm TAPSE:   9.0 mm RV s'    0.06 m/s  TRICUSPID VALVE/RVSP:          Normal Ranges: Peak TR Velocity:     2.64 m/s RV Syst Pressure:     43 mmHg  (< 30mmHg) IVC Diam:             3.00 cm  AORTA: Asc Ao Diam 4.23 cm  [id number] Mira Mayer MD Electronically signed on 11/5/2024 at 2:36:55 PM  ** Final **       Meds:  Scheduled medications  acetaminophen, 975 mg, oral, q8h  aspirin, 81 mg, oral, Daily  bisacodyl, 10 mg, rectal, Once  digoxin, 125 mcg, oral, Daily  escitalopram, 10 mg, oral, Daily  heparin (porcine), 5,000 Units, subcutaneous, q8h  melatonin, 5 mg, oral, Nightly  polyethylene glycol, 17 g, oral, BID  sennosides-docusate sodium, 2 tablet, oral, BID  warfarin, 4 mg, oral, Daily      Continuous medications  EPINEPHrine, 0.01  mcg/kg/min, Last Rate: 0.01 mcg/kg/min (11/16/24 1145)  lactated Ringer's, 20 mL/hr, Last Rate: 20 mL/hr (11/15/24 1800)  lactated Ringer's, 5 mL/hr, Last Rate: 5 mL/hr (11/15/24 1800)  milrinone, 0.25 mcg/kg/min, Last Rate: 0.25 mcg/kg/min (11/16/24 1145)      PRN medications  PRN medications: alteplase, hydrALAZINE, naloxone, ondansetron **OR** ondansetron, oxyCODONE, oxyCODONE, oxygen     Assessment/Plan   Assessment & Plan     # Stage D NYHA IV, ICM, decompensated acute on chronic heart failure, HFrEF 10-15% s/p LVAD HM3 implant 11/12/24   #RV dysfunction     Intra-op BRIANA: EF 15-20%, dilated RV     Heart Mate III interrogation   Most Recent   Flow 3.8   Speed 5100   Power 3.5   PI 4   MAP (mmHg): 74    - Please maintain MAP 70-80  - Maintain net even today from a volume perspective. Okay for spot diuresis.   - Increase Coumadin to 4mg at bedtime.   - Patient has primary indication for ASA (CAD, ICM) -> on ASA 81 mg daily.  - Please continue daily driveline dressing changes w/ picture in chart   - Plan for RAMP study on Monday.  - Start digoxin for RV support.   - Not appropriate for GDMT in acute postop period   - Change out lines and pan culture, discontinue narcotics with worsening confusion today.  - Continue excellent care per CTICU. Plan to transfer to HFICU today.     Seen and discussed with Dr. Jonatan Freitas DO  HF Fellow

## 2024-11-16 NOTE — PROGRESS NOTES
Fahad Meraz is a 69 y.o. male on day 23 of admission presenting with Acute on chronic heart failure with reduced ejection fraction and diastolic dysfunction.    Subjective   Patient discussed in morning handover, patient examined and chart reviewed. Meds, labs and radiology reviewed during full interdisciplinary rounds this morning. Co-rounded with HF this morning.    Surgeon: Jas Briseno  DOS: Nov. 12, 2024  Procedure: HMIII Implantation (via L thoracotomy)     Airway: grade I - full view of glottis   EF:  LVEF 15-20% with mild AI and mod MR, TR pre & post, dilated RV     FULL Code  Emergency Sternotomy: N; L mini-thoracotomy    HPI:   10/18/24 - Patient initially presented to an outside hospital  with decompensated heart failure and KENYA.   10/24/24 - Patient transferred to [facility] HFICU  for medical optimization and evaluation for advanced heart failure therapies.    11/5/2024 -  [employer] approved destination therapy LVAD.  He is now admitted to the CTICU s/p Heartmate 3 implant with the outflow to the mid descending thoracic aorta via left thoracotomy 11/12/24 with Dr. Mclain and Dr. Madrigal.     PMH:  CAD - s/p CABG x 4 (2012) and PCI (LCx/OM; 5/2019)  Stage D ICM HFrEF LVEF 10-15%  AF s/p RFA (PVI and CTI; 4/2024)  HTN  Dyslipidemia  pre-T2DM (A1c 6.3)  VIV using CPAP   Depression  Anxiety    Course in Hospital:  11/12 - Intra-op -  episode of pulseless VT requiring defib x 1 prior to being placed on CPB. In the ICU (post-op), significant bleeding from venous cannulation site requiring transfusion  11/14 - Extubated  11/15 - off i-EPO and epinephrine    Overnight: LVAD flows stable. Remains on milrinone for RV support     Objective     Last Recorded Vitals  Blood pressure 85/67, pulse (!) 119, temperature 36.6 °C (97.9 °F), temperature source Core, resp. rate 13, height 1.829 m (6'), weight 97.5 kg (214 lb 15.2 oz), SpO2 97%.    Invasive Hemodynamics:    Most Recent Range  Past 24hrs   BP (Art) 95/67 Arterial Line BP 1  Min: 82/58  Max: 107/71   MAP(Art) 75 mmHg Arterial Line MAP 1 (mmHg)   Min: 66 mmHg  Max: 91 mmHg   RA/CVP   No data recorded   PA 36/19 PAP  Min: 30/17  Max: 42/23   PA(mean) 25 mmHg PAP (Mean)  Min: 20 mmHg  Max: 32 mmHg   CO 5.5 L/min CO (L/min)  Min: 4.86 L/min  Max: 5.64 L/min   CI 2.6 L/min/m2 CI (L/min/m2)  Min: 2.27 L/min/m2  Max: 2.64 L/min/m2   Mixed Venous 64 % SVO2 (%)  Min: 62 %  Max: 69 %   SVR  924 (dyne*sec)/cm5 SVR (dyne*sec)/cm5  Min: 822 (dyne*sec)/cm5  Max: 1193 (dyne*sec)/cm5     Intake/Output last 3 Shifts:  I/O last 3 completed shifts:  In: 2319 (23.8 mL/kg) [P.O.:1320; I.V.:999 (10.2 mL/kg)]  Out: 3360 (34.5 mL/kg) [Urine:3335 (1 mL/kg/hr); Chest Tube:25]  Weight: 97.5 kg     Physical Exam  Eyes:      Pupils: Pupils are equal, round, and reactive to light.   Cardiovascular:      Rate and Rhythm: Regular rhythm. Tachycardia present.      Comments: Milrinone: 0.25    Lines:  RIJ CVL and PAC  Right brachial A-line  Pulmonary:      Effort: Pulmonary effort is normal.      Breath sounds: Examination of the right-lower field reveals decreased breath sounds. Examination of the left-lower field reveals decreased breath sounds. Decreased breath sounds present.      Comments: On room air this morning  Abdominal:      General: Bowel sounds are decreased.      Palpations: Abdomen is soft.      Tenderness: There is no abdominal tenderness (patient sedatec). There is no guarding or rebound.   Skin:     Comments: No active issues   Neurological:      Mental Status: He is alert.      Comments: RASS 0  CAM-ICU: positive this morning   Psychiatric:         Behavior: Behavior is cooperative.     MCS:   Heart Mate III:     Most Recent Range Past 24hrs   Flow 4.1 Pump Flow  Min: 3.8  Max: 5.2   Speed 5100 Speed RPM  Min: 5050  Max: 5150   Power 3.5 [place]  Min: 3.4  Max: 3.7   PI 3.5 Pulse Index  Min: 3.3  Max: 4   No PI event and stable power overnight. LDH down  trending    Assessment/Plan     ICU Liberation Bundle:  A-Analgesia: Tylenol and DC opioids  B-SBT: Extubated  C-RASS Target: 0  D-CAM: positive this morning; RASS 0  E-Mobilization Plan: OOBTC and ambulate today  F - Family Last Update: to be update by the team    Daily Checklist:  HOB > 30 degrees: extubated   Feeding: p.o. intake  Thromboprophylaxis: Heparin and SCDs  Ulcer Prophylaxis: PPI  Glucose Control: Target 110-180; < 180 achieved with no insulin for past 72 hours  Line to removed: R/A PAC Cath for removal now that epinephrine is off  Bowel Care: Bowel regime ordered  De-escalation of Antibiotics: Completed routine anti-microbial prophylaxis; WBC in normalizing    Plan:  Continue with chemical RVAD support with milrinone today.  Delirium: stop narcotics, surveillance cultures, sleep promotion with melatonin, continue with ambulation today   ASA; continue warfarin today (INR 1.5 - will increase dose today)  No planned changes to LVAD parameters today  Hold on diuresis today  Start digoxin today  R/A sildenafil tomorrow  R/A statin tomorrow    DISPO: CTICU with plan to transfer to HFICU once bed available.    I spent 49 minutes in the professional and overall care of this patient.    This critically ill patient continues to be at-risk for clinically significant deterioration / failure due to the above mentioned dysfunctional, unstable organ systems.  I have personally identified and managed all complex critical care issues to prevent aforementioned clinical deterioration.  Critical care time is spent at bedside and/or the immediate area and has included, but is not limited to, the review of diagnostic tests, labs, radiographs, serial assessments of hemodynamics, respiratory status, ventilatory management, and family updates.  Time spent in procedures and teaching are reported separately.    Zak Aguilar MD

## 2024-11-16 NOTE — PROGRESS NOTES
Physical Therapy    Physical Therapy Treatment    Patient Name: Fahad Meraz  MRN: [id number]  Department: [facility] [place] CTU  Room: 06/06-A  Today's Date: 11/16/2024  Time Calculation  Start Time: 1424  Stop Time: 1441  Time Calculation (min): 17 min         Assessment/Plan   PT Assessment  PT Assessment Results: Decreased strength, Decreased endurance, Impaired balance, Decreased mobility, Decreased cognition, Impaired judgement, Decreased safety awareness  Rehab Prognosis: Excellent  Barriers to Discharge: medical acuity  Evaluation/Treatment Tolerance: Patient tolerated treatment well  Medical Staff Made Aware: Yes  End of Session Communication: Bedside nurse  Assessment Comment: Pt performed bed mobility with Min A and bed>chair transfer with Min A; pt will continue to benefit from skilled PT to improve functional mobility.  End of Session Patient Position: Up in chair, Alarm off, caregiver present, Alarm off, not on at start of session  PT Plan  Inpatient/Swing Bed or Outpatient: Inpatient  PT Plan  Treatment/Interventions: Bed mobility, Transfer training, Gait training, Stair training, Balance training, Strengthening, Endurance training, Range of motion, Therapeutic exercise, Home exercise program, Therapeutic activity, Positioning  PT Plan: Ongoing PT  PT Frequency: Daily  PT Discharge Recommendations: High intensity level of continued care  Equipment Recommended upon Discharge: Wheeled walker  PT Recommended Transfer Status: Assist x1  PT - OK to Discharge: Yes      General Visit Information:   PT  Visit  PT Received On: 11/16/24  Response to Previous Treatment: Patient with no complaints from previous session.  General  Prior to Session Communication: Bedside nurse  Patient Position Received: Bed, 3 rail up, Alarm off, not on at start of session  Preferred Learning Style: auditory, verbal, visual  General Comment: Pt awake and willing to participate in PT treatment session; pt pleasantly confused throughout  session. (LVAD: (Pre: 3.7/5100/4.6/3.6; Post: 3.9/5100/3.9/3.5); A line, tele, IV, molina)    Subjective   Precautions:  Precautions  Hearing/Visual Limitations: hearing is WFL  Medical Precautions: Cardiac precautions, Fall precautions  Precautions Comment: MAP 70-80, SpO2>92    Vital Signs (Past 2hrs)        Date/Time Vitals Session Patient Position Pulse Resp SpO2 BP MAP (mmHg)    11/16/24 1400 --  --  131  17  95 %  --  --     11/16/24 1424 Pre PT  Lying  135  --  92 %  125/70  86     11/16/24 1441 Post PT  --  136  --  --  --  --     11/16/24 1500 --  --  131  15  90 %  --  --                         Objective   Pain:  Pain Assessment  Pain Assessment: 0-10  0-10 (Numeric) Pain Score: 0 - No pain  Cognition:  Cognition  Overall Cognitive Status: Impaired  Arousal/Alertness: Appropriate responses to stimuli  Orientation Level: Disoriented to time, Disoriented to place  Following Commands: Follows one step commands with repetition  Cognition Comments: Pt followed all commands throughout session however demonstrated multiple bouts of confusion/speaking about random topics- pt disoriented to place and time  Insight: Moderate  Impulsive: Mildly  Coordination:     Postural Control:  Postural Control  Postural Control: Within Functional Limits  Static Sitting Balance  Static Sitting-Balance Support: Feet supported, Bilateral upper extremity supported  Static Sitting-Level of Assistance: Contact guard, Minimum assistance  Static Sitting-Comment/Number of Minutes: CGA-Min A for occasional posterior lean at EOB  Static Standing Balance  Static Standing-Balance Support: Bilateral upper extremity supported  Static Standing-Level of Assistance: Minimum assistance (x1)  Extremity/Trunk Assessments:    RLE   RLE : Within Functional Limits  LLE   LLE : Within Functional Limits  Activity Tolerance:  Activity Tolerance  Endurance: Tolerates 10 - 20 min exercise with multiple rests  Early Mobility/Exercise Safety Screen: Proceed  with mobilization - No exclusion criteria met  Activity Tolerance Comments: Vitals stable throughout session; O2 NC donned at start of session however removed during transfer per RN; pt somewhat SOB after transfer however O2 with poor waveform. O2 redonned by RN at end of session  Treatments:  Therapeutic Exercise  Therapeutic Exercise Performed: Yes  Therapeutic Exercise Activity 1: Seated LAQ x 10 bilat LE    Therapeutic Activity  Therapeutic Activity Performed: Yes  Therapeutic Activity 1: Increased time for advanced ICU line management required for functional mobility  Therapeutic Activity 2: Pt sat EOB for ~7 min prior to bed>chair transfer; CGA-Min A due to occasional posterior lean  Therapeutic Activity 3: Pt stood for ~15s prior to taking side step to chair    Bed Mobility  Bed Mobility: Yes  Bed Mobility 1  Bed Mobility 1: Supine to sitting  Level of Assistance 1: Minimum assistance  Bed Mobility Comments 1: Min A for LE management and trunk elevation to sitting    Ambulation/Gait Training  Ambulation/Gait Training Performed: No (Aborted due to fatigue/SOB)  Transfers  Transfer: Yes  Transfer 1  Transfer From 1: Bed to  Transfer to 1: Chair with arms  Technique 1: Sit to stand, Stand to sit  Transfer Level of Assistance 1: Arm in arm assistance, Minimum assistance  Trials/Comments 1: Min A for hip elevation to stand and balance during 3 side steps to chair; verbal cues for direction provided throughout. Pt reporting fatigue/some SOB after transfer    Outcome Measures:  [facility]  Turning from your back to your side while in a flat bed without using bedrails: A little  Moving from lying on your back to sitting on the side of a flat bed without using bedrails: A little  Moving to and from bed to chair (including a wheelchair): A little  Standing up from a chair using your arms (e.g. wheelchair or bedside chair): A little  To walk in hospital room: A lot  Climbing 3-5 steps with railing:  Total  Basic Mobility - Total Score: 15    FSS-ICU  Ambulation: Unable to attempt due to weakness  Rolling: Minimal assistance (performs 75% or more of task)  Sitting: Minimal assistance (performs 75% or more of task)  Transfer Sit-to-Stand: Minimal assistance (performs 75% or more of task)  Transfer Supine-to-Sit: Minimal assistance (performs 75% or more of task)  Total Score: 16      Early Mobility/Exercise Safety Screen: Proceed with mobilization - No exclusion criteria met  ICU Mobility Scale: Transferring bed to chair [5]    Education Documentation  Precautions, taught by Dipika Noriega PT at 11/16/2024  3:15 PM.  Learner: Patient  Readiness: Acceptance  Method: Explanation  Response: Needs Reinforcement    Body Mechanics, taught by Dipika Noriega PT at 11/16/2024  3:15 PM.  Learner: Patient  Readiness: Acceptance  Method: Explanation  Response: Needs Reinforcement    Handouts, taught by Dipika Noriega PT at 11/16/2024  3:15 PM.  Learner: Patient  Readiness: Acceptance  Method: Explanation  Response: Needs Reinforcement    Mobility Training, taught by Dipika Noriega PT at 11/16/2024  3:15 PM.  Learner: Patient  Readiness: Acceptance  Method: Explanation  Response: Needs Reinforcement    Education Comments  No comments found.           Encounter Problems       Encounter Problems (Active)       Balance       Pt will score >45 on VEGA for safe community ambulation (Progressing)       Start:  10/29/24    Expected End:  11/28/24               Mobility       Patient will ambulate >1000 ft with LRAD and supervision With stable vitals and RPD </= 3/10 and RPE </= 13/20 for improved functional mobility.   (Met)       Start:  10/29/24    Expected End:  11/12/24    Resolved:  11/05/24    Updated to: Patient will ambulate >2500 ft with LRAD and supervision With stable vitals and RPD </= 3/10 and RPE </= 13/20 for improved functional mobility.    Update reason: Progression         Pt will complete 6MWT with no assistive  device and supervision and achieve >700 ft With stable vitals and RPD </= 3/10 and RPE </= 13/20 for improved functional mobility.   (Met)       Start:  10/29/24    Expected End:  11/12/24    Resolved:  11/05/24    Updated to: Pt will complete 6MWT with no assistive device and supervision and achieve >1300 ft With stable vitals and RPD </= 3/10 and RPE </= 13/20 for improved functional mobility.    Update reason: Progression         Patient will ambulate >2500 ft with LRAD and supervision With stable vitals and RPD </= 3/10 and RPE </= 13/20 for improved functional mobility. (Progressing)       Start:  11/05/24    Expected End:  11/28/24                Pt will complete 6MWT with no assistive device and supervision and achieve >1300 ft With stable vitals and RPD </= 3/10 and RPE </= 13/20 for improved functional mobility. (Progressing)       Start:  11/05/24    Expected End:  11/28/24                   PT Transfers       Patient will perform bed mobility indep (maintenance 2/2 prolonged hospital stay anticipated) (Progressing)       Start:  10/29/24    Expected End:  11/28/24            Patient will transfer sit to and from stand indep (Progressing)       Start:  10/29/24    Expected End:  11/28/24            Pt will complete 5XSTS from recliner without UE assist <12 seconds With stable vitals and RPD </= 3/10 and RPE </= 13/20 for improved functional mobility.   (Progressing)       Start:  10/29/24    Expected End:  11/28/24               Pain - Adult            ANIL PEGUERO, PT

## 2024-11-16 NOTE — CONSULTS
Vancomycin Dosing by Pharmacy- INITIAL    Fahad Meraz is a 69 y.o. year old male who Pharmacy has been consulted for vancomycin dosing for other SIRS s/p LVAD . Based on the patient's indication and renal status this patient will be dosed based on a goal AUC of 400-600.     Renal function is currently stable. Scr isn't around baseline yet (~1.2) but stable.     Visit Vitals  /70   Pulse (!) 121   Temp 37.6 °C (99.7 °F) (Core)   Resp 16        Lab Results   Component Value Date    CREATININE 1.73 (H) 2024    CREATININE 1.91 (H) 11/15/2024    CREATININE 1.85 (H) 11/15/2024    CREATININE 1.78 (H) 2024        Patient weight is as follows:   Vitals:    24 0600   Weight: 94.5 kg (208 lb 5.4 oz)       Cultures:  No results found for the encounter in last 14 days.        I/O last 3 completed shifts:  In: 2319 (24.5 mL/kg) [P.O.:1320; I.V.:999 (10.6 mL/kg)]  Out: 3360 (35.6 mL/kg) [Urine:3335 (1 mL/kg/hr); Chest Tube:25]  Weight: 94.5 kg   I/O during current shift:  I/O this shift:  In: 684.5 [P.O.:474; I.V.:210.5]  Out: 350 [Urine:350]    Temp (24hrs), Av.2 °C (99 °F), Min:36.6 °C (97.9 °F), Max:37.6 °C (99.7 °F)         Assessment/Plan     Patient will not be given a loading dose.  Will initiate vancomycin maintenance,  1250 mg every 24 hours.    This dosing regimen is predicted by [employer] to result in the following pharmacokinetic parameters:  Loading dose: N/A  Regimen: 1250 mg IV every 24 hours.  Start time: 16:07 on 2024  Exposure target: AUC24 (range)400-600 mg/L.hr   [id number]: 426 mg/L.hr  AUC24,ss: 512 mg/L.hr  Probability of AUC24 > 400: 75 %  Ctrough,ss: 16.2 mg/L  Probability of Ctrough,ss > 20: 32 %    Follow-up level will be ordered on  at AM lab draw unless clinically indicated sooner.  Will continue to monitor renal function daily while on vancomycin and order serum creatinine at least every 48 hours if not already ordered.  Follow for continued vancomycin needs,  clinical response, and signs/symptoms of toxicity.       Andres Bean, PharmD

## 2024-11-16 NOTE — CARE PLAN
Problem: Heart Failure  Goal: Improved gas exchange this shift  Outcome: Progressing  Goal: Improved urinary output this shift  Outcome: Progressing  Goal: Reduction in peripheral edema within 24 hours  Outcome: Progressing  Goal: Report improvement of dyspnea/breathlessness this shift  Outcome: Progressing  Goal: Weight from fluid excess reduced over 2-3 days, then stabilize  Outcome: Progressing  Goal: Increase self care and/or family involvement in 24 hours  Outcome: Progressing     Problem: Skin  Goal: Decreased wound size/increased tissue granulation at next dressing change  Outcome: Progressing  Flowsheets (Taken 11/16/2024 0935)  Decreased wound size/increased tissue granulation at next dressing change: Protective dressings over bony prominences  Goal: Participates in plan/prevention/treatment measures  Outcome: Progressing  Flowsheets (Taken 11/16/2024 0935)  Participates in plan/prevention/treatment measures:   Elevate heels   Increase activity/out of bed for meals  Goal: Prevent/manage excess moisture  Outcome: Progressing  Flowsheets (Taken 11/16/2024 0935)  Prevent/manage excess moisture:   Monitor for/manage infection if present   Moisturize dry skin  Goal: Prevent/minimize sheer/friction injuries  Outcome: Progressing  Goal: Promote/optimize nutrition  Outcome: Progressing  Flowsheets (Taken 11/16/2024 0935)  Promote/optimize nutrition:   Assist with feeding   Monitor/record intake including meals   Consume > 50% meals/supplements  Goal: Promote skin healing  Outcome: Progressing     Problem: Pain - Adult  Goal: Verbalizes/displays adequate comfort level or baseline comfort level  Outcome: Progressing     Problem: Safety - Adult  Goal: Free from fall injury  Outcome: Progressing     Problem: Discharge Planning  Goal: Discharge to home or other facility with appropriate resources  Outcome: Progressing     Problem: Chronic Conditions and Co-morbidities  Goal: Patient's chronic conditions and  co-morbidity symptoms are monitored and maintained or improved  Outcome: Progressing     Problem: Fall/Injury  Goal: Not fall by end of shift  Outcome: Progressing  Goal: Be free from injury by end of the shift  Outcome: Progressing  Goal: Verbalize understanding of personal risk factors for fall in the hospital  Outcome: Progressing  Goal: Verbalize understanding of risk factor reduction measures to prevent injury from fall in the home  Outcome: Progressing  Goal: Use assistive devices by end of the shift  Outcome: Progressing  Goal: Pace activities to prevent fatigue by end of the shift  Outcome: Progressing     Problem: Ventricular Assisted Device (VAD)  Goal: Hemodynamic stability, correction of coagulopathy, lab value stability  Outcome: Progressing  Goal: Stable metal status  Outcome: Progressing  Goal: Pulmonary toileting, incentive spirometry  Outcome: Progressing  Goal: Nutrition  Outcome: Progressing  Goal: Mobility/OT/PT  Outcome: Progressing  Goal: ROM  Outcome: Progressing  Goal: Sitting  Outcome: Progressing  Goal: Walk  Outcome: Progressing  Goal: Involve in VAD awareness, dressing change  Outcome: Progressing     Problem: Meds/Post-op Pain  Goal: Pain controlled to tolerate pain level  Outcome: Progressing  Goal: Tolerates prescribed medication  Outcome: Progressing     Problem: DVT/VTE Prevention/Activity  Goal: No decrease in circulation/sensation  Outcome: Progressing  Goal: Prevent skin breakdown  Outcome: Progressing  Goal: Return to preop oxygenation status  Outcome: Progressing  Goal: Tolerates optimal activity  Outcome: Progressing  Goal: Increase self care and/or family involvement in 24 hrs.  Outcome: Progressing     Problem: Wound care/infection prevention  Goal: No signs of infection in 24 hrs.  Outcome: Progressing  Goal: No unexpected bleeding from incision this shift  Outcome: Progressing     Problem: Diet/fluid balance  Goal: Adequate urinary output  Outcome: Progressing  Goal: Free  from nausea/vomiting  Outcome: Progressing  Goal: Return in bowel function  Outcome: Progressing  Goal: Tolerates prescribed diet  Outcome: Progressing     Problem: Other goals  Goal: No change in neurological status  Outcome: Progressing  Goal: Stabilize vital signs (return to 10% of baseline)  Outcome: Progressing   The patient's goals for the shift include      The clinical goals for the shift include Patient will have decreased confusion

## 2024-11-16 NOTE — CARE PLAN
The clinical goals for the shift include Patient will have decreased confusion    Problem: Ventricular Assisted Device (VAD)  Goal: Stable metal status  Outcome: Not Progressing  Goal: Involve in VAD awareness, dressing change  Outcome: Not Progressing     Problem: Ventricular Assisted Device (VAD)  Goal: Hemodynamic stability, correction of coagulopathy, lab value stability  Outcome: Progressing  Goal: Wean vasopressors/nitric  Outcome: Progressing  Goal: Pulmonary toileting, incentive spirometry  Outcome: Progressing  Goal: Nutrition  Outcome: Progressing  Goal: Mobility/OT/PT  Outcome: Progressing  Goal: Sitting  Outcome: Progressing  Goal: Walk  Outcome: Progressing

## 2024-11-17 ENCOUNTER — APPOINTMENT (OUTPATIENT)
Dept: RADIOLOGY | Facility: HOSPITAL | Age: 69
DRG: 001 | End: 2024-11-17
Payer: MEDICARE

## 2024-11-17 ENCOUNTER — APPOINTMENT (OUTPATIENT)
Dept: CARDIOLOGY | Facility: HOSPITAL | Age: 69
DRG: 001 | End: 2024-11-17
Payer: MEDICARE

## 2024-11-17 LAB
ABO GROUP (TYPE) IN BLOOD: NORMAL
ALBUMIN SERPL BCP-MCNC: 3.4 G/DL (ref 3.4–5)
ALBUMIN SERPL BCP-MCNC: 3.5 G/DL (ref 3.4–5)
ALP SERPL-CCNC: 52 U/L (ref 33–136)
ALT SERPL W P-5'-P-CCNC: 3 U/L (ref 10–52)
ANION GAP BLDMV CALCULATED.4IONS-SCNC: 8 MMO/L (ref 10–25)
ANION GAP SERPL CALC-SCNC: 13 MMOL/L (ref 10–20)
ANION GAP SERPL CALC-SCNC: 14 MMOL/L (ref 10–20)
ANTIBODY SCREEN: NORMAL
APPEARANCE UR: CLEAR
AST SERPL W P-5'-P-CCNC: 21 U/L (ref 9–39)
BACTERIA #/AREA URNS AUTO: ABNORMAL /HPF
BACTERIA BLD CULT: NORMAL
BASE EXCESS BLDMV CALC-SCNC: 3.2 MMOL/L (ref -2–3)
BILIRUB DIRECT SERPL-MCNC: 0.3 MG/DL (ref 0–0.3)
BILIRUB SERPL-MCNC: 1 MG/DL (ref 0–1.2)
BILIRUB UR STRIP.AUTO-MCNC: NEGATIVE MG/DL
BODY TEMPERATURE: 37 DEGREES CELSIUS
BUN SERPL-MCNC: 42 MG/DL (ref 6–23)
BUN SERPL-MCNC: 46 MG/DL (ref 6–23)
CA-I BLD-SCNC: 1.21 MMOL/L (ref 1.1–1.33)
CA-I BLD-SCNC: 1.23 MMOL/L (ref 1.1–1.33)
CA-I BLDMV-SCNC: 1.26 MMOL/L (ref 1.1–1.33)
CALCIUM SERPL-MCNC: 9 MG/DL (ref 8.6–10.6)
CALCIUM SERPL-MCNC: 9.1 MG/DL (ref 8.6–10.6)
CHLORIDE BLD-SCNC: 102 MMOL/L (ref 98–107)
CHLORIDE SERPL-SCNC: 100 MMOL/L (ref 98–107)
CHLORIDE SERPL-SCNC: 101 MMOL/L (ref 98–107)
CO2 SERPL-SCNC: 25 MMOL/L (ref 21–32)
CO2 SERPL-SCNC: 26 MMOL/L (ref 21–32)
COLOR UR: YELLOW
CREAT SERPL-MCNC: 1.41 MG/DL (ref 0.5–1.3)
CREAT SERPL-MCNC: 1.47 MG/DL (ref 0.5–1.3)
DIGOXIN SERPL-MCNC: <0.3 NG/ML (ref 0.8–?)
EGFRCR SERPLBLD CKD-EPI 2021: 51 ML/MIN/1.73M*2
EGFRCR SERPLBLD CKD-EPI 2021: 54 ML/MIN/1.73M*2
ERYTHROCYTE [DISTWIDTH] IN BLOOD BY AUTOMATED COUNT: 15.8 % (ref 11.5–14.5)
ERYTHROCYTE [DISTWIDTH] IN BLOOD BY AUTOMATED COUNT: 16 % (ref 11.5–14.5)
GLUCOSE BLD MANUAL STRIP-MCNC: 124 MG/DL (ref 74–99)
GLUCOSE BLD-MCNC: 136 MG/DL (ref 74–99)
GLUCOSE SERPL-MCNC: 129 MG/DL (ref 74–99)
GLUCOSE SERPL-MCNC: 136 MG/DL (ref 74–99)
GLUCOSE UR STRIP.AUTO-MCNC: NORMAL MG/DL
HCO3 BLDMV-SCNC: 27.9 MMOL/L (ref 22–26)
HCT VFR BLD AUTO: 30.8 % (ref 41–52)
HCT VFR BLD AUTO: 31.3 % (ref 41–52)
HCT VFR BLD EST: 32 % (ref 41–52)
HGB BLD-MCNC: 10.1 G/DL (ref 13.5–17.5)
HGB BLD-MCNC: 10.5 G/DL (ref 13.5–17.5)
HGB BLDMV-MCNC: 10.6 G/DL (ref 13.5–17.5)
INHALED O2 CONCENTRATION: 28 %
INR PPP: 1.7 (ref 0.9–1.1)
KETONES UR STRIP.AUTO-MCNC: NEGATIVE MG/DL
LACTATE BLDMV-SCNC: 0.5 MMOL/L (ref 0.4–2)
LDH SERPL L TO P-CCNC: 270 U/L (ref 84–246)
LEUKOCYTE ESTERASE UR QL STRIP.AUTO: NEGATIVE
MAGNESIUM SERPL-MCNC: 1.89 MG/DL (ref 1.6–2.4)
MAGNESIUM SERPL-MCNC: 2.05 MG/DL (ref 1.6–2.4)
MCH RBC QN AUTO: 27.4 PG (ref 26–34)
MCH RBC QN AUTO: 27.9 PG (ref 26–34)
MCHC RBC AUTO-ENTMCNC: 32.3 G/DL (ref 32–36)
MCHC RBC AUTO-ENTMCNC: 34.1 G/DL (ref 32–36)
MCV RBC AUTO: 82 FL (ref 80–100)
MCV RBC AUTO: 85 FL (ref 80–100)
NITRITE UR QL STRIP.AUTO: NEGATIVE
NRBC BLD-RTO: 0 /100 WBCS (ref 0–0)
NRBC BLD-RTO: 0 /100 WBCS (ref 0–0)
OXYHGB MFR BLDMV: 67.9 % (ref 45–75)
PCO2 BLDMV: 42 MM HG (ref 41–51)
PH BLDMV: 7.43 PH (ref 7.33–7.43)
PH UR STRIP.AUTO: 5.5 [PH]
PHOSPHATE SERPL-MCNC: 2.4 MG/DL (ref 2.5–4.9)
PHOSPHATE SERPL-MCNC: 2.5 MG/DL (ref 2.5–4.9)
PLATELET # BLD AUTO: 302 X10*3/UL (ref 150–450)
PLATELET # BLD AUTO: 336 X10*3/UL (ref 150–450)
PO2 BLDMV: 44 MM HG (ref 35–45)
POTASSIUM BLDMV-SCNC: 4.2 MMOL/L (ref 3.5–5.3)
POTASSIUM SERPL-SCNC: 3.9 MMOL/L (ref 3.5–5.3)
POTASSIUM SERPL-SCNC: 4.1 MMOL/L (ref 3.5–5.3)
PROT SERPL-MCNC: 5.7 G/DL (ref 6.4–8.2)
PROT UR STRIP.AUTO-MCNC: ABNORMAL MG/DL
PROTHROMBIN TIME: 18.9 SECONDS (ref 9.8–12.8)
RBC # BLD AUTO: 3.69 X10*6/UL (ref 4.5–5.9)
RBC # BLD AUTO: 3.77 X10*6/UL (ref 4.5–5.9)
RBC # UR STRIP.AUTO: ABNORMAL /UL
RBC #/AREA URNS AUTO: ABNORMAL /HPF
RH FACTOR (ANTIGEN D): NORMAL
SAO2 % BLDMV: 70 % (ref 45–75)
SODIUM BLDMV-SCNC: 134 MMOL/L (ref 136–145)
SODIUM SERPL-SCNC: 134 MMOL/L (ref 136–145)
SODIUM SERPL-SCNC: 137 MMOL/L (ref 136–145)
SP GR UR STRIP.AUTO: 1.02
UROBILINOGEN UR STRIP.AUTO-MCNC: NORMAL MG/DL
VANCOMYCIN SERPL-MCNC: 13.6 UG/ML (ref 5–20)
WBC # BLD AUTO: 10 X10*3/UL (ref 4.4–11.3)
WBC # BLD AUTO: 10.8 X10*3/UL (ref 4.4–11.3)
WBC #/AREA URNS AUTO: ABNORMAL /HPF

## 2024-11-17 PROCEDURE — 2500000004 HC RX 250 GENERAL PHARMACY W/ HCPCS (ALT 636 FOR OP/ED): Performed by: EMERGENCY MEDICINE

## 2024-11-17 PROCEDURE — 86901 BLOOD TYPING SEROLOGIC RH(D): CPT

## 2024-11-17 PROCEDURE — 93005 ELECTROCARDIOGRAM TRACING: CPT

## 2024-11-17 PROCEDURE — 2500000004 HC RX 250 GENERAL PHARMACY W/ HCPCS (ALT 636 FOR OP/ED): Performed by: STUDENT IN AN ORGANIZED HEALTH CARE EDUCATION/TRAINING PROGRAM

## 2024-11-17 PROCEDURE — 85027 COMPLETE CBC AUTOMATED: CPT | Performed by: NURSE PRACTITIONER

## 2024-11-17 PROCEDURE — 82248 BILIRUBIN DIRECT: CPT | Performed by: NURSE PRACTITIONER

## 2024-11-17 PROCEDURE — 70450 CT HEAD/BRAIN W/O DYE: CPT | Performed by: RADIOLOGY

## 2024-11-17 PROCEDURE — 99291 CRITICAL CARE FIRST HOUR: CPT | Performed by: NURSE PRACTITIONER

## 2024-11-17 PROCEDURE — 84132 ASSAY OF SERUM POTASSIUM: CPT | Performed by: NURSE PRACTITIONER

## 2024-11-17 PROCEDURE — 80162 ASSAY OF DIGOXIN TOTAL: CPT | Performed by: NURSE PRACTITIONER

## 2024-11-17 PROCEDURE — 2500000004 HC RX 250 GENERAL PHARMACY W/ HCPCS (ALT 636 FOR OP/ED): Performed by: NURSE PRACTITIONER

## 2024-11-17 PROCEDURE — 2500000005 HC RX 250 GENERAL PHARMACY W/O HCPCS: Performed by: NURSE PRACTITIONER

## 2024-11-17 PROCEDURE — 99291 CRITICAL CARE FIRST HOUR: CPT

## 2024-11-17 PROCEDURE — 80048 BASIC METABOLIC PNL TOTAL CA: CPT | Performed by: NURSE PRACTITIONER

## 2024-11-17 PROCEDURE — 71045 X-RAY EXAM CHEST 1 VIEW: CPT

## 2024-11-17 PROCEDURE — 93750 INTERROGATION VAD IN PERSON: CPT

## 2024-11-17 PROCEDURE — 99233 SBSQ HOSP IP/OBS HIGH 50: CPT | Performed by: INTERNAL MEDICINE

## 2024-11-17 PROCEDURE — 80202 ASSAY OF VANCOMYCIN: CPT | Performed by: NURSE PRACTITIONER

## 2024-11-17 PROCEDURE — 2020000001 HC ICU ROOM DAILY

## 2024-11-17 PROCEDURE — 70450 CT HEAD/BRAIN W/O DYE: CPT

## 2024-11-17 PROCEDURE — 85610 PROTHROMBIN TIME: CPT | Performed by: NURSE PRACTITIONER

## 2024-11-17 PROCEDURE — 84100 ASSAY OF PHOSPHORUS: CPT | Performed by: NURSE PRACTITIONER

## 2024-11-17 PROCEDURE — 93010 ELECTROCARDIOGRAM REPORT: CPT | Performed by: INTERNAL MEDICINE

## 2024-11-17 PROCEDURE — 82947 ASSAY GLUCOSE BLOOD QUANT: CPT

## 2024-11-17 PROCEDURE — 83735 ASSAY OF MAGNESIUM: CPT | Performed by: NURSE PRACTITIONER

## 2024-11-17 PROCEDURE — 71045 X-RAY EXAM CHEST 1 VIEW: CPT | Performed by: RADIOLOGY

## 2024-11-17 PROCEDURE — 82330 ASSAY OF CALCIUM: CPT | Performed by: NURSE PRACTITIONER

## 2024-11-17 PROCEDURE — 99222 1ST HOSP IP/OBS MODERATE 55: CPT

## 2024-11-17 PROCEDURE — 83615 LACTATE (LD) (LDH) ENZYME: CPT | Performed by: NURSE PRACTITIONER

## 2024-11-17 PROCEDURE — 2500000001 HC RX 250 WO HCPCS SELF ADMINISTERED DRUGS (ALT 637 FOR MEDICARE OP): Performed by: NURSE PRACTITIONER

## 2024-11-17 PROCEDURE — 93750 INTERROGATION VAD IN PERSON: CPT | Performed by: INTERNAL MEDICINE

## 2024-11-17 PROCEDURE — 37799 UNLISTED PX VASCULAR SURGERY: CPT | Performed by: NURSE PRACTITIONER

## 2024-11-17 RX ORDER — DEXMEDETOMIDINE HYDROCHLORIDE 4 UG/ML
0-1.5 INJECTION, SOLUTION INTRAVENOUS CONTINUOUS
Status: DISCONTINUED | OUTPATIENT
Start: 2024-11-17 | End: 2024-11-18

## 2024-11-17 RX ORDER — AMOXICILLIN 250 MG
1 CAPSULE ORAL 2 TIMES DAILY
Status: DISCONTINUED | OUTPATIENT
Start: 2024-11-17 | End: 2024-12-03 | Stop reason: HOSPADM

## 2024-11-17 RX ORDER — POTASSIUM CHLORIDE 14.9 MG/ML
20 INJECTION INTRAVENOUS ONCE
Status: COMPLETED | OUTPATIENT
Start: 2024-11-17 | End: 2024-11-17

## 2024-11-17 RX ORDER — POLYETHYLENE GLYCOL 3350 17 G/17G
17 POWDER, FOR SOLUTION ORAL DAILY PRN
Status: DISCONTINUED | OUTPATIENT
Start: 2024-11-17 | End: 2024-12-03 | Stop reason: HOSPADM

## 2024-11-17 RX ORDER — DEXMEDETOMIDINE HYDROCHLORIDE 4 UG/ML
0-1.5 INJECTION, SOLUTION INTRAVENOUS CONTINUOUS
Status: DISCONTINUED | OUTPATIENT
Start: 2024-11-17 | End: 2024-11-17

## 2024-11-17 RX ORDER — RISPERIDONE 0.5 MG/1
0.5 TABLET, ORALLY DISINTEGRATING ORAL 2 TIMES DAILY
Status: DISCONTINUED | OUTPATIENT
Start: 2024-11-17 | End: 2024-11-17

## 2024-11-17 RX ORDER — HYDROMORPHONE HYDROCHLORIDE 0.2 MG/ML
0.2 INJECTION INTRAMUSCULAR; INTRAVENOUS; SUBCUTANEOUS ONCE
Status: COMPLETED | OUTPATIENT
Start: 2024-11-17 | End: 2024-11-17

## 2024-11-17 RX ORDER — THIAMINE HYDROCHLORIDE 100 MG/ML
100 INJECTION, SOLUTION INTRAMUSCULAR; INTRAVENOUS DAILY
Status: DISCONTINUED | OUTPATIENT
Start: 2024-11-17 | End: 2024-11-18

## 2024-11-17 RX ORDER — BUMETANIDE 0.25 MG/ML
2 INJECTION, SOLUTION INTRAMUSCULAR; INTRAVENOUS ONCE
Status: COMPLETED | OUTPATIENT
Start: 2024-11-17 | End: 2024-11-17

## 2024-11-17 ASSESSMENT — PAIN SCALES - GENERAL
PAINLEVEL_OUTOF10: 0 - NO PAIN
PAINLEVEL_OUTOF10: 4
PAINLEVEL_OUTOF10: 0 - NO PAIN
PAINLEVEL_OUTOF10: 4
PAINLEVEL_OUTOF10: 0 - NO PAIN

## 2024-11-17 ASSESSMENT — PAIN DESCRIPTION - LOCATION: LOCATION: CHEST

## 2024-11-17 ASSESSMENT — PAIN - FUNCTIONAL ASSESSMENT
PAIN_FUNCTIONAL_ASSESSMENT: 0-10
PAIN_FUNCTIONAL_ASSESSMENT: CPOT (CRITICAL CARE PAIN OBSERVATION TOOL)
PAIN_FUNCTIONAL_ASSESSMENT: 0-10

## 2024-11-17 ASSESSMENT — PAIN DESCRIPTION - ORIENTATION: ORIENTATION: MID

## 2024-11-17 NOTE — PROGRESS NOTES
ADVANCED HEART FAILURE LVAD PROGRESS NOTE      VAD Cardiologist: Padmini     Type of VAD: HM3  Implant Date: 11/12/24  Reason for VAD: ICM  Intent: Long-Term (Significant PAD, age, patient preference)      Subjective     HPI:  Fahad Meraz is a very pleasant 69 y.o. male w/ a PMHx sig for CAD s/p 4V CABG (2012) and PCI (LCx/OM; 5/2019), stage D systolic HF/ICM/HFrEF with LVEF 10-15%( 10/22/24 TTE), AF s/p RFA (PVI and CTI; 4/2024) on OAC therapy, HTN, dyslipidemia, depression, anxiety and VIV using CPAP who was admitted to OSH ICU, s/p RHC on 10/18/24.  Per patient, he had been experienced worsening SOB and fluids overload for 2-3 weeks. Patient was on milrinone drip and underwent SGC diuresis. However his KENYA worsened, and were not able to wean milrinone drip. [place] [facility] [place]. Advanced therapies evaluation was initiated on 10/30. Discussed in advanced therapeutics committee on 11/5 and was denied for transplantation, and approved for LVAD (significant PAD, Elevated PSA level, Age approaching 70, as well as patient verbalized he would be more in favor of LVAD vs Transplant). He was transferred to the floor on 11/6 to await VAD implantation. Readmitted to HFICU as of 11/09 for pre-OR swan guided optimization. Now presents to CTICU s/p LVAD HM3 implant on 11/12/24 with Dr. Mclain. OR Course/Issues: pulseless VT requiring defib x 1 pre- CPB.      Principal Problem:    Acute on chronic heart failure with reduced ejection fraction and diastolic dysfunction  Active Problems:    Ischemic cardiomyopathy    Receiving inotropic medication    HTN (hypertension)    CAD (coronary artery disease)    MI (myocardial infarction) (Multi)    CHF (congestive heart failure)    Gastroesophageal reflux disease    Acute kidney injury (nontraumatic) (CMS-HCC)    Delirium       LOS: 24 days     Interval Events:   Continues to be confused requiring  medications and sitter overnight. So far infectious work up negative.   Remains on epi 0.01, milrinone 0.25 and dig for RV support.    NYHA class pre-VAD implant: IV    dexmedeTOMIDine, 0-1.5 mcg/kg/hr  EPINEPHrine, 0.01 mcg/kg/min, Last Rate: 0.01 mcg/kg/min (11/17/24 1000)  milrinone, 0.25 mcg/kg/min, Last Rate: 0.25 mcg/kg/min (11/17/24 1000)       PAP: (32-51)/(10-29) 37/17  CVP:  [5 mmHg-21 mmHg] 8 mmHg  PCWP:  [10 mmHg-13 mmHg] 13 mmHg  CO:  [4.9 L/min-7.2 L/min] 7.2 L/min  CI:  [2.29 L/min/m2-3.4 L/min/m2] 3.4 L/min/m2      Objective   Vitals:   Vitals:    11/17/24 0800 11/17/24 0900 11/17/24 1000 11/17/24 1100   BP:       Pulse: 108 (!) 117 (!) 127 (!) 123   Resp: 14 19 16 18   Temp: 36.9 °C (98.4 °F)      TempSrc: Core      SpO2: 100% 100% 100% 98%   Weight:       Height:         Wt Readings from Last 5 Encounters:   11/17/24 98.3 kg (216 lb 11.4 oz)   10/24/24 92.5 kg (204 lb)   08/05/24 122 kg (268 lb)   01/31/22 119 kg (262 lb)   02/18/20 123 kg (270 lb 2 oz)     Hemodynamic parameters for last 24 hours:  PAP: (32-51)/(10-29) 37/17  CVP:  [5 mmHg-21 mmHg] 8 mmHg  PCWP:  [10 mmHg-13 mmHg] 13 mmHg  CO:  [4.9 L/min-7.2 L/min] 7.2 L/min  CI:  [2.29 L/min/m2-3.4 L/min/m2] 3.4 L/min/m2  Intake/Output for last 24 hours:    Intake/Output Summary (Last 24 hours) at 11/17/2024 1152  Last data filed at 11/17/2024 1142  Gross per 24 hour   Intake 1731.61 ml   Output 2350 ml   Net -618.39 ml      Vent settings:       Physical exam:  Physical Exam  Constitutional:       General: He is not in acute distress.     Appearance: Normal appearance. He is not diaphoretic.   HENT:      Head: Normocephalic and atraumatic.      Mouth/Throat:      Mouth: Mucous membranes are moist.      Pharynx: Oropharynx is clear.   Eyes:      Extraocular Movements: Extraocular movements intact.      Pupils: Pupils are equal, round, and reactive to light.   Cardiovascular:      Rate and Rhythm: Tachycardia present.      Comments: LVAD hum heard  on ascultation  Driveline clean, no bleeding. Site appropriately dressed with tegaderm.   Pulmonary:      Breath sounds: Normal breath sounds. No wheezing or rhonchi.      Comments: On 2L NC  Abdominal:      General: Abdomen is flat. There is no distension.      Palpations: Abdomen is soft. There is no mass.      Tenderness: There is no abdominal tenderness. There is no guarding.   Musculoskeletal:      Right lower leg: No edema.      Left lower leg: No edema.   Neurological:      General: No focal deficit present.      Mental Status: He is alert.        Driveline:          Labs:   CMP:  Recent Labs     11/17/24  0122 11/16/24  1358 11/16/24  0128 11/15/24  1255 11/15/24  0020 11/14/24  1224 11/14/24  0118 11/13/24  1228   * 134* 134* 135* 136 137 136 136   K 3.9 4.2 4.2 4.6 4.7 4.9 5.6* 5.2    98 98 99 100 102 102 102   CO2 25 25 26 25 24 23 25 23   ANIONGAP 13 15 14 16 17 17 15 16   BUN 46* 49* 51* 53* 49* 44* 39* 33*   CREATININE 1.41* 1.50* 1.73* 1.91* 1.85* 1.78* 1.86* 1.66*   EGFR 54* 50* 42* 37* 39* 41* 39* 44*   MG 2.05 2.03 2.08 2.09 2.21 2.31 2.49* 2.41*     Recent Labs     11/17/24  0122 11/16/24  1358 11/16/24  0128 11/15/24  1255 11/15/24  0021 11/15/24  0020 11/14/24  1224 11/14/24  0118 11/13/24  1228 11/13/24  0007 11/12/24  1307 11/01/24  0317 10/31/24  1249 10/25/24  1221 10/24/24  2328   ALBUMIN 3.4 3.5 3.6 3.6 3.7 3.7 3.5 3.8 3.7   < > 3.7   < > 4.3   < > 4.4   ALT 3*  --  4*  --  5*  --   --  7* 11  --  15  --  14  --  13   AST 21  --  24  --  29  --   --  40* 42*  --  37  --  16  --  15   BILITOT 1.0  --  1.0  --  1.1  --   --  0.9 1.1  --  0.9  --  1.4*  --  2.3*    < > = values in this interval not displayed.     CBC:  Recent Labs     11/17/24  0122 11/16/24  1358 11/16/24  0128 11/15/24  1255 11/15/24  0020 11/14/24  1224 11/14/24  0118 11/13/24  1228   WBC 10.8 10.2 11.4* 13.0* 14.7* 16.9* 18.0* 15.6*   HGB 10.1* 10.8* 10.5* 10.7* 10.9* 11.3* 12.6* 12.3*   HCT 31.3* 32.9* 32.8*  "32.9* 33.8* 35.2* 37.9* 36.3*    270 262 276 283 341 413 391   MCV 85 84 85 84 85 84 83 82     COAG:   Recent Labs     11/17/24  0122 11/16/24  0128 11/15/24  0020 11/14/24  0118 11/13/24  0008 11/12/24  1257 11/12/24  0220 11/11/24  0523 11/10/24  0436 11/09/24  0748 11/08/24  0729 11/03/24  0726 11/03/24  0242 11/02/24  0507 11/02/24  0506   INR 1.7* 1.5* 1.4* 1.4* 1.4* 1.4*  --   --   --   --   --   --  1.4*  --  1.3*   HAUF  --   --   --   --   --   --  0.4 0.5 0.5 0.4 0.4   < > 0.4   < >  --    FIBRINOGEN  --   --   --   --   --  301  --   --   --   --   --   --   --   --   --     < > = values in this interval not displayed.     ABO:   Recent Labs     11/14/24 0118   ABO A     HEME/ENDO:   Recent Labs     10/31/24  1249 10/24/24  2328   FERRITIN  --  76   IRONSAT  --  17*   TSH 4.39* 8.47*   HGBA1C  --  6.3*     CARDIAC:   Recent Labs     11/17/24  0122 11/16/24  0128 11/15/24  0020 11/14/24  0118 11/13/24  0008 11/12/24  1307 10/31/24  1249 10/24/24  2328   * 266* 301* 351* 276* 324* 187  --    BNP  --   --   --   --   --   --  755* 1,995*     No results for input(s): \"CHOL\", \"LDLF\", \"LDLCALC\", \"HDL\", \"TRIG\" in the last 28237 hours.  TOX:  Recent Labs     10/31/24  1249   AMPHETAMINE Negative     MICRO: No results for input(s): \"ESR\", \"CRP\", \"PROCAL\" in the last 57232 hours.  No results found for the last 90 days.        Imaging:   XR chest 1 view    Result Date: 11/17/2024  Interpreted By:  Elvis Ross, STUDY: XR CHEST 1 VIEW; 11/17/2024 4:04 am   INDICATION: Signs/Symptoms:s/p LVAD implant, assess lung fields, devices.   COMPARISON: Radiograph dated 11/16/2024   ACCESSION NUMBER(S): [id number]   ORDERING CLINICIAN: ANETA MANNING   FINDINGS: Right IJ Odin-Faviola catheter is in place with the tip projecting over the right main pulmonary artery. LVAD is stable in position.   Cardiac silhouette size is persistently enlarged. Status post median sternotomy.   Persistent bibasilar " atelectasis and interstitial edema with slight improvement in the aeration of the lungs. Small bibasilar pleural effusion, left more than right. No sizable pneumothorax.   No acute osseous abnormality.       1. Slight interval improvement in pulmonary edema and bibasilar atelectasis. Small bibasilar pleural effusion, left more than right. No pneumothorax.       Signed by: Elvis Sagastume 11/17/2024 9:44 AM Dictation workstation:   [id number]    XR chest 1 view    Result Date: 11/16/2024  Interpreted By:  Elvis Ross, STUDY: XR CHEST 1 VIEW; 11/16/2024 3:47 am   INDICATION: Signs/Symptoms:s/p LVAD implant, assess lung fields, devices.   COMPARISON: Radiograph dated 11/15/2024   ACCESSION NUMBER(S): [id number]   ORDERING CLINICIAN: ANETA MANNING   FINDINGS: Right IJ approach [place]-Faviola catheter is in place with the tip projecting over the proximal right main pulmonary artery. LVAD is unchanged in position.   Status post median sternotomy. The cardiac silhouette size is significantly enlarged, unchanged.   Persistent bibasilar pleural effusion and bibasilar atelectasis and mild perihilar edema, unchanged. No sizable pneumothorax.   No acute osseous abnormality.       No significant interval change in bilateral pleural effusions with bibasilar atelectasis and mild perihilar congestion/edema.     Signed by: Elvis Sagastume 11/16/2024 10:28 AM Dictation workstation:   [id number]      Notable Studies:   EKG:  Encounter Date: 10/24/24   Electrocardiogram, 12-lead PRN ACS symptoms   Result Value    Ventricular Rate 111    Atrial Rate 111    QRS Duration 130    QT Interval 302    QTC Calculation(Bazett) 410    R Axis 134    T Axis -40    QRS Count 18    Q Onset 218    T Offset 369    QTC Fredericia 370    Narrative    Atrial fibrillation with occasional Premature ventricular complexes and Fusion complexes  Nonspecific intraventricular block  Cannot rule out Anteroseptal infarct , age undetermined  T  wave abnormality, consider inferolateral ischemia  Abnormal ECG  No previous ECGs available  Confirmed by Lucas Kerns ([id number]) on 10/28/2024 12:58:57 PM       Echo:  Transthoracic Echo (TTE) Limited    Result Date: 11/15/2024   [facility] ([facility] [address] [place] [address]                Tel [phone] and Fax [phone] TRANSTHORACIC ECHOCARDIOGRAM REPORT  Patient Name:       RJ Webb Physician:    [id number] Fawad Chicas MD Study Date:         11/15/2024          Ordering Provider:    [id number] ANETA MANNING MRN/PID:            [id number]            Fellow:               [id number] Aramis Fitzpatrick MD Accession#:         [id number]        Nurse: Date of Birth/Age:  1955 / 69     Sonographer:          Jackson estrada RDCS Gender assigned at  M                   Additional Staff: Birth: Height:             182.88 cm           Admit Date: Weight:             97.07 kg            Admission Status:     Inpatient - STAT BSA / BMI:          2.19 m2 / 29.02     Encounter#:           [id number]                     kg/m2 Blood Pressure:     107/71 mmHg         Department Location:  [facility] Study Type:    TRANSTHORACIC ECHO (TTE) LIMITED Diagnosis/ICD: Presence of heart assist device-Z95.811 Indication:    LVAD ramp study CPT Code:      Echo Limited-22213; Doppler Limited-62323; Color Doppler-51110 Patient History: Pertinent History: S/p CABG x 4 (2012) and PCI (LCx/OM; 5/2019), Stage D ICM                    HFrEF LVEF 10-15%, AF s/p RFA (PVI and CTI; 4/2024), HTN,                    Dyslipidemia, 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 Study Detail: The following Echo  studies were performed: 2D, M-Mode, Doppler and               color flow. Technically challenging study due to poor acoustic               windows, prominent lung artifact, body habitus and patient lying               in supine position.  PHYSICIAN INTERPRETATION: Left Ventricle: The left ventricular systolic function is severely decreased, with a visually estimated ejection fraction of 10-15%. There is global hypokinesis of the left ventricle with minor regional variations. The left ventricular cavity size is severely dilated. There is normal septal and normal posterior left ventricular wall thickness. Abnormal (paradoxical) septal motion consistent with post-operative status. Left ventricular diastolic filling cannot be determined, due to left ventricular assist device. Left Atrium: The left atrium is mildly dilated. Right Ventricle: The right ventricle is severely enlarged. There is severely reduced right ventricular systolic function. A device is visualized in the right ventricle. Right Atrium: The right atrium is severely dilated. There is a device visualized in the right atrium. Aortic Valve: The aortic valve is structurally normal. There is mild to moderate aortic valve cusp calcification. There is mild aortic valve regurgitation. Mitral Valve: The mitral valve is normal in structure. There is mild mitral annular calcification. There is mild mitral valve regurgitation. Tricuspid Valve: The tricuspid valve is structurally normal. There is mild tricuspid regurgitation. The right ventricular systolic pressure is unable to be estimated. Pulmonic Valve: The pulmonic valve is not well visualized. Pulmonic valve regurgitation was not assessed. Pericardium: Trivial pericardial effusion. Aorta: The aortic root is abnormal. There is mild dilatation of the ascending aorta. There is mild dilatation of the aortic root. In comparison to the previous echocardiogram(s): Compared with study dated 11/13/2024, no significant  change.  LEFT VENTRICULAR ASSIST DEVICE: Study Type: This study was performed while LVAD settings were optimized. LVAD: The patient has a(n) HeartMate 3 LVAD device present. Inflow Cannula: The inflow cannula is visualized in the left ventricular apex. Outflow Cannula: Visualization of the outflow cannula is technically difficult. AV Leaflet Mobility: . The aortic valve appears to be opening every 1 beats.  CONCLUSIONS:  1. The left ventricular systolic function is severely decreased, with a visually estimated ejection fraction of 10-15%.  2. There is global hypokinesis of the left ventricle with minor regional variations.  3. Left ventricular diastolic filling cannot be determined, due to left ventricular assist device.  4. Left ventricular cavity size is severely dilated.  5. Abnormal septal motion consistent with post-operative status.  6. There is severely reduced right ventricular systolic function.  7. Severely enlarged right ventricle.  8. The left atrium is mildly dilated.  9. The right atrium is severely dilated. 10. Mild aortic valve regurgitation. 11. . The aortic valve appears to be opening every 1 beats. 12. Compared with study dated 11/13/2024, no significant change. QUANTITATIVE DATA SUMMARY:  2D MEASUREMENTS:          Normal Ranges: IVSd:            1.00 cm  (0.6-1.1cm) LVPWd:           1.00 cm  (0.6-1.1cm) LVIDd:           5.90 cm  (3.9-5.9cm) LV Mass Index:   109 g/m2  LA VOLUME:                   Normal Ranges: LA Vol A4C:        86.2 ml   (22+/-6mL/m2) LA Vol Index A4C:  39.3ml/m2 LA Area A4C:       24.2 cm2 LA Major Axis A4C: 5.8 cm  AORTA MEASUREMENTS:         Normal Ranges: Ao Sinus, d:        4.30 cm (2.1-3.5cm)  LV SYSTOLIC FUNCTION BY 2D PLANIMETRY (MOD):                      Normal Ranges: EF-Visual:      13 % LV EF Reported: 13 %  [id number] Fawad Chicas MD Electronically signed on 11/15/2024 at 12:09:54 PM  ** Final **     Transthoracic Echo (TTE) Limited    Result Date: 11/13/2024    [facility], [address] [place] [address]                Tel [phone] and Fax [phone] TRANSTHORACIC ECHOCARDIOGRAM REPORT  Patient Name:       RJ Webb Physician:    [id number] Raciel Casarez MD Study Date:         11/13/2024          Ordering Provider:    [id number] JO CASTILLO MRN/PID:            [id number]            Fellow: Accession#:         [id number]        Nurse: Date of Birth/Age:  1955 / 69     Sonographer:          Jackson estrada RDCS Gender assigned at  M                   Additional Staff: Birth: Height:             182.88 cm           Admit Date: Weight:             93.90 kg            Admission Status:     Inpatient - STAT BSA / BMI:          2.16 m2 / 28.07     Encounter#:           [id number]                     kg/m2 Blood Pressure:     94/74 mmHg          Department Location:  [facility] Study Type:    TRANSTHORACIC ECHO (TTE) LIMITED Diagnosis/ICD: Acute on chronic combined systolic (congestive) and diastolic                (congestive) heart failure (CHF)-I50.43 Indication:    eval RV fx CPT Code:      Echo Limited-12655; Doppler Limited-77953; Color Doppler-59702 Patient History: Pertinent History: CAD s/p CABGx4 in 2012 and PCI 2019 w/ ICM LVEF 10-15%, AF                    s/p RFA & PVI, HTN, CHF exacerbation, 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 Study Detail: The following Echo studies were performed: 2D, M-Mode, Doppler and               color flow. Technically challenging study due to body habitus,               patient lying in supine position, postoperative dressings,               prominent lung artifact and poor acoustic windows. The patient is               intubated. Definity  used as a contrast agent for endocardial               border definition. Total contrast used for this procedure was 2.0               mL via IV push.  PHYSICIAN INTERPRETATION: Left Ventricle: Left ventricular ejection fraction is severely decreased, by visual estimate at 15%. There is eccentric left ventricular hypertrophy. There is global hypokinesis of the left ventricle with minor regional variations. The left ventricular cavity size is moderate to severely dilated. Left ventricular diastolic filling was not assessed. Left Atrium: The left atrium is moderately dilated. Right Ventricle: The right ventricle is mild to moderately enlarged. There is severely reduced right ventricular systolic function. A device is visualized in the right ventricle. Right Atrium: The right atrium is moderately dilated. There is a device visualized in the right atrium. Aortic Valve: The aortic valve was not well visualized. There is mild to moderate aortic valve cusp calcification. There is mild aortic valve regurgitation. Mitral Valve: The mitral valve is normal in structure. There is mild mitral valve regurgitation. Tricuspid Valve: The tricuspid valve is structurally normal. There is mild tricuspid regurgitation. Pulmonic Valve: The pulmonic valve is not well visualized. There is trace pulmonic valve regurgitation. Pericardium: There is no pericardial effusion noted. Aorta: The aortic root is abnormal. There is mild dilatation of the aortic root. Pulmonary Artery: The tricuspid regurgitant velocity is 1.83 m/s, and with an estimated right atrial pressure of 10 mmHg, the estimated pulmonary artery pressure is normal with the RVSP at 23.4 mmHg. Systemic Veins: The inferior vena cava appears moderately dilated, on vent. In comparison to the previous echocardiogram(s): Compared with study dated 11/5/2024, no significant change.  LEFT VENTRICULAR ASSIST DEVICE: LVAD: The patient has a(n) HeartMate 3 LVAD device present. Inflow Cannula:  The inflow cannula is visualized in the left ventricular apex.  CONCLUSIONS:  1. Poorly visualized anatomical structures due to suboptimal image quality.  2. Left ventricular cavity size is moderate to severely dilated.  3. Left ventricular ejection fraction is severely decreased, by visual estimate at 15%.  4. There is global hypokinesis of the left ventricle with minor regional variations.  5. There is severely reduced right ventricular systolic function.  6. Mild to moderately enlarged right ventricle.  7. The left atrium is moderately dilated.  8. The right atrium is moderately dilated.  9. Mild aortic valve regurgitation. 10. The inferior vena cava appears moderately dilated, on vent. QUANTITATIVE DATA SUMMARY:  2D MEASUREMENTS:          Normal Ranges: IVSd:            1.00 cm  (0.6-1.1cm) LVPWd:           1.20 cm  (0.6-1.1cm) LVIDd:           6.90 cm  (3.9-5.9cm) LVIDs:           6.60 cm LV Mass Index:   164 g/m2 LV % FS          4.3 %  AORTA MEASUREMENTS:         Normal Ranges: Ao Sinus, d:        4.00 cm (2.1-3.5cm)  LV SYSTOLIC FUNCTION BY 2D PLANIMETRY (MOD):                      Normal Ranges: EF-Visual:      15 % LV EF Reported: 15 %  RIGHT VENTRICLE: RV s' 0.07 m/s  TRICUSPID VALVE/RVSP:          Normal Ranges: Peak TR Velocity:     1.83 m/s RV Syst Pressure:     23 mmHg  (< 30mmHg) IVC Diam:             2.70 cm  [id number] Raciel Casarez MD Electronically signed on 11/13/2024 at 10:25:04 AM  ** Final **     Transthoracic Echo (TTE) Limited    Result Date: 11/5/2024   [facility] ([facility]3], [address] [place] [address]                Tel [phone] and Fax [phone] TRANSTHORACIC ECHOCARDIOGRAM REPORT  Patient Name:       RJ Webb Physician:    [id number] Mira Mayer MD Study Date:         11/5/2024           Ordering Provider:    [id number] PAYAL RING                                                                EDITA MRN/PID:            [id number]            Fellow: Accession#:         [id number]        Nurse: Date of Birth/Age:  1955 / 69     Sonographer:          Maureen estrada RDCS Gender assigned at                     Additional Staff: Birth: Height:             182.88 cm           Admit Date:           10/24/2024 Weight:             93.44 kg            Admission Status:     Inpatient - STAT BSA / BMI:          2.16 m2 / 27.94     Encounter#:           [id number]                     kg/m2 Blood Pressure:     106/72 mmHg         Department Location:  [address] Study Type:    TRANSTHORACIC ECHO (TTE) LIMITED Diagnosis/ICD: Heart failure, unspecified-I50.9 Indication:    RV assessment CPT Code:      Echo Limited-28653; Doppler Limited-29469; Color Doppler-86483 Patient History: Pertinent History: Negative bubble peformed on prior echo; Known 15-20% EF;                    Cardiogenic shock; Acute on chronic heart failure with                    reduced EF and diastolic dysfunction; ICM; Hx of chronic                    A-Fib; VIV. Study Detail: The following Echo studies were performed: 2D, M-Mode, Doppler and               color flow. Technically challenging study due to body habitus.               Definity used as a contrast agent for endocardial border               definition. Total contrast used for this procedure was 2 mL via IV               push.  PHYSICIAN INTERPRETATION: Left Ventricle: Left ventricular ejection fraction is severely decreased, calculated by Loera's biplane at 18%. There is severely increased eccentric left ventricular hypertrophy. There is global hypokinesis of the left ventricle with minor regional variations. The left ventricular cavity size is severely dilated. There is normal septal and normal posterior left ventricular wall thickness. Left ventricular diastolic filling was not assessed. There is no definite  left ventricular thrombus visualized. Left Atrium: The left atrium is severely dilated. Right Ventricle: The right ventricle is severely enlarged. There is severely reduced right ventricular systolic function. A device is visualized in the right ventricle. Right Atrium: The right atrium is severely dilated. There is a device visualized in the right atrium. Aortic Valve: The aortic valve was not well visualized. There is mild aortic valve cusp calcification. Aortic valve regurgitation was not assessed. Mitral Valve: The mitral valve is normal in structure. Mitral valve regurgitation was not assessed. Tricuspid Valve: The tricuspid valve is structurally normal. There is mild tricuspid regurgitation. The Doppler estimated RVSP is mildly elevated at 42.9 mmHg. Pulmonic Valve: The pulmonic valve was not assessed. Pulmonic valve regurgitation was not assessed. Pericardium: Trivial pericardial effusion. Aorta: The aortic root is abnormal. The aortic root appears mildly dilated and measures 4.20 cm. There is mild dilatation of the ascending aorta. There is mild dilatation of the aortic root. Systemic Veins: The inferior vena cava appears dilated, with IVC inspiratory collapse less than 50%. In comparison to the previous echocardiogram(s): Compared with the prior exam from 10/25/2024, there was already a severely dilated LV with severely reduced function. The RV remains dilated with severely reduced function. Valvular function not assessed on today's exam.  CONCLUSIONS:  1. Left ventricular ejection fraction is severely decreased, calculated by Loera's biplane at 18%.  2. There is global hypokinesis of the left ventricle with minor regional variations.  3. Left ventricular cavity size is severely dilated.  4. No left ventricular thrombus visualized.  5. There is severely increased eccentric left ventricular hypertrophy.  6. There is severely reduced right ventricular systolic function.  7. Severely enlarged right  ventricle.  8. The left atrium is severely dilated.  9. The right atrium is severely dilated. 10. Mitral valve regurgitation was not assessed. 11. Mildly elevated right ventricular systolic pressure. 12. Mildly dilated aortic root. 13. Compared with the prior exam from 10/25/2024, there was already a severely dilated LV with severely reduced function. The RV remains dilated with severely reduced function. Valvular function not assessed on today's exam. QUANTITATIVE DATA SUMMARY:  2D MEASUREMENTS:           Normal Ranges: Ao Root d:       4.20 cm   (2.0-3.7cm) IVSd:            0.87 cm   (0.6-1.1cm) LVPWd:           0.78 cm   (0.6-1.1cm) LVIDd:           8.21 cm   (3.9-5.9cm) LVIDs:           8.04 cm LV Mass Index:   157 g/m2 LVEDV Index:     138 ml/m2 LV % FS          2.0 %  LA VOLUME:                  Normal Ranges: LA Volume Index: 59.9 ml/m2  RA VOLUME BY A/L METHOD:          Normal Ranges: RA Area A4C:             36.8 cm2  AORTA MEASUREMENTS:         Normal Ranges: Ao Sinus, d:        4.20 cm (2.1-3.5cm) Asc Ao, d:          4.20 cm (2.1-3.4cm)  LV SYSTOLIC FUNCTION BY 2D PLANIMETRY (MOD):                      Normal Ranges: EF-A4C View:    14 % (>=55%) EF-A2C View:    21 % EF-Biplane:     18 % LV EF Reported: 18 %  AORTIC VALVE:          Normal Ranges: LVOT Diameter: 2.31 cm (1.8-2.4cm)  RIGHT VENTRICLE: RV Basal 6.10 cm RV Mid   4.10 cm RV Major 9.1 cm TAPSE:   9.0 mm RV s'    0.06 m/s  TRICUSPID VALVE/RVSP:          Normal Ranges: Peak TR Velocity:     2.64 m/s RV Syst Pressure:     43 mmHg  (< 30mmHg) IVC Diam:             3.00 cm  AORTA: Asc Ao Diam 4.23 cm  [id number] Mira Mayer MD Electronically signed on 11/5/2024 at 2:36:55 PM  ** Final **       Meds:  Scheduled medications  acetaminophen, 975 mg, oral, q8h  aspirin, 81 mg, oral, Daily  digoxin, 125 mcg, oral, Daily  escitalopram, 10 mg, oral, Daily  heparin (porcine), 5,000 Units, subcutaneous, q8h  melatonin, 5 mg, oral, Nightly  oxygen, , inhalation,  Continuous - Inhalation  perflutren lipid microspheres, 0.5-10 mL of dilution, intravenous, Once in imaging  perflutren protein A microsphere, 0.5 mL, intravenous, Once in imaging  piperacillin-tazobactam, 3.375 g, intravenous, q6h  polyethylene glycol, 17 g, oral, BID  sennosides-docusate sodium, 2 tablet, oral, BID  sulfur hexafluoride microsphr, 2 mL, intravenous, Once in imaging  vancomycin, 1,750 mg, intravenous, q24h  warfarin, 4 mg, oral, Daily      Continuous medications  dexmedeTOMIDine, 0-1.5 mcg/kg/hr  EPINEPHrine, 0.01 mcg/kg/min, Last Rate: 0.01 mcg/kg/min (11/17/24 1000)  milrinone, 0.25 mcg/kg/min, Last Rate: 0.25 mcg/kg/min (11/17/24 1000)      PRN medications  PRN medications: alteplase, hydrALAZINE, naloxone, ondansetron **OR** ondansetron, vancomycin     Assessment/Plan   Assessment & Plan     # Stage D NYHA IV, ICM, decompensated acute on chronic heart failure, HFrEF 10-15% s/p LVAD HM3 implant 11/12/24   #RV dysfunction     Intra-op BRIANA: EF 15-20%, dilated RV     Heart Mate III interrogation   Most Recent   Flow 3.9   Speed 5100   Power 3.7   PI 3.9   MAP (mmHg): 86    - Please maintain MAP 70-80  - Maintain net even today from a volume perspective. Okay for spot diuresis.   - Continue Coumadin to 4mg at bedtime.   - Patient has primary indication for ASA (CAD, ICM) -> on ASA 81 mg daily.  - Please continue daily driveline dressing changes w/ picture in chart   - Plan for RAMP study on Monday. Will consider epi wean after this.   - Continue digoxin for RV support.   - Not appropriate for GDMT in acute postop period   - Change out lines and continue empiric vanc/zosyn with confusion.  - Continue excellent care per [facility].     Seen and discussed with Dr. Jonatan Freitas DO  HF Fellow

## 2024-11-17 NOTE — CONSULTS
"Consults    History Of Present Illness  Fahad Meraz is a 69 y.o. male with a history of CAD s/p CABG x 4 (2012) and PCI (LCx/OM; 5/2019), stage D ICM HFrEF LVEF 10-15%, AF s/p RFA (PVI and CTI; 4/2024), HTN, pre-T2DM A1c 6.3, depression, anxiety, and VIV using CPAP who was admitted to [facility] on 10/18/24 with decompensated heart failure and KENYA. Now s/p LVAD. Neurology paged for BAT because of abnormal eye movements. Upon arrival to bedside he was back to baseline. NIHSS was 0 and BAT was cancelled at 5:15 PM.    Per nursing, she noticed the patient's continuous nystagmus lasted for about 5 minutes total. As he looked around he continued to have those eye movements. Patient was responsive throughout the episode and never lost consciousness. He did not have any other focal deficits.    Chart search was performed for \"seizure\", \"nystagmus\" which yielded no results. He has no prior neurology notes.    Past Medical History  No past medical history on file.  Surgical History  Past Surgical History:   Procedure Laterality Date    CARDIAC CATHETERIZATION N/A 10/28/2024    Procedure: Left & Right Heart Cath w Angiography & LV;  Surgeon: Jed Lindsay MD;  Location: [place] [address] Cardiac Cath Lab;  Service: Cardiovascular;  Laterality: N/A;  Patient needs ischemia evaluation - Already has PA catheter in place     Social History  Social History     Tobacco Use    Smoking status: Former     Types: Cigarettes    Smokeless tobacco: Never     Allergies  Eliquis [apixaban] and Jardiance [empagliflozin]  Medications Prior to Admission   Medication Sig Dispense Refill Last Dose/Taking    aspirin 81 mg EC tablet Take 1 tablet (81 mg) by mouth once daily.       bumetanide (Bumex) 1 mg tablet Take 0.5 tablets (0.5 mg) by mouth once daily.       cholecalciferol (Vitamin D-3) 50,000 unit capsule Take 1 capsule (50,000 Units) by mouth 1 (one) time per week.       escitalopram (Lexapro) 10 mg tablet Take 1 tablet (10 mg) by mouth " once daily.       hydrALAZINE (Apresoline) 25 mg tablet Take 1 tablet (25 mg) by mouth 2 times a day.       isosorbide mononitrate ER (Imdur) 120 mg 24 hr tablet Take 1 tablet (120 mg) by mouth once daily. Do not crush or chew.       metoprolol succinate XL (Toprol-XL) 50 mg 24 hr tablet Take 1 tablet (50 mg) by mouth once daily. Do not crush or chew.       multivitamin tablet Take 1 tablet by mouth once daily.       nitroglycerin (Nitrostat) 0.4 mg SL tablet Place 1 tablet (0.4 mg) under the tongue every 5 minutes if needed for chest pain.       pantoprazole (ProtoNix) 40 mg EC tablet Take 1 tablet (40 mg) by mouth once daily in the morning. Take before meals. Do not crush, chew, or split.       sacubitriL-valsartan (Entresto)  mg tablet Take 1 tablet by mouth 2 times a day.       spironolactone (Aldactone) 25 mg tablet Take 1 tablet (25 mg) by mouth once daily.       warfarin (Coumadin) 5 mg tablet Take 1 tablet (5 mg) by mouth once daily in the evening. Take as directed per After Visit Summary.          Review of Systems  10 point review of systems negative except as noted in HPI.    Neurological Exam  Physical Exam  Neurological examination    Mental status: Mr. Meraz is awake, alert and appropriate, with fluent speech, no word finding difficulties and no difficulty answering questions.    Cranial nerves:   CN 2: Pupils are equal, round and reactive to light bilaterally. Visual fields are full to confrontation without visual extinction.   CN 3, 4, 6: Extraocular movements are intact.  Smooth pursuit was saccadic. No nystagmus at primary position or at extremes of gaze.  CN 7: No facial droop  CN 8: Hearing is intact to voice.  CN 9, 10: No dysphagia  CN 11: Sternocleidomastoid and trapezius muscle strength grossly intact  CN 12:  no dysarthria            Motor Examination: Bulk is normal throughout.  Axilla and upper and lower extremity tone is normal.  No pronator drift is noted.  Strength testing as  below.  No fasciculations are noted.  There is no tremor or other involuntary movement.    Motor: all four limbs antigravity, with some limitation in left arm elevation likely to due to many wires/drips being connected to that arm  Sensory examination: Sensation is intact to light touch.  Reflexes: Positioning during reflex exam: patient sitting in chair. Reflex testing as below.  Plantar response is flexor bilaterally. Cross-adductors positive.    Reflexes L R   Biceps 2 2   Triceps 2 2   Brachioradialis 2 2   Patellar 3 3   Achilles 2 2     Coordination: Finger-nose-finger no dysmetria on right but there was some tremor. On left much less stable but also had many wires and instruments connected to that arm.    Gait: deferred for patient safety.    NIHSS  Level of consciousness: 0 = Alert  LOC Question: 0 = Both correct  LOC Commands: 0 = Obeys both  Best Gaze: 0 = Normal  Visual Field: 0 = No visual loss  Facial Paresis: 0 = Normal  LUE: 0 = No drift; 10 sec  RUE: 0 = No drift; 10 sec  LLE: 0 = No drift; 5 sec  RLE: 0 = No drift; 5 sec  Dysarthria: 0 = Normal  Best Language: 0 = No aphasia  Limb Ataxia: 0 = Absent  Sensory: 0 = Absent  Neglect: 0 = None  NIHSS Score: 0      Last Recorded Vitals  Blood pressure 136/80, pulse (!) 119, temperature 36.9 °C (98.4 °F), temperature source Temporal, resp. rate 18, height 1.829 m (6'), weight 98.3 kg (216 lb 11.4 oz), SpO2 97%.    Relevant Results     I have personally reviewed the following imaging results XR chest 1 view    Result Date: 11/17/2024  Interpreted By:  Elvis Ross, STUDY: XR CHEST 1 VIEW; 11/17/2024 4:04 am   INDICATION: Signs/Symptoms:s/p LVAD implant, assess lung fields, devices.   COMPARISON: Radiograph dated 11/16/2024   ACCESSION NUMBER(S): [id number]   ORDERING CLINICIAN: ANETA MANNING   FINDINGS: Right IJ [place]-Faviola catheter is in place with the tip projecting over the right main pulmonary artery. LVAD is stable in position.   Cardiac  silhouette size is persistently enlarged. Status post median sternotomy.   Persistent bibasilar atelectasis and interstitial edema with slight improvement in the aeration of the lungs. Small bibasilar pleural effusion, left more than right. No sizable pneumothorax.   No acute osseous abnormality.       1. Slight interval improvement in pulmonary edema and bibasilar atelectasis. Small bibasilar pleural effusion, left more than right. No pneumothorax.       Signed by: Elvis Sagastume 11/17/2024 9:44 AM Dictation workstation:   [id number]    XR chest 1 view    Result Date: 11/16/2024  Interpreted By:  Elvis Ross, STUDY: XR CHEST 1 VIEW; 11/16/2024 3:47 am   INDICATION: Signs/Symptoms:s/p LVAD implant, assess lung fields, devices.   COMPARISON: Radiograph dated 11/15/2024   ACCESSION NUMBER(S): [id number]   ORDERING CLINICIAN: ANETA MANNING   FINDINGS: Right IJ approach [place]-Faviola catheter is in place with the tip projecting over the proximal right main pulmonary artery. LVAD is unchanged in position.   Status post median sternotomy. The cardiac silhouette size is significantly enlarged, unchanged.   Persistent bibasilar pleural effusion and bibasilar atelectasis and mild perihilar edema, unchanged. No sizable pneumothorax.   No acute osseous abnormality.       No significant interval change in bilateral pleural effusions with bibasilar atelectasis and mild perihilar congestion/edema.     Signed by: Elvis Sagastume 11/16/2024 10:28 AM Dictation workstation:   [id number]    XR chest 1 view    Result Date: 11/15/2024  Interpreted By:  Tuan Platt, STUDY: XR CHEST 1 VIEW;  11/15/2024 3:29 am   INDICATION: Signs/Symptoms:s/p LVAD implant, assess lung fields, devices.   COMPARISON: Chest radiograph dated 11/14/2024   ACCESSION NUMBER(S): [id number]   ORDERING CLINICIAN: ANETA MANNING   FINDINGS: AP radiograph of the chest   The swans Faviola catheter is within the distal right pulmonary artery. VD  projects overlying the left lower thorax.   Sternal sutures are noted. Vascular clips overlie the mediastinum. There is moderately severe cardiomegaly. Opacity obscures the left diaphragm and costophrenic sulcus.       1. Pulmonary aeration is similar to the prior study Opacity within left lower lobe persists obscuring the left diaphragm laterally and the costophrenic sulcus       Signed by: Tuna Platt 11/15/2024 12:27 PM Dictation workstation:   [id number]    Transthoracic Echo (TTE) Limited    Result Date: 11/15/2024   [facility], [address] [place] [address]                Tel [phone] and Fax [phone] TRANSTHORACIC ECHOCARDIOGRAM REPORT  Patient Name:       RJ LEIJA     Reading Physician:    [id number] Chicas MD Study Date:         11/15/2024          Ordering Provider:    [id number] MANNING MRN/PID:            [id number]            Fellow:               [id number] Aramis Fitzpatrick MD Accession#:         [id number]        Nurse: Date of Birth/Age:  1955 / 69     Sonographer:          Jackson estrada                                     BLANQUITA Gender assigned at  M                   Additional Staff: Birth: Height:             182.88 cm           Admit Date: Weight:             97.07 kg            Admission Status:     Inpatient - STAT BSA / BMI:          2.19 m2 / 29.02     Encounter#:           [id number]                     kg/m2 Blood Pressure:     107/71 mmHg         Department Location:  [facility] Study Type:    TRANSTHORACIC ECHO (TTE) LIMITED Diagnosis/ICD: Presence of heart assist device-Z95.811 Indication:    LVAD ramp study CPT Code:      Echo Limited-46286; Doppler Limited-00856; Color Doppler-39953 Patient History: Pertinent History: S/p  CABG x 4 (2012) and PCI (LCx/OM; 5/2019), Stage D ICM                    HFrEF LVEF 10-15%, AF s/p RFA (PVI and CTI; 4/2024), HTN,                    Dyslipidemia, 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 Study Detail: The following Echo studies were performed: 2D, M-Mode, Doppler and               color flow. Technically challenging study due to poor acoustic               windows, prominent lung artifact, body habitus and patient lying               in supine position.  PHYSICIAN INTERPRETATION: Left Ventricle: The left ventricular systolic function is severely decreased, with a visually estimated ejection fraction of 10-15%. There is global hypokinesis of the left ventricle with minor regional variations. The left ventricular cavity size is severely dilated. There is normal septal and normal posterior left ventricular wall thickness. Abnormal (paradoxical) septal motion consistent with post-operative status. Left ventricular diastolic filling cannot be determined, due to left ventricular assist device. Left Atrium: The left atrium is mildly dilated. Right Ventricle: The right ventricle is severely enlarged. There is severely reduced right ventricular systolic function. A device is visualized in the right ventricle. Right Atrium: The right atrium is severely dilated. There is a device visualized in the right atrium. Aortic Valve: The aortic valve is structurally normal. There is mild to moderate aortic valve cusp calcification. There is mild aortic valve regurgitation. Mitral Valve: The mitral valve is normal in structure. There is mild mitral annular calcification. There is mild mitral valve regurgitation. Tricuspid Valve: The tricuspid valve is structurally normal. There is mild tricuspid regurgitation. The right ventricular systolic pressure is unable to be estimated. Pulmonic Valve: The pulmonic valve is not well visualized. Pulmonic valve regurgitation  was not assessed. Pericardium: Trivial pericardial effusion. Aorta: The aortic root is abnormal. There is mild dilatation of the ascending aorta. There is mild dilatation of the aortic root. In comparison to the previous echocardiogram(s): Compared with study dated 11/13/2024, no significant change.  LEFT VENTRICULAR ASSIST DEVICE: Study Type: This study was performed while LVAD settings were optimized. LVAD: The patient has a(n) HeartMate 3 LVAD device present. Inflow Cannula: The inflow cannula is visualized in the left ventricular apex. Outflow Cannula: Visualization of the outflow cannula is technically difficult. AV Leaflet Mobility: . The aortic valve appears to be opening every 1 beats.  CONCLUSIONS:  1. The left ventricular systolic function is severely decreased, with a visually estimated ejection fraction of 10-15%.  2. There is global hypokinesis of the left ventricle with minor regional variations.  3. Left ventricular diastolic filling cannot be determined, due to left ventricular assist device.  4. Left ventricular cavity size is severely dilated.  5. Abnormal septal motion consistent with post-operative status.  6. There is severely reduced right ventricular systolic function.  7. Severely enlarged right ventricle.  8. The left atrium is mildly dilated.  9. The right atrium is severely dilated. 10. Mild aortic valve regurgitation. 11. . The aortic valve appears to be opening every 1 beats. 12. Compared with study dated 11/13/2024, no significant change. QUANTITATIVE DATA SUMMARY:  2D MEASUREMENTS:          Normal Ranges: IVSd:            1.00 cm  (0.6-1.1cm) LVPWd:           1.00 cm  (0.6-1.1cm) LVIDd:           5.90 cm  (3.9-5.9cm) LV Mass Index:   109 g/m2  LA VOLUME:                   Normal Ranges: LA Vol A4C:        86.2 ml   (22+/-6mL/m2) LA Vol Index A4C:  39.3ml/m2 LA Area A4C:       24.2 cm2 LA Major Axis A4C: 5.8 cm  AORTA MEASUREMENTS:         Normal Ranges: Ao Sinus, d:        4.30 cm  (2.1-3.5cm)  LV SYSTOLIC FUNCTION BY 2D PLANIMETRY (MOD):                      Normal Ranges: EF-Visual:      13 % LV EF Reported: 13 %  [id number] Fawad Chicas MD Electronically signed on 11/15/2024 at 12:09:54 PM  ** Final **     XR chest 1 view    Result Date: 11/14/2024  Interpreted By:  Tuan Platt, STUDY: XR CHEST 1 VIEW;  11/14/2024 8:07 am   INDICATION: Signs/Symptoms:s/p LVAD implant, assess lung fields, devices.   COMPARISON: Chest radiograph dated 11/13/2024   ACCESSION NUMBER(S): [id number]   ORDERING CLINICIAN: ANETA MANNING   FINDINGS: AP radiograph of the chest   The endotracheal tube is above the carlo in satisfactory position. The swans Faviola catheter is positioned within the right pulmonary artery. The enteric tube traverses the esophagus. LVAD projects over lying the left lower thorax. Chest tubes are noted.   There is moderate cardiomegaly stable in comparison to previous study. Left lower lobe opacity is noted. Overall pulmonary aeration is similar previous study.       1. Left lower lobe opacity persists similar previous study 2. The pulmonary vascular distribution is similar.       Signed by: Tuan Platt 11/14/2024 12:33 PM Dictation workstation:   [id number]    XR chest 1 view    Result Date: 11/13/2024  Interpreted By:  Tuan Platt and Omar Mahmoud STUDY: XR CHEST 1 VIEW;  11/13/2024 4:41 am   INDICATION: Signs/Symptoms:Post op cardiac surgery.     COMPARISON: Chest x-ray 11/12/2024 6:15 p.m., CT chest 11/04/2024   ACCESSION NUMBER(S): [id number]   ORDERING CLINICIAN: ANETA MANNING   FINDINGS: AP radiograph of the chest was provided.   Patient is mildly rotated to the right which limits evaluation and comparison.   Postoperative findings from median sternotomy. Endotracheal tube tip projects approximately 6.2 cm from the carlo. Right IJ approach pulmonary arterial catheter tip projects over the distal right main pulmonary artery. Again seen LVAD projecting over the left lower thorax.  2 left chest tubes. Enteric tube tip projects over the gastric fundus with the proximal side port likely above the gastroesophageal junction. Again seen left shoulder arthroplasty.   CARDIOMEDIASTINAL SILHOUETTE: There is moderate cardiomegaly. Cardiomediastinal silhouette is stable in size and configuration.   LUNGS: No pneumothorax. Decrease in diffuse interstitial opacities. There is no new focal consolidation. The bilateral costophrenic angles are clear.   ABDOMEN: No remarkable upper abdominal findings.   BONES: No acute osseous changes.       1. Similar positioning of an enteric tube with maximal side-port likely above the gastroesophageal junction. Consider advancement of the enteric tube. Stable positioning of other medical devices as described above. 2. Decrease in pulmonary interstitial edema. There are no new cardiopulmonary abnormalities.   I personally reviewed the images/study and I agree with the findings as stated by Zack Ortiz MD (PGY-2). This study was interpreted at [place], [place].   MACRO: None   Signed by: Tuan Platt 11/13/2024 2:00 PM Dictation workstation:   [id number]    Anesthesia Intraoperative Transesophageal Echocardiogram    Result Date: 11/13/2024  [facility] - Dept of Anesthesiology    [address] [place] [address]       Tel [phone] and Fax [phone] TRANSESOPHAGEAL ECHOCARDIOGRAM REPORT  Patient Name:       RJ Webb Physician:    [id number] Fred Ortiz MD Study Date:         11/12/2024          Ordering Provider:    [id number] CHIP BANKS MRN/PID:            [id number]            Fellow: Accession#:         [id number]        Nurse: Date of Birth/Age:  1955 / 69     Sonographer:                     years Gender assigned at  M                   Additional  Staff: Birth: BSA / BMI:          m2 / kg/m2          Encounter#:           [id number] Blood Pressure:     /                   Department Location:  [place]                                                               Anesthesia Study Type:    ANESTHESIA INTRAOPERATIVE BRIANA Diagnosis/ICD: Dilated cardiomyopathy-I42.0 Indication:    Left Ventricular Assist Device CPT Code:      BRIANA Complete-81867; Doppler Limited-95848; Color Doppler-19108 PHYSICIAN INTERPRETATION: BRIANA Details: Technically adequate omniplane transesophageal echocardiogram performed. BRIANA Procedure: The probe was passed without difficulty. Left Ventricle: The left ventricular systolic function is severely decreased, with a visually estimated ejection fraction of 15-20%. The left ventricular cavity size is severely dilated. The left ventricular septal wall thickness is mildly increased. Left ventricular diastolic filling was not assessed. Left Atrium: The left atrium is severely dilated. The left atrial appendage Doppler velocities are mildly reduced and there is no thrombus visualized in the left atrial appendage. Right Ventricle: The right ventricle is severely enlarged. There is moderately reduced right ventricular systolic function. Right Atrium: The right atrium is moderately dilated. Aortic Valve: The aortic valve is trileaflet. There is evidence of mild aortic valve stenosis. There is mild aortic valve regurgitation. Calcified valve with fused right and left cusp. Central jet angled towards anterior leaflet of ana valve. Mitral Valve: The mitral valve is mildly thickened. There is no evidence of mitral valve stenosis. There is mild mitral valve regurgitation. Tricuspid Valve: The tricuspid valve is structurally normal. There is mild to moderate tricuspid regurgitation. Pulmonic Valve: The pulmonic valve is structurally normal. There is trace pulmonic valve regurgitation. Pericardium: There is no pericardial effusion noted. Aorta: The aortic  root is normal. In comparison to the previous echocardiogram(s): Not performed on intraoperative study.  CONCLUSIONS:  1. The left ventricular systolic function is severely decreased, with a visually estimated ejection fraction of 15-20%.  2. Left ventricular cavity size is severely dilated.  3. There is moderately reduced right ventricular systolic function.  4. Severely enlarged right ventricle.  5. The left atrium is severely dilated.  6. The right atrium is moderately dilated.  7. Mild to moderate tricuspid regurgitation visualized.  8. Mild aortic valve stenosis.  9. Mild aortic valve regurgitation. POST PROCEDURE REPORT: Patient is on epinephrine and milrinone drips. LV function is unchanged from pre-pump exam. LVAD inflow cannula in good position in LV apex with no obstructin to flow. RV function is mildly improved from pre-pump exam. There is mild aortic regurgitation. Aortic regurgitation is unchanged. No evidence of post aortic cannulation dissection. All transesophageal echocardiogram findings discussed with surgeon. LVAD outflow graft to descending aorta appears to have good flow.  QUANTITATIVE DATA SUMMARY:  LV SYSTOLIC FUNCTION BY 2D PLANIMETRY (MOD):                      Normal Ranges: EF-Visual:      18 % LV EF Reported: 18 %  [id number] Fred Ortiz MD Electronically signed on 11/13/2024 at 11:57:37 AM  ** Final **     XR abdomen 1 view    Result Date: 11/13/2024  Interpreted By:  Tuan Platt and Omar Mahmoud STUDY: XR ABDOMEN 1 VIEW;  11/13/2024 9:16 am   INDICATION: Signs/Symptoms:OG positioning.     COMPARISON: X-ray abdomen 11/12/2024   ACCESSION NUMBER(S): [id number]   ORDERING CLINICIAN: JO CASTILLO   FINDINGS: Enteric tube tip projects over the expected location of the gastric fundus, suggestion of a proximal side-port projects near the gastroesophageal junction. LVAD overlying the left lower chest. Median sternotomy wires again seen. Partially visualized pulmonary arterial catheter.    Nonobstructive bowel gas pattern. Limited evaluation of pneumoperitoneum on supine imaging, however no gross evidence of free air is noted.   Visualized lungs are clear.   Osseous structures demonstrate no acute bony changes.       Enteric tube tip projects over the gastric fundus with proximal side port near the gastroesophageal junction. Recommend advancement.   I personally reviewed the images/study and I agree with the findings as stated by Zack Ortiz MD (PGY-2). This study was interpreted at [place], [place].   MACRO: None   Signed by: Tuan Platt 11/13/2024 11:17 AM Dictation workstation:   [id number]    XR abdomen 1 view    Result Date: 11/13/2024  Interpreted By:  Tuan Platt  and Cristhian Garcia STUDY: XR ABDOMEN 1 VIEW; XR CHEST 1 VIEW;  11/12/2024 6:42 pm; 11/12/2024 7:13 pm; 11/12/2024 7:04 pm   INDICATION: Signs/Symptoms:OG tube assessment; Signs/Symptoms:Feeding tube; Signs/Symptoms:Et tube placement.   COMPARISON: Chest radiograph 7:04 p.m. same day, abdominal radiograph same day 6:42 p.m.   ACCESSION NUMBER(S): [id number]; [id number]; [id number]   ORDERING CLINICIAN: ANETA MANNING; JAMEL LEDESMA; SHAYY PUENTE   FINDINGS: Chest radiograph 7:04 p.m. same day, and abdominal radiograph same day 6:42 p.m. were dictated together due to proximity of the exams.   AP radiographs of the chest and abdomen were provided. Endotracheal tube noted with tip projecting approximately 5.9 cm above the carlo. Postsurgical changes of LVAD placement. Postsurgical changes of median sternotomy. Right IJ [place]-Faviola catheter with tip projecting over the expected position of the right main pulmonary artery. On the most recent abdominal radiograph, enteric tube seen coursing below the level diaphragm with tip tip projecting over the expected position of the gastroesophageal junction. The side-hole of the enteric tube is within the lower thoracic esophagus. Heart is  enlarged. Cardiomediastinal silhouette is stable in size.   No evidence of pneumothorax. Bilateral costophrenic angles are clear. No focal consolidation. Bilateral mild pulmonary interstitial edema.   Nonobstructive bowel gas pattern. Limited evaluation of pneumoperitoneum on supine imaging, however no gross evidence of free air is noted.   Vizualized lungs are clear.   Osseous structures demonstrate no acute bony changes.       1. Enteric tube seen coursing below the level diaphragm with tip projecting over the expected position of the distal gastroesophageal junction. The side-hole of the enteric tube projects over the distal thoracic esophagus. Repositioning of the enteric tube can be considered based on patient position. 2. Additional medical devices as described above. 3. No pneumothorax, focal consolidation, or pleural effusion. Mild pulmonary interstitial edema. 4. Nonobstructive bowel gas pattern.   I personally reviewed the images/study and I agree with the findings as stated by Jose Morrow MD. This study was interpreted at [facility] [facility], [place], [place].   MACRO: None   Signed by: Tuan Platt 11/13/2024 10:53 AM Dictation workstation:   [id number]    XR chest 1 view    Result Date: 11/13/2024  Interpreted By:  Tuan Platt and Nakamoto Kent STUDY: XR ABDOMEN 1 VIEW; XR CHEST 1 VIEW;  11/12/2024 6:42 pm; 11/12/2024 7:13 pm; 11/12/2024 7:04 pm   INDICATION: Signs/Symptoms:OG tube assessment; Signs/Symptoms:Feeding tube; Signs/Symptoms:Et tube placement.   COMPARISON: Chest radiograph 7:04 p.m. same day, abdominal radiograph same day 6:42 p.m.   ACCESSION NUMBER(S): [id number]; [id number]; [id number]   ORDERING CLINICIAN: ANETA PUENTE   FINDINGS: Chest radiograph 7:04 p.m. same day, and abdominal radiograph same day 6:42 p.m. were dictated together due to proximity of the exams.   AP radiographs of the chest and abdomen were  provided. Endotracheal tube noted with tip projecting approximately 5.9 cm above the carlo. Postsurgical changes of LVAD placement. Postsurgical changes of median sternotomy. Right IJ Brant-Faviola catheter with tip projecting over the expected position of the right main pulmonary artery. On the most recent abdominal radiograph, enteric tube seen coursing below the level diaphragm with tip tip projecting over the expected position of the gastroesophageal junction. The side-hole of the enteric tube is within the lower thoracic esophagus. Heart is enlarged. Cardiomediastinal silhouette is stable in size.   No evidence of pneumothorax. Bilateral costophrenic angles are clear. No focal consolidation. Bilateral mild pulmonary interstitial edema.   Nonobstructive bowel gas pattern. Limited evaluation of pneumoperitoneum on supine imaging, however no gross evidence of free air is noted.   Vizualized lungs are clear.   Osseous structures demonstrate no acute bony changes.       1. Enteric tube seen coursing below the level diaphragm with tip projecting over the expected position of the distal gastroesophageal junction. The side-hole of the enteric tube projects over the distal thoracic esophagus. Repositioning of the enteric tube can be considered based on patient position. 2. Additional medical devices as described above. 3. No pneumothorax, focal consolidation, or pleural effusion. Mild pulmonary interstitial edema. 4. Nonobstructive bowel gas pattern.   I personally reviewed the images/study and I agree with the findings as stated by Jose Morrow MD. This study was interpreted at [facility], [place], [place].   MACRO: None   Signed by: Tuan Platt 11/13/2024 10:53 AM Dictation workstation:   [id number]    XR abdomen 1 view    Result Date: 11/13/2024  Interpreted By:  Tuan Platt and Nakamoto Kent STUDY: XR ABDOMEN 1 VIEW; XR CHEST 1 VIEW;  11/12/2024 6:42 pm; 11/12/2024 7:13 pm;  11/12/2024 7:04 pm   INDICATION: Signs/Symptoms:OG tube assessment; Signs/Symptoms:Feeding tube; Signs/Symptoms:Et tube placement.   COMPARISON: Chest radiograph 7:04 p.m. same day, abdominal radiograph same day 6:42 p.m.   ACCESSION NUMBER(S): [id number]; [id number]; [id number]   ORDERING CLINICIAN: ANETA MANNING; JAMEL LEDESMA; SHAYY PUENTE   FINDINGS: Chest radiograph 7:04 p.m. same day, and abdominal radiograph same day 6:42 p.m. were dictated together due to proximity of the exams.   AP radiographs of the chest and abdomen were provided. Endotracheal tube noted with tip projecting approximately 5.9 cm above the carlo. Postsurgical changes of LVAD placement. Postsurgical changes of median sternotomy. Right IJ [place]-Faviola catheter with tip projecting over the expected position of the right main pulmonary artery. On the most recent abdominal radiograph, enteric tube seen coursing below the level diaphragm with tip tip projecting over the expected position of the gastroesophageal junction. The side-hole of the enteric tube is within the lower thoracic esophagus. Heart is enlarged. Cardiomediastinal silhouette is stable in size.   No evidence of pneumothorax. Bilateral costophrenic angles are clear. No focal consolidation. Bilateral mild pulmonary interstitial edema.   Nonobstructive bowel gas pattern. Limited evaluation of pneumoperitoneum on supine imaging, however no gross evidence of free air is noted.   Vizualized lungs are clear.   Osseous structures demonstrate no acute bony changes.       1. Enteric tube seen coursing below the level diaphragm with tip projecting over the expected position of the distal gastroesophageal junction. The side-hole of the enteric tube projects over the distal thoracic esophagus. Repositioning of the enteric tube can be considered based on patient position. 2. Additional medical devices as described above. 3. No pneumothorax, focal consolidation, or pleural effusion.  Mild pulmonary interstitial edema. 4. Nonobstructive bowel gas pattern.   I personally reviewed the images/study and I agree with the findings as stated by Jose Morrow MD. This study was interpreted at [facility], [place], [place].   MACRO: None   Signed by: Tuan Platt 11/13/2024 10:53 AM Dictation workstation:   [id number]    Transthoracic Echo (TTE) Limited    Result Date: 11/13/2024   [facility], [address] [place] [address]                Tel [phone] and Fax [phone] TRANSTHORACIC ECHOCARDIOGRAM REPORT  Patient Name:       RJ Webb Physician:    [id number] Raciel Casarez MD Study Date:         11/13/2024          Ordering Provider:    [id number] JO CASTILLO MRN/PID:            [id number]            Fellow: Accession#:         [id number]        Nurse: Date of Birth/Age:  1955 / 69     Sonographer:          Jackson estrada RDCS Gender assigned at                     Additional Staff: Birth: Height:             182.88 cm           Admit Date: Weight:             93.90 kg            Admission Status:     Inpatient - STAT BSA / BMI:          2.16 m2 / 28.07     Encounter#:           [id number]                     kg/m2 Blood Pressure:     94/74 mmHg          Department Location:  [facility] Study Type:    TRANSTHORACIC ECHO (TTE) LIMITED Diagnosis/ICD: Acute on chronic combined systolic (congestive) and diastolic                (congestive) heart failure (CHF)-I50.43 Indication:    eval RV fx CPT Code:      Echo Limited-86195; Doppler Limited-25415; Color Doppler-04954 Patient History: Pertinent History: CAD s/p CABGx4 in 2012 and PCI 2019 w/ ICM LVEF 10-15%, AF                    s/p RFA & PVI, HTN, CHF exacerbation, 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 Study Detail: The following Echo studies were performed: 2D, M-Mode, Doppler and               color flow. Technically challenging study due to body habitus,               patient lying in supine position, postoperative dressings,               prominent lung artifact and poor acoustic windows. The patient is               intubated. Definity used as a contrast agent for endocardial               border definition. Total contrast used for this procedure was 2.0               mL via IV push.  PHYSICIAN INTERPRETATION: Left Ventricle: Left ventricular ejection fraction is severely decreased, by visual estimate at 15%. There is eccentric left ventricular hypertrophy. There is global hypokinesis of the left ventricle with minor regional variations. The left ventricular cavity size is moderate to severely dilated. Left ventricular diastolic filling was not assessed. Left Atrium: The left atrium is moderately dilated. Right Ventricle: The right ventricle is mild to moderately enlarged. There is severely reduced right ventricular systolic function. A device is visualized in the right ventricle. Right Atrium: The right atrium is moderately dilated. There is a device visualized in the right atrium. Aortic Valve: The aortic valve was not well visualized. There is mild to moderate aortic valve cusp calcification. There is mild aortic valve regurgitation. Mitral Valve: The mitral valve is normal in structure. There is mild mitral valve regurgitation. Tricuspid Valve: The tricuspid valve is structurally normal. There is mild tricuspid regurgitation. Pulmonic Valve: The pulmonic valve is not well visualized. There is trace pulmonic valve regurgitation. Pericardium: There is no pericardial effusion noted. Aorta: The aortic root is abnormal. There is mild dilatation of the aortic root. Pulmonary Artery: The tricuspid regurgitant velocity is 1.83 m/s, and  with an estimated right atrial pressure of 10 mmHg, the estimated pulmonary artery pressure is normal with the RVSP at 23.4 mmHg. Systemic Veins: The inferior vena cava appears moderately dilated, on vent. In comparison to the previous echocardiogram(s): Compared with study dated 11/5/2024, no significant change.  LEFT VENTRICULAR ASSIST DEVICE: LVAD: The patient has a(n) HeartMate 3 LVAD device present. Inflow Cannula: The inflow cannula is visualized in the left ventricular apex.  CONCLUSIONS:  1. Poorly visualized anatomical structures due to suboptimal image quality.  2. Left ventricular cavity size is moderate to severely dilated.  3. Left ventricular ejection fraction is severely decreased, by visual estimate at 15%.  4. There is global hypokinesis of the left ventricle with minor regional variations.  5. There is severely reduced right ventricular systolic function.  6. Mild to moderately enlarged right ventricle.  7. The left atrium is moderately dilated.  8. The right atrium is moderately dilated.  9. Mild aortic valve regurgitation. 10. The inferior vena cava appears moderately dilated, on vent. QUANTITATIVE DATA SUMMARY:  2D MEASUREMENTS:          Normal Ranges: IVSd:            1.00 cm  (0.6-1.1cm) LVPWd:           1.20 cm  (0.6-1.1cm) LVIDd:           6.90 cm  (3.9-5.9cm) LVIDs:           6.60 cm LV Mass Index:   164 g/m2 LV % FS          4.3 %  AORTA MEASUREMENTS:         Normal Ranges: Ao Sinus, d:        4.00 cm (2.1-3.5cm)  LV SYSTOLIC FUNCTION BY 2D PLANIMETRY (MOD):                      Normal Ranges: EF-Visual:      15 % LV EF Reported: 15 %  RIGHT VENTRICLE: RV s' 0.07 m/s  TRICUSPID VALVE/RVSP:          Normal Ranges: Peak TR Velocity:     1.83 m/s RV Syst Pressure:     23 mmHg  (< 30mmHg) IVC Diam:             2.70 cm  [id number] Raciel Casarez MD Electronically signed on 11/13/2024 at 10:25:04 AM  ** Final **     XR chest 1 view    Result Date: 11/12/2024  Interpreted By:  Tuan Platt and Omar  Zack STUDY: XR CHEST 1 VIEW;  11/12/2024 1:38 pm   INDICATION: Signs/Symptoms:Post op cardiac surgery.     COMPARISON: Chest x-ray 11/11/2024 7:55 a.m., CT chest abdomen pelvis 11/04/2024   ACCESSION NUMBER(S): [id number]   ORDERING CLINICIAN: ANETA MANNING   FINDINGS: AP radiograph of the chest was provided.   Interval demonstration of postsurgical changes of LVAD placement. Postsurgical changes from median sternotomy surgical clips overlying left hilum. Right IJ pulmonary arterial catheter tip projects over the distal right main pulmonary artery. Interval placement of an enteric tube with tip projecting over the expected location of the gastroesophageal junction. Interval placement of left chest tubes. Partially visualized left shoulder arthroplasty.   CARDIOMEDIASTINAL SILHOUETTE: The heart is enlarged. Cardiomediastinal silhouette is stable in size and configuration.   LUNGS: Mild perihilar interstitial opacities, grossly stable as compared to prior. The bilateral costophrenic angles are clear, within the limitations of the newly placed LVAD obscured the left lower lung field. There is no evidence of a pneumothorax. There is no new focal consolidation.   ABDOMEN: No remarkable upper abdominal findings.   BONES: No acute osseous changes.       1. Postsurgical changes and medical devices as described above. Recommend advancement of enteric tube. 2. Stable mild pulmonary interstitial edema.   I personally reviewed the images/study and I agree with the findings as stated by Zack Ortiz MD (PGY-2). This study was interpreted at [facility], [place], [place].   MACRO: None   Signed by: Tuan Platt 11/12/2024 3:19 PM Dictation workstation:   [id number]    XR chest 1 view    Result Date: 11/11/2024  Interpreted By:  Tuan Paltt and Omar Mahmoud STUDY: XR CHEST 1 VIEW;  11/11/2024 8:02 am   INDICATION: Signs/Symptoms:SGC monitoring.     COMPARISON: Chest x-ray 11/10/2024, CT  chest 1124   ACCESSION NUMBER(S): [id number]   ORDERING CLINICIAN: KHALIDA UP   FINDINGS: AP radiograph of the chest was provided.   Patient is mildly rotated to the right which limits evaluation and comparison.   Postoperative findings from median sternotomy. Right IJ approach pulmonary arterial catheter with tip projecting over a proximal right lower lobe pulmonary artery, advanced as compared to prior.   CARDIOMEDIASTINAL SILHOUETTE: Heart is moderately enlarged. Small pericardial effusion not excluded. Cardiomediastinal silhouette is stable in size and configuration.   LUNGS: Mild pulmonary vascular congestion within the right lung is noted. No acute pulmonary consolidations or effusions are demonstrated.   ABDOMEN: No remarkable upper abdominal findings.   BONES: No acute osseous changes.       1. Interval advancement of a pulmonary arterial catheter with tip now projecting over a proximal right lower lobe pulmonary artery. 2. Similar enlargement of the cardiomediastinal silhouette. 3. No acute cardiopulmonary process.   I personally reviewed the images/study and I agree with the findings as stated by Zack Ortiz MD (PGY-2). This study was interpreted at [place], [place].   MACRO: None   Signed by: Tuan Platt 11/11/2024 2:18 PM Dictation workstation:   [id number]    XR chest 1 view    Result Date: 11/10/2024  Interpreted By:  Olvin Farias, STUDY: XR CHEST 1 VIEW; 11/10/2024 8:17 am   INDICATION: Signs/Symptoms:SGC monitoring.   COMPARISON: Prior day radiograph   ACCESSION NUMBER(S): [id number]   ORDERING CLINICIAN: KHALIDA UP   FINDINGS:     CARDIOMEDIASTINAL SILHOUETTE: Patient is status post median sternotomy. Megaly versus pericardial effusion. 9 [place]-Faviola catheter overlies the right main pulmonary artery.   LUNGS: Minimal prominence pulmonary interstitium but no focal airspace disease. No effusions.   ABDOMEN: No remarkable upper abdominal  findings.   BONES: No acute osseous changes.       1.  [place]-Faviola catheter overlies the right main pulmonary artery.     Signed by: Olvin Farias 11/10/2024 5:29 PM Dictation workstation:   [id number]    XR chest 1 view    Result Date: 11/9/2024  Interpreted By:  Olvin Farias  and Carter Fink STUDY: XR CHEST 1 VIEW;  11/9/2024 6:09 pm   INDICATION: Signs/Symptoms:PA catheter retracted then advanced to address coiling, now at 62 cm.   COMPARISON: Chest radiograph 11/09/2024   ACCESSION NUMBER(S): [id number]   ORDERING CLINICIAN: KHALIDA UP   FINDINGS: Semi-erect AP radiograph of the chest was provided.   Right internal jugular pulmonary artery catheter with distal tip projecting over the distal right main pulmonary artery. Status post median sternotomy.   CARDIOMEDIASTINAL SILHOUETTE: Stable cardiomegaly versus pericardial effusion.   LUNGS: No pneumothorax. No focal consolidation. No pleural effusion.   ABDOMEN: No remarkable upper abdominal findings.   BONES: No acute osseous changes.       1.  Right IJ pulmonary artery catheter with distal tip projecting over the distal main pulmonary artery. 2. Otherwise, stable cardiomegaly versus pericardial effusion. 3. Mild perihilar interstitial edema and correlate with fluid status.   I personally reviewed the images/study and I agree with the findings as stated by Aleks Merino MD (Radiology Resident).   MACRO: None   Signed by: Ovlin Farias 11/9/2024 9:03 PM Dictation workstation:   [id number]    XR chest 1 view    Result Date: 11/9/2024  Interpreted By:  Olvin Farias,  and Jayro Hughes STUDY: XR CHEST 1 VIEW; ;  11/9/2024 4:00 pm   INDICATION: Signs/Symptoms:retracted PA catheter 3 cm.   COMPARISON: XR CHEST 1 VIEW;  11/9/2024 3:11 pm   ACCESSION NUMBER(S): [id number]   ORDERING CLINICIAN: KHALIDA UP   FINDINGS: AP radiograph of the chest was provided.   Right IJ [place]-Faviola catheter that appears to  coiled onto itself with tip projecting over the expected position of the right ventricle in similar positioning compared to prior examination. Postsurgical changes from median sternotomy. Redemonstration of surgical staples overlying the aortic knob.   CARDIOMEDIASTINAL SILHOUETTE:  Cardiomediastinal silhouette is stable and enlarged in size with globular configuration.   LUNGS:  No pneumothorax. No focal consolidation or pleural effusion.   ABDOMEN:  No remarkable upper abdominal findings.   BONES:  No acute osseous changes. Status post left shoulder arthroplasty.       1. Right IJ [place]-Faviola catheter appears to be coiled onto itself with tip projecting over the expected position of the right ventricle. Repositioning of the right IJ [place]-Faviola catheter would be recommended with repeat radiographs. 2. Stable cardiomegaly versus pericardial effusion.   I personally reviewed the images/study and I agree with the findings as stated by Ellen Dial MD. This study was interpreted at [place], [place].   MACRO: None   Signed by: Olvin Farias 11/9/2024 9:02 PM Dictation workstation:   [id number]    XR chest 1 view    Result Date: 11/9/2024  Interpreted By:  Olvin Farias and Nakamoto Kent STUDY: XR CHEST 1 VIEW;  11/9/2024 3:11 pm   INDICATION: Signs/Symptoms:post PA catheter placement.   COMPARISON: Chest radiograph 11/06/2024   ACCESSION NUMBER(S): [id number]   ORDERING CLINICIAN: KHALIDA UP   FINDINGS: AP radiograph of the chest was provided.   Right IJ [place]-Faviola catheter that appears to coiled onto itself with tip projecting over the expected position of the right ventricle. Postsurgical changes from median sternotomy.   CARDIOMEDIASTINAL SILHOUETTE: Cardiomediastinal silhouette is stable and enlarged in size with globular configuration.   LUNGS: No pneumothorax. No focal consolidation or pleural effusion..   ABDOMEN: No remarkable upper abdominal  findings.   BONES: No acute osseous changes.       1. Right IJ [place]-Faviola catheter appears to be coiled onto itself with tip projecting over the expected position of the right ventricle. Repositioning of the right IJ [place]-Faviola catheter would be recommended with repeat radiographs. 2. Stable cardiomegaly.   I personally reviewed the images/study and I agree with the findings as stated by Jose Morrow MD. This study was interpreted at [facility], [place], [place].   The above findings were communicated with Dr. Christ Stanford by epic chat at 3:29 pm.   MACRO: Critical Finding:  See findings. Notification was initiated on 11/9/2024 at 3:53 pm by  Jose Morrow.  (**-OCF-**) Instructions:   Signed by: Olvin Farias 11/9/2024 9:01 PM Dictation workstation:   [id number]    XR chest 1 view    Result Date: 11/7/2024  Interpreted By:  Tuan Platt, STUDY: XR CHEST 1 VIEW;  11/6/2024 9:04 am   INDICATION: Signs/Symptoms:SGC.   COMPARISON: Chest radiograph dated 11/6/2024   ACCESSION NUMBER(S): [id number]   ORDERING CLINICIAN: PAYAL KOHLER   FINDINGS: AP radiograph of the chest     The swans Faviola catheter is positioned within the main pulmonary artery. There has been previous median sternotomy.   The heart is enlarged and stable. Pulmonary aeration is similar previous study. No new infiltrates, consolidations or effusions are noted.       1. No acute cardiopulmonary pathology       Signed by: Tuan Platt 11/7/2024 9:24 AM Dictation workstation:   [id number]    Vascular US Ankle Brachial Index (SITA) Without Exercise    Result Date: 11/6/2024            [place] [address]   Tel [phone] and Fax [phone]  Vascular Lab Report VASC US ANKLE BRACHIAL INDEX (SITA) WITHOUT EXERCISE  Patient Name:     RJ Webb           [id number] Nely Coffman MD                                        Physician: Study Date:        11/5/2024            Ordering          [id number] LANDONRUBENS GARCES                                        Physician: MRN/PID:          [id number]             Technologist:     Pinky Wyatt RVT,                                                          [facility] Accession#:       [id number]         Technologist 2: Date of           1955 / 69      Encounter#:       [id number] Birth/Age:        years Gender:           M Admission Status: Inpatient            Location          [facility]                                        Performed:  Diagnosis/ICD: Peripheral vascular disease, unspecified-I73.9 CPT Codes:     89269 Peripheral artery Lower arterial Duplex complete  CONCLUSIONS: Right Lower PVR: No evidence of arterial occlusive disease in the right lower extremity at rest. Multiphasic flow is noted in the right common femoral artery, right posterior tibial artery and right dorsalis pedis artery. Brachial pressure not obtained due to lines. Left Lower PVR: No evidence of arterial occlusive disease in the left lower extremity at rest. Multiphasic flow is noted in the left common femoral artery, left dorsalis pedis artery and left posterior tibial artery.  Imaging & Doppler Findings:  RIGHT Lower PVR                Pressures Ratios Right Posterior Tibial (Ankle) 136 mmHg  1.30 Right Dorsalis Pedis (Ankle)   118 mmHg  1.12   LEFT Lower PVR                Pressures Ratios Left Posterior Tibial (Ankle) 117 mmHg  1.11 Left Dorsalis Pedis (Ankle)   126 mmHg  1.20                      Left Brachial Pressure 105 mmHg   [id number] Nely Coffman MD Electronically signed by [id number] Nely Coffman MD on 11/6/2024 at 7:55:34 PM  ** Final **     XR chest 1 view    Result Date: 11/6/2024  Interpreted By:  Tuan Platt, STUDY: XR CHEST 1 VIEW;  11/5/2024 7:42 am   INDICATION: Signs/Symptoms:SGC.   COMPARISON: Chest radiograph dated 11/5/2024   ACCESSION NUMBER(S): [id number]   ORDERING CLINICIAN: PAYAL KOHLER   FINDINGS: AP  radiograph of the chest Medical devices: The swans Faviola catheter is positioned within the right main pulmonary artery similar previous study. Sternal sutures are intact.   The heart is moderately severely enlarged. Pulmonary aeration is similar to previous study.   The left shoulder arthroplasty is partially visualized.       1. No evidence of acute cardiopulmonary process. 2. No significant changes noted in comparison to the prior study.       Signed by: Tuan Platt 11/6/2024 8:03 AM Dictation workstation:   [id number]    Transthoracic Echo (TTE) Limited    Result Date: 11/5/2024   [facility], [address] [place] [address]                Tel [phone] and Fax [phone] TRANSTHORACIC ECHOCARDIOGRAM REPORT  Patient Name:       RJ Webb Physician:    [id number] Mira Mayer MD Study Date:         11/5/2024           Ordering Provider:    [id number] KOHLER MRN/PID:            [id number]            Fellow: Accession#:         [id number]        Nurse: Date of Birth/Age:  1955 / 69     Sonographer:          Maureen estrada                                     [facility] Gender assigned at                     Additional Staff: Birth: Height:             182.88 cm           Admit Date:           10/24/2024 Weight:             93.44 kg            Admission Status:     Inpatient - STAT BSA / BMI:          2.16 m2 / 27.94     Encounter#:           [id number]                     kg/m2 Blood Pressure:     106/72 mmHg         Department Location:  [address] Study Type:    TRANSTHORACIC ECHO (TTE) LIMITED Diagnosis/ICD: Heart failure, unspecified-I50.9 Indication:    RV assessment CPT Code:      Echo Limited-63793; Doppler Limited-82032; Color Doppler-93117 Patient History: Pertinent History: Negative bubble peformed on prior  echo; Known 15-20% EF;                    Cardiogenic shock; Acute on chronic heart failure with                    reduced EF and diastolic dysfunction; ICM; Hx of chronic                    A-Fib; VIV. Study Detail: The following Echo studies were performed: 2D, M-Mode, Doppler and               color flow. Technically challenging study due to body habitus.               Definity used as a contrast agent for endocardial border               definition. Total contrast used for this procedure was 2 mL via IV               push.  PHYSICIAN INTERPRETATION: Left Ventricle: Left ventricular ejection fraction is severely decreased, calculated by Loera's biplane at 18%. There is severely increased eccentric left ventricular hypertrophy. There is global hypokinesis of the left ventricle with minor regional variations. The left ventricular cavity size is severely dilated. There is normal septal and normal posterior left ventricular wall thickness. Left ventricular diastolic filling was not assessed. There is no definite left ventricular thrombus visualized. Left Atrium: The left atrium is severely dilated. Right Ventricle: The right ventricle is severely enlarged. There is severely reduced right ventricular systolic function. A device is visualized in the right ventricle. Right Atrium: The right atrium is severely dilated. There is a device visualized in the right atrium. Aortic Valve: The aortic valve was not well visualized. There is mild aortic valve cusp calcification. Aortic valve regurgitation was not assessed. Mitral Valve: The mitral valve is normal in structure. Mitral valve regurgitation was not assessed. Tricuspid Valve: The tricuspid valve is structurally normal. There is mild tricuspid regurgitation. The Doppler estimated RVSP is mildly elevated at 42.9 mmHg. Pulmonic Valve: The pulmonic valve was not assessed. Pulmonic valve regurgitation was not assessed. Pericardium: Trivial pericardial effusion. Aorta:  The aortic root is abnormal. The aortic root appears mildly dilated and measures 4.20 cm. There is mild dilatation of the ascending aorta. There is mild dilatation of the aortic root. Systemic Veins: The inferior vena cava appears dilated, with IVC inspiratory collapse less than 50%. In comparison to the previous echocardiogram(s): Compared with the prior exam from 10/25/2024, there was already a severely dilated LV with severely reduced function. The RV remains dilated with severely reduced function. Valvular function not assessed on today's exam.  CONCLUSIONS:  1. Left ventricular ejection fraction is severely decreased, calculated by Loera's biplane at 18%.  2. There is global hypokinesis of the left ventricle with minor regional variations.  3. Left ventricular cavity size is severely dilated.  4. No left ventricular thrombus visualized.  5. There is severely increased eccentric left ventricular hypertrophy.  6. There is severely reduced right ventricular systolic function.  7. Severely enlarged right ventricle.  8. The left atrium is severely dilated.  9. The right atrium is severely dilated. 10. Mitral valve regurgitation was not assessed. 11. Mildly elevated right ventricular systolic pressure. 12. Mildly dilated aortic root. 13. Compared with the prior exam from 10/25/2024, there was already a severely dilated LV with severely reduced function. The RV remains dilated with severely reduced function. Valvular function not assessed on today's exam. QUANTITATIVE DATA SUMMARY:  2D MEASUREMENTS:           Normal Ranges: Ao Root d:       4.20 cm   (2.0-3.7cm) IVSd:            0.87 cm   (0.6-1.1cm) LVPWd:           0.78 cm   (0.6-1.1cm) LVIDd:           8.21 cm   (3.9-5.9cm) LVIDs:           8.04 cm LV Mass Index:   157 g/m2 LVEDV Index:     138 ml/m2 LV % FS          2.0 %  LA VOLUME:                  Normal Ranges: LA Volume Index: 59.9 ml/m2  RA VOLUME BY A/L METHOD:          Normal Ranges: RA Area A4C:              36.8 cm2  AORTA MEASUREMENTS:         Normal Ranges: Ao Sinus, d:        4.20 cm (2.1-3.5cm) Asc Ao, d:          4.20 cm (2.1-3.4cm)  LV SYSTOLIC FUNCTION BY 2D PLANIMETRY (MOD):                      Normal Ranges: EF-A4C View:    14 % (>=55%) EF-A2C View:    21 % EF-Biplane:     18 % LV EF Reported: 18 %  AORTIC VALVE:          Normal Ranges: LVOT Diameter: 2.31 cm (1.8-2.4cm)  RIGHT VENTRICLE: RV Basal 6.10 cm RV Mid   4.10 cm RV Major 9.1 cm TAPSE:   9.0 mm RV s'    0.06 m/s  TRICUSPID VALVE/RVSP:          Normal Ranges: Peak TR Velocity:     2.64 m/s RV Syst Pressure:     43 mmHg  (< 30mmHg) IVC Diam:             3.00 cm  AORTA: Asc Ao Diam 4.23 cm  [id number] Mira Mayer MD Electronically signed on 11/5/2024 at 2:36:55 PM  ** Final **     CT chest abdomen pelvis wo IV contrast    Result Date: 11/5/2024  Interpreted By:  Clay Weiss,  and Jose Manuel Cook STUDY: CT CHEST ABDOMEN PELVIS WO CONTRAST;  11/4/2024 5:45 pm   INDICATION: Signs/Symptoms:assess for aneurysm prior to transplant evaluation.   COMPARISON: CT chest 10/31/2024 and CT lower 11/02/2024   ACCESSION NUMBER(S): [id number]   ORDERING CLINICIAN: PAYAL KOHLER   TECHNIQUE: Contiguous axial images of the chest, abdomen, and pelvis were obtained in the absence of intravenous contrast. Coronal and sagittal reformatted images were reconstructed from the axial data.   FINDINGS: CT CHEST:   MEDIASTINUM AND LYMPH NODES: The visualized thyroid is within normal limits. No enlarged intrathoracic or axillary lymph nodes by imaging criteria, however evaluation is limited in the absence of contrast. No pneumomediastinum. The esophagus appears within normal limits.   HEART: The heart is markedly enlarged. Extensive coronary artery calcifications. No significant pericardial effusion.   VESSELS: There is dilation of the proximal ascending aorta measuring up to 4.4 cm in diameter. Moderate calcified atherosclerosis of the thoracic aorta. The main  pulmonary artery is mildly prominent. [place]-Faviola catheter tips terminate at the distal right pulmonary artery.   LUNG, AIRWAYS, PLEURA: The trachea and proximal mainstem bronchi are patent. No consolidation, pleural effusion, or pneumothorax. Moderate dependent and bandlike atelectasis bilaterally.   CHEST WALL SOFT TISSUES: No discernible abnormality.   OSSEOUS STRUCTURES: No acute osseous abnormality. Several remote appearing left-sided rib fractures. Status post median sternotomy.     CT ABDOMEN/PELVIS:   LIVER: Poor visualization of the previously described cystic lesion near the medial liver tip, better evaluated on 11/02/2024 exam. The liver is mildly enlarged measuring 19.6 cm in craniocaudal dimension.   BILE DUCTS: No significant intrahepatic or extrahepatic dilatation.   GALLBLADDER: Cholelithiasis without evidence of acute cholecystitis.   PANCREAS: No significant abnormality.   SPLEEN: Splenomegaly measuring 18.8 cm in anteroposterior dimension.   ADRENALS: No significant abnormality.   KIDNEYS, URETERS, BLADDER: Cyst of the right kidney measures up to 3.9 cm. No hydronephrosis or nephrolithiasis. High-density lesion of the left anterior kidney measuring 1.1 cm likely represents a hemorrhagic cyst. The urinary bladder is unremarkable.   REPRODUCTIVE ORGANS: Prostate is enlarged measuring 5.3 cm in transverse axis.   GI: No bowel obstruction. No significant bowel wall thickening or adjacent inflammatory change. Normal appendix.   VESSELS: Moderate to advanced vascular calcifications of the abdominal aorta. There is a chronic appearing and partially calcified focal dissection of the infrarenal abdominal aorta as seen on series 2, image 130. Mild multifocal fusiform aneurysmal dilatation of the abdominal aorta measuring up to a maximum of 2.6 cm in diameter at the level of the dissection flap.   PERITONEUM/RETROPERITONEUM: No ascites, free air, or fluid collection.   LYMPH NODES: No enlarged lymph nodes.    ABDOMINAL WALL: No significant abnormality.   OSSEOUS STRUCTURES: No acute osseous abnormality. Moderate degenerative changes of the thoracolumbar spine. Grade 1 anterolisthesis of L5 on S1 with chronic bilateral pars defects.       1. No acute abnormality within the chest, abdomen, or pelvis. 2. 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 3. Aneurysm of the proximal ascending aorta measuring up to 4.4 cm and multifocal fusiform dilation of the abdominal aorta as above. 4. Marked cardiomegaly, unchanged from the prior exam. 5. Hepatosplenomegaly.   I personally reviewed the image(s)/study and resident interpretation as stated by Dr. Joyce Richard MD. I agree with the findings as stated. This study was interpreted at [facility], [place], [place].   Signed by: Clay Weiss 11/5/2024 9:31 AM Dictation workstation:   [id number]    Cardiac Catheterization Procedure    Result Date: 11/5/2024   [facility], Cath Lab, [address] [place] [address] Cardiovascular Catheterization Report Patient Name:      RJ LEIJA       Performing Physician:  [id number] Lindsay MD Study Date:        10/28/2024            Verifying Physician:   [id number] Lindsay MD MRN/PID:           [id number]              Cardiologist/Co-Scrub: Accession#:        [id number]          Ordering Provider:     [id number] LANDON GARCES Date of Birth/Age: 1955 / 69 years Cardiologist: Gender:            M                     Fellow:                [id number] Rodríguez Patten MD Encounter#:        [id number]            Surgeon:  Study: Left Heart Cath with Grafts  Indications: RJ verduzco a  69 year old male who presents with coronary artery disease, prior percutaneous coronary intervention, atrial fibrillation, chronic pulmonary disease, dyslipidemia, hypertension, diabetes and presents with ADHF and cardiogenic shock requiring ionotropic support currently being worked up for advanced heart failure therapies. Cardiomyopathy. Heart failure.  Appropriate Use Criteria: Re-evaluation of know cardiomyopathy with change in clinical status or on cardiac exam or to guide therapy; AUC score = 7. Medical History: Stress test performed: No. CTA performed: No. [place] accessed: No. LVEF Assessed: Yes. LVEF = 15%. Cardiac arrest: No. Cardiac surgical consult: No. Cardiovascular instability: Yes. Cardiogenic shock and acute heart failure. Frailty status of patient entering lab: 8 = Very severely frail.  Procedure Description: Cardiac output was calculated via the Ernestina method.  Coronary Angiography: The coronary circulation is right dominant.  Left Main Coronary Artery: The left main coronary artery is a normal caliber vessel. The left main arises normally from the left coronary sinus of Valsalva and bifurcates into the LAD and circumflex coronary arteries. The distal left main coronary artery showed 30% stenosis.  Left Anterior Descending Coronary Artery Distribution: The left anterior descending coronary artery is a normal caliber vessel. The LAD arises normally from the left main coronary artery. The proximal left anterior descending coronary artery showed 100% stenosis.  Circumflex Coronary Artery Distribution: The circumflex coronary artery is a normal caliber vessel. The circumflex arises normally from the left main coronary artery and terminates in the AV groove. The circumflex revealed no significant disease or stenosis greater than 30% and Patent proximal LCx stents. The 1st obtuse marginal branch showed no significant disease or stenosis greater than 30% and Patent ostial-proximal ARIELLE. The 2nd obtuse  marginal branch demonstrated no significant disease or stenosis greater than 30%.  Right Heart Catheterization: Cardiac output was calculated via the Ernestina method. Elevated ventricular filling pressure. Pulmonary venous hypertension. Right heart hemodynamics on 0.375 mcg/kg/min: RA 17, RV 39/13, RVEDP 17, PA 37/22, mean PAP 28, PA sat 71%, CO/CI 5.2/2.5.  Right Coronary Artery Distribution: The right coronary artery is a normal caliber vessel. The RCA arises normally from the right sinus of Valsalva. The proximal right coronary artery showed 100% stenosis.  Coronary Grafts:  LIMA Graft: Left internal mammary artery graft conduit, originating in situ and attached to the distal left anterior descending, is patent. Saphaneous Vein Graft(s): The saphenous vein graft, originating from the aorta and attached to the 1st obtuse marginal, appeared totally occluded. The 2nd saphenous vein graft, originating from the aorta and attached to the RCA, appeared totally occluded. The 3rd saphenous vein graft, originating from the aorta and attached to the ramus intermedius, appeared totally occluded.  Coronary Lesion Summary: Vessel      Stenosis    Vessel Segment Left Main 30% stenosis      distal LAD       100% stenosis    proximal RCA       100% stenosis    proximal  Graft Stenosis Summary: Graft      Destination of Graft                      % Stenosis LIMA  distal left anterior       descending SVG 1 1st obtuse marginal SVG 2 RCA SVG 3 ramus intermedius              Unable to engage and appears to be                                      occluded.  Hemo Personnel: +----------------+---------+ Name            Duty      +----------------+---------+ Jed Lindsay MD 1 +----------------+---------+  Hemodynamic Pressures:  +----+----------+---------+------------+-------------+---+-----+-------+-------+ SiteDate Time   Phase    Systolic    Diastolic  ED Mean A-Wave V-Wave                  Name       mmHg         mmHg     mmHmmHg  mmHg   mmHg                                                    g                     +----+----------+---------+------------+-------------+---+-----+-------+-------+  Art10/28/2024     Rest         129           49      68                   9:47:13 AM                                                         +----+----------+---------+------------+-------------+---+-----+-------+-------+  Art10/28/2024     Rest         114           53      67                   9:47:17 AM                                                         +----+----------+---------+------------+-------------+---+-----+-------+-------+  Art10/28/2024     Rest         108           54      66                   9:47:20 AM                                                         +----+----------+---------+------------+-------------+---+-----+-------+-------+   RA10/28/2024     Rest                               17     16     18       10:18:57                                                                     AM                                                         +----+----------+---------+------------+-------------+---+-----+-------+-------+  Art10/28/2024     Rest          99           52      66                     10:18:57                                                                     AM                                                         +----+----------+---------+------------+-------------+---+-----+-------+-------+   RA10/28/2024     Rest                               18     17     19       10:19:09                                                                     AM                                                         +----+----------+---------+------------+-------------+---+-----+-------+-------+  Art10/28/2024     Rest         101            54      67                     10:19:09                                                                     AM                                                         +----+----------+---------+------------+-------------+---+-----+-------+-------+   RV10/28/2024     Rest          39           13 17                          10:19:30                                                                     AM                                                         +----+----------+---------+------------+-------------+---+-----+-------+-------+  Art10/28/2024     Rest          96           50      63                     10:19:30                                                                     AM                                                         +----+----------+---------+------------+-------------+---+-----+-------+-------+   PA10/28/2024     Rest          37           22      28                     10:20:06                                                                     AM                                                         +----+----------+---------+------------+-------------+---+-----+-------+-------+  Art10/28/2024     Rest          93           50      61                     10:20:06                                                                     AM                                                         +----+----------+---------+------------+-------------+---+-----+-------+-------+   AO10/28/2024     Rest         102           62      77                     10:38:05                                                                     AM                                                         +----+----------+---------+------------+-------------+---+-----+-------+-------+  Art10/28/2024     Rest         112            52      67                     10:38:05                                                                     AM                                                         +----+----------+---------+------------+-------------+---+-----+-------+-------+  Art10/28/2024     Rest         128           60      79                     10:59:15                                                                     AM                                                         +----+----------+---------+------------+-------------+---+-----+-------+-------+  Art10/28/2024     Rest         123           58      77                     11:01:12                                                                     AM                                                         +----+----------+---------+------------+-------------+---+-----+-------+-------+  Oxygen Saturation %: +-----------+----------+------------+ Sample SiteO2 Sat (%)HB (g/100ml) +-----------+----------+------------+          FA        96        14.9 +-----------+----------+------------+          PA        71        14.9 +-----------+----------+------------+          FA        96        14.9 +-----------+----------+------------+          PA        71        14.9 +-----------+----------+------------+  Cardiac Outputs: +---------------+------------------+-------+ ESTEBAN CO (l/min)ETSEBAN CI (l/min/m2)ESTEBAN SV +---------------+------------------+-------+             5.2               2.5   60.8 +---------------+------------------+-------+  Vascular Resistance Calculated Values (Wood Units): +-----+----+----+---+----+----+----+-------+ PhaseSVR SVRITPRTPRITVR TVRITPR/TVR +-----+----+----+---+----+----+----+-------+ 0    [id number]       +-----+----+----+---+----+----+----+-------+  Complications: No in-lab complications observed.  Cardiac Cath  Post Procedure Notes: Post Procedure Diagnosis: Diffuse severe native coronary artery disease.                           Patent proximal LCx and ostial-prox OM 1 ARIELLE.                           Patent LIMA-LAD, occluded SVG-OM, SVG-RI, SVG- RCA.                           Elevated right and left sided filling pressures with                           preserved cardiac output and index while on ionotropic                           support with 0.375 mcg/kg/min of milrinone. Blood Loss:               Estimated blood loss during the procedure was 5 cc                           mls. Specimens Removed:        Number of specimen(s) removed: none. ____________________________________________________________________________________ CONCLUSIONS:  1. Diffuse severe native coronary artery disease.  2. Patent proximal LCx and ostial-prox OM 1 ARIELLE.  3. Patent LIMA-LAD. Other grafts are occluded: SVG-OM, SVG-RI, SVG- RCA.  4. Elevated right and left sided filling pressures with preserved cardiac output and index while on ionotropic support with 0.375 mcg/kg/min of milrinone {5. Further work-up and management per advanced heart failure team. ICD 10 Codes: Ischemic cardiomyopathy-I25.5; Cardiogenic shock-R57.0  CPT Codes: Left Heart Cath (visualization of coronaries) and LV-75709; Ultrasound guidance for vascular access-47538; Moderate Sedation Services initial 15 minutes patient >5 years-83360; Moderate Sedation Services 1st additional 15 minutes patient >5 years-78959  [id number] Jed Lindsay MD Performing Physician Electronically signed by [id number] Jed Lindsay MD on 11/5/2024 at 7:06:05 AM  ** Final **     Vascular US aorta iliac duplex limited    Result Date: 11/4/2024            [place] [address]   Tel [phone] and Fax [phone]  Vascular Lab Report VASC US AORTA ILIAC DUPLEX LIMITED  Patient Name:      RJ Webb Physician:  [id number] Claude Pitt                                                               Rajeev NUNES Study Date:        11/4/2024             Ordering Physician: Arnie GARCES MRN/PID:           [id number]              Technologist:       Octavio Silver RVT Accession#:        [id number]          Technologist 2: Date of Birth/Age: 1955 / 69 years Encounter#:         [id number] Gender:            M Admission Status:  Inpatient             Location Performed: [facility]  Diagnosis/ICD: Abdominal aortic aneurysm, without rupture, unspecified-I71.40;                Encounter for other preprocedural examination-Z01.818 CPT Codes:     47169 Duplex Aorta/IVC/Iliac/Bypass Graft  CONCLUSIONS: Aorta/Common Iliac Arteries/IVC: The abdominal aorta and bilateral common iliac arteries demonstrate no evidence of aneurysm. Moderate atherosclerotic plaque noted throughout the abdominal aorta.  Imaging & Doppler Findings:   AORTA     AP       PSV Proximal 1.63 cm 57.0 cm/s   Mid    1.90 cm 99.0 cm/s  Distal  1.50 cm 83.0 cm/s    RIGHT       AP        PSV RICHARD Proximal 1.01 cm 114.00 cm/s     LEFT       AP       PSV RICHARD Proximal 1.09 cm 97.00 cm/s  [id number] Yoandy Boo MD Electronically signed by [id number] Yoandy Boo MD on 11/4/2024 at 9:22:19 PM  ** Final **     Vascular US carotid artery duplex bilateral    Result Date: 11/4/2024            [place] [address]   Tel [phone] and Fax [phone]  Vascular Lab Report VASC US CAROTID ARTERY DUPLEX BILATERAL  Patient Name:      RJ Webb Physician:  [id number] Yoandy Boo MD Study Date:        11/4/2024             Ordering Physician: Arnie GARCES MRN/PID:            [id number]              Technologist:       Octavio Silver RVT Accession#:        [id number]          Technologist 2: Date of Birth/Age: 1955 / 69 years Encounter#:         [id number] Gender:            M Admission Status:  Inpatient             Location Performed: [facility]  Diagnosis/ICD: Other specified symptoms and signs involving the circulatory and                respiratory systems-R09.89; Encounter for other preprocedural                examination-Z01.818 CPT Codes:     83760 Cerebrovascular Carotid Duplex scan complete  CONCLUSIONS: Right Carotid: Findings are consistent with less than 50% stenosis of the right proximal internal carotid artery. Right external carotid artery appears patent with no evidence of stenosis. The right vertebral artery is patent with antegrade flow. No evidence of hemodynamically significant stenosis in the right subclavian artery. Left Carotid: Findings are consistent with less than 50% stenosis of the left proximal internal carotid artery. Left external carotid artery appears patent with no evidence of stenosis. The mid left vertebral artery demonstrates no flow. The origin of the vertebral artery appears patent. No evidence of hemodynamically significant stenosis in the left subclavian artery.  Imaging & Doppler Findings: Right Plaque Morph: The proximal right internal carotid artery demonstrates heterogenous and smooth plaque. Left Plaque Morph: The proximal left internal carotid artery demonstrates heterogenous and smooth plaque.   Right                       Left   PSV     EDV                PSV      EDV 57 cm/s           CCA P    95 cm/s 90 cm/s           CCA D    67 cm/s 38 cm/s 15 cm/s   ICA P    58 cm/s  22 cm/s 62 cm/s 20 cm/s   ICA M    70 cm/s  24 cm/s 58 cm/s 18 cm/s   ICA D    56 cm/s  22 cm/s 56 cm/s            ECA     61 cm/s 44 cm/s 13 cm/s Vertebral   0 cm/s 65 cm/s          Subclavian 136 cm/s               Right Left ICA/CCA Ratio  0.4  0.9   [id number] Yoandy Boo MD Electronically signed by [id number] Yoandy Boo MD on 11/4/2024 at 8:42:58 PM  ** Final **     Colonoscopy Diagnostic    Result Date: 11/4/2024  Table formatting from the original result was not included. Impression One Krystal Is polyp measuring 5-9 mm in the ascending colon; performed cold snare removal. Hemorrhoids. Rest of exam was normal. Findings One sessile Krystal Is polyp measuring 5-9 mm in the ascending colon; performed cold snare with complete en bloc removal and retrieved specimen. The specimen was placed in Jar 1. Hemorrhoids. Rest of exam was normal. Recommendation Await pathology results. Repeat colonoscopy in 7 years, due: 11/3/2031 Personal history of colon polyps - Defer resumption of diet and medications to the patient's primary team. No restrictions from a post-procedural standpoint. - Observe patient's clinical course following today's procedure with therapeutic intervention. - Watch for bleeding, perforation, and infection. - The findings and recommendations were discussed with the referring physicians. - The signs and symptoms of potential delayed complications were discussed with the patient. - Patient has a contact number available for emergencies. - Follow up with Raimundo Dickey and James for ongoing inpatient gastroenterology consultation. Indication Acute on chronic heart failure with reduced ejection fraction and diastolic dysfunction Staff Staff Role Pedro Dickey MD                Attending Honey Ramirez MD             Fellow Medications Totals unavailable because the procedure time range is not set Preprocedure A history and physical has been performed, and patient medication allergies have been reviewed. The patient's tolerance of previous anesthesia has been reviewed. The risks and benefits of the procedure and the sedation options and risks were discussed with the  patient. All questions were answered and informed consent obtained. Details of the Procedure The patient underwent moderate sedation, which was administered by the procedural nurse. The patient's blood pressure, ECG, ETCO2, heart rate, level of consciousness, oxygen and respirations were monitored throughout the procedure. A digital rectal exam was performed. A perianal exam was performed. The scope was introduced through the anus and advanced to the terminal ileum. Retroflexion was performed in the rectum. The quality of bowel preparation was evaluated using the Duarte Bowel Preparation Scale with scores of: right colon = 3, transverse colon = 3, left colon = 3. The total BBPS score was 9. Bowel prep was adequate. The patient's estimated blood loss was minimal (<5 mL). The procedure was not difficult. The patient tolerated the procedure well. There were no apparent adverse events. Events Procedure Events Event Event Time Specimens No specimens collected Procedure Location [facility] [address] [facility] [id number] [phone] Referring Provider Jareth Vargas MD Procedure Provider Honey Ramirez MD     US renal complete    Result Date: 11/4/2024  Interpreted By:  Regulo Martel and Beyersdorf Conner STUDY: US RENAL COMPLETE;  11/1/2024 3:44 pm   INDICATION: Signs/Symptoms:Left renal cyst.     COMPARISON: None.   ACCESSION NUMBER(S): [id number]   ORDERING CLINICIAN: DINAH GONZALEZ   TECHNIQUE: Multiple images of the kidneys were obtained.   FINDINGS: RIGHT KIDNEY: The right kidney measures 12.4 cm in length. The renal cortical echogenicity and thickness are within normal limits. No hydronephrosis is present; no evidence of nephrolithiasis. There is a heterogeneous hypoechoic lesion without internal flow in the right kidney measuring 3.9 X 3.0 X 2.8 cm.   LEFT KIDNEY: The left kidney measures 10.3 cm in length. The renal  cortical echogenicity and thickness are within normal limits. No hydronephrosis is present; no evidence of nephrolithiasis.   BLADDER: Mild thickening of the urinary bladder wall.   Homogeneous, hyperechoic lesion within the right hepatic lobe is incompletely evaluated.       1. Heterogeneous appearing cyst in the right kidney measures up to 3.9 cm, potentially a hemorrhagic cyst. Otherwise unremarkable sonographic appearance of the kidneys. 2. Homogeneous hyperechoic lesion within the right hepatic lobe is incompletely evaluated. This may represent a hemangioma. Consider further evaluation with dedicated multiphase contrast enhanced CT or MRI of the liver.     I personally reviewed the image(s)/study and resident interpretation. I agree with the findings as stated by resident Rich Mccurdy. Data analyzed and images interpreted at [facility], [place], [place].   MACRO: None     Signed by: Regulo Martel 11/4/2024 10:46 AM Dictation workstation:   [id number]    XR chest 1 view    Result Date: 11/4/2024  Interpreted By:  Tuan Platt, STUDY: XR CHEST 1 VIEW;  11/4/2024 9:08 am   INDICATION: Signs/Symptoms:SGC placement.   COMPARISON: Chest radiograph dated 11/3/2024; CT chest 10/31/2024   ACCESSION NUMBER(S): [id number]   ORDERING CLINICIAN: DINAH GONZALEZ   FINDINGS: AP radiograph of the chest     Medical devices: The swans Faviola catheter is positioned within the right main pulmonary artery. Sternal sutures are intact.   The heart is moderately severely enlarged. Pulmonary aeration is similar previous study. No pneumothorax is noted.   The left shoulder arthroplasty is partially visualized.       1. No evidence of acute cardiopulmonary process. 2. No significant change from previous study.       Signed by: Tuan Platt 11/4/2024 9:38 AM Dictation workstation:   [id number]    CT liver w IV contrast    Result Date: 11/4/2024  Interpreted By:  Regulo Martel, STUDY: CT LIVER  W IV CONTRAST; 11/2/2024 11:38 am   INDICATION: Signs/Symptoms:liver lesion.   COMPARISON: None   ACCESSION NUMBER(S): [id number]   ORDERING CLINICIAN: DINAH GONZALEZ   TECHNIQUE: Multiphase CT of the abdomen was performed utilizing intravenous contrast including arterial, portal venous and delayed postcontrast imaging. Coronal and sagittal reconstructions were also created. Intravenous contrast: 75 mL of Omnipaque 350   FINDINGS: Some images are mildly degraded by motion.   INCLUDED LOWER CHEST: There is mild dependent linear opacities, likely atelectasis or postinflammatory scarring. No consolidation or effusion. The heart is enlarged and a right-sided device lead is partially included.   LIVER: 4.4 cm ovoid heterogeneous hypodense lesion inferiorly within hepatic segments V/VI does not demonstrate change in appearance or attenuation on serial postcontrast imaging and is felt most likely to be a complex cyst. A couple additional too small to characterize hypodense lesions are also likely cysts. No solid enhancing lesion is evident. No morphologic evidence of cirrhosis.   BILE DUCTS: No biliary dilation.   GALLBLADDER: Cholelithiasis is present.   PANCREAS: A 1.6 cm ovoid cystic lesion along the superior margin of the pancreatic body does not demonstrate internal complexity or enhancement. A couple additional too small to characterize hypodense foci are also noted in the pancreas, also likely cystic lesions. The main duct is not dilated.   SPLEEN: The spleen is enlarged up to 20 cm without focal abnormality evident.   ADRENAL GLANDS: Unremarkable.   KIDNEYS, URETERS AND BLADDER: A 1.4 cm hypodense lesion along the lateral inferior pole of the right kidney does not demonstrate change in attenuation over serial post-contrast imaging, likely a complex cystic lesion such as a hemorrhagic cyst. Additional simple appearing cystic lesions and too small to characterize hypodense lesions, also likely simple cysts, are  present. No solid-appearing enhancing lesion is evident.   INCLUDED BOWEL: No evidence of obstruction or inflammation.   VESSELS: Calcific atherosclerosis is present. The abdominal aorta is not aneurysmal.   PERITONEUM/RETROPERITONEUM/LYMPH NODES: No free fluid or free air. No adenopathy is evident.   MUSCULOSKELETAL: No destructive osseous lesion is evident. The patient is status post sternotomy. Degenerative changes are present in the lower lumbar spine.       1. 4.4 cm ovoid heterogeneous hypodense lesion inferiorly within hepatic segments V/VI does not demonstrate change in appearance or attenuation on serial postcontrast imaging and is felt most likely to be a complex cyst. A couple additional too small to characterize hypodense lesions are also likely cysts. No solid enhancing lesion is evident. No morphologic evidence of cirrhosis. 2. Splenomegaly. 3. Incidental findings include pancreatic and renal simple and complex attenuation lesions as well, for which initial follow-up by contrast MRI is recommended on a routine outpatient basis.   Signed by: Regulo Martel 11/4/2024 7:49 AM Dictation workstation:   [id number]    Pulmonary function testing    Result Date: 11/3/2024  Normal spirometry. Lung volumes are normal. The DLCO corrected for hemoglobin is normal.    XR chest 1 view    Result Date: 11/3/2024  Interpreted By:  Frank Connolly, STUDY: XR CHEST 1 VIEW;  11/3/2024 9:55 am   INDICATION: Signs/Symptoms:SGC placement.     COMPARISON: Exam dated 11/02/2024   ACCESSION NUMBER(S): [id number]   ORDERING CLINICIAN: DINAH GONZALEZ   FINDINGS: AP radiograph of the chest was provided. Exam is limited by rightward patient rotation. There is a partially visualized left shoulder arthroplasty.   Status post median sternotomy. Right IJ approach [place]-Faviola catheter tip projects over the right pulmonary artery.   CARDIOMEDIASTINAL SILHOUETTE: Stable diffuse enlargement of the cardiac silhouette.   LUNGS: Lungs are  clear.   ABDOMEN: No remarkable upper abdominal findings.   BONES: No acute osseous changes.       1.  No evidence of acute pulmonary process. 2. Stable enlargement of the cardiac silhouette. 3. [place]-Faviola catheter tip projects over the right pulmonary artery.       MACRO: None   Signed by: Frank Connolly 11/3/2024 1:47 PM Dictation workstation:   [id number]    XR chest 1 view    Result Date: 11/2/2024  Interpreted By:  Frank Connolly, STUDY: XR CHEST 1 VIEW;  11/2/2024 7:57 am   INDICATION: Signs/Symptoms:SGC placement.     COMPARISON: Exam dated 11/01/2024   ACCESSION NUMBER(S): [id number]   ORDERING CLINICIAN: DINAH GONZALEZ   FINDINGS: AP radiograph of the chest was provided.   Right IJ approach [place]-Faviola catheter with tip projecting over the right pulmonary artery. Status post median sternotomy.Partially visualized left shoulder arthroplasty hardware.   CARDIOMEDIASTINAL SILHOUETTE: Stable enlargement of the cardiac silhouette.   LUNGS: Lungs are clear.   ABDOMEN: No remarkable upper abdominal findings.   BONES: No acute osseous changes.       1.  Right IJ [place]-Faviola catheter tip projects over the right pulmonary artery. 2. Stable enlargement of the cardiac silhouette. 3. No radiographic evidence of acute pulmonary process.       MACRO: None   Signed by: Frank Connolly 11/2/2024 9:21 AM Dictation workstation:   [id number]    XR chest 1 view    Result Date: 11/2/2024  Interpreted By:  Frank Connolly, STUDY: XR CHEST 1 VIEW;  11/1/2024 6:38 am   INDICATION: Signs/Symptoms:PA catheter postition assessment.     COMPARISON: Exam dated 10/31/2024   ACCESSION NUMBER(S): [id number]   ORDERING CLINICIAN: LANDON GARCES   FINDINGS: AP radiograph of the chest was provided.   Right IJ approach [place]-Faviola catheter with tip projecting over the right pulmonary artery. Status post median sternotomy. Partially visualized left shoulder arthroplasty hardware.   CARDIOMEDIASTINAL SILHOUETTE: Stable enlargement of the cardiac  silhouette.   LUNGS: Lungs are clear.   ABDOMEN: No remarkable upper abdominal findings.   BONES: No acute osseous changes.       1.  [place]-Faviola catheter tip projects over the right pulmonary artery. 2. No radiographic evidence of acute pulmonary process. 3. Stable enlargement of the cardiac silhouette.       MACRO: None   Signed by: Frank Connolly 11/2/2024 9:20 AM Dictation workstation:   [id number]    XR chest 2 views    Result Date: 11/1/2024  Interpreted By:  Frank Connolly, STUDY: XR CHEST 2 VIEWS;  10/31/2024 6:01 pm   INDICATION: Signs/Symptoms:LVAD / OHT evaluation.     COMPARISON: Exam dated earlier same day   ACCESSION NUMBER(S): [id number]   ORDERING CLINICIAN: LANDON GARCES   FINDINGS: PA and lateral radiographs of the chest were provided.  Additional PA dual energy images were also provided.   Right subclavian approach [place]-Faviola catheter with tip projecting over the distal right pulmonary artery.   CARDIOMEDIASTINAL SILHOUETTE: The cardiac silhouette is enlarged. Remaining mediastinal contours within normal limits.   LUNGS: Lungs are clear.   ABDOMEN: No remarkable upper abdominal findings.   BONES: No acute osseous changes.       1.  No evidence of acute cardiopulmonary process. 2. Stable enlargement of the cardiac silhouette. 3. [place]-Faviola catheter tip projects over the distal right pulmonary artery.       MACRO: None   Signed by: Frank Connolly 11/1/2024 4:38 PM Dictation workstation:   [id number]    XR chest 1 view    Result Date: 11/1/2024  Interpreted By:  Frank Connolly, STUDY: XR CHEST 1 VIEW;  10/31/2024 8:03 am   INDICATION: Signs/Symptoms:PA catheter assessment.     COMPARISON: Exam dated 10/30/2024   ACCESSION NUMBER(S): [id number]   ORDERING CLINICIAN: LANDON GARCES   FINDINGS: AP radiograph of the chest was provided.   Status post median sternotomy. Right IJ approach [place]-Faviola catheter with the tip projecting over the distal right pulmonary artery.   CARDIOMEDIASTINAL SILHOUETTE: Stable  enlargement of the cardiac silhouette.   LUNGS: Lungs are clear.   ABDOMEN: No remarkable upper abdominal findings.   BONES: No acute osseous changes.       1.  [place]-Faviola catheter tip projects over the distal right pulmonary artery. 2. Stable enlargement of the cardiac silhouette.       MACRO: None   Signed by: Frank Connolly 11/1/2024 4:36 PM Dictation workstation:   [id number]    CT chest wo IV contrast    Result Date: 11/1/2024  Interpreted By:  Frank Connolly, STUDY: CT CHEST WO IV CONTRAST;  10/31/2024 12:37 pm   INDICATION: Signs/Symptoms:LVAD / OHT evaluation.     COMPARISON: None.   ACCESSION NUMBER(S): [id number]   ORDERING CLINICIAN: LANDON GARCES   TECHNIQUE: Helical data acquisition of the chest was obtained  without IV contrast material.  Images were reformatted in axial, coronal, and sagittal planes.   FINDINGS: LUNGS AND AIRWAYS: The trachea and central airways are patent. No endobronchial lesion. Scattered secretions in the trachea. Nonspecific areas of traction bronchiolectasis in the lower lobes which is likely due to scarring.   Areas of linear parenchymal and ground-glass opacity in the dependent lungs likely represents atelectasis. No pleural effusion or pneumothorax. No lobar consolidation. Scattered punctate calcified granulomas. No suspicious pulmonary nodules.   MEDIASTINUM AND BEAU, LOWER NECK AND AXILLA: The visualized thyroid gland is within normal limits.   No evidence of thoracic lymphadenopathy by CT criteria.   Small hiatal hernia. The esophagus is otherwise within normal limits.   HEART AND VESSELS: The ascending aorta is dilated measuring 4.3 cm. There are moderate calcifications of the aorta and branch vessels. 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   Right IJ pulmonary artery catheter tip is in the right pulmonary artery. The main pulmonary artery is dilated measuring 3.4 cm.   Severe coronary artery calcifications. Left circumflex coronary stent is present. The study is not  optimized for evaluation of coronary arteries.   Moderate cardiomegaly with multichamber involvement. Moderate aortic valve calcifications.   No evidence of pericardial effusion.   UPPER ABDOMEN: There is a partially visualized, incompletely characterized hypodense lesion in the inferior right hepatic lobe measuring up to 4.8 cm. Subcentimeter hypodensity in the left hepatic lobe is incompletely characterized but most likely represents a simple cyst or hemangioma. The spleen is enlarged measuring 18.1 cm. 3.6 cm likely right renal cyst is noted.   CHEST WALL AND OSSEOUS STRUCTURES: There are no suspicious osseous lesions. Multilevel degenerative changes are present. Status post left shoulder hemiarthroplasty. Status post median sternotomy.       1.  Dilated ascending aorta measuring up to 4.3 cm. Recommend continued imaging surveillance as per clinical guidelines. 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 Moderate calcifications of the aorta and branch vessels. 2. Severe coronary artery calcifications. 3. Moderate cardiomegaly. 4. Moderate aortic valve calcifications and correlate with concern for aortic valve stenosis. 5. Incompletely characterized lesion in the inferior right hepatic lobe measuring up to 4.8 cm. Recommend initial evaluation with liver ultrasound. 6. [place]-Faviola catheter tip in the right pulmonary artery. The main pulmonary artery is mildly dilated and correlate with concern for pulmonary hypertension.   MACRO: Critical Finding:  See findings. Notification was initiated on 11/1/2024 at 1:18 pm by  Frank Connolly.  (**-OCF-**) Instructions:   Signed by: Frank Connolly 11/1/2024 1:18 PM Dictation workstation:   [id number]    XR panorex    Result Date: 11/1/2024  Interpreted By:  Rosita Beltran, STUDY: XR PANOREX   INDICATION: Signs/Symptoms:LVAD / OHT evaluation   COMPARISON: None.   ACCESSION NUMBER(S): [id number]   ORDERING CLINICIAN: LANDON GARCES   FINDINGS: Single panoramic radiograph of the  mandible was obtained. Edentulous patient.   Punctate radiopaque densities projecting in the right aspect of the mouth of uncertain clinical significance and etiology. Bony defects projecting in the inferior aspect of the bilateral mandibular angles likely artifactual from technique.   Paranasal sinuses are clear. There is no evidence of mandibular dislocation or fracture.       Edentulous patient.   Bony defects projecting in the inferior aspect of the bilateral mandibular angles likely artifactual from technique.   Signed by: Rosita Beltran 11/1/2024 10:19 AM Dictation workstation:   [id number]    XR chest 1 view    Result Date: 10/31/2024  Interpreted By:  Olvin Farias, STUDY: XR CHEST 1 VIEW; 10/30/2024 7:54 am   INDICATION: Signs/Symptoms:ADHF.   COMPARISON: 10/29/2024.   ACCESSION NUMBER(S): [id number]   ORDERING CLINICIAN: EULA WRIGHT   FINDINGS:     CARDIOMEDIASTINAL SILHOUETTE: Cardiomegaly versus pericardial effusion. Patient is status post median sternotomy. [place]-Faviola catheter overlies the right main pulmonary artery. LUNGS: Lungs are clear of focal airspace disease.   ABDOMEN: No remarkable upper abdominal findings.   BONES: No acute osseous changes. Patient is status post left TSA.       1.  Stable positioning of [place]-Faviola catheter with tip overlying the right main pulmonary artery.     Signed by: Olvin Farias 10/31/2024 9:08 AM Dictation workstation:   [id number]    XR chest 1 view    Result Date: 10/31/2024  Interpreted By:  Olvin Farias, STUDY: XR CHEST 1 VIEW; 10/29/2024 8:02 am   INDICATION: Signs/Symptoms:PA catheter postition evaluation.   COMPARISON: 10/28/2024.   ACCESSION NUMBER(S): [id number]   ORDERING CLINICIAN: LANDON GARCES   FINDINGS:     CARDIOMEDIASTINAL SILHOUETTE: Cardiomegaly versus pericardial effusion. Patient is status post median sternotomy. [place]-Faviola catheter overlies the right main pulmonary artery.   LUNGS: Lungs are clear of focal airspace disease or edema.    ABDOMEN: No remarkable upper abdominal findings.   BONES: No acute osseous changes.       1.  [place]-Faviola catheter overlies the right main pulmonary artery. Cardiomegaly pericardial effusion. No focal airspace disease.     Signed by: Olvin Farias 10/31/2024 9:06 AM Dictation workstation:   [id number]    XR chest 1 view    Result Date: 10/28/2024  Interpreted By:  Elvis Ross, STUDY: XR CHEST 1 VIEW; 10/28/2024 12:30 pm   INDICATION: Signs/Symptoms:PA catheter placement assessment.   COMPARISON: Radiograph dated 10/28/2024   ACCESSION NUMBER(S): [id number]   ORDERING CLINICIAN: LANDON GARCES   FINDINGS: Right IJ [place]-Faviola catheter is in place with the tip projecting over the right main pulmonary artery.   The cardiac silhouette size is enlarged, unchanged. Status post median sternotomy.   Mild perihilar congestion. No focal infiltrate, pleural effusion or pneumothorax.   No acute osseous abnormality.       1. Unchanged cardiomegaly and mild perihilar congestion. 2. Medical devices and postsurgical changes as described above.       Signed by: Elvis Sagastume 10/28/2024 1:31 PM Dictation workstation:   [id number]    Electrocardiogram, 12-lead PRN ACS symptoms    Result Date: 10/28/2024  Atrial fibrillation with occasional Premature ventricular complexes and Fusion complexes Nonspecific intraventricular block Cannot rule out Anteroseptal infarct , age undetermined T wave abnormality, consider inferolateral ischemia Abnormal ECG No previous ECGs available Confirmed by Lucas Kerns ([id number]) on 10/28/2024 12:58:57 PM    XR chest 1 view    Result Date: 10/28/2024  Interpreted By:  Elvis Ross, STUDY: XR CHEST 1 VIEW; 10/28/2024 8:42 am   INDICATION: Signs/Symptoms:PA catheter position assessment.   COMPARISON: Radiograph dated 10/27/2024   ACCESSION NUMBER(S): [id number]   ORDERING CLINICIAN: LANDON GARCES   FINDINGS: Right IJ [place]-Faviola catheter is in place with the tip projecting over  the right pulmonary artery.   The cardiac silhouette size is enlarged, unchanged. Status post median sternotomy.   There is no focal consolidation, edema or pneumothorax. No sizeable pleural effusion. No acute osseous abnormality.       1. Unchanged cardiomegaly. 2. No focal infiltrate, pleural effusion, edema or pneumothorax.       Signed by: Elvis Sagastume 10/28/2024 11:41 AM Dictation workstation:   [id number]    XR chest 1 view    Result Date: 10/27/2024  Interpreted By:  Olvin Farias, STUDY: XR CHEST 1 VIEW; 10/27/2024 8:08 am   INDICATION: Signs/Symptoms:PA catheter position assessment.   COMPARISON: 10/26/2024.   ACCESSION NUMBER(S): [id number]   ORDERING CLINICIAN: LANDON GARCES   FINDINGS:     CARDIOMEDIASTINAL SILHOUETTE: Cardiomegaly versus pericardial effusion. Patient is status post median sternotomy. [place]-Faviola catheter overlies the interlobar pulmonary artery.   LUNGS: Lungs are clear of focal airspace disease or edema.   ABDOMEN: No remarkable upper abdominal findings.   BONES: No acute osseous changes. Status post left TSA.       1.  [place]-Faviola catheter with tip overlying the interlobar pulmonary artery. 2. No focal airspace disease. No significant edema.     Signed by: Olvin Farias 10/27/2024 1:12 PM Dictation workstation:   [id number]    XR chest 1 view    Result Date: 10/26/2024  Interpreted By:  Olvin Farias, STUDY: XR CHEST 1 VIEW; 10/26/2024 8:51 am   INDICATION: Signs/Symptoms:PA catheter evaluation.   COMPARISON: 10/24/2024.   ACCESSION NUMBER(S): [id number]   ORDERING CLINICIAN: LANDON GARCES   FINDINGS:     CARDIOMEDIASTINAL SILHOUETTE: Patient is status post median sternotomy. Right subclavian [place]-Faviola catheter with tip overlying the right main pulmonary artery.   LUNGS: There is mild perihilar interstitial prominence but no focal airspace disease.   ABDOMEN: No remarkable upper abdominal findings.   BONES: No acute osseous changes.       1.  [place]-Faviola catheter  overlies the right main pulmonary artery. No acute airspace disease.     Signed by: Olvin Farias 10/26/2024 11:26 AM Dictation workstation:   [id number]    Transthoracic Echo (TTE) Complete    Result Date: 10/25/2024   [facility], [address] [place] [address]                Tel [phone] and Fax [phone] TRANSTHORACIC ECHOCARDIOGRAM REPORT  Patient Name:      RJ Webb Physician:    [id number] Fawad Chicas MD Study Date:        10/25/2024           Ordering Provider:    [id number] LANDON GARCES MRN/PID:           [id number]             Fellow: Accession#:        [id number]         Nurse: Date of Birth/Age: 1955 / 69      Sonographer:          Nikkie estrada RDCS Gender:            M                    Additional Staff: Height:            182.88 cm            Admit Date:           10/24/2024 Weight:            93.44 kg             Admission Status:     Inpatient -                                                               Routine BSA / BMI:         2.16 m2 / 27.94      Encounter#:           [id number]                    kg/m2 Blood Pressure:    113/62 mmHg          Department Location:  [address] Study Type:    TRANSTHORACIC ECHO (TTE) COMPLETE Diagnosis/ICD: Acute on chronic combined systolic (congestive) and diastolic                (congestive) heart failure (CHF)-I50.43; Ischemic                cardiomyopathy-I25.5 Indication:    Stage D heart failure CPT Code:      Echo Complete w Full Doppler-[id number] Patient History: Pertinent History: CAD s/p 4V CABG and PCI, HF/ICM/HFrEF, HTN, DLD, VIV,                    bicuspid AV, afib. Study Detail: The following Echo studies were performed: 2D, M-Mode, Doppler and               color flow. Definity used as a contrast  agent for endocardial               border definition and agitated saline used as a contrast agent for               intraseptal flow evaluation. Unable to obtain suprasternal notch               view. Patient's heart rhythm is atrial fibrillation.  PHYSICIAN INTERPRETATION: Left Ventricle: Left ventricular ejection fraction is severely decreased, by visual estimate at 15-20%. There is global hypokinesis of the left ventricle with minor regional variations. The left ventricular cavity size is severely dilated. Abnormal (paradoxical) septal motion consistent with post-operative status. Left ventricular diastolic filling was not assessed. There is no definite left ventricular thrombus visualized. Left Atrium: The left atrium is moderately dilated. There is no evidence of a patent foramen ovale. A bubble study using agitated saline was performed. Bubble study is negative. Right Ventricle: The right ventricle is moderately enlarged. There is severely reduced right ventricular systolic function. A device is visualized in the right ventricle. Right Atrium: The right atrium is moderately dilated. There is a device visualized in the right atrium. Aortic Valve: The aortic valve is structurally normal. There is mild to moderate aortic valve cusp calcification. The aortic valve dimensionless index is 0.57. There is trace aortic valve regurgitation. The peak instantaneous gradient of the aortic valve is 16.2 mmHg. The mean gradient of the aortic valve is 10.0 mmHg. Mitral Valve: The mitral valve is normal in structure. There is mild mitral annular calcification. There is mild mitral valve regurgitation. Tricuspid Valve: The tricuspid valve is structurally normal. There is trace tricuspid regurgitation. The Doppler estimated RVSP is within normal limits at 21.0 mmHg. Pulmonic Valve: The pulmonic valve is structurally normal. There is no indication of pulmonic valve regurgitation. Pericardium: Trivial pericardial effusion. Aorta:  The aortic root is normal. Systemic Veins: The inferior vena cava was not well visualized. In comparison to the previous echocardiogram(s): Compared with study dated 2/3/2020, LVEF is now severely reduced, Previoulsy reported LVEF is 40-45% with some regional hypokinesis. Primary team is aware because of a recent BRIANA which showed similar findinds.  CONCLUSIONS:  1. Left ventricular ejection fraction is severely decreased, by visual estimate at 15-20%.  2. There is global hypokinesis of the left ventricle with minor regional variations.  3. Left ventricular cavity size is severely dilated.  4. Abnormal septal motion consistent with post-operative status.  5. No left ventricular thrombus visualized.  6. There is severely reduced right ventricular systolic function.  7. Moderately enlarged right ventricle.  8. The left atrium is moderately dilated.  9. The right atrium is moderately dilated. 10. Right ventricular systolic pressure is within normal limits. 11. There is no evidence of a patent foramen ovale. 12. Compared with study dated 2/3/2020, LVEF is now severely reduced, Previoulsy reported LVEF is 40-45% with some regional hypokinesis. Primary team is aware because of a recent BRIANA which showed similar findinds. QUANTITATIVE DATA SUMMARY:  2D MEASUREMENTS:          Normal Ranges: Ao Root d:       4.00 cm  (2.0-3.7cm) LAs:             5.00 cm  (2.7-4.0cm) IVSd:            0.90 cm  (0.6-1.1cm) LVPWd:           0.90 cm  (0.6-1.1cm) LVIDd:           7.70 cm  (3.9-5.9cm) LVIDs:           6.70 cm LV Mass Index:   155 g/m2 LV % FS          13.0 %  LA VOLUME:                    Normal Ranges: LA Vol A4C:        77.3 ml    (22+/-6mL/m2) LA Vol A2C:        97.8 ml LA Vol BP:         89.5 ml LA Vol Index A4C:  35.8ml/m2 LA Vol Index A2C:  45.3 ml/m2 LA Vol Index BP:   41.5 ml/m2 LA Area A4C:       25.0 cm2 LA Area A2C:       27.3 cm2 LA Major Axis A4C: 6.9 cm LA Major Axis A2C: 6.5 cm LA Volume Index:   41.5 ml/m2  RA VOLUME  BY A/L METHOD:          Normal Ranges: RA Area A4C:             25.3 cm2  LV SYSTOLIC FUNCTION BY 2D PLANIMETRY (MOD):                      Normal Ranges: EF-Visual:      18 % LV EF Reported: 18 %  AORTIC VALVE:                      Normal Ranges: AoV Vmax:                2.01 m/s  (<=1.7m/s) AoV Peak P.2 mmHg (<20mmHg) AoV Mean PG:             10.0 mmHg (1.7-11.5mmHg) LVOT Max Jude:            1.12 m/s  (<=1.1m/s) AoV VTI:                 36.30 cm  (18-25cm) LVOT VTI:                20.70 cm LVOT Diameter:           2.60 cm   (1.8-2.4cm) AoV Area, VTI:           2.37 cm2  (2.5-5.5cm2) AoV Area,Vmax:           2.32 cm2  (2.5-4.5cm2) AoV Dimensionless Index: 0.57  RIGHT VENTRICLE: RV Basal 4.70 cm RV Mid   3.60 cm RV Major 8.0 cm TAPSE:   10.1 mm  TRICUSPID VALVE/RVSP:          Normal Ranges: Peak TR Velocity:     2.12 m/s RV Syst Pressure:     21 mmHg  (< 30mmHg) IVC Diam:             1.90 cm  PULMONIC VALVE:          Normal Ranges: PV Accel Time:  85 msec  (>120ms) PV Max Jude:     0.9 m/s  (0.6-0.9m/s) PV Max P.9 mmHg  [id number] Fawad Chicas MD Electronically signed on 10/25/2024 at 3:00:23 PM  ** Final **     XR chest 1 view    Result Date: 10/25/2024  Interpreted By:  Hemanth Rock and Ritchie Brandon STUDY: XR CHEST 1 VIEW;  10/24/2024 11:31 pm   INDICATION: Signs/Symptoms:New admission, ADHF with SG.   COMPARISON: None.   ACCESSION NUMBER(S): [id number]   ORDERING CLINICIAN: EULA WRIGHT   FINDINGS: AP radiograph of the chest was provided.   There is a right subclavian approach [place]-Faviola catheter with the distal tip projecting over the expected location of the distal interlobar pulmonary artery. Postsurgical changes consistent with median sternotomy are seen with intact cerclage wires.   CARDIOMEDIASTINAL SILHOUETTE: Cardiomediastinal silhouette is enlarged. Surgical clips projecting over the left upper cardiomediastinal silhouette.   LUNGS: Mild central pulmonary vascular congestion. No  evidence of focal consolidation, pleural effusion, or pneumothorax.   ABDOMEN: No remarkable upper abdominal findings.   BONES: No acute osseous changes.       1.  Cardiomegaly with mild central pulmonary vascular congestion. Recommend correlation with patient's volume status. 2. No focal consolidation, pleural effusion, or pneumothorax. 3. Postsurgical changes and medical devices as above, including right subclavian approach [place]-Faviola catheter with the distal tip projecting over the distal interlobar right pulmonary artery.   I personally reviewed the images/study and I agree with the findings as stated by resident Ozzie Collazo. This study was interpreted at [place], [place].   MACRO: None   Signed by: Hemanth Rock 10/25/2024 7:33 AM Dictation workstation:   [id number]    XR chest 1 view    Result Date: 10/24/2024  Exam: 1V chest Comparison: CR/SR - XR CHEST PORTABLE - 10/23/2024 01:11 AM EDT Findings: Right [place]-Faviola catheter tip remains over the distal right main pulmonary artery level. Mediastinum: Stable advanced cardiac silhouette enlargement. Lungs: No infiltrate or mass. Pleura: No pneumothorax or significant effusion. Bones: No acute pathology. Sternotomy. Left shoulder arthroplasty.    Impression: [place]-Faviola catheter satisfactory. Stable advanced cardiac silhouette enlargement. This document has been electronically signed by: Gisele Cabral MD on 10/24/2024 03:07 AM    XR chest 1 view    Result Date: 10/23/2024  1 view chest x-ray Comparison: CR/SR - XR CHEST PORTABLE - 10/22/2024 01:31 AM EDT Findings: Right central line introducer with [place]-Faviola catheter in unchanged position overlying distal aspect of the right main pulmonary artery. Stable cardiomegaly. CABG. Interstitial thickening of the bilateral lungs remains. No large pleural effusion. No pneumothorax. Median sternotomy wires. Left shoulder arthroplasty.    1. No interval change. This document  has been electronically signed by: Ritchie Bryant DO on 10/23/2024 02:06 AM    Echocardiogram    Result Date: 10/22/2024    Left Ventricle: Severely reduced left ventricular systolic function with a visually estimated EF of 10 -15%. Left ventricle is severely dilated. Normal wall thickness. Severe global hypokinesis present.   Right Ventricle: Right ventricle is moderately dilated. Moderately reduced systolic function.   Left Atrium: Left atrium is moderately dilated.   Right Atrium: Right atrium is mildly dilated.   Image quality is adequate. Left Ventricle Severely reduced left ventricular systolic function with a visually estimated EF of 10 -15%. Left ventricle is severely dilated. Normal wall thickness. Severe global hypokinesis present. Indeterminate diastolic function. Right Ventricle Right ventricle is moderately dilated. Moderately reduced systolic function. Left Atrium Left atrium is moderately dilated. Right Atrium Right atrium is mildly dilated. IVC/SVC IVC was not well visualized. Mitral Valve Valve structure is normal. Tricuspid Valve Valve structure is normal. Aortic Valve Not well visualized. Pulmonic Valve Valve structure is normal. Ascending Aorta Normal sized aortic root. Pericardium No pericardial effusion. Study Details Image quality: adequate. The view(s) performed were parasternal, apical, subcostal and suprasternal. Cardiac history: prior CABG. Color flow Doppler was performed and pulse wave and/or continuous wave Doppler was performed. No contrast was given. Comparison Study There is a prior study available for comparison. Prior study date: 10/18/2024. · Left Ventricle: Severely reduced left ventricular systolic function with a visually estimated EF of 10 -15%. Left ventricle is severely dilated. Normal wall thickness. Severe global hypokinesis present. · Right Ventricle: Right ventricle is severely dilated. Lead present in the right ventricle. Severely reduced systolic function. · Aortic  Valve: Bicuspid valve with commisural fusion of the right and left cusps. Mild regurgitation. · Mitral Valve: Severe annular dilation. Mild regurgitation. · Tricuspid Valve: Severe annular dilation. Moderate regurgitation. · Left Atrium: Left atrium is severely dilated. Normal sized appendage. No left atrial appendage thrombus noted. · Right Atrium: Right atrium is severely dilated. · Image quality is adequate.    XR chest 1 view    Result Date: 10/22/2024  1 view chest x-ray Comparison: CR/SR - XR CHEST PORTABLE - 10/21/2024 08:09 AM EDT Findings: Right subclavian line introducer with [place]-Faviola catheter terminating within the distal aspect of the right main pulmonary artery similar to the prior examination. Stable cardiomegaly. CABG. Stable coarse interstitial thickening, unchanged. Fluid within minor fissure. No large pleural effusion. No pneumothorax. Median sternotomy wires.    1. Unchanged position of [place]-Faviola catheter with distal tip overlying the distal aspect of the right main pulmonary artery. Remaining examination is unchanged. This document has been electronically signed by: Ritchie Bryant DO on 10/22/2024 04:33 AM    XR chest 1 view    Result Date: 10/21/2024  PROCEDURE: XR CHEST PORTABLE CLINICAL INFORMATION: [place] placement verification. COMPARISON: Radiograph 10/18/2024 TECHNIQUE: AP upright view of the chest was obtained. FINDINGS: There is a right subclavian vein access [place]-Faviola catheter. The tip is projecting in the right pulmonary outflow tract. There is cardiomegaly. There is no significant pleural effusion or pneumothorax. Visualized portions of the upper abdomen are within normal limits. The osseous structures are intact. No acute fractures or suspicious osseous lesions.    There is a right-sided access [place]-Faviola catheter with the tip terminating in the right pulmonary outflow tract. **This report has been created using voice recognition software. It may contain minor errors which are inherent  in voice recognition technology.** Electronically signed by Dr Sven Danielle  .      Assessment/Plan   Assessment & Plan  Acute on chronic heart failure with reduced ejection fraction and diastolic dysfunction    Ischemic cardiomyopathy    Receiving inotropic medication    HTN (hypertension)    CAD (coronary artery disease)    MI (myocardial infarction) (Multi)    CHF (congestive heart failure)    Gastroesophageal reflux disease    Acute kidney injury (nontraumatic) (CMS-HCC)    Delirium      Fahad Meraz is a 69 y.o. male with a history of CAD s/p CABG x 4 (2012) and PCI (LCx/OM; 5/2019), stage D ICM HFrEF LVEF 10-15%, AF s/p RFA (PVI and CTI; 4/2024), HTN, pre-T2DM A1c 6.3, depression, anxiety, and VIV using CPAP who was admitted to OSH s/p RHC on 10/18/24 with decompensated heart failure and KENYA. Now s/p LVAD. Neurology paged for BAT because of abnormal eye movements (nystagmus lasting for 5 minutes). Upon arrival to bedside he was back to baseline. NIHSS was 0 and BAT was cancelled. His eye movement abnormalities have resolved. While certainly seizures or stroke/TIA can cause transient nystagmus, his symptoms would be atypical for either process and they have also resolved. This transient episode of eye movement abnormality is of unclear neurologic etiology but there is lower concern for seizure or stroke.    Recommendations:  - CT head when able  - Record video of eye movement abnormalities if this were to occur again and page neurology  - IV Thiamine 500 mg TID for 3 days  - To be formally staffed in the AM with Dr. Raceil Rock MD PhD  PGY-2 Neurology

## 2024-11-17 NOTE — CARE PLAN
Problem: Heart Failure  Goal: Improved gas exchange this shift  Outcome: Progressing  Goal: Improved urinary output this shift  Outcome: Progressing  Goal: Reduction in peripheral edema within 24 hours  Outcome: Progressing  Goal: Report improvement of dyspnea/breathlessness this shift  Outcome: Progressing  Goal: Weight from fluid excess reduced over 2-3 days, then stabilize  Outcome: Progressing  Goal: Increase self care and/or family involvement in 24 hours  Outcome: Progressing     Problem: Skin  Goal: Decreased wound size/increased tissue granulation at next dressing change  Outcome: Progressing  Goal: Participates in plan/prevention/treatment measures  Outcome: Progressing  Goal: Prevent/manage excess moisture  Outcome: Progressing  Goal: Prevent/minimize sheer/friction injuries  Outcome: Progressing  Flowsheets (Taken 11/17/2024 0440 by Kati Jessica RN)  Prevent/minimize sheer/friction injuries:   Turn/reposition every 2 hours/use positioning/transfer devices   Increase activity/out of bed for meals   HOB 30 degrees or less  Goal: Promote/optimize nutrition  Outcome: Progressing  Goal: Promote skin healing  Outcome: Progressing     Problem: Pain - Adult  Goal: Verbalizes/displays adequate comfort level or baseline comfort level  Outcome: Progressing     Problem: Safety - Adult  Goal: Free from fall injury  Outcome: Progressing     Problem: Discharge Planning  Goal: Discharge to home or other facility with appropriate resources  Outcome: Progressing     Problem: Chronic Conditions and Co-morbidities  Goal: Patient's chronic conditions and co-morbidity symptoms are monitored and maintained or improved  Outcome: Progressing     Problem: Fall/Injury  Goal: Not fall by end of shift  Outcome: Progressing  Goal: Be free from injury by end of the shift  Outcome: Progressing  Goal: Use assistive devices by end of the shift  Outcome: Progressing  Goal: Pace activities to prevent fatigue by end of the  shift  Outcome: Progressing     Problem: Ventricular Assisted Device (VAD)  Goal: Hemodynamic stability, correction of coagulopathy, lab value stability  Outcome: Progressing  Goal: Pulmonary toileting, incentive spirometry  Outcome: Progressing  Goal: Nutrition  Outcome: Progressing  Goal: Mobility/OT/PT  Outcome: Progressing  Goal: ROM  Outcome: Progressing  Goal: Sitting  Outcome: Progressing  Goal: Involve in VAD awareness, dressing change  Outcome: Progressing     Problem: Meds/Post-op Pain  Goal: Pain controlled to tolerate pain level  Outcome: Progressing  Goal: Tolerates prescribed medication  Outcome: Progressing     Problem: DVT/VTE Prevention/Activity  Goal: No decrease in circulation/sensation  Outcome: Progressing  Goal: Prevent skin breakdown  Outcome: Progressing  Goal: Return to preop oxygenation status  Outcome: Progressing  Goal: Tolerates optimal activity  Outcome: Progressing  Goal: Increase self care and/or family involvement in 24 hrs.  Outcome: Progressing     Problem: Wound care/infection prevention  Goal: No signs of infection in 24 hrs.  Outcome: Progressing  Goal: No unexpected bleeding from incision this shift  Outcome: Progressing     Problem: Diet/fluid balance  Goal: Adequate urinary output  Outcome: Progressing  Goal: Free from nausea/vomiting  Outcome: Progressing  Goal: Return in bowel function  Outcome: Progressing  Goal: Tolerates prescribed diet  Outcome: Progressing     Problem: Other goals  Goal: No change in neurological status  Outcome: Progressing  Goal: Stabilize vital signs (return to 10% of baseline)  Outcome: Progressing   Problem: Ventricular Assisted Device (VAD)  Goal: Stable metal status  Outcome: Not Progressing

## 2024-11-17 NOTE — PROGRESS NOTES
Fahad Meraz is a 69 y.o. male on day 24 of admission presenting with Acute on chronic heart failure with reduced ejection fraction and diastolic dysfunction.    Subjective   Patient discussed in morning handover, patient examined and chart reviewed. Meds, labs and radiology reviewed during full interdisciplinary rounds this morning. Co-rounded with HF this morning.    Surgeon: Jas Briseno  DOS: Nov. 12, 2024  Procedure: HMIII Implantation (via L thoracotomy)     Airway: grade I - full view of glottis   EF:  LVEF 15-20% with mild AI and mod MR, TR pre & post, dilated RV     FULL Code  Emergency Sternotomy: N; L mini-thoracotomy    HPI:   10/18/24 - Patient initially presented to an outside hospital  with decompensated heart failure and KENYA.   10/24/24 - Patient [facility]ICU  for medical optimization and evaluation for advanced heart failure therapies.    11/5/2024 -  [employer] approved destination therapy LVAD.  He is now admitted to the CTICU s/p Heartmate 3 implant with the outflow to the mid descending thoracic aorta via left thoracotomy 11/12/24 with Dr. Mclain and Dr. Madrigal.     PMH:  CAD - s/p CABG x 4 (2012) and PCI (LCx/OM; 5/2019)  Stage D ICM HFrEF LVEF 10-15%  AF s/p RFA (PVI and CTI; 4/2024)  HTN  Dyslipidemia  pre-T2DM (A1c 6.3)  VIV using CPAP   Depression  Anxiety    Course in Hospital:  11/12 - Intra-op -  episode of pulseless VT requiring defib x 1 prior to being placed on CPB. In the ICU (post-op), significant bleeding from venous cannulation site requiring transfusion  11/14 - Extubated  11/15 - off i-EPO and epinephrine    Overnight: LVAD flows stable. Remains on milrinone for RV support. New delirium yesterday persists.      Objective     Last Recorded Vitals  Blood pressure 136/80, pulse (!) 127, temperature 37.5 °C (99.5 °F), temperature source Core, resp. rate 16, height 1.829 m (6'), weight 98.3 kg (216 lb 11.4 oz), SpO2 100%.    Invasive  Hemodynamics:    Most Recent Range Past 24hrs   BP (Art) 126/70 Arterial Line BP 1  Min: 74/48  Max: 145/92   MAP(Art) 84 mmHg Arterial Line MAP 1 (mmHg)   Min: 57 mmHg  Max: 109 mmHg   RA/CVP   No data recorded   PA 38/21 PAP  Min: 32/20  Max: 51/29   PA(mean) 27 mmHg PAP (Mean)  Min: 19 mmHg  Max: 36 mmHg   CO 7.2 L/min CO (L/min)  Min: 4.9 L/min  Max: 7.2 L/min   CI 3.4 L/min/m2 CI (L/min/m2)  Min: 2.29 L/min/m2  Max: 3.4 L/min/m2   Mixed Venous 70 % SVO2 (%)  Min: 70 %  Max: 70 %   SVR  747 (dyne*sec)/cm5 SVR (dyne*sec)/cm5  Min: 714 (dyne*sec)/cm5  Max: 1044 (dyne*sec)/cm5     Intake/Output last 3 Shifts:  I/O last 3 completed shifts:  In: 2247.5 (22.9 mL/kg) [P.O.:954; I.V.:893.5 (9.1 mL/kg); IV Piggyback:400]  Out: 2110 (21.5 mL/kg) [Urine:2110 (0.6 mL/kg/hr)]  Weight: 98.3 kg     Physical Exam  Eyes:      Pupils: Pupils are equal, round, and reactive to light.   Cardiovascular:      Rate and Rhythm: Regular rhythm. Tachycardia present.      Comments: Milrinone: 0.25  Epi: 0.01    Lines:  RIJ CVL and PAC  Right brachial A-line  Pulmonary:      Effort: Pulmonary effort is normal.      Breath sounds: Examination of the right-lower field reveals decreased breath sounds. Examination of the left-lower field reveals decreased breath sounds. Decreased breath sounds present.      Comments: On room air this morning  Abdominal:      General: Bowel sounds are decreased.      Palpations: Abdomen is soft.      Tenderness: There is no abdominal tenderness (patient sedatec). There is no guarding or rebound.   Skin:     Comments: No active issues   Neurological:      General: No focal deficit present.      Mental Status: He is alert. He is disoriented.      Sensory: Sensation is intact.      Motor: Motor function is intact.      Comments: RASS 0 - +2  CAM-ICU: remains positive this morning   Psychiatric:         Behavior: Behavior is cooperative.     MCS:   Heart Mate III:     Most Recent Range Past 24hrs   Flow 3.9 Pump  Flow  Min: 3.3  Max: 4.1   Speed 5100 Speed RPM  Min: 5100  Max: 5150   Power 3.6 [place]  Min: 3.5  Max: 3.7   PI 4.1 Pulse Index  Min: 3.3  Max: 6.9   No PI event and stable power overnight. LDH stable    Assessment/Plan     ICU Liberation Bundle:  A-Analgesia: Tylenol  B-SBT: Extubated  C-RASS Target: 0  D-CAM: positive this morning  E-Mobilization Plan: OOBTC and ambulate today  F - Family Last Update: to be update by the team    Daily Checklist:  HOB > 30 degrees: extubated   Feeding: p.o. intake  Thromboprophylaxis: Heparin and SCDs  Ulcer Prophylaxis: PPI  Glucose Control: Target 110-180; < 180 achieved with no insulin for past 72 hours  Line to removed: DC RIJ Line  Bowel Care: Bowel regime ordered  De-escalation of Antibiotics: started on vanco and pip/tazo yesterday in light of new delirium for 48 hours and then re-assess    Plan:  Continue with chemical RVAD support with milrinone today. Plan TTE and ramp study tomorrow; continue current inotropic support and p.o. digoxin  Delirium: continue with antibiotics started  - will follow-up cultures, sleep promotion with melatonin, continue with ambulation today   ASA; continue warfarin 4mg today (INR 1.7 today)  Continuie diuresis today (even to -1L).  R/A sildenafil tomorrow  R/A statin tomorrow    DISPO: CTICU with plan to transfer to HFICU once bed available.    I spent 47 minutes in the professional and overall care of this patient.    This critically ill patient continues to be at-risk for clinically significant deterioration / failure due to the above mentioned dysfunctional, unstable organ systems.  I have personally identified and managed all complex critical care issues to prevent aforementioned clinical deterioration.  Critical care time is spent at bedside and/or the immediate area and has included, but is not limited to, the review of diagnostic tests, labs, radiographs, serial assessments of hemodynamics, respiratory status, ventilatory management,  and family updates.  Time spent in procedures and teaching are reported separately.    Zak Aguilar MD

## 2024-11-17 NOTE — PROGRESS NOTES
CTICU Progress Note  Fahad Meraz/[id number]    Admit Date: 10/24/2024  Hospital Length of Stay: 24   ICU Length of Stay: 4d 18h   CT SURGEON: Sergio    SUBJECTIVE:   Vaso actives: epi 0.01, milrinone 0.25   CI 3.4 SvO2 70    Increasingly agitated with hyperactive delirium, given two doses of haloperidol (2mg and 5mg respectively) during the day and dexmedetomidine overnight.  Pan cultures sent and antibiotics started empirically.  Nursing reports periods of apnea overnight.  He is globally more confused this morning though re-orientates with help.      MEDICATIONS  Infusions:  dexmedeTOMIDine, Last Rate: Stopped (11/17/24 0628)  EPINEPHrine, Last Rate: 0.01 mcg/kg/min (11/17/24 0600)  lactated Ringer's, Last Rate: 20 mL/hr (11/17/24 0600)  lactated Ringer's, Last Rate: 5 mL/hr (11/17/24 0600)  milrinone, Last Rate: 0.25 mcg/kg/min (11/17/24 0600)      Scheduled:  acetaminophen, 975 mg, q8h  aspirin, 81 mg, Daily  digoxin, 125 mcg, Daily  escitalopram, 10 mg, Daily  heparin (porcine), 5,000 Units, q8h  melatonin, 5 mg, Nightly  oxygen, , Continuous - Inhalation  piperacillin-tazobactam, 3.375 g, q6h  polyethylene glycol, 17 g, BID  sennosides-docusate sodium, 2 tablet, BID  vancomycin, 1,250 mg, q24h  warfarin, 4 mg, Daily      PRN:  alteplase, 2 mg, PRN  hydrALAZINE, 10 mg, q4h PRN  naloxone, 0.2 mg, q5 min PRN  ondansetron, 4 mg, q8h PRN   Or  ondansetron, 4 mg, q8h PRN  oxyCODONE, 10 mg, q4h PRN  oxyCODONE, 5 mg, q4h PRN  vancomycin, , Daily PRN        PHYSICAL EXAM:   Visit Vitals  /80   Pulse (!) 127   Temp 37.5 °C (99.5 °F) (Core)   Resp 17   Ht 1.829 m (6')   Wt 98.3 kg (216 lb 11.4 oz)   SpO2 (!) 76%   BMI 29.39 kg/m²   Smoking Status Former   BSA 2.23 m²     Wt Readings from Last 5 Encounters:   11/17/24 98.3 kg (216 lb 11.4 oz)   10/24/24 92.5 kg (204 lb)   08/05/24 122 kg (268 lb)   01/31/22 119 kg (262 lb)   02/18/20 123 kg (270 lb 2 oz)     INTAKE/OUTPUT:  I/O last 3 completed shifts:  In:  2247.5 (22.9 mL/kg) [P.O.:954; I.V.:893.5 (9.1 mL/kg); IV Piggyback:400]  Out: 2110 (21.5 mL/kg) [Urine:2110 (0.6 mL/kg/hr)]  Weight: 98.3 kg          Physical Exam:   - CONSTITUTION: in bed, awake and alert  - NEUROLOGIC: intact, non focal, more confused than yesterday, Alert to self +/- place & recent events  - CARDIOVASCULAR: sinus tachy with frequent PVCs.  Periphery warm & well perfused  - RESPIRATORY: Unlabored, diminished bases.   - GI: soft, + BS  - : external catheter  - EXTREMITIES: trace edema    - SKIN: intact  - PSYCHIATRIC: restless    Daily Risk Screen  Intubated: No    Central line: 11/9 hemodynamic monitoring & vasoactive meds  Gallo: No    Images: TTE reviewed and noted for stable reduced RV function, aortic valve opening.      Invasive Hemodynamics:    Most Recent Range Past 24hrs   BP (Art) 145/92 Arterial Line BP 1  Min: 74/48  Max: 145/92   MAP(Art) 109 mmHg Arterial Line MAP 1 (mmHg)   Min: 57 mmHg  Max: 109 mmHg   RA/CVP   No data recorded   PA 51/29 PAP  Min: 32/20  Max: 51/29   PA(mean) 36 mmHg PAP (Mean)  Min: 19 mmHg  Max: 36 mmHg   CO 7.2 L/min CO (L/min)  Min: 4.87 L/min  Max: 7.2 L/min   CI 3.4 L/min/m2 CI (L/min/m2)  Min: 2.28 L/min/m2  Max: 3.4 L/min/m2   Mixed Venous 70 % SVO2 (%)  Min: 70 %  Max: 70 %   SVR  747 (dyne*sec)/cm5 SVR (dyne*sec)/cm5  Min: 714 (dyne*sec)/cm5  Max: 1133 (dyne*sec)/cm5         Assessment/Plan   69 M with PMHx of CAD s/p CABG x 4 (2012) and PCI (LCx/OM; 5/2019), stage D ICM HFrEF LVEF 10-15%, AF s/p RFA (PVI and CTI; 4/2024), HTN, pre-T2DM A1c 6.3, depression, anxiety, and VIV using CPAP who was admitted to OS s/p RHC on 10/18/24 with decompensated heart failure and KENYA. He was referred to [facility] HFICU 10/24/24 and was medically optomized and presented to [employer] [facility] 11/5 who approved destination therapy LVAD.  He is now admitted to the CTICU s/p Heartmate 3 implant with the outflow to the mid descending thoracic aorta via left  thoracotomy 11/12/24 with Dr. Mclain and Dr. Madrigal.         NEURO:  PMH of anxiety & depression.  Acute post operative pain acceptably controlled. CAM positive with hyperactive to hypoactive delirium.  He is moderately deconditioned, getting OOB to chair daily     - Serial neuro and pain assessments   - Continue scheduled Tylenol   - Discontinue oxycodone, can reorder if needed but will attempt to minimize agents that can alter sensorium or contribute to delirium   - Continue home escitalopram 10mg daily   - If RASS >+2 can consider additional haloperidol.  Qtc 525 this morning   - PT Consult, OOB to chair as tolerated, ambulate today  - CAM ICU score qshift  - Sleep/wake cycle hygiene  - Continue melatonin 5mg at bedtime for sleep  - REST Protocol with labs & CXR at 7am   - Resume nocturnal dexmedetomidine 0.6 tonight at 8pm     CV:  Patient has a history of CAD s/p CABG x 4 (2012) and PCI (LCx/OM; 5/2019), stage D ICM HFrEF LVEF 10-15%, AF s/p RFA (PVI and CTI; 4/2024), HTN, and is now status post HM3 LVAD via left thoracotomy 11/12 with Jas Ortiz and Kaitlin. Pre/Post EF: 15-20% &  dilated RV. Remains on epi 0.01, milrinone 0.25,  with CVP 20  and PA Pressure 51/29. Sinus tachy with PVCs. No PPM or pacer wires.    - Maintain goal MAP 70-80mmHg, avoid sustained MAPs > 90mmHg as flows decrease and PI increases    - Mixed venous and CI Q4H   - Continue milrinone infusion 0.25 mcg/kg/min    - Continue epinephrine infusion at 0.01mcg/kg/min  - Continue digoxin 0.125mg daily per heart failure recommendations  - Plan for ramp study tomorrow 11/18 under TTE guidance  - Discontinue PA and introducer, can consider TLC or PICC in coming days depending on course and PIV access   - Daily weights per LVAD protocol   - Dressing changes daily until dry, then weekly per protocol   - Continue ASA 81mg daily    - Hold pre-op hydral, isosorbide, entresto, metoprolol xl 50mg, spironolactone.    - Clarify home statin use, if not,  will start statin (OSH lipid were low normal but he has hx of CAD/CABG,ICM).   - Check EKG for baseline QTC (520)     PULM:  PMHx VIV on CPAP at home. Adequate oxygenation on room air  - Daily CXR  - Resume home nocturnal CPAP  - Wean FiO2 maintaining SpO2 >92%.   - IS q1h and encourage OOB and ambulation today     GI:  PMH of GERD on PPI at home. Malnutrition with poor PO intake pre and post-op.  Two stools overnight, 1 today   - Continue PPI for GERD, LVAD bleeding risk   - Continue adult regular diet with supplements  - Continue Colace/senna BID and change miralax to as needed  - Daily LFT's while monitoring for venous congestion      :  CSA-KENYA Risk Score pre-op 6, ICU admit 6.  He did have KENYA on CKD in recent weeks that improved to 1.3 pre-op (baseline creatinine 1.3 - 1.6). Creatinine stable 1.7-1.8 post op.  Last 24 hours net positive 1L.  Mild hyponatremia with serum Na 134 overnight.  He appears grossly euvolemic on exam.   - RFP as clinically indicated  - Replete electrolytes per CTICU protocol  - Bumex 2mg IV this morning  - Goal even to slightly negative today     ENDO:  PMH of pre-DM with recent A1c: 6.3.      - Goal BG <180       HEME:  Acute blood loss anemia stable    - CBC in AM, stable over last 24 hours  - Continue warfarin 4mg daily (11/16 - ) for goal INR 2-3.  (PT previously attributed dizziness to apixaban)   - Continue SQH, SCDs for DVT prophylaxis.  - Last type and screen: 11/14, repeat today     ID:  Afebrile, no leukocytosis.  S/P Periop cefazolin, vanco, fluconazole x 48hrs per LVAD protocol.  ABX started 11/16 for sirs equivalent of worsening delirium  - F/U bld & urin -> UA neg   - Continue vancomycin per pharmacy dosing (11/16 -)  - Continue empiric zosyn (11/16 - )     Skin:  No active skin issues.  - preventative Mepilex dressings in place on sacrum and heels  - change preventative Mepilex weekly or more frequently as indicated (when moist/soiled)   - every shift skin assessment  per nursing and weekly ICU skin rounds  - moisture barrier to be applied with ricky care  - active skin problems addressed with nursing on daily rounds     Proph:  SCDs  PPI  SQH     G:  Line  Discontinue PA & introducer catheter   Left radial arterial line 11/16  PIVs, PIVs x 2     F: Family: updated at bedside.     A,B,C,D,E,F,G: reviewed  Dispo: CTICU, likely HFICU in near future  CTICU TEAM PHONE [phone]    MAYTE Brooke Student  [facility]      I have independently reviewed data and examined the patient.  The above A&P includes my edits.    I personally spent 40 minutes of critical care time directly and personally managing the patient exclusive of separately billable procedures or teaching.      MAYTE Caceres

## 2024-11-17 NOTE — PROGRESS NOTES
Vancomycin Dosing by Pharmacy  FOLLOW UP    Fahad Meraz is a 69 y.o. male who Pharmacy is consulted to dose vancomycin for SIRS s/p LVAD.     Based on the patient's indication and renal status, this patient is being dosed based on a goal vancomycin AUC of 400-600.     Current vancomycin dose: 1250 mg every 24 hours    Estimated vancomycin AUC on current dose: 373 mg/L.hr    Renal function is currently improving.    Estimated Creatinine Clearance: 60.1 mL/min (A) (by C-G formula based on SCr of 1.41 mg/dL (H)).    Vancomycin   Date Value Ref Range Status   11/17/2024 13.6 5.0 - 20.0 ug/mL Final       Results from last 7 days   Lab Units 11/17/24  0122 11/16/24  1358 11/16/24  0128 11/15/24  1255   CREATININE mg/dL 1.41* 1.50* 1.73* 1.91*   BUN mg/dL 46* 49* 51* 53*   WBC AUTO x10*3/uL 10.8 10.2 11.4* 13.0*        Visit Vitals  /80   Pulse 108   Temp 37.5 °C (99.5 °F) (Core)   Resp 14       Staph/MRSA Screen Culture   Date/Time Value Ref Range Status   11/12/2024 01:03 PM No Staphylococcus aureus isolated  Final     Blood Culture   Date/Time Value Ref Range Status   11/16/2024 06:01 PM Loaded on Instrument - Culture in progress  Preliminary        Assessment/Plan    AUC is below goal range. Orders placed for new vancomycin regimen of 1750 mg every 24 hours. This dosing regimen is predicted by [employer] to result in the following pharmacokinetic parameters:    Regimen: 1750 mg IV every 24 hours.  Start time: 17:26 on 11/18/2024  Exposure target: AUC24 (range)400-600 mg/L.hr   LYY91-89: 427 mg/L.hr  AUC24,ss: 519 mg/L.hr  Probability of AUC24 > 400: 97 %  Ctrough,ss: 15.4 mg/L  Probability of Ctrough,ss > 20: 13 %      The next vancomycin level will be ordered for 11/18 at AM labs, unless clinically indicated sooner.  Will continue to monitor renal function daily while on vancomycin and order serum creatinine at least every 48 hours if not already ordered.  Will follow for continued vancomycin needs, clinical  response, and signs/symptoms of toxicity.       Ludmila Mendieta, PharmD

## 2024-11-17 NOTE — CARE PLAN
The patient's goals for the shift include      The clinical goals for the shift include Patient will have decreased confusion    Over the shift, the patient did not make progress toward the following goals. Barriers to progression include continued delerium. Recommendations to address these barriers include rest protocol, medication to assist with rest.

## 2024-11-18 ENCOUNTER — APPOINTMENT (OUTPATIENT)
Dept: RADIOLOGY | Facility: HOSPITAL | Age: 69
DRG: 001 | End: 2024-11-18
Payer: MEDICARE

## 2024-11-18 ENCOUNTER — APPOINTMENT (OUTPATIENT)
Dept: CARDIOLOGY | Facility: HOSPITAL | Age: 69
DRG: 001 | End: 2024-11-18
Payer: MEDICARE

## 2024-11-18 VITALS
WEIGHT: 216.71 LBS | HEIGHT: 72 IN | BODY MASS INDEX: 29.35 KG/M2 | HEART RATE: 113 BPM | TEMPERATURE: 98.6 F | SYSTOLIC BLOOD PRESSURE: 101 MMHG | RESPIRATION RATE: 19 BRPM | OXYGEN SATURATION: 96 % | DIASTOLIC BLOOD PRESSURE: 59 MMHG

## 2024-11-18 LAB
ALBUMIN SERPL BCP-MCNC: 3.5 G/DL (ref 3.4–5)
ALP SERPL-CCNC: 54 U/L (ref 33–136)
ALT SERPL W P-5'-P-CCNC: 6 U/L (ref 10–52)
ANION GAP BLDA CALCULATED.4IONS-SCNC: 6 MMO/L (ref 10–25)
ANION GAP BLDA CALCULATED.4IONS-SCNC: 7 MMO/L (ref 10–25)
ANION GAP SERPL CALC-SCNC: 14 MMOL/L (ref 10–20)
AST SERPL W P-5'-P-CCNC: 18 U/L (ref 9–39)
BASE EXCESS BLDA CALC-SCNC: 5 MMOL/L (ref -2–3)
BASE EXCESS BLDA CALC-SCNC: 5.7 MMOL/L (ref -2–3)
BILIRUB DIRECT SERPL-MCNC: 0.3 MG/DL (ref 0–0.3)
BILIRUB SERPL-MCNC: 0.8 MG/DL (ref 0–1.2)
BODY TEMPERATURE: 37 DEGREES CELSIUS
BODY TEMPERATURE: 37 DEGREES CELSIUS
BUN SERPL-MCNC: 34 MG/DL (ref 6–23)
CA-I BLD-SCNC: 1.21 MMOL/L (ref 1.1–1.33)
CA-I BLD-SCNC: 1.24 MMOL/L (ref 1.1–1.33)
CA-I BLDA-SCNC: 1.16 MMOL/L (ref 1.1–1.33)
CA-I BLDA-SCNC: 1.26 MMOL/L (ref 1.1–1.33)
CALCIUM SERPL-MCNC: 9 MG/DL (ref 8.6–10.6)
CHLORIDE BLDA-SCNC: 101 MMOL/L (ref 98–107)
CHLORIDE BLDA-SCNC: 103 MMOL/L (ref 98–107)
CHLORIDE SERPL-SCNC: 99 MMOL/L (ref 98–107)
CO2 SERPL-SCNC: 25 MMOL/L (ref 21–32)
CREAT SERPL-MCNC: 1.33 MG/DL (ref 0.5–1.3)
EGFRCR SERPLBLD CKD-EPI 2021: 58 ML/MIN/1.73M*2
EJECTION FRACTION: 10 %
ERYTHROCYTE [DISTWIDTH] IN BLOOD BY AUTOMATED COUNT: 15.9 % (ref 11.5–14.5)
GLUCOSE BLDA-MCNC: 112 MG/DL (ref 74–99)
GLUCOSE BLDA-MCNC: 140 MG/DL (ref 74–99)
GLUCOSE SERPL-MCNC: 128 MG/DL (ref 74–99)
HCO3 BLDA-SCNC: 27.8 MMOL/L (ref 22–26)
HCO3 BLDA-SCNC: 28.2 MMOL/L (ref 22–26)
HCT VFR BLD AUTO: 31.9 % (ref 41–52)
HCT VFR BLD EST: 33 % (ref 41–52)
HCT VFR BLD EST: 36 % (ref 41–52)
HGB BLD-MCNC: 10.7 G/DL (ref 13.5–17.5)
HGB BLDA-MCNC: 11 G/DL (ref 13.5–17.5)
HGB BLDA-MCNC: 12 G/DL (ref 13.5–17.5)
HOLD SPECIMEN: NORMAL
INHALED O2 CONCENTRATION: 21 %
INHALED O2 CONCENTRATION: 21 %
INR PPP: 1.8 (ref 0.9–1.1)
LACTATE BLDA-SCNC: 0.5 MMOL/L (ref 0.4–2)
LACTATE BLDA-SCNC: 0.8 MMOL/L (ref 0.4–2)
LDH SERPL L TO P-CCNC: 248 U/L (ref 84–246)
MAGNESIUM SERPL-MCNC: 1.92 MG/DL (ref 1.6–2.4)
MAGNESIUM SERPL-MCNC: 2.27 MG/DL (ref 1.6–2.4)
MCH RBC QN AUTO: 27.5 PG (ref 26–34)
MCHC RBC AUTO-ENTMCNC: 33.5 G/DL (ref 32–36)
MCV RBC AUTO: 82 FL (ref 80–100)
NRBC BLD-RTO: 0 /100 WBCS (ref 0–0)
OXYHGB MFR BLDA: 93.4 % (ref 94–98)
OXYHGB MFR BLDA: 94.2 % (ref 94–98)
PCO2 BLDA: 33 MM HG (ref 38–42)
PCO2 BLDA: 34 MM HG (ref 38–42)
PH BLDA: 7.52 PH (ref 7.38–7.42)
PH BLDA: 7.54 PH (ref 7.38–7.42)
PHOSPHATE SERPL-MCNC: 3 MG/DL (ref 2.5–4.9)
PLATELET # BLD AUTO: 355 X10*3/UL (ref 150–450)
PO2 BLDA: 70 MM HG (ref 85–95)
PO2 BLDA: 73 MM HG (ref 85–95)
POTASSIUM BLDA-SCNC: 3.3 MMOL/L (ref 3.5–5.3)
POTASSIUM BLDA-SCNC: 4.2 MMOL/L (ref 3.5–5.3)
POTASSIUM SERPL-SCNC: 3.9 MMOL/L (ref 3.5–5.3)
PROT SERPL-MCNC: 5.5 G/DL (ref 6.4–8.2)
PROTHROMBIN TIME: 20.2 SECONDS (ref 9.8–12.8)
RBC # BLD AUTO: 3.89 X10*6/UL (ref 4.5–5.9)
SAO2 % BLDA: 97 % (ref 94–100)
SAO2 % BLDA: 97 % (ref 94–100)
SODIUM BLDA-SCNC: 131 MMOL/L (ref 136–145)
SODIUM BLDA-SCNC: 135 MMOL/L (ref 136–145)
SODIUM SERPL-SCNC: 134 MMOL/L (ref 136–145)
VANCOMYCIN SERPL-MCNC: 13.6 UG/ML (ref 5–20)
WBC # BLD AUTO: 10 X10*3/UL (ref 4.4–11.3)

## 2024-11-18 PROCEDURE — 83735 ASSAY OF MAGNESIUM: CPT | Performed by: NURSE PRACTITIONER

## 2024-11-18 PROCEDURE — 99233 SBSQ HOSP IP/OBS HIGH 50: CPT | Performed by: REGISTERED NURSE

## 2024-11-18 PROCEDURE — 2500000004 HC RX 250 GENERAL PHARMACY W/ HCPCS (ALT 636 FOR OP/ED): Performed by: EMERGENCY MEDICINE

## 2024-11-18 PROCEDURE — 2500000005 HC RX 250 GENERAL PHARMACY W/O HCPCS: Performed by: NURSE PRACTITIONER

## 2024-11-18 PROCEDURE — 97530 THERAPEUTIC ACTIVITIES: CPT | Mod: GP

## 2024-11-18 PROCEDURE — 2020000001 HC ICU ROOM DAILY

## 2024-11-18 PROCEDURE — 2500000004 HC RX 250 GENERAL PHARMACY W/ HCPCS (ALT 636 FOR OP/ED): Performed by: NURSE PRACTITIONER

## 2024-11-18 PROCEDURE — 84132 ASSAY OF SERUM POTASSIUM: CPT | Performed by: NURSE PRACTITIONER

## 2024-11-18 PROCEDURE — 2500000002 HC RX 250 W HCPCS SELF ADMINISTERED DRUGS (ALT 637 FOR MEDICARE OP, ALT 636 FOR OP/ED)

## 2024-11-18 PROCEDURE — 93325 DOPPLER ECHO COLOR FLOW MAPG: CPT | Performed by: INTERNAL MEDICINE

## 2024-11-18 PROCEDURE — 83615 LACTATE (LD) (LDH) ENZYME: CPT | Performed by: NURSE PRACTITIONER

## 2024-11-18 PROCEDURE — 97530 THERAPEUTIC ACTIVITIES: CPT | Mod: GO

## 2024-11-18 PROCEDURE — 84100 ASSAY OF PHOSPHORUS: CPT | Performed by: NURSE PRACTITIONER

## 2024-11-18 PROCEDURE — 82330 ASSAY OF CALCIUM: CPT | Performed by: NURSE PRACTITIONER

## 2024-11-18 PROCEDURE — 99291 CRITICAL CARE FIRST HOUR: CPT

## 2024-11-18 PROCEDURE — 93750 INTERROGATION VAD IN PERSON: CPT

## 2024-11-18 PROCEDURE — 71045 X-RAY EXAM CHEST 1 VIEW: CPT | Performed by: RADIOLOGY

## 2024-11-18 PROCEDURE — 93325 DOPPLER ECHO COLOR FLOW MAPG: CPT

## 2024-11-18 PROCEDURE — 37799 UNLISTED PX VASCULAR SURGERY: CPT | Performed by: NURSE PRACTITIONER

## 2024-11-18 PROCEDURE — 99291 CRITICAL CARE FIRST HOUR: CPT | Performed by: INTERNAL MEDICINE

## 2024-11-18 PROCEDURE — 2500000001 HC RX 250 WO HCPCS SELF ADMINISTERED DRUGS (ALT 637 FOR MEDICARE OP): Performed by: NURSE PRACTITIONER

## 2024-11-18 PROCEDURE — 97116 GAIT TRAINING THERAPY: CPT | Mod: GP

## 2024-11-18 PROCEDURE — 2500000004 HC RX 250 GENERAL PHARMACY W/ HCPCS (ALT 636 FOR OP/ED)

## 2024-11-18 PROCEDURE — 93750 INTERROGATION VAD IN PERSON: CPT | Performed by: INTERNAL MEDICINE

## 2024-11-18 PROCEDURE — 71045 X-RAY EXAM CHEST 1 VIEW: CPT

## 2024-11-18 PROCEDURE — 93750 INTERROGATION VAD IN PERSON: CPT | Performed by: REGISTERED NURSE

## 2024-11-18 PROCEDURE — 2500000001 HC RX 250 WO HCPCS SELF ADMINISTERED DRUGS (ALT 637 FOR MEDICARE OP): Performed by: EMERGENCY MEDICINE

## 2024-11-18 PROCEDURE — 80202 ASSAY OF VANCOMYCIN: CPT | Performed by: NURSE PRACTITIONER

## 2024-11-18 PROCEDURE — 82248 BILIRUBIN DIRECT: CPT | Performed by: NURSE PRACTITIONER

## 2024-11-18 PROCEDURE — 85610 PROTHROMBIN TIME: CPT | Performed by: NURSE PRACTITIONER

## 2024-11-18 PROCEDURE — 93321 DOPPLER ECHO F-UP/LMTD STD: CPT | Performed by: INTERNAL MEDICINE

## 2024-11-18 PROCEDURE — 93308 TTE F-UP OR LMTD: CPT | Performed by: INTERNAL MEDICINE

## 2024-11-18 PROCEDURE — 85027 COMPLETE CBC AUTOMATED: CPT | Performed by: NURSE PRACTITIONER

## 2024-11-18 RX ORDER — BUMETANIDE 0.25 MG/ML
3 INJECTION, SOLUTION INTRAMUSCULAR; INTRAVENOUS ONCE
Status: COMPLETED | OUTPATIENT
Start: 2024-11-18 | End: 2024-11-18

## 2024-11-18 RX ORDER — POTASSIUM CHLORIDE 1.5 G/1.58G
20 POWDER, FOR SOLUTION ORAL ONCE
Status: COMPLETED | OUTPATIENT
Start: 2024-11-18 | End: 2024-11-18

## 2024-11-18 RX ORDER — POTASSIUM CHLORIDE 20 MEQ/1
20 TABLET, EXTENDED RELEASE ORAL EVERY 6 HOURS PRN
Status: DISCONTINUED | OUTPATIENT
Start: 2024-11-18 | End: 2024-11-19

## 2024-11-18 RX ORDER — CALCIUM GLUCONATE 20 MG/ML
2 INJECTION, SOLUTION INTRAVENOUS EVERY 6 HOURS PRN
Status: DISCONTINUED | OUTPATIENT
Start: 2024-11-18 | End: 2024-11-19

## 2024-11-18 RX ORDER — THIAMINE HYDROCHLORIDE 100 MG/ML
500 INJECTION, SOLUTION INTRAMUSCULAR; INTRAVENOUS EVERY 8 HOURS
Status: COMPLETED | OUTPATIENT
Start: 2024-11-18 | End: 2024-11-21

## 2024-11-18 RX ORDER — POTASSIUM CHLORIDE 1.5 G/1.58G
40 POWDER, FOR SOLUTION ORAL EVERY 6 HOURS PRN
Status: DISCONTINUED | OUTPATIENT
Start: 2024-11-18 | End: 2024-11-19

## 2024-11-18 RX ORDER — MAGNESIUM SULFATE HEPTAHYDRATE 40 MG/ML
2 INJECTION, SOLUTION INTRAVENOUS EVERY 6 HOURS PRN
Status: DISCONTINUED | OUTPATIENT
Start: 2024-11-18 | End: 2024-11-19

## 2024-11-18 RX ORDER — MAGNESIUM SULFATE 1 G/100ML
1 INJECTION INTRAVENOUS EVERY 6 HOURS PRN
Status: DISCONTINUED | OUTPATIENT
Start: 2024-11-18 | End: 2024-11-19

## 2024-11-18 RX ORDER — POTASSIUM CHLORIDE 20 MEQ/1
40 TABLET, EXTENDED RELEASE ORAL ONCE
Status: DISCONTINUED | OUTPATIENT
Start: 2024-11-19 | End: 2024-11-19

## 2024-11-18 RX ORDER — CALCIUM GLUCONATE 20 MG/ML
1 INJECTION, SOLUTION INTRAVENOUS EVERY 6 HOURS PRN
Status: DISCONTINUED | OUTPATIENT
Start: 2024-11-18 | End: 2024-11-19

## 2024-11-18 RX ORDER — MAGNESIUM SULFATE HEPTAHYDRATE 40 MG/ML
2 INJECTION, SOLUTION INTRAVENOUS ONCE
Status: COMPLETED | OUTPATIENT
Start: 2024-11-18 | End: 2024-11-18

## 2024-11-18 RX ORDER — POTASSIUM CHLORIDE 1.5 G/1.58G
20 POWDER, FOR SOLUTION ORAL EVERY 6 HOURS PRN
Status: DISCONTINUED | OUTPATIENT
Start: 2024-11-18 | End: 2024-11-19

## 2024-11-18 RX ORDER — POTASSIUM CHLORIDE 20 MEQ/1
40 TABLET, EXTENDED RELEASE ORAL EVERY 6 HOURS PRN
Status: DISCONTINUED | OUTPATIENT
Start: 2024-11-18 | End: 2024-11-19

## 2024-11-18 RX ORDER — OLANZAPINE 5 MG/1
5 TABLET, ORALLY DISINTEGRATING ORAL NIGHTLY
Status: DISCONTINUED | OUTPATIENT
Start: 2024-11-18 | End: 2024-11-19

## 2024-11-18 ASSESSMENT — COGNITIVE AND FUNCTIONAL STATUS - GENERAL
PERSONAL GROOMING: A LITTLE
MOVING FROM LYING ON BACK TO SITTING ON SIDE OF FLAT BED WITH BEDRAILS: A LITTLE
STANDING UP FROM CHAIR USING ARMS: A LITTLE
DRESSING REGULAR UPPER BODY CLOTHING: A LOT
CLIMB 3 TO 5 STEPS WITH RAILING: A LOT
EATING MEALS: A LITTLE
DRESSING REGULAR LOWER BODY CLOTHING: A LOT
WALKING IN HOSPITAL ROOM: A LITTLE
HELP NEEDED FOR BATHING: A LOT
MOBILITY SCORE: 17
DAILY ACTIVITIY SCORE: 14
TOILETING: A LOT
MOVING TO AND FROM BED TO CHAIR: A LITTLE
TURNING FROM BACK TO SIDE WHILE IN FLAT BAD: A LITTLE

## 2024-11-18 ASSESSMENT — PAIN SCALES - GENERAL
PAINLEVEL_OUTOF10: 0 - NO PAIN
PAINLEVEL_OUTOF10: 3

## 2024-11-18 ASSESSMENT — PAIN - FUNCTIONAL ASSESSMENT
PAIN_FUNCTIONAL_ASSESSMENT: 0-10

## 2024-11-18 ASSESSMENT — PAIN DESCRIPTION - ORIENTATION: ORIENTATION: LEFT

## 2024-11-18 ASSESSMENT — PAIN DESCRIPTION - LOCATION: LOCATION: CHEST

## 2024-11-18 NOTE — PROGRESS NOTES
1/1 CTICU     LVAD  @ [facility] --> 11/12 LVAD     DC PLAN  TBD - Care Transitions is following to develop a safe & supportive discharge plan in collaboration with multidisciplinary team (&) patient/family/significant others.       PAYOR   Medicare A/B   [employer] Supplement     COMPLETED  (X) Daily ongoing review of patient via chart and/or (M-F) IDT rounds     Raine Juarez (LSW, MSW)

## 2024-11-18 NOTE — PROGRESS NOTES
Occupational Therapy    Occupational Therapy Treatment    Name: Fahad Meraz  MRN: [id number]  : 1955  Date: 24  Room: A      Time Calculation  Start Time: 1507  Stop Time: 1527  Time Calculation (min): 20 min    Assessment:  OT Assessment: Pt continues to demo deficits in cognition, endurance, strength, and fine motor control. Would benefit from continued skilled therapy to maximize functional independence.  Prognosis: Good  Barriers to Discharge: Other (Comment) (medical acuity)  Evaluation/Treatment Tolerance: Patient tolerated treatment well  Medical Staff Made Aware: Yes  End of Session Communication: Bedside nurse  End of Session Patient Position: Bed, 3 rail up, Alarm off, not on at start of session, Alarm off, caregiver present (sitter present)  Plan:  Treatment Interventions: ADL retraining, Functional transfer training, UE strengthening/ROM, Endurance training, Cognitive reorientation, Equipment evaluation/education, Neuromuscular reeducation, Fine motor coordination activities, Compensatory technique education  OT Frequency: 3 times per week  OT Discharge Recommendations: High intensity level of continued care  Equipment Recommended upon Discharge: Wheeled walker  OT Recommended Transfer Status: Moderate assist  OT - OK to Discharge: Yes    Subjective   General:  OT Last Visit  OT Received On: 24  Reason for Referral: s/p HM3 implantation  Past Medical History Relevant to Rehab: CAD s/p 4V CABG () and PCI (LCx/OM; 2019), stage D systolic HF/ICM/HFrEF with LVEF 10-15%( 10/22/24 TTE), AF s/p RFA (PVI and CTI; 2024) on OAC therapy, HTN, dyslipidemia, depression, anxiety and VIV using CPAP  Prior to Session Communication: Bedside nurse  Patient Position Received: Bed, 3 rail up, Alarm off, not on at start of session, Alarm off, caregiver present (sitter present)  Family/Caregiver Present: Yes  Caregiver Feedback: wife present at start of session, left  General Comment: Pt  awake and willing to participate in OT session. Engaged in bed mobility, sitting EOB for LVAD education and training, and cognitive exercise. (HM3 Speed 5200, PI 3.6, Power 3.6, Flow 4.2. Milrinone 0.25)   Precautions:  Medical Precautions: Cardiac precautions, Fall precautions  Precautions Comment: MAP 70-80, SpO2>92  Vitals:  Vital Signs (Past 2hrs)        Date/Time Vitals Session Patient Position Pulse Resp SpO2 BP MAP (mmHg)    11/18/24 1400 --  --  101  16  95 %  --  --     11/18/24 1500 --  --  121  18  93 %  --  --     11/18/24 1507 Pre OT  Lying  106  19  94 %  116/71  83     11/18/24 1527 Post OT  Lying  119  18  96 %  121/72  86                   Lines/Tubes/Drains:  Arterial Line 11/16/24 Left Radial (Active)   Number of days: 2       Ventricular Assist Device HeartMate 3 (Active)   Number of days: 6       External Urinary Catheter (Active)   Number of days: 1       Cognition:  Overall Cognitive Status: Impaired  Arousal/Alertness: Delayed responses to stimuli  Orientation Level: Disoriented to situation (With prompts for location, needed prompts for day of the month)  Following Commands: Follows one step commands with increased time  Cognition Comments: Pt educated and trained on LVAD managment, continues to have delayed responses and lack of understanding of importance. Under impression that he did not have the LVAD in yet this date, required cues throughout session as reminder.    Pain Assessment:  Pain Assessment  Pain Assessment: 0-10  0-10 (Numeric) Pain Score: 0 - No pain     Objective     Therapy/Activity:      Therapeutic Activity  Therapeutic Activity Performed: Yes  Therapeutic Activity 1: Pt engaged in bed mobility from supine to sitting and sitting to supine with min A required for LE management and trunk elevation, max cues for sequencing and initiation  Therapeutic Activity 2: Pt engaged in battery switch from HM3 to LVAD, demo'ing some retained information from previous session. However,  continues to require max cues for sequencing and completion of task, with pt bryson'ing lack of understanding of importance and need for speed in cord connection.       Cognitive Skill Development:  Cognitive Skill Development  Cognitive Skill Development Activity 1: Pt engaged in cognitive skill activity requiring identifying between two cards which was of the higher value. Pt bryson'd slow movements throughout task, but completed x10 reps with 80% accuracy and min cues  Cognitive Skill Development Activity 1: Pt engaged in cognitive skill activity requiring identifying between two cards which was of the higher value. Pt demo'd slow movements throughout task, but completed x10 reps with 80% accuracy and min cues    Outcome Measures:  [facility] Daily Activity  Putting on and taking off regular lower body clothing: A lot  Bathing (including washing, rinsing, drying): A lot  Putting on and taking off regular upper body clothing: A lot  Toileting, which includes using toilet, bedpan or urinal: A lot  Taking care of personal grooming such as brushing teeth: A little  Eating Meals: A little  Daily Activity - Total Score: 14     Education Documentation  Precautions, taught by WAYLON Billingsley at 11/18/2024  3:50 PM.  Learner: Patient  Readiness: Acceptance  Method: Explanation  Response: Verbalizes Understanding, Needs Reinforcement, Demonstrated Understanding    Body Mechanics, taught by WAYLON Billingsley at 11/18/2024  3:50 PM.  Learner: Patient  Readiness: Acceptance  Method: Explanation  Response: Verbalizes Understanding, Needs Reinforcement, Demonstrated Understanding    ADL Training, taught by WAYLON Billingsley at 11/18/2024  3:50 PM.  Learner: Patient  Readiness: Acceptance  Method: Explanation  Response: Verbalizes Understanding, Needs Reinforcement, Demonstrated Understanding    Education Comments  No comments found.        Goals:  Encounter Problems       Encounter Problems (Active)       ADLs        Patient with complete upper body dressing with independent level of assistance donning and doffing all UE clothes with no adaptive equipment. (Not Progressing)       Start:  10/29/24    Expected End:  11/28/24            Patient with complete lower body dressing with independent level of assistance donning and doffing all LE clothes  with PRN adaptive equipment. (Not Progressing)       Start:  10/29/24    Expected End:  11/28/24            Patient will complete daily grooming tasks with independent level of assistance and PRN adaptive equipment while standing. (Not Progressing)       Start:  10/29/24    Expected End:  11/28/24            Patient will complete toileting including hygiene clothing management/hygiene with independent level of assistance. (Not Progressing)       Start:  10/29/24    Expected End:  11/28/24            Patient will perform basic IADLs/simulated household tasks with mod (I). (Not Progressing)       Start:  10/29/24    Expected End:  11/28/24               COGNITION/SAFETY       Patient will score WFL on standardized cognitive assessment within reasonable time frame (Progressing)       Start:  10/29/24    Expected End:  11/28/24               EXERCISE/STRENGTHENING       Patient will participate in >15 minutes of activity with <2 rest breaks to increase tolerance for ADL/functional mobility performance. (Progressing)       Start:  10/29/24    Expected End:  11/28/24               EXERCISE/STRENGTHENING       Pt will demo functional bimanual manipulation coordination and strength skills as evidenced by ability to switch LVAD wires to battery with min A       Start:  11/14/24    Expected End:  11/28/24               MOBILITY       Patient will perform Functional mobility Household distances/Community Distances with independent level of assistance and least restrictive device in order to improve safety and functional mobility. (Not Progressing)       Start:  10/29/24    Expected End:  11/28/24                TRANSFERS       Patient will perform bed mobility independent level of assistance in order to improve safety and independence with mobility (Progressing)       Start:  10/29/24    Expected End:  11/28/24            Patient will complete functional transfers with least restrictive device with independent level of assistance. (Progressing)       Start:  10/29/24    Expected End:  11/28/24 11/18/24 at 3:53 PM   ELYSIA CARMONA, S-OT   [phone]

## 2024-11-18 NOTE — PROGRESS NOTES
Vancomycin Dosing by Pharmacy- FOLLOW UP    Fahad Meraz is a 69 y.o. year old male who Pharmacy has been consulted for vancomycin dosing for SIRS s/p LVAD. Based on the patient's indication and renal status this patient is being dosed based on a goal AUC of 400-600.     Renal function is currently stable/improving.    Current vancomycin dose: 1750 mg given every 24 hours    Estimated vancomycin AUC on current dose: 474 mg/L.hr     Visit Vitals  /59   Pulse 103   Temp 36.9 °C (98.4 °F) (Temporal)   Resp 13        Lab Results   Component Value Date    CREATININE 1.33 (H) 2024    CREATININE 1.47 (H) 2024    CREATININE 1.41 (H) 2024    CREATININE 1.50 (H) 2024        Patient weight is as follows:   Vitals:    24 0600   Weight: 93.8 kg (206 lb 12.7 oz)       Cultures:  No results found for the encounter in last 14 days.       I/O last 3 completed shifts:  In: 3403.6 (34.6 mL/kg) [P.O.:1729; I.V.:1174.6 (11.9 mL/kg); IV Piggyback:500]  Out: 2950 (30 mL/kg) [Urine:2950 (0.8 mL/kg/hr)]  Weight: 98.3 kg   I/O during current shift:  I/O this shift:  In: 758.4 [I.V.:158.4; IV Piggyback:600]  Out: 600 [Urine:600]    Temp (24hrs), Av.9 °C (98.4 °F), Min:36.8 °C (98.2 °F), Max:37 °C (98.6 °F)      Assessment/Plan    Within goal AUC range. Continue current vancomycin regimen.    This dosing regimen is predicted by [employer]ight[employer] to result in the following pharmacokinetic parameters:      Loading dose: N/A  Regimen: 1750 mg IV every 24 hours.  Start time: 16:46 on 2024  Exposure target: AUC24 (range)400-600 mg/L.hr   RYP51-76: 457 mg/L.hr  AUC24,ss: 474 mg/L.hr  Probability of AUC24 > 400: 94 %  Ctrough,ss: 12.8 mg/L  Probability of Ctrough,ss > 20: 3 %    The next level will be obtained on  at AM labs. May be obtained sooner if clinically indicated.   Will continue to monitor renal function daily while on vancomycin and order serum creatinine at least every 48 hours if not already  ordered.  Follow for continued vancomycin needs, clinical response, and signs/symptoms of toxicity.       Steven Chase, PharmD

## 2024-11-18 NOTE — SIGNIFICANT EVENT
NEUROLOGY SIGN OFF NOTE    Fahad Meraz is a 69 y.o. male with a history of CAD s/p CABG x 4 (2012) and PCI (LCx/OM; 5/2019), stage D ICM HFrEF LVEF 10-15%, AF s/p RFA (PVI and CTI; 4/2024), HTN, pre-T2DM A1c 6.3, depression, anxiety, and VIV using CPAP who was admitted to OS s/p [facility] on 10/18/24 with decompensated heart failure and KENYA. Now s/p LVAD. Neurology paged for BAT because of abnormal eye movements (nystagmus lasting for 5 minutes). On arrival of BAT team, NIHSS was 0 and patient back to baseline. Emergent CT head was not obtained. BAT was cancelled at 5:15 pm    Subsequent CT head wo was obtained at 11 pm demonstrating no acute abnormalities or lesions to explain paroxysmal eye movements. Imaging reviewed with general neurology team and attending. Low concern for seizure or stroke causing these eye movement abnormalities.     Neurology will sign off at this time. If abnormal eye movements occur again, please obtain video of eye movements and page neurology.    Jonathan Harp MD  PGY-3 Neurology  General Neuro Pager: [phone]

## 2024-11-18 NOTE — CARE PLAN
The patient's goals for the shift include      Problem: Heart Failure  Goal: Improved urinary output this shift  Outcome: Progressing  Goal: Reduction in peripheral edema within 24 hours  Outcome: Progressing  Goal: Weight from fluid excess reduced over 2-3 days, then stabilize  Outcome: Progressing     Problem: Skin  Goal: Participates in plan/prevention/treatment measures  Outcome: Progressing  Goal: Prevent/manage excess moisture  Outcome: Progressing  Goal: Prevent/minimize sheer/friction injuries  Outcome: Progressing  Goal: Promote skin healing  Outcome: Progressing     Problem: Pain - Adult  Goal: Verbalizes/displays adequate comfort level or baseline comfort level  Outcome: Progressing     Problem: Safety - Adult  Goal: Free from fall injury  Outcome: Met     Problem: Fall/Injury  Goal: Not fall by end of shift  Outcome: Met  Goal: Be free from injury by end of the shift  Outcome: Met  Goal: Verbalize understanding of personal risk factors for fall in the hospital  Outcome: Progressing     Problem: Ventricular Assisted Device (VAD)  Goal: Hemodynamic stability, correction of coagulopathy, lab value stability  Outcome: Progressing  Goal: Wean ventilator  Outcome: Met  Goal: Extubation  Outcome: Met  Goal: Stable metal status  11/18/2024 0646 by Dianne Adame RN  Outcome: Not Progressing  11/18/2024 0046 by Dianne Adame RN  Outcome: Not Progressing  Goal: Involve in VAD awareness, dressing change  Outcome: Progressing     Problem: Meds/Post-op Pain  Goal: Pain controlled to tolerate pain level  Outcome: Progressing  Goal: Tolerates prescribed medication  Outcome: Progressing     Problem: DVT/VTE Prevention/Activity  Goal: No decrease in circulation/sensation  Outcome: Progressing  Goal: Prevent skin breakdown  Outcome: Progressing  Goal: Return to preop oxygenation status  Outcome: Progressing     Problem: Wound care/infection prevention  Goal: No signs of infection in 24 hrs.  Outcome: Progressing  Goal: No  unexpected bleeding from incision this shift  Outcome: Met     Problem: Diet/fluid balance  Goal: Adequate urinary output  Outcome: Progressing  Goal: Free from nausea/vomiting  Outcome: Met  Goal: Return in bowel function  Outcome: Met  Goal: Tolerates prescribed diet  Outcome: Progressing     Problem: Other goals  Goal: No change in neurological status  Outcome: Not Progressing  Goal: Stabilize vital signs (return to 10% of baseline)  Outcome: Progressing           Over the shift, the patient did not make progress toward the following goals. Barriers to progression include continued confusion and disorientation. Recommendations to address these barriers include optimizing sleep/ wake cycles, repeated reorientation, investigate underlying cause.    Problem: Ventricular Assisted Device (VAD)  Goal: Stable metal status  11/18/2024 0646 by Dianne Adame RN  Outcome: Not Progressing  11/18/2024 0046 by Dianne Adame RN  Outcome: Not Progressing     Problem: Other goals  Goal: No change in neurological status  Outcome: Not Progressing

## 2024-11-18 NOTE — PROGRESS NOTES
HFICU Attending Note    Principal Problem:    Acute on chronic heart failure with reduced ejection fraction and diastolic dysfunction  Active Problems:    Ischemic cardiomyopathy    Receiving inotropic medication    HTN (hypertension)    CAD (coronary artery disease)    MI (myocardial infarction) (Multi)    CHF (congestive heart failure)    Gastroesophageal reflux disease    Acute kidney injury (nontraumatic) (CMS-[facility])    Delirium      S/p LVAD  1- Delirium - improving with increased sleep/ambulation   2- Hemodynamic support with epinephrine and milrinone - will perform a RAMP study and optimize the speed under echo guidance , can discontinue epinephrine afterwards   3- Infectious workup remains negative   4- No significant LVAD alarms noted   5- Stable creatinine   6- ? TIA event yesterday with reported nystagmus - will bridge him with heparin   7- to HFICU or floor tomorrow depending on clinical course and mental state today     This critically ill patient continues to be at-risk for clinically significant deterioration / failure due to the above mentioned dysfunctional, unstable organ systems.  I have personally identified and managed all complex critical care issues to prevent aforementioned clinical deterioration.  Critical care time is spent at bedside and/or the immediate area and has included, but is not limited to, the review of diagnostic tests, labs, radiographs, serial assessments of hemodynamics, respiratory status, ventilatory management, and family updates.  Time spent in procedures and teaching are reported separately.    Critical care time: 35____ minutes

## 2024-11-18 NOTE — PROGRESS NOTES
CTICU Progress Note  Fahad Meraz/[id number]    Admit Date: 10/24/2024  Hospital Length of Stay: 25   ICU Length of Stay: 5d 19h   CT SURGEON: Dr. Hill    SUBJECTIVE:   Hemodynamics acceptable on provided therapy. Remains on epinephrine 0.01mcg/kg/min and milrinone 0.25mcg/kg/min. Oxygenating well on room air. LVAD flows stable.    MEDICATIONS  Infusions:  dexmedeTOMIDine, Last Rate: 0.3 mcg/kg/hr (11/18/24 0738)  EPINEPHrine, Last Rate: 0.01 mcg/kg/min (11/18/24 0300)  milrinone, Last Rate: 0.25 mcg/kg/min (11/18/24 0300)      Scheduled:  acetaminophen, 975 mg, q8h  aspirin, 81 mg, Daily  digoxin, 125 mcg, Daily  escitalopram, 10 mg, Daily  heparin (porcine), 5,000 Units, q8h  magnesium sulfate, 2 g, Once  melatonin, 5 mg, Nightly  oxygen, , Continuous - Inhalation  perflutren lipid microspheres, 0.5-10 mL of dilution, Once in imaging  perflutren protein A microsphere, 0.5 mL, Once in imaging  piperacillin-tazobactam, 3.375 g, q6h  potassium chloride, 20 mEq, Once  sennosides-docusate sodium, 1 tablet, BID  sulfur hexafluoride microsphr, 2 mL, Once in imaging  thiamine, 100 mg, Daily  vancomycin, 1,750 mg, q24h  warfarin, 4 mg, Daily      PRN:  alteplase, 2 mg, PRN  hydrALAZINE, 10 mg, q4h PRN  naloxone, 0.2 mg, q5 min PRN  ondansetron, 4 mg, q8h PRN   Or  ondansetron, 4 mg, q8h PRN  polyethylene glycol, 17 g, Daily PRN  vancomycin, , Daily PRN        PHYSICAL EXAM:   Visit Vitals  /59   Pulse 110   Temp 36.9 °C (98.4 °F) (Temporal)   Resp 15   Ht 1.829 m (6')   Wt 93.8 kg (206 lb 12.7 oz)   SpO2 95%   BMI 28.05 kg/m²   Smoking Status Former   BSA 2.18 m²     Wt Readings from Last 5 Encounters:   11/18/24 93.8 kg (206 lb 12.7 oz)   10/24/24 92.5 kg (204 lb)   08/05/24 122 kg (268 lb)   01/31/22 119 kg (262 lb)   02/18/20 123 kg (270 lb 2 oz)     INTAKE/OUTPUT:  I/O last 3 completed shifts:  In: 2796.4 (29.8 mL/kg) [P.O.:1015; I.V.:981.4 (10.5 mL/kg); IV Piggyback:800]  Out: 3050 (32.5 mL/kg) [Urine:3050  (0.9 mL/kg/hr)]  Weight: 93.8 kg          Vent settings:       Physical Exam:   - CONSTITUTION: Male patient lying in ICU bed, in no acute distress  - NEUROLOGIC: A+Ox3, no obvious focal deficits, CAM +  - CARDIOVASCULAR: Sinus tachy with frequent PVCs/PACs. Periphery warm & well perfused  - RESPIRATORY: Unlabored, diminished bases.   - GI: Soft, + BS  - : Voids independently with external catheter  - EXTREMITIES: Generalized trace edema, distal signals present  - SKIN: Warm and dry  - PSYCHIATRIC: Calm and cooperative    Daily Risk Screen  Central line: Hemodynamic instability requiring parenteral medication  Gallo: Accurate I/Os in critically ill    Images: Reviewed    Invasive Hemodynamics:    Most Recent Range Past 24hrs   BP (Art) 118/70 Arterial Line BP 1  Min: 94/57  Max: 135/73   MAP(Art) 83 mmHg Arterial Line MAP 1 (mmHg)   Min: 59 mmHg  Max: 93 mmHg   RA/CVP   No data recorded   PA 37/17 PAP  Min: 37/17  Max: 40/18   PA(mean) 27 mmHg PAP (Mean)  Min: 26 mmHg  Max: 28 mmHg   CO 7.2 L/min No data recorded   CI 3.4 L/min/m2 No data recorded   Mixed Venous 70 % No data recorded   SVR  747 (dyne*sec)/cm5 No data recorded     MCS:   Heart Mate III:     Most Recent Range Past 24hrs   Flow 4.4 Pump Flow  Min: 3.7  Max: 4.4   Speed 5100 Speed RPM  Min: 5100  Max: 5200   Power 3.6 Cortes  Min: 3.5  Max: 3.9   PI 3.2 Pulse Index  Min: 3.2  Max: 4.7     Assessment/Plan   Fahad Meraz is a 69 M with PMHx of CAD s/p CABG x 4 (2012) and PCI (LCx/OM; 5/2019), stage D ICM HFrEF LVEF 10-15%, AF s/p RFA (PVI and CTI; 4/2024), HTN, pre-T2DM A1c 6.3, depression, anxiety, and VIV using CPAP who was admitted to OSH s/p RHC on 10/18/24 with decompensated heart failure and KENYA. He was referred to [facility]ICU 10/24/24 and was medically optomized and presented to [employer] 11/5 who approved destination therapy LVAD.  He is now admitted to the CTICU s/p Heartmate 3 implant with the outflow to the mid  descending thoracic aorta via left thoracotomy 11/12/24 with Dr. Mclain and Dr. Madrigal.         NEURO: PMH of anxiety & depression. Acute post operative pain acceptably controlled. CAM positive with hyperactive to hypoactive delirium. He is moderately deconditioned, getting OOB to chair daily. BAT yesterday, CTH negative for acute process. -->    - Serial neuro and pain assessments   - Continue scheduled Tylenol   - Continue home escitalopram 10mg daily   - PT Consult, OOB to chair as tolerated, ambulate today  - CAM ICU score qshift  - Sleep/wake cycle hygiene  - Continue melatonin 5mg at bedtime for sleep  - REST Protocol with labs & CXR at 7am   - Resume nocturnal dexmedetomidine 0.6 tonight at 8pm     CV: Patient has a history of CAD s/p CABG x 4 (2012) and PCI (LCx/OM; 5/2019), stage D ICM HFrEF LVEF 10-15%, AF s/p RFA (PVI and CTI; 4/2024), HTN, and is now status post HM3 LVAD via left thoracotomy 11/12 with Jas Ortiz and Kaitlin. Pre/Post EF: 15-20% & dilated RV. Remains on epi 0.01, milrinone 0.25, with CVP 20 and PA Pressure 51/29. Sinus tachy with PVCs. No PPM or pacer wires. MAPs 70-80s. -->  - Maintain goal MAP 70-80mmHg, avoid sustained MAPs > 90mmHg as flows decrease and PI increases    - Continue milrinone infusion 0.25 mcg/kg/min    - Discontinue epinephrine infusion after ramp study if indicated  - Continue digoxin 0.125mg daily per heart failure recommendations  - Plan for ramp study today 11/18 under TTE guidance  - Daily weights per LVAD protocol   - Weekly LVAD dressing changes  - Continue ASA 81mg daily    - Hold pre-op hydral, isosorbide, entresto, metoprolol xl 50mg, spironolactone.    - Patient's family reports joint pain with previous statin use     PULM: PMHx VIV on CPAP at home. Adequate oxygenation on room air. Morning CXR with slightly worsening pulmonary edema. -->  - Daily CXR  - Resume home nocturnal CPAP  - Wean FiO2 maintaining SpO2 >92%.   - IS q1h and encourage OOB and  ambulation today     GI: PMH of GERD on PPI at home. Malnutrition with poor PO intake pre and post-op. Stool x4 in last 24 hours. -->  - Continue PPI for GERD, LVAD bleeding risk   - Continue adult regular diet with supplements  - Hold Colace/senna BID and change miralax to as needed  - Daily LFT's while monitoring for venous congestion      : CSA-KENYA Risk Score pre-op 6, ICU admit 6. He did have KENYA on CKD in recent weeks that improved to 1.3 pre-op (baseline creatinine 1.3 - 1.6). Creatinine stable 1.7-1.8 post op. Last 24 hours net positive 1L. Mild hyponatremia with serum Na 134 overnight. He appears grossly euvolemic on exam. -->  - RFP as clinically indicated  - Replete electrolytes per CTICU protocol  - Bumex 3mg  - Goal net negative 1L     ENDO: PMH of pre-DM with recent A1c: 6.3. Euglycemic. -->  - Goal BG <180     HEME: Acute blood loss anemia stable. INR 1.8. -->  - Daily CBC  - Continue warfarin 4mg daily (11/16 - ) for goal INR 2-3.  (PT previously attributed dizziness to apixaban)   - Continue SQH, SCDs for DVT prophylaxis  - Discuss systemic anticoagulation with Dr. Hill to bridge  - Last type and screen: 11/17     ID: Afebrile, no leukocytosis. S/P Periop cefazolin, vanco, fluconazole x 48hrs per LVAD protocol. BX started 11/16 for sirs equivalent of worsening delirium. -->  - F/U bld & urin -> UA neg   - Continue vancomycin per pharmacy dosing (11/16 -)  - Continue empiric zosyn (11/16 - )     Skin: No active skin issues. -->  - preventative Mepilex dressings in place on sacrum and heels  - change preventative Mepilex weekly or more frequently as indicated (when moist/soiled)   - every shift skin assessment per nursing and weekly ICU skin rounds  - moisture barrier to be applied with ricky care  - active skin problems addressed with nursing on daily rounds     Proph:  SCDs  PPI  SQH     G: Line  Left radial arterial line 11/16  PIVs, PIVs x 2      F: Family: updated at bedside.    Restraints:  Not indicated.    Code status: Full Code    I personally spent 49 minutes of critical care time directly and personally managing the patient exclusive of separately billable procedures.     A,B,C,D,E,F,G: reviewed    Discussed with Dr. Aguilar.  Dispo: CTICU care for now.    CTICU TEAM PHONE [phone]

## 2024-11-18 NOTE — PROGRESS NOTES
Fahad Meraz is a 69 y.o. male on day 25 of admission presenting with Acute on chronic heart failure with reduced ejection fraction and diastolic dysfunction.    Subjective   Patient discussed in morning handover, patient examined and chart reviewed. Meds, labs and radiology reviewed during full interdisciplinary rounds this morning. Co-rounded with HF this morning.    Surgeon: Jas Briseno  DOS: Nov. 12, 2024  Procedure: HMIII Implantation (via L thoracotomy)     Airway: grade I - full view of glottis   EF:  LVEF 15-20% with mild AI and mod MR, TR pre & post, dilated RV     FULL Code  Emergency Sternotomy: N; L mini-thoracotomy    HPI:   10/18/24 - Patient initially presented to an outside hospital with decompensated heart failure and KENYA.   10/24/24 - Patient transferred to [facility] HFICU  for medical optimization and evaluation for advanced heart failure therapies.    11/5/2024 -  [employer] approved destination therapy LVAD.  He is now admitted to the CTICU s/p Heartmate 3 implant with the outflow to the mid descending thoracic aorta via left thoracotomy 11/12/24 with Dr. Mclain and Dr. Madrigal.     PMH:  CAD - s/p CABG x 4 (2012) and PCI (LCx/OM; 5/2019)  Stage D ICM HFrEF LVEF 10-15%  AF s/p RFA (PVI and CTI; 4/2024)  HTN  Dyslipidemia  pre-T2DM (A1c 6.3)  VIV using CPAP   Depression  Anxiety    Course in Hospital:  11/12 - Intra-op -  episode of pulseless VT requiring defib x 1 prior to being placed on CPB. In the ICU (post-op), significant bleeding from venous cannulation site requiring transfusion  11/14 - Extubated  11/15 - off i-EPO and epinephrine    Overnight: LVAD flows stable. Remains on milrinone for RV support. Persistent delirium with no clear cardiac, pulmonary or infectious source at present. Yesterday afternoon, note by bedside RN to have a rapid nystagmus that lasted ~1 min. BAT called and assessed by neurology (thank you!) - nil acute on CT brain.     Objective      Last Recorded Vitals  Blood pressure 101/59, pulse 96, temperature 36.9 °C (98.4 °F), temperature source Temporal, resp. rate 14, height 1.829 m (6'), weight 93.8 kg (206 lb 12.7 oz), SpO2 98%.    Invasive Hemodynamics:    Most Recent Range Past 24hrs   BP (Art) 120/73 Arterial Line BP 1  Min: 94/57  Max: 128/82   MAP(Art) 85 mmHg Arterial Line MAP 1 (mmHg)   Min: 59 mmHg  Max: 93 mmHg   RA/CVP   No data recorded   PA 37/17 PAP  Min: 37/17  Max: 38/21   PA(mean) 27 mmHg PAP (Mean)  Min: 27 mmHg  Max: 27 mmHg   CO 7.2 L/min No data recorded   CI 3.4 L/min/m2 No data recorded   Mixed Venous 70 % No data recorded   SVR  747 (dyne*sec)/cm5 No data recorded     Intake/Output last 3 Shifts:  I/O last 3 completed shifts:  In: 2796.4 (29.8 mL/kg) [P.O.:1015; I.V.:981.4 (10.5 mL/kg); IV Piggyback:800]  Out: 3050 (32.5 mL/kg) [Urine:3050 (0.9 mL/kg/hr)]  Weight: 93.8 kg     Physical Exam  Constitutional:       Interventions: He is intubated.   Eyes:      Pupils: Pupils are equal, round, and reactive to light.   Cardiovascular:      Rate and Rhythm: Regular rhythm. Tachycardia present.      Comments: Milrinone: 0.25  Epi: 0.01    Lines:  RIJ CVL and PAC  Right brachial A-line  Pulmonary:      Effort: Pulmonary effort is normal. He is intubated.      Breath sounds: Examination of the right-lower field reveals decreased breath sounds. Examination of the left-lower field reveals decreased breath sounds. Decreased breath sounds present.      Comments: On room air this morning  Abdominal:      General: Bowel sounds are decreased.      Palpations: Abdomen is soft.      Tenderness: There is no abdominal tenderness (patient sedatec). There is no guarding or rebound.   Skin:     Comments: No active issues   Neurological:      General: No focal deficit present.      Mental Status: He is alert. He is disoriented.      Sensory: Sensation is intact.      Motor: Motor function is intact.      Comments: RASS 0 - +3 (at times)  CAM-ICU:  remains positive this morning   Psychiatric:         Behavior: Behavior is cooperative.     MCS:   Heart Mate III:     Most Recent Range Past 24hrs   Flow 4 Pump Flow  Min: 3.7  Max: 4.1   Speed 5100 Speed RPM  Min: 5100  Max: 5200   Power 3.5 Cortes  Min: 3.5  Max: 3.9   PI 3.9 Pulse Index  Min: 3.3  Max: 4.8   No PI event and stable power overnight. LDH stable    Assessment/Plan     ICU Liberation Bundle:  A-Analgesia: Tylenol  B-SBT: Extubated  C-RASS Target: 0  D-CAM: positive this morning  E-Mobilization Plan: OOBTC and ambulate today  F - Family Last Update: to be update by the team    Daily Checklist:  HOB > 30 degrees: extubated   Feeding: p.o. intake  Thromboprophylaxis: Heparin and SCDs  Ulcer Prophylaxis: PPI  Glucose Control: Target 110-180  Line to removed:   Bowel Care: Bowel regime ordered  De-escalation of Antibiotics: started on vanco and pip/tazo yesterday in light of new delirium for 48 hours and then re-assess    Plan:  Continue with chemical RVAD support with milrinone today. Plan TTE and ramp study today; continue current inotropic support and p.o. digoxin  Delirium: continue with antibiotics started  - will follow-up cultures, sleep promotion with melatonin, continue with ambulation today   ASA; continue warfarin 4mg today (INR 1.8 today)  Continuie diuresis today (even to -1L).  Thiamine 500 mg TID for 3 days per Neurology.  R/A sildenafil tomorrow  R/A statin tomorrow    DISPO: Keep in CTICU today    I spent 49 minutes in the professional and overall care of this patient.    This critically ill patient continues to be at-risk for clinically significant deterioration / failure due to the above mentioned dysfunctional, unstable organ systems.  I have personally identified and managed all complex critical care issues to prevent aforementioned clinical deterioration.  Critical care time is spent at bedside and/or the immediate area and has included, but is not limited to, the review of diagnostic  tests, labs, radiographs, serial assessments of hemodynamics, respiratory status, ventilatory management, and family updates.  Time spent in procedures and teaching are reported separately.    Zak Aguilar MD

## 2024-11-18 NOTE — PROGRESS NOTES
Physical Therapy    Physical Therapy Treatment    Patient Name: Fahad Meraz  MRN: [id number]  Department: [facility] [place] [facility]  Room: 06/06-A  Today's Date: 11/18/2024  Time Calculation  Start Time: 0841  Stop Time: 0928  Time Calculation (min): 47 min    Assessment/Plan   PT Assessment  PT Assessment Results: Decreased strength, Decreased endurance, Impaired balance, Decreased mobility, Decreased cognition, Impaired judgement, Decreased safety awareness  Rehab Prognosis: Excellent  Barriers to Discharge: medical acuity  Evaluation/Treatment Tolerance: Patient tolerated treatment well  Medical Staff Made Aware: Yes  End of Session Communication: Bedside nurse  End of Session Patient Position: Up in chair, Alarm off, caregiver present, Alarm off, not on at start of session  PT Plan  Inpatient/Swing Bed or Outpatient: Inpatient  PT Plan  Treatment/Interventions: Bed mobility, Transfer training, Gait training, Balance training, Strengthening, Endurance training, Therapeutic exercise, Therapeutic activity  PT Plan: Ongoing PT  PT Frequency: Daily  PT Discharge Recommendations: High intensity level of continued care  Equipment Recommended upon Discharge: Wheeled walker  PT Recommended Transfer Status: Assist x1  PT - OK to Discharge: Yes    General Visit Information:   PT  Visit  PT Received On: 11/18/24  Response to Previous Treatment: Patient with no complaints from previous session.  General  Missed Visit: No  Family/Caregiver Present: No  Prior to Session Communication: Bedside nurse  Patient Position Received: Bed, 3 rail up, Alarm off, not on at start of session  Preferred Learning Style: auditory, verbal  General Comment: Pt awake, alert and willing to participate in PT session.  Pt completed bed mobility, sit<>stand transfer and ambulation with thoracic walker.  Fuentes-CGA provided throughout session. (Tele, jayson, epi .01, mil .25; Driveline secured start/during/end of session - pre/post: flow-3.9/4.4, speed-5100/5100,  PI-3.8/3.2, PP-3.5/3.6)    Subjective   Precautions:  Precautions  Medical Precautions: Cardiac precautions, Fall precautions  Precautions Comment: MAP 70-80, SpO2>92     11/18/24 0841 11/18/24 0928   Vital Signs   Vitals Session Pre PT Post PT   Heart Rate 98 98   Resp 17 15   SpO2 97 % 93 %   /67 79/51   MAP (mmHg) 81 62   BP Method Arterial line Arterial line   Patient Position Lying Sitting   Vital Signs Comment  --  Vitals stable throughout session     Objective   Pain:  Pain Assessment  Pain Assessment: 0-10  0-10 (Numeric) Pain Score: 0 - No pain  Cognition:  Cognition  Overall Cognitive Status: Within Functional Limits  Arousal/Alertness: Appropriate responses to stimuli  Orientation Level: Oriented X4  Following Commands: Follows all commands and directions without difficulty  Cognition Comments: CAM (-)  Attention: Exceptions to WFL  Divided Attention: Impaired  Abstract Reasoning: Exceptions to WFL  Abstract Thinking Not Consistent: Minimal  Processing Speed: Delayed  Activity Tolerance:  Activity Tolerance  Endurance: Tolerates 30 min exercise with multiple rests  Early Mobility/Exercise Safety Screen: Proceed with mobilization - No exclusion criteria met  Treatments:  Therapeutic Activity  Therapeutic Activity Performed: Yes  Therapeutic Activity 1: Increased time for skilled ICU line/tube management for safe functional mobility  Therapeutic Activity 2: Start of session (Supine): pt demonstrating ability to complete all tasks of transferred LVAD from monitor>battery.  Pt requiring verbal cues to complete majority of steps and only minimal tactile cues. Pt having a difficulty time matching up the connection from monitor>battery requiring increased assistance. End of session (sitting): pt completing battery>monitor transfer.  Therapeutic Activity 3: Pt sat EOB ~10 min prior to ambulation demonstrating good seated posture and no instability.  Therapeutic Activity 4: ~60 sec static standing to don  pants - CGA-minAx1 d/t posterior instability    Bed Mobility  Bed Mobility: Yes  Bed Mobility 1  Bed Mobility 1: Supine to sitting  Level of Assistance 1: Minimum assistance (x1)  Bed Mobility Comments 1: HOB elevated, assistance with advancing trunk upright    Ambulation/Gait Training  Ambulation/Gait Training Performed: Yes  Ambulation/Gait Training 1  Surface 1: Level tile  Device 1: Thoracic walker  Assistance 1: Contact guard  Comments/Distance (ft) 1: ~125ft (Decreased tommy)  Transfers  Transfer: Yes  Transfer 1  Transfer From 1: Sit to, Stand to  Transfer to 1: Sit, Stand  Technique 1: Sit to stand, Stand to sit  Transfer Device 1: Thoracic walker  Transfer Level of Assistance 1: Minimum assistance (x1)  Trials/Comments 1: Cues for hand placement and proper use of AD (x2 transfers from EOB)    Stairs  Stairs: No    Outcome Measures:  [facility] Basic Mobility  Turning from your back to your side while in a flat bed without using bedrails: A little  Moving from lying on your back to sitting on the side of a flat bed without using bedrails: A little  Moving to and from bed to chair (including a wheelchair): A little  Standing up from a chair using your arms (e.g. wheelchair or bedside chair): A little  To walk in hospital room: A little  Climbing 3-5 steps with railing: A lot  Basic Mobility - Total Score: 17    FSS-ICU  Ambulation: Walks >/ or equal to 50 feet with any assistance x1  Rolling: Supervision or set-up only  Sitting: Supervision or set-up only  Transfer Sit-to-Stand: Minimal assistance (performs 75% or more of task)  Transfer Supine-to-Sit: Minimal assistance (performs 75% or more of task)  Total Score: 20    Early Mobility/Exercise Safety Screen: Proceed with mobilization - No exclusion criteria met  ICU Mobility Scale: Walking with assistance of 1 person [8]    Education Documentation  Precautions, taught by Scarlett Bhandari PT at 11/18/2024 10:47 AM.  Learner: Patient  Readiness:  Acceptance  Method: Explanation, Demonstration  Response: Demonstrated Understanding    Body Mechanics, taught by Scarlett Bhandari, PT at 11/18/2024 10:47 AM.  Learner: Patient  Readiness: Acceptance  Method: Explanation, Demonstration  Response: Demonstrated Understanding    Mobility Training, taught by Scareltt Bhandari, PT at 11/18/2024 10:47 AM.  Learner: Patient  Readiness: Acceptance  Method: Explanation, Demonstration  Response: Demonstrated Understanding    Education Comments  No comments found.    EDUCATION:       Encounter Problems       Encounter Problems (Active)       Balance       Pt will score >45 on VEGA for safe community ambulation (Progressing)       Start:  10/29/24    Expected End:  11/28/24               Mobility       Patient will ambulate >1000 ft with LRAD and supervision With stable vitals and RPD </= 3/10 and RPE </= 13/20 for improved functional mobility.   (Met)       Start:  10/29/24    Expected End:  11/12/24    Resolved:  11/05/24    Updated to: Patient will ambulate >2500 ft with LRAD and supervision With stable vitals and RPD </= 3/10 and RPE </= 13/20 for improved functional mobility.    Update reason: Progression         Pt will complete 6MWT with no assistive device and supervision and achieve >700 ft With stable vitals and RPD </= 3/10 and RPE </= 13/20 for improved functional mobility.   (Met)       Start:  10/29/24    Expected End:  11/12/24    Resolved:  11/05/24    Updated to: Pt will complete 6MWT with no assistive device and supervision and achieve >1300 ft With stable vitals and RPD </= 3/10 and RPE </= 13/20 for improved functional mobility.    Update reason: Progression         Patient will ambulate >2500 ft with LRAD and supervision With stable vitals and RPD </= 3/10 and RPE </= 13/20 for improved functional mobility. (Progressing)       Start:  11/05/24    Expected End:  11/28/24                Pt will complete 6MWT with no assistive device and supervision and achieve  >1300 ft With stable vitals and RPD </= 3/10 and RPE </= 13/20 for improved functional mobility. (Progressing)       Start:  11/05/24    Expected End:  11/28/24                   PT Transfers       Patient will perform bed mobility indep (maintenance 2/2 prolonged hospital stay anticipated) (Progressing)       Start:  10/29/24    Expected End:  11/28/24            Patient will transfer sit to and from stand indep (Progressing)       Start:  10/29/24    Expected End:  11/28/24            Pt will complete 5XSTS from recliner without UE assist <12 seconds With stable vitals and RPD </= 3/10 and RPE </= 13/20 for improved functional mobility.   (Progressing)       Start:  10/29/24    Expected End:  11/28/24               Pain - Adult

## 2024-11-18 NOTE — PROGRESS NOTES
ADVANCED HEART FAILURE LVAD PROGRESS NOTE      VAD Cardiologist: Padmini     Type of VAD: HM3  Implant Date: 11/12/24  Reason for VAD: ICM  Intent: Long-Term (Significant PAD, age, patient preference)      Subjective     HPI:  Fahad Meraz is a very pleasant 69 y.o. male w/ a PMHx sig for CAD s/p 4V CABG (2012) and PCI (LCx/OM; 5/2019), stage D systolic HF/ICM/HFrEF with LVEF 10-15%( 10/22/24 TTE), AF s/p RFA (PVI and CTI; 4/2024) on OAC therapy, HTN, dyslipidemia, depression, anxiety and VIV using CPAP who was admitted to OSH ICU, s/p RHC on 10/18/24.  Per patient, he had been experienced worsening SOB and fluids overload for 2-3 weeks. Patient was on milrinone drip and underwent SGC diuresis. However his KENYA worsened, and were not able to wean milrinone drip. Decision was made to transfer him to HF ICU [facility] for possible advanced therapy consideration. Advanced therapies evaluation was initiated on 10/30. Discussed in advanced therapeutics committee on 11/5 and was denied for transplantation, and approved for LVAD (significant PAD, Elevated PSA level, Age approaching 70, as well as patient verbalized he would be more in favor of LVAD vs Transplant). He was transferred to the floor on 11/6 to await VAD implantation. Readmitted to HFICU as of 11/09 for pre-OR swan guided optimization. Now presents to CTICU s/p LVAD HM3 implant on 11/12/24 with Dr. Mclain. OR Course/Issues: pulseless VT requiring defib x 1 pre- CPB.      Principal Problem:    Acute on chronic heart failure with reduced ejection fraction and diastolic dysfunction  Active Problems:    Ischemic cardiomyopathy    Receiving inotropic medication    HTN (hypertension)    CAD (coronary artery disease)    MI (myocardial infarction) (Multi)    CHF (congestive heart failure)    Gastroesophageal reflux disease    Acute kidney injury (nontraumatic) (CMS-HCC)    Delirium       LOS: 25 days     Interval Events:   A&Ox3, however CAM positive. Received  Precedex overnight which allowed him to sleep. Remains on low dose epi and milrinone. RAMP today: RPM increased to 5200. Attempted to increase to 5300, however septum bowing to the left and increased ectopy at that speed (although AV did remain open). Will maintain 5200 for now.      ?TIA event over weekend - beating nystagmus episode lasting < 1 minute. CTH negative.     NYHA class pre-VAD implant: IV    dexmedeTOMIDine, 0-1.5 mcg/kg/hr, Last Rate: Stopped (11/18/24 0845)  EPINEPHrine, 0.01 mcg/kg/min, Last Rate: 0.01 mcg/kg/min (11/18/24 1000)  milrinone, 0.25 mcg/kg/min, Last Rate: 0.25 mcg/kg/min (11/18/24 1000)       PAP: (37)/(17) 37/17  CVP:  [8 mmHg] 8 mmHg      Objective   Vitals:   Vitals:    11/18/24 0700 11/18/24 0800 11/18/24 0900 11/18/24 1000   BP:       Pulse: 110 99 96 99   Resp: 15 17 14 18   Temp:       TempSrc:       SpO2: 95% 94% 98% 92%   Weight:       Height:         Wt Readings from Last 5 Encounters:   11/18/24 93.8 kg (206 lb 12.7 oz)   10/24/24 92.5 kg (204 lb)   08/05/24 122 kg (268 lb)   01/31/22 119 kg (262 lb)   02/18/20 123 kg (270 lb 2 oz)     Hemodynamic parameters for last 24 hours:  PAP: (37)/(17) 37/17  CVP:  [8 mmHg] 8 mmHg  Intake/Output for last 24 hours:    Intake/Output Summary (Last 24 hours) at 11/18/2024 1039  Last data filed at 11/18/2024 1000  Gross per 24 hour   Intake 2069 ml   Output 1650 ml   Net 419 ml      Vent settings:       Physical exam:  Physical Exam  Constitutional:       General: He is not in acute distress.     Appearance: Normal appearance. He is not diaphoretic.   HENT:      Head: Normocephalic and atraumatic.      Mouth/Throat:      Mouth: Mucous membranes are moist.      Pharynx: Oropharynx is clear.   Eyes:      Extraocular Movements: Extraocular movements intact.      Pupils: Pupils are equal, round, and reactive to light.   Cardiovascular:      Rate and Rhythm: Tachycardia present.      Comments: LVAD hum heard on ascultation  Driveline clean, no  bleeding  Pulmonary:      Breath sounds: Normal breath sounds. No wheezing or rhonchi.      Comments: On 2L NC  Abdominal:      General: Abdomen is flat. There is no distension.      Palpations: Abdomen is soft. There is no mass.      Tenderness: There is no abdominal tenderness. There is no guarding.   Musculoskeletal:      Right lower leg: No edema.      Left lower leg: No edema.   Neurological:      General: No focal deficit present.      Mental Status: He is alert.        Driveline:          Labs:   CMP:  Recent Labs     11/18/24  0525 11/17/24  1323 11/17/24  0122 11/16/24  1358 11/16/24  0128 11/15/24  1255 11/15/24  0020 11/14/24  1224   * 137 134* 134* 134* 135* 136 137   K 3.9 4.1 3.9 4.2 4.2 4.6 4.7 4.9   CL 99 101 100 98 98 99 100 102   CO2 25 26 25 25 26 25 24 23   ANIONGAP 14 14 13 15 14 16 17 17   BUN 34* 42* 46* 49* 51* 53* 49* 44*   CREATININE 1.33* 1.47* 1.41* 1.50* 1.73* 1.91* 1.85* 1.78*   EGFR 58* 51* 54* 50* 42* 37* 39* 41*   MG 1.92 1.89 2.05 2.03 2.08 2.09 2.21 2.31     Recent Labs     11/18/24  0525 11/17/24  1323 11/17/24  0122 11/16/24  1358 11/16/24  0128 11/15/24  1255 11/15/24  0021 11/15/24  0020 11/14/24  1224 11/14/24  0118 11/13/24  1228 11/13/24  0007 11/12/24  1307 11/01/24  0317 10/31/24  1249   ALBUMIN 3.5 3.5 3.4 3.5 3.6 3.6 3.7 3.7   < > 3.8 3.7   < > 3.7   < > 4.3   ALT 6*  --  3*  --  4*  --  5*  --   --  7* 11  --  15  --  14   AST 18  --  21  --  24  --  29  --   --  40* 42*  --  37  --  16   BILITOT 0.8  --  1.0  --  1.0  --  1.1  --   --  0.9 1.1  --  0.9  --  1.4*    < > = values in this interval not displayed.     CBC:  Recent Labs     11/18/24  0525 11/17/24  1323 11/17/24  0122 11/16/24  1358 11/16/24  0128 11/15/24  1255 11/15/24  0020 11/14/24  1224   WBC 10.0 10.0 10.8 10.2 11.4* 13.0* 14.7* 16.9*   HGB 10.7* 10.5* 10.1* 10.8* 10.5* 10.7* 10.9* 11.3*   HCT 31.9* 30.8* 31.3* 32.9* 32.8* 32.9* 33.8* 35.2*    336 302 270 262 276 283 341   MCV 82 82 85 84  "85 84 85 84     COAG:   Recent Labs     11/18/24  0525 11/17/24  0122 11/16/24  0128 11/15/24  0020 11/14/24  0118 11/13/24  0008 11/12/24  1257 11/12/24  0220 11/11/24  0523 11/10/24  0436 11/09/24  0748 11/08/24  0729 11/03/24  0726 11/03/24  0242   INR 1.8* 1.7* 1.5* 1.4* 1.4* 1.4* 1.4*  --   --   --   --   --   --  1.4*   HAUF  --   --   --   --   --   --   --  0.4 0.5 0.5 0.4 0.4   < > 0.4   FIBRINOGEN  --   --   --   --   --   --  301  --   --   --   --   --   --   --     < > = values in this interval not displayed.     ABO:   Recent Labs     11/17/24  1323   ABO A     HEME/ENDO:   Recent Labs     10/31/24  1249 10/24/24  2328   FERRITIN  --  76   IRONSAT  --  17*   TSH 4.39* 8.47*   HGBA1C  --  6.3*     CARDIAC:   Recent Labs     11/18/24  0525 11/17/24  0122 11/16/24  0128 11/15/24  0020 11/14/24  0118 11/13/24  0008 11/12/24  1307 10/31/24  1249 10/24/24  2328   * 270* 266* 301* 351* 276* 324* 187  --    BNP  --   --   --   --   --   --   --  755* 1,995*     No results for input(s): \"CHOL\", \"LDLF\", \"LDLCALC\", \"HDL\", \"TRIG\" in the last 78815 hours.  TOX:  Recent Labs     10/31/24  1249   AMPHETAMINE Negative     MICRO: No results for input(s): \"ESR\", \"CRP\", \"PROCAL\" in the last 39076 hours.  No results found for the last 90 days.        Imaging:   Electrocardiogram 12-lead PRN for arrhythmia    Result Date: 11/18/2024  Atrial fibrillation with rapid ventricular response with premature ventricular or aberrantly conducted complexes Low voltage QRS Inferior infarct (cited on or before 17-NOV-2024) Abnormal ECG When compared with ECG of 17-NOV-2024 08:43, Previous ECG has undetermined rhythm, needs review Questionable change in QRS duration Borderline criteria for Lateral infarct are no longer Present    CT head wo IV contrast    Result Date: 11/18/2024  Interpreted By:  Amarilis Owen  and Kaz Villanueva STUDY: CT HEAD WO IV CONTRAST;  11/17/2024 10:54 pm   INDICATION: Signs/Symptoms:tranient vision " changes, s/p LVAD.     COMPARISON: None.   ACCESSION NUMBER(S): [id number]   ORDERING CLINICIAN: ANETA MANNING   TECHNIQUE: Noncontrast axial CT scan of head was performed. Angled reformats in brain and bone windows were generated. The images were reviewed in bone, brain, blood and soft tissue windows.   FINDINGS: CSF Spaces: The ventricles, sulci and basal cisterns are within normal limits. There is no extraaxial fluid collection.   Parenchyma:  The grey-white differentiation is intact. Mild patchy nonspecific white matter hypodensity is compatible with microangiopathy. There is no mass effect or midline shift.  There is no intracranial hemorrhage.   Calvarium: The calvarium is unremarkable.   Paranasal sinuses and mastoids: Visualized paranasal sinuses and mastoids are clear.       No acute intracranial hemorrhage or mass effect.   I personally reviewed the image(s)/study and resident interpretation. I agree with the findings as stated by resident Rich Mccurdy. Data analyzed and images interpreted at [facility], [place], [place].   MACRO: None   Signed by: Amarilis Owen 11/18/2024 7:20 AM Dictation workstation:   [id number]    XR chest 1 view    Result Date: 11/17/2024  Interpreted By:  Elvis Ross, STUDY: XR CHEST 1 VIEW; 11/17/2024 4:04 am   INDICATION: Signs/Symptoms:s/p LVAD implant, assess lung fields, devices.   COMPARISON: Radiograph dated 11/16/2024   ACCESSION NUMBER(S): [id number]   ORDERING CLINICIAN: ANETA MANNING   FINDINGS: Right IJ [place] catheter is in place with the tip projecting over the right main pulmonary artery. LVAD is stable in position.   Cardiac silhouette size is persistently enlarged. Status post median sternotomy.   Persistent bibasilar atelectasis and interstitial edema with slight improvement in the aeration of the lungs. Small bibasilar pleural effusion, left more than right. No sizable pneumothorax.   No acute osseous  abnormality.       1. Slight interval improvement in pulmonary edema and bibasilar atelectasis. Small bibasilar pleural effusion, left more than right. No pneumothorax.       Signed by: Elvis Sagastume 11/17/2024 9:44 AM Dictation workstation:   [id number]      Notable Studies:   EKG:  Encounter Date: 10/24/24   Electrocardiogram 12-lead PRN for arrhythmia   Result Value    Ventricular Rate 119    Atrial Rate 138    QRS Duration 112    QT Interval 374    QTC Calculation(Bazett) 526    R Axis 5    T Axis -67    QRS Count 19    Q Onset 210    T Offset 397    QTC Fredericia 469    Narrative    Atrial fibrillation with rapid ventricular response with premature ventricular or aberrantly conducted complexes  Low voltage QRS  Inferior infarct (cited on or before 17-NOV-2024)  Abnormal ECG  When compared with ECG of 17-NOV-2024 08:43,  Previous ECG has undetermined rhythm, needs review  Questionable change in QRS duration  Borderline criteria for Lateral infarct are no longer Present       Echo:  Transthoracic Echo (TTE) Limited    Result Date: 11/15/2024   [facility], [address] [place] [address]                Tel [phone] and Fax [phone] TRANSTHORACIC ECHOCARDIOGRAM REPORT  Patient Name:       RJ Webb Physician:    [id number] Fawad Chicas MD Study Date:         11/15/2024          Ordering Provider:    [id number] ANETA MANNING MRN/PID:            [id number]            Fellow:               [id number] Aramis Fitzpatrick MD Accession#:         [id number]        Nurse: Date of Birth/Age:  1955 / 69     Sonographer:          Jackson estrada                                     RUST Gender assigned at  M                   Additional Staff: Birth: Height:              182.88 cm           Admit Date: Weight:             97.07 kg            Admission Status:     Inpatient - STAT BSA / BMI:          2.19 m2 / 29.02     Encounter#:           [id number]                     kg/m2 Blood Pressure:     107/71 mmHg         Department Location:  [facility] Study Type:    TRANSTHORACIC ECHO (TTE) LIMITED Diagnosis/ICD: Presence of heart assist device-Z95.811 Indication:    LVAD ramp study CPT Code:      Echo Limited-39130; Doppler Limited-08089; Color Doppler-93615 Patient History: Pertinent History: S/p CABG x 4 (2012) and PCI (LCx/OM; 5/2019), Stage D ICM                    HFrEF LVEF 10-15%, AF s/p RFA (PVI and CTI; 4/2024), HTN,                    Dyslipidemia, 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 Study Detail: The following Echo studies were performed: 2D, M-Mode, Doppler and               color flow. Technically challenging study due to poor acoustic               windows, prominent lung artifact, body habitus and patient lying               in supine position.  PHYSICIAN INTERPRETATION: Left Ventricle: The left ventricular systolic function is severely decreased, with a visually estimated ejection fraction of 10-15%. There is global hypokinesis of the left ventricle with minor regional variations. The left ventricular cavity size is severely dilated. There is normal septal and normal posterior left ventricular wall thickness. Abnormal (paradoxical) septal motion consistent with post-operative status. Left ventricular diastolic filling cannot be determined, due to left ventricular assist device. Left Atrium: The left atrium is mildly dilated. Right Ventricle: The right ventricle is severely enlarged. There is severely reduced right ventricular systolic function. A device is visualized in the right ventricle. Right Atrium: The right atrium is severely dilated. There is a device visualized in the right atrium. Aortic  Valve: The aortic valve is structurally normal. There is mild to moderate aortic valve cusp calcification. There is mild aortic valve regurgitation. Mitral Valve: The mitral valve is normal in structure. There is mild mitral annular calcification. There is mild mitral valve regurgitation. Tricuspid Valve: The tricuspid valve is structurally normal. There is mild tricuspid regurgitation. The right ventricular systolic pressure is unable to be estimated. Pulmonic Valve: The pulmonic valve is not well visualized. Pulmonic valve regurgitation was not assessed. Pericardium: Trivial pericardial effusion. Aorta: The aortic root is abnormal. There is mild dilatation of the ascending aorta. There is mild dilatation of the aortic root. In comparison to the previous echocardiogram(s): Compared with study dated 11/13/2024, no significant change.  LEFT VENTRICULAR ASSIST DEVICE: Study Type: This study was performed while LVAD settings were optimized. LVAD: The patient has a(n) HeartMate 3 LVAD device present. Inflow Cannula: The inflow cannula is visualized in the left ventricular apex. Outflow Cannula: Visualization of the outflow cannula is technically difficult. AV Leaflet Mobility: . The aortic valve appears to be opening every 1 beats.  CONCLUSIONS:  1. The left ventricular systolic function is severely decreased, with a visually estimated ejection fraction of 10-15%.  2. There is global hypokinesis of the left ventricle with minor regional variations.  3. Left ventricular diastolic filling cannot be determined, due to left ventricular assist device.  4. Left ventricular cavity size is severely dilated.  5. Abnormal septal motion consistent with post-operative status.  6. There is severely reduced right ventricular systolic function.  7. Severely enlarged right ventricle.  8. The left atrium is mildly dilated.  9. The right atrium is severely dilated. 10. Mild aortic valve regurgitation. 11. . The aortic valve appears to be  opening every 1 beats. 12. Compared with study dated 11/13/2024, no significant change. QUANTITATIVE DATA SUMMARY:  2D MEASUREMENTS:          Normal Ranges: IVSd:            1.00 cm  (0.6-1.1cm) LVPWd:           1.00 cm  (0.6-1.1cm) LVIDd:           5.90 cm  (3.9-5.9cm) LV Mass Index:   109 g/m2  LA VOLUME:                   Normal Ranges: LA Vol A4C:        86.2 ml   (22+/-6mL/m2) LA Vol Index A4C:  39.3ml/m2 LA Area A4C:       24.2 cm2 LA Major Axis A4C: 5.8 cm  AORTA MEASUREMENTS:         Normal Ranges: Ao Sinus, d:        4.30 cm (2.1-3.5cm)  LV SYSTOLIC FUNCTION BY 2D PLANIMETRY (MOD):                      Normal Ranges: EF-Visual:      13 % LV EF Reported: 13 %  [id number] Fawad Chicas MD Electronically signed on 11/15/2024 at 12:09:54 PM  ** Final **     Transthoracic Echo (TTE) Limited    Result Date: 11/13/2024   [facility], [address] [place] [address]                Tel [phone] and Fax [phone] TRANSTHORACIC ECHOCARDIOGRAM REPORT  Patient Name:       RJ Webb Physician:    [id number] Raciel Casarez MD Study Date:         11/13/2024          Ordering Provider:    [id number] JO CASTILLO MRN/PID:            [id number]            Fellow: Accession#:         [id number]        Nurse: Date of Birth/Age:  1955 / 69     Sonographer:          Jackson estrada RDCS Gender assigned at  M                   Additional Staff: Birth: Height:             182.88 cm           Admit Date: Weight:             93.90 kg            Admission Status:     Inpatient - STAT BSA / BMI:          2.16 m2 / 28.07     Encounter#:           [id number]                     kg/m2 Blood Pressure:     94/74 mmHg          Department Location:  [facility] Study Type:    TRANSTHORACIC ECHO (TTE) LIMITED  Diagnosis/ICD: Acute on chronic combined systolic (congestive) and diastolic                (congestive) heart failure (CHF)-I50.43 Indication:    eval RV fx CPT Code:      Echo Limited-02369; Doppler Limited-10299; Color Doppler-63957 Patient History: Pertinent History: CAD s/p CABGx4 in 2012 and PCI 2019 w/ ICM LVEF 10-15%, AF                    s/p RFA & PVI, HTN, CHF exacerbation, 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 Study Detail: The following Echo studies were performed: 2D, M-Mode, Doppler and               color flow. Technically challenging study due to body habitus,               patient lying in supine position, postoperative dressings,               prominent lung artifact and poor acoustic windows. The patient is               intubated. Definity used as a contrast agent for endocardial               border definition. Total contrast used for this procedure was 2.0               mL via IV push.  PHYSICIAN INTERPRETATION: Left Ventricle: Left ventricular ejection fraction is severely decreased, by visual estimate at 15%. There is eccentric left ventricular hypertrophy. There is global hypokinesis of the left ventricle with minor regional variations. The left ventricular cavity size is moderate to severely dilated. Left ventricular diastolic filling was not assessed. Left Atrium: The left atrium is moderately dilated. Right Ventricle: The right ventricle is mild to moderately enlarged. There is severely reduced right ventricular systolic function. A device is visualized in the right ventricle. Right Atrium: The right atrium is moderately dilated. There is a device visualized in the right atrium. Aortic Valve: The aortic valve was not well visualized. There is mild to moderate aortic valve cusp calcification. There is mild aortic valve regurgitation. Mitral Valve: The mitral valve is normal in structure. There is mild mitral valve regurgitation.  Tricuspid Valve: The tricuspid valve is structurally normal. There is mild tricuspid regurgitation. Pulmonic Valve: The pulmonic valve is not well visualized. There is trace pulmonic valve regurgitation. Pericardium: There is no pericardial effusion noted. Aorta: The aortic root is abnormal. There is mild dilatation of the aortic root. Pulmonary Artery: The tricuspid regurgitant velocity is 1.83 m/s, and with an estimated right atrial pressure of 10 mmHg, the estimated pulmonary artery pressure is normal with the RVSP at 23.4 mmHg. Systemic Veins: The inferior vena cava appears moderately dilated, on vent. In comparison to the previous echocardiogram(s): Compared with study dated 11/5/2024, no significant change.  LEFT VENTRICULAR ASSIST DEVICE: LVAD: The patient has a(n) HeartMate 3 LVAD device present. Inflow Cannula: The inflow cannula is visualized in the left ventricular apex.  CONCLUSIONS:  1. Poorly visualized anatomical structures due to suboptimal image quality.  2. Left ventricular cavity size is moderate to severely dilated.  3. Left ventricular ejection fraction is severely decreased, by visual estimate at 15%.  4. There is global hypokinesis of the left ventricle with minor regional variations.  5. There is severely reduced right ventricular systolic function.  6. Mild to moderately enlarged right ventricle.  7. The left atrium is moderately dilated.  8. The right atrium is moderately dilated.  9. Mild aortic valve regurgitation. 10. The inferior vena cava appears moderately dilated, on vent. QUANTITATIVE DATA SUMMARY:  2D MEASUREMENTS:          Normal Ranges: IVSd:            1.00 cm  (0.6-1.1cm) LVPWd:           1.20 cm  (0.6-1.1cm) LVIDd:           6.90 cm  (3.9-5.9cm) LVIDs:           6.60 cm LV Mass Index:   164 g/m2 LV % FS          4.3 %  AORTA MEASUREMENTS:         Normal Ranges: Ao Sinus, d:        4.00 cm (2.1-3.5cm)  LV SYSTOLIC FUNCTION BY 2D PLANIMETRY (MOD):                      Normal  Ranges: EF-Visual:      15 % LV EF Reported: 15 %  RIGHT VENTRICLE: RV s' 0.07 m/s  TRICUSPID VALVE/RVSP:          Normal Ranges: Peak TR Velocity:     1.83 m/s RV Syst Pressure:     23 mmHg  (< 30mmHg) IVC Diam:             2.70 cm  [id number] Raciel Casarez MD Electronically signed on 11/13/2024 at 10:25:04 AM  ** Final **     Transthoracic Echo (TTE) Limited    Result Date: 11/5/2024   [facility] [address] [place] [address]                Tel [phone] and Fax [phone] TRANSTHORACIC ECHOCARDIOGRAM REPORT  Patient Name:       RJ Webb Physician:    [id number] Mira Mayer MD Study Date:         11/5/2024           Ordering Provider:    [id number] PAYAL KOHLER MRN/PID:            [id number]            Fellow: Accession#:         [id number]        Nurse: Date of Birth/Age:  1955 / 69     Sonographer:          Maureen estrada                                     [facility] Gender assigned at  M                   Additional Staff: Birth: Height:             182.88 cm           Admit Date:           10/24/2024 Weight:             93.44 kg            Admission Status:     Inpatient - STAT BSA / BMI:          2.16 m2 / 27.94     Encounter#:           [id number]                     kg/m2 Blood Pressure:     106/72 mmHg         Department Location:  [address] Study Type:    TRANSTHORACIC ECHO (TTE) LIMITED Diagnosis/ICD: Heart failure, unspecified-I50.9 Indication:    RV assessment CPT Code:      Echo Limited-52585; Doppler Limited-83500; Color Doppler-57730 Patient History: Pertinent History: Negative bubble peformed on prior echo; Known 15-20% EF;                    Cardiogenic shock; Acute on chronic heart failure with                    reduced EF and diastolic dysfunction; ICM; Hx of chronic                    A-Fib; VIV.  Study Detail: The following Echo studies were performed: 2D, M-Mode, Doppler and               color flow. Technically challenging study due to body habitus.               Definity used as a contrast agent for endocardial border               definition. Total contrast used for this procedure was 2 mL via IV               push.  PHYSICIAN INTERPRETATION: Left Ventricle: Left ventricular ejection fraction is severely decreased, calculated by Loera's biplane at 18%. There is severely increased eccentric left ventricular hypertrophy. There is global hypokinesis of the left ventricle with minor regional variations. The left ventricular cavity size is severely dilated. There is normal septal and normal posterior left ventricular wall thickness. Left ventricular diastolic filling was not assessed. There is no definite left ventricular thrombus visualized. Left Atrium: The left atrium is severely dilated. Right Ventricle: The right ventricle is severely enlarged. There is severely reduced right ventricular systolic function. A device is visualized in the right ventricle. Right Atrium: The right atrium is severely dilated. There is a device visualized in the right atrium. Aortic Valve: The aortic valve was not well visualized. There is mild aortic valve cusp calcification. Aortic valve regurgitation was not assessed. Mitral Valve: The mitral valve is normal in structure. Mitral valve regurgitation was not assessed. Tricuspid Valve: The tricuspid valve is structurally normal. There is mild tricuspid regurgitation. The Doppler estimated RVSP is mildly elevated at 42.9 mmHg. Pulmonic Valve: The pulmonic valve was not assessed. Pulmonic valve regurgitation was not assessed. Pericardium: Trivial pericardial effusion. Aorta: The aortic root is abnormal. The aortic root appears mildly dilated and measures 4.20 cm. There is mild dilatation of the ascending aorta. There is mild dilatation of the aortic root. Systemic Veins: The  inferior vena cava appears dilated, with IVC inspiratory collapse less than 50%. In comparison to the previous echocardiogram(s): Compared with the prior exam from 10/25/2024, there was already a severely dilated LV with severely reduced function. The RV remains dilated with severely reduced function. Valvular function not assessed on today's exam.  CONCLUSIONS:  1. Left ventricular ejection fraction is severely decreased, calculated by Loera's biplane at 18%.  2. There is global hypokinesis of the left ventricle with minor regional variations.  3. Left ventricular cavity size is severely dilated.  4. No left ventricular thrombus visualized.  5. There is severely increased eccentric left ventricular hypertrophy.  6. There is severely reduced right ventricular systolic function.  7. Severely enlarged right ventricle.  8. The left atrium is severely dilated.  9. The right atrium is severely dilated. 10. Mitral valve regurgitation was not assessed. 11. Mildly elevated right ventricular systolic pressure. 12. Mildly dilated aortic root. 13. Compared with the prior exam from 10/25/2024, there was already a severely dilated LV with severely reduced function. The RV remains dilated with severely reduced function. Valvular function not assessed on today's exam. QUANTITATIVE DATA SUMMARY:  2D MEASUREMENTS:           Normal Ranges: Ao Root d:       4.20 cm   (2.0-3.7cm) IVSd:            0.87 cm   (0.6-1.1cm) LVPWd:           0.78 cm   (0.6-1.1cm) LVIDd:           8.21 cm   (3.9-5.9cm) LVIDs:           8.04 cm LV Mass Index:   157 g/m2 LVEDV Index:     138 ml/m2 LV % FS          2.0 %  LA VOLUME:                  Normal Ranges: LA Volume Index: 59.9 ml/m2  RA VOLUME BY A/L METHOD:          Normal Ranges: RA Area A4C:             36.8 cm2  AORTA MEASUREMENTS:         Normal Ranges: Ao Sinus, d:        4.20 cm (2.1-3.5cm) Asc Ao, d:          4.20 cm (2.1-3.4cm)  LV SYSTOLIC FUNCTION BY 2D PLANIMETRY (MOD):                       Normal Ranges: EF-A4C View:    14 % (>=55%) EF-A2C View:    21 % EF-Biplane:     18 % LV EF Reported: 18 %  AORTIC VALVE:          Normal Ranges: LVOT Diameter: 2.31 cm (1.8-2.4cm)  RIGHT VENTRICLE: RV Basal 6.10 cm RV Mid   4.10 cm RV Major 9.1 cm TAPSE:   9.0 mm RV s'    0.06 m/s  TRICUSPID VALVE/RVSP:          Normal Ranges: Peak TR Velocity:     2.64 m/s RV Syst Pressure:     43 mmHg  (< 30mmHg) IVC Diam:             3.00 cm  AORTA: Asc Ao Diam 4.23 cm  [id number] Mira Mayer MD Electronically signed on 11/5/2024 at 2:36:55 PM  ** Final **       Meds:  Scheduled medications  acetaminophen, 975 mg, oral, q8h  aspirin, 81 mg, oral, Daily  digoxin, 125 mcg, oral, Daily  escitalopram, 10 mg, oral, Daily  heparin (porcine), 5,000 Units, subcutaneous, q8h  melatonin, 5 mg, oral, Nightly  oxygen, , inhalation, Continuous - Inhalation  perflutren lipid microspheres, 0.5-10 mL of dilution, intravenous, Once in imaging  piperacillin-tazobactam, 3.375 g, intravenous, q6h  sennosides-docusate sodium, 1 tablet, oral, BID  thiamine, 500 mg, intravenous, q8h  vancomycin, 1,750 mg, intravenous, q24h  warfarin, 4 mg, oral, Daily      Continuous medications  dexmedeTOMIDine, 0-1.5 mcg/kg/hr, Last Rate: Stopped (11/18/24 0845)  EPINEPHrine, 0.01 mcg/kg/min, Last Rate: 0.01 mcg/kg/min (11/18/24 1000)  milrinone, 0.25 mcg/kg/min, Last Rate: 0.25 mcg/kg/min (11/18/24 1000)      PRN medications  PRN medications: alteplase, calcium gluconate, calcium gluconate, hydrALAZINE, magnesium sulfate, magnesium sulfate, naloxone, ondansetron **OR** ondansetron, polyethylene glycol, potassium chloride CR **OR** potassium chloride, potassium chloride CR **OR** potassium chloride, vancomycin     Assessment/Plan   Assessment & Plan     # Stage D NYHA IV, ICM, decompensated acute on chronic heart failure, HFrEF 10-15% s/p LVAD HM3 implant 11/12/24   #RV dysfunction     Intra-op BRIANA: EF 15-20%, dilated RV   TTE 11/15: EF , dilated RV/LV, severe RV,  mild MR, mild AI, AV opening every beat     Heart Mate III interrogation   Most Recent   Flow 4.3   Speed 5100   Power 3.5   PI 3.4   MAP (mmHg): 72    - Please maintain MAP 70-80  - Maintain net even to -500 today from a volume perspective. Okay for spot diuresis given congested CXR and dialated RV  - Continue Coumadin to 4mg at bedtime as patient is incrementing  - Patient has primary indication for ASA (CAD, ICM) -> on ASA 81 mg daily  - He is appropriate for transition to weekly driveline dressing changes at CTICU discretion   - Okay to wean epi to off   - Okay for heparin bridge given possible TIA event over weekend   - Continue digoxin for RV support; would draw a level mid week   - Would be an ideal candidate for sildenafil once he has a more robust MAP, will likely continue on milrinone for next 1-2 weeks and can initiate sildenafil if BP tolerates later this week   - Not appropriate for GDMT in acute postop period, would avoid BB once appropriate for GDMT   - Change out lines and continue empiric vanc/zosyn, but would advocate for early de- escalation perhaps tomorrow as patient does not appear infected  - Continue excellent care per CTICU  - Appropriate for transfer to HFICU tomorrow    Seen and discussed with Dr. Jonatan Davis, MSN, AGACNP-BC  Advanced Heart Failure LVAD Nurse Practitioner   For floor patients please page HHVI team after 5pm- [id number]  LVAD 24/7 emergency pager [phone]

## 2024-11-19 ENCOUNTER — APPOINTMENT (OUTPATIENT)
Dept: RADIOLOGY | Facility: HOSPITAL | Age: 69
DRG: 001 | End: 2024-11-19
Payer: MEDICARE

## 2024-11-19 LAB
ALBUMIN SERPL BCP-MCNC: 3.1 G/DL (ref 3.4–5)
ALBUMIN SERPL BCP-MCNC: 3.2 G/DL (ref 3.4–5)
ALP SERPL-CCNC: 55 U/L (ref 33–136)
ALT SERPL W P-5'-P-CCNC: 6 U/L (ref 10–52)
ANION GAP SERPL CALC-SCNC: 11 MMOL/L (ref 10–20)
ANION GAP SERPL CALC-SCNC: 15 MMOL/L (ref 10–20)
AST SERPL W P-5'-P-CCNC: 16 U/L (ref 9–39)
ATRIAL RATE: 138 BPM
BILIRUB DIRECT SERPL-MCNC: 0.3 MG/DL (ref 0–0.3)
BILIRUB SERPL-MCNC: 0.8 MG/DL (ref 0–1.2)
BUN SERPL-MCNC: 33 MG/DL (ref 6–23)
BUN SERPL-MCNC: 35 MG/DL (ref 6–23)
CA-I BLD-SCNC: 1.21 MMOL/L (ref 1.1–1.33)
CALCIUM SERPL-MCNC: 8.7 MG/DL (ref 8.6–10.6)
CALCIUM SERPL-MCNC: 8.8 MG/DL (ref 8.6–10.6)
CHLORIDE SERPL-SCNC: 100 MMOL/L (ref 98–107)
CHLORIDE SERPL-SCNC: 99 MMOL/L (ref 98–107)
CO2 SERPL-SCNC: 28 MMOL/L (ref 21–32)
CO2 SERPL-SCNC: 31 MMOL/L (ref 21–32)
CREAT SERPL-MCNC: 1.68 MG/DL (ref 0.5–1.3)
CREAT SERPL-MCNC: 1.81 MG/DL (ref 0.5–1.3)
EGFRCR SERPLBLD CKD-EPI 2021: 40 ML/MIN/1.73M*2
EGFRCR SERPLBLD CKD-EPI 2021: 44 ML/MIN/1.73M*2
ERYTHROCYTE [DISTWIDTH] IN BLOOD BY AUTOMATED COUNT: 15.9 % (ref 11.5–14.5)
GLUCOSE BLD MANUAL STRIP-MCNC: 107 MG/DL (ref 74–99)
GLUCOSE SERPL-MCNC: 143 MG/DL (ref 74–99)
GLUCOSE SERPL-MCNC: 98 MG/DL (ref 74–99)
HCT VFR BLD AUTO: 31 % (ref 41–52)
HGB BLD-MCNC: 10.2 G/DL (ref 13.5–17.5)
INR PPP: 1.8 (ref 0.9–1.1)
LDH SERPL L TO P-CCNC: 213 U/L (ref 84–246)
MAGNESIUM SERPL-MCNC: 2.01 MG/DL (ref 1.6–2.4)
MCH RBC QN AUTO: 27.1 PG (ref 26–34)
MCHC RBC AUTO-ENTMCNC: 32.9 G/DL (ref 32–36)
MCV RBC AUTO: 82 FL (ref 80–100)
NRBC BLD-RTO: 0 /100 WBCS (ref 0–0)
PHOSPHATE SERPL-MCNC: 3 MG/DL (ref 2.5–4.9)
PHOSPHATE SERPL-MCNC: 3.1 MG/DL (ref 2.5–4.9)
PLATELET # BLD AUTO: 425 X10*3/UL (ref 150–450)
POTASSIUM SERPL-SCNC: 3.9 MMOL/L (ref 3.5–5.3)
POTASSIUM SERPL-SCNC: 4 MMOL/L (ref 3.5–5.3)
PROT SERPL-MCNC: 5.1 G/DL (ref 6.4–8.2)
PROTHROMBIN TIME: 20.8 SECONDS (ref 9.8–12.8)
Q ONSET: 210 MS
QRS COUNT: 19 BEATS
QRS DURATION: 112 MS
QT INTERVAL: 374 MS
QTC CALCULATION(BAZETT): 526 MS
QTC FREDERICIA: 469 MS
R AXIS: 5 DEGREES
RBC # BLD AUTO: 3.77 X10*6/UL (ref 4.5–5.9)
SODIUM SERPL-SCNC: 138 MMOL/L (ref 136–145)
SODIUM SERPL-SCNC: 138 MMOL/L (ref 136–145)
T AXIS: -67 DEGREES
T OFFSET: 397 MS
VENTRICULAR RATE: 119 BPM
WBC # BLD AUTO: 10.5 X10*3/UL (ref 4.4–11.3)

## 2024-11-19 PROCEDURE — 2500000002 HC RX 250 W HCPCS SELF ADMINISTERED DRUGS (ALT 637 FOR MEDICARE OP, ALT 636 FOR OP/ED): Performed by: STUDENT IN AN ORGANIZED HEALTH CARE EDUCATION/TRAINING PROGRAM

## 2024-11-19 PROCEDURE — 99291 CRITICAL CARE FIRST HOUR: CPT | Performed by: SPECIALIST

## 2024-11-19 PROCEDURE — 84100 ASSAY OF PHOSPHORUS: CPT

## 2024-11-19 PROCEDURE — 37799 UNLISTED PX VASCULAR SURGERY: CPT | Performed by: NURSE PRACTITIONER

## 2024-11-19 PROCEDURE — 82248 BILIRUBIN DIRECT: CPT | Performed by: NURSE PRACTITIONER

## 2024-11-19 PROCEDURE — 82947 ASSAY GLUCOSE BLOOD QUANT: CPT

## 2024-11-19 PROCEDURE — 71045 X-RAY EXAM CHEST 1 VIEW: CPT | Performed by: STUDENT IN AN ORGANIZED HEALTH CARE EDUCATION/TRAINING PROGRAM

## 2024-11-19 PROCEDURE — 2500000001 HC RX 250 WO HCPCS SELF ADMINISTERED DRUGS (ALT 637 FOR MEDICARE OP): Performed by: NURSE PRACTITIONER

## 2024-11-19 PROCEDURE — 2500000004 HC RX 250 GENERAL PHARMACY W/ HCPCS (ALT 636 FOR OP/ED): Performed by: NURSE PRACTITIONER

## 2024-11-19 PROCEDURE — 82330 ASSAY OF CALCIUM: CPT | Performed by: NURSE PRACTITIONER

## 2024-11-19 PROCEDURE — 2500000004 HC RX 250 GENERAL PHARMACY W/ HCPCS (ALT 636 FOR OP/ED)

## 2024-11-19 PROCEDURE — 2500000002 HC RX 250 W HCPCS SELF ADMINISTERED DRUGS (ALT 637 FOR MEDICARE OP, ALT 636 FOR OP/ED): Performed by: NURSE PRACTITIONER

## 2024-11-19 PROCEDURE — 83735 ASSAY OF MAGNESIUM: CPT | Performed by: NURSE PRACTITIONER

## 2024-11-19 PROCEDURE — 93750 INTERROGATION VAD IN PERSON: CPT

## 2024-11-19 PROCEDURE — 2500000004 HC RX 250 GENERAL PHARMACY W/ HCPCS (ALT 636 FOR OP/ED): Performed by: STUDENT IN AN ORGANIZED HEALTH CARE EDUCATION/TRAINING PROGRAM

## 2024-11-19 PROCEDURE — 2020000001 HC ICU ROOM DAILY

## 2024-11-19 PROCEDURE — 85027 COMPLETE CBC AUTOMATED: CPT

## 2024-11-19 PROCEDURE — 80069 RENAL FUNCTION PANEL: CPT | Mod: CCI

## 2024-11-19 PROCEDURE — 71045 X-RAY EXAM CHEST 1 VIEW: CPT

## 2024-11-19 PROCEDURE — 99291 CRITICAL CARE FIRST HOUR: CPT

## 2024-11-19 PROCEDURE — 85610 PROTHROMBIN TIME: CPT | Performed by: NURSE PRACTITIONER

## 2024-11-19 PROCEDURE — 37799 UNLISTED PX VASCULAR SURGERY: CPT

## 2024-11-19 PROCEDURE — 97530 THERAPEUTIC ACTIVITIES: CPT | Mod: GP

## 2024-11-19 PROCEDURE — 93750 INTERROGATION VAD IN PERSON: CPT | Performed by: SPECIALIST

## 2024-11-19 PROCEDURE — 83615 LACTATE (LD) (LDH) ENZYME: CPT | Performed by: NURSE PRACTITIONER

## 2024-11-19 PROCEDURE — 2500000001 HC RX 250 WO HCPCS SELF ADMINISTERED DRUGS (ALT 637 FOR MEDICARE OP)

## 2024-11-19 RX ORDER — PANTOPRAZOLE SODIUM 40 MG/1
40 TABLET, DELAYED RELEASE ORAL
Status: DISCONTINUED | OUTPATIENT
Start: 2024-11-20 | End: 2024-12-03 | Stop reason: HOSPADM

## 2024-11-19 RX ORDER — DEXMEDETOMIDINE HYDROCHLORIDE 4 UG/ML
0-.5 INJECTION, SOLUTION INTRAVENOUS CONTINUOUS
Status: DISCONTINUED | OUTPATIENT
Start: 2024-11-19 | End: 2024-11-19

## 2024-11-19 RX ORDER — POTASSIUM CHLORIDE 20 MEQ/1
20 TABLET, EXTENDED RELEASE ORAL ONCE
Status: COMPLETED | OUTPATIENT
Start: 2024-11-19 | End: 2024-11-19

## 2024-11-19 RX ORDER — ACETAMINOPHEN 325 MG/1
650 TABLET ORAL EVERY 6 HOURS PRN
Status: DISCONTINUED | OUTPATIENT
Start: 2024-11-20 | End: 2024-11-21

## 2024-11-19 RX ORDER — HEPARIN SODIUM 10000 [USP'U]/100ML
0-4000 INJECTION, SOLUTION INTRAVENOUS CONTINUOUS
Status: DISCONTINUED | OUTPATIENT
Start: 2024-11-19 | End: 2024-11-22

## 2024-11-19 RX ORDER — WARFARIN SODIUM 5 MG/1
5 TABLET ORAL DAILY
Status: DISCONTINUED | OUTPATIENT
Start: 2024-11-19 | End: 2024-11-24

## 2024-11-19 RX ORDER — ACETAMINOPHEN 500 MG
10 TABLET ORAL NIGHTLY
Status: DISCONTINUED | OUTPATIENT
Start: 2024-11-19 | End: 2024-11-20

## 2024-11-19 ASSESSMENT — COGNITIVE AND FUNCTIONAL STATUS - GENERAL
TURNING FROM BACK TO SIDE WHILE IN FLAT BAD: A LITTLE
STANDING UP FROM CHAIR USING ARMS: A LITTLE
MOVING TO AND FROM BED TO CHAIR: A LITTLE
WALKING IN HOSPITAL ROOM: A LITTLE
MOBILITY SCORE: 16
CLIMB 3 TO 5 STEPS WITH RAILING: TOTAL
MOVING FROM LYING ON BACK TO SITTING ON SIDE OF FLAT BED WITH BEDRAILS: A LITTLE

## 2024-11-19 ASSESSMENT — PAIN - FUNCTIONAL ASSESSMENT
PAIN_FUNCTIONAL_ASSESSMENT: 0-10

## 2024-11-19 ASSESSMENT — PAIN SCALES - GENERAL
PAINLEVEL_OUTOF10: 0 - NO PAIN

## 2024-11-19 NOTE — PROGRESS NOTES
Fahad Meraz is a 69 y.o. male on day 26 of admission presenting with Acute on chronic heart failure with reduced ejection fraction and diastolic dysfunction.    Subjective   Patient discussed in morning handover, patient examined and chart reviewed. Meds, labs and radiology reviewed during full interdisciplinary rounds this morning. Co-rounded with HF this morning.    Surgeon: Jas Briseno  DOS: Nov. 12, 2024  Procedure: HMIII Implantation (via L thoracotomy)     Airway: grade I - full view of glottis   EF:  LVEF 15-20% with mild AI and mod MR, TR pre & post, dilated RV     FULL Code  Emergency Sternotomy: N; L mini-thoracotomy    HPI:   10/18/24 - Patient initially presented to an outside hospital with decompensated heart failure and KENYA.   10/24/24 - Patient transferred to [facility] HFICU  for medical optimization and evaluation for advanced heart failure therapies.    11/5/2024 -  [employer] Committee approved destination therapy LVAD.  He is now admitted to the CTICU s/p Heartmate 3 implant with the outflow to the mid descending thoracic aorta via left thoracotomy 11/12/24 with Dr. Mclain and Dr. Madrigal.     PMH:  CAD - s/p CABG x 4 (2012) and PCI (LCx/OM; 5/2019)  Stage D ICM HFrEF LVEF 10-15%  AF s/p RFA (PVI and CTI; 4/2024)  HTN  Dyslipidemia  pre-T2DM (A1c 6.3)  VIV using CPAP   Depression  Anxiety    Course in Hospital:  11/12 - Intra-op -  episode of pulseless VT requiring defib x 1 prior to being placed on CPB. In the ICU (post-op), significant bleeding from venous cannulation site requiring transfusion  11/14 - Extubated  11/15 - off i-EPO and epinephrine  11/15-16 - New delirium  11/17 - Noted to have an episode of rapid nystagmus - BAT called - nil acute on CT head  11/18 - Ramp TTE study - septal bowing to the left at 5300 RPMs; left at 5200 RPMs for now    Overnight: LVAD flows stable with increase in RPM to 5200 following ramp study yesterday. Remains on milrinone for RV  support. Persistent delirium     Objective     Last Recorded Vitals  Blood pressure 121/72, pulse 94, temperature 36 °C (96.8 °F), temperature source Temporal, resp. rate 14, height 1.829 m (6'), weight 91.4 kg (201 lb 8 oz), SpO2 90%.    Invasive Hemodynamics:    Most Recent Range Past 24hrs   BP (Art) 87/61 Arterial Line BP 1  Min: 86/55  Max: 130/68   MAP(Art) 70 mmHg Arterial Line MAP 1 (mmHg)   Min: 63 mmHg  Max: 87 mmHg   RA/CVP   No data recorded   PA 37/17 No data recorded   PA(mean) 27 mmHg No data recorded   CO 7.2 L/min No data recorded   CI 3.4 L/min/m2 No data recorded   Mixed Venous 70 % No data recorded   SVR  747 (dyne*sec)/cm5 No data recorded     Intake/Output last 3 Shifts:  I/O last 3 completed shifts:  In: 2472.3 (27 mL/kg) [P.O.:680; I.V.:492.3 (5.4 mL/kg); IV Piggyback:1300]  Out: 5050 (55.3 mL/kg) [Urine:5050 (1.5 mL/kg/hr)]  Weight: 91.4 kg     Physical Exam  Eyes:      Pupils: Pupils are equal, round, and reactive to light.   Cardiovascular:      Rate and Rhythm: Regular rhythm. Tachycardia present.      Comments: Milrinone: 0.25    Lines:  Right brachial A-line  Pulmonary:      Effort: Pulmonary effort is normal.      Breath sounds: Examination of the right-lower field reveals decreased breath sounds. Examination of the left-lower field reveals decreased breath sounds. Decreased breath sounds present.      Comments: On room air this morning.  Abdominal:      General: Bowel sounds are decreased.      Palpations: Abdomen is soft.      Tenderness: There is no abdominal tenderness (patient sedatec). There is no guarding or rebound.      Comments: Poor p.o. intake   Skin:     Comments: No active issues   Neurological:      General: No focal deficit present.      Mental Status: He is easily aroused. He is disoriented.      Sensory: Sensation is intact.      Motor: Motor function is intact.      Comments: RASS 0 - +1  CAM-ICU: remains positive this morning but better sleep overnight     MCS:    Heart Mate III:     Most Recent Range Past 24hrs   Flow 4.4 Pump Flow  Min: 4  Max: 4.5   Speed 5200 Speed RPM  Min: 5100  Max: 79031   Power 3.4 Cortes  Min: 3.3  Max: 3.8   PI 3.6 Pulse Index  Min: 3.2  Max: 4   No PI event and stable power overnight. LDH stable    Assessment/Plan     ICU Liberation Bundle:  A-Analgesia: Tylenol  B-SBT: Extubated  C-RASS Target: 0  D-CAM: positive this morning  E-Mobilization Plan: OOBTC and ambulate again today  F - Family Last Update: to be update by the team    Daily Checklist:  HOB > 30 degrees: extubated   Feeding: p.o. intake; will ask Dietician to see  Thromboprophylaxis: Heparin (systemic) and SCDs  Ulcer Prophylaxis: PPI  Glucose Control: Target 110-180  Line to removed: None to remove at present  Bowel Care: Bowel regime ordered  De-escalation of Antibiotics: started on vanco and pip/tazo yesterday in light of new delirium for 48 hours - cultures negative, WBC normal. Will plan to stop today    Plan:  Continue with milrinone today and p.o. digoxin  Delirium: continue with sleep promotion with melatonin, continue with ambulation today   Increase in creatinine - will plan hold diuresis today  ASA; continue warfarin 5mg today (INR 1.8 today); on heparin bridge until therapeutic  Thiamine 500 mg TID for 3 days per Neurology (discontinue tomorrow)  R/A sildenafil tomorrow    DISPO: CTICU - plan to send to HFICU once bed available    I spent 49 minutes in the professional and overall care of this patient.    This critically ill patient continues to be at-risk for clinically significant deterioration / failure due to the above mentioned dysfunctional, unstable organ systems.  I have personally identified and managed all complex critical care issues to prevent aforementioned clinical deterioration.  Critical care time is spent at bedside and/or the immediate area and has included, but is not limited to, the review of diagnostic tests, labs, radiographs, serial assessments of  hemodynamics, respiratory status, ventilatory management, and family updates.  Time spent in procedures and teaching are reported separately.    Zak Aguilar MD

## 2024-11-19 NOTE — PROGRESS NOTES
HFICU Attending Note    Principal Problem:    Acute on chronic heart failure with reduced ejection fraction and diastolic dysfunction  Active Problems:    Ischemic cardiomyopathy    Receiving inotropic medication    HTN (hypertension)    CAD (coronary artery disease)    MI (myocardial infarction) (Multi)    CHF (congestive heart failure)    Gastroesophageal reflux disease    Acute kidney injury (nontraumatic) (CMS-[facility])    Delirium      S/p LVAD  1- Delirium - improving with increased sleep/ambulation   2- Hemodynamic support.  Epinephrine weaned off, will continue milrinone for now..  3- Infectious workup remains negative   4- No significant LVAD alarms noted   5- Stable creatinine   6- ? TIA event yesterday with reported nystagmus - will bridge him with heparin   7-will transfer to heart failure ICU today.    This critically ill patient continues to be at-risk for clinically significant deterioration / failure due to the above mentioned dysfunctional, unstable organ systems.  I have personally identified and managed all complex critical care issues to prevent aforementioned clinical deterioration.  Critical care time is spent at bedside and/or the immediate area and has included, but is not limited to, the review of diagnostic tests, labs, radiographs, serial assessments of hemodynamics, respiratory status, ventilatory management, and family updates.  Time spent in procedures and teaching are reported separately.    Critical care time: 35____ minutes

## 2024-11-19 NOTE — PROGRESS NOTES
Physical Therapy    Physical Therapy Treatment    Patient Name: Fahad Meraz  MRN: [id number]  Department: [facility] HFICU  Room: 08/08-A  Today's Date: 11/19/2024  Time Calculation  Start Time: 1037  Stop Time: 1117  Time Calculation (min): 40 min    Assessment/Plan   PT Assessment  PT Assessment Results: Decreased strength, Decreased endurance, Impaired balance, Decreased mobility, Decreased cognition, Impaired judgement, Decreased safety awareness  Rehab Prognosis: Excellent  Barriers to Discharge: medical acuity  Evaluation/Treatment Tolerance: Patient tolerated treatment well  Medical Staff Made Aware: Yes  End of Session Communication: Bedside nurse  End of Session Patient Position: Bed, 3 rail up, Alarm off, not on at start of session, Alarm off, caregiver present  PT Plan  Inpatient/Swing Bed or Outpatient: Inpatient  PT Plan  Treatment/Interventions: Transfer training, Bed mobility, Gait training, Balance training, Neuromuscular re-education, Strengthening, Endurance training, Therapeutic exercise, Therapeutic activity  PT Plan: Ongoing PT  PT Frequency: Daily  PT Discharge Recommendations: High intensity level of continued care  Equipment Recommended upon Discharge: Wheeled walker  PT Recommended Transfer Status: Assist x1  PT - OK to Discharge: Yes    General Visit Information:   PT  Visit  PT Received On: 11/19/24  Response to Previous Treatment: Patient with no complaints from previous session.  General  Missed Visit: No  Family/Caregiver Present: Yes  Caregiver Feedback: Family present throughout session  Prior to Session Communication: Bedside nurse  Patient Position Received: Up in chair, Alarm off, not on at start of session  Preferred Learning Style: auditory, verbal  General Comment: Pt awake, alert and willing to participate in PT session.  Pt completed sit<>stand transfer, ambulation and bed mobility.  Thoracic walker utilized.  CGA-MinAx1 provided for all mobility.    Subjective    Precautions:  Precautions  Medical Precautions: Cardiac precautions, Fall precautions  Precautions Comment: MAP 70-80, SpO2>92     11/19/24 1037 11/19/24 1117   Vital Signs   Vitals Session Pre PT Post PT   Heart Rate (!) 121 (!) 121   Resp 20 19   SpO2 96 % 97 %   BP 91/75 74/53   MAP (mmHg) 81 62   BP Method Arterial line Arterial line   Patient Position Sitting Lying   Vital Signs Comment  --  Vitals stable throughout session     Objective   Pain:  Pain Assessment  Pain Assessment: 0-10  0-10 (Numeric) Pain Score: 0 - No pain  Cognition:  Cognition  Overall Cognitive Status: Within Functional Limits  Arousal/Alertness: Appropriate responses to stimuli  Orientation Level: Oriented X4  Following Commands: Follows all commands and directions without difficulty  Activity Tolerance:  Activity Tolerance  Endurance: Tolerates 30 min exercise with multiple rests  Early Mobility/Exercise Safety Screen: Proceed with mobilization - No exclusion criteria met  Treatments:  Therapeutic Activity  Therapeutic Activity Performed: Yes  Therapeutic Activity 1: Increased time for skilled ICU line/tube management for safe mobility  Therapeutic Activity 2: Pt completed monitor<>LVAD transfer while in chair.  Minimal verbal and tactile cues provided throughout.  Increased time to complete.  Demonstrated how to don LVAD in carrying device.  Therapeutic Activity 3: During ambulation, pt with BM.  Assisted pt back to the room.  Increased time for clean up in hallway and with patient.  Pt in static standing with minAx1 for clean up.    Bed Mobility  Bed Mobility: Yes  Bed Mobility 1  Bed Mobility 1: Sitting to supine  Level of Assistance 1: Minimum assistance (x1)  Bed Mobility Comments 1: HOB elevated, assistance with advancing LEs onto the bed  Bed Mobility 2  Bed Mobility  2:  (Boost towards HOB)  Level of Assistance 2: Dependent, +2  Bed Mobility Comments 2: HOB flat, draw sheet utilized    Ambulation/Gait Training  Ambulation/Gait  Training Performed: Yes  Ambulation/Gait Training 1  Surface 1: Level tile  Device 1: Thoracic walker  Assistance 1: Contact guard  Comments/Distance (ft) 1: ~50ft (Limited ambulation d/t pt needed to use the restroom)  Transfers  Transfer: Yes  Transfer 1  Transfer From 1: Sit to, Stand to  Transfer to 1: Sit, Stand  Technique 1: Sit to stand, Stand to sit  Transfer Device 1: Thoracic walker  Transfer Level of Assistance 1: Minimum assistance (x1)  Trials/Comments 1: x3 transfers completed from chair  Transfers 2  Transfer From 2: Chair with arms to  Transfer to 2: Bed  Technique 2: Lateral, To right  Transfer Device 2:  (HHA)  Transfer Level of Assistance 2: Minimum assistance (x1)  Trials/Comments 2: ~4-5 lateral steps to complete transfer    Stairs  Stairs: No    Outcome Measures:  [facility] Basic Mobility  Turning from your back to your side while in a flat bed without using bedrails: A little  Moving from lying on your back to sitting on the side of a flat bed without using bedrails: A little  Moving to and from bed to chair (including a wheelchair): A little  Standing up from a chair using your arms (e.g. wheelchair or bedside chair): A little  To walk in hospital room: A little  Climbing 3-5 steps with railing: Total  Basic Mobility - Total Score: 16    FSS-ICU  Ambulation: Walks >/ or equal to 50 feet with any assistance x1  Rolling: Minimal assistance (performs 75% or more of task)  Sitting: Supervision or set-up only  Transfer Sit-to-Stand: Minimal assistance (performs 75% or more of task)  Transfer Supine-to-Sit: Minimal assistance (performs 75% or more of task)  Total Score: 19    Early Mobility/Exercise Safety Screen: Proceed with mobilization - No exclusion criteria met  ICU Mobility Scale: Walking with assistance of 1 person [8]    Education Documentation  Body Mechanics, taught by Scarlett Bhandari PT at 11/19/2024  1:51 PM.  Learner: Patient  Readiness: Acceptance  Method: Explanation,  Demonstration  Response: Demonstrated Understanding    Mobility Training, taught by Scarlett Bhandari, PT at 11/19/2024  1:51 PM.  Learner: Patient  Readiness: Acceptance  Method: Explanation, Demonstration  Response: Demonstrated Understanding    Education Comments  No comments found.    EDUCATION:       Encounter Problems       Encounter Problems (Active)       Balance       Pt will score >45 on VEGA for safe community ambulation (Progressing)       Start:  10/29/24    Expected End:  11/28/24               Mobility       Patient will ambulate >1000 ft with LRAD and supervision With stable vitals and RPD </= 3/10 and RPE </= 13/20 for improved functional mobility.   (Met)       Start:  10/29/24    Expected End:  11/12/24    Resolved:  11/05/24    Updated to: Patient will ambulate >2500 ft with LRAD and supervision With stable vitals and RPD </= 3/10 and RPE </= 13/20 for improved functional mobility.    Update reason: Progression         Pt will complete 6MWT with no assistive device and supervision and achieve >700 ft With stable vitals and RPD </= 3/10 and RPE </= 13/20 for improved functional mobility.   (Met)       Start:  10/29/24    Expected End:  11/12/24    Resolved:  11/05/24    Updated to: Pt will complete 6MWT with no assistive device and supervision and achieve >1300 ft With stable vitals and RPD </= 3/10 and RPE </= 13/20 for improved functional mobility.    Update reason: Progression         Patient will ambulate >2500 ft with LRAD and supervision With stable vitals and RPD </= 3/10 and RPE </= 13/20 for improved functional mobility. (Progressing)       Start:  11/05/24    Expected End:  11/28/24                Pt will complete 6MWT with no assistive device and supervision and achieve >1300 ft With stable vitals and RPD </= 3/10 and RPE </= 13/20 for improved functional mobility. (Progressing)       Start:  11/05/24    Expected End:  11/28/24                   PT Transfers       Patient will perform bed  mobility indep (maintenance 2/2 prolonged hospital stay anticipated) (Progressing)       Start:  10/29/24    Expected End:  11/28/24            Patient will transfer sit to and from stand indep (Progressing)       Start:  10/29/24    Expected End:  11/28/24            Pt will complete 5XSTS from recliner without UE assist <12 seconds With stable vitals and RPD </= 3/10 and RPE </= 13/20 for improved functional mobility.   (Progressing)       Start:  10/29/24    Expected End:  11/28/24               Pain - Adult

## 2024-11-19 NOTE — PROGRESS NOTES
F/up from HM3 LVAD implant on 11/12/24 with surgeons Dr. Hill and Dr. Madrigal. SW reviewed, participated and is in agreement with multi-disciplinary plan of care. SW met with Pt at bedside. Pt is day 22 of overall admission.      Functional/Medical Status: Remains in CTICU, extubated, alert with some confusion, had RAMP study for LVAD speed earlier today resulted in 5000 > 5100, no significant alarms; started coumadin yesterday, continue daily dressing changes to driveline.   Dialysis start date: N/A  Adaption to the Stress of Transplant: Pt benefiting from visits from palliative and ; also has frequent family visitors, appears to be in good spirits.   Mental Health/Substance use: PMH of depression, on Lexapro, stable; never cigarette smoker, smokeless tobacco until 15 years ago, occasional/social alcohol use  Post Discharge Supports/Recovery Plan: Goal to return home with wife and [facility] therapies, home is 2 story, but there is bed and bath on first floor available; also has 3 adult children and 5 siblings, all local and willing to assist as needed including one brother who recently underwent heart transplant.   Benefits: Medicare + [employer] supplement; Pt and wife both receive social security; Pt stated no financial concerns.   Impressions: Pt progressing adequately for this point.   Plan: Transfer to HFICU when appropriate. SW will continue to follow during inpatient stay and also remain available to Pt and family on outpatient basis and will complete Intermacs including KCCQ12 and Interqol post-implant at 3 months, 6 months, and then every 6 months after that as directed.     JOE Rm  Transplant/LVAD

## 2024-11-19 NOTE — SIGNIFICANT EVENT
11/19/24 1158   HeartMate Equipment Transfer   Transfer From [facility]   Transfer To Heart Failure Unit   Battery Charger Yes   Battery Clip 1 Yes   Battery Clip 2 Yes   Battery Clip 3 Yes   Battery Clip 4 Yes   Rechargeable Battery 1 Yes   Rechargeable Battery 2 Yes   Rechargeable Battery 3 Yes   Rechargeable Battery 4 Yes   Rechargeable Battery 5 Yes   Rechargeable Battery 6 Yes   Rechargeable Battery 7 Yes   Rechargeable Battery 8 Yes   Backup Battery 2 Yes   Go Gear Consolidated Bag Yes   Go Gear Accessory Kit Yes   Go Gear Vest Yes   Programmed Backup  Yes   Primary Controller Yes   Mobile Power Unit Yes   Black Emergency Red Tag Bag Yes   Shower Bag Yes   Apil Cuff Yes   Outflow graft Yes   Modular cable Yes

## 2024-11-19 NOTE — PROGRESS NOTES
CTICU Progress Note  Fahad Meraz/[id number]    Admit Date: 10/24/2024  Hospital Length of Stay: 26   ICU Length of Stay: 6d 19h   CT SURGEON: Dr. Hill    SUBJECTIVE:   Hemodynamics acceptable on provided therapy. Remains on milrinone 0.25mcg/kg/min infusion. Refused CPAP overnight. Briefly on precedex infusion, but reports improved sleep. Tolerated diuresis for net negative ~ 2L.    MEDICATIONS  Infusions:  dexmedeTOMIDine, Last Rate: Stopped (11/19/24 0315)  milrinone, Last Rate: 0.25 mcg/kg/min (11/19/24 0700)      Scheduled:  acetaminophen, 975 mg, q8h  aspirin, 81 mg, Daily  digoxin, 125 mcg, Daily  escitalopram, 10 mg, Daily  heparin (porcine), 5,000 Units, q8h  melatonin, 5 mg, Nightly  OLANZapine zydis, 5 mg, Nightly  perflutren lipid microspheres, 0.5-10 mL of dilution, Once in imaging  piperacillin-tazobactam, 3.375 g, q6h  potassium chloride CR, 40 mEq, Once  [Held by provider] sennosides-docusate sodium, 1 tablet, BID  thiamine, 500 mg, q8h  vancomycin, 1,750 mg, q24h  warfarin, 4 mg, Daily      PRN:  alteplase, 2 mg, PRN  calcium gluconate, 1 g, q6h PRN  calcium gluconate, 2 g, q6h PRN  hydrALAZINE, 10 mg, q4h PRN  magnesium sulfate, 1 g, q6h PRN  magnesium sulfate, 2 g, q6h PRN  naloxone, 0.2 mg, q5 min PRN  ondansetron, 4 mg, q8h PRN   Or  ondansetron, 4 mg, q8h PRN  polyethylene glycol, 17 g, Daily PRN  potassium chloride CR, 20 mEq, q6h PRN   Or  potassium chloride, 20 mEq, q6h PRN  potassium chloride CR, 40 mEq, q6h PRN   Or  potassium chloride, 40 mEq, q6h PRN  vancomycin, , Daily PRN        PHYSICAL EXAM:   Visit Vitals  /72   Pulse 94   Temp 36 °C (96.8 °F) (Temporal)   Resp 14   Ht 1.829 m (6')   Wt 91.4 kg (201 lb 8 oz)   SpO2 90%   BMI 27.33 kg/m²   Smoking Status Former   BSA 2.15 m²     Wt Readings from Last 5 Encounters:   11/19/24 91.4 kg (201 lb 8 oz)   10/24/24 92.5 kg (204 lb)   08/05/24 122 kg (268 lb)   01/31/22 119 kg (262 lb)   02/18/20 123 kg (270 lb 2 oz)      INTAKE/OUTPUT:  I/O last 3 completed shifts:  In: 2472.3 (27 mL/kg) [P.O.:680; I.V.:492.3 (5.4 mL/kg); IV Piggyback:1300]  Out: 5050 (55.3 mL/kg) [Urine:5050 (1.5 mL/kg/hr)]  Weight: 91.4 kg          Vent settings:       Physical Exam:   - CONSTITUTION: Male patient lying in ICU bed, in no acute distress  - NEUROLOGIC: A+Ox3, no obvious focal deficits, CAM +  - CARDIOVASCULAR: Sinus tachy with frequent PVCs/PACs. Periphery warm & well perfused  - RESPIRATORY: Unlabored, diminished bases.   - GI: Soft, + BS  - : Voids independently with external catheter  - EXTREMITIES: Generalized trace edema, distal signals present  - SKIN: Warm and dry  - PSYCHIATRIC: Calm and cooperative    Daily Risk Screen  N/A    Images: Reviewed    Invasive Hemodynamics:    Most Recent Range Past 24hrs   BP (Art) 87/61 Arterial Line BP 1  Min: 86/55  Max: 130/68   MAP(Art) 70 mmHg Arterial Line MAP 1 (mmHg)   Min: 63 mmHg  Max: 87 mmHg     Assessment/Plan   Fahad Meraz is a 69 M with PMHx of CAD s/p CABG x 4 (2012) and PCI (LCx/OM; 5/2019), stage D ICM HFrEF LVEF 10-15%, AF s/p RFA (PVI and CTI; 4/2024), HTN, pre-T2DM A1c 6.3, depression, anxiety, and VIV using CPAP who was admitted to OSH s/p RHC on 10/18/24 with decompensated heart failure and KENYA. He was referred to [facility] HFICU 10/24/24 and was medically optomized and presented to [employer] 11/5 who approved destination therapy LVAD.  He is now admitted to the CTICU s/p Heartmate 3 implant with the outflow to the mid descending thoracic aorta via left thoracotomy 11/12/24 with Dr. Mclain and Dr. Madrigal.    NEURO: PMH of anxiety & depression. Previous BAT called for nystagmus. CTH negative for acute process, neuro signed off. Acute post operative pain acceptably controlled. CAM positive with hyperactive to hypoactive delirium but improving. Reporting improved sleep. He is moderately deconditioned, getting OOB to chair daily. -->    - Serial neuro and pain  assessments   - Continue scheduled Tylenol   - Continue home escitalopram 10mg daily   - PT Consult, OOB to chair as tolerated, ambulate today  - CAM ICU score qshift  - Sleep/wake cycle hygiene  - Continue thiamine 500mg q8h until 11/21  - Continue melatonin 5mg at bedtime for sleep  - REST Protocol with labs & CXR at 7am     CV: Patient has a history of CAD s/p CABG x 4 (2012) and PCI (LCx/OM; 5/2019), stage D ICM HFrEF LVEF 10-15%, AF s/p RFA (PVI and CTI; 4/2024), HTN, and is now status post HM3 LVAD via left thoracotomy 11/12 with Abu Angel and Elgudin. Pre/Post EF: 15-20% & dilated RV. Ramp study on 11/18 with increased RPMs to 5200 from 5100. Remains on milrinone 0.25mcg/kg/min. Sinus tachy with PVCs. No PPM or pacer wires. Normotensive. -->  - Maintain goal MAP 70-80mmHg, avoid sustained MAPs > 90mmHg as flows decrease and PI increases    - Continue milrinone infusion 0.25 mcg/kg/min    - Continue digoxin 0.125mg daily per heart failure recommendations  - Daily weights per LVAD protocol   - Weekly LVAD dressing changes  - Continue ASA 81mg daily    - Hold pre-op hydral, isosorbide, entresto, metoprolol xl 50mg, spironolactone.    - Discuss statin initiation with heart failure team     PULM: PMHx VIV on CPAP at home. Adequate oxygenation on room air. Morning CXR with improving pulmonary edema. -->  - Daily CXR  - Resume home nocturnal CPAP  - Wean FiO2 maintaining SpO2 >92%.   - IS q1h and encourage OOB and ambulation today     GI: PMH of GERD on PPI at home. Malnutrition with poor PO intake pre and post-op. Stool x3 in last 24 hours. -->  - Continue PPI for GERD, LVAD bleeding risk   - Continue adult regular diet with supplements  - Hold Colace/senna BID and change miralax to as needed  - Daily LFT's while monitoring for venous congestion     : CSA-KENYA Risk Score pre-op 6, ICU admit 6. He did have KENYA on CKD in recent weeks that improved to 1.3 pre-op (baseline creatinine 1.3 - 1.6). Creatinine stable  1.7-1.8 post op. Last 24 hours net -2.6L. Mild hyponatremia resolved with Na 138 this morning. He appears grossly euvolemic on exam. -->  - RFP as clinically indicated  - Replete electrolytes per CTICU protocol  - No diuresis this morning. Reassess this afternoon  - Goal net even to negative 500     ENDO: PMH of pre-DM with recent A1c: 6.3. Euglycemic. -->  - Goal BG <180     HEME: Acute blood loss anemia stable. INR 1.8. -->  - Daily CBC  - Increase warfarin to 5mg daily (11/19 - ) for goal INR 2-3.  (PT previously attributed dizziness to apixaban)   - Continue SCDs for DVT prophylaxis  - Start systemic heparin  - Last type and screen: 11/17     ID: Afebrile, no leukocytosis. S/P Periop cefazolin, vanco, fluconazole x 48hrs per LVAD protocol. Most recent cultures negative. -->  - Trend temp q4h  - Discontinue vanc/zosyn     Skin: No active skin issues. -->  - preventative Mepilex dressings in place on sacrum and heels  - change preventative Mepilex weekly or more frequently as indicated (when moist/soiled)   - every shift skin assessment per nursing and weekly ICU skin rounds  - moisture barrier to be applied with ricky care  - active skin problems addressed with nursing on daily rounds     Proph:  SCDs  PPI  Systemic heparin     G: Line  Left radial arterial line 11/16  PIVs, PIVs x 2      F: Family: updated at bedside.    Restraints: Not indicated.    Code status: Full Code    I personally spent 38 minutes of critical care time directly and personally managing the patient exclusive of separately billable procedures.     A,B,C,D,E,F,G: reviewed    Discussed with Dr. Aguilar.  Dispo: Transfer to HFICU today.  CTICU TEAM PHONE [phone]

## 2024-11-19 NOTE — SIGNIFICANT EVENT
Fahad Meraz is a 69 M with PMHx of CAD s/p CABG x 4 (2012) and PCI (LCx/OM; 5/2019), stage D ICM HFrEF LVEF 10-15%, AF s/p RFA (PVI and CTI; 4/2024), HTN, pre-T2DM A1c 6.3, depression, anxiety, and VIV using CPAP who was admitted to OS s/p C on 10/18/24 with decompensated heart failure and KENYA. He was referred to [facility] HFICU 10/24/24 and was medically optomized and presented to [employer] 11/5 who approved destination therapy LVAD.  [facility] 3 implant with the outflow to the mid descending thoracic aorta via left thoracotomy 11/12/24 with Dr. Mclain and Dr. Madrigal. Post op course complicated by delirium, possible TIA, and KENYA. Now transferred back to HFICU on 11/19 for further care.      NEURO:   #Hx of anxiety and depression  #Acute post-operative pain (resolving)  #CAM positive with hyperactive to hypoactive delirium (Improving)  - Serial neuro and pain assessments   - Continue scheduled Tylenol   - Continue home escitalopram 10mg daily   - PT Consult, OOB to chair as tolerated, ambulate today  - CAM ICU score qshift  - Sleep/wake cycle hygiene  - Continue thiamine 500mg q8h until 11/21  - Continue melatonin 5mg at bedtime for sleep  - REST Protocol with labs & CXR at 7am     CV:   #PMHx CAD s/p CABG x 4 (2012) and PCI (LCx/OM; 5/2019), stage D ICM HFrEF LVEF 10-15%, AF s/p RFA (PVI and CTI; 4/2024), HTN  #S/p HM3 LVAD via left thoracotomy 11/12 with Jas Briseno.   - Pre/Post EF: 15-20% & dilated RV  - Maintain goal MAP 70-80mmHg, avoid sustained MAPs > 90mmHg as flows decrease and PI increases    - Continue milrinone infusion 0.25 mcg/kg/min  -> initiate sildenafil if BP tolerates later this week   - Continue heparin infusion for coumadin bridge   - Continue digoxin 0.125mg daily, collect level 11/20 AM ordered   - Daily weights   - Continue ASA 81mg daily      Daily VAD Interrogation     Device: HM III  DT  Speed: 5200 (adjusted 11/18  during RAMP study)  Power:   Flow: 4.7  PI: 2.4  Hematocrit set at: 31%  LDH 11/19: 213  INR 11/19: 1.8    Medications:  ASA  Coumadin dose 5 mg (increased on 11/19 from 4 mg)   PPI    Weekly dressing change     PULM:   #VIV on CPAP at home  - Daily CXR  - Resume home nocturnal CPAP  - Maintaining SpO2 >92%.   - IS q1h and encourage OOB and ambulation daily     GI:   #GERD  #Malnutrition d/t poor PO intake  - Continue PPI for GERD, LVAD bleeding risk   - Continue adult regular diet with supplements  - Hold Colace/senna BID and change miralax to as needed     :   #KENYA on CKD (baseline Cr 1.3-1.6)  - Strict I&Os  - No diuresis this morning. Reassess this afternoon  - Avoid hypotension and nephrotoxic agents      ENDO:   #PMH of pre-DM with recent A1c: 6.3  - Euglycemic   - Goal BG <180     HEME:   #Acute blood loss anemia   #Iron deficiency anemia   #Chronic anticoagulation   - Increase warfarin to 5mg daily (11/19 ) for goal INR 2-3   - Continue heparin gtt for coumadin bridge  - SCDs for DVT prophylaxis     ID:   - Afebrile, no leukocytosis  - S/p Periop cefazolin, vanco, fluconazole x 48hrs per LVAD protocol.   - Trend temp q4h  - Discontinue vanc/zosyn (11/16-11/17) was started for SIRS/worsening delirium    PHYSICAL AND OCCUPATIONAL THERAPY: Ordered     LINES:  PIVs   L radial jayson 11/16     DVT: Heparin gtt, coumadin, SCDs  VAP BUNDLE: NA  CENTRAL LINE BUNDLE: NA  ULCER PPX: Pantoprazole 40 mg daily  GLYCEMIC CONTROL: NA - A1C: 6.3  BOWEL CARE: Senna / Miralax on hold d/t multiple BMs  INDWELLING CATHETER: NA  NUTRITION: Adult diet Regular; 2000 mL fluid        EMERGENCY CONTACT: Extended Emergency Contact Information  Primary Emergency Contact: Tanvi Meraz  Home Phone: [phone]  Relation: Spouse  Secondary Emergency Contact: Subhash Meraz  Mobile Phone: [phone]  Relation: Son  FAMILY UPDATE: Family at bedside   CODE STATUS: Full Code  DISPO: Transfer to HFICU     Patient seen and assessed with   Margret STEEN personally spent 60 minutes of critical care time directly and personally managing the patient exclusive of separately billable procedures   _________________________________________________  Thuy Colunga, APRN-CNP

## 2024-11-19 NOTE — PROGRESS NOTES
Social Work Note   HFICU Treatment Plan: S/p LVAD returned to HFICU today o IV milirinone and heparin  - Additional information: none at this time  -Support: spouse, Tanvi is NOK  - Payee: Christal and Jayson   -Planned Disposition: Rehab recommendation - High intensity   - Barriers to discharge: None noted at this time.   Gris Ramey, LETICIAA, LSW

## 2024-11-20 ENCOUNTER — APPOINTMENT (OUTPATIENT)
Dept: RADIOLOGY | Facility: HOSPITAL | Age: 69
DRG: 001 | End: 2024-11-20
Payer: MEDICARE

## 2024-11-20 LAB
ABO GROUP (TYPE) IN BLOOD: NORMAL
ALBUMIN SERPL BCP-MCNC: 3.3 G/DL (ref 3.4–5)
ANION GAP BLDA CALCULATED.4IONS-SCNC: 6 MMO/L (ref 10–25)
ANION GAP SERPL CALC-SCNC: 12 MMOL/L (ref 10–20)
ANTIBODY SCREEN: NORMAL
APTT PPP: 35 SECONDS (ref 27–38)
BACTERIA BLD CULT: NORMAL
BASE EXCESS BLDA CALC-SCNC: 0.7 MMOL/L (ref -2–3)
BASOPHILS # BLD AUTO: 0.12 X10*3/UL (ref 0–0.1)
BASOPHILS NFR BLD AUTO: 1.1 %
BODY TEMPERATURE: 37 DEGREES CELSIUS
BUN SERPL-MCNC: 30 MG/DL (ref 6–23)
CA-I BLDA-SCNC: 1.08 MMOL/L (ref 1.1–1.33)
CALCIUM SERPL-MCNC: 8.6 MG/DL (ref 8.6–10.6)
CHLORIDE BLDA-SCNC: 109 MMOL/L (ref 98–107)
CHLORIDE SERPL-SCNC: 101 MMOL/L (ref 98–107)
CO2 SERPL-SCNC: 28 MMOL/L (ref 21–32)
CREAT SERPL-MCNC: 1.68 MG/DL (ref 0.5–1.3)
DIGOXIN SERPL-MCNC: 0.38 NG/ML (ref 0.8–?)
EGFRCR SERPLBLD CKD-EPI 2021: 44 ML/MIN/1.73M*2
EOSINOPHIL # BLD AUTO: 0.45 X10*3/UL (ref 0–0.7)
EOSINOPHIL NFR BLD AUTO: 4.2 %
ERYTHROCYTE [DISTWIDTH] IN BLOOD BY AUTOMATED COUNT: 15.9 % (ref 11.5–14.5)
GLUCOSE BLDA-MCNC: 93 MG/DL (ref 74–99)
GLUCOSE SERPL-MCNC: 96 MG/DL (ref 74–99)
HCO3 BLDA-SCNC: 23.6 MMOL/L (ref 22–26)
HCT VFR BLD AUTO: 31.7 % (ref 41–52)
HCT VFR BLD EST: 32 % (ref 41–52)
HGB BLD-MCNC: 10.5 G/DL (ref 13.5–17.5)
HGB BLDA-MCNC: 10.5 G/DL (ref 13.5–17.5)
IMM GRANULOCYTES # BLD AUTO: 0.46 X10*3/UL (ref 0–0.7)
IMM GRANULOCYTES NFR BLD AUTO: 4.3 % (ref 0–0.9)
INHALED O2 CONCENTRATION: 21 %
INR PPP: 1.9 (ref 0.9–1.1)
LACTATE BLDA-SCNC: 0.4 MMOL/L (ref 0.4–2)
LDH SERPL L TO P-CCNC: 218 U/L (ref 84–246)
LYMPHOCYTES # BLD AUTO: 0.92 X10*3/UL (ref 1.2–4.8)
LYMPHOCYTES NFR BLD AUTO: 8.6 %
MAGNESIUM SERPL-MCNC: 1.99 MG/DL (ref 1.6–2.4)
MCH RBC QN AUTO: 27 PG (ref 26–34)
MCHC RBC AUTO-ENTMCNC: 33.1 G/DL (ref 32–36)
MCV RBC AUTO: 82 FL (ref 80–100)
MONOCYTES # BLD AUTO: 0.68 X10*3/UL (ref 0.1–1)
MONOCYTES NFR BLD AUTO: 6.4 %
NEUTROPHILS # BLD AUTO: 8.03 X10*3/UL (ref 1.2–7.7)
NEUTROPHILS NFR BLD AUTO: 75.4 %
NRBC BLD-RTO: 0 /100 WBCS (ref 0–0)
OXYHGB MFR BLDA: 92.5 % (ref 94–98)
PCO2 BLDA: 31 MM HG (ref 38–42)
PH BLDA: 7.49 PH (ref 7.38–7.42)
PHOSPHATE SERPL-MCNC: 3.6 MG/DL (ref 2.5–4.9)
PLATELET # BLD AUTO: 465 X10*3/UL (ref 150–450)
PO2 BLDA: 68 MM HG (ref 85–95)
POTASSIUM BLDA-SCNC: 3.5 MMOL/L (ref 3.5–5.3)
POTASSIUM SERPL-SCNC: 4.4 MMOL/L (ref 3.5–5.3)
PROTHROMBIN TIME: 21.7 SECONDS (ref 9.8–12.8)
RBC # BLD AUTO: 3.89 X10*6/UL (ref 4.5–5.9)
RH FACTOR (ANTIGEN D): NORMAL
SAO2 % BLDA: 96 % (ref 94–100)
SODIUM BLDA-SCNC: 135 MMOL/L (ref 136–145)
SODIUM SERPL-SCNC: 137 MMOL/L (ref 136–145)
UFH PPP CHRO-ACNC: 0.1 IU/ML
UFH PPP CHRO-ACNC: 0.1 IU/ML
UFH PPP CHRO-ACNC: <0.1 IU/ML
UFH PPP CHRO-ACNC: <0.1 IU/ML
WBC # BLD AUTO: 10.7 X10*3/UL (ref 4.4–11.3)

## 2024-11-20 PROCEDURE — 2500000004 HC RX 250 GENERAL PHARMACY W/ HCPCS (ALT 636 FOR OP/ED): Performed by: NURSE PRACTITIONER

## 2024-11-20 PROCEDURE — 93750 INTERROGATION VAD IN PERSON: CPT | Performed by: SPECIALIST

## 2024-11-20 PROCEDURE — 97116 GAIT TRAINING THERAPY: CPT | Mod: GP

## 2024-11-20 PROCEDURE — 85610 PROTHROMBIN TIME: CPT

## 2024-11-20 PROCEDURE — 93010 ELECTROCARDIOGRAM REPORT: CPT | Performed by: INTERNAL MEDICINE

## 2024-11-20 PROCEDURE — 2500000002 HC RX 250 W HCPCS SELF ADMINISTERED DRUGS (ALT 637 FOR MEDICARE OP, ALT 636 FOR OP/ED): Performed by: STUDENT IN AN ORGANIZED HEALTH CARE EDUCATION/TRAINING PROGRAM

## 2024-11-20 PROCEDURE — 2500000004 HC RX 250 GENERAL PHARMACY W/ HCPCS (ALT 636 FOR OP/ED)

## 2024-11-20 PROCEDURE — 85025 COMPLETE CBC W/AUTO DIFF WBC: CPT

## 2024-11-20 PROCEDURE — 37799 UNLISTED PX VASCULAR SURGERY: CPT

## 2024-11-20 PROCEDURE — 80162 ASSAY OF DIGOXIN TOTAL: CPT

## 2024-11-20 PROCEDURE — 83615 LACTATE (LD) (LDH) ENZYME: CPT

## 2024-11-20 PROCEDURE — 71045 X-RAY EXAM CHEST 1 VIEW: CPT

## 2024-11-20 PROCEDURE — 80069 RENAL FUNCTION PANEL: CPT

## 2024-11-20 PROCEDURE — 99497 ADVNCD CARE PLAN 30 MIN: CPT

## 2024-11-20 PROCEDURE — 85520 HEPARIN ASSAY: CPT

## 2024-11-20 PROCEDURE — 99291 CRITICAL CARE FIRST HOUR: CPT | Performed by: SPECIALIST

## 2024-11-20 PROCEDURE — 97530 THERAPEUTIC ACTIVITIES: CPT | Mod: GP

## 2024-11-20 PROCEDURE — 2020000001 HC ICU ROOM DAILY

## 2024-11-20 PROCEDURE — 2500000001 HC RX 250 WO HCPCS SELF ADMINISTERED DRUGS (ALT 637 FOR MEDICARE OP)

## 2024-11-20 PROCEDURE — 83735 ASSAY OF MAGNESIUM: CPT

## 2024-11-20 PROCEDURE — 99233 SBSQ HOSP IP/OBS HIGH 50: CPT

## 2024-11-20 PROCEDURE — 2500000001 HC RX 250 WO HCPCS SELF ADMINISTERED DRUGS (ALT 637 FOR MEDICARE OP): Performed by: NURSE PRACTITIONER

## 2024-11-20 PROCEDURE — 86901 BLOOD TYPING SEROLOGIC RH(D): CPT

## 2024-11-20 PROCEDURE — 84132 ASSAY OF SERUM POTASSIUM: CPT | Performed by: NURSE PRACTITIONER

## 2024-11-20 RX ORDER — LANOLIN ALCOHOL/MO/W.PET/CERES
800 CREAM (GRAM) TOPICAL 2 TIMES DAILY
Status: DISCONTINUED | OUTPATIENT
Start: 2024-11-20 | End: 2024-12-03 | Stop reason: HOSPADM

## 2024-11-20 RX ORDER — LANOLIN ALCOHOL/MO/W.PET/CERES
800 CREAM (GRAM) TOPICAL DAILY
Status: DISCONTINUED | OUTPATIENT
Start: 2024-11-20 | End: 2024-11-20

## 2024-11-20 ASSESSMENT — PAIN - FUNCTIONAL ASSESSMENT
PAIN_FUNCTIONAL_ASSESSMENT: 0-10

## 2024-11-20 ASSESSMENT — COGNITIVE AND FUNCTIONAL STATUS - GENERAL
STANDING UP FROM CHAIR USING ARMS: A LITTLE
WALKING IN HOSPITAL ROOM: A LITTLE
MOVING TO AND FROM BED TO CHAIR: A LITTLE
TURNING FROM BACK TO SIDE WHILE IN FLAT BAD: A LITTLE
MOBILITY SCORE: 17
MOVING FROM LYING ON BACK TO SITTING ON SIDE OF FLAT BED WITH BEDRAILS: A LITTLE
CLIMB 3 TO 5 STEPS WITH RAILING: A LOT

## 2024-11-20 ASSESSMENT — PAIN SCALES - GENERAL
PAINLEVEL_OUTOF10: 0 - NO PAIN

## 2024-11-20 NOTE — PROGRESS NOTES
Physical Therapy    Physical Therapy Treatment    Patient Name: Fahad Meraz  MRN: [id number]  Department: [facility] HFICU  Room: 08/08-A  Today's Date: 11/20/2024  Time Calculation  Start Time: 1453  Stop Time: 1531  Time Calculation (min): 38 min    Assessment/Plan   PT Assessment  Barriers to Discharge: medical acuity  End of Session Communication: Bedside nurse  End of Session Patient Position: Bed, 3 rail up, Alarm off, not on at start of session  PT Plan  Inpatient/Swing Bed or Outpatient: Inpatient  PT Plan  Treatment/Interventions: Transfer training, Bed mobility, Gait training, Balance training, Neuromuscular re-education, Strengthening, Endurance training, Therapeutic exercise, Therapeutic activity  PT Plan: Ongoing PT  PT Frequency: Daily  PT Discharge Recommendations: High intensity level of continued care  Equipment Recommended upon Discharge: Wheeled walker  PT Recommended Transfer Status: Assist x1  PT - OK to Discharge: Yes    General Visit Information:   PT  Visit  PT Received On: 11/20/24  Response to Previous Treatment: Patient with no complaints from previous session.  General  Family/Caregiver Present: No  Prior to Session Communication: Bedside nurse  Patient Position Received: Bed, 3 rail up, Alarm off, not on at start of session  General Comment: Pt pleasantly confused throughout session. Pt has an LVAD-Heartmate III (driveline secured). Milrinone gtt.    Subjective     Precautions:  Precautions  Hearing/Visual Limitations: hearing is WFL  Medical Precautions: Cardiac precautions, Fall precautions  Precautions Comment: MAP 70-80, SpO2>92    VSS     Objective     Pain:  Pain Assessment  Pain Assessment: 0-10  0-10 (Numeric) Pain Score: 0 - No pain    Cognition:  Cognition  Overall Cognitive Status: Impaired  Arousal/Alertness: Appropriate responses to stimuli  Orientation Level:  (oriented to self, place, month and year. Pt consistently not remembering the date. Having difficulty with  dreams/delusions that seem real to him. RN is aware.)  Following Commands: Follows one step commands without difficulty  Impulsive: Moderately    Activity Tolerance:  Activity Tolerance  Early Mobility/Exercise Safety Screen: Proceed with mobilization - No exclusion criteria met    Treatments:  Therapeutic Activity  Therapeutic Activity Performed: Yes  Therapeutic Activity 1: Pt able to perform battery power change with frequent cues for steps of transfer but able to perform physical aspects without assistance.    Bed Mobility  Bed Mobility: Yes  Bed Mobility 1  Bed Mobility 1: Supine to sitting  Level of Assistance 1: Moderate assistance (x1 person; cues for sequence/log roll)  Bed Mobility Comments 1: HOB elevated  Bed Mobility 2  Bed Mobility  2: Sitting to supine  Level of Assistance 2: Moderate assistance (x1 person; cues for log roll)  Bed Mobility Comments 2: HOB flat, assist for LEs onto bed    Ambulation/Gait Training  Ambulation/Gait Training Performed: Yes  Ambulation/Gait Training 1  Surface 1: Level tile  Device 1: Rolling walker  Assistance 1:  (CGA-minAx1 with occasional sway and unsafe use of walker)  Quality of Gait 1: Narrow base of support, Forward flexed posture  Comments/Distance (ft) 1: 100 ft, limited by needing to urinate  Transfers  Transfer: Yes  Transfer 1  Transfer From 1: Sit to  Transfer to 1: Stand  Technique 1: Sit to stand  Transfer Device 1: Walker  Transfer Level of Assistance 1: Minimum assistance (x1 person; cues for hand placement)  Transfers 2  Transfer From 2: Stand to  Transfer to 2: Sit  Technique 2: Stand to sit  Transfer Device 2: Walker  Transfer Level of Assistance 2: Contact guard (cues for hand placement)    Outcome Measures:  [facility] Basic Mobility  Turning from your back to your side while in a flat bed without using bedrails: A little  Moving from lying on your back to sitting on the side of a flat bed without using bedrails: A little  Moving to and from bed to chair  (including a wheelchair): A little  Standing up from a chair using your arms (e.g. wheelchair or bedside chair): A little  To walk in hospital room: A little  Climbing 3-5 steps with railing: A lot  Basic Mobility - Total Score: 17    Confusion Assessment Method-ICU (CAM-ICU)  Feature 1: Acute Onset or Fluctuating Course: Positive    FSS-ICU  Ambulation: Walks >/ or equal to 50 feet with any assistance x1  Rolling: Minimal assistance (performs 75% or more of task)  Sitting: Supervision or set-up only  Transfer Sit-to-Stand: Minimal assistance (performs 75% or more of task)  Transfer Supine-to-Sit: Moderate assistance (performs 50 - 74% of task)  Total Score: 18    Early Mobility/Exercise Safety Screen: Proceed with mobilization - No exclusion criteria met  ICU Mobility Scale: Walking with assistance of 1 person [8]  E = Exercise and Early Mobility  Early Mobility/Exercise Safety Screen: Proceed with mobilization - No exclusion criteria met  ICU Mobility Scale: Walking with assistance of 1 person    Education Documentation  Mobility Training, taught by Amanda Bain PT at 11/20/2024  3:59 PM.  Learner: Patient  Readiness: Acceptance  Method: Explanation  Response: Needs Reinforcement  Comment: mobility precautions, MITT precautions    Education Comments  No comments found.      Encounter Problems       Encounter Problems (Active)       Balance       Pt will score >45 on VEGA for safe community ambulation (Progressing)       Start:  10/29/24    Expected End:  11/28/24               Mobility       Patient will ambulate >1000 ft with LRAD and supervision With stable vitals and RPD </= 3/10 and RPE </= 13/20 for improved functional mobility.   (Met)       Start:  10/29/24    Expected End:  11/12/24    Resolved:  11/05/24    Updated to: Patient will ambulate >2500 ft with LRAD and supervision With stable vitals and RPD </= 3/10 and RPE </= 13/20 for improved functional mobility.    Update reason: Progression          Pt will complete 6MWT with no assistive device and supervision and achieve >700 ft With stable vitals and RPD </= 3/10 and RPE </= 13/20 for improved functional mobility.   (Met)       Start:  10/29/24    Expected End:  11/12/24    Resolved:  11/05/24    Updated to: Pt will complete 6MWT with no assistive device and supervision and achieve >1300 ft With stable vitals and RPD </= 3/10 and RPE </= 13/20 for improved functional mobility.    Update reason: Progression         Patient will ambulate >2500 ft with LRAD and supervision With stable vitals and RPD </= 3/10 and RPE </= 13/20 for improved functional mobility. (Progressing)       Start:  11/05/24    Expected End:  11/28/24                Pt will complete 6MWT with no assistive device and supervision and achieve >1300 ft With stable vitals and RPD </= 3/10 and RPE </= 13/20 for improved functional mobility. (Progressing)       Start:  11/05/24    Expected End:  11/28/24                   PT Transfers       Patient will perform bed mobility indep (maintenance 2/2 prolonged hospital stay anticipated) (Progressing)       Start:  10/29/24    Expected End:  11/28/24            Patient will transfer sit to and from stand indep (Progressing)       Start:  10/29/24    Expected End:  11/28/24            Pt will complete 5XSTS from recliner without UE assist <12 seconds With stable vitals and RPD </= 3/10 and RPE </= 13/20 for improved functional mobility.   (Progressing)       Start:  10/29/24    Expected End:  11/28/24               Pain - Adult            Signed by Amanda Bain DPT

## 2024-11-20 NOTE — PROGRESS NOTES
Fahad Meraz is a 69 y.o. male on day 27 of admission presenting with Acute on chronic heart failure with reduced ejection fraction and diastolic dysfunction.    Palliative Medicine following for:  Complex medical decision making, symptom management, patient/family support    History obtained from chart review including ED note, H&P, patient's daily progress notes, review of lab/test results, and discussion with primary team and bedside RN.    Subjective    History of Present Illness  Mr Fahad Meraz is a 69 M with PMHx of CAD s/p CABG x 4 (2012) and PCI (LCx/OM; 5/2019), stage D ICM HFrEF LVEF 10-15%, AF s/p RFA (PVI and CTI; 4/2024), HTN, dyslipidemia, pre-T2DM A1c 6.3, depression, anxiety, and VIV using CPAP who was admitted to OSH s/p RHC on 10/18/24 with decompensated heart failure and KENYA. He was referred to [facility] HFICU 10/24/24 and was medically optomized and presented to [employer] [facility] 11/5 who approved destination therapy LVAD.  He is now admitted to the CTICU s/p Heartmate 3 implant with the outflow to the mid descending thoracic aorta via left thoracotomy 11/12/24 with Dr. Mclain and Dr. Madrigal.    11/12 - Intra-op, episode of pulseless VT requiring defib x 1 prior to being placed on CPB. In the ICU (post-op), significant bleeding from venous cannulation site requiring transfusion. Required volume administration and ongoing inotropic and vasopressor support with LVAD stable flows.       Symptoms  Pain: driveline pain  Dyspnea: denies  Fatigue: denies  Insomnia: finally slept well that last 2 nights  Drowsiness: denies  Constipation: denies  Nausea: denies  Appetite: good  Anxiety: denies  Depression: denies    Objective    Last Recorded Vitals  BP 74/53   Pulse 96   Temp 36.5 °C (97.7 °F) (Temporal)   Resp 15   Ht 1.829 m (6')   Wt 89.9 kg (198 lb 3.1 oz)   SpO2 98%   BMI 26.88 kg/m²      Physical Exam  Constitutional:       Appearance: He is normal weight.   HENT:       Head: Normocephalic.      Mouth/Throat:      Mouth: Mucous membranes are moist.      Pharynx: Oropharynx is clear.   Cardiovascular:      Rate and Rhythm: Tachycardia present. Rhythm irregular.   Pulmonary:      Effort: Pulmonary effort is normal.      Breath sounds: Examination of the left-upper field reveals decreased breath sounds. Examination of the left-middle field reveals decreased breath sounds. Decreased breath sounds present.   Abdominal:      General: Bowel sounds are normal.      Palpations: Abdomen is soft.   Skin:     General: Skin is warm and dry.   Neurological:      Mental Status: He is alert. He is disoriented.   Psychiatric:         Mood and Affect: Mood normal.         Behavior: Behavior normal.          Relevant Results   Results for orders placed or performed during the hospital encounter of 10/24/24 (from the past 24 hours)   Renal function panel   Result Value Ref Range    Glucose 143 (H) 74 - 99 mg/dL    Sodium 138 136 - 145 mmol/L    Potassium 3.9 3.5 - 5.3 mmol/L    Chloride 100 98 - 107 mmol/L    Bicarbonate 31 21 - 32 mmol/L    Anion Gap 11 10 - 20 mmol/L    Urea Nitrogen 33 (H) 6 - 23 mg/dL    Creatinine 1.81 (H) 0.50 - 1.30 mg/dL    eGFR 40 (L) >60 mL/min/1.73m*2    Calcium 8.8 8.6 - 10.6 mg/dL    Phosphorus 3.0 2.5 - 4.9 mg/dL    Albumin 3.2 (L) 3.4 - 5.0 g/dL   Type And Screen   Result Value Ref Range    ABO TYPE A     Rh TYPE POS     ANTIBODY SCREEN NEG    Digoxin   Result Value Ref Range    Digoxin  0.38 (L) 0.80 - <2.00 ng/mL   CBC and Auto Differential   Result Value Ref Range    WBC 10.7 4.4 - 11.3 x10*3/uL    nRBC 0.0 0.0 - 0.0 /100 WBCs    RBC 3.89 (L) 4.50 - 5.90 x10*6/uL    Hemoglobin 10.5 (L) 13.5 - 17.5 g/dL    Hematocrit 31.7 (L) 41.0 - 52.0 %    MCV 82 80 - 100 fL    MCH 27.0 26.0 - 34.0 pg    MCHC 33.1 32.0 - 36.0 g/dL    RDW 15.9 (H) 11.5 - 14.5 %    Platelets 465 (H) 150 - 450 x10*3/uL    Neutrophils % 75.4 40.0 - 80.0 %    Immature Granulocytes %, Automated 4.3 (H)  0.0 - 0.9 %    Lymphocytes % 8.6 13.0 - 44.0 %    Monocytes % 6.4 2.0 - 10.0 %    Eosinophils % 4.2 0.0 - 6.0 %    Basophils % 1.1 0.0 - 2.0 %    Neutrophils Absolute 8.03 (H) 1.20 - 7.70 x10*3/uL    Immature Granulocytes Absolute, Automated 0.46 0.00 - 0.70 x10*3/uL    Lymphocytes Absolute 0.92 (L) 1.20 - 4.80 x10*3/uL    Monocytes Absolute 0.68 0.10 - 1.00 x10*3/uL    Eosinophils Absolute 0.45 0.00 - 0.70 x10*3/uL    Basophils Absolute 0.12 (H) 0.00 - 0.10 x10*3/uL   Renal function panel   Result Value Ref Range    Glucose 96 74 - 99 mg/dL    Sodium 137 136 - 145 mmol/L    Potassium 4.4 3.5 - 5.3 mmol/L    Chloride 101 98 - 107 mmol/L    Bicarbonate 28 21 - 32 mmol/L    Anion Gap 12 10 - 20 mmol/L    Urea Nitrogen 30 (H) 6 - 23 mg/dL    Creatinine 1.68 (H) 0.50 - 1.30 mg/dL    eGFR 44 (L) >60 mL/min/1.73m*2    Calcium 8.6 8.6 - 10.6 mg/dL    Phosphorus 3.6 2.5 - 4.9 mg/dL    Albumin 3.3 (L) 3.4 - 5.0 g/dL   Magnesium   Result Value Ref Range    Magnesium 1.99 1.60 - 2.40 mg/dL   Lactate Dehydrogenase   Result Value Ref Range     84 - 246 U/L   Coagulation Screen   Result Value Ref Range    Protime 21.7 (H) 9.8 - 12.8 seconds    INR 1.9 (H) 0.9 - 1.1    aPTT 35 27 - 38 seconds   Heparin Assay, UFH   Result Value Ref Range    Heparin Unfractionated <0.1 See Comment Below for Therapeutic Ranges IU/mL   BLOOD GAS ARTERIAL FULL PANEL   Result Value Ref Range    POCT pH, Arterial 7.49 (H) 7.38 - 7.42 pH    POCT pCO2, Arterial 31 (L) 38 - 42 mm Hg    POCT pO2, Arterial 68 (L) 85 - 95 mm Hg    POCT SO2, Arterial 96 94 - 100 %    POCT Oxy Hemoglobin, Arterial 92.5 (L) 94.0 - 98.0 %    POCT Hematocrit Calculated, Arterial 32.0 (L) 41.0 - 52.0 %    POCT Sodium, Arterial 135 (L) 136 - 145 mmol/L    POCT Potassium, Arterial 3.5 3.5 - 5.3 mmol/L    POCT Chloride, Arterial 109 (H) 98 - 107 mmol/L    POCT Ionized Calcium, Arterial 1.08 (L) 1.10 - 1.33 mmol/L    POCT Glucose, Arterial 93 74 - 99 mg/dL    POCT Lactate,  Arterial 0.4 0.4 - 2.0 mmol/L    POCT Base Excess, Arterial 0.7 -2.0 - 3.0 mmol/L    POCT HCO3 Calculated, Arterial 23.6 22.0 - 26.0 mmol/L    POCT Hemoglobin, Arterial 10.5 (L) 13.5 - 17.5 g/dL    POCT Anion Gap, Arterial 6 (L) 10 - 25 mmo/L    Patient Temperature 37.0 degrees Celsius    FiO2 21 %   Heparin Assay, UFH   Result Value Ref Range    Heparin Unfractionated <0.1 See Comment Below for Therapeutic Ranges IU/mL   Heparin Assay, UFH   Result Value Ref Range    Heparin Unfractionated 0.1 See Comment Below for Therapeutic Ranges IU/mL        Allergies  Eliquis [apixaban] and Jardiance [empagliflozin]  Medications  Scheduled medications  acetaminophen, 975 mg, oral, q8h  aspirin, 81 mg, oral, Daily  escitalopram, 10 mg, oral, Daily  magnesium oxide, 800 mg, oral, Daily  pantoprazole, 40 mg, oral, Daily before breakfast  [Held by provider] sennosides-docusate sodium, 1 tablet, oral, BID  thiamine, 500 mg, intravenous, q8h  warfarin, 5 mg, oral, Daily      Continuous medications  heparin, 0-4,000 Units/hr, Last Rate: 1,300 Units/hr (11/20/24 0928)  milrinone, 0.25 mcg/kg/min, Last Rate: 0.25 mcg/kg/min (11/20/24 0902)      PRN medications  PRN medications: acetaminophen, alteplase, naloxone, ondansetron **OR** ondansetron, polyethylene glycol     Assessment/Plan    Mr Fahad Meraz is a 69 M with PMHx of CAD s/p CABG x 4 (2012) and PCI (LCx/OM; 5/2019), stage D ICM HFrEF LVEF 10-15%, AF s/p RFA (PVI and CTI; 4/2024), HTN, dyslipidemia, pre-T2DM A1c 6.3, depression, anxiety, and VIV using CPAP who was admitted to OSH s/p RHC on 10/18/24 with decompensated heart failure and KENYA. He was referred to [facility] HFICU 10/24/24 and was medically optomized and presented to [employer] 11/5 who approved destination therapy LVAD.  [facility] Heartmate 3 implant with the outflow to the mid descending thoracic aorta via left thoracotomy 11/12/24 with Dr. Mclain and Dr. Madrigal.     11/12 - Intra-op, episode of pulseless VT requiring defib x 1 prior to being placed on CPB. In the ICU (post-op), significant bleeding from venous cannulation site requiring transfusion. Required volume administration and ongoing inotropic and vasopressor support with LVAD stable flows.      11/1: Palliative consulted for pt/family support and advanced therapies eval.      11/7: Supportive visit to discuss pt's feelings on LVAD approval (vs OHT) and coping and supports. Pt very positive and motivated. Pt reports feeling great mentally and physically yet anxious about when LVAD will be placed so family can prepare.     11/13: Palliative rounded on pt s/p LVAD placement 11/12. Pt intubated/sedated, no family present. Plan to wean as medically appropriate.      11/20: Supportive visit for pt and family.       Palliative Performance Scale (PPS): 50-60     ----------------------------------------------------------------------------------------------------------------------------------------------------------------------------------------------------------------------------------------------------------------------------------------------------------------------------------------------------------------------        I spent 23 minutes in providing separately identifiable ACP services with the patient and/or surrogate decision maker in a voluntary conversation discussing the patient's wishes and goals as detailed in the above note.   ----------------------------------------------------------------------------------------------------------------------------------------------------------------------------------------------------------------------------------------------------------------------------------------------------------------------------------------------------------------------     #Complex Medical Decision Making   #Goals of Care  #Advance Care Planning   - Code status: FULL CODE  - Surrogate decision maker:  "Tanvi Meraz (Spouse)  [phone]   - Goals are mix of survival and time and improved quality of life  - 11/20: Supportive visit with pt, wife at bedside. Pt feeling well but admits to feeling some pain of the drive line after the RAMP study, and still with some confusion, although improving. Discussed his supports and coping mechanisms along with hopes/goals. Discussed his progress and AR. Received a choice from Ira, will communicate to SW. Pt's dtr works for [employer] and has a facility closer to home. Unsure if choice is SNF vs AR and if they accept LVAD pts.      #Acute pain  - Driveline tenderness, sometimes sharp \"tooth ache\" pain with certain movements. Tylenol helps. Not on opiate regimen r/t delirium.       #Psychosocial Support  - Music Therapy  - Spiritual Care Support     Plan of Care discussed with: Updated primary and bedside RN on goals of care decision, medication adjustments, and code status      Medical Decision Making was high level due to high complexity of problems, extensive data review, and high risk of management/treatment.     - HFrEF and inotrope dependent, work up for cardiac advanced therapies, s/p LVAD 11/12 with pulseless VT, posing threat to life or function.   - Reviewed external notes from   - Reviews results from  which were used in decision making for   - Recommended the following tests:   - Assessment required independent historian: primary  - Independent interpretation of test:  - Discussion of management with: primary  - Drug therapy requiring intensive monitoring for toxicity: hep, warfarin, potassium  - Decision regarding elective major surgery with identified patient or procedure risk factors:   - Decision regarding emergency major surgery:   - Decision regarding hospitalization or escalation of hospital-level care:   - Decision not to resuscitate or to de-escalate care because of poor prognosis:   - Parenteral controlled substances: milrinone, hep    Thank you for allowing " us to care for this patient. Palliative Team will continue to follow as needed. Please contact team with any questions or concerns.   Team pager [phone] (weekdays)    MAYTE Nix-CNP

## 2024-11-20 NOTE — PROGRESS NOTES
[facility]  HFICU PROGRESS NOTE    Fahad Meraz/[id number]    Admit Date: 10/24/2024  Hospital Length of Stay: 27   ICU Length of Stay: 23h   Primary Service: HFICU  Primary HF Cardiologist: Dr Washington   Referring: Dr Washington     INTERVAL EVENTS / PERTINENT ROS:     No acute events, patient remains alert and oriented x3 but CAM positive. Complains of sacrum pain, on assessment area is red and excoriated - new sacral dressing applied. Patient states he slept better last night, no VAD alarms on interrogation this AM. Had some intermittent bouts of afib with RVR overnight but remained HDS.     Plan:  Discontinue digoxin  Discontinue melatonin  Daily dressing changes for VAD - have nursing take picture in AM when it is changed next   PT/OT - OOB to chair and walk today     MEDICATIONS  Infusions:  heparin, Last Rate: 1,300 Units/hr (11/20/24 0928)  milrinone, Last Rate: 0.25 mcg/kg/min (11/20/24 0902)      Scheduled:  acetaminophen, 975 mg, q8h  aspirin, 81 mg, Daily  escitalopram, 10 mg, Daily  magnesium oxide, 800 mg, Daily  pantoprazole, 40 mg, Daily before breakfast  [Held by provider] sennosides-docusate sodium, 1 tablet, BID  thiamine, 500 mg, q8h  warfarin, 5 mg, Daily      PRN:  acetaminophen, 650 mg, q6h PRN  alteplase, 2 mg, PRN  naloxone, 0.2 mg, q5 min PRN  ondansetron, 4 mg, q8h PRN   Or  ondansetron, 4 mg, q8h PRN  polyethylene glycol, 17 g, Daily PRN      Invasive Hemodynamics:    Most Recent Range Past 24hrs   BP (Art) 104/62 Arterial Line BP 1  Min: 79/54  Max: 116/71   MAP(Art) 75 mmHg Arterial Line MAP 1 (mmHg)   Min: 61 mmHg  Max: 86 mmHg   RA/CVP   No data recorded   PA 37/17 No data recorded   PA(mean) 27 mmHg No data recorded   PCWP 13 mmHg No data recorded   CO 7.2 L/min No data recorded   CI 3.4 L/min/m2 No data recorded   Mixed Venous 70 % No data recorded   SVR  747 (dyne*sec)/cm5 No data recorded    (dyne*sec)/cm5 No data recorded     MCS:   Heart Mate  III:     Most Recent Range Past 24hrs   Flow 4.5 Pump Flow  Min: 4.1  Max: 4.6   Speed 5200 Speed RPM  Min: 5200  Max: 5250   Power 3.6    PI 3.2 Pulse Index  Min: 3.2  Max: 4.5       PHYSICAL EXAM:   Visit Vitals  BP 74/53   Pulse 96   Temp 36.5 °C (97.7 °F) (Temporal)   Resp 15   Ht 1.829 m (6')   Wt 89.9 kg (198 lb 3.1 oz)   SpO2 98%   BMI 26.88 kg/m²   Smoking Status Former   BSA 2.14 m²       Wt Readings from Last 5 Encounters:   11/20/24 89.9 kg (198 lb 3.1 oz)   10/24/24 92.5 kg (204 lb)   08/05/24 122 kg (268 lb)   01/31/22 119 kg (262 lb)   02/18/20 123 kg (270 lb 2 oz)       INTAKE/OUTPUT:  I/O last 3 completed shifts:  In: 1170.6 (13 mL/kg) [P.O.:640; I.V.:430.6 (4.8 mL/kg); IV Piggyback:100]  Out: 4200 (46.7 mL/kg) [Urine:4200 (1.3 mL/kg/hr)]  Weight: 89.9 kg      Physical Exam  Constitutional:       General: He is not in acute distress.     Appearance: Normal appearance. He is not ill-appearing.   HENT:      Mouth/Throat:      Mouth: Mucous membranes are dry.   Eyes:      Pupils: Pupils are equal, round, and reactive to light.   Cardiovascular:      Rate and Rhythm: Normal rate. Rhythm irregular.      Comments: VAD hum present on auscultation   Pulmonary:      Effort: Pulmonary effort is normal. No respiratory distress.      Breath sounds: Normal breath sounds.   Abdominal:      General: Abdomen is flat. There is no distension.      Palpations: Abdomen is soft.      Comments: Abdominal dressing C/D/I from driveline    Musculoskeletal:         General: Normal range of motion.      Cervical back: Normal range of motion.      Right lower leg: No edema.      Left lower leg: No edema.   Skin:     General: Skin is warm and dry.      Capillary Refill: Capillary refill takes less than 2 seconds.   Neurological:      Mental Status: He is alert and oriented to person, place, and time.      Comments: CAM +   Psychiatric:         Attention and Perception: He perceives visual hallucinations.          DATA:  CMP:  Results from last 7 days   Lab Units 11/20/24  0151 11/19/24  1459 11/19/24  0322 11/18/24  1149 11/18/24  0525 11/17/24  1323 11/17/24  0122 11/16/24  1358 11/16/24  0128 11/15/24  1255 11/15/24  0021 11/15/24  0020 11/14/24  1224 11/14/24  0118 11/13/24  1228   SODIUM mmol/L 137 138 138  --  134* 137 134* 134* 134* 135*  --  136   < > 136 136   POTASSIUM mmol/L 4.4 3.9 4.0  --  3.9 4.1 3.9 4.2 4.2 4.6  --  4.7   < > 5.6* 5.2   CHLORIDE mmol/L 101 100 99  --  99 101 100 98 98 99  --  100   < > 102 102   CO2 mmol/L 28 31 28  --  25 26 25 25 26 25  --  24   < > 25 23   ANION GAP mmol/L 12 11 15  --  14 14 13 15 14 16  --  17   < > 15 16   BUN mg/dL 30* 33* 35*  --  34* 42* 46* 49* 51* 53*  --  49*   < > 39* 33*   CREATININE mg/dL 1.68* 1.81* 1.68*  --  1.33* 1.47* 1.41* 1.50* 1.73* 1.91*  --  1.85*   < > 1.86* 1.66*   EGFR mL/min/1.73m*2 44* 40* 44*  --  58* 51* 54* 50* 42* 37*  --  39*   < > 39* 44*   MAGNESIUM mg/dL 1.99  --  2.01 2.27 1.92 1.89 2.05 2.03 2.08 2.09  --  2.21   < > 2.49* 2.41*   ALBUMIN g/dL 3.3* 3.2* 3.1*  --  3.5 3.5 3.4 3.5 3.6 3.6 3.7 3.7   < > 3.8 3.7   ALT U/L  --   --  6*  --  6*  --  3*  --  4*  --  5*  --   --  7* 11   AST U/L  --   --  16  --  18  --  21  --  24  --  29  --   --  40* 42*   BILIRUBIN TOTAL mg/dL  --   --  0.8  --  0.8  --  1.0  --  1.0  --  1.1  --   --  0.9 1.1    < > = values in this interval not displayed.     CBC:  Results from last 7 days   Lab Units 11/20/24 0151 11/19/24 0322 11/18/24  0525 11/17/24  1323 11/17/24  0122 11/16/24  1358 11/16/24  0128 11/15/24  1255   WBC AUTO x10*3/uL 10.7 10.5 10.0 10.0 10.8 10.2 11.4* 13.0*   HEMOGLOBIN g/dL 10.5* 10.2* 10.7* 10.5* 10.1* 10.8* 10.5* 10.7*   HEMATOCRIT % 31.7* 31.0* 31.9* 30.8* 31.3* 32.9* 32.8* 32.9*   PLATELETS AUTO x10*3/uL 465* 425 355 336 302 270 262 276   MCV fL 82 82 82 82 85 84 85 84     COAG:   Results from last 7 days   Lab Units 11/20/24 0151 11/19/24 0322 11/18/24  0525  11/17/24  0122 11/16/24  0128 11/15/24  0020 11/14/24  0118   INR  1.9* 1.8* 1.8* 1.7* 1.5* 1.4* 1.4*     ABO:   ABO TYPE   Date Value Ref Range Status   11/20/2024 A  Final     HEME/ENDO:     CARDIAC:   Results from last 7 days   Lab Units 11/20/24  0151 11/19/24  0322 11/18/24  0525 11/17/24  0122 11/16/24  0128 11/15/24  0020 11/14/24  0118   LD U/L 218 213 248* 270* 266* 301* 351*       ASSESSMENT AND PLAN:   Fahad Meraz is a 69 M with PMHx of CAD s/p CABG x 4 (2012) and PCI (LCx/OM; 5/2019), stage D ICM HFrEF LVEF 10-15%, AF s/p RFA (PVI and CTI; 4/2024), HTN, pre-T2DM A1c 6.3, depression, anxiety, and VIV using CPAP who was admitted to OS s/p RHC on 10/18/24 with decompensated heart failure and KENYA. He was referred to [facility] HFICU 10/24/24 and was medically optomized and presented to [employer] 11/5 who approved destination therapy LVAD.  He was transferred to CTICU s/p Heartmate 3 implant with the outflow to the mid descending thoracic aorta via left thoracotomy 11/12/24 with Dr. Mclain and Dr. Madrigal. Post op course complicated by delirium, possible TIA, and KENYA. Now transferred back to HFICU on 11/19 for further care.      NEURO:   #Hx of anxiety and depression  #Acute post-operative pain (resolving)  #CAM positive with hyperactive to hypoactive delirium (Improving)  - Serial neuro and pain assessments   - Continue scheduled Tylenol   - Continue home escitalopram 10mg daily   - PT Consult, OOB to chair as tolerated, ambulate today  - CAM ICU score qshift  - Sleep/wake cycle hygiene  - Continue thiamine 500mg q8h until 11/21  - Discontinue melatonin per Dr Oswald  - REST Protocol with labs & CXR at 7am     CV:   #PMHx CAD s/p CABG x 4 (2012) and PCI (LCx/OM; 5/2019), stage D ICM HFrEF LVEF 10-15%, AF s/p RFA (PVI and CTI; 4/2024), HTN  #S/p HM3 LVAD via left thoracotomy 11/12 with Abu Angel and Kaitlin.   - Pre/Post EF: 15-20% & dilated RV  - Maintain goal MAP 70-80mmHg, avoid  sustained MAPs > 90mmHg as flows decrease and PI increases    - Continue milrinone infusion 0.25 mcg/kg/min  -> initiate sildenafil if BP tolerates later this week   - Continue heparin infusion for coumadin bridge   - Discontinue digoxin 0.125mg daily 11/20   - Daily weights   - Continue ASA 81mg daily    - If patient develops afib with RVR again - initiate amio gtt per Dr Oswald      Daily VAD Interrogation      Device: HM III  DT  Speed: 5200 (adjusted 11/18 during RAMP study)  Power: 3.7  Flow: 4.5  PI: 3.2  Hematocrit set at: 31%  LDH 11/20: 218  INR 11/20: 1.9     Medications:  ASA  Coumadin dose 5 mg (increased on 11/19 from 4 mg)   PPI     Daily dressing change -> drainage from driveline site serosanguinous (have nursing capture picture 11/21 AM) change to weekly once site is dry      PULM:   #VIV on CPAP at home  - Daily CXR  - Resume home nocturnal CPAP  - Maintaining SpO2 >92%.   - IS q1h and encourage OOB and ambulation daily     GI:   #GERD  #Malnutrition d/t poor PO intake  - Continue PPI for GERD, LVAD bleeding risk   - Continue adult regular diet with supplements  - Hold Colace/senna BID and change miralax to as needed     :   #KENYA on CKD (baseline Cr 1.3-1.6)  - Strict I&Os  - No diuresis at this time - appears euvolemic   - Avoid hypotension and nephrotoxic agents      ENDO:   #PMH of pre-DM with recent A1c: 6.3  - Euglycemic   - Goal BG <180     HEME:   #Acute blood loss anemia   #Iron deficiency anemia   #Chronic anticoagulation   - C/w warfarin 5mg daily (11/19 ) for goal INR 2-3   - Continue heparin gtt for coumadin bridge  - SCDs for DVT prophylaxis     ID:   - Afebrile, no leukocytosis  - S/p Periop cefazolin, vanco, fluconazole x 48hrs per LVAD protocol.   - Trend temp q4h  - Discontinue vanc/zosyn (11/16-11/17) was started for SIRS/worsening delirium     PHYSICAL AND OCCUPATIONAL THERAPY: Ordered     LINES:  PIVs   L radial jayson 11/16     DVT: Heparin gtt, coumadin, SCDs  VAP BUNDLE:  NA  CENTRAL LINE BUNDLE: NA  ULCER PPX: Pantoprazole 40 mg daily  GLYCEMIC CONTROL: NA - A1C: 6.3  BOWEL CARE: Senna / Miralax on hold d/t multiple BMs  INDWELLING CATHETER: NA  NUTRITION: Adult diet Regular      EMERGENCY CONTACT: Extended Emergency Contact Information  Primary Emergency Contact: Tanisha Meraza  Home Phone: [phone]  Relation: Spouse  Secondary Emergency Contact: Subhash Meraz  Mobile Phone: [phone]  Relation: Son  FAMILY UPDATE: wife at bedside  CODE STATUS: Full Code  DISPO: remain in HFICU    Patient seen and assessed with Dr. Margret STEEN personally spent 30 minutes of critical care time directly and personally managing the patient exclusive of separately billable procedures   _________________________________________________  Thuy Colunga, APRN-CNP

## 2024-11-21 ENCOUNTER — APPOINTMENT (OUTPATIENT)
Dept: CARDIOLOGY | Facility: HOSPITAL | Age: 69
DRG: 001 | End: 2024-11-21
Payer: MEDICARE

## 2024-11-21 ENCOUNTER — APPOINTMENT (OUTPATIENT)
Dept: RADIOLOGY | Facility: HOSPITAL | Age: 69
DRG: 001 | End: 2024-11-21
Payer: MEDICARE

## 2024-11-21 LAB
ALBUMIN SERPL BCP-MCNC: 3.3 G/DL (ref 3.4–5)
ALBUMIN SERPL BCP-MCNC: 3.6 G/DL (ref 3.4–5)
ANION GAP SERPL CALC-SCNC: 15 MMOL/L (ref 10–20)
ANION GAP SERPL CALC-SCNC: 15 MMOL/L (ref 10–20)
APTT PPP: 37 SECONDS (ref 27–38)
ATRIAL RATE: 105 BPM
ATRIAL RATE: 81 BPM
BASOPHILS # BLD MANUAL: 0.17 X10*3/UL (ref 0–0.1)
BASOPHILS NFR BLD MANUAL: 1.7 %
BUN SERPL-MCNC: 25 MG/DL (ref 6–23)
BUN SERPL-MCNC: 25 MG/DL (ref 6–23)
CALCIUM SERPL-MCNC: 8.3 MG/DL (ref 8.6–10.6)
CALCIUM SERPL-MCNC: 9.2 MG/DL (ref 8.6–10.6)
CHLORIDE SERPL-SCNC: 101 MMOL/L (ref 98–107)
CHLORIDE SERPL-SCNC: 101 MMOL/L (ref 98–107)
CO2 SERPL-SCNC: 24 MMOL/L (ref 21–32)
CO2 SERPL-SCNC: 25 MMOL/L (ref 21–32)
CREAT SERPL-MCNC: 1.39 MG/DL (ref 0.5–1.3)
CREAT SERPL-MCNC: 1.47 MG/DL (ref 0.5–1.3)
EGFRCR SERPLBLD CKD-EPI 2021: 51 ML/MIN/1.73M*2
EGFRCR SERPLBLD CKD-EPI 2021: 55 ML/MIN/1.73M*2
EOSINOPHIL # BLD MANUAL: 0.17 X10*3/UL (ref 0–0.7)
EOSINOPHIL NFR BLD MANUAL: 1.7 %
ERYTHROCYTE [DISTWIDTH] IN BLOOD BY AUTOMATED COUNT: 16 % (ref 11.5–14.5)
GLUCOSE SERPL-MCNC: 111 MG/DL (ref 74–99)
GLUCOSE SERPL-MCNC: 116 MG/DL (ref 74–99)
HCT VFR BLD AUTO: 32.5 % (ref 41–52)
HGB BLD-MCNC: 10.4 G/DL (ref 13.5–17.5)
IMM GRANULOCYTES # BLD AUTO: 0.59 X10*3/UL (ref 0–0.7)
IMM GRANULOCYTES NFR BLD AUTO: 5.8 % (ref 0–0.9)
INR PPP: 2.1 (ref 0.9–1.1)
LDH SERPL L TO P-CCNC: 231 U/L (ref 84–246)
LYMPHOCYTES # BLD MANUAL: 0.98 X10*3/UL (ref 1.2–4.8)
LYMPHOCYTES NFR BLD MANUAL: 9.6 %
MAGNESIUM SERPL-MCNC: 1.97 MG/DL (ref 1.6–2.4)
MAGNESIUM SERPL-MCNC: 2.54 MG/DL (ref 1.6–2.4)
MCH RBC QN AUTO: 26.9 PG (ref 26–34)
MCHC RBC AUTO-ENTMCNC: 32 G/DL (ref 32–36)
MCV RBC AUTO: 84 FL (ref 80–100)
MONOCYTES # BLD MANUAL: 0.36 X10*3/UL (ref 0.1–1)
MONOCYTES NFR BLD MANUAL: 3.5 %
MYELOCYTES # BLD MANUAL: 0.09 X10*3/UL
MYELOCYTES NFR BLD MANUAL: 0.9 %
NEUTROPHILS # BLD MANUAL: 8.42 X10*3/UL (ref 1.2–7.7)
NEUTS BAND # BLD MANUAL: 0.09 X10*3/UL (ref 0–0.7)
NEUTS BAND NFR BLD MANUAL: 0.9 %
NEUTS SEG # BLD MANUAL: 8.33 X10*3/UL (ref 1.2–7)
NEUTS SEG NFR BLD MANUAL: 81.7 %
NRBC BLD-RTO: 0 /100 WBCS (ref 0–0)
P OFFSET: 224 MS
P ONSET: 209 MS
PHOSPHATE SERPL-MCNC: 3 MG/DL (ref 2.5–4.9)
PHOSPHATE SERPL-MCNC: 3.2 MG/DL (ref 2.5–4.9)
PLATELET # BLD AUTO: 455 X10*3/UL (ref 150–450)
POTASSIUM SERPL-SCNC: 4 MMOL/L (ref 3.5–5.3)
POTASSIUM SERPL-SCNC: 4.2 MMOL/L (ref 3.5–5.3)
PROTHROMBIN TIME: 24.1 SECONDS (ref 9.8–12.8)
Q ONSET: 203 MS
Q ONSET: 226 MS
QRS COUNT: 18 BEATS
QRS COUNT: 21 BEATS
QRS DURATION: 140 MS
QRS DURATION: 146 MS
QT INTERVAL: 366 MS
QT INTERVAL: 406 MS
QTC CALCULATION(BAZETT): 530 MS
QTC CALCULATION(BAZETT): 552 MS
QTC FREDERICIA: 469 MS
QTC FREDERICIA: 498 MS
R AXIS: 24 DEGREES
R AXIS: 88 DEGREES
RBC # BLD AUTO: 3.86 X10*6/UL (ref 4.5–5.9)
RBC MORPH BLD: ABNORMAL
SODIUM SERPL-SCNC: 136 MMOL/L (ref 136–145)
SODIUM SERPL-SCNC: 137 MMOL/L (ref 136–145)
T AXIS: 211 DEGREES
T AXIS: 45 DEGREES
T OFFSET: 386 MS
T OFFSET: 429 MS
TOTAL CELLS COUNTED BLD: 115
UFH PPP CHRO-ACNC: 0.1 IU/ML
UFH PPP CHRO-ACNC: 0.1 IU/ML
VENTRICULAR RATE: 111 BPM
VENTRICULAR RATE: 126 BPM
WBC # BLD AUTO: 10.2 X10*3/UL (ref 4.4–11.3)

## 2024-11-21 PROCEDURE — 71045 X-RAY EXAM CHEST 1 VIEW: CPT

## 2024-11-21 PROCEDURE — 2500000004 HC RX 250 GENERAL PHARMACY W/ HCPCS (ALT 636 FOR OP/ED)

## 2024-11-21 PROCEDURE — 2500000002 HC RX 250 W HCPCS SELF ADMINISTERED DRUGS (ALT 637 FOR MEDICARE OP, ALT 636 FOR OP/ED)

## 2024-11-21 PROCEDURE — 2500000001 HC RX 250 WO HCPCS SELF ADMINISTERED DRUGS (ALT 637 FOR MEDICARE OP): Performed by: NURSE PRACTITIONER

## 2024-11-21 PROCEDURE — 2500000001 HC RX 250 WO HCPCS SELF ADMINISTERED DRUGS (ALT 637 FOR MEDICARE OP)

## 2024-11-21 PROCEDURE — 2500000004 HC RX 250 GENERAL PHARMACY W/ HCPCS (ALT 636 FOR OP/ED): Performed by: NURSE PRACTITIONER

## 2024-11-21 PROCEDURE — 2500000002 HC RX 250 W HCPCS SELF ADMINISTERED DRUGS (ALT 637 FOR MEDICARE OP, ALT 636 FOR OP/ED): Performed by: STUDENT IN AN ORGANIZED HEALTH CARE EDUCATION/TRAINING PROGRAM

## 2024-11-21 PROCEDURE — 97530 THERAPEUTIC ACTIVITIES: CPT | Mod: GP

## 2024-11-21 PROCEDURE — 99291 CRITICAL CARE FIRST HOUR: CPT | Performed by: SPECIALIST

## 2024-11-21 PROCEDURE — 2020000001 HC ICU ROOM DAILY

## 2024-11-21 PROCEDURE — 2500000005 HC RX 250 GENERAL PHARMACY W/O HCPCS

## 2024-11-21 PROCEDURE — 80069 RENAL FUNCTION PANEL: CPT

## 2024-11-21 PROCEDURE — 83735 ASSAY OF MAGNESIUM: CPT

## 2024-11-21 PROCEDURE — 37799 UNLISTED PX VASCULAR SURGERY: CPT

## 2024-11-21 PROCEDURE — 99233 SBSQ HOSP IP/OBS HIGH 50: CPT

## 2024-11-21 PROCEDURE — 85610 PROTHROMBIN TIME: CPT

## 2024-11-21 PROCEDURE — 93005 ELECTROCARDIOGRAM TRACING: CPT

## 2024-11-21 PROCEDURE — 85520 HEPARIN ASSAY: CPT

## 2024-11-21 PROCEDURE — 97116 GAIT TRAINING THERAPY: CPT | Mod: GP

## 2024-11-21 PROCEDURE — 84100 ASSAY OF PHOSPHORUS: CPT

## 2024-11-21 PROCEDURE — 71045 X-RAY EXAM CHEST 1 VIEW: CPT | Performed by: RADIOLOGY

## 2024-11-21 PROCEDURE — 85007 BL SMEAR W/DIFF WBC COUNT: CPT

## 2024-11-21 PROCEDURE — 85027 COMPLETE CBC AUTOMATED: CPT

## 2024-11-21 PROCEDURE — 83615 LACTATE (LD) (LDH) ENZYME: CPT

## 2024-11-21 RX ORDER — POTASSIUM CHLORIDE 20 MEQ/1
40 TABLET, EXTENDED RELEASE ORAL ONCE
Status: COMPLETED | OUTPATIENT
Start: 2024-11-21 | End: 2024-11-21

## 2024-11-21 RX ORDER — LIDOCAINE 560 MG/1
2 PATCH PERCUTANEOUS; TOPICAL; TRANSDERMAL DAILY
Status: DISCONTINUED | OUTPATIENT
Start: 2024-11-21 | End: 2024-12-03 | Stop reason: HOSPADM

## 2024-11-21 RX ORDER — ACETAMINOPHEN 325 MG/1
650 TABLET ORAL 3 TIMES DAILY
Status: DISCONTINUED | OUTPATIENT
Start: 2024-11-21 | End: 2024-11-27

## 2024-11-21 RX ORDER — MAGNESIUM SULFATE HEPTAHYDRATE 40 MG/ML
2 INJECTION, SOLUTION INTRAVENOUS ONCE
Status: COMPLETED | OUTPATIENT
Start: 2024-11-21 | End: 2024-11-21

## 2024-11-21 RX ORDER — FUROSEMIDE 10 MG/ML
40 INJECTION INTRAMUSCULAR; INTRAVENOUS ONCE
Status: COMPLETED | OUTPATIENT
Start: 2024-11-21 | End: 2024-11-21

## 2024-11-21 ASSESSMENT — COGNITIVE AND FUNCTIONAL STATUS - GENERAL
MOVING FROM LYING ON BACK TO SITTING ON SIDE OF FLAT BED WITH BEDRAILS: A LITTLE
CLIMB 3 TO 5 STEPS WITH RAILING: A LOT
TURNING FROM BACK TO SIDE WHILE IN FLAT BAD: A LITTLE
STANDING UP FROM CHAIR USING ARMS: A LITTLE
MOVING TO AND FROM BED TO CHAIR: A LITTLE
MOBILITY SCORE: 17
WALKING IN HOSPITAL ROOM: A LITTLE

## 2024-11-21 ASSESSMENT — PAIN SCALES - GENERAL
PAINLEVEL_OUTOF10: 0 - NO PAIN
PAINLEVEL_OUTOF10: 6
PAINLEVEL_OUTOF10: 0 - NO PAIN

## 2024-11-21 ASSESSMENT — PAIN - FUNCTIONAL ASSESSMENT
PAIN_FUNCTIONAL_ASSESSMENT: 0-10

## 2024-11-21 NOTE — PROGRESS NOTES
HFICU Attending Note    Principal Problem:    Acute on chronic heart failure with reduced ejection fraction and diastolic dysfunction  Active Problems:    Ischemic cardiomyopathy    Receiving inotropic medication    HTN (hypertension)    CAD (coronary artery disease)    MI (myocardial infarction) (Multi)    CHF (congestive heart failure)    Gastroesophageal reflux disease    Acute kidney injury (nontraumatic) (CMS-[facility])    Delirium      S/p LVAD  1- Delirium - improving with increased sleep/ambulation   2- Hemodynamic support.  Epinephrine weaned off, will continue milrinone for now..  3- Infectious workup remains negative   4- No significant LVAD alarms noted   5- Stable creatinine   6- Delerium. All psychoactive medications stopped except chronic ecitalopram. Will push diurnal schedule.       This critically ill patient continues to be at-risk for clinically significant deterioration / failure due to the above mentioned dysfunctional, unstable organ systems.  I have personally identified and managed all complex critical care issues to prevent aforementioned clinical deterioration.  Critical care time is spent at bedside and/or the immediate area and has included, but is not limited to, the review of diagnostic tests, labs, radiographs, serial assessments of hemodynamics, respiratory status, ventilatory management, and family updates.  Time spent in procedures and teaching are reported separately.    Critical care time: 35____ minutes

## 2024-11-21 NOTE — PROGRESS NOTES
[facility]  HFICU PROGRESS NOTE    Fahad Meraz/[id number]    Admit Date: 10/24/2024  Hospital Length of Stay: 28   ICU Length of Stay: 1d 21h   Primary Service: HFICU  Primary HF Cardiologist: Dr Washington   Referring: Dr Washington     INTERVAL EVENTS / PERTINENT ROS:     No acute events, patient remains alert and oriented x3 but remains CAM positive. Nursing states he did not sleep at all overnight. Patients VAD dressing is saturated, dressings will be completed daily.     Plan:  Lasix 40 mg x1  Daily dressing changes for VAD  PT/OT - OOB to chair and walk today     MEDICATIONS  Infusions:  heparin, Last Rate: 1,600 Units/hr (11/21/24 0417)  milrinone, Last Rate: 0.25 mcg/kg/min (11/21/24 0044)      Scheduled:  aspirin, 81 mg, Daily  escitalopram, 10 mg, Daily  furosemide, 40 mg, Once  magnesium oxide, 800 mg, BID  magnesium sulfate, 2 g, Once  pantoprazole, 40 mg, Daily before breakfast  [Held by provider] sennosides-docusate sodium, 1 tablet, BID  warfarin, 5 mg, Daily      PRN:  acetaminophen, 650 mg, q6h PRN  alteplase, 2 mg, PRN  naloxone, 0.2 mg, q5 min PRN  ondansetron, 4 mg, q8h PRN   Or  ondansetron, 4 mg, q8h PRN  polyethylene glycol, 17 g, Daily PRN      Invasive Hemodynamics:    Most Recent Range Past 24hrs   BP (Art) 111/71 Arterial Line BP 1  Min: 87/62  Max: 118/77   MAP(Art) 82 mmHg Arterial Line MAP 1 (mmHg)   Min: 62 mmHg  Max: 90 mmHg   RA/CVP   No data recorded   PA 37/17 No data recorded   PA(mean) 27 mmHg No data recorded   PCWP 13 mmHg No data recorded   CO 7.2 L/min No data recorded   CI 3.4 L/min/m2 No data recorded   Mixed Venous 70 % No data recorded   SVR  747 (dyne*sec)/cm5 No data recorded    (dyne*sec)/cm5 No data recorded     MCS:   Heart Mate III:     Most Recent Range Past 24hrs   Flow 4.2 Pump Flow  Min: 4  Max: 4.5   Speed 5200 Speed RPM  Min: 5200  Max: 5250   Power 3.6    PI 3.4 Pulse Index  Min: 3.2  Max: 4.4       PHYSICAL EXAM:   Visit  Vitals  BP 74/53   Pulse 95   Temp 36.3 °C (97.3 °F)   Resp 14   Ht 1.829 m (6')   Wt 94.6 kg (208 lb 8.9 oz)   SpO2 94%   BMI 28.29 kg/m²   Smoking Status Former   BSA 2.19 m²       Wt Readings from Last 5 Encounters:   11/21/24 94.6 kg (208 lb 8.9 oz)   10/24/24 92.5 kg (204 lb)   08/05/24 122 kg (268 lb)   01/31/22 119 kg (262 lb)   02/18/20 123 kg (270 lb 2 oz)       INTAKE/OUTPUT:  I/O last 3 completed shifts:  In: 861.3 (9.1 mL/kg) [I.V.:861.3 (9.1 mL/kg)]  Out: 1800 (19 mL/kg) [Urine:1800 (0.5 mL/kg/hr)]  Weight: 94.6 kg      Physical Exam  Constitutional:       General: He is not in acute distress.     Appearance: Normal appearance. He is not ill-appearing.   HENT:      Mouth/Throat:      Mouth: Mucous membranes are dry.   Eyes:      Pupils: Pupils are equal, round, and reactive to light.   Cardiovascular:      Rate and Rhythm: Normal rate. Rhythm irregular.      Comments: VAD hum present on auscultation   Pulmonary:      Effort: Pulmonary effort is normal. No respiratory distress.      Breath sounds: Normal breath sounds.   Abdominal:      General: Abdomen is flat. There is no distension.      Palpations: Abdomen is soft.      Comments: Abdominal dressing C/D/I from driveline    Musculoskeletal:         General: Normal range of motion.      Cervical back: Normal range of motion.      Right lower leg: No edema.      Left lower leg: No edema.   Skin:     General: Skin is warm and dry.      Capillary Refill: Capillary refill takes less than 2 seconds.   Neurological:      Mental Status: He is alert and oriented to person, place, and time.      Comments: CAM +   Psychiatric:         Attention and Perception: He perceives visual hallucinations.         DATA:  CMP:  Results from last 7 days   Lab Units 11/21/24  0426 11/20/24  0151 11/19/24  1459 11/19/24  0322 11/18/24  1149 11/18/24  0525 11/17/24  1323 11/17/24  0122 11/16/24  1358 11/16/24  0128 11/15/24  1255 11/15/24  0021   SODIUM mmol/L 137 137 138 138   --  134* 137 134* 134* 134* 135*  --    POTASSIUM mmol/L 4.2 4.4 3.9 4.0  --  3.9 4.1 3.9 4.2 4.2 4.6  --    CHLORIDE mmol/L 101 101 100 99  --  99 101 100 98 98 99  --    CO2 mmol/L 25 28 31 28  --  25 26 25 25 26 25  --    ANION GAP mmol/L 15 12 11 15  --  14 14 13 15 14 16  --    BUN mg/dL 25* 30* 33* 35*  --  34* 42* 46* 49* 51* 53*  --    CREATININE mg/dL 1.39* 1.68* 1.81* 1.68*  --  1.33* 1.47* 1.41* 1.50* 1.73* 1.91*  --    EGFR mL/min/1.73m*2 55* 44* 40* 44*  --  58* 51* 54* 50* 42* 37*  --    MAGNESIUM mg/dL 1.97 1.99  --  2.01 2.27 1.92 1.89 2.05 2.03 2.08 2.09  --    ALBUMIN g/dL 3.3* 3.3* 3.2* 3.1*  --  3.5 3.5 3.4 3.5 3.6 3.6 3.7   ALT U/L  --   --   --  6*  --  6*  --  3*  --  4*  --  5*   AST U/L  --   --   --  16  --  18  --  21  --  24  --  29   BILIRUBIN TOTAL mg/dL  --   --   --  0.8  --  0.8  --  1.0  --  1.0  --  1.1     CBC:  Results from last 7 days   Lab Units 11/21/24  0426 11/20/24  0151 11/19/24  0322 11/18/24  0525 11/17/24  1323 11/17/24  0122 11/16/24  1358 11/16/24  0128   WBC AUTO x10*3/uL 10.2 10.7 10.5 10.0 10.0 10.8 10.2 11.4*   HEMOGLOBIN g/dL 10.4* 10.5* 10.2* 10.7* 10.5* 10.1* 10.8* 10.5*   HEMATOCRIT % 32.5* 31.7* 31.0* 31.9* 30.8* 31.3* 32.9* 32.8*   PLATELETS AUTO x10*3/uL 455* 465* 425 355 336 302 270 262   MCV fL 84 82 82 82 82 85 84 85     COAG:   Results from last 7 days   Lab Units 11/21/24  0426 11/20/24  0151 11/19/24  0322 11/18/24  0525 11/17/24  0122 11/16/24  0128 11/15/24  0020   INR  2.1* 1.9* 1.8* 1.8* 1.7* 1.5* 1.4*     ABO:   ABO TYPE   Date Value Ref Range Status   11/20/2024 A  Final     HEME/ENDO:     CARDIAC:   Results from last 7 days   Lab Units 11/21/24  0426 11/20/24  0151 11/19/24  0322 11/18/24  0525 11/17/24  0122 11/16/24  0128 11/15/24  0020   LD U/L 231 218 213 248* 270* 266* 301*       ASSESSMENT AND PLAN:   Fahad Meraz is a 69 M with PMHx of CAD s/p CABG x 4 (2012) and PCI (LCx/OM; 5/2019), stage D ICM HFrEF LVEF 10-15%, AF s/p RFA (PVI and  CTI; 4/2024), HTN, pre-T2DM A1c 6.3, depression, anxiety, and VIV using CPAP who was admitted to [facility] s/p RHC on 10/18/24 with decompensated heart failure and KENYA. He was referred to [facility] HFICU 10/24/24 and was medically optomized and presented to [employer] [facility] 11/5 who approved destination therapy LVAD.  [facility] with the outflow to the mid descending thoracic aorta via left thoracotomy 11/12/24 with Dr. Mclain and Dr. Madrigal. Post op course complicated by delirium, possible TIA, and KENYA. Now transferred back to HFICU on 11/19 for further care. He continues to not sleep overnight and be confused during the day,      NEURO:   #Hx of anxiety and depression  #Acute post-operative pain (resolving)  #CAM positive with hyperactive to hypoactive delirium (Improving)  - Serial neuro and pain assessments   - Continue scheduled Tylenol   - Continue home escitalopram 10mg daily   - PT Consult, OOB to chair as tolerated, ambulate today  - CAM ICU score qshift  - Sleep/wake cycle hygiene  - Continue thiamine 500mg q8h until 11/21 last dose today   - Discontinue melatonin per Dr Oswald  - REST Protocol with labs & CXR at 7am     CV:   #PMHx CAD s/p CABG x 4 (2012) and PCI (LCx/OM; 5/2019), stage D ICM HFrEF LVEF 10-15%, AF s/p RFA (PVI and CTI; 4/2024), HTN  #S/p HM3 LVAD via left thoracotomy 11/12 with Jas Ortiz and Kaitlin.   - Pre/Post EF: 15-20% & dilated RV  - Maintain goal MAP 70-80mmHg, avoid sustained MAPs > 90mmHg as flows decrease and PI increases    - Continue milrinone infusion 0.25 mcg/kg/min  -> initiate sildenafil if BP tolerates later this week   - Continue heparin infusion for coumadin bridge   - Discontinue digoxin 0.125mg daily 11/20   - Daily weights   - Continue ASA 81mg daily    - If patient develops afib with RVR again - initiate amio gtt per Dr Oswald      Daily VAD Interrogation      Device: HM III  DT  Speed: 5200 (adjusted 11/18 during  RAMP study)  Power: 3.7  Flow: 4.1  PI: 4.4  Hematocrit set at: 32%  LDH 11/21: 231  INR 11/21: 2.1     Medications:  ASA  Coumadin dose 5 mg (increased on 11/19 from 4 mg)   PPI     Daily dressing change -> drainage from driveline site serosanguinous (have nursing capture picture 11/21 AM) change to weekly once site is dry      PULM:   #VIV on CPAP at home  - Daily CXR  - Resume home nocturnal CPAP  - Maintaining SpO2 >92%.   - IS q1h and encourage OOB and ambulation daily     GI:   #GERD  #Malnutrition d/t poor PO intake  - Continue PPI for GERD, LVAD bleeding risk   - Continue adult regular diet with supplements  - Hold Colace/senna BID and change miralax to as needed     :   #KENYA on CKD (baseline Cr 1.3-1.6)  - Strict I&Os  - Diurese with lasix 40 mg x1 11/21  - Avoid hypotension and nephrotoxic agents      ENDO:   #PMH of pre-DM with recent A1c: 6.3  - Euglycemic   - Goal BG <180     HEME:   #Acute blood loss anemia   #Iron deficiency anemia   #Chronic anticoagulation   - C/w warfarin 5mg daily (11/19 ) for goal INR 2-3   - Continue heparin gtt for coumadin bridge  - SCDs for DVT prophylaxis     ID:   - Afebrile, no leukocytosis  - S/p Periop cefazolin, vanco, fluconazole x 48hrs per LVAD protocol.   - Trend temp q4h  - Discontinue vanc/zosyn (11/16-11/17) was started for SIRS/worsening delirium     PHYSICAL AND OCCUPATIONAL THERAPY: Ordered     LINES:  PIVs   L radial jayson 11/16     DVT: Heparin gtt, coumadin, SCDs  VAP BUNDLE: NA  CENTRAL LINE BUNDLE: NA  ULCER PPX: Pantoprazole 40 mg daily  GLYCEMIC CONTROL: NA - A1C: 6.3  BOWEL CARE: Senna / Miralax on hold d/t multiple BMs  INDWELLING CATHETER: NA  NUTRITION: Adult diet Regular      EMERGENCY CONTACT: Extended Emergency Contact Information  Primary Emergency Contact: Tanvi Meraz  Home Phone: [phone]  Relation: Spouse  Secondary Emergency Contact: Subhash Meraz  Mobile Phone: [phone]  Relation: Son  FAMILY UPDATE: wife at bedside  CODE STATUS:  Full Code  DISPO: remain in HFICU    Patient seen and assessed with Dr. Margret STEEN personally spent 30 minutes of critical care time directly and personally managing the patient exclusive of separately billable procedures   _________________________________________________  Thuy Colunga, APRN-CNP

## 2024-11-21 NOTE — NURSING NOTE
Patient VAD education started today with patient and family. Reviewed all VAD equipment that will be going home with the patient. Patient successfully demonstrated battery connections and power module connections with verbal enforcement. Reviewed controller buttons and yellow/red alarms with patient today.      At this time, will plan to continue to review equipment basic knowledge and add VAD related education. Spent 45 minutes with the patient today. Plan to review again Friday.

## 2024-11-21 NOTE — PROGRESS NOTES
HFICU Attending Note    Principal Problem:    Acute on chronic heart failure with reduced ejection fraction and diastolic dysfunction  Active Problems:    Ischemic cardiomyopathy    Receiving inotropic medication    HTN (hypertension)    CAD (coronary artery disease)    MI (myocardial infarction) (Multi)    CHF (congestive heart failure)    Gastroesophageal reflux disease    Acute kidney injury (nontraumatic) (CMS-[facility])    Delirium      S/p LVAD  1- Delirium - improving with increased sleep/ambulation   2- Hemodynamic support.  Epinephrine weaned off, will continue milrinone for now..  3- Infectious workup remains negative   4- No significant LVAD alarms noted   5- Stable creatinine   6- ? TIA event yesterday with reported nystagmus - will bridge him with heparin       This critically ill patient continues to be at-risk for clinically significant deterioration / failure due to the above mentioned dysfunctional, unstable organ systems.  I have personally identified and managed all complex critical care issues to prevent aforementioned clinical deterioration.  Critical care time is spent at bedside and/or the immediate area and has included, but is not limited to, the review of diagnostic tests, labs, radiographs, serial assessments of hemodynamics, respiratory status, ventilatory management, and family updates.  Time spent in procedures and teaching are reported separately.    Critical care time: 35____ minutes

## 2024-11-21 NOTE — PROGRESS NOTES
Social Work Discharge note.   SW was asked to contact the patients daughter Giulia Mcdonald [phone]. Giulia is a manager at [employer] in [place]. [facility]. She states they are able to manage LVAD's. She is aware that Rehab recommended [facility], she states her dad would not agree to go anywhere else. The facility is able to provide the rehab and medical support that he will need.   AT the request of family a referral was submitted to [employer] at [place] (SNF not [facility])   ADOD - Uncertain  ERIN Gilbert

## 2024-11-21 NOTE — SIGNIFICANT EVENT
"Followed up with primary team. Pt experiencing ongoing Sundowning with \"picking\" each night. Pt does not recall the events and states he is sleeping well. Pt's digoxin and melatonin were stopped 11/20 as contributing factors of mental status. Recommend tylenol to be scheduled TID. Administration before bed often helps with sleep. Plus, pt was complaining of pain, which can also be a delirium trigger. Better pain control may help him overall, along with management of other contributing factors.     Pall NP attempted visit but was working with PT/OT.    Delirium Guidelines  Provide glasses, hearing aids and/or communication boards as needed for impairments  Frequent reorientation, minimize room and staff changes  Open blinds during the day, dark/quiet room at night   Minimal interruptions and daytime naps  Early evaluation and intervention by PT, out of bed as tolerated  Minimize use of restraints   Minimize use of benzodiazepines, anticholinergic medications, and opiates (while ensuring adequate treatment of pain)  Keep Mg>2, K>4 (as able)  Ensure regular bowel and bladder function (as able)       Palliative will continue to follow up.    - Poornima Inman, CNP  "

## 2024-11-21 NOTE — PROGRESS NOTES
Fahad Meraz is a 69 y.o. male on day 28 of admission presenting with Acute on chronic heart failure with reduced ejection fraction and diastolic dysfunction.      Palliative Medicine following for:  Complex medical decision making, symptom management, patient/family support    History obtained from chart review including ED note, H&P, patient's daily progress notes, review of lab/test results, and discussion with primary team and bedside RN.    Subjective    History of Present Illness  Mr Fahad Meraz is a 69 M with PMHx of CAD s/p CABG x 4 (2012) and PCI (LCx/OM; 5/2019), stage D ICM HFrEF LVEF 10-15%, AF s/p RFA (PVI and CTI; 4/2024), HTN, dyslipidemia, pre-T2DM A1c 6.3, depression, anxiety, and VIV using CPAP who was admitted to OSH s/p RHC on 10/18/24 with decompensated heart failure and KENYA. He was referred to [facility] HFICU 10/24/24 and was medically optomized and presented to [employer] [facility] 11/5 who approved destination therapy LVAD.  He is now admitted to the CTICU s/p Heartmate 3 implant with the outflow to the mid descending thoracic aorta via left thoracotomy 11/12/24 with Dr. Mclain and Dr. Madrigal.    11/12 - Intra-op, episode of pulseless VT requiring defib x 1 prior to being placed on CPB. In the ICU (post-op), significant bleeding from venous cannulation site requiring transfusion. Required volume administration and ongoing inotropic and vasopressor support with LVAD stable flows      Symptoms  Pain: driveline pain sometimes  Dyspnea: denies  Fatigue: denies  Insomnia: states people were placing tubes and extra lines.  Drowsiness: denies  Constipation: denies  Nausea: denies  Appetite: good, getting better  Anxiety: denies  Depression: denies    Objective    Last Recorded Vitals  BP 74/53   Pulse (!) 111   Temp 35.8 °C (96.4 °F)   Resp 19   Ht 1.829 m (6')   Wt 94.6 kg (208 lb 8.9 oz)   SpO2 97%   BMI 28.29 kg/m²      Physical Exam   Constitutional:       Appearance: He  is normal weight.   HENT:      Head: Normocephalic.      Mouth/Throat:      Mouth: Mucous membranes are moist.      Pharynx: Oropharynx is clear.   Cardiovascular:      Rate and Rhythm: Tachycardia present. Rhythm irregular.   Pulmonary:      Effort: Pulmonary effort is normal.      Breath sounds: Examination of the left-upper field reveals decreased breath sounds. Examination of the left-middle field reveals decreased breath sounds. Decreased breath sounds present.   Abdominal:      General: Bowel sounds are normal.      Palpations: Abdomen is soft.   Skin:     General: Skin is warm and dry.   Neurological:      Mental Status: He is alert. He is disoriented.   Psychiatric:         Mood and Affect: Mood mildly restless and fidgety, forgetful.         Behavior: Behavior forgetful but pleasant.    Relevant Results   Results for orders placed or performed during the hospital encounter of 10/24/24 (from the past 24 hours)   Heparin Assay, UFH   Result Value Ref Range    Heparin Unfractionated 0.1 See Comment Below for Therapeutic Ranges IU/mL   Heparin Assay, UFH   Result Value Ref Range    Heparin Unfractionated 0.1 See Comment Below for Therapeutic Ranges IU/mL   CBC and Auto Differential   Result Value Ref Range    WBC 10.2 4.4 - 11.3 x10*3/uL    nRBC 0.0 0.0 - 0.0 /100 WBCs    RBC 3.86 (L) 4.50 - 5.90 x10*6/uL    Hemoglobin 10.4 (L) 13.5 - 17.5 g/dL    Hematocrit 32.5 (L) 41.0 - 52.0 %    MCV 84 80 - 100 fL    MCH 26.9 26.0 - 34.0 pg    MCHC 32.0 32.0 - 36.0 g/dL    RDW 16.0 (H) 11.5 - 14.5 %    Platelets 455 (H) 150 - 450 x10*3/uL    Immature Granulocytes %, Automated 5.8 (H) 0.0 - 0.9 %    Immature Granulocytes Absolute, Automated 0.59 0.00 - 0.70 x10*3/uL   Renal function panel   Result Value Ref Range    Glucose 111 (H) 74 - 99 mg/dL    Sodium 137 136 - 145 mmol/L    Potassium 4.2 3.5 - 5.3 mmol/L    Chloride 101 98 - 107 mmol/L    Bicarbonate 25 21 - 32 mmol/L    Anion Gap 15 10 - 20 mmol/L    Urea Nitrogen 25  (H) 6 - 23 mg/dL    Creatinine 1.39 (H) 0.50 - 1.30 mg/dL    eGFR 55 (L) >60 mL/min/1.73m*2    Calcium 8.3 (L) 8.6 - 10.6 mg/dL    Phosphorus 3.2 2.5 - 4.9 mg/dL    Albumin 3.3 (L) 3.4 - 5.0 g/dL   Magnesium   Result Value Ref Range    Magnesium 1.97 1.60 - 2.40 mg/dL   Lactate Dehydrogenase   Result Value Ref Range     84 - 246 U/L   Coagulation Screen   Result Value Ref Range    Protime 24.1 (H) 9.8 - 12.8 seconds    INR 2.1 (H) 0.9 - 1.1    aPTT 37 27 - 38 seconds   Manual Differential   Result Value Ref Range    Neutrophils %, Manual 81.7 40.0 - 80.0 %    Bands %, Manual 0.9 0.0 - 5.0 %    Lymphocytes %, Manual 9.6 13.0 - 44.0 %    Monocytes %, Manual 3.5 2.0 - 10.0 %    Eosinophils %, Manual 1.7 0.0 - 6.0 %    Basophils %, Manual 1.7 0.0 - 2.0 %    Myelocytes %, Manual 0.9 0.0 - 0.0 %    Seg Neutrophils Absolute, Manual 8.33 (H) 1.20 - 7.00 x10*3/uL    Bands Absolute, Manual 0.09 0.00 - 0.70 x10*3/uL    Lymphocytes Absolute, Manual 0.98 (L) 1.20 - 4.80 x10*3/uL    Monocytes Absolute, Manual 0.36 0.10 - 1.00 x10*3/uL    Eosinophils Absolute, Manual 0.17 0.00 - 0.70 x10*3/uL    Basophils Absolute, Manual 0.17 (H) 0.00 - 0.10 x10*3/uL    Myelocytes Absolute, Manual 0.09 0.00 - 0.00 x10*3/uL    Total Cells Counted 115     Neutrophils Absolute, Manual 8.42 (H) 1.20 - 7.70 x10*3/uL    RBC Morphology No significant RBC morphology present    Electrocardiogram 12-lead PRN for arrhythmia   Result Value Ref Range    Ventricular Rate 111 BPM    Atrial Rate 81 BPM    QRS Duration 146 ms    QT Interval 406 ms    QTC Calculation(Bazett) 552 ms    R Axis 24 degrees    T Axis 45 degrees    QRS Count 18 beats    Q Onset 226 ms    P Onset 209 ms    P Offset 224 ms    T Offset 429 ms    QTC Fredericia 498 ms   Renal function panel   Result Value Ref Range    Glucose 116 (H) 74 - 99 mg/dL    Sodium 136 136 - 145 mmol/L    Potassium 4.0 3.5 - 5.3 mmol/L    Chloride 101 98 - 107 mmol/L    Bicarbonate 24 21 - 32 mmol/L    Anion  Gap 15 10 - 20 mmol/L    Urea Nitrogen 25 (H) 6 - 23 mg/dL    Creatinine 1.47 (H) 0.50 - 1.30 mg/dL    eGFR 51 (L) >60 mL/min/1.73m*2    Calcium 9.2 8.6 - 10.6 mg/dL    Phosphorus 3.0 2.5 - 4.9 mg/dL    Albumin 3.6 3.4 - 5.0 g/dL   Heparin Assay, UFH   Result Value Ref Range    Heparin Unfractionated 0.1 See Comment Below for Therapeutic Ranges IU/mL   Magnesium   Result Value Ref Range    Magnesium 2.54 (H) 1.60 - 2.40 mg/dL        Allergies  Eliquis [apixaban] and Jardiance [empagliflozin]  Medications  Scheduled medications  acetaminophen, 650 mg, oral, TID  aspirin, 81 mg, oral, Daily  escitalopram, 10 mg, oral, Daily  lidocaine, 2 patch, transdermal, Daily  magnesium oxide, 800 mg, oral, BID  pantoprazole, 40 mg, oral, Daily before breakfast  [Held by provider] sennosides-docusate sodium, 1 tablet, oral, BID  warfarin, 5 mg, oral, Daily      Continuous medications  heparin, 0-4,000 Units/hr, Last Rate: 1,600 Units/hr (11/21/24 3816)  milrinone, 0.25 mcg/kg/min, Last Rate: 0.25 mcg/kg/min (11/21/24 1301)      PRN medications  PRN medications: alteplase, naloxone, ondansetron **OR** ondansetron, polyethylene glycol     Assessment/Plan    Mr Fahad Meraz is a 69 M with PMHx of CAD s/p CABG x 4 (2012) and PCI (LCx/OM; 5/2019), stage D ICM HFrEF LVEF 10-15%, AF s/p RFA (PVI and CTI; 4/2024), HTN, dyslipidemia, pre-T2DM A1c 6.3, depression, anxiety, and VIV using CPAP who was admitted to OSH s/p RHC on 10/18/24 with decompensated heart failure and KENYA. He was referred to [facility] HFICU 10/24/24 and was medically optomized and presented to [employer] [facility] 11/5 who approved destination therapy LVAD.  He is now admitted to the CTICU s/p Heartmate 3 implant with the outflow to the mid descending thoracic aorta via left thoracotomy 11/12/24 with Dr. Mclain and Dr. Madrigal.    11/12 - Intra-op, episode of pulseless VT requiring defib x 1 prior to being placed on CPB. In the ICU (post-op), significant  "bleeding from venous cannulation site requiring transfusion. Required volume administration and ongoing inotropic and vasopressor support with LVAD stable flows.      11/1: Palliative consulted for pt/family support and advanced therapies eval.      11/7: Supportive visit to discuss pt's feelings on LVAD approval (vs OHT) and coping and supports. Pt very positive and motivated. Pt reports feeling great mentally and physically yet anxious about when LVAD will be placed so family can prepare.     11/13: Palliative rounded on pt s/p LVAD placement 11/12. Pt intubated/sedated, no family present. Plan to wean as medically appropriate.      11/20: Supportive visit for pt and family.    11/21: Pt having worsening delirium and restless nights \"picking\". See recs below.    Palliative Performance Scale (PPS): 50-60    ----------------------------------------------------------------------------------------------------------------------------------------------------------------------------------------------------------------------------------------------------------------------------------------------------------------------------------------------------------------------      I spent  minutes in providing separately identifiable ACP services with the patient and/or surrogate decision maker in a voluntary conversation discussing the patient's wishes and goals as detailed in the above note.   ----------------------------------------------------------------------------------------------------------------------------------------------------------------------------------------------------------------------------------------------------------------------------------------------------------------------------------------------------------------------  #Complex Medical Decision Making   #Goals of Care  #Advance Care Planning   - Code status: FULL CODE  - Surrogate decision maker: KtTanvi (Spouse)  [phone]   - Goals are mix " "of survival and time and improved quality of life  - 11/20: Supportive visit with pt, wife at bedside. Pt feeling well but admits to feeling some pain of the drive line after the RAMP study, and still with some confusion, although improving. Discussed his supports and coping mechanisms along with hopes/goals. Discussed his progress and AR. Received a choice from [facility], will communicate to SW. Pt's dtr works for [employer] and has a facility closer to home. Unsure if choice is SNF vs [facility] and if they accept LVAD pts.      #Acute pain  - Driveline tenderness, sometimes sharp \"tooth ache\" pain with certain movements. Tylenol helps. Not on opiate regimen r/t delirium.       #Delirium  - Was improving out of ICU setting, now quite fidgety and \"sundowning\" with \"picking\" during HS. Pt does not recall the events and states he is sleeping well.   - Off off opiates, digoxin and melatonin 11/20 for potential contributing factors.   - Wife/family visiting daily. Has pictures up all over walls and windows. Has PT/OT, distractions during the day to help with wake/sleep cycle, window shades are up.   Delirium Guidelines  Provide glasses, hearing aids and/or communication boards as needed for impairments  Frequent reorientation, minimize room and staff changes  Open blinds during the day, dark/quiet room at night   Minimal interruptions and daytime naps  Early evaluation and intervention by PT, out of bed as tolerated  Minimize use of restraints   Minimize use of benzodiazepines, anticholinergic medications, and opiates (while ensuring adequate treatment of pain)  Keep Mg>2, K>4 (as able)  Ensure regular bowel and bladder function (as able)  - Recommend tylenol to be scheduled TID. Administration before bed often helps with sleep. Plus, pt was complaining of pain, which can also be a delirium trigger. Better pain control may help him overall, along with management of other contributing factors.   - Music notified to assist with natural " modalities to assist with sleep, such as meditation.        #Psychosocial Support  - Ongoing pt/family support.  - Music Therapy  - Spiritual Care Support     Plan of Care discussed with: Updated primary and bedside RN on goals of care decision, medication adjustments, and code status      Medical Decision Making was high level due to high complexity of problems, extensive data review, and high risk of management/treatment.     - HFrEF and inotrope dependent, work up for cardiac advanced therapies, s/p LVAD 11/12 with pulseless VT, posing threat to life or function.   - Reviewed external notes from   - Reviews results from  which were used in decision making for   - Recommended the following tests:   - Assessment required independent historian: primary  - Independent interpretation of test:  - Discussion of management with: primary  - Drug therapy requiring intensive monitoring for toxicity: hep, warfarin, potassium  - Decision regarding elective major surgery with identified patient or procedure risk factors:   - Decision regarding emergency major surgery:   - Decision regarding hospitalization or escalation of hospital-level care:   - Decision not to resuscitate or to de-escalate care because of poor prognosis:   - Parenteral controlled substances: milrinone, hep    Thank you for allowing us to care for this patient. Palliative Team will continue to follow as needed. Please contact team with any questions or concerns.   Team pager [phone] (weekdays)    MAYTE Nix-CNP

## 2024-11-21 NOTE — PROGRESS NOTES
Occupational Therapy    Communication Note    Patient Name: Fahad Meraz  MRN: [id number]  Today's Date: 11/21/2024   Room: 08/08    Discipline: Occupational Therapy      Missed Visit: Yes  Missed Visit Reason:  (Patient up in hallway with PT at this time; will attempt OT next visit as appropriate.)      11/21/24 at 12:25 PM   Thuy Pop OT   Rehab Office: [phone]

## 2024-11-22 ENCOUNTER — APPOINTMENT (OUTPATIENT)
Dept: RADIOLOGY | Facility: HOSPITAL | Age: 69
DRG: 001 | End: 2024-11-22
Payer: MEDICARE

## 2024-11-22 LAB
ALBUMIN SERPL BCP-MCNC: 3.4 G/DL (ref 3.4–5)
ALBUMIN SERPL BCP-MCNC: 3.5 G/DL (ref 3.4–5)
ANION GAP SERPL CALC-SCNC: 12 MMOL/L (ref 10–20)
ANION GAP SERPL CALC-SCNC: 14 MMOL/L (ref 10–20)
APTT PPP: 59 SECONDS (ref 27–38)
BASOPHILS # BLD MANUAL: 0.28 X10*3/UL (ref 0–0.1)
BASOPHILS NFR BLD MANUAL: 2.6 %
BUN SERPL-MCNC: 19 MG/DL (ref 6–23)
BUN SERPL-MCNC: 20 MG/DL (ref 6–23)
CALCIUM SERPL-MCNC: 8.7 MG/DL (ref 8.6–10.6)
CALCIUM SERPL-MCNC: 8.8 MG/DL (ref 8.6–10.6)
CHLORIDE SERPL-SCNC: 102 MMOL/L (ref 98–107)
CHLORIDE SERPL-SCNC: 103 MMOL/L (ref 98–107)
CO2 SERPL-SCNC: 25 MMOL/L (ref 21–32)
CO2 SERPL-SCNC: 27 MMOL/L (ref 21–32)
CREAT SERPL-MCNC: 1.35 MG/DL (ref 0.5–1.3)
CREAT SERPL-MCNC: 1.35 MG/DL (ref 0.5–1.3)
EGFRCR SERPLBLD CKD-EPI 2021: 57 ML/MIN/1.73M*2
EGFRCR SERPLBLD CKD-EPI 2021: 57 ML/MIN/1.73M*2
EOSINOPHIL # BLD MANUAL: 0.19 X10*3/UL (ref 0–0.7)
EOSINOPHIL NFR BLD MANUAL: 1.7 %
ERYTHROCYTE [DISTWIDTH] IN BLOOD BY AUTOMATED COUNT: 15.9 % (ref 11.5–14.5)
GLUCOSE SERPL-MCNC: 124 MG/DL (ref 74–99)
GLUCOSE SERPL-MCNC: 93 MG/DL (ref 74–99)
HCT VFR BLD AUTO: 34.3 % (ref 41–52)
HGB BLD-MCNC: 11.2 G/DL (ref 13.5–17.5)
IMM GRANULOCYTES # BLD AUTO: 0.59 X10*3/UL (ref 0–0.7)
IMM GRANULOCYTES NFR BLD AUTO: 5.4 % (ref 0–0.9)
INR PPP: 2.5 (ref 0.9–1.1)
LDH SERPL L TO P-CCNC: 233 U/L (ref 84–246)
LYMPHOCYTES # BLD MANUAL: 0.76 X10*3/UL (ref 1.2–4.8)
LYMPHOCYTES NFR BLD MANUAL: 7 %
MAGNESIUM SERPL-MCNC: 2.1 MG/DL (ref 1.6–2.4)
MCH RBC QN AUTO: 27 PG (ref 26–34)
MCHC RBC AUTO-ENTMCNC: 32.7 G/DL (ref 32–36)
MCV RBC AUTO: 83 FL (ref 80–100)
METAMYELOCYTES # BLD MANUAL: 0.28 X10*3/UL
METAMYELOCYTES NFR BLD MANUAL: 2.6 %
MONOCYTES # BLD MANUAL: 0.66 X10*3/UL (ref 0.1–1)
MONOCYTES NFR BLD MANUAL: 6.1 %
MYELOCYTES # BLD MANUAL: 0.19 X10*3/UL
MYELOCYTES NFR BLD MANUAL: 1.7 %
NEUTS SEG # BLD MANUAL: 8.15 X10*3/UL (ref 1.2–7)
NEUTS SEG NFR BLD MANUAL: 74.8 %
NRBC BLD-RTO: 0 /100 WBCS (ref 0–0)
OVALOCYTES BLD QL SMEAR: ABNORMAL
PHOSPHATE SERPL-MCNC: 3.2 MG/DL (ref 2.5–4.9)
PHOSPHATE SERPL-MCNC: 3.8 MG/DL (ref 2.5–4.9)
PLATELET # BLD AUTO: 473 X10*3/UL (ref 150–450)
POTASSIUM SERPL-SCNC: 4.2 MMOL/L (ref 3.5–5.3)
POTASSIUM SERPL-SCNC: 5.2 MMOL/L (ref 3.5–5.3)
PROMYELOCYTES # BLD MANUAL: 0.1 X10*3/UL
PROMYELOCYTES NFR BLD MANUAL: 0.9 %
PROTHROMBIN TIME: 28.1 SECONDS (ref 9.8–12.8)
RBC # BLD AUTO: 4.15 X10*6/UL (ref 4.5–5.9)
RBC MORPH BLD: ABNORMAL
SODIUM SERPL-SCNC: 137 MMOL/L (ref 136–145)
SODIUM SERPL-SCNC: 137 MMOL/L (ref 136–145)
TOTAL CELLS COUNTED BLD: 115
UFH PPP CHRO-ACNC: 0.1 IU/ML
VARIANT LYMPHS # BLD MANUAL: 0.28 X10*3/UL (ref 0–0.5)
VARIANT LYMPHS NFR BLD: 2.6 %
WBC # BLD AUTO: 10.9 X10*3/UL (ref 4.4–11.3)

## 2024-11-22 PROCEDURE — 71045 X-RAY EXAM CHEST 1 VIEW: CPT | Performed by: RADIOLOGY

## 2024-11-22 PROCEDURE — 1100000001 HC PRIVATE ROOM DAILY

## 2024-11-22 PROCEDURE — 85610 PROTHROMBIN TIME: CPT

## 2024-11-22 PROCEDURE — 71045 X-RAY EXAM CHEST 1 VIEW: CPT

## 2024-11-22 PROCEDURE — 2500000004 HC RX 250 GENERAL PHARMACY W/ HCPCS (ALT 636 FOR OP/ED)

## 2024-11-22 PROCEDURE — 2500000001 HC RX 250 WO HCPCS SELF ADMINISTERED DRUGS (ALT 637 FOR MEDICARE OP)

## 2024-11-22 PROCEDURE — 85007 BL SMEAR W/DIFF WBC COUNT: CPT

## 2024-11-22 PROCEDURE — 85027 COMPLETE CBC AUTOMATED: CPT

## 2024-11-22 PROCEDURE — 2500000004 HC RX 250 GENERAL PHARMACY W/ HCPCS (ALT 636 FOR OP/ED): Performed by: NURSE PRACTITIONER

## 2024-11-22 PROCEDURE — 83735 ASSAY OF MAGNESIUM: CPT

## 2024-11-22 PROCEDURE — 2500000001 HC RX 250 WO HCPCS SELF ADMINISTERED DRUGS (ALT 637 FOR MEDICARE OP): Performed by: NURSE PRACTITIONER

## 2024-11-22 PROCEDURE — 2500000005 HC RX 250 GENERAL PHARMACY W/O HCPCS

## 2024-11-22 PROCEDURE — 2500000002 HC RX 250 W HCPCS SELF ADMINISTERED DRUGS (ALT 637 FOR MEDICARE OP, ALT 636 FOR OP/ED): Performed by: STUDENT IN AN ORGANIZED HEALTH CARE EDUCATION/TRAINING PROGRAM

## 2024-11-22 PROCEDURE — 80069 RENAL FUNCTION PANEL: CPT

## 2024-11-22 PROCEDURE — 37799 UNLISTED PX VASCULAR SURGERY: CPT

## 2024-11-22 PROCEDURE — 85520 HEPARIN ASSAY: CPT

## 2024-11-22 PROCEDURE — 83615 LACTATE (LD) (LDH) ENZYME: CPT

## 2024-11-22 PROCEDURE — 99291 CRITICAL CARE FIRST HOUR: CPT | Performed by: SPECIALIST

## 2024-11-22 RX ORDER — HYDRALAZINE HYDROCHLORIDE 10 MG/1
10 TABLET, FILM COATED ORAL 3 TIMES DAILY
Status: DISCONTINUED | OUTPATIENT
Start: 2024-11-22 | End: 2024-11-22

## 2024-11-22 RX ORDER — FUROSEMIDE 10 MG/ML
40 INJECTION INTRAMUSCULAR; INTRAVENOUS ONCE
Status: COMPLETED | OUTPATIENT
Start: 2024-11-22 | End: 2024-11-22

## 2024-11-22 RX ORDER — HYDRALAZINE HYDROCHLORIDE 25 MG/1
25 TABLET, FILM COATED ORAL 3 TIMES DAILY
Status: DISCONTINUED | OUTPATIENT
Start: 2024-11-22 | End: 2024-11-23

## 2024-11-22 ASSESSMENT — PAIN - FUNCTIONAL ASSESSMENT
PAIN_FUNCTIONAL_ASSESSMENT: 0-10

## 2024-11-22 ASSESSMENT — PAIN SCALES - GENERAL
PAINLEVEL_OUTOF10: 0 - NO PAIN
PAINLEVEL_OUTOF10: 2
PAINLEVEL_OUTOF10: 0 - NO PAIN

## 2024-11-22 NOTE — SIGNIFICANT EVENT
Pt with ongoing delirium and all modifiable risk factors addressed. Pt not a threat to self or others and maintains a pleasant confusion and is redirectable. Palliative care team will sign off at this time, but if needed, feel free to re-consult.   - Poornima Inman CNP.

## 2024-11-22 NOTE — PROGRESS NOTES
[facility]  HFICU PROGRESS NOTE    Fahad Meraz/[id number]    Admit Date: 10/24/2024  Hospital Length of Stay: 29   ICU Length of Stay: 2d 23h   Primary Service: HFICU  Primary HF Cardiologist: Dr Washington   Referring: Dr Washington     INTERVAL EVENTS / PERTINENT ROS:     No acute events, patient remains alert and oriented x3 but remains CAM positive. Nursing states patient slept better overnight but still confused and lots of wake windows during the night. Driveline continues to have serous drainage.      Plan:  Lasix 40 mg x1  Add hydralazine 10 mg TID  Decrease milrinone to 0.125 mcg/kg/min   Hold lexapro   Daily dressing changes for VAD  PT/OT - OOB to chair and walk today     MEDICATIONS  Infusions:  milrinone, Last Rate: 0.125 mcg/kg/min (11/22/24 1042)      Scheduled:  acetaminophen, 650 mg, TID  aspirin, 81 mg, Daily  [Held by provider] escitalopram, 10 mg, Daily  hydrALAZINE, 10 mg, TID  lidocaine, 2 patch, Daily  magnesium oxide, 800 mg, BID  pantoprazole, 40 mg, Daily before breakfast  sennosides-docusate sodium, 1 tablet, BID  warfarin, 5 mg, Daily      PRN:  alteplase, 2 mg, PRN  naloxone, 0.2 mg, q5 min PRN  ondansetron, 4 mg, q8h PRN   Or  ondansetron, 4 mg, q8h PRN  polyethylene glycol, 17 g, Daily PRN      Invasive Hemodynamics:    Most Recent Range Past 24hrs   BP (Art) 122/72 Arterial Line BP 1  Min: 96/56  Max: 126/72   MAP(Art) 89 mmHg Arterial Line MAP 1 (mmHg)   Min: 67 mmHg  Max: 89 mmHg   RA/CVP   No data recorded   PA 37/17 No data recorded   PA(mean) 27 mmHg No data recorded   PCWP 13 mmHg No data recorded   CO 7.2 L/min No data recorded   CI 3.4 L/min/m2 No data recorded   Mixed Venous 70 % No data recorded   SVR  747 (dyne*sec)/cm5 No data recorded    (dyne*sec)/cm5 No data recorded     MCS:   Heart Mate III:     Most Recent Range Past 24hrs   Flow 4.2 Pump Flow  Min: 3.7  Max: 4.5   Speed 5200 Speed RPM  Min: 5200  Max: 5250   Power 3.6    PI 3.3  Pulse Index  Min: 3.1  Max: 5.7       PHYSICAL EXAM:   Visit Vitals  /89   Pulse 103   Temp 35.6 °C (96.1 °F) (Temporal)   Resp 14   Ht 1.829 m (6')   Wt 94.6 kg (208 lb 8.9 oz)   SpO2 99%   BMI 28.29 kg/m²   Smoking Status Former   BSA 2.19 m²       Wt Readings from Last 5 Encounters:   11/22/24 94.6 kg (208 lb 8.9 oz)   10/24/24 92.5 kg (204 lb)   08/05/24 122 kg (268 lb)   01/31/22 119 kg (262 lb)   02/18/20 123 kg (270 lb 2 oz)       INTAKE/OUTPUT:  I/O last 3 completed shifts:  In: 1647.7 (17.4 mL/kg) [P.O.:480; I.V.:1167.7 (12.3 mL/kg)]  Out: 2375 (25.1 mL/kg) [Urine:2375 (0.7 mL/kg/hr)]  Weight: 94.6 kg      Physical Exam  Constitutional:       General: He is not in acute distress.     Appearance: Normal appearance. He is not ill-appearing.   HENT:      Mouth/Throat:      Mouth: Mucous membranes are dry.   Eyes:      Pupils: Pupils are equal, round, and reactive to light.   Cardiovascular:      Rate and Rhythm: Normal rate. Rhythm irregular.      Comments: VAD hum present on auscultation   Pulmonary:      Effort: Pulmonary effort is normal. No respiratory distress.      Breath sounds: Normal breath sounds.   Abdominal:      General: Abdomen is flat. There is no distension.      Palpations: Abdomen is soft.      Comments: Abdominal dressing C/D/I from driveline    Musculoskeletal:         General: Normal range of motion.      Cervical back: Normal range of motion.      Right lower leg: No edema.      Left lower leg: No edema.   Skin:     General: Skin is warm and dry.      Capillary Refill: Capillary refill takes less than 2 seconds.   Neurological:      Mental Status: He is alert and oriented to person, place, and time.      Comments: CAM +   Psychiatric:         Attention and Perception: He perceives visual hallucinations.         DATA:  CMP:  Results from last 7 days   Lab Units 11/22/24  0304 11/21/24  1247 11/21/24  0426 11/20/24  0151 11/19/24  1459 11/19/24  0322 11/18/24  1149 11/18/24  0525  11/17/24  1323 11/17/24  0122 11/16/24  1358 11/16/24  0128   SODIUM mmol/L 137 136 137 137 138 138  --  134* 137 134* 134* 134*   POTASSIUM mmol/L 4.2 4.0 4.2 4.4 3.9 4.0  --  3.9 4.1 3.9 4.2 4.2   CHLORIDE mmol/L 102 101 101 101 100 99  --  99 101 100 98 98   CO2 mmol/L 27 24 25 28 31 28  --  25 26 25 25 26   ANION GAP mmol/L 12 15 15 12 11 15  --  14 14 13 15 14   BUN mg/dL 20 25* 25* 30* 33* 35*  --  34* 42* 46* 49* 51*   CREATININE mg/dL 1.35* 1.47* 1.39* 1.68* 1.81* 1.68*  --  1.33* 1.47* 1.41* 1.50* 1.73*   EGFR mL/min/1.73m*2 57* 51* 55* 44* 40* 44*  --  58* 51* 54* 50* 42*   MAGNESIUM mg/dL 2.10 2.54* 1.97 1.99  --  2.01 2.27 1.92 1.89 2.05 2.03 2.08   ALBUMIN g/dL 3.5 3.6 3.3* 3.3* 3.2* 3.1*  --  3.5 3.5 3.4 3.5 3.6   ALT U/L  --   --   --   --   --  6*  --  6*  --  3*  --  4*   AST U/L  --   --   --   --   --  16  --  18  --  21 -- 24   BILIRUBIN TOTAL mg/dL  --   --   --   --   --  0.8  --  0.8  --  1.0  --  1.0     CBC:  Results from last 7 days   Lab Units 11/22/24  0304 11/21/24  0426 11/20/24  0151 11/19/24  0322 11/18/24  0525 11/17/24  1323 11/17/24  0122 11/16/24  1358   WBC AUTO x10*3/uL 10.9 10.2 10.7 10.5 10.0 10.0 10.8 10.2   HEMOGLOBIN g/dL 11.2* 10.4* 10.5* 10.2* 10.7* 10.5* 10.1* 10.8*   HEMATOCRIT % 34.3* 32.5* 31.7* 31.0* 31.9* 30.8* 31.3* 32.9*   PLATELETS AUTO x10*3/uL 473* 455* 465* 425 355 336 302 270   MCV fL 83 84 82 82 82 82 85 84     COAG:   Results from last 7 days   Lab Units 11/22/24  0304 11/21/24  0426 11/20/24  0151 11/19/24  0322 11/18/24  0525 11/17/24  0122 11/16/24  0128   INR  2.5* 2.1* 1.9* 1.8* 1.8* 1.7* 1.5*     ABO:   ABO TYPE   Date Value Ref Range Status   11/20/2024 A  Final     HEME/ENDO:     CARDIAC:   Results from last 7 days   Lab Units 11/22/24  0304 11/21/24  0426 11/20/24  0151 11/19/24  0322 11/18/24  0525 11/17/24  0122 11/16/24  0128   LD U/L 233 231 218 213 248* 270* 266*       ASSESSMENT AND PLAN:   Fahad Meraz is a 69 M with PMHx of CAD s/p  CABG x 4 (2012) and PCI (LCx/OM; 5/2019), stage D ICM HFrEF LVEF 10-15%, AF s/p RFA (PVI and CTI; 4/2024), HTN, pre-T2DM A1c 6.3, depression, anxiety, and VIV using CPAP who was admitted to OS s/p C on 10/18/24 with decompensated heart failure and KENYA. He was [facility] to [facility] HFICU 10/24/24 and was medically optomized and presented to [employer] [facility] 11/5 who approved destination therapy LVAD.  [facility] with the outflow to the mid descending thoracic aorta via left thoracotomy 11/12/24 with Dr. Mclain and Dr. Madrigal. Post op course complicated by delirium, possible TIA, and KENYA. Now transferred back to HFICU on 11/19 for further care. He continues to not sleep overnight and be confused during the day.      NEURO:   #Hx of anxiety and depression  #Acute post-operative pain (resolving)  #CAM positive with hyperactive to hypoactive delirium (Improving)  - Serial neuro and pain assessments   - Continue scheduled Tylenol   - HOLD home escitalopram 10mg daily 11/22  - PT Consult, OOB to chair as tolerated, ambulate today  - CAM ICU score qshift  - Sleep/wake cycle hygiene  - Completed thiamine 500mg 11/21  - Discontinue melatonin per Dr Oswald  - REST Protocol with labs & CXR at 7am     CV:   #PMHx CAD s/p CABG x 4 (2012) and PCI (LCx/OM; 5/2019), stage D ICM HFrEF LVEF 10-15%, AF s/p RFA (PVI and CTI; 4/2024), HTN  #S/p HM3 LVAD via left thoracotomy 11/12 with Jas Ortiz and Kaitlin.   - Pre/Post EF: 15-20% & dilated RV  - Maintain goal MAP 70-80mmHg, avoid sustained MAPs > 90mmHg as flows decrease and PI increases    - Decrease milrinone infusion to 0.125 mcg/kg/min  11/22  - STOP heparin infusion as INR is therapeutic   - Discontinue digoxin 0.125mg daily 11/20   - Initiate hydralazine 10 mg TID   - Daily weights   - Continue ASA 81mg daily    - If patient develops afib with RVR again - initiate amio gtt per Dr Oswald      Daily VAD Interrogation       Device: HM III  DT  Speed: 5200 (adjusted 11/18 during RAMP study)  Power: 3.7  Flow: 4.2  PI: 3.3  Hematocrit set at: 34%  LDH 11/22: 233  INR 11/22: 2.5     Medications:  ASA  Coumadin dose 5 mg (increased on 11/19 from 4 mg)   PPI     Daily dressing change -> drainage from driveline site serosanguinous, change to weekly once site is dry      PULM:   #VIV on CPAP at home  - Daily CXR  - Resume home nocturnal CPAP  - Maintaining SpO2 >92%.   - IS q1h and encourage OOB and ambulation daily     GI:   #GERD  #Malnutrition d/t poor PO intake  - Continue PPI for GERD, LVAD bleeding risk   - Continue adult regular diet with supplements  - Hold Colace/senna BID and change miralax to as needed     :   #KENYA on CKD (baseline Cr 1.3-1.6)  - Strict I&Os  - Diurese with lasix 40 mg x1 11/21 and 11/22  - Avoid hypotension and nephrotoxic agents      ENDO:   #PMH of pre-DM with recent A1c: 6.3  - Euglycemic   - Goal BG <180     HEME:   #Acute blood loss anemia   #Iron deficiency anemia   #Chronic anticoagulation   - C/w warfarin 5mg daily (11/19 ) for goal INR 2-3   - Stop heparin gtt as INR is therapeutic   - SCDs for DVT prophylaxis     ID:   - Afebrile, no leukocytosis  - S/p Periop cefazolin, vanco, fluconazole x 48hrs per LVAD protocol.   - Trend temp q4h  - Discontinue vanc/zosyn (11/16-11/17) was started for SIRS/worsening delirium     PHYSICAL AND OCCUPATIONAL THERAPY: Ordered     LINES:  PIVs   L radial jayson 11/16-11/21     DVT: Heparin gtt, coumadin, SCDs  VAP BUNDLE: NA  CENTRAL LINE BUNDLE: NA  ULCER PPX: Pantoprazole 40 mg daily  GLYCEMIC CONTROL: NA - A1C: 6.3  BOWEL CARE: Romina-colace BID, miralax PRN   INDWELLING CATHETER: NA  NUTRITION: Adult diet Regular      EMERGENCY CONTACT: Extended Emergency Contact Information  Primary Emergency Contact: Tanvi Meraz  Home Phone: [phone]  Relation: Spouse  Secondary Emergency Contact: Subhash Meraz  Mobile Phone: [phone]  Relation: Son  FAMILY UPDATE: wife at  bedside  CODE STATUS: Full Code  DISPO: transfer to floor     Patient seen and assessed with Dr. Margret STEEN personally spent 30 minutes of critical care time directly and personally managing the patient exclusive of separately billable procedures   _________________________________________________  Thuy Colunga, APRN-CNP

## 2024-11-22 NOTE — PROGRESS NOTES
Physical Therapy    Physical Therapy Treatment    Patient Name: Fahad Meraz  MRN: [id number]  Department: [facility] HFICU  Room: 08/08-A  Today's Date: 11/21/2024  Time Calculation  Start Time: 1156  Stop Time: 1238  Time Calculation (min): 42 min         Assessment/Plan   PT Assessment  End of Session Communication: Bedside nurse  End of Session Patient Position: Alarm off, not on at start of session, Up in chair  PT Plan  Inpatient/Swing Bed or Outpatient: Inpatient  PT Plan  Treatment/Interventions: Transfer training, Bed mobility, Gait training, Balance training, Neuromuscular re-education, Strengthening, Endurance training, Therapeutic exercise, Therapeutic activity  PT Plan: Ongoing PT  PT Frequency: Daily  PT Discharge Recommendations: High intensity level of continued care  Equipment Recommended upon Discharge: Wheeled walker  PT Recommended Transfer Status: Assist x1  PT - OK to Discharge: Yes      General Visit Information:   PT  Visit  PT Received On: 11/21/24  Response to Previous Treatment: Patient with no complaints from previous session.  General  Family/Caregiver Present: Yes  Caregiver Feedback: Family present throughout session  Prior to Session Communication: Bedside nurse  Patient Position Received: Up in chair, Alarm off, not on at start of session  General Comment: Pt pleasant and agreeble throughout session. Worked on ambulation and LVAD battery transfer today.  LVAD driveline secure pre/post visit.    Subjective   Precautions:  Precautions  Medical Precautions: Cardiac precautions, Fall precautions  Precautions Comment: MAP 70-80, SpO2>92    Vitals  PRE: , RR 16, spO2 98%  DURING: VSS  POST: , RR 15, spO2 99%    VAD numbers:  PRE             POST  5200 PS     5200  4.3    PF      4.6  4.0    PI      3.2  3.7    PP     3.7          Objective   Pain:  Pain Assessment  Pain Assessment: 0-10  0-10 (Numeric) Pain Score:  (reported pain at abdomen/drive line site. RN aware. did not  limit session.)    Cognition:  Cognition  Overall Cognitive Status: Within Functional Limits  Arousal/Alertness: Appropriate responses to stimuli  Orientation Level: Oriented X4  Following Commands: Follows all commands and directions without difficulty       Postural Control:  Static Sitting Balance  Static Sitting-Balance Support: Feet supported, No upper extremity supported  Static Sitting-Level of Assistance: Close supervision  Dynamic Sitting Balance  Dynamic Sitting-Balance Support: Feet supported, No upper extremity supported  Dynamic Sitting-Level of Assistance: Close supervision  Static Standing Balance  Static Standing-Balance Support: Bilateral upper extremity supported  Static Standing-Level of Assistance: Minimum assistance  Dynamic Standing Balance  Dynamic Standing-Balance Support: Bilateral upper extremity supported  Dynamic Standing-Level of Assistance: Minimum assistance    Activity Tolerance:  Activity Tolerance  Endurance: Tolerates 30 min exercise with multiple rests  Early Mobility/Exercise Safety Screen: Proceed with mobilization - No exclusion criteria met    Treatments:  Therapeutic Exercise  Therapeutic Exercise Performed: Yes  Therapeutic Exercise Activity 1: Seated LAQ x 10 bilat LE  Therapeutic Exercise Activity 2: standing marches x 10    Therapeutic Activity  Therapeutic Activity Performed: Yes  Therapeutic Activity 1: PT assisted pt with LVAD battery transfer to main unit. Pt able to unscrew/rescrew with verbal cues but no manual assist required. Did require minimal assist to remove battery and controller from carrying case after ambulation. Increased time noted to complete activity.         Ambulation/Gait Training  Ambulation/Gait Training Performed: Yes  Ambulation/Gait Training 1  Surface 1: Level tile  Device 1: Rolling walker  Assistance 1: Minimum assistance  Quality of Gait 1: Narrow base of support, Forward flexed posture  Comments/Distance (ft) 1: 200  ft  Transfers  Transfer: Yes  Transfer 1  Transfer From 1: Sit to  Transfer to 1: Stand  Technique 1: Sit to stand  Transfer Device 1: Walker  Transfer Level of Assistance 1: Minimum assistance  Transfers 2  Transfer From 2: Stand to  Transfer to 2: Sit  Technique 2: Stand to sit  Transfer Device 2: Walker  Transfer Level of Assistance 2: Contact guard    Outcome Measures:  [facility] Basic Mobility  Turning from your back to your side while in a flat bed without using bedrails: A little  Moving from lying on your back to sitting on the side of a flat bed without using bedrails: A little  Moving to and from bed to chair (including a wheelchair): A little  Standing up from a chair using your arms (e.g. wheelchair or bedside chair): A little  To walk in hospital room: A little  Climbing 3-5 steps with railing: A lot  Basic Mobility - Total Score: 17       FSS-ICU  Ambulation: Walks >/ or equal to 150 feet with minimal assistance x1  Rolling: Supervision or set-up only  Sitting: Supervision or set-up only  Transfer Sit-to-Stand: Minimal assistance (performs 75% or more of task)  Transfer Supine-to-Sit: Minimal assistance (performs 75% or more of task)  Total Score: 22      Early Mobility/Exercise Safety Screen: Proceed with mobilization - No exclusion criteria met  ICU Mobility Scale: Walking with assistance of 1 person [8]    Education Documentation  Mobility Training, taught by Robina Friedman PT at 11/21/2024  8:59 PM.  Learner: Patient  Readiness: Acceptance  Method: Explanation  Response: Verbalizes Understanding    Education Comments  No comments found.        OP EDUCATION:       Encounter Problems       Encounter Problems (Active)       Balance       Pt will score >45 on VEGA for safe community ambulation (Progressing)       Start:  10/29/24    Expected End:  11/28/24               Mobility       Patient will ambulate >1000 ft with LRAD and supervision With stable vitals and RPD </= 3/10 and RPE </= 13/20 for  improved functional mobility.   (Met)       Start:  10/29/24    Expected End:  11/12/24    Resolved:  11/05/24    Updated to: Patient will ambulate >2500 ft with LRAD and supervision With stable vitals and RPD </= 3/10 and RPE </= 13/20 for improved functional mobility.    Update reason: Progression         Pt will complete 6MWT with no assistive device and supervision and achieve >700 ft With stable vitals and RPD </= 3/10 and RPE </= 13/20 for improved functional mobility.   (Met)       Start:  10/29/24    Expected End:  11/12/24    Resolved:  11/05/24    Updated to: Pt will complete 6MWT with no assistive device and supervision and achieve >1300 ft With stable vitals and RPD </= 3/10 and RPE </= 13/20 for improved functional mobility.    Update reason: Progression         Patient will ambulate >2500 ft with LRAD and supervision With stable vitals and RPD </= 3/10 and RPE </= 13/20 for improved functional mobility. (Progressing)       Start:  11/05/24    Expected End:  11/28/24                Pt will complete 6MWT with no assistive device and supervision and achieve >1300 ft With stable vitals and RPD </= 3/10 and RPE </= 13/20 for improved functional mobility. (Progressing)       Start:  11/05/24    Expected End:  11/28/24                   PT Transfers       Patient will perform bed mobility indep (maintenance 2/2 prolonged hospital stay anticipated) (Progressing)       Start:  10/29/24    Expected End:  11/28/24            Patient will transfer sit to and from stand indep (Progressing)       Start:  10/29/24    Expected End:  11/28/24            Pt will complete 5XSTS from recliner without UE assist <12 seconds With stable vitals and RPD </= 3/10 and RPE </= 13/20 for improved functional mobility.   (Progressing)       Start:  10/29/24    Expected End:  11/28/24               Pain - Adult

## 2024-11-22 NOTE — CARE PLAN
The patient's goals for the shift include  to sleep    The clinical goals for the shift include stable hemodynamics with no alarms with LVAD, improved mentation with decreased confusion/delusions, and to maintain safety      Problem: Heart Failure  Goal: Improved gas exchange this shift  Outcome: Progressing  Goal: Improved urinary output this shift  Outcome: Progressing  Goal: Reduction in peripheral edema within 24 hours  Outcome: Progressing  Goal: Report improvement of dyspnea/breathlessness this shift  Outcome: Progressing  Goal: Weight from fluid excess reduced over 2-3 days, then stabilize  Outcome: Progressing  Goal: Increase self care and/or family involvement in 24 hours  Outcome: Progressing     Problem: Skin  Goal: Decreased wound size/increased tissue granulation at next dressing change  Outcome: Progressing  Goal: Participates in plan/prevention/treatment measures  Outcome: Progressing  Goal: Prevent/manage excess moisture  Outcome: Progressing  Goal: Prevent/minimize sheer/friction injuries  Outcome: Progressing  Goal: Promote/optimize nutrition  Outcome: Progressing  Goal: Promote skin healing  Outcome: Progressing     Problem: Pain - Adult  Goal: Verbalizes/displays adequate comfort level or baseline comfort level  Outcome: Progressing     Problem: Discharge Planning  Goal: Discharge to home or other facility with appropriate resources  Outcome: Progressing     Problem: Chronic Conditions and Co-morbidities  Goal: Patient's chronic conditions and co-morbidity symptoms are monitored and maintained or improved  Outcome: Progressing     Problem: Fall/Injury  Goal: Verbalize understanding of personal risk factors for fall in the hospital  Outcome: Progressing  Goal: Verbalize understanding of risk factor reduction measures to prevent injury from fall in the home  Outcome: Progressing  Goal: Use assistive devices by end of the shift  Outcome: Progressing  Goal: Pace activities to prevent fatigue by end  of the shift  Outcome: Progressing  Goal: Not fall by end of shift  Outcome: Progressing  Goal: Be free from injury by end of the shift  Outcome: Progressing     Problem: Ventricular Assisted Device (VAD)  Goal: Hemodynamic stability, correction of coagulopathy, lab value stability  Outcome: Progressing  Goal: Stable metal status  Outcome: Progressing  Goal: Pulmonary toileting, incentive spirometry  Outcome: Progressing  Goal: Nutrition  Outcome: Progressing  Goal: Mobility/OT/PT  Outcome: Progressing  Goal: Rubber ball  Outcome: Progressing  Goal: ROM  Outcome: Progressing  Goal: Sitting  Outcome: Progressing  Goal: Walk  Outcome: Progressing  Goal: Involve in VAD awareness, dressing change  Outcome: Progressing  Goal: AICD On/Off  Outcome: Progressing     Problem: Meds/Post-op Pain  Goal: Pain controlled to tolerate pain level  Outcome: Progressing  Goal: Tolerates prescribed medication  Outcome: Progressing     Problem: DVT/VTE Prevention/Activity  Goal: No decrease in circulation/sensation  Outcome: Progressing  Goal: Prevent skin breakdown  Outcome: Progressing  Goal: Return to preop oxygenation status  Outcome: Progressing  Goal: Tolerates optimal activity  Outcome: Progressing  Goal: Increase self care and/or family involvement in 24 hrs.  Outcome: Progressing     Problem: Wound care/infection prevention  Goal: No signs of infection in 24 hrs.  Outcome: Progressing     Problem: Diet/fluid balance  Goal: Adequate urinary output  Outcome: Progressing  Goal: Tolerates prescribed diet  Outcome: Progressing     Problem: Other goals  Goal: No change in neurological status  Outcome: Progressing  Goal: Stabilize vital signs (return to 10% of baseline)  Outcome: Progressing

## 2024-11-22 NOTE — NURSING NOTE
During chart review I found heparin assay values are not therapeutic. I updated the evening provider Miguel Angel Polanco CNP with the above information. I was advised to not make adjustments based on heparin assay values as the heparin drip will more than likely be stopped later today due to INR value within therapeutic range.

## 2024-11-22 NOTE — PROGRESS NOTES
Social Work Note   HFICU Treatment Plan: s/p LVAD,  weaning from milrinone, patient still has some confusion   - Additional information: Patient will be going to floor today   - Payee: Medicare   -Planned Disposition: Rehab recommendation - High intensity - Patient daughter has chosen a preferred SNF [facility]. They are able to accept. Referral was submitted on 11/21.   - DME: none    - Barriers to discharge: None noted at this time.   ANGELA Gilbert, LSW

## 2024-11-23 ENCOUNTER — APPOINTMENT (OUTPATIENT)
Dept: RADIOLOGY | Facility: HOSPITAL | Age: 69
DRG: 001 | End: 2024-11-23
Payer: MEDICARE

## 2024-11-23 LAB
ALBUMIN SERPL BCP-MCNC: 3.6 G/DL (ref 3.4–5)
ANION GAP SERPL CALC-SCNC: 12 MMOL/L (ref 10–20)
APTT PPP: 38 SECONDS (ref 27–38)
BASOPHILS # BLD AUTO: 0.18 X10*3/UL (ref 0–0.1)
BASOPHILS NFR BLD AUTO: 1.3 %
BUN SERPL-MCNC: 19 MG/DL (ref 6–23)
CALCIUM SERPL-MCNC: 9 MG/DL (ref 8.6–10.6)
CHLORIDE SERPL-SCNC: 105 MMOL/L (ref 98–107)
CO2 SERPL-SCNC: 26 MMOL/L (ref 21–32)
CREAT SERPL-MCNC: 1.32 MG/DL (ref 0.5–1.3)
EGFRCR SERPLBLD CKD-EPI 2021: 58 ML/MIN/1.73M*2
EOSINOPHIL # BLD AUTO: 0.49 X10*3/UL (ref 0–0.7)
EOSINOPHIL NFR BLD AUTO: 3.5 %
ERYTHROCYTE [DISTWIDTH] IN BLOOD BY AUTOMATED COUNT: 16.4 % (ref 11.5–14.5)
GLUCOSE SERPL-MCNC: 115 MG/DL (ref 74–99)
HCT VFR BLD AUTO: 40.1 % (ref 41–52)
HGB BLD-MCNC: 12.5 G/DL (ref 13.5–17.5)
IMM GRANULOCYTES # BLD AUTO: 0.63 X10*3/UL (ref 0–0.7)
IMM GRANULOCYTES NFR BLD AUTO: 4.6 % (ref 0–0.9)
INR PPP: 1.9 (ref 0.9–1.1)
LDH SERPL L TO P-CCNC: 240 U/L (ref 84–246)
LYMPHOCYTES # BLD AUTO: 1.06 X10*3/UL (ref 1.2–4.8)
LYMPHOCYTES NFR BLD AUTO: 7.7 %
MAGNESIUM SERPL-MCNC: 2.12 MG/DL (ref 1.6–2.4)
MCH RBC QN AUTO: 27 PG (ref 26–34)
MCHC RBC AUTO-ENTMCNC: 31.2 G/DL (ref 32–36)
MCV RBC AUTO: 87 FL (ref 80–100)
MONOCYTES # BLD AUTO: 0.66 X10*3/UL (ref 0.1–1)
MONOCYTES NFR BLD AUTO: 4.8 %
NEUTROPHILS # BLD AUTO: 10.81 X10*3/UL (ref 1.2–7.7)
NEUTROPHILS NFR BLD AUTO: 78.1 %
NRBC BLD-RTO: 0 /100 WBCS (ref 0–0)
PHOSPHATE SERPL-MCNC: 3.8 MG/DL (ref 2.5–4.9)
PLATELET # BLD AUTO: 529 X10*3/UL (ref 150–450)
POTASSIUM SERPL-SCNC: 4.3 MMOL/L (ref 3.5–5.3)
PROTHROMBIN TIME: 21.8 SECONDS (ref 9.8–12.8)
RBC # BLD AUTO: 4.63 X10*6/UL (ref 4.5–5.9)
SODIUM SERPL-SCNC: 139 MMOL/L (ref 136–145)
WBC # BLD AUTO: 13.8 X10*3/UL (ref 4.4–11.3)

## 2024-11-23 PROCEDURE — 2500000004 HC RX 250 GENERAL PHARMACY W/ HCPCS (ALT 636 FOR OP/ED): Performed by: NURSE PRACTITIONER

## 2024-11-23 PROCEDURE — 1100000001 HC PRIVATE ROOM DAILY

## 2024-11-23 PROCEDURE — 2500000001 HC RX 250 WO HCPCS SELF ADMINISTERED DRUGS (ALT 637 FOR MEDICARE OP): Performed by: NURSE PRACTITIONER

## 2024-11-23 PROCEDURE — 37799 UNLISTED PX VASCULAR SURGERY: CPT

## 2024-11-23 PROCEDURE — 84100 ASSAY OF PHOSPHORUS: CPT

## 2024-11-23 PROCEDURE — 85025 COMPLETE CBC W/AUTO DIFF WBC: CPT

## 2024-11-23 PROCEDURE — 2500000001 HC RX 250 WO HCPCS SELF ADMINISTERED DRUGS (ALT 637 FOR MEDICARE OP)

## 2024-11-23 PROCEDURE — 71045 X-RAY EXAM CHEST 1 VIEW: CPT

## 2024-11-23 PROCEDURE — 83615 LACTATE (LD) (LDH) ENZYME: CPT

## 2024-11-23 PROCEDURE — 83735 ASSAY OF MAGNESIUM: CPT

## 2024-11-23 PROCEDURE — 2500000002 HC RX 250 W HCPCS SELF ADMINISTERED DRUGS (ALT 637 FOR MEDICARE OP, ALT 636 FOR OP/ED): Performed by: STUDENT IN AN ORGANIZED HEALTH CARE EDUCATION/TRAINING PROGRAM

## 2024-11-23 PROCEDURE — 99291 CRITICAL CARE FIRST HOUR: CPT | Performed by: SPECIALIST

## 2024-11-23 PROCEDURE — 71045 X-RAY EXAM CHEST 1 VIEW: CPT | Performed by: RADIOLOGY

## 2024-11-23 PROCEDURE — 85610 PROTHROMBIN TIME: CPT

## 2024-11-23 RX ORDER — HYDRALAZINE HYDROCHLORIDE 50 MG/1
50 TABLET, FILM COATED ORAL EVERY 8 HOURS
Status: DISCONTINUED | OUTPATIENT
Start: 2024-11-23 | End: 2024-11-25

## 2024-11-23 ASSESSMENT — PAIN - FUNCTIONAL ASSESSMENT
PAIN_FUNCTIONAL_ASSESSMENT: 0-10

## 2024-11-23 ASSESSMENT — PAIN SCALES - GENERAL
PAINLEVEL_OUTOF10: 0 - NO PAIN

## 2024-11-23 NOTE — PROGRESS NOTES
[facility]  HFICU PROGRESS NOTE    Fahad Meraz/[id number]    Admit Date: 10/24/2024  Hospital Length of Stay: 30   ICU Length of Stay: 4d   Primary Service: HFICU  Primary HF Cardiologist: Dr Washington   Referring: Dr Washington     INTERVAL EVENTS / PERTINENT ROS:     No acute events, patient remains alert and oriented x3 but remains CAM positive.   Awaiting for floor bed for transfer.  No complaints this morning.     Plan:  - Discontinue milrinone   - Daily dressing changes for VAD  - Increase  hydralazine  to 50 mg Q 8 h on 11/23 as dMAP at high 80's and low 90's.   - Transfer to floor     MEDICATIONS  Infusions:       Scheduled:  acetaminophen, 650 mg, TID  aspirin, 81 mg, Daily  [Held by provider] escitalopram, 10 mg, Daily  hydrALAZINE, 50 mg, q8h  lidocaine, 2 patch, Daily  magnesium oxide, 800 mg, BID  pantoprazole, 40 mg, Daily before breakfast  sennosides-docusate sodium, 1 tablet, BID  warfarin, 5 mg, Daily      PRN:  ondansetron, 4 mg, q8h PRN  polyethylene glycol, 17 g, Daily PRN      Invasive Hemodynamics:    Most Recent Range Past 24hrs   BP (Art) 122/72 No data recorded   MAP(Art) 89 mmHg No data recorded   RA/CVP   No data recorded   PA 37/17 No data recorded   PA(mean) 27 mmHg No data recorded   PCWP 13 mmHg No data recorded   CO 7.2 L/min No data recorded   CI 3.4 L/min/m2 No data recorded   Mixed Venous 70 % No data recorded   SVR  747 (dyne*sec)/cm5 No data recorded    (dyne*sec)/cm5 No data recorded     MCS:   Heart Mate III:     Most Recent Range Past 24hrs   Flow 4.4 Pump Flow  Min: 4  Max: 4.4   Speed 5200 Speed RPM  Min: 5200  Max: 5250   Power 3.7    PI 2.3 Pulse Index  Min: 2.3  Max: 4.6       PHYSICAL EXAM:   Visit Vitals  BP 94/74 (BP Location: Right arm, Patient Position: Sitting)   Pulse 103   Temp 35.7 °C (96.3 °F) (Temporal)   Resp 17   Ht 1.829 m (6')   Wt 90.9 kg (200 lb 6.4 oz)   SpO2 96%   BMI 27.18 kg/m²   Smoking Status Former   BSA 2.15 m²        Wt Readings from Last 5 Encounters:   11/23/24 90.9 kg (200 lb 6.4 oz)   10/24/24 92.5 kg (204 lb)   08/05/24 122 kg (268 lb)   01/31/22 119 kg (262 lb)   02/18/20 123 kg (270 lb 2 oz)       INTAKE/OUTPUT:  I/O last 3 completed shifts:  In: 2100.3 (22.2 mL/kg) [P.O.:1350; I.V.:750.3 (7.9 mL/kg)]  Out: 3175 (33.6 mL/kg) [Urine:3175 (0.9 mL/kg/hr)]  Weight: 94.6 kg      Physical Exam  Constitutional:       General: He is not in acute distress.     Appearance: Normal appearance. He is not ill-appearing.   HENT:      Mouth/Throat:      Mouth: Mucous membranes are dry.   Eyes:      Pupils: Pupils are equal, round, and reactive to light.   Cardiovascular:      Rate and Rhythm: Normal rate. Rhythm irregular.      Comments: VAD hum present on auscultation   Pulmonary:      Effort: Pulmonary effort is normal. No respiratory distress.      Breath sounds: Normal breath sounds.   Abdominal:      General: Abdomen is flat. There is no distension.      Palpations: Abdomen is soft.      Comments: Abdominal dressing C/D/I from driveline    Musculoskeletal:         General: Normal range of motion.      Cervical back: Normal range of motion.      Right lower leg: No edema.      Left lower leg: No edema.   Skin:     General: Skin is warm and dry.      Capillary Refill: Capillary refill takes less than 2 seconds.   Neurological:      Mental Status: He is alert and oriented to person, place, and time.      Comments: CAM +   Psychiatric:         Attention and Perception: He perceives visual hallucinations.         DATA:  CMP:  Results from last 7 days   Lab Units 11/23/24  0451 11/22/24  1437 11/22/24  0304 11/21/24  1247 11/21/24  0426 11/20/24  0151 11/19/24  1459 11/19/24  0322 11/18/24  1149 11/18/24  0525 11/17/24  1323 11/17/24  0122   SODIUM mmol/L 139 137 137 136 137 137 138 138  --  134* 137 134*   POTASSIUM mmol/L 4.3 5.2 4.2 4.0 4.2 4.4 3.9 4.0  --  3.9 4.1 3.9   CHLORIDE mmol/L 105 103 102 101 101 101 100 99  --  99 101  "100   CO2 mmol/L 26 25 27 24 25 28 31 28  --  25 26 25   ANION GAP mmol/L 12 14 12 15 15 12 11 15  --  14 14 13   BUN mg/dL 19 19 20 25* 25* 30* 33* 35*  --  34* 42* 46*   CREATININE mg/dL 1.32* 1.35* 1.35* 1.47* 1.39* 1.68* 1.81* 1.68*  --  1.33* 1.47* 1.41*   EGFR mL/min/1.73m*2 58* 57* 57* 51* 55* 44* 40* 44*  --  58* 51* 54*   MAGNESIUM mg/dL 2.12  --  2.10 2.54* 1.97 1.99  --  2.01 2.27 1.92 1.89 2.05   ALBUMIN g/dL 3.6 3.4 3.5 3.6 3.3* 3.3* 3.2* 3.1*  --  3.5 3.5 3.4   ALT U/L  --   --   --   --   --   --   --  6*  --  6*  --  3*   AST U/L  --   --   --   --   --   --   --  16  --  18  --  21   BILIRUBIN TOTAL mg/dL  --   --   --   --   --   --   --  0.8  --  0.8  --  1.0     CBC:  Results from last 7 days   Lab Units 11/23/24  0451 11/22/24  0304 11/21/24  0426 11/20/24  0151 11/19/24  0322 11/18/24  0525 11/17/24  1323 11/17/24  0122   WBC AUTO x10*3/uL 13.8* 10.9 10.2 10.7 10.5 10.0 10.0 10.8   HEMOGLOBIN g/dL 12.5* 11.2* 10.4* 10.5* 10.2* 10.7* 10.5* 10.1*   HEMATOCRIT % 40.1* 34.3* 32.5* 31.7* 31.0* 31.9* 30.8* 31.3*   PLATELETS AUTO x10*3/uL 529* 473* 455* 465* 425 355 336 302   MCV fL 87 83 84 82 82 82 82 85     COAG:   Results from last 7 days   Lab Units 11/23/24  0451 11/22/24  0304 11/21/24  0426 11/20/24  0151 11/19/24  0322 11/18/24  0525 11/17/24  0122   INR  1.9* 2.5* 2.1* 1.9* 1.8* 1.8* 1.7*     ABO:   No results found for: \"ABO\"    HEME/ENDO:     CARDIAC:   Results from last 7 days   Lab Units 11/23/24  0451 11/22/24  0304 11/21/24  0426 11/20/24  0151 11/19/24  0322 11/18/24  0525 11/17/24  0122   LD U/L 240 233 231 218 213 248* 270*     Hospital Course:      Fahad Meraz is a 69 M with PMHx of CAD s/p CABG x 4 (2012) and PCI (LCx/OM; 5/2019), stage D ICM HFrEF LVEF 10-15%, AF s/p RFA (PVI and CTI; 4/2024), HTN, pre-T2DM A1c 6.3, depression, anxiety, and VIV using CPAP who was admitted to OSH s/p RHC on 10/18/24 with decompensated heart failure and KENYA. He was referred to [facility] HFICU " 10/24/24 and was medically optomized and presented to [employer] [facility] 11/5 who approved destination therapy LVAD.  [facility] 3 implant with the outflow to the mid descending thoracic aorta via left thoracotomy 11/12/24 with Dr. Mclain and Dr. Madrigal. Post op course complicated by delirium, possible TIA, and KENYA. Now transferred back to HFICU on 11/19 for further care. He continues to not sleep overnight and be confused during the day. All sleep aid and lexpro stopped, Rest protocol in place, delirium improving. Milrinone drip weaned OFF 11/23, LVAD with speed 5200, adjusted 11/18 during RAMP study, now with stable flow, no PI events per interrogation.     HD stable, for transfer to floor service.      Assessment/Plan:     NEURO:   #Hx of anxiety and depression  #Acute post-operative pain (resolving)  #CAM positive with hyperactive to hypoactive delirium (Improving)  - Serial neuro and pain assessments   - Continue scheduled Tylenol   - HOLD home escitalopram 10mg daily 11/22  - Discontinue melatonin per Dr Oswald  - PT Consult, OOB to chair as tolerated, ambulate today  - CAM ICU score qshift  - Sleep/wake cycle hygiene  - Completed thiamine 500mg 11/21  - REST Protocol      CV:   #PMHx CAD s/p CABG x 4 (2012) and PCI (LCx/OM; 5/2019), stage D ICM HFrEF LVEF 10-15%, AF s/p RFA (PVI and CTI; 4/2024), HTN  #S/p HM3 LVAD via left thoracotomy 11/12 with Jas Briseno.   - Pre/Post EF: 15-20% & dilated RV  - Maintain goal MAP 70-80mmHg, avoid sustained MAPs > 90mmHg as flows decrease and PI increases    - Please check doppler MAPS Q4 h per LVAD protocol   - Decrease milrinone infusion to 0.125 mcg/kg/min  11/22  - STOP heparin infusion as INR was therapeutic on 11/22.  INR 1.9 on 11/23, OK to leave heparin drip OFF per D/w attending    - Discontinue digoxin 0.125mg daily 11/20   - Increase  hydralazine  to 50 mg Q 8 h on 11/23 as dMAP at high 80's and low 90's.    - Daily weights   - Continue ASA 81mg daily    - If patient develops afib with RVR again - initiate amio gtt per Dr Oswald      Daily VAD Interrogation      Device: HM III  DT  Speed: 5200 (adjusted 11/18 during RAMP study)  Power: 3.9  Flow: 4.3  PI: 3.0  Hematocrit set at: 40 % (adjusted on 11/23)  LDH 11/23: 240, stable   INR 11/23: 1.9 (2.5)      Medications:  ASA  Coumadin dose 5 mg (increased on 11/19 from 4 mg)   PPI     Daily dressing change -> drainage from driveline site serosanguinous, change to weekly once site is dry      PULM:   #VIV on CPAP at home  - Resume home nocturnal CPAP  - Maintaining SpO2 >92%.   - IS q1h and encourage OOB and ambulation daily     GI:   #GERD  #Malnutrition d/t poor PO intake  - Continue PPI for GERD, LVAD bleeding risk   - Continue adult regular diet with supplements  - Hold Colace/senna BID and change miralax to as needed     :   #KENYA on CKD (baseline Cr 1.3-1.6)  - Strict I&Os  - Diurese with lasix 40 mg x1 11/21 and 11/22  - Avoid hypotension and nephrotoxic agents      ENDO:   #PMH of pre-DM with recent A1c: 6.3  - Euglycemic   - Goal BG <180     HEME:   #Acute blood loss anemia   #Iron deficiency anemia   #Chronic anticoagulation   - C/w warfarin 5mg daily (11/19 ) for goal INR 2-3   - Heparin gtt remains OFF, INR 1.9 today 11/23 as d/w HF attending 11/23.   - SCDs for DVT prophylaxis     ID:   - Afebrile, no leukocytosis  - S/p Periop cefazolin, vanco, fluconazole x 48hrs per LVAD protocol.   - Trend temp q4h  - Discontinue vanc/zosyn (11/16-11/17) was started for SIRS/worsening delirium     PHYSICAL AND OCCUPATIONAL THERAPY: Ordered     LINES:  PIVs      DVT:  coumadin, SCDs  VAP BUNDLE: NA  CENTRAL LINE BUNDLE: NA  ULCER PPX: Pantoprazole 40 mg daily  GLYCEMIC CONTROL: NA - A1C: 6.3  BOWEL CARE: Romina-colace BID, miralax PRN   INDWELLING CATHETER: NA  NUTRITION: Adult diet Regular      EMERGENCY CONTACT: Extended Emergency Contact Information  Primary Emergency  Contact: Tanvi Meraz  Home Phone: [phone]  Relation: Spouse  Secondary Emergency Contact: Subhash Meraz  Mobile Phone: [phone]  Relation: Son  FAMILY UPDATE: wife at bedside  CODE STATUS: Full Code  DISPO: transfer to floor     Patient seen and assessed with Dr. Margret STEEN personally spent 50 minutes of critical care time directly and personally managing the patient exclusive of separately billable procedures   _________________________________________________  BARBARA De

## 2024-11-23 NOTE — PROGRESS NOTES
HFICU Attending Note    Principal Problem:    Acute on chronic heart failure with reduced ejection fraction and diastolic dysfunction  Active Problems:    Ischemic cardiomyopathy    Receiving inotropic medication    HTN (hypertension)    CAD (coronary artery disease)    MI (myocardial infarction) (Multi)    CHF (congestive heart failure)    Gastroesophageal reflux disease    Acute kidney injury (nontraumatic) (CMS-[facility])    Delirium      S/p LVAD  1- Delirium - improving with increased sleep/ambulation   2- Hemodynamic support.  Doing well, will discontinue milrinone.  Will add afterload reducers depending on blood pressure.  3- Infectious workup remains negative   4- No significant LVAD alarms noted   5- Stable creatinine         This critically ill patient continues to be at-risk for clinically significant deterioration / failure due to the above mentioned dysfunctional, unstable organ systems.  I have personally identified and managed all complex critical care issues to prevent aforementioned clinical deterioration.  Critical care time is spent at bedside and/or the immediate area and has included, but is not limited to, the review of diagnostic tests, labs, radiographs, serial assessments of hemodynamics, respiratory status, ventilatory management, and family updates.  Time spent in procedures and teaching are reported separately.    Critical care time: 35____ minutes

## 2024-11-23 NOTE — PROGRESS NOTES
HFICU Attending Note    Principal Problem:    Acute on chronic heart failure with reduced ejection fraction and diastolic dysfunction  Active Problems:    Ischemic cardiomyopathy    Receiving inotropic medication    HTN (hypertension)    CAD (coronary artery disease)    MI (myocardial infarction) (Multi)    CHF (congestive heart failure)    Gastroesophageal reflux disease    Acute kidney injury (nontraumatic) (CMS-[facility])    Delirium      S/p LVAD  1- Delirium - improving with increased sleep/ambulation   2- Hemodynamic support.  Epinephrine weaned off, will continue milrinone for now..  3- Infectious workup remains negative   4- No significant LVAD alarms noted   5- Stable creatinine   6- Delerium. All psychoactive medications stopped except chronic ecitalopram. Will push diurnal schedule.       This critically ill patient continues to be at-risk for clinically significant deterioration / failure due to the above mentioned dysfunctional, unstable organ systems.  I have personally identified and managed all complex critical care issues to prevent aforementioned clinical deterioration.  Critical care time is spent at bedside and/or the immediate area and has included, but is not limited to, the review of diagnostic tests, labs, radiographs, serial assessments of hemodynamics, respiratory status, ventilatory management, and family updates.  Time spent in procedures and teaching are reported separately.    Critical care time: 35____ minutes

## 2024-11-24 ENCOUNTER — APPOINTMENT (OUTPATIENT)
Dept: RADIOLOGY | Facility: HOSPITAL | Age: 69
DRG: 001 | End: 2024-11-24
Payer: MEDICARE

## 2024-11-24 VITALS
HEIGHT: 72 IN | DIASTOLIC BLOOD PRESSURE: 85 MMHG | WEIGHT: 193.78 LBS | BODY MASS INDEX: 26.25 KG/M2 | SYSTOLIC BLOOD PRESSURE: 115 MMHG | TEMPERATURE: 97.2 F | RESPIRATION RATE: 17 BRPM | OXYGEN SATURATION: 97 % | HEART RATE: 86 BPM

## 2024-11-24 LAB
ALBUMIN SERPL BCP-MCNC: 3.6 G/DL (ref 3.4–5)
ANION GAP SERPL CALC-SCNC: 15 MMOL/L (ref 10–20)
APTT PPP: 35 SECONDS (ref 27–38)
BASOPHILS # BLD AUTO: 0.2 X10*3/UL (ref 0–0.1)
BASOPHILS NFR BLD AUTO: 1.3 %
BUN SERPL-MCNC: 20 MG/DL (ref 6–23)
CALCIUM SERPL-MCNC: 9.1 MG/DL (ref 8.6–10.6)
CHLORIDE SERPL-SCNC: 104 MMOL/L (ref 98–107)
CO2 SERPL-SCNC: 22 MMOL/L (ref 21–32)
CREAT SERPL-MCNC: 1.24 MG/DL (ref 0.5–1.3)
EGFRCR SERPLBLD CKD-EPI 2021: 63 ML/MIN/1.73M*2
EOSINOPHIL # BLD AUTO: 0.61 X10*3/UL (ref 0–0.7)
EOSINOPHIL NFR BLD AUTO: 4 %
ERYTHROCYTE [DISTWIDTH] IN BLOOD BY AUTOMATED COUNT: 16.4 % (ref 11.5–14.5)
GLUCOSE BLD MANUAL STRIP-MCNC: 92 MG/DL (ref 74–99)
GLUCOSE SERPL-MCNC: 100 MG/DL (ref 74–99)
HCT VFR BLD AUTO: 39.1 % (ref 41–52)
HGB BLD-MCNC: 12.3 G/DL (ref 13.5–17.5)
IMM GRANULOCYTES # BLD AUTO: 0.67 X10*3/UL (ref 0–0.7)
IMM GRANULOCYTES NFR BLD AUTO: 4.3 % (ref 0–0.9)
INR PPP: 1.8 (ref 0.9–1.1)
LDH SERPL L TO P-CCNC: 247 U/L (ref 84–246)
LYMPHOCYTES # BLD AUTO: 1.08 X10*3/UL (ref 1.2–4.8)
LYMPHOCYTES NFR BLD AUTO: 7 %
MAGNESIUM SERPL-MCNC: 2.03 MG/DL (ref 1.6–2.4)
MCH RBC QN AUTO: 26.9 PG (ref 26–34)
MCHC RBC AUTO-ENTMCNC: 31.5 G/DL (ref 32–36)
MCV RBC AUTO: 86 FL (ref 80–100)
MONOCYTES # BLD AUTO: 0.8 X10*3/UL (ref 0.1–1)
MONOCYTES NFR BLD AUTO: 5.2 %
NEUTROPHILS # BLD AUTO: 12.06 X10*3/UL (ref 1.2–7.7)
NEUTROPHILS NFR BLD AUTO: 78.2 %
NRBC BLD-RTO: 0 /100 WBCS (ref 0–0)
PHOSPHATE SERPL-MCNC: 3.5 MG/DL (ref 2.5–4.9)
PLATELET # BLD AUTO: 545 X10*3/UL (ref 150–450)
POTASSIUM SERPL-SCNC: 4.6 MMOL/L (ref 3.5–5.3)
PROTHROMBIN TIME: 20.6 SECONDS (ref 9.8–12.8)
RBC # BLD AUTO: 4.57 X10*6/UL (ref 4.5–5.9)
SODIUM SERPL-SCNC: 136 MMOL/L (ref 136–145)
UFH PPP CHRO-ACNC: <0.1 IU/ML
WBC # BLD AUTO: 15.4 X10*3/UL (ref 4.4–11.3)

## 2024-11-24 PROCEDURE — 2500000001 HC RX 250 WO HCPCS SELF ADMINISTERED DRUGS (ALT 637 FOR MEDICARE OP): Performed by: NURSE PRACTITIONER

## 2024-11-24 PROCEDURE — 83615 LACTATE (LD) (LDH) ENZYME: CPT | Performed by: NURSE PRACTITIONER

## 2024-11-24 PROCEDURE — 99233 SBSQ HOSP IP/OBS HIGH 50: CPT | Performed by: SPECIALIST

## 2024-11-24 PROCEDURE — 1200000002 HC GENERAL ROOM WITH TELEMETRY DAILY

## 2024-11-24 PROCEDURE — 80069 RENAL FUNCTION PANEL: CPT | Performed by: NURSE PRACTITIONER

## 2024-11-24 PROCEDURE — 93750 INTERROGATION VAD IN PERSON: CPT | Performed by: SPECIALIST

## 2024-11-24 PROCEDURE — 2500000004 HC RX 250 GENERAL PHARMACY W/ HCPCS (ALT 636 FOR OP/ED)

## 2024-11-24 PROCEDURE — 85520 HEPARIN ASSAY: CPT

## 2024-11-24 PROCEDURE — 70450 CT HEAD/BRAIN W/O DYE: CPT

## 2024-11-24 PROCEDURE — 2500000005 HC RX 250 GENERAL PHARMACY W/O HCPCS: Performed by: NURSE PRACTITIONER

## 2024-11-24 PROCEDURE — 2500000001 HC RX 250 WO HCPCS SELF ADMINISTERED DRUGS (ALT 637 FOR MEDICARE OP)

## 2024-11-24 PROCEDURE — 2500000004 HC RX 250 GENERAL PHARMACY W/ HCPCS (ALT 636 FOR OP/ED): Performed by: NURSE PRACTITIONER

## 2024-11-24 PROCEDURE — 82947 ASSAY GLUCOSE BLOOD QUANT: CPT

## 2024-11-24 PROCEDURE — 2500000002 HC RX 250 W HCPCS SELF ADMINISTERED DRUGS (ALT 637 FOR MEDICARE OP, ALT 636 FOR OP/ED)

## 2024-11-24 PROCEDURE — 70450 CT HEAD/BRAIN W/O DYE: CPT | Performed by: STUDENT IN AN ORGANIZED HEALTH CARE EDUCATION/TRAINING PROGRAM

## 2024-11-24 PROCEDURE — 85025 COMPLETE CBC W/AUTO DIFF WBC: CPT | Performed by: NURSE PRACTITIONER

## 2024-11-24 PROCEDURE — 85610 PROTHROMBIN TIME: CPT | Performed by: NURSE PRACTITIONER

## 2024-11-24 PROCEDURE — 83735 ASSAY OF MAGNESIUM: CPT | Performed by: NURSE PRACTITIONER

## 2024-11-24 RX ORDER — HEPARIN SODIUM 10000 [USP'U]/100ML
0-4000 INJECTION, SOLUTION INTRAVENOUS CONTINUOUS
Status: DISCONTINUED | OUTPATIENT
Start: 2024-11-24 | End: 2024-11-28

## 2024-11-24 RX ORDER — WARFARIN 3 MG/1
6 TABLET ORAL DAILY
Status: DISCONTINUED | OUTPATIENT
Start: 2024-11-24 | End: 2024-11-26

## 2024-11-24 ASSESSMENT — PAIN - FUNCTIONAL ASSESSMENT
PAIN_FUNCTIONAL_ASSESSMENT: 0-10

## 2024-11-24 ASSESSMENT — COGNITIVE AND FUNCTIONAL STATUS - GENERAL
DRESSING REGULAR LOWER BODY CLOTHING: A LITTLE
STANDING UP FROM CHAIR USING ARMS: A LITTLE
DAILY ACTIVITIY SCORE: 21
DRESSING REGULAR LOWER BODY CLOTHING: A LITTLE
WALKING IN HOSPITAL ROOM: A LITTLE
DRESSING REGULAR UPPER BODY CLOTHING: A LITTLE
DRESSING REGULAR UPPER BODY CLOTHING: A LITTLE
HELP NEEDED FOR BATHING: A LITTLE
STANDING UP FROM CHAIR USING ARMS: A LITTLE
CLIMB 3 TO 5 STEPS WITH RAILING: A LITTLE
MOBILITY SCORE: 21
CLIMB 3 TO 5 STEPS WITH RAILING: A LITTLE
HELP NEEDED FOR BATHING: A LITTLE
WALKING IN HOSPITAL ROOM: A LITTLE

## 2024-11-24 ASSESSMENT — PAIN SCALES - GENERAL
PAINLEVEL_OUTOF10: 0 - NO PAIN
PAINLEVEL_OUTOF10: 2
PAINLEVEL_OUTOF10: 2

## 2024-11-24 NOTE — PROGRESS NOTES
Patient safely transferred to the floor with all belongings in stable condition to the [facility] Cardiology service. Upon evaluation, patient is HDS and in no acute distress. Based on chart review and physical examination, patient is appropriate for the I service at this time.      All questions answered at this time . plan of care as per daily progress note. To be seen and discussed with AM staff.

## 2024-11-24 NOTE — PROGRESS NOTES
ADVANCED HEART FAILURE LVAD PROGRESS NOTE      VAD Cardiologist: Dr. Washington    Type of VAD: HM 3 with outflow cannula to mid descending aorta  Implant Date: 11/12  Reason for VAD: Stage D HFrEF/ICM  Intent: Long-Term     Subjective     HPI:  Fahad Meraz is a very pleasant 69 y.o. male presenting for management of Stage D HFrEF s/p CAD s/p CABG x 4 (2012) and PCI (LCx/OM; 5/2019), stage D ICM HFrEF LVEF 10-15%, AF s/p RFA (PVI and CTI; 4/2024), HTN, pre-T2DM A1c 6.3, depression, anxiety, and VIV using CPAP who was admitted to OS s/p RHC on 10/18/24 with decompensated heart failure and KENYA. He was referred to [facility]ICU 10/24/24 and was medically optimized and presented to [employer] Committee 11/5 who approved destination therapy LVAD.  [facility] Heartmate 3 implant with the outflow to the mid descending thoracic aorta via left thoracotomy 11/12/24 with Dr. Mclain and Dr. Madrigal. Post op course complicated by delirium, possible TIA, and KENYA. He was transferred back to the HFICU on 11/19 for further care. All sleep aids and lexpro stopped due to confusion and delirium with improvement in mentation. Milrinone drip weaned OFF 11/23, LVAD with speed 5200, adjusted 11/18 during RAMP study, now with stable flow, no PI events per interrogation. He was transferred back to the floor on 11/24 for further management.    Principal Problem:    Acute on chronic heart failure with reduced ejection fraction and diastolic dysfunction  Active Problems:    Ischemic cardiomyopathy    Receiving inotropic medication    HTN (hypertension)    CAD (coronary artery disease)    MI (myocardial infarction) (Multi)    CHF (congestive heart failure)    Gastroesophageal reflux disease    Acute kidney injury (nontraumatic) (CMS-HCC)    Delirium       LOS: 31 days     Interval Events:   Patient seen and examined at bedside. This morning, MAP increased to 100s and with transient LUE weakness that  improved with Tylenol. Otherwise, he feels great but with copious serosanguinous discharge.      Objective   Vitals:   Vitals:    11/24/24 0600 11/24/24 0750 11/24/24 1257 11/24/24 1517   BP:       BP Location:       Patient Position:       Pulse:  87 87 87   Resp:  22 17 18   Temp:  36.6 °C (97.9 °F) 36.4 °C (97.5 °F) 36.3 °C (97.3 °F)   TempSrc:  Temporal Temporal Temporal   SpO2:  96% 97% 97%   Weight: 87.9 kg (193 lb 12.6 oz)      Height:         Wt Readings from Last 5 Encounters:   11/24/24 87.9 kg (193 lb 12.6 oz)   10/24/24 92.5 kg (204 lb)   08/05/24 122 kg (268 lb)   01/31/22 119 kg (262 lb)   02/18/20 123 kg (270 lb 2 oz)     Hemodynamic parameters for last 24 hours:     Intake/Output for last 24 hours:    Intake/Output Summary (Last 24 hours) at 11/24/2024 1613  Last data filed at 11/24/2024 1300  Gross per 24 hour   Intake 960 ml   Output 700 ml   Net 260 ml      Vent settings:       Physical exam:  Vitals:    11/24/24 1517   BP:    Pulse: 87   Resp: 18   Temp: 36.3 °C (97.3 °F)   SpO2: 97%       GEN: Healthy appearing, well-developed, NAD.  CV: RRR, no m/r/g. JVP flat  LUNGS: CTAB, no w/r/c.  ABD: Soft, NT/ND, NBS, no masses or organomegaly.  SKIN: Warm, well perfused. No skin rashes or abnormal lesions. LE edema absent  MSK: Normal gait. No deformities.  EXT: No clubbing, cyanosis, or edema.  NEURO: Ambulating with no limitations. No focal deficits. 5/5  strength bilaterally.    Imaging:   CT head wo IV contrast    Result Date: 11/24/2024  Interpreted By:  Des Thao, STUDY: CT HEAD WO IV CONTRAST;  11/24/2024 2:07 pm   INDICATION: Signs/Symptoms:LUE numbness in VAD patient.     COMPARISON: 11/17/2024   ACCESSION NUMBER(S): [id number]   ORDERING CLINICIAN: SOLIS DUEÑAS   TECHNIQUE: Noncontrast axial CT images of head were obtained with coronal and sagittal reconstructed images.   FINDINGS: BRAIN PARENCHYMA: Good infarct in the medial left cerebellar hemisphere. No acute intraparenchymal  hemorrhage or parenchymal evidence of acute large territory ischemic infarct. Gray-white matter distinction is preserved. No mass-effect.   VENTRICLES and EXTRA-AXIAL SPACES:  No acute extra-axial or intraventricular hemorrhage. No effacement of cerebral sulci. The ventricles and sulci are age-concordant.   PARANASAL SINUSES/MASTOIDS:  No hemorrhage or air-fluid levels within the visualized paranasal sinuses. The mastoids are well aerated.   CALVARIUM/ORBITS:  No skull fracture.  The orbits and globes are intact to the extent visualized.   EXTRACRANIAL SOFT TISSUES: No discernible abnormality.       No acute intracranial abnormality.     MACRO: None.   Signed by: Des Thao 11/24/2024 3:07 PM Dictation workstation:   [id number]    XR chest 1 view    Result Date: 11/23/2024  Interpreted By:  Olvin Farias, STUDY: XR CHEST 1 VIEW; 11/23/2024 7:15 am   INDICATION: Signs/Symptoms:s/p LVAD implant, assess lung fields, devices.   COMPARISON: 11/22/2024   ACCESSION NUMBER(S): [id number]   ORDERING CLINICIAN: ANETA MANNING   FINDINGS:     CARDIOMEDIASTINAL SILHOUETTE: Cardiomegaly versus pericardial effusion. Post LVAD changes.   LUNGS: Mild perihilar interstitial edema and left-sided effusion. Continued left retrocardiac atelectasis/effusion. No pneumothorax.   ABDOMEN: No remarkable upper abdominal findings.   BONES: No acute osseous changes.       1.  Post LVAD changes with residual left basilar loculated fluid/atelectasis.     Signed by: Olvin Farias 11/23/2024 4:41 PM Dictation workstation:   [id number]      Notable Studies:   EKG:  Encounter Date: 10/24/24   Electrocardiogram 12-lead PRN for arrhythmia   Result Value    Ventricular Rate 111    Atrial Rate 81    QRS Duration 146    QT Interval 406    QTC Calculation(Bazett) 552    R Axis 24    T Axis 45    QRS Count 18    Q Onset 226    P Onset 209    P Offset 224    T Offset 429    QTC Fredericia 498    Narrative    Atrial fibrillation with rapid  ventricular response with premature ventricular or aberrantly conducted complexes  Left bundle branch block  Abnormal ECG  When compared with ECG of 20-NOV-2024 03:52,  Left bundle branch block has replaced Nonspecific intraventricular block  Criteria for Septal infarct are no longer Present  Criteria for Lateral infarct are no longer Present       Echo:  Transthoracic Echo (TTE) Limited    Result Date: 11/18/2024   [facility], [address] [place] [address]                Tel [phone] and Fax [phone] TRANSTHORACIC ECHOCARDIOGRAM REPORT  Patient Name:       RJ Webb Physician:    [id number] Fawad Chicas MD Study Date:         11/18/2024          Ordering Provider:    [id number] HARRISON PALOMINO MRN/PID:            [id number]            Fellow: Accession#:         [id number]        Nurse: Date of Birth/Age:  1955 / 69     Sonographer:          Genesis Sinclair RDCS                     years Gender assigned at  M                   Additional Staff: Birth: Height:             182.88 cm           Admit Date:           10/24/2024 Weight:             93.44 kg            Admission Status:     Inpatient -                                                               Routine BSA / BMI:          2.16 m2 / 27.94     Encounter#:           [id number]                     kg/m2 Blood Pressure:     /                   Department Location:  [facility] Study Type:    TRANSTHORACIC ECHO (TTE) LIMITED Diagnosis/ICD: Presence of heart assist device-Z95.811 Indication:    Ramp study for LVAD CPT Code:      Echo Limited-91261; Doppler Limited-52130; Color Doppler-07443 Patient History: Pertinent History: Acute on chronic heart failure with reduced EF, Diastolic                    disfunction, Ischemic cardiiomyopathy, MI, CHF, LVAD, VIV. Study Detail: The following  Echo studies were performed: 2D, Doppler, color flow               and M-Mode. Technically challenging study due to patient lying in               supine position and postoperative dressings.  PHYSICIAN INTERPRETATION: Left Ventricle: Left ventricular ejection fraction is severely decreased, by visual estimate at 10%. There is global hypokinesis of the left ventricle with minor regional variations. The left ventricular cavity size is severely dilated. Abnormal (paradoxical) septal motion consistent with post-operative status. Left ventricular diastolic filling was not assessed. Left Atrium: The left atrium is moderate to severely dilated. Right Ventricle: The right ventricle is severely enlarged. There is severely reduced right ventricular systolic function. Right Atrium: The right atrium is moderately dilated. Aortic Valve: The aortic valve is structurally normal. There is mild aortic valve cusp calcification. There is mild aortic valve regurgitation. Mitral Valve: The mitral valve is normal in structure. There is mild mitral valve regurgitation. Tricuspid Valve: The tricuspid valve is structurally normal. There is mild tricuspid regurgitation. Pulmonic Valve: The pulmonic valve is not well visualized. Pulmonic valve regurgitation was not assessed. Pericardium: Trivial pericardial effusion. Aorta: The aortic root is normal. In comparison to the previous echocardiogram(s): Compared with study dated 11/15/2024, no significant change.  LEFT VENTRICULAR ASSIST DEVICE: LVAD: The patient has a(n) HeartMate 3 LVAD device present. Inflow Cannula: The inflow cannula is visualized in the left ventricular apex. AV Leaflet Mobility: . The aortic valve appears to be opening every 1 beats. LVAD Comments: Ramp study speed increased from 5200 to 5300. Aortic valve still opens every beat. Septum remains in the midline.  CONCLUSIONS:  1. Poorly visualized anatomical structures due to suboptimal image quality.  2. Left ventricular  ejection fraction is severely decreased, by visual estimate at 10%.  3. There is global hypokinesis of the left ventricle with minor regional variations.  4. Left ventricular cavity size is severely dilated.  5. Abnormal septal motion consistent with post-operative status.  6. There is severely reduced right ventricular systolic function.  7. Severely enlarged right ventricle.  8. The left atrium is moderate to severely dilated.  9. The right atrium is moderately dilated. 10. Mild aortic valve regurgitation. 11. . The aortic valve appears to be opening every 1 beats. 12. Compared with study dated 11/15/2024, no significant change. QUANTITATIVE DATA SUMMARY:  LV SYSTOLIC FUNCTION BY 2D PLANIMETRY (MOD):                      Normal Ranges: EF-Visual:      10 % LV EF Reported: 10 %  [id number] Fawad Chicas MD Electronically signed on 11/18/2024 at 12:33:46 PM  ** Final **     Transthoracic Echo (TTE) Limited    Result Date: 11/15/2024   [facility], [address] [place] [address]                Tel [phone] and Fax [phone] TRANSTHORACIC ECHOCARDIOGRAM REPORT  Patient Name:       RJ LEIJA     Tracey Physician:    [id number] Fawad Chicas MD Study Date:         11/15/2024          Ordering Provider:    [id number] ANETA MANNING MRN/PID:            [id number]            Fellow:               [id number] Aramis Fitzpatrick MD Accession#:         [id number]        Nurse: Date of Birth/Age:  1955 / 69     Sonographer:          Jackson estrada RDCS Gender assigned at  M                   Additional Staff: Birth: Height:             182.88 cm           Admit Date: Weight:             97.07 kg            Admission Status:     Inpatient - STAT BSA / BMI:           2.19 m2 / 29.02     Encounter#:           [id number]                     kg/m2 Blood Pressure:     107/71 mmHg         Department Location:  [facility] Study Type:    TRANSTHORACIC ECHO (TTE) LIMITED Diagnosis/ICD: Presence of heart assist device-Z95.811 Indication:    LVAD ramp study CPT Code:      Echo Limited-91192; Doppler Limited-35923; Color Doppler-84265 Patient History: Pertinent History: S/p CABG x 4 (2012) and PCI (LCx/OM; 5/2019), Stage D ICM                    HFrEF LVEF 10-15%, AF s/p RFA (PVI and CTI; 4/2024), HTN,                    Dyslipidemia, 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 Study Detail: The following Echo studies were performed: 2D, M-Mode, Doppler and               color flow. Technically challenging study due to poor acoustic               windows, prominent lung artifact, body habitus and patient lying               in supine position.  PHYSICIAN INTERPRETATION: Left Ventricle: The left ventricular systolic function is severely decreased, with a visually estimated ejection fraction of 10-15%. There is global hypokinesis of the left ventricle with minor regional variations. The left ventricular cavity size is severely dilated. There is normal septal and normal posterior left ventricular wall thickness. Abnormal (paradoxical) septal motion consistent with post-operative status. Left ventricular diastolic filling cannot be determined, due to left ventricular assist device. Left Atrium: The left atrium is mildly dilated. Right Ventricle: The right ventricle is severely enlarged. There is severely reduced right ventricular systolic function. A device is visualized in the right ventricle. Right Atrium: The right atrium is severely dilated. There is a device visualized in the right atrium. Aortic Valve: The aortic valve is structurally normal. There is mild to moderate aortic valve cusp calcification. There is mild aortic  valve regurgitation. Mitral Valve: The mitral valve is normal in structure. There is mild mitral annular calcification. There is mild mitral valve regurgitation. Tricuspid Valve: The tricuspid valve is structurally normal. There is mild tricuspid regurgitation. The right ventricular systolic pressure is unable to be estimated. Pulmonic Valve: The pulmonic valve is not well visualized. Pulmonic valve regurgitation was not assessed. Pericardium: Trivial pericardial effusion. Aorta: The aortic root is abnormal. There is mild dilatation of the ascending aorta. There is mild dilatation of the aortic root. In comparison to the previous echocardiogram(s): Compared with study dated 11/13/2024, no significant change.  LEFT VENTRICULAR ASSIST DEVICE: Study Type: This study was performed while LVAD settings were optimized. LVAD: The patient has a(n) HeartMate 3 LVAD device present. Inflow Cannula: The inflow cannula is visualized in the left ventricular apex. Outflow Cannula: Visualization of the outflow cannula is technically difficult. AV Leaflet Mobility: . The aortic valve appears to be opening every 1 beats.  CONCLUSIONS:  1. The left ventricular systolic function is severely decreased, with a visually estimated ejection fraction of 10-15%.  2. There is global hypokinesis of the left ventricle with minor regional variations.  3. Left ventricular diastolic filling cannot be determined, due to left ventricular assist device.  4. Left ventricular cavity size is severely dilated.  5. Abnormal septal motion consistent with post-operative status.  6. There is severely reduced right ventricular systolic function.  7. Severely enlarged right ventricle.  8. The left atrium is mildly dilated.  9. The right atrium is severely dilated. 10. Mild aortic valve regurgitation. 11. . The aortic valve appears to be opening every 1 beats. 12. Compared with study dated 11/13/2024, no significant change. QUANTITATIVE DATA SUMMARY:  2D  MEASUREMENTS:          Normal Ranges: IVSd:            1.00 cm  (0.6-1.1cm) LVPWd:           1.00 cm  (0.6-1.1cm) LVIDd:           5.90 cm  (3.9-5.9cm) LV Mass Index:   109 g/m2  LA VOLUME:                   Normal Ranges: LA Vol A4C:        86.2 ml   (22+/-6mL/m2) LA Vol Index A4C:  39.3ml/m2 LA Area A4C:       24.2 cm2 LA Major Axis A4C: 5.8 cm  AORTA MEASUREMENTS:         Normal Ranges: Ao Sinus, d:        4.30 cm (2.1-3.5cm)  LV SYSTOLIC FUNCTION BY 2D PLANIMETRY (MOD):                      Normal Ranges: EF-Visual:      13 % LV EF Reported: 13 %  [id number] Fawad Chicas MD Electronically signed on 11/15/2024 at 12:09:54 PM  ** Final **     Transthoracic Echo (TTE) Limited    Result Date: 11/13/2024   [facility], [address] [place] [address]                Tel [phone] and Fax [phone] TRANSTHORACIC ECHOCARDIOGRAM REPORT  Patient Name:       RJ Webb Physician:    [id number] Raciel Casarez MD Study Date:         11/13/2024          Ordering Provider:    [id number] JO CASTILLO MRN/PID:            [id number]            Fellow: Accession#:         [id number]        Nurse: Date of Birth/Age:  1955 / 69     Sonographer:          Jackson estrada RDCS Gender assigned at  M                   Additional Staff: Birth: Height:             182.88 cm           Admit Date: Weight:             93.90 kg            Admission Status:     Inpatient - STAT BSA / BMI:          2.16 m2 / 28.07     Encounter#:           [id number]                     kg/m2 Blood Pressure:     94/74 mmHg          Department Location:  [facility] Study Type:    TRANSTHORACIC ECHO (TTE) LIMITED Diagnosis/ICD: Acute on chronic combined systolic (congestive) and diastolic                (congestive) heart failure  (CHF)-I50.43 Indication:    eval RV fx CPT Code:      Echo Limited-30141; Doppler Limited-60764; Color Doppler-19329 Patient History: Pertinent History: CAD s/p CABGx4 in 2012 and PCI 2019 w/ ICM LVEF 10-15%, AF                    s/p RFA & PVI, HTN, CHF exacerbation, 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 Study Detail: The following Echo studies were performed: 2D, M-Mode, Doppler and               color flow. Technically challenging study due to body habitus,               patient lying in supine position, postoperative dressings,               prominent lung artifact and poor acoustic windows. The patient is               intubated. Definity used as a contrast agent for endocardial               border definition. Total contrast used for this procedure was 2.0               mL via IV push.  PHYSICIAN INTERPRETATION: Left Ventricle: Left ventricular ejection fraction is severely decreased, by visual estimate at 15%. There is eccentric left ventricular hypertrophy. There is global hypokinesis of the left ventricle with minor regional variations. The left ventricular cavity size is moderate to severely dilated. Left ventricular diastolic filling was not assessed. Left Atrium: The left atrium is moderately dilated. Right Ventricle: The right ventricle is mild to moderately enlarged. There is severely reduced right ventricular systolic function. A device is visualized in the right ventricle. Right Atrium: The right atrium is moderately dilated. There is a device visualized in the right atrium. Aortic Valve: The aortic valve was not well visualized. There is mild to moderate aortic valve cusp calcification. There is mild aortic valve regurgitation. Mitral Valve: The mitral valve is normal in structure. There is mild mitral valve regurgitation. Tricuspid Valve: The tricuspid valve is structurally normal. There is mild tricuspid regurgitation. Pulmonic Valve: The  pulmonic valve is not well visualized. There is trace pulmonic valve regurgitation. Pericardium: There is no pericardial effusion noted. Aorta: The aortic root is abnormal. There is mild dilatation of the aortic root. Pulmonary Artery: The tricuspid regurgitant velocity is 1.83 m/s, and with an estimated right atrial pressure of 10 mmHg, the estimated pulmonary artery pressure is normal with the RVSP at 23.4 mmHg. Systemic Veins: The inferior vena cava appears moderately dilated, on vent. In comparison to the previous echocardiogram(s): Compared with study dated 11/5/2024, no significant change.  LEFT VENTRICULAR ASSIST DEVICE: LVAD: The patient has a(n) HeartMate 3 LVAD device present. Inflow Cannula: The inflow cannula is visualized in the left ventricular apex.  CONCLUSIONS:  1. Poorly visualized anatomical structures due to suboptimal image quality.  2. Left ventricular cavity size is moderate to severely dilated.  3. Left ventricular ejection fraction is severely decreased, by visual estimate at 15%.  4. There is global hypokinesis of the left ventricle with minor regional variations.  5. There is severely reduced right ventricular systolic function.  6. Mild to moderately enlarged right ventricle.  7. The left atrium is moderately dilated.  8. The right atrium is moderately dilated.  9. Mild aortic valve regurgitation. 10. The inferior vena cava appears moderately dilated, on vent. QUANTITATIVE DATA SUMMARY:  2D MEASUREMENTS:          Normal Ranges: IVSd:            1.00 cm  (0.6-1.1cm) LVPWd:           1.20 cm  (0.6-1.1cm) LVIDd:           6.90 cm  (3.9-5.9cm) LVIDs:           6.60 cm LV Mass Index:   164 g/m2 LV % FS          4.3 %  AORTA MEASUREMENTS:         Normal Ranges: Ao Sinus, d:        4.00 cm (2.1-3.5cm)  LV SYSTOLIC FUNCTION BY 2D PLANIMETRY (MOD):                      Normal Ranges: EF-Visual:      15 % LV EF Reported: 15 %  RIGHT VENTRICLE: RV s' 0.07 m/s  TRICUSPID VALVE/RVSP:          Normal  Ranges: Peak TR Velocity:     1.83 m/s RV Syst Pressure:     23 mmHg  (< 30mmHg) IVC Diam:             2.70 cm  [id number] Raciel Casarez MD Electronically signed on 11/13/2024 at 10:25:04 AM  ** Final **       Meds:  Scheduled medications  acetaminophen, 650 mg, oral, TID  aspirin, 81 mg, oral, Daily  [Held by provider] escitalopram, 10 mg, oral, Daily  hydrALAZINE, 50 mg, oral, q8h  lidocaine, 2 patch, transdermal, Daily  magnesium oxide, 800 mg, oral, BID  pantoprazole, 40 mg, oral, Daily before breakfast  sacubitriL-valsartan, 1 tablet, oral, BID  sennosides-docusate sodium, 1 tablet, oral, BID  warfarin, 6 mg, oral, Daily      Continuous medications  heparin, 0-4,000 Units/hr, Last Rate: 1,000 Units/hr (11/24/24 1538)      PRN medications  PRN medications: heparin, [DISCONTINUED] ondansetron **OR** ondansetron, polyethylene glycol     Assessment/Plan   Assessment & Plan   Fahad Meraz is a very pleasant 69 y.o. male presenting for management of Stage D HFrEF s/p CAD s/p CABG x 4 (2012) and PCI (LCx/OM; 5/2019), stage D ICM HFrEF LVEF 10-15%, AF s/p RFA (PVI and CTI; 4/2024), HTN, pre-T2DM A1c 6.3, depression, anxiety, and VIV using CPAP who was admitted to OS s/p RHC on 10/18/24 with decompensated heart failure and KENYA. He was referred to [facility] HFICU 10/24/24 and was medically optimized and presented to [employer] [facility] 11/5 who approved destination therapy LVAD.  He was transferred to CTICU s/p Heartmate 3 implant with the outflow to the mid descending thoracic aorta via left thoracotomy 11/12/24 with Dr. Mclain and Dr. Madrigal. Post op course complicated by delirium, possible TIA, and KENYA. He was transferred back to the HFICU on 11/19 for further care. All sleep aids and lexpro stopped due to confusion and delirium with improvement in mentation. Milrinone drip weaned OFF 11/23, LVAD with speed 5200, adjusted 11/18 during RAMP study, now with stable flow, no PI events per interrogation. He was  transferred back to the floor on 11/24 for further management.    NEUROLOGICAL  PMH:   Active issues:   #Hx of anxiety and depression  #Acute post-operative pain (resolving)  #ICU delirium (resolved)  - Serial neuro and pain assessments   - Continue scheduled Tylenol   - HOLD home escitalopram 10mg daily 11/22  - Discontinue melatonin per Dr Oswald  - Completed thiamine 500mg 11/21  - PT/OT    #Transient LUE weakness on 11/24  - Resolved with tylenol  - CTH (11/24) without acute abnormalities  - Heparin gtt resumed while coumadin is subtherapeutic.    CARDIAC  #PMHx CAD s/p CABG x 4 (2012) and PCI (LCx/OM; 5/2019), stage D ICM HFrEF LVEF 10-15%, AF s/p RFA (PVI and CTI; 4/2024), HTN  #S/p HM3 LVAD via left thoracotomy 11/12 with [place] and Elgudin.   - Pre/Post EF: 15-20% & dilated RV  - Maintain goal MAP 70-80mmHg, avoid sustained MAPs > 90mmHg as flows decrease and PI increases    - Please check doppler MAPS Q4 h per LVAD protocol   - Decrease milrinone infusion to 0.125 mcg/kg/min  11/22  - Discontinued digoxin 0.125mg daily 11/20   - Hydralazine 50 mg q8 TID  - Add Entresto 24-26 BID today for additional afterload reduction  - Daily weights   - Continue ASA 81mg daily    - If patient develops afib with RVR again - initiate amio gtt per Dr Oswald  - Anti-coagulation: Heparin gtt resumed today due to LUE weakness.  - Coumadin increased to 6 mg every evening (11/24).    Device: HM III  DT  Speed: 5200 (adjusted 11/18 during RAMP study)  Power: 3.9  Flow: 4.3  PI: 3.0  Hematocrit set at: 40 % (adjusted on 11/23)  LDH 11/24: 247, stable   INR 11/24: 1.8 (2.5)     Heart Mate III interrogation   Most Recent   Flow 4.3   Speed 5200   Power 3.8   PI 3.1   MAP (mmHg): 92    LVAD interrogation: stable flow, no sig PI events or power spikes.    Daily weight:   Vitals:    11/24/24 0600   Weight: 87.9 kg (193 lb 12.6 oz)        PULM:   #VIV on CPAP at home  - Resume home nocturnal CPAP  - Maintaining SpO2 >92%.   - IS q1h  and encourage OOB and ambulation daily     GI:   #GERD  #Malnutrition d/t poor PO intake  - Continue PPI for GERD, LVAD bleeding risk   - Continue adult regular diet with supplements  - Hold Colace/senna BID and change miralax to as needed     :   #KENYA on CKD (baseline Cr 1.3-1.6)  - Strict I&Os  - Diurese with lasix 40 mg x1  and   - Avoid hypotension and nephrotoxic agents   Results from last 72 hours   Lab Units 24  0556 24  0451   BUN mg/dL 20 19   CREATININE mg/dL 1.24 1.32*     ENDO:   #PMH of pre-DM with recent A1c: 6.3  - Euglycemic   - Goal BG <180     HEME:   #Acute blood loss anemia   #Iron deficiency anemia   #Chronic anticoagulation   - C/w warfarin 6mg daily ( ) for goal INR 2-3   - Heparin gtt remains OFF, INR 1.9 today  as d/w HF attending .   - SCDs for DVT prophylaxis  Results from last 7 days   Lab Units 24  0556 24  0451   HEMOGLOBIN g/dL 12.3* 12.5*   HEMATOCRIT % 39.1* 40.1*   PLATELETS AUTO x10*3/uL 545* 529*      INFECTIOUS DISEASE  ID:   - Afebrile, no leukocytosis  - S/p Periop cefazolin, vanco, fluconazole x 48hrs per LVAD protocol.   - Trend temp q4h  - Discontinue vanc/zosyn (-) was started for SIRS/worsening delirium  Results from last 7 days   Lab Units 24  0556 24  0451   WBC AUTO x10*3/uL 15.4* 13.8*      Temp (24hrs), Av.3 °C (97.3 °F), Min:35.7 °C (96.2 °F), Max:36.6 °C (97.9 °F)      Skin/Musculoskeletal:  PMH:   Active issues: None    LINES:  - Type /day PIV    PROPHYLAXIS:  - DVT: SCD's, Coumadin/Heparin   - GI:  Pantoprazole daily    CODE STATUS: Full    DISPO PLANNING/ SOCIAL:  - Disposition expectation: PT/OT to assess.   - Barriers to discharge: Rehab recommendations, INR    Patient examined and discussed with attending physician Dr. Margret STEEN spent 30 minutes in the professional and overall care of this patient.     ____________________________________________________________  Michael Tiwari MD    Heart Failure Fellow  PGY-4

## 2024-11-24 NOTE — CARE PLAN
The patient's goals for the shift include      The clinical goals for the shift include MAPs will be below 100 by the end of the shift    Over the shift, the patient remained alert and oriented x4, on room air, throughout the shift. Patient has slight pain on right side from incision site. MAPs were in the 100s but no event recorded throughout the shift. Patient involved with and aware of plan of care. Patient has remained free of falls throughout the shift, call bell within reach, bed locked in the lowest position with nonskid socks on. Purposeful rounding performed. Patient calls appropriately for assistance. See chart for further details.

## 2024-11-25 ENCOUNTER — APPOINTMENT (OUTPATIENT)
Dept: RADIOLOGY | Facility: HOSPITAL | Age: 69
DRG: 001 | End: 2024-11-25
Payer: MEDICARE

## 2024-11-25 LAB
ALBUMIN SERPL BCP-MCNC: 3.4 G/DL (ref 3.4–5)
ALBUMIN SERPL BCP-MCNC: 3.7 G/DL (ref 3.4–5)
ANION GAP SERPL CALC-SCNC: 13 MMOL/L (ref 10–20)
ANION GAP SERPL CALC-SCNC: 18 MMOL/L (ref 10–20)
APTT PPP: 43 SECONDS (ref 27–38)
ATRIAL RATE: 105 BPM
BASOPHILS # BLD MANUAL: 0.23 X10*3/UL (ref 0–0.1)
BASOPHILS NFR BLD MANUAL: 1.7 %
BUN SERPL-MCNC: 20 MG/DL (ref 6–23)
BUN SERPL-MCNC: 22 MG/DL (ref 6–23)
CALCIUM SERPL-MCNC: 8.5 MG/DL (ref 8.6–10.6)
CALCIUM SERPL-MCNC: 9.1 MG/DL (ref 8.6–10.6)
CHLORIDE SERPL-SCNC: 102 MMOL/L (ref 98–107)
CHLORIDE SERPL-SCNC: 105 MMOL/L (ref 98–107)
CO2 SERPL-SCNC: 16 MMOL/L (ref 21–32)
CO2 SERPL-SCNC: 26 MMOL/L (ref 21–32)
CREAT SERPL-MCNC: 1.1 MG/DL (ref 0.5–1.3)
CREAT SERPL-MCNC: 1.22 MG/DL (ref 0.5–1.3)
EGFRCR SERPLBLD CKD-EPI 2021: 64 ML/MIN/1.73M*2
EGFRCR SERPLBLD CKD-EPI 2021: 73 ML/MIN/1.73M*2
EOSINOPHIL # BLD MANUAL: 0.57 X10*3/UL (ref 0–0.7)
EOSINOPHIL NFR BLD MANUAL: 4.2 %
ERYTHROCYTE [DISTWIDTH] IN BLOOD BY AUTOMATED COUNT: 16.7 % (ref 11.5–14.5)
GLUCOSE BLD MANUAL STRIP-MCNC: 120 MG/DL (ref 74–99)
GLUCOSE SERPL-MCNC: 103 MG/DL (ref 74–99)
GLUCOSE SERPL-MCNC: 111 MG/DL (ref 74–99)
HCT VFR BLD AUTO: 37.2 % (ref 41–52)
HGB BLD-MCNC: 12.1 G/DL (ref 13.5–17.5)
IMM GRANULOCYTES # BLD AUTO: 0.71 X10*3/UL (ref 0–0.7)
IMM GRANULOCYTES NFR BLD AUTO: 5.2 % (ref 0–0.9)
INR PPP: 1.8 (ref 0.9–1.1)
LABORATORY COMMENT REPORT: NORMAL
LDH SERPL L TO P-CCNC: 426 U/L (ref 84–246)
LDH SERPL L TO P-CCNC: 725 U/L (ref 84–246)
LYMPHOCYTES # BLD MANUAL: 1.61 X10*3/UL (ref 1.2–4.8)
LYMPHOCYTES NFR BLD MANUAL: 11.9 %
MAGNESIUM SERPL-MCNC: 2.47 MG/DL (ref 1.6–2.4)
MCH RBC QN AUTO: 27 PG (ref 26–34)
MCHC RBC AUTO-ENTMCNC: 32.5 G/DL (ref 32–36)
MCV RBC AUTO: 83 FL (ref 80–100)
MONOCYTES # BLD MANUAL: 0.34 X10*3/UL (ref 0.1–1)
MONOCYTES NFR BLD MANUAL: 2.5 %
MYELOCYTES # BLD MANUAL: 0.23 X10*3/UL
MYELOCYTES NFR BLD MANUAL: 1.7 %
NEUTROPHILS # BLD MANUAL: 10.3 X10*3/UL (ref 1.2–7.7)
NEUTS BAND # BLD MANUAL: 0.12 X10*3/UL (ref 0–0.7)
NEUTS BAND NFR BLD MANUAL: 0.9 %
NEUTS SEG # BLD MANUAL: 10.18 X10*3/UL (ref 1.2–7)
NEUTS SEG NFR BLD MANUAL: 75.4 %
NRBC BLD-RTO: 0 /100 WBCS (ref 0–0)
PATH REPORT.FINAL DX SPEC: NORMAL
PATH REPORT.GROSS SPEC: NORMAL
PATH REPORT.RELEVANT HX SPEC: NORMAL
PATH REPORT.TOTAL CANCER: NORMAL
PHOSPHATE SERPL-MCNC: 4 MG/DL (ref 2.5–4.9)
PHOSPHATE SERPL-MCNC: 4.7 MG/DL (ref 2.5–4.9)
PLATELET # BLD AUTO: 313 X10*3/UL (ref 150–450)
POTASSIUM SERPL-SCNC: 4.4 MMOL/L (ref 3.5–5.3)
POTASSIUM SERPL-SCNC: 8 MMOL/L (ref 3.5–5.3)
PROTHROMBIN TIME: 20.2 SECONDS (ref 9.8–12.8)
Q ONSET: 203 MS
QRS COUNT: 21 BEATS
QRS DURATION: 140 MS
QT INTERVAL: 366 MS
QTC CALCULATION(BAZETT): 530 MS
QTC FREDERICIA: 469 MS
R AXIS: 88 DEGREES
RBC # BLD AUTO: 4.48 X10*6/UL (ref 4.5–5.9)
RBC MORPH BLD: ABNORMAL
SODIUM SERPL-SCNC: 133 MMOL/L (ref 136–145)
SODIUM SERPL-SCNC: 135 MMOL/L (ref 136–145)
T AXIS: 211 DEGREES
T OFFSET: 386 MS
TOTAL CELLS COUNTED BLD: 118
UFH PPP CHRO-ACNC: 0.1 IU/ML
UFH PPP CHRO-ACNC: 0.1 IU/ML
UFH PPP CHRO-ACNC: 0.2 IU/ML
UFH PPP CHRO-ACNC: 0.2 IU/ML
VARIANT LYMPHS # BLD MANUAL: 0.23 X10*3/UL (ref 0–0.5)
VARIANT LYMPHS NFR BLD: 1.7 %
VENTRICULAR RATE: 126 BPM
WBC # BLD AUTO: 13.5 X10*3/UL (ref 4.4–11.3)

## 2024-11-25 PROCEDURE — 93750 INTERROGATION VAD IN PERSON: CPT | Performed by: REGISTERED NURSE

## 2024-11-25 PROCEDURE — 2500000001 HC RX 250 WO HCPCS SELF ADMINISTERED DRUGS (ALT 637 FOR MEDICARE OP): Performed by: NURSE PRACTITIONER

## 2024-11-25 PROCEDURE — 80069 RENAL FUNCTION PANEL: CPT | Performed by: NURSE PRACTITIONER

## 2024-11-25 PROCEDURE — 99233 SBSQ HOSP IP/OBS HIGH 50: CPT | Performed by: REGISTERED NURSE

## 2024-11-25 PROCEDURE — 2500000002 HC RX 250 W HCPCS SELF ADMINISTERED DRUGS (ALT 637 FOR MEDICARE OP, ALT 636 FOR OP/ED)

## 2024-11-25 PROCEDURE — 2500000004 HC RX 250 GENERAL PHARMACY W/ HCPCS (ALT 636 FOR OP/ED)

## 2024-11-25 PROCEDURE — 2500000004 HC RX 250 GENERAL PHARMACY W/ HCPCS (ALT 636 FOR OP/ED): Performed by: NURSE PRACTITIONER

## 2024-11-25 PROCEDURE — 84100 ASSAY OF PHOSPHORUS: CPT | Performed by: SPECIALIST

## 2024-11-25 PROCEDURE — 85027 COMPLETE CBC AUTOMATED: CPT | Performed by: NURSE PRACTITIONER

## 2024-11-25 PROCEDURE — 83615 LACTATE (LD) (LDH) ENZYME: CPT | Performed by: NURSE PRACTITIONER

## 2024-11-25 PROCEDURE — 85520 HEPARIN ASSAY: CPT

## 2024-11-25 PROCEDURE — 83615 LACTATE (LD) (LDH) ENZYME: CPT | Performed by: REGISTERED NURSE

## 2024-11-25 PROCEDURE — 83735 ASSAY OF MAGNESIUM: CPT | Performed by: NURSE PRACTITIONER

## 2024-11-25 PROCEDURE — 82947 ASSAY GLUCOSE BLOOD QUANT: CPT

## 2024-11-25 PROCEDURE — 85007 BL SMEAR W/DIFF WBC COUNT: CPT | Performed by: NURSE PRACTITIONER

## 2024-11-25 PROCEDURE — 74177 CT ABD & PELVIS W/CONTRAST: CPT | Performed by: STUDENT IN AN ORGANIZED HEALTH CARE EDUCATION/TRAINING PROGRAM

## 2024-11-25 PROCEDURE — 2500000001 HC RX 250 WO HCPCS SELF ADMINISTERED DRUGS (ALT 637 FOR MEDICARE OP): Performed by: REGISTERED NURSE

## 2024-11-25 PROCEDURE — 2500000001 HC RX 250 WO HCPCS SELF ADMINISTERED DRUGS (ALT 637 FOR MEDICARE OP)

## 2024-11-25 PROCEDURE — 74177 CT ABD & PELVIS W/CONTRAST: CPT

## 2024-11-25 PROCEDURE — 2500000005 HC RX 250 GENERAL PHARMACY W/O HCPCS: Performed by: NURSE PRACTITIONER

## 2024-11-25 PROCEDURE — 1200000002 HC GENERAL ROOM WITH TELEMETRY DAILY

## 2024-11-25 PROCEDURE — 85610 PROTHROMBIN TIME: CPT | Performed by: NURSE PRACTITIONER

## 2024-11-25 PROCEDURE — 2550000001 HC RX 255 CONTRASTS: Performed by: STUDENT IN AN ORGANIZED HEALTH CARE EDUCATION/TRAINING PROGRAM

## 2024-11-25 PROCEDURE — 2500000002 HC RX 250 W HCPCS SELF ADMINISTERED DRUGS (ALT 637 FOR MEDICARE OP, ALT 636 FOR OP/ED): Performed by: REGISTERED NURSE

## 2024-11-25 RX ORDER — ICOSAPENT ETHYL 1 G/1
2 CAPSULE ORAL
Status: DISCONTINUED | OUTPATIENT
Start: 2024-11-25 | End: 2024-12-03 | Stop reason: HOSPADM

## 2024-11-25 RX ORDER — SILDENAFIL CITRATE 20 MG/1
20 TABLET ORAL 3 TIMES DAILY
Status: DISCONTINUED | OUTPATIENT
Start: 2024-11-25 | End: 2024-12-03 | Stop reason: HOSPADM

## 2024-11-25 RX ORDER — DIGOXIN 125 MCG
125 TABLET ORAL DAILY
Status: DISCONTINUED | OUTPATIENT
Start: 2024-11-25 | End: 2024-11-27

## 2024-11-25 ASSESSMENT — COGNITIVE AND FUNCTIONAL STATUS - GENERAL
WALKING IN HOSPITAL ROOM: A LITTLE
HELP NEEDED FOR BATHING: A LITTLE
TURNING FROM BACK TO SIDE WHILE IN FLAT BAD: A LITTLE
DRESSING REGULAR LOWER BODY CLOTHING: A LITTLE
STANDING UP FROM CHAIR USING ARMS: A LITTLE
DAILY ACTIVITIY SCORE: 23
DRESSING REGULAR UPPER BODY CLOTHING: A LITTLE
MOBILITY SCORE: 19
CLIMB 3 TO 5 STEPS WITH RAILING: A LITTLE
CLIMB 3 TO 5 STEPS WITH RAILING: A LITTLE
DRESSING REGULAR UPPER BODY CLOTHING: A LITTLE
WALKING IN HOSPITAL ROOM: A LITTLE
MOVING TO AND FROM BED TO CHAIR: A LITTLE
STANDING UP FROM CHAIR USING ARMS: A LITTLE

## 2024-11-25 ASSESSMENT — ACTIVITIES OF DAILY LIVING (ADL): LACK_OF_TRANSPORTATION: NO

## 2024-11-25 ASSESSMENT — PAIN - FUNCTIONAL ASSESSMENT: PAIN_FUNCTIONAL_ASSESSMENT: 0-10

## 2024-11-25 ASSESSMENT — PAIN SCALES - GENERAL
PAINLEVEL_OUTOF10: 0 - NO PAIN
PAINLEVEL_OUTOF10: 0 - NO PAIN

## 2024-11-25 NOTE — PROGRESS NOTES
ADVANCED HEART FAILURE LVAD PROGRESS NOTE     H VAD Cardiologist: Padmini      Type of VAD: HM3  Implant Date: 11/12/24  Reason for VAD: ICM  Intent: Long-Term (Significant PAD, age, patient preference)      Subjective     HPI:  Fahad Meraz is a very pleasant 69 y.o. male w/ a PMHx sig for CAD s/p 4V CABG (2012) and PCI (LCx/OM; 5/2019), stage D systolic HF/ICM/HFrEF with LVEF 10-15%( 10/22/24 TTE), AF s/p RFA (PVI and CTI; 4/2024) on OAC therapy, HTN, dyslipidemia, depression, anxiety and VIV using CPAP who was admitted to OSH ICU, s/p RHC on 10/18/24.  Per patient, he had been experienced worsening SOB and fluids overload for 2-3 weeks. Patient was on milrinone drip and underwent SGC diuresis. However his KENYA worsened, and were not able to wean milrinone drip. Decision was made to transfer him to HF ICU [facility] for possible advanced therapy consideration. Advanced therapies evaluation was initiated on 10/30. Discussed in advanced therapeutics committee on 11/5 and was denied for transplantation, and approved for LVAD (significant PAD, Elevated PSA level, Age approaching 70, as well as patient verbalized he would be more in favor of LVAD vs Transplant). He was transferred to the floor on 11/6 to await VAD implantation. Readmitted to HFICU as of 11/09 for pre-OR swan guided optimization. Now presents to CTICU s/p LVAD HM3 implant via thoracotomy with descending outflow graft on 11/12/24 with Dr. Mclain. OR Course/Issues: pulseless VT requiring defib x 1 pre- CPB. Postop course c/b delirium and RV dysfunction.     11/23- Milrinone stopped and patient transferred to LT5 in stable condition   11/24- Low dose Entresto started, transient LUE weakness/numbess - [facility] negative     Principal Problem:    Acute on chronic heart failure with reduced ejection fraction and diastolic dysfunction  Active Problems:    Ischemic cardiomyopathy    Receiving inotropic medication    HTN (hypertension)    CAD (coronary artery  disease)    MI (myocardial infarction) (Multi)    CHF (congestive heart failure)    Gastroesophageal reflux disease    Acute kidney injury (nontraumatic) (CMS-HCC)    Delirium       LOS: 32 days     Interval Events:   Patient seen and examined at bedside. NAOE. Patient with large amount serosang drainage from driveline site, CT abdomen done this morning,.     NYHA class pre-VAD implant: IV  NYHA class today (11/25): II      Objective   Vitals:   Vitals:    11/25/24 0039 11/25/24 0045 11/25/24 0600 11/25/24 0604   BP:       Patient Position:       Pulse: 87 87  85   Resp:    16   Temp:  36.2 °C (97.2 °F)  36.2 °C (97.2 °F)   TempSrc:  Temporal  Temporal   SpO2:  98%     Weight:   87.4 kg (192 lb 10.9 oz)    Height:         Wt Readings from Last 5 Encounters:   11/25/24 87.4 kg (192 lb 10.9 oz)   10/24/24 92.5 kg (204 lb)   08/05/24 122 kg (268 lb)   01/31/22 119 kg (262 lb)   02/18/20 123 kg (270 lb 2 oz)     Hemodynamic parameters for last 24 hours:     Intake/Output for last 24 hours:    Intake/Output Summary (Last 24 hours) at 11/25/2024 1041  Last data filed at 11/25/2024 0928  Gross per 24 hour   Intake 1062.26 ml   Output 1525 ml   Net -462.74 ml           Physical exam:  Physical Exam  Constitutional:       Appearance: Normal appearance. He is not ill-appearing or toxic-appearing.   HENT:      Head: Normocephalic and atraumatic.      Mouth/Throat:      Mouth: Mucous membranes are moist.      Pharynx: Oropharynx is clear.   Eyes:      Extraocular Movements: Extraocular movements intact.      Pupils: Pupils are equal, round, and reactive to light.   Cardiovascular:      Comments: LVAD hum heard on ascultation  No JVP   No BLE edema   Euvolemic on exam   Driveline without s/s infection, large amount serosang drainage   Pulmonary:      Effort: Pulmonary effort is normal.      Breath sounds: Normal breath sounds.   Abdominal:      General: Abdomen is flat. Bowel sounds are normal. There is no distension.       Tenderness: There is no abdominal tenderness.   Musculoskeletal:         General: Normal range of motion.      Cervical back: Normal range of motion.      Right lower leg: No edema.      Left lower leg: No edema.   Skin:     General: Skin is warm and dry.   Neurological:      General: No focal deficit present.      Mental Status: He is alert and oriented to person, place, and time. Mental status is at baseline.   Psychiatric:         Mood and Affect: Mood normal.         Behavior: Behavior normal.        Driveline:   CDI       Labs:   CMP:  Recent Labs     11/25/24  0736 11/25/24  0250 11/24/24  0556 11/23/24  0451 11/22/24  1437 11/22/24  0304 11/21/24  1247 11/21/24  0426 11/20/24  0151 11/19/24  1459 11/19/24  0322   * 133* 136 139 137 137 136 137 137   < > 138   K 4.4 8.0* 4.6 4.3 5.2 4.2 4.0 4.2 4.4   < > 4.0    102 104 105 103 102 101 101 101   < > 99   CO2 16* 26 22 26 25 27 24 25 28   < > 28   ANIONGAP 18 13 15 12 14 12 15 15 12   < > 15   BUN 20 22 20 19 19 20 25* 25* 30*   < > 35*   CREATININE 1.10 1.22 1.24 1.32* 1.35* 1.35* 1.47* 1.39* 1.68*   < > 1.68*   EGFR 73 64 63 58* 57* 57* 51* 55* 44*   < > 44*   MG  --  2.47* 2.03 2.12  --  2.10 2.54* 1.97 1.99  --  2.01    < > = values in this interval not displayed.     Recent Labs     11/25/24  0736 11/25/24  0250 11/24/24  0556 11/23/24  0451 11/22/24  1437 11/22/24  0304 11/21/24  1247 11/21/24  0426 11/19/24  1459 11/19/24  0322 11/18/24  0525 11/17/24  1323 11/17/24  0122 11/16/24  1358 11/16/24  0128 11/15/24  1255 11/15/24  0021 11/14/24  1224 11/14/24  0118 11/13/24  1228 11/13/24  0007 11/12/24  1307   ALBUMIN 3.4 3.7 3.6 3.6 3.4 3.5 3.6 3.3*   < > 3.1* 3.5   < > 3.4   < > 3.6   < > 3.7   < > 3.8 3.7   < > 3.7   ALT  --   --   --   --   --   --   --   --   --  6* 6*  --  3*  --  4*  --  5*  --  7* 11  --  15   AST  --   --   --   --   --   --   --   --   --  16 18  --  21  --  24  --  29  --  40* 42*  --  37   BILITOT  --   --   --   --    "--   --   --   --   --  0.8 0.8  --  1.0  --  1.0  --  1.1  --  0.9 1.1  --  0.9    < > = values in this interval not displayed.     CBC:  Recent Labs     11/25/24  0250 11/24/24  0556 11/23/24  0451 11/22/24  0304 11/21/24  0426 11/20/24  0151 11/19/24  0322 11/18/24  0525   WBC 13.5* 15.4* 13.8* 10.9 10.2 10.7 10.5 10.0   HGB 12.1* 12.3* 12.5* 11.2* 10.4* 10.5* 10.2* 10.7*   HCT 37.2* 39.1* 40.1* 34.3* 32.5* 31.7* 31.0* 31.9*    545* 529* 473* 455* 465* 425 355   MCV 83 86 87 83 84 82 82 82     COAG:   Recent Labs     11/25/24  0736 11/25/24  0250 11/24/24  2156 11/24/24  0556 11/23/24  0451 11/22/24  0304 11/21/24  1247 11/21/24  0426 11/20/24  0410 11/20/24  0151 11/19/24  0322 11/18/24  0525 11/13/24  0008 11/12/24  1257   INR  --  1.8*  --  1.8* 1.9* 2.5*  --  2.1*  --  1.9* 1.8* 1.8*   < > 1.4*   HAUF 0.1 0.1 <0.1  --   --  0.1 0.1  --    < > <0.1  --   --   --   --    FIBRINOGEN  --   --   --   --   --   --   --   --   --   --   --   --   --  301    < > = values in this interval not displayed.     ABO:   Recent Labs     11/20/24  0151   ABO A     HEME/ENDO:   Recent Labs     10/31/24  1249 10/24/24  2328   FERRITIN  --  76   IRONSAT  --  17*   TSH 4.39* 8.47*   HGBA1C  --  6.3*     CARDIAC:   Recent Labs     11/25/24  0736 11/25/24  0250 11/24/24  0556 11/23/24  0451 11/22/24  0304 11/21/24  0426 11/20/24  0151 11/19/24  0322 11/12/24  1307 10/31/24  1249 10/24/24  2328   * 725* 247* 240 233 231 218 213   < > 187  --    BNP  --   --   --   --   --   --   --   --   --  755* 1,995*    < > = values in this interval not displayed.     No results for input(s): \"CHOL\", \"LDLF\", \"LDLCALC\", \"HDL\", \"TRIG\" in the last 16194 hours.  TOX:  Recent Labs     10/31/24  1249   AMPHETAMINE Negative     MICRO: No results for input(s): \"ESR\", \"CRP\", \"PROCAL\" in the last 86180 hours.  No results found for the last 90 days.        Imaging:   CT head wo IV contrast    Result Date: 11/24/2024  Interpreted By:  " Des Thao, STUDY: CT HEAD WO IV CONTRAST;  11/24/2024 2:07 pm   INDICATION: Signs/Symptoms:LUE numbness in VAD patient.     COMPARISON: 11/17/2024   ACCESSION NUMBER(S): [id number]   ORDERING CLINICIAN: SOLIS DUEÑAS   TECHNIQUE: Noncontrast axial CT images of head were obtained with coronal and sagittal reconstructed images.   FINDINGS: BRAIN PARENCHYMA: Good infarct in the medial left cerebellar hemisphere. No acute intraparenchymal hemorrhage or parenchymal evidence of acute large territory ischemic infarct. Gray-white matter distinction is preserved. No mass-effect.   VENTRICLES and EXTRA-AXIAL SPACES:  No acute extra-axial or intraventricular hemorrhage. No effacement of cerebral sulci. The ventricles and sulci are age-concordant.   PARANASAL SINUSES/MASTOIDS:  No hemorrhage or air-fluid levels within the visualized paranasal sinuses. The mastoids are well aerated.   CALVARIUM/ORBITS:  No skull fracture.  The orbits and globes are intact to the extent visualized.   EXTRACRANIAL SOFT TISSUES: No discernible abnormality.       No acute intracranial abnormality.     MACRO: None.   Signed by: Des Thao 11/24/2024 3:07 PM Dictation workstation:   [id number]      Notable Studies:   EKG:  Encounter Date: 10/24/24   Electrocardiogram 12-lead PRN for arrhythmia   Result Value    Ventricular Rate 111    Atrial Rate 81    QRS Duration 146    QT Interval 406    QTC Calculation(Bazett) 552    R Axis 24    T Axis 45    QRS Count 18    Q Onset 226    P Onset 209    P Offset 224    T Offset 429    QTC Fredericia 498    Narrative    Atrial fibrillation with rapid ventricular response with premature ventricular or aberrantly conducted complexes  Left bundle branch block  Abnormal ECG  When compared with ECG of 20-NOV-2024 03:52,  Left bundle branch block has replaced Nonspecific intraventricular block  Criteria for Septal infarct are no longer Present  Criteria for Lateral infarct are no longer Present        Echo:  Transthoracic Echo (TTE) Limited    Result Date: 11/18/2024   [facility], [address] [place] [address]                Tel [phone] and Fax [phone] TRANSTHORACIC ECHOCARDIOGRAM REPORT  Patient Name:       RJ LEIJA     Tracey Physician:    [id number] Fawad Chicas MD Study Date:         11/18/2024          Ordering Provider:    [id number] HARRISON PALOMINO MRN/PID:            [id number]            Fellow: Accession#:         [id number]        Nurse: Date of Birth/Age:  1955 / 69     Sonographer:          Genesis Sinclair RDCS                     years Gender assigned at  M                   Additional Staff: Birth: Height:             182.88 cm           Admit Date:           10/24/2024 Weight:             93.44 kg            Admission Status:     Inpatient -                                                               Routine BSA / BMI:          2.16 m2 / 27.94     Encounter#:           [id number]                     kg/m2 Blood Pressure:     /                   Department Location:  [facility] Study Type:    TRANSTHORACIC ECHO (TTE) LIMITED Diagnosis/ICD: Presence of heart assist device-Z95.811 Indication:    Ramp study for LVAD CPT Code:      Echo Limited-71422; Doppler Limited-39703; Color Doppler-60028 Patient History: Pertinent History: Acute on chronic heart failure with reduced EF, Diastolic                    disfunction, Ischemic cardiiomyopathy, MI, CHF, LVAD, VIV. Study Detail: The following Echo studies were performed: 2D, Doppler, color flow               and M-Mode. Technically challenging study due to patient lying in               supine position and postoperative dressings.  PHYSICIAN INTERPRETATION: Left Ventricle: Left ventricular ejection fraction is severely decreased, by visual estimate at 10%. There is global  hypokinesis of the left ventricle with minor regional variations. The left ventricular cavity size is severely dilated. Abnormal (paradoxical) septal motion consistent with post-operative status. Left ventricular diastolic filling was not assessed. Left Atrium: The left atrium is moderate to severely dilated. Right Ventricle: The right ventricle is severely enlarged. There is severely reduced right ventricular systolic function. Right Atrium: The right atrium is moderately dilated. Aortic Valve: The aortic valve is structurally normal. There is mild aortic valve cusp calcification. There is mild aortic valve regurgitation. Mitral Valve: The mitral valve is normal in structure. There is mild mitral valve regurgitation. Tricuspid Valve: The tricuspid valve is structurally normal. There is mild tricuspid regurgitation. Pulmonic Valve: The pulmonic valve is not well visualized. Pulmonic valve regurgitation was not assessed. Pericardium: Trivial pericardial effusion. Aorta: The aortic root is normal. In comparison to the previous echocardiogram(s): Compared with study dated 11/15/2024, no significant change.  LEFT VENTRICULAR ASSIST DEVICE: LVAD: The patient has a(n) HeartMate 3 LVAD device present. Inflow Cannula: The inflow cannula is visualized in the left ventricular apex. AV Leaflet Mobility: . The aortic valve appears to be opening every 1 beats. LVAD Comments: Ramp study speed increased from 5200 to 5300. Aortic valve still opens every beat. Septum remains in the midline.  CONCLUSIONS:  1. Poorly visualized anatomical structures due to suboptimal image quality.  2. Left ventricular ejection fraction is severely decreased, by visual estimate at 10%.  3. There is global hypokinesis of the left ventricle with minor regional variations.  4. Left ventricular cavity size is severely dilated.  5. Abnormal septal motion consistent with post-operative status.  6. There is severely reduced right ventricular systolic  function.  7. Severely enlarged right ventricle.  8. The left atrium is moderate to severely dilated.  9. The right atrium is moderately dilated. 10. Mild aortic valve regurgitation. 11. . The aortic valve appears to be opening every 1 beats. 12. Compared with study dated 11/15/2024, no significant change. QUANTITATIVE DATA SUMMARY:  LV SYSTOLIC FUNCTION BY 2D PLANIMETRY (MOD):                      Normal Ranges: EF-Visual:      10 % LV EF Reported: 10 %  [id number] Fawad Chicas MD Electronically signed on 11/18/2024 at 12:33:46 PM  ** Final **     Transthoracic Echo (TTE) Limited    Result Date: 11/15/2024   [facility], [address] [place] [address]                Tel [phone] and Fax [phone] TRANSTHORACIC ECHOCARDIOGRAM REPORT  Patient Name:       RJ LEIJA     Tracey Physician:    [id number] Fawad Chicas MD Study Date:         11/15/2024          Ordering Provider:    [id number] ANETA MANNING MRN/PID:            [id number]            Fellow:               [id number] Aramis Fitzpatrick MD Accession#:         [id number]        Nurse: Date of Birth/Age:  1955 / 69     Sonographer:          Jackson estrada RDCS Gender assigned at  M                   Additional Staff: Birth: Height:             182.88 cm           Admit Date: Weight:             97.07 kg            Admission Status:     Inpatient - STAT BSA / BMI:          2.19 m2 / 29.02     Encounter#:           [id number]                     kg/m2 Blood Pressure:     107/71 mmHg         Department Location:  [facility] Study Type:    TRANSTHORACIC ECHO (TTE) LIMITED Diagnosis/ICD: Presence of heart assist device-Z95.811 Indication:    LVAD ramp study CPT Code:      Echo  Limited-29733; Doppler Limited-79348; Color Doppler-39131 Patient History: Pertinent History: S/p CABG x 4 (2012) and PCI (LCx/OM; 5/2019), Stage D ICM                    HFrEF LVEF 10-15%, AF s/p RFA (PVI and CTI; 4/2024), HTN,                    Dyslipidemia, 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 Study Detail: The following Echo studies were performed: 2D, M-Mode, Doppler and               color flow. Technically challenging study due to poor acoustic               windows, prominent lung artifact, body habitus and patient lying               in supine position.  PHYSICIAN INTERPRETATION: Left Ventricle: The left ventricular systolic function is severely decreased, with a visually estimated ejection fraction of 10-15%. There is global hypokinesis of the left ventricle with minor regional variations. The left ventricular cavity size is severely dilated. There is normal septal and normal posterior left ventricular wall thickness. Abnormal (paradoxical) septal motion consistent with post-operative status. Left ventricular diastolic filling cannot be determined, due to left ventricular assist device. Left Atrium: The left atrium is mildly dilated. Right Ventricle: The right ventricle is severely enlarged. There is severely reduced right ventricular systolic function. A device is visualized in the right ventricle. Right Atrium: The right atrium is severely dilated. There is a device visualized in the right atrium. Aortic Valve: The aortic valve is structurally normal. There is mild to moderate aortic valve cusp calcification. There is mild aortic valve regurgitation. Mitral Valve: The mitral valve is normal in structure. There is mild mitral annular calcification. There is mild mitral valve regurgitation. Tricuspid Valve: The tricuspid valve is structurally normal. There is mild tricuspid regurgitation. The right ventricular systolic pressure is unable to be  estimated. Pulmonic Valve: The pulmonic valve is not well visualized. Pulmonic valve regurgitation was not assessed. Pericardium: Trivial pericardial effusion. Aorta: The aortic root is abnormal. There is mild dilatation of the ascending aorta. There is mild dilatation of the aortic root. In comparison to the previous echocardiogram(s): Compared with study dated 11/13/2024, no significant change.  LEFT VENTRICULAR ASSIST DEVICE: Study Type: This study was performed while LVAD settings were optimized. LVAD: The patient has a(n) HeartMate 3 LVAD device present. Inflow Cannula: The inflow cannula is visualized in the left ventricular apex. Outflow Cannula: Visualization of the outflow cannula is technically difficult. AV Leaflet Mobility: . The aortic valve appears to be opening every 1 beats.  CONCLUSIONS:  1. The left ventricular systolic function is severely decreased, with a visually estimated ejection fraction of 10-15%.  2. There is global hypokinesis of the left ventricle with minor regional variations.  3. Left ventricular diastolic filling cannot be determined, due to left ventricular assist device.  4. Left ventricular cavity size is severely dilated.  5. Abnormal septal motion consistent with post-operative status.  6. There is severely reduced right ventricular systolic function.  7. Severely enlarged right ventricle.  8. The left atrium is mildly dilated.  9. The right atrium is severely dilated. 10. Mild aortic valve regurgitation. 11. . The aortic valve appears to be opening every 1 beats. 12. Compared with study dated 11/13/2024, no significant change. QUANTITATIVE DATA SUMMARY:  2D MEASUREMENTS:          Normal Ranges: IVSd:            1.00 cm  (0.6-1.1cm) LVPWd:           1.00 cm  (0.6-1.1cm) LVIDd:           5.90 cm  (3.9-5.9cm) LV Mass Index:   109 g/m2  LA VOLUME:                   Normal Ranges: LA Vol A4C:        86.2 ml   (22+/-6mL/m2) LA Vol Index A4C:  39.3ml/m2 LA Area A4C:       24.2 cm2 LA  Major Axis A4C: 5.8 cm  AORTA MEASUREMENTS:         Normal Ranges: Ao Sinus, d:        4.30 cm (2.1-3.5cm)  LV SYSTOLIC FUNCTION BY 2D PLANIMETRY (MOD):                      Normal Ranges: EF-Visual:      13 % LV EF Reported: 13 %  [id number] Fawad Chicas MD Electronically signed on 11/15/2024 at 12:09:54 PM  ** Final **     Transthoracic Echo (TTE) Limited    Result Date: 11/13/2024   [facility], [address] [place] [address]                Tel [phone] and Fax [phone] TRANSTHORACIC ECHOCARDIOGRAM REPORT  Patient Name:       RJ LEIJA     Reading Physician:    [id number] Raciel Casarez MD Study Date:         11/13/2024          Ordering Provider:    [id number] JO CASTILLO MRN/PID:            [id number]            Fellow: Accession#:         [id number]        Nurse: Date of Birth/Age:  1955 / 69     Sonographer:          Jackson estrada RDCS Gender assigned at  M                   Additional Staff: Birth: Height:             182.88 cm           Admit Date: Weight:             93.90 kg            Admission Status:     Inpatient - STAT BSA / BMI:          2.16 m2 / 28.07     Encounter#:           [id number]                     kg/m2 Blood Pressure:     94/74 mmHg          Department Location:  [facility] Study Type:    TRANSTHORACIC ECHO (TTE) LIMITED Diagnosis/ICD: Acute on chronic combined systolic (congestive) and diastolic                (congestive) heart failure (CHF)-I50.43 Indication:    eval RV fx CPT Code:      Echo Limited-05041; Doppler Limited-93430; Color Doppler-41215 Patient History: Pertinent History: CAD s/p CABGx4 in 2012 and PCI 2019 w/ ICM LVEF 10-15%, AF                    s/p RFA & PVI, HTN, CHF exacerbation, HM3 implantation w/                    outflow to  mid-desc aorta via left thoracotomy 11/12/24. Study Detail: The following Echo studies were performed: 2D, M-Mode, Doppler and               color flow. Technically challenging study due to body habitus,               patient lying in supine position, postoperative dressings,               prominent lung artifact and poor acoustic windows. The patient is               intubated. Definity used as a contrast agent for endocardial               border definition. Total contrast used for this procedure was 2.0               mL via IV push.  PHYSICIAN INTERPRETATION: Left Ventricle: Left ventricular ejection fraction is severely decreased, by visual estimate at 15%. There is eccentric left ventricular hypertrophy. There is global hypokinesis of the left ventricle with minor regional variations. The left ventricular cavity size is moderate to severely dilated. Left ventricular diastolic filling was not assessed. Left Atrium: The left atrium is moderately dilated. Right Ventricle: The right ventricle is mild to moderately enlarged. There is severely reduced right ventricular systolic function. A device is visualized in the right ventricle. Right Atrium: The right atrium is moderately dilated. There is a device visualized in the right atrium. Aortic Valve: The aortic valve was not well visualized. There is mild to moderate aortic valve cusp calcification. There is mild aortic valve regurgitation. Mitral Valve: The mitral valve is normal in structure. There is mild mitral valve regurgitation. Tricuspid Valve: The tricuspid valve is structurally normal. There is mild tricuspid regurgitation. Pulmonic Valve: The pulmonic valve is not well visualized. There is trace pulmonic valve regurgitation. Pericardium: There is no pericardial effusion noted. Aorta: The aortic root is abnormal. There is mild dilatation of the aortic root. Pulmonary Artery: The tricuspid regurgitant velocity is 1.83 m/s, and with an estimated right atrial  pressure of 10 mmHg, the estimated pulmonary artery pressure is normal with the RVSP at 23.4 mmHg. Systemic Veins: The inferior vena cava appears moderately dilated, on vent. In comparison to the previous echocardiogram(s): Compared with study dated 11/5/2024, no significant change.  LEFT VENTRICULAR ASSIST DEVICE: LVAD: The patient has a(n) HeartMate 3 LVAD device present. Inflow Cannula: The inflow cannula is visualized in the left ventricular apex.  CONCLUSIONS:  1. Poorly visualized anatomical structures due to suboptimal image quality.  2. Left ventricular cavity size is moderate to severely dilated.  3. Left ventricular ejection fraction is severely decreased, by visual estimate at 15%.  4. There is global hypokinesis of the left ventricle with minor regional variations.  5. There is severely reduced right ventricular systolic function.  6. Mild to moderately enlarged right ventricle.  7. The left atrium is moderately dilated.  8. The right atrium is moderately dilated.  9. Mild aortic valve regurgitation. 10. The inferior vena cava appears moderately dilated, on vent. QUANTITATIVE DATA SUMMARY:  2D MEASUREMENTS:          Normal Ranges: IVSd:            1.00 cm  (0.6-1.1cm) LVPWd:           1.20 cm  (0.6-1.1cm) LVIDd:           6.90 cm  (3.9-5.9cm) LVIDs:           6.60 cm LV Mass Index:   164 g/m2 LV % FS          4.3 %  AORTA MEASUREMENTS:         Normal Ranges: Ao Sinus, d:        4.00 cm (2.1-3.5cm)  LV SYSTOLIC FUNCTION BY 2D PLANIMETRY (MOD):                      Normal Ranges: EF-Visual:      15 % LV EF Reported: 15 %  RIGHT VENTRICLE: RV s' 0.07 m/s  TRICUSPID VALVE/RVSP:          Normal Ranges: Peak TR Velocity:     1.83 m/s RV Syst Pressure:     23 mmHg  (< 30mmHg) IVC Diam:             2.70 cm  [id number] Raciel Casarez MD Electronically signed on 11/13/2024 at 10:25:04 AM  ** Final **       Meds:  Scheduled medications  acetaminophen, 650 mg, oral, TID  aspirin, 81 mg, oral, Daily  [Held by provider]  escitalopram, 10 mg, oral, Daily  hydrALAZINE, 50 mg, oral, q8h  lidocaine, 2 patch, transdermal, Daily  magnesium oxide, 800 mg, oral, BID  pantoprazole, 40 mg, oral, Daily before breakfast  sacubitriL-valsartan, 1 tablet, oral, BID  sennosides-docusate sodium, 1 tablet, oral, BID  warfarin, 6 mg, oral, Daily      Continuous medications  heparin, 0-4,000 Units/hr, Last Rate: 1,500 Units/hr (11/25/24 0928)      PRN medications  PRN medications: heparin, [DISCONTINUED] ondansetron **OR** ondansetron, polyethylene glycol     Assessment/Plan   Assessment & Plan     NEUROLOGICAL  Active issues:   #Hx of anxiety and depression  #Acute post-operative pain (resolving)  #ICU delirium (resolved)  - Serial neuro and pain assessments   - Continue scheduled Tylenol   - RESTART home escitalopram 10mg daily 11/22  - Melatonin dc'ed per Dr. Oswald   - Completed thiamine 500mg 11/21  - PT/OT    #Transient LUE weakness on 11/24  - Resolved with tylenol  - CTH (11/24) without acute abnormalities  - Heparin gtt resumed while coumadin is subtherapeutic    CARDIAC  #PMHx CAD s/p CABG x 4 (2012) and PCI (LCx/OM; 5/2019), stage D ICM HFrEF LVEF 10-15%, AF s/p RFA (PVI and CTI; 4/2024), HTN  #S/p HM3 LVAD via left thoracotomy 11/12 with Abu Angel and Kaitlin.   GDMT and RV support:   BB: n/a - RV dysfunction   ARNI/ACEi/ARB: Entresto 24-26mg BID   MRA: Not started yet in postop period   SGLT2i: Not started yet in postop period   RV support: Digoxin stopped 11/20 -> restart today; start sildenafil 20mg TID today   AC: C/w Coumadin 6mg daily (increased 11/24), remain on heparin gtt while subtherapeutic. ASA (primary indication - ICM, PCI)     Heart Mate III interrogation   Most Recent   Flow 4.4   Speed 5200   Power 3.7   PI 3.4   MAP (mmHg): 88    LVAD interrogation: stable flow, no sig PI events or power spikes. LDH uptrending today, though hemolyzed. C/w AC and trend      RAMP study: Ordered for tomorrow 11/26     Daily weight:   Vitals:     24 0600   Weight: 87.4 kg (192 lb 10.9 oz)      #HTN   - Remains on hydral 50mg TID, would dc today with sildenafil introduction. If pt remains HTN, recommend increasing Entresto     RESPIRATORY   #VIV on CPAP at home  - Resume home nocturnal CPAP  - Maintaining SpO2 >92%.   - Encourage OOB and ambulation daily       GI:  #GERD  #Malnutrition d/t poor PO intake  - Continue PPI for GERD, LVAD bleeding risk   - Continue adult regular diet with supplements  - Hold Colace/senna BID and change miralax to as needed  - Start Vascepa today     RENAL:  #KENYA on CKD (baseline Cr 1.3-1.6)  - Strict I&Os  - Diurese with lasix 40 mg x1  and   - Avoid hypotension and nephrotoxic agents   Results from last 72 hours   Lab Units 24  0736 24  0250   BUN mg/dL 20 22   CREATININE mg/dL 1.10 1.22         ENDO:  #PMH of pre-DM with recent A1c: 6.3  - Euglycemic   - Goal BG <180  - NO indication for SSI at this time     HEMATOLOGY:  #Acute blood loss anemia   #Iron deficiency anemia   #Chronic anticoagulation 2/2 LVAD   Results from last 7 days   Lab Units 24  0250 24  0556   HEMOGLOBIN g/dL 12.1* 12.3*   HEMATOCRIT % 37.2* 39.1*   PLATELETS AUTO x10*3/uL 313 545*      - C/w warfarin 6mg daily ( ) for goal INR 2-3   - Heparin gtt until INR > 2     INFECTIOUS DISEASE  Active issues:   # No acute issues    Results from last 7 days   Lab Units 24  0250 24  0556   WBC AUTO x10*3/uL 13.5* 15.4*      Temp (24hrs), Av.3 °C (97.3 °F), Min:36.2 °C (97.2 °F), Max:36.4 °C (97.5 °F)     - Afebrile, no leukocytosis  - S/p Periop cefazolin, vanco, fluconazole x 48hrs per LVAD protocol.   - Trend temp q4h  - Additional course vanc/zosyn (-) was started for SIRS/worsening delirium, stopped when infectious workup negative     Skin/Musculoskeletal:  Active issues:   # no acute issues        PROPHYLAXIS:  - DVT: SCD's, Coumadin w/ Heparin bridge   - GI:  Protonix     CODE STATUS: Full  code     DISPO PLANNING/ SOCIAL:  - Disposition expectation: LT5    - Barriers to discharge: PT/OT recs for discharge, optimizing LVAD speed, resuming GDMT, ensuring RV stability off milrinone gtt     RESTRAINTS:  Not indicated/Patient does not meet criteria for restraints    Patient examined and discussed with attending physician Dr. Drummond      I spent 40 minutes in the professional and overall care of this patient.     ____________________________________________________________  Stacie Davis, ANUSHKA, AGACNP-BC  Advanced Heart Failure LVAD Nurse Practitioner   For floor patients please page HHVI team after 5pm- [phone]  LVAD 24/7 emergency pager [phone]

## 2024-11-25 NOTE — CARE PLAN
The patient's goals for the shift include      The clinical goals for the shift include MAPs will be between 70-90 throughout this shift    Over the shift, the patient remained alert and oriented x4, on room air, throughout the shift. Patient denied pain/discomfort throughout the shift. MAPs were between 80-90. Patient is still not therapeutic on heparin assay. Teaching on how long LVAD batteries last before recharging.   Patient and family involved with and aware of plan of care. Patient has remained free of falls throughout the shift, call bell within reach, bed locked in the lowest position with nonskid socks on. Purposeful rounding performed. Patient calls appropriately for assistance. See chart for further details.

## 2024-11-25 NOTE — PROGRESS NOTES
Music Therapy Note    Fahad Meraz    Therapy Session  Referral Type: New referral this admission  Visit Type: New visit  Session Start Time: 1434  Session End Time: 1440  Intervention Delivery: In-person  Conflict of Service: None  Number of family members present: 1  Family Present for Session: Spouse/Significant Other  Family Participation: Supportive               Treatment/Interventions       Post-assessment  Total Session Time (min): 6 minutes    Narrative  Assessment Detail: Pt sitting up visiting with wife when MT attempted session. Pt referred for support with falling asleep. MT explained this process including information about music with meditation and pt was interested in this in addition to other benefits of music therapy. MT explained services and pt was particularly interested in instrument playing. Wife encouraged pt. MT will continue to f/u accordingly    Education Documentation  No documentation found.

## 2024-11-25 NOTE — CARE PLAN
The patient's goals for the shift include      The clinical goals for the shift include CT abd    Over the shift, the patient did  make progress toward the following goals. Barriers to progression include none. Recommendations to address these barriers include n/a.  Awaiting CT results patient continues to be sub therpeutic on heparin

## 2024-11-25 NOTE — PROGRESS NOTES
11/25/24 0907   Discharge Planning   Living Arrangements Spouse/significant other   Support Systems Spouse/significant other   Assistance Needed none   Type of Residence Private residence   Number of Stairs to Enter Residence 2   Number of Stairs Within Residence 12   Do you have animals or pets at home? Yes   Type of Animals or Pets dog   Who is requesting discharge planning? Provider   Home or Post Acute Services None   Expected Discharge Disposition Home   Does the patient need discharge transport arranged? No   Financial Resource Strain   How hard is it for you to pay for the very basics like food, housing, medical care, and heating? Not very   Housing Stability   In the last 12 months, was there a time when you were not able to pay the mortgage or rent on time? N   In the past 12 months, how many times have you moved where you were living? 1   At any time in the past 12 months, were you homeless or living in a shelter (including now)? N   Transportation Needs   In the past 12 months, has lack of transportation kept you from medical appointments or from getting medications? no   In the past 12 months, has lack of transportation kept you from meetings, work, or from getting things needed for daily living? No   Patient Choice   Provider Choice list and CMS website (https://medicare.gov/care-compare#search) for post-acute Quality and Resource Measure Data were provided and reviewed with: Patient   Patient / Family choosing to utilize agency / facility established prior to hospitalization No   Stroke Family Assessment   Stroke Family Assessment Needed No     Transitional Care Coordination Progress Note:  Patient discussed during interdisciplinary rounds.   Team members present: RN CORBY COATS MD   Plan per Medical/Surgical team: LVAD hep/gtt  Payor: MEDICARE   Discharge disposition: Home   Potential Barriers: None   ADOD: 11-      Previous Home Care:   DME: None   Pharmacy: [facility]   Falls: None   PCP:   KARRI CHAVEZ APRAUGUSTINE   Dialysis: None

## 2024-11-25 NOTE — NURSING NOTE
Began in depth VAD education today with patient and patient wife, Ira. Reviewed the controller green arrows and when to recognize if pump is off. Discussed the functions of the 3 controller buttons. Discussed the black travel bag and the importance of taking the bag with you at all times.     Patient inquired about going to [place]; went over the need to let coordinator know in order to provide him with a letter from [facility] and a VAD trained center in the area.     Will plan to continue VAD education and review above education throughout sessions.

## 2024-11-26 ENCOUNTER — APPOINTMENT (OUTPATIENT)
Dept: CARDIOLOGY | Facility: HOSPITAL | Age: 69
DRG: 001 | End: 2024-11-26
Payer: MEDICARE

## 2024-11-26 LAB
ALBUMIN SERPL BCP-MCNC: 3.6 G/DL (ref 3.4–5)
ANION GAP SERPL CALC-SCNC: 14 MMOL/L (ref 10–20)
APTT PPP: 131 SECONDS (ref 27–38)
BASOPHILS # BLD AUTO: 0.16 X10*3/UL (ref 0–0.1)
BASOPHILS NFR BLD AUTO: 1.4 %
BUN SERPL-MCNC: 19 MG/DL (ref 6–23)
CALCIUM SERPL-MCNC: 9.5 MG/DL (ref 8.6–10.6)
CHLORIDE SERPL-SCNC: 103 MMOL/L (ref 98–107)
CO2 SERPL-SCNC: 26 MMOL/L (ref 21–32)
CREAT SERPL-MCNC: 1.39 MG/DL (ref 0.5–1.3)
EGFRCR SERPLBLD CKD-EPI 2021: 55 ML/MIN/1.73M*2
EJECTION FRACTION: 18 %
EOSINOPHIL # BLD AUTO: 0.45 X10*3/UL (ref 0–0.7)
EOSINOPHIL NFR BLD AUTO: 3.8 %
ERYTHROCYTE [DISTWIDTH] IN BLOOD BY AUTOMATED COUNT: 16.6 % (ref 11.5–14.5)
GLUCOSE SERPL-MCNC: 100 MG/DL (ref 74–99)
HCT VFR BLD AUTO: 38.6 % (ref 41–52)
HGB BLD-MCNC: 12.4 G/DL (ref 13.5–17.5)
IMM GRANULOCYTES # BLD AUTO: 0.51 X10*3/UL (ref 0–0.7)
IMM GRANULOCYTES NFR BLD AUTO: 4.3 % (ref 0–0.9)
INR PPP: 1.8 (ref 0.9–1.1)
LDH SERPL L TO P-CCNC: 264 U/L (ref 84–246)
LYMPHOCYTES # BLD AUTO: 1.15 X10*3/UL (ref 1.2–4.8)
LYMPHOCYTES NFR BLD AUTO: 9.8 %
MAGNESIUM SERPL-MCNC: 2.13 MG/DL (ref 1.6–2.4)
MCH RBC QN AUTO: 27.3 PG (ref 26–34)
MCHC RBC AUTO-ENTMCNC: 32.1 G/DL (ref 32–36)
MCV RBC AUTO: 85 FL (ref 80–100)
MITRAL VALVE E/A RATIO: 1.14
MONOCYTES # BLD AUTO: 0.62 X10*3/UL (ref 0.1–1)
MONOCYTES NFR BLD AUTO: 5.3 %
NEUTROPHILS # BLD AUTO: 8.86 X10*3/UL (ref 1.2–7.7)
NEUTROPHILS NFR BLD AUTO: 75.4 %
NRBC BLD-RTO: 0 /100 WBCS (ref 0–0)
PHOSPHATE SERPL-MCNC: 4.2 MG/DL (ref 2.5–4.9)
PLATELET # BLD AUTO: 537 X10*3/UL (ref 150–450)
POTASSIUM SERPL-SCNC: 4.3 MMOL/L (ref 3.5–5.3)
PROTHROMBIN TIME: 20.2 SECONDS (ref 9.8–12.8)
RBC # BLD AUTO: 4.54 X10*6/UL (ref 4.5–5.9)
SODIUM SERPL-SCNC: 139 MMOL/L (ref 136–145)
UFH PPP CHRO-ACNC: 0.2 IU/ML
UFH PPP CHRO-ACNC: 0.4 IU/ML
UFH PPP CHRO-ACNC: 0.5 IU/ML
WBC # BLD AUTO: 11.8 X10*3/UL (ref 4.4–11.3)

## 2024-11-26 PROCEDURE — 93325 DOPPLER ECHO COLOR FLOW MAPG: CPT

## 2024-11-26 PROCEDURE — 93308 TTE F-UP OR LMTD: CPT | Performed by: INTERNAL MEDICINE

## 2024-11-26 PROCEDURE — 83615 LACTATE (LD) (LDH) ENZYME: CPT | Performed by: NURSE PRACTITIONER

## 2024-11-26 PROCEDURE — 84520 ASSAY OF UREA NITROGEN: CPT | Performed by: NURSE PRACTITIONER

## 2024-11-26 PROCEDURE — 2500000001 HC RX 250 WO HCPCS SELF ADMINISTERED DRUGS (ALT 637 FOR MEDICARE OP)

## 2024-11-26 PROCEDURE — 2500000005 HC RX 250 GENERAL PHARMACY W/O HCPCS: Performed by: NURSE PRACTITIONER

## 2024-11-26 PROCEDURE — 2500000001 HC RX 250 WO HCPCS SELF ADMINISTERED DRUGS (ALT 637 FOR MEDICARE OP): Performed by: REGISTERED NURSE

## 2024-11-26 PROCEDURE — 83735 ASSAY OF MAGNESIUM: CPT | Performed by: NURSE PRACTITIONER

## 2024-11-26 PROCEDURE — 85520 HEPARIN ASSAY: CPT

## 2024-11-26 PROCEDURE — 1200000002 HC GENERAL ROOM WITH TELEMETRY DAILY

## 2024-11-26 PROCEDURE — 93325 DOPPLER ECHO COLOR FLOW MAPG: CPT | Performed by: INTERNAL MEDICINE

## 2024-11-26 PROCEDURE — 97530 THERAPEUTIC ACTIVITIES: CPT | Mod: GP | Performed by: STUDENT IN AN ORGANIZED HEALTH CARE EDUCATION/TRAINING PROGRAM

## 2024-11-26 PROCEDURE — 2500000002 HC RX 250 W HCPCS SELF ADMINISTERED DRUGS (ALT 637 FOR MEDICARE OP, ALT 636 FOR OP/ED): Performed by: REGISTERED NURSE

## 2024-11-26 PROCEDURE — 85610 PROTHROMBIN TIME: CPT | Performed by: NURSE PRACTITIONER

## 2024-11-26 PROCEDURE — 99233 SBSQ HOSP IP/OBS HIGH 50: CPT | Performed by: REGISTERED NURSE

## 2024-11-26 PROCEDURE — 2500000001 HC RX 250 WO HCPCS SELF ADMINISTERED DRUGS (ALT 637 FOR MEDICARE OP): Performed by: NURSE PRACTITIONER

## 2024-11-26 PROCEDURE — 93321 DOPPLER ECHO F-UP/LMTD STD: CPT | Performed by: INTERNAL MEDICINE

## 2024-11-26 PROCEDURE — 2500000004 HC RX 250 GENERAL PHARMACY W/ HCPCS (ALT 636 FOR OP/ED)

## 2024-11-26 PROCEDURE — 97116 GAIT TRAINING THERAPY: CPT | Mod: GP | Performed by: STUDENT IN AN ORGANIZED HEALTH CARE EDUCATION/TRAINING PROGRAM

## 2024-11-26 PROCEDURE — 85025 COMPLETE CBC W/AUTO DIFF WBC: CPT | Performed by: NURSE PRACTITIONER

## 2024-11-26 PROCEDURE — 93750 INTERROGATION VAD IN PERSON: CPT | Performed by: REGISTERED NURSE

## 2024-11-26 PROCEDURE — 2500000004 HC RX 250 GENERAL PHARMACY W/ HCPCS (ALT 636 FOR OP/ED): Performed by: NURSE PRACTITIONER

## 2024-11-26 RX ORDER — DAPAGLIFLOZIN 10 MG/1
5 TABLET, FILM COATED ORAL DAILY
Status: DISCONTINUED | OUTPATIENT
Start: 2024-11-26 | End: 2024-11-28

## 2024-11-26 RX ORDER — WARFARIN SODIUM 5 MG/1
7.5 TABLET ORAL DAILY
Status: DISCONTINUED | OUTPATIENT
Start: 2024-11-26 | End: 2024-12-03 | Stop reason: HOSPADM

## 2024-11-26 ASSESSMENT — COGNITIVE AND FUNCTIONAL STATUS - GENERAL
DAILY ACTIVITIY SCORE: 20
HELP NEEDED FOR BATHING: A LITTLE
STANDING UP FROM CHAIR USING ARMS: A LITTLE
WALKING IN HOSPITAL ROOM: A LITTLE
CLIMB 3 TO 5 STEPS WITH RAILING: TOTAL
DRESSING REGULAR UPPER BODY CLOTHING: A LITTLE
WALKING IN HOSPITAL ROOM: A LITTLE
MOBILITY SCORE: 16
WALKING IN HOSPITAL ROOM: A LITTLE
DRESSING REGULAR LOWER BODY CLOTHING: A LITTLE
STANDING UP FROM CHAIR USING ARMS: A LITTLE
TOILETING: A LITTLE
MOVING TO AND FROM BED TO CHAIR: A LITTLE
MOBILITY SCORE: 21
MOVING FROM LYING ON BACK TO SITTING ON SIDE OF FLAT BED WITH BEDRAILS: A LITTLE
PERSONAL GROOMING: A LITTLE
CLIMB 3 TO 5 STEPS WITH RAILING: A LITTLE
MOVING TO AND FROM BED TO CHAIR: A LITTLE
TURNING FROM BACK TO SIDE WHILE IN FLAT BAD: A LITTLE
MOBILITY SCORE: 19
DRESSING REGULAR UPPER BODY CLOTHING: A LITTLE
CLIMB 3 TO 5 STEPS WITH RAILING: A LITTLE
HELP NEEDED FOR BATHING: A LITTLE
STANDING UP FROM CHAIR USING ARMS: A LITTLE
DAILY ACTIVITIY SCORE: 20
TURNING FROM BACK TO SIDE WHILE IN FLAT BAD: A LITTLE
DRESSING REGULAR LOWER BODY CLOTHING: A LITTLE

## 2024-11-26 ASSESSMENT — PAIN - FUNCTIONAL ASSESSMENT
PAIN_FUNCTIONAL_ASSESSMENT: 0-10
PAIN_FUNCTIONAL_ASSESSMENT: 0-10

## 2024-11-26 ASSESSMENT — PAIN SCALES - GENERAL
PAINLEVEL_OUTOF10: 0 - NO PAIN

## 2024-11-26 NOTE — CARE PLAN
The patient's goals for the shift include      The clinical goals for the shift include Patient will become therapeutic on heparin assay by the end of shift    Over the shift, the patient remained alert and oriented x4, on room air, throughout the shift. Patient denied pain/discomfort throughout the shift. He become therapeutic X1 on heparin assay. MAPs average at 80 through out shift. Patient need more teaching on LVAD. Patient involved with and aware of plan of care. Patient has remained free of falls throughout the shift, call bell within reach, bed locked in the lowest position with nonskid socks on. Purposeful rounding performed. Patient calls appropriately for assistance. See chart for further details.

## 2024-11-26 NOTE — PROGRESS NOTES
Physical Therapy    Physical Therapy Treatment    Patient Name: Fahad Meraz  MRN: [id number]  Today's Date: 11/26/2024  Room: [address] [place], [place] [address]  Time Calculation  Start Time: 1510  Stop Time: 1540  Time Calculation (min): 30 min       Assessment/Plan   PT Plan  Treatment/Interventions: Transfer training, Bed mobility, Gait training, Balance training, Neuromuscular re-education, Strengthening, Endurance training, Therapeutic exercise, Therapeutic activity  PT Plan: Ongoing PT  PT Frequency: Daily  PT Discharge Recommendations: High intensity level of continued care  Equipment Recommended upon Discharge: Wheeled walker  PT Recommended Transfer Status: Assist x1  PT - OK to Discharge: Yes    General Visit Information:   Reason for Referral: s/p HM3 implantation  Past Medical History Relevant to Rehab: CAD s/p 4V CABG (2012) and PCI (LCx/OM; 5/2019), stage D systolic HF/ICM/HFrEF with LVEF 10-15%( 10/22/24 TTE), AF s/p RFA (PVI and CTI; 4/2024) on OAC therapy, HTN, dyslipidemia, depression, anxiety and VIV using CPAP  Prior to Session Communication: Bedside nurse  Patient Position Received: Bed, 3 rail up, Alarm off, not on at start of session  Family/Caregiver Present: No   Subjective: Patient is alert, agreeable to PT. States he is hoping to be discharged early next week if all goes well.  VAD coordinator came by to drop books off for family.  Precautions:  Precautions  Medical Precautions: Fall precautions, Cardiac precautions  Post-Surgical Precautions: Move in the Tube (LVAD)  Vital Signs:  Vital Signs (Past 2hrs)        Date/Time Vitals Session Patient Position Pulse Resp SpO2 BP MAP (mmHg)    11/26/24 1513 --  --  78  20  98 %  --  --     11/26/24 15:13:24 --  --  --  --  --  --  84     11/26/24 1515 --  --  80  --  --  --  84     11/26/24 1540 --  --  90  --  --  --  --                     Objective   Pain:  Pain Assessment  0-10 (Numeric) Pain Score: 0 - No pain (post 0)  Cognition:  Cognition  Orientation Level:  Oriented X4  Lines/Tubes/Drains:  Ventricular Assist Device HeartMate 3 (Active)   Number of days: 14   Heart Mate III:  Drive line checked and secured prior to session   Most Recent Range Past 24hrs   Flow 4.5 Pump Flow  Min: 4.2  Max: 4.6   Speed 5200 Speed RPM  Min: 5200  Max: 5200   Power 3.7      PI 3.4 Pulse Index  Min: 2.2  Max: 3.4       Medical Gas Therapy: None (Room air)  Continuous Medications/Drips:  heparin, 0-4,000 Units/hr, Last Rate: 2,300 Units/hr (11/26/24 1424)        PT Treatments:     Therapeutic Activity  Therapeutic Activity 1: Assisted transfer to portable battery unit, mod A for transition     Bed Mobility 1  Bed Mobility 1: Supine to sitting  Level of Assistance 1: Close supervision  Bed Mobility Comments 1: HOB 30, rail  Ambulation/Gait Training  Ambulation/Gait Training Performed: Yes  Ambulation/Gait Training 1  Surface 1: Level tile  Device 1: No device  Assistance 1: Contact guard  Quality of Gait 1:  (path deviation, narrow NILDA, decreased step length, decreased gait speed)  Comments/Distance (ft) 1: 15' x 2  Ambulation/Gait Training 2  Surface 2: Level tile  Device 2: Rolling walker  Assistance 2: Contact guard  Quality of Gait 2:  (path deviation, narrow NILDA, decreased gait speed, intermttent pausing, difficulty dualtasking/answering questions while ambulating)  Comments/Distance (ft) 2: 120'  Transfers  Transfer: Yes  Transfer 1  Transfer From 1: Sit to  Transfer to 1: Stand  Transfer Device 1:  (no device)  Transfer Level of Assistance 1: Contact guard  Trials/Comments 1: x4  Transfers 2  Transfer From 2: Stand to  Transfer to 2: Sit  Transfer Device 2:  (no device)  Transfer Level of Assistance 2: Contact guard  Trials/Comments 2: x4  Transfers 3  Transfer From 3: Bed to  Transfer to 3: Chair with arms  Transfer Device 3:  (no device)  Transfer Level of Assistance 3: Contact guard  Trials/Comments 3: x1  Transfers 4  Transfer From 4: Chair with arms to  Transfer to 4:  Toilet  Transfer Device 4:  (no device)  Transfer Level of Assistance 4: Contact guard  Trials/Comments 4: x1  Transfers 5  Transfer From 5: Toilet to  Transfer to 5: Chair with arms  Transfer Device 5:  (no device)  Transfer Level of Assistance 5: Contact guard             Outcome Measures:  [facility] Basic Mobility  Turning from your back to your side while in a flat bed without using bedrails: A little  Moving from lying on your back to sitting on the side of a flat bed without using bedrails: A little  Moving to and from bed to chair (including a wheelchair): A little  Standing up from a chair using your arms (e.g. wheelchair or bedside chair): A little  To walk in hospital room: A little  Climbing 3-5 steps with railing: Total  Basic Mobility - Total Score: 16                 Tinetti  Sitting Balance: Steady, safe  Arises: Able, uses arms to help  Attempts to Arise: Able to arise, one attempt  Immediate Standing Balance (First 5 Seconds): Steady but uses walker or other support  Standing Balance: Unsteady  Nudged: Staggers, grabs, catches self  Eyes Closed: Steady  Turned 360 Degrees: Steadiness: Unsteady (Grabs, staggers)  Turned 360 Degrees: Continuity of Steps: Discontinuous steps  Sitting Down: Uses arms or not a smooth motion  Balance Score: 8  Initiation of Gait: Any hesitancy or multiple attempts to start  Step Height: R Swing Foot: Right foot complete clears floor  Step Length: R Swing Foot: Does not pass left stance foot with step  Step Height: L Swing Foot: Left foot complete clears floor  Step Length: L Swing Foot: Does not pass right stance foot with step  Step Symmetry: Right and left step length not equal (Estimate)  Step Continuity: Stopping or discontinuity between steps  Path: Marked deviation  Trunk: Marked sway or uses walking aid  Walking Time: Heels apart  Gait Score: 2  Total Score: 10          Education Documentation  Precautions, taught by Bryan Workman PT at 11/26/2024  4:15  PM.  Learner: Patient  Readiness: Acceptance  Method: Explanation  Response: Needs Reinforcement  Comment: LVAD Battery transition    Body Mechanics, taught by Bryan Workman, PT at 11/26/2024  4:15 PM.  Learner: Patient  Readiness: Acceptance  Method: Explanation  Response: Needs Reinforcement  Comment: LVAD Battery transition    Handouts, taught by Bryan Workman, PT at 11/26/2024  4:15 PM.  Learner: Patient  Readiness: Acceptance  Method: Explanation  Response: Needs Reinforcement  Comment: LVAD Battery transition    Mobility Training, taught by Bryan Workman, PT at 11/26/2024  4:15 PM.  Learner: Patient  Readiness: Acceptance  Method: Explanation  Response: Needs Reinforcement  Comment: LVAD Battery transition    Education Comments  No comments found.          OP EDUCATION:       Encounter Problems       Encounter Problems (Active)       Balance       Pt will score >45 on VEGA for safe community ambulation (Progressing)       Start:  10/29/24    Expected End:  11/28/24               Mobility       Patient will ambulate >1000 ft with LRAD and supervision With stable vitals and RPD </= 3/10 and RPE </= 13/20 for improved functional mobility.   (Met)       Start:  10/29/24    Expected End:  11/12/24    Resolved:  11/05/24    Updated to: Patient will ambulate >2500 ft with LRAD and supervision With stable vitals and RPD </= 3/10 and RPE </= 13/20 for improved functional mobility.    Update reason: Progression         Pt will complete 6MWT with no assistive device and supervision and achieve >700 ft With stable vitals and RPD </= 3/10 and RPE </= 13/20 for improved functional mobility.   (Met)       Start:  10/29/24    Expected End:  11/12/24    Resolved:  11/05/24    Updated to: Pt will complete 6MWT with no assistive device and supervision and achieve >1300 ft With stable vitals and RPD </= 3/10 and RPE </= 13/20 for improved functional mobility.    Update reason: Progression         Patient will ambulate  >2500 ft with LRAD and supervision With stable vitals and RPD </= 3/10 and RPE </= 13/20 for improved functional mobility. (Progressing)       Start:  11/05/24    Expected End:  11/28/24                Pt will complete 6MWT with no assistive device and supervision and achieve >1300 ft With stable vitals and RPD </= 3/10 and RPE </= 13/20 for improved functional mobility. (Progressing)       Start:  11/05/24    Expected End:  11/28/24                   PT Transfers       Patient will perform bed mobility indep (maintenance 2/2 prolonged hospital stay anticipated) (Progressing)       Start:  10/29/24    Expected End:  11/28/24            Patient will transfer sit to and from stand indep (Progressing)       Start:  10/29/24    Expected End:  11/28/24            Pt will complete 5XSTS from recliner without UE assist <12 seconds With stable vitals and RPD </= 3/10 and RPE </= 13/20 for improved functional mobility.   (Progressing)       Start:  10/29/24    Expected End:  11/28/24               Pain - Adult              Assessment: Patient is progressing Well with therapy this date.  Patient able to complete multiple transfer and gait trials this date.  Continues to require moderate assistance for battery transitions with difficulty following open ended instructions.  No LOB but with increased postural sway during activity and remains a high falls risk with 10 on Tinetti this date.  Patient remains appropriate for HIGH intensity therapy when medically appropriate for discharge from acute stay.  Will continue to follow.      11/26/24 at 4:16 PM   Bryan Workman, PT   Rehab Office: [phone]

## 2024-11-26 NOTE — NURSING NOTE
VAD education session held today with Patient and family. Reviewed information from previous learning sessions.     Introduced the battery charger functions and how to calibrate a battery. Reviewed what each light means on the battery charger. The MPU was discussed in detail, reviewing the icons on the top and the 3 AA batteries located on the bottom.  Discussed the need to always connect to the MPU when sleeping.    Controller change out demonstrated today with Patient wife.    All maintenance discussed with patient. Daily self test, weekly dressing, monthly cleaning of the gold areas on all equipment, and biannual charging of the backup controller. Will plan to continue education.

## 2024-11-26 NOTE — PROGRESS NOTES
Communication Note    Patient Name: Fahad Meraz  MRN: [id number]  Today's Date: 11/26/2024   Room: [address] [place], [place] [address]    Discipline: Physical Therapy      PT Missed Visit: Yes  Missed Visit Reason:  (PT intervention attempted, patient off unit at ECHO, will follow up as appropriate.)      11/26/24 at 2:31 PM   Bryan Workman, PT   Rehab Office: [phone]

## 2024-11-26 NOTE — CARE PLAN
The patient's goals for the shift include      The clinical goals for the shift include ther INR/hep assay    Over the shift, the patient did make progress toward the following goals. Barriers to progression include INR 1.8. and assays therapeutic.Recommendations to address these barriers include -discontinue heparin when INR is 2.

## 2024-11-26 NOTE — SIGNIFICANT EVENT
RAMP STUDY     Started study with RPM 5200. AV opening every beat, no MR, mild AI, septum midline. After review with Dr. Drummond, he appears well optimized at this speed. No speed changes made. dMAP 68.       Heart Mate III interrogation 1155   Most Recent   Flow 4.4   Speed 5200   Power 3.7   PI 2.2   MAP (mmHg): 68

## 2024-11-26 NOTE — PROGRESS NOTES
ADVANCED HEART FAILURE LVAD PROGRESS NOTE     H VAD Cardiologist: Padmini      Type of VAD: HM3  Implant Date: 11/12/24  Reason for VAD: ICM  Intent: Long-Term (Significant PAD, age, patient preference)      Subjective     HPI:  Fahad Meraz is a very pleasant 69 y.o. male w/ a PMHx sig for CAD s/p 4V CABG (2012) and PCI (LCx/OM; 5/2019), stage D systolic HF/ICM/HFrEF with LVEF 10-15%( 10/22/24 TTE), AF s/p RFA (PVI and CTI; 4/2024) on OAC therapy, HTN, dyslipidemia, depression, anxiety and VIV using CPAP who was admitted to OSH ICU, s/p RHC on 10/18/24.  Per patient, he had been experienced worsening SOB and fluids overload for 2-3 weeks. Patient was on milrinone drip and underwent SGC diuresis. However his KENYA worsened, and were not able to wean milrinone drip. Decision was made to transfer him to HF ICU [facility] [place] possible advanced therapy consideration. Advanced therapies evaluation was initiated on 10/30. Discussed in advanced therapeutics committee on 11/5 and was denied for transplantation, and approved for LVAD (significant PAD, Elevated PSA level, Age approaching 70, as well as patient verbalized he would be more in favor of LVAD vs Transplant). He was transferred to the floor on 11/6 to await VAD implantation. Readmitted to HFICU as of 11/09 for pre-OR swan guided optimization. Now presents to CTICU s/p LVAD HM3 implant via thoracotomy with descending outflow graft on 11/12/24 with Dr. Mclain. OR Course/Issues: pulseless VT requiring defib x 1 pre- CPB. Postop course c/b delirium and RV dysfunction.     11/23- Milrinone stopped and patient transferred to LT5 in stable condition   11/24- Low dose Entresto started, transient LUE weakness/numbess - CT negative   11/25 - Sildenafil started, dig restarted   11/26- Farxiga started     Principal Problem:    Acute on chronic heart failure with reduced ejection fraction and diastolic dysfunction  Active Problems:    Ischemic cardiomyopathy    Receiving  inotropic medication    HTN (hypertension)    CAD (coronary artery disease)    MI (myocardial infarction) (Multi)    CHF (congestive heart failure)    Gastroesophageal reflux disease    Acute kidney injury (nontraumatic) (CMS-HCC)    Delirium       LOS: 33 days     Interval Events:   Patient seen and examined at bedside. NAOE. Driveline drainage scant this am. Per nursing report, patient was confused overnight but oriented this morning.     NYHA class pre-VAD implant: IV  NYHA class today (11/25): II    No speed adjustments made during RAMP today. Farxiga started today.       Objective   Vitals:   Vitals:    11/26/24 0005 11/26/24 0500 11/26/24 0600 11/26/24 0747   BP:       Pulse: 94 88     Resp: 14 16     Temp: 36.6 °C (97.9 °F) 36.3 °C (97.3 °F)  36.3 °C (97.3 °F)   TempSrc: Temporal Temporal     SpO2: 94% 97%  97%   Weight:   87.7 kg (193 lb 5.5 oz)    Height:         Wt Readings from Last 5 Encounters:   11/26/24 87.7 kg (193 lb 5.5 oz)   10/24/24 92.5 kg (204 lb)   08/05/24 122 kg (268 lb)   01/31/22 119 kg (262 lb)   02/18/20 123 kg (270 lb 2 oz)     Hemodynamic parameters for last 24 hours:     Intake/Output for last 24 hours:    Intake/Output Summary (Last 24 hours) at 11/26/2024 1205  Last data filed at 11/26/2024 0900  Gross per 24 hour   Intake 1313.38 ml   Output 1730 ml   Net -416.62 ml           Physical exam:  Physical Exam  Constitutional:       Appearance: Normal appearance. He is not ill-appearing or toxic-appearing.   HENT:      Head: Normocephalic and atraumatic.      Mouth/Throat:      Mouth: Mucous membranes are moist.      Pharynx: Oropharynx is clear.   Eyes:      Extraocular Movements: Extraocular movements intact.      Pupils: Pupils are equal, round, and reactive to light.   Cardiovascular:      Comments: LVAD hum heard on ascultation  No JVP   No BLE edema   Euvolemic on exam   Driveline without s/s infection, large amount serosang drainage   Pulmonary:      Effort: Pulmonary effort is  normal.      Breath sounds: Normal breath sounds.   Abdominal:      General: Abdomen is flat. Bowel sounds are normal. There is no distension.      Tenderness: There is no abdominal tenderness.   Musculoskeletal:         General: Normal range of motion.      Cervical back: Normal range of motion.      Right lower leg: No edema.      Left lower leg: No edema.   Skin:     General: Skin is warm and dry.   Neurological:      General: No focal deficit present.      Mental Status: He is alert and oriented to person, place, and time. Mental status is at baseline.   Psychiatric:         Mood and Affect: Mood normal.         Behavior: Behavior normal.        Driveline:   CDI       Labs:   CMP:  Recent Labs     11/26/24  0603 11/25/24  0736 11/25/24  0250 11/24/24  0556 11/23/24  0451 11/22/24  1437 11/22/24  0304 11/21/24  1247 11/21/24  0426 11/20/24  0151    135* 133* 136 139 137 137 136 137 137   K 4.3 4.4 8.0* 4.6 4.3 5.2 4.2 4.0 4.2 4.4    105 102 104 105 103 102 101 101 101   CO2 26 16* 26 22 26 25 27 24 25 28   ANIONGAP 14 18 13 15 12 14 12 15 15 12   BUN 19 20 22 20 19 19 20 25* 25* 30*   CREATININE 1.39* 1.10 1.22 1.24 1.32* 1.35* 1.35* 1.47* 1.39* 1.68*   EGFR 55* 73 64 63 58* 57* 57* 51* 55* 44*   MG 2.13  --  2.47* 2.03 2.12  --  2.10 2.54* 1.97 1.99     Recent Labs     11/26/24  0603 11/25/24  0736 11/25/24  0250 11/24/24  0556 11/23/24  0451 11/22/24  1437 11/22/24  0304 11/21/24  1247 11/19/24  1459 11/19/24  0322 11/18/24  0525 11/17/24  1323 11/17/24  0122 11/16/24  1358 11/16/24  0128 11/15/24  1255 11/15/24  0021 11/14/24  1224 11/14/24  0118 11/13/24  1228 11/13/24  0007 11/12/24  1307   ALBUMIN 3.6 3.4 3.7 3.6 3.6 3.4 3.5 3.6   < > 3.1* 3.5   < > 3.4   < > 3.6   < > 3.7   < > 3.8 3.7   < > 3.7   ALT  --   --   --   --   --   --   --   --   --  6* 6*  --  3*  --  4*  --  5*  --  7* 11  --  15   AST  --   --   --   --   --   --   --   --   --  16 18  --  21  --  24  --  29  --  40* 42*  --  37  "  BILITOT  --   --   --   --   --   --   --   --   --  0.8 0.8  --  1.0  --  1.0  --  1.1  --  0.9 1.1  --  0.9    < > = values in this interval not displayed.     CBC:  Recent Labs     11/26/24  0603 11/25/24  0250 11/24/24  0556 11/23/24  0451 11/22/24  0304 11/21/24  0426 11/20/24  0151 11/19/24  0322   WBC 11.8* 13.5* 15.4* 13.8* 10.9 10.2 10.7 10.5   HGB 12.4* 12.1* 12.3* 12.5* 11.2* 10.4* 10.5* 10.2*   HCT 38.6* 37.2* 39.1* 40.1* 34.3* 32.5* 31.7* 31.0*   * 313 545* 529* 473* 455* 465* 425   MCV 85 83 86 87 83 84 82 82     COAG:   Recent Labs     11/26/24  0957 11/26/24  0603 11/25/24  2348 11/25/24  1821 11/25/24  1433 11/25/24  0736 11/25/24  0250 11/24/24  2156 11/24/24  0556 11/23/24  0451 11/22/24  0304 11/21/24  1247 11/21/24  0426 11/20/24  0410 11/20/24  0151 11/19/24  0322 11/13/24  0008 11/12/24  1257   INR  --  1.8*  --   --   --   --  1.8*  --  1.8* 1.9* 2.5*  --  2.1*  --  1.9* 1.8*   < > 1.4*   HAUF 0.5 0.4 0.2 0.2 0.2   < > 0.1   < >  --   --  0.1   < >  --    < > <0.1  --   --   --    FIBRINOGEN  --   --   --   --   --   --   --   --   --   --   --   --   --   --   --   --   --  301    < > = values in this interval not displayed.     ABO:   Recent Labs     11/20/24  0151   ABO A     HEME/ENDO:   Recent Labs     10/31/24  1249 10/24/24  2328   FERRITIN  --  76   IRONSAT  --  17*   TSH 4.39* 8.47*   HGBA1C  --  6.3*     CARDIAC:   Recent Labs     11/26/24  0603 11/25/24  0736 11/25/24  0250 11/24/24  0556 11/23/24  0451 11/22/24  0304 11/21/24  0426 11/20/24  0151 11/12/24  1307 10/31/24  1249 10/24/24  2328   * 426* 725* 247* 240 233 231 218   < > 187  --    BNP  --   --   --   --   --   --   --   --   --  755* 1,995*    < > = values in this interval not displayed.     No results for input(s): \"CHOL\", \"LDLF\", \"LDLCALC\", \"HDL\", \"TRIG\" in the last 19406 hours.  TOX:  Recent Labs     10/31/24  1249   AMPHETAMINE Negative     MICRO: No results for input(s): \"ESR\", \"CRP\", \"PROCAL\" in " the last 98535 hours.  No results found for the last 90 days.        Imaging:   CT abdomen pelvis w IV contrast    Result Date: 11/25/2024  Interpreted By:  Danya Siddiqi  and Shayla Araya STUDY: CT ABDOMEN PELVIS W IV CONTRAST;  11/25/2024 10:28 am   INDICATION: Signs/Symptoms:Copious serosanguinous drainage around driveline site. Eval for fluid collection..     COMPARISON: CT chest and pelvis without IV contrast 11/04/2024.   ACCESSION NUMBER(S): [id number]   ORDERING CLINICIAN: SOLIS DUEÑAS   TECHNIQUE: CT of the abdomen and pelvis was performed.  Standard contiguous axial images were obtained at 3 mm slice thickness through the abdomen and pelvis. Coronal and sagittal reconstructions at 3 mm slice thickness were performed.  90 ML of Omnipaque 350 was administered intravenously without immediate complication.   FINDINGS: LOWER CHEST: New small to moderate left-sided loculated pleural effusion and left lower lobe atelectatic changes. Bilateral interlobular septal thickening of the visualized lungs that likely represents a component of edema and atelectasis secondary to postoperative changes. LVAD device in place along the left ventricle with the outflow tract connecting to the mid descending thoracic aorta and driveline connecting to external device outside of the right chest wall.   Patient is status post previous median sternotomy. The heart is moderately enlarged but similar in size to prior without pericardial effusion. 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 No pneumothorax. Visualized distal esophagus appears normal.   A hematoma adjacent to the drive line along the left chest wall measuring 9.1 x 3.7 x 4.9 cm (Series 201, Image 22) (Series 203, Image 61).   Additionally, there is a 5.2 x 2.4 x 2.2 cm fluid collection/loculated pleural effusion adjacent to the LVAD outflow tract in the posterior left lower lobe (series 201, image 12).   ABDOMEN:   LIVER: The liver is mildly enlarged in  size measuring 19.3 cm in craniocaudal dimension. Large hypodense lesion in segment 6 of the liver measuring 4.2 x 2.3 x 2.8 cm (Series 203, Image 57) (Series 903, Image 78) that is overall indeterminate however appears similar to prior imaging.   BILE DUCTS: The intrahepatic and extrahepatic ducts are not dilated.   GALLBLADDER: The gallbladder is nondistended and without evidence of radiopaque stones.   PANCREAS: The pancreas appears unremarkable without evidence of ductal dilatation or masses.   SPLEEN: Splenomegaly measuring up to 19.5 cm craniocaudal dimension, not significantly changed compared to 11/04/2024 CT. Interval development of several wedge-shaped hypodense lesions in the spleen compared to 11/04/2024 CT, compatible with infarcts.   ADRENAL GLANDS: Bilateral adrenal glands appear normal.   KIDNEYS AND URETERS: The kidneys are normal in size and enhance symmetrically. Multiple too small to characterize hypodense lesions in bilateral kidneys are statistically favored to represent simple renal cysts. A 2.1 cm cystic lesion in the mid pole of the right kidney, likely a simple renal cyst.  No hydroureteronephrosis or nephroureterolithiasis is identified.   PELVIS:   BLADDER: The urinary bladder is decompressed, limited for evaluation. There is intraluminal air in the anterior bladder that is likely secondary to recent catheterization.   REPRODUCTIVE ORGANS: Mild prostatomegaly.   BOWEL: The stomach is unremarkable. The small and large bowel are normal in caliber and demonstrate no wall thickening. Moderate colonic stool burden. Colonic diverticulosis without evidence of diverticulitis. The appendix is not definitely visualized. There is however no pericecal stranding or fluid.   VESSELS: There is no aneurysmal dilatation of the abdominal aorta. Moderate atherosclerotic disease of the abdominal aorta and its branches. The IVC appears normal. The splenic vasculature appears patent.    PERITONEUM/RETROPERITONEUM/LYMPH NODES: No ascites or free air, no fluid collection. No abdominopelvic lymphadenopathy is present.   BONES AND ABDOMINAL WALL: No suspicious osseous lesions are identified. Small acute left rib fracture that may be secondary to recent LVAD placement (Series 201, Image 2). Remote rib fractures of posterolateral left 9th through 11th ribs. Multilevel moderate degenerative discogenic disease is noted in the lower thoracic and lumbar spine particularly of the lower lumbar spine.   The abdominal wall soft tissues appear normal.       1.  Postoperative changes of recent LVAD placement. A 9.0 x 3.7 x 4.9 cm left chest wall hematoma adjacent to the driveline which appears otherwise intact with no evidence of fracture. Additionally, there is a 5.2 x 2.4 x 2.2 cm fluid collection/loculated pleural effusion adjacent to the LVAD outflow tract in the posterior left lower lobe. 2. Splenomegaly with interval development of scattered peripheral hypoenhancing wedge-shaped areas, compatible with infarcts. 3. In the visualized lung segments there are mild bilateral lung findings suggestive of pulmonary edema and atelectatic changes that are likely secondary to recent procedure. 4. Stable moderate cardiomegaly. 5. Stable indeterminate 4.3 cm hypodense lesion in segment V of the liver, which was better evaluated on 11/02/2024 CT liver protocol. 6. Additional stable chronic findings as described above.   I personally reviewed the images/study and I agree with the findings as stated by Resident Dr. Quiana Mcguire MD. This study was interpreted at [place], [place].   MACRO: None   Signed by: Danya Siddiqi 11/25/2024 10:37 PM Dictation workstation:   [id number]    CT head wo IV contrast    Result Date: 11/24/2024  Interpreted By:  Des Thao, STUDY: CT HEAD WO IV CONTRAST;  11/24/2024 2:07 pm   INDICATION: Signs/Symptoms:LUE numbness in VAD patient.      COMPARISON: 11/17/2024   ACCESSION NUMBER(S): [id number]   ORDERING CLINICIAN: SOLIS DUEÑAS   TECHNIQUE: Noncontrast axial CT images of head were obtained with coronal and sagittal reconstructed images.   FINDINGS: BRAIN PARENCHYMA: Good infarct in the medial left cerebellar hemisphere. No acute intraparenchymal hemorrhage or parenchymal evidence of acute large territory ischemic infarct. Gray-white matter distinction is preserved. No mass-effect.   VENTRICLES and EXTRA-AXIAL SPACES:  No acute extra-axial or intraventricular hemorrhage. No effacement of cerebral sulci. The ventricles and sulci are age-concordant.   PARANASAL SINUSES/MASTOIDS:  No hemorrhage or air-fluid levels within the visualized paranasal sinuses. The mastoids are well aerated.   CALVARIUM/ORBITS:  No skull fracture.  The orbits and globes are intact to the extent visualized.   EXTRACRANIAL SOFT TISSUES: No discernible abnormality.       No acute intracranial abnormality.     MACRO: None.   Signed by: Des Thao 11/24/2024 3:07 PM Dictation workstation:   [id number]      Notable Studies:   EKG:  Encounter Date: 10/24/24   Electrocardiogram 12-lead PRN for arrhythmia   Result Value    Ventricular Rate 111    Atrial Rate 81    QRS Duration 146    QT Interval 406    QTC Calculation(Bazett) 552    R Axis 24    T Axis 45    QRS Count 18    Q Onset 226    P Onset 209    P Offset 224    T Offset 429    QTC Fredericia 498    Narrative    Atrial fibrillation with rapid ventricular response with premature ventricular or aberrantly conducted complexes  Left bundle branch block  Abnormal ECG  When compared with ECG of 20-NOV-2024 03:52,  Left bundle branch block has replaced Nonspecific intraventricular block  Criteria for Septal infarct are no longer Present  Criteria for Lateral infarct are no longer Present       Echo:  Transthoracic Echo (TTE) Limited    Result Date: 11/18/2024   [facility], [address] [place] [address]  [id number]                Tel [phone] and Fax [phone] TRANSTHORACIC ECHOCARDIOGRAM REPORT  Patient Name:       RJ ROSAPEARL     Reading Physician:    [id number] Fawad Chicas MD Study Date:         11/18/2024          Ordering Provider:    [id number] HARRISON VICKERS                                                               GEORGETTE MRN/PID:            [id number]            Fellow: Accession#:         [id number]        Nurse: Date of Birth/Age:  1955 / 69     Sonographer:          Genesis Sinclair RDCS                     years Gender assigned at  M                   Additional Staff: Birth: Height:             182.88 cm           Admit Date:           10/24/2024 Weight:             93.44 kg            Admission Status:     Inpatient -                                                               Routine BSA / BMI:          2.16 m2 / 27.94     Encounter#:           [id number]                     kg/m2 Blood Pressure:     /                   Department Location:  [facility] Study Type:    TRANSTHORACIC ECHO (TTE) LIMITED Diagnosis/ICD: Presence of heart assist device-Z95.811 Indication:    Ramp study for LVAD CPT Code:      Echo Limited-14907; Doppler Limited-83223; Color Doppler-26170 Patient History: Pertinent History: Acute on chronic heart failure with reduced EF, Diastolic                    disfunction, Ischemic cardiiomyopathy, MI, CHF, LVAD, VIV. Study Detail: The following Echo studies were performed: 2D, Doppler, color flow               and M-Mode. Technically challenging study due to patient lying in               supine position and postoperative dressings.  PHYSICIAN INTERPRETATION: Left Ventricle: Left ventricular ejection fraction is severely decreased, by visual estimate at 10%. There is global hypokinesis of the left ventricle with minor regional variations. The left ventricular cavity size is severely dilated. Abnormal (paradoxical)  septal motion consistent with post-operative status. Left ventricular diastolic filling was not assessed. Left Atrium: The left atrium is moderate to severely dilated. Right Ventricle: The right ventricle is severely enlarged. There is severely reduced right ventricular systolic function. Right Atrium: The right atrium is moderately dilated. Aortic Valve: The aortic valve is structurally normal. There is mild aortic valve cusp calcification. There is mild aortic valve regurgitation. Mitral Valve: The mitral valve is normal in structure. There is mild mitral valve regurgitation. Tricuspid Valve: The tricuspid valve is structurally normal. There is mild tricuspid regurgitation. Pulmonic Valve: The pulmonic valve is not well visualized. Pulmonic valve regurgitation was not assessed. Pericardium: Trivial pericardial effusion. Aorta: The aortic root is normal. In comparison to the previous echocardiogram(s): Compared with study dated 11/15/2024, no significant change.  LEFT VENTRICULAR ASSIST DEVICE: LVAD: The patient has a(n) HeartMate 3 LVAD device present. Inflow Cannula: The inflow cannula is visualized in the left ventricular apex. AV Leaflet Mobility: . The aortic valve appears to be opening every 1 beats. LVAD Comments: Ramp study speed increased from 5200 to 5300. Aortic valve still opens every beat. Septum remains in the midline.  CONCLUSIONS:  1. Poorly visualized anatomical structures due to suboptimal image quality.  2. Left ventricular ejection fraction is severely decreased, by visual estimate at 10%.  3. There is global hypokinesis of the left ventricle with minor regional variations.  4. Left ventricular cavity size is severely dilated.  5. Abnormal septal motion consistent with post-operative status.  6. There is severely reduced right ventricular systolic function.  7. Severely enlarged right ventricle.  8. The left atrium is moderate to severely dilated.  9. The right atrium is moderately dilated. 10.  Mild aortic valve regurgitation. 11. . The aortic valve appears to be opening every 1 beats. 12. Compared with study dated 11/15/2024, no significant change. QUANTITATIVE DATA SUMMARY:  LV SYSTOLIC FUNCTION BY 2D PLANIMETRY (MOD):                      Normal Ranges: EF-Visual:      10 % LV EF Reported: 10 %  [id number] Fawad Chicas MD Electronically signed on 11/18/2024 at 12:33:46 PM  ** Final **     Transthoracic Echo (TTE) Limited    Result Date: 11/15/2024   [facility], [address] [place] [address]                Tel [phone] and Fax [phone] TRANSTHORACIC ECHOCARDIOGRAM REPORT  Patient Name:       RJ Webb Physician:    [id number] Fawad Chicas MD Study Date:         11/15/2024          Ordering Provider:    [id number] MANNING MRN/PID:            [id number]            Fellow:               [id number] Aramis Fitzpatrick MD Accession#:         [id number]        Nurse: Date of Birth/Age:  1955 / 69     Sonographer:          Jackson estrada                                     BLANQUITA Gender assigned at  M                   Additional Staff: Birth: Height:             182.88 cm           Admit Date: Weight:             97.07 kg            Admission Status:     Inpatient - STAT BSA / BMI:          2.19 m2 / 29.02     Encounter#:           [id number]                     kg/m2 Blood Pressure:     107/71 mmHg         Department Location:  [facility] Study Type:    TRANSTHORACIC ECHO (TTE) LIMITED Diagnosis/ICD: Presence of heart assist device-Z95.811 Indication:    LVAD ramp study CPT Code:      Echo Limited-12194; Doppler Limited-67888; Color Doppler-29953 Patient History: Pertinent History: S/p CABG x 4 (2012) and PCI (LCx/OM; 5/2019), Stage D ICM                     HFrEF LVEF 10-15%, AF s/p RFA (PVI and CTI; 4/2024), HTN,                    Dyslipidemia, 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 Study Detail: The following Echo studies were performed: 2D, M-Mode, Doppler and               color flow. Technically challenging study due to poor acoustic               windows, prominent lung artifact, body habitus and patient lying               in supine position.  PHYSICIAN INTERPRETATION: Left Ventricle: The left ventricular systolic function is severely decreased, with a visually estimated ejection fraction of 10-15%. There is global hypokinesis of the left ventricle with minor regional variations. The left ventricular cavity size is severely dilated. There is normal septal and normal posterior left ventricular wall thickness. Abnormal (paradoxical) septal motion consistent with post-operative status. Left ventricular diastolic filling cannot be determined, due to left ventricular assist device. Left Atrium: The left atrium is mildly dilated. Right Ventricle: The right ventricle is severely enlarged. There is severely reduced right ventricular systolic function. A device is visualized in the right ventricle. Right Atrium: The right atrium is severely dilated. There is a device visualized in the right atrium. Aortic Valve: The aortic valve is structurally normal. There is mild to moderate aortic valve cusp calcification. There is mild aortic valve regurgitation. Mitral Valve: The mitral valve is normal in structure. There is mild mitral annular calcification. There is mild mitral valve regurgitation. Tricuspid Valve: The tricuspid valve is structurally normal. There is mild tricuspid regurgitation. The right ventricular systolic pressure is unable to be estimated. Pulmonic Valve: The pulmonic valve is not well visualized. Pulmonic valve regurgitation was not assessed. Pericardium: Trivial pericardial effusion.  Aorta: The aortic root is abnormal. There is mild dilatation of the ascending aorta. There is mild dilatation of the aortic root. In comparison to the previous echocardiogram(s): Compared with study dated 11/13/2024, no significant change.  LEFT VENTRICULAR ASSIST DEVICE: Study Type: This study was performed while LVAD settings were optimized. LVAD: The patient has a(n) HeartMate 3 LVAD device present. Inflow Cannula: The inflow cannula is visualized in the left ventricular apex. Outflow Cannula: Visualization of the outflow cannula is technically difficult. AV Leaflet Mobility: . The aortic valve appears to be opening every 1 beats.  CONCLUSIONS:  1. The left ventricular systolic function is severely decreased, with a visually estimated ejection fraction of 10-15%.  2. There is global hypokinesis of the left ventricle with minor regional variations.  3. Left ventricular diastolic filling cannot be determined, due to left ventricular assist device.  4. Left ventricular cavity size is severely dilated.  5. Abnormal septal motion consistent with post-operative status.  6. There is severely reduced right ventricular systolic function.  7. Severely enlarged right ventricle.  8. The left atrium is mildly dilated.  9. The right atrium is severely dilated. 10. Mild aortic valve regurgitation. 11. . The aortic valve appears to be opening every 1 beats. 12. Compared with study dated 11/13/2024, no significant change. QUANTITATIVE DATA SUMMARY:  2D MEASUREMENTS:          Normal Ranges: IVSd:            1.00 cm  (0.6-1.1cm) LVPWd:           1.00 cm  (0.6-1.1cm) LVIDd:           5.90 cm  (3.9-5.9cm) LV Mass Index:   109 g/m2  LA VOLUME:                   Normal Ranges: LA Vol A4C:        86.2 ml   (22+/-6mL/m2) LA Vol Index A4C:  39.3ml/m2 LA Area A4C:       24.2 cm2 LA Major Axis A4C: 5.8 cm  AORTA MEASUREMENTS:         Normal Ranges: Ao Sinus, d:        4.30 cm (2.1-3.5cm)  LV SYSTOLIC FUNCTION BY 2D PLANIMETRY (MOD):                       Normal Ranges: EF-Visual:      13 % LV EF Reported: 13 %  [id number] Fawad Chicas MD Electronically signed on 11/15/2024 at 12:09:54 PM  ** Final **     Transthoracic Echo (TTE) Limited    Result Date: 11/13/2024   [facility], [address] [place] [address]                Tel [phone] and Fax [phone] TRANSTHORACIC ECHOCARDIOGRAM REPORT  Patient Name:       RJ Webb Physician:    [id number] Raciel Casarez MD Study Date:         11/13/2024          Ordering Provider:    [id number] JO CASTILLO MRN/PID:            [id number]            Fellow: Accession#:         [id number]        Nurse: Date of Birth/Age:  1955 / 69     Sonographer:          Jackson estrada RDCS Gender assigned at  M                   Additional Staff: Birth: Height:             182.88 cm           Admit Date: Weight:             93.90 kg            Admission Status:     Inpatient - STAT BSA / BMI:          2.16 m2 / 28.07     Encounter#:           [id number]                     kg/m2 Blood Pressure:     94/74 mmHg          Department Location:  [facility] Study Type:    TRANSTHORACIC ECHO (TTE) LIMITED Diagnosis/ICD: Acute on chronic combined systolic (congestive) and diastolic                (congestive) heart failure (CHF)-I50.43 Indication:    eval RV fx CPT Code:      Echo Limited-51203; Doppler Limited-22974; Color Doppler-07438 Patient History: Pertinent History: CAD s/p CABGx4 in 2012 and PCI 2019 w/ ICM LVEF 10-15%, AF                    s/p RFA & PVI, HTN, CHF exacerbation, 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 Study Detail: The following Echo studies were performed: 2D, M-Mode, Doppler and               color flow. Technically  challenging study due to body habitus,               patient lying in supine position, postoperative dressings,               prominent lung artifact and poor acoustic windows. The patient is               intubated. Definity used as a contrast agent for endocardial               border definition. Total contrast used for this procedure was 2.0               mL via IV push.  PHYSICIAN INTERPRETATION: Left Ventricle: Left ventricular ejection fraction is severely decreased, by visual estimate at 15%. There is eccentric left ventricular hypertrophy. There is global hypokinesis of the left ventricle with minor regional variations. The left ventricular cavity size is moderate to severely dilated. Left ventricular diastolic filling was not assessed. Left Atrium: The left atrium is moderately dilated. Right Ventricle: The right ventricle is mild to moderately enlarged. There is severely reduced right ventricular systolic function. A device is visualized in the right ventricle. Right Atrium: The right atrium is moderately dilated. There is a device visualized in the right atrium. Aortic Valve: The aortic valve was not well visualized. There is mild to moderate aortic valve cusp calcification. There is mild aortic valve regurgitation. Mitral Valve: The mitral valve is normal in structure. There is mild mitral valve regurgitation. Tricuspid Valve: The tricuspid valve is structurally normal. There is mild tricuspid regurgitation. Pulmonic Valve: The pulmonic valve is not well visualized. There is trace pulmonic valve regurgitation. Pericardium: There is no pericardial effusion noted. Aorta: The aortic root is abnormal. There is mild dilatation of the aortic root. Pulmonary Artery: The tricuspid regurgitant velocity is 1.83 m/s, and with an estimated right atrial pressure of 10 mmHg, the estimated pulmonary artery pressure is normal with the RVSP at 23.4 mmHg. Systemic Veins: The inferior vena cava appears moderately dilated,  on vent. In comparison to the previous echocardiogram(s): Compared with study dated 11/5/2024, no significant change.  LEFT VENTRICULAR ASSIST DEVICE: LVAD: The patient has a(n) HeartMate 3 LVAD device present. Inflow Cannula: The inflow cannula is visualized in the left ventricular apex.  CONCLUSIONS:  1. Poorly visualized anatomical structures due to suboptimal image quality.  2. Left ventricular cavity size is moderate to severely dilated.  3. Left ventricular ejection fraction is severely decreased, by visual estimate at 15%.  4. There is global hypokinesis of the left ventricle with minor regional variations.  5. There is severely reduced right ventricular systolic function.  6. Mild to moderately enlarged right ventricle.  7. The left atrium is moderately dilated.  8. The right atrium is moderately dilated.  9. Mild aortic valve regurgitation. 10. The inferior vena cava appears moderately dilated, on vent. QUANTITATIVE DATA SUMMARY:  2D MEASUREMENTS:          Normal Ranges: IVSd:            1.00 cm  (0.6-1.1cm) LVPWd:           1.20 cm  (0.6-1.1cm) LVIDd:           6.90 cm  (3.9-5.9cm) LVIDs:           6.60 cm LV Mass Index:   164 g/m2 LV % FS          4.3 %  AORTA MEASUREMENTS:         Normal Ranges: Ao Sinus, d:        4.00 cm (2.1-3.5cm)  LV SYSTOLIC FUNCTION BY 2D PLANIMETRY (MOD):                      Normal Ranges: EF-Visual:      15 % LV EF Reported: 15 %  RIGHT VENTRICLE: RV s' 0.07 m/s  TRICUSPID VALVE/RVSP:          Normal Ranges: Peak TR Velocity:     1.83 m/s RV Syst Pressure:     23 mmHg  (< 30mmHg) IVC Diam:             2.70 cm  [id number] Raciel Casarez MD Electronically signed on 11/13/2024 at 10:25:04 AM  ** Final **       Meds:  Scheduled medications  acetaminophen, 650 mg, oral, TID  aspirin, 81 mg, oral, Daily  dapagliflozin propanediol, 5 mg, oral, Daily  digoxin, 125 mcg, oral, Daily  escitalopram, 10 mg, oral, Daily  icosapent ethyL, 2 g, oral, BID  lidocaine, 2 patch, transdermal,  Daily  magnesium oxide, 800 mg, oral, BID  pantoprazole, 40 mg, oral, Daily before breakfast  perflutren lipid microspheres, 0.5-10 mL of dilution, intravenous, Once in imaging  sacubitriL-valsartan, 1 tablet, oral, BID  sennosides-docusate sodium, 1 tablet, oral, BID  sildenafil, 20 mg, oral, TID  warfarin, 7.5 mg, oral, Daily      Continuous medications  heparin, 0-4,000 Units/hr, Last Rate: 2,300 Units/hr (11/26/24 0703)      PRN medications  PRN medications: heparin, [DISCONTINUED] ondansetron **OR** ondansetron, polyethylene glycol     Assessment/Plan   Assessment & Plan     NEUROLOGICAL  Active issues:   #Hx of anxiety and depression  #Acute post-operative pain (resolving)  #ICU delirium (resolved)  - Serial neuro and pain assessments   - Continue scheduled Tylenol   - RESTART home escitalopram 10mg daily 11/22  - Melatonin dc'ed per Dr. Oswald   - Completed thiamine 500mg 11/21  - PT/OT    #Transient LUE weakness on 11/24  - Resolved with tylenol  - CTH (11/24) without acute abnormalities  - Heparin gtt resumed while coumadin is subtherapeutic    CARDIAC  #PMHx CAD s/p CABG x 4 (2012) and PCI (LCx/OM; 5/2019), stage D ICM HFrEF LVEF 10-15%, AF s/p RFA (PVI and CTI; 4/2024), HTN  #S/p HM3 LVAD via left thoracotomy 11/12 with Abu Angel and Kaitlin.   GDMT and RV support:   BB: n/a - RV dysfunction   ARNI/ACEi/ARB: Entresto 24-26mg BID   MRA: Not started yet in postop period   SGLT2i: Not started yet in postop period   RV support: Digoxin stopped 11/20 -> restarted 11/26; sildenafil 20mg TID today   AC: Coumadin INCREASE today to 7.5mg daily, remain on heparin gtt while subtherapeutic. ASA (primary indication - ICM, PCI)     Heart Mate III interrogation   Most Recent   Flow 4.4   Speed 5200   Power 3.7   PI 2.2   MAP (mmHg): 76    LVAD interrogation: stable flow, no sig PI events or power spikes.     RAMP study: Started study with RPM 5200. AV opening every beat, no MR, mild AI, septum midline. After review with  Dr. Drummond, he appears well optimized at this speed. No speed changes made. dMAP 68.     Daily weight:   Vitals:    24 0600   Weight: 87.7 kg (193 lb 5.5 oz)      #HTN   - Hydralazine stopped, c/w Entresto 24-26mg BID and uptitrate as indicated     RESPIRATORY   #VIV on CPAP at home  - Resume home nocturnal CPAP  - Maintaining SpO2 >92%.   - Encourage OOB and ambulation daily       GI:  #GERD  #Malnutrition d/t poor PO intake  - Continue PPI for GERD, LVAD bleeding risk   - Continue adult regular diet with supplements  - Hold Colace/senna BID and change miralax to as needed  - C/w Vascepa     RENAL:  #KENYA on CKD (baseline Cr 1.3-1.6)  - Strict I&Os  - Diurese with lasix 40 mg x1  and   - Avoid hypotension and nephrotoxic agents   Results from last 72 hours   Lab Units 24  0603 24  0736   BUN mg/dL 19 20   CREATININE mg/dL 1.39* 1.10         ENDO:  #PMH of pre-DM with recent A1c: 6.3  - Euglycemic   - Goal BG <180  - NO indication for SSI at this time     HEMATOLOGY:  #Acute blood loss anemia   #Iron deficiency anemia   #Chronic anticoagulation 2/2 LVAD   Results from last 7 days   Lab Units 24  0603 24  0250   HEMOGLOBIN g/dL 12.4* 12.1*   HEMATOCRIT % 38.6* 37.2*   PLATELETS AUTO x10*3/uL 537* 313      - C/w warfarin 6mg daily ( ) for goal INR 2-3   - Heparin gtt until INR > 2     INFECTIOUS DISEASE  Active issues:   # No acute issues    Results from last 7 days   Lab Units 24  0603 24  0250   WBC AUTO x10*3/uL 11.8* 13.5*      Temp (24hrs), Av.5 °C (97.7 °F), Min:36.3 °C (97.3 °F), Max:36.7 °C (98.1 °F)     - Afebrile, no leukocytosis  - S/p Periop cefazolin, vanco, fluconazole x 48hrs per LVAD protocol.   - Trend temp q4h  - Additional course vanc/zosyn (-) was started for SIRS/worsening delirium, stopped when infectious workup negative     Skin/Musculoskeletal:  Active issues:   # no acute issues        PROPHYLAXIS:  - DVT: SCD's,  Coumadin w/ Heparin bridge   - GI:  Protonix     CODE STATUS: Full code     DISPO PLANNING/ SOCIAL:  - Disposition expectation: LT5    - Barriers to discharge: PT/OT recs for discharge, optimizing LVAD speed, resuming GDMT, ensuring RV stability off milrinone gtt     RESTRAINTS:  Not indicated/Patient does not meet criteria for restraints    Patient examined and discussed with attending physician Dr. Drummond      I spent 40 minutes in the professional and overall care of this patient.     ____________________________________________________________  Stacie Davis, MSN, AGACN-BC  Advanced Heart Failure LVAD Nurse Practitioner   For floor patients please page HHVI team after 5pm- [phone]  LVAD 24/7 emergency pager [phone]

## 2024-11-26 NOTE — PROGRESS NOTES
Nutrition Follow Up Assessment:   Nutrition Assessment         Pt presents with acute on chronic heart failure with reduced ejection fraction and diastolic dysfunction. S/p LVAD placement on 11/12.     PMHx of CAD s/p 4V CABG (2012) and PCI (LCx/OM; 5/2019), stage D systolic HF/ICM/HFrEF with LVEF 10-15%( 10/22/24 TTE), AF s/p RFA (PVI and CTI; 4/2024) on OAC therapy, HTN, dyslipidemia, depression, anxiety and VIV using CPAP       Nutrition History:  Energy Intake: Good > 75 %  Food and Nutrient History: Pt and family report good appetite with 100% consumption of foods. Pt being sent Ensure even though he declined, ONS are piling up on sink. Upon entering room, pt finishing lunch, his lunch plate was completely empty and pt was finishing up his last bites of dessert (cheese cake). All documented meals within the past 3 days have been % intake.         Anthropometrics:  Height: 182.9 cm (6')   Weight: 87.7 kg (193 lb 5.5 oz)   BMI (Calculated): 27.17  IBW/kg (Dietitian Calculated): 81 kg  Percent of IBW: 108 %       Weight History:   Wt Readings from Last 20 Encounters:   11/26/24 87.7 kg (193 lb 5.5 oz)   10/24/24 92.5 kg (204 lb)   08/05/24 122 kg (268 lb)   01/31/22 119 kg (262 lb)   02/18/20 123 kg (270 lb 2 oz)       Weight Change %:  Weight History / % Weight Change: Pt with 5.2% weight loss x 1 month, likely iso diuresis. Says he is normally between 91kg-95.5kg     Nutrition Focused Physical Exam Findings:  Subcutaneous fat loss:  Orbital fat pads: well nourished  Buccal fat pads: well nourished  Muscle wasting:  Temporalis: mild-moderate  Pectoralis: mild-moderate  Deltoid: mild-moderate  Interosseous: mild-moderate  Edema:  Edema Location: Nonpitting BLE and RUE  Physical Findings:  Skin: Positive (incision wound on flank and abdomen, unspecified wounds on abdomen and groin)    Nutrition Significant Labs:  CBC Trend:   Results from last 7 days   Lab Units 11/26/24  0603 11/25/24  0250 11/24/24  0556  "11/23/24  0451   WBC AUTO x10*3/uL 11.8* 13.5* 15.4* 13.8*   RBC AUTO x10*6/uL 4.54 4.48* 4.57 4.63   HEMOGLOBIN g/dL 12.4* 12.1* 12.3* 12.5*   HEMATOCRIT % 38.6* 37.2* 39.1* 40.1*   MCV fL 85 83 86 87   PLATELETS AUTO x10*3/uL 537* 313 545* 529*    , A1C:  Lab Results   Component Value Date    HGBA1C 6.3 (H) 10/24/2024   , BG POCT trend:   Results from last 7 days   Lab Units 11/25/24  0253 11/24/24  0703   POCT GLUCOSE mg/dL 120* 92    , Liver Function Trend:        , Renal Lab Trend:   Results from last 7 days   Lab Units 11/26/24  0603 11/25/24  0736 11/25/24  0250 11/24/24  0556   POTASSIUM mmol/L 4.3 4.4 8.0* 4.6   PHOSPHORUS mg/dL 4.2 4.0 4.7 3.5   SODIUM mmol/L 139 135* 133* 136   MAGNESIUM mg/dL 2.13  --  2.47* 2.03   EGFR mL/min/1.73m*2 55* 73 64 63   BUN mg/dL 19 20 22 20   CREATININE mg/dL 1.39* 1.10 1.22 1.24    , Lipid Panel: No results found for: \"CHOL\", \"HDL\", \"CHHDL\", \"LDLF\", \"VLDL\", \"TRIG\" , Vit D: No results found for: \"VITD25\"     Nutrition Specific Medications:  Scheduled medications  acetaminophen, 650 mg, oral, TID  aspirin, 81 mg, oral, Daily  dapagliflozin propanediol, 5 mg, oral, Daily  digoxin, 125 mcg, oral, Daily  escitalopram, 10 mg, oral, Daily  icosapent ethyL, 2 g, oral, BID  lidocaine, 2 patch, transdermal, Daily  magnesium oxide, 800 mg, oral, BID  pantoprazole, 40 mg, oral, Daily before breakfast  perflutren lipid microspheres, 0.5-10 mL of dilution, intravenous, Once in imaging  sacubitriL-valsartan, 1 tablet, oral, BID  sennosides-docusate sodium, 1 tablet, oral, BID  sildenafil, 20 mg, oral, TID  warfarin, 7.5 mg, oral, Daily      Continuous medications  heparin, 0-4,000 Units/hr, Last Rate: 2,300 Units/hr (11/26/24 1424)      PRN medications  PRN medications: heparin, [DISCONTINUED] ondansetron **OR** ondansetron, polyethylene glycol      I/O:   Last BM Date: 11/26/24; Stool Appearance: Formed (11/26/24 0606)    Dietary Orders (From admission, onward)       Start     Ordered "    11/15/24 1145  Oral nutritional supplements  Until discontinued        Question Answer Comment   Deliver with All meals    Select supplement: Ensure High Protein        11/15/24 1144    11/14/24 1604  Adult diet Regular  Diet effective now        Question:  Diet type  Answer:  Regular    11/14/24 1605    11/12/24 1306  May Participate in Room Service  ( ROOM SERVICE MAY PARTICIPATE)  Once        Question:  .  Answer:  Yes    11/12/24 1305    11/12/24 1257  Oral nutritional supplements - Jonatan  Until discontinued        Comments: Twice a day.   Question Answer Comment   Deliver with Breakfast    Deliver with Lunch    Select supplement: Jonatan        11/12/24 1256                     Estimated Needs:   Total Energy Estimated Needs (kCal):  (2136-3641)  Method for Estimating Needs: EER=8483 (SF 1.3)  Total Protein Estimated Needs (g):  (100-115)  Method for Estimating Needs: 1.2-1.4 x 81kg  Total Fluid Estimated Needs (mL):  (1mL/kcal or per team)           Nutrition Diagnosis   Malnutrition Diagnosis  Patient has Malnutrition Diagnosis: Yes  Diagnosis Status: Ongoing  Malnutrition Diagnosis: Moderate malnutrition related to acute disease or injury  As Evidenced by: pt reports a poor to fair PO intake for weeks to months PTA along with moderate to severe fat and muscle loss on physical exam; he is down in weight by 23% in two months-- mostly fluids but suspect also has a component of true fat/muscle loss         Nutrition Interventions/Recommendations         Nutrition Prescription:  Individualized Nutrition Prescription Provided for : 0063-2580 kcal, 100-115 g pro, continue regular diet        Nutrition Interventions:   Goal: 1) Continue liberalized regular diet     Goal: 2) Pt has been declining ONS this hospital staty, may be of benefit for post-op healing & recovery > rec resume upon pt request         Nutrition Monitoring and Evaluation   Food/Nutrient Related History Monitoring  Monitoring and Evaluation  Plan: Energy intake, Amount of food  Energy Intake: Estimated energy intake  Criteria: Consume >75% EER  Amount of Food: Estimated amout of food, Medical food intake  Criteria: Consume >75% meals and snacks    Body Composition/Growth/Weight History  Monitoring and Evaluation Plan: Weight change  Weight Change: Weight gain, Weight loss  Criteria: Maintain stable weight    Biochemical Data, Medical Tests and Procedures  Monitoring and Evaluation Plan: Electrolyte/renal panel, Glucose/endocrine profile, Nutritional anemia profile, Vitamin profile  Criteria: WNL    Nutrition Focused Physical Findings  Monitoring and Evaluation Plan: Skin  Criteria: Wound healing         Time Spent (min): 35 minutes

## 2024-11-27 ENCOUNTER — TELEPHONE (OUTPATIENT)
Dept: RESPIRATORY THERAPY | Facility: HOSPITAL | Age: 69
End: 2024-11-27
Payer: MEDICARE

## 2024-11-27 ENCOUNTER — APPOINTMENT (OUTPATIENT)
Dept: CARDIOLOGY | Facility: HOSPITAL | Age: 69
DRG: 001 | End: 2024-11-27
Payer: MEDICARE

## 2024-11-27 LAB
ALBUMIN SERPL BCP-MCNC: 3.6 G/DL (ref 3.4–5)
ANION GAP SERPL CALC-SCNC: 13 MMOL/L (ref 10–20)
ANION GAP SERPL CALC-SCNC: 15 MMOL/L (ref 10–20)
BASOPHILS # BLD AUTO: 0.17 X10*3/UL (ref 0–0.1)
BASOPHILS NFR BLD AUTO: 1.4 %
BUN SERPL-MCNC: 20 MG/DL (ref 6–23)
BUN SERPL-MCNC: 22 MG/DL (ref 6–23)
CALCIUM SERPL-MCNC: 8.6 MG/DL (ref 8.6–10.6)
CALCIUM SERPL-MCNC: 9.2 MG/DL (ref 8.6–10.6)
CHLORIDE SERPL-SCNC: 105 MMOL/L (ref 98–107)
CHLORIDE SERPL-SCNC: 105 MMOL/L (ref 98–107)
CHOLEST SERPL-MCNC: 126 MG/DL (ref 0–199)
CHOLEST SERPL-MCNC: 126 MG/DL (ref 0–199)
CHOLESTEROL/HDL RATIO: 5.1
CO2 SERPL-SCNC: 23 MMOL/L (ref 21–32)
CO2 SERPL-SCNC: 24 MMOL/L (ref 21–32)
CREAT SERPL-MCNC: 1.21 MG/DL (ref 0.5–1.3)
CREAT SERPL-MCNC: 1.28 MG/DL (ref 0.5–1.3)
DIGOXIN SERPL-MCNC: <0.3 NG/ML (ref 0.8–?)
EGFRCR SERPLBLD CKD-EPI 2021: 61 ML/MIN/1.73M*2
EGFRCR SERPLBLD CKD-EPI 2021: 65 ML/MIN/1.73M*2
EOSINOPHIL # BLD AUTO: 0.52 X10*3/UL (ref 0–0.7)
EOSINOPHIL NFR BLD AUTO: 4.3 %
ERYTHROCYTE [DISTWIDTH] IN BLOOD BY AUTOMATED COUNT: 16.8 % (ref 11.5–14.5)
GLUCOSE SERPL-MCNC: 116 MG/DL (ref 74–99)
GLUCOSE SERPL-MCNC: 126 MG/DL (ref 74–99)
HCT VFR BLD AUTO: 38.3 % (ref 41–52)
HDLC SERPL-MCNC: 24.6 MG/DL
HDLC SERPL-MCNC: 24.6 MG/DL
HGB BLD-MCNC: 12.6 G/DL (ref 13.5–17.5)
IMM GRANULOCYTES # BLD AUTO: 0.31 X10*3/UL (ref 0–0.7)
IMM GRANULOCYTES NFR BLD AUTO: 2.6 % (ref 0–0.9)
INR PPP: 1.8 (ref 0.9–1.1)
LDH SERPL L TO P-CCNC: 214 U/L (ref 84–246)
LDLC SERPL DIRECT ASSAY-MCNC: 82 MG/DL (ref 0–129)
LYMPHOCYTES # BLD AUTO: 1.09 X10*3/UL (ref 1.2–4.8)
LYMPHOCYTES NFR BLD AUTO: 9.1 %
MAGNESIUM SERPL-MCNC: 2.12 MG/DL (ref 1.6–2.4)
MAGNESIUM SERPL-MCNC: 2.18 MG/DL (ref 1.6–2.4)
MCH RBC QN AUTO: 27.6 PG (ref 26–34)
MCHC RBC AUTO-ENTMCNC: 32.9 G/DL (ref 32–36)
MCV RBC AUTO: 84 FL (ref 80–100)
MONOCYTES # BLD AUTO: 0.54 X10*3/UL (ref 0.1–1)
MONOCYTES NFR BLD AUTO: 4.5 %
NEUTROPHILS # BLD AUTO: 9.38 X10*3/UL (ref 1.2–7.7)
NEUTROPHILS NFR BLD AUTO: 78.1 %
NON-HDL CHOLESTEROL: 101 MG/DL (ref 0–149)
NRBC BLD-RTO: 0 /100 WBCS (ref 0–0)
PHOSPHATE SERPL-MCNC: 3.7 MG/DL (ref 2.5–4.9)
PLATELET # BLD AUTO: 503 X10*3/UL (ref 150–450)
POTASSIUM SERPL-SCNC: 4.4 MMOL/L (ref 3.5–5.3)
POTASSIUM SERPL-SCNC: 4.8 MMOL/L (ref 3.5–5.3)
PROTHROMBIN TIME: 20.9 SECONDS (ref 9.8–12.8)
RBC # BLD AUTO: 4.57 X10*6/UL (ref 4.5–5.9)
SODIUM SERPL-SCNC: 138 MMOL/L (ref 136–145)
SODIUM SERPL-SCNC: 138 MMOL/L (ref 136–145)
TRIGL SERPL-MCNC: 155 MG/DL (ref 0–149)
UFH PPP CHRO-ACNC: 0.5 IU/ML
WBC # BLD AUTO: 12 X10*3/UL (ref 4.4–11.3)

## 2024-11-27 PROCEDURE — 85025 COMPLETE CBC W/AUTO DIFF WBC: CPT | Performed by: NURSE PRACTITIONER

## 2024-11-27 PROCEDURE — 2500000001 HC RX 250 WO HCPCS SELF ADMINISTERED DRUGS (ALT 637 FOR MEDICARE OP): Performed by: REGISTERED NURSE

## 2024-11-27 PROCEDURE — 83735 ASSAY OF MAGNESIUM: CPT

## 2024-11-27 PROCEDURE — 83735 ASSAY OF MAGNESIUM: CPT | Performed by: NURSE PRACTITIONER

## 2024-11-27 PROCEDURE — 99233 SBSQ HOSP IP/OBS HIGH 50: CPT | Performed by: NURSE PRACTITIONER

## 2024-11-27 PROCEDURE — 93750 INTERROGATION VAD IN PERSON: CPT | Performed by: NURSE PRACTITIONER

## 2024-11-27 PROCEDURE — 2500000004 HC RX 250 GENERAL PHARMACY W/ HCPCS (ALT 636 FOR OP/ED): Performed by: NURSE PRACTITIONER

## 2024-11-27 PROCEDURE — 80162 ASSAY OF DIGOXIN TOTAL: CPT | Performed by: NURSE PRACTITIONER

## 2024-11-27 PROCEDURE — 83615 LACTATE (LD) (LDH) ENZYME: CPT | Performed by: NURSE PRACTITIONER

## 2024-11-27 PROCEDURE — 83721 ASSAY OF BLOOD LIPOPROTEIN: CPT | Performed by: NURSE PRACTITIONER

## 2024-11-27 PROCEDURE — 99221 1ST HOSP IP/OBS SF/LOW 40: CPT | Performed by: STUDENT IN AN ORGANIZED HEALTH CARE EDUCATION/TRAINING PROGRAM

## 2024-11-27 PROCEDURE — 97530 THERAPEUTIC ACTIVITIES: CPT | Mod: GP | Performed by: STUDENT IN AN ORGANIZED HEALTH CARE EDUCATION/TRAINING PROGRAM

## 2024-11-27 PROCEDURE — 1200000002 HC GENERAL ROOM WITH TELEMETRY DAILY

## 2024-11-27 PROCEDURE — 2500000001 HC RX 250 WO HCPCS SELF ADMINISTERED DRUGS (ALT 637 FOR MEDICARE OP): Performed by: NURSE PRACTITIONER

## 2024-11-27 PROCEDURE — 85610 PROTHROMBIN TIME: CPT | Performed by: NURSE PRACTITIONER

## 2024-11-27 PROCEDURE — 82465 ASSAY BLD/SERUM CHOLESTEROL: CPT | Performed by: NURSE PRACTITIONER

## 2024-11-27 PROCEDURE — 83718 ASSAY OF LIPOPROTEIN: CPT | Performed by: NURSE PRACTITIONER

## 2024-11-27 PROCEDURE — 93010 ELECTROCARDIOGRAM REPORT: CPT | Performed by: INTERNAL MEDICINE

## 2024-11-27 PROCEDURE — 80061 LIPID PANEL: CPT | Performed by: NURSE PRACTITIONER

## 2024-11-27 PROCEDURE — 85520 HEPARIN ASSAY: CPT

## 2024-11-27 PROCEDURE — 80048 BASIC METABOLIC PNL TOTAL CA: CPT | Mod: CCI | Performed by: NURSE PRACTITIONER

## 2024-11-27 PROCEDURE — 2500000002 HC RX 250 W HCPCS SELF ADMINISTERED DRUGS (ALT 637 FOR MEDICARE OP, ALT 636 FOR OP/ED): Performed by: REGISTERED NURSE

## 2024-11-27 PROCEDURE — 80069 RENAL FUNCTION PANEL: CPT

## 2024-11-27 PROCEDURE — 2500000004 HC RX 250 GENERAL PHARMACY W/ HCPCS (ALT 636 FOR OP/ED)

## 2024-11-27 PROCEDURE — 2500000005 HC RX 250 GENERAL PHARMACY W/O HCPCS: Performed by: NURSE PRACTITIONER

## 2024-11-27 PROCEDURE — 2500000001 HC RX 250 WO HCPCS SELF ADMINISTERED DRUGS (ALT 637 FOR MEDICARE OP)

## 2024-11-27 PROCEDURE — 97116 GAIT TRAINING THERAPY: CPT | Mod: GP | Performed by: STUDENT IN AN ORGANIZED HEALTH CARE EDUCATION/TRAINING PROGRAM

## 2024-11-27 PROCEDURE — 93005 ELECTROCARDIOGRAM TRACING: CPT

## 2024-11-27 PROCEDURE — 2500000002 HC RX 250 W HCPCS SELF ADMINISTERED DRUGS (ALT 637 FOR MEDICARE OP, ALT 636 FOR OP/ED): Performed by: NURSE PRACTITIONER

## 2024-11-27 RX ORDER — DIGOXIN 250 MCG
250 TABLET ORAL DAILY
Status: DISCONTINUED | OUTPATIENT
Start: 2024-11-28 | End: 2024-12-02

## 2024-11-27 RX ORDER — AMIODARONE HYDROCHLORIDE 200 MG/1
400 TABLET ORAL 2 TIMES DAILY
Status: DISCONTINUED | OUTPATIENT
Start: 2024-11-27 | End: 2024-12-02

## 2024-11-27 RX ORDER — ACETAMINOPHEN 325 MG/1
650 TABLET ORAL EVERY 4 HOURS
Status: DISCONTINUED | OUTPATIENT
Start: 2024-11-27 | End: 2024-11-27

## 2024-11-27 RX ORDER — METHOCARBAMOL 500 MG/1
500 TABLET, FILM COATED ORAL EVERY 6 HOURS
Status: DISCONTINUED | OUTPATIENT
Start: 2024-11-27 | End: 2024-11-28

## 2024-11-27 RX ORDER — ACETAMINOPHEN 325 MG/1
975 TABLET ORAL EVERY 6 HOURS
Status: DISCONTINUED | OUTPATIENT
Start: 2024-11-27 | End: 2024-11-28

## 2024-11-27 RX ORDER — SPIRONOLACTONE 25 MG/1
12.5 TABLET ORAL DAILY
Status: DISCONTINUED | OUTPATIENT
Start: 2024-11-27 | End: 2024-12-03 | Stop reason: HOSPADM

## 2024-11-27 ASSESSMENT — COGNITIVE AND FUNCTIONAL STATUS - GENERAL
STANDING UP FROM CHAIR USING ARMS: A LITTLE
CLIMB 3 TO 5 STEPS WITH RAILING: A LOT
WALKING IN HOSPITAL ROOM: A LITTLE
WALKING IN HOSPITAL ROOM: A LITTLE
MOVING TO AND FROM BED TO CHAIR: A LITTLE
MOBILITY SCORE: 17
CLIMB 3 TO 5 STEPS WITH RAILING: A LITTLE
MOBILITY SCORE: 19
TOILETING: A LITTLE
DRESSING REGULAR LOWER BODY CLOTHING: A LITTLE
TOILETING: A LITTLE
DRESSING REGULAR LOWER BODY CLOTHING: A LITTLE
DAILY ACTIVITIY SCORE: 20
DRESSING REGULAR UPPER BODY CLOTHING: A LITTLE
STANDING UP FROM CHAIR USING ARMS: A LITTLE
MOBILITY SCORE: 19
DAILY ACTIVITIY SCORE: 20
HELP NEEDED FOR BATHING: A LITTLE
WALKING IN HOSPITAL ROOM: A LITTLE
STANDING UP FROM CHAIR USING ARMS: A LITTLE
MOVING FROM LYING ON BACK TO SITTING ON SIDE OF FLAT BED WITH BEDRAILS: A LITTLE
MOVING TO AND FROM BED TO CHAIR: A LITTLE
MOVING TO AND FROM BED TO CHAIR: A LITTLE
DRESSING REGULAR UPPER BODY CLOTHING: A LITTLE
TURNING FROM BACK TO SIDE WHILE IN FLAT BAD: A LITTLE
HELP NEEDED FOR BATHING: A LITTLE
CLIMB 3 TO 5 STEPS WITH RAILING: A LITTLE
TURNING FROM BACK TO SIDE WHILE IN FLAT BAD: A LITTLE
TURNING FROM BACK TO SIDE WHILE IN FLAT BAD: A LITTLE

## 2024-11-27 ASSESSMENT — PAIN - FUNCTIONAL ASSESSMENT: PAIN_FUNCTIONAL_ASSESSMENT: 0-10

## 2024-11-27 ASSESSMENT — PAIN SCALES - GENERAL
PAINLEVEL_OUTOF10: 0 - NO PAIN
PAINLEVEL_OUTOF10: 8
PAINLEVEL_OUTOF10: 0 - NO PAIN

## 2024-11-27 ASSESSMENT — PAIN DESCRIPTION - ORIENTATION: ORIENTATION: LEFT

## 2024-11-27 ASSESSMENT — PAIN DESCRIPTION - LOCATION: LOCATION: OTHER (COMMENT)

## 2024-11-27 NOTE — PROGRESS NOTES
Physical Therapy    Physical Therapy Treatment    Patient Name: Fahad Meraz  MRN: [id number]  Today's Date: 11/27/2024  Room: [address] [place] [address]A  Time Calculation  Start Time: 1330  Stop Time: 1410  Time Calculation (min): 40 min       Assessment/Plan   PT Plan  Treatment/Interventions: Transfer training, Bed mobility, Gait training, Balance training, Neuromuscular re-education, Strengthening, Endurance training, Therapeutic exercise, Therapeutic activity  PT Plan: Ongoing PT  PT Frequency: Daily  PT Discharge Recommendations: High intensity level of continued care  Equipment Recommended upon Discharge: Wheeled walker  PT Recommended Transfer Status: Assist x1  PT - OK to Discharge: Yes    General Visit Information:   Reason for Referral: s/p HM3 implantation  Past Medical History Relevant to Rehab: CAD s/p 4V CABG (2012) and PCI (LCx/OM; 5/2019), stage D systolic HF/ICM/HFrEF with LVEF 10-15%( 10/22/24 TTE), AF s/p RFA (PVI and CTI; 4/2024) on OAC therapy, HTN, dyslipidemia, depression, anxiety and VIV using CPAP  Prior to Session Communication: Bedside nurse  Patient Position Received: Bed, 3 rail up, Alarm off, not on at start of session  Caregiver Feedback: spouse and sisters in laws exiting at start of session   Subjective: Patient is alert, agreeable to PT.  Family in room, expressing interest in SNF near daughter.  Precautions:  Precautions  Medical Precautions: Fall precautions, Cardiac precautions  Post-Surgical Precautions: Move in the Tube  Precautions Comment: LVAD  Vital Signs:  Vital Signs (Past 2hrs)        Date/Time Vitals Session Patient Position Pulse Resp SpO2 BP MAP (mmHg)    11/27/24 1410 --  --  85  --  96 %  --  --                   Heart Mate III:     Most Recent Range Past 24hrs   Flow 4.5 Pump Flow  Min: 4.4  Max: 4.5   Speed 5200 Speed RPM  Min: 5200  Max: 5200   Power 3.7      PI 3.1 Pulse Index  Min: 3.1  Max: 3.3     Drive line checked and secured    Objective   Pain:  Pain Assessment  0-10  (Numeric) Pain Score: 0 - No pain (post 0)  Cognition:  Cognition  Overall Cognitive Status: Within Functional Limits  Lines/Tubes/Drains:  Ventricular Assist Device HeartMate 3 (Active)   Number of days: 15        Continuous Medications/Drips:  heparin, 0-4,000 Units/hr, Last Rate: 2,300 Units/hr (11/27/24 1049)        PT Treatments:     Therapeutic Activity  Therapeutic Activity 1: CGA for battery pack transfer  Therapeutic Activity 2: Tinetti Trial,  Therapeutic Activity 3: TUG trial     Bed Mobility 1  Bed Mobility 1: Supine to sitting  Level of Assistance 1: Contact guard  Bed Mobility Comments 1: HOB 30, rail  Ambulation/Gait Training 1  Surface 1: Level tile  Device 1: No device  Assistance 1: Close supervision  Quality of Gait 1:  (WFL)  Comments/Distance (ft) 1: 20'  Ambulation/Gait Training 2  Surface 2: Level tile  Device 2: Rolling walker  Assistance 2: Contact guard  Quality of Gait 2:  (Reciprocal pattern)  Comments/Distance (ft) 2: 350'  Transfer 1  Transfer From 1: Sit to  Transfer to 1: Stand  Transfer Device 1:  (no device)  Transfer Level of Assistance 1: Close supervision  Trials/Comments 1: x7  Transfers 2  Transfer From 2: Stand to  Transfer to 2: Sit  Transfer Level of Assistance 2: Close supervision  Trials/Comments 2: x7  Transfers 3  Transfer From 3: Bed to  Transfer to 3: Chair with arms  Transfer Level of Assistance 3: Close supervision  Trials/Comments 3: x1  Transfers 4  Transfer From 4: Chair with arms to  Transfer to 4: Chair with arms  Transfer Level of Assistance 4: Close supervision  Trials/Comments 4: x4             Outcome Measures:  [facility] Basic Mobility  Turning from your back to your side while in a flat bed without using bedrails: A little  Moving from lying on your back to sitting on the side of a flat bed without using bedrails: A little  Moving to and from bed to chair (including a wheelchair): A little  Standing up from a chair using your arms (e.g. wheelchair or bedside  chair): A little  To walk in hospital room: A little  Climbing 3-5 steps with railing: A lot  Basic Mobility - Total Score: 17                 Tinetti  Sitting Balance: Steady, safe  Arises: Able, uses arms to help  Attempts to Arise: Able to arise, one attempt  Immediate Standing Balance (First 5 Seconds): Steady without walker or other support  Standing Balance: Narrow stance without support  Nudged: Staggers, grabs, catches self  Eyes Closed: Steady  Turned 360 Degrees: Steadiness: Steady  Turned 360 Degrees: Continuity of Steps: Discontinuous steps  Sitting Down: Uses arms or not a smooth motion  Balance Score: 12  Initiation of Gait: Any hesitancy or multiple attempts to start  Step Height: R Swing Foot: Right foot complete clears floor  Step Length: R Swing Foot: Passes left stance foot  Step Height: L Swing Foot: Left foot complete clears floor  Step Length: L Swing Foot: Passes right stance foot  Step Symmetry: Right and left step appear equal  Step Continuity: Steps appear continuous  Path: Mild/moderate deviation or uses walking aid  Trunk: Marked sway or uses walking aid  Walking Time: Heels almost touching while walking  Gait Score: 8  Total Score: 20  Timed Up and Go Test  How many seconds did it take to complete the 5 tasks?: 9 seconds       Education Documentation  Precautions, taught by Bryan Workman PT at 11/27/2024  3:30 PM.  Learner: Patient  Readiness: Acceptance  Method: Explanation  Response: Needs Reinforcement  Comment: LVAD education    Body Mechanics, taught by Bryan Workman PT at 11/27/2024  3:30 PM.  Learner: Patient  Readiness: Acceptance  Method: Explanation  Response: Needs Reinforcement  Comment: LVAD education    Mobility Training, taught by Bryan Workman PT at 11/27/2024  3:30 PM.  Learner: Patient  Readiness: Acceptance  Method: Explanation  Response: Needs Reinforcement  Comment: LVAD education    Education Comments  No comments found.          OP EDUCATION:        Encounter Problems       Encounter Problems (Active)       Balance       Pt will score >45 on VEGA for safe community ambulation (Progressing)       Start:  10/29/24    Expected End:  11/28/24               Mobility       Patient will ambulate >1000 ft with LRAD and supervision With stable vitals and RPD </= 3/10 and RPE </= 13/20 for improved functional mobility.   (Met)       Start:  10/29/24    Expected End:  11/12/24    Resolved:  11/05/24    Updated to: Patient will ambulate >2500 ft with LRAD and supervision With stable vitals and RPD </= 3/10 and RPE </= 13/20 for improved functional mobility.    Update reason: Progression         Pt will complete 6MWT with no assistive device and supervision and achieve >700 ft With stable vitals and RPD </= 3/10 and RPE </= 13/20 for improved functional mobility.   (Met)       Start:  10/29/24    Expected End:  11/12/24    Resolved:  11/05/24    Updated to: Pt will complete 6MWT with no assistive device and supervision and achieve >1300 ft With stable vitals and RPD </= 3/10 and RPE </= 13/20 for improved functional mobility.    Update reason: Progression         Patient will ambulate >2500 ft with LRAD and supervision With stable vitals and RPD </= 3/10 and RPE </= 13/20 for improved functional mobility. (Progressing)       Start:  11/05/24    Expected End:  11/28/24                Pt will complete 6MWT with no assistive device and supervision and achieve >1300 ft With stable vitals and RPD </= 3/10 and RPE </= 13/20 for improved functional mobility. (Progressing)       Start:  11/05/24    Expected End:  11/28/24                   PT Transfers       Patient will perform bed mobility indep (maintenance 2/2 prolonged hospital stay anticipated) (Progressing)       Start:  10/29/24    Expected End:  11/28/24            Patient will transfer sit to and from stand indep (Progressing)       Start:  10/29/24    Expected End:  11/28/24            Pt will complete 5XSTS from  recliner without UE assist <12 seconds With stable vitals and RPD </= 3/10 and RPE </= 13/20 for improved functional mobility.   (Progressing)       Start:  10/29/24    Expected End:  11/28/24               Pain - Adult              Assessment: Patient is progressing Well with therapy this date.  Patient able to complete multiple standing and gait trials this date.  Remains moderate falls risk based on Tinetti score but with improved velocity on TUG trial and endurance with gait.  Minimal assistance for battery change.  Patient remains appropriate for HIGH intensity therapy when medically appropriate for discharge from acute stay.  Will continue to follow.      11/27/24 at 3:32 PM   Bryan Workman, PT   Rehab Office: [phone]

## 2024-11-27 NOTE — SIGNIFICANT EVENT
Spoke with patient's daughter, Giulia Mcdonald. [facility]. Will schedule LVAD education with nursing director, Lisandra Brandon ([phone]) for Monday with anticipated discharge Tuesday 12/3.

## 2024-11-27 NOTE — PROGRESS NOTES
ADVANCED HEART FAILURE LVAD PROGRESS NOTE     H VAD Cardiologist: Padmini      Type of VAD: HM3  Implant Date: 11/12/24  Reason for VAD: ICM  Intent: Long-Term (Significant PAD, age, patient preference)      Subjective     HPI:  Fahad Meraz is a very pleasant 69 y.o. male w/ a PMHx sig for CAD s/p 4V CABG (2012) and PCI (LCx/OM; 5/2019), stage D systolic HF/ICM/HFrEF with LVEF 10-15%( 10/22/24 TTE), AF s/p RFA (PVI and CTI; 4/2024) on OAC therapy, HTN, dyslipidemia, depression, anxiety and VIV using CPAP who was admitted to OSH ICU, s/p RHC on 10/18/24.  Per patient, he had been experienced worsening SOB and fluids overload for 2-3 weeks. Patient was on milrinone drip and underwent SGC diuresis. However his KENYA worsened, and were not able to wean milrinone drip. Decision was made to transfer him to HF ICU [facility] [place] possible advanced therapy consideration. Advanced therapies evaluation was initiated on 10/30. Discussed in advanced therapeutics committee on 11/5 and was denied for transplantation, and approved for LVAD (significant PAD, Elevated PSA level, Age approaching 70, as well as patient verbalized he would be more in favor of LVAD vs Transplant). He was transferred to the floor on 11/6 to await VAD implantation. Readmitted to HFICU as of 11/09 for pre-OR swan guided optimization. Now presents to CTICU s/p LVAD HM3 implant via thoracotomy with descending outflow graft on 11/12/24 with Dr. Mclain. OR Course/Issues: pulseless VT requiring defib x 1 pre- CPB. Postop course c/b delirium, RV dysfunction, NSVT & malnutrition.       Principal Problem:    Acute on chronic heart failure with reduced ejection fraction and diastolic dysfunction  Active Problems:    Ischemic cardiomyopathy    Receiving inotropic medication    HTN (hypertension)    CAD (coronary artery disease)    MI (myocardial infarction) (Multi)    CHF (congestive heart failure)    Gastroesophageal reflux disease    Acute kidney injury  (nontraumatic) (CMS-HCC)    Delirium       LOS: 34 days     Interval Events:   Patient developed ~ 1xminute sustained VT w/ HR 140s around 1941 last evening. Arrhythmia resolved without intervention and patient remained asymptomatic w/ stable hemodynamics throughout episode. Telemetry review shows frequent PVCs and 3-6xbeat NSVT.     Patient endorses pain at left back near posterior portion of surgical incision. States it is an ache which gets worse when moving. Otherwise, denies N/V, abdominal pain, SOB/RAMOS, fevers or chills.     NYHA class pre-VAD implant: IV  NYHA class today (11/25): II        Objective   Vitals:   Vitals:    11/26/24 2342 11/27/24 0430 11/27/24 0432 11/27/24 0741   BP:    (!) 142/96   Pulse: 84  89 84   Resp: 15  18 17   Temp: 36 °C (96.8 °F)  36.9 °C (98.4 °F) 36 °C (96.8 °F)   TempSrc:       SpO2: 95%  95% 99%   Weight:  88.7 kg (195 lb 8.8 oz)     Height:         Wt Readings from Last 5 Encounters:   11/27/24 88.7 kg (195 lb 8.8 oz)   10/24/24 92.5 kg (204 lb)   08/05/24 122 kg (268 lb)   01/31/22 119 kg (262 lb)   02/18/20 123 kg (270 lb 2 oz)     Hemodynamic parameters for last 24 hours:     Intake/Output for last 24 hours:    Intake/Output Summary (Last 24 hours) at 11/27/2024 0758  Last data filed at 11/27/2024 0700  Gross per 24 hour   Intake 1353.32 ml   Output 1350 ml   Net 3.32 ml           Physical exam:  Physical Exam  Constitutional:       General: He is not in acute distress.     Appearance: Normal appearance. He is normal weight. He is not ill-appearing or toxic-appearing.   HENT:      Head: Normocephalic and atraumatic.      Mouth/Throat:      Mouth: Mucous membranes are moist.      Pharynx: Oropharynx is clear. No oropharyngeal exudate or posterior oropharyngeal erythema.   Eyes:      Extraocular Movements: Extraocular movements intact.      Conjunctiva/sclera: Conjunctivae normal.      Pupils: Pupils are equal, round, and reactive to light.   Neck:      Vascular: No JVD.    Cardiovascular:      Rate and Rhythm: Normal rate and regular rhythm.      Comments: LVAD hum heard on auscultation. Rate controlled afib w/ intermittent PVCs and NSVT. Capillary refill < 2 seconds in all extremities.   Pulmonary:      Effort: Pulmonary effort is normal. No accessory muscle usage, respiratory distress or retractions.      Breath sounds: No stridor. No wheezing, rhonchi or rales.      Comments: Left lower lobe diminished. All other lung fields clear.   Abdominal:      General: Abdomen is flat. Bowel sounds are normal. There is no distension.      Palpations: Abdomen is soft. There is no mass.      Tenderness: There is no abdominal tenderness. There is no guarding.      Hernia: No hernia is present.   Musculoskeletal:         General: No swelling or tenderness. Normal range of motion.      Cervical back: Full passive range of motion without pain and normal range of motion.      Right lower leg: No edema.      Left lower leg: No edema.   Skin:     General: Skin is warm and dry.      Capillary Refill: Capillary refill takes less than 2 seconds.      Coloration: Skin is not jaundiced.      Findings: Lesion present. No bruising, laceration, rash or wound.      Comments: Left thoracotomy well approximated w/o signs of infection. LVAD driveline site covered with weekly dressing. No drainage or tenderness on exam.    Neurological:      General: No focal deficit present.      Mental Status: He is alert and oriented to person, place, and time. Mental status is at baseline.   Psychiatric:         Mood and Affect: Mood normal.         Behavior: Behavior normal.         Thought Content: Thought content normal.         Judgment: Judgment normal.        Driveline:   CDI       Labs:   CMP:  Recent Labs     11/27/24  0227 11/26/24  0603 11/25/24  0736 11/25/24  0250 11/24/24  0556 11/23/24  0451 11/22/24  1437 11/22/24  0304 11/21/24  1247 11/21/24  0426    139 135* 133* 136 139 137 137 136 137   K 4.8 4.3  4.4 8.0* 4.6 4.3 5.2 4.2 4.0 4.2    103 105 102 104 105 103 102 101 101   CO2 23 26 16* 26 22 26 25 27 24 25   ANIONGAP 15 14 18 13 15 12 14 12 15 15   BUN 22 19 20 22 20 19 19 20 25* 25*   CREATININE 1.21 1.39* 1.10 1.22 1.24 1.32* 1.35* 1.35* 1.47* 1.39*   EGFR 65 55* 73 64 63 58* 57* 57* 51* 55*   MG 2.12 2.13  --  2.47* 2.03 2.12  --  2.10 2.54* 1.97     Recent Labs     11/26/24  0603 11/25/24  0736 11/25/24  0250 11/24/24  0556 11/23/24  0451 11/22/24  1437 11/22/24  0304 11/21/24  1247 11/19/24  1459 11/19/24  0322 11/18/24  0525 11/17/24  1323 11/17/24  0122 11/16/24  1358 11/16/24  0128 11/15/24  1255 11/15/24  0021 11/14/24  1224 11/14/24  0118 11/13/24  1228 11/13/24  0007 11/12/24  1307   ALBUMIN 3.6 3.4 3.7 3.6 3.6 3.4 3.5 3.6   < > 3.1* 3.5   < > 3.4   < > 3.6   < > 3.7   < > 3.8 3.7   < > 3.7   ALT  --   --   --   --   --   --   --   --   --  6* 6*  --  3*  --  4*  --  5*  --  7* 11  --  15   AST  --   --   --   --   --   --   --   --   --  16 18  --  21  --  24  --  29  --  40* 42*  --  37   BILITOT  --   --   --   --   --   --   --   --   --  0.8 0.8  --  1.0  --  1.0  --  1.1  --  0.9 1.1  --  0.9    < > = values in this interval not displayed.     CBC:  Recent Labs     11/26/24  0603 11/25/24  0250 11/24/24  0556 11/23/24  0451 11/22/24  0304 11/21/24  0426 11/20/24  0151 11/19/24  0322   WBC 11.8* 13.5* 15.4* 13.8* 10.9 10.2 10.7 10.5   HGB 12.4* 12.1* 12.3* 12.5* 11.2* 10.4* 10.5* 10.2*   HCT 38.6* 37.2* 39.1* 40.1* 34.3* 32.5* 31.7* 31.0*   * 313 545* 529* 473* 455* 465* 425   MCV 85 83 86 87 83 84 82 82     COAG:   Recent Labs     11/26/24  0957 11/26/24  0603 11/25/24  2348 11/25/24  1821 11/25/24  1433 11/25/24  0736 11/25/24  0250 11/24/24  2156 11/24/24  0556 11/23/24  0451 11/22/24  0304 11/21/24  1247 11/21/24  0426 11/20/24  0410 11/20/24  0151 11/19/24  0322 11/13/24  0008 11/12/24  1257   INR  --  1.8*  --   --   --   --  1.8*  --  1.8* 1.9* 2.5*  --  2.1*  --  1.9* 1.8*    "< > 1.4*   HAUF 0.5 0.4 0.2 0.2 0.2   < > 0.1   < >  --   --  0.1   < >  --    < > <0.1  --   --   --    FIBRINOGEN  --   --   --   --   --   --   --   --   --   --   --   --   --   --   --   --   --  301    < > = values in this interval not displayed.     ABO:   Recent Labs     11/20/24  0151   ABO A     HEME/ENDO:   Recent Labs     10/31/24  1249 10/24/24  2328   FERRITIN  --  76   IRONSAT  --  17*   TSH 4.39* 8.47*   HGBA1C  --  6.3*     CARDIAC:   Recent Labs     11/26/24  0603 11/25/24  0736 11/25/24  0250 11/24/24  0556 11/23/24  0451 11/22/24  0304 11/21/24  0426 11/20/24  0151 11/12/24  1307 10/31/24  1249 10/24/24  2328   * 426* 725* 247* 240 233 231 218   < > 187  --    BNP  --   --   --   --   --   --   --   --   --  755* 1,995*    < > = values in this interval not displayed.     No results for input(s): \"CHOL\", \"LDLF\", \"LDLCALC\", \"HDL\", \"TRIG\" in the last 97749 hours.  TOX:  Recent Labs     10/31/24  1249   AMPHETAMINE Negative     MICRO: No results for input(s): \"ESR\", \"CRP\", \"PROCAL\" in the last 93452 hours.  No results found for the last 90 days.        Imaging:   Transthoracic Echo (TTE) Limited    Result Date: 11/26/2024   [facility], [address] [place] [address]                Tel [phone] and Fax [phone] TRANSTHORACIC ECHOCARDIOGRAM REPORT  Patient Name:       RJ Webb Physician:    [id number] Parker Terrell MD Study Date:         11/26/2024          Ordering Provider:    [id number] KARRI CHAVEZ MRN/PID:            [id number]            Fellow: Accession#:         [id number]        Nurse: Date of Birth/Age:  1955 / 69     Sonographer:          IRMA Montoya RDCS Gender assigned at                     Additional Staff:     Melida Kaye" Birth:                                                        RDBLANQUITA Height:             182.88 cm           Admit Date: Weight:             87.54 kg            Admission Status:     Inpatient -                                                               Routine BSA / BMI:          2.10 m2 / 26.18     Encounter#:           [id number]                     kg/m2 Blood Pressure:     /                   Department Location:  [facility]                                                               Non Invasive Study Type:    TRANSTHORACIC ECHO (TTE) LIMITED Diagnosis/ICD: Presence of heart assist device-Z95.811 Indication:    RAMP study CPT Code:      Echo Limited-05538; Doppler Limited-30101; Color Doppler-35499 Patient History: Pertinent History: CAD. CABG x4 2012, PCI 2019, ICM, CHF, s/p HeartMate III                    LVAD, AF s/p RFA (PVI and CTI 4/2024). Study Detail: The following Echo studies were performed: 2D, M-Mode, Doppler and               color flow. Technically challenging study due to poor acoustic               windows.  PHYSICIAN INTERPRETATION: Left Ventricle: Left ventricular ejection fraction is severely decreased, by visual estimate at 15-20%. There is global hypokinesis of the left ventricle with minor regional variations. The left ventricular cavity size is severely dilated. Spectral Doppler shows a Grade II (pseudonormal pattern) of left ventricular diastolic filling with an elevated left atrial pressure. Left Atrium: The left atrium is enlarged. Right Ventricle: The right ventricle is severely enlarged. There is reduced right ventricular systolic function. Right Atrium: The right atrium was not well visualized. Aortic Valve: The aortic valve is probably trileaflet. There is mild aortic valve cusp calcification. There is mild aortic valve regurgitation. Mitral Valve: The mitral valve is mildly thickened. There is trace mitral valve regurgitation. Tricuspid Valve: The tricuspid valve is  structurally normal. There is trace tricuspid regurgitation. The right ventricular systolic pressure is unable to be estimated. Pulmonic Valve: The pulmonic valve is structurally normal. Pulmonic valve regurgitation was not assessed. Pericardium: Trivial pericardial effusion. Aorta: The aortic root is abnormal. There is mild dilatation of the ascending aorta. There is mild dilatation of the aortic root. Pulmonary Artery: The pulmonary artery is not well visualized. Systemic Veins: The inferior vena cava was not well visualized. In comparison to the previous echocardiogram(s): Compared with study dated 11/18/2024, no significant change.  LEFT VENTRICULAR ASSIST DEVICE: LVAD: The patient has a(n) HeartMate 3 LVAD device present. Inflow Cannula: Visualization of the inflow cannula is technically difficult. Outflow Cannula: Visualization of the outflow cannula is technically difficult. LVAD Comments: Ramp study. 5200 rpm at start and finish of limited echo.  CONCLUSIONS:  1. Left ventricular ejection fraction is severely decreased, by visual estimate at 15-20%.  2. There is global hypokinesis of the left ventricle with minor regional variations.  3. Spectral Doppler shows a Grade II (pseudonormal pattern) of left ventricular diastolic filling with an elevated left atrial pressure.  4. Left ventricular cavity size is severely dilated.  5. There is reduced right ventricular systolic function.  6. Severely enlarged right ventricle.  7. The left atrium is enlarged.  8. Mild aortic valve regurgitation.  9. The pulmonary artery is not well visualized. QUANTITATIVE DATA SUMMARY:  M-MODE MEASUREMENTS:         Normal Ranges: Ao Root:             4.10 cm (2.0-3.7cm)  AORTA MEASUREMENTS:         Normal Ranges: Asc Ao, d:          4.20 cm (2.1-3.4cm)  LV SYSTOLIC FUNCTION BY 2D PLANIMETRY (MOD):                      Normal Ranges: EF-Visual:      18 % LV EF Reported: 18 %  LV DIASTOLIC FUNCTION:          Normal Ranges: MV Peak E:              0.50 m/s (0.7-1.2 m/s) MV Peak A:             0.44 m/s (0.42-0.7 m/s) E/A Ratio:             1.14     (1.0-2.2)  [id number] Parker Terrell MD Electronically signed on 11/26/2024 at 3:08:03 PM  ** Final **     CT abdomen pelvis w IV contrast    Result Date: 11/25/2024  Interpreted By:  Danya Siddiqi,  and Shayla Araya STUDY: CT ABDOMEN PELVIS W IV CONTRAST;  11/25/2024 10:28 am   INDICATION: Signs/Symptoms:Copious serosanguinous drainage around driveline site. Eval for fluid collection..     COMPARISON: CT chest and pelvis without IV contrast 11/04/2024.   ACCESSION NUMBER(S): [id number]   ORDERING CLINICIAN: SOLIS DUEÑAS   TECHNIQUE: CT of the abdomen and pelvis was performed.  Standard contiguous axial images were obtained at 3 mm slice thickness through the abdomen and pelvis. Coronal and sagittal reconstructions at 3 mm slice thickness were performed.  90 ML of Omnipaque 350 was administered intravenously without immediate complication.   FINDINGS: LOWER CHEST: New small to moderate left-sided loculated pleural effusion and left lower lobe atelectatic changes. Bilateral interlobular septal thickening of the visualized lungs that likely represents a component of edema and atelectasis secondary to postoperative changes. LVAD device in place along the left ventricle with the outflow tract connecting to the mid descending thoracic aorta and driveline connecting to external device outside of the right chest wall.   Patient is status post previous median sternotomy. The heart is moderately enlarged but similar in size to prior without pericardial effusion. 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 No pneumothorax. Visualized distal esophagus appears normal.   A hematoma adjacent to the drive line along the left chest wall measuring 9.1 x 3.7 x 4.9 cm (Series 201, Image 22) (Series 203, Image 61).   Additionally, there is a 5.2 x 2.4 x 2.2 cm fluid collection/loculated pleural effusion adjacent to  the LVAD outflow tract in the posterior left lower lobe (series 201, image 12).   ABDOMEN:   LIVER: The liver is mildly enlarged in size measuring 19.3 cm in craniocaudal dimension. Large hypodense lesion in segment 6 of the liver measuring 4.2 x 2.3 x 2.8 cm (Series 203, Image 57) (Series 903, Image 78) that is overall indeterminate however appears similar to prior imaging.   BILE DUCTS: The intrahepatic and extrahepatic ducts are not dilated.   GALLBLADDER: The gallbladder is nondistended and without evidence of radiopaque stones.   PANCREAS: The pancreas appears unremarkable without evidence of ductal dilatation or masses.   SPLEEN: Splenomegaly measuring up to 19.5 cm craniocaudal dimension, not significantly changed compared to 11/04/2024 CT. Interval development of several wedge-shaped hypodense lesions in the spleen compared to 11/04/2024 CT, compatible with infarcts.   ADRENAL GLANDS: Bilateral adrenal glands appear normal.   KIDNEYS AND URETERS: The kidneys are normal in size and enhance symmetrically. Multiple too small to characterize hypodense lesions in bilateral kidneys are statistically favored to represent simple renal cysts. A 2.1 cm cystic lesion in the mid pole of the right kidney, likely a simple renal cyst.  No hydroureteronephrosis or nephroureterolithiasis is identified.   PELVIS:   BLADDER: The urinary bladder is decompressed, limited for evaluation. There is intraluminal air in the anterior bladder that is likely secondary to recent catheterization.   REPRODUCTIVE ORGANS: Mild prostatomegaly.   BOWEL: The stomach is unremarkable. The small and large bowel are normal in caliber and demonstrate no wall thickening. Moderate colonic stool burden. Colonic diverticulosis without evidence of diverticulitis. The appendix is not definitely visualized. There is however no pericecal stranding or fluid.   VESSELS: There is no aneurysmal dilatation of the abdominal aorta. Moderate atherosclerotic disease  of the abdominal aorta and its branches. The IVC appears normal. The splenic vasculature appears patent.   PERITONEUM/RETROPERITONEUM/LYMPH NODES: No ascites or free air, no fluid collection. No abdominopelvic lymphadenopathy is present.   BONES AND ABDOMINAL WALL: No suspicious osseous lesions are identified. Small acute left rib fracture that may be secondary to recent LVAD placement (Series 201, Image 2). Remote rib fractures of posterolateral left 9th through 11th ribs. Multilevel moderate degenerative discogenic disease is noted in the lower thoracic and lumbar spine particularly of the lower lumbar spine.   The abdominal wall soft tissues appear normal.       1.  Postoperative changes of recent LVAD placement. A 9.0 x 3.7 x 4.9 cm left chest wall hematoma adjacent to the driveline which appears otherwise intact with no evidence of fracture. Additionally, there is a 5.2 x 2.4 x 2.2 cm fluid collection/loculated pleural effusion adjacent to the LVAD outflow tract in the posterior left lower lobe. 2. Splenomegaly with interval development of scattered peripheral hypoenhancing wedge-shaped areas, compatible with infarcts. 3. In the visualized lung segments there are mild bilateral lung findings suggestive of pulmonary edema and atelectatic changes that are likely secondary to recent procedure. 4. Stable moderate cardiomegaly. 5. Stable indeterminate 4.3 cm hypodense lesion in segment V of the liver, which was better evaluated on 11/02/2024 CT liver protocol. 6. Additional stable chronic findings as described above.   I personally reviewed the images/study and I agree with the findings as stated by Resident Dr. Quiana Mcguire MD. This study was interpreted at [facility], [place], [place].   MACRO: None   Signed by: Danya Siddiqi 11/25/2024 10:37 PM Dictation workstation:   [id number]      Notable Studies:   EKG:  Encounter Date: 10/24/24   Electrocardiogram 12-lead PRN for  arrhythmia   Result Value    Ventricular Rate 111    Atrial Rate 81    QRS Duration 146    QT Interval 406    QTC Calculation(Bazett) 552    R Axis 24    T Axis 45    QRS Count 18    Q Onset 226    P Onset 209    P Offset 224    T Offset 429    QTC Fredericia 498    Narrative    Atrial fibrillation with rapid ventricular response with premature ventricular or aberrantly conducted complexes  Left bundle branch block  Abnormal ECG  When compared with ECG of 20-NOV-2024 03:52,  Left bundle branch block has replaced Nonspecific intraventricular block  Criteria for Septal infarct are no longer Present  Criteria for Lateral infarct are no longer Present       Echo:  Transthoracic Echo (TTE) Limited    Result Date: 11/26/2024   [facility], [address] [place] [address]                Tel [phone] and Fax [phone] TRANSTHORACIC ECHOCARDIOGRAM REPORT  Patient Name:       RJ Webb Physician:    [id number] Parker Terrell MD Study Date:         11/26/2024          Ordering Provider:    [id number] KARRI HCAVEZ MRN/PID:            [id number]            Fellow: Accession#:         [id number]        Nurse: Date of Birth/Age:  1955 / 69     Sonographer:          Manuela estrada                                     RDCS, IRMA Gender assigned at  M                   Additional Staff:     Melida Kaye Birth:                                                        RDCS Height:             182.88 cm           Admit Date: Weight:             87.54 kg            Admission Status:     Inpatient -                                                               Routine BSA / BMI:          2.10 m2 / 26.18     Encounter#:           [id number]                     kg/m2 Blood Pressure:     /                   Department Location:  [facility]                                                               Non Invasive Study Type:    TRANSTHORACIC ECHO (TTE) LIMITED Diagnosis/ICD: Presence of heart assist device-Z95.811 Indication:    RAMP study CPT Code:      Echo Limited-09240; Doppler Limited-41454; Color Doppler-04806 Patient History: Pertinent History: CAD. CABG x4 2012, PCI 2019, ICM, CHF, s/p HeartMate III                    LVAD, AF s/p RFA (PVI and CTI 4/2024). Study Detail: The following Echo studies were performed: 2D, M-Mode, Doppler and               color flow. Technically challenging study due to poor acoustic               windows.  PHYSICIAN INTERPRETATION: Left Ventricle: Left ventricular ejection fraction is severely decreased, by visual estimate at 15-20%. There is global hypokinesis of the left ventricle with minor regional variations. The left ventricular cavity size is severely dilated. Spectral Doppler shows a Grade II (pseudonormal pattern) of left ventricular diastolic filling with an elevated left atrial pressure. Left Atrium: The left atrium is enlarged. Right Ventricle: The right ventricle is severely enlarged. There is reduced right ventricular systolic function. Right Atrium: The right atrium was not well visualized. Aortic Valve: The aortic valve is probably trileaflet. There is mild aortic valve cusp calcification. There is mild aortic valve regurgitation. Mitral Valve: The mitral valve is mildly thickened. There is trace mitral valve regurgitation. Tricuspid Valve: The tricuspid valve is structurally normal. There is trace tricuspid regurgitation. The right ventricular systolic pressure is unable to be estimated. Pulmonic Valve: The pulmonic valve is structurally normal. Pulmonic valve regurgitation was not assessed. Pericardium: Trivial pericardial effusion. Aorta: The aortic root is abnormal. There is mild dilatation of the ascending aorta. There is mild dilatation of the aortic root. Pulmonary Artery: The pulmonary  artery is not well visualized. Systemic Veins: The inferior vena cava was not well visualized. In comparison to the previous echocardiogram(s): Compared with study dated 11/18/2024, no significant change.  LEFT VENTRICULAR ASSIST DEVICE: LVAD: The patient has a(n) HeartMate 3 LVAD device present. Inflow Cannula: Visualization of the inflow cannula is technically difficult. Outflow Cannula: Visualization of the outflow cannula is technically difficult. LVAD Comments: Ramp study. 5200 rpm at start and finish of limited echo.  CONCLUSIONS:  1. Left ventricular ejection fraction is severely decreased, by visual estimate at 15-20%.  2. There is global hypokinesis of the left ventricle with minor regional variations.  3. Spectral Doppler shows a Grade II (pseudonormal pattern) of left ventricular diastolic filling with an elevated left atrial pressure.  4. Left ventricular cavity size is severely dilated.  5. There is reduced right ventricular systolic function.  6. Severely enlarged right ventricle.  7. The left atrium is enlarged.  8. Mild aortic valve regurgitation.  9. The pulmonary artery is not well visualized. QUANTITATIVE DATA SUMMARY:  M-MODE MEASUREMENTS:         Normal Ranges: Ao Root:             4.10 cm (2.0-3.7cm)  AORTA MEASUREMENTS:         Normal Ranges: Asc Ao, d:          4.20 cm (2.1-3.4cm)  LV SYSTOLIC FUNCTION BY 2D PLANIMETRY (MOD):                      Normal Ranges: EF-Visual:      18 % LV EF Reported: 18 %  LV DIASTOLIC FUNCTION:          Normal Ranges: MV Peak E:             0.50 m/s (0.7-1.2 m/s) MV Peak A:             0.44 m/s (0.42-0.7 m/s) E/A Ratio:             1.14     (1.0-2.2)  [id number] Parker Terrell MD Electronically signed on 11/26/2024 at 3:08:03 PM  ** Final **     Transthoracic Echo (TTE) Limited    Result Date: 11/18/2024   [facility], [address] [place] [address]                Tel [phone] and Fax [phone] TRANSTHORACIC ECHOCARDIOGRAM REPORT   Patient Name:       RJ LEIJA     Tracey Physician:    [id number] Fawad Chicas MD Study Date:         11/18/2024          Ordering Provider:    [id number] HARRISON PALOMINO MRN/PID:            [id number]            Fellow: Accession#:         [id number]        Nurse: Date of Birth/Age:  1955 / 69     Sonographer:          Genesis Sinclair RDCS                     years Gender assigned at  M                   Additional Staff: Birth: Height:             182.88 cm           Admit Date:           10/24/2024 Weight:             93.44 kg            Admission Status:     Inpatient -                                                               Routine BSA / BMI:          2.16 m2 / 27.94     Encounter#:           [id number]                     kg/m2 Blood Pressure:     /                   Department Location:  [facility] Study Type:    TRANSTHORACIC ECHO (TTE) LIMITED Diagnosis/ICD: Presence of heart assist device-Z95.811 Indication:    Ramp study for LVAD CPT Code:      Echo Limited-00361; Doppler Limited-83638; Color Doppler-24712 Patient History: Pertinent History: Acute on chronic heart failure with reduced EF, Diastolic                    disfunction, Ischemic cardiiomyopathy, MI, CHF, LVAD, VIV. Study Detail: The following Echo studies were performed: 2D, Doppler, color flow               and M-Mode. Technically challenging study due to patient lying in               supine position and postoperative dressings.  PHYSICIAN INTERPRETATION: Left Ventricle: Left ventricular ejection fraction is severely decreased, by visual estimate at 10%. There is global hypokinesis of the left ventricle with minor regional variations. The left ventricular cavity size is severely dilated. Abnormal (paradoxical) septal motion consistent with post-operative status. Left ventricular diastolic filling was not  assessed. Left Atrium: The left atrium is moderate to severely dilated. Right Ventricle: The right ventricle is severely enlarged. There is severely reduced right ventricular systolic function. Right Atrium: The right atrium is moderately dilated. Aortic Valve: The aortic valve is structurally normal. There is mild aortic valve cusp calcification. There is mild aortic valve regurgitation. Mitral Valve: The mitral valve is normal in structure. There is mild mitral valve regurgitation. Tricuspid Valve: The tricuspid valve is structurally normal. There is mild tricuspid regurgitation. Pulmonic Valve: The pulmonic valve is not well visualized. Pulmonic valve regurgitation was not assessed. Pericardium: Trivial pericardial effusion. Aorta: The aortic root is normal. In comparison to the previous echocardiogram(s): Compared with study dated 11/15/2024, no significant change.  LEFT VENTRICULAR ASSIST DEVICE: LVAD: The patient has a(n) HeartMate 3 LVAD device present. Inflow Cannula: The inflow cannula is visualized in the left ventricular apex. AV Leaflet Mobility: . The aortic valve appears to be opening every 1 beats. LVAD Comments: Ramp study speed increased from 5200 to 5300. Aortic valve still opens every beat. Septum remains in the midline.  CONCLUSIONS:  1. Poorly visualized anatomical structures due to suboptimal image quality.  2. Left ventricular ejection fraction is severely decreased, by visual estimate at 10%.  3. There is global hypokinesis of the left ventricle with minor regional variations.  4. Left ventricular cavity size is severely dilated.  5. Abnormal septal motion consistent with post-operative status.  6. There is severely reduced right ventricular systolic function.  7. Severely enlarged right ventricle.  8. The left atrium is moderate to severely dilated.  9. The right atrium is moderately dilated. 10. Mild aortic valve regurgitation. 11. . The aortic valve appears to be opening every 1 beats. 12.  Compared with study dated 11/15/2024, no significant change. QUANTITATIVE DATA SUMMARY:  LV SYSTOLIC FUNCTION BY 2D PLANIMETRY (MOD):                      Normal Ranges: EF-Visual:      10 % LV EF Reported: 10 %  [id number] Fawad Chicas MD Electronically signed on 11/18/2024 at 12:33:46 PM  ** Final **     Transthoracic Echo (TTE) Limited    Result Date: 11/15/2024   [facility], [address] [place] [address]                Tel [phone] and Fax [phone] TRANSTHORACIC ECHOCARDIOGRAM REPORT  Patient Name:       RJ LEIJA     Reading Physician:    [id number] Fawad Chicas MD Study Date:         11/15/2024          Ordering Provider:    [id number] MANNING MRN/PID:            [id number]            Fellow:               [id number] Aramis Fitzpatrick MD Accession#:         [id number]        Nurse: Date of Birth/Age:  1955 / 69     Sonographer:          Jackson estrada                                     BLANQUITA Gender assigned at  M                   Additional Staff: Birth: Height:             182.88 cm           Admit Date: Weight:             97.07 kg            Admission Status:     Inpatient - STAT BSA / BMI:          2.19 m2 / 29.02     Encounter#:           [id number]                     kg/m2 Blood Pressure:     107/71 mmHg         Department Location:  [facility] Study Type:    TRANSTHORACIC ECHO (TTE) LIMITED Diagnosis/ICD: Presence of heart assist device-Z95.811 Indication:    LVAD ramp study CPT Code:      Echo Limited-03415; Doppler Limited-73487; Color Doppler-50436 Patient History: Pertinent History: S/p CABG x 4 (2012) and PCI (LCx/OM; 5/2019), Stage D ICM                    HFrEF LVEF 10-15%, AF s/p RFA (PVI and CTI; 4/2024), HTN,                     Dyslipidemia, 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 Study Detail: The following Echo studies were performed: 2D, M-Mode, Doppler and               color flow. Technically challenging study due to poor acoustic               windows, prominent lung artifact, body habitus and patient lying               in supine position.  PHYSICIAN INTERPRETATION: Left Ventricle: The left ventricular systolic function is severely decreased, with a visually estimated ejection fraction of 10-15%. There is global hypokinesis of the left ventricle with minor regional variations. The left ventricular cavity size is severely dilated. There is normal septal and normal posterior left ventricular wall thickness. Abnormal (paradoxical) septal motion consistent with post-operative status. Left ventricular diastolic filling cannot be determined, due to left ventricular assist device. Left Atrium: The left atrium is mildly dilated. Right Ventricle: The right ventricle is severely enlarged. There is severely reduced right ventricular systolic function. A device is visualized in the right ventricle. Right Atrium: The right atrium is severely dilated. There is a device visualized in the right atrium. Aortic Valve: The aortic valve is structurally normal. There is mild to moderate aortic valve cusp calcification. There is mild aortic valve regurgitation. Mitral Valve: The mitral valve is normal in structure. There is mild mitral annular calcification. There is mild mitral valve regurgitation. Tricuspid Valve: The tricuspid valve is structurally normal. There is mild tricuspid regurgitation. The right ventricular systolic pressure is unable to be estimated. Pulmonic Valve: The pulmonic valve is not well visualized. Pulmonic valve regurgitation was not assessed. Pericardium: Trivial pericardial effusion. Aorta: The aortic root is abnormal. There is mild dilatation of the ascending aorta.  There is mild dilatation of the aortic root. In comparison to the previous echocardiogram(s): Compared with study dated 11/13/2024, no significant change.  LEFT VENTRICULAR ASSIST DEVICE: Study Type: This study was performed while LVAD settings were optimized. LVAD: The patient has a(n) HeartMate 3 LVAD device present. Inflow Cannula: The inflow cannula is visualized in the left ventricular apex. Outflow Cannula: Visualization of the outflow cannula is technically difficult. AV Leaflet Mobility: . The aortic valve appears to be opening every 1 beats.  CONCLUSIONS:  1. The left ventricular systolic function is severely decreased, with a visually estimated ejection fraction of 10-15%.  2. There is global hypokinesis of the left ventricle with minor regional variations.  3. Left ventricular diastolic filling cannot be determined, due to left ventricular assist device.  4. Left ventricular cavity size is severely dilated.  5. Abnormal septal motion consistent with post-operative status.  6. There is severely reduced right ventricular systolic function.  7. Severely enlarged right ventricle.  8. The left atrium is mildly dilated.  9. The right atrium is severely dilated. 10. Mild aortic valve regurgitation. 11. . The aortic valve appears to be opening every 1 beats. 12. Compared with study dated 11/13/2024, no significant change. QUANTITATIVE DATA SUMMARY:  2D MEASUREMENTS:          Normal Ranges: IVSd:            1.00 cm  (0.6-1.1cm) LVPWd:           1.00 cm  (0.6-1.1cm) LVIDd:           5.90 cm  (3.9-5.9cm) LV Mass Index:   109 g/m2  LA VOLUME:                   Normal Ranges: LA Vol A4C:        86.2 ml   (22+/-6mL/m2) LA Vol Index A4C:  39.3ml/m2 LA Area A4C:       24.2 cm2 LA Major Axis A4C: 5.8 cm  AORTA MEASUREMENTS:         Normal Ranges: Ao Sinus, d:        4.30 cm (2.1-3.5cm)  LV SYSTOLIC FUNCTION BY 2D PLANIMETRY (MOD):                      Normal Ranges: EF-Visual:      13 % LV EF Reported: 13 %  [id number]  Fawad Chicas MD Electronically signed on 11/15/2024 at 12:09:54 PM  ** Final **     Transthoracic Echo (TTE) Limited    Result Date: 11/13/2024   [facility], [address] [place] [address]                Tel [phone] and Fax [phone] TRANSTHORACIC ECHOCARDIOGRAM REPORT  Patient Name:       RJ Webb Physician:    [id number] Raciel Casarez MD Study Date:         11/13/2024          Ordering Provider:    [id number] JO CASTILLO MRN/PID:            [id number]            Fellow: Accession#:         [id number]        Nurse: Date of Birth/Age:  1955 / 69     Sonographer:          Jackson estrada RDCS Gender assigned at  M                   Additional Staff: Birth: Height:             182.88 cm           Admit Date: Weight:             93.90 kg            Admission Status:     Inpatient - STAT BSA / BMI:          2.16 m2 / 28.07     Encounter#:           [id number]                     kg/m2 Blood Pressure:     94/74 mmHg          Department Location:  [facility] Study Type:    TRANSTHORACIC ECHO (TTE) LIMITED Diagnosis/ICD: Acute on chronic combined systolic (congestive) and diastolic                (congestive) heart failure (CHF)-I50.43 Indication:    eval RV fx CPT Code:      Echo Limited-61547; Doppler Limited-02587; Color Doppler-18311 Patient History: Pertinent History: CAD s/p CABGx4 in 2012 and PCI 2019 w/ ICM LVEF 10-15%, AF                    s/p RFA & PVI, HTN, CHF exacerbation, 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 Study Detail: The following Echo studies were performed: 2D, M-Mode, Doppler and               color flow. Technically challenging study due to body habitus,               patient lying in supine  position, postoperative dressings,               prominent lung artifact and poor acoustic windows. The patient is               intubated. Definity used as a contrast agent for endocardial               border definition. Total contrast used for this procedure was 2.0               mL via IV push.  PHYSICIAN INTERPRETATION: Left Ventricle: Left ventricular ejection fraction is severely decreased, by visual estimate at 15%. There is eccentric left ventricular hypertrophy. There is global hypokinesis of the left ventricle with minor regional variations. The left ventricular cavity size is moderate to severely dilated. Left ventricular diastolic filling was not assessed. Left Atrium: The left atrium is moderately dilated. Right Ventricle: The right ventricle is mild to moderately enlarged. There is severely reduced right ventricular systolic function. A device is visualized in the right ventricle. Right Atrium: The right atrium is moderately dilated. There is a device visualized in the right atrium. Aortic Valve: The aortic valve was not well visualized. There is mild to moderate aortic valve cusp calcification. There is mild aortic valve regurgitation. Mitral Valve: The mitral valve is normal in structure. There is mild mitral valve regurgitation. Tricuspid Valve: The tricuspid valve is structurally normal. There is mild tricuspid regurgitation. Pulmonic Valve: The pulmonic valve is not well visualized. There is trace pulmonic valve regurgitation. Pericardium: There is no pericardial effusion noted. Aorta: The aortic root is abnormal. There is mild dilatation of the aortic root. Pulmonary Artery: The tricuspid regurgitant velocity is 1.83 m/s, and with an estimated right atrial pressure of 10 mmHg, the estimated pulmonary artery pressure is normal with the RVSP at 23.4 mmHg. Systemic Veins: The inferior vena cava appears moderately dilated, on vent. In comparison to the previous echocardiogram(s): Compared with study  dated 11/5/2024, no significant change.  LEFT VENTRICULAR ASSIST DEVICE: LVAD: The patient has a(n) HeartMate 3 LVAD device present. Inflow Cannula: The inflow cannula is visualized in the left ventricular apex.  CONCLUSIONS:  1. Poorly visualized anatomical structures due to suboptimal image quality.  2. Left ventricular cavity size is moderate to severely dilated.  3. Left ventricular ejection fraction is severely decreased, by visual estimate at 15%.  4. There is global hypokinesis of the left ventricle with minor regional variations.  5. There is severely reduced right ventricular systolic function.  6. Mild to moderately enlarged right ventricle.  7. The left atrium is moderately dilated.  8. The right atrium is moderately dilated.  9. Mild aortic valve regurgitation. 10. The inferior vena cava appears moderately dilated, on vent. QUANTITATIVE DATA SUMMARY:  2D MEASUREMENTS:          Normal Ranges: IVSd:            1.00 cm  (0.6-1.1cm) LVPWd:           1.20 cm  (0.6-1.1cm) LVIDd:           6.90 cm  (3.9-5.9cm) LVIDs:           6.60 cm LV Mass Index:   164 g/m2 LV % FS          4.3 %  AORTA MEASUREMENTS:         Normal Ranges: Ao Sinus, d:        4.00 cm (2.1-3.5cm)  LV SYSTOLIC FUNCTION BY 2D PLANIMETRY (MOD):                      Normal Ranges: EF-Visual:      15 % LV EF Reported: 15 %  RIGHT VENTRICLE: RV s' 0.07 m/s  TRICUSPID VALVE/RVSP:          Normal Ranges: Peak TR Velocity:     1.83 m/s RV Syst Pressure:     23 mmHg  (< 30mmHg) IVC Diam:             2.70 cm  [id number] Raciel Casarez MD Electronically signed on 11/13/2024 at 10:25:04 AM  ** Final **       Meds:  Scheduled medications  acetaminophen, 650 mg, oral, TID  aspirin, 81 mg, oral, Daily  dapagliflozin propanediol, 5 mg, oral, Daily  digoxin, 125 mcg, oral, Daily  escitalopram, 10 mg, oral, Daily  icosapent ethyL, 2 g, oral, BID  lidocaine, 2 patch, transdermal, Daily  magnesium oxide, 800 mg, oral, BID  pantoprazole, 40 mg, oral, Daily before  breakfast  perflutren lipid microspheres, 0.5-10 mL of dilution, intravenous, Once in imaging  sacubitriL-valsartan, 1 tablet, oral, BID  sennosides-docusate sodium, 1 tablet, oral, BID  sildenafil, 20 mg, oral, TID  warfarin, 7.5 mg, oral, Daily      Continuous medications  heparin, 0-4,000 Units/hr, Last Rate: 2,300 Units/hr (11/27/24 0039)      PRN medications  PRN medications: heparin, [DISCONTINUED] ondansetron **OR** ondansetron, polyethylene glycol     Assessment/Plan   Assessment & Plan     NEUROLOGICAL  Active issues:   #Acute post-operative pain  - Endorses pain at back near posterior portion of surgical incision. Pain musculoskeletal in nature.   - Increase scheduled tylenol to 975mg Q6hrs    - Start robaxin 500mg Q6 x 8 doses   - Continue lidoderm patches     #Hx of anxiety and depression  - Continue home escitalopram 10mg daily    #Transient LUE weakness on 11/24  - CTH (11/24) without acute abnormalities  - Resolved with tylenol  - No focal deficits any further       CARDIAC  #PMHx CAD s/p CABG x 4 (2012) and PCI (LCx/OM; 5/2019), stage D ICM HFrEF LVEF 10-15%  #S/p HM3 LVAD via left thoracotomy 11/12 with Jas Briseno.     GDMT and RV support:   - BB: n/a - RV dysfunction   - ARNI/ACEi/ARB: Entresto 24-26mg BID   - MRA: Start spironolactone 12.5mg daily    - SGLT2i: Farxiga 5mg daily     RV support:   - Increase digoxin to 250mcg daily as levels remain < 30   - Continue sildenafil 20mg TID     Anticoagulation:   - Continue Coumadin 7.5mg daily for goal INR 2-3.   - Remains on heparin infusion for bridging.    - Continue ASA (primary indication - ICM, PCI)     Heart Mate III interrogation   Most Recent   Flow 4.4   Speed 5200   Power 3.7   PI 3.1   MAP (mmHg): 86    LVAD interrogation: stable flow, no sig PI events or power spikes.     RAMP study: Started study with RPM 5200. AV opening every beat, no MR, mild AI, septum midline. After review with Dr. Drummond, he appears well optimized at  this speed. No speed changes made. dMAP 68.     Daily weight:   Vitals:    24 0430   Weight: 88.7 kg (195 lb 8.8 oz)      #Chronic AF s/p RFA (PVI and CTI; 2024),   #Sustained VT and NSVT  - Patient never required ICD as his EF remained > 30% prior to acute reduction and LVAD placement   - Patient has had frequent PVCs and NSVT on telemetry  - 1xminute sustained VT on . Patient remained hemodynamically stable & asymptomatic  - Maintain K > 4.5 & Mag > 2.0   - Consult EP for recommendations regarding need for potential ICD vs. Medical management     #HLD  - Continue vascepa 2g BID  - Repeat lipid panel       RESPIRATORY   #VIV on CPAP at home  - Resume home nocturnal CPAP  - Maintaining SpO2 >92%.   - Encourage OOB and ambulation daily       GI:  #GERD  #Malnutrition d/t poor PO intake  - Continue PPI for GERD, LVAD bleeding risk   - Continue adult regular diet with supplements  - senna/colace BID and miralax to as needed    RENAL:  #CKD (baseline Cr 1.3-1.6)  - Strict I&Os  - Appears euvolemic on exam   - Avoid hypotension and nephrotoxic agents   Results from last 72 hours   Lab Units 24  0227 24  0603   BUN mg/dL 22 19   CREATININE mg/dL 1.21 1.39*         ENDO:  #PMH of pre-DM with recent A1c: 6.3  - Euglycemic   - Goal BG <180  - NO indication for SSI at this time     HEMATOLOGY:  #Acute blood loss anemia   #Iron deficiency anemia   Results from last 7 days   Lab Units 24  0603 24  0250   HEMOGLOBIN g/dL 12.4* 12.1*   HEMATOCRIT % 38.6* 37.2*   PLATELETS AUTO x10*3/uL 537* 313   - Iron studies on  suggest iron deficiency   - Hg remains stable without active signs of bleeding  - Start iron sucrose 200mg IV daily x 5 days ( - )         INFECTIOUS DISEASE  Active issues:   # No acute issues    Results from last 7 days   Lab Units 24  0603 24  0250   WBC AUTO x10*3/uL 11.8* 13.5*      Temp (24hrs), Av.2 °C (97.2 °F), Min:36 °C (96.8 °F), Max:36.9 °C  (98.4 °F)      Skin/Musculoskeletal:  Active issues:   # no acute issues        PROPHYLAXIS:  - DVT: SCD's, Coumadin w/ Heparin bridge   - GI:  Protonix     CODE STATUS: Full code     DISPO PLANNING/ SOCIAL:  - Disposition expectation: Insurance approval for transfer to [facility] (patient and family preference) obtained on 11/27/24. LVAD team to educated facility staff on 12/2/24 w/ discharge likely 12/3/24/       RESTRAINTS:  Not indicated/Patient does not meet criteria for restraints    Patient examined and discussed with attending physician Dr. Drummond      I spent 40 minutes in the professional and overall care of this patient.     ____________________________________________________________  Nakul Perkins CNP   Advanced Heart Failure LVAD Nurse Practitioner   For floor patients please page HHVI team after 5pm- [phone]  LVAD 24/7 emergency pager [phone]

## 2024-11-27 NOTE — TELEPHONE ENCOUNTER
Spoke to Giulia about Fahad going to a SNF. Informed her that we are obtaining insurance clearance for this patient to go to the requested facility. Giulia expressed frustration with the inpatient team and the lack of consistent communication. Advised Giulia that we will have a plan in place before discharging and the LVAD team will set up a ZOOM education if necessary. Giulia requested to speak with the nurse practitioner to confirm a discharge plan.

## 2024-11-27 NOTE — NURSING NOTE
Continued VAD education with patient and patient wife. Reviewed all education from previous sessions today. Reviewed all questions and concerns from both patient and wife. Patient and wife state they are feeling like they can handle the LVAD properly. Patient and wife to take test. Will review test this afternoon.

## 2024-11-27 NOTE — CONSULTS
Inpatient consult to Electrophysiology  Consult performed by: Earl Alcantar MD  Consult ordered by: MAYTE Monk-CNP        History Of Present Illness:      Mr. Meraz is a 69 y.o. male w/ a PMHx sig for CAD s/p 4V CABG (2012) and PCI (LCx/OM; 5/2019), stage D systolic HF/ICM/HFrEF with LVEF 10-15%( 10/22/24 TTE), AF s/p RFA (PVI and CTI; 4/2024) on OAC therapy, HTN, dyslipidemia, depression, anxiety and VIV using CPAP who was admitted to OSH ICU. Patient underwent an LVAD on 11/12 with Dr. Mclain.  EP consulted for post VAD VT.     Patient with long hospital course. Patient presented to OSH ICU for worsening shortness of breath and fluid overload for 2-3 weeks. Patient was on a milrinone drip and underwent diuresis. His KENYA worsened and he was subsequently transferred to HFICU at [facility] for possible advanced therapy consideration. He was denied for transplantation and approved for LVAD, for which he underwent a VAD on 11/12. Intra-op, patient had an episode of pulseless VT requiring defib. Patient has had increased ectopy over the past 24 hours, with several runs of NSVT. He did have 2 episodes of 30 seconds of VT overnight on 11/26 into 11/27. Patient has been asymptomatic throughout this. He has been walking several laps around the unit without any issues.      Last Recorded Vitals:  Vitals:    11/27/24 0430 11/27/24 0432 11/27/24 0741 11/27/24 1128   BP:       Pulse:  89 84    Resp:  18 17    Temp:  36.9 °C (98.4 °F) 36 °C (96.8 °F) 35.7 °C (96.3 °F)   TempSrc:    Temporal   SpO2:  95% 99% 96%   Weight: 88.7 kg (195 lb 8.8 oz)      Height:           Last Labs:  CBC - 11/27/2024:  6:52 AM  12.0 12.6 503    38.3      CMP - 11/27/2024:  6:52 AM  9.2 5.1 16 --- 0.8   3.7 3.6 6 55      PTT - 11/26/2024:  6:03 AM  1.8   20.2 131     BNP   Date/Time Value Ref Range Status   10/31/2024 12:49  (H) 0 - 99 pg/mL Final   10/24/2024 11:28 PM 1,995 (H) 0 - 99 pg/mL Final     Hemoglobin A1C   Date/Time Value  Ref Range Status   10/24/2024 11:28 PM 6.3 (H) See comment % Final      Last I/O:  I/O last 3 completed shifts:  In: 1651.9 (18.6 mL/kg) [P.O.:1080; I.V.:571.9 (6.4 mL/kg)]  Out: 2530 (28.5 mL/kg) [Urine:2530 (0.8 mL/kg/hr)]  Weight: 88.7 kg     Past Cardiology Tests (Last 3 Years):  EKG:  Electrocardiogram 12-lead PRN for arrhythmia 11/21/2024 (Preliminary)      Electrocardiogram 12-lead PRN for arrhythmia 11/17/2024      ECG 12 Lead 11/12/2024      Electrocardiogram, 12-lead PRN ACS symptoms 10/24/2024      ECG 12 lead (Clinic Performed) 08/05/2024    Echo:  Transthoracic Echo (TTE) Limited 11/26/2024      Transthoracic Echo (TTE) Limited 11/18/2024      Transthoracic Echo (TTE) Limited 11/15/2024      Transthoracic Echo (TTE) Limited 11/13/2024      Anesthesia Intraoperative Transesophageal Echocardiogram 11/12/2024      Transthoracic Echo (TTE) Limited 11/05/2024      Transthoracic Echo (TTE) Complete 10/25/2024    Ejection Fractions:  EF   Date/Time Value Ref Range Status   11/26/2024 11:52 AM 18 %    11/18/2024 11:44 AM 10 %    11/15/2024 09:04 AM 13 %      Cath:  Cardiac Catheterization Procedure 10/28/2024    Stress Test:  No results found for this or any previous visit from the past 1095 days.    Cardiac Imaging:  No results found for this or any previous visit from the past 1095 days.      Past Medical History:  He has no past medical history on file.    Past Surgical History:  He has a past surgical history that includes Cardiac catheterization (N/A, 10/28/2024).      Social History:  He reports that he has quit smoking. His smoking use included cigarettes. He has never used smokeless tobacco. No history on file for alcohol use and drug use.    Family History:  No family history on file.     Allergies:  Eliquis [apixaban] and Jardiance [empagliflozin]    Inpatient Medications:  Scheduled medications   Medication Dose Route Frequency    acetaminophen  975 mg oral q6h    aspirin  81 mg oral Daily     dapagliflozin propanediol  5 mg oral Daily    [START ON 11/28/2024] digoxin  250 mcg oral Daily    escitalopram  10 mg oral Daily    icosapent ethyL  2 g oral BID    lidocaine  2 patch transdermal Daily    magnesium oxide  800 mg oral BID    methocarbamol  500 mg oral q6h    pantoprazole  40 mg oral Daily before breakfast    sacubitriL-valsartan  1 tablet oral BID    sennosides-docusate sodium  1 tablet oral BID    sildenafil  20 mg oral TID    spironolactone  12.5 mg oral Daily    warfarin  7.5 mg oral Daily     PRN medications   Medication    heparin    ondansetron    polyethylene glycol     Continuous Medications   Medication Dose Last Rate    heparin  0-4,000 Units/hr 2,300 Units/hr (11/27/24 1049)     Outpatient Medications:  Current Outpatient Medications   Medication Instructions    aspirin 81 mg, oral, Daily    bumetanide (BUMEX) 0.5 mg, oral, Daily    cholecalciferol (VITAMIN D-3) 50,000 Units, oral, Once Weekly    escitalopram (LEXAPRO) 10 mg, oral, Daily    hydrALAZINE (APRESOLINE) 25 mg, oral, 2 times daily    isosorbide mononitrate ER (IMDUR) 120 mg, oral, Daily, Do not crush or chew.    metoprolol succinate XL (TOPROL-XL) 50 mg, oral, Daily, Do not crush or chew.    multivitamin tablet 1 tablet, oral, Daily    nitroglycerin (NITROSTAT) 0.4 mg, sublingual, Every 5 min PRN    pantoprazole (PROTONIX) 40 mg, oral, Daily before breakfast, Do not crush, chew, or split.    sacubitriL-valsartan (Entresto)  mg tablet 1 tablet, oral, 2 times daily    spironolactone (ALDACTONE) 25 mg, oral, Daily    warfarin (COUMADIN) 5 mg, oral, Every evening, Take as directed per After Visit Summary.       Physical Exam:  General: No acute distress  HEENT: No JVD  Cardio: LVAD hum on auscultation  Pulmonary: Non labored breathing  Extremities: No lower extremity edema      Assessment/Plan     Mr. Meraz is a 69 y.o. male w/ a PMHx sig for CAD s/p 4V CABG (2012) and PCI (LCx/OM; 5/2019), stage D systolic HF/ICM/HFrEF  with LVEF 10-15%( 10/22/24 TTE), AF s/p RFA (PVI and CTI; 4/2024) on OAC therapy, HTN, dyslipidemia, depression, anxiety and VIV using CPAP who was admitted to [facility] ICU. Patient underwent an LVAD on 11/12 with Dr. Mclain.  EP consulted for post VAD VT.     Tele, EKG, clinical history reviewed. Patient has frequent ectopy with 2 episodes of sustained VT > 30 seconds on 11/26 into 11/27. He was asymptomatic throughout this incidence, awake at the time. Patient with LVEF of 15-20%, LVAD was placed on 11/12.     Recommendations:  > Recommend starting PO Amio load (400 mg BID for 1 week, followed by 400 mg daily for 1 week, and then 200 mg daily thereafter)   > Given that patient was asymptomatic from his sustained VT and has an LVAD, will defer ICD at this time and manage with medications as above.   > Rest of care per primary team    EP will sign off at this time. Please do not hesitate to page team should further questions arise.     Discussed with Dr. Kerns     Peripheral IV 11/17/24 18 G Left Forearm (Active)   Site Assessment Clean;Dry;Intact 11/26/24 2130   Dressing Type Transparent 11/26/24 2130   Line Status Infusing 11/26/24 2130   Dressing Status Clean;Dry;Occlusive 11/26/24 2130   Number of days: 10       Peripheral IV 11/24/24 22 G Right;Posterior Forearm (Active)   Site Assessment Clean;Dry;Intact 11/26/24 2130   Dressing Type Transparent 11/26/24 2130   Line Status Blood return noted;Flushed;Saline locked;Capped 11/26/24 2130   Dressing Status Clean;Dry;Occlusive 11/26/24 2130   Number of days: 3       Peripheral IV 11/24/24 Right;Anterior Hand (Active)   Site Assessment Clean;Dry;Intact 11/26/24 2130   Dressing Type Transparent 11/26/24 2130   Line Status No blood return;Flushed;Saline locked;Capped 11/26/24 2130   Dressing Status Clean;Dry;Occlusive 11/26/24 2130   Number of days: 3       Ventricular Assist Device HeartMate 3 (Active)   Site Location Abdomen left 11/27/24 0430   Site Assessment  Clean;Dry;Intact 11/27/24 0430   Dressing Status Clean;Dry;Intact 11/27/24 0430   Dressing Change Due 12/03/24 11/27/24 0430   Number of days: 15       Code Status:  Full Code    Earl Alcantar MD

## 2024-11-27 NOTE — TELEPHONE ENCOUNTER
Giulia called, stated patient will be getting discharge to her facility. Wanted to talk to a coord about education for LVAD.  Requesting call back.

## 2024-11-27 NOTE — CONSULTS
PM&R c/s re placement - d/w pt and wife at Bedside briefly.  They have already decided on pulmonary nursing home-[facility], where the daughter is an operations.  He has no significant neurologic deficit, expected side pain after LVAD and rib fracture, low back pain tolerable.      Agree with current discharge planning, no charge visit today.  Please reconsult if needs change or other questions    Steven Martinez M.D, FAAPMR, R-MSK  Chief, Division of PM&R  Board Certified in PM&R, Sports Medicine and Musculoskeletal Ultrasound

## 2024-11-27 NOTE — CARE PLAN
The clinical goals for the shift include pt will remain HDS thorugh shift    Over the shift, the patient had occasional moments of not knowing the time of day but after being reoriented pt had no issues; around midnight pt had a 61 beat episode of vTach. I was in the room for the whole episode and pt was asymptomatic, EKG was completed and provider saw pt at bedside; pt remains stable MAPs 70s-80s during shift. Pt has short runs of Vtach around 0630

## 2024-11-28 LAB
APTT PPP: 185 SECONDS (ref 27–38)
INR PPP: 2.1 (ref 0.9–1.1)
PROTHROMBIN TIME: 24 SECONDS (ref 9.8–12.8)
UFH PPP CHRO-ACNC: 0.6 IU/ML

## 2024-11-28 PROCEDURE — 85610 PROTHROMBIN TIME: CPT | Performed by: NURSE PRACTITIONER

## 2024-11-28 PROCEDURE — 2500000001 HC RX 250 WO HCPCS SELF ADMINISTERED DRUGS (ALT 637 FOR MEDICARE OP): Performed by: NURSE PRACTITIONER

## 2024-11-28 PROCEDURE — 2500000004 HC RX 250 GENERAL PHARMACY W/ HCPCS (ALT 636 FOR OP/ED): Performed by: NURSE PRACTITIONER

## 2024-11-28 PROCEDURE — 97116 GAIT TRAINING THERAPY: CPT | Mod: GP,CQ

## 2024-11-28 PROCEDURE — 2500000001 HC RX 250 WO HCPCS SELF ADMINISTERED DRUGS (ALT 637 FOR MEDICARE OP): Performed by: REGISTERED NURSE

## 2024-11-28 PROCEDURE — 1200000002 HC GENERAL ROOM WITH TELEMETRY DAILY

## 2024-11-28 PROCEDURE — 99233 SBSQ HOSP IP/OBS HIGH 50: CPT | Performed by: STUDENT IN AN ORGANIZED HEALTH CARE EDUCATION/TRAINING PROGRAM

## 2024-11-28 PROCEDURE — 2500000005 HC RX 250 GENERAL PHARMACY W/O HCPCS: Performed by: NURSE PRACTITIONER

## 2024-11-28 PROCEDURE — 85520 HEPARIN ASSAY: CPT

## 2024-11-28 PROCEDURE — 97530 THERAPEUTIC ACTIVITIES: CPT | Mod: GP,CQ

## 2024-11-28 PROCEDURE — 2500000001 HC RX 250 WO HCPCS SELF ADMINISTERED DRUGS (ALT 637 FOR MEDICARE OP)

## 2024-11-28 PROCEDURE — 93750 INTERROGATION VAD IN PERSON: CPT | Performed by: STUDENT IN AN ORGANIZED HEALTH CARE EDUCATION/TRAINING PROGRAM

## 2024-11-28 PROCEDURE — 2500000002 HC RX 250 W HCPCS SELF ADMINISTERED DRUGS (ALT 637 FOR MEDICARE OP, ALT 636 FOR OP/ED): Performed by: REGISTERED NURSE

## 2024-11-28 PROCEDURE — 2500000002 HC RX 250 W HCPCS SELF ADMINISTERED DRUGS (ALT 637 FOR MEDICARE OP, ALT 636 FOR OP/ED): Performed by: NURSE PRACTITIONER

## 2024-11-28 PROCEDURE — 2500000004 HC RX 250 GENERAL PHARMACY W/ HCPCS (ALT 636 FOR OP/ED)

## 2024-11-28 PROCEDURE — 2500000001 HC RX 250 WO HCPCS SELF ADMINISTERED DRUGS (ALT 637 FOR MEDICARE OP): Performed by: STUDENT IN AN ORGANIZED HEALTH CARE EDUCATION/TRAINING PROGRAM

## 2024-11-28 RX ORDER — ACETAMINOPHEN 325 MG/1
975 TABLET ORAL EVERY 6 HOURS PRN
Status: DISCONTINUED | OUTPATIENT
Start: 2024-11-28 | End: 2024-12-03 | Stop reason: HOSPADM

## 2024-11-28 RX ORDER — DAPAGLIFLOZIN 10 MG/1
10 TABLET, FILM COATED ORAL DAILY
Status: DISCONTINUED | OUTPATIENT
Start: 2024-11-29 | End: 2024-12-03 | Stop reason: HOSPADM

## 2024-11-28 ASSESSMENT — PAIN SCALES - GENERAL
PAINLEVEL_OUTOF10: 0 - NO PAIN
PAINLEVEL_OUTOF10: 1
PAINLEVEL_OUTOF10: 0 - NO PAIN
PAINLEVEL_OUTOF10: 3

## 2024-11-28 ASSESSMENT — COGNITIVE AND FUNCTIONAL STATUS - GENERAL
HELP NEEDED FOR BATHING: A LITTLE
STANDING UP FROM CHAIR USING ARMS: A LITTLE
MOVING TO AND FROM BED TO CHAIR: A LITTLE
DRESSING REGULAR LOWER BODY CLOTHING: A LITTLE
WALKING IN HOSPITAL ROOM: A LITTLE
STANDING UP FROM CHAIR USING ARMS: A LITTLE
DRESSING REGULAR UPPER BODY CLOTHING: A LITTLE
MOVING TO AND FROM BED TO CHAIR: A LITTLE
MOBILITY SCORE: 19
TURNING FROM BACK TO SIDE WHILE IN FLAT BAD: A LITTLE
WALKING IN HOSPITAL ROOM: A LITTLE
MOVING FROM LYING ON BACK TO SITTING ON SIDE OF FLAT BED WITH BEDRAILS: A LITTLE
MOBILITY SCORE: 17
CLIMB 3 TO 5 STEPS WITH RAILING: A LOT
TOILETING: A LITTLE
CLIMB 3 TO 5 STEPS WITH RAILING: A LITTLE
DAILY ACTIVITIY SCORE: 20
TURNING FROM BACK TO SIDE WHILE IN FLAT BAD: A LITTLE

## 2024-11-28 ASSESSMENT — PAIN - FUNCTIONAL ASSESSMENT
PAIN_FUNCTIONAL_ASSESSMENT: 0-10

## 2024-11-28 ASSESSMENT — PAIN DESCRIPTION - DESCRIPTORS: DESCRIPTORS: ACHING;SORE

## 2024-11-28 NOTE — PROGRESS NOTES
Physical Therapy    Physical Therapy Treatment    Patient Name: Fahad Meraz  MRN: [id number]  Department: [place] [id number]  Room: [address] [place] [address]  Today's Date: 11/28/2024  Time Calculation  Start Time: 1535  Stop Time: 1615  Time Calculation (min): 40 min         Assessment/Plan   PT Assessment  End of Session Communication: Bedside nurse  Assessment Comment: .  End of Session Patient Position: Up in chair  PT Plan  Inpatient/Swing Bed or Outpatient: Inpatient  PT Plan  Treatment/Interventions: Transfer training, Bed mobility, Gait training, Balance training, Neuromuscular re-education, Strengthening, Endurance training, Therapeutic exercise, Therapeutic activity  PT Plan: Ongoing PT  PT Frequency: Daily  PT Discharge Recommendations: High intensity level of continued care  Equipment Recommended upon Discharge: Wheeled walker  PT Recommended Transfer Status: Assist x1  PT - OK to Discharge: Yes      General Visit Information:   PT  Visit  PT Received On: 11/28/24  General  Prior to Session Communication: Bedside nurse  Patient Position Received: Bed, 3 rail up, Alarm off, not on at start of session  General Comment: Pt pleasant and agreeble throughout session. Worked on ambulation and LVAD battery transfer today.  pt's family present for education. pt ambulates 2 trials up to 175' prior to fatigue    Subjective   Precautions:  Precautions  Medical Precautions: Fall precautions, Cardiac precautions  Post-Surgical Precautions: Move in the Tube    Vital Signs (Past 2hrs)        Date/Time Vitals Session Patient Position Pulse Resp SpO2 BP MAP (mmHg)    11/28/24 1514 --  --  72  17  95 %  --  --     11/28/24 1535 Pre PT  --  --  --  --  --  --     11/28/24 1600 Post PT  --  --  --  --  --  --                   Vital Signs Comment: VAD numbers PF 4.2  speed 5250  PI 3.1  power 3.7     Objective   Pain:  Pain Assessment  0-10 (Numeric) Pain Score: 0 - No pain  Cognition:  Cognition  Orientation Level: Oriented X4        Treatments:  Therapeutic Exercise  Therapeutic Exercise Activity 1: sitting LAQ and hip flexion x15 reps AROM    Bed Mobility 1  Bed Mobility 1: Supine to sitting  Level of Assistance 1: Contact guard  Bed Mobility Comments 1: HOB elevated, log roll    Ambulation/Gait Training 1  Surface 1: Level tile  Device 1: No device  Assistance 1: Contact guard  Quality of Gait 1: Narrow base of support, Forward flexed posture  Comments/Distance (ft) 1: 1x175, 1x100. cues to inhibit lateral sway  Transfer 1  Technique 1: Sit to stand, Stand to sit  Transfer Level of Assistance 1: Close supervision  Trials/Comments 1: cues for safe set up/ UE placement         Outcome Measures:  [facility] Basic Mobility  Turning from your back to your side while in a flat bed without using bedrails: A little  Moving from lying on your back to sitting on the side of a flat bed without using bedrails: A little  Moving to and from bed to chair (including a wheelchair): A little  Standing up from a chair using your arms (e.g. wheelchair or bedside chair): A little  To walk in hospital room: A little  Climbing 3-5 steps with railing: A lot  Basic Mobility - Total Score: 17    Education Documentation  Mobility Training, taught by Christ Ferrer PTA at 11/28/2024  4:52 PM.  Learner: Patient  Readiness: Acceptance  Method: Explanation  Response: Verbalizes Understanding    Education Comments  No comments found.        OP EDUCATION:       Encounter Problems       Encounter Problems (Active)       Balance       Pt will score >45 on VEGA for safe community ambulation (Progressing)       Start:  10/29/24    Expected End:  11/28/24               Mobility       Patient will ambulate >1000 ft with LRAD and supervision With stable vitals and RPD </= 3/10 and RPE </= 13/20 for improved functional mobility.   (Met)       Start:  10/29/24    Expected End:  11/12/24    Resolved:  11/05/24    Updated to: Patient will ambulate >2500 ft with LRAD and supervision  With stable vitals and RPD </= 3/10 and RPE </= 13/20 for improved functional mobility.    Update reason: Progression         Pt will complete 6MWT with no assistive device and supervision and achieve >700 ft With stable vitals and RPD </= 3/10 and RPE </= 13/20 for improved functional mobility.   (Met)       Start:  10/29/24    Expected End:  11/12/24    Resolved:  11/05/24    Updated to: Pt will complete 6MWT with no assistive device and supervision and achieve >1300 ft With stable vitals and RPD </= 3/10 and RPE </= 13/20 for improved functional mobility.    Update reason: Progression         Patient will ambulate >2500 ft with LRAD and supervision With stable vitals and RPD </= 3/10 and RPE </= 13/20 for improved functional mobility. (Progressing)       Start:  11/05/24    Expected End:  11/28/24                Pt will complete 6MWT with no assistive device and supervision and achieve >1300 ft With stable vitals and RPD </= 3/10 and RPE </= 13/20 for improved functional mobility. (Progressing)       Start:  11/05/24    Expected End:  11/28/24                   PT Transfers       Patient will perform bed mobility indep (maintenance 2/2 prolonged hospital stay anticipated) (Progressing)       Start:  10/29/24    Expected End:  11/28/24            Patient will transfer sit to and from stand indep (Progressing)       Start:  10/29/24    Expected End:  11/28/24            Pt will complete 5XSTS from recliner without UE assist <12 seconds With stable vitals and RPD </= 3/10 and RPE </= 13/20 for improved functional mobility.   (Progressing)       Start:  10/29/24    Expected End:  11/28/24               Pain - Adult

## 2024-11-28 NOTE — PROGRESS NOTES
ADVANCED HEART FAILURE LVAD PROGRESS NOTE     H VAD Cardiologist: Padmini      Type of VAD: HM3  Implant Date: 11/12/24  Reason for VAD: ICM  Intent: Long-Term (Significant PAD, age, patient preference)      Subjective   Interval events:  No overnight events, NSVT noted on tele. Longest was 7 beats. This morning, pt denies acute/new complaints.    HPI:  Fahad Meraz is a very pleasant 69 y.o. male w/ a PMHx sig for CAD s/p 4V CABG (2012) and PCI (LCx/OM; 5/2019), stage D systolic HF/ICM/HFrEF with LVEF 10-15%( 10/22/24 TTE), AF s/p RFA (PVI and CTI; 4/2024) on OAC therapy, HTN, dyslipidemia, depression, anxiety and VIV using CPAP who was admitted to OSH ICU, s/p RHC on 10/18/24.  Per patient, he had been experienced worsening SOB and fluids overload for 2-3 weeks. Patient was on milrinone drip and underwent SGC diuresis. However his KENYA worsened, and were not able to wean milrinone drip. Decision was made to transfer him to HF ICU [facility] [place] for possible advanced therapy consideration. Advanced therapies evaluation was initiated on 10/30. Discussed in advanced therapeutics committee on 11/5 and was denied for transplantation, and approved for LVAD (significant PAD, Elevated PSA level, Age approaching 70, as well as patient verbalized he would be more in favor of LVAD vs Transplant). He was transferred to the floor on 11/6 to await VAD implantation. Readmitted to HFICU as of 11/09 for pre-OR swan guided optimization. Now presents to CTICU s/p LVAD HM3 implant via thoracotomy with descending outflow graft on 11/12/24 with Dr. Mclain. OR Course/Issues: pulseless VT requiring defib x 1 pre- CPB. Postop course c/b delirium, RV dysfunction, NSVT & malnutrition.       Principal Problem:    Acute on chronic heart failure with reduced ejection fraction and diastolic dysfunction  Active Problems:    Ischemic cardiomyopathy    Receiving inotropic medication    HTN (hypertension)    CAD (coronary artery disease)    MI  (myocardial infarction) (Multi)    CHF (congestive heart failure)    Gastroesophageal reflux disease    Acute kidney injury (nontraumatic) (CMS-HCC)    Delirium       LOS: 35 days     NYHA class pre-VAD implant: IV  NYHA class today (11/25): II        Objective   Vitals:   Vitals:    11/27/24 2350 11/28/24 0602 11/28/24 0747 11/28/24 1104   BP: 99/65 92/69     BP Location: Right arm Right arm     Patient Position: Lying Lying     Pulse: 78 76 74 67   Resp: 13 14 15 18   Temp: 36.3 °C (97.3 °F) 36.2 °C (97.2 °F) 36.5 °C (97.7 °F) 36.5 °C (97.7 °F)   TempSrc: Temporal Temporal Temporal Temporal   SpO2: 96% 95% 97% 97%   Weight:  89.6 kg (197 lb 8.5 oz)     Height:         Wt Readings from Last 5 Encounters:   11/28/24 89.6 kg (197 lb 8.5 oz)   10/24/24 92.5 kg (204 lb)   08/05/24 122 kg (268 lb)   01/31/22 119 kg (262 lb)   02/18/20 123 kg (270 lb 2 oz)     Hemodynamic parameters for last 24 hours:     Intake/Output for last 24 hours:    Intake/Output Summary (Last 24 hours) at 11/28/2024 1402  Last data filed at 11/28/2024 1346  Gross per 24 hour   Intake --   Output 1925 ml   Net -1925 ml           Physical exam:  Physical Exam  Constitutional:       General: He is not in acute distress.     Appearance: Normal appearance. He is normal weight. He is not ill-appearing or toxic-appearing.   HENT:      Head: Normocephalic and atraumatic.      Mouth/Throat:      Mouth: Mucous membranes are moist.      Pharynx: Oropharynx is clear. No oropharyngeal exudate or posterior oropharyngeal erythema.   Eyes:      Extraocular Movements: Extraocular movements intact.      Conjunctiva/sclera: Conjunctivae normal.      Pupils: Pupils are equal, round, and reactive to light.   Neck:      Vascular: No JVD.   Cardiovascular:      Rate and Rhythm: Normal rate and regular rhythm.      Comments: LVAD hum heard on auscultation. Rate controlled afib w/ intermittent PVCs and NSVT. Capillary refill < 2 seconds in all extremities.   Pulmonary:       Effort: Pulmonary effort is normal. No accessory muscle usage, respiratory distress or retractions.      Breath sounds: No stridor. No wheezing, rhonchi or rales.      Comments: Left lower lobe diminished. All other lung fields clear.   Abdominal:      General: Abdomen is flat. Bowel sounds are normal. There is no distension.      Palpations: Abdomen is soft. There is no mass.      Tenderness: There is no abdominal tenderness. There is no guarding.      Hernia: No hernia is present.   Musculoskeletal:         General: No swelling or tenderness. Normal range of motion.      Cervical back: Full passive range of motion without pain and normal range of motion.      Right lower leg: No edema.      Left lower leg: No edema.   Skin:     General: Skin is warm and dry.      Capillary Refill: Capillary refill takes less than 2 seconds.      Coloration: Skin is not jaundiced.      Findings: Lesion present. No bruising, laceration, rash or wound.      Comments: Left thoracotomy well approximated w/o signs of infection. LVAD driveline site covered with weekly dressing. No drainage or tenderness on exam.    Neurological:      General: No focal deficit present.      Mental Status: He is alert and oriented to person, place, and time. Mental status is at baseline.   Psychiatric:         Mood and Affect: Mood normal.         Behavior: Behavior normal.         Thought Content: Thought content normal.         Judgment: Judgment normal.        Driveline:   [facility] 11/28       Labs:   CMP:  Recent Labs     11/27/24  0652 11/27/24  0227 11/26/24  0603 11/25/24  0736 11/25/24  0250 11/24/24  0556 11/23/24  0451 11/22/24  1437 11/22/24  0304 11/21/24  1247    138 139 135* 133* 136 139 137 137 136   K 4.4 4.8 4.3 4.4 8.0* 4.6 4.3 5.2 4.2 4.0    105 103 105 102 104 105 103 102 101   CO2 24 23 26 16* 26 22 26 25 27 24   ANIONGAP 13 15 14 18 13 15 12 14 12 15   BUN 20 22 19 20 22 20 19 19 20 25*   CREATININE 1.28 1.21 1.39* 1.10  1.22 1.24 1.32* 1.35* 1.35* 1.47*   EGFR 61 65 55* 73 64 63 58* 57* 57* 51*   MG 2.18 2.12 2.13  --  2.47* 2.03 2.12  --  2.10 2.54*     Recent Labs     11/27/24  0652 11/26/24  0603 11/25/24  0736 11/25/24  0250 11/24/24  0556 11/23/24  0451 11/22/24  1437 11/22/24  0304 11/19/24  1459 11/19/24  0322 11/18/24  0525 11/17/24  1323 11/17/24  0122 11/16/24  1358 11/16/24  0128 11/15/24  1255 11/15/24  0021 11/14/24  1224 11/14/24  0118 11/13/24  1228 11/13/24  0007 11/12/24  1307   ALBUMIN 3.6 3.6 3.4 3.7 3.6 3.6 3.4 3.5   < > 3.1* 3.5   < > 3.4   < > 3.6   < > 3.7   < > 3.8 3.7   < > 3.7   ALT  --   --   --   --   --   --   --   --   --  6* 6*  --  3*  --  4*  --  5*  --  7* 11  --  15   AST  --   --   --   --   --   --   --   --   --  16 18  --  21  --  24  --  29  --  40* 42*  --  37   BILITOT  --   --   --   --   --   --   --   --   --  0.8 0.8  --  1.0  --  1.0  --  1.1  --  0.9 1.1  --  0.9    < > = values in this interval not displayed.     CBC:  Recent Labs     11/27/24  0652 11/26/24  0603 11/25/24  0250 11/24/24  0556 11/23/24  0451 11/22/24  0304 11/21/24  0426 11/20/24  0151   WBC 12.0* 11.8* 13.5* 15.4* 13.8* 10.9 10.2 10.7   HGB 12.6* 12.4* 12.1* 12.3* 12.5* 11.2* 10.4* 10.5*   HCT 38.3* 38.6* 37.2* 39.1* 40.1* 34.3* 32.5* 31.7*   * 537* 313 545* 529* 473* 455* 465*   MCV 84 85 83 86 87 83 84 82     COAG:   Recent Labs     11/28/24  0542 11/27/24  2037 11/27/24  1605 11/26/24  0957 11/26/24  0603 11/25/24  2348 11/25/24  0736 11/25/24  0250 11/24/24  2156 11/24/24  0556 11/23/24  0451 11/22/24  0304 11/21/24  1247 11/21/24  0426 11/13/24  0008 11/12/24  1257   INR 2.1*  --  1.8*  --  1.8*  --   --  1.8*  --  1.8* 1.9* 2.5*  --  2.1*   < > 1.4*   HAUF 0.6 0.5  --  0.5 0.4 0.2   < > 0.1   < >  --   --  0.1   < >  --    < >  --    FIBRINOGEN  --   --   --   --   --   --   --   --   --   --   --   --   --   --   --  301    < > = values in this interval not displayed.     ABO:   Recent Labs      "11/20/24  0151   ABO A     HEME/ENDO:   Recent Labs     10/31/24  1249 10/24/24  2328   FERRITIN  --  76   IRONSAT  --  17*   TSH 4.39* 8.47*   HGBA1C  --  6.3*     CARDIAC:   Recent Labs     11/27/24  0652 11/26/24  0603 11/25/24  0736 11/25/24  0250 11/24/24  0556 11/23/24  0451 11/22/24  0304 11/21/24  0426 11/12/24  1307 10/31/24  1249 10/24/24  2328    264* 426* 725* 247* 240 233 231   < > 187  --    BNP  --   --   --   --   --   --   --   --   --  755* 1,995*    < > = values in this interval not displayed.     Recent Labs     11/27/24  0652   CHOL 126  126   HDL 24.6  24.6   TRIG 155*     TOX:  Recent Labs     10/31/24  1249   AMPHETAMINE Negative     MICRO: No results for input(s): \"ESR\", \"CRP\", \"PROCAL\" in the last 27109 hours.  No results found for the last 90 days.        Imaging:   No results found.     Notable Studies:   EKG:  Encounter Date: 10/24/24   Electrocardiogram 12-lead PRN for arrhythmia   Result Value    Ventricular Rate 111    Atrial Rate 81    QRS Duration 146    QT Interval 406    QTC Calculation(Bazett) 552    R Axis 24    T Axis 45    QRS Count 18    Q Onset 226    P Onset 209    P Offset 224    T Offset 429    QTC Fredericia 498    Narrative    Atrial fibrillation with rapid ventricular response with premature ventricular or aberrantly conducted complexes  Left bundle branch block  Abnormal ECG  When compared with ECG of 20-NOV-2024 03:52,  Left bundle branch block has replaced Nonspecific intraventricular block  Criteria for Septal infarct are no longer Present  Criteria for Lateral infarct are no longer Present       Echo:  Transthoracic Echo (TTE) Limited    Result Date: 11/26/2024   [facility], [address] [place] [address]                Tel [phone] and Fax [phone] TRANSTHORACIC ECHOCARDIOGRAM REPORT  Patient Name:       RJ LEIJA     Reading Physician:    [id number] Parker" Xander NUNES Study Date:         11/26/2024          Ordering Provider:    [id number] KARRI CHAVEZ MRN/PID:            [id number]            Fellow: Accession#:         [id number]        Nurse: Date of Birth/Age:  1955 / 69     Sonographer:          Manuela estrada                                     RDCSIRMA Gender assigned at  M                   Additional Staff:     Melida Kaye Birth:                                                        RDCS Height:             182.88 cm           Admit Date: Weight:             87.54 kg            Admission Status:     Inpatient -                                                               Routine BSA / BMI:          2.10 m2 / 26.18     Encounter#:           [id number]                     kg/m2 Blood Pressure:     /                   Department Location:  [facility]                                                               Non Invasive Study Type:    TRANSTHORACIC ECHO (TTE) LIMITED Diagnosis/ICD: Presence of heart assist device-Z95.811 Indication:    RAMP study CPT Code:      Echo Limited-71978; Doppler Limited-15117; Color Doppler-45016 Patient History: Pertinent History: CAD. CABG x4 2012, PCI 2019, ICM, CHF, s/p HeartMate III                    LVAD, AF s/p RFA (PVI and CTI 4/2024). Study Detail: The following Echo studies were performed: 2D, M-Mode, Doppler and               color flow. Technically challenging study due to poor acoustic               windows.  PHYSICIAN INTERPRETATION: Left Ventricle: Left ventricular ejection fraction is severely decreased, by visual estimate at 15-20%. There is global hypokinesis of the left ventricle with minor regional variations. The left ventricular cavity size is severely dilated. Spectral Doppler shows a Grade II (pseudonormal pattern) of left ventricular diastolic filling with an elevated left atrial pressure. Left  Atrium: The left atrium is enlarged. Right Ventricle: The right ventricle is severely enlarged. There is reduced right ventricular systolic function. Right Atrium: The right atrium was not well visualized. Aortic Valve: The aortic valve is probably trileaflet. There is mild aortic valve cusp calcification. There is mild aortic valve regurgitation. Mitral Valve: The mitral valve is mildly thickened. There is trace mitral valve regurgitation. Tricuspid Valve: The tricuspid valve is structurally normal. There is trace tricuspid regurgitation. The right ventricular systolic pressure is unable to be estimated. Pulmonic Valve: The pulmonic valve is structurally normal. Pulmonic valve regurgitation was not assessed. Pericardium: Trivial pericardial effusion. Aorta: The aortic root is abnormal. There is mild dilatation of the ascending aorta. There is mild dilatation of the aortic root. Pulmonary Artery: The pulmonary artery is not well visualized. Systemic Veins: The inferior vena cava was not well visualized. In comparison to the previous echocardiogram(s): Compared with study dated 11/18/2024, no significant change.  LEFT VENTRICULAR ASSIST DEVICE: LVAD: The patient has a(n) HeartMate 3 LVAD device present. Inflow Cannula: Visualization of the inflow cannula is technically difficult. Outflow Cannula: Visualization of the outflow cannula is technically difficult. LVAD Comments: Ramp study. 5200 rpm at start and finish of limited echo.  CONCLUSIONS:  1. Left ventricular ejection fraction is severely decreased, by visual estimate at 15-20%.  2. There is global hypokinesis of the left ventricle with minor regional variations.  3. Spectral Doppler shows a Grade II (pseudonormal pattern) of left ventricular diastolic filling with an elevated left atrial pressure.  4. Left ventricular cavity size is severely dilated.  5. There is reduced right ventricular systolic function.  6. Severely enlarged right ventricle.  7. The left  atrium is enlarged.  8. Mild aortic valve regurgitation.  9. The pulmonary artery is not well visualized. QUANTITATIVE DATA SUMMARY:  M-MODE MEASUREMENTS:         Normal Ranges: Ao Root:             4.10 cm (2.0-3.7cm)  AORTA MEASUREMENTS:         Normal Ranges: Asc Ao, d:          4.20 cm (2.1-3.4cm)  LV SYSTOLIC FUNCTION BY 2D PLANIMETRY (MOD):                      Normal Ranges: EF-Visual:      18 % LV EF Reported: 18 %  LV DIASTOLIC FUNCTION:          Normal Ranges: MV Peak E:             0.50 m/s (0.7-1.2 m/s) MV Peak A:             0.44 m/s (0.42-0.7 m/s) E/A Ratio:             1.14     (1.0-2.2)  [id number] Parker Terrell MD Electronically signed on 11/26/2024 at 3:08:03 PM  ** Final **     Transthoracic Echo (TTE) Limited    Result Date: 11/18/2024   [facility], [address] [place] [address]                Tel [phone] and Fax [phone] TRANSTHORACIC ECHOCARDIOGRAM REPORT  Patient Name:       RJ Webb Physician:    [id number] Fawad Chicas MD Study Date:         11/18/2024          Ordering Provider:    [id number] HARRISON PALOMINO MRN/PID:            [id number]            Fellow: Accession#:         [id number]        Nurse: Date of Birth/Age:  1955 / 69     Sonographer:          Genesis Sinclair RDCS                     years Gender assigned at  M                   Additional Staff: Birth: Height:             182.88 cm           Admit Date:           10/24/2024 Weight:             93.44 kg            Admission Status:     Inpatient -                                                               Routine BSA / BMI:          2.16 m2 / 27.94     Encounter#:           [id number]                     kg/m2 Blood Pressure:     /                   Department Location:  [facility] Study Type:    TRANSTHORACIC ECHO (TTE) LIMITED Diagnosis/ICD: Presence  of heart assist device-Z95.811 Indication:    Ramp study for LVAD CPT Code:      Echo Limited-11783; Doppler Limited-66667; Color Doppler-70695 Patient History: Pertinent History: Acute on chronic heart failure with reduced EF, Diastolic                    disfunction, Ischemic cardiiomyopathy, MI, CHF, LVAD, VIV. Study Detail: The following Echo studies were performed: 2D, Doppler, color flow               and M-Mode. Technically challenging study due to patient lying in               supine position and postoperative dressings.  PHYSICIAN INTERPRETATION: Left Ventricle: Left ventricular ejection fraction is severely decreased, by visual estimate at 10%. There is global hypokinesis of the left ventricle with minor regional variations. The left ventricular cavity size is severely dilated. Abnormal (paradoxical) septal motion consistent with post-operative status. Left ventricular diastolic filling was not assessed. Left Atrium: The left atrium is moderate to severely dilated. Right Ventricle: The right ventricle is severely enlarged. There is severely reduced right ventricular systolic function. Right Atrium: The right atrium is moderately dilated. Aortic Valve: The aortic valve is structurally normal. There is mild aortic valve cusp calcification. There is mild aortic valve regurgitation. Mitral Valve: The mitral valve is normal in structure. There is mild mitral valve regurgitation. Tricuspid Valve: The tricuspid valve is structurally normal. There is mild tricuspid regurgitation. Pulmonic Valve: The pulmonic valve is not well visualized. Pulmonic valve regurgitation was not assessed. Pericardium: Trivial pericardial effusion. Aorta: The aortic root is normal. In comparison to the previous echocardiogram(s): Compared with study dated 11/15/2024, no significant change.  LEFT VENTRICULAR ASSIST DEVICE: LVAD: The patient has a(n) HeartMate 3 LVAD device present. Inflow Cannula: The inflow cannula is visualized in the  left ventricular apex. AV Leaflet Mobility: . The aortic valve appears to be opening every 1 beats. LVAD Comments: Ramp study speed increased from 5200 to 5300. Aortic valve still opens every beat. Septum remains in the midline.  CONCLUSIONS:  1. Poorly visualized anatomical structures due to suboptimal image quality.  2. Left ventricular ejection fraction is severely decreased, by visual estimate at 10%.  3. There is global hypokinesis of the left ventricle with minor regional variations.  4. Left ventricular cavity size is severely dilated.  5. Abnormal septal motion consistent with post-operative status.  6. There is severely reduced right ventricular systolic function.  7. Severely enlarged right ventricle.  8. The left atrium is moderate to severely dilated.  9. The right atrium is moderately dilated. 10. Mild aortic valve regurgitation. 11. . The aortic valve appears to be opening every 1 beats. 12. Compared with study dated 11/15/2024, no significant change. QUANTITATIVE DATA SUMMARY:  LV SYSTOLIC FUNCTION BY 2D PLANIMETRY (MOD):                      Normal Ranges: EF-Visual:      10 % LV EF Reported: 10 %  [id number] Fawad Chicas MD Electronically signed on 11/18/2024 at 12:33:46 PM  ** Final **     Transthoracic Echo (TTE) Limited    Result Date: 11/15/2024   [facility], [address] [place] [address]                Tel [phone] and Fax [phone] TRANSTHORACIC ECHOCARDIOGRAM REPORT  Patient Name:       RJ LEIJA     Tracey Physician:    [id number] Chicas MD Study Date:         11/15/2024          Ordering Provider:    [id number] ANETA MANNING MRN/PID:            [id number]            Fellow:               [id number] Aramis Fitzpatrick MD Accession#:         [id number]        Nurse: Date of  Birth/Age:  1955 / 69     Sonographer:          Jackson estrada                                     BLANQUITA Gender assigned at  M                   Additional Staff: Birth: Height:             182.88 cm           Admit Date: Weight:             97.07 kg            Admission Status:     Inpatient - STAT BSA / BMI:          2.19 m2 / 29.02     Encounter#:           [id number]                     kg/m2 Blood Pressure:     107/71 mmHg         Department Location:  [facility] Study Type:    TRANSTHORACIC ECHO (TTE) LIMITED Diagnosis/ICD: Presence of heart assist device-Z95.811 Indication:    LVAD ramp study CPT Code:      Echo Limited-91080; Doppler Limited-11965; Color Doppler-21585 Patient History: Pertinent History: S/p CABG x 4 (2012) and PCI (LCx/OM; 5/2019), Stage D ICM                    HFrEF LVEF 10-15%, AF s/p RFA (PVI and CTI; 4/2024), HTN,                    Dyslipidemia, 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 Study Detail: The following Echo studies were performed: 2D, M-Mode, Doppler and               color flow. Technically challenging study due to poor acoustic               windows, prominent lung artifact, body habitus and patient lying               in supine position.  PHYSICIAN INTERPRETATION: Left Ventricle: The left ventricular systolic function is severely decreased, with a visually estimated ejection fraction of 10-15%. There is global hypokinesis of the left ventricle with minor regional variations. The left ventricular cavity size is severely dilated. There is normal septal and normal posterior left ventricular wall thickness. Abnormal (paradoxical) septal motion consistent with post-operative status. Left ventricular diastolic filling cannot be determined, due to left ventricular assist device. Left Atrium: The left atrium is mildly dilated. Right Ventricle: The right ventricle is severely enlarged.  There is severely reduced right ventricular systolic function. A device is visualized in the right ventricle. Right Atrium: The right atrium is severely dilated. There is a device visualized in the right atrium. Aortic Valve: The aortic valve is structurally normal. There is mild to moderate aortic valve cusp calcification. There is mild aortic valve regurgitation. Mitral Valve: The mitral valve is normal in structure. There is mild mitral annular calcification. There is mild mitral valve regurgitation. Tricuspid Valve: The tricuspid valve is structurally normal. There is mild tricuspid regurgitation. The right ventricular systolic pressure is unable to be estimated. Pulmonic Valve: The pulmonic valve is not well visualized. Pulmonic valve regurgitation was not assessed. Pericardium: Trivial pericardial effusion. Aorta: The aortic root is abnormal. There is mild dilatation of the ascending aorta. There is mild dilatation of the aortic root. In comparison to the previous echocardiogram(s): Compared with study dated 11/13/2024, no significant change.  LEFT VENTRICULAR ASSIST DEVICE: Study Type: This study was performed while LVAD settings were optimized. LVAD: The patient has a(n) HeartMate 3 LVAD device present. Inflow Cannula: The inflow cannula is visualized in the left ventricular apex. Outflow Cannula: Visualization of the outflow cannula is technically difficult. AV Leaflet Mobility: . The aortic valve appears to be opening every 1 beats.  CONCLUSIONS:  1. The left ventricular systolic function is severely decreased, with a visually estimated ejection fraction of 10-15%.  2. There is global hypokinesis of the left ventricle with minor regional variations.  3. Left ventricular diastolic filling cannot be determined, due to left ventricular assist device.  4. Left ventricular cavity size is severely dilated.  5. Abnormal septal motion consistent with post-operative status.  6. There is severely reduced right  ventricular systolic function.  7. Severely enlarged right ventricle.  8. The left atrium is mildly dilated.  9. The right atrium is severely dilated. 10. Mild aortic valve regurgitation. 11. . The aortic valve appears to be opening every 1 beats. 12. Compared with study dated 11/13/2024, no significant change. QUANTITATIVE DATA SUMMARY:  2D MEASUREMENTS:          Normal Ranges: IVSd:            1.00 cm  (0.6-1.1cm) LVPWd:           1.00 cm  (0.6-1.1cm) LVIDd:           5.90 cm  (3.9-5.9cm) LV Mass Index:   109 g/m2  LA VOLUME:                   Normal Ranges: LA Vol A4C:        86.2 ml   (22+/-6mL/m2) LA Vol Index A4C:  39.3ml/m2 LA Area A4C:       24.2 cm2 LA Major Axis A4C: 5.8 cm  AORTA MEASUREMENTS:         Normal Ranges: Ao Sinus, d:        4.30 cm (2.1-3.5cm)  LV SYSTOLIC FUNCTION BY 2D PLANIMETRY (MOD):                      Normal Ranges: EF-Visual:      13 % LV EF Reported: 13 %  [id number] Fawad Chicas MD Electronically signed on 11/15/2024 at 12:09:54 PM  ** Final **       Meds:  Scheduled medications  amiodarone, 400 mg, oral, BID  aspirin, 81 mg, oral, Daily  [START ON 11/29/2024] dapagliflozin propanediol, 10 mg, oral, Daily  digoxin, 250 mcg, oral, Daily  escitalopram, 10 mg, oral, Daily  icosapent ethyL, 2 g, oral, BID  iron sucrose, 200 mg, intravenous, Daily  lidocaine, 2 patch, transdermal, Daily  magnesium oxide, 800 mg, oral, BID  pantoprazole, 40 mg, oral, Daily before breakfast  sacubitriL-valsartan, 1 tablet, oral, BID  sennosides-docusate sodium, 1 tablet, oral, BID  sildenafil, 20 mg, oral, TID  spironolactone, 12.5 mg, oral, Daily  warfarin, 7.5 mg, oral, Daily      Continuous medications       PRN medications  PRN medications: acetaminophen, [DISCONTINUED] ondansetron **OR** ondansetron, polyethylene glycol     Assessment/Plan   Assessment & Plan     NEUROLOGICAL  Active issues:   #Acute post-operative pain  - Endorses pain at back near posterior portion of surgical incision. Pain  musculoskeletal in nature.   - PRN po Tylenol 975 mg q6 hrs   - Continue lidoderm patches     #Hx of anxiety and depression  - Continue home escitalopram 10mg daily    #Transient LUE weakness on 11/24  - CTH (11/24) without acute abnormalities  - Resolved with tylenol  - No focal deficits any further       CARDIAC  #PMHx CAD s/p CABG x 4 (2012) and PCI (LCx/OM; 5/2019), stage D ICM HFrEF LVEF 10-15%  #S/p HM3 LVAD via left thoracotomy 11/12 with Jas Ortiz and Kaitlin.     GDMT and RV support:   - BB: n/a - RV dysfunction   - ARNI/ACEi/ARB: Entresto 24-26mg BID   - MRA: continue spironolactone 12.5mg daily    - SGLT2i: Farxiga 10 mg daily     RV support:   - c/w digoxin to 250mcg daily as levels remain < 30   - Continue sildenafil 20mg TID     Anticoagulation:   - Continue Coumadin 7.5mg daily for goal INR 2-3.   - INR 2.1 today so stopping heparin gtt   - Continue ASA (primary indication - ICM, PCI)     Heart Mate III interrogation   Most Recent   Flow 4.3   Speed 5200   Power 3.7   PI 3.2   MAP (mmHg): 76    LVAD interrogation: stable flow, no sig PI events or power spikes.     RAMP study: Started study with RPM 5200. AV opening every beat, no MR, mild AI, septum midline. After review with Dr. Drummond, he appears well optimized at this speed. No speed changes made. dMAP 68.     Daily weight:   Vitals:    11/28/24 0602   Weight: 89.6 kg (197 lb 8.5 oz)      #Chronic AF s/p RFA (PVI and CTI; 4/2024),   #Sustained VT and NSVT  - Patient never required ICD as his EF remained > 30% prior to acute reduction and LVAD placement   - Patient has had frequent PVCs and NSVT on telemetry  - 1xminute sustained VT on 11/26. Patient remained hemodynamically stable & asymptomatic  - Maintain K > 4.5 & Mag > 2.0   - EP consulted, recs: Recommend starting PO Amio load (400 mg BID for 1 week, followed by 400 mg daily for 1 week, and then 200 mg daily thereafter). Given that patient was asymptomatic from his sustained VT and has an  LVAD, will defer ICD at this time and manage with medications.     #HLD  - Continue vascepa 2g BID  - Repeat lipid panel: HDL 24.6, Non-HDL cholesterol 101, . Cholesterol/HDL ratio 5.1      RESPIRATORY   #VIV on CPAP at home  - Resume home nocturnal CPAP  - Maintaining SpO2 >92%.   - Encourage OOB and ambulation daily       GI:  #GERD  #Malnutrition d/t poor PO intake  - Continue PPI for GERD, LVAD bleeding risk   - Continue adult regular diet with supplements  - senna/colace BID and miralax to as needed    RENAL:  #CKD (baseline Cr 1.3-1.6)  - Strict I&Os  - Appears euvolemic on exam   - Avoid hypotension and nephrotoxic agents   Results from last 72 hours   Lab Units 24  0652 24  0227   BUN mg/dL 20 22   CREATININE mg/dL 1.28 1.21         ENDO:  #PMH of pre-DM with recent A1c: 6.3  - Euglycemic   - Goal BG <180  - NO indication for SSI at this time     HEMATOLOGY:  #Acute blood loss anemia   #Iron deficiency anemia   Results from last 7 days   Lab Units 24  0652 24  0603   HEMOGLOBIN g/dL 12.6* 12.4*   HEMATOCRIT % 38.3* 38.6*   PLATELETS AUTO x10*3/uL 503* 537*   - Iron studies on  suggest iron deficiency   - Hg remains stable without active signs of bleeding  - Start iron sucrose 200mg IV daily x 5 days ( - )         INFECTIOUS DISEASE  Active issues:   # No acute issues    Results from last 7 days   Lab Units 24  0652 24  0603   WBC AUTO x10*3/uL 12.0* 11.8*      Temp (24hrs), Av.4 °C (97.5 °F), Min:36.2 °C (97.2 °F), Max:36.5 °C (97.7 °F)      Skin/Musculoskeletal:  Active issues:   # no acute issues        PROPHYLAXIS:  - DVT: SCD's, Coumadin w/ Heparin bridge   - GI:  Protonix     CODE STATUS: Full code     DISPO PLANNING/ SOCIAL:  - Disposition expectation: Insurance approval for transfer to [facility] (patient and family preference) obtained on 24. LVAD team to educated facility staff on 24 w/ discharge likely 12/3/24/     Patient  examined and discussed with attending physician Dr. Bhanu Mcmahon MD, MS, FACP   Heart Failure Fellow,  [facility]

## 2024-11-28 NOTE — CARE PLAN
The patient's goals for the shift include      The clinical goals for the shift include Patient will not have any runs of v-tach during shift    Patient remained safe and free of falls during shift. Patient reported mild back pain during shift. No events on tele during shift.

## 2024-11-28 NOTE — CARE PLAN
Problem: Heart Failure  Goal: Improved gas exchange this shift  Outcome: Progressing  Goal: Improved urinary output this shift  Outcome: Progressing  Goal: Reduction in peripheral edema within 24 hours  Outcome: Progressing  Goal: Report improvement of dyspnea/breathlessness this shift  Outcome: Progressing  Goal: Weight from fluid excess reduced over 2-3 days, then stabilize  Outcome: Progressing  Goal: Increase self care and/or family involvement in 24 hours  Outcome: Progressing     Problem: Chronic Conditions and Co-morbidities  Goal: Patient's chronic conditions and co-morbidity symptoms are monitored and maintained or improved  Outcome: Progressing     Problem: Discharge Planning  Goal: Discharge to home or other facility with appropriate resources  Outcome: Progressing   The patient's goals for the shift include      The clinical goals for the shift include Patient will not have any runs of v-tach during shift

## 2024-11-28 NOTE — PROGRESS NOTES
HF Attending Attestation Note    Briefly, 69M from [place], [place] followed by Dr. Washington for end-stage ischemic cardiomyopathy (LVEF 10-15%, NYHA IV), s/p HM3 LVAD implantation (11/12/24) as long therapy due to significant PAD, age, and patient preference, with a complicated post-op course involving RV dysfunction, delirium, NSVT, and malnutrition.    Exam: Euvolemic well appearing, LVAD hum    Heart Mate III interrogation (personally interrogated)  5200 rpm  Arterial Extended Pressure Measurements  Arterial Extended Diastolic Pressure: 58 mmHg  Arterial Extended Systolic Pressure: 123 mmHg  Arterial Extended Mean Pressure: 77 mmHg    Problems:   Principal Problem:    Acute on chronic heart failure with reduced ejection fraction and diastolic dysfunction  Active Problems:    Ischemic cardiomyopathy    Receiving inotropic medication    HTN (hypertension)    CAD (coronary artery disease)    MI (myocardial infarction) (Multi)    CHF (congestive heart failure)    Gastroesophageal reflux disease    Acute kidney injury (nontraumatic) (CMS-[facility])    Delirium    Plan:   - amiodarone load for NSVT, no ICD in place.   - PT/OT  - continue GDMT, no change today  - continue warfarin, may need to reduce with amiodarone initiation  - Continue 5200rpm LVAD speed to maintain AV opening (given descending aortic graft position) and prevent worsening AI. Septum is midline with no MR and with E>A on bedside echo ramp.     Dispo: Insurance approval for transfer to [facility] (patient and family preference) obtained on 11/27/24. LVAD team to educated facility staff on 12/2/24 w/ discharge likely 12/3/24/     Olvin Drummond MD    Objective   Admit Date: 10/24/2024  Hospital Length of Stay: 35   Home: [facility] [id number]    MEDICATIONS  Infusions:     Scheduled:  amiodarone, 400 mg, BID  aspirin, 81 mg, Daily  [START ON 11/29/2024] dapagliflozin propanediol, 10 mg, Daily  digoxin, 250 mcg, Daily  escitalopram, 10 mg,  Daily  icosapent ethyL, 2 g, BID  iron sucrose, 200 mg, Daily  lidocaine, 2 patch, Daily  magnesium oxide, 800 mg, BID  pantoprazole, 40 mg, Daily before breakfast  sacubitriL-valsartan, 1 tablet, BID  sennosides-docusate sodium, 1 tablet, BID  sildenafil, 20 mg, TID  spironolactone, 12.5 mg, Daily  warfarin, 7.5 mg, Daily      PRN:  acetaminophen, 975 mg, q6h PRN  ondansetron, 4 mg, q8h PRN  polyethylene glycol, 17 g, Daily PRN      Prior to Admission Meds:  Medications Prior to Admission   Medication Sig Dispense Refill Last Dose/Taking    aspirin 81 mg EC tablet Take 1 tablet (81 mg) by mouth once daily.       bumetanide (Bumex) 1 mg tablet Take 0.5 tablets (0.5 mg) by mouth once daily.       cholecalciferol (Vitamin D-3) 50,000 unit capsule Take 1 capsule (50,000 Units) by mouth 1 (one) time per week.       escitalopram (Lexapro) 10 mg tablet Take 1 tablet (10 mg) by mouth once daily.       hydrALAZINE (Apresoline) 25 mg tablet Take 1 tablet (25 mg) by mouth 2 times a day.       isosorbide mononitrate ER (Imdur) 120 mg 24 hr tablet Take 1 tablet (120 mg) by mouth once daily. Do not crush or chew.       metoprolol succinate XL (Toprol-XL) 50 mg 24 hr tablet Take 1 tablet (50 mg) by mouth once daily. Do not crush or chew.       multivitamin tablet Take 1 tablet by mouth once daily.       nitroglycerin (Nitrostat) 0.4 mg SL tablet Place 1 tablet (0.4 mg) under the tongue every 5 minutes if needed for chest pain.       pantoprazole (ProtoNix) 40 mg EC tablet Take 1 tablet (40 mg) by mouth once daily in the morning. Take before meals. Do not crush, chew, or split.       sacubitriL-valsartan (Entresto)  mg tablet Take 1 tablet by mouth 2 times a day.       spironolactone (Aldactone) 25 mg tablet Take 1 tablet (25 mg) by mouth once daily.       warfarin (Coumadin) 5 mg tablet Take 1 tablet (5 mg) by mouth once daily in the evening. Take as directed per After Visit Summary.          Vitals:      11/28/2024      7:47 AM 11/28/2024     6:02 AM 11/27/2024    11:50 PM 11/27/2024     7:59 PM 11/27/2024     3:00 PM 11/27/2024     2:10 PM 11/27/2024     1:30 PM   Vitals   Systolic  92 99 108 82     Diastolic  69 65 78 61     BP Location  Right arm Right arm Right arm Right arm     Heart Rate 74 76 78 88 87 85 84   Temp 36.5 °C (97.7 °F) 36.2 °C (97.2 °F) 36.3 °C (97.3 °F) 36.2 °C (97.2 °F) 36.5 °C (97.7 °F)     Resp 15 14 13 13 16     Weight (lb)  197.53        BMI  26.79 kg/m2        BSA (m2)  2.13 m2          Wt Readings from Last 5 Encounters:   11/28/24 89.6 kg (197 lb 8.5 oz)   10/24/24 92.5 kg (204 lb)   08/05/24 122 kg (268 lb)   01/31/22 119 kg (262 lb)   02/18/20 123 kg (270 lb 2 oz)       Intake/Output Summary (Last 24 hours) at 11/28/2024 1109  Last data filed at 11/28/2024 0747  Gross per 24 hour   Intake --   Output 1850 ml   Net -1850 ml       CHEST: Unlabored, Diminished  CV:  Normal sinus rhythm  NEURO:   RASS: Alert and calm  CAM: Negative  LOC: Alert  Cognition: Appropriate judgement, Appropriate safety awareness, Appropriate attention/concentration, Appropriate for developmental age  GCS: 15    DATA:  CMP:  Recent Labs     11/27/24  0652 11/27/24  0227 11/26/24  0603 11/25/24  0736 11/25/24  0250 11/24/24  0556 11/23/24  0451 11/22/24  1437 11/22/24  0304 11/21/24  1247    138 139 135* 133* 136 139 137 137 136   K 4.4 4.8 4.3 4.4 8.0* 4.6 4.3 5.2 4.2 4.0    105 103 105 102 104 105 103 102 101   CO2 24 23 26 16* 26 22 26 25 27 24   ANIONGAP 13 15 14 18 13 15 12 14 12 15   BUN 20 22 19 20 22 20 19 19 20 25*   CREATININE 1.28 1.21 1.39* 1.10 1.22 1.24 1.32* 1.35* 1.35* 1.47*   EGFR 61 65 55* 73 64 63 58* 57* 57* 51*   MG 2.18 2.12 2.13  --  2.47* 2.03 2.12  --  2.10 2.54*     Recent Labs     11/27/24  0652 11/26/24  0603 11/25/24  0736 11/25/24  0250 11/24/24  0556 11/23/24  0451 11/22/24  1437 11/22/24  0304 11/19/24  1459 11/19/24  0322 11/18/24  0525 11/17/24  1323 11/17/24  0122 11/16/24  1358  11/16/24  0128 11/15/24  1255 11/15/24  0021 11/14/24  1224 11/14/24  0118 11/13/24  1228 11/13/24  0007 11/12/24  1307   ALBUMIN 3.6 3.6 3.4 3.7 3.6 3.6 3.4 3.5   < > 3.1* 3.5   < > 3.4   < > 3.6   < > 3.7   < > 3.8 3.7   < > 3.7   ALT  --   --   --   --   --   --   --   --   --  6* 6*  --  3*  --  4*  --  5*  --  7* 11  --  15   AST  --   --   --   --   --   --   --   --   --  16 18  --  21  --  24  --  29  --  40* 42*  --  37   BILITOT  --   --   --   --   --   --   --   --   --  0.8 0.8  --  1.0  --  1.0  --  1.1  --  0.9 1.1  --  0.9    < > = values in this interval not displayed.     CBC:  Recent Labs     11/27/24  0652 11/26/24  0603 11/25/24  0250 11/24/24  0556 11/23/24  0451 11/22/24  0304 11/21/24  0426 11/20/24  0151   WBC 12.0* 11.8* 13.5* 15.4* 13.8* 10.9 10.2 10.7   HGB 12.6* 12.4* 12.1* 12.3* 12.5* 11.2* 10.4* 10.5*   HCT 38.3* 38.6* 37.2* 39.1* 40.1* 34.3* 32.5* 31.7*   * 537* 313 545* 529* 473* 455* 465*   MCV 84 85 83 86 87 83 84 82     COAG:   Recent Labs     11/28/24  0542 11/27/24  2037 11/27/24  1605 11/26/24  0957 11/26/24  0603 11/25/24  2348 11/25/24  0736 11/25/24  0250 11/24/24  2156 11/24/24  0556 11/23/24  0451 11/22/24  0304 11/21/24  1247 11/21/24  0426 11/13/24  0008 11/12/24  1257   INR 2.1*  --  1.8*  --  1.8*  --   --  1.8*  --  1.8* 1.9* 2.5*  --  2.1*   < > 1.4*   HAUF 0.6 0.5  --  0.5 0.4 0.2   < > 0.1   < >  --   --  0.1   < >  --    < >  --    FIBRINOGEN  --   --   --   --   --   --   --   --   --   --   --   --   --   --   --  301    < > = values in this interval not displayed.     ABO:   Recent Labs     11/20/24  0151   ABO A     HEME/ENDO:   Recent Labs     10/31/24  1249 10/24/24  2328   FERRITIN  --  76   IRONSAT  --  17*   TSH 4.39* 8.47*   FREET4  --  1.44   HGBA1C  --  6.3*     CARDIAC:   Recent Labs     11/27/24  0652 11/26/24  0603 11/25/24  0736 11/25/24  0250 11/24/24  0556 11/23/24  0451 11/22/24  0304 11/21/24  0426 11/12/24  1307 10/31/24  1249  "10/24/24  2328    264* 426* 725* 247* 240 233 231   < > 187  --    BNP  --   --   --   --   --   --   --   --   --  755* 1,995*    < > = values in this interval not displayed.     Recent Labs     11/27/24  0652   CHOL 126  126   HDL 24.6  24.6   TRIG 155*     TOX:  Recent Labs     10/31/24  1249   AMPHETAMINE Negative     MICRO: No results for input(s): \"ESR\", \"CRP\", \"PROCAL\" in the last 90888 hours.  No results found for the last 90 days.      EKG:   Recent Labs     11/21/24  0945 11/17/24  0855 11/12/24  1610   ATRRATE 81 138 105   VENTRATE 111 119 126   QRSDUR 146 112 140   QTCFRED 498 469 469   QTCCALCB 552 526 530     Encounter Date: 10/24/24   Electrocardiogram 12-lead PRN for arrhythmia   Result Value    Ventricular Rate 111    Atrial Rate 81    QRS Duration 146    QT Interval 406    QTC Calculation(Bazett) 552    R Axis 24    T Axis 45    QRS Count 18    Q Onset 226    P Onset 209    P Offset 224    T Offset 429    QTC Fredericia 498    Narrative    Atrial fibrillation with rapid ventricular response with premature ventricular or aberrantly conducted complexes  Left bundle branch block  Abnormal ECG  When compared with ECG of 20-NOV-2024 03:52,  Left bundle branch block has replaced Nonspecific intraventricular block  Criteria for Septal infarct are no longer Present  Criteria for Lateral infarct are no longer Present     Echocardiogram:   Recent Labs     11/26/24  1152 11/18/24  1144 11/15/24  0904 11/13/24  0851 11/12/24  0628 11/05/24  1154 10/25/24  1436   EF 18 10 13 15 18 18 18   LVIDD  --   --  5.90 6.90  --  8.21 7.70   RV  --   --   --  23.4  --  42.9 21.0   RVFRWALLPKSP  --   --   --  6.64  --  6.00  --    TAPSE  --   --   --   --   --  0.9 1.0   Transthoracic Echo (TTE) Complete With Contrast 10/25/2024    [facility], [address] [place] [address]  Tel [phone] and Fax [phone]    TRANSTHORACIC ECHOCARDIOGRAM REPORT      Patient Name:      " RJ Webb Physician:    [id number] Fawad Chicas MD  Study Date:        10/25/2024           Ordering Provider:    [id number] LANDON CARPENTER  MILI  MRN/PID:           [id number]             Fellow:  Accession#:        [id number]         Nurse:  Date of Birth/Age: 1955 / 69      Sonographer:          Nikkie estrada                                      RDBLANQUITA  Gender:            M                    Additional Staff:  Height:            182.88 cm            Admit Date:           10/24/2024  Weight:            93.44 kg             Admission Status:     Inpatient -  Routine  BSA / BMI:         2.16 m2 / 27.94      Encounter#:           [id number]  kg/m2  Blood Pressure:    113/62 mmHg          Department Location:  [address]    Study Type:    TRANSTHORACIC ECHO (TTE) COMPLETE  Diagnosis/ICD: Acute on chronic combined systolic (congestive) and diastolic  (congestive) heart failure (CHF)-I50.43; Ischemic  cardiomyopathy-I25.5  Indication:    Stage D heart failure  CPT Code:      Echo Complete w Full Doppler-99860    Patient History:  Pertinent History: CAD s/p 4V CABG and PCI, HF/ICM/HFrEF, HTN, DLD, VVI,  bicuspid AV, afib.    Study Detail: The following Echo studies were performed: 2D, M-Mode, Doppler and  color flow. Definity used as a contrast agent for endocardial  border definition and agitated saline used as a contrast agent for  intraseptal flow evaluation. Unable to obtain suprasternal notch  view. Patient's heart rhythm is atrial fibrillation.      PHYSICIAN INTERPRETATION:  Left Ventricle: Left ventricular ejection fraction is severely decreased, by visual estimate at 15-20%. There is global hypokinesis of the left ventricle with minor regional variations. The left ventricular cavity size is severely dilated. Abnormal (paradoxical) septal motion consistent with post-operative status. Left ventricular diastolic filling was not assessed. There is no definite left ventricular thrombus  visualized.  Left Atrium: The left atrium is moderately dilated. There is no evidence of a patent foramen ovale. A bubble study using agitated saline was performed. Bubble study is negative.  Right Ventricle: The right ventricle is moderately enlarged. There is severely reduced right ventricular systolic function. A device is visualized in the right ventricle.  Right Atrium: The right atrium is moderately dilated. There is a device visualized in the right atrium.  Aortic Valve: The aortic valve is structurally normal. There is mild to moderate aortic valve cusp calcification. The aortic valve dimensionless index is 0.57. There is trace aortic valve regurgitation. The peak instantaneous gradient of the aortic valve is 16.2 mmHg. The mean gradient of the aortic valve is 10.0 mmHg.  Mitral Valve: The mitral valve is normal in structure. There is mild mitral annular calcification. There is mild mitral valve regurgitation.  Tricuspid Valve: The tricuspid valve is structurally normal. There is trace tricuspid regurgitation. The Doppler estimated RVSP is within normal limits at 21.0 mmHg.  Pulmonic Valve: The pulmonic valve is structurally normal. There is no indication of pulmonic valve regurgitation.  Pericardium: Trivial pericardial effusion.  Aorta: The aortic root is normal.  Systemic Veins: The inferior vena cava was not well visualized.  In comparison to the previous echocardiogram(s): Compared with study dated 2/3/2020, LVEF is now severely reduced, Previoulsy reported LVEF is 40-45% with some regional hypokinesis. Primary team is aware because of a recent BRIANA which showed similar findinds.      CONCLUSIONS:  1. Left ventricular ejection fraction is severely decreased, by visual estimate at 15-20%.  2. There is global hypokinesis of the left ventricle with minor regional variations.  3. Left ventricular cavity size is severely dilated.  4. Abnormal septal motion consistent with post-operative status.  5. No left  ventricular thrombus visualized.  6. There is severely reduced right ventricular systolic function.  7. Moderately enlarged right ventricle.  8. The left atrium is moderately dilated.  9. The right atrium is moderately dilated.  10. Right ventricular systolic pressure is within normal limits.  11. There is no evidence of a patent foramen ovale.  12. Compared with study dated 2/3/2020, LVEF is now severely reduced, Previoulsy reported LVEF is 40-45% with some regional hypokinesis. Primary team is aware because of a recent BRIANA which showed similar findinds.    QUANTITATIVE DATA SUMMARY:    2D MEASUREMENTS:          Normal Ranges:  Ao Root d:       4.00 cm  (2.0-3.7cm)  LAs:             5.00 cm  (2.7-4.0cm)  IVSd:            0.90 cm  (0.6-1.1cm)  LVPWd:           0.90 cm  (0.6-1.1cm)  LVIDd:           7.70 cm  (3.9-5.9cm)  LVIDs:           6.70 cm  LV Mass Index:   155 g/m2  LV % FS          13.0 %      LA VOLUME:                    Normal Ranges:  LA Vol A4C:        77.3 ml    (22+/-6mL/m2)  LA Vol A2C:        97.8 ml  LA Vol BP:         89.5 ml  LA Vol Index A4C:  35.8ml/m2  LA Vol Index A2C:  45.3 ml/m2  LA Vol Index BP:   41.5 ml/m2  LA Area A4C:       25.0 cm2  LA Area A2C:       27.3 cm2  LA Major Axis A4C: 6.9 cm  LA Major Axis A2C: 6.5 cm  LA Volume Index:   41.5 ml/m2      RA VOLUME BY A/L METHOD:          Normal Ranges:  RA Area A4C:             25.3 cm2      LV SYSTOLIC FUNCTION BY 2D PLANIMETRY (MOD):  Normal Ranges:  EF-Visual:      18 %  LV EF Reported: 18 %      AORTIC VALVE:                      Normal Ranges:  AoV Vmax:                2.01 m/s  (<=1.7m/s)  AoV Peak P.2 mmHg (<20mmHg)  AoV Mean PG:             10.0 mmHg (1.7-11.5mmHg)  LVOT Max Jude:            1.12 m/s  (<=1.1m/s)  AoV VTI:                 36.30 cm  (18-25cm)  LVOT VTI:                20.70 cm  LVOT Diameter:           2.60 cm   (1.8-2.4cm)  AoV Area, VTI:           2.37 cm2  (2.5-5.5cm2)  AoV Area,Vmax:           2.32  cm2  (2.5-4.5cm2)  AoV Dimensionless Index: 0.57      RIGHT VENTRICLE:  RV Basal 4.70 cm  RV Mid   3.60 cm  RV Major 8.0 cm  TAPSE:   10.1 mm      TRICUSPID VALVE/RVSP:          Normal Ranges:  Peak TR Velocity:     2.12 m/s  RV Syst Pressure:     21 mmHg  (< 30mmHg)  IVC Diam:             1.90 cm      PULMONIC VALVE:          Normal Ranges:  PV Accel Time:  85 msec  (>120ms)  PV Max Jude:     0.9 m/s  (0.6-0.9m/s)  PV Max P.9 mmHg      [id number] Fawad Chicas MD  Electronically signed on 10/25/2024 at 3:00:23 PM        ** Final **    Coronary Angiography:   Left & Right Heart Cath w Angiography & LV 10/28/2024    [facility], Cath Lab, [address] [place] [address]    Cardiovascular Catheterization Report    Patient Name:      RJ LEIJA       Performing Physician:  [id number] Lindsay MD  Study Date:        10/28/2024            Verifying Physician:   Marie Lindsay MD  MRN/PID:           [id number]              Cardiologist/Co-Scrub:  Accession#:        [id number]          Ordering Provider:     [id number] LANDON GARCES  Date of Birth/Age: 1955 / 69 years Cardiologist:  Gender:            M                     Fellow:                [id number] Rodríguez Patten MD  Encounter#:        [id number]            Surgeon:      Study: Left Heart Cath with Grafts      Indications:  RJ LEIJA is a 69 year old male who presents with coronary artery disease, prior percutaneous coronary intervention, atrial fibrillation, chronic pulmonary disease, dyslipidemia, hypertension, diabetes and presents with ADHF and cardiogenic shock requiring ionotropic support currently being worked up for advanced heart failure therapies. Cardiomyopathy. Heart failure.    Appropriate Use Criteria:  Re-evaluation of know cardiomyopathy with change in clinical status or on cardiac exam or to guide therapy; AUC score = 7.  Medical History:  Stress test performed: No. CTA performed: Nuno Huggins  accessed: No. LVEF  Assessed: Yes. LVEF = 15%.  Cardiac arrest: No.  Cardiac surgical consult: No.  Cardiovascular instability: Yes. Cardiogenic shock and acute heart failure.  Frailty status of patient entering lab: 8 = Very severely frail.      Procedure Description:  Cardiac output was calculated via the Ernestina method.    Coronary Angiography:  The coronary circulation is right dominant.    Left Main Coronary Artery:  The left main coronary artery is a normal caliber vessel. The left main arises normally from the left coronary sinus of Valsalva and bifurcates into the LAD and circumflex coronary arteries. The distal left main coronary artery showed 30% stenosis.    Left Anterior Descending Coronary Artery Distribution:  The left anterior descending coronary artery is a normal caliber vessel. The LAD arises normally from the left main coronary artery. The proximal left anterior descending coronary artery showed 100% stenosis.    Circumflex Coronary Artery Distribution:  The circumflex coronary artery is a normal caliber vessel. The circumflex arises normally from the left main coronary artery and terminates in the AV groove. The circumflex revealed no significant disease or stenosis greater than 30% and Patent proximal LCx stents. The 1st obtuse marginal branch showed no significant disease or stenosis greater than 30% and Patent ostial-proximal ARIELLE. The 2nd obtuse marginal branch demonstrated no significant disease or stenosis greater than 30%.    Right Heart Catheterization:  Cardiac output was calculated via the Ernestina method. Elevated ventricular filling pressure. Pulmonary venous hypertension. Right heart hemodynamics on 0.375 mcg/kg/min:  RA 17, RV 39/13, RVEDP 17, PA 37/22, mean PAP 28, PA sat 71%, CO/CI 5.2/2.5.    Right Coronary Artery Distribution:    The right coronary artery is a normal caliber vessel. The RCA arises normally from the right sinus of Valsalva. The proximal right coronary artery showed 100%  stenosis.    Coronary Grafts:    LIMA Graft:  Left internal mammary artery graft conduit, originating in situ and attached to the distal left anterior descending, is patent.  Saphaneous Vein Graft(s):  The saphenous vein graft, originating from the aorta and attached to the 1st obtuse marginal, appeared totally occluded.  The 2nd saphenous vein graft, originating from the aorta and attached to the RCA, appeared totally occluded.  The 3rd saphenous vein graft, originating from the aorta and attached to the ramus intermedius, appeared totally occluded.    Coronary Lesion Summary:  Vessel      Stenosis    Vessel Segment  Left Main 30% stenosis      distal  LAD       100% stenosis    proximal  RCA       100% stenosis    proximal      Graft Stenosis Summary:  Graft      Destination of Graft                      % Stenosis  LIMA  distal left anterior  descending  SVG 1 1st obtuse marginal  SVG 2 RCA  SVG 3 ramus intermedius              Unable to engage and appears to be  occluded.      Hemo Personnel:  +----------------+---------+  Name            Duty       +----------------+---------+  Jed Lindsay MD 1  +----------------+---------+      Hemodynamic Pressures:    +----+----------+---------+------------+-------------+---+-----+-------+-------+  SiteDate Time   Phase    Systolic    Diastolic  ED Mean A-Wave V-Wave                   Name       mmHg        mmHg     mmHmmHg  mmHg   mmHg                                                     g                      +----+----------+---------+------------+-------------+---+-----+-------+-------+   Art10/28/2024     Rest         129           49      68                    9:47:13 AM                                                          +----+----------+---------+------------+-------------+---+-----+-------+-------+   Art10/28/2024     Rest         114           53      67                    9:47:17 AM                                                           +----+----------+---------+------------+-------------+---+-----+-------+-------+   Art10/28/2024     Rest         108           54      66                    9:47:20 AM                                                          +----+----------+---------+------------+-------------+---+-----+-------+-------+    RA10/28/2024     Rest                               17     16     18        10:18:57                                                                      AM                                                          +----+----------+---------+------------+-------------+---+-----+-------+-------+   Art10/28/2024     Rest          99           52      66                      10:18:57                                                                      AM                                                          +----+----------+---------+------------+-------------+---+-----+-------+-------+    RA10/28/2024     Rest                               18     17     19        10:19:09                                                                      AM                                                          +----+----------+---------+------------+-------------+---+-----+-------+-------+   Art10/28/2024     Rest         101           54      67                      10:19:09                                                                      AM                                                          +----+----------+---------+------------+-------------+---+-----+-------+-------+    RV10/28/2024     Rest          39           13 17                           10:19:30                                                                      AM                                                           +----+----------+---------+------------+-------------+---+-----+-------+-------+   Art10/28/2024     Rest          96           50      63                      10:19:30                                                                      AM                                                          +----+----------+---------+------------+-------------+---+-----+-------+-------+    PA10/28/2024     Rest          37           22      28                      10:20:06                                                                      AM                                                          +----+----------+---------+------------+-------------+---+-----+-------+-------+   Art10/28/2024     Rest          93           50      61                      10:20:06                                                                      AM                                                          +----+----------+---------+------------+-------------+---+-----+-------+-------+    AO10/28/2024     Rest         102           62      77                      10:38:05                                                                      AM                                                          +----+----------+---------+------------+-------------+---+-----+-------+-------+   Art10/28/2024     Rest         112           52      67                      10:38:05                                                                      AM                                                          +----+----------+---------+------------+-------------+---+-----+-------+-------+   Art10/28/2024     Rest         128           60      79                      10:59:15                                                                      AM                                                           +----+----------+---------+------------+-------------+---+-----+-------+-------+   Art10/28/2024     Rest         123           58      77                      11:01:12                                                                      AM                                                          +----+----------+---------+------------+-------------+---+-----+-------+-------+        Oxygen Saturation %:  +-----------+----------+------------+  Sample SiteO2 Sat (%)HB (g/100ml)  +-----------+----------+------------+           FA        96        14.9  +-----------+----------+------------+           PA        71        14.9  +-----------+----------+------------+           FA        96        14.9  +-----------+----------+------------+           PA        71        14.9  +-----------+----------+------------+      Cardiac Outputs:  +---------------+------------------+-------+  ESTEBAN CO (l/min)ESTEBAN CI (l/min/m2)ESTEBAN SV  +---------------+------------------+-------+              5.2               2.5   60.8  +---------------+------------------+-------+      Vascular Resistance Calculated Values (Wood Units):  +-----+----+----+---+----+----+----+-------+  PhaseSVR SVRITPRTPRITVR TVRITPR/TVR  +-----+----+----+---+----+----+----+-------+  0    [phone]        +-----+----+----+---+----+----+----+-------+      Complications:  No in-lab complications observed.    Cardiac Cath Post Procedure Notes:  Post Procedure Diagnosis: Diffuse severe native coronary artery disease.  Patent proximal LCx and ostial-prox OM 1 ARIELLE.  Patent LIMA-LAD, occluded SVG-OM, SVG-RI, SVG- RCA.  Elevated right and left sided filling pressures with  preserved cardiac output and index while on ionotropic  support with 0.375 mcg/kg/min of milrinone.  Blood Loss:               Estimated blood loss during the procedure was 5 cc  mls.  Specimens  Removed:        Number of specimen(s) removed: none.    ____________________________________________________________________________________  CONCLUSIONS:  1. Diffuse severe native coronary artery disease.  2. Patent proximal LCx and ostial-prox OM 1 ARIELLE.  3. Patent LIMA-LAD. Other grafts are occluded: SVG-OM, SVG-RI, SVG- RCA.  4. Elevated right and left sided filling pressures with preserved cardiac output and index while on ionotropic support with 0.375 mcg/kg/min of milrinone .  5. Further work-up and management per advanced heart failure team.    ICD 10 Codes:  Ischemic cardiomyopathy-I25.5; Cardiogenic shock-R57.0    CPT Codes:  Left Heart Cath (visualization of coronaries) and LV-16795; Ultrasound guidance for vascular access-85288; Moderate Sedation Services initial 15 minutes patient >5 years-50979; Moderate Sedation Services 1st additional 15 minutes patient >5 years-95564    [id number] Jed Lindsay MD  Performing Physician  Electronically signed by [id number] Jed Lindsay MD on 11/5/2024 at 7:06:05 AM          ** Final **    Right Heart Cath: No results found for this or any previous visit from the past 1800 days.    Cardiac Scoring: No results found for this or any previous visit from the past 1800 days.    Cardiac MRI: No results found for this or any previous visit from the past 1800 days.    Nuclear:No results found for this or any previous visit from the past 1800 days.    Metabolic Stress: No results found for this or any previous visit from the past 1800 days.      Transthoracic Echo (TTE) Limited    Result Date: 11/26/2024   [facility], [address] [place] [address]                Tel [phone] and Fax [phone] TRANSTHORACIC ECHOCARDIOGRAM REPORT  Patient Name:       JR Webb Physician:    [id number] Parker Terrell MD Study Date:         11/26/2024          Ordering Provider:    [id number] YANES                                                                SCOTT MRN/PID:            [id number]            Fellow: Accession#:         [id number]        Nurse: Date of Birth/Age:  1955 / 69     Sonographer:          Manuela estrada RDCS, IRMA Gender assigned at  M                   Additional Staff:     Melida Kaye Birth:                                                        RDCS Height:             182.88 cm           Admit Date: Weight:             87.54 kg            Admission Status:     Inpatient -                                                               Routine BSA / BMI:          2.10 m2 / 26.18     Encounter#:           [id number]                     kg/m2 Blood Pressure:     /                   Department Location:  [facility]                                                               Non Invasive Study Type:    TRANSTHORACIC ECHO (TTE) LIMITED Diagnosis/ICD: Presence of heart assist device-Z95.811 Indication:    RAMP study CPT Code:      Echo Limited-25590; Doppler Limited-08612; Color Doppler-17767 Patient History: Pertinent History: CAD. CABG x4 2012, PCI 2019, ICM, CHF, s/p HeartMate III                    LVAD, AF s/p RFA (PVI and CTI 4/2024). Study Detail: The following Echo studies were performed: 2D, M-Mode, Doppler and               color flow. Technically challenging study due to poor acoustic               windows.  PHYSICIAN INTERPRETATION: Left Ventricle: Left ventricular ejection fraction is severely decreased, by visual estimate at 15-20%. There is global hypokinesis of the left ventricle with minor regional variations. The left ventricular cavity size is severely dilated. Spectral Doppler shows a Grade II (pseudonormal pattern) of left ventricular diastolic filling with an elevated left atrial pressure. Left Atrium: The left atrium is enlarged. Right Ventricle: The right ventricle is severely enlarged.  There is reduced right ventricular systolic function. Right Atrium: The right atrium was not well visualized. Aortic Valve: The aortic valve is probably trileaflet. There is mild aortic valve cusp calcification. There is mild aortic valve regurgitation. Mitral Valve: The mitral valve is mildly thickened. There is trace mitral valve regurgitation. Tricuspid Valve: The tricuspid valve is structurally normal. There is trace tricuspid regurgitation. The right ventricular systolic pressure is unable to be estimated. Pulmonic Valve: The pulmonic valve is structurally normal. Pulmonic valve regurgitation was not assessed. Pericardium: Trivial pericardial effusion. Aorta: The aortic root is abnormal. There is mild dilatation of the ascending aorta. There is mild dilatation of the aortic root. Pulmonary Artery: The pulmonary artery is not well visualized. Systemic Veins: The inferior vena cava was not well visualized. In comparison to the previous echocardiogram(s): Compared with study dated 11/18/2024, no significant change.  LEFT VENTRICULAR ASSIST DEVICE: LVAD: The patient has a(n) HeartMate 3 LVAD device present. Inflow Cannula: Visualization of the inflow cannula is technically difficult. Outflow Cannula: Visualization of the outflow cannula is technically difficult. LVAD Comments: Ramp study. 5200 rpm at start and finish of limited echo.  CONCLUSIONS:  1. Left ventricular ejection fraction is severely decreased, by visual estimate at 15-20%.  2. There is global hypokinesis of the left ventricle with minor regional variations.  3. Spectral Doppler shows a Grade II (pseudonormal pattern) of left ventricular diastolic filling with an elevated left atrial pressure.  4. Left ventricular cavity size is severely dilated.  5. There is reduced right ventricular systolic function.  6. Severely enlarged right ventricle.  7. The left atrium is enlarged.  8. Mild aortic valve regurgitation.  9. The pulmonary artery is not well  visualized. QUANTITATIVE DATA SUMMARY:  M-MODE MEASUREMENTS:         Normal Ranges: Ao Root:             4.10 cm (2.0-3.7cm)  AORTA MEASUREMENTS:         Normal Ranges: Asc Ao, d:          4.20 cm (2.1-3.4cm)  LV SYSTOLIC FUNCTION BY 2D PLANIMETRY (MOD):                      Normal Ranges: EF-Visual:      18 % LV EF Reported: 18 %  LV DIASTOLIC FUNCTION:          Normal Ranges: MV Peak E:             0.50 m/s (0.7-1.2 m/s) MV Peak A:             0.44 m/s (0.42-0.7 m/s) E/A Ratio:             1.14     (1.0-2.2)  [id number] Parker Terrell MD Electronically signed on 11/26/2024 at 3:08:03 PM  ** Final **     CT abdomen pelvis w IV contrast    Result Date: 11/25/2024  Interpreted By:  Danya Siddiqi  and Shayla Araya STUDY: CT ABDOMEN PELVIS W IV CONTRAST;  11/25/2024 10:28 am   INDICATION: Signs/Symptoms:Copious serosanguinous drainage around driveline site. Eval for fluid collection..     COMPARISON: CT chest and pelvis without IV contrast 11/04/2024.   ACCESSION NUMBER(S): [id number]   ORDERING CLINICIAN: SOLIS DUEÑAS   TECHNIQUE: CT of the abdomen and pelvis was performed.  Standard contiguous axial images were obtained at 3 mm slice thickness through the abdomen and pelvis. Coronal and sagittal reconstructions at 3 mm slice thickness were performed.  90 ML of Omnipaque 350 was administered intravenously without immediate complication.   FINDINGS: LOWER CHEST: New small to moderate left-sided loculated pleural effusion and left lower lobe atelectatic changes. Bilateral interlobular septal thickening of the visualized lungs that likely represents a component of edema and atelectasis secondary to postoperative changes. LVAD device in place along the left ventricle with the outflow tract connecting to the mid descending thoracic aorta and driveline connecting to external device outside of the right chest wall.   Patient is status post previous median sternotomy. The heart is moderately enlarged but similar in size to  prior without pericardial effusion. 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 No pneumothorax. Visualized distal esophagus appears normal.   A hematoma adjacent to the drive line along the left chest wall measuring 9.1 x 3.7 x 4.9 cm (Series 201, Image 22) (Series 203, Image 61).   Additionally, there is a 5.2 x 2.4 x 2.2 cm fluid collection/loculated pleural effusion adjacent to the LVAD outflow tract in the posterior left lower lobe (series 201, image 12).   ABDOMEN:   LIVER: The liver is mildly enlarged in size measuring 19.3 cm in craniocaudal dimension. Large hypodense lesion in segment 6 of the liver measuring 4.2 x 2.3 x 2.8 cm (Series 203, Image 57) (Series 903, Image 78) that is overall indeterminate however appears similar to prior imaging.   BILE DUCTS: The intrahepatic and extrahepatic ducts are not dilated.   GALLBLADDER: The gallbladder is nondistended and without evidence of radiopaque stones.   PANCREAS: The pancreas appears unremarkable without evidence of ductal dilatation or masses.   SPLEEN: Splenomegaly measuring up to 19.5 cm craniocaudal dimension, not significantly changed compared to 11/04/2024 CT. Interval development of several wedge-shaped hypodense lesions in the spleen compared to 11/04/2024 CT, compatible with infarcts.   ADRENAL GLANDS: Bilateral adrenal glands appear normal.   KIDNEYS AND URETERS: The kidneys are normal in size and enhance symmetrically. Multiple too small to characterize hypodense lesions in bilateral kidneys are statistically favored to represent simple renal cysts. A 2.1 cm cystic lesion in the mid pole of the right kidney, likely a simple renal cyst.  No hydroureteronephrosis or nephroureterolithiasis is identified.   PELVIS:   BLADDER: The urinary bladder is decompressed, limited for evaluation. There is intraluminal air in the anterior bladder that is likely secondary to recent catheterization.   REPRODUCTIVE ORGANS: Mild prostatomegaly.   BOWEL:  The stomach is unremarkable. The small and large bowel are normal in caliber and demonstrate no wall thickening. Moderate colonic stool burden. Colonic diverticulosis without evidence of diverticulitis. The appendix is not definitely visualized. There is however no pericecal stranding or fluid.   VESSELS: There is no aneurysmal dilatation of the abdominal aorta. Moderate atherosclerotic disease of the abdominal aorta and its branches. The IVC appears normal. The splenic vasculature appears patent.   PERITONEUM/RETROPERITONEUM/LYMPH NODES: No ascites or free air, no fluid collection. No abdominopelvic lymphadenopathy is present.   BONES AND ABDOMINAL WALL: No suspicious osseous lesions are identified. Small acute left rib fracture that may be secondary to recent LVAD placement (Series 201, Image 2). Remote rib fractures of posterolateral left 9th through 11th ribs. Multilevel moderate degenerative discogenic disease is noted in the lower thoracic and lumbar spine particularly of the lower lumbar spine.   The abdominal wall soft tissues appear normal.       1.  Postoperative changes of recent LVAD placement. A 9.0 x 3.7 x 4.9 cm left chest wall hematoma adjacent to the driveline which appears otherwise intact with no evidence of fracture. Additionally, there is a 5.2 x 2.4 x 2.2 cm fluid collection/loculated pleural effusion adjacent to the LVAD outflow tract in the posterior left lower lobe. 2. Splenomegaly with interval development of scattered peripheral hypoenhancing wedge-shaped areas, compatible with infarcts. 3. In the visualized lung segments there are mild bilateral lung findings suggestive of pulmonary edema and atelectatic changes that are likely secondary to recent procedure. 4. Stable moderate cardiomegaly. 5. Stable indeterminate 4.3 cm hypodense lesion in segment V of the liver, which was better evaluated on 11/02/2024 CT liver protocol. 6. Additional stable chronic findings as described above.   I  personally reviewed the images/study and I agree with the findings as stated by Resident Dr. Quiana Mcguire MD. This study was interpreted at [facility], [place], [place].   MACRO: None   Signed by: Danya Siddiqi 11/25/2024 10:37 PM Dictation workstation:   [id number]    ECG 12 Lead    Result Date: 11/25/2024  Undetermined rhythm Nonspecific intraventricular block Cannot rule out Anterior infarct (cited on or before 24-OCT-2024) T wave abnormality, consider inferolateral ischemia Abnormal ECG When compared with ECG of 24-OCT-2024 23:03, Current undetermined rhythm precludes rhythm comparison, needs review QRS axis Shifted left Serial changes of Anterior infarct Present Confirmed by Lucas Kerns ([id number]) on 11/25/2024 12:12:55 PM    CT head wo IV contrast    Result Date: 11/24/2024  Interpreted By:  Des Thao, STUDY: CT HEAD WO IV CONTRAST;  11/24/2024 2:07 pm   INDICATION: Signs/Symptoms:LUE numbness in VAD patient.     COMPARISON: 11/17/2024   ACCESSION NUMBER(S): [id number]   ORDERING CLINICIAN: SOLIS DUEÑAS   TECHNIQUE: Noncontrast axial CT images of head were obtained with coronal and sagittal reconstructed images.   FINDINGS: BRAIN PARENCHYMA: Good infarct in the medial left cerebellar hemisphere. No acute intraparenchymal hemorrhage or parenchymal evidence of acute large territory ischemic infarct. Gray-white matter distinction is preserved. No mass-effect.   VENTRICLES and EXTRA-AXIAL SPACES:  No acute extra-axial or intraventricular hemorrhage. No effacement of cerebral sulci. The ventricles and sulci are age-concordant.   PARANASAL SINUSES/MASTOIDS:  No hemorrhage or air-fluid levels within the visualized paranasal sinuses. The mastoids are well aerated.   CALVARIUM/ORBITS:  No skull fracture.  The orbits and globes are intact to the extent visualized.   EXTRACRANIAL SOFT TISSUES: No discernible abnormality.       No acute intracranial abnormality.     MACRO: None.    Signed by: Des Thao 11/24/2024 3:07 PM Dictation workstation:   [id number]    XR chest 1 view    Result Date: 11/23/2024  Interpreted By:  Olvin Farias, STUDY: XR CHEST 1 VIEW; 11/23/2024 7:15 am   INDICATION: Signs/Symptoms:s/p LVAD implant, assess lung fields, devices.   COMPARISON: 11/22/2024   ACCESSION NUMBER(S): [id number]   ORDERING CLINICIAN: ANETA MANNING   FINDINGS:     CARDIOMEDIASTINAL SILHOUETTE: Cardiomegaly versus pericardial effusion. Post LVAD changes.   LUNGS: Mild perihilar interstitial edema and left-sided effusion. Continued left retrocardiac atelectasis/effusion. No pneumothorax.   ABDOMEN: No remarkable upper abdominal findings.   BONES: No acute osseous changes.       1.  Post LVAD changes with residual left basilar loculated fluid/atelectasis.     Signed by: Olvin Farias 11/23/2024 4:41 PM Dictation workstation:   [id number]    XR chest 1 view    Result Date: 11/22/2024  Interpreted By:  Olvin Farias, STUDY: XR CHEST 1 VIEW; 11/22/2024 8:22 am   INDICATION: Signs/Symptoms:s/p LVAD implant, assess lung fields, devices.   COMPARISON: 11/21/2024.   ACCESSION NUMBER(S): [id number]   ORDERING CLINICIAN: ANETA MANNING   FINDINGS:     CARDIOMEDIASTINAL SILHOUETTE: Cardiomegaly versus pericardial effusion. Patient is status post median sternotomy and LVAD.   LUNGS: There is mild perihilar interstitial edema. Left retrocardiac atelectasis versus loculated effusion. No pneumothorax.   ABDOMEN: No remarkable upper abdominal findings.   BONES: No acute osseous changes.       1.  Question left basilar consolidation/effusion status post LVAD. Consider follow-up with PA and lateral x-ray as clinically indicated.     Signed by: Olvin Farias 11/22/2024 4:44 PM Dictation workstation:   [id number]    XR chest 1 view    Result Date: 11/21/2024  Interpreted By:  Frank Connolly, STUDY: XR CHEST 1 VIEW;  11/21/2024 8:28 am   INDICATION: Signs/Symptoms:s/p LVAD implant, assess lung  fields, devices.     COMPARISON: Exam dated 11/20/2024   ACCESSION NUMBER(S): [id number]   ORDERING CLINICIAN: ANETA MANNING   FINDINGS: AP radiograph of the chest was provided.   Status post median sternotomy. LVAD device is again visualized.   CARDIOMEDIASTINAL SILHOUETTE: Stable severe enlargement of the cardiac silhouette.   LUNGS: Left costophrenic angle is partially excluded from view due to LVAD device. Lungs otherwise demonstrate prominent interstitial markings. No new focal consolidation, right-sided pleural effusion, or evidence of pneumothorax. Focal lucency adjacent to the LVAD is similar to the recent exam.   ABDOMEN: No remarkable upper abdominal findings.   BONES: No acute osseous changes.       1.  Stable severe cardiomegaly with findings suggestive of pulmonary edema. 2. Additional stable focal lucency adjacent to the LVAD device may represent aerated lung versus postprocedural changes of LVAD placement.       MACRO: None   Signed by: Frank Connolly 11/21/2024 1:04 PM Dictation workstation:   [id number]    Electrocardiogram 12-lead PRN for arrhythmia    Result Date: 11/21/2024  Atrial fibrillation with rapid ventricular response with premature ventricular or aberrantly conducted complexes Left bundle branch block Abnormal ECG When compared with ECG of 20-NOV-2024 03:52, Left bundle branch block has replaced Nonspecific intraventricular block Criteria for Septal infarct are no longer Present Criteria for Lateral infarct are no longer Present    XR chest 1 view    Result Date: 11/20/2024  Interpreted By:  Hemanth Rock and Ogievich Taessa STUDY: XR CHEST 1 VIEW;  11/20/2024 7:51 am   INDICATION: Signs/Symptoms:s/p LVAD implant, assess lung fields, devices.     COMPARISON: Chest x-ray 11/19/2024-11/14/2024   ACCESSION NUMBER(S): [id number]   ORDERING CLINICIAN: ANETA MANNING   FINDINGS: AP radiograph of the chest was provided.   LINES/TUBES: Unchanged positioning of LVAD. Postsurgical changes from  median sternotomy with intact sternal cerclage wires. Multiple surgical clips again overlie left heart border.   CARDIOMEDIASTINAL SILHOUETTE: Cardiomediastinal silhouette is persistently enlarged, however stable in size and configuration when compared to prior..   LUNGS: Continued improvement in interstitial lung markings with patchy airspace opacities. Similar to slightly improved right perihilar and left mid basilar lung presumed atelectasis. Persistent blunting of the left costophrenic angle. No sizable pneumothorax.   ABDOMEN: No remarkable upper abdominal findings.   BONES: Partial visualization of left shoulder arthroplasty. No acute osseous changes.       1. Slight interval improvement in mild pulmonary edema. 2. Findings suggestive of persistent small left pleural effusion.   I personally reviewed the images/study and I agree with the findings as stated by Emery Mclain DO, PGY-3. This study was interpreted at [place], [place].   MACRO: None   Signed by: Hemanth Rock 11/20/2024 4:01 PM Dictation workstation:   [id number]    XR chest 1 view    Result Date: 11/19/2024  Interpreted By:  Mazin Blanco,  and [place] STUDY: XR CHEST 1 VIEW;  11/19/2024 3:50 am   INDICATION: Signs/Symptoms:s/p LVAD implant, assess lung fields, devices.   COMPARISON: Chest radiograph 11/18/2024-11/16/2024 CT chest, abdomen and pelvis 11/04/2024   ACCESSION NUMBER(S): [id number]   ORDERING CLINICIAN: ANETA MANNING   FINDINGS: AP supine radiograph of the chest was provided. There is slightly better patient centering on present study.   MEDICAL DEVICES/LINES: Unchanged positioning of LVAD. Postsurgical changes from median sternotomy with intact sternal cerclage wires. Multiple surgical clips again overlie left heart border.   CARDIOMEDIASTINAL SILHOUETTE: Cardiomediastinal silhouette is persistently enlarged, stable in size, but now with better delineation of bilateral heart  borders as compared to prior radiograph from yesterday.   LUNGS: Improved interstitial lung markings with patchy airspace opacities. Improved but residual right perihilar and left mid basilar lung presumed atelectasis. Persistent blunting of the left costophrenic angle. No sizable pneumothorax.   ABDOMEN: No remarkable upper abdominal findings.   BONES: Partial visualization of left shoulder arthroplasty. No acute osseous changes.       1. Interval improvement in bilateral lung aeration with residual mild edema and left mid basilar lung predominant atelectasis with suggestion of small left pleural effusion   I personally reviewed the images/study and I agree with the findings as stated by Emery Mclain DO, PGY-3. This study was interpreted at [facility], [place], [place].   MACRO: None   Signed by: Mazin Blanco 11/19/2024 1:02 PM Dictation workstation:   [id number]    Electrocardiogram 12-lead PRN for arrhythmia    Result Date: 11/19/2024  Atrial fibrillation with rapid ventricular response with premature ventricular or aberrantly conducted complexes Low voltage QRS Inferior infarct (cited on or before 17-NOV-2024) Abnormal ECG When compared with ECG of 17-NOV-2024 08:43, Previous ECG has undetermined rhythm, needs review Questionable change in QRS duration Borderline criteria for Lateral infarct are no longer Present Confirmed by Lucas Kerns ([id number]) on 11/19/2024 8:57:22 AM    Transthoracic Echo (TTE) Limited    Result Date: 11/18/2024   [facility], [address] [place] [address]                Tel [phone] and Fax [phone] TRANSTHORACIC ECHOCARDIOGRAM REPORT  Patient Name:       RJ Webb Physician:    [id number] Fawad Chicas MD Study Date:         11/18/2024          Ordering Provider:    [id number] HARRISON VICKERS                                                                GEORGETTE MRN/PID:            [id number]            Fellow: Accession#:         [id number]        Nurse: Date of Birth/Age:  1955 / 69     Sonographer:          Genesis Sinclair RDCS                     years Gender assigned at                     Additional Staff: Birth: Height:             182.88 cm           Admit Date:           10/24/2024 Weight:             93.44 kg            Admission Status:     Inpatient -                                                               Routine BSA / BMI:          2.16 m2 / 27.94     Encounter#:           [id number]                     kg/m2 Blood Pressure:     /                   Department Location:  [facility] Study Type:    TRANSTHORACIC ECHO (TTE) LIMITED Diagnosis/ICD: Presence of heart assist device-Z95.811 Indication:    Ramp study for LVAD CPT Code:      Echo Limited-29680; Doppler Limited-41873; Color Doppler-77779 Patient History: Pertinent History: Acute on chronic heart failure with reduced EF, Diastolic                    disfunction, Ischemic cardiiomyopathy, MI, CHF, LVAD, VIV. Study Detail: The following Echo studies were performed: 2D, Doppler, color flow               and M-Mode. Technically challenging study due to patient lying in               supine position and postoperative dressings.  PHYSICIAN INTERPRETATION: Left Ventricle: Left ventricular ejection fraction is severely decreased, by visual estimate at 10%. There is global hypokinesis of the left ventricle with minor regional variations. The left ventricular cavity size is severely dilated. Abnormal (paradoxical) septal motion consistent with post-operative status. Left ventricular diastolic filling was not assessed. Left Atrium: The left atrium is moderate to severely dilated. Right Ventricle: The right ventricle is severely enlarged. There is severely reduced right ventricular systolic function. Right Atrium: The right atrium is moderately dilated. Aortic Valve: The aortic valve is  structurally normal. There is mild aortic valve cusp calcification. There is mild aortic valve regurgitation. Mitral Valve: The mitral valve is normal in structure. There is mild mitral valve regurgitation. Tricuspid Valve: The tricuspid valve is structurally normal. There is mild tricuspid regurgitation. Pulmonic Valve: The pulmonic valve is not well visualized. Pulmonic valve regurgitation was not assessed. Pericardium: Trivial pericardial effusion. Aorta: The aortic root is normal. In comparison to the previous echocardiogram(s): Compared with study dated 11/15/2024, no significant change.  LEFT VENTRICULAR ASSIST DEVICE: LVAD: The patient has a(n) HeartMate 3 LVAD device present. Inflow Cannula: The inflow cannula is visualized in the left ventricular apex. AV Leaflet Mobility: . The aortic valve appears to be opening every 1 beats. LVAD Comments: Ramp study speed increased from 5200 to 5300. Aortic valve still opens every beat. Septum remains in the midline.  CONCLUSIONS:  1. Poorly visualized anatomical structures due to suboptimal image quality.  2. Left ventricular ejection fraction is severely decreased, by visual estimate at 10%.  3. There is global hypokinesis of the left ventricle with minor regional variations.  4. Left ventricular cavity size is severely dilated.  5. Abnormal septal motion consistent with post-operative status.  6. There is severely reduced right ventricular systolic function.  7. Severely enlarged right ventricle.  8. The left atrium is moderate to severely dilated.  9. The right atrium is moderately dilated. 10. Mild aortic valve regurgitation. 11. . The aortic valve appears to be opening every 1 beats. 12. Compared with study dated 11/15/2024, no significant change. QUANTITATIVE DATA SUMMARY:  LV SYSTOLIC FUNCTION BY 2D PLANIMETRY (MOD):                      Normal Ranges: EF-Visual:      10 % LV EF Reported: 10 %  [id number] Fawad Chicas MD Electronically signed on 11/18/2024 at  12:33:46 PM  ** Final **     XR chest 1 view    Result Date: 11/18/2024  Interpreted By:  Elvis Ross, STUDY: XR CHEST 1 VIEW; 11/18/2024 6:47 am   INDICATION: Signs/Symptoms:s/p LVAD implant, assess lung fields, devices.   COMPARISON: Radiograph dated 11/17/2024   ACCESSION NUMBER(S): [id number]   ORDERING CLINICIAN: ANETA MANNING   FINDINGS: Interval removal of Newhall-Faviola catheter. Unchanged positioning of LVAD.   Cardiac silhouette size is persistently enlarged.   Increased interstitial markings and ground-glass opacity, worse compared to prior study. Bilateral basilar pleural effusion and atelectasis. No sizable pneumothorax.   No acute osseous abnormality.       1. Worsening pulmonary edema. Correlate with volume status. 2. Persistent bibasilar pleural effusion and atelectasis with slight worsening of the aeration of the bases.       Signed by: Elvis Sagastume 11/18/2024 10:57 AM Dictation workstation:   [id number]    CT head wo IV contrast    Result Date: 11/18/2024  Interpreted By:  Amarilis Owen and Beyersdorf Conner STUDY: CT HEAD WO IV CONTRAST;  11/17/2024 10:54 pm   INDICATION: Signs/Symptoms:tranient vision changes, s/p LVAD.     COMPARISON: None.   ACCESSION NUMBER(S): [id number]   ORDERING CLINICIAN: ANETA MANNING   TECHNIQUE: Noncontrast axial CT scan of head was performed. Angled reformats in brain and bone windows were generated. The images were reviewed in bone, brain, blood and soft tissue windows.   FINDINGS: CSF Spaces: The ventricles, sulci and basal cisterns are within normal limits. There is no extraaxial fluid collection.   Parenchyma:  The grey-white differentiation is intact. Mild patchy nonspecific white matter hypodensity is compatible with microangiopathy. There is no mass effect or midline shift.  There is no intracranial hemorrhage.   Calvarium: The calvarium is unremarkable.   Paranasal sinuses and mastoids: Visualized paranasal sinuses and mastoids are clear.        No acute intracranial hemorrhage or mass effect.   I personally reviewed the image(s)/study and resident interpretation. I agree with the findings as stated by resident Rich Mccurdy. Data analyzed and images interpreted at [facility], [place], [place].   MACRO: None   Signed by: Amarilis Owen 11/18/2024 7:20 AM Dictation workstation:   [id number]    XR chest 1 view    Result Date: 11/17/2024  Interpreted By:  Elvis Ross, STUDY: XR CHEST 1 VIEW; 11/17/2024 4:04 am   INDICATION: Signs/Symptoms:s/p LVAD implant, assess lung fields, devices.   COMPARISON: Radiograph dated 11/16/2024   ACCESSION NUMBER(S): [id number]   ORDERING CLINICIAN: ANETA MANNING   FINDINGS: Right IJ Ki[place]-Faviola catheter is in place with the tip projecting over the right main pulmonary artery. LVAD is stable in position.   Cardiac silhouette size is persistently enlarged. Status post median sternotomy.   Persistent bibasilar atelectasis and interstitial edema with slight improvement in the aeration of the lungs. Small bibasilar pleural effusion, left more than right. No sizable pneumothorax.   No acute osseous abnormality.       1. Slight interval improvement in pulmonary edema and bibasilar atelectasis. Small bibasilar pleural effusion, left more than right. No pneumothorax.       Signed by: Elvis Sagastume 11/17/2024 9:44 AM Dictation workstation:   [id number]    XR chest 1 view    Result Date: 11/16/2024  Interpreted By:  Elvis Ross, STUDY: XR CHEST 1 VIEW; 11/16/2024 3:47 am   INDICATION: Signs/Symptoms:s/p LVAD implant, assess lung fields, devices.   COMPARISON: Radiograph dated 11/15/2024   ACCESSION NUMBER(S): [id number]   ORDERING CLINICIAN: ANETA MANNING   FINDINGS: Right IJ approach Kiowa-Faviola catheter is in place with the tip projecting over the proximal right main pulmonary artery. LVAD is unchanged in position.   Status post median sternotomy. The cardiac  silhouette size is significantly enlarged, unchanged.   Persistent bibasilar pleural effusion and bibasilar atelectasis and mild perihilar edema, unchanged. No sizable pneumothorax.   No acute osseous abnormality.       No significant interval change in bilateral pleural effusions with bibasilar atelectasis and mild perihilar congestion/edema.     Signed by: Elvis Sagastume 11/16/2024 10:28 AM Dictation workstation:   [id number]    XR chest 1 view    Result Date: 11/15/2024  Interpreted By:  Tuan Platt, STUDY: XR CHEST 1 VIEW;  11/15/2024 3:29 am   INDICATION: Signs/Symptoms:s/p LVAD implant, assess lung fields, devices.   COMPARISON: Chest radiograph dated 11/14/2024   ACCESSION NUMBER(S): [id number]   ORDERING CLINICIAN: ANETA MANNING   FINDINGS: AP radiograph of the chest   The swans Faviola catheter is within the distal right pulmonary artery. VD projects overlying the left lower thorax.   Sternal sutures are noted. Vascular clips overlie the mediastinum. There is moderately severe cardiomegaly. Opacity obscures the left diaphragm and costophrenic sulcus.       1. Pulmonary aeration is similar to the prior study Opacity within left lower lobe persists obscuring the left diaphragm laterally and the costophrenic sulcus       Signed by: Tuan Platt 11/15/2024 12:27 PM Dictation workstation:   [id number]    Transthoracic Echo (TTE) Limited    Result Date: 11/15/2024   [facility], [address] [place] [address]                Tel [phone] and Fax [phone] TRANSTHORACIC ECHOCARDIOGRAM REPORT  Patient Name:       RJ Webb Physician:    [id number] Fawad Chicas MD Study Date:         11/15/2024          Ordering Provider:    [id number] ANETA MANNING MRN/PID:            [id number]            Fellow:               [id number] Self                                                                Amari NUNES Accession#:         [id number]        Nurse: Date of Birth/Age:  1955 / 69     Sonographer:          Jackson estrada                                     BLANQUITA Gender assigned at  M                   Additional Staff: Birth: Height:             182.88 cm           Admit Date: Weight:             97.07 kg            Admission Status:     Inpatient - STAT BSA / BMI:          2.19 m2 / 29.02     Encounter#:           [id number]                     kg/m2 Blood Pressure:     107/71 mmHg         Department Location:  [facility] Study Type:    TRANSTHORACIC ECHO (TTE) LIMITED Diagnosis/ICD: Presence of heart assist device-Z95.811 Indication:    LVAD ramp study CPT Code:      Echo Limited-28349; Doppler Limited-40988; Color Doppler-20616 Patient History: Pertinent History: S/p CABG x 4 (2012) and PCI (LCx/OM; 5/2019), Stage D ICM                    HFrEF LVEF 10-15%, AF s/p RFA (PVI and CTI; 4/2024), HTN,                    Dyslipidemia, 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 Study Detail: The following Echo studies were performed: 2D, M-Mode, Doppler and               color flow. Technically challenging study due to poor acoustic               windows, prominent lung artifact, body habitus and patient lying               in supine position.  PHYSICIAN INTERPRETATION: Left Ventricle: The left ventricular systolic function is severely decreased, with a visually estimated ejection fraction of 10-15%. There is global hypokinesis of the left ventricle with minor regional variations. The left ventricular cavity size is severely dilated. There is normal septal and normal posterior left ventricular wall thickness. Abnormal (paradoxical) septal motion consistent with post-operative status. Left ventricular diastolic filling cannot be determined, due to left ventricular assist device.  Left Atrium: The left atrium is mildly dilated. Right Ventricle: The right ventricle is severely enlarged. There is severely reduced right ventricular systolic function. A device is visualized in the right ventricle. Right Atrium: The right atrium is severely dilated. There is a device visualized in the right atrium. Aortic Valve: The aortic valve is structurally normal. There is mild to moderate aortic valve cusp calcification. There is mild aortic valve regurgitation. Mitral Valve: The mitral valve is normal in structure. There is mild mitral annular calcification. There is mild mitral valve regurgitation. Tricuspid Valve: The tricuspid valve is structurally normal. There is mild tricuspid regurgitation. The right ventricular systolic pressure is unable to be estimated. Pulmonic Valve: The pulmonic valve is not well visualized. Pulmonic valve regurgitation was not assessed. Pericardium: Trivial pericardial effusion. Aorta: The aortic root is abnormal. There is mild dilatation of the ascending aorta. There is mild dilatation of the aortic root. In comparison to the previous echocardiogram(s): Compared with study dated 11/13/2024, no significant change.  LEFT VENTRICULAR ASSIST DEVICE: Study Type: This study was performed while LVAD settings were optimized. LVAD: The patient has a(n) HeartMate 3 LVAD device present. Inflow Cannula: The inflow cannula is visualized in the left ventricular apex. Outflow Cannula: Visualization of the outflow cannula is technically difficult. AV Leaflet Mobility: . The aortic valve appears to be opening every 1 beats.  CONCLUSIONS:  1. The left ventricular systolic function is severely decreased, with a visually estimated ejection fraction of 10-15%.  2. There is global hypokinesis of the left ventricle with minor regional variations.  3. Left ventricular diastolic filling cannot be determined, due to left ventricular assist device.  4. Left ventricular cavity size is severely dilated.   5. Abnormal septal motion consistent with post-operative status.  6. There is severely reduced right ventricular systolic function.  7. Severely enlarged right ventricle.  8. The left atrium is mildly dilated.  9. The right atrium is severely dilated. 10. Mild aortic valve regurgitation. 11. . The aortic valve appears to be opening every 1 beats. 12. Compared with study dated 11/13/2024, no significant change. QUANTITATIVE DATA SUMMARY:  2D MEASUREMENTS:          Normal Ranges: IVSd:            1.00 cm  (0.6-1.1cm) LVPWd:           1.00 cm  (0.6-1.1cm) LVIDd:           5.90 cm  (3.9-5.9cm) LV Mass Index:   109 g/m2  LA VOLUME:                   Normal Ranges: LA Vol A4C:        86.2 ml   (22+/-6mL/m2) LA Vol Index A4C:  39.3ml/m2 LA Area A4C:       24.2 cm2 LA Major Axis A4C: 5.8 cm  AORTA MEASUREMENTS:         Normal Ranges: Ao Sinus, d:        4.30 cm (2.1-3.5cm)  LV SYSTOLIC FUNCTION BY 2D PLANIMETRY (MOD):                      Normal Ranges: EF-Visual:      13 % LV EF Reported: 13 %  [id number] Fawad Chicas MD Electronically signed on 11/15/2024 at 12:09:54 PM  ** Final **     XR chest 1 view    Result Date: 11/14/2024  Interpreted By:  Tuan Platt, STUDY: XR CHEST 1 VIEW;  11/14/2024 8:07 am   INDICATION: Signs/Symptoms:s/p LVAD implant, assess lung fields, devices.   COMPARISON: Chest radiograph dated 11/13/2024   ACCESSION NUMBER(S): [id number]   ORDERING CLINICIAN: ANETA MANNING   FINDINGS: AP radiograph of the chest   The endotracheal tube is above the carlo in satisfactory position. The swans Faviola catheter is positioned within the right pulmonary artery. The enteric tube traverses the esophagus. LVAD projects over lying the left lower thorax. Chest tubes are noted.   There is moderate cardiomegaly stable in comparison to previous study. Left lower lobe opacity is noted. Overall pulmonary aeration is similar previous study.       1. Left lower lobe opacity persists similar previous study 2. The pulmonary  vascular distribution is similar.       Signed by: Tuan Platt 11/14/2024 12:33 PM Dictation workstation:   [id number]    XR chest 1 view    Result Date: 11/13/2024  Interpreted By:  Tuan Platt and Omar Mahmoud STUDY: XR CHEST 1 VIEW;  11/13/2024 4:41 am   INDICATION: Signs/Symptoms:Post op cardiac surgery.     COMPARISON: Chest x-ray 11/12/2024 6:15 p.m., CT chest 11/04/2024   ACCESSION NUMBER(S): [id number]   ORDERING CLINICIAN: ANETA MANNING   FINDINGS: AP radiograph of the chest was provided.   Patient is mildly rotated to the right which limits evaluation and comparison.   Postoperative findings from median sternotomy. Endotracheal tube tip projects approximately 6.2 cm from the carlo. Right IJ approach pulmonary arterial catheter tip projects over the distal right main pulmonary artery. Again seen LVAD projecting over the left lower thorax. 2 left chest tubes. Enteric tube tip projects over the gastric fundus with the proximal side port likely above the gastroesophageal junction. Again seen left shoulder arthroplasty.   CARDIOMEDIASTINAL SILHOUETTE: There is moderate cardiomegaly. Cardiomediastinal silhouette is stable in size and configuration.   LUNGS: No pneumothorax. Decrease in diffuse interstitial opacities. There is no new focal consolidation. The bilateral costophrenic angles are clear.   ABDOMEN: No remarkable upper abdominal findings.   BONES: No acute osseous changes.       1. Similar positioning of an enteric tube with maximal side-port likely above the gastroesophageal junction. Consider advancement of the enteric tube. Stable positioning of other medical devices as described above. 2. Decrease in pulmonary interstitial edema. There are no new cardiopulmonary abnormalities.   I personally reviewed the images/study and I agree with the findings as stated by Zcak Ortiz MD (PGY-2). This study was interpreted at [facility], [place], [place].   MACRO: None    Signed by: Tuan Platt 11/13/2024 2:00 PM Dictation workstation:   [id number]    Anesthesia Intraoperative Transesophageal Echocardiogram    Result Date: 11/13/2024  [facility] - Dept of Anesthesiology    [address] [place] [address]       Tel [phone] and Fax [phone] TRANSESOPHAGEAL ECHOCARDIOGRAM REPORT  Patient Name:       RJ LEIJA     Tracey Physician:    [id number] Fred Ortiz MD Study Date:         11/12/2024          Ordering Provider:    [id number] CHIP BANKS MRN/PID:            [id number]            Fellow: Accession#:         [id number]        Nurse: Date of Birth/Age:  1955 / 69     Sonographer:                     years Gender assigned at                     Additional Staff: Birth: BSA / BMI:          m2 / kg/m2          Encounter#:           [id number] Blood Pressure:     /                   Department Location:  [place]                                                               Anesthesia Study Type:    ANESTHESIA INTRAOPERATIVE BRIANA Diagnosis/ICD: Dilated cardiomyopathy-I42.0 Indication:    Left Ventricular Assist Device CPT Code:      BRIANA Complete-29452; Doppler Limited-06796; Color Doppler-72335 PHYSICIAN INTERPRETATION: BRIANA Details: Technically adequate omniplane transesophageal echocardiogram performed. BRIANA Procedure: The probe was passed without difficulty. Left Ventricle: The left ventricular systolic function is severely decreased, with a visually estimated ejection fraction of 15-20%. The left ventricular cavity size is severely dilated. The left ventricular septal wall thickness is mildly increased. Left ventricular diastolic filling was not assessed. Left Atrium: The left atrium is severely dilated. The left atrial appendage Doppler velocities are mildly reduced and there is no thrombus visualized in the left atrial  appendage. Right Ventricle: The right ventricle is severely enlarged. There is moderately reduced right ventricular systolic function. Right Atrium: The right atrium is moderately dilated. Aortic Valve: The aortic valve is trileaflet. There is evidence of mild aortic valve stenosis. There is mild aortic valve regurgitation. Calcified valve with fused right and left cusp. Central jet angled towards anterior leaflet of ana valve. Mitral Valve: The mitral valve is mildly thickened. There is no evidence of mitral valve stenosis. There is mild mitral valve regurgitation. Tricuspid Valve: The tricuspid valve is structurally normal. There is mild to moderate tricuspid regurgitation. Pulmonic Valve: The pulmonic valve is structurally normal. There is trace pulmonic valve regurgitation. Pericardium: There is no pericardial effusion noted. Aorta: The aortic root is normal. In comparison to the previous echocardiogram(s): Not performed on intraoperative study.  CONCLUSIONS:  1. The left ventricular systolic function is severely decreased, with a visually estimated ejection fraction of 15-20%.  2. Left ventricular cavity size is severely dilated.  3. There is moderately reduced right ventricular systolic function.  4. Severely enlarged right ventricle.  5. The left atrium is severely dilated.  6. The right atrium is moderately dilated.  7. Mild to moderate tricuspid regurgitation visualized.  8. Mild aortic valve stenosis.  9. Mild aortic valve regurgitation. POST PROCEDURE REPORT: Patient is on epinephrine and milrinone drips. LV function is unchanged from pre-pump exam. LVAD inflow cannula in good position in LV apex with no obstructin to flow. RV function is mildly improved from pre-pump exam. There is mild aortic regurgitation. Aortic regurgitation is unchanged. No evidence of post aortic cannulation dissection. All transesophageal echocardiogram findings discussed with surgeon. LVAD outflow graft to descending aorta  appears to have good flow.  QUANTITATIVE DATA SUMMARY:  LV SYSTOLIC FUNCTION BY 2D PLANIMETRY (MOD):                      Normal Ranges: EF-Visual:      18 % LV EF Reported: 18 %  [id number] Fred Ortiz MD Electronically signed on 11/13/2024 at 11:57:37 AM  ** Final **     XR abdomen 1 view    Result Date: 11/13/2024  Interpreted By:  Tuan Platt and Omar Mahmoud STUDY: XR ABDOMEN 1 VIEW;  11/13/2024 9:16 am   INDICATION: Signs/Symptoms:OG positioning.     COMPARISON: X-ray abdomen 11/12/2024   ACCESSION NUMBER(S): [id number]   ORDERING CLINICIAN: JO CASTILLO   FINDINGS: Enteric tube tip projects over the expected location of the gastric fundus, suggestion of a proximal side-port projects near the gastroesophageal junction. LVAD overlying the left lower chest. Median sternotomy wires again seen. Partially visualized pulmonary arterial catheter.   Nonobstructive bowel gas pattern. Limited evaluation of pneumoperitoneum on supine imaging, however no gross evidence of free air is noted.   Visualized lungs are clear.   Osseous structures demonstrate no acute bony changes.       Enteric tube tip projects over the gastric fundus with proximal side port near the gastroesophageal junction. Recommend advancement.   I personally reviewed the images/study and I agree with the findings as stated by Zack Ortiz MD (PGY-2). This study was interpreted at [place], [place].   MACRO: None   Signed by: Tuan Platt 11/13/2024 11:17 AM Dictation workstation:   [id number]    XR abdomen 1 view    Result Date: 11/13/2024  Interpreted By:  Tuan Platt and Nakamoto Kent STUDY: XR ABDOMEN 1 VIEW; XR CHEST 1 VIEW;  11/12/2024 6:42 pm; 11/12/2024 7:13 pm; 11/12/2024 7:04 pm   INDICATION: Signs/Symptoms:OG tube assessment; Signs/Symptoms:Feeding tube; Signs/Symptoms:Et tube placement.   COMPARISON: Chest radiograph 7:04 p.m. same day, abdominal radiograph same day 6:42 p.m.   ACCESSION  NUMBER(S): [id number]; [id number]; [id number]   ORDERING CLINICIAN: ANETA MANNING; JAMEL LEDESMA; SHAYY PUENTE   FINDINGS: Chest radiograph 7:04 p.m. same day, and abdominal radiograph same day 6:42 p.m. were dictated together due to proximity of the exams.   AP radiographs of the chest and abdomen were provided. Endotracheal tube noted with tip projecting approximately 5.9 cm above the carlo. Postsurgical changes of LVAD placement. Postsurgical changes of median sternotomy. Right IJ Sherwood-Faviola catheter with tip projecting over the expected position of the right main pulmonary artery. On the most recent abdominal radiograph, enteric tube seen coursing below the level diaphragm with tip tip projecting over the expected position of the gastroesophageal junction. The side-hole of the enteric tube is within the lower thoracic esophagus. Heart is enlarged. Cardiomediastinal silhouette is stable in size.   No evidence of pneumothorax. Bilateral costophrenic angles are clear. No focal consolidation. Bilateral mild pulmonary interstitial edema.   Nonobstructive bowel gas pattern. Limited evaluation of pneumoperitoneum on supine imaging, however no gross evidence of free air is noted.   Vizualized lungs are clear.   Osseous structures demonstrate no acute bony changes.       1. Enteric tube seen coursing below the level diaphragm with tip projecting over the expected position of the distal gastroesophageal junction. The side-hole of the enteric tube projects over the distal thoracic esophagus. Repositioning of the enteric tube can be considered based on patient position. 2. Additional medical devices as described above. 3. No pneumothorax, focal consolidation, or pleural effusion. Mild pulmonary interstitial edema. 4. Nonobstructive bowel gas pattern.   I personally reviewed the images/study and I agree with the findings as stated by Jose Morrow MD. This study was interpreted at [facility]  [place], [place].   MACRO: None   Signed by: Tuan Platt 11/13/2024 10:53 AM Dictation workstation:   [id number]    XR chest 1 view    Result Date: 11/13/2024  Interpreted By:  Tuan Platt and Nakamoto Kent STUDY: XR ABDOMEN 1 VIEW; XR CHEST 1 VIEW;  11/12/2024 6:42 pm; 11/12/2024 7:13 pm; 11/12/2024 7:04 pm   INDICATION: Signs/Symptoms:OG tube assessment; Signs/Symptoms:Feeding tube; Signs/Symptoms:Et tube placement.   COMPARISON: Chest radiograph 7:04 p.m. same day, abdominal radiograph same day 6:42 p.m.   ACCESSION NUMBER(S): [id number]; [id number]; [id number]   ORDERING CLINICIAN: ANETA MANNING; JAMEL LEDESMA; SHAYY PUENTE   FINDINGS: Chest radiograph 7:04 p.m. same day, and abdominal radiograph same day 6:42 p.m. were dictated together due to proximity of the exams.   AP radiographs of the chest and abdomen were provided. Endotracheal tube noted with tip projecting approximately 5.9 cm above the carlo. Postsurgical changes of LVAD placement. Postsurgical changes of median sternotomy. Right IJ [place]-Faviola catheter with tip projecting over the expected position of the right main pulmonary artery. On the most recent abdominal radiograph, enteric tube seen coursing below the level diaphragm with tip tip projecting over the expected position of the gastroesophageal junction. The side-hole of the enteric tube is within the lower thoracic esophagus. Heart is enlarged. Cardiomediastinal silhouette is stable in size.   No evidence of pneumothorax. Bilateral costophrenic angles are clear. No focal consolidation. Bilateral mild pulmonary interstitial edema.   Nonobstructive bowel gas pattern. Limited evaluation of pneumoperitoneum on supine imaging, however no gross evidence of free air is noted.   Vizualized lungs are clear.   Osseous structures demonstrate no acute bony changes.       1. Enteric tube seen coursing below the level diaphragm with tip projecting over the expected position of the  distal gastroesophageal junction. The side-hole of the enteric tube projects over the distal thoracic esophagus. Repositioning of the enteric tube can be considered based on patient position. 2. Additional medical devices as described above. 3. No pneumothorax, focal consolidation, or pleural effusion. Mild pulmonary interstitial edema. 4. Nonobstructive bowel gas pattern.   I personally reviewed the images/study and I agree with the findings as stated by Jose Morrow MD. This study was interpreted at [facility], [place], [place].   MACRO: None   Signed by: Tuan Platt 11/13/2024 10:53 AM Dictation workstation:   [id number]    XR abdomen 1 view    Result Date: 11/13/2024  Interpreted By:  Tuan Platt  and Cristhian Garcia STUDY: XR ABDOMEN 1 VIEW; XR CHEST 1 VIEW;  11/12/2024 6:42 pm; 11/12/2024 7:13 pm; 11/12/2024 7:04 pm   INDICATION: Signs/Symptoms:OG tube assessment; Signs/Symptoms:Feeding tube; Signs/Symptoms:Et tube placement.   COMPARISON: Chest radiograph 7:04 p.m. same day, abdominal radiograph same day 6:42 p.m.   ACCESSION NUMBER(S): [id number]; [id number]; [id number]   ORDERING CLINICIAN: ANETA MANNING; JAMEL LEDESMA; SHAYY PUENTE   FINDINGS: Chest radiograph 7:04 p.m. same day, and abdominal radiograph same day 6:42 p.m. were dictated together due to proximity of the exams.   AP radiographs of the chest and abdomen were provided. Endotracheal tube noted with tip projecting approximately 5.9 cm above the carlo. Postsurgical changes of LVAD placement. Postsurgical changes of median sternotomy. Right IJ [place]-Faviola catheter with tip projecting over the expected position of the right main pulmonary artery. On the most recent abdominal radiograph, enteric tube seen coursing below the level diaphragm with tip tip projecting over the expected position of the gastroesophageal junction. The side-hole of the enteric tube is within the lower thoracic esophagus. Heart is  enlarged. Cardiomediastinal silhouette is stable in size.   No evidence of pneumothorax. Bilateral costophrenic angles are clear. No focal consolidation. Bilateral mild pulmonary interstitial edema.   Nonobstructive bowel gas pattern. Limited evaluation of pneumoperitoneum on supine imaging, however no gross evidence of free air is noted.   Vizualized lungs are clear.   Osseous structures demonstrate no acute bony changes.       1. Enteric tube seen coursing below the level diaphragm with tip projecting over the expected position of the distal gastroesophageal junction. The side-hole of the enteric tube projects over the distal thoracic esophagus. Repositioning of the enteric tube can be considered based on patient position. 2. Additional medical devices as described above. 3. No pneumothorax, focal consolidation, or pleural effusion. Mild pulmonary interstitial edema. 4. Nonobstructive bowel gas pattern.   I personally reviewed the images/study and I agree with the findings as stated by Jose Morrow MD. This study was interpreted at [facility], [place], [place].   MACRO: None   Signed by: Tuan Platt 11/13/2024 10:53 AM Dictation workstation:   [id number]    Transthoracic Echo (TTE) Limited    Result Date: 11/13/2024   [facility], [address] [place] [address]                Tel [phone] and Fax [phone] TRANSTHORACIC ECHOCARDIOGRAM REPORT  Patient Name:       RJ Webb Physician:    [id number] Raciel Casarez MD Study Date:         11/13/2024          Ordering Provider:    [id number] JO CASTILLO MRN/PID:            [id number]            Fellow: Accession#:         [id number]        Nurse: Date of Birth/Age:  1955 / 69     Sonographer:          Jackson estrada                                      RDCS Gender assigned at  M                   Additional Staff: Birth: Height:             182.88 cm           Admit Date: Weight:             93.90 kg            Admission Status:     Inpatient - STAT BSA / BMI:          2.16 m2 / 28.07     Encounter#:           [id number]                     kg/m2 Blood Pressure:     94/74 mmHg          Department Location:  [facility] Study Type:    TRANSTHORACIC ECHO (TTE) LIMITED Diagnosis/ICD: Acute on chronic combined systolic (congestive) and diastolic                (congestive) heart failure (CHF)-I50.43 Indication:    eval RV fx CPT Code:      Echo Limited-50174; Doppler Limited-40417; Color Doppler-10849 Patient History: Pertinent History: CAD s/p CABGx4 in 2012 and PCI 2019 w/ ICM LVEF 10-15%, AF                    s/p RFA & PVI, HTN, CHF exacerbation, 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 Study Detail: The following Echo studies were performed: 2D, M-Mode, Doppler and               color flow. Technically challenging study due to body habitus,               patient lying in supine position, postoperative dressings,               prominent lung artifact and poor acoustic windows. The patient is               intubated. Definity used as a contrast agent for endocardial               border definition. Total contrast used for this procedure was 2.0               mL via IV push.  PHYSICIAN INTERPRETATION: Left Ventricle: Left ventricular ejection fraction is severely decreased, by visual estimate at 15%. There is eccentric left ventricular hypertrophy. There is global hypokinesis of the left ventricle with minor regional variations. The left ventricular cavity size is moderate to severely dilated. Left ventricular diastolic filling was not assessed. Left Atrium: The left atrium is moderately dilated. Right Ventricle: The right ventricle is mild to moderately enlarged. There is severely reduced  right ventricular systolic function. A device is visualized in the right ventricle. Right Atrium: The right atrium is moderately dilated. There is a device visualized in the right atrium. Aortic Valve: The aortic valve was not well visualized. There is mild to moderate aortic valve cusp calcification. There is mild aortic valve regurgitation. Mitral Valve: The mitral valve is normal in structure. There is mild mitral valve regurgitation. Tricuspid Valve: The tricuspid valve is structurally normal. There is mild tricuspid regurgitation. Pulmonic Valve: The pulmonic valve is not well visualized. There is trace pulmonic valve regurgitation. Pericardium: There is no pericardial effusion noted. Aorta: The aortic root is abnormal. There is mild dilatation of the aortic root. Pulmonary Artery: The tricuspid regurgitant velocity is 1.83 m/s, and with an estimated right atrial pressure of 10 mmHg, the estimated pulmonary artery pressure is normal with the RVSP at 23.4 mmHg. Systemic Veins: The inferior vena cava appears moderately dilated, on vent. In comparison to the previous echocardiogram(s): Compared with study dated 11/5/2024, no significant change.  LEFT VENTRICULAR ASSIST DEVICE: LVAD: The patient has a(n) HeartMate 3 LVAD device present. Inflow Cannula: The inflow cannula is visualized in the left ventricular apex.  CONCLUSIONS:  1. Poorly visualized anatomical structures due to suboptimal image quality.  2. Left ventricular cavity size is moderate to severely dilated.  3. Left ventricular ejection fraction is severely decreased, by visual estimate at 15%.  4. There is global hypokinesis of the left ventricle with minor regional variations.  5. There is severely reduced right ventricular systolic function.  6. Mild to moderately enlarged right ventricle.  7. The left atrium is moderately dilated.  8. The right atrium is moderately dilated.  9. Mild aortic valve regurgitation. 10. The inferior vena cava appears  moderately dilated, on vent. QUANTITATIVE DATA SUMMARY:  2D MEASUREMENTS:          Normal Ranges: IVSd:            1.00 cm  (0.6-1.1cm) LVPWd:           1.20 cm  (0.6-1.1cm) LVIDd:           6.90 cm  (3.9-5.9cm) LVIDs:           6.60 cm LV Mass Index:   164 g/m2 LV % FS          4.3 %  AORTA MEASUREMENTS:         Normal Ranges: Ao Sinus, d:        4.00 cm (2.1-3.5cm)  LV SYSTOLIC FUNCTION BY 2D PLANIMETRY (MOD):                      Normal Ranges: EF-Visual:      15 % LV EF Reported: 15 %  RIGHT VENTRICLE: RV s' 0.07 m/s  TRICUSPID VALVE/RVSP:          Normal Ranges: Peak TR Velocity:     1.83 m/s RV Syst Pressure:     23 mmHg  (< 30mmHg) IVC Diam:             2.70 cm  [id number] Raciel Casarez MD Electronically signed on 11/13/2024 at 10:25:04 AM  ** Final **     XR chest 1 view    Result Date: 11/12/2024  Interpreted By:  Tuan Platt and Omar Mahmoud STUDY: XR CHEST 1 VIEW;  11/12/2024 1:38 pm   INDICATION: Signs/Symptoms:Post op cardiac surgery.     COMPARISON: Chest x-ray 11/11/2024 7:55 a.m., CT chest abdomen pelvis 11/04/2024   ACCESSION NUMBER(S): [id number]   ORDERING CLINICIAN: ANETA MANNING   FINDINGS: AP radiograph of the chest was provided.   Interval demonstration of postsurgical changes of LVAD placement. Postsurgical changes from median sternotomy surgical clips overlying left hilum. Right IJ pulmonary arterial catheter tip projects over the distal right main pulmonary artery. Interval placement of an enteric tube with tip projecting over the expected location of the gastroesophageal junction. Interval placement of left chest tubes. Partially visualized left shoulder arthroplasty.   CARDIOMEDIASTINAL SILHOUETTE: The heart is enlarged. Cardiomediastinal silhouette is stable in size and configuration.   LUNGS: Mild perihilar interstitial opacities, grossly stable as compared to prior. The bilateral costophrenic angles are clear, within the limitations of the newly placed LVAD obscured the left lower lung  field. There is no evidence of a pneumothorax. There is no new focal consolidation.   ABDOMEN: No remarkable upper abdominal findings.   BONES: No acute osseous changes.       1. Postsurgical changes and medical devices as described above. Recommend advancement of enteric tube. 2. Stable mild pulmonary interstitial edema.   I personally reviewed the images/study and I agree with the findings as stated by Zack Ortiz MD (PGY-2). This study was interpreted at [facility], [place], [place].   MACRO: None   Signed by: Tuan Platt 11/12/2024 3:19 PM Dictation workstation:   [id number]    XR chest 1 view    Result Date: 11/11/2024  Interpreted By:  Tuan Platt  and Angel Dixon STUDY: XR CHEST 1 VIEW;  11/11/2024 8:02 am   INDICATION: Signs/Symptoms:SGC monitoring.     COMPARISON: Chest x-ray 11/10/2024, CT chest 1124   ACCESSION NUMBER(S): [id number]   ORDERING CLINICIAN: KHALIDA UP   FINDINGS: AP radiograph of the chest was provided.   Patient is mildly rotated to the right which limits evaluation and comparison.   Postoperative findings from median sternotomy. Right IJ approach pulmonary arterial catheter with tip projecting over a proximal right lower lobe pulmonary artery, advanced as compared to prior.   CARDIOMEDIASTINAL SILHOUETTE: Heart is moderately enlarged. Small pericardial effusion not excluded. Cardiomediastinal silhouette is stable in size and configuration.   LUNGS: Mild pulmonary vascular congestion within the right lung is noted. No acute pulmonary consolidations or effusions are demonstrated.   ABDOMEN: No remarkable upper abdominal findings.   BONES: No acute osseous changes.       1. Interval advancement of a pulmonary arterial catheter with tip now projecting over a proximal right lower lobe pulmonary artery. 2. Similar enlargement of the cardiomediastinal silhouette. 3. No acute cardiopulmonary process.   I personally reviewed the images/study and I agree  with the findings as stated by Zack Ortiz MD (PGY-2). This study was interpreted at [facility], [place], [place].   MACRO: None   Signed by: Tuan Platt 11/11/2024 2:18 PM Dictation workstation:   [id number]    XR chest 1 view    Result Date: 11/10/2024  Interpreted By:  Olvin Farias, STUDY: XR CHEST 1 VIEW; 11/10/2024 8:17 am   INDICATION: Signs/Symptoms:SGC monitoring.   COMPARISON: Prior day radiograph   ACCESSION NUMBER(S): [id number]   ORDERING CLINICIAN: KHALIDA UP   FINDINGS:     CARDIOMEDIASTINAL SILHOUETTE: Patient is status post median sternotomy. Megaly versus pericardial effusion. 9 [place]-Faviola catheter overlies the right main pulmonary artery.   LUNGS: Minimal prominence pulmonary interstitium but no focal airspace disease. No effusions.   ABDOMEN: No remarkable upper abdominal findings.   BONES: No acute osseous changes.       1.  [place]-Faviola catheter overlies the right main pulmonary artery.     Signed by: Olvin Farias 11/10/2024 5:29 PM Dictation workstation:   [id number]    XR chest 1 view    Result Date: 11/9/2024  Interpreted By:  Olvin Farias,  and Carter Fink STUDY: XR CHEST 1 VIEW;  11/9/2024 6:09 pm   INDICATION: Signs/Symptoms:PA catheter retracted then advanced to address coiling, now at 62 cm.   COMPARISON: Chest radiograph 11/09/2024   ACCESSION NUMBER(S): [id number]   ORDERING CLINICIAN: KHALIDA UP   FINDINGS: Semi-erect AP radiograph of the chest was provided.   Right internal jugular pulmonary artery catheter with distal tip projecting over the distal right main pulmonary artery. Status post median sternotomy.   CARDIOMEDIASTINAL SILHOUETTE: Stable cardiomegaly versus pericardial effusion.   LUNGS: No pneumothorax. No focal consolidation. No pleural effusion.   ABDOMEN: No remarkable upper abdominal findings.   BONES: No acute osseous changes.       1.  Right IJ pulmonary artery catheter with distal tip  projecting over the distal main pulmonary artery. 2. Otherwise, stable cardiomegaly versus pericardial effusion. 3. Mild perihilar interstitial edema and correlate with fluid status.   I personally reviewed the images/study and I agree with the findings as stated by Aleks Merino MD (Radiology Resident).   MACRO: None   Signed by: Olvin Farias 11/9/2024 9:03 PM Dictation workstation:   [id number]    XR chest 1 view    Result Date: 11/9/2024  Interpreted By:  Olvin Farias,  and Jayro Hughes STUDY: XR CHEST 1 VIEW; ;  11/9/2024 4:00 pm   INDICATION: Signs/Symptoms:retracted PA catheter 3 cm.   COMPARISON: XR CHEST 1 VIEW;  11/9/2024 3:11 pm   ACCESSION NUMBER(S): [id number]   ORDERING CLINICIAN: KHALIDA UP   FINDINGS: AP radiograph of the chest was provided.   Right IJ [place]-Faviola catheter that appears to coiled onto itself with tip projecting over the expected position of the right ventricle in similar positioning compared to prior examination. Postsurgical changes from median sternotomy. Redemonstration of surgical staples overlying the aortic knob.   CARDIOMEDIASTINAL SILHOUETTE:  Cardiomediastinal silhouette is stable and enlarged in size with globular configuration.   LUNGS:  No pneumothorax. No focal consolidation or pleural effusion.   ABDOMEN:  No remarkable upper abdominal findings.   BONES:  No acute osseous changes. Status post left shoulder arthroplasty.       1. Right IJ [place]-Faviola catheter appears to be coiled onto itself with tip projecting over the expected position of the right ventricle. Repositioning of the right IJ [place]-Faviola catheter would be recommended with repeat radiographs. 2. Stable cardiomegaly versus pericardial effusion.   I personally reviewed the images/study and I agree with the findings as stated by Ellen Dial MD. This study was interpreted at [facility], [place], [place].   MACRO: None   Signed by: Olvin  Jarrett 11/9/2024 9:02 PM Dictation workstation:   [id number]    XR chest 1 view    Result Date: 11/9/2024  Interpreted By:  Olvin Farias and Nakamoto Kent STUDY: XR CHEST 1 VIEW;  11/9/2024 3:11 pm   INDICATION: Signs/Symptoms:post PA catheter placement.   COMPARISON: Chest radiograph 11/06/2024   ACCESSION NUMBER(S): [id number]   ORDERING CLINICIAN: KHALIDA UP   FINDINGS: AP radiograph of the chest was provided.   Right IJ [place]-Faviola catheter that appears to coiled onto itself with tip projecting over the expected position of the right ventricle. Postsurgical changes from median sternotomy.   CARDIOMEDIASTINAL SILHOUETTE: Cardiomediastinal silhouette is stable and enlarged in size with globular configuration.   LUNGS: No pneumothorax. No focal consolidation or pleural effusion..   ABDOMEN: No remarkable upper abdominal findings.   BONES: No acute osseous changes.       1. Right IJ [place]-Faviola catheter appears to be coiled onto itself with tip projecting over the expected position of the right ventricle. Repositioning of the right IJ [place]-Faviola catheter would be recommended with repeat radiographs. 2. Stable cardiomegaly.   I personally reviewed the images/study and I agree with the findings as stated by Jose Morrow MD. This study was interpreted at [facility], [place], [place].   The above findings were communicated with Dr. Christ Stanford by epic chat at 3:29 pm.   MACRO: Critical Finding:  See findings. Notification was initiated on 11/9/2024 at 3:53 pm by  Jose Morrow.  (**-OCF-**) Instructions:   Signed by: Olvin Farias 11/9/2024 9:01 PM Dictation workstation:   [id number]    XR chest 1 view    Result Date: 11/7/2024  Interpreted By:  Tuan Platt, STUDY: XR CHEST 1 VIEW;  11/6/2024 9:04 am   INDICATION: Signs/Symptoms:SGC.   COMPARISON: Chest radiograph dated 11/6/2024   ACCESSION NUMBER(S): [id number]   ORDERING CLINICIAN: PAYAL KOHLER   FINDINGS:  AP radiograph of the chest     The [place] Faviola catheter is positioned within the main pulmonary artery. There has been previous median sternotomy.   The heart is enlarged and stable. Pulmonary aeration is similar previous study. No new infiltrates, consolidations or effusions are noted.       1. No acute cardiopulmonary pathology       Signed by: Tuan Platt 11/7/2024 9:24 AM Dictation workstation:   [id number]    Vascular US Ankle Brachial Index (SITA) Without Exercise    Result Date: 11/6/2024            [place] [address]   Tel [phone] and Fax [phone]  Vascular Lab Report VASC US ANKLE BRACHIAL INDEX (SITA) WITHOUT EXERCISE  Patient Name:     RJ LEIJA      Reading           [id number] Nely Coffman MD                                        Physician: Study Date:       11/5/2024            Ordering          [id number] LANDON GARCES                                        Physician: MRN/PID:          [id number]             Technologist:     Pinky Wyatt RVT,                                                          [facility] Accession#:       [id number]         Technologist 2: Date of           1955 / 69      Encounter#:       [id number] Birth/Age:        years Gender:           M Admission Status: Inpatient            Location          [facility]                                        Performed:  Diagnosis/ICD: Peripheral vascular disease, unspecified-I73.9 CPT Codes:     57464 Peripheral artery Lower arterial Duplex complete  CONCLUSIONS: Right Lower PVR: No evidence of arterial occlusive disease in the right lower extremity at rest. Multiphasic flow is noted in the right common femoral artery, right posterior tibial artery and right dorsalis pedis artery. Brachial pressure not obtained due to lines. Left Lower PVR: No evidence of arterial occlusive disease in the left lower extremity at rest. Multiphasic flow is noted in the left common femoral  artery, left dorsalis pedis artery and left posterior tibial artery.  Imaging & Doppler Findings:  RIGHT Lower PVR                Pressures Ratios Right Posterior Tibial (Ankle) 136 mmHg  1.30 Right Dorsalis Pedis (Ankle)   118 mmHg  1.12   LEFT Lower PVR                Pressures Ratios Left Posterior Tibial (Ankle) 117 mmHg  1.11 Left Dorsalis Pedis (Ankle)   126 mmHg  1.20                      Left Brachial Pressure 105 mmHg   [id number] Nely Coffman MD Electronically signed by [id number] Nely Coffman MD on 11/6/2024 at 7:55:34 PM  ** Final **     XR chest 1 view    Result Date: 11/6/2024  Interpreted By:  Tuan Platt, STUDY: XR CHEST 1 VIEW;  11/5/2024 7:42 am   INDICATION: Signs/Symptoms:SGC.   COMPARISON: Chest radiograph dated 11/5/2024   ACCESSION NUMBER(S): [id number]   ORDERING CLINICIAN: PAYAL KOHLER   FINDINGS: AP radiograph of the chest Medical devices: The swans Faviola catheter is positioned within the right main pulmonary artery similar previous study. Sternal sutures are intact.   The heart is moderately severely enlarged. Pulmonary aeration is similar to previous study.   The left shoulder arthroplasty is partially visualized.       1. No evidence of acute cardiopulmonary process. 2. No significant changes noted in comparison to the prior study.       Signed by: Tuan Platt 11/6/2024 8:03 AM Dictation workstation:   [id number]    Transthoracic Echo (TTE) Limited    Result Date: 11/5/2024   [facility], [address] [place] [address]                Tel [phone] and Fax [phone] TRANSTHORACIC ECHOCARDIOGRAM REPORT  Patient Name:       RJ LEIJA     Reading Physician:    [id number] Mira Mayer MD Study Date:         11/5/2024           Ordering Provider:    [id number] PAYLA KOHLER MRN/PID:            [id number]            Fellow: Accession#:          [id number]        Nurse: Date of Birth/Age:  1955 / 69     Sonographer:          Maureen estrada                                     RDBLANQUITA Gender assigned at                     Additional Staff: Birth: Height:             182.88 cm           Admit Date:           10/24/2024 Weight:             93.44 kg            Admission Status:     Inpatient - STAT BSA / BMI:          2.16 m2 / 27.94     Encounter#:           [id number]                     kg/m2 Blood Pressure:     106/72 mmHg         Department Location:  [address] Study Type:    TRANSTHORACIC ECHO (TTE) LIMITED Diagnosis/ICD: Heart failure, unspecified-I50.9 Indication:    RV assessment CPT Code:      Echo Limited-32581; Doppler Limited-71695; Color Doppler-90740 Patient History: Pertinent History: Negative bubble peformed on prior echo; Known 15-20% EF;                    Cardiogenic shock; Acute on chronic heart failure with                    reduced EF and diastolic dysfunction; ICM; Hx of chronic                    A-Fib; VIV. Study Detail: The following Echo studies were performed: 2D, M-Mode, Doppler and               color flow. Technically challenging study due to body habitus.               Definity used as a contrast agent for endocardial border               definition. Total contrast used for this procedure was 2 mL via IV               push.  PHYSICIAN INTERPRETATION: Left Ventricle: Left ventricular ejection fraction is severely decreased, calculated by Loera's biplane at 18%. There is severely increased eccentric left ventricular hypertrophy. There is global hypokinesis of the left ventricle with minor regional variations. The left ventricular cavity size is severely dilated. There is normal septal and normal posterior left ventricular wall thickness. Left ventricular diastolic filling was not assessed. There is no definite left ventricular thrombus visualized. Left Atrium: The left atrium is severely  dilated. Right Ventricle: The right ventricle is severely enlarged. There is severely reduced right ventricular systolic function. A device is visualized in the right ventricle. Right Atrium: The right atrium is severely dilated. There is a device visualized in the right atrium. Aortic Valve: The aortic valve was not well visualized. There is mild aortic valve cusp calcification. Aortic valve regurgitation was not assessed. Mitral Valve: The mitral valve is normal in structure. Mitral valve regurgitation was not assessed. Tricuspid Valve: The tricuspid valve is structurally normal. There is mild tricuspid regurgitation. The Doppler estimated RVSP is mildly elevated at 42.9 mmHg. Pulmonic Valve: The pulmonic valve was not assessed. Pulmonic valve regurgitation was not assessed. Pericardium: Trivial pericardial effusion. Aorta: The aortic root is abnormal. The aortic root appears mildly dilated and measures 4.20 cm. There is mild dilatation of the ascending aorta. There is mild dilatation of the aortic root. Systemic Veins: The inferior vena cava appears dilated, with IVC inspiratory collapse less than 50%. In comparison to the previous echocardiogram(s): Compared with the prior exam from 10/25/2024, there was already a severely dilated LV with severely reduced function. The RV remains dilated with severely reduced function. Valvular function not assessed on today's exam.  CONCLUSIONS:  1. Left ventricular ejection fraction is severely decreased, calculated by Loera's biplane at 18%.  2. There is global hypokinesis of the left ventricle with minor regional variations.  3. Left ventricular cavity size is severely dilated.  4. No left ventricular thrombus visualized.  5. There is severely increased eccentric left ventricular hypertrophy.  6. There is severely reduced right ventricular systolic function.  7. Severely enlarged right ventricle.  8. The left atrium is severely dilated.  9. The right atrium is severely  dilated. 10. Mitral valve regurgitation was not assessed. 11. Mildly elevated right ventricular systolic pressure. 12. Mildly dilated aortic root. 13. Compared with the prior exam from 10/25/2024, there was already a severely dilated LV with severely reduced function. The RV remains dilated with severely reduced function. Valvular function not assessed on today's exam. QUANTITATIVE DATA SUMMARY:  2D MEASUREMENTS:           Normal Ranges: Ao Root d:       4.20 cm   (2.0-3.7cm) IVSd:            0.87 cm   (0.6-1.1cm) LVPWd:           0.78 cm   (0.6-1.1cm) LVIDd:           8.21 cm   (3.9-5.9cm) LVIDs:           8.04 cm LV Mass Index:   157 g/m2 LVEDV Index:     138 ml/m2 LV % FS          2.0 %  LA VOLUME:                  Normal Ranges: LA Volume Index: 59.9 ml/m2  RA VOLUME BY A/L METHOD:          Normal Ranges: RA Area A4C:             36.8 cm2  AORTA MEASUREMENTS:         Normal Ranges: Ao Sinus, d:        4.20 cm (2.1-3.5cm) Asc Ao, d:          4.20 cm (2.1-3.4cm)  LV SYSTOLIC FUNCTION BY 2D PLANIMETRY (MOD):                      Normal Ranges: EF-A4C View:    14 % (>=55%) EF-A2C View:    21 % EF-Biplane:     18 % LV EF Reported: 18 %  AORTIC VALVE:          Normal Ranges: LVOT Diameter: 2.31 cm (1.8-2.4cm)  RIGHT VENTRICLE: RV Basal 6.10 cm RV Mid   4.10 cm RV Major 9.1 cm TAPSE:   9.0 mm RV s'    0.06 m/s  TRICUSPID VALVE/RVSP:          Normal Ranges: Peak TR Velocity:     2.64 m/s RV Syst Pressure:     43 mmHg  (< 30mmHg) IVC Diam:             3.00 cm  AORTA: Asc Ao Diam 4.23 cm  [id number] Mira Mayer MD Electronically signed on 11/5/2024 at 2:36:55 PM  ** Final **     CT chest abdomen pelvis wo IV contrast    Result Date: 11/5/2024  Interpreted By:  Clay Weiss,  and Jose Manuel Cook STUDY: CT CHEST ABDOMEN PELVIS WO CONTRAST;  11/4/2024 5:45 pm   INDICATION: Signs/Symptoms:assess for aneurysm prior to transplant evaluation.   COMPARISON: CT chest 10/31/2024 and CT lower 11/02/2024   ACCESSION NUMBER(S):  [id number]   ORDERING CLINICIAN: PAYAL KOHLER   TECHNIQUE: Contiguous axial images of the chest, abdomen, and pelvis were obtained in the absence of intravenous contrast. Coronal and sagittal reformatted images were reconstructed from the axial data.   FINDINGS: CT CHEST:   MEDIASTINUM AND LYMPH NODES: The visualized thyroid is within normal limits. No enlarged intrathoracic or axillary lymph nodes by imaging criteria, however evaluation is limited in the absence of contrast. No pneumomediastinum. The esophagus appears within normal limits.   HEART: The heart is markedly enlarged. Extensive coronary artery calcifications. No significant pericardial effusion.   VESSELS: There is dilation of the proximal ascending aorta measuring up to 4.4 cm in diameter. Moderate calcified atherosclerosis of the thoracic aorta. The main pulmonary artery is mildly prominent. [place]-Faviola catheter tips terminate at the distal right pulmonary artery.   LUNG, AIRWAYS, PLEURA: The trachea and proximal mainstem bronchi are patent. No consolidation, pleural effusion, or pneumothorax. Moderate dependent and bandlike atelectasis bilaterally.   CHEST WALL SOFT TISSUES: No discernible abnormality.   OSSEOUS STRUCTURES: No acute osseous abnormality. Several remote appearing left-sided rib fractures. Status post median sternotomy.     CT ABDOMEN/PELVIS:   LIVER: Poor visualization of the previously described cystic lesion near the medial liver tip, better evaluated on 11/02/2024 exam. The liver is mildly enlarged measuring 19.6 cm in craniocaudal dimension.   BILE DUCTS: No significant intrahepatic or extrahepatic dilatation.   GALLBLADDER: Cholelithiasis without evidence of acute cholecystitis.   PANCREAS: No significant abnormality.   SPLEEN: Splenomegaly measuring 18.8 cm in anteroposterior dimension.   ADRENALS: No significant abnormality.   KIDNEYS, URETERS, BLADDER: Cyst of the right kidney measures up to 3.9 cm. No hydronephrosis or  nephrolithiasis. High-density lesion of the left anterior kidney measuring 1.1 cm likely represents a hemorrhagic cyst. The urinary bladder is unremarkable.   REPRODUCTIVE ORGANS: Prostate is enlarged measuring 5.3 cm in transverse axis.   GI: No bowel obstruction. No significant bowel wall thickening or adjacent inflammatory change. Normal appendix.   VESSELS: Moderate to advanced vascular calcifications of the abdominal aorta. There is a chronic appearing and partially calcified focal dissection of the infrarenal abdominal aorta as seen on series 2, image 130. Mild multifocal fusiform aneurysmal dilatation of the abdominal aorta measuring up to a maximum of 2.6 cm in diameter at the level of the dissection flap.   PERITONEUM/RETROPERITONEUM: No ascites, free air, or fluid collection.   LYMPH NODES: No enlarged lymph nodes.   ABDOMINAL WALL: No significant abnormality.   OSSEOUS STRUCTURES: No acute osseous abnormality. Moderate degenerative changes of the thoracolumbar spine. Grade 1 anterolisthesis of L5 on S1 with chronic bilateral pars defects.       1. No acute abnormality within the chest, abdomen, or pelvis. 2. 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 3. Aneurysm of the proximal ascending aorta measuring up to 4.4 cm and multifocal fusiform dilation of the abdominal aorta as above. 4. Marked cardiomegaly, unchanged from the prior exam. 5. Hepatosplenomegaly.   I personally reviewed the image(s)/study and resident interpretation as stated by Dr. Joyce Richard MD. I agree with the findings as stated. This study was interpreted at [facility], [place], [place].   Signed by: Clay Weiss 11/5/2024 9:31 AM Dictation workstation:   [id number]    Vascular US aorta iliac duplex limited    Result Date: 11/4/2024            [place] [address]   Tel [phone] and Fax  [phone]  Vascular Lab Report VASC US AORTA ILIAC DUPLEX LIMITED  Patient Name:      RJ Webb Physician:  [id number] Yoandy Boo MD Study Date:        11/4/2024             Ordering Physician: [id number] GARCES MRN/PID:           [id number]              Technologist:       Octavio Silver RVT Accession#:        [id number]          Technologist 2: Date of Birth/Age: 1955 / 69 years Encounter#:         [id number] Gender:            M Admission Status:  Inpatient             Location Performed: [facility]  Diagnosis/ICD: Abdominal aortic aneurysm, without rupture, unspecified-I71.40;                Encounter for other preprocedural examination-Z01.818 CPT Codes:     87095 Duplex Aorta/IVC/Iliac/Bypass Graft  CONCLUSIONS: Aorta/Common Iliac Arteries/IVC: The abdominal aorta and bilateral common iliac arteries demonstrate no evidence of aneurysm. Moderate atherosclerotic plaque noted throughout the abdominal aorta.  Imaging & Doppler Findings:   AORTA     AP       PSV Proximal 1.63 cm 57.0 cm/s   Mid    1.90 cm 99.0 cm/s  Distal  1.50 cm 83.0 cm/s    RIGHT       AP        PSV RICHARD Proximal 1.01 cm 114.00 cm/s     LEFT       AP       PSV RICHARD Proximal 1.09 cm 97.00 cm/s  [id number] Yoandy Boo MD Electronically signed by [id number] Yoandy Boo MD on 11/4/2024 at 9:22:19 PM  ** Final **     Vascular US carotid artery duplex bilateral    Result Date: 11/4/2024            [place] [address]   Tel [phone] and Fax [phone]  Vascular Lab Report VASC US CAROTID ARTERY DUPLEX BILATERAL  Patient Name:      RJ Webb Physician:  [id number] Pitt                                                               Rajeev NUNES Study Date:        11/4/2024             Ordering Physician: [id number] GARCES MRN/PID:           [id number]              Technologist:       Octavio Silver RVT Accession#:        [id number]          Technologist 2: Date of Birth/Age: 1955 / 69 years Encounter#:         [id number] Gender:            M Admission Status:  Inpatient             Location Performed: [facility]  Diagnosis/ICD: Other specified symptoms and signs involving the circulatory and                respiratory systems-R09.89; Encounter for other preprocedural                examination-Z01.818 CPT Codes:     96527 Cerebrovascular Carotid Duplex scan complete  CONCLUSIONS: Right Carotid: Findings are consistent with less than 50% stenosis of the right proximal internal carotid artery. Right external carotid artery appears patent with no evidence of stenosis. The right vertebral artery is patent with antegrade flow. No evidence of hemodynamically significant stenosis in the right subclavian artery. Left Carotid: Findings are consistent with less than 50% stenosis of the left proximal internal carotid artery. Left external carotid artery appears patent with no evidence of stenosis. The mid left vertebral artery demonstrates no flow. The origin of the vertebral artery appears patent. No evidence of hemodynamically significant stenosis in the left subclavian artery.  Imaging & Doppler Findings: Right Plaque Morph: The proximal right internal carotid artery demonstrates heterogenous and smooth plaque. Left Plaque Morph: The proximal left internal carotid artery demonstrates heterogenous and smooth plaque.   Right                       Left   PSV     EDV                PSV      EDV 57 cm/s           CCA P    95 cm/s 90 cm/s           CCA D    67 cm/s 38 cm/s 15 cm/s   ICA P    58 cm/s  22 cm/s 62  cm/s 20 cm/s   ICA M    70 cm/s  24 cm/s 58 cm/s 18 cm/s   ICA D    56 cm/s  22 cm/s 56 cm/s            ECA     61 cm/s 44 cm/s 13 cm/s Vertebral   0 cm/s 65 cm/s         Subclavian 136 cm/s               Right Left ICA/CCA Ratio  0.4  0.9   [id number] Yoandy Boo MD Electronically signed by [id number] Yoandy Boo MD on 11/4/2024 at 8:42:58 PM  ** Final **     Colonoscopy Diagnostic    Result Date: 11/4/2024  Table formatting from the original result was not included. Impression One Krystal Is polyp measuring 5-9 mm in the ascending colon; performed cold snare removal. Hemorrhoids. Rest of exam was normal. Findings One sessile Krystal Is polyp measuring 5-9 mm in the ascending colon; performed cold snare with complete en bloc removal and retrieved specimen. The specimen was placed in Jar 1. Hemorrhoids. Rest of exam was normal. Recommendation Await pathology results. Repeat colonoscopy in 7 years, due: 11/3/2031 Personal history of colon polyps - Defer resumption of diet and medications to the patient's primary team. No restrictions from a post-procedural standpoint. - Observe patient's clinical course following today's procedure with therapeutic intervention. - Watch for bleeding, perforation, and infection. - The findings and recommendations were discussed with the referring physicians. - The signs and symptoms of potential delayed complications were discussed with the patient. - Patient has a contact number available for emergencies. - Follow up with Raimundo Dickey and James for ongoing inpatient gastroenterology consultation. Indication Acute on chronic heart failure with reduced ejection fraction and diastolic dysfunction Staff Staff Role Pedro Dickey MD                Attending Honey Ramirez MD             Fellow Medications Totals unavailable because the procedure time range is not set Preprocedure A history and physical has been performed, and patient medication allergies have been  reviewed. The patient's tolerance of previous anesthesia has been reviewed. The risks and benefits of the procedure and the sedation options and risks were discussed with the patient. All questions were answered and informed consent obtained. Details of the Procedure The patient underwent moderate sedation, which was administered by the procedural nurse. The patient's blood pressure, ECG, ETCO2, heart rate, level of consciousness, oxygen and respirations were monitored throughout the procedure. A digital rectal exam was performed. A perianal exam was performed. The scope was introduced through the anus and advanced to the terminal ileum. Retroflexion was performed in the rectum. The quality of bowel preparation was evaluated using the Premier Bowel Preparation Scale with scores of: right colon = 3, transverse colon = 3, left colon = 3. The total BBPS score was 9. Bowel prep was adequate. The patient's estimated blood loss was minimal (<5 mL). The procedure was not difficult. The patient tolerated the procedure well. There were no apparent adverse events. Events Procedure Events Event Event Time Specimens No specimens collected Procedure Location [facility] Heart Failure Intensive [facility] [address] [facility] [address] [phone] Referring Provider Jareth Vargas MD Procedure Provider Honey Ramirez MD     US renal complete    Result Date: 11/4/2024  Interpreted By:  Regulo Martel and Beyersdorf Conner STUDY: US RENAL COMPLETE;  11/1/2024 3:44 pm   INDICATION: Signs/Symptoms:Left renal cyst.     COMPARISON: None.   ACCESSION NUMBER(S): [id number]   ORDERING CLINICIAN: DINAH GONZALEZ   TECHNIQUE: Multiple images of the kidneys were obtained.   FINDINGS: RIGHT KIDNEY: The right kidney measures 12.4 cm in length. The renal cortical echogenicity and thickness are within normal limits. No hydronephrosis is present; no evidence of nephrolithiasis. There is  a heterogeneous hypoechoic lesion without internal flow in the right kidney measuring 3.9 X 3.0 X 2.8 cm.   LEFT KIDNEY: The left kidney measures 10.3 cm in length. The renal cortical echogenicity and thickness are within normal limits. No hydronephrosis is present; no evidence of nephrolithiasis.   BLADDER: Mild thickening of the urinary bladder wall.   Homogeneous, hyperechoic lesion within the right hepatic lobe is incompletely evaluated.       1. Heterogeneous appearing cyst in the right kidney measures up to 3.9 cm, potentially a hemorrhagic cyst. Otherwise unremarkable sonographic appearance of the kidneys. 2. Homogeneous hyperechoic lesion within the right hepatic lobe is incompletely evaluated. This may represent a hemangioma. Consider further evaluation with dedicated multiphase contrast enhanced CT or MRI of the liver.     I personally reviewed the image(s)/study and resident interpretation. I agree with the findings as stated by resident Rich Mccurdy. Data analyzed and images interpreted at [facility], [place], [place].   MACRO: None     Signed by: Regulo Martel 11/4/2024 10:46 AM Dictation workstation:   [id number]    XR chest 1 view    Result Date: 11/4/2024  Interpreted By:  Tuan Platt, STUDY: XR CHEST 1 VIEW;  11/4/2024 9:08 am   INDICATION: Signs/Symptoms:SGC placement.   COMPARISON: Chest radiograph dated 11/3/2024; CT chest 10/31/2024   ACCESSION NUMBER(S): [id number]   ORDERING CLINICIAN: DINAH GONZALEZ   FINDINGS: AP radiograph of the chest     Medical devices: The swans Faviola catheter is positioned within the right main pulmonary artery. Sternal sutures are intact.   The heart is moderately severely enlarged. Pulmonary aeration is similar previous study. No pneumothorax is noted.   The left shoulder arthroplasty is partially visualized.       1. No evidence of acute cardiopulmonary process. 2. No significant change from previous study.        Signed by: Tuan Platt 11/4/2024 9:38 AM Dictation workstation:   [id number]    CT liver w IV contrast    Result Date: 11/4/2024  Interpreted By:  Regulo Martel, STUDY: CT LIVER W IV CONTRAST; 11/2/2024 11:38 am   INDICATION: Signs/Symptoms:liver lesion.   COMPARISON: None   ACCESSION NUMBER(S): [id number]   ORDERING CLINICIAN: DINAH GONZALEZ   TECHNIQUE: Multiphase CT of the abdomen was performed utilizing intravenous contrast including arterial, portal venous and delayed postcontrast imaging. Coronal and sagittal reconstructions were also created. Intravenous contrast: 75 mL of Omnipaque 350   FINDINGS: Some images are mildly degraded by motion.   INCLUDED LOWER CHEST: There is mild dependent linear opacities, likely atelectasis or postinflammatory scarring. No consolidation or effusion. The heart is enlarged and a right-sided device lead is partially included.   LIVER: 4.4 cm ovoid heterogeneous hypodense lesion inferiorly within hepatic segments V/VI does not demonstrate change in appearance or attenuation on serial postcontrast imaging and is felt most likely to be a complex cyst. A couple additional too small to characterize hypodense lesions are also likely cysts. No solid enhancing lesion is evident. No morphologic evidence of cirrhosis.   BILE DUCTS: No biliary dilation.   GALLBLADDER: Cholelithiasis is present.   PANCREAS: A 1.6 cm ovoid cystic lesion along the superior margin of the pancreatic body does not demonstrate internal complexity or enhancement. A couple additional too small to characterize hypodense foci are also noted in the pancreas, also likely cystic lesions. The main duct is not dilated.   SPLEEN: The spleen is enlarged up to 20 cm without focal abnormality evident.   ADRENAL GLANDS: Unremarkable.   KIDNEYS, URETERS AND BLADDER: A 1.4 cm hypodense lesion along the lateral inferior pole of the right kidney does not demonstrate change in attenuation over serial post-contrast imaging,  likely a complex cystic lesion such as a hemorrhagic cyst. Additional simple appearing cystic lesions and too small to characterize hypodense lesions, also likely simple cysts, are present. No solid-appearing enhancing lesion is evident.   INCLUDED BOWEL: No evidence of obstruction or inflammation.   VESSELS: Calcific atherosclerosis is present. The abdominal aorta is not aneurysmal.   PERITONEUM/RETROPERITONEUM/LYMPH NODES: No free fluid or free air. No adenopathy is evident.   MUSCULOSKELETAL: No destructive osseous lesion is evident. The patient is status post sternotomy. Degenerative changes are present in the lower lumbar spine.       1. 4.4 cm ovoid heterogeneous hypodense lesion inferiorly within hepatic segments V/VI does not demonstrate change in appearance or attenuation on serial postcontrast imaging and is felt most likely to be a complex cyst. A couple additional too small to characterize hypodense lesions are also likely cysts. No solid enhancing lesion is evident. No morphologic evidence of cirrhosis. 2. Splenomegaly. 3. Incidental findings include pancreatic and renal simple and complex attenuation lesions as well, for which initial follow-up by contrast MRI is recommended on a routine outpatient basis.   Signed by: Regulo Martel 11/4/2024 7:49 AM Dictation workstation:   [id number]    Pulmonary function testing    Result Date: 11/3/2024  Normal spirometry. Lung volumes are normal. The DLCO corrected for hemoglobin is normal.    XR chest 1 view    Result Date: 11/3/2024  Interpreted By:  Frank Connolly, STUDY: XR CHEST 1 VIEW;  11/3/2024 9:55 am   INDICATION: Signs/Symptoms:SGC placement.     COMPARISON: Exam dated 11/02/2024   ACCESSION NUMBER(S): [id number]   ORDERING CLINICIAN: DINAH GONZALEZ   FINDINGS: AP radiograph of the chest was provided. Exam is limited by rightward patient rotation. There is a partially visualized left shoulder arthroplasty.   Status post median sternotomy. Right  IJ approach [place]-Faviola catheter tip projects over the right pulmonary artery.   CARDIOMEDIASTINAL SILHOUETTE: Stable diffuse enlargement of the cardiac silhouette.   LUNGS: Lungs are clear.   ABDOMEN: No remarkable upper abdominal findings.   BONES: No acute osseous changes.       1.  No evidence of acute pulmonary process. 2. Stable enlargement of the cardiac silhouette. 3. [place]-Faviola catheter tip projects over the right pulmonary artery.       MACRO: None   Signed by: Frank Connolly 11/3/2024 1:47 PM Dictation workstation:   [id number]    XR chest 1 view    Result Date: 11/2/2024  Interpreted By:  Frank Connolly, STUDY: XR CHEST 1 VIEW;  11/2/2024 7:57 am   INDICATION: Signs/Symptoms:SGC placement.     COMPARISON: Exam dated 11/01/2024   ACCESSION NUMBER(S): [id number]   ORDERING CLINICIAN: DINAH GONZALEZ   FINDINGS: AP radiograph of the chest was provided.   Right IJ approach [place]-Faviola catheter with tip projecting over the right pulmonary artery. Status post median sternotomy.Partially visualized left shoulder arthroplasty hardware.   CARDIOMEDIASTINAL SILHOUETTE: Stable enlargement of the cardiac silhouette.   LUNGS: Lungs are clear.   ABDOMEN: No remarkable upper abdominal findings.   BONES: No acute osseous changes.       1.  Right IJ [place]-Faviola catheter tip projects over the right pulmonary artery. 2. Stable enlargement of the cardiac silhouette. 3. No radiographic evidence of acute pulmonary process.       MACRO: None   Signed by: Frank Connolly 11/2/2024 9:21 AM Dictation workstation:   [id number]    XR chest 1 view    Result Date: 11/2/2024  Interpreted By:  Frank Connolly, STUDY: XR CHEST 1 VIEW;  11/1/2024 6:38 am   INDICATION: Signs/Symptoms:PA catheter postition assessment.     COMPARISON: Exam dated 10/31/2024   ACCESSION NUMBER(S): [id number]   ORDERING CLINICIAN: LANDON GARCES   FINDINGS: AP radiograph of the chest was provided.   Right IJ approach [place]-Faviola catheter with tip projecting over the  right pulmonary artery. Status post median sternotomy. Partially visualized left shoulder arthroplasty hardware.   CARDIOMEDIASTINAL SILHOUETTE: Stable enlargement of the cardiac silhouette.   LUNGS: Lungs are clear.   ABDOMEN: No remarkable upper abdominal findings.   BONES: No acute osseous changes.       1.  [place]-Faviola catheter tip projects over the right pulmonary artery. 2. No radiographic evidence of acute pulmonary process. 3. Stable enlargement of the cardiac silhouette.       MACRO: None   Signed by: Frank Connolly 11/2/2024 9:20 AM Dictation workstation:   [id number]    XR chest 2 views    Result Date: 11/1/2024  Interpreted By:  Frank Connolly, STUDY: XR CHEST 2 VIEWS;  10/31/2024 6:01 pm   INDICATION: Signs/Symptoms:LVAD / OHT evaluation.     COMPARISON: Exam dated earlier same day   ACCESSION NUMBER(S): [id number]   ORDERING CLINICIAN: LANDON GARCES   FINDINGS: PA and lateral radiographs of the chest were provided.  Additional PA dual energy images were also provided.   Right subclavian approach [place]-Faviola catheter with tip projecting over the distal right pulmonary artery.   CARDIOMEDIASTINAL SILHOUETTE: The cardiac silhouette is enlarged. Remaining mediastinal contours within normal limits.   LUNGS: Lungs are clear.   ABDOMEN: No remarkable upper abdominal findings.   BONES: No acute osseous changes.       1.  No evidence of acute cardiopulmonary process. 2. Stable enlargement of the cardiac silhouette. 3. [place]-Faviola catheter tip projects over the distal right pulmonary artery.       MACRO: None   Signed by: Frank Connolly 11/1/2024 4:38 PM Dictation workstation:   [id number]    XR chest 1 view    Result Date: 11/1/2024  Interpreted By:  Frank Connolly, STUDY: XR CHEST 1 VIEW;  10/31/2024 8:03 am   INDICATION: Signs/Symptoms:PA catheter assessment.     COMPARISON: Exam dated 10/30/2024   ACCESSION NUMBER(S): [id number]   ORDERING CLINICIAN: LANDON GARCES   FINDINGS: AP radiograph of the chest was  provided.   Status post median sternotomy. Right IJ approach [place]-Faviola catheter with the tip projecting over the distal right pulmonary artery.   CARDIOMEDIASTINAL SILHOUETTE: Stable enlargement of the cardiac silhouette.   LUNGS: Lungs are clear.   ABDOMEN: No remarkable upper abdominal findings.   BONES: No acute osseous changes.       1.  [place]-Faviola catheter tip projects over the distal right pulmonary artery. 2. Stable enlargement of the cardiac silhouette.       MACRO: None   Signed by: Frank Connolly 11/1/2024 4:36 PM Dictation workstation:   [id number]    CT chest wo IV contrast    Result Date: 11/1/2024  Interpreted By:  Frank Connolly, STUDY: CT CHEST WO IV CONTRAST;  10/31/2024 12:37 pm   INDICATION: Signs/Symptoms:LVAD / OHT evaluation.     COMPARISON: None.   ACCESSION NUMBER(S): [id number]   ORDERING CLINICIAN: LANDON GARCES   TECHNIQUE: Helical data acquisition of the chest was obtained  without IV contrast material.  Images were reformatted in axial, coronal, and sagittal planes.   FINDINGS: LUNGS AND AIRWAYS: The trachea and central airways are patent. No endobronchial lesion. Scattered secretions in the trachea. Nonspecific areas of traction bronchiolectasis in the lower lobes which is likely due to scarring.   Areas of linear parenchymal and ground-glass opacity in the dependent lungs likely represents atelectasis. No pleural effusion or pneumothorax. No lobar consolidation. Scattered punctate calcified granulomas. No suspicious pulmonary nodules.   MEDIASTINUM AND BEAU, LOWER NECK AND AXILLA: The visualized thyroid gland is within normal limits.   No evidence of thoracic lymphadenopathy by CT criteria.   Small hiatal hernia. The esophagus is otherwise within normal limits.   HEART AND VESSELS: The ascending aorta is dilated measuring 4.3 cm. There are moderate calcifications of the aorta and branch vessels. 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   Right IJ pulmonary artery catheter tip is in the  right pulmonary artery. The main pulmonary artery is dilated measuring 3.4 cm.   Severe coronary artery calcifications. Left circumflex coronary stent is present. The study is not optimized for evaluation of coronary arteries.   Moderate cardiomegaly with multichamber involvement. Moderate aortic valve calcifications.   No evidence of pericardial effusion.   UPPER ABDOMEN: There is a partially visualized, incompletely characterized hypodense lesion in the inferior right hepatic lobe measuring up to 4.8 cm. Subcentimeter hypodensity in the left hepatic lobe is incompletely characterized but most likely represents a simple cyst or hemangioma. The spleen is enlarged measuring 18.1 cm. 3.6 cm likely right renal cyst is noted.   CHEST WALL AND OSSEOUS STRUCTURES: There are no suspicious osseous lesions. Multilevel degenerative changes are present. Status post left shoulder hemiarthroplasty. Status post median sternotomy.       1.  Dilated ascending aorta measuring up to 4.3 cm. Recommend continued imaging surveillance as per clinical guidelines. 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 Moderate calcifications of the aorta and branch vessels. 2. Severe coronary artery calcifications. 3. Moderate cardiomegaly. 4. Moderate aortic valve calcifications and correlate with concern for aortic valve stenosis. 5. Incompletely characterized lesion in the inferior right hepatic lobe measuring up to 4.8 cm. Recommend initial evaluation with liver ultrasound. 6. [place]-Faviola catheter tip in the right pulmonary artery. The main pulmonary artery is mildly dilated and correlate with concern for pulmonary hypertension.   MACRO: Critical Finding:  See findings. Notification was initiated on 11/1/2024 at 1:18 pm by  Frank Connolly.  (**-OCF-**) Instructions:   Signed by: Frank Connolly 11/1/2024 1:18 PM Dictation workstation:   [id number]    XR panorex    Result Date: 11/1/2024  Interpreted By:  Rosita Beltran, STUDY: XR PANOREX   INDICATION:  Signs/Symptoms:LVAD / OHT evaluation   COMPARISON: None.   ACCESSION NUMBER(S): [id number]   ORDERING CLINICIAN: LANDON GARCES   FINDINGS: Single panoramic radiograph of the mandible was obtained. Edentulous patient.   Punctate radiopaque densities projecting in the right aspect of the mouth of uncertain clinical significance and etiology. Bony defects projecting in the inferior aspect of the bilateral mandibular angles likely artifactual from technique.   Paranasal sinuses are clear. There is no evidence of mandibular dislocation or fracture.       Edentulous patient.   Bony defects projecting in the inferior aspect of the bilateral mandibular angles likely artifactual from technique.   Signed by: Rosita Beltran 11/1/2024 10:19 AM Dictation workstation:   [id number]    XR chest 1 view    Result Date: 10/31/2024  Interpreted By:  Olvin Farias, STUDY: XR CHEST 1 VIEW; 10/30/2024 7:54 am   INDICATION: Signs/Symptoms:ADHF.   COMPARISON: 10/29/2024.   ACCESSION NUMBER(S): [id number]   ORDERING CLINICIAN: EULA WRIGHT   FINDINGS:     CARDIOMEDIASTINAL SILHOUETTE: Cardiomegaly versus pericardial effusion. Patient is status post median sternotomy. [place]-Faviola catheter overlies the right main pulmonary artery. LUNGS: Lungs are clear of focal airspace disease.   ABDOMEN: No remarkable upper abdominal findings.   BONES: No acute osseous changes. Patient is status post left TSA.       1.  Stable positioning of [place]-Faviola catheter with tip overlying the right main pulmonary artery.     Signed by: Olvin Farias 10/31/2024 9:08 AM Dictation workstation:   [id number]       LDA:  Ventricular Assist Device HeartMate 3 (Active)   Placement Date: 11/12/24   Placed by: Suhas Hill  Site Location: Abdomen left  VAD Type: Left ventricular assist device  VAD Brand: HeartMate 3   Number of days: 16       External Urinary Catheter Male (Active)   Placement Date/Time: 11/27/24 2051   External Catheter Type: Male   Number of days:  0     NUTRITION: Adult diet Regular  EMERGENCY CONTACT: Extended Emergency Contact Information  Primary Emergency Contact: Tanvi Meraz  Home Phone: [phone]  Relation: Spouse  Secondary Emergency Contact: Subhash Meraz  Mobile Phone: [phone]  Relation: Son  CODE STATUS: Full Code  DISPO: Discharge Planning  Living Arrangements: Spouse/significant other  Support Systems: Spouse/significant other  Assistance Needed: none  Type of Residence: Private residence  Number of Stairs to Enter Residence: 2  Number of Stairs Within Residence: 12  Do you have animals or pets at home?: Yes  Type of Animals or Pets: dog  Who is requesting discharge planning?: Provider  Home or Post Acute Services: None  Expected Discharge Disposition: Inpatient Rehab Facility (IRF)  Does the patient need discharge transport arranged?: Yes  RoundTrip coordination needed?: Yes  Has discharge transport been arranged?: Yes  What day is the transport expected?: 11/27/24  What time is the transport expected?: 1136  AMPAC: Daily Activity - Total Score: 20    FOLLOWUP: No future appointments.

## 2024-11-29 ENCOUNTER — DOCUMENTATION (OUTPATIENT)
Dept: TRANSPLANT | Facility: HOSPITAL | Age: 69
End: 2024-11-29
Payer: MEDICARE

## 2024-11-29 LAB
ALBUMIN SERPL BCP-MCNC: 3.3 G/DL (ref 3.4–5)
ANION GAP SERPL CALC-SCNC: 10 MMOL/L (ref 10–20)
APTT PPP: 44 SECONDS (ref 27–38)
BNP SERPL-MCNC: 478 PG/ML (ref 0–99)
BUN SERPL-MCNC: 16 MG/DL (ref 6–23)
CALCIUM SERPL-MCNC: 9 MG/DL (ref 8.6–10.6)
CHLORIDE SERPL-SCNC: 106 MMOL/L (ref 98–107)
CO2 SERPL-SCNC: 26 MMOL/L (ref 21–32)
CREAT SERPL-MCNC: 1.19 MG/DL (ref 0.5–1.3)
DIGOXIN SERPL-MCNC: 0.46 NG/ML (ref 0.8–?)
EGFRCR SERPLBLD CKD-EPI 2021: 66 ML/MIN/1.73M*2
ERYTHROCYTE [DISTWIDTH] IN BLOOD BY AUTOMATED COUNT: 16.7 % (ref 11.5–14.5)
GLUCOSE SERPL-MCNC: 102 MG/DL (ref 74–99)
HCT VFR BLD AUTO: 33.1 % (ref 41–52)
HGB BLD-MCNC: 10.5 G/DL (ref 13.5–17.5)
INR PPP: 2.1 (ref 0.9–1.1)
LDH SERPL L TO P-CCNC: 151 U/L (ref 84–246)
MAGNESIUM SERPL-MCNC: 2.05 MG/DL (ref 1.6–2.4)
MCH RBC QN AUTO: 27.1 PG (ref 26–34)
MCHC RBC AUTO-ENTMCNC: 31.7 G/DL (ref 32–36)
MCV RBC AUTO: 86 FL (ref 80–100)
NRBC BLD-RTO: 0 /100 WBCS (ref 0–0)
PHOSPHATE SERPL-MCNC: 3.8 MG/DL (ref 2.5–4.9)
PLATELET # BLD AUTO: 401 X10*3/UL (ref 150–450)
POTASSIUM SERPL-SCNC: 4.2 MMOL/L (ref 3.5–5.3)
PROTHROMBIN TIME: 23.7 SECONDS (ref 9.8–12.8)
RBC # BLD AUTO: 3.87 X10*6/UL (ref 4.5–5.9)
SODIUM SERPL-SCNC: 138 MMOL/L (ref 136–145)
WBC # BLD AUTO: 9 X10*3/UL (ref 4.4–11.3)

## 2024-11-29 PROCEDURE — 97530 THERAPEUTIC ACTIVITIES: CPT | Mod: GP | Performed by: STUDENT IN AN ORGANIZED HEALTH CARE EDUCATION/TRAINING PROGRAM

## 2024-11-29 PROCEDURE — 2500000004 HC RX 250 GENERAL PHARMACY W/ HCPCS (ALT 636 FOR OP/ED): Performed by: NURSE PRACTITIONER

## 2024-11-29 PROCEDURE — 93750 INTERROGATION VAD IN PERSON: CPT | Performed by: NURSE PRACTITIONER

## 2024-11-29 PROCEDURE — 2500000005 HC RX 250 GENERAL PHARMACY W/O HCPCS: Performed by: NURSE PRACTITIONER

## 2024-11-29 PROCEDURE — 2500000001 HC RX 250 WO HCPCS SELF ADMINISTERED DRUGS (ALT 637 FOR MEDICARE OP): Performed by: NURSE PRACTITIONER

## 2024-11-29 PROCEDURE — 93750 INTERROGATION VAD IN PERSON: CPT | Performed by: STUDENT IN AN ORGANIZED HEALTH CARE EDUCATION/TRAINING PROGRAM

## 2024-11-29 PROCEDURE — 80069 RENAL FUNCTION PANEL: CPT | Performed by: NURSE PRACTITIONER

## 2024-11-29 PROCEDURE — 2500000001 HC RX 250 WO HCPCS SELF ADMINISTERED DRUGS (ALT 637 FOR MEDICARE OP): Performed by: REGISTERED NURSE

## 2024-11-29 PROCEDURE — 85610 PROTHROMBIN TIME: CPT | Performed by: NURSE PRACTITIONER

## 2024-11-29 PROCEDURE — 80162 ASSAY OF DIGOXIN TOTAL: CPT | Performed by: NURSE PRACTITIONER

## 2024-11-29 PROCEDURE — 1200000002 HC GENERAL ROOM WITH TELEMETRY DAILY

## 2024-11-29 PROCEDURE — 2500000002 HC RX 250 W HCPCS SELF ADMINISTERED DRUGS (ALT 637 FOR MEDICARE OP, ALT 636 FOR OP/ED): Performed by: REGISTERED NURSE

## 2024-11-29 PROCEDURE — 83880 ASSAY OF NATRIURETIC PEPTIDE: CPT | Performed by: STUDENT IN AN ORGANIZED HEALTH CARE EDUCATION/TRAINING PROGRAM

## 2024-11-29 PROCEDURE — 85027 COMPLETE CBC AUTOMATED: CPT | Performed by: NURSE PRACTITIONER

## 2024-11-29 PROCEDURE — 2500000001 HC RX 250 WO HCPCS SELF ADMINISTERED DRUGS (ALT 637 FOR MEDICARE OP): Performed by: STUDENT IN AN ORGANIZED HEALTH CARE EDUCATION/TRAINING PROGRAM

## 2024-11-29 PROCEDURE — 83615 LACTATE (LD) (LDH) ENZYME: CPT | Performed by: NURSE PRACTITIONER

## 2024-11-29 PROCEDURE — 97116 GAIT TRAINING THERAPY: CPT | Mod: GP | Performed by: STUDENT IN AN ORGANIZED HEALTH CARE EDUCATION/TRAINING PROGRAM

## 2024-11-29 PROCEDURE — 2500000002 HC RX 250 W HCPCS SELF ADMINISTERED DRUGS (ALT 637 FOR MEDICARE OP, ALT 636 FOR OP/ED): Performed by: NURSE PRACTITIONER

## 2024-11-29 PROCEDURE — 2500000001 HC RX 250 WO HCPCS SELF ADMINISTERED DRUGS (ALT 637 FOR MEDICARE OP)

## 2024-11-29 PROCEDURE — 83735 ASSAY OF MAGNESIUM: CPT | Performed by: NURSE PRACTITIONER

## 2024-11-29 PROCEDURE — 99233 SBSQ HOSP IP/OBS HIGH 50: CPT | Performed by: STUDENT IN AN ORGANIZED HEALTH CARE EDUCATION/TRAINING PROGRAM

## 2024-11-29 PROCEDURE — 99233 SBSQ HOSP IP/OBS HIGH 50: CPT | Performed by: NURSE PRACTITIONER

## 2024-11-29 RX ORDER — PRAVASTATIN SODIUM 20 MG/1
20 TABLET ORAL NIGHTLY
Status: DISCONTINUED | OUTPATIENT
Start: 2024-11-29 | End: 2024-12-03 | Stop reason: HOSPADM

## 2024-11-29 RX ORDER — TERBUTALINE SULFATE 2.5 MG/1
5 TABLET ORAL EVERY 8 HOURS
Status: DISCONTINUED | OUTPATIENT
Start: 2024-11-29 | End: 2024-11-29

## 2024-11-29 RX ORDER — METHOCARBAMOL 500 MG/1
500 TABLET, FILM COATED ORAL EVERY 6 HOURS
Status: DISCONTINUED | OUTPATIENT
Start: 2024-11-29 | End: 2024-12-03 | Stop reason: HOSPADM

## 2024-11-29 ASSESSMENT — COGNITIVE AND FUNCTIONAL STATUS - GENERAL
TOILETING: A LITTLE
STANDING UP FROM CHAIR USING ARMS: A LITTLE
TURNING FROM BACK TO SIDE WHILE IN FLAT BAD: A LITTLE
TOILETING: A LITTLE
MOBILITY SCORE: 19
WALKING IN HOSPITAL ROOM: A LITTLE
WALKING IN HOSPITAL ROOM: A LITTLE
MOVING TO AND FROM BED TO CHAIR: A LITTLE
CLIMB 3 TO 5 STEPS WITH RAILING: A LITTLE
CLIMB 3 TO 5 STEPS WITH RAILING: A LITTLE
MOVING TO AND FROM BED TO CHAIR: A LITTLE
STANDING UP FROM CHAIR USING ARMS: A LITTLE
MOBILITY SCORE: 19
PERSONAL GROOMING: A LITTLE
PERSONAL GROOMING: A LITTLE
DAILY ACTIVITIY SCORE: 22
DAILY ACTIVITIY SCORE: 22
TURNING FROM BACK TO SIDE WHILE IN FLAT BAD: A LITTLE

## 2024-11-29 ASSESSMENT — PAIN - FUNCTIONAL ASSESSMENT
PAIN_FUNCTIONAL_ASSESSMENT: 0-10

## 2024-11-29 ASSESSMENT — PAIN SCALES - GENERAL
PAINLEVEL_OUTOF10: 0 - NO PAIN
PAINLEVEL_OUTOF10: 3
PAINLEVEL_OUTOF10: 0 - NO PAIN

## 2024-11-29 ASSESSMENT — PAIN DESCRIPTION - DESCRIPTORS: DESCRIPTORS: ACHING;SORE

## 2024-11-29 NOTE — PROGRESS NOTES
ADVANCED HEART FAILURE LVAD PROGRESS NOTE     H VAD Cardiologist: Padmini      Type of VAD: HM3  Implant Date: 11/12/24  Reason for VAD: ICM  Intent: Long-Term (Significant PAD, age, patient preference)      Subjective   Interval events:  No overnight events, NSVT noted on tele. Longest was 7 beats. This morning, pt denies acute/new complaints.    HPI:  Fahad Meraz is a very pleasant 69 y.o. male w/ a PMHx sig for CAD s/p 4V CABG (2012) and PCI (LCx/OM; 5/2019), stage D systolic HF/ICM/HFrEF with LVEF 10-15%( 10/22/24 TTE), AF s/p RFA (PVI and CTI; 4/2024) on OAC therapy, HTN, dyslipidemia, depression, anxiety and VIV using CPAP who was admitted to OSH ICU, s/p RHC on 10/18/24.  Per patient, he had been experienced worsening SOB and fluids overload for 2-3 weeks. Patient was on milrinone drip and underwent SGC diuresis. However his KENYA worsened, and were not able to wean milrinone drip. Decision was made to transfer him to HF ICU [facility] for possible advanced therapy consideration. Advanced therapies evaluation was initiated on 10/30. Discussed in advanced therapeutics committee on 11/5 and was denied for transplantation, and approved for LVAD (significant PAD, Elevated PSA level, Age approaching 70, as well as patient verbalized he would be more in favor of LVAD vs Transplant). He was transferred to the floor on 11/6 to await VAD implantation. Readmitted to HFICU as of 11/09 for pre-OR swan guided optimization. Now presents to CTICU s/p LVAD HM3 implant via thoracotomy with descending outflow graft on 11/12/24 with Dr. Mclain. OR Course/Issues: pulseless VT requiring defib x 1 pre- CPB. Postop course c/b delirium, RV dysfunction, VT & malnutrition.       Principal Problem:    Acute on chronic heart failure with reduced ejection fraction and diastolic dysfunction  Active Problems:    Ischemic cardiomyopathy    Receiving inotropic medication    HTN (hypertension)    CAD (coronary artery disease)    MI (myocardial  infarction) (Multi)    CHF (congestive heart failure)    Gastroesophageal reflux disease    Acute kidney injury (nontraumatic) (CMS-HCC)    Delirium       LOS: 36 days     NYHA class pre-VAD implant: IV  NYHA class today (11/25): II        Objective   Vitals:   Vitals:    11/29/24 0000 11/29/24 0430 11/29/24 0532 11/29/24 0721   BP:       BP Location:       Patient Position:       Pulse: 68 68  67   Resp: 14 15  14   Temp: 36.2 °C (97.2 °F) 36.5 °C (97.7 °F)  36.5 °C (97.7 °F)   TempSrc: Temporal Temporal  Temporal   SpO2: 98% 97%  94%   Weight:   88.9 kg (195 lb 15.8 oz)    Height:         Wt Readings from Last 5 Encounters:   11/29/24 88.9 kg (195 lb 15.8 oz)   10/24/24 92.5 kg (204 lb)   08/05/24 122 kg (268 lb)   01/31/22 119 kg (262 lb)   02/18/20 123 kg (270 lb 2 oz)     Hemodynamic parameters for last 24 hours:     Intake/Output for last 24 hours:    Intake/Output Summary (Last 24 hours) at 11/29/2024 0745  Last data filed at 11/28/2024 1346  Gross per 24 hour   Intake --   Output 275 ml   Net -275 ml           Physical exam:  Physical Exam  Constitutional:       General: He is not in acute distress.     Appearance: Normal appearance. He is normal weight. He is not ill-appearing or toxic-appearing.   HENT:      Head: Normocephalic and atraumatic.      Mouth/Throat:      Mouth: Mucous membranes are moist.      Pharynx: Oropharynx is clear. No oropharyngeal exudate or posterior oropharyngeal erythema.   Eyes:      Extraocular Movements: Extraocular movements intact.      Conjunctiva/sclera: Conjunctivae normal.      Pupils: Pupils are equal, round, and reactive to light.   Neck:      Vascular: No JVD.   Cardiovascular:      Rate and Rhythm: Normal rate and regular rhythm.      Comments: LVAD hum heard on auscultation. Sinus rhythm w/ intermittent PVCs and NSVT. Capillary refill < 2 seconds in all extremities.   Pulmonary:      Effort: Pulmonary effort is normal. No accessory muscle usage, respiratory distress  or retractions.      Breath sounds: No stridor. No wheezing, rhonchi or rales.   Abdominal:      General: Abdomen is flat. Bowel sounds are normal. There is no distension.      Palpations: Abdomen is soft. There is no mass.      Tenderness: There is no abdominal tenderness. There is no guarding.      Hernia: No hernia is present.   Musculoskeletal:         General: No swelling or tenderness. Normal range of motion.      Cervical back: Full passive range of motion without pain and normal range of motion.      Right lower leg: No edema.      Left lower leg: No edema.   Skin:     General: Skin is warm and dry.      Capillary Refill: Capillary refill takes less than 2 seconds.      Coloration: Skin is not jaundiced.      Findings: Lesion present. No bruising, laceration, rash or wound.      Comments: Left thoracotomy well approximated w/o signs of infection. LVAD driveline site covered with weekly dressing. No drainage or tenderness on exam.    Neurological:      General: No focal deficit present.      Mental Status: He is alert and oriented to person, place, and time. Mental status is at baseline.   Psychiatric:         Mood and Affect: Mood normal.         Behavior: Behavior normal.         Thought Content: Thought content normal.         Judgment: Judgment normal.        Driveline:   [facility] 11/28       Labs:   CMP:  Recent Labs     11/27/24  0652 11/27/24  0227 11/26/24  0603 11/25/24  0736 11/25/24  0250 11/24/24  0556 11/23/24  0451 11/22/24  1437 11/22/24  0304 11/21/24  1247    138 139 135* 133* 136 139 137 137 136   K 4.4 4.8 4.3 4.4 8.0* 4.6 4.3 5.2 4.2 4.0    105 103 105 102 104 105 103 102 101   CO2 24 23 26 16* 26 22 26 25 27 24   ANIONGAP 13 15 14 18 13 15 12 14 12 15   BUN 20 22 19 20 22 20 19 19 20 25*   CREATININE 1.28 1.21 1.39* 1.10 1.22 1.24 1.32* 1.35* 1.35* 1.47*   EGFR 61 65 55* 73 64 63 58* 57* 57* 51*   MG 2.18 2.12 2.13  --  2.47* 2.03 2.12  --  2.10 2.54*     Recent Labs      11/27/24  0652 11/26/24  0603 11/25/24  0736 11/25/24  0250 11/24/24  0556 11/23/24  0451 11/22/24  1437 11/22/24  0304 11/19/24  1459 11/19/24  0322 11/18/24  0525 11/17/24  1323 11/17/24  0122 11/16/24  1358 11/16/24  0128 11/15/24  1255 11/15/24  0021 11/14/24  1224 11/14/24  0118 11/13/24  1228 11/13/24  0007 11/12/24  1307   ALBUMIN 3.6 3.6 3.4 3.7 3.6 3.6 3.4 3.5   < > 3.1* 3.5   < > 3.4   < > 3.6   < > 3.7   < > 3.8 3.7   < > 3.7   ALT  --   --   --   --   --   --   --   --   --  6* 6*  --  3*  --  4*  --  5*  --  7* 11  --  15   AST  --   --   --   --   --   --   --   --   --  16 18  --  21  --  24  --  29  --  40* 42*  --  37   BILITOT  --   --   --   --   --   --   --   --   --  0.8 0.8  --  1.0  --  1.0  --  1.1  --  0.9 1.1  --  0.9    < > = values in this interval not displayed.     CBC:  Recent Labs     11/27/24  0652 11/26/24  0603 11/25/24  0250 11/24/24  0556 11/23/24  0451 11/22/24  0304 11/21/24  0426 11/20/24  0151   WBC 12.0* 11.8* 13.5* 15.4* 13.8* 10.9 10.2 10.7   HGB 12.6* 12.4* 12.1* 12.3* 12.5* 11.2* 10.4* 10.5*   HCT 38.3* 38.6* 37.2* 39.1* 40.1* 34.3* 32.5* 31.7*   * 537* 313 545* 529* 473* 455* 465*   MCV 84 85 83 86 87 83 84 82     COAG:   Recent Labs     11/28/24  0542 11/27/24  2037 11/27/24  1605 11/26/24  0957 11/26/24  0603 11/25/24  2348 11/25/24  0736 11/25/24  0250 11/24/24  2156 11/24/24  0556 11/23/24  0451 11/22/24  0304 11/21/24  1247 11/21/24  0426 11/13/24  0008 11/12/24  1257   INR 2.1*  --  1.8*  --  1.8*  --   --  1.8*  --  1.8* 1.9* 2.5*  --  2.1*   < > 1.4*   HAUF 0.6 0.5  --  0.5 0.4 0.2   < > 0.1   < >  --   --  0.1   < >  --    < >  --    FIBRINOGEN  --   --   --   --   --   --   --   --   --   --   --   --   --   --   --  301    < > = values in this interval not displayed.     ABO:   Recent Labs     11/20/24  0151   ABO A     HEME/ENDO:   Recent Labs     10/31/24  1249 10/24/24  2328   FERRITIN  --  76   IRONSAT  --  17*   TSH 4.39* 8.47*   HGBA1C  --  6.3*  "    CARDIAC:   Recent Labs     11/27/24  0652 11/26/24  0603 11/25/24  0736 11/25/24  0250 11/24/24  0556 11/23/24  0451 11/22/24  0304 11/21/24  0426 11/12/24  1307 10/31/24  1249 10/24/24  2328    264* 426* 725* 247* 240 233 231   < > 187  --    BNP  --   --   --   --   --   --   --   --   --  755* 1,995*    < > = values in this interval not displayed.     Recent Labs     11/27/24  0652   CHOL 126  126   HDL 24.6  24.6   TRIG 155*     TOX:  Recent Labs     10/31/24  1249   AMPHETAMINE Negative     MICRO: No results for input(s): \"ESR\", \"CRP\", \"PROCAL\" in the last 94196 hours.  No results found for the last 90 days.        Imaging:   No results found.     Notable Studies:   EKG:  Encounter Date: 10/24/24   Electrocardiogram 12-lead PRN for arrhythmia   Result Value    Ventricular Rate 111    Atrial Rate 81    QRS Duration 146    QT Interval 406    QTC Calculation(Bazett) 552    R Axis 24    T Axis 45    QRS Count 18    Q Onset 226    P Onset 209    P Offset 224    T Offset 429    QTC Fredericia 498    Narrative    Atrial fibrillation with rapid ventricular response with premature ventricular or aberrantly conducted complexes  Left bundle branch block  Abnormal ECG  When compared with ECG of 20-NOV-2024 03:52,  Left bundle branch block has replaced Nonspecific intraventricular block  Criteria for Septal infarct are no longer Present  Criteria for Lateral infarct are no longer Present       Echo:  Transthoracic Echo (TTE) Limited    Result Date: 11/26/2024   [facility], [address] [place] [address]                Tel [phone] and Fax [phone] TRANSTHORACIC ECHOCARDIOGRAM REPORT  Patient Name:       RJ Webb Physician:    [id number] Parker Terrell MD Study Date:         11/26/2024          Ordering Provider:    [id number] KARRI CHAVEZ " MRN/PID:            [id number]            Fellow: Accession#:         [id number]        Nurse: Date of Birth/Age:  1955 / 69     Sonographer:          IRMA Montoya RDCS Gender assigned at  M                   Additional Staff:     Melida Kaye Birth:                                                        RDCS Height:             182.88 cm           Admit Date: Weight:             87.54 kg            Admission Status:     Inpatient -                                                               Routine BSA / BMI:          2.10 m2 / 26.18     Encounter#:           [id number]                     kg/m2 Blood Pressure:     /                   Department Location:  [facility]                                                               Non Invasive Study Type:    TRANSTHORACIC ECHO (TTE) LIMITED Diagnosis/ICD: Presence of heart assist device-Z95.811 Indication:    RAMP study CPT Code:      Echo Limited-53577; Doppler Limited-30949; Color Doppler-22002 Patient History: Pertinent History: CAD. CABG x4 2012, PCI 2019, ICM, CHF, s/p HeartMate III                    LVAD, AF s/p RFA (PVI and CTI 4/2024). Study Detail: The following Echo studies were performed: 2D, M-Mode, Doppler and               color flow. Technically challenging study due to poor acoustic               windows.  PHYSICIAN INTERPRETATION: Left Ventricle: Left ventricular ejection fraction is severely decreased, by visual estimate at 15-20%. There is global hypokinesis of the left ventricle with minor regional variations. The left ventricular cavity size is severely dilated. Spectral Doppler shows a Grade II (pseudonormal pattern) of left ventricular diastolic filling with an elevated left atrial pressure. Left Atrium: The left atrium is enlarged. Right Ventricle: The right ventricle is severely enlarged. There is reduced right ventricular systolic function. Right Atrium:  The right atrium was not well visualized. Aortic Valve: The aortic valve is probably trileaflet. There is mild aortic valve cusp calcification. There is mild aortic valve regurgitation. Mitral Valve: The mitral valve is mildly thickened. There is trace mitral valve regurgitation. Tricuspid Valve: The tricuspid valve is structurally normal. There is trace tricuspid regurgitation. The right ventricular systolic pressure is unable to be estimated. Pulmonic Valve: The pulmonic valve is structurally normal. Pulmonic valve regurgitation was not assessed. Pericardium: Trivial pericardial effusion. Aorta: The aortic root is abnormal. There is mild dilatation of the ascending aorta. There is mild dilatation of the aortic root. Pulmonary Artery: The pulmonary artery is not well visualized. Systemic Veins: The inferior vena cava was not well visualized. In comparison to the previous echocardiogram(s): Compared with study dated 11/18/2024, no significant change.  LEFT VENTRICULAR ASSIST DEVICE: LVAD: The patient has a(n) HeartMate 3 LVAD device present. Inflow Cannula: Visualization of the inflow cannula is technically difficult. Outflow Cannula: Visualization of the outflow cannula is technically difficult. LVAD Comments: Ramp study. 5200 rpm at start and finish of limited echo.  CONCLUSIONS:  1. Left ventricular ejection fraction is severely decreased, by visual estimate at 15-20%.  2. There is global hypokinesis of the left ventricle with minor regional variations.  3. Spectral Doppler shows a Grade II (pseudonormal pattern) of left ventricular diastolic filling with an elevated left atrial pressure.  4. Left ventricular cavity size is severely dilated.  5. There is reduced right ventricular systolic function.  6. Severely enlarged right ventricle.  7. The left atrium is enlarged.  8. Mild aortic valve regurgitation.  9. The pulmonary artery is not well visualized. QUANTITATIVE DATA SUMMARY:  M-MODE MEASUREMENTS:          Normal Ranges: Ao Root:             4.10 cm (2.0-3.7cm)  AORTA MEASUREMENTS:         Normal Ranges: Asc Ao, d:          4.20 cm (2.1-3.4cm)  LV SYSTOLIC FUNCTION BY 2D PLANIMETRY (MOD):                      Normal Ranges: EF-Visual:      18 % LV EF Reported: 18 %  LV DIASTOLIC FUNCTION:          Normal Ranges: MV Peak E:             0.50 m/s (0.7-1.2 m/s) MV Peak A:             0.44 m/s (0.42-0.7 m/s) E/A Ratio:             1.14     (1.0-2.2)  [id number] Parker Terrell MD Electronically signed on 11/26/2024 at 3:08:03 PM  ** Final **     Transthoracic Echo (TTE) Limited    Result Date: 11/18/2024   [facility], [address] [place] [address]                Tel [phone] and Fax [phone] TRANSTHORACIC ECHOCARDIOGRAM REPORT  Patient Name:       RJ Webb Physician:    [id number] Fawad Chicas MD Study Date:         11/18/2024          Ordering Provider:    [id number] HARRISON PALOMINO MRN/PID:            [id number]            Fellow: Accession#:         [id number]        Nurse: Date of Birth/Age:  1955 / 69     Sonographer:          Genesis Sinclair RDCS                     years Gender assigned at  M                   Additional Staff: Birth: Height:             182.88 cm           Admit Date:           10/24/2024 Weight:             93.44 kg            Admission Status:     Inpatient -                                                               Routine BSA / BMI:          2.16 m2 / 27.94     Encounter#:           [id number]                     kg/m2 Blood Pressure:     /                   Department Location:  [facility] Study Type:    TRANSTHORACIC ECHO (TTE) LIMITED Diagnosis/ICD: Presence of heart assist device-Z95.811 Indication:    Ramp study for LVAD CPT Code:      Echo Limited-11808; Doppler Limited-82636; Color Doppler-87524 Patient History:  Pertinent History: Acute on chronic heart failure with reduced EF, Diastolic                    disfunction, Ischemic cardiiomyopathy, MI, CHF, LVAD, VIV. Study Detail: The following Echo studies were performed: 2D, Doppler, color flow               and M-Mode. Technically challenging study due to patient lying in               supine position and postoperative dressings.  PHYSICIAN INTERPRETATION: Left Ventricle: Left ventricular ejection fraction is severely decreased, by visual estimate at 10%. There is global hypokinesis of the left ventricle with minor regional variations. The left ventricular cavity size is severely dilated. Abnormal (paradoxical) septal motion consistent with post-operative status. Left ventricular diastolic filling was not assessed. Left Atrium: The left atrium is moderate to severely dilated. Right Ventricle: The right ventricle is severely enlarged. There is severely reduced right ventricular systolic function. Right Atrium: The right atrium is moderately dilated. Aortic Valve: The aortic valve is structurally normal. There is mild aortic valve cusp calcification. There is mild aortic valve regurgitation. Mitral Valve: The mitral valve is normal in structure. There is mild mitral valve regurgitation. Tricuspid Valve: The tricuspid valve is structurally normal. There is mild tricuspid regurgitation. Pulmonic Valve: The pulmonic valve is not well visualized. Pulmonic valve regurgitation was not assessed. Pericardium: Trivial pericardial effusion. Aorta: The aortic root is normal. In comparison to the previous echocardiogram(s): Compared with study dated 11/15/2024, no significant change.  LEFT VENTRICULAR ASSIST DEVICE: LVAD: The patient has a(n) HeartMate 3 LVAD device present. Inflow Cannula: The inflow cannula is visualized in the left ventricular apex. AV Leaflet Mobility: . The aortic valve appears to be opening every 1 beats. LVAD Comments: Ramp study speed increased from 5200 to 5300.  Aortic valve still opens every beat. Septum remains in the midline.  CONCLUSIONS:  1. Poorly visualized anatomical structures due to suboptimal image quality.  2. Left ventricular ejection fraction is severely decreased, by visual estimate at 10%.  3. There is global hypokinesis of the left ventricle with minor regional variations.  4. Left ventricular cavity size is severely dilated.  5. Abnormal septal motion consistent with post-operative status.  6. There is severely reduced right ventricular systolic function.  7. Severely enlarged right ventricle.  8. The left atrium is moderate to severely dilated.  9. The right atrium is moderately dilated. 10. Mild aortic valve regurgitation. 11. . The aortic valve appears to be opening every 1 beats. 12. Compared with study dated 11/15/2024, no significant change. QUANTITATIVE DATA SUMMARY:  LV SYSTOLIC FUNCTION BY 2D PLANIMETRY (MOD):                      Normal Ranges: EF-Visual:      10 % LV EF Reported: 10 %  [id number] Fawad Chicas MD Electronically signed on 11/18/2024 at 12:33:46 PM  ** Final **     Transthoracic Echo (TTE) Limited    Result Date: 11/15/2024   [facility], [address] [place] [address]                Tel [phone] and Fax [phone] TRANSTHORACIC ECHOCARDIOGRAM REPORT  Patient Name:       RJ Webb Physician:    [id number] Fawad Chicas MD Study Date:         11/15/2024          Ordering Provider:    [id number] MANNING MRN/PID:            [id number]            Fellow:               [id number] Aramis Fitzpatrick MD Accession#:         [id number]        Nurse: Date of Birth/Age:  1955 / 69     Sonographer:          Jackson estrada                                     [facility] Gender assigned at  M                    Additional Staff: Birth: Height:             182.88 cm           Admit Date: Weight:             97.07 kg            Admission Status:     Inpatient - STAT BSA / BMI:          2.19 m2 / 29.02     Encounter#:           [id number]                     kg/m2 Blood Pressure:     107/71 mmHg         Department Location:  [facility] Study Type:    TRANSTHORACIC ECHO (TTE) LIMITED Diagnosis/ICD: Presence of heart assist device-Z95.811 Indication:    LVAD ramp study CPT Code:      Echo Limited-64246; Doppler Limited-27981; Color Doppler-86042 Patient History: Pertinent History: S/p CABG x 4 (2012) and PCI (LCx/OM; 5/2019), Stage D ICM                    HFrEF LVEF 10-15%, AF s/p RFA (PVI and CTI; 4/2024), HTN,                    Dyslipidemia, 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 Study Detail: The following Echo studies were performed: 2D, M-Mode, Doppler and               color flow. Technically challenging study due to poor acoustic               windows, prominent lung artifact, body habitus and patient lying               in supine position.  PHYSICIAN INTERPRETATION: Left Ventricle: The left ventricular systolic function is severely decreased, with a visually estimated ejection fraction of 10-15%. There is global hypokinesis of the left ventricle with minor regional variations. The left ventricular cavity size is severely dilated. There is normal septal and normal posterior left ventricular wall thickness. Abnormal (paradoxical) septal motion consistent with post-operative status. Left ventricular diastolic filling cannot be determined, due to left ventricular assist device. Left Atrium: The left atrium is mildly dilated. Right Ventricle: The right ventricle is severely enlarged. There is severely reduced right ventricular systolic function. A device is visualized in the right ventricle. Right Atrium: The right atrium is severely dilated.  There is a device visualized in the right atrium. Aortic Valve: The aortic valve is structurally normal. There is mild to moderate aortic valve cusp calcification. There is mild aortic valve regurgitation. Mitral Valve: The mitral valve is normal in structure. There is mild mitral annular calcification. There is mild mitral valve regurgitation. Tricuspid Valve: The tricuspid valve is structurally normal. There is mild tricuspid regurgitation. The right ventricular systolic pressure is unable to be estimated. Pulmonic Valve: The pulmonic valve is not well visualized. Pulmonic valve regurgitation was not assessed. Pericardium: Trivial pericardial effusion. Aorta: The aortic root is abnormal. There is mild dilatation of the ascending aorta. There is mild dilatation of the aortic root. In comparison to the previous echocardiogram(s): Compared with study dated 11/13/2024, no significant change.  LEFT VENTRICULAR ASSIST DEVICE: Study Type: This study was performed while LVAD settings were optimized. LVAD: The patient has a(n) HeartMate 3 LVAD device present. Inflow Cannula: The inflow cannula is visualized in the left ventricular apex. Outflow Cannula: Visualization of the outflow cannula is technically difficult. AV Leaflet Mobility: . The aortic valve appears to be opening every 1 beats.  CONCLUSIONS:  1. The left ventricular systolic function is severely decreased, with a visually estimated ejection fraction of 10-15%.  2. There is global hypokinesis of the left ventricle with minor regional variations.  3. Left ventricular diastolic filling cannot be determined, due to left ventricular assist device.  4. Left ventricular cavity size is severely dilated.  5. Abnormal septal motion consistent with post-operative status.  6. There is severely reduced right ventricular systolic function.  7. Severely enlarged right ventricle.  8. The left atrium is mildly dilated.  9. The right atrium is severely dilated. 10. Mild aortic  valve regurgitation. 11. . The aortic valve appears to be opening every 1 beats. 12. Compared with study dated 11/13/2024, no significant change. QUANTITATIVE DATA SUMMARY:  2D MEASUREMENTS:          Normal Ranges: IVSd:            1.00 cm  (0.6-1.1cm) LVPWd:           1.00 cm  (0.6-1.1cm) LVIDd:           5.90 cm  (3.9-5.9cm) LV Mass Index:   109 g/m2  LA VOLUME:                   Normal Ranges: LA Vol A4C:        86.2 ml   (22+/-6mL/m2) LA Vol Index A4C:  39.3ml/m2 LA Area A4C:       24.2 cm2 LA Major Axis A4C: 5.8 cm  AORTA MEASUREMENTS:         Normal Ranges: Ao Sinus, d:        4.30 cm (2.1-3.5cm)  LV SYSTOLIC FUNCTION BY 2D PLANIMETRY (MOD):                      Normal Ranges: EF-Visual:      13 % LV EF Reported: 13 %  [id number] Fawad Chicas MD Electronically signed on 11/15/2024 at 12:09:54 PM  ** Final **       Meds:  Scheduled medications  amiodarone, 400 mg, oral, BID  aspirin, 81 mg, oral, Daily  dapagliflozin propanediol, 10 mg, oral, Daily  digoxin, 250 mcg, oral, Daily  escitalopram, 10 mg, oral, Daily  icosapent ethyL, 2 g, oral, BID  iron sucrose, 200 mg, intravenous, Daily  lidocaine, 2 patch, transdermal, Daily  magnesium oxide, 800 mg, oral, BID  pantoprazole, 40 mg, oral, Daily before breakfast  sacubitriL-valsartan, 1 tablet, oral, BID  sennosides-docusate sodium, 1 tablet, oral, BID  sildenafil, 20 mg, oral, TID  spironolactone, 12.5 mg, oral, Daily  warfarin, 7.5 mg, oral, Daily      Continuous medications       PRN medications  PRN medications: acetaminophen, [DISCONTINUED] ondansetron **OR** ondansetron, polyethylene glycol     Assessment/Plan   Assessment & Plan     NEUROLOGICAL  Active issues:   #Acute post-operative pain  - Endorses pain at back near posterior portion of surgical incision. Pain musculoskeletal in nature & improved with robaxin   - PRN po Tylenol 975 mg q6 hrs   - Continue lidoderm patches   - Restart robaxin 500mg Q6 x 5 days     #Hx of anxiety and depression  - Continue  home escitalopram 10mg daily    #Transient LUE weakness on 11/24  - CTH (11/24) without acute abnormalities  - Resolved with tylenol  - No focal deficits any further       CARDIAC  #PMHx CAD s/p CABG x 4 (2012) and PCI (LCx/OM; 5/2019), stage D ICM HFrEF LVEF 10-15%  #S/p HM3 LVAD via left thoracotomy 11/12 with [place] Angel and Elgudin.     GDMT and RV support:   - BB: n/a - RV dysfunction   - ARNI/ACEi/ARB: Entresto 24-26mg BID   - MRA: continue spironolactone 12.5mg daily    - SGLT2i: Farxiga 10 mg daily     RV support:   - c/w digoxin to 250mcg daily -> repeat level today   - Continue sildenafil 20mg TID     Anticoagulation:   - Continue Coumadin 7.5mg daily for goal INR 2-3.   - Continue ASA (primary indication - ICM, PCI)     Heart Mate III interrogation   Most Recent   Flow 4.4   Speed 5200   Power 3.7   PI 3.5   MAP (mmHg): 86    LVAD interrogation: stable flow, no sig PI events or power spikes.     RAMP study: Started study with RPM 5200. AV opening every beat, no MR, mild AI, septum midline. After review with Dr. Drummond, he appears well optimized at this speed. No speed changes made. dMAP 68.     Daily weight:   Vitals:    11/29/24 0532   Weight: 88.9 kg (195 lb 15.8 oz)      #Chronic AF s/p RFA (PVI and CTI; 4/2024),   #Sustained VT and NSVT  - Patient never required ICD as his EF remained > 30% prior to acute reduction and LVAD placement   - 11/26 patient noted to have sustained asymptomatic VT w/ self termination.   - EP consulted and recommend medical management with amio over ICD placement given patient's stability and lack of symptoms   - Since starting amio, patient's ectopy has improved; however, now trending towards sinus bradycardia w/ HR in 60s   - Continue amiodarone 400mg BID x 7 days (11/27 - 12/2) and then reduce to 200mg daily; may require dose reductio earlier if patient develops  worsening bradycardia   - Maintain K > 4.5 & Mag > 2.0     #HLD  - Repeat lipid panel: HDL 24.6, Non-HDL  cholesterol 101, . Cholesterol/HDL ratio 5.1  - Continue vascepa 2g BID  - Start pravastatin 20mg daily         RESPIRATORY   #VIV on CPAP at home  - Resume home nocturnal CPAP  - Maintaining SpO2 >92%.   - Encourage OOB and ambulation daily       GI:  #GERD  #Malnutrition d/t poor PO intake  - Continue PPI for GERD, LVAD bleeding risk   - Continue adult regular diet with supplements  - senna/colace BID and miralax to as needed      RENAL:  #CKD (baseline Cr 1.3-1.6)  - Strict I&Os  - Appears euvolemic on exam   - Avoid hypotension and nephrotoxic agents   Results from last 72 hours   Lab Units 24  0652 24  0227   BUN mg/dL 20 22   CREATININE mg/dL 1.28 1.21         ENDO:  #PMH of pre-DM with recent A1c: 6.3  - Euglycemic   - Goal BG <180  - NO indication for SSI at this time     HEMATOLOGY:  #Acute blood loss anemia   #Iron deficiency anemia   Results from last 7 days   Lab Units 24  0652 24  0603   HEMOGLOBIN g/dL 12.6* 12.4*   HEMATOCRIT % 38.3* 38.6*   PLATELETS AUTO x10*3/uL 503* 537*   - Iron studies on  suggest iron deficiency   - Hg remains stable without active signs of bleeding  - Continue iron sucrose 200mg IV daily x 5 days ( - )         INFECTIOUS DISEASE  Active issues:   # No acute issues    Results from last 7 days   Lab Units 24  0652 24  0603   WBC AUTO x10*3/uL 12.0* 11.8*      Temp (24hrs), Av.5 °C (97.7 °F), Min:36.2 °C (97.2 °F), Max:36.6 °C (97.9 °F)      Skin/Musculoskeletal:  Active issues:   # no acute issues        PROPHYLAXIS:  - DVT: SCD's, Coumadin  - GI:  Protonix     CODE STATUS: Full code     DISPO PLANNING/ SOCIAL:  - Disposition expectation: Insurance approval for transfer to [facility] (patient and family preference) obtained on 24. LVAD team to educated facility staff on 24 w/ discharge likely 12/3/24/     Patient examined and discussed with attending physician Dr. Bhanu Perkins, CNP   Advanced  Heart Failure Nurse Practitioner   For floor patients please page HHVI team after 5pm- [phone]  LVAD 24/7 emergency pager [phone]

## 2024-11-29 NOTE — PROGRESS NOTES
Physical Therapy    Physical Therapy Treatment    Patient Name: Fahad Meraz  MRN: [id number]  Today's Date: 11/29/2024  Room: [address] [place], [place] [address]  Time Calculation  Start Time: 1415  Stop Time: 1455  Time Calculation (min): 40 min       Assessment/Plan   PT Plan  Treatment/Interventions: Transfer training, Bed mobility, Gait training, Balance training, Neuromuscular re-education, Strengthening, Endurance training, Therapeutic exercise, Therapeutic activity  PT Plan: Ongoing PT  PT Frequency: 5 times per week  PT Discharge Recommendations: High intensity level of continued care  Equipment Recommended upon Discharge: Wheeled walker  PT Recommended Transfer Status: Assist x1  PT - OK to Discharge: Yes    General Visit Information:   Reason for Referral: s/p HM3 implantation  Past Medical History Relevant to Rehab: CAD s/p 4V CABG (2012) and PCI (LCx/OM; 5/2019), stage D systolic HF/ICM/HFrEF with LVEF 10-15%( 10/22/24 TTE), AF s/p RFA (PVI and CTI; 4/2024) on OAC therapy, HTN, dyslipidemia, depression, anxiety and VIV using CPAP  Prior to Session Communication: Bedside nurse  Patient Position Received: Bed, 3 rail up, Alarm on   Subjective: Patient is alert, agreeable to PT.  In good spirits, has been mobilizing in room without AD.Feels like he could do a formal full flight of steps  Precautions:  Precautions  Medical Precautions: Fall precautions, Cardiac precautions  Post-Surgical Precautions: Move in the Tube  Precautions Comment: LVAD  Vital Signs:  Vital Signs (Past 2hrs)             Objective   Pain:  Pain Assessment  0-10 (Numeric) Pain Score: 0 - No pain (post 0)  Cognition:     Lines/Tubes/Drains:  Ventricular Assist Device HeartMate 3 (Active)   Number of days: 17       External Urinary Catheter Male (Active)   Number of days: 1     Medical Gas Therapy: None (Room air)  Continuous Medications/Drips:       PT Treatments:     Therapeutic Activity  Therapeutic Activity 1: TUG x10  Therapeutic Activity 2: Stair  trials     Bed Mobility 1  Bed Mobility 1: Supine to sitting  Level of Assistance 1: Close supervision  Ambulation/Gait Training 1  Surface 1: Level tile  Device 1: No device  Assistance 1: Close supervision  Quality of Gait 1:  (WFL)  Comments/Distance (ft) 1: 20' x10, 15' x 2  Transfer 1  Transfer From 1: Sit to  Transfer to 1: Stand  Transfer Device 1:  (no device)  Transfer Level of Assistance 1: Close supervision  Trials/Comments 1: x13  Transfers 2  Transfer From 2: Stand to  Transfer to 2: Sit  Transfer Device 2:  (no device)  Transfer Level of Assistance 2: Close supervision  Trials/Comments 2: x13  Transfers 3  Transfer From 3: Bed to  Transfer to 3: Chair with arms  Transfer Level of Assistance 3: Close supervision  Trials/Comments 3: x1  Transfers 4  Transfer From 4: Chair with arms to  Transfer to 4: Chair with arms  Transfer Device 4:  (no device)  Transfer Level of Assistance 4: Close supervision  Trials/Comments 4: x12  Stairs  Stairs: Yes  Stairs  Rails 1: Right  Assistance 1: Close supervision  Comment/Number of Steps 1: x16 ascend/descend step to for safety          Outcome Measures:                               Education Documentation  Precautions, taught by Bryan Workman PT at 11/29/2024  3:13 PM.  Learner: Patient  Readiness: Acceptance  Method: Explanation  Response: Needs Reinforcement  Comment: Stair training    Body Mechanics, taught by Bryan Workman PT at 11/29/2024  3:13 PM.  Learner: Patient  Readiness: Acceptance  Method: Explanation  Response: Needs Reinforcement  Comment: Stair training    Mobility Training, taught by Bryan Workman PT at 11/29/2024  3:13 PM.  Learner: Patient  Readiness: Acceptance  Method: Explanation  Response: Needs Reinforcement  Comment: Stair training    Education Comments  No comments found.          OP EDUCATION:       Encounter Problems       Encounter Problems (Active)       Balance       Pt will score >45 on VEGA for safe community ambulation  (Progressing)       Start:  10/29/24    Expected End:  12/13/24               Mobility       Patient will ambulate >1000 ft with LRAD and supervision With stable vitals and RPD </= 3/10 and RPE </= 13/20 for improved functional mobility.   (Met)       Start:  10/29/24    Expected End:  11/12/24    Resolved:  11/05/24    Updated to: Patient will ambulate >2500 ft with LRAD and supervision With stable vitals and RPD </= 3/10 and RPE </= 13/20 for improved functional mobility.    Update reason: Progression         Pt will complete 6MWT with no assistive device and supervision and achieve >700 ft With stable vitals and RPD </= 3/10 and RPE </= 13/20 for improved functional mobility.   (Met)       Start:  10/29/24    Expected End:  11/12/24    Resolved:  11/05/24    Updated to: Pt will complete 6MWT with no assistive device and supervision and achieve >1300 ft With stable vitals and RPD </= 3/10 and RPE </= 13/20 for improved functional mobility.    Update reason: Progression         Patient will ambulate >2500 ft with LRAD and supervision With stable vitals and RPD </= 3/10 and RPE </= 13/20 for improved functional mobility. (Progressing)       Start:  11/05/24    Expected End:  12/13/24                Pt will complete 6MWT with no assistive device and supervision and achieve >1300 ft With stable vitals and RPD </= 3/10 and RPE </= 13/20 for improved functional mobility. (Progressing)       Start:  11/05/24    Expected End:  12/13/24                   PT Transfers       Patient will perform bed mobility indep (maintenance 2/2 prolonged hospital stay anticipated) (Progressing)       Start:  10/29/24    Expected End:  12/13/24            Patient will transfer sit to and from stand indep (Progressing)       Start:  10/29/24    Expected End:  12/13/24            Pt will complete 5XSTS from recliner without UE assist <12 seconds With stable vitals and RPD </= 3/10 and RPE </= 13/20 for improved functional mobility.    (Progressing)       Start:  10/29/24    Expected End:  12/13/24               Pain - Adult              Assessment: Patient is progressing Well with therapy this date.  Patient able to complete multiple standing and gait trials this date.  Completed stair training without impairment noted on single step ups.  Will trial flight at next session.  No increase in RAMOS/WOB.  Completed 10x consecutive TUG trials without rest between each set.  Will extend POC and extend goals two weeks.  May progress to home pending current functional capacity.  Patient would benefit from further therapy to increase functional independence and safety.  Will continue to follow during acute stay.      11/29/24 at 3:15 PM   Bryan Workman, PT   Rehab Office: [phone]

## 2024-11-29 NOTE — PROGRESS NOTES
Attempted to provide education on LVAD dressing change with Tanvi per our scheduled time determined on 11/27. Upon arrival to bedside I was informed Tanvi was not coming today. Called Tanvi to inquire about the change in communication. Tanvi stated the bedside nurse showed her how to do the dressing change and she thought that was enough. Educated Tanvi that she still needs to undergo an education session with the LVAD team. New allotted time scheduled for Monday 12/2 at 11am

## 2024-11-29 NOTE — PROGRESS NOTES
HF Attending Attestation Note    Briefly, 69M from [place], [place] followed by Dr. [place] for end-stage ischemic cardiomyopathy (LVEF 10-15%, NYHA IV), s/p HM3 LVAD implantation (11/12/24) as long therapy due to significant PAD, age, and patient preference, with a complicated post-op course involving RV dysfunction, delirium, NSVT, and malnutrition.    Exam: Euvolemic well appearing, LVAD hum    Heart Mate III interrogation (personally interrogated)  5200 rpm  Arterial Extended Pressure Measurements  Arterial Extended Diastolic Pressure: 58 mmHg  Arterial Extended Systolic Pressure: 123 mmHg  Arterial Extended Mean Pressure: 77 mmHg    Problems:   Principal Problem:    Acute on chronic heart failure with reduced ejection fraction and diastolic dysfunction  Active Problems:    Ischemic cardiomyopathy    Receiving inotropic medication    HTN (hypertension)    CAD (coronary artery disease)    MI (myocardial infarction) (Multi)    CHF (congestive heart failure)    Gastroesophageal reflux disease    Acute kidney injury (nontraumatic) (CMS-HCC)    Delirium    Notable Therapies   Class  Agent SAFETY    *ARNI* / ACE / ARB  Entresto 24-26mg BID Last BP: 92/69, Last BUN/Cr (GFR): 11/29/2024: 16/1.19 (66)    *Beta-Blocker*  RV dysfunction Last HR: 67    *MRA*  Rodrigo 12.5 Last K: 11/29/2024: 4.2     *SGLT2*  Farxiga 10 Last A1c 10/24/2024: 6.3     Diuretic   Last BNP: 10/31/2024: 755    Hydralazine/ISDN       Digoxin Digoxin 250mcg daily Last Digoxin level: 11/27/2024: <0.30    Anticoagulation  Warfarin  Last Hgb: 11/29/2024: 10.5, last INR: 11/29/2024: 2.1      Anti-arrhythmic  Amiodarone load Last QTc: 11/21/2024: 552    Antiplatelet  Aspirin 81 Last Platelet: 11/29/2024: 401    Lipid-Lowering  icosapent ethyL, 2 g, BID  ,trial pravastatin (did not tolerate atorvastatin_ Last Tchol 11/27/2024: 126; 126 / LDL 11/27/2024: 24.6; 24.6 / TRIG 11/27/2024: 155    Other        Device(s)  No ICD, consider.  EF: 11/26/2024: 18%,  QRS: 11/21/2024: 146ms    Cardiac Rehab,   if EF <35/MI/OHS  To refer      Plan:   - amiodarone load for NSVT, no ICD in place. Appreciate EP recs  - PT/OT  - continue GDMT, no change today  - continue warfarin, may need to reduce with amiodarone initiation  - Continue 5200rpm LVAD speed to maintain AV opening (given descending aortic graft position) and prevent worsening AI. Septum is midline with no MR and with E>A on bedside echo ramp.     Dispo: Insurance approval for transfer to [facility] (patient and family preference) obtained on 11/27/24. LVAD team to educated facility staff on 12/2/24 w/ discharge likely 12/3/24/     Olvin Drummond MD    Objective   Admit Date: 10/24/2024  Hospital Length of Stay: 36   Home: [facility] [id number]    MEDICATIONS  Infusions:     Scheduled:  amiodarone, 400 mg, BID  aspirin, 81 mg, Daily  dapagliflozin propanediol, 10 mg, Daily  digoxin, 250 mcg, Daily  escitalopram, 10 mg, Daily  icosapent ethyL, 2 g, BID  iron sucrose, 200 mg, Daily  lidocaine, 2 patch, Daily  magnesium oxide, 800 mg, BID  pantoprazole, 40 mg, Daily before breakfast  sacubitriL-valsartan, 1 tablet, BID  sennosides-docusate sodium, 1 tablet, BID  sildenafil, 20 mg, TID  spironolactone, 12.5 mg, Daily  warfarin, 7.5 mg, Daily      PRN:  acetaminophen, 975 mg, q6h PRN  ondansetron, 4 mg, q8h PRN  polyethylene glycol, 17 g, Daily PRN      Prior to Admission Meds:  Medications Prior to Admission   Medication Sig Dispense Refill Last Dose/Taking    aspirin 81 mg EC tablet Take 1 tablet (81 mg) by mouth once daily.       bumetanide (Bumex) 1 mg tablet Take 0.5 tablets (0.5 mg) by mouth once daily.       cholecalciferol (Vitamin D-3) 50,000 unit capsule Take 1 capsule (50,000 Units) by mouth 1 (one) time per week.       escitalopram (Lexapro) 10 mg tablet Take 1 tablet (10 mg) by mouth once daily.       hydrALAZINE (Apresoline) 25 mg tablet Take 1 tablet (25 mg) by mouth 2 times a day.        isosorbide mononitrate ER (Imdur) 120 mg 24 hr tablet Take 1 tablet (120 mg) by mouth once daily. Do not crush or chew.       metoprolol succinate XL (Toprol-XL) 50 mg 24 hr tablet Take 1 tablet (50 mg) by mouth once daily. Do not crush or chew.       multivitamin tablet Take 1 tablet by mouth once daily.       nitroglycerin (Nitrostat) 0.4 mg SL tablet Place 1 tablet (0.4 mg) under the tongue every 5 minutes if needed for chest pain.       pantoprazole (ProtoNix) 40 mg EC tablet Take 1 tablet (40 mg) by mouth once daily in the morning. Take before meals. Do not crush, chew, or split.       sacubitriL-valsartan (Entresto)  mg tablet Take 1 tablet by mouth 2 times a day.       spironolactone (Aldactone) 25 mg tablet Take 1 tablet (25 mg) by mouth once daily.       warfarin (Coumadin) 5 mg tablet Take 1 tablet (5 mg) by mouth once daily in the evening. Take as directed per After Visit Summary.          Vitals:      11/29/2024     7:21 AM 11/29/2024     5:32 AM 11/29/2024     4:30 AM 11/29/2024    12:00 AM 11/28/2024     8:00 PM 11/28/2024     3:14 PM 11/28/2024    11:04 AM   Vitals   BP Location   Right arm Right arm Right arm     Heart Rate 67  68 68 68 72 67   Temp 36.5 °C (97.7 °F)  36.5 °C (97.7 °F) 36.2 °C (97.2 °F) 36.6 °C (97.9 °F) 36.5 °C (97.7 °F) 36.5 °C (97.7 °F)   Resp 14  15 14 15 17 18   Weight (lb)  195.99        BMI  26.58 kg/m2        BSA (m2)  2.13 m2          Wt Readings from Last 5 Encounters:   11/29/24 88.9 kg (195 lb 15.8 oz)   10/24/24 92.5 kg (204 lb)   08/05/24 122 kg (268 lb)   01/31/22 119 kg (262 lb)   02/18/20 123 kg (270 lb 2 oz)       Intake/Output Summary (Last 24 hours) at 11/29/2024 1105  Last data filed at 11/29/2024 1000  Gross per 24 hour   Intake --   Output 475 ml   Net -475 ml       CHEST: Unlabored, Clear, Diminished  CV:  Heart block  NEURO:   RASS: Alert and calm  CAM: Negative  LOC: New agitation  Cognition: Follows commands  GCS: 15    DATA:  CMP:  Recent Labs      11/29/24  0702 11/27/24  0652 11/27/24  0227 11/26/24  0603 11/25/24  0736 11/25/24  0250 11/24/24  0556 11/23/24  0451 11/22/24  1437 11/22/24  0304    138 138 139 135* 133* 136 139   < > 137   K 4.2 4.4 4.8 4.3 4.4 8.0* 4.6 4.3   < > 4.2    105 105 103 105 102 104 105   < > 102   CO2 26 24 23 26 16* 26 22 26   < > 27   ANIONGAP 10 13 15 14 18 13 15 12   < > 12   BUN 16 20 22 19 20 22 20 19   < > 20   CREATININE 1.19 1.28 1.21 1.39* 1.10 1.22 1.24 1.32*   < > 1.35*   EGFR 66 61 65 55* 73 64 63 58*   < > 57*   MG 2.05 2.18 2.12 2.13  --  2.47* 2.03 2.12  --  2.10    < > = values in this interval not displayed.     Recent Labs     11/29/24  0702 11/27/24  0652 11/26/24  0603 11/25/24  0736 11/25/24  0250 11/24/24  0556 11/23/24  0451 11/22/24  1437 11/19/24  1459 11/19/24  0322 11/18/24  0525 11/17/24  1323 11/17/24  0122 11/16/24  1358 11/16/24  0128 11/15/24  1255 11/15/24  0021 11/14/24  1224 11/14/24  0118 11/13/24  1228 11/13/24  0007 11/12/24  1307   ALBUMIN 3.3* 3.6 3.6 3.4 3.7 3.6 3.6 3.4   < > 3.1* 3.5   < > 3.4   < > 3.6   < > 3.7   < > 3.8 3.7   < > 3.7   ALT  --   --   --   --   --   --   --   --   --  6* 6*  --  3*  --  4*  --  5*  --  7* 11  --  15   AST  --   --   --   --   --   --   --   --   --  16 18  --  21  --  24  --  29  --  40* 42*  --  37   BILITOT  --   --   --   --   --   --   --   --   --  0.8 0.8  --  1.0  --  1.0  --  1.1  --  0.9 1.1  --  0.9    < > = values in this interval not displayed.     CBC:  Recent Labs     11/29/24  0755 11/27/24  0652 11/26/24  0603 11/25/24  0250 11/24/24  0556 11/23/24  0451 11/22/24  0304 11/21/24  0426   WBC 9.0 12.0* 11.8* 13.5* 15.4* 13.8* 10.9 10.2   HGB 10.5* 12.6* 12.4* 12.1* 12.3* 12.5* 11.2* 10.4*   HCT 33.1* 38.3* 38.6* 37.2* 39.1* 40.1* 34.3* 32.5*    503* 537* 313 545* 529* 473* 455*   MCV 86 84 85 83 86 87 83 84     COAG:   Recent Labs     11/29/24  0702 11/28/24  0542 11/27/24  2037 11/27/24  1605 11/26/24  0957 11/26/24  0603  "11/25/24  2348 11/25/24  0736 11/25/24  0250 11/24/24  2156 11/24/24  0556 11/23/24  0451 11/22/24  0304 11/13/24  0008 11/12/24  1257   INR 2.1* 2.1*  --  1.8*  --  1.8*  --   --  1.8*  --  1.8* 1.9* 2.5*   < > 1.4*   HAUF  --  0.6 0.5  --  0.5 0.4 0.2   < > 0.1   < >  --   --  0.1   < >  --    FIBRINOGEN  --   --   --   --   --   --   --   --   --   --   --   --   --   --  301    < > = values in this interval not displayed.     ABO:   Recent Labs     11/20/24  0151   ABO A     HEME/ENDO:   Recent Labs     10/31/24  1249 10/24/24  2328   FERRITIN  --  76   IRONSAT  --  17*   TSH 4.39* 8.47*   FREET4  --  1.44   HGBA1C  --  6.3*     CARDIAC:   Recent Labs     11/29/24  0702 11/27/24  0652 11/26/24  0603 11/25/24  0736 11/25/24  0250 11/24/24  0556 11/23/24  0451 11/22/24  0304 11/12/24  1307 10/31/24  1249 10/24/24  2328    214 264* 426* 725* 247* 240 233   < > 187  --    BNP  --   --   --   --   --   --   --   --   --  755* 1,995*    < > = values in this interval not displayed.     Recent Labs     11/27/24  0652   CHOL 126  126   HDL 24.6  24.6   TRIG 155*     TOX:  Recent Labs     10/31/24  1249   AMPHETAMINE Negative     MICRO: No results for input(s): \"ESR\", \"CRP\", \"PROCAL\" in the last 98995 hours.  No results found for the last 90 days.      EKG:   Recent Labs     11/21/24  0945 11/17/24  0855 11/12/24  1610   ATRRATE 81 138 105   VENTRATE 111 119 126   QRSDUR 146 112 140   QTCFRED 498 469 469   QTCCALCB 552 526 530     Encounter Date: 10/24/24   Electrocardiogram 12-lead PRN for arrhythmia   Result Value    Ventricular Rate 111    Atrial Rate 81    QRS Duration 146    QT Interval 406    QTC Calculation(Bazett) 552    R Axis 24    T Axis 45    QRS Count 18    Q Onset 226    P Onset 209    P Offset 224    T Offset 429    QTC Fredericia 498    Narrative    Atrial fibrillation with rapid ventricular response with premature ventricular or aberrantly conducted complexes  Left bundle branch block  Abnormal " ECG  When compared with ECG of 20-NOV-2024 03:52,  Left bundle branch block has replaced Nonspecific intraventricular block  Criteria for Septal infarct are no longer Present  Criteria for Lateral infarct are no longer Present     Echocardiogram:   Recent Labs     11/26/24  1152 11/18/24  1144 11/15/24  0904 11/13/24  0851 11/12/24  0628 11/05/24  1154 10/25/24  1436   EF 18 10 13 15 18 18 18   LVIDD  --   --  5.90 6.90  --  8.21 7.70   RV  --   --   --  23.4  --  42.9 21.0   RVFRWALLPKSP  --   --   --  6.64  --  6.00  --    TAPSE  --   --   --   --   --  0.9 1.0   Transthoracic Echo (TTE) Complete With Contrast 10/25/2024    [facility], [address] [place] [address]  Tel [phone] and Fax [phone]    TRANSTHORACIC ECHOCARDIOGRAM REPORT      Patient Name:      RJ Webb Physician:    [id number] Fawad Chicas MD  Study Date:        10/25/2024           Ordering Provider:    [id number] LANDON GARCES  MRN/PID:           [id number]             Fellow:  Accession#:        [id number]         Nurse:  Date of Birth/Age: 1955 / 69      Sonographer:          Nikkie estrada                                      BLANQUITA  Gender:            M                    Additional Staff:  Height:            182.88 cm            Admit Date:           10/24/2024  Weight:            93.44 kg             Admission Status:     Inpatient -  Routine  BSA / BMI:         2.16 m2 / 27.94      Encounter#:           [id number]  kg/m2  Blood Pressure:    113/62 mmHg          Department Location:  [address]    Study Type:    TRANSTHORACIC ECHO (TTE) COMPLETE  Diagnosis/ICD: Acute on chronic combined systolic (congestive) and diastolic  (congestive) heart failure (CHF)-I50.43; Ischemic  cardiomyopathy-I25.5  Indication:    Stage D heart failure  CPT Code:      Echo Complete w Full Doppler-99719    Patient History:  Pertinent History: CAD s/p 4V CABG and PCI, HF/ICM/HFrEF,  HTN, DLD, VIV,  bicuspid AV, afib.    Study Detail: The following Echo studies were performed: 2D, M-Mode, Doppler and  color flow. Definity used as a contrast agent for endocardial  border definition and agitated saline used as a contrast agent for  intraseptal flow evaluation. Unable to obtain suprasternal notch  view. Patient's heart rhythm is atrial fibrillation.      PHYSICIAN INTERPRETATION:  Left Ventricle: Left ventricular ejection fraction is severely decreased, by visual estimate at 15-20%. There is global hypokinesis of the left ventricle with minor regional variations. The left ventricular cavity size is severely dilated. Abnormal (paradoxical) septal motion consistent with post-operative status. Left ventricular diastolic filling was not assessed. There is no definite left ventricular thrombus visualized.  Left Atrium: The left atrium is moderately dilated. There is no evidence of a patent foramen ovale. A bubble study using agitated saline was performed. Bubble study is negative.  Right Ventricle: The right ventricle is moderately enlarged. There is severely reduced right ventricular systolic function. A device is visualized in the right ventricle.  Right Atrium: The right atrium is moderately dilated. There is a device visualized in the right atrium.  Aortic Valve: The aortic valve is structurally normal. There is mild to moderate aortic valve cusp calcification. The aortic valve dimensionless index is 0.57. There is trace aortic valve regurgitation. The peak instantaneous gradient of the aortic valve is 16.2 mmHg. The mean gradient of the aortic valve is 10.0 mmHg.  Mitral Valve: The mitral valve is normal in structure. There is mild mitral annular calcification. There is mild mitral valve regurgitation.  Tricuspid Valve: The tricuspid valve is structurally normal. There is trace tricuspid regurgitation. The Doppler estimated RVSP is within normal limits at 21.0 mmHg.  Pulmonic Valve: The pulmonic  valve is structurally normal. There is no indication of pulmonic valve regurgitation.  Pericardium: Trivial pericardial effusion.  Aorta: The aortic root is normal.  Systemic Veins: The inferior vena cava was not well visualized.  In comparison to the previous echocardiogram(s): Compared with study dated 2/3/2020, LVEF is now severely reduced, Previoulsy reported LVEF is 40-45% with some regional hypokinesis. Primary team is aware because of a recent BRIANA which showed similar findinds.      CONCLUSIONS:  1. Left ventricular ejection fraction is severely decreased, by visual estimate at 15-20%.  2. There is global hypokinesis of the left ventricle with minor regional variations.  3. Left ventricular cavity size is severely dilated.  4. Abnormal septal motion consistent with post-operative status.  5. No left ventricular thrombus visualized.  6. There is severely reduced right ventricular systolic function.  7. Moderately enlarged right ventricle.  8. The left atrium is moderately dilated.  9. The right atrium is moderately dilated.  10. Right ventricular systolic pressure is within normal limits.  11. There is no evidence of a patent foramen ovale.  12. Compared with study dated 2/3/2020, LVEF is now severely reduced, Previoulsy reported LVEF is 40-45% with some regional hypokinesis. Primary team is aware because of a recent BRIANA which showed similar findinds.    QUANTITATIVE DATA SUMMARY:    2D MEASUREMENTS:          Normal Ranges:  Ao Root d:       4.00 cm  (2.0-3.7cm)  LAs:             5.00 cm  (2.7-4.0cm)  IVSd:            0.90 cm  (0.6-1.1cm)  LVPWd:           0.90 cm  (0.6-1.1cm)  LVIDd:           7.70 cm  (3.9-5.9cm)  LVIDs:           6.70 cm  LV Mass Index:   155 g/m2  LV % FS          13.0 %      LA VOLUME:                    Normal Ranges:  LA Vol A4C:        77.3 ml    (22+/-6mL/m2)  LA Vol A2C:        97.8 ml  LA Vol BP:         89.5 ml  LA Vol Index A4C:  35.8ml/m2  LA Vol Index A2C:  45.3 ml/m2  LA Vol  Index BP:   41.5 ml/m2  LA Area A4C:       25.0 cm2  LA Area A2C:       27.3 cm2  LA Major Axis A4C: 6.9 cm  LA Major Axis A2C: 6.5 cm  LA Volume Index:   41.5 ml/m2      RA VOLUME BY A/L METHOD:          Normal Ranges:  RA Area A4C:             25.3 cm2      LV SYSTOLIC FUNCTION BY 2D PLANIMETRY (MOD):  Normal Ranges:  EF-Visual:      18 %  LV EF Reported: 18 %      AORTIC VALVE:                      Normal Ranges:  AoV Vmax:                2.01 m/s  (<=1.7m/s)  AoV Peak P.2 mmHg (<20mmHg)  AoV Mean PG:             10.0 mmHg (1.7-11.5mmHg)  LVOT Max Jude:            1.12 m/s  (<=1.1m/s)  AoV VTI:                 36.30 cm  (18-25cm)  LVOT VTI:                20.70 cm  LVOT Diameter:           2.60 cm   (1.8-2.4cm)  AoV Area, VTI:           2.37 cm2  (2.5-5.5cm2)  AoV Area,Vmax:           2.32 cm2  (2.5-4.5cm2)  AoV Dimensionless Index: 0.57      RIGHT VENTRICLE:  RV Basal 4.70 cm  RV Mid   3.60 cm  RV Major 8.0 cm  TAPSE:   10.1 mm      TRICUSPID VALVE/RVSP:          Normal Ranges:  Peak TR Velocity:     2.12 m/s  RV Syst Pressure:     21 mmHg  (< 30mmHg)  IVC Diam:             1.90 cm      PULMONIC VALVE:          Normal Ranges:  PV Accel Time:  85 msec  (>120ms)  PV Max Jude:     0.9 m/s  (0.6-0.9m/s)  PV Max P.9 mmHg      [id number] Fawad Chicas MD  Electronically signed on 10/25/2024 at 3:00:23 PM        ** Final **    Coronary Angiography:   Left & Right Heart Cath w Angiography & LV 10/28/2024    [facility], Cath Lab, [address] [place] [address]    Cardiovascular Catheterization Report    Patient Name:      RJ LEIJA       Performing Physician:  [id number] Lindsay MD  Study Date:        10/28/2024            Verifying Physician:   [id number] Lindsay MD  MRN/PID:           [id number]              Cardiologist/Co-Scrub:  Accession#:        [id number]          Ordering Provider:     [id number] LANDON GARCES  Date of Birth/Age: 1955 / 69  years Cardiologist:  Gender:            M                     Fellow:                [id number] Rodríguez Patten MD  Encounter#:        [id number]            Surgeon:      Study: Left Heart Cath with Grafts      Indications:  RJ LEIJA is a 69 year old male who presents with coronary artery disease, prior percutaneous coronary intervention, atrial fibrillation, chronic pulmonary disease, dyslipidemia, hypertension, diabetes and presents with ADHF and cardiogenic shock requiring ionotropic support currently being worked up for advanced heart failure therapies. Cardiomyopathy. Heart failure.    Appropriate Use Criteria:  Re-evaluation of know cardiomyopathy with change in clinical status or on cardiac exam or to guide therapy; AUC score = 7.  Medical History:  Stress test performed: No. CTA performed: No. Agatston accessed: No. LVEF  Assessed: Yes. LVEF = 15%.  Cardiac arrest: No.  Cardiac surgical consult: No.  Cardiovascular instability: Yes. Cardiogenic shock and acute heart failure.  Frailty status of patient entering lab: 8 = Very severely frail.      Procedure Description:  Cardiac output was calculated via the Ernestina method.    Coronary Angiography:  The coronary circulation is right dominant.    Left Main Coronary Artery:  The left main coronary artery is a normal caliber vessel. The left main arises normally from the left coronary sinus of Valsalva and bifurcates into the LAD and circumflex coronary arteries. The distal left main coronary artery showed 30% stenosis.    Left Anterior Descending Coronary Artery Distribution:  The left anterior descending coronary artery is a normal caliber vessel. The LAD arises normally from the left main coronary artery. The proximal left anterior descending coronary artery showed 100% stenosis.    Circumflex Coronary Artery Distribution:  The circumflex coronary artery is a normal caliber vessel. The circumflex arises normally from the left main coronary artery and terminates  in the AV groove. The circumflex revealed no significant disease or stenosis greater than 30% and Patent proximal LCx stents. The 1st obtuse marginal branch showed no significant disease or stenosis greater than 30% and Patent ostial-proximal ARIELLE. The 2nd obtuse marginal branch demonstrated no significant disease or stenosis greater than 30%.    Right Heart Catheterization:  Cardiac output was calculated via the Ernestina method. Elevated ventricular filling pressure. Pulmonary venous hypertension. Right heart hemodynamics on 0.375 mcg/kg/min:  RA 17, RV 39/13, RVEDP 17, PA 37/22, mean PAP 28, PA sat 71%, CO/CI 5.2/2.5.    Right Coronary Artery Distribution:    The right coronary artery is a normal caliber vessel. The RCA arises normally from the right sinus of Valsalva. The proximal right coronary artery showed 100% stenosis.    Coronary Grafts:    LIMA Graft:  Left internal mammary artery graft conduit, originating in situ and attached to the distal left anterior descending, is patent.  Saphaneous Vein Graft(s):  The saphenous vein graft, originating from the aorta and attached to the 1st obtuse marginal, appeared totally occluded.  The 2nd saphenous vein graft, originating from the aorta and attached to the RCA, appeared totally occluded.  The 3rd saphenous vein graft, originating from the aorta and attached to the ramus intermedius, appeared totally occluded.    Coronary Lesion Summary:  Vessel      Stenosis    Vessel Segment  Left Main 30% stenosis      distal  LAD       100% stenosis    proximal  RCA       100% stenosis    proximal      Graft Stenosis Summary:  Graft      Destination of Graft                      % Stenosis  LIMA  distal left anterior  descending  SVG 1 1st obtuse marginal  SVG 2 RCA  SVG 3 ramus intermedius              Unable to engage and appears to be  occluded.      Hemo Personnel:  +----------------+---------+  Name            Duty       +----------------+---------+  Jed Lindsay  MD 1  +----------------+---------+      Hemodynamic Pressures:    +----+----------+---------+------------+-------------+---+-----+-------+-------+  SiteDate Time   Phase    Systolic    Diastolic  ED Mean A-Wave V-Wave                   Name       mmHg        mmHg     mmHmmHg  mmHg   mmHg                                                     g                      +----+----------+---------+------------+-------------+---+-----+-------+-------+   Art10/28/2024     Rest         129           49      68                    9:47:13 AM                                                          +----+----------+---------+------------+-------------+---+-----+-------+-------+   Art10/28/2024     Rest         114           53      67                    9:47:17 AM                                                          +----+----------+---------+------------+-------------+---+-----+-------+-------+   Art10/28/2024     Rest         108           54      66                    9:47:20 AM                                                          +----+----------+---------+------------+-------------+---+-----+-------+-------+    RA10/28/2024     Rest                               17     16     18        10:18:57                                                                      AM                                                          +----+----------+---------+------------+-------------+---+-----+-------+-------+   Art10/28/2024     Rest          99           52      66                      10:18:57                                                                      AM                                                          +----+----------+---------+------------+-------------+---+-----+-------+-------+    RA10/28/2024     Rest                               18     17      19        10:19:09                                                                      AM                                                          +----+----------+---------+------------+-------------+---+-----+-------+-------+   Art10/28/2024     Rest         101           54      67                      10:19:09                                                                      AM                                                          +----+----------+---------+------------+-------------+---+-----+-------+-------+    RV10/28/2024     Rest          39           13 17                           10:19:30                                                                      AM                                                          +----+----------+---------+------------+-------------+---+-----+-------+-------+   Art10/28/2024     Rest          96           50      63                      10:19:30                                                                      AM                                                          +----+----------+---------+------------+-------------+---+-----+-------+-------+    PA10/28/2024     Rest          37           22      28                      10:20:06                                                                      AM                                                          +----+----------+---------+------------+-------------+---+-----+-------+-------+   Art10/28/2024     Rest          93           50      61                      10:20:06                                                                      AM                                                          +----+----------+---------+------------+-------------+---+-----+-------+-------+    AO10/28/2024     Rest         102           62       77                      10:38:05                                                                      AM                                                          +----+----------+---------+------------+-------------+---+-----+-------+-------+   Art10/28/2024     Rest         112           52      67                      10:38:05                                                                      AM                                                          +----+----------+---------+------------+-------------+---+-----+-------+-------+   Art10/28/2024     Rest         128           60      79                      10:59:15                                                                      AM                                                          +----+----------+---------+------------+-------------+---+-----+-------+-------+   Art10/28/2024     Rest         123           58      77                      11:01:12                                                                      AM                                                          +----+----------+---------+------------+-------------+---+-----+-------+-------+        Oxygen Saturation %:  +-----------+----------+------------+  Sample SiteO2 Sat (%)HB (g/100ml)  +-----------+----------+------------+           FA        96        14.9  +-----------+----------+------------+           PA        71        14.9  +-----------+----------+------------+           FA        96        14.9  +-----------+----------+------------+           PA        71        14.9  +-----------+----------+------------+      Cardiac Outputs:  +---------------+------------------+-------+  ESTEBAN CO (l/min)ESTEBAN CI (l/min/m2)ESTEBAN SV  +---------------+------------------+-------+              5.2               2.5    60.8  +---------------+------------------+-------+      Vascular Resistance Calculated Values (Wood Units):  +-----+----+----+---+----+----+----+-------+  PhaseSVR SVRITPRTPRITVR TVRITPR/TVR  +-----+----+----+---+----+----+----+-------+  0    [phone]        +-----+----+----+---+----+----+----+-------+      Complications:  No in-lab complications observed.    Cardiac Cath Post Procedure Notes:  Post Procedure Diagnosis: Diffuse severe native coronary artery disease.  Patent proximal LCx and ostial-prox OM 1 ARIELLE.  Patent LIMA-LAD, occluded SVG-OM, SVG-RI, SVG- RCA.  Elevated right and left sided filling pressures with  preserved cardiac output and index while on ionotropic  support with 0.375 mcg/kg/min of milrinone.  Blood Loss:               Estimated blood loss during the procedure was 5 cc  mls.  Specimens Removed:        Number of specimen(s) removed: none.    ____________________________________________________________________________________  CONCLUSIONS:  1. Diffuse severe native coronary artery disease.  2. Patent proximal LCx and ostial-prox OM 1 ARIELLE.  3. Patent LIMA-LAD. Other grafts are occluded: SVG-OM, SVG-RI, SVG- RCA.  4. Elevated right and left sided filling pressures with preserved cardiac output and index while on ionotropic support with 0.375 mcg/kg/min of milrinone .  5. Further work-up and management per advanced heart failure team.    ICD 10 Codes:  Ischemic cardiomyopathy-I25.5; Cardiogenic shock-R57.0    CPT Codes:  Left Heart Cath (visualization of coronaries) and LV-63771; Ultrasound guidance for vascular access-16796; Moderate Sedation Services initial 15 minutes patient >5 years-65322; Moderate Sedation Services 1st additional 15 minutes patient >5 years-98218    [id number] Jed Lindsay MD  Performing Physician  Electronically signed by [id number] Jed Lindsay MD on 11/5/2024 at 7:06:05 AM          ** Final **    Right Heart Cath: No results found for this or any  previous visit from the past 1800 days.    Cardiac Scoring: No results found for this or any previous visit from the past 1800 days.    Cardiac MRI: No results found for this or any previous visit from the past 1800 days.    Nuclear:No results found for this or any previous visit from the past 1800 days.    Metabolic Stress: No results found for this or any previous visit from the past 1800 days.      Transthoracic Echo (TTE) Limited    Result Date: 11/26/2024   [facility], [address] [place] [address]                Tel [phone] and Fax [phone] TRANSTHORACIC ECHOCARDIOGRAM REPORT  Patient Name:       RJ Webb Physician:    [id number] Parker Terrell MD Study Date:         11/26/2024          Ordering Provider:    [id number] KARRI CHAVEZ MRN/PID:            [id number]            Fellow: Accession#:         [id number]        Nurse: Date of Birth/Age:  1955 / 69     Sonographer:          Manuela estrada                                     [facility]                     Additional Staff:     Melida Kaye Birth:                                                        [facility] Height:             182.88 cm           Admit Date: Weight:             87.54 kg            Admission Status:     Inpatient -                                                               Routine BSA / BMI:          2.10 m2 / 26.18     Encounter#:           [id number]                     kg/m2 Blood Pressure:     /                   Department Location:  [facility]                                                               Non Invasive Study Type:    TRANSTHORACIC ECHO (TTE) LIMITED Diagnosis/ICD: Presence of heart assist device-Z95.811 Indication:    RAMP study CPT Code:      Echo Limited-14834; Doppler Limited-00653;  Color Doppler-77669 Patient History: Pertinent History: CAD. CABG x4 2012, PCI 2019, ICM, CHF, s/p HeartMate III                    LVAD, AF s/p RFA (PVI and CTI 4/2024). Study Detail: The following Echo studies were performed: 2D, M-Mode, Doppler and               color flow. Technically challenging study due to poor acoustic               windows.  PHYSICIAN INTERPRETATION: Left Ventricle: Left ventricular ejection fraction is severely decreased, by visual estimate at 15-20%. There is global hypokinesis of the left ventricle with minor regional variations. The left ventricular cavity size is severely dilated. Spectral Doppler shows a Grade II (pseudonormal pattern) of left ventricular diastolic filling with an elevated left atrial pressure. Left Atrium: The left atrium is enlarged. Right Ventricle: The right ventricle is severely enlarged. There is reduced right ventricular systolic function. Right Atrium: The right atrium was not well visualized. Aortic Valve: The aortic valve is probably trileaflet. There is mild aortic valve cusp calcification. There is mild aortic valve regurgitation. Mitral Valve: The mitral valve is mildly thickened. There is trace mitral valve regurgitation. Tricuspid Valve: The tricuspid valve is structurally normal. There is trace tricuspid regurgitation. The right ventricular systolic pressure is unable to be estimated. Pulmonic Valve: The pulmonic valve is structurally normal. Pulmonic valve regurgitation was not assessed. Pericardium: Trivial pericardial effusion. Aorta: The aortic root is abnormal. There is mild dilatation of the ascending aorta. There is mild dilatation of the aortic root. Pulmonary Artery: The pulmonary artery is not well visualized. Systemic Veins: The inferior vena cava was not well visualized. In comparison to the previous echocardiogram(s): Compared with study dated 11/18/2024, no significant change.  LEFT VENTRICULAR ASSIST DEVICE: LVAD: The patient has a(n)  HeartMate 3 LVAD device present. Inflow Cannula: Visualization of the inflow cannula is technically difficult. Outflow Cannula: Visualization of the outflow cannula is technically difficult. LVAD Comments: Ramp study. 5200 rpm at start and finish of limited echo.  CONCLUSIONS:  1. Left ventricular ejection fraction is severely decreased, by visual estimate at 15-20%.  2. There is global hypokinesis of the left ventricle with minor regional variations.  3. Spectral Doppler shows a Grade II (pseudonormal pattern) of left ventricular diastolic filling with an elevated left atrial pressure.  4. Left ventricular cavity size is severely dilated.  5. There is reduced right ventricular systolic function.  6. Severely enlarged right ventricle.  7. The left atrium is enlarged.  8. Mild aortic valve regurgitation.  9. The pulmonary artery is not well visualized. QUANTITATIVE DATA SUMMARY:  M-MODE MEASUREMENTS:         Normal Ranges: Ao Root:             4.10 cm (2.0-3.7cm)  AORTA MEASUREMENTS:         Normal Ranges: Asc Ao, d:          4.20 cm (2.1-3.4cm)  LV SYSTOLIC FUNCTION BY 2D PLANIMETRY (MOD):                      Normal Ranges: EF-Visual:      18 % LV EF Reported: 18 %  LV DIASTOLIC FUNCTION:          Normal Ranges: MV Peak E:             0.50 m/s (0.7-1.2 m/s) MV Peak A:             0.44 m/s (0.42-0.7 m/s) E/A Ratio:             1.14     (1.0-2.2)  [id number] Parker Terrell MD Electronically signed on 11/26/2024 at 3:08:03 PM  ** Final **     CT abdomen pelvis w IV contrast    Result Date: 11/25/2024  Interpreted By:  Danya Siddiqi,  and Shayla Araya STUDY: CT ABDOMEN PELVIS W IV CONTRAST;  11/25/2024 10:28 am   INDICATION: Signs/Symptoms:Copious serosanguinous drainage around driveline site. Eval for fluid collection..     COMPARISON: CT chest and pelvis without IV contrast 11/04/2024.   ACCESSION NUMBER(S): [id number]   ORDERING CLINICIAN: SOLIS DUEÑAS   TECHNIQUE: CT of the abdomen and pelvis was performed.   Standard contiguous axial images were obtained at 3 mm slice thickness through the abdomen and pelvis. Coronal and sagittal reconstructions at 3 mm slice thickness were performed.  90 ML of Omnipaque 350 was administered intravenously without immediate complication.   FINDINGS: LOWER CHEST: New small to moderate left-sided loculated pleural effusion and left lower lobe atelectatic changes. Bilateral interlobular septal thickening of the visualized lungs that likely represents a component of edema and atelectasis secondary to postoperative changes. LVAD device in place along the left ventricle with the outflow tract connecting to the mid descending thoracic aorta and driveline connecting to external device outside of the right chest wall.   Patient is status post previous median sternotomy. The heart is moderately enlarged but similar in size to prior without pericardial effusion. 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 No pneumothorax. Visualized distal esophagus appears normal.   A hematoma adjacent to the drive line along the left chest wall measuring 9.1 x 3.7 x 4.9 cm (Series 201, Image 22) (Series 203, Image 61).   Additionally, there is a 5.2 x 2.4 x 2.2 cm fluid collection/loculated pleural effusion adjacent to the LVAD outflow tract in the posterior left lower lobe (series 201, image 12).   ABDOMEN:   LIVER: The liver is mildly enlarged in size measuring 19.3 cm in craniocaudal dimension. Large hypodense lesion in segment 6 of the liver measuring 4.2 x 2.3 x 2.8 cm (Series 203, Image 57) (Series 903, Image 78) that is overall indeterminate however appears similar to prior imaging.   BILE DUCTS: The intrahepatic and extrahepatic ducts are not dilated.   GALLBLADDER: The gallbladder is nondistended and without evidence of radiopaque stones.   PANCREAS: The pancreas appears unremarkable without evidence of ductal dilatation or masses.   SPLEEN: Splenomegaly measuring up to 19.5 cm craniocaudal  dimension, not significantly changed compared to 11/04/2024 CT. Interval development of several wedge-shaped hypodense lesions in the spleen compared to 11/04/2024 CT, compatible with infarcts.   ADRENAL GLANDS: Bilateral adrenal glands appear normal.   KIDNEYS AND URETERS: The kidneys are normal in size and enhance symmetrically. Multiple too small to characterize hypodense lesions in bilateral kidneys are statistically favored to represent simple renal cysts. A 2.1 cm cystic lesion in the mid pole of the right kidney, likely a simple renal cyst.  No hydroureteronephrosis or nephroureterolithiasis is identified.   PELVIS:   BLADDER: The urinary bladder is decompressed, limited for evaluation. There is intraluminal air in the anterior bladder that is likely secondary to recent catheterization.   REPRODUCTIVE ORGANS: Mild prostatomegaly.   BOWEL: The stomach is unremarkable. The small and large bowel are normal in caliber and demonstrate no wall thickening. Moderate colonic stool burden. Colonic diverticulosis without evidence of diverticulitis. The appendix is not definitely visualized. There is however no pericecal stranding or fluid.   VESSELS: There is no aneurysmal dilatation of the abdominal aorta. Moderate atherosclerotic disease of the abdominal aorta and its branches. The IVC appears normal. The splenic vasculature appears patent.   PERITONEUM/RETROPERITONEUM/LYMPH NODES: No ascites or free air, no fluid collection. No abdominopelvic lymphadenopathy is present.   BONES AND ABDOMINAL WALL: No suspicious osseous lesions are identified. Small acute left rib fracture that may be secondary to recent LVAD placement (Series 201, Image 2). Remote rib fractures of posterolateral left 9th through 11th ribs. Multilevel moderate degenerative discogenic disease is noted in the lower thoracic and lumbar spine particularly of the lower lumbar spine.   The abdominal wall soft tissues appear normal.       1.  Postoperative  changes of recent LVAD placement. A 9.0 x 3.7 x 4.9 cm left chest wall hematoma adjacent to the driveline which appears otherwise intact with no evidence of fracture. Additionally, there is a 5.2 x 2.4 x 2.2 cm fluid collection/loculated pleural effusion adjacent to the LVAD outflow tract in the posterior left lower lobe. 2. Splenomegaly with interval development of scattered peripheral hypoenhancing wedge-shaped areas, compatible with infarcts. 3. In the visualized lung segments there are mild bilateral lung findings suggestive of pulmonary edema and atelectatic changes that are likely secondary to recent procedure. 4. Stable moderate cardiomegaly. 5. Stable indeterminate 4.3 cm hypodense lesion in segment V of the liver, which was better evaluated on 11/02/2024 CT liver protocol. 6. Additional stable chronic findings as described above.   I personally reviewed the images/study and I agree with the findings as stated by Resident Dr. Quiana Mcguire MD. This study was interpreted at [place], [place].   MACRO: None   Signed by: Danya Siddiqi 11/25/2024 10:37 PM Dictation workstation:   [id number]    ECG 12 Lead    Result Date: 11/25/2024  Undetermined rhythm Nonspecific intraventricular block Cannot rule out Anterior infarct (cited on or before 24-OCT-2024) T wave abnormality, consider inferolateral ischemia Abnormal ECG When compared with ECG of 24-OCT-2024 23:03, Current undetermined rhythm precludes rhythm comparison, needs review QRS axis Shifted left Serial changes of Anterior infarct Present Confirmed by Lucas Kerns ([id number]) on 11/25/2024 12:12:55 PM    CT head wo IV contrast    Result Date: 11/24/2024  Interpreted By:  Des Thao, STUDY: CT HEAD WO IV CONTRAST;  11/24/2024 2:07 pm   INDICATION: Signs/Symptoms:LUE numbness in VAD patient.     COMPARISON: 11/17/2024   ACCESSION NUMBER(S): [id number]   ORDERING CLINICIAN: SOLIS DUEÑAS   TECHNIQUE: Noncontrast  axial CT images of head were obtained with coronal and sagittal reconstructed images.   FINDINGS: BRAIN PARENCHYMA: Good infarct in the medial left cerebellar hemisphere. No acute intraparenchymal hemorrhage or parenchymal evidence of acute large territory ischemic infarct. Gray-white matter distinction is preserved. No mass-effect.   VENTRICLES and EXTRA-AXIAL SPACES:  No acute extra-axial or intraventricular hemorrhage. No effacement of cerebral sulci. The ventricles and sulci are age-concordant.   PARANASAL SINUSES/MASTOIDS:  No hemorrhage or air-fluid levels within the visualized paranasal sinuses. The mastoids are well aerated.   CALVARIUM/ORBITS:  No skull fracture.  The orbits and globes are intact to the extent visualized.   EXTRACRANIAL SOFT TISSUES: No discernible abnormality.       No acute intracranial abnormality.     MACRO: None.   Signed by: Des Thao 11/24/2024 3:07 PM Dictation workstation:   [id number]    XR chest 1 view    Result Date: 11/23/2024  Interpreted By:  Olvin Farias, STUDY: XR CHEST 1 VIEW; 11/23/2024 7:15 am   INDICATION: Signs/Symptoms:s/p LVAD implant, assess lung fields, devices.   COMPARISON: 11/22/2024   ACCESSION NUMBER(S): [id number]   ORDERING CLINICIAN: ANETA MANNING   FINDINGS:     CARDIOMEDIASTINAL SILHOUETTE: Cardiomegaly versus pericardial effusion. Post LVAD changes.   LUNGS: Mild perihilar interstitial edema and left-sided effusion. Continued left retrocardiac atelectasis/effusion. No pneumothorax.   ABDOMEN: No remarkable upper abdominal findings.   BONES: No acute osseous changes.       1.  Post LVAD changes with residual left basilar loculated fluid/atelectasis.     Signed by: Olvin Farias 11/23/2024 4:41 PM Dictation workstation:   [id number]    XR chest 1 view    Result Date: 11/22/2024  Interpreted By:  Olvin Farias, STUDY: XR CHEST 1 VIEW; 11/22/2024 8:22 am   INDICATION: Signs/Symptoms:s/p LVAD implant, assess lung fields, devices.    COMPARISON: 11/21/2024.   ACCESSION NUMBER(S): [id number]   ORDERING CLINICIAN: ANETA MANNING   FINDINGS:     CARDIOMEDIASTINAL SILHOUETTE: Cardiomegaly versus pericardial effusion. Patient is status post median sternotomy and LVAD.   LUNGS: There is mild perihilar interstitial edema. Left retrocardiac atelectasis versus loculated effusion. No pneumothorax.   ABDOMEN: No remarkable upper abdominal findings.   BONES: No acute osseous changes.       1.  Question left basilar consolidation/effusion status post LVAD. Consider follow-up with PA and lateral x-ray as clinically indicated.     Signed by: Olvin Farias 11/22/2024 4:44 PM Dictation workstation:   [id number]    XR chest 1 view    Result Date: 11/21/2024  Interpreted By:  Frank Connolly, STUDY: XR CHEST 1 VIEW;  11/21/2024 8:28 am   INDICATION: Signs/Symptoms:s/p LVAD implant, assess lung fields, devices.     COMPARISON: Exam dated 11/20/2024   ACCESSION NUMBER(S): [id number]   ORDERING CLINICIAN: ANETA MANNING   FINDINGS: AP radiograph of the chest was provided.   Status post median sternotomy. LVAD device is again visualized.   CARDIOMEDIASTINAL SILHOUETTE: Stable severe enlargement of the cardiac silhouette.   LUNGS: Left costophrenic angle is partially excluded from view due to LVAD device. Lungs otherwise demonstrate prominent interstitial markings. No new focal consolidation, right-sided pleural effusion, or evidence of pneumothorax. Focal lucency adjacent to the LVAD is similar to the recent exam.   ABDOMEN: No remarkable upper abdominal findings.   BONES: No acute osseous changes.       1.  Stable severe cardiomegaly with findings suggestive of pulmonary edema. 2. Additional stable focal lucency adjacent to the LVAD device may represent aerated lung versus postprocedural changes of LVAD placement.       MACRO: None   Signed by: Frank Connolly 11/21/2024 1:04 PM Dictation workstation:   [id number]    Electrocardiogram 12-lead PRN for  arrhythmia    Result Date: 11/21/2024  Atrial fibrillation with rapid ventricular response with premature ventricular or aberrantly conducted complexes Left bundle branch block Abnormal ECG When compared with ECG of 20-NOV-2024 03:52, Left bundle branch block has replaced Nonspecific intraventricular block Criteria for Septal infarct are no longer Present Criteria for Lateral infarct are no longer Present    XR chest 1 view    Result Date: 11/20/2024  Interpreted By:  Hemanth Rock,  and Chemo Land STUDY: XR CHEST 1 VIEW;  11/20/2024 7:51 am   INDICATION: Signs/Symptoms:s/p LVAD implant, assess lung fields, devices.     COMPARISON: Chest x-ray 11/19/2024-11/14/2024   ACCESSION NUMBER(S): [id number]   ORDERING CLINICIAN: ANETA MANNING   FINDINGS: AP radiograph of the chest was provided.   LINES/TUBES: Unchanged positioning of LVAD. Postsurgical changes from median sternotomy with intact sternal cerclage wires. Multiple surgical clips again overlie left heart border.   CARDIOMEDIASTINAL SILHOUETTE: Cardiomediastinal silhouette is persistently enlarged, however stable in size and configuration when compared to prior..   LUNGS: Continued improvement in interstitial lung markings with patchy airspace opacities. Similar to slightly improved right perihilar and left mid basilar lung presumed atelectasis. Persistent blunting of the left costophrenic angle. No sizable pneumothorax.   ABDOMEN: No remarkable upper abdominal findings.   BONES: Partial visualization of left shoulder arthroplasty. No acute osseous changes.       1. Slight interval improvement in mild pulmonary edema. 2. Findings suggestive of persistent small left pleural effusion.   I personally reviewed the images/study and I agree with the findings as stated by Emery Mclain DO, PGY-3. This study was interpreted at [facility], [place], [place].   MACRO: None   Signed by: Hemanth Rock 11/20/2024 4:01 PM Dictation  workstation:   [id number]    XR chest 1 view    Result Date: 11/19/2024  Interpreted By:  Mazin Blanco,  and Chemo [place] STUDY: XR CHEST 1 VIEW;  11/19/2024 3:50 am   INDICATION: Signs/Symptoms:s/p LVAD implant, assess lung fields, devices.   COMPARISON: Chest radiograph 11/18/2024-11/16/2024 CT chest, abdomen and pelvis 11/04/2024   ACCESSION NUMBER(S): [id number]   ORDERING CLINICIAN: ANETA MANNING   FINDINGS: AP supine radiograph of the chest was provided. There is slightly better patient centering on present study.   MEDICAL DEVICES/LINES: Unchanged positioning of LVAD. Postsurgical changes from median sternotomy with intact sternal cerclage wires. Multiple surgical clips again overlie left heart border.   CARDIOMEDIASTINAL SILHOUETTE: Cardiomediastinal silhouette is persistently enlarged, stable in size, but now with better delineation of bilateral heart borders as compared to prior radiograph from yesterday.   LUNGS: Improved interstitial lung markings with patchy airspace opacities. Improved but residual right perihilar and left mid basilar lung presumed atelectasis. Persistent blunting of the left costophrenic angle. No sizable pneumothorax.   ABDOMEN: No remarkable upper abdominal findings.   BONES: Partial visualization of left shoulder arthroplasty. No acute osseous changes.       1. Interval improvement in bilateral lung aeration with residual mild edema and left mid basilar lung predominant atelectasis with suggestion of small left pleural effusion   I personally reviewed the images/study and I agree with the findings as stated by Emery Mclain DO, PGY-3. This study was interpreted at [facility], [place], [place].   MACRO: None   Signed by: Mazin Blanco 11/19/2024 1:02 PM Dictation workstation:   [id number]    Electrocardiogram 12-lead PRN for arrhythmia    Result Date: 11/19/2024  Atrial fibrillation with rapid ventricular response with premature ventricular  or aberrantly conducted complexes Low voltage QRS Inferior infarct (cited on or before 17-NOV-2024) Abnormal ECG When compared with ECG of 17-NOV-2024 08:43, Previous ECG has undetermined rhythm, needs review Questionable change in QRS duration Borderline criteria for Lateral infarct are no longer Present Confirmed by Lucas Kerns ([id number]) on 11/19/2024 8:57:22 AM    Transthoracic Echo (TTE) Limited    Result Date: 11/18/2024   [facility], [address] [place] [address]                Tel [phone] and Fax [phone] TRANSTHORACIC ECHOCARDIOGRAM REPORT  Patient Name:       RJ LEIJA     Reading Physician:    [id number] Fawad Chicas MD Study Date:         11/18/2024          Ordering Provider:    [id number] HARRISON PALOMINO MRN/PID:            [id number]            Fellow: Accession#:         [id number]        Nurse: Date of Birth/Age:  1955 / 69     Sonographer:          Genesis Sinclair RDCS                     years Gender assigned at  M                   Additional Staff: Birth: Height:             182.88 cm           Admit Date:           10/24/2024 Weight:             93.44 kg            Admission Status:     Inpatient -                                                               Routine BSA / BMI:          2.16 m2 / 27.94     Encounter#:           [id number]                     kg/m2 Blood Pressure:     /                   Department Location:  [facility] Study Type:    TRANSTHORACIC ECHO (TTE) LIMITED Diagnosis/ICD: Presence of heart assist device-Z95.811 Indication:    Ramp study for LVAD CPT Code:      Echo Limited-52905; Doppler Limited-10774; Color Doppler-07784 Patient History: Pertinent History: Acute on chronic heart failure with reduced EF, Diastolic                    disfunction, Ischemic cardiiomyopathy, MI, CHF, LVAD, VIV. Study Detail: The  following Echo studies were performed: 2D, Doppler, color flow               and M-Mode. Technically challenging study due to patient lying in               supine position and postoperative dressings.  PHYSICIAN INTERPRETATION: Left Ventricle: Left ventricular ejection fraction is severely decreased, by visual estimate at 10%. There is global hypokinesis of the left ventricle with minor regional variations. The left ventricular cavity size is severely dilated. Abnormal (paradoxical) septal motion consistent with post-operative status. Left ventricular diastolic filling was not assessed. Left Atrium: The left atrium is moderate to severely dilated. Right Ventricle: The right ventricle is severely enlarged. There is severely reduced right ventricular systolic function. Right Atrium: The right atrium is moderately dilated. Aortic Valve: The aortic valve is structurally normal. There is mild aortic valve cusp calcification. There is mild aortic valve regurgitation. Mitral Valve: The mitral valve is normal in structure. There is mild mitral valve regurgitation. Tricuspid Valve: The tricuspid valve is structurally normal. There is mild tricuspid regurgitation. Pulmonic Valve: The pulmonic valve is not well visualized. Pulmonic valve regurgitation was not assessed. Pericardium: Trivial pericardial effusion. Aorta: The aortic root is normal. In comparison to the previous echocardiogram(s): Compared with study dated 11/15/2024, no significant change.  LEFT VENTRICULAR ASSIST DEVICE: LVAD: The patient has a(n) HeartMate 3 LVAD device present. Inflow Cannula: The inflow cannula is visualized in the left ventricular apex. AV Leaflet Mobility: . The aortic valve appears to be opening every 1 beats. LVAD Comments: Ramp study speed increased from 5200 to 5300. Aortic valve still opens every beat. Septum remains in the midline.  CONCLUSIONS:  1. Poorly visualized anatomical structures due to suboptimal image quality.  2. Left  ventricular ejection fraction is severely decreased, by visual estimate at 10%.  3. There is global hypokinesis of the left ventricle with minor regional variations.  4. Left ventricular cavity size is severely dilated.  5. Abnormal septal motion consistent with post-operative status.  6. There is severely reduced right ventricular systolic function.  7. Severely enlarged right ventricle.  8. The left atrium is moderate to severely dilated.  9. The right atrium is moderately dilated. 10. Mild aortic valve regurgitation. 11. . The aortic valve appears to be opening every 1 beats. 12. Compared with study dated 11/15/2024, no significant change. QUANTITATIVE DATA SUMMARY:  LV SYSTOLIC FUNCTION BY 2D PLANIMETRY (MOD):                      Normal Ranges: EF-Visual:      10 % LV EF Reported: 10 %  [id number] Fawad Chicas MD Electronically signed on 11/18/2024 at 12:33:46 PM  ** Final **     XR chest 1 view    Result Date: 11/18/2024  Interpreted By:  Elvis Ross, STUDY: XR CHEST 1 VIEW; 11/18/2024 6:47 am   INDICATION: Signs/Symptoms:s/p LVAD implant, assess lung fields, devices.   COMPARISON: Radiograph dated 11/17/2024   ACCESSION NUMBER(S): [id number]   ORDERING CLINICIAN: ANETA MANNING   FINDINGS: Interval removal of [place]-Faviola catheter. Unchanged positioning of LVAD.   Cardiac silhouette size is persistently enlarged.   Increased interstitial markings and ground-glass opacity, worse compared to prior study. Bilateral basilar pleural effusion and atelectasis. No sizable pneumothorax.   No acute osseous abnormality.       1. Worsening pulmonary edema. Correlate with volume status. 2. Persistent bibasilar pleural effusion and atelectasis with slight worsening of the aeration of the bases.       Signed by: Elvis Sagastume 11/18/2024 10:57 AM Dictation workstation:   [id number]    CT head wo IV contrast    Result Date: 11/18/2024  Interpreted By:  Amarilis Owen,  Louis Villanueva STUDY: CT HEAD WO IV  CONTRAST;  11/17/2024 10:54 pm   INDICATION: Signs/Symptoms:tranient vision changes, s/p LVAD.     COMPARISON: None.   ACCESSION NUMBER(S): [id number]   ORDERING CLINICIAN: ANETA MANNING   TECHNIQUE: Noncontrast axial CT scan of head was performed. Angled reformats in brain and bone windows were generated. The images were reviewed in bone, brain, blood and soft tissue windows.   FINDINGS: CSF Spaces: The ventricles, sulci and basal cisterns are within normal limits. There is no extraaxial fluid collection.   Parenchyma:  The grey-white differentiation is intact. Mild patchy nonspecific white matter hypodensity is compatible with microangiopathy. There is no mass effect or midline shift.  There is no intracranial hemorrhage.   Calvarium: The calvarium is unremarkable.   Paranasal sinuses and mastoids: Visualized paranasal sinuses and mastoids are clear.       No acute intracranial hemorrhage or mass effect.   I personally reviewed the image(s)/study and resident interpretation. I agree with the findings as stated by resident Rich Mccurdy. Data analyzed and images interpreted at [facility], [place], [place].   MACRO: None   Signed by: Amarilis Owen 11/18/2024 7:20 AM Dictation workstation:   [id number]    XR chest 1 view    Result Date: 11/17/2024  Interpreted By:  Elvis Ross, STUDY: XR CHEST 1 VIEW; 11/17/2024 4:04 am   INDICATION: Signs/Symptoms:s/p LVAD implant, assess lung fields, devices.   COMPARISON: Radiograph dated 11/16/2024   ACCESSION NUMBER(S): [id number]   ORDERING CLINICIAN: ANETA MANNING   FINDINGS: Right IJ [place]-Faviola catheter is in place with the tip projecting over the right main pulmonary artery. LVAD is stable in position.   Cardiac silhouette size is persistently enlarged. Status post median sternotomy.   Persistent bibasilar atelectasis and interstitial edema with slight improvement in the aeration of the lungs. Small bibasilar pleural effusion,  left more than right. No sizable pneumothorax.   No acute osseous abnormality.       1. Slight interval improvement in pulmonary edema and bibasilar atelectasis. Small bibasilar pleural effusion, left more than right. No pneumothorax.       Signed by: Elvis Sagastume 11/17/2024 9:44 AM Dictation workstation:   [id number]    XR chest 1 view    Result Date: 11/16/2024  Interpreted By:  Elvis Ross, STUDY: XR CHEST 1 VIEW; 11/16/2024 3:47 am   INDICATION: Signs/Symptoms:s/p LVAD implant, assess lung fields, devices.   COMPARISON: Radiograph dated 11/15/2024   ACCESSION NUMBER(S): [id number]   ORDERING CLINICIAN: ANETA MANNING   FINDINGS: Right IJ approach [place]-Faviola catheter is in place with the tip projecting over the proximal right main pulmonary artery. LVAD is unchanged in position.   Status post median sternotomy. The cardiac silhouette size is significantly enlarged, unchanged.   Persistent bibasilar pleural effusion and bibasilar atelectasis and mild perihilar edema, unchanged. No sizable pneumothorax.   No acute osseous abnormality.       No significant interval change in bilateral pleural effusions with bibasilar atelectasis and mild perihilar congestion/edema.     Signed by: Elvis Sagastume 11/16/2024 10:28 AM Dictation workstation:   [id number]    XR chest 1 view    Result Date: 11/15/2024  Interpreted By:  Tuan Platt, STUDY: XR CHEST 1 VIEW;  11/15/2024 3:29 am   INDICATION: Signs/Symptoms:s/p LVAD implant, assess lung fields, devices.   COMPARISON: Chest radiograph dated 11/14/2024   ACCESSION NUMBER(S): [id number]   ORDERING CLINICIAN: ANETA MANNING   FINDINGS: AP radiograph of the chest   The swans Faviola catheter is within the distal right pulmonary artery. VD projects overlying the left lower thorax.   Sternal sutures are noted. Vascular clips overlie the mediastinum. There is moderately severe cardiomegaly. Opacity obscures the left diaphragm and costophrenic sulcus.       1.  Pulmonary aeration is similar to the prior study Opacity within left lower lobe persists obscuring the left diaphragm laterally and the costophrenic sulcus       Signed by: Tuan Platt 11/15/2024 12:27 PM Dictation workstation:   [id number]    Transthoracic Echo (TTE) Limited    Result Date: 11/15/2024   [facility], [address] [place] [address]                Tel [phone] and Fax [phone] TRANSTHORACIC ECHOCARDIOGRAM REPORT  Patient Name:       RJ LEIJA     Reading Physician:    [id number] Fawad Chicas MD Study Date:         11/15/2024          Ordering Provider:    [id number] ANETA MANNING MRN/PID:            [id number]            Fellow:               [id number] Aramis Fitzpatrick MD Accession#:         [id number]        Nurse: Date of Birth/Age:  1955 / 69     Sonographer:          Jackson estrada RDCS Gender assigned at  M                   Additional Staff: Birth: Height:             182.88 cm           Admit Date: Weight:             97.07 kg            Admission Status:     Inpatient - STAT BSA / BMI:          2.19 m2 / 29.02     Encounter#:           [id number]                     kg/m2 Blood Pressure:     107/71 mmHg         Department Location:  [facility] Study Type:    TRANSTHORACIC ECHO (TTE) LIMITED Diagnosis/ICD: Presence of heart assist device-Z95.811 Indication:    LVAD ramp study CPT Code:      Echo Limited-22696; Doppler Limited-27842; Color Doppler-18494 Patient History: Pertinent History: S/p CABG x 4 (2012) and PCI (LCx/OM; 5/2019), Stage D ICM                    HFrEF LVEF 10-15%, AF s/p RFA (PVI and CTI; 4/2024), HTN,                    Dyslipidemia, 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 Study Detail: The following Echo studies were performed: 2D, M-Mode, Doppler and               color flow. Technically challenging study due to poor acoustic               windows, prominent lung artifact, body habitus and patient lying               in supine position.  PHYSICIAN INTERPRETATION: Left Ventricle: The left ventricular systolic function is severely decreased, with a visually estimated ejection fraction of 10-15%. There is global hypokinesis of the left ventricle with minor regional variations. The left ventricular cavity size is severely dilated. There is normal septal and normal posterior left ventricular wall thickness. Abnormal (paradoxical) septal motion consistent with post-operative status. Left ventricular diastolic filling cannot be determined, due to left ventricular assist device. Left Atrium: The left atrium is mildly dilated. Right Ventricle: The right ventricle is severely enlarged. There is severely reduced right ventricular systolic function. A device is visualized in the right ventricle. Right Atrium: The right atrium is severely dilated. There is a device visualized in the right atrium. Aortic Valve: The aortic valve is structurally normal. There is mild to moderate aortic valve cusp calcification. There is mild aortic valve regurgitation. Mitral Valve: The mitral valve is normal in structure. There is mild mitral annular calcification. There is mild mitral valve regurgitation. Tricuspid Valve: The tricuspid valve is structurally normal. There is mild tricuspid regurgitation. The right ventricular systolic pressure is unable to be estimated. Pulmonic Valve: The pulmonic valve is not well visualized. Pulmonic valve regurgitation was not assessed. Pericardium: Trivial pericardial effusion. Aorta: The aortic root is abnormal. There is mild dilatation of the ascending aorta. There is mild dilatation of the aortic root. In comparison to the previous  echocardiogram(s): Compared with study dated 11/13/2024, no significant change.  LEFT VENTRICULAR ASSIST DEVICE: Study Type: This study was performed while LVAD settings were optimized. LVAD: The patient has a(n) HeartMate 3 LVAD device present. Inflow Cannula: The inflow cannula is visualized in the left ventricular apex. Outflow Cannula: Visualization of the outflow cannula is technically difficult. AV Leaflet Mobility: . The aortic valve appears to be opening every 1 beats.  CONCLUSIONS:  1. The left ventricular systolic function is severely decreased, with a visually estimated ejection fraction of 10-15%.  2. There is global hypokinesis of the left ventricle with minor regional variations.  3. Left ventricular diastolic filling cannot be determined, due to left ventricular assist device.  4. Left ventricular cavity size is severely dilated.  5. Abnormal septal motion consistent with post-operative status.  6. There is severely reduced right ventricular systolic function.  7. Severely enlarged right ventricle.  8. The left atrium is mildly dilated.  9. The right atrium is severely dilated. 10. Mild aortic valve regurgitation. 11. . The aortic valve appears to be opening every 1 beats. 12. Compared with study dated 11/13/2024, no significant change. QUANTITATIVE DATA SUMMARY:  2D MEASUREMENTS:          Normal Ranges: IVSd:            1.00 cm  (0.6-1.1cm) LVPWd:           1.00 cm  (0.6-1.1cm) LVIDd:           5.90 cm  (3.9-5.9cm) LV Mass Index:   109 g/m2  LA VOLUME:                   Normal Ranges: LA Vol A4C:        86.2 ml   (22+/-6mL/m2) LA Vol Index A4C:  39.3ml/m2 LA Area A4C:       24.2 cm2 LA Major Axis A4C: 5.8 cm  AORTA MEASUREMENTS:         Normal Ranges: Ao Sinus, d:        4.30 cm (2.1-3.5cm)  LV SYSTOLIC FUNCTION BY 2D PLANIMETRY (MOD):                      Normal Ranges: EF-Visual:      13 % LV EF Reported: 13 %  [id number] Fawad Chicas MD Electronically signed on 11/15/2024 at 12:09:54 PM  ** Final **      XR chest 1 view    Result Date: 11/14/2024  Interpreted By:  Tuan Platt, STUDY: XR CHEST 1 VIEW;  11/14/2024 8:07 am   INDICATION: Signs/Symptoms:s/p LVAD implant, assess lung fields, devices.   COMPARISON: Chest radiograph dated 11/13/2024   ACCESSION NUMBER(S): [id number]   ORDERING CLINICIAN: ANETA MANNING   FINDINGS: AP radiograph of the chest   The endotracheal tube is above the carlo in satisfactory position. The swans Faviola catheter is positioned within the right pulmonary artery. The enteric tube traverses the esophagus. LVAD projects over lying the left lower thorax. Chest tubes are noted.   There is moderate cardiomegaly stable in comparison to previous study. Left lower lobe opacity is noted. Overall pulmonary aeration is similar previous study.       1. Left lower lobe opacity persists similar previous study 2. The pulmonary vascular distribution is similar.       Signed by: Tuan Platt 11/14/2024 12:33 PM Dictation workstation:   [id number]    XR chest 1 view    Result Date: 11/13/2024  Interpreted By:  Tuan Platt and Omar Mahmoud STUDY: XR CHEST 1 VIEW;  11/13/2024 4:41 am   INDICATION: Signs/Symptoms:Post op cardiac surgery.     COMPARISON: Chest x-ray 11/12/2024 6:15 p.m., CT chest 11/04/2024   ACCESSION NUMBER(S): [id number]   ORDERING CLINICIAN: ANETA MANNING   FINDINGS: AP radiograph of the chest was provided.   Patient is mildly rotated to the right which limits evaluation and comparison.   Postoperative findings from median sternotomy. Endotracheal tube tip projects approximately 6.2 cm from the carlo. Right IJ approach pulmonary arterial catheter tip projects over the distal right main pulmonary artery. Again seen LVAD projecting over the left lower thorax. 2 left chest tubes. Enteric tube tip projects over the gastric fundus with the proximal side port likely above the gastroesophageal junction. Again seen left shoulder arthroplasty.   CARDIOMEDIASTINAL SILHOUETTE: There is  moderate cardiomegaly. Cardiomediastinal silhouette is stable in size and configuration.   LUNGS: No pneumothorax. Decrease in diffuse interstitial opacities. There is no new focal consolidation. The bilateral costophrenic angles are clear.   ABDOMEN: No remarkable upper abdominal findings.   BONES: No acute osseous changes.       1. Similar positioning of an enteric tube with maximal side-port likely above the gastroesophageal junction. Consider advancement of the enteric tube. Stable positioning of other medical devices as described above. 2. Decrease in pulmonary interstitial edema. There are no new cardiopulmonary abnormalities.   I personally reviewed the images/study and I agree with the findings as stated by Zack Ortiz MD (PGY-2). This study was interpreted at [facility], [place], [place].   MACRO: None   Signed by: Tuan Platt 11/13/2024 2:00 PM Dictation workstation:   [id number]    Anesthesia Intraoperative Transesophageal Echocardiogram    Result Date: 11/13/2024  [facility] - Dept of Anesthesiology    [address] [place] [address]       Tel [phone] and Fax [phone] TRANSESOPHAGEAL ECHOCARDIOGRAM REPORT  Patient Name:       RJ LEIJA     Reading Physician:    [id number] Fred Ortiz MD Study Date:         11/12/2024          Ordering Provider:    [id number] BANKS MRN/PID:            [id number]            Fellow: Accession#:         [id number]        Nurse: Date of Birth/Age:  1955 / 69     Sonographer:                     years Gender assigned at  M                   Additional Staff: Birth: BSA / BMI:          m2 / kg/m2          Encounter#:           [id number] Blood Pressure:     /                   Department Location:  [facility]                                                                Anesthesia Study Type:    ANESTHESIA INTRAOPERATIVE BRIANA Diagnosis/ICD: Dilated cardiomyopathy-I42.0 Indication:    Left Ventricular Assist Device CPT Code:      BRIANA Complete-06668; Doppler Limited-32489; Color Doppler-91968 PHYSICIAN INTERPRETATION: BRIANA Details: Technically adequate omniplane transesophageal echocardiogram performed. BRIANA Procedure: The probe was passed without difficulty. Left Ventricle: The left ventricular systolic function is severely decreased, with a visually estimated ejection fraction of 15-20%. The left ventricular cavity size is severely dilated. The left ventricular septal wall thickness is mildly increased. Left ventricular diastolic filling was not assessed. Left Atrium: The left atrium is severely dilated. The left atrial appendage Doppler velocities are mildly reduced and there is no thrombus visualized in the left atrial appendage. Right Ventricle: The right ventricle is severely enlarged. There is moderately reduced right ventricular systolic function. Right Atrium: The right atrium is moderately dilated. Aortic Valve: The aortic valve is trileaflet. There is evidence of mild aortic valve stenosis. There is mild aortic valve regurgitation. Calcified valve with fused right and left cusp. Central jet angled towards anterior leaflet of ana valve. Mitral Valve: The mitral valve is mildly thickened. There is no evidence of mitral valve stenosis. There is mild mitral valve regurgitation. Tricuspid Valve: The tricuspid valve is structurally normal. There is mild to moderate tricuspid regurgitation. Pulmonic Valve: The pulmonic valve is structurally normal. There is trace pulmonic valve regurgitation. Pericardium: There is no pericardial effusion noted. Aorta: The aortic root is normal. In comparison to the previous echocardiogram(s): Not performed on intraoperative study.  CONCLUSIONS:  1. The left ventricular systolic function is severely decreased, with a visually estimated ejection  fraction of 15-20%.  2. Left ventricular cavity size is severely dilated.  3. There is moderately reduced right ventricular systolic function.  4. Severely enlarged right ventricle.  5. The left atrium is severely dilated.  6. The right atrium is moderately dilated.  7. Mild to moderate tricuspid regurgitation visualized.  8. Mild aortic valve stenosis.  9. Mild aortic valve regurgitation. POST PROCEDURE REPORT: Patient is on epinephrine and milrinone drips. LV function is unchanged from pre-pump exam. LVAD inflow cannula in good position in LV apex with no obstructin to flow. RV function is mildly improved from pre-pump exam. There is mild aortic regurgitation. Aortic regurgitation is unchanged. No evidence of post aortic cannulation dissection. All transesophageal echocardiogram findings discussed with surgeon. LVAD outflow graft to descending aorta appears to have good flow.  QUANTITATIVE DATA SUMMARY:  LV SYSTOLIC FUNCTION BY 2D PLANIMETRY (MOD):                      Normal Ranges: EF-Visual:      18 % LV EF Reported: 18 %  [id number] Fred Ortiz MD Electronically signed on 11/13/2024 at 11:57:37 AM  ** Final **     XR abdomen 1 view    Result Date: 11/13/2024  Interpreted By:  Tuan Platt and Omar Mahmoud STUDY: XR ABDOMEN 1 VIEW;  11/13/2024 9:16 am   INDICATION: Signs/Symptoms:OG positioning.     COMPARISON: X-ray abdomen 11/12/2024   ACCESSION NUMBER(S): [id number]   ORDERING CLINICIAN: JO CASTILLO   FINDINGS: Enteric tube tip projects over the expected location of the gastric fundus, suggestion of a proximal side-port projects near the gastroesophageal junction. LVAD overlying the left lower chest. Median sternotomy wires again seen. Partially visualized pulmonary arterial catheter.   Nonobstructive bowel gas pattern. Limited evaluation of pneumoperitoneum on supine imaging, however no gross evidence of free air is noted.   Visualized lungs are clear.   Osseous structures demonstrate no acute bony  changes.       Enteric tube tip projects over the gastric fundus with proximal side port near the gastroesophageal junction. Recommend advancement.   I personally reviewed the images/study and I agree with the findings as stated by Zack Ortiz MD (PGY-2). This study was interpreted at [place], [place].   MACRO: None   Signed by: Tuan Platt 11/13/2024 11:17 AM Dictation workstation:   [id number]    XR abdomen 1 view    Result Date: 11/13/2024  Interpreted By:  Tuan Platt  and Cristhian Garcia STUDY: XR ABDOMEN 1 VIEW; XR CHEST 1 VIEW;  11/12/2024 6:42 pm; 11/12/2024 7:13 pm; 11/12/2024 7:04 pm   INDICATION: Signs/Symptoms:OG tube assessment; Signs/Symptoms:Feeding tube; Signs/Symptoms:Et tube placement.   COMPARISON: Chest radiograph 7:04 p.m. same day, abdominal radiograph same day 6:42 p.m.   ACCESSION NUMBER(S): [id number]; [id number]; [id number]   ORDERING CLINICIAN: ANETA MANNING; JAMEL LEDESMA; SHAYY PUENTE   FINDINGS: Chest radiograph 7:04 p.m. same day, and abdominal radiograph same day 6:42 p.m. were dictated together due to proximity of the exams.   AP radiographs of the chest and abdomen were provided. Endotracheal tube noted with tip projecting approximately 5.9 cm above the carlo. Postsurgical changes of LVAD placement. Postsurgical changes of median sternotomy. Right IJ [place]-Faviola catheter with tip projecting over the expected position of the right main pulmonary artery. On the most recent abdominal radiograph, enteric tube seen coursing below the level diaphragm with tip tip projecting over the expected position of the gastroesophageal junction. The side-hole of the enteric tube is within the lower thoracic esophagus. Heart is enlarged. Cardiomediastinal silhouette is stable in size.   No evidence of pneumothorax. Bilateral costophrenic angles are clear. No focal consolidation. Bilateral mild pulmonary interstitial edema.   Nonobstructive  bowel gas pattern. Limited evaluation of pneumoperitoneum on supine imaging, however no gross evidence of free air is noted.   Vizualized lungs are clear.   Osseous structures demonstrate no acute bony changes.       1. Enteric tube seen coursing below the level diaphragm with tip projecting over the expected position of the distal gastroesophageal junction. The side-hole of the enteric tube projects over the distal thoracic esophagus. Repositioning of the enteric tube can be considered based on patient position. 2. Additional medical devices as described above. 3. No pneumothorax, focal consolidation, or pleural effusion. Mild pulmonary interstitial edema. 4. Nonobstructive bowel gas pattern.   I personally reviewed the images/study and I agree with the findings as stated by Jose Morrow MD. This study was interpreted at [facility], [place], [place].   MACRO: None   Signed by: Tuan Platt 11/13/2024 10:53 AM Dictation workstation:   [id number]    XR chest 1 view    Result Date: 11/13/2024  Interpreted By:  Tuan Platt  and Cristhian Garcia STUDY: XR ABDOMEN 1 VIEW; XR CHEST 1 VIEW;  11/12/2024 6:42 pm; 11/12/2024 7:13 pm; 11/12/2024 7:04 pm   INDICATION: Signs/Symptoms:OG tube assessment; Signs/Symptoms:Feeding tube; Signs/Symptoms:Et tube placement.   COMPARISON: Chest radiograph 7:04 p.m. same day, abdominal radiograph same day 6:42 p.m.   ACCESSION NUMBER(S): [id number]; [id number]; [id number]   ORDERING CLINICIAN: ANETA MANNING; JAMEL PUENTE   FINDINGS: Chest radiograph 7:04 p.m. same day, and abdominal radiograph same day 6:42 p.m. were dictated together due to proximity of the exams.   AP radiographs of the chest and abdomen were provided. Endotracheal tube noted with tip projecting approximately 5.9 cm above the carlo. Postsurgical changes of LVAD placement. Postsurgical changes of median sternotomy. Right IJ [place]-Faviola catheter with tip projecting  over the expected position of the right main pulmonary artery. On the most recent abdominal radiograph, enteric tube seen coursing below the level diaphragm with tip tip projecting over the expected position of the gastroesophageal junction. The side-hole of the enteric tube is within the lower thoracic esophagus. Heart is enlarged. Cardiomediastinal silhouette is stable in size.   No evidence of pneumothorax. Bilateral costophrenic angles are clear. No focal consolidation. Bilateral mild pulmonary interstitial edema.   Nonobstructive bowel gas pattern. Limited evaluation of pneumoperitoneum on supine imaging, however no gross evidence of free air is noted.   Vizualized lungs are clear.   Osseous structures demonstrate no acute bony changes.       1. Enteric tube seen coursing below the level diaphragm with tip projecting over the expected position of the distal gastroesophageal junction. The side-hole of the enteric tube projects over the distal thoracic esophagus. Repositioning of the enteric tube can be considered based on patient position. 2. Additional medical devices as described above. 3. No pneumothorax, focal consolidation, or pleural effusion. Mild pulmonary interstitial edema. 4. Nonobstructive bowel gas pattern.   I personally reviewed the images/study and I agree with the findings as stated by Jose Morrow MD. This study was interpreted at [facility], [place], [place].   MACRO: None   Signed by: Tuan Platt 11/13/2024 10:53 AM Dictation workstation:   [id number]    XR abdomen 1 view    Result Date: 11/13/2024  Interpreted By:  Tuan Platt and Nakamoto Kent STUDY: XR ABDOMEN 1 VIEW; XR CHEST 1 VIEW;  11/12/2024 6:42 pm; 11/12/2024 7:13 pm; 11/12/2024 7:04 pm   INDICATION: Signs/Symptoms:OG tube assessment; Signs/Symptoms:Feeding tube; Signs/Symptoms:Et tube placement.   COMPARISON: Chest radiograph 7:04 p.m. same day, abdominal radiograph same day 6:42 p.m.    ACCESSION NUMBER(S): [id number]; [id number]; [id number]   ORDERING CLINICIAN: ANETA MANNING; JAMEL LEDESMA; SHAYY PUENTE   FINDINGS: Chest radiograph 7:04 p.m. same day, and abdominal radiograph same day 6:42 p.m. were dictated together due to proximity of the exams.   AP radiographs of the chest and abdomen were provided. Endotracheal tube noted with tip projecting approximately 5.9 cm above the carlo. Postsurgical changes of LVAD placement. Postsurgical changes of median sternotomy. Right IJ [place]-Faviola catheter with tip projecting over the expected position of the right main pulmonary artery. On the most recent abdominal radiograph, enteric tube seen coursing below the level diaphragm with tip tip projecting over the expected position of the gastroesophageal junction. The side-hole of the enteric tube is within the lower thoracic esophagus. Heart is enlarged. Cardiomediastinal silhouette is stable in size.   No evidence of pneumothorax. Bilateral costophrenic angles are clear. No focal consolidation. Bilateral mild pulmonary interstitial edema.   Nonobstructive bowel gas pattern. Limited evaluation of pneumoperitoneum on supine imaging, however no gross evidence of free air is noted.   Vizualized lungs are clear.   Osseous structures demonstrate no acute bony changes.       1. Enteric tube seen coursing below the level diaphragm with tip projecting over the expected position of the distal gastroesophageal junction. The side-hole of the enteric tube projects over the distal thoracic esophagus. Repositioning of the enteric tube can be considered based on patient position. 2. Additional medical devices as described above. 3. No pneumothorax, focal consolidation, or pleural effusion. Mild pulmonary interstitial edema. 4. Nonobstructive bowel gas pattern.   I personally reviewed the images/study and I agree with the findings as stated by Jose Morrow MD. This study was interpreted at [facility]  [place], [place].   MACRO: None   Signed by: Tuan Platt 11/13/2024 10:53 AM Dictation workstation:   [id number]    Transthoracic Echo (TTE) Limited    Result Date: 11/13/2024   [facility], [address] [place] [address]                Tel [phone] and Fax [phone] TRANSTHORACIC ECHOCARDIOGRAM REPORT  Patient Name:       RJ Webb Physician:    [id number] Raciel Casarez MD Study Date:         11/13/2024          Ordering Provider:    [id number] JO CASTILLO MRN/PID:            [id number]            Fellow: Accession#:         [id number]        Nurse: Date of Birth/Age:  1955 / 69     Sonographer:          Jackson estrada RDCS Gender assigned at  M                   Additional Staff: Birth: Height:             182.88 cm           Admit Date: Weight:             93.90 kg            Admission Status:     Inpatient - STAT BSA / BMI:          2.16 m2 / 28.07     Encounter#:           [id number]                     kg/m2 Blood Pressure:     94/74 mmHg          Department Location:  [facility] Study Type:    TRANSTHORACIC ECHO (TTE) LIMITED Diagnosis/ICD: 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 Study Detail: The following Echo studies were performed: 2D, M-Mode, Doppler and               color flow. Technically challenging study due to body  habitus,               patient lying in supine position, postoperative dressings,               prominent lung artifact and poor acoustic windows. The patient is               intubated. Definity used as a contrast agent for endocardial               border definition. Total contrast used for this procedure was 2.0               mL via IV push.  PHYSICIAN INTERPRETATION: Left Ventricle: Left ventricular ejection fraction is severely decreased, by visual estimate at 15%. There is eccentric left ventricular hypertrophy. There is global hypokinesis of the left ventricle with minor regional variations. The left ventricular cavity size is moderate to severely dilated. Left ventricular diastolic filling was not assessed. Left Atrium: The left atrium is moderately dilated. Right Ventricle: The right ventricle is mild to moderately enlarged. There is severely reduced right ventricular systolic function. A device is visualized in the right ventricle. Right Atrium: The right atrium is moderately dilated. There is a device visualized in the right atrium. Aortic Valve: The aortic valve was not well visualized. There is mild to moderate aortic valve cusp calcification. There is mild aortic valve regurgitation. Mitral Valve: The mitral valve is normal in structure. There is mild mitral valve regurgitation. Tricuspid Valve: The tricuspid valve is structurally normal. There is mild tricuspid regurgitation. Pulmonic Valve: The pulmonic valve is not well visualized. There is trace pulmonic valve regurgitation. Pericardium: There is no pericardial effusion noted. Aorta: The aortic root is abnormal. There is mild dilatation of the aortic root. Pulmonary Artery: The tricuspid regurgitant velocity is 1.83 m/s, and with an estimated right atrial pressure of 10 mmHg, the estimated pulmonary artery pressure is normal with the RVSP at 23.4 mmHg. Systemic Veins: The inferior vena cava appears moderately dilated, on vent. In comparison to the  previous echocardiogram(s): Compared with study dated 11/5/2024, no significant change.  LEFT VENTRICULAR ASSIST DEVICE: LVAD: The patient has a(n) HeartMate 3 LVAD device present. Inflow Cannula: The inflow cannula is visualized in the left ventricular apex.  CONCLUSIONS:  1. Poorly visualized anatomical structures due to suboptimal image quality.  2. Left ventricular cavity size is moderate to severely dilated.  3. Left ventricular ejection fraction is severely decreased, by visual estimate at 15%.  4. There is global hypokinesis of the left ventricle with minor regional variations.  5. There is severely reduced right ventricular systolic function.  6. Mild to moderately enlarged right ventricle.  7. The left atrium is moderately dilated.  8. The right atrium is moderately dilated.  9. Mild aortic valve regurgitation. 10. The inferior vena cava appears moderately dilated, on vent. QUANTITATIVE DATA SUMMARY:  2D MEASUREMENTS:          Normal Ranges: IVSd:            1.00 cm  (0.6-1.1cm) LVPWd:           1.20 cm  (0.6-1.1cm) LVIDd:           6.90 cm  (3.9-5.9cm) LVIDs:           6.60 cm LV Mass Index:   164 g/m2 LV % FS          4.3 %  AORTA MEASUREMENTS:         Normal Ranges: Ao Sinus, d:        4.00 cm (2.1-3.5cm)  LV SYSTOLIC FUNCTION BY 2D PLANIMETRY (MOD):                      Normal Ranges: EF-Visual:      15 % LV EF Reported: 15 %  RIGHT VENTRICLE: RV s' 0.07 m/s  TRICUSPID VALVE/RVSP:          Normal Ranges: Peak TR Velocity:     1.83 m/s RV Syst Pressure:     23 mmHg  (< 30mmHg) IVC Diam:             2.70 cm  [id number] Raciel Casarez MD Electronically signed on 11/13/2024 at 10:25:04 AM  ** Final **     XR chest 1 view    Result Date: 11/12/2024  Interpreted By:  Tuan Platt and Omar Mahmoud STUDY: XR CHEST 1 VIEW;  11/12/2024 1:38 pm   INDICATION: Signs/Symptoms:Post op cardiac surgery.     COMPARISON: Chest x-ray 11/11/2024 7:55 a.m., CT chest abdomen pelvis 11/04/2024   ACCESSION NUMBER(S): [id number]    ORDERING CLINICIAN: ANETA MANNING   FINDINGS: AP radiograph of the chest was provided.   Interval demonstration of postsurgical changes of LVAD placement. Postsurgical changes from median sternotomy surgical clips overlying left hilum. Right IJ pulmonary arterial catheter tip projects over the distal right main pulmonary artery. Interval placement of an enteric tube with tip projecting over the expected location of the gastroesophageal junction. Interval placement of left chest tubes. Partially visualized left shoulder arthroplasty.   CARDIOMEDIASTINAL SILHOUETTE: The heart is enlarged. Cardiomediastinal silhouette is stable in size and configuration.   LUNGS: Mild perihilar interstitial opacities, grossly stable as compared to prior. The bilateral costophrenic angles are clear, within the limitations of the newly placed LVAD obscured the left lower lung field. There is no evidence of a pneumothorax. There is no new focal consolidation.   ABDOMEN: No remarkable upper abdominal findings.   BONES: No acute osseous changes.       1. Postsurgical changes and medical devices as described above. Recommend advancement of enteric tube. 2. Stable mild pulmonary interstitial edema.   I personally reviewed the images/study and I agree with the findings as stated by Zack Ortiz MD (PGY-2). This study was interpreted at [place], [place].   MACRO: None   Signed by: Tuan Platt 11/12/2024 3:19 PM Dictation workstation:   [id number]    XR chest 1 view    Result Date: 11/11/2024  Interpreted By:  Tuan Platt and Omar Mahmoud STUDY: XR CHEST 1 VIEW;  11/11/2024 8:02 am   INDICATION: Signs/Symptoms:SGC monitoring.     COMPARISON: Chest x-ray 11/10/2024, CT chest 1124   ACCESSION NUMBER(S): [id number]   ORDERING CLINICIAN: KHALIDA PU   FINDINGS: AP radiograph of the chest was provided.   Patient is mildly rotated to the right which limits evaluation and comparison.    Postoperative findings from median sternotomy. Right IJ approach pulmonary arterial catheter with tip projecting over a proximal right lower lobe pulmonary artery, advanced as compared to prior.   CARDIOMEDIASTINAL SILHOUETTE: Heart is moderately enlarged. Small pericardial effusion not excluded. Cardiomediastinal silhouette is stable in size and configuration.   LUNGS: Mild pulmonary vascular congestion within the right lung is noted. No acute pulmonary consolidations or effusions are demonstrated.   ABDOMEN: No remarkable upper abdominal findings.   BONES: No acute osseous changes.       1. Interval advancement of a pulmonary arterial catheter with tip now projecting over a proximal right lower lobe pulmonary artery. 2. Similar enlargement of the cardiomediastinal silhouette. 3. No acute cardiopulmonary process.   I personally reviewed the images/study and I agree with the findings as stated by Zack Ortiz MD (PGY-2). This study was interpreted at [place], [place].   MACRO: None   Signed by: Tuan Platt 11/11/2024 2:18 PM Dictation workstation:   [id number]    XR chest 1 view    Result Date: 11/10/2024  Interpreted By:  Olvin Farias, STUDY: XR CHEST 1 VIEW; 11/10/2024 8:17 am   INDICATION: Signs/Symptoms:SGC monitoring.   COMPARISON: Prior day radiograph   ACCESSION NUMBER(S): [id number]   ORDERING CLINICIAN: KHALIDA UP   FINDINGS:     CARDIOMEDIASTINAL SILHOUETTE: Patient is status post median sternotomy. Megaly versus pericardial effusion. 9 [place]-Faviola catheter overlies the right main pulmonary artery.   LUNGS: Minimal prominence pulmonary interstitium but no focal airspace disease. No effusions.   ABDOMEN: No remarkable upper abdominal findings.   BONES: No acute osseous changes.       1.  [place]-Faviola catheter overlies the right main pulmonary artery.     Signed by: Olvin Farias 11/10/2024 5:29 PM Dictation workstation:   [id number]    XR chest 1  view    Result Date: 11/9/2024  Interpreted By:  Olvin Farias and Bartolomei Aguilar Christopher STUDY: XR CHEST 1 VIEW;  11/9/2024 6:09 pm   INDICATION: Signs/Symptoms:PA catheter retracted then advanced to address coiling, now at 62 cm.   COMPARISON: Chest radiograph 11/09/2024   ACCESSION NUMBER(S): [id number]   ORDERING CLINICIAN: KHALIDA UP   FINDINGS: Semi-erect AP radiograph of the chest was provided.   Right internal jugular pulmonary artery catheter with distal tip projecting over the distal right main pulmonary artery. Status post median sternotomy.   CARDIOMEDIASTINAL SILHOUETTE: Stable cardiomegaly versus pericardial effusion.   LUNGS: No pneumothorax. No focal consolidation. No pleural effusion.   ABDOMEN: No remarkable upper abdominal findings.   BONES: No acute osseous changes.       1.  Right IJ pulmonary artery catheter with distal tip projecting over the distal main pulmonary artery. 2. Otherwise, stable cardiomegaly versus pericardial effusion. 3. Mild perihilar interstitial edema and correlate with fluid status.   I personally reviewed the images/study and I agree with the findings as stated by Aleks Merino MD (Radiology Resident).   MACRO: None   Signed by: Olvin Farias 11/9/2024 9:03 PM Dictation workstation:   [id number]    XR chest 1 view    Result Date: 11/9/2024  Interpreted By:  Olvin Farias and Tippareddy Charit STUDY: XR CHEST 1 VIEW; ;  11/9/2024 4:00 pm   INDICATION: Signs/Symptoms:retracted PA catheter 3 cm.   COMPARISON: XR CHEST 1 VIEW;  11/9/2024 3:11 pm   ACCESSION NUMBER(S): [id number]   ORDERING CLINICIAN: KHALIDA UP   FINDINGS: AP radiograph of the chest was provided.   Right IJ [place]-Faviola catheter that appears to coiled onto itself with tip projecting over the expected position of the right ventricle in similar positioning compared to prior examination. Postsurgical changes from median sternotomy. Redemonstration of surgical  staples overlying the aortic knob.   CARDIOMEDIASTINAL SILHOUETTE:  Cardiomediastinal silhouette is stable and enlarged in size with globular configuration.   LUNGS:  No pneumothorax. No focal consolidation or pleural effusion.   ABDOMEN:  No remarkable upper abdominal findings.   BONES:  No acute osseous changes. Status post left shoulder arthroplasty.       1. Right IJ [place]-Faviola catheter appears to be coiled onto itself with tip projecting over the expected position of the right ventricle. Repositioning of the right IJ [place]-Faviola catheter would be recommended with repeat radiographs. 2. Stable cardiomegaly versus pericardial effusion.   I personally reviewed the images/study and I agree with the findings as stated by Ellen Dial MD. This study was interpreted at [place], [place].   MACRO: None   Signed by: Olvin Farias 11/9/2024 9:02 PM Dictation workstation:   [id number]    XR chest 1 view    Result Date: 11/9/2024  Interpreted By:  Olvin Farias and Nakamoto Kent STUDY: XR CHEST 1 VIEW;  11/9/2024 3:11 pm   INDICATION: Signs/Symptoms:post PA catheter placement.   COMPARISON: Chest radiograph 11/06/2024   ACCESSION NUMBER(S): [id number]   ORDERING CLINICIAN: KHALIDA UP   FINDINGS: AP radiograph of the chest was provided.   Right IJ [place]-Faviola catheter that appears to coiled onto itself with tip projecting over the expected position of the right ventricle. Postsurgical changes from median sternotomy.   CARDIOMEDIASTINAL SILHOUETTE: Cardiomediastinal silhouette is stable and enlarged in size with globular configuration.   LUNGS: No pneumothorax. No focal consolidation or pleural effusion..   ABDOMEN: No remarkable upper abdominal findings.   BONES: No acute osseous changes.       1. Right IJ [place]-Faviola catheter appears to be coiled onto itself with tip projecting over the expected position of the right ventricle. Repositioning of the right IJ  [place]-Faviola catheter would be recommended with repeat radiographs. 2. Stable cardiomegaly.   I personally reviewed the images/study and I agree with the findings as stated by Jose Morrow MD. This study was interpreted at [facility], [place], [place].   The above findings were communicated with Dr. Christ Stanford by epic chat at 3:29 pm.   MACRO: Critical Finding:  See findings. Notification was initiated on 11/9/2024 at 3:53 pm by  Jose Morrow.  (**-OCF-**) Instructions:   Signed by: Olvin Farias 11/9/2024 9:01 PM Dictation workstation:   [id number]    XR chest 1 view    Result Date: 11/7/2024  Interpreted By:  Tuan Platt, STUDY: XR CHEST 1 VIEW;  11/6/2024 9:04 am   INDICATION: Signs/Symptoms:SGC.   COMPARISON: Chest radiograph dated 11/6/2024   ACCESSION NUMBER(S): [id number]   ORDERING CLINICIAN: PAYAL KOHLER   FINDINGS: AP radiograph of the chest     The swans Faviola catheter is positioned within the main pulmonary artery. There has been previous median sternotomy.   The heart is enlarged and stable. Pulmonary aeration is similar previous study. No new infiltrates, consolidations or effusions are noted.       1. No acute cardiopulmonary pathology       Signed by: Tuan Platt 11/7/2024 9:24 AM Dictation workstation:   [id number]    Vascular US Ankle Brachial Index (SITA) Without Exercise    Result Date: 11/6/2024            [place] [address]   Tel [phone] and Fax [phone]  Vascular Lab Report [facility] US ANKLE BRACHIAL INDEX (SITA) WITHOUT EXERCISE  Patient Name:     RJ LEIJA      Tracey           [id number] Nely Coffman MD                                        Physician: Study Date:       11/5/2024            Ordering          [id number] LANDON GARCES                                        Physician: MRN/PID:          [id number]             Technologist:     Pinky BETANCOURTT,                                                           [facility] Accession#:       [id number]         Technologist 2: Date of           1955 / 69      Encounter#:       [id number] Birth/Age:        years Gender:           M Admission Status: Inpatient            Location          [facility]                                        Performed:  Diagnosis/ICD: Peripheral vascular disease, unspecified-I73.9 CPT Codes:     66469 Peripheral artery Lower arterial Duplex complete  CONCLUSIONS: Right Lower PVR: No evidence of arterial occlusive disease in the right lower extremity at rest. Multiphasic flow is noted in the right common femoral artery, right posterior tibial artery and right dorsalis pedis artery. Brachial pressure not obtained due to lines. Left Lower PVR: No evidence of arterial occlusive disease in the left lower extremity at rest. Multiphasic flow is noted in the left common femoral artery, left dorsalis pedis artery and left posterior tibial artery.  Imaging & Doppler Findings:  RIGHT Lower PVR                Pressures Ratios Right Posterior Tibial (Ankle) 136 mmHg  1.30 Right Dorsalis Pedis (Ankle)   118 mmHg  1.12   LEFT Lower PVR                Pressures Ratios Left Posterior Tibial (Ankle) 117 mmHg  1.11 Left Dorsalis Pedis (Ankle)   126 mmHg  1.20                      Left Brachial Pressure 105 mmHg   [id number] Nely Coffman MD Electronically signed by [id number] Nely Coffman MD on 11/6/2024 at 7:55:34 PM  ** Final **     XR chest 1 view    Result Date: 11/6/2024  Interpreted By:  Tuan Platt, STUDY: XR CHEST 1 VIEW;  11/5/2024 7:42 am   INDICATION: Signs/Symptoms:SGC.   COMPARISON: Chest radiograph dated 11/5/2024   ACCESSION NUMBER(S): [id number]   ORDERING CLINICIAN: PAYAL KOHLER   FINDINGS: AP radiograph of the chest Medical devices: The swans Faviola catheter is positioned within the right main pulmonary artery similar previous study. Sternal sutures are intact.   The heart is moderately severely enlarged. Pulmonary  aeration is similar to previous study.   The left shoulder arthroplasty is partially visualized.       1. No evidence of acute cardiopulmonary process. 2. No significant changes noted in comparison to the prior study.       Signed by: Tuan Platt 11/6/2024 8:03 AM Dictation workstation:   [id number]    Transthoracic Echo (TTE) Limited    Result Date: 11/5/2024   [facility], [address] [place] [address]                Tel [phone] and Fax [phone] TRANSTHORACIC ECHOCARDIOGRAM REPORT  Patient Name:       RJ Webb Physician:    [id number] Mira Mayer MD Study Date:         11/5/2024           Ordering Provider:    [id number] PAYAL KOHLER MRN/PID:            [id number]            Fellow: Accession#:         [id number]        Nurse: Date of Birth/Age:  1955 / 69     Sonographer:          Maureen estrada                                     RDCS Gender assigned at  M                   Additional Staff: Birth: Height:             182.88 cm           Admit Date:           10/24/2024 Weight:             93.44 kg            Admission Status:     Inpatient - STAT BSA / BMI:          2.16 m2 / 27.94     Encounter#:           [id number]                     kg/m2 Blood Pressure:     106/72 mmHg         Department Location:  [address] Study Type:    TRANSTHORACIC ECHO (TTE) LIMITED Diagnosis/ICD: Heart failure, unspecified-I50.9 Indication:    RV assessment CPT Code:      Echo Limited-54347; Doppler Limited-54068; Color Doppler-94713 Patient History: Pertinent History: Negative bubble peformed on prior echo; Known 15-20% EF;                    Cardiogenic shock; Acute on chronic heart failure with                    reduced EF and diastolic dysfunction; ICM; Hx of chronic                    A-Fib; VIV. Study Detail: The  following Echo studies were performed: 2D, M-Mode, Doppler and               color flow. Technically challenging study due to body habitus.               Definity used as a contrast agent for endocardial border               definition. Total contrast used for this procedure was 2 mL via IV               push.  PHYSICIAN INTERPRETATION: Left Ventricle: Left ventricular ejection fraction is severely decreased, calculated by Loera's biplane at 18%. There is severely increased eccentric left ventricular hypertrophy. There is global hypokinesis of the left ventricle with minor regional variations. The left ventricular cavity size is severely dilated. There is normal septal and normal posterior left ventricular wall thickness. Left ventricular diastolic filling was not assessed. There is no definite left ventricular thrombus visualized. Left Atrium: The left atrium is severely dilated. Right Ventricle: The right ventricle is severely enlarged. There is severely reduced right ventricular systolic function. A device is visualized in the right ventricle. Right Atrium: The right atrium is severely dilated. There is a device visualized in the right atrium. Aortic Valve: The aortic valve was not well visualized. There is mild aortic valve cusp calcification. Aortic valve regurgitation was not assessed. Mitral Valve: The mitral valve is normal in structure. Mitral valve regurgitation was not assessed. Tricuspid Valve: The tricuspid valve is structurally normal. There is mild tricuspid regurgitation. The Doppler estimated RVSP is mildly elevated at 42.9 mmHg. Pulmonic Valve: The pulmonic valve was not assessed. Pulmonic valve regurgitation was not assessed. Pericardium: Trivial pericardial effusion. Aorta: The aortic root is abnormal. The aortic root appears mildly dilated and measures 4.20 cm. There is mild dilatation of the ascending aorta. There is mild dilatation of the aortic root. Systemic Veins: The inferior vena cava  appears dilated, with IVC inspiratory collapse less than 50%. In comparison to the previous echocardiogram(s): Compared with the prior exam from 10/25/2024, there was already a severely dilated LV with severely reduced function. The RV remains dilated with severely reduced function. Valvular function not assessed on today's exam.  CONCLUSIONS:  1. Left ventricular ejection fraction is severely decreased, calculated by Loera's biplane at 18%.  2. There is global hypokinesis of the left ventricle with minor regional variations.  3. Left ventricular cavity size is severely dilated.  4. No left ventricular thrombus visualized.  5. There is severely increased eccentric left ventricular hypertrophy.  6. There is severely reduced right ventricular systolic function.  7. Severely enlarged right ventricle.  8. The left atrium is severely dilated.  9. The right atrium is severely dilated. 10. Mitral valve regurgitation was not assessed. 11. Mildly elevated right ventricular systolic pressure. 12. Mildly dilated aortic root. 13. Compared with the prior exam from 10/25/2024, there was already a severely dilated LV with severely reduced function. The RV remains dilated with severely reduced function. Valvular function not assessed on today's exam. QUANTITATIVE DATA SUMMARY:  2D MEASUREMENTS:           Normal Ranges: Ao Root d:       4.20 cm   (2.0-3.7cm) IVSd:            0.87 cm   (0.6-1.1cm) LVPWd:           0.78 cm   (0.6-1.1cm) LVIDd:           8.21 cm   (3.9-5.9cm) LVIDs:           8.04 cm LV Mass Index:   157 g/m2 LVEDV Index:     138 ml/m2 LV % FS          2.0 %  LA VOLUME:                  Normal Ranges: LA Volume Index: 59.9 ml/m2  RA VOLUME BY A/L METHOD:          Normal Ranges: RA Area A4C:             36.8 cm2  AORTA MEASUREMENTS:         Normal Ranges: Ao Sinus, d:        4.20 cm (2.1-3.5cm) Asc Ao, d:          4.20 cm (2.1-3.4cm)  LV SYSTOLIC FUNCTION BY 2D PLANIMETRY (MOD):                      Normal Ranges:  EF-A4C View:    14 % (>=55%) EF-A2C View:    21 % EF-Biplane:     18 % LV EF Reported: 18 %  AORTIC VALVE:          Normal Ranges: LVOT Diameter: 2.31 cm (1.8-2.4cm)  RIGHT VENTRICLE: RV Basal 6.10 cm RV Mid   4.10 cm RV Major 9.1 cm TAPSE:   9.0 mm RV s'    0.06 m/s  TRICUSPID VALVE/RVSP:          Normal Ranges: Peak TR Velocity:     2.64 m/s RV Syst Pressure:     43 mmHg  (< 30mmHg) IVC Diam:             3.00 cm  AORTA: Asc Ao Diam 4.23 cm  [id number] Mira Mayer MD Electronically signed on 11/5/2024 at 2:36:55 PM  ** Final **     CT chest abdomen pelvis wo IV contrast    Result Date: 11/5/2024  Interpreted By:  Clay Weiss,  and Jose Manuel Cook STUDY: CT CHEST ABDOMEN PELVIS WO CONTRAST;  11/4/2024 5:45 pm   INDICATION: Signs/Symptoms:assess for aneurysm prior to transplant evaluation.   COMPARISON: CT chest 10/31/2024 and CT lower 11/02/2024   ACCESSION NUMBER(S): [id number]   ORDERING CLINICIAN: PAYAL KOHLER   TECHNIQUE: Contiguous axial images of the chest, abdomen, and pelvis were obtained in the absence of intravenous contrast. Coronal and sagittal reformatted images were reconstructed from the axial data.   FINDINGS: CT CHEST:   MEDIASTINUM AND LYMPH NODES: The visualized thyroid is within normal limits. No enlarged intrathoracic or axillary lymph nodes by imaging criteria, however evaluation is limited in the absence of contrast. No pneumomediastinum. The esophagus appears within normal limits.   HEART: The heart is markedly enlarged. Extensive coronary artery calcifications. No significant pericardial effusion.   VESSELS: There is dilation of the proximal ascending aorta measuring up to 4.4 cm in diameter. Moderate calcified atherosclerosis of the thoracic aorta. The main pulmonary artery is mildly prominent. [place]-Faviola catheter tips terminate at the distal right pulmonary artery.   LUNG, AIRWAYS, PLEURA: The trachea and proximal mainstem bronchi are patent. No consolidation, pleural  effusion, or pneumothorax. Moderate dependent and bandlike atelectasis bilaterally.   CHEST WALL SOFT TISSUES: No discernible abnormality.   OSSEOUS STRUCTURES: No acute osseous abnormality. Several remote appearing left-sided rib fractures. Status post median sternotomy.     CT ABDOMEN/PELVIS:   LIVER: Poor visualization of the previously described cystic lesion near the medial liver tip, better evaluated on 11/02/2024 exam. The liver is mildly enlarged measuring 19.6 cm in craniocaudal dimension.   BILE DUCTS: No significant intrahepatic or extrahepatic dilatation.   GALLBLADDER: Cholelithiasis without evidence of acute cholecystitis.   PANCREAS: No significant abnormality.   SPLEEN: Splenomegaly measuring 18.8 cm in anteroposterior dimension.   ADRENALS: No significant abnormality.   KIDNEYS, URETERS, BLADDER: Cyst of the right kidney measures up to 3.9 cm. No hydronephrosis or nephrolithiasis. High-density lesion of the left anterior kidney measuring 1.1 cm likely represents a hemorrhagic cyst. The urinary bladder is unremarkable.   REPRODUCTIVE ORGANS: Prostate is enlarged measuring 5.3 cm in transverse axis.   GI: No bowel obstruction. No significant bowel wall thickening or adjacent inflammatory change. Normal appendix.   VESSELS: Moderate to advanced vascular calcifications of the abdominal aorta. There is a chronic appearing and partially calcified focal dissection of the infrarenal abdominal aorta as seen on series 2, image 130. Mild multifocal fusiform aneurysmal dilatation of the abdominal aorta measuring up to a maximum of 2.6 cm in diameter at the level of the dissection flap.   PERITONEUM/RETROPERITONEUM: No ascites, free air, or fluid collection.   LYMPH NODES: No enlarged lymph nodes.   ABDOMINAL WALL: No significant abnormality.   OSSEOUS STRUCTURES: No acute osseous abnormality. Moderate degenerative changes of the thoracolumbar spine. Grade 1 anterolisthesis of L5 on S1 with chronic bilateral  pars defects.       1. No acute abnormality within the chest, abdomen, or pelvis. 2. 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 3. Aneurysm of the proximal ascending aorta measuring up to 4.4 cm and multifocal fusiform dilation of the abdominal aorta as above. 4. Marked cardiomegaly, unchanged from the prior exam. 5. Hepatosplenomegaly.   I personally reviewed the image(s)/study and resident interpretation as stated by Dr. Joyce Richard MD. I agree with the findings as stated. This study was interpreted at [facility], [place], [place].   Signed by: Clay Weiss 11/5/2024 9:31 AM Dictation workstation:   [id number]    Vascular US aorta iliac duplex limited    Result Date: 11/4/2024            [place] [address]   Tel [phone] and Fax [phone]  Vascular Lab Report VASC US AORTA ILIAC DUPLEX LIMITED  Patient Name:      RJ LEIJA       Reading Physician:  [id number] Yoandy Boo MD Study Date:        11/4/2024             Ordering Physician: [id number] GARCES MRN/PID:           [id number]              Technologist:       Octavio Silver T Accession#:        [id number]          Technologist 2: Date of Birth/Age: 1955 / 69 years Encounter#:         [id number] Gender:            M Admission Status:  Inpatient             Location Performed: [facility]  Diagnosis/ICD: Abdominal aortic aneurysm, without rupture, unspecified-I71.40;                Encounter for other preprocedural examination-Z01.818 CPT Codes:     51880 Duplex Aorta/IVC/Iliac/Bypass Graft  CONCLUSIONS: Aorta/Common Iliac Arteries/IVC: The abdominal aorta and bilateral common iliac arteries demonstrate  no evidence of aneurysm. Moderate atherosclerotic plaque noted throughout the abdominal aorta.  Imaging & Doppler Findings:   AORTA     AP       PSV Proximal 1.63 cm 57.0 cm/s   Mid    1.90 cm 99.0 cm/s  Distal  1.50 cm 83.0 cm/s    RIGHT       AP        PSV RICHARD Proximal 1.01 cm 114.00 cm/s     LEFT       AP       PSV RICHARD Proximal 1.09 cm 97.00 cm/s  [id number] Yoandy Boo MD Electronically signed by [id number] Yoandy Boo MD on 11/4/2024 at 9:22:19 PM  ** Final **     Vascular US carotid artery duplex bilateral    Result Date: 11/4/2024            [place] [address]   Tel [phone] and Fax [phone]  [facility]  Patient Name:      RJ LEIJA       Reading Physician:  [id number] Yoandy Boo MD Study Date:        11/4/2024             Ordering Physician: [id number] GARCES MRN/PID:           [id number]              Technologist:       Octavio Silver RVT Accession#:        [id number]          Technologist 2: Date of Birth/Age: 1955 / 69 years Encounter#:         [id number] Gender:            M Admission Status:  Inpatient             Location Performed: [facility]  Diagnosis/ICD: Other specified symptoms and signs involving the circulatory and                respiratory systems-R09.89; Encounter for other preprocedural                examination-Z01.818 CPT Codes:     51748 Cerebrovascular Carotid Duplex scan complete  CONCLUSIONS: Right Carotid: Findings are consistent with less than 50% stenosis of the right proximal internal carotid artery. Right external carotid artery appears patent with no evidence of stenosis. The right vertebral artery is patent with antegrade flow. No evidence of  hemodynamically significant stenosis in the right subclavian artery. Left Carotid: Findings are consistent with less than 50% stenosis of the left proximal internal carotid artery. Left external carotid artery appears patent with no evidence of stenosis. The mid left vertebral artery demonstrates no flow. The origin of the vertebral artery appears patent. No evidence of hemodynamically significant stenosis in the left subclavian artery.  Imaging & Doppler Findings: Right Plaque Morph: The proximal right internal carotid artery demonstrates heterogenous and smooth plaque. Left Plaque Morph: The proximal left internal carotid artery demonstrates heterogenous and smooth plaque.   Right                       Left   PSV     EDV                PSV      EDV 57 cm/s           CCA P    95 cm/s 90 cm/s           CCA D    67 cm/s 38 cm/s 15 cm/s   ICA P    58 cm/s  22 cm/s 62 cm/s 20 cm/s   ICA M    70 cm/s  24 cm/s 58 cm/s 18 cm/s   ICA D    56 cm/s  22 cm/s 56 cm/s            ECA     61 cm/s 44 cm/s 13 cm/s Vertebral   0 cm/s 65 cm/s         Subclavian 136 cm/s               Right Left ICA/CCA Ratio  0.4  0.9   [id number] Yoandy Boo MD Electronically signed by [id number] Yoandy Boo MD on 11/4/2024 at 8:42:58 PM  ** Final **     Colonoscopy Diagnostic    Result Date: 11/4/2024  Table formatting from the original result was not included. Impression One Krystal Is polyp measuring 5-9 mm in the ascending colon; performed cold snare removal. Hemorrhoids. Rest of exam was normal. Findings One sessile Krystal Is polyp measuring 5-9 mm in the ascending colon; performed cold snare with complete en bloc removal and retrieved specimen. The specimen was placed in Jar 1. Hemorrhoids. Rest of exam was normal. Recommendation Await pathology results. Repeat colonoscopy in 7 years, due: 11/3/2031 Personal history of colon polyps - Defer resumption of diet and medications to the patient's primary team. No restrictions from a  post-procedural standpoint. - Observe patient's clinical course following today's procedure with therapeutic intervention. - Watch for bleeding, perforation, and infection. - The findings and recommendations were discussed with the referring physicians. - The signs and symptoms of potential delayed complications were discussed with the patient. - Patient has a contact number available for emergencies. - Follow up with Raimundo Dickey and James for ongoing inpatient gastroenterology consultation. Indication Acute on chronic heart failure with reduced ejection fraction and diastolic dysfunction Staff Staff Role Pedro Dickey MD                Attending Honey Ramirez MD             Fellow Medications Totals unavailable because the procedure time range is not set Preprocedure A history and physical has been performed, and patient medication allergies have been reviewed. The patient's tolerance of previous anesthesia has been reviewed. The risks and benefits of the procedure and the sedation options and risks were discussed with the patient. All questions were answered and informed consent obtained. Details of the Procedure The patient underwent moderate sedation, which was administered by the procedural nurse. The patient's blood pressure, ECG, ETCO2, heart rate, level of consciousness, oxygen and respirations were monitored throughout the procedure. A digital rectal exam was performed. A perianal exam was performed. The scope was introduced through the anus and advanced to the terminal ileum. Retroflexion was performed in the rectum. The quality of bowel preparation was evaluated using the [place] Bowel Preparation Scale with scores of: right colon = 3, transverse colon = 3, left colon = 3. The total BBPS score was 9. Bowel prep was adequate. The patient's estimated blood loss was minimal (<5 mL). The procedure was not difficult. The patient tolerated the procedure well. There were no apparent adverse events.  Events Procedure Events Event Event Time Specimens No specimens collected Procedure Location [facility] [address] [facility] [address] [phone] Referring Provider Jareth Vargas MD Procedure Provider Honey Ramirez MD     US renal complete    Result Date: 11/4/2024  Interpreted By:  Regulo Martel and Beyersdorf Conner STUDY: US RENAL COMPLETE;  11/1/2024 3:44 pm   INDICATION: Signs/Symptoms:Left renal cyst.     COMPARISON: None.   ACCESSION NUMBER(S): [id number]   ORDERING CLINICIAN: DINAH GONZALEZ   TECHNIQUE: Multiple images of the kidneys were obtained.   FINDINGS: RIGHT KIDNEY: The right kidney measures 12.4 cm in length. The renal cortical echogenicity and thickness are within normal limits. No hydronephrosis is present; no evidence of nephrolithiasis. There is a heterogeneous hypoechoic lesion without internal flow in the right kidney measuring 3.9 X 3.0 X 2.8 cm.   LEFT KIDNEY: The left kidney measures 10.3 cm in length. The renal cortical echogenicity and thickness are within normal limits. No hydronephrosis is present; no evidence of nephrolithiasis.   BLADDER: Mild thickening of the urinary bladder wall.   Homogeneous, hyperechoic lesion within the right hepatic lobe is incompletely evaluated.       1. Heterogeneous appearing cyst in the right kidney measures up to 3.9 cm, potentially a hemorrhagic cyst. Otherwise unremarkable sonographic appearance of the kidneys. 2. Homogeneous hyperechoic lesion within the right hepatic lobe is incompletely evaluated. This may represent a hemangioma. Consider further evaluation with dedicated multiphase contrast enhanced CT or MRI of the liver.     I personally reviewed the image(s)/study and resident interpretation. I agree with the findings as stated by resident Rich Mccurdy. Data analyzed and images interpreted at [facility],  [place], [place].   MACRO: None     Signed by: Regulo Martel 11/4/2024 10:46 AM Dictation workstation:   [id number]    XR chest 1 view    Result Date: 11/4/2024  Interpreted By:  Tuan Platt, STUDY: XR CHEST 1 VIEW;  11/4/2024 9:08 am   INDICATION: Signs/Symptoms:SGC placement.   COMPARISON: Chest radiograph dated 11/3/2024; CT chest 10/31/2024   ACCESSION NUMBER(S): [id number]   ORDERING CLINICIAN: DINAH GONZALEZ   FINDINGS: AP radiograph of the chest     Medical devices: The swans Faviola catheter is positioned within the right main pulmonary artery. Sternal sutures are intact.   The heart is moderately severely enlarged. Pulmonary aeration is similar previous study. No pneumothorax is noted.   The left shoulder arthroplasty is partially visualized.       1. No evidence of acute cardiopulmonary process. 2. No significant change from previous study.       Signed by: Tuan Platt 11/4/2024 9:38 AM Dictation workstation:   [id number]    CT liver w IV contrast    Result Date: 11/4/2024  Interpreted By:  Regulo Martel, STUDY: CT LIVER W IV CONTRAST; 11/2/2024 11:38 am   INDICATION: Signs/Symptoms:liver lesion.   COMPARISON: None   ACCESSION NUMBER(S): [id number]   ORDERING CLINICIAN: DINAH GONZALEZ   TECHNIQUE: Multiphase CT of the abdomen was performed utilizing intravenous contrast including arterial, portal venous and delayed postcontrast imaging. Coronal and sagittal reconstructions were also created. Intravenous contrast: 75 mL of Omnipaque 350   FINDINGS: Some images are mildly degraded by motion.   INCLUDED LOWER CHEST: There is mild dependent linear opacities, likely atelectasis or postinflammatory scarring. No consolidation or effusion. The heart is enlarged and a right-sided device lead is partially included.   LIVER: 4.4 cm ovoid heterogeneous hypodense lesion inferiorly within hepatic segments V/VI does not demonstrate change in appearance or attenuation on serial postcontrast imaging and is  felt most likely to be a complex cyst. A couple additional too small to characterize hypodense lesions are also likely cysts. No solid enhancing lesion is evident. No morphologic evidence of cirrhosis.   BILE DUCTS: No biliary dilation.   GALLBLADDER: Cholelithiasis is present.   PANCREAS: A 1.6 cm ovoid cystic lesion along the superior margin of the pancreatic body does not demonstrate internal complexity or enhancement. A couple additional too small to characterize hypodense foci are also noted in the pancreas, also likely cystic lesions. The main duct is not dilated.   SPLEEN: The spleen is enlarged up to 20 cm without focal abnormality evident.   ADRENAL GLANDS: Unremarkable.   KIDNEYS, URETERS AND BLADDER: A 1.4 cm hypodense lesion along the lateral inferior pole of the right kidney does not demonstrate change in attenuation over serial post-contrast imaging, likely a complex cystic lesion such as a hemorrhagic cyst. Additional simple appearing cystic lesions and too small to characterize hypodense lesions, also likely simple cysts, are present. No solid-appearing enhancing lesion is evident.   INCLUDED BOWEL: No evidence of obstruction or inflammation.   VESSELS: Calcific atherosclerosis is present. The abdominal aorta is not aneurysmal.   PERITONEUM/RETROPERITONEUM/LYMPH NODES: No free fluid or free air. No adenopathy is evident.   MUSCULOSKELETAL: No destructive osseous lesion is evident. The patient is status post sternotomy. Degenerative changes are present in the lower lumbar spine.       1. 4.4 cm ovoid heterogeneous hypodense lesion inferiorly within hepatic segments V/VI does not demonstrate change in appearance or attenuation on serial postcontrast imaging and is felt most likely to be a complex cyst. A couple additional too small to characterize hypodense lesions are also likely cysts. No solid enhancing lesion is evident. No morphologic evidence of cirrhosis. 2. Splenomegaly. 3. Incidental findings  include pancreatic and renal simple and complex attenuation lesions as well, for which initial follow-up by contrast MRI is recommended on a routine outpatient basis.   Signed by: Regulo Martel 11/4/2024 7:49 AM Dictation workstation:   [id number]    Pulmonary function testing    Result Date: 11/3/2024  Normal spirometry. Lung volumes are normal. The DLCO corrected for hemoglobin is normal.    XR chest 1 view    Result Date: 11/3/2024  Interpreted By:  Frank Connolly, STUDY: XR CHEST 1 VIEW;  11/3/2024 9:55 am   INDICATION: Signs/Symptoms:SGC placement.     COMPARISON: Exam dated 11/02/2024   ACCESSION NUMBER(S): [id number]   ORDERING CLINICIAN: DINAH GONZALEZ   FINDINGS: AP radiograph of the chest was provided. Exam is limited by rightward patient rotation. There is a partially visualized left shoulder arthroplasty.   Status post median sternotomy. Right IJ approach [place]-Faviola catheter tip projects over the right pulmonary artery.   CARDIOMEDIASTINAL SILHOUETTE: Stable diffuse enlargement of the cardiac silhouette.   LUNGS: Lungs are clear.   ABDOMEN: No remarkable upper abdominal findings.   BONES: No acute osseous changes.       1.  No evidence of acute pulmonary process. 2. Stable enlargement of the cardiac silhouette. 3. [place]-Faviola catheter tip projects over the right pulmonary artery.       MACRO: None   Signed by: Frank Connolly 11/3/2024 1:47 PM Dictation workstation:   [id number]    XR chest 1 view    Result Date: 11/2/2024  Interpreted By:  Frank Connolly, STUDY: XR CHEST 1 VIEW;  11/2/2024 7:57 am   INDICATION: Signs/Symptoms:SGC placement.     COMPARISON: Exam dated 11/01/2024   ACCESSION NUMBER(S): [id number]   ORDERING CLINICIAN: DINAH GONZALEZ   FINDINGS: AP radiograph of the chest was provided.   Right IJ approach [place]-Faviola catheter with tip projecting over the right pulmonary artery. Status post median sternotomy.Partially visualized left shoulder arthroplasty hardware.    CARDIOMEDIASTINAL SILHOUETTE: Stable enlargement of the cardiac silhouette.   LUNGS: Lungs are clear.   ABDOMEN: No remarkable upper abdominal findings.   BONES: No acute osseous changes.       1.  Right IJ [place]-Faviola catheter tip projects over the right pulmonary artery. 2. Stable enlargement of the cardiac silhouette. 3. No radiographic evidence of acute pulmonary process.       MACRO: None   Signed by: Frank Connolly 11/2/2024 9:21 AM Dictation workstation:   [id number]    XR chest 1 view    Result Date: 11/2/2024  Interpreted By:  Frank Connolly, STUDY: XR CHEST 1 VIEW;  11/1/2024 6:38 am   INDICATION: Signs/Symptoms:PA catheter postition assessment.     COMPARISON: Exam dated 10/31/2024   ACCESSION NUMBER(S): [id number]   ORDERING CLINICIAN: LANDON GARCES   FINDINGS: AP radiograph of the chest was provided.   Right IJ approach [place]-Faviola catheter with tip projecting over the right pulmonary artery. Status post median sternotomy. Partially visualized left shoulder arthroplasty hardware.   CARDIOMEDIASTINAL SILHOUETTE: Stable enlargement of the cardiac silhouette.   LUNGS: Lungs are clear.   ABDOMEN: No remarkable upper abdominal findings.   BONES: No acute osseous changes.       1.  [place]-Faviola catheter tip projects over the right pulmonary artery. 2. No radiographic evidence of acute pulmonary process. 3. Stable enlargement of the cardiac silhouette.       MACRO: None   Signed by: Frank Connolly 11/2/2024 9:20 AM Dictation workstation:   [id number]    XR chest 2 views    Result Date: 11/1/2024  Interpreted By:  Frank Connolly, STUDY: XR CHEST 2 VIEWS;  10/31/2024 6:01 pm   INDICATION: Signs/Symptoms:LVAD / OHT evaluation.     COMPARISON: Exam dated earlier same day   ACCESSION NUMBER(S): [id number]   ORDERING CLINICIAN: LANDON GARCES   FINDINGS: PA and lateral radiographs of the chest were provided.  Additional PA dual energy images were also provided.   Right subclavian approach [place]-Faviola catheter with tip  projecting over the distal right pulmonary artery.   CARDIOMEDIASTINAL SILHOUETTE: The cardiac silhouette is enlarged. Remaining mediastinal contours within normal limits.   LUNGS: Lungs are clear.   ABDOMEN: No remarkable upper abdominal findings.   BONES: No acute osseous changes.       1.  No evidence of acute cardiopulmonary process. 2. Stable enlargement of the cardiac silhouette. 3. [place]-Faviola catheter tip projects over the distal right pulmonary artery.       MACRO: None   Signed by: Frank Connolly 11/1/2024 4:38 PM Dictation workstation:   [id number]    XR chest 1 view    Result Date: 11/1/2024  Interpreted By:  Frank Connolly, STUDY: XR CHEST 1 VIEW;  10/31/2024 8:03 am   INDICATION: Signs/Symptoms:PA catheter assessment.     COMPARISON: Exam dated 10/30/2024   ACCESSION NUMBER(S): [id number]   ORDERING CLINICIAN: LANDON GARCES   FINDINGS: AP radiograph of the chest was provided.   Status post median sternotomy. Right IJ approach [place]-Faviola catheter with the tip projecting over the distal right pulmonary artery.   CARDIOMEDIASTINAL SILHOUETTE: Stable enlargement of the cardiac silhouette.   LUNGS: Lungs are clear.   ABDOMEN: No remarkable upper abdominal findings.   BONES: No acute osseous changes.       1.  [place]-Faviola catheter tip projects over the distal right pulmonary artery. 2. Stable enlargement of the cardiac silhouette.       MACRO: None   Signed by: Frank Connolly 11/1/2024 4:36 PM Dictation workstation:   [id number]    CT chest wo IV contrast    Result Date: 11/1/2024  Interpreted By:  Farnk Connolly, STUDY: CT CHEST WO IV CONTRAST;  10/31/2024 12:37 pm   INDICATION: Signs/Symptoms:LVAD / OHT evaluation.     COMPARISON: None.   ACCESSION NUMBER(S): [id number]   ORDERING CLINICIAN: LANDON GARCES   TECHNIQUE: Helical data acquisition of the chest was obtained  without IV contrast material.  Images were reformatted in axial, coronal, and sagittal planes.   FINDINGS: LUNGS AND AIRWAYS: The trachea  and central airways are patent. No endobronchial lesion. Scattered secretions in the trachea. Nonspecific areas of traction bronchiolectasis in the lower lobes which is likely due to scarring.   Areas of linear parenchymal and ground-glass opacity in the dependent lungs likely represents atelectasis. No pleural effusion or pneumothorax. No lobar consolidation. Scattered punctate calcified granulomas. No suspicious pulmonary nodules.   MEDIASTINUM AND BEAU, LOWER NECK AND AXILLA: The visualized thyroid gland is within normal limits.   No evidence of thoracic lymphadenopathy by CT criteria.   Small hiatal hernia. The esophagus is otherwise within normal limits.   HEART AND VESSELS: The ascending aorta is dilated measuring 4.3 cm. There are moderate calcifications of the aorta and branch vessels. 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   Right IJ pulmonary artery catheter tip is in the right pulmonary artery. The main pulmonary artery is dilated measuring 3.4 cm.   Severe coronary artery calcifications. Left circumflex coronary stent is present. The study is not optimized for evaluation of coronary arteries.   Moderate cardiomegaly with multichamber involvement. Moderate aortic valve calcifications.   No evidence of pericardial effusion.   UPPER ABDOMEN: There is a partially visualized, incompletely characterized hypodense lesion in the inferior right hepatic lobe measuring up to 4.8 cm. Subcentimeter hypodensity in the left hepatic lobe is incompletely characterized but most likely represents a simple cyst or hemangioma. The spleen is enlarged measuring 18.1 cm. 3.6 cm likely right renal cyst is noted.   CHEST WALL AND OSSEOUS STRUCTURES: There are no suspicious osseous lesions. Multilevel degenerative changes are present. Status post left shoulder hemiarthroplasty. Status post median sternotomy.       1.  Dilated ascending aorta measuring up to 4.3 cm. Recommend continued imaging surveillance as per clinical guidelines.  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 Moderate calcifications of the aorta and branch vessels. 2. Severe coronary artery calcifications. 3. Moderate cardiomegaly. 4. Moderate aortic valve calcifications and correlate with concern for aortic valve stenosis. 5. Incompletely characterized lesion in the inferior right hepatic lobe measuring up to 4.8 cm. Recommend initial evaluation with liver ultrasound. 6. [place]-Faviola catheter tip in the right pulmonary artery. The main pulmonary artery is mildly dilated and correlate with concern for pulmonary hypertension.   MACRO: Critical Finding:  See findings. Notification was initiated on 11/1/2024 at 1:18 pm by  Frank Connolly.  (**-OCF-**) Instructions:   Signed by: Frank Connolly 11/1/2024 1:18 PM Dictation workstation:   [id number]    XR panorex    Result Date: 11/1/2024  Interpreted By:  Rosita Beltran, STUDY: XR PANOREX   INDICATION: Signs/Symptoms:LVAD / OHT evaluation   COMPARISON: None.   ACCESSION NUMBER(S): [id number]   ORDERING CLINICIAN: LANDON GARCES   FINDINGS: Single panoramic radiograph of the mandible was obtained. Edentulous patient.   Punctate radiopaque densities projecting in the right aspect of the mouth of uncertain clinical significance and etiology. Bony defects projecting in the inferior aspect of the bilateral mandibular angles likely artifactual from technique.   Paranasal sinuses are clear. There is no evidence of mandibular dislocation or fracture.       Edentulous patient.   Bony defects projecting in the inferior aspect of the bilateral mandibular angles likely artifactual from technique.   Signed by: Rosita Beltran 11/1/2024 10:19 AM Dictation workstation:   [id number]    XR chest 1 view    Result Date: 10/31/2024  Interpreted By:  Olvin Farias, STUDY: XR CHEST 1 VIEW; 10/30/2024 7:54 am   INDICATION: Signs/Symptoms:ADHF.   COMPARISON: 10/29/2024.   ACCESSION NUMBER(S): [id number]   ORDERING CLINICIAN: EULA WRIGHT   FINDINGS:      CARDIOMEDIASTINAL SILHOUETTE: Cardiomegaly versus pericardial effusion. Patient is status post median sternotomy. [place]-Faviola catheter overlies the right main pulmonary artery. LUNGS: Lungs are clear of focal airspace disease.   ABDOMEN: No remarkable upper abdominal findings.   BONES: No acute osseous changes. Patient is status post left TSA.       1.  Stable positioning of [place]-Faviola catheter with tip overlying the right main pulmonary artery.     Signed by: Olvin Farias 10/31/2024 9:08 AM Dictation workstation:   [id number]       LDA:  Ventricular Assist Device HeartMate 3 (Active)   Placement Date: 11/12/24   Placed by: Suhas Hill  Site Location: Abdomen left  VAD Type: Left ventricular assist device  VAD Brand: HeartMate 3   Number of days: 17       External Urinary Catheter Male (Active)   Placement Date/Time: 11/27/24 2051   External Catheter Type: Male   Number of days: 1     NUTRITION: Adult diet Regular  EMERGENCY CONTACT: Extended Emergency Contact Information  Primary Emergency Contact: Tanvi Meraz  Home Phone: [phone]  Relation: Spouse  Secondary Emergency Contact: Subhash Meraz  Mobile Phone: [phone]  Relation: Son  CODE STATUS: Full Code  DISPO: Discharge Planning  Living Arrangements: Spouse/significant other  Support Systems: Spouse/significant other  Assistance Needed: none  Type of Residence: Private residence  Number of Stairs to Enter Residence: 2  Number of Stairs Within Residence: 12  Do you have animals or pets at home?: Yes  Type of Animals or Pets: dog  Who is requesting discharge planning?: Provider  Home or Post Acute Services: None  Expected Discharge Disposition: Inpatient Rehab Facility (IRF)  Does the patient need discharge transport arranged?: Yes  RoundTrip coordination needed?: Yes  Has discharge transport been arranged?: Yes  What day is the transport expected?: 11/27/24  What time is the transport expected?: 1136  AMPAC: Daily Activity - Total Score:  22    FOLLOWUP: No future appointments.

## 2024-11-29 NOTE — CARE PLAN
Called in. The patient's goals for the shift include      The clinical goals for the shift include HDS    Over the shift, the patient did make progress toward the following goals. Barriers to progression include none. Recommendations to address these barriers include n/a.  Pt maps in 80's , INR 2.1

## 2024-11-30 LAB
ALBUMIN SERPL BCP-MCNC: 3.5 G/DL (ref 3.4–5)
ANION GAP SERPL CALC-SCNC: 12 MMOL/L (ref 10–20)
APTT PPP: 44 SECONDS (ref 27–38)
BUN SERPL-MCNC: 17 MG/DL (ref 6–23)
CALCIUM SERPL-MCNC: 9.3 MG/DL (ref 8.6–10.6)
CHLORIDE SERPL-SCNC: 105 MMOL/L (ref 98–107)
CO2 SERPL-SCNC: 26 MMOL/L (ref 21–32)
CREAT SERPL-MCNC: 1.2 MG/DL (ref 0.5–1.3)
EGFRCR SERPLBLD CKD-EPI 2021: 65 ML/MIN/1.73M*2
ERYTHROCYTE [DISTWIDTH] IN BLOOD BY AUTOMATED COUNT: 17 % (ref 11.5–14.5)
GLUCOSE SERPL-MCNC: 97 MG/DL (ref 74–99)
HCT VFR BLD AUTO: 34.6 % (ref 41–52)
HGB BLD-MCNC: 11 G/DL (ref 13.5–17.5)
INR PPP: 2.1 (ref 0.9–1.1)
LDH SERPL L TO P-CCNC: 176 U/L (ref 84–246)
MAGNESIUM SERPL-MCNC: 2.02 MG/DL (ref 1.6–2.4)
MCH RBC QN AUTO: 26.9 PG (ref 26–34)
MCHC RBC AUTO-ENTMCNC: 31.8 G/DL (ref 32–36)
MCV RBC AUTO: 85 FL (ref 80–100)
NRBC BLD-RTO: 0 /100 WBCS (ref 0–0)
PHOSPHATE SERPL-MCNC: 4.4 MG/DL (ref 2.5–4.9)
PLATELET # BLD AUTO: 381 X10*3/UL (ref 150–450)
POTASSIUM SERPL-SCNC: 4.3 MMOL/L (ref 3.5–5.3)
PROTHROMBIN TIME: 23.4 SECONDS (ref 9.8–12.8)
RBC # BLD AUTO: 4.09 X10*6/UL (ref 4.5–5.9)
SODIUM SERPL-SCNC: 139 MMOL/L (ref 136–145)
WBC # BLD AUTO: 7.9 X10*3/UL (ref 4.4–11.3)

## 2024-11-30 PROCEDURE — 99233 SBSQ HOSP IP/OBS HIGH 50: CPT | Performed by: STUDENT IN AN ORGANIZED HEALTH CARE EDUCATION/TRAINING PROGRAM

## 2024-11-30 PROCEDURE — 83735 ASSAY OF MAGNESIUM: CPT | Performed by: NURSE PRACTITIONER

## 2024-11-30 PROCEDURE — 85610 PROTHROMBIN TIME: CPT | Performed by: NURSE PRACTITIONER

## 2024-11-30 PROCEDURE — 80069 RENAL FUNCTION PANEL: CPT | Performed by: NURSE PRACTITIONER

## 2024-11-30 PROCEDURE — 2500000001 HC RX 250 WO HCPCS SELF ADMINISTERED DRUGS (ALT 637 FOR MEDICARE OP): Performed by: NURSE PRACTITIONER

## 2024-11-30 PROCEDURE — 2500000001 HC RX 250 WO HCPCS SELF ADMINISTERED DRUGS (ALT 637 FOR MEDICARE OP): Performed by: REGISTERED NURSE

## 2024-11-30 PROCEDURE — 2500000002 HC RX 250 W HCPCS SELF ADMINISTERED DRUGS (ALT 637 FOR MEDICARE OP, ALT 636 FOR OP/ED): Performed by: NURSE PRACTITIONER

## 2024-11-30 PROCEDURE — 2500000004 HC RX 250 GENERAL PHARMACY W/ HCPCS (ALT 636 FOR OP/ED): Performed by: NURSE PRACTITIONER

## 2024-11-30 PROCEDURE — 1200000002 HC GENERAL ROOM WITH TELEMETRY DAILY

## 2024-11-30 PROCEDURE — 85027 COMPLETE CBC AUTOMATED: CPT | Performed by: NURSE PRACTITIONER

## 2024-11-30 PROCEDURE — 2500000002 HC RX 250 W HCPCS SELF ADMINISTERED DRUGS (ALT 637 FOR MEDICARE OP, ALT 636 FOR OP/ED): Performed by: REGISTERED NURSE

## 2024-11-30 PROCEDURE — 2500000001 HC RX 250 WO HCPCS SELF ADMINISTERED DRUGS (ALT 637 FOR MEDICARE OP): Performed by: STUDENT IN AN ORGANIZED HEALTH CARE EDUCATION/TRAINING PROGRAM

## 2024-11-30 PROCEDURE — 2500000001 HC RX 250 WO HCPCS SELF ADMINISTERED DRUGS (ALT 637 FOR MEDICARE OP)

## 2024-11-30 PROCEDURE — 93750 INTERROGATION VAD IN PERSON: CPT | Performed by: STUDENT IN AN ORGANIZED HEALTH CARE EDUCATION/TRAINING PROGRAM

## 2024-11-30 PROCEDURE — 83615 LACTATE (LD) (LDH) ENZYME: CPT | Performed by: NURSE PRACTITIONER

## 2024-11-30 ASSESSMENT — COGNITIVE AND FUNCTIONAL STATUS - GENERAL
MOBILITY SCORE: 19
TOILETING: A LITTLE
DAILY ACTIVITIY SCORE: 22
PERSONAL GROOMING: A LITTLE
MOBILITY SCORE: 18
MOVING TO AND FROM BED TO CHAIR: A LITTLE
STANDING UP FROM CHAIR USING ARMS: A LITTLE
DAILY ACTIVITIY SCORE: 22
TURNING FROM BACK TO SIDE WHILE IN FLAT BAD: A LITTLE
CLIMB 3 TO 5 STEPS WITH RAILING: A LOT
TURNING FROM BACK TO SIDE WHILE IN FLAT BAD: A LITTLE
TOILETING: A LITTLE
WALKING IN HOSPITAL ROOM: A LITTLE
CLIMB 3 TO 5 STEPS WITH RAILING: A LITTLE
MOVING TO AND FROM BED TO CHAIR: A LITTLE
WALKING IN HOSPITAL ROOM: A LITTLE
DRESSING REGULAR LOWER BODY CLOTHING: A LITTLE
STANDING UP FROM CHAIR USING ARMS: A LITTLE

## 2024-11-30 ASSESSMENT — PAIN - FUNCTIONAL ASSESSMENT
PAIN_FUNCTIONAL_ASSESSMENT: 0-10

## 2024-11-30 ASSESSMENT — PAIN SCALES - GENERAL
PAINLEVEL_OUTOF10: 3
PAINLEVEL_OUTOF10: 0 - NO PAIN

## 2024-11-30 NOTE — PROGRESS NOTES
HF Attending Attestation Note    Briefly, 69M from [place], [place] followed by Dr. Washington for end-stage ischemic cardiomyopathy (LVEF 10-15%, NYHA IV), s/p HM3 LVAD implantation (11/12/24) as long therapy due to significant PAD, age, and patient preference, with a complicated post-op course involving RV dysfunction, delirium, NSVT, and malnutrition.    Exam: Euvolemic well appearing, LVAD hum    Heart Mate III interrogation (personally interrogated)  5200 rpm  Arterial Extended Pressure Measurements  Arterial Extended Diastolic Pressure: 58 mmHg  Arterial Extended Systolic Pressure: 123 mmHg  Arterial Extended Mean Pressure: 77 mmHg    Problems:   Principal Problem:    Acute on chronic heart failure with reduced ejection fraction and diastolic dysfunction  Active Problems:    Ischemic cardiomyopathy    Receiving inotropic medication    HTN (hypertension)    CAD (coronary artery disease)    MI (myocardial infarction) (Multi)    CHF (congestive heart failure)    Gastroesophageal reflux disease    Acute kidney injury (nontraumatic) (CMS-HCC)    Delirium    Notable Therapies   Class  Agent SAFETY    *ARNI* / ACE / ARB  Entresto 24-26mg BID Last BP: 90/59, Last BUN/Cr (GFR): 11/29/2024: 16/1.19 (66)    *Beta-Blocker*  RV dysfunction Last HR: 68    *MRA*  Rodrigo 12.5 Last K: 11/29/2024: 4.2     *SGLT2*  Farxiga 10 Last A1c 10/24/2024: 6.3     Diuretic   Last BNP: 11/29/2024: 478    Hydralazine/ISDN       Digoxin Digoxin 250mcg daily Last Digoxin level: 11/29/2024: 0.46    Anticoagulation  Warfarin  Last Hgb: 11/29/2024: 10.5, last INR: 11/29/2024: 2.1      Anti-arrhythmic  Amiodarone load Last QTc: 11/21/2024: 552    Antiplatelet  Aspirin 81 Last Platelet: 11/29/2024: 401    Lipid-Lowering  icosapent ethyL, 2 g, BID  ,trial pravastatin (did not tolerate atorvastatin_ Last Tchol 11/27/2024: 126; 126 / LDL 11/27/2024: 24.6; 24.6 / TRIG 11/27/2024: 155    Other        Device(s)  No ICD, consider.  EF: 11/26/2024: 18%,  QRS: 11/21/2024: 146ms    Cardiac Rehab,   if EF <35/MI/OHS  To refer      Plan:   - amiodarone load for NSVT, no ICD in place. Appreciate EP recs  - PT/OT  - continue GDMT, no change today  - continue warfarin, may need to reduce with amiodarone initiation  - Continue 5200rpm LVAD speed to maintain AV opening (given descending aortic graft position) and prevent worsening AI. Septum is midline with no MR and with E>A on bedside echo ramp.     Dispo: Insurance approval for transfer to [facility] (patient and family preference) obtained on 11/27/24. LVAD team to educated facility staff on 12/2/24 w/ discharge likely 12/3/24/     Olvin Drummond MD    Objective   Admit Date: 10/24/2024  Hospital Length of Stay: 37   Home: [facility] [id number]    MEDICATIONS  Infusions:     Scheduled:  amiodarone, 400 mg, BID  aspirin, 81 mg, Daily  dapagliflozin propanediol, 10 mg, Daily  digoxin, 250 mcg, Daily  escitalopram, 10 mg, Daily  icosapent ethyL, 2 g, BID  iron sucrose, 200 mg, Daily  lidocaine, 2 patch, Daily  magnesium oxide, 800 mg, BID  methocarbamol, 500 mg, q6h  pantoprazole, 40 mg, Daily before breakfast  pravastatin, 20 mg, Nightly  sacubitriL-valsartan, 1 tablet, BID  sennosides-docusate sodium, 1 tablet, BID  sildenafil, 20 mg, TID  spironolactone, 12.5 mg, Daily  warfarin, 7.5 mg, Daily      PRN:  acetaminophen, 975 mg, q6h PRN  ondansetron, 4 mg, q8h PRN  polyethylene glycol, 17 g, Daily PRN      Prior to Admission Meds:  Medications Prior to Admission   Medication Sig Dispense Refill Last Dose/Taking    aspirin 81 mg EC tablet Take 1 tablet (81 mg) by mouth once daily.       bumetanide (Bumex) 1 mg tablet Take 0.5 tablets (0.5 mg) by mouth once daily.       cholecalciferol (Vitamin D-3) 50,000 unit capsule Take 1 capsule (50,000 Units) by mouth 1 (one) time per week.       escitalopram (Lexapro) 10 mg tablet Take 1 tablet (10 mg) by mouth once daily.       hydrALAZINE (Apresoline) 25 mg tablet Take  1 tablet (25 mg) by mouth 2 times a day.       isosorbide mononitrate ER (Imdur) 120 mg 24 hr tablet Take 1 tablet (120 mg) by mouth once daily. Do not crush or chew.       metoprolol succinate XL (Toprol-XL) 50 mg 24 hr tablet Take 1 tablet (50 mg) by mouth once daily. Do not crush or chew.       multivitamin tablet Take 1 tablet by mouth once daily.       nitroglycerin (Nitrostat) 0.4 mg SL tablet Place 1 tablet (0.4 mg) under the tongue every 5 minutes if needed for chest pain.       pantoprazole (ProtoNix) 40 mg EC tablet Take 1 tablet (40 mg) by mouth once daily in the morning. Take before meals. Do not crush, chew, or split.       sacubitriL-valsartan (Entresto)  mg tablet Take 1 tablet by mouth 2 times a day.       spironolactone (Aldactone) 25 mg tablet Take 1 tablet (25 mg) by mouth once daily.       warfarin (Coumadin) 5 mg tablet Take 1 tablet (5 mg) by mouth once daily in the evening. Take as directed per After Visit Summary.          Vitals:      11/30/2024     7:37 AM 11/30/2024     4:00 AM 11/30/2024     3:33 AM 11/29/2024    11:37 PM 11/29/2024     8:36 PM 11/29/2024     7:40 PM 11/29/2024     3:35 PM   Vitals   Systolic 90    106     Diastolic 59    66     BP Location Right arm Left arm   Left arm Left arm    Heart Rate 68  74 73 76  73   Temp 36.1 °C (97 °F)  36.1 °C (97 °F) 36.5 °C (97.7 °F) 36.7 °C (98.1 °F)  36.9 °C (98.4 °F)   Resp 17  18 18 18  17   Weight (lb)   197.53       BMI   26.79 kg/m2       BSA (m2)   2.13 m2         Wt Readings from Last 5 Encounters:   11/30/24 89.6 kg (197 lb 8.5 oz)   10/24/24 92.5 kg (204 lb)   08/05/24 122 kg (268 lb)   01/31/22 119 kg (262 lb)   02/18/20 123 kg (270 lb 2 oz)       Intake/Output Summary (Last 24 hours) at 11/30/2024 0814  Last data filed at 11/30/2024 0621  Gross per 24 hour   Intake 480 ml   Output 1400 ml   Net -920 ml       CHEST: Unlabored, Clear, Diminished  CV:  Heart block  NEURO:   RASS: Alert and calm  CAM: Negative  LOC:  Alert  Cognition: Follows commands  GCS: 15    DATA:  CMP:  Recent Labs     11/29/24  0702 11/27/24  0652 11/27/24  0227 11/26/24  0603 11/25/24  0736 11/25/24  0250 11/24/24  0556 11/23/24  0451 11/22/24  1437 11/22/24  0304    138 138 139 135* 133* 136 139   < > 137   K 4.2 4.4 4.8 4.3 4.4 8.0* 4.6 4.3   < > 4.2    105 105 103 105 102 104 105   < > 102   CO2 26 24 23 26 16* 26 22 26   < > 27   ANIONGAP 10 13 15 14 18 13 15 12   < > 12   BUN 16 20 22 19 20 22 20 19   < > 20   CREATININE 1.19 1.28 1.21 1.39* 1.10 1.22 1.24 1.32*   < > 1.35*   EGFR 66 61 65 55* 73 64 63 58*   < > 57*   MG 2.05 2.18 2.12 2.13  --  2.47* 2.03 2.12  --  2.10    < > = values in this interval not displayed.     Recent Labs     11/29/24  0702 11/27/24  0652 11/26/24  0603 11/25/24  0736 11/25/24  0250 11/24/24  0556 11/23/24  0451 11/22/24  1437 11/19/24  1459 11/19/24  0322 11/18/24  0525 11/17/24  1323 11/17/24  0122 11/16/24  1358 11/16/24  0128 11/15/24  1255 11/15/24  0021 11/14/24  1224 11/14/24  0118 11/13/24  1228 11/13/24  0007 11/12/24  1307   ALBUMIN 3.3* 3.6 3.6 3.4 3.7 3.6 3.6 3.4   < > 3.1* 3.5   < > 3.4   < > 3.6   < > 3.7   < > 3.8 3.7   < > 3.7   ALT  --   --   --   --   --   --   --   --   --  6* 6*  --  3*  --  4*  --  5*  --  7* 11  --  15   AST  --   --   --   --   --   --   --   --   --  16 18  --  21  --  24  --  29  --  40* 42*  --  37   BILITOT  --   --   --   --   --   --   --   --   --  0.8 0.8  --  1.0  --  1.0  --  1.1  --  0.9 1.1  --  0.9    < > = values in this interval not displayed.     CBC:  Recent Labs     11/29/24  0755 11/27/24  0652 11/26/24  0603 11/25/24  0250 11/24/24  0556 11/23/24  0451 11/22/24  0304 11/21/24  0426   WBC 9.0 12.0* 11.8* 13.5* 15.4* 13.8* 10.9 10.2   HGB 10.5* 12.6* 12.4* 12.1* 12.3* 12.5* 11.2* 10.4*   HCT 33.1* 38.3* 38.6* 37.2* 39.1* 40.1* 34.3* 32.5*    503* 537* 313 545* 529* 473* 455*   MCV 86 84 85 83 86 87 83 84     COAG:   Recent Labs      "11/29/24  0702 11/28/24  0542 11/27/24  2037 11/27/24  1605 11/26/24  0957 11/26/24  0603 11/25/24  2348 11/25/24  0736 11/25/24  0250 11/24/24  2156 11/24/24  0556 11/23/24  0451 11/22/24  0304 11/13/24  0008 11/12/24  1257   INR 2.1* 2.1*  --  1.8*  --  1.8*  --   --  1.8*  --  1.8* 1.9* 2.5*   < > 1.4*   HAUF  --  0.6 0.5  --  0.5 0.4 0.2   < > 0.1   < >  --   --  0.1   < >  --    FIBRINOGEN  --   --   --   --   --   --   --   --   --   --   --   --   --   --  301    < > = values in this interval not displayed.     ABO:   Recent Labs     11/20/24  0151   ABO A     HEME/ENDO:   Recent Labs     10/31/24  1249 10/24/24  2328   FERRITIN  --  76   IRONSAT  --  17*   TSH 4.39* 8.47*   FREET4  --  1.44   HGBA1C  --  6.3*     CARDIAC:   Recent Labs     11/29/24  0755 11/29/24  0702 11/27/24  0652 11/26/24  0603 11/25/24  0736 11/25/24  0250 11/24/24  0556 11/23/24  0451 11/22/24  0304 11/12/24  1307 10/31/24  1249 10/24/24  2328   LDH  --  151 214 264* 426* 725* 247* 240 233   < > 187  --    *  --   --   --   --   --   --   --   --   --  755* 1,995*    < > = values in this interval not displayed.     Recent Labs     11/27/24  0652   CHOL 126  126   HDL 24.6  24.6   TRIG 155*     TOX:  Recent Labs     10/31/24  1249   AMPHETAMINE Negative     MICRO: No results for input(s): \"ESR\", \"CRP\", \"PROCAL\" in the last 31072 hours.  No results found for the last 90 days.      EKG:   Recent Labs     11/21/24  0945 11/17/24  0855 11/12/24  1610   ATRRATE 81 138 105   VENTRATE 111 119 126   QRSDUR 146 112 140   QTCFRED 498 469 469   QTCCALCB 552 526 530     Encounter Date: 10/24/24   Electrocardiogram 12-lead PRN for arrhythmia   Result Value    Ventricular Rate 111    Atrial Rate 81    QRS Duration 146    QT Interval 406    QTC Calculation(Bazett) 552    R Axis 24    T Axis 45    QRS Count 18    Q Onset 226    P Onset 209    P Offset 224    T Offset 429    QTC Fredericia 498    Narrative    Atrial fibrillation with rapid " ventricular response with premature ventricular or aberrantly conducted complexes  Left bundle branch block  Abnormal ECG  When compared with ECG of 20-NOV-2024 03:52,  Left bundle branch block has replaced Nonspecific intraventricular block  Criteria for Septal infarct are no longer Present  Criteria for Lateral infarct are no longer Present     Echocardiogram:   Recent Labs     11/26/24  1152 11/18/24  1144 11/15/24  0904 11/13/24  0851 11/12/24  0628 11/05/24  1154 10/25/24  1436   EF 18 10 13 15 18 18 18   LVIDD  --   --  5.90 6.90  --  8.21 7.70   RV  --   --   --  23.4  --  42.9 21.0   RVFRWALLPKSP  --   --   --  6.64  --  6.00  --    TAPSE  --   --   --   --   --  0.9 1.0   Transthoracic Echo (TTE) Complete With Contrast 10/25/2024    [facility], [address] [place] [address]  Tel [phone] and Fax [phone]    TRANSTHORACIC ECHOCARDIOGRAM REPORT      Patient Name:      RJ LEEPEARL Webb Physician:    [id number] Fawad Chicas MD  Study Date:        10/25/2024           Ordering Provider:    [id number] LANDON GARCES  MRN/PID:           [id number]             Fellow:  Accession#:        [id number]         Nurse:  Date of Birth/Age: 1955 / 69      Sonographer:          Nikkie estrada                                      [facility]  Gender:            M                    Additional Staff:  Height:            182.88 cm            Admit Date:           10/24/2024  Weight:            93.44 kg             Admission Status:     Inpatient -  Routine  BSA / BMI:         2.16 m2 / 27.94      Encounter#:           [id number]  kg/m2  Blood Pressure:    113/62 mmHg          Department Location:  [address]    Study Type:    TRANSTHORACIC ECHO (TTE) COMPLETE  Diagnosis/ICD: Acute on chronic combined systolic (congestive) and diastolic  (congestive) heart failure (CHF)-I50.43; Ischemic  cardiomyopathy-I25.5  Indication:    Stage D heart failure  CPT Code:       Echo Complete w Full Doppler-15441    Patient History:  Pertinent History: CAD s/p 4V CABG and PCI, HF/ICM/HFrEF, HTN, DLD, VIV,  bicuspid AV, afib.    Study Detail: The following Echo studies were performed: 2D, M-Mode, Doppler and  color flow. Definity used as a contrast agent for endocardial  border definition and agitated saline used as a contrast agent for  intraseptal flow evaluation. Unable to obtain suprasternal notch  view. Patient's heart rhythm is atrial fibrillation.      PHYSICIAN INTERPRETATION:  Left Ventricle: Left ventricular ejection fraction is severely decreased, by visual estimate at 15-20%. There is global hypokinesis of the left ventricle with minor regional variations. The left ventricular cavity size is severely dilated. Abnormal (paradoxical) septal motion consistent with post-operative status. Left ventricular diastolic filling was not assessed. There is no definite left ventricular thrombus visualized.  Left Atrium: The left atrium is moderately dilated. There is no evidence of a patent foramen ovale. A bubble study using agitated saline was performed. Bubble study is negative.  Right Ventricle: The right ventricle is moderately enlarged. There is severely reduced right ventricular systolic function. A device is visualized in the right ventricle.  Right Atrium: The right atrium is moderately dilated. There is a device visualized in the right atrium.  Aortic Valve: The aortic valve is structurally normal. There is mild to moderate aortic valve cusp calcification. The aortic valve dimensionless index is 0.57. There is trace aortic valve regurgitation. The peak instantaneous gradient of the aortic valve is 16.2 mmHg. The mean gradient of the aortic valve is 10.0 mmHg.  Mitral Valve: The mitral valve is normal in structure. There is mild mitral annular calcification. There is mild mitral valve regurgitation.  Tricuspid Valve: The tricuspid valve is structurally normal. There is trace  tricuspid regurgitation. The Doppler estimated RVSP is within normal limits at 21.0 mmHg.  Pulmonic Valve: The pulmonic valve is structurally normal. There is no indication of pulmonic valve regurgitation.  Pericardium: Trivial pericardial effusion.  Aorta: The aortic root is normal.  Systemic Veins: The inferior vena cava was not well visualized.  In comparison to the previous echocardiogram(s): Compared with study dated 2/3/2020, LVEF is now severely reduced, Previoulsy reported LVEF is 40-45% with some regional hypokinesis. Primary team is aware because of a recent BRIANA which showed similar findinds.      CONCLUSIONS:  1. Left ventricular ejection fraction is severely decreased, by visual estimate at 15-20%.  2. There is global hypokinesis of the left ventricle with minor regional variations.  3. Left ventricular cavity size is severely dilated.  4. Abnormal septal motion consistent with post-operative status.  5. No left ventricular thrombus visualized.  6. There is severely reduced right ventricular systolic function.  7. Moderately enlarged right ventricle.  8. The left atrium is moderately dilated.  9. The right atrium is moderately dilated.  10. Right ventricular systolic pressure is within normal limits.  11. There is no evidence of a patent foramen ovale.  12. Compared with study dated 2/3/2020, LVEF is now severely reduced, Previoulsy reported LVEF is 40-45% with some regional hypokinesis. Primary team is aware because of a recent BRIANA which showed similar findinds.    QUANTITATIVE DATA SUMMARY:    2D MEASUREMENTS:          Normal Ranges:  Ao Root d:       4.00 cm  (2.0-3.7cm)  LAs:             5.00 cm  (2.7-4.0cm)  IVSd:            0.90 cm  (0.6-1.1cm)  LVPWd:           0.90 cm  (0.6-1.1cm)  LVIDd:           7.70 cm  (3.9-5.9cm)  LVIDs:           6.70 cm  LV Mass Index:   155 g/m2  LV % FS          13.0 %      LA VOLUME:                    Normal Ranges:  LA Vol A4C:        77.3 ml    (22+/-6mL/m2)  LA Vol  A2C:        97.8 ml  LA Vol BP:         89.5 ml  LA Vol Index A4C:  35.8ml/m2  LA Vol Index A2C:  45.3 ml/m2  LA Vol Index BP:   41.5 ml/m2  LA Area A4C:       25.0 cm2  LA Area A2C:       27.3 cm2  LA Major Axis A4C: 6.9 cm  LA Major Axis A2C: 6.5 cm  LA Volume Index:   41.5 ml/m2      RA VOLUME BY A/L METHOD:          Normal Ranges:  RA Area A4C:             25.3 cm2      LV SYSTOLIC FUNCTION BY 2D PLANIMETRY (MOD):  Normal Ranges:  EF-Visual:      18 %  LV EF Reported: 18 %      AORTIC VALVE:                      Normal Ranges:  AoV Vmax:                2.01 m/s  (<=1.7m/s)  AoV Peak P.2 mmHg (<20mmHg)  AoV Mean PG:             10.0 mmHg (1.7-11.5mmHg)  LVOT Max Jude:            1.12 m/s  (<=1.1m/s)  AoV VTI:                 36.30 cm  (18-25cm)  LVOT VTI:                20.70 cm  LVOT Diameter:           2.60 cm   (1.8-2.4cm)  AoV Area, VTI:           2.37 cm2  (2.5-5.5cm2)  AoV Area,Vmax:           2.32 cm2  (2.5-4.5cm2)  AoV Dimensionless Index: 0.57      RIGHT VENTRICLE:  RV Basal 4.70 cm  RV Mid   3.60 cm  RV Major 8.0 cm  TAPSE:   10.1 mm      TRICUSPID VALVE/RVSP:          Normal Ranges:  Peak TR Velocity:     2.12 m/s  RV Syst Pressure:     21 mmHg  (< 30mmHg)  IVC Diam:             1.90 cm      PULMONIC VALVE:          Normal Ranges:  PV Accel Time:  85 msec  (>120ms)  PV Max Jude:     0.9 m/s  (0.6-0.9m/s)  PV Max P.9 mmHg      [id number] Fawad Chicas MD  Electronically signed on 10/25/2024 at 3:00:23 PM        ** Final **    Coronary Angiography:   Left & Right Heart Cath w Angiography & LV 10/28/2024    [facility], Cath Lab, [address] [place] [address]    Cardiovascular Catheterization Report    Patient Name:      RJ LEIJA       Performing Physician:  [id number] Jed Lindsay MD  Study Date:        10/28/2024            Verifying Physician:   [id number] Lindsay MD  MRN/PID:           [id number]               Cardiologist/Co-Scrub:  Accession#:        [id number]          Ordering Provider:     [id number] LANDON GARCES  Date of Birth/Age: 1955 / 69 years Cardiologist:  Gender:            M                     Fellow:                [id number] Rodríguez Patten MD  Encounter#:        [id number]            Surgeon:      Study: Left Heart Cath with Grafts      Indications:  RJ LEIJA is a 69 year old male who presents with coronary artery disease, prior percutaneous coronary intervention, atrial fibrillation, chronic pulmonary disease, dyslipidemia, hypertension, diabetes and presents with ADHF and cardiogenic shock requiring ionotropic support currently being worked up for advanced heart failure therapies. Cardiomyopathy. Heart failure.    Appropriate Use Criteria:  Re-evaluation of know cardiomyopathy with change in clinical status or on cardiac exam or to guide therapy; AUC score = 7.  Medical History:  Stress test performed: No. CTA performed: No. Agatston accessed: No. LVEF  Assessed: Yes. LVEF = 15%.  Cardiac arrest: No.  Cardiac surgical consult: No.  Cardiovascular instability: Yes. Cardiogenic shock and acute heart failure.  Frailty status of patient entering lab: 8 = Very severely frail.      Procedure Description:  Cardiac output was calculated via the Ernestina method.    Coronary Angiography:  The coronary circulation is right dominant.    Left Main Coronary Artery:  The left main coronary artery is a normal caliber vessel. The left main arises normally from the left coronary sinus of Valsalva and bifurcates into the LAD and circumflex coronary arteries. The distal left main coronary artery showed 30% stenosis.    Left Anterior Descending Coronary Artery Distribution:  The left anterior descending coronary artery is a normal caliber vessel. The LAD arises normally from the left main coronary artery. The proximal left anterior descending coronary artery showed 100% stenosis.    Circumflex Coronary Artery  Distribution:  The circumflex coronary artery is a normal caliber vessel. The circumflex arises normally from the left main coronary artery and terminates in the AV groove. The circumflex revealed no significant disease or stenosis greater than 30% and Patent proximal LCx stents. The 1st obtuse marginal branch showed no significant disease or stenosis greater than 30% and Patent ostial-proximal ARIELLE. The 2nd obtuse marginal branch demonstrated no significant disease or stenosis greater than 30%.    Right Heart Catheterization:  Cardiac output was calculated via the Ernestina method. Elevated ventricular filling pressure. Pulmonary venous hypertension. Right heart hemodynamics on 0.375 mcg/kg/min:  RA 17, RV 39/13, RVEDP 17, PA 37/22, mean PAP 28, PA sat 71%, CO/CI 5.2/2.5.    Right Coronary Artery Distribution:    The right coronary artery is a normal caliber vessel. The RCA arises normally from the right sinus of Valsalva. The proximal right coronary artery showed 100% stenosis.    Coronary Grafts:    LIMA Graft:  Left internal mammary artery graft conduit, originating in situ and attached to the distal left anterior descending, is patent.  Saphaneous Vein Graft(s):  The saphenous vein graft, originating from the aorta and attached to the 1st obtuse marginal, appeared totally occluded.  The 2nd saphenous vein graft, originating from the aorta and attached to the RCA, appeared totally occluded.  The 3rd saphenous vein graft, originating from the aorta and attached to the ramus intermedius, appeared totally occluded.    Coronary Lesion Summary:  Vessel      Stenosis    Vessel Segment  Left Main 30% stenosis      distal  LAD       100% stenosis    proximal  RCA       100% stenosis    proximal      Graft Stenosis Summary:  Graft      Destination of Graft                      % Stenosis  LIMA  distal left anterior  descending  SVG 1 1st obtuse marginal  SVG 2 RCA  SVG 3 ramus intermedius              Unable to engage and  appears to be  occluded.      Hemo Personnel:  +----------------+---------+  Name            Duty       +----------------+---------+  Jed Lindsay MD 1  +----------------+---------+      Hemodynamic Pressures:    +----+----------+---------+------------+-------------+---+-----+-------+-------+  SiteDate Time   Phase    Systolic    Diastolic  ED Mean A-Wave V-Wave                   Name       mmHg        mmHg     mmHmmHg  mmHg   mmHg                                                     g                      +----+----------+---------+------------+-------------+---+-----+-------+-------+   Art10/28/2024     Rest         129           49      68                    9:47:13 AM                                                          +----+----------+---------+------------+-------------+---+-----+-------+-------+   Art10/28/2024     Rest         114           53      67                    9:47:17 AM                                                          +----+----------+---------+------------+-------------+---+-----+-------+-------+   Art10/28/2024     Rest         108           54      66                    9:47:20 AM                                                          +----+----------+---------+------------+-------------+---+-----+-------+-------+    RA10/28/2024     Rest                               17     16     18        10:18:57                                                                      AM                                                          +----+----------+---------+------------+-------------+---+-----+-------+-------+   Art10/28/2024     Rest          99           52      66                      10:18:57                                                                      AM                                                           +----+----------+---------+------------+-------------+---+-----+-------+-------+    RA10/28/2024     Rest                               18     17     19        10:19:09                                                                      AM                                                          +----+----------+---------+------------+-------------+---+-----+-------+-------+   Art10/28/2024     Rest         101           54      67                      10:19:09                                                                      AM                                                          +----+----------+---------+------------+-------------+---+-----+-------+-------+    RV10/28/2024     Rest          39           13 17                           10:19:30                                                                      AM                                                          +----+----------+---------+------------+-------------+---+-----+-------+-------+   Art10/28/2024     Rest          96           50      63                      10:19:30                                                                      AM                                                          +----+----------+---------+------------+-------------+---+-----+-------+-------+    PA10/28/2024     Rest          37           22      28                      10:20:06                                                                      AM                                                          +----+----------+---------+------------+-------------+---+-----+-------+-------+   Art10/28/2024     Rest          93           50      61                      10:20:06                                                                      AM                                                           +----+----------+---------+------------+-------------+---+-----+-------+-------+    AO10/28/2024     Rest         102           62      77                      10:38:05                                                                      AM                                                          +----+----------+---------+------------+-------------+---+-----+-------+-------+   Art10/28/2024     Rest         112           52      67                      10:38:05                                                                      AM                                                          +----+----------+---------+------------+-------------+---+-----+-------+-------+   Art10/28/2024     Rest         128           60      79                      10:59:15                                                                      AM                                                          +----+----------+---------+------------+-------------+---+-----+-------+-------+   Art10/28/2024     Rest         123           58      77                      11:01:12                                                                      AM                                                          +----+----------+---------+------------+-------------+---+-----+-------+-------+        Oxygen Saturation %:  +-----------+----------+------------+  Sample SiteO2 Sat (%)HB (g/100ml)  +-----------+----------+------------+           FA        96        14.9  +-----------+----------+------------+           PA        71        14.9  +-----------+----------+------------+           FA        96        14.9  +-----------+----------+------------+           PA        71        14.9  +-----------+----------+------------+      Cardiac Outputs:  +---------------+------------------+-------+  ESTEBAN CO  (l/min)ESTEBAN CI (l/min/m2)ESTEBAN SV  +---------------+------------------+-------+              5.2               2.5   60.8  +---------------+------------------+-------+      Vascular Resistance Calculated Values (Wood Units):  +-----+----+----+---+----+----+----+-------+  PhaseSVR SVRITPRTPRITVR TVRITPR/TVR  +-----+----+----+---+----+----+----+-------+  0    [phone]        +-----+----+----+---+----+----+----+-------+      Complications:  No in-lab complications observed.    Cardiac Cath Post Procedure Notes:  Post Procedure Diagnosis: Diffuse severe native coronary artery disease.  Patent proximal LCx and ostial-prox OM 1 ARIELLE.  Patent LIMA-LAD, occluded SVG-OM, SVG-RI, SVG- RCA.  Elevated right and left sided filling pressures with  preserved cardiac output and index while on ionotropic  support with 0.375 mcg/kg/min of milrinone.  Blood Loss:               Estimated blood loss during the procedure was 5 cc  mls.  Specimens Removed:        Number of specimen(s) removed: none.    ____________________________________________________________________________________  CONCLUSIONS:  1. Diffuse severe native coronary artery disease.  2. Patent proximal LCx and ostial-prox OM 1 ARIELLE.  3. Patent LIMA-LAD. Other grafts are occluded: SVG-OM, SVG-RI, SVG- RCA.  4. Elevated right and left sided filling pressures with preserved cardiac output and index while on ionotropic support with 0.375 mcg/kg/min of milrinone .  5. Further work-up and management per advanced heart failure team.    ICD 10 Codes:  Ischemic cardiomyopathy-I25.5; Cardiogenic shock-R57.0    CPT Codes:  Left Heart Cath (visualization of coronaries) and LV-76928; Ultrasound guidance for vascular access-76508; Moderate Sedation Services initial 15 minutes patient >5 years-05799; Moderate Sedation Services 1st additional 15 minutes patient >5 years-57865    [id number] Jed Lindsay MD  Performing Physician  Electronically signed by  [id number] Jed Lindsay MD on 11/5/2024 at 7:06:05 AM          ** Final **    Right Heart Cath: No results found for this or any previous visit from the past 1800 days.    Cardiac Scoring: No results found for this or any previous visit from the past 1800 days.    Cardiac MRI: No results found for this or any previous visit from the past 1800 days.    Nuclear:No results found for this or any previous visit from the past 1800 days.    Metabolic Stress: No results found for this or any previous visit from the past 1800 days.      Transthoracic Echo (TTE) Limited    Result Date: 11/26/2024   [facility], [address] [place] [address]                Tel [phone] and Fax [phone] TRANSTHORACIC ECHOCARDIOGRAM REPORT  Patient Name:       RJ Webb Physician:    [id number] Parker Terrell MD Study Date:         11/26/2024          Ordering Provider:    [id number] KARRI CHAVEZ MRN/PID:            [id number]            Fellow: Accession#:         [id number]        Nurse: Date of Birth/Age:  1955 / 69     Sonographer:          Manuela estrada                                     RDBLANQUITA, IRMA Gender assigned at  M                   Additional Staff:     Melida Kaye Birth:                                                        RDCS Height:             182.88 cm           Admit Date: Weight:             87.54 kg            Admission Status:     Inpatient -                                                               Routine BSA / BMI:          2.10 m2 / 26.18     Encounter#:           [id number]                     kg/m2 Blood Pressure:     /                   Department Location:  [facility]                                                               Non Invasive Study Type:    TRANSTHORACIC ECHO (TTE) LIMITED Diagnosis/ICD:  Presence of heart assist device-Z95.811 Indication:    RAMP study CPT Code:      Echo Limited-73702; Doppler Limited-93506; Color Doppler-01964 Patient History: Pertinent History: CAD. CABG x4 2012, PCI 2019, ICM, CHF, s/p HeartMate III                    LVAD, AF s/p RFA (PVI and CTI 4/2024). Study Detail: The following Echo studies were performed: 2D, M-Mode, Doppler and               color flow. Technically challenging study due to poor acoustic               windows.  PHYSICIAN INTERPRETATION: Left Ventricle: Left ventricular ejection fraction is severely decreased, by visual estimate at 15-20%. There is global hypokinesis of the left ventricle with minor regional variations. The left ventricular cavity size is severely dilated. Spectral Doppler shows a Grade II (pseudonormal pattern) of left ventricular diastolic filling with an elevated left atrial pressure. Left Atrium: The left atrium is enlarged. Right Ventricle: The right ventricle is severely enlarged. There is reduced right ventricular systolic function. Right Atrium: The right atrium was not well visualized. Aortic Valve: The aortic valve is probably trileaflet. There is mild aortic valve cusp calcification. There is mild aortic valve regurgitation. Mitral Valve: The mitral valve is mildly thickened. There is trace mitral valve regurgitation. Tricuspid Valve: The tricuspid valve is structurally normal. There is trace tricuspid regurgitation. The right ventricular systolic pressure is unable to be estimated. Pulmonic Valve: The pulmonic valve is structurally normal. Pulmonic valve regurgitation was not assessed. Pericardium: Trivial pericardial effusion. Aorta: The aortic root is abnormal. There is mild dilatation of the ascending aorta. There is mild dilatation of the aortic root. Pulmonary Artery: The pulmonary artery is not well visualized. Systemic Veins: The inferior vena cava was not well visualized. In comparison to the previous echocardiogram(s):  Compared with study dated 11/18/2024, no significant change.  LEFT VENTRICULAR ASSIST DEVICE: LVAD: The patient has a(n) HeartMate 3 LVAD device present. Inflow Cannula: Visualization of the inflow cannula is technically difficult. Outflow Cannula: Visualization of the outflow cannula is technically difficult. LVAD Comments: Ramp study. 5200 rpm at start and finish of limited echo.  CONCLUSIONS:  1. Left ventricular ejection fraction is severely decreased, by visual estimate at 15-20%.  2. There is global hypokinesis of the left ventricle with minor regional variations.  3. Spectral Doppler shows a Grade II (pseudonormal pattern) of left ventricular diastolic filling with an elevated left atrial pressure.  4. Left ventricular cavity size is severely dilated.  5. There is reduced right ventricular systolic function.  6. Severely enlarged right ventricle.  7. The left atrium is enlarged.  8. Mild aortic valve regurgitation.  9. The pulmonary artery is not well visualized. QUANTITATIVE DATA SUMMARY:  M-MODE MEASUREMENTS:         Normal Ranges: Ao Root:             4.10 cm (2.0-3.7cm)  AORTA MEASUREMENTS:         Normal Ranges: Asc Ao, d:          4.20 cm (2.1-3.4cm)  LV SYSTOLIC FUNCTION BY 2D PLANIMETRY (MOD):                      Normal Ranges: EF-Visual:      18 % LV EF Reported: 18 %  LV DIASTOLIC FUNCTION:          Normal Ranges: MV Peak E:             0.50 m/s (0.7-1.2 m/s) MV Peak A:             0.44 m/s (0.42-0.7 m/s) E/A Ratio:             1.14     (1.0-2.2)  [id number] Parker Terrell MD Electronically signed on 11/26/2024 at 3:08:03 PM  ** Final **     CT abdomen pelvis w IV contrast    Result Date: 11/25/2024  Interpreted By:  Danya Siddiqi,  and Shayla Araya STUDY: CT ABDOMEN PELVIS W IV CONTRAST;  11/25/2024 10:28 am   INDICATION: Signs/Symptoms:Copious serosanguinous drainage around driveline site. Eval for fluid collection..     COMPARISON: CT chest and pelvis without IV contrast 11/04/2024.   ACCESSION  NUMBER(S): [id number]   ORDERING CLINICIAN: SOLIS DUEÑAS   TECHNIQUE: CT of the abdomen and pelvis was performed.  Standard contiguous axial images were obtained at 3 mm slice thickness through the abdomen and pelvis. Coronal and sagittal reconstructions at 3 mm slice thickness were performed.  90 ML of Omnipaque 350 was administered intravenously without immediate complication.   FINDINGS: LOWER CHEST: New small to moderate left-sided loculated pleural effusion and left lower lobe atelectatic changes. Bilateral interlobular septal thickening of the visualized lungs that likely represents a component of edema and atelectasis secondary to postoperative changes. LVAD device in place along the left ventricle with the outflow tract connecting to the mid descending thoracic aorta and driveline connecting to external device outside of the right chest wall.   Patient is status post previous median sternotomy. The heart is moderately enlarged but similar in size to prior without pericardial effusion. 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 No pneumothorax. Visualized distal esophagus appears normal.   A hematoma adjacent to the drive line along the left chest wall measuring 9.1 x 3.7 x 4.9 cm (Series 201, Image 22) (Series 203, Image 61).   Additionally, there is a 5.2 x 2.4 x 2.2 cm fluid collection/loculated pleural effusion adjacent to the LVAD outflow tract in the posterior left lower lobe (series 201, image 12).   ABDOMEN:   LIVER: The liver is mildly enlarged in size measuring 19.3 cm in craniocaudal dimension. Large hypodense lesion in segment 6 of the liver measuring 4.2 x 2.3 x 2.8 cm (Series 203, Image 57) (Series 903, Image 78) that is overall indeterminate however appears similar to prior imaging.   BILE DUCTS: The intrahepatic and extrahepatic ducts are not dilated.   GALLBLADDER: The gallbladder is nondistended and without evidence of radiopaque stones.   PANCREAS: The pancreas appears  unremarkable without evidence of ductal dilatation or masses.   SPLEEN: Splenomegaly measuring up to 19.5 cm craniocaudal dimension, not significantly changed compared to 11/04/2024 CT. Interval development of several wedge-shaped hypodense lesions in the spleen compared to 11/04/2024 CT, compatible with infarcts.   ADRENAL GLANDS: Bilateral adrenal glands appear normal.   KIDNEYS AND URETERS: The kidneys are normal in size and enhance symmetrically. Multiple too small to characterize hypodense lesions in bilateral kidneys are statistically favored to represent simple renal cysts. A 2.1 cm cystic lesion in the mid pole of the right kidney, likely a simple renal cyst.  No hydroureteronephrosis or nephroureterolithiasis is identified.   PELVIS:   BLADDER: The urinary bladder is decompressed, limited for evaluation. There is intraluminal air in the anterior bladder that is likely secondary to recent catheterization.   REPRODUCTIVE ORGANS: Mild prostatomegaly.   BOWEL: The stomach is unremarkable. The small and large bowel are normal in caliber and demonstrate no wall thickening. Moderate colonic stool burden. Colonic diverticulosis without evidence of diverticulitis. The appendix is not definitely visualized. There is however no pericecal stranding or fluid.   VESSELS: There is no aneurysmal dilatation of the abdominal aorta. Moderate atherosclerotic disease of the abdominal aorta and its branches. The IVC appears normal. The splenic vasculature appears patent.   PERITONEUM/RETROPERITONEUM/LYMPH NODES: No ascites or free air, no fluid collection. No abdominopelvic lymphadenopathy is present.   BONES AND ABDOMINAL WALL: No suspicious osseous lesions are identified. Small acute left rib fracture that may be secondary to recent LVAD placement (Series 201, Image 2). Remote rib fractures of posterolateral left 9th through 11th ribs. Multilevel moderate degenerative discogenic disease is noted in the lower thoracic and  lumbar spine particularly of the lower lumbar spine.   The abdominal wall soft tissues appear normal.       1.  Postoperative changes of recent LVAD placement. A 9.0 x 3.7 x 4.9 cm left chest wall hematoma adjacent to the driveline which appears otherwise intact with no evidence of fracture. Additionally, there is a 5.2 x 2.4 x 2.2 cm fluid collection/loculated pleural effusion adjacent to the LVAD outflow tract in the posterior left lower lobe. 2. Splenomegaly with interval development of scattered peripheral hypoenhancing wedge-shaped areas, compatible with infarcts. 3. In the visualized lung segments there are mild bilateral lung findings suggestive of pulmonary edema and atelectatic changes that are likely secondary to recent procedure. 4. Stable moderate cardiomegaly. 5. Stable indeterminate 4.3 cm hypodense lesion in segment V of the liver, which was better evaluated on 11/02/2024 CT liver protocol. 6. Additional stable chronic findings as described above.   I personally reviewed the images/study and I agree with the findings as stated by Resident Dr. Quiana Mcguire MD. This study was interpreted at [place], [place].   MACRO: None   Signed by: Danya Siddiqi 11/25/2024 10:37 PM Dictation workstation:   [id number]    ECG 12 Lead    Result Date: 11/25/2024  Undetermined rhythm Nonspecific intraventricular block Cannot rule out Anterior infarct (cited on or before 24-OCT-2024) T wave abnormality, consider inferolateral ischemia Abnormal ECG When compared with ECG of 24-OCT-2024 23:03, Current undetermined rhythm precludes rhythm comparison, needs review QRS axis Shifted left Serial changes of Anterior infarct Present Confirmed by Lucas Kerns ([id number]) on 11/25/2024 12:12:55 PM    CT head wo IV contrast    Result Date: 11/24/2024  Interpreted By:  Des Thao, STUDY: CT HEAD WO IV CONTRAST;  11/24/2024 2:07 pm   INDICATION: Signs/Symptoms:LUE numbness in VAD  patient.     COMPARISON: 11/17/2024   ACCESSION NUMBER(S): [id number]   ORDERING CLINICIAN: SOLIS DUEÑAS   TECHNIQUE: Noncontrast axial CT images of head were obtained with coronal and sagittal reconstructed images.   FINDINGS: BRAIN PARENCHYMA: Good infarct in the medial left cerebellar hemisphere. No acute intraparenchymal hemorrhage or parenchymal evidence of acute large territory ischemic infarct. Gray-white matter distinction is preserved. No mass-effect.   VENTRICLES and EXTRA-AXIAL SPACES:  No acute extra-axial or intraventricular hemorrhage. No effacement of cerebral sulci. The ventricles and sulci are age-concordant.   PARANASAL SINUSES/MASTOIDS:  No hemorrhage or air-fluid levels within the visualized paranasal sinuses. The mastoids are well aerated.   CALVARIUM/ORBITS:  No skull fracture.  The orbits and globes are intact to the extent visualized.   EXTRACRANIAL SOFT TISSUES: No discernible abnormality.       No acute intracranial abnormality.     MACRO: None.   Signed by: Des Thao 11/24/2024 3:07 PM Dictation workstation:   [id number]    XR chest 1 view    Result Date: 11/23/2024  Interpreted By:  Olvin Farias, STUDY: XR CHEST 1 VIEW; 11/23/2024 7:15 am   INDICATION: Signs/Symptoms:s/p LVAD implant, assess lung fields, devices.   COMPARISON: 11/22/2024   ACCESSION NUMBER(S): [id number]   ORDERING CLINICIAN: ANETA MANNING   FINDINGS:     CARDIOMEDIASTINAL SILHOUETTE: Cardiomegaly versus pericardial effusion. Post LVAD changes.   LUNGS: Mild perihilar interstitial edema and left-sided effusion. Continued left retrocardiac atelectasis/effusion. No pneumothorax.   ABDOMEN: No remarkable upper abdominal findings.   BONES: No acute osseous changes.       1.  Post LVAD changes with residual left basilar loculated fluid/atelectasis.     Signed by: Olvin Farias 11/23/2024 4:41 PM Dictation workstation:   [id number]    XR chest 1 view    Result Date: 11/22/2024  Interpreted By:  Jarrett  Olvin, STUDY: XR CHEST 1 VIEW; 11/22/2024 8:22 am   INDICATION: Signs/Symptoms:s/p LVAD implant, assess lung fields, devices.   COMPARISON: 11/21/2024.   ACCESSION NUMBER(S): [id number]   ORDERING CLINICIAN: ANETA MANNING   FINDINGS:     CARDIOMEDIASTINAL SILHOUETTE: Cardiomegaly versus pericardial effusion. Patient is status post median sternotomy and LVAD.   LUNGS: There is mild perihilar interstitial edema. Left retrocardiac atelectasis versus loculated effusion. No pneumothorax.   ABDOMEN: No remarkable upper abdominal findings.   BONES: No acute osseous changes.       1.  Question left basilar consolidation/effusion status post LVAD. Consider follow-up with PA and lateral x-ray as clinically indicated.     Signed by: Olvin Farias 11/22/2024 4:44 PM Dictation workstation:   [id number]    XR chest 1 view    Result Date: 11/21/2024  Interpreted By:  Frank Connolly, STUDY: XR CHEST 1 VIEW;  11/21/2024 8:28 am   INDICATION: Signs/Symptoms:s/p LVAD implant, assess lung fields, devices.     COMPARISON: Exam dated 11/20/2024   ACCESSION NUMBER(S): [id number]   ORDERING CLINICIAN: ANETA MANNING   FINDINGS: AP radiograph of the chest was provided.   Status post median sternotomy. LVAD device is again visualized.   CARDIOMEDIASTINAL SILHOUETTE: Stable severe enlargement of the cardiac silhouette.   LUNGS: Left costophrenic angle is partially excluded from view due to LVAD device. Lungs otherwise demonstrate prominent interstitial markings. No new focal consolidation, right-sided pleural effusion, or evidence of pneumothorax. Focal lucency adjacent to the LVAD is similar to the recent exam.   ABDOMEN: No remarkable upper abdominal findings.   BONES: No acute osseous changes.       1.  Stable severe cardiomegaly with findings suggestive of pulmonary edema. 2. Additional stable focal lucency adjacent to the LVAD device may represent aerated lung versus postprocedural changes of LVAD placement.       MACRO: None    Signed by: Frank Connolly 11/21/2024 1:04 PM Dictation workstation:   [id number]    Electrocardiogram 12-lead PRN for arrhythmia    Result Date: 11/21/2024  Atrial fibrillation with rapid ventricular response with premature ventricular or aberrantly conducted complexes Left bundle branch block Abnormal ECG When compared with ECG of 20-NOV-2024 03:52, Left bundle branch block has replaced Nonspecific intraventricular block Criteria for Septal infarct are no longer Present Criteria for Lateral infarct are no longer Present    XR chest 1 view    Result Date: 11/20/2024  Interpreted By:  Hemanth Rock and Ogievich Taessa STUDY: XR CHEST 1 VIEW;  11/20/2024 7:51 am   INDICATION: Signs/Symptoms:s/p LVAD implant, assess lung fields, devices.     COMPARISON: Chest x-ray 11/19/2024-11/14/2024   ACCESSION NUMBER(S): [id number]   ORDERING CLINICIAN: ANETA MANNING   FINDINGS: AP radiograph of the chest was provided.   LINES/TUBES: Unchanged positioning of LVAD. Postsurgical changes from median sternotomy with intact sternal cerclage wires. Multiple surgical clips again overlie left heart border.   CARDIOMEDIASTINAL SILHOUETTE: Cardiomediastinal silhouette is persistently enlarged, however stable in size and configuration when compared to prior..   LUNGS: Continued improvement in interstitial lung markings with patchy airspace opacities. Similar to slightly improved right perihilar and left mid basilar lung presumed atelectasis. Persistent blunting of the left costophrenic angle. No sizable pneumothorax.   ABDOMEN: No remarkable upper abdominal findings.   BONES: Partial visualization of left shoulder arthroplasty. No acute osseous changes.       1. Slight interval improvement in mild pulmonary edema. 2. Findings suggestive of persistent small left pleural effusion.   I personally reviewed the images/study and I agree with the findings as stated by Emery Mclain DO, PGY-3. This study was interpreted at [facility]  [place], [place].   MACRO: None   Signed by: Hemanth Rock 11/20/2024 4:01 PM Dictation workstation:   [id number]    XR chest 1 view    Result Date: 11/19/2024  Interpreted By:  Mazin Blanco,  and [place] STUDY: XR CHEST 1 VIEW;  11/19/2024 3:50 am   INDICATION: Signs/Symptoms:s/p LVAD implant, assess lung fields, devices.   COMPARISON: Chest radiograph 11/18/2024-11/16/2024 CT chest, abdomen and pelvis 11/04/2024   ACCESSION NUMBER(S): [id number]   ORDERING CLINICIAN: ANETA MANNING   FINDINGS: AP supine radiograph of the chest was provided. There is slightly better patient centering on present study.   MEDICAL DEVICES/LINES: Unchanged positioning of LVAD. Postsurgical changes from median sternotomy with intact sternal cerclage wires. Multiple surgical clips again overlie left heart border.   CARDIOMEDIASTINAL SILHOUETTE: Cardiomediastinal silhouette is persistently enlarged, stable in size, but now with better delineation of bilateral heart borders as compared to prior radiograph from yesterday.   LUNGS: Improved interstitial lung markings with patchy airspace opacities. Improved but residual right perihilar and left mid basilar lung presumed atelectasis. Persistent blunting of the left costophrenic angle. No sizable pneumothorax.   ABDOMEN: No remarkable upper abdominal findings.   BONES: Partial visualization of left shoulder arthroplasty. No acute osseous changes.       1. Interval improvement in bilateral lung aeration with residual mild edema and left mid basilar lung predominant atelectasis with suggestion of small left pleural effusion   I personally reviewed the images/study and I agree with the findings as stated by Emery Mclain DO, PGY-3. This study was interpreted at [facility], [place], [place].   MACRO: None   Signed by: Mazin Blanco 11/19/2024 1:02 PM Dictation workstation:   [id number]    Electrocardiogram 12-lead PRN for  arrhythmia    Result Date: 11/19/2024  Atrial fibrillation with rapid ventricular response with premature ventricular or aberrantly conducted complexes Low voltage QRS Inferior infarct (cited on or before 17-NOV-2024) Abnormal ECG When compared with ECG of 17-NOV-2024 08:43, Previous ECG has undetermined rhythm, needs review Questionable change in QRS duration Borderline criteria for Lateral infarct are no longer Present Confirmed by Lucas Kerns ([id number]) on 11/19/2024 8:57:22 AM    Transthoracic Echo (TTE) Limited    Result Date: 11/18/2024   [facility] (formerly [facility])[3], [address] [place] [address]                Tel [phone] and Fax [phone] TRANSTHORACIC ECHOCARDIOGRAM REPORT  Patient Name:       RJ Webb Physician:    [id number] Fawad Chicas MD Study Date:         11/18/2024          Ordering Provider:    [id number] HARRISON PALOMINO MRN/PID:            [id number]            Fellow: Accession#:         [id number]        Nurse: Date of Birth/Age:  1955 / 69     Sonographer:          Genesis Sinclair RDCS                     years Gender assigned at  M                   Additional Staff: Birth: Height:             182.88 cm           Admit Date:           10/24/2024 Weight:             93.44 kg            Admission Status:     Inpatient -                                                               Routine BSA / BMI:          2.16 m2 / 27.94     Encounter#:           [id number]                     kg/m2 Blood Pressure:     /                   Department Location:  [facility] Study Type:    TRANSTHORACIC ECHO (TTE) LIMITED Diagnosis/ICD: Presence of heart assist device-Z95.811 Indication:    Ramp study for LVAD CPT Code:      Echo Limited-29835; Doppler Limited-71615; Color Doppler-77274 Patient History: Pertinent History: Acute on chronic heart failure with  reduced EF, Diastolic                    disfunction, Ischemic cardiiomyopathy, MI, CHF, LVAD, VIV. Study Detail: The following Echo studies were performed: 2D, Doppler, color flow               and M-Mode. Technically challenging study due to patient lying in               supine position and postoperative dressings.  PHYSICIAN INTERPRETATION: Left Ventricle: Left ventricular ejection fraction is severely decreased, by visual estimate at 10%. There is global hypokinesis of the left ventricle with minor regional variations. The left ventricular cavity size is severely dilated. Abnormal (paradoxical) septal motion consistent with post-operative status. Left ventricular diastolic filling was not assessed. Left Atrium: The left atrium is moderate to severely dilated. Right Ventricle: The right ventricle is severely enlarged. There is severely reduced right ventricular systolic function. Right Atrium: The right atrium is moderately dilated. Aortic Valve: The aortic valve is structurally normal. There is mild aortic valve cusp calcification. There is mild aortic valve regurgitation. Mitral Valve: The mitral valve is normal in structure. There is mild mitral valve regurgitation. Tricuspid Valve: The tricuspid valve is structurally normal. There is mild tricuspid regurgitation. Pulmonic Valve: The pulmonic valve is not well visualized. Pulmonic valve regurgitation was not assessed. Pericardium: Trivial pericardial effusion. Aorta: The aortic root is normal. In comparison to the previous echocardiogram(s): Compared with study dated 11/15/2024, no significant change.  LEFT VENTRICULAR ASSIST DEVICE: LVAD: The patient has a(n) HeartMate 3 LVAD device present. Inflow Cannula: The inflow cannula is visualized in the left ventricular apex. AV Leaflet Mobility: . The aortic valve appears to be opening every 1 beats. LVAD Comments: Ramp study speed increased from 5200 to 5300. Aortic valve still opens every beat. Septum remains in  the midline.  CONCLUSIONS:  1. Poorly visualized anatomical structures due to suboptimal image quality.  2. Left ventricular ejection fraction is severely decreased, by visual estimate at 10%.  3. There is global hypokinesis of the left ventricle with minor regional variations.  4. Left ventricular cavity size is severely dilated.  5. Abnormal septal motion consistent with post-operative status.  6. There is severely reduced right ventricular systolic function.  7. Severely enlarged right ventricle.  8. The left atrium is moderate to severely dilated.  9. The right atrium is moderately dilated. 10. Mild aortic valve regurgitation. 11. . The aortic valve appears to be opening every 1 beats. 12. Compared with study dated 11/15/2024, no significant change. QUANTITATIVE DATA SUMMARY:  LV SYSTOLIC FUNCTION BY 2D PLANIMETRY (MOD):                      Normal Ranges: EF-Visual:      10 % LV EF Reported: 10 %  [id number] Fawad Chicas MD Electronically signed on 11/18/2024 at 12:33:46 PM  ** Final **     XR chest 1 view    Result Date: 11/18/2024  Interpreted By:  Elvis Ross, STUDY: XR CHEST 1 VIEW; 11/18/2024 6:47 am   INDICATION: Signs/Symptoms:s/p LVAD implant, assess lung fields, devices.   COMPARISON: Radiograph dated 11/17/2024   ACCESSION NUMBER(S): [id number]   ORDERING CLINICIAN: ANETA MANNING   FINDINGS: Interval removal of [place]-Faviola catheter. Unchanged positioning of LVAD.   Cardiac silhouette size is persistently enlarged.   Increased interstitial markings and ground-glass opacity, worse compared to prior study. Bilateral basilar pleural effusion and atelectasis. No sizable pneumothorax.   No acute osseous abnormality.       1. Worsening pulmonary edema. Correlate with volume status. 2. Persistent bibasilar pleural effusion and atelectasis with slight worsening of the aeration of the bases.       Signed by: Elvis Sagastume 11/18/2024 10:57 AM Dictation workstation:   [id number]    CT head wo IV  contrast    Result Date: 11/18/2024  Interpreted By:  Amarilis Owen and Beyersdorf Conner STUDY: CT HEAD WO IV CONTRAST;  11/17/2024 10:54 pm   INDICATION: Signs/Symptoms:tranient vision changes, s/p LVAD.     COMPARISON: None.   ACCESSION NUMBER(S): [id number]   ORDERING CLINICIAN: ANETA MANNING   TECHNIQUE: Noncontrast axial CT scan of head was performed. Angled reformats in brain and bone windows were generated. The images were reviewed in bone, brain, blood and soft tissue windows.   FINDINGS: CSF Spaces: The ventricles, sulci and basal cisterns are within normal limits. There is no extraaxial fluid collection.   Parenchyma:  The grey-white differentiation is intact. Mild patchy nonspecific white matter hypodensity is compatible with microangiopathy. There is no mass effect or midline shift.  There is no intracranial hemorrhage.   Calvarium: The calvarium is unremarkable.   Paranasal sinuses and mastoids: Visualized paranasal sinuses and mastoids are clear.       No acute intracranial hemorrhage or mass effect.   I personally reviewed the image(s)/study and resident interpretation. I agree with the findings as stated by resident Rich Mccurdy. Data analyzed and images interpreted at [facility], [place], [place].   MACRO: None   Signed by: Amarilis Owen 11/18/2024 7:20 AM Dictation workstation:   [id number]    XR chest 1 view    Result Date: 11/17/2024  Interpreted By:  Elvis Ross, STUDY: XR CHEST 1 VIEW; 11/17/2024 4:04 am   INDICATION: Signs/Symptoms:s/p LVAD implant, assess lung fields, devices.   COMPARISON: Radiograph dated 11/16/2024   ACCESSION NUMBER(S): [id number]   ORDERING CLINICIAN: ANETA MANNING   FINDINGS: Right IJ [place]-Faviola catheter is in place with the tip projecting over the right main pulmonary artery. LVAD is stable in position.   Cardiac silhouette size is persistently enlarged. Status post median sternotomy.   Persistent bibasilar  atelectasis and interstitial edema with slight improvement in the aeration of the lungs. Small bibasilar pleural effusion, left more than right. No sizable pneumothorax.   No acute osseous abnormality.       1. Slight interval improvement in pulmonary edema and bibasilar atelectasis. Small bibasilar pleural effusion, left more than right. No pneumothorax.       Signed by: Elvis Sagastume 11/17/2024 9:44 AM Dictation workstation:   [id number]    XR chest 1 view    Result Date: 11/16/2024  Interpreted By:  Elvis Ross, STUDY: XR CHEST 1 VIEW; 11/16/2024 3:47 am   INDICATION: Signs/Symptoms:s/p LVAD implant, assess lung fields, devices.   COMPARISON: Radiograph dated 11/15/2024   ACCESSION NUMBER(S): [id number]   ORDERING CLINICIAN: ANETA MANNING   FINDINGS: Right IJ approach [place]-Faviola catheter is in place with the tip projecting over the proximal right main pulmonary artery. LVAD is unchanged in position.   Status post median sternotomy. The cardiac silhouette size is significantly enlarged, unchanged.   Persistent bibasilar pleural effusion and bibasilar atelectasis and mild perihilar edema, unchanged. No sizable pneumothorax.   No acute osseous abnormality.       No significant interval change in bilateral pleural effusions with bibasilar atelectasis and mild perihilar congestion/edema.     Signed by: Elvis Sagastume 11/16/2024 10:28 AM Dictation workstation:   [id number]    XR chest 1 view    Result Date: 11/15/2024  Interpreted By:  Tuan Platt, STUDY: XR CHEST 1 VIEW;  11/15/2024 3:29 am   INDICATION: Signs/Symptoms:s/p LVAD implant, assess lung fields, devices.   COMPARISON: Chest radiograph dated 11/14/2024   ACCESSION NUMBER(S): [id number]   ORDERING CLINICIAN: ANETA MANNING   FINDINGS: AP radiograph of the chest   The swans Faviola catheter is within the distal right pulmonary artery. VD projects overlying the left lower thorax.   Sternal sutures are noted. Vascular clips overlie the  mediastinum. There is moderately severe cardiomegaly. Opacity obscures the left diaphragm and costophrenic sulcus.       1. Pulmonary aeration is similar to the prior study Opacity within left lower lobe persists obscuring the left diaphragm laterally and the costophrenic sulcus       Signed by: Tuan Platt 11/15/2024 12:27 PM Dictation workstation:   [id number]    Transthoracic Echo (TTE) Limited    Result Date: 11/15/2024   [facility], [address] [place] [address]                Tel [phone] and Fax [phone] TRANSTHORACIC ECHOCARDIOGRAM REPORT  Patient Name:       RJ LEIJA     Reading Physician:    [id number] Fawad Chicas MD Study Date:         11/15/2024          Ordering Provider:    [id number] ANETA MANNING MRN/PID:            [id number]            Fellow:               [id number] Aramis Fitzpatrick MD Accession#:         [id number]        Nurse: Date of Birth/Age:  1955 / 69     Sonographer:          Jackson estrada                                     BLANQUITA Gender assigned at                     Additional Staff: Birth: Height:             182.88 cm           Admit Date: Weight:             97.07 kg            Admission Status:     Inpatient - STAT BSA / BMI:          2.19 m2 / 29.02     Encounter#:           [id number]                     kg/m2 Blood Pressure:     107/71 mmHg         Department Location:  [facility] Study Type:    TRANSTHORACIC ECHO (TTE) LIMITED Diagnosis/ICD: Presence of heart assist device-Z95.811 Indication:    LVAD ramp study CPT Code:      Echo Limited-29600; Doppler Limited-86862; Color Doppler-39055 Patient History: Pertinent History: S/p CABG x 4 (2012) and PCI (LCx/OM; 5/2019), Stage D ICM                    HFrEF LVEF 10-15%, AF s/p  RFA (PVI and CTI; 4/2024), HTN,                    Dyslipidemia, 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 Study Detail: The following Echo studies were performed: 2D, M-Mode, Doppler and               color flow. Technically challenging study due to poor acoustic               windows, prominent lung artifact, body habitus and patient lying               in supine position.  PHYSICIAN INTERPRETATION: Left Ventricle: The left ventricular systolic function is severely decreased, with a visually estimated ejection fraction of 10-15%. There is global hypokinesis of the left ventricle with minor regional variations. The left ventricular cavity size is severely dilated. There is normal septal and normal posterior left ventricular wall thickness. Abnormal (paradoxical) septal motion consistent with post-operative status. Left ventricular diastolic filling cannot be determined, due to left ventricular assist device. Left Atrium: The left atrium is mildly dilated. Right Ventricle: The right ventricle is severely enlarged. There is severely reduced right ventricular systolic function. A device is visualized in the right ventricle. Right Atrium: The right atrium is severely dilated. There is a device visualized in the right atrium. Aortic Valve: The aortic valve is structurally normal. There is mild to moderate aortic valve cusp calcification. There is mild aortic valve regurgitation. Mitral Valve: The mitral valve is normal in structure. There is mild mitral annular calcification. There is mild mitral valve regurgitation. Tricuspid Valve: The tricuspid valve is structurally normal. There is mild tricuspid regurgitation. The right ventricular systolic pressure is unable to be estimated. Pulmonic Valve: The pulmonic valve is not well visualized. Pulmonic valve regurgitation was not assessed. Pericardium: Trivial pericardial effusion. Aorta: The aortic root is abnormal.  There is mild dilatation of the ascending aorta. There is mild dilatation of the aortic root. In comparison to the previous echocardiogram(s): Compared with study dated 11/13/2024, no significant change.  LEFT VENTRICULAR ASSIST DEVICE: Study Type: This study was performed while LVAD settings were optimized. LVAD: The patient has a(n) HeartMate 3 LVAD device present. Inflow Cannula: The inflow cannula is visualized in the left ventricular apex. Outflow Cannula: Visualization of the outflow cannula is technically difficult. AV Leaflet Mobility: . The aortic valve appears to be opening every 1 beats.  CONCLUSIONS:  1. The left ventricular systolic function is severely decreased, with a visually estimated ejection fraction of 10-15%.  2. There is global hypokinesis of the left ventricle with minor regional variations.  3. Left ventricular diastolic filling cannot be determined, due to left ventricular assist device.  4. Left ventricular cavity size is severely dilated.  5. Abnormal septal motion consistent with post-operative status.  6. There is severely reduced right ventricular systolic function.  7. Severely enlarged right ventricle.  8. The left atrium is mildly dilated.  9. The right atrium is severely dilated. 10. Mild aortic valve regurgitation. 11. . The aortic valve appears to be opening every 1 beats. 12. Compared with study dated 11/13/2024, no significant change. QUANTITATIVE DATA SUMMARY:  2D MEASUREMENTS:          Normal Ranges: IVSd:            1.00 cm  (0.6-1.1cm) LVPWd:           1.00 cm  (0.6-1.1cm) LVIDd:           5.90 cm  (3.9-5.9cm) LV Mass Index:   109 g/m2  LA VOLUME:                   Normal Ranges: LA Vol A4C:        86.2 ml   (22+/-6mL/m2) LA Vol Index A4C:  39.3ml/m2 LA Area A4C:       24.2 cm2 LA Major Axis A4C: 5.8 cm  AORTA MEASUREMENTS:         Normal Ranges: Ao Sinus, d:        4.30 cm (2.1-3.5cm)  LV SYSTOLIC FUNCTION BY 2D PLANIMETRY (MOD):                      Normal Ranges:  EF-Visual:      13 % LV EF Reported: 13 %  [id number] Fawad Chicas MD Electronically signed on 11/15/2024 at 12:09:54 PM  ** Final **     XR chest 1 view    Result Date: 11/14/2024  Interpreted By:  Tuan Platt, STUDY: XR CHEST 1 VIEW;  11/14/2024 8:07 am   INDICATION: Signs/Symptoms:s/p LVAD implant, assess lung fields, devices.   COMPARISON: Chest radiograph dated 11/13/2024   ACCESSION NUMBER(S): [id number]   ORDERING CLINICIAN: ANETA MANNING   FINDINGS: AP radiograph of the chest   The endotracheal tube is above the carlo in satisfactory position. The swans Faviola catheter is positioned within the right pulmonary artery. The enteric tube traverses the esophagus. LVAD projects over lying the left lower thorax. Chest tubes are noted.   There is moderate cardiomegaly stable in comparison to previous study. Left lower lobe opacity is noted. Overall pulmonary aeration is similar previous study.       1. Left lower lobe opacity persists similar previous study 2. The pulmonary vascular distribution is similar.       Signed by: Tuan Platt 11/14/2024 12:33 PM Dictation workstation:   [id number]    XR chest 1 view    Result Date: 11/13/2024  Interpreted By:  Tuan Platt and Omar Mahmoud STUDY: XR CHEST 1 VIEW;  11/13/2024 4:41 am   INDICATION: Signs/Symptoms:Post op cardiac surgery.     COMPARISON: Chest x-ray 11/12/2024 6:15 p.m., CT chest 11/04/2024   ACCESSION NUMBER(S): [id number]   ORDERING CLINICIAN: ANETA MANNING   FINDINGS: AP radiograph of the chest was provided.   Patient is mildly rotated to the right which limits evaluation and comparison.   Postoperative findings from median sternotomy. Endotracheal tube tip projects approximately 6.2 cm from the carlo. Right IJ approach pulmonary arterial catheter tip projects over the distal right main pulmonary artery. Again seen LVAD projecting over the left lower thorax. 2 left chest tubes. Enteric tube tip projects over the gastric fundus with the proximal side  port likely above the gastroesophageal junction. Again seen left shoulder arthroplasty.   CARDIOMEDIASTINAL SILHOUETTE: There is moderate cardiomegaly. Cardiomediastinal silhouette is stable in size and configuration.   LUNGS: No pneumothorax. Decrease in diffuse interstitial opacities. There is no new focal consolidation. The bilateral costophrenic angles are clear.   ABDOMEN: No remarkable upper abdominal findings.   BONES: No acute osseous changes.       1. Similar positioning of an enteric tube with maximal side-port likely above the gastroesophageal junction. Consider advancement of the enteric tube. Stable positioning of other medical devices as described above. 2. Decrease in pulmonary interstitial edema. There are no new cardiopulmonary abnormalities.   I personally reviewed the images/study and I agree with the findings as stated by Zack Ortiz MD (PGY-2). This study was interpreted at [place], [place].   MACRO: None   Signed by: Tuan Platt 11/13/2024 2:00 PM Dictation workstation:   [id number]    Anesthesia Intraoperative Transesophageal Echocardiogram    Result Date: 11/13/2024  [facility] - Dept of Anesthesiology    [address] [place] [address]       Tel [phone] and Fax [phone] TRANSESOPHAGEAL ECHOCARDIOGRAM REPORT  Patient Name:       RJ Webb Physician:    [id number] Fred Ortiz MD Study Date:         11/12/2024          Ordering Provider:    [id number] BANKS MRN/PID:            [id number]            Fellow: Accession#:         [id number]        Nurse: Date of Birth/Age:  1955 / 69     Sonographer:                     years Gender assigned at                     Additional Staff: Birth: BSA / BMI:          m2 / kg/m2          Encounter#:           [id number] Blood  Pressure:     /                   Department Location:  [place]                                                               Anesthesia Study Type:    ANESTHESIA INTRAOPERATIVE BRIANA Diagnosis/ICD: Dilated cardiomyopathy-I42.0 Indication:    Left Ventricular Assist Device CPT Code:      BRIANA Complete-26521; Doppler Limited-33792; Color Doppler-89489 PHYSICIAN INTERPRETATION: BRIANA Details: Technically adequate omniplane transesophageal echocardiogram performed. BRIANA Procedure: The probe was passed without difficulty. Left Ventricle: The left ventricular systolic function is severely decreased, with a visually estimated ejection fraction of 15-20%. The left ventricular cavity size is severely dilated. The left ventricular septal wall thickness is mildly increased. Left ventricular diastolic filling was not assessed. Left Atrium: The left atrium is severely dilated. The left atrial appendage Doppler velocities are mildly reduced and there is no thrombus visualized in the left atrial appendage. Right Ventricle: The right ventricle is severely enlarged. There is moderately reduced right ventricular systolic function. Right Atrium: The right atrium is moderately dilated. Aortic Valve: The aortic valve is trileaflet. There is evidence of mild aortic valve stenosis. There is mild aortic valve regurgitation. Calcified valve with fused right and left cusp. Central jet angled towards anterior leaflet of ana valve. Mitral Valve: The mitral valve is mildly thickened. There is no evidence of mitral valve stenosis. There is mild mitral valve regurgitation. Tricuspid Valve: The tricuspid valve is structurally normal. There is mild to moderate tricuspid regurgitation. Pulmonic Valve: The pulmonic valve is structurally normal. There is trace pulmonic valve regurgitation. Pericardium: There is no pericardial effusion noted. Aorta: The aortic root is normal. In comparison to the previous echocardiogram(s): Not performed on  intraoperative study.  CONCLUSIONS:  1. The left ventricular systolic function is severely decreased, with a visually estimated ejection fraction of 15-20%.  2. Left ventricular cavity size is severely dilated.  3. There is moderately reduced right ventricular systolic function.  4. Severely enlarged right ventricle.  5. The left atrium is severely dilated.  6. The right atrium is moderately dilated.  7. Mild to moderate tricuspid regurgitation visualized.  8. Mild aortic valve stenosis.  9. Mild aortic valve regurgitation. POST PROCEDURE REPORT: Patient is on epinephrine and milrinone drips. LV function is unchanged from pre-pump exam. LVAD inflow cannula in good position in LV apex with no obstructin to flow. RV function is mildly improved from pre-pump exam. There is mild aortic regurgitation. Aortic regurgitation is unchanged. No evidence of post aortic cannulation dissection. All transesophageal echocardiogram findings discussed with surgeon. LVAD outflow graft to descending aorta appears to have good flow.  QUANTITATIVE DATA SUMMARY:  LV SYSTOLIC FUNCTION BY 2D PLANIMETRY (MOD):                      Normal Ranges: EF-Visual:      18 % LV EF Reported: 18 %  [id number] Fred Ortiz MD Electronically signed on 11/13/2024 at 11:57:37 AM  ** Final **     XR abdomen 1 view    Result Date: 11/13/2024  Interpreted By:  Tuan Platt and Omar Mahmoud STUDY: XR ABDOMEN 1 VIEW;  11/13/2024 9:16 am   INDICATION: Signs/Symptoms:OG positioning.     COMPARISON: X-ray abdomen 11/12/2024   ACCESSION NUMBER(S): [id number]   ORDERING CLINICIAN: JO CASTILLO   FINDINGS: Enteric tube tip projects over the expected location of the gastric fundus, suggestion of a proximal side-port projects near the gastroesophageal junction. LVAD overlying the left lower chest. Median sternotomy wires again seen. Partially visualized pulmonary arterial catheter.   Nonobstructive bowel gas pattern. Limited evaluation of pneumoperitoneum on  supine imaging, however no gross evidence of free air is noted.   Visualized lungs are clear.   Osseous structures demonstrate no acute bony changes.       Enteric tube tip projects over the gastric fundus with proximal side port near the gastroesophageal junction. Recommend advancement.   I personally reviewed the images/study and I agree with the findings as stated by Zack Ortiz MD (PGY-2). This study was interpreted at [place], [place].   MACRO: None   Signed by: Tuan Platt 11/13/2024 11:17 AM Dictation workstation:   [id number]    XR abdomen 1 view    Result Date: 11/13/2024  Interpreted By:  Tuan Platt  and Cristhian Garcia STUDY: XR ABDOMEN 1 VIEW; XR CHEST 1 VIEW;  11/12/2024 6:42 pm; 11/12/2024 7:13 pm; 11/12/2024 7:04 pm   INDICATION: Signs/Symptoms:OG tube assessment; Signs/Symptoms:Feeding tube; Signs/Symptoms:Et tube placement.   COMPARISON: Chest radiograph 7:04 p.m. same day, abdominal radiograph same day 6:42 p.m.   ACCESSION NUMBER(S): [id number]; [id number]; [id number]   ORDERING CLINICIAN: ANETA MANNING; JAMEL LEDESMA; SHAYY PUENTE   FINDINGS: Chest radiograph 7:04 p.m. same day, and abdominal radiograph same day 6:42 p.m. were dictated together due to proximity of the exams.   AP radiographs of the chest and abdomen were provided. Endotracheal tube noted with tip projecting approximately 5.9 cm above the carlo. Postsurgical changes of LVAD placement. Postsurgical changes of median sternotomy. Right IJ [place]-Faviola catheter with tip projecting over the expected position of the right main pulmonary artery. On the most recent abdominal radiograph, enteric tube seen coursing below the level diaphragm with tip tip projecting over the expected position of the gastroesophageal junction. The side-hole of the enteric tube is within the lower thoracic esophagus. Heart is enlarged. Cardiomediastinal silhouette is stable in size.   No evidence of  pneumothorax. Bilateral costophrenic angles are clear. No focal consolidation. Bilateral mild pulmonary interstitial edema.   Nonobstructive bowel gas pattern. Limited evaluation of pneumoperitoneum on supine imaging, however no gross evidence of free air is noted.   Vizualized lungs are clear.   Osseous structures demonstrate no acute bony changes.       1. Enteric tube seen coursing below the level diaphragm with tip projecting over the expected position of the distal gastroesophageal junction. The side-hole of the enteric tube projects over the distal thoracic esophagus. Repositioning of the enteric tube can be considered based on patient position. 2. Additional medical devices as described above. 3. No pneumothorax, focal consolidation, or pleural effusion. Mild pulmonary interstitial edema. 4. Nonobstructive bowel gas pattern.   I personally reviewed the images/study and I agree with the findings as stated by Jose Morrow MD. This study was interpreted at [facility], [place], [place].   MACRO: None   Signed by: Tuan Platt 11/13/2024 10:53 AM Dictation workstation:   [id number]    XR chest 1 view    Result Date: 11/13/2024  Interpreted By:  Tuan Platt and Nakamoto Kent STUDY: XR ABDOMEN 1 VIEW; XR CHEST 1 VIEW;  11/12/2024 6:42 pm; 11/12/2024 7:13 pm; 11/12/2024 7:04 pm   INDICATION: Signs/Symptoms:OG tube assessment; Signs/Symptoms:Feeding tube; Signs/Symptoms:Et tube placement.   COMPARISON: Chest radiograph 7:04 p.m. same day, abdominal radiograph same day 6:42 p.m.   ACCESSION NUMBER(S): [id number]; [id number]; [id number]   ORDERING CLINICIAN: ANETA PUENTE   FINDINGS: Chest radiograph 7:04 p.m. same day, and abdominal radiograph same day 6:42 p.m. were dictated together due to proximity of the exams.   AP radiographs of the chest and abdomen were provided. Endotracheal tube noted with tip projecting approximately 5.9 cm above  the carlo. Postsurgical changes of LVAD placement. Postsurgical changes of median sternotomy. Right IJ [place]-Faviola catheter with tip projecting over the expected position of the right main pulmonary artery. On the most recent abdominal radiograph, enteric tube seen coursing below the level diaphragm with tip tip projecting over the expected position of the gastroesophageal junction. The side-hole of the enteric tube is within the lower thoracic esophagus. Heart is enlarged. Cardiomediastinal silhouette is stable in size.   No evidence of pneumothorax. Bilateral costophrenic angles are clear. No focal consolidation. Bilateral mild pulmonary interstitial edema.   Nonobstructive bowel gas pattern. Limited evaluation of pneumoperitoneum on supine imaging, however no gross evidence of free air is noted.   Vizualized lungs are clear.   Osseous structures demonstrate no acute bony changes.       1. Enteric tube seen coursing below the level diaphragm with tip projecting over the expected position of the distal gastroesophageal junction. The side-hole of the enteric tube projects over the distal thoracic esophagus. Repositioning of the enteric tube can be considered based on patient position. 2. Additional medical devices as described above. 3. No pneumothorax, focal consolidation, or pleural effusion. Mild pulmonary interstitial edema. 4. Nonobstructive bowel gas pattern.   I personally reviewed the images/study and I agree with the findings as stated by Jose Morrow MD. This study was interpreted at [facility], [place], [place].   MACRO: None   Signed by: Tuan Platt 11/13/2024 10:53 AM Dictation workstation:   [id number]    XR abdomen 1 view    Result Date: 11/13/2024  Interpreted By:  Tuan Platt and Nakamoto Kent STUDY: XR ABDOMEN 1 VIEW; XR CHEST 1 VIEW;  11/12/2024 6:42 pm; 11/12/2024 7:13 pm; 11/12/2024 7:04 pm   INDICATION: Signs/Symptoms:OG tube assessment;  Signs/Symptoms:Feeding tube; Signs/Symptoms:Et tube placement.   COMPARISON: Chest radiograph 7:04 p.m. same day, abdominal radiograph same day 6:42 p.m.   ACCESSION NUMBER(S): [id number]; [id number]; [id number]   ORDERING CLINICIAN: ANETA MANNING; JAMEL LEDESMA; SHAYY PUENTE   FINDINGS: Chest radiograph 7:04 p.m. same day, and abdominal radiograph same day 6:42 p.m. were dictated together due to proximity of the exams.   AP radiographs of the chest and abdomen were provided. Endotracheal tube noted with tip projecting approximately 5.9 cm above the carlo. Postsurgical changes of LVAD placement. Postsurgical changes of median sternotomy. Right IJ [place]-Faviola catheter with tip projecting over the expected position of the right main pulmonary artery. On the most recent abdominal radiograph, enteric tube seen coursing below the level diaphragm with tip tip projecting over the expected position of the gastroesophageal junction. The side-hole of the enteric tube is within the lower thoracic esophagus. Heart is enlarged. Cardiomediastinal silhouette is stable in size.   No evidence of pneumothorax. Bilateral costophrenic angles are clear. No focal consolidation. Bilateral mild pulmonary interstitial edema.   Nonobstructive bowel gas pattern. Limited evaluation of pneumoperitoneum on supine imaging, however no gross evidence of free air is noted.   Vizualized lungs are clear.   Osseous structures demonstrate no acute bony changes.       1. Enteric tube seen coursing below the level diaphragm with tip projecting over the expected position of the distal gastroesophageal junction. The side-hole of the enteric tube projects over the distal thoracic esophagus. Repositioning of the enteric tube can be considered based on patient position. 2. Additional medical devices as described above. 3. No pneumothorax, focal consolidation, or pleural effusion. Mild pulmonary interstitial edema. 4. Nonobstructive bowel gas  pattern.   I personally reviewed the images/study and I agree with the findings as stated by Jose Morrow MD. This study was interpreted at [facility], [place], [place].   MACRO: None   Signed by: Tuan Platt 11/13/2024 10:53 AM Dictation workstation:   [id number]    Transthoracic Echo (TTE) Limited    Result Date: 11/13/2024   [facility], [address] [place] [address]                Tel [phone] and Fax [phone] TRANSTHORACIC ECHOCARDIOGRAM REPORT  Patient Name:       RJ LEIJA     Reading Physician:    [id number] Raciel Casarez MD Study Date:         11/13/2024          Ordering Provider:    [id number] JO CASTILLO MRN/PID:            [id number]            Fellow: Accession#:         [id number]        Nurse: Date of Birth/Age:  1955 / 69     Sonographer:          Jackson estrada RDCS Gender assigned at  M                   Additional Staff: Birth: Height:             182.88 cm           Admit Date: Weight:             93.90 kg            Admission Status:     Inpatient - STAT BSA / BMI:          2.16 m2 / 28.07     Encounter#:           [id number]                     kg/m2 Blood Pressure:     94/74 mmHg          Department Location:  [facility] Study Type:    TRANSTHORACIC ECHO (TTE) LIMITED Diagnosis/ICD: 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 Study Detail: The following Echo studies were performed: 2D, M-Mode, Doppler and               color flow. Technically challenging study due to body habitus,               patient lying in supine position, postoperative dressings,               prominent lung artifact and poor acoustic windows. The patient is               intubated. Definity used as a contrast agent for endocardial               border definition. Total contrast used for this procedure was 2.0               mL via IV push.  PHYSICIAN INTERPRETATION: Left Ventricle: Left ventricular ejection fraction is severely decreased, by visual estimate at 15%. There is eccentric left ventricular hypertrophy. There is global hypokinesis of the left ventricle with minor regional variations. The left ventricular cavity size is moderate to severely dilated. Left ventricular diastolic filling was not assessed. Left Atrium: The left atrium is moderately dilated. Right Ventricle: The right ventricle is mild to moderately enlarged. There is severely reduced right ventricular systolic function. A device is visualized in the right ventricle. Right Atrium: The right atrium is moderately dilated. There is a device visualized in the right atrium. Aortic Valve: The aortic valve was not well visualized. There is mild to moderate aortic valve cusp calcification. There is mild aortic valve regurgitation. Mitral Valve: The mitral valve is normal in structure. There is mild mitral valve regurgitation. Tricuspid Valve: The tricuspid valve is structurally normal. There is mild tricuspid regurgitation. Pulmonic Valve: The pulmonic valve is not well visualized. There is trace pulmonic valve regurgitation. Pericardium: There is no pericardial effusion noted. Aorta: The aortic root is abnormal. There is mild dilatation of the aortic root. Pulmonary Artery: The tricuspid regurgitant velocity is 1.83 m/s, and with an estimated right atrial pressure of 10 mmHg, the  estimated pulmonary artery pressure is normal with the RVSP at 23.4 mmHg. Systemic Veins: The inferior vena cava appears moderately dilated, on vent. In comparison to the previous echocardiogram(s): Compared with study dated 11/5/2024, no significant change.  LEFT VENTRICULAR ASSIST DEVICE: LVAD: The patient has a(n) HeartMate 3 LVAD device present. Inflow Cannula: The inflow cannula is visualized in the left ventricular apex.  CONCLUSIONS:  1. Poorly visualized anatomical structures due to suboptimal image quality.  2. Left ventricular cavity size is moderate to severely dilated.  3. Left ventricular ejection fraction is severely decreased, by visual estimate at 15%.  4. There is global hypokinesis of the left ventricle with minor regional variations.  5. There is severely reduced right ventricular systolic function.  6. Mild to moderately enlarged right ventricle.  7. The left atrium is moderately dilated.  8. The right atrium is moderately dilated.  9. Mild aortic valve regurgitation. 10. The inferior vena cava appears moderately dilated, on vent. QUANTITATIVE DATA SUMMARY:  2D MEASUREMENTS:          Normal Ranges: IVSd:            1.00 cm  (0.6-1.1cm) LVPWd:           1.20 cm  (0.6-1.1cm) LVIDd:           6.90 cm  (3.9-5.9cm) LVIDs:           6.60 cm LV Mass Index:   164 g/m2 LV % FS          4.3 %  AORTA MEASUREMENTS:         Normal Ranges: Ao Sinus, d:        4.00 cm (2.1-3.5cm)  LV SYSTOLIC FUNCTION BY 2D PLANIMETRY (MOD):                      Normal Ranges: EF-Visual:      15 % LV EF Reported: 15 %  RIGHT VENTRICLE: RV s' 0.07 m/s  TRICUSPID VALVE/RVSP:          Normal Ranges: Peak TR Velocity:     1.83 m/s RV Syst Pressure:     23 mmHg  (< 30mmHg) IVC Diam:             2.70 cm  [id number] Raciel Casarez MD Electronically signed on 11/13/2024 at 10:25:04 AM  ** Final **     XR chest 1 view    Result Date: 11/12/2024  Interpreted By:  Tuan Platt  and Angel Dixon STUDY: XR CHEST 1 VIEW;  11/12/2024 1:38 pm    INDICATION: Signs/Symptoms:Post op cardiac surgery.     COMPARISON: Chest x-ray 11/11/2024 7:55 a.m., CT chest abdomen pelvis 11/04/2024   ACCESSION NUMBER(S): [id number]   ORDERING CLINICIAN: ANETA MANNING   FINDINGS: AP radiograph of the chest was provided.   Interval demonstration of postsurgical changes of LVAD placement. Postsurgical changes from median sternotomy surgical clips overlying left hilum. Right IJ pulmonary arterial catheter tip projects over the distal right main pulmonary artery. Interval placement of an enteric tube with tip projecting over the expected location of the gastroesophageal junction. Interval placement of left chest tubes. Partially visualized left shoulder arthroplasty.   CARDIOMEDIASTINAL SILHOUETTE: The heart is enlarged. Cardiomediastinal silhouette is stable in size and configuration.   LUNGS: Mild perihilar interstitial opacities, grossly stable as compared to prior. The bilateral costophrenic angles are clear, within the limitations of the newly placed LVAD obscured the left lower lung field. There is no evidence of a pneumothorax. There is no new focal consolidation.   ABDOMEN: No remarkable upper abdominal findings.   BONES: No acute osseous changes.       1. Postsurgical changes and medical devices as described above. Recommend advancement of enteric tube. 2. Stable mild pulmonary interstitial edema.   I personally reviewed the images/study and I agree with the findings as stated by Zack Ortiz MD (PGY-2). This study was interpreted at [facility], [place], [place].   MACRO: None   Signed by: Tuan Platt 11/12/2024 3:19 PM Dictation workstation:   [id number]    XR chest 1 view    Result Date: 11/11/2024  Interpreted By:  Tuan Platt and Omar Mahmoud STUDY: XR CHEST 1 VIEW;  11/11/2024 8:02 am   INDICATION: Signs/Symptoms:SGC monitoring.     COMPARISON: Chest x-ray 11/10/2024, CT chest 1124   ACCESSION NUMBER(S): [id number]   ORDERING  CLINICIAN: KHALIDA UP   FINDINGS: AP radiograph of the chest was provided.   Patient is mildly rotated to the right which limits evaluation and comparison.   Postoperative findings from median sternotomy. Right IJ approach pulmonary arterial catheter with tip projecting over a proximal right lower lobe pulmonary artery, advanced as compared to prior.   CARDIOMEDIASTINAL SILHOUETTE: Heart is moderately enlarged. Small pericardial effusion not excluded. Cardiomediastinal silhouette is stable in size and configuration.   LUNGS: Mild pulmonary vascular congestion within the right lung is noted. No acute pulmonary consolidations or effusions are demonstrated.   ABDOMEN: No remarkable upper abdominal findings.   BONES: No acute osseous changes.       1. Interval advancement of a pulmonary arterial catheter with tip now projecting over a proximal right lower lobe pulmonary artery. 2. Similar enlargement of the cardiomediastinal silhouette. 3. No acute cardiopulmonary process.   I personally reviewed the images/study and I agree with the findings as stated by Zack Ortiz MD (PGY-2). This study was interpreted at [place], [place].   MACRO: None   Signed by: Tuan Platt 11/11/2024 2:18 PM Dictation workstation:   [id number]    XR chest 1 view    Result Date: 11/10/2024  Interpreted By:  Olvin Farias, STUDY: XR CHEST 1 VIEW; 11/10/2024 8:17 am   INDICATION: Signs/Symptoms:SGC monitoring.   COMPARISON: Prior day radiograph   ACCESSION NUMBER(S): [id number]   ORDERING CLINICIAN: KHALIDA UP   FINDINGS:     CARDIOMEDIASTINAL SILHOUETTE: Patient is status post median sternotomy. Megaly versus pericardial effusion. 9 [place]-Faviola catheter overlies the right main pulmonary artery.   LUNGS: Minimal prominence pulmonary interstitium but no focal airspace disease. No effusions.   ABDOMEN: No remarkable upper abdominal findings.   BONES: No acute osseous changes.       1.   [place]-Faviola catheter overlies the right main pulmonary artery.     Signed by: Olvin Farias 11/10/2024 5:29 PM Dictation workstation:   [id number]    XR chest 1 view    Result Date: 11/9/2024  Interpreted By:  Olvin Farias and Bartolomei Aguilar Christopher STUDY: XR CHEST 1 VIEW;  11/9/2024 6:09 pm   INDICATION: Signs/Symptoms:PA catheter retracted then advanced to address coiling, now at 62 cm.   COMPARISON: Chest radiograph 11/09/2024   ACCESSION NUMBER(S): [id number]   ORDERING CLINICIAN: KHALIDA UP   FINDINGS: Semi-erect AP radiograph of the chest was provided.   Right internal jugular pulmonary artery catheter with distal tip projecting over the distal right main pulmonary artery. Status post median sternotomy.   CARDIOMEDIASTINAL SILHOUETTE: Stable cardiomegaly versus pericardial effusion.   LUNGS: No pneumothorax. No focal consolidation. No pleural effusion.   ABDOMEN: No remarkable upper abdominal findings.   BONES: No acute osseous changes.       1.  Right IJ pulmonary artery catheter with distal tip projecting over the distal main pulmonary artery. 2. Otherwise, stable cardiomegaly versus pericardial effusion. 3. Mild perihilar interstitial edema and correlate with fluid status.   I personally reviewed the images/study and I agree with the findings as stated by Aleks Merino MD (Radiology Resident).   MACRO: None   Signed by: Olvin Farias 11/9/2024 9:03 PM Dictation workstation:   [id number]    XR chest 1 view    Result Date: 11/9/2024  Interpreted By:  Olvin Farias and Tippareddy Charit STUDY: XR CHEST 1 VIEW; ;  11/9/2024 4:00 pm   INDICATION: Signs/Symptoms:retracted PA catheter 3 cm.   COMPARISON: XR CHEST 1 VIEW;  11/9/2024 3:11 pm   ACCESSION NUMBER(S): [id number]   ORDERING CLINICIAN: KHALIDA UP   FINDINGS: AP radiograph of the chest was provided.   Right IJ [place]-Faviola catheter that appears to coiled onto itself with tip projecting over the expected  position of the right ventricle in similar positioning compared to prior examination. Postsurgical changes from median sternotomy. Redemonstration of surgical staples overlying the aortic knob.   CARDIOMEDIASTINAL SILHOUETTE:  Cardiomediastinal silhouette is stable and enlarged in size with globular configuration.   LUNGS:  No pneumothorax. No focal consolidation or pleural effusion.   ABDOMEN:  No remarkable upper abdominal findings.   BONES:  No acute osseous changes. Status post left shoulder arthroplasty.       1. Right IJ [place]-Faviola catheter appears to be coiled onto itself with tip projecting over the expected position of the right ventricle. Repositioning of the right IJ [place]-Faviola catheter would be recommended with repeat radiographs. 2. Stable cardiomegaly versus pericardial effusion.   I personally reviewed the images/study and I agree with the findings as stated by Ellen Dial MD. This study was interpreted at [place], [place].   MACRO: None   Signed by: Olvin Farias 11/9/2024 9:02 PM Dictation workstation:   [id number]    XR chest 1 view    Result Date: 11/9/2024  Interpreted By:  Olvin Farias and Nakamoto Kent STUDY: XR CHEST 1 VIEW;  11/9/2024 3:11 pm   INDICATION: Signs/Symptoms:post PA catheter placement.   COMPARISON: Chest radiograph 11/06/2024   ACCESSION NUMBER(S): [id number]   ORDERING CLINICIAN: KHALIDA UP   FINDINGS: AP radiograph of the chest was provided.   Right IJ [place]-Faviola catheter that appears to coiled onto itself with tip projecting over the expected position of the right ventricle. Postsurgical changes from median sternotomy.   CARDIOMEDIASTINAL SILHOUETTE: Cardiomediastinal silhouette is stable and enlarged in size with globular configuration.   LUNGS: No pneumothorax. No focal consolidation or pleural effusion..   ABDOMEN: No remarkable upper abdominal findings.   BONES: No acute osseous changes.       1. Right IJ  [place]-Faviola catheter appears to be coiled onto itself with tip projecting over the expected position of the right ventricle. Repositioning of the right IJ [place]-Faviola catheter would be recommended with repeat radiographs. 2. Stable cardiomegaly.   I personally reviewed the images/study and I agree with the findings as stated by Jose Morrow MD. This study was interpreted at [facility], [place], [place].   The above findings were communicated with Dr. Christ Stanford by epic chat at 3:29 pm.   MACRO: Critical Finding:  See findings. Notification was initiated on 11/9/2024 at 3:53 pm by  Jose Morrow.  (**-OCF-**) Instructions:   Signed by: Olvin Farias 11/9/2024 9:01 PM Dictation workstation:   [id number]    XR chest 1 view    Result Date: 11/7/2024  Interpreted By:  Tuan Platt, STUDY: XR CHEST 1 VIEW;  11/6/2024 9:04 am   INDICATION: Signs/Symptoms:SGC.   COMPARISON: Chest radiograph dated 11/6/2024   ACCESSION NUMBER(S): [id number]   ORDERING CLINICIAN: PAYAL KOHLER   FINDINGS: AP radiograph of the chest     The swans Faviola catheter is positioned within the main pulmonary artery. There has been previous median sternotomy.   The heart is enlarged and stable. Pulmonary aeration is similar previous study. No new infiltrates, consolidations or effusions are noted.       1. No acute cardiopulmonary pathology       Signed by: Tuan Platt 11/7/2024 9:24 AM Dictation workstation:   [id number]    Vascular US Ankle Brachial Index (SITA) Without Exercise    Result Date: 11/6/2024            [place] [address]   Tel [phone] and Fax [phone]  Vascular Lab Report [facility] US ANKLE BRACHIAL INDEX (SITA) WITHOUT EXERCISE  Patient Name:     RJ Webb           [id number] Nely Coffman MD                                        Physician: Study Date:       11/5/2024            Ordering          [id number] LANDON GARCES                                         Physician: MRN/PID:          [id number]             Technologist:     Pinky Wyatt RVT,                                                          [facility] Accession#:       [id number]         Technologist 2: Date of           1955 / 69      Encounter#:       [id number] Birth/Age:        years Gender:           M Admission Status: Inpatient            Location          [facility]                                        Performed:  Diagnosis/ICD: Peripheral vascular disease, unspecified-I73.9 CPT Codes:     91369 Peripheral artery Lower arterial Duplex complete  CONCLUSIONS: Right Lower PVR: No evidence of arterial occlusive disease in the right lower extremity at rest. Multiphasic flow is noted in the right common femoral artery, right posterior tibial artery and right dorsalis pedis artery. Brachial pressure not obtained due to lines. Left Lower PVR: No evidence of arterial occlusive disease in the left lower extremity at rest. Multiphasic flow is noted in the left common femoral artery, left dorsalis pedis artery and left posterior tibial artery.  Imaging & Doppler Findings:  RIGHT Lower PVR                Pressures Ratios Right Posterior Tibial (Ankle) 136 mmHg  1.30 Right Dorsalis Pedis (Ankle)   118 mmHg  1.12   LEFT Lower PVR                Pressures Ratios Left Posterior Tibial (Ankle) 117 mmHg  1.11 Left Dorsalis Pedis (Ankle)   126 mmHg  1.20                      Left Brachial Pressure 105 mmHg   [id number] Nely Coffman MD Electronically signed by [id number] Nely Coffman MD on 11/6/2024 at 7:55:34 PM  ** Final **     XR chest 1 view    Result Date: 11/6/2024  Interpreted By:  Tuan Platt, STUDY: XR CHEST 1 VIEW;  11/5/2024 7:42 am   INDICATION: Signs/Symptoms:SGC.   COMPARISON: Chest radiograph dated 11/5/2024   ACCESSION NUMBER(S): [id number]   ORDERING CLINICIAN: PAYAL KOHLER   FINDINGS: AP radiograph of the chest Medical devices: The swans Faviola catheter is  positioned within the right main pulmonary artery similar previous study. Sternal sutures are intact.   The heart is moderately severely enlarged. Pulmonary aeration is similar to previous study.   The left shoulder arthroplasty is partially visualized.       1. No evidence of acute cardiopulmonary process. 2. No significant changes noted in comparison to the prior study.       Signed by: Tuan Platt 11/6/2024 8:03 AM Dictation workstation:   [id number]    Transthoracic Echo (TTE) Limited    Result Date: 11/5/2024   [facility], [address] [place] [address]                Tel [phone] and Fax [phone] TRANSTHORACIC ECHOCARDIOGRAM REPORT  Patient Name:       RJ Webb Physician:    [id number] Mira Mayer MD Study Date:         11/5/2024           Ordering Provider:    [id number] PAYAL KOHLER MRN/PID:            [id number]            Fellow: Accession#:         [id number]        Nurse: Date of Birth/Age:  1955 / 69     Sonographer:          Maureen estrada                                     RDCS Gender assigned at                     Additional Staff: Birth: Height:             182.88 cm           Admit Date:           10/24/2024 Weight:             93.44 kg            Admission Status:     Inpatient - STAT BSA / BMI:          2.16 m2 / 27.94     Encounter#:           [id number]                     kg/m2 Blood Pressure:     106/72 mmHg         Department Location:  [address] Study Type:    TRANSTHORACIC ECHO (TTE) LIMITED Diagnosis/ICD: Heart failure, unspecified-I50.9 Indication:    RV assessment CPT Code:      Echo Limited-41650; Doppler Limited-15718; Color Doppler-33252 Patient History: Pertinent History: Negative bubble peformed on prior echo; Known 15-20% EF;                    Cardiogenic shock; Acute  on chronic heart failure with                    reduced EF and diastolic dysfunction; ICM; Hx of chronic                    A-Fib; VIV. Study Detail: The following Echo studies were performed: 2D, M-Mode, Doppler and               color flow. Technically challenging study due to body habitus.               Definity used as a contrast agent for endocardial border               definition. Total contrast used for this procedure was 2 mL via IV               push.  PHYSICIAN INTERPRETATION: Left Ventricle: Left ventricular ejection fraction is severely decreased, calculated by Loera's biplane at 18%. There is severely increased eccentric left ventricular hypertrophy. There is global hypokinesis of the left ventricle with minor regional variations. The left ventricular cavity size is severely dilated. There is normal septal and normal posterior left ventricular wall thickness. Left ventricular diastolic filling was not assessed. There is no definite left ventricular thrombus visualized. Left Atrium: The left atrium is severely dilated. Right Ventricle: The right ventricle is severely enlarged. There is severely reduced right ventricular systolic function. A device is visualized in the right ventricle. Right Atrium: The right atrium is severely dilated. There is a device visualized in the right atrium. Aortic Valve: The aortic valve was not well visualized. There is mild aortic valve cusp calcification. Aortic valve regurgitation was not assessed. Mitral Valve: The mitral valve is normal in structure. Mitral valve regurgitation was not assessed. Tricuspid Valve: The tricuspid valve is structurally normal. There is mild tricuspid regurgitation. The Doppler estimated RVSP is mildly elevated at 42.9 mmHg. Pulmonic Valve: The pulmonic valve was not assessed. Pulmonic valve regurgitation was not assessed. Pericardium: Trivial pericardial effusion. Aorta: The aortic root is abnormal. The aortic root appears mildly dilated  and measures 4.20 cm. There is mild dilatation of the ascending aorta. There is mild dilatation of the aortic root. Systemic Veins: The inferior vena cava appears dilated, with IVC inspiratory collapse less than 50%. In comparison to the previous echocardiogram(s): Compared with the prior exam from 10/25/2024, there was already a severely dilated LV with severely reduced function. The RV remains dilated with severely reduced function. Valvular function not assessed on today's exam.  CONCLUSIONS:  1. Left ventricular ejection fraction is severely decreased, calculated by Loera's biplane at 18%.  2. There is global hypokinesis of the left ventricle with minor regional variations.  3. Left ventricular cavity size is severely dilated.  4. No left ventricular thrombus visualized.  5. There is severely increased eccentric left ventricular hypertrophy.  6. There is severely reduced right ventricular systolic function.  7. Severely enlarged right ventricle.  8. The left atrium is severely dilated.  9. The right atrium is severely dilated. 10. Mitral valve regurgitation was not assessed. 11. Mildly elevated right ventricular systolic pressure. 12. Mildly dilated aortic root. 13. Compared with the prior exam from 10/25/2024, there was already a severely dilated LV with severely reduced function. The RV remains dilated with severely reduced function. Valvular function not assessed on today's exam. QUANTITATIVE DATA SUMMARY:  2D MEASUREMENTS:           Normal Ranges: Ao Root d:       4.20 cm   (2.0-3.7cm) IVSd:            0.87 cm   (0.6-1.1cm) LVPWd:           0.78 cm   (0.6-1.1cm) LVIDd:           8.21 cm   (3.9-5.9cm) LVIDs:           8.04 cm LV Mass Index:   157 g/m2 LVEDV Index:     138 ml/m2 LV % FS          2.0 %  LA VOLUME:                  Normal Ranges: LA Volume Index: 59.9 ml/m2  RA VOLUME BY A/L METHOD:          Normal Ranges: RA Area A4C:             36.8 cm2  AORTA MEASUREMENTS:         Normal Ranges: Ao Sinus,  d:        4.20 cm (2.1-3.5cm) Asc Ao, d:          4.20 cm (2.1-3.4cm)  LV SYSTOLIC FUNCTION BY 2D PLANIMETRY (MOD):                      Normal Ranges: EF-A4C View:    14 % (>=55%) EF-A2C View:    21 % EF-Biplane:     18 % LV EF Reported: 18 %  AORTIC VALVE:          Normal Ranges: LVOT Diameter: 2.31 cm (1.8-2.4cm)  RIGHT VENTRICLE: RV Basal 6.10 cm RV Mid   4.10 cm RV Major 9.1 cm TAPSE:   9.0 mm RV s'    0.06 m/s  TRICUSPID VALVE/RVSP:          Normal Ranges: Peak TR Velocity:     2.64 m/s RV Syst Pressure:     43 mmHg  (< 30mmHg) IVC Diam:             3.00 cm  AORTA: Asc Ao Diam 4.23 cm  [id number] Mira Mayer MD Electronically signed on 11/5/2024 at 2:36:55 PM  ** Final **     CT chest abdomen pelvis wo IV contrast    Result Date: 11/5/2024  Interpreted By:  Clay Weiss,  and Jose Manuel Cook STUDY: CT CHEST ABDOMEN PELVIS WO CONTRAST;  11/4/2024 5:45 pm   INDICATION: Signs/Symptoms:assess for aneurysm prior to transplant evaluation.   COMPARISON: CT chest 10/31/2024 and CT lower 11/02/2024   ACCESSION NUMBER(S): [id number]   ORDERING CLINICIAN: PAYAL KOHLER   TECHNIQUE: Contiguous axial images of the chest, abdomen, and pelvis were obtained in the absence of intravenous contrast. Coronal and sagittal reformatted images were reconstructed from the axial data.   FINDINGS: CT CHEST:   MEDIASTINUM AND LYMPH NODES: The visualized thyroid is within normal limits. No enlarged intrathoracic or axillary lymph nodes by imaging criteria, however evaluation is limited in the absence of contrast. No pneumomediastinum. The esophagus appears within normal limits.   HEART: The heart is markedly enlarged. Extensive coronary artery calcifications. No significant pericardial effusion.   VESSELS: There is dilation of the proximal ascending aorta measuring up to 4.4 cm in diameter. Moderate calcified atherosclerosis of the thoracic aorta. The main pulmonary artery is mildly prominent. [place]-Faviola catheter tips terminate  at the distal right pulmonary artery.   LUNG, AIRWAYS, PLEURA: The trachea and proximal mainstem bronchi are patent. No consolidation, pleural effusion, or pneumothorax. Moderate dependent and bandlike atelectasis bilaterally.   CHEST WALL SOFT TISSUES: No discernible abnormality.   OSSEOUS STRUCTURES: No acute osseous abnormality. Several remote appearing left-sided rib fractures. Status post median sternotomy.     CT ABDOMEN/PELVIS:   LIVER: Poor visualization of the previously described cystic lesion near the medial liver tip, better evaluated on 11/02/2024 exam. The liver is mildly enlarged measuring 19.6 cm in craniocaudal dimension.   BILE DUCTS: No significant intrahepatic or extrahepatic dilatation.   GALLBLADDER: Cholelithiasis without evidence of acute cholecystitis.   PANCREAS: No significant abnormality.   SPLEEN: Splenomegaly measuring 18.8 cm in anteroposterior dimension.   ADRENALS: No significant abnormality.   KIDNEYS, URETERS, BLADDER: Cyst of the right kidney measures up to 3.9 cm. No hydronephrosis or nephrolithiasis. High-density lesion of the left anterior kidney measuring 1.1 cm likely represents a hemorrhagic cyst. The urinary bladder is unremarkable.   REPRODUCTIVE ORGANS: Prostate is enlarged measuring 5.3 cm in transverse axis.   GI: No bowel obstruction. No significant bowel wall thickening or adjacent inflammatory change. Normal appendix.   VESSELS: Moderate to advanced vascular calcifications of the abdominal aorta. There is a chronic appearing and partially calcified focal dissection of the infrarenal abdominal aorta as seen on series 2, image 130. Mild multifocal fusiform aneurysmal dilatation of the abdominal aorta measuring up to a maximum of 2.6 cm in diameter at the level of the dissection flap.   PERITONEUM/RETROPERITONEUM: No ascites, free air, or fluid collection.   LYMPH NODES: No enlarged lymph nodes.   ABDOMINAL WALL: No significant abnormality.   OSSEOUS STRUCTURES: No  acute osseous abnormality. Moderate degenerative changes of the thoracolumbar spine. Grade 1 anterolisthesis of L5 on S1 with chronic bilateral pars defects.       1. No acute abnormality within the chest, abdomen, or pelvis. 2. 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 3. Aneurysm of the proximal ascending aorta measuring up to 4.4 cm and multifocal fusiform dilation of the abdominal aorta as above. 4. Marked cardiomegaly, unchanged from the prior exam. 5. Hepatosplenomegaly.   I personally reviewed the image(s)/study and resident interpretation as stated by Dr. Joyce Richard MD. I agree with the findings as stated. This study was interpreted at [facility], [place], [place].   Signed by: Clay Weiss 11/5/2024 9:31 AM Dictation workstation:   [id number]    Vascular US aorta iliac duplex limited    Result Date: 11/4/2024            [place] [address]   Tel [phone] and Fax [phone]  Vascular Lab Report VASC US AORTA ILIAC DUPLEX LIMITED  Patient Name:      RJ LEIJA       Reading Physician:  [id number] Yoandy Boo MD Study Date:        11/4/2024             Ordering Physician: [id number] GARCES MRN/PID:           [id number]              Technologist:       Octavio Silver WENDI Accession#:        [id number]          Technologist 2: Date of Birth/Age: 1955 / 69 years Encounter#:         [id number] Gender:            M Admission Status:  Inpatient             Location Performed: [facility]  Diagnosis/ICD: Abdominal aortic aneurysm, without rupture, unspecified-I71.40;                Encounter for other preprocedural examination-Z01.818 CPT Codes:     16137 Duplex  Aorta/IVC/Iliac/Bypass Graft  CONCLUSIONS: Aorta/Common Iliac Arteries/IVC: The abdominal aorta and bilateral common iliac arteries demonstrate no evidence of aneurysm. Moderate atherosclerotic plaque noted throughout the abdominal aorta.  Imaging & Doppler Findings:   AORTA     AP       PSV Proximal 1.63 cm 57.0 cm/s   Mid    1.90 cm 99.0 cm/s  Distal  1.50 cm 83.0 cm/s    RIGHT       AP        PSV RICHARD Proximal 1.01 cm 114.00 cm/s     LEFT       AP       PSV RICHARD Proximal 1.09 cm 97.00 cm/s  [id number] Yoandy Boo MD Electronically signed by [id number] Boo MD on 11/4/2024 at 9:22:19 PM  ** Final **     Vascular US carotid artery duplex bilateral    Result Date: 11/4/2024            [place] [address]   Tel [phone] and Fax [phone]  Vascular Lab Report VASC US CAROTID ARTERY DUPLEX BILATERAL  Patient Name:      RJ LEIJA       Reading Physician:  [id number] Yoandy Boo MD Study Date:        11/4/2024             Ordering Physician: [id number] GARCES MRN/PID:           [id number]              Technologist:       Octavio Silver T Accession#:        [id number]          Technologist 2: Date of Birth/Age: 1955 / 69 years Encounter#:         [id number] Gender:            M Admission Status:  Inpatient             Location Performed: [facility]  Diagnosis/ICD: Other specified symptoms and signs involving the circulatory and                respiratory systems-R09.89; Encounter for other preprocedural                examination-Z01.818 CPT Codes:     46735 Cerebrovascular Carotid Duplex scan complete  CONCLUSIONS: Right Carotid: Findings are consistent with less than 50% stenosis of the right proximal internal carotid artery. Right  external carotid artery appears patent with no evidence of stenosis. The right vertebral artery is patent with antegrade flow. No evidence of hemodynamically significant stenosis in the right subclavian artery. Left Carotid: Findings are consistent with less than 50% stenosis of the left proximal internal carotid artery. Left external carotid artery appears patent with no evidence of stenosis. The mid left vertebral artery demonstrates no flow. The origin of the vertebral artery appears patent. No evidence of hemodynamically significant stenosis in the left subclavian artery.  Imaging & Doppler Findings: Right Plaque Morph: The proximal right internal carotid artery demonstrates heterogenous and smooth plaque. Left Plaque Morph: The proximal left internal carotid artery demonstrates heterogenous and smooth plaque.   Right                       Left   PSV     EDV                PSV      EDV 57 cm/s           CCA P    95 cm/s 90 cm/s           CCA D    67 cm/s 38 cm/s 15 cm/s   ICA P    58 cm/s  22 cm/s 62 cm/s 20 cm/s   ICA M    70 cm/s  24 cm/s 58 cm/s 18 cm/s   ICA D    56 cm/s  22 cm/s 56 cm/s            ECA     61 cm/s 44 cm/s 13 cm/s Vertebral   0 cm/s 65 cm/s         Subclavian 136 cm/s               Right Left ICA/CCA Ratio  0.4  0.9   [id number] Yoandy Boo MD Electronically signed by [id number] Yoandy Boo MD on 11/4/2024 at 8:42:58 PM  ** Final **     Colonoscopy Diagnostic    Result Date: 11/4/2024  Table formatting from the original result was not included. Impression One Krystal Is polyp measuring 5-9 mm in the ascending colon; performed cold snare removal. Hemorrhoids. Rest of exam was normal. Findings One sessile Krystal Is polyp measuring 5-9 mm in the ascending colon; performed cold snare with complete en bloc removal and retrieved specimen. The specimen was placed in Jar 1. Hemorrhoids. Rest of exam was normal. Recommendation Await pathology results. Repeat colonoscopy in 7 years, due:  11/3/2031 Personal history of colon polyps - Defer resumption of diet and medications to the patient's primary team. No restrictions from a post-procedural standpoint. - Observe patient's clinical course following today's procedure with therapeutic intervention. - Watch for bleeding, perforation, and infection. - The findings and recommendations were discussed with the referring physicians. - The signs and symptoms of potential delayed complications were discussed with the patient. - Patient has a contact number available for emergencies. - Follow up with Raimundo Dickey and James for ongoing inpatient gastroenterology consultation. Indication Acute on chronic heart failure with reduced ejection fraction and diastolic dysfunction Staff Staff Role Pedro Dickey MD                Attending Honey Ramirez MD             Fellow Medications Totals unavailable because the procedure time range is not set Preprocedure A history and physical has been performed, and patient medication allergies have been reviewed. The patient's tolerance of previous anesthesia has been reviewed. The risks and benefits of the procedure and the sedation options and risks were discussed with the patient. All questions were answered and informed consent obtained. Details of the Procedure The patient underwent moderate sedation, which was administered by the procedural nurse. The patient's blood pressure, ECG, ETCO2, heart rate, level of consciousness, oxygen and respirations were monitored throughout the procedure. A digital rectal exam was performed. A perianal exam was performed. The scope was introduced through the anus and advanced to the terminal ileum. Retroflexion was performed in the rectum. The quality of bowel preparation was evaluated using the [place] Bowel Preparation Scale with scores of: right colon = 3, transverse colon = 3, left colon = 3. The total BBPS score was 9. Bowel prep was adequate. The patient's estimated blood  loss was minimal (<5 mL). The procedure was not difficult. The patient tolerated the procedure well. There were no apparent adverse events. Events Procedure Events Event Event Time Specimens No specimens collected Procedure Location [facility] [address] [address] [facility] [address] [phone] Referring Provider Jareth Vargas MD Procedure Provider Honey Ramirez MD     US renal complete    Result Date: 11/4/2024  Interpreted By:  Regulo Martel and Beyersdorf Conner STUDY: US RENAL COMPLETE;  11/1/2024 3:44 pm   INDICATION: Signs/Symptoms:Left renal cyst.     COMPARISON: None.   ACCESSION NUMBER(S): [id number]   ORDERING CLINICIAN: DINAH GONZALEZ   TECHNIQUE: Multiple images of the kidneys were obtained.   FINDINGS: RIGHT KIDNEY: The right kidney measures 12.4 cm in length. The renal cortical echogenicity and thickness are within normal limits. No hydronephrosis is present; no evidence of nephrolithiasis. There is a heterogeneous hypoechoic lesion without internal flow in the right kidney measuring 3.9 X 3.0 X 2.8 cm.   LEFT KIDNEY: The left kidney measures 10.3 cm in length. The renal cortical echogenicity and thickness are within normal limits. No hydronephrosis is present; no evidence of nephrolithiasis.   BLADDER: Mild thickening of the urinary bladder wall.   Homogeneous, hyperechoic lesion within the right hepatic lobe is incompletely evaluated.       1. Heterogeneous appearing cyst in the right kidney measures up to 3.9 cm, potentially a hemorrhagic cyst. Otherwise unremarkable sonographic appearance of the kidneys. 2. Homogeneous hyperechoic lesion within the right hepatic lobe is incompletely evaluated. This may represent a hemangioma. Consider further evaluation with dedicated multiphase contrast enhanced CT or MRI of the liver.     I personally reviewed the image(s)/study and resident interpretation. I agree with  the findings as stated by resident Rich Mccurdy. Data analyzed and images interpreted at [facility], [place], [place].   MACRO: None     Signed by: Regulo Martel 11/4/2024 10:46 AM Dictation workstation:   [id number]    XR chest 1 view    Result Date: 11/4/2024  Interpreted By:  Tuan Platt, STUDY: XR CHEST 1 VIEW;  11/4/2024 9:08 am   INDICATION: Signs/Symptoms:SGC placement.   COMPARISON: Chest radiograph dated 11/3/2024; CT chest 10/31/2024   ACCESSION NUMBER(S): [id number]   ORDERING CLINICIAN: DINAH GONZALEZ   FINDINGS: AP radiograph of the chest     Medical devices: The swans Faviola catheter is positioned within the right main pulmonary artery. Sternal sutures are intact.   The heart is moderately severely enlarged. Pulmonary aeration is similar previous study. No pneumothorax is noted.   The left shoulder arthroplasty is partially visualized.       1. No evidence of acute cardiopulmonary process. 2. No significant change from previous study.       Signed by: Tuan Platt 11/4/2024 9:38 AM Dictation workstation:   [id number]    CT liver w IV contrast    Result Date: 11/4/2024  Interpreted By:  Regulo Martel, STUDY: CT LIVER W IV CONTRAST; 11/2/2024 11:38 am   INDICATION: Signs/Symptoms:liver lesion.   COMPARISON: None   ACCESSION NUMBER(S): [id number]   ORDERING CLINICIAN: DINAH GONZALEZ   TECHNIQUE: Multiphase CT of the abdomen was performed utilizing intravenous contrast including arterial, portal venous and delayed postcontrast imaging. Coronal and sagittal reconstructions were also created. Intravenous contrast: 75 mL of Omnipaque 350   FINDINGS: Some images are mildly degraded by motion.   INCLUDED LOWER CHEST: There is mild dependent linear opacities, likely atelectasis or postinflammatory scarring. No consolidation or effusion. The heart is enlarged and a right-sided device lead is partially included.   LIVER: 4.4 cm ovoid heterogeneous hypodense  lesion inferiorly within hepatic segments V/VI does not demonstrate change in appearance or attenuation on serial postcontrast imaging and is felt most likely to be a complex cyst. A couple additional too small to characterize hypodense lesions are also likely cysts. No solid enhancing lesion is evident. No morphologic evidence of cirrhosis.   BILE DUCTS: No biliary dilation.   GALLBLADDER: Cholelithiasis is present.   PANCREAS: A 1.6 cm ovoid cystic lesion along the superior margin of the pancreatic body does not demonstrate internal complexity or enhancement. A couple additional too small to characterize hypodense foci are also noted in the pancreas, also likely cystic lesions. The main duct is not dilated.   SPLEEN: The spleen is enlarged up to 20 cm without focal abnormality evident.   ADRENAL GLANDS: Unremarkable.   KIDNEYS, URETERS AND BLADDER: A 1.4 cm hypodense lesion along the lateral inferior pole of the right kidney does not demonstrate change in attenuation over serial post-contrast imaging, likely a complex cystic lesion such as a hemorrhagic cyst. Additional simple appearing cystic lesions and too small to characterize hypodense lesions, also likely simple cysts, are present. No solid-appearing enhancing lesion is evident.   INCLUDED BOWEL: No evidence of obstruction or inflammation.   VESSELS: Calcific atherosclerosis is present. The abdominal aorta is not aneurysmal.   PERITONEUM/RETROPERITONEUM/LYMPH NODES: No free fluid or free air. No adenopathy is evident.   MUSCULOSKELETAL: No destructive osseous lesion is evident. The patient is status post sternotomy. Degenerative changes are present in the lower lumbar spine.       1. 4.4 cm ovoid heterogeneous hypodense lesion inferiorly within hepatic segments V/VI does not demonstrate change in appearance or attenuation on serial postcontrast imaging and is felt most likely to be a complex cyst. A couple additional too small to characterize hypodense  lesions are also likely cysts. No solid enhancing lesion is evident. No morphologic evidence of cirrhosis. 2. Splenomegaly. 3. Incidental findings include pancreatic and renal simple and complex attenuation lesions as well, for which initial follow-up by contrast MRI is recommended on a routine outpatient basis.   Signed by: Regulo Martel 11/4/2024 7:49 AM Dictation workstation:   [id number]    Pulmonary function testing    Result Date: 11/3/2024  Normal spirometry. Lung volumes are normal. The DLCO corrected for hemoglobin is normal.    XR chest 1 view    Result Date: 11/3/2024  Interpreted By:  Frank Connolly, STUDY: XR CHEST 1 VIEW;  11/3/2024 9:55 am   INDICATION: Signs/Symptoms:SGC placement.     COMPARISON: Exam dated 11/02/2024   ACCESSION NUMBER(S): [id number]   ORDERING CLINICIAN: DINAH GONZALEZ   FINDINGS: AP radiograph of the chest was provided. Exam is limited by rightward patient rotation. There is a partially visualized left shoulder arthroplasty.   Status post median sternotomy. Right IJ approach [place]-Faviola catheter tip projects over the right pulmonary artery.   CARDIOMEDIASTINAL SILHOUETTE: Stable diffuse enlargement of the cardiac silhouette.   LUNGS: Lungs are clear.   ABDOMEN: No remarkable upper abdominal findings.   BONES: No acute osseous changes.       1.  No evidence of acute pulmonary process. 2. Stable enlargement of the cardiac silhouette. 3. [place]-Faviola catheter tip projects over the right pulmonary artery.       MACRO: None   Signed by: Frank Connolly 11/3/2024 1:47 PM Dictation workstation:   [id number]    XR chest 1 view    Result Date: 11/2/2024  Interpreted By:  Frank Connolly, STUDY: XR CHEST 1 VIEW;  11/2/2024 7:57 am   INDICATION: Signs/Symptoms:SGC placement.     COMPARISON: Exam dated 11/01/2024   ACCESSION NUMBER(S): [id number]   ORDERING CLINICIAN: DINAH GONZALEZ   FINDINGS: AP radiograph of the chest was provided.   Right IJ approach [place]-Faviola catheter with tip  projecting over the right pulmonary artery. Status post median sternotomy.Partially visualized left shoulder arthroplasty hardware.   CARDIOMEDIASTINAL SILHOUETTE: Stable enlargement of the cardiac silhouette.   LUNGS: Lungs are clear.   ABDOMEN: No remarkable upper abdominal findings.   BONES: No acute osseous changes.       1.  Right IJ [place]-Faviola catheter tip projects over the right pulmonary artery. 2. Stable enlargement of the cardiac silhouette. 3. No radiographic evidence of acute pulmonary process.       MACRO: None   Signed by: Frank Connolly 11/2/2024 9:21 AM Dictation workstation:   [id number]    XR chest 1 view    Result Date: 11/2/2024  Interpreted By:  Frank Connolly, STUDY: XR CHEST 1 VIEW;  11/1/2024 6:38 am   INDICATION: Signs/Symptoms:PA catheter postition assessment.     COMPARISON: Exam dated 10/31/2024   ACCESSION NUMBER(S): [id number]   ORDERING CLINICIAN: LANDON GARCES   FINDINGS: AP radiograph of the chest was provided.   Right IJ approach [place]-Faviola catheter with tip projecting over the right pulmonary artery. Status post median sternotomy. Partially visualized left shoulder arthroplasty hardware.   CARDIOMEDIASTINAL SILHOUETTE: Stable enlargement of the cardiac silhouette.   LUNGS: Lungs are clear.   ABDOMEN: No remarkable upper abdominal findings.   BONES: No acute osseous changes.       1.  [place]-Faviola catheter tip projects over the right pulmonary artery. 2. No radiographic evidence of acute pulmonary process. 3. Stable enlargement of the cardiac silhouette.       MACRO: None   Signed by: Frank Connolly 11/2/2024 9:20 AM Dictation workstation:   [id number]    XR chest 2 views    Result Date: 11/1/2024  Interpreted By:  Frank Connolly, STUDY: XR CHEST 2 VIEWS;  10/31/2024 6:01 pm   INDICATION: Signs/Symptoms:LVAD / OHT evaluation.     COMPARISON: Exam dated earlier same day   ACCESSION NUMBER(S): [id number]   ORDERING CLINICIAN: LANDON GARCES   FINDINGS: PA and lateral radiographs of the  chest were provided.  Additional PA dual energy images were also provided.   Right subclavian approach [place]-Faviola catheter with tip projecting over the distal right pulmonary artery.   CARDIOMEDIASTINAL SILHOUETTE: The cardiac silhouette is enlarged. Remaining mediastinal contours within normal limits.   LUNGS: Lungs are clear.   ABDOMEN: No remarkable upper abdominal findings.   BONES: No acute osseous changes.       1.  No evidence of acute cardiopulmonary process. 2. Stable enlargement of the cardiac silhouette. 3. [place]-Faviola catheter tip projects over the distal right pulmonary artery.       MACRO: None   Signed by: Frank Connolly 11/1/2024 4:38 PM Dictation workstation:   [id number]    XR chest 1 view    Result Date: 11/1/2024  Interpreted By:  Frank Connolly, STUDY: XR CHEST 1 VIEW;  10/31/2024 8:03 am   INDICATION: Signs/Symptoms:PA catheter assessment.     COMPARISON: Exam dated 10/30/2024   ACCESSION NUMBER(S): [id number]   ORDERING CLINICIAN: LANDON GARCES   FINDINGS: AP radiograph of the chest was provided.   Status post median sternotomy. Right IJ approach [place]-Faviola catheter with the tip projecting over the distal right pulmonary artery.   CARDIOMEDIASTINAL SILHOUETTE: Stable enlargement of the cardiac silhouette.   LUNGS: Lungs are clear.   ABDOMEN: No remarkable upper abdominal findings.   BONES: No acute osseous changes.       1.  [place]-Faviola catheter tip projects over the distal right pulmonary artery. 2. Stable enlargement of the cardiac silhouette.       MACRO: None   Signed by: Frank Connolly 11/1/2024 4:36 PM Dictation workstation:   [id number]    CT chest wo IV contrast    Result Date: 11/1/2024  Interpreted By:  Frank Connolly, STUDY: CT CHEST WO IV CONTRAST;  10/31/2024 12:37 pm   INDICATION: Signs/Symptoms:LVAD / OHT evaluation.     COMPARISON: None.   ACCESSION NUMBER(S): [id number]   ORDERING CLINICIAN: LANDON GARCES   TECHNIQUE: Helical data acquisition of the chest was obtained  without  IV contrast material.  Images were reformatted in axial, coronal, and sagittal planes.   FINDINGS: LUNGS AND AIRWAYS: The trachea and central airways are patent. No endobronchial lesion. Scattered secretions in the trachea. Nonspecific areas of traction bronchiolectasis in the lower lobes which is likely due to scarring.   Areas of linear parenchymal and ground-glass opacity in the dependent lungs likely represents atelectasis. No pleural effusion or pneumothorax. No lobar consolidation. Scattered punctate calcified granulomas. No suspicious pulmonary nodules.   MEDIASTINUM AND BEAU, LOWER NECK AND AXILLA: The visualized thyroid gland is within normal limits.   No evidence of thoracic lymphadenopathy by CT criteria.   Small hiatal hernia. The esophagus is otherwise within normal limits.   HEART AND VESSELS: The ascending aorta is dilated measuring 4.3 cm. There are moderate calcifications of the aorta and branch vessels. 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   Right IJ pulmonary artery catheter tip is in the right pulmonary artery. The main pulmonary artery is dilated measuring 3.4 cm.   Severe coronary artery calcifications. Left circumflex coronary stent is present. The study is not optimized for evaluation of coronary arteries.   Moderate cardiomegaly with multichamber involvement. Moderate aortic valve calcifications.   No evidence of pericardial effusion.   UPPER ABDOMEN: There is a partially visualized, incompletely characterized hypodense lesion in the inferior right hepatic lobe measuring up to 4.8 cm. Subcentimeter hypodensity in the left hepatic lobe is incompletely characterized but most likely represents a simple cyst or hemangioma. The spleen is enlarged measuring 18.1 cm. 3.6 cm likely right renal cyst is noted.   CHEST WALL AND OSSEOUS STRUCTURES: There are no suspicious osseous lesions. Multilevel degenerative changes are present. Status post left shoulder hemiarthroplasty. Status post median  sternotomy.       1.  Dilated ascending aorta measuring up to 4.3 cm. Recommend continued imaging surveillance as per clinical guidelines. 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 Moderate calcifications of the aorta and branch vessels. 2. Severe coronary artery calcifications. 3. Moderate cardiomegaly. 4. Moderate aortic valve calcifications and correlate with concern for aortic valve stenosis. 5. Incompletely characterized lesion in the inferior right hepatic lobe measuring up to 4.8 cm. Recommend initial evaluation with liver ultrasound. 6. [place]-Faviola catheter tip in the right pulmonary artery. The main pulmonary artery is mildly dilated and correlate with concern for pulmonary hypertension.   MACRO: Critical Finding:  See findings. Notification was initiated on 11/1/2024 at 1:18 pm by  Frank Connolly.  (**-OCF-**) Instructions:   Signed by: Frank Connolly 11/1/2024 1:18 PM Dictation workstation:   [id number]    XR panorex    Result Date: 11/1/2024  Interpreted By:  Rosita Beltran, STUDY: XR PANOREX   INDICATION: Signs/Symptoms:LVAD / OHT evaluation   COMPARISON: None.   ACCESSION NUMBER(S): [id number]   ORDERING CLINICIAN: LANDON GARCES   FINDINGS: Single panoramic radiograph of the mandible was obtained. Edentulous patient.   Punctate radiopaque densities projecting in the right aspect of the mouth of uncertain clinical significance and etiology. Bony defects projecting in the inferior aspect of the bilateral mandibular angles likely artifactual from technique.   Paranasal sinuses are clear. There is no evidence of mandibular dislocation or fracture.       Edentulous patient.   Bony defects projecting in the inferior aspect of the bilateral mandibular angles likely artifactual from technique.   Signed by: Rosita Beltran 11/1/2024 10:19 AM Dictation workstation:   [id number]    XR chest 1 view    Result Date: 10/31/2024  Interpreted By:  Olvni Farias, STUDY: XR CHEST 1 VIEW; 10/30/2024 7:54 am   INDICATION:  Signs/Symptoms:ADHF.   COMPARISON: 10/29/2024.   ACCESSION NUMBER(S): [id number]   ORDERING CLINICIAN: EULA WRIGHT   FINDINGS:     CARDIOMEDIASTINAL SILHOUETTE: Cardiomegaly versus pericardial effusion. Patient is status post median sternotomy. [place]-Faviola catheter overlies the right main pulmonary artery. LUNGS: Lungs are clear of focal airspace disease.   ABDOMEN: No remarkable upper abdominal findings.   BONES: No acute osseous changes. Patient is status post left TSA.       1.  Stable positioning of [place]-Faviola catheter with tip overlying the right main pulmonary artery.     Signed by: Olvin Farias 10/31/2024 9:08 AM Dictation workstation:   [id number]       LDA:  Ventricular Assist Device HeartMate 3 (Active)   Placement Date: 11/12/24   Placed by: Suhas Hill  Site Location: Abdomen left  VAD Type: Left ventricular assist device  VAD Brand: HeartMate 3   Number of days: 18       External Urinary Catheter Male (Active)   Placement Date/Time: 11/27/24 2051   External Catheter Type: Male   Number of days: 2     NUTRITION: Adult diet Regular  EMERGENCY CONTACT: Extended Emergency Contact Information  Primary Emergency Contact: Tanvi Meraz  Home Phone: [phone]  Relation: Spouse  Secondary Emergency Contact: Subhash Meraz  [employer] Phone: [phone]  Relation: Son  CODE STATUS: Full Code  DISPO: Discharge Planning  Living Arrangements: Spouse/significant other  Support Systems: Spouse/significant other  Assistance Needed: none  Type of Residence: Private residence  Number of Stairs to Enter Residence: 2  Number of Stairs Within Residence: 12  Do you have animals or pets at home?: Yes  Type of Animals or Pets: dog  Who is requesting discharge planning?: Provider  Home or Post Acute Services: None  Expected Discharge Disposition: Inpatient Rehab Facility (IRF)  Does the patient need discharge transport arranged?: Yes  RoundTrip coordination needed?: Yes  Has discharge transport been arranged?: Yes  What day  is the transport expected?: 11/27/24  What time is the transport expected?: 1136  AMPAC: Daily Activity - Total Score: 22    FOLLOWUP:   Future Appointments   Date Time Provider Department Center   12/10/2024  2:10 PM Romelia Lomas MD [id number]

## 2024-11-30 NOTE — PROGRESS NOTES
ADVANCED HEART FAILURE LVAD PROGRESS NOTE     H VAD Cardiologist: Padmini      Type of VAD: HM3  Implant Date: 11/12/24  Reason for VAD: ICM  Intent: Long-Term (Significant PAD, age, patient preference)      Subjective   Interval events:  No overnight events, NSVT noted on tele. Longest was 5 beats. This morning, pt denies acute/new complaints.    HPI:  Fahad Meraz is a very pleasant 69 y.o. male w/ a PMHx sig for CAD s/p 4V CABG (2012) and PCI (LCx/OM; 5/2019), stage D systolic HF/ICM/HFrEF with LVEF 10-15%( 10/22/24 TTE), AF s/p RFA (PVI and CTI; 4/2024) on OAC therapy, HTN, dyslipidemia, depression, anxiety and VIV using CPAP who was admitted to OSH ICU, s/p RHC on 10/18/24.  Per patient, he had been experienced worsening SOB and fluids overload for 2-3 weeks. Patient was on milrinone drip and underwent SGC diuresis. However his KENYA worsened, and were not able to wean milrinone drip. Decision was made to transfer him to HF ICU [facility] for possible advanced therapy consideration. Advanced therapies evaluation was initiated on 10/30. Discussed in advanced therapeutics committee on 11/5 and was denied for transplantation, and approved for LVAD (significant PAD, Elevated PSA level, Age approaching 70, as well as patient verbalized he would be more in favor of LVAD vs Transplant). He was transferred to the floor on 11/6 to await VAD implantation. Readmitted to HFICU as of 11/09 for pre-OR swan guided optimization. Now presents to CTICU s/p LVAD HM3 implant via thoracotomy with descending outflow graft on 11/12/24 with Dr. Mclain. OR Course/Issues: pulseless VT requiring defib x 1 pre- CPB. Postop course c/b delirium, RV dysfunction, VT & malnutrition.       Principal Problem:    Acute on chronic heart failure with reduced ejection fraction and diastolic dysfunction  Active Problems:    Ischemic cardiomyopathy    Receiving inotropic medication    HTN (hypertension)    CAD (coronary artery disease)    MI (myocardial  infarction) (Multi)    CHF (congestive heart failure)    Gastroesophageal reflux disease    Acute kidney injury (nontraumatic) (CMS-HCC)    Delirium       LOS: 37 days     NYHA class pre-VAD implant: IV  NYHA class today (11/25): II        Objective   Vitals:   Vitals:    11/30/24 0333 11/30/24 0737 11/30/24 1124 11/30/24 1500   BP:  90/59     BP Location:  Right arm     Patient Position:  Lying     Pulse: 74 68 72 70   Resp: 18 17 17 17   Temp: 36.1 °C (97 °F) 36.1 °C (97 °F) 36.3 °C (97.3 °F) 36.1 °C (97 °F)   TempSrc: Temporal Temporal Temporal Temporal   SpO2: 97% 96% 94% 95%   Weight: 89.6 kg (197 lb 8.5 oz)      Height:         Wt Readings from Last 5 Encounters:   11/30/24 89.6 kg (197 lb 8.5 oz)   10/24/24 92.5 kg (204 lb)   08/05/24 122 kg (268 lb)   01/31/22 119 kg (262 lb)   02/18/20 123 kg (270 lb 2 oz)     Hemodynamic parameters for last 24 hours:     Intake/Output for last 24 hours:    Intake/Output Summary (Last 24 hours) at 11/30/2024 1618  Last data filed at 11/30/2024 1124  Gross per 24 hour   Intake 360 ml   Output 1000 ml   Net -640 ml           Physical exam:  Physical Exam  Constitutional:       General: He is not in acute distress.     Appearance: Normal appearance. He is normal weight. He is not ill-appearing or toxic-appearing.   HENT:      Head: Normocephalic and atraumatic.      Mouth/Throat:      Mouth: Mucous membranes are moist.      Pharynx: Oropharynx is clear. No oropharyngeal exudate or posterior oropharyngeal erythema.   Eyes:      Extraocular Movements: Extraocular movements intact.      Conjunctiva/sclera: Conjunctivae normal.      Pupils: Pupils are equal, round, and reactive to light.   Neck:      Vascular: No JVD.   Cardiovascular:      Rate and Rhythm: Normal rate and regular rhythm.      Comments: LVAD hum heard on auscultation. Sinus rhythm w/ intermittent PVCs and NSVT. Capillary refill < 2 seconds in all extremities.   Pulmonary:      Effort: Pulmonary effort is normal.  No accessory muscle usage, respiratory distress or retractions.      Breath sounds: No stridor. No wheezing, rhonchi or rales.   Abdominal:      General: Abdomen is flat. Bowel sounds are normal. There is no distension.      Palpations: Abdomen is soft. There is no mass.      Tenderness: There is no abdominal tenderness. There is no guarding.      Hernia: No hernia is present.   Musculoskeletal:         General: No swelling or tenderness. Normal range of motion.      Cervical back: Full passive range of motion without pain and normal range of motion.      Right lower leg: No edema.      Left lower leg: No edema.   Skin:     General: Skin is warm and dry.      Capillary Refill: Capillary refill takes less than 2 seconds.      Coloration: Skin is not jaundiced.      Findings: Lesion present. No bruising, laceration, rash or wound.      Comments: Left thoracotomy well approximated w/o signs of infection. LVAD driveline site covered with weekly dressing. No drainage or tenderness on exam.    Neurological:      General: No focal deficit present.      Mental Status: He is alert and oriented to person, place, and time. Mental status is at baseline.   Psychiatric:         Mood and Affect: Mood normal.         Behavior: Behavior normal.         Thought Content: Thought content normal.         Judgment: Judgment normal.        Driveline:   [facility] 11/28       Labs:   CMP:  Recent Labs     11/30/24  0834 11/29/24  0702 11/27/24  0652 11/27/24  0227 11/26/24  0603 11/25/24  0736 11/25/24  0250 11/24/24  0556 11/23/24  0451    138 138 138 139 135* 133* 136 139   K 4.3 4.2 4.4 4.8 4.3 4.4 8.0* 4.6 4.3    106 105 105 103 105 102 104 105   CO2 26 26 24 23 26 16* 26 22 26   ANIONGAP 12 10 13 15 14 18 13 15 12   BUN 17 16 20 22 19 20 22 20 19   CREATININE 1.20 1.19 1.28 1.21 1.39* 1.10 1.22 1.24 1.32*   EGFR 65 66 61 65 55* 73 64 63 58*   MG 2.02 2.05 2.18 2.12 2.13  --  2.47* 2.03 2.12     Recent Labs     11/30/24  0834  11/29/24  0702 11/27/24  0652 11/26/24  0603 11/25/24  0736 11/25/24  0250 11/24/24  0556 11/23/24  0451 11/19/24  1459 11/19/24  0322 11/18/24  0525 11/17/24  1323 11/17/24  0122 11/16/24  1358 11/16/24  0128 11/15/24  1255 11/15/24  0021 11/14/24  1224 11/14/24  0118 11/13/24  1228 11/13/24  0007 11/12/24  1307   ALBUMIN 3.5 3.3* 3.6 3.6 3.4 3.7 3.6 3.6   < > 3.1* 3.5   < > 3.4   < > 3.6   < > 3.7   < > 3.8 3.7   < > 3.7   ALT  --   --   --   --   --   --   --   --   --  6* 6*  --  3*  --  4*  --  5*  --  7* 11  --  15   AST  --   --   --   --   --   --   --   --   --  16 18  --  21  --  24  --  29  --  40* 42*  --  37   BILITOT  --   --   --   --   --   --   --   --   --  0.8 0.8  --  1.0  --  1.0  --  1.1  --  0.9 1.1  --  0.9    < > = values in this interval not displayed.     CBC:  Recent Labs     11/30/24  0834 11/29/24  0755 11/27/24  0652 11/26/24  0603 11/25/24  0250 11/24/24  0556 11/23/24  0451 11/22/24  0304   WBC 7.9 9.0 12.0* 11.8* 13.5* 15.4* 13.8* 10.9   HGB 11.0* 10.5* 12.6* 12.4* 12.1* 12.3* 12.5* 11.2*   HCT 34.6* 33.1* 38.3* 38.6* 37.2* 39.1* 40.1* 34.3*    401 503* 537* 313 545* 529* 473*   MCV 85 86 84 85 83 86 87 83     COAG:   Recent Labs     11/30/24  0834 11/29/24  0702 11/28/24  0542 11/27/24  2037 11/27/24  1605 11/26/24  0957 11/26/24  0603 11/25/24  2348 11/25/24  0736 11/25/24  0250 11/24/24  2156 11/24/24  0556 11/23/24  0451 11/13/24  0008 11/12/24  1257   INR 2.1* 2.1* 2.1*  --  1.8*  --  1.8*  --   --  1.8*  --  1.8* 1.9*   < > 1.4*   HAUF  --   --  0.6 0.5  --  0.5 0.4 0.2   < > 0.1   < >  --   --    < >  --    FIBRINOGEN  --   --   --   --   --   --   --   --   --   --   --   --   --   --  301    < > = values in this interval not displayed.     ABO:   Recent Labs     11/20/24  0151   ABO A     HEME/ENDO:   Recent Labs     10/31/24  1249 10/24/24  2328   FERRITIN  --  76   IRONSAT  --  17*   TSH 4.39* 8.47*   HGBA1C  --  6.3*     CARDIAC:   Recent Labs     11/30/24  0834  "11/29/24  0755 11/29/24  0702 11/27/24  0652 11/26/24  0603 11/25/24  0736 11/25/24  0250 11/24/24  0556 11/23/24  0451 11/12/24  1307 10/31/24  1249 10/24/24  2328     --  151 214 264* 426* 725* 247* 240   < > 187  --    BNP  --  478*  --   --   --   --   --   --   --   --  755* 1,995*    < > = values in this interval not displayed.     Recent Labs     11/27/24  0652   CHOL 126  126   HDL 24.6  24.6   TRIG 155*     TOX:  Recent Labs     10/31/24  1249   AMPHETAMINE Negative     MICRO: No results for input(s): \"ESR\", \"CRP\", \"PROCAL\" in the last 75160 hours.  No results found for the last 90 days.        Imaging:   No results found.     Notable Studies:   EKG:  Encounter Date: 10/24/24   Electrocardiogram 12-lead PRN for arrhythmia   Result Value    Ventricular Rate 111    Atrial Rate 81    QRS Duration 146    QT Interval 406    QTC Calculation(Bazett) 552    R Axis 24    T Axis 45    QRS Count 18    Q Onset 226    P Onset 209    P Offset 224    T Offset 429    QTC Fredericia 498    Narrative    Atrial fibrillation with rapid ventricular response with premature ventricular or aberrantly conducted complexes  Left bundle branch block  Abnormal ECG  When compared with ECG of 20-NOV-2024 03:52,  Left bundle branch block has replaced Nonspecific intraventricular block  Criteria for Septal infarct are no longer Present  Criteria for Lateral infarct are no longer Present       Echo:  Transthoracic Echo (TTE) Limited    Result Date: 11/26/2024   [facility], [address] [place] [address]                Tel [phone] and Fax [phone] TRANSTHORACIC ECHOCARDIOGRAM REPORT  Patient Name:       RJ Webb Physician:    [id number] Parker Terrell MD Study Date:         11/26/2024          Ordering Provider:    [id number] KARRI CHAVEZ MRN/PID:            " [id number]            Fellow: Accession#:         [id number]        Nurse: Date of Birth/Age:  1955 / 69     Sonographer:          Manuela estrada                                     RDIRMA JUARES Gender assigned at  M                   Additional Staff:     Melida Randallprincess Birth:                                                        RDCS Height:             182.88 cm           Admit Date: Weight:             87.54 kg            Admission Status:     Inpatient -                                                               Routine BSA / BMI:          2.10 m2 / 26.18     Encounter#:           [id number]                     kg/m2 Blood Pressure:     /                   Department Location:  [facility]                                                               Non Invasive Study Type:    TRANSTHORACIC ECHO (TTE) LIMITED Diagnosis/ICD: Presence of heart assist device-Z95.811 Indication:    RAMP study CPT Code:      Echo Limited-85546; Doppler Limited-18414; Color Doppler-36658 Patient History: Pertinent History: CAD. CABG x4 2012, PCI 2019, ICM, CHF, s/p HeartMate III                    LVAD, AF s/p RFA (PVI and CTI 4/2024). Study Detail: The following Echo studies were performed: 2D, M-Mode, Doppler and               color flow. Technically challenging study due to poor acoustic               windows.  PHYSICIAN INTERPRETATION: Left Ventricle: Left ventricular ejection fraction is severely decreased, by visual estimate at 15-20%. There is global hypokinesis of the left ventricle with minor regional variations. The left ventricular cavity size is severely dilated. Spectral Doppler shows a Grade II (pseudonormal pattern) of left ventricular diastolic filling with an elevated left atrial pressure. Left Atrium: The left atrium is enlarged. Right Ventricle: The right ventricle is severely enlarged. There is reduced right ventricular systolic function. Right Atrium: The right atrium was  not well visualized. Aortic Valve: The aortic valve is probably trileaflet. There is mild aortic valve cusp calcification. There is mild aortic valve regurgitation. Mitral Valve: The mitral valve is mildly thickened. There is trace mitral valve regurgitation. Tricuspid Valve: The tricuspid valve is structurally normal. There is trace tricuspid regurgitation. The right ventricular systolic pressure is unable to be estimated. Pulmonic Valve: The pulmonic valve is structurally normal. Pulmonic valve regurgitation was not assessed. Pericardium: Trivial pericardial effusion. Aorta: The aortic root is abnormal. There is mild dilatation of the ascending aorta. There is mild dilatation of the aortic root. Pulmonary Artery: The pulmonary artery is not well visualized. Systemic Veins: The inferior vena cava was not well visualized. In comparison to the previous echocardiogram(s): Compared with study dated 11/18/2024, no significant change.  LEFT VENTRICULAR ASSIST DEVICE: LVAD: The patient has a(n) HeartMate 3 LVAD device present. Inflow Cannula: Visualization of the inflow cannula is technically difficult. Outflow Cannula: Visualization of the outflow cannula is technically difficult. LVAD Comments: Ramp study. 5200 rpm at start and finish of limited echo.  CONCLUSIONS:  1. Left ventricular ejection fraction is severely decreased, by visual estimate at 15-20%.  2. There is global hypokinesis of the left ventricle with minor regional variations.  3. Spectral Doppler shows a Grade II (pseudonormal pattern) of left ventricular diastolic filling with an elevated left atrial pressure.  4. Left ventricular cavity size is severely dilated.  5. There is reduced right ventricular systolic function.  6. Severely enlarged right ventricle.  7. The left atrium is enlarged.  8. Mild aortic valve regurgitation.  9. The pulmonary artery is not well visualized. QUANTITATIVE DATA SUMMARY:  M-MODE MEASUREMENTS:         Normal Ranges: Ao Root:              4.10 cm (2.0-3.7cm)  AORTA MEASUREMENTS:         Normal Ranges: Asc Ao, d:          4.20 cm (2.1-3.4cm)  LV SYSTOLIC FUNCTION BY 2D PLANIMETRY (MOD):                      Normal Ranges: EF-Visual:      18 % LV EF Reported: 18 %  LV DIASTOLIC FUNCTION:          Normal Ranges: MV Peak E:             0.50 m/s (0.7-1.2 m/s) MV Peak A:             0.44 m/s (0.42-0.7 m/s) E/A Ratio:             1.14     (1.0-2.2)  [id number] Parker Terrell MD Electronically signed on 11/26/2024 at 3:08:03 PM  ** Final **     Transthoracic Echo (TTE) Limited    Result Date: 11/18/2024   [facility], [address] [place] [address]                Tel [phone] and Fax [phone] TRANSTHORACIC ECHOCARDIOGRAM REPORT  Patient Name:       RJ Webb Physician:    [id number] Fawad Chicas MD Study Date:         11/18/2024          Ordering Provider:    [id number] HARRISON PALOMINO MRN/PID:            [id number]            Fellow: Accession#:         [id number]        Nurse: Date of Birth/Age:  1955 / 69     Sonographer:          Genesis Sinclair RDCS                     years Gender assigned at  M                   Additional Staff: Birth: Height:             182.88 cm           Admit Date:           10/24/2024 Weight:             93.44 kg            Admission Status:     Inpatient -                                                               Routine BSA / BMI:          2.16 m2 / 27.94     Encounter#:           [id number]                     kg/m2 Blood Pressure:     /                   Department Location:  [facility] Study Type:    TRANSTHORACIC ECHO (TTE) LIMITED Diagnosis/ICD: Presence of heart assist device-Z95.811 Indication:    Ramp study for LVAD CPT Code:      Echo Limited-12182; Doppler Limited-39089; Color Doppler-12828 Patient History: Pertinent History: Acute  on chronic heart failure with reduced EF, Diastolic                    disfunction, Ischemic cardiiomyopathy, MI, CHF, LVAD, VIV. Study Detail: The following Echo studies were performed: 2D, Doppler, color flow               and M-Mode. Technically challenging study due to patient lying in               supine position and postoperative dressings.  PHYSICIAN INTERPRETATION: Left Ventricle: Left ventricular ejection fraction is severely decreased, by visual estimate at 10%. There is global hypokinesis of the left ventricle with minor regional variations. The left ventricular cavity size is severely dilated. Abnormal (paradoxical) septal motion consistent with post-operative status. Left ventricular diastolic filling was not assessed. Left Atrium: The left atrium is moderate to severely dilated. Right Ventricle: The right ventricle is severely enlarged. There is severely reduced right ventricular systolic function. Right Atrium: The right atrium is moderately dilated. Aortic Valve: The aortic valve is structurally normal. There is mild aortic valve cusp calcification. There is mild aortic valve regurgitation. Mitral Valve: The mitral valve is normal in structure. There is mild mitral valve regurgitation. Tricuspid Valve: The tricuspid valve is structurally normal. There is mild tricuspid regurgitation. Pulmonic Valve: The pulmonic valve is not well visualized. Pulmonic valve regurgitation was not assessed. Pericardium: Trivial pericardial effusion. Aorta: The aortic root is normal. In comparison to the previous echocardiogram(s): Compared with study dated 11/15/2024, no significant change.  LEFT VENTRICULAR ASSIST DEVICE: LVAD: The patient has a(n) HeartMate 3 LVAD device present. Inflow Cannula: The inflow cannula is visualized in the left ventricular apex. AV Leaflet Mobility: . The aortic valve appears to be opening every 1 beats. LVAD Comments: Ramp study speed increased from 5200 to 5300. Aortic valve still opens  every beat. Septum remains in the midline.  CONCLUSIONS:  1. Poorly visualized anatomical structures due to suboptimal image quality.  2. Left ventricular ejection fraction is severely decreased, by visual estimate at 10%.  3. There is global hypokinesis of the left ventricle with minor regional variations.  4. Left ventricular cavity size is severely dilated.  5. Abnormal septal motion consistent with post-operative status.  6. There is severely reduced right ventricular systolic function.  7. Severely enlarged right ventricle.  8. The left atrium is moderate to severely dilated.  9. The right atrium is moderately dilated. 10. Mild aortic valve regurgitation. 11. . The aortic valve appears to be opening every 1 beats. 12. Compared with study dated 11/15/2024, no significant change. QUANTITATIVE DATA SUMMARY:  LV SYSTOLIC FUNCTION BY 2D PLANIMETRY (MOD):                      Normal Ranges: EF-Visual:      10 % LV EF Reported: 10 %  [id number] Fawad Chicas MD Electronically signed on 11/18/2024 at 12:33:46 PM  ** Final **       Meds:  Scheduled medications  amiodarone, 400 mg, oral, BID  aspirin, 81 mg, oral, Daily  dapagliflozin propanediol, 10 mg, oral, Daily  digoxin, 250 mcg, oral, Daily  escitalopram, 10 mg, oral, Daily  icosapent ethyL, 2 g, oral, BID  iron sucrose, 200 mg, intravenous, Daily  lidocaine, 2 patch, transdermal, Daily  magnesium oxide, 800 mg, oral, BID  methocarbamol, 500 mg, oral, q6h  pantoprazole, 40 mg, oral, Daily before breakfast  pravastatin, 20 mg, oral, Nightly  sacubitriL-valsartan, 1 tablet, oral, BID  sennosides-docusate sodium, 1 tablet, oral, BID  sildenafil, 20 mg, oral, TID  spironolactone, 12.5 mg, oral, Daily  warfarin, 7.5 mg, oral, Daily      Continuous medications       PRN medications  PRN medications: acetaminophen, [DISCONTINUED] ondansetron **OR** ondansetron, polyethylene glycol     Assessment/Plan   Assessment & Plan     NEUROLOGICAL  Active issues:   #Acute  post-operative pain  - Endorses pain at back near posterior portion of surgical incision. Pain musculoskeletal in nature & improved with robaxin   - PRN po Tylenol 975 mg q6 hrs   - Continue lidoderm patches   - Restart robaxin 500mg Q6 x 5 days     #Hx of anxiety and depression  - Continue home escitalopram 10mg daily    #Transient LUE weakness on 11/24  - CTH (11/24) without acute abnormalities  - Resolved with tylenol  - No focal deficits any further       CARDIAC  #PMHx CAD s/p CABG x 4 (2012) and PCI (LCx/OM; 5/2019), stage D ICM HFrEF LVEF 10-15%  #S/p HM3 LVAD via left thoracotomy 11/12 with [place] Angel and Elgudin.     GDMT and RV support:   - BB: n/a - RV dysfunction   - ARNI/ACEi/ARB: Entresto 24-26mg BID   - MRA: continue spironolactone 12.5mg daily    - SGLT2i: Farxiga 10 mg daily     RV support:   - c/w digoxin to 250mcg daily -> 0.46 on 11/29  - Continue sildenafil 20mg TID     Anticoagulation:   - Continue Coumadin 7.5mg daily for goal INR 2-3.   - Continue ASA (primary indication - ICM, PCI)     Heart Mate III interrogation   Most Recent   Flow 4.6   Speed 5200   Power 3.7   PI 3.3   MAP (mmHg): 80    LVAD interrogation: stable flow, no sig PI events or power spikes.     RAMP study: Started study with RPM 5200. AV opening every beat, no MR, mild AI, septum midline. After review with Dr. Drummond, he appears well optimized at this speed. No speed changes made. dMAP 68.     Daily weight:   Vitals:    11/30/24 0333   Weight: 89.6 kg (197 lb 8.5 oz)      #Chronic AF s/p RFA (PVI and CTI; 4/2024),   #Sustained VT and NSVT  - Patient never required ICD as his EF remained > 30% prior to acute reduction and LVAD placement   - 11/26 patient noted to have sustained asymptomatic VT w/ self termination.   - EP consulted and recommend medical management with amio over ICD placement given patient's stability and lack of symptoms   - Since starting amio, patient's ectopy has improved; however, now trending towards  sinus bradycardia w/ HR in 60s   - Continue amiodarone 400mg BID x 7 days ( - ) and then reduce to 200mg daily; may require dose reductio earlier if patient develops  worsening bradycardia   - Maintain K > 4.5 & Mag > 2.0     #HLD  - Repeat lipid panel: HDL 24.6, Non-HDL cholesterol 101, . Cholesterol/HDL ratio 5.1  - Continue vascepa 2g BID  - Start pravastatin 20mg daily         RESPIRATORY   #VIV on CPAP at home  - Resume home nocturnal CPAP  - Maintaining SpO2 >92%.   - Encourage OOB and ambulation daily       GI:  #GERD  #Malnutrition d/t poor PO intake  - Continue PPI for GERD, LVAD bleeding risk   - Continue adult regular diet with supplements  - senna/colace BID and miralax to as needed      RENAL:  #CKD (baseline Cr 1.3-1.6)  - Strict I&Os  - Appears euvolemic on exam   - Avoid hypotension and nephrotoxic agents   Results from last 72 hours   Lab Units 24  0834 24  0702   BUN mg/dL 17 16   CREATININE mg/dL 1.20 1.19         ENDO:  #PMH of pre-DM with recent A1c: 6.3  - Euglycemic   - Goal BG <180  - NO indication for SSI at this time     HEMATOLOGY:  #Acute blood loss anemia   #Iron deficiency anemia   Results from last 7 days   Lab Units 24  0834 24  0755   HEMOGLOBIN g/dL 11.0* 10.5*   HEMATOCRIT % 34.6* 33.1*   PLATELETS AUTO x10*3/uL 381 401   - Iron studies on  suggest iron deficiency   - Hg remains stable without active signs of bleeding  - Continue iron sucrose 200mg IV daily x 5 days ( - )         INFECTIOUS DISEASE  Active issues:   # No acute issues    Results from last 7 days   Lab Units 24  0834 24  0755   WBC AUTO x10*3/uL 7.9 9.0      Temp (24hrs), Av.3 °C (97.4 °F), Min:36.1 °C (97 °F), Max:36.7 °C (98.1 °F)      Skin/Musculoskeletal:  Active issues:   # no acute issues        PROPHYLAXIS:  - DVT: SCD's, Coumadin  - GI:  Protonix     CODE STATUS: Full code     DISPO PLANNING/ SOCIAL:  - Disposition expectation: Insurance  approval for transfer to [facility] (patient and family preference) obtained on 11/27/24. LVAD team to educated facility staff on 12/2/24 w/ discharge likely 12/3/24/     Patient examined and discussed with attending physician Dr. Bhanu Mcmahon MD, MS, FACP   Heart Failure Fellow,  [facility]

## 2024-11-30 NOTE — CARE PLAN
The patient's goals for the shift include      The clinical goals for the shift include Patient willl remain HDS this shift.    Over the shift, the patient did not make progress toward the following goals. Barriers to progression include patient with LVAD placed this admission. Recommendations to address these barriers include monitor patient and pland for Discharge to Rehab.

## 2024-11-30 NOTE — CARE PLAN
The patient's goals for the shift include      The clinical goals for the shift include Patient willl remain HDS this shift.    Over the shift, the patient did not make progress toward the following goals. Barriers to progression include patient recent LAVD placed. Recommendations to address these barriers include plan discharge to SNF rehab.

## 2024-12-01 LAB
ALBUMIN SERPL BCP-MCNC: 3.5 G/DL (ref 3.4–5)
ANION GAP SERPL CALC-SCNC: 16 MMOL/L (ref 10–20)
APTT PPP: 44 SECONDS (ref 27–38)
BUN SERPL-MCNC: 17 MG/DL (ref 6–23)
CALCIUM SERPL-MCNC: 9.5 MG/DL (ref 8.6–10.6)
CHLORIDE SERPL-SCNC: 105 MMOL/L (ref 98–107)
CO2 SERPL-SCNC: 25 MMOL/L (ref 21–32)
CREAT SERPL-MCNC: 1.46 MG/DL (ref 0.5–1.3)
EGFRCR SERPLBLD CKD-EPI 2021: 52 ML/MIN/1.73M*2
ERYTHROCYTE [DISTWIDTH] IN BLOOD BY AUTOMATED COUNT: 17.2 % (ref 11.5–14.5)
GLUCOSE SERPL-MCNC: 93 MG/DL (ref 74–99)
HCT VFR BLD AUTO: 33.9 % (ref 41–52)
HGB BLD-MCNC: 11.1 G/DL (ref 13.5–17.5)
INR PPP: 2 (ref 0.9–1.1)
LDH SERPL L TO P-CCNC: 223 U/L (ref 84–246)
MAGNESIUM SERPL-MCNC: 2.02 MG/DL (ref 1.6–2.4)
MCH RBC QN AUTO: 27.2 PG (ref 26–34)
MCHC RBC AUTO-ENTMCNC: 32.7 G/DL (ref 32–36)
MCV RBC AUTO: 83 FL (ref 80–100)
NRBC BLD-RTO: 0 /100 WBCS (ref 0–0)
PHOSPHATE SERPL-MCNC: 4.2 MG/DL (ref 2.5–4.9)
PLATELET # BLD AUTO: 347 X10*3/UL (ref 150–450)
POTASSIUM SERPL-SCNC: 4.5 MMOL/L (ref 3.5–5.3)
PROTHROMBIN TIME: 22.3 SECONDS (ref 9.8–12.8)
RBC # BLD AUTO: 4.08 X10*6/UL (ref 4.5–5.9)
SODIUM SERPL-SCNC: 141 MMOL/L (ref 136–145)
WBC # BLD AUTO: 8.1 X10*3/UL (ref 4.4–11.3)

## 2024-12-01 PROCEDURE — 2500000001 HC RX 250 WO HCPCS SELF ADMINISTERED DRUGS (ALT 637 FOR MEDICARE OP): Performed by: NURSE PRACTITIONER

## 2024-12-01 PROCEDURE — 85730 THROMBOPLASTIN TIME PARTIAL: CPT | Performed by: NURSE PRACTITIONER

## 2024-12-01 PROCEDURE — 2500000001 HC RX 250 WO HCPCS SELF ADMINISTERED DRUGS (ALT 637 FOR MEDICARE OP)

## 2024-12-01 PROCEDURE — 2500000004 HC RX 250 GENERAL PHARMACY W/ HCPCS (ALT 636 FOR OP/ED): Performed by: NURSE PRACTITIONER

## 2024-12-01 PROCEDURE — 2500000001 HC RX 250 WO HCPCS SELF ADMINISTERED DRUGS (ALT 637 FOR MEDICARE OP): Performed by: STUDENT IN AN ORGANIZED HEALTH CARE EDUCATION/TRAINING PROGRAM

## 2024-12-01 PROCEDURE — 2500000001 HC RX 250 WO HCPCS SELF ADMINISTERED DRUGS (ALT 637 FOR MEDICARE OP): Performed by: REGISTERED NURSE

## 2024-12-01 PROCEDURE — 83615 LACTATE (LD) (LDH) ENZYME: CPT | Performed by: NURSE PRACTITIONER

## 2024-12-01 PROCEDURE — 2500000002 HC RX 250 W HCPCS SELF ADMINISTERED DRUGS (ALT 637 FOR MEDICARE OP, ALT 636 FOR OP/ED): Performed by: NURSE PRACTITIONER

## 2024-12-01 PROCEDURE — 85027 COMPLETE CBC AUTOMATED: CPT | Performed by: NURSE PRACTITIONER

## 2024-12-01 PROCEDURE — 2500000005 HC RX 250 GENERAL PHARMACY W/O HCPCS: Performed by: NURSE PRACTITIONER

## 2024-12-01 PROCEDURE — 80069 RENAL FUNCTION PANEL: CPT | Performed by: NURSE PRACTITIONER

## 2024-12-01 PROCEDURE — 2500000002 HC RX 250 W HCPCS SELF ADMINISTERED DRUGS (ALT 637 FOR MEDICARE OP, ALT 636 FOR OP/ED): Performed by: REGISTERED NURSE

## 2024-12-01 PROCEDURE — 1200000002 HC GENERAL ROOM WITH TELEMETRY DAILY

## 2024-12-01 PROCEDURE — 99233 SBSQ HOSP IP/OBS HIGH 50: CPT | Performed by: STUDENT IN AN ORGANIZED HEALTH CARE EDUCATION/TRAINING PROGRAM

## 2024-12-01 PROCEDURE — 83735 ASSAY OF MAGNESIUM: CPT | Performed by: NURSE PRACTITIONER

## 2024-12-01 ASSESSMENT — COGNITIVE AND FUNCTIONAL STATUS - GENERAL
DRESSING REGULAR LOWER BODY CLOTHING: A LITTLE
STANDING UP FROM CHAIR USING ARMS: A LITTLE
MOVING TO AND FROM BED TO CHAIR: A LITTLE
MOVING TO AND FROM BED TO CHAIR: A LITTLE
DRESSING REGULAR LOWER BODY CLOTHING: A LITTLE
TOILETING: A LITTLE
TURNING FROM BACK TO SIDE WHILE IN FLAT BAD: A LITTLE
DAILY ACTIVITIY SCORE: 22
MOBILITY SCORE: 18
TURNING FROM BACK TO SIDE WHILE IN FLAT BAD: A LITTLE
CLIMB 3 TO 5 STEPS WITH RAILING: A LOT
WALKING IN HOSPITAL ROOM: A LITTLE
STANDING UP FROM CHAIR USING ARMS: A LITTLE
CLIMB 3 TO 5 STEPS WITH RAILING: A LOT
TOILETING: A LITTLE
WALKING IN HOSPITAL ROOM: A LITTLE
MOBILITY SCORE: 18
DAILY ACTIVITIY SCORE: 22

## 2024-12-01 ASSESSMENT — PAIN - FUNCTIONAL ASSESSMENT
PAIN_FUNCTIONAL_ASSESSMENT: 0-10

## 2024-12-01 ASSESSMENT — PAIN SCALES - GENERAL
PAINLEVEL_OUTOF10: 0 - NO PAIN

## 2024-12-01 NOTE — CARE PLAN
The clinical goals for the shift include Pt will remain HDS and free from injury    Over the shift, the patient remained stable; pt  continued to have some PVCs/ nonsustained Vtach but asymptomatic; pt reported 0/10 pain throughout the shift; MAPs in high 70s.

## 2024-12-01 NOTE — PROGRESS NOTES
HF Attending Attestation Note    Briefly, 69M from [place], [place] followed by Dr. Washington for end-stage ischemic cardiomyopathy (LVEF 10-15%, NYHA IV), s/p HM3 LVAD implantation (11/12/24) as long therapy due to significant PAD, age, and patient preference, with a complicated post-op course involving RV dysfunction, delirium, NSVT, and malnutrition.    Exam: Euvolemic well appearing, LVAD hum    Heart Mate III interrogation (personally interrogated)  5200 rpm, no significant spin downs or alarms    Problems:   Principal Problem:    Acute on chronic heart failure with reduced ejection fraction and diastolic dysfunction  Active Problems:    Ischemic cardiomyopathy    Receiving inotropic medication    HTN (hypertension)    CAD (coronary artery disease)    MI (myocardial infarction) (Multi)    CHF (congestive heart failure)    Gastroesophageal reflux disease    Acute kidney injury (nontraumatic) (CMS-HCC)    Delirium    Notable Therapies   Class  Agent SAFETY    *ARNI* / ACE / ARB  Entresto 24-26mg BID Last BP: 101/66, Last BUN/Cr (GFR): 11/30/2024: 17/1.20 (65)    *Beta-Blocker*  RV dysfunction Last HR: 72    *MRA*  [place] 12.5 Last K: 11/30/2024: 4.3     *SGLT2*  Farxiga 10 Last A1c 10/24/2024: 6.3     Diuretic   Last BNP: 11/29/2024: 478    Hydralazine/ISDN       Digoxin Digoxin 250mcg daily Last Digoxin level: 11/29/2024: 0.46    Anticoagulation  Warfarin  Last Hgb: 12/1/2024: 11.1, last INR: 11/30/2024: 2.1      Anti-arrhythmic  Amiodarone load Last QTc: 11/21/2024: 552    Antiplatelet  Aspirin 81 Last Platelet: 12/1/2024: 347    Lipid-Lowering  icosapent ethyL, 2 g, BID  ,trial pravastatin (did not tolerate atorvastatin_ Last Tchol 11/27/2024: 126; 126 / LDL 11/27/2024: 24.6; 24.6 / TRIG 11/27/2024: 155    Other        Device(s)  No ICD, consider.  EF: 11/26/2024: 18%, QRS: 11/21/2024: 146ms    Cardiac Rehab,   if EF <35/MI/OHS  To refer      Plan:   - amiodarone load for NSVT, no ICD in place. Appreciate EP  recs  - PT/OT  - continue GDMT, no change today  - continue warfarin, may need to reduce with amiodarone initiation  - Continue 5200rpm LVAD speed to maintain AV opening (given descending aortic graft position) and prevent worsening AI. Septum is midline with no MR and with E>A on bedside echo ramp.     Dispo: Insurance approval for transfer to [facility] (patient and family preference) obtained on 11/27/24. LVAD team to educated facility staff on 12/2/24 w/ discharge likely 12/3/24/     Olvin Drummond MD    Objective   Admit Date: 10/24/2024  Hospital Length of Stay: 38   Home: [facility] [id number]    MEDICATIONS  Infusions:     Scheduled:  amiodarone, 400 mg, BID  aspirin, 81 mg, Daily  dapagliflozin propanediol, 10 mg, Daily  digoxin, 250 mcg, Daily  escitalopram, 10 mg, Daily  icosapent ethyL, 2 g, BID  lidocaine, 2 patch, Daily  magnesium oxide, 800 mg, BID  methocarbamol, 500 mg, q6h  pantoprazole, 40 mg, Daily before breakfast  pravastatin, 20 mg, Nightly  sacubitriL-valsartan, 1 tablet, BID  sennosides-docusate sodium, 1 tablet, BID  sildenafil, 20 mg, TID  spironolactone, 12.5 mg, Daily  warfarin, 7.5 mg, Daily      PRN:  acetaminophen, 975 mg, q6h PRN  ondansetron, 4 mg, q8h PRN  polyethylene glycol, 17 g, Daily PRN      Prior to Admission Meds:  Medications Prior to Admission   Medication Sig Dispense Refill Last Dose/Taking    aspirin 81 mg EC tablet Take 1 tablet (81 mg) by mouth once daily.       bumetanide (Bumex) 1 mg tablet Take 0.5 tablets (0.5 mg) by mouth once daily.       cholecalciferol (Vitamin D-3) 50,000 unit capsule Take 1 capsule (50,000 Units) by mouth 1 (one) time per week.       escitalopram (Lexapro) 10 mg tablet Take 1 tablet (10 mg) by mouth once daily.       hydrALAZINE (Apresoline) 25 mg tablet Take 1 tablet (25 mg) by mouth 2 times a day.       isosorbide mononitrate ER (Imdur) 120 mg 24 hr tablet Take 1 tablet (120 mg) by mouth once daily. Do not crush or chew.        metoprolol succinate XL (Toprol-XL) 50 mg 24 hr tablet Take 1 tablet (50 mg) by mouth once daily. Do not crush or chew.       multivitamin tablet Take 1 tablet by mouth once daily.       nitroglycerin (Nitrostat) 0.4 mg SL tablet Place 1 tablet (0.4 mg) under the tongue every 5 minutes if needed for chest pain.       pantoprazole (ProtoNix) 40 mg EC tablet Take 1 tablet (40 mg) by mouth once daily in the morning. Take before meals. Do not crush, chew, or split.       sacubitriL-valsartan (Entresto)  mg tablet Take 1 tablet by mouth 2 times a day.       spironolactone (Aldactone) 25 mg tablet Take 1 tablet (25 mg) by mouth once daily.       warfarin (Coumadin) 5 mg tablet Take 1 tablet (5 mg) by mouth once daily in the evening. Take as directed per After Visit Summary.          Vitals:      12/1/2024     4:30 AM 11/30/2024    11:43 PM 11/30/2024     8:40 PM 11/30/2024     3:00 PM 11/30/2024    11:24 AM 11/30/2024     7:37 AM 11/30/2024     4:00 AM   Vitals   Systolic 101 101 99   90    Diastolic 66 68 67   59    BP Location Left arm Left arm Left arm   Right arm Left arm   Heart Rate 72 67  70 72 68    Temp 37 °C (98.6 °F) 36.2 °C (97.2 °F)  36.1 °C (97 °F) 36.3 °C (97.3 °F) 36.1 °C (97 °F)    Resp 14 15  17 17 17    Weight (lb) 194.89         BMI 26.43 kg/m2         BSA (m2) 2.12 m2           Wt Readings from Last 5 Encounters:   12/01/24 88.4 kg (194 lb 14.2 oz)   10/24/24 92.5 kg (204 lb)   08/05/24 122 kg (268 lb)   01/31/22 119 kg (262 lb)   02/18/20 123 kg (270 lb 2 oz)       Intake/Output Summary (Last 24 hours) at 12/1/2024 0806  Last data filed at 12/1/2024 0430  Gross per 24 hour   Intake 720 ml   Output 1050 ml   Net -330 ml       CHEST: Unlabored, Clear, Diminished  CV:  Normal sinus rhythm  NEURO:   RASS: Alert and calm  CAM: Negative  LOC: Alert  Cognition: Follows commands  GCS: 15    DATA:  CMP:  Recent Labs     11/30/24  0834 11/29/24  0702 11/27/24  0652 11/27/24  0227 11/26/24  0603  11/25/24  0736 11/25/24  0250 11/24/24  0556 11/23/24  0451    138 138 138 139 135* 133* 136 139   K 4.3 4.2 4.4 4.8 4.3 4.4 8.0* 4.6 4.3    106 105 105 103 105 102 104 105   CO2 26 26 24 23 26 16* 26 22 26   ANIONGAP 12 10 13 15 14 18 13 15 12   BUN 17 16 20 22 19 20 22 20 19   CREATININE 1.20 1.19 1.28 1.21 1.39* 1.10 1.22 1.24 1.32*   EGFR 65 66 61 65 55* 73 64 63 58*   MG 2.02 2.05 2.18 2.12 2.13  --  2.47* 2.03 2.12     Recent Labs     11/30/24  0834 11/29/24  0702 11/27/24  0652 11/26/24  0603 11/25/24  0736 11/25/24  0250 11/24/24  0556 11/23/24  0451 11/19/24  1459 11/19/24  0322 11/18/24  0525 11/17/24  1323 11/17/24  0122 11/16/24  1358 11/16/24  0128 11/15/24  1255 11/15/24  0021 11/14/24  1224 11/14/24  0118 11/13/24  1228 11/13/24  0007 11/12/24  1307   ALBUMIN 3.5 3.3* 3.6 3.6 3.4 3.7 3.6 3.6   < > 3.1* 3.5   < > 3.4   < > 3.6   < > 3.7   < > 3.8 3.7   < > 3.7   ALT  --   --   --   --   --   --   --   --   --  6* 6*  --  3*  --  4*  --  5*  --  7* 11  --  15   AST  --   --   --   --   --   --   --   --   --  16 18  --  21  --  24  --  29  --  40* 42*  --  37   BILITOT  --   --   --   --   --   --   --   --   --  0.8 0.8  --  1.0  --  1.0  --  1.1  --  0.9 1.1  --  0.9    < > = values in this interval not displayed.     CBC:  Recent Labs     12/01/24  0635 11/30/24  0834 11/29/24  0755 11/27/24  0652 11/26/24  0603 11/25/24  0250 11/24/24  0556 11/23/24  0451   WBC 8.1 7.9 9.0 12.0* 11.8* 13.5* 15.4* 13.8*   HGB 11.1* 11.0* 10.5* 12.6* 12.4* 12.1* 12.3* 12.5*   HCT 33.9* 34.6* 33.1* 38.3* 38.6* 37.2* 39.1* 40.1*    381 401 503* 537* 313 545* 529*   MCV 83 85 86 84 85 83 86 87     COAG:   Recent Labs     11/30/24  0834 11/29/24  0702 11/28/24  0542 11/27/24  2037 11/27/24  1605 11/26/24  0957 11/26/24  0603 11/25/24  2348 11/25/24  0736 11/25/24  0250 11/24/24  2156 11/24/24  0556 11/23/24  0451 11/13/24  0008 11/12/24  1257   INR 2.1* 2.1* 2.1*  --  1.8*  --  1.8*  --   --  1.8*  --   "1.8* 1.9*   < > 1.4*   HAUF  --   --  0.6 0.5  --  0.5 0.4 0.2   < > 0.1   < >  --   --    < >  --    FIBRINOGEN  --   --   --   --   --   --   --   --   --   --   --   --   --   --  301    < > = values in this interval not displayed.     ABO:   Recent Labs     11/20/24  0151   ABO A     HEME/ENDO:   Recent Labs     10/31/24  1249 10/24/24  2328   FERRITIN  --  76   IRONSAT  --  17*   TSH 4.39* 8.47*   FREET4  --  1.44   HGBA1C  --  6.3*     CARDIAC:   Recent Labs     11/30/24  0834 11/29/24  0755 11/29/24  0702 11/27/24  0652 11/26/24  0603 11/25/24  0736 11/25/24  0250 11/24/24  0556 11/23/24  0451 11/12/24  1307 10/31/24  1249 10/24/24  2328     --  151 214 264* 426* 725* 247* 240   < > 187  --    BNP  --  478*  --   --   --   --   --   --   --   --  755* 1,995*    < > = values in this interval not displayed.     Recent Labs     11/27/24  0652   CHOL 126  126   HDL 24.6  24.6   TRIG 155*     TOX:  Recent Labs     10/31/24  1249   AMPHETAMINE Negative     MICRO: No results for input(s): \"ESR\", \"CRP\", \"PROCAL\" in the last 73647 hours.  No results found for the last 90 days.      EKG:   Recent Labs     11/21/24  0945 11/17/24  0855 11/12/24  1610   ATRRATE 81 138 105   VENTRATE 111 119 126   QRSDUR 146 112 140   QTCFRED 498 469 469   QTCCALCB 552 526 530     Encounter Date: 10/24/24   Electrocardiogram 12-lead PRN for arrhythmia   Result Value    Ventricular Rate 111    Atrial Rate 81    QRS Duration 146    QT Interval 406    QTC Calculation(Bazett) 552    R Axis 24    T Axis 45    QRS Count 18    Q Onset 226    P Onset 209    P Offset 224    T Offset 429    QTC Fredericia 498    Narrative    Atrial fibrillation with rapid ventricular response with premature ventricular or aberrantly conducted complexes  Left bundle branch block  Abnormal ECG  When compared with ECG of 20-NOV-2024 03:52,  Left bundle branch block has replaced Nonspecific intraventricular block  Criteria for Septal infarct are no longer " Present  Criteria for Lateral infarct are no longer Present     Echocardiogram:   Recent Labs     11/26/24  1152 11/18/24  1144 11/15/24  0904 11/13/24  0851 11/12/24  0628 11/05/24  1154 10/25/24  1436   EF 18 10 13 15 18 18 18   LVIDD  --   --  5.90 6.90  --  8.21 7.70   RV  --   --   --  23.4  --  42.9 21.0   RVFRWALLPKSP  --   --   --  6.64  --  6.00  --    TAPSE  --   --   --   --   --  0.9 1.0   Transthoracic Echo (TTE) Complete With Contrast 10/25/2024    [facility], [address] [place] [address]  Tel [phone] and Fax [phone]    TRANSTHORACIC ECHOCARDIOGRAM REPORT      Patient Name:      RJ Webb Physician:    [id number] Fawad Chicas MD  Study Date:        10/25/2024           Ordering Provider:    [id number] LANDON GARCES  MRN/PID:           [id number]             Fellow:  Accession#:        [id number]         Nurse:  Date of Birth/Age: 1955 / 69      Sonographer:          Nikkie estrada                                      BLANQUITA  Gender:            M                    Additional Staff:  Height:            182.88 cm            Admit Date:           10/24/2024  Weight:            93.44 kg             Admission Status:     Inpatient -  Routine  BSA / BMI:         2.16 m2 / 27.94      Encounter#:           [id number]  kg/m2  Blood Pressure:    113/62 mmHg          Department Location:  [address]    Study Type:    TRANSTHORACIC ECHO (TTE) COMPLETE  Diagnosis/ICD: Acute on chronic combined systolic (congestive) and diastolic  (congestive) heart failure (CHF)-I50.43; Ischemic  cardiomyopathy-I25.5  Indication:    Stage D heart failure  CPT Code:      Echo Complete w Full Doppler-96185    Patient History:  Pertinent History: CAD s/p 4V CABG and PCI, HF/ICM/HFrEF, HTN, DLD, VIV,  bicuspid AV, afib.    Study Detail: The following Echo studies were performed: 2D, M-Mode, Doppler and  color flow. Definity used as a contrast agent for  endocardial  border definition and agitated saline used as a contrast agent for  intraseptal flow evaluation. Unable to obtain suprasternal notch  view. Patient's heart rhythm is atrial fibrillation.      PHYSICIAN INTERPRETATION:  Left Ventricle: Left ventricular ejection fraction is severely decreased, by visual estimate at 15-20%. There is global hypokinesis of the left ventricle with minor regional variations. The left ventricular cavity size is severely dilated. Abnormal (paradoxical) septal motion consistent with post-operative status. Left ventricular diastolic filling was not assessed. There is no definite left ventricular thrombus visualized.  Left Atrium: The left atrium is moderately dilated. There is no evidence of a patent foramen ovale. A bubble study using agitated saline was performed. Bubble study is negative.  Right Ventricle: The right ventricle is moderately enlarged. There is severely reduced right ventricular systolic function. A device is visualized in the right ventricle.  Right Atrium: The right atrium is moderately dilated. There is a device visualized in the right atrium.  Aortic Valve: The aortic valve is structurally normal. There is mild to moderate aortic valve cusp calcification. The aortic valve dimensionless index is 0.57. There is trace aortic valve regurgitation. The peak instantaneous gradient of the aortic valve is 16.2 mmHg. The mean gradient of the aortic valve is 10.0 mmHg.  Mitral Valve: The mitral valve is normal in structure. There is mild mitral annular calcification. There is mild mitral valve regurgitation.  Tricuspid Valve: The tricuspid valve is structurally normal. There is trace tricuspid regurgitation. The Doppler estimated RVSP is within normal limits at 21.0 mmHg.  Pulmonic Valve: The pulmonic valve is structurally normal. There is no indication of pulmonic valve regurgitation.  Pericardium: Trivial pericardial effusion.  Aorta: The aortic root is  normal.  Systemic Veins: The inferior vena cava was not well visualized.  In comparison to the previous echocardiogram(s): Compared with study dated 2/3/2020, LVEF is now severely reduced, Previoulsy reported LVEF is 40-45% with some regional hypokinesis. Primary team is aware because of a recent BRIANA which showed similar findinds.      CONCLUSIONS:  1. Left ventricular ejection fraction is severely decreased, by visual estimate at 15-20%.  2. There is global hypokinesis of the left ventricle with minor regional variations.  3. Left ventricular cavity size is severely dilated.  4. Abnormal septal motion consistent with post-operative status.  5. No left ventricular thrombus visualized.  6. There is severely reduced right ventricular systolic function.  7. Moderately enlarged right ventricle.  8. The left atrium is moderately dilated.  9. The right atrium is moderately dilated.  10. Right ventricular systolic pressure is within normal limits.  11. There is no evidence of a patent foramen ovale.  12. Compared with study dated 2/3/2020, LVEF is now severely reduced, Previoulsy reported LVEF is 40-45% with some regional hypokinesis. Primary team is aware because of a recent BRIANA which showed similar findinds.    QUANTITATIVE DATA SUMMARY:    2D MEASUREMENTS:          Normal Ranges:  Ao Root d:       4.00 cm  (2.0-3.7cm)  LAs:             5.00 cm  (2.7-4.0cm)  IVSd:            0.90 cm  (0.6-1.1cm)  LVPWd:           0.90 cm  (0.6-1.1cm)  LVIDd:           7.70 cm  (3.9-5.9cm)  LVIDs:           6.70 cm  LV Mass Index:   155 g/m2  LV % FS          13.0 %      LA VOLUME:                    Normal Ranges:  LA Vol A4C:        77.3 ml    (22+/-6mL/m2)  LA Vol A2C:        97.8 ml  LA Vol BP:         89.5 ml  LA Vol Index A4C:  35.8ml/m2  LA Vol Index A2C:  45.3 ml/m2  LA Vol Index BP:   41.5 ml/m2  LA Area A4C:       25.0 cm2  LA Area A2C:       27.3 cm2  LA Major Axis A4C: 6.9 cm  LA Major Axis A2C: 6.5 cm  LA Volume Index:   41.5  ml/m2      RA VOLUME BY A/L METHOD:          Normal Ranges:  RA Area A4C:             25.3 cm2      LV SYSTOLIC FUNCTION BY 2D PLANIMETRY (MOD):  Normal Ranges:  EF-Visual:      18 %  LV EF Reported: 18 %      AORTIC VALVE:                      Normal Ranges:  AoV Vmax:                2.01 m/s  (<=1.7m/s)  AoV Peak P.2 mmHg (<20mmHg)  AoV Mean PG:             10.0 mmHg (1.7-11.5mmHg)  LVOT Max Jude:            1.12 m/s  (<=1.1m/s)  AoV VTI:                 36.30 cm  (18-25cm)  LVOT VTI:                20.70 cm  LVOT Diameter:           2.60 cm   (1.8-2.4cm)  AoV Area, VTI:           2.37 cm2  (2.5-5.5cm2)  AoV Area,Vmax:           2.32 cm2  (2.5-4.5cm2)  AoV Dimensionless Index: 0.57      RIGHT VENTRICLE:  RV Basal 4.70 cm  RV Mid   3.60 cm  RV Major 8.0 cm  TAPSE:   10.1 mm      TRICUSPID VALVE/RVSP:          Normal Ranges:  Peak TR Velocity:     2.12 m/s  RV Syst Pressure:     21 mmHg  (< 30mmHg)  IVC Diam:             1.90 cm      PULMONIC VALVE:          Normal Ranges:  PV Accel Time:  85 msec  (>120ms)  PV Max Jude:     0.9 m/s  (0.6-0.9m/s)  PV Max P.9 mmHg      [id number] Fawad Chicas MD  Electronically signed on 10/25/2024 at 3:00:23 PM        ** Final **    Coronary Angiography:   Left & Right Heart Cath w Angiography & LV 10/28/2024    [facility], Cath Lab, [address] [place] [address]    Cardiovascular Catheterization Report    Patient Name:      RJ LEIJA       Performing Physician:  [id number] Lindsay MD  Study Date:        10/28/2024            Verifying Physician:   Marie Lindsay MD  MRN/PID:           [id number]              Cardiologist/Co-Scrub:  Accession#:        [id number]          Ordering Provider:     [id number] LANDON GARCES  Date of Birth/Age: 1955 / 69 years Cardiologist:  Gender:            M                     Fellow:                [id number] Rodríguez Patten MD  Encounter#:        [id number]             Surgeon:      Study: Left Heart Cath with Grafts      Indications:  RJ LEIJA is a 69 year old male who presents with coronary artery disease, prior percutaneous coronary intervention, atrial fibrillation, chronic pulmonary disease, dyslipidemia, hypertension, diabetes and presents with ADHF and cardiogenic shock requiring ionotropic support currently being worked up for advanced heart failure therapies. Cardiomyopathy. Heart failure.    Appropriate Use Criteria:  Re-evaluation of know cardiomyopathy with change in clinical status or on cardiac exam or to guide therapy; AUC score = 7.  Medical History:  Stress test performed: No. CTA performed: No. Agatston accessed: No. LVEF  Assessed: Yes. LVEF = 15%.  Cardiac arrest: No.  Cardiac surgical consult: No.  Cardiovascular instability: Yes. Cardiogenic shock and acute heart failure.  Frailty status of patient entering lab: 8 = Very severely frail.      Procedure Description:  Cardiac output was calculated via the Ernestina method.    Coronary Angiography:  The coronary circulation is right dominant.    Left Main Coronary Artery:  The left main coronary artery is a normal caliber vessel. The left main arises normally from the left coronary sinus of Valsalva and bifurcates into the LAD and circumflex coronary arteries. The distal left main coronary artery showed 30% stenosis.    Left Anterior Descending Coronary Artery Distribution:  The left anterior descending coronary artery is a normal caliber vessel. The LAD arises normally from the left main coronary artery. The proximal left anterior descending coronary artery showed 100% stenosis.    Circumflex Coronary Artery Distribution:  The circumflex coronary artery is a normal caliber vessel. The circumflex arises normally from the left main coronary artery and terminates in the AV groove. The circumflex revealed no significant disease or stenosis greater than 30% and Patent proximal LCx stents. The 1st obtuse marginal  branch showed no significant disease or stenosis greater than 30% and Patent ostial-proximal ARIELLE. The 2nd obtuse marginal branch demonstrated no significant disease or stenosis greater than 30%.    Right Heart Catheterization:  Cardiac output was calculated via the Ernestina method. Elevated ventricular filling pressure. Pulmonary venous hypertension. Right heart hemodynamics on 0.375 mcg/kg/min:  RA 17, RV 39/13, RVEDP 17, PA 37/22, mean PAP 28, PA sat 71%, CO/CI 5.2/2.5.    Right Coronary Artery Distribution:    The right coronary artery is a normal caliber vessel. The RCA arises normally from the right sinus of Valsalva. The proximal right coronary artery showed 100% stenosis.    Coronary Grafts:    LIMA Graft:  Left internal mammary artery graft conduit, originating in situ and attached to the distal left anterior descending, is patent.  Saphaneous Vein Graft(s):  The saphenous vein graft, originating from the aorta and attached to the 1st obtuse marginal, appeared totally occluded.  The 2nd saphenous vein graft, originating from the aorta and attached to the RCA, appeared totally occluded.  The 3rd saphenous vein graft, originating from the aorta and attached to the ramus intermedius, appeared totally occluded.    Coronary Lesion Summary:  Vessel      Stenosis    Vessel Segment  Left Main 30% stenosis      distal  LAD       100% stenosis    proximal  RCA       100% stenosis    proximal      Graft Stenosis Summary:  Graft      Destination of Graft                      % Stenosis  LIMA  distal left anterior  descending  SVG 1 1st obtuse marginal  SVG 2 RCA  SVG 3 ramus intermedius              Unable to engage and appears to be  occluded.      Hemo Personnel:  +----------------+---------+  Name            Duty       +----------------+---------+  Jed Lindsay MD 1  +----------------+---------+      Hemodynamic  Pressures:    +----+----------+---------+------------+-------------+---+-----+-------+-------+  SiteDate Time   Phase    Systolic    Diastolic  ED Mean A-Wave V-Wave                   Name       mmHg        mmHg     mmHmmHg  mmHg   mmHg                                                     g                      +----+----------+---------+------------+-------------+---+-----+-------+-------+   Art10/28/2024     Rest         129           49      68                    9:47:13 AM                                                          +----+----------+---------+------------+-------------+---+-----+-------+-------+   Art10/28/2024     Rest         114           53      67                    9:47:17 AM                                                          +----+----------+---------+------------+-------------+---+-----+-------+-------+   Art10/28/2024     Rest         108           54      66                    9:47:20 AM                                                          +----+----------+---------+------------+-------------+---+-----+-------+-------+    RA10/28/2024     Rest                               17     16     18        10:18:57                                                                      AM                                                          +----+----------+---------+------------+-------------+---+-----+-------+-------+   Art10/28/2024     Rest          99           52      66                      10:18:57                                                                      AM                                                          +----+----------+---------+------------+-------------+---+-----+-------+-------+    RA10/28/2024     Rest                               18     17     19        10:19:09                                                                       AM                                                          +----+----------+---------+------------+-------------+---+-----+-------+-------+   Art10/28/2024     Rest         101           54      67                      10:19:09                                                                      AM                                                          +----+----------+---------+------------+-------------+---+-----+-------+-------+    RV10/28/2024     Rest          39           13 17                           10:19:30                                                                      AM                                                          +----+----------+---------+------------+-------------+---+-----+-------+-------+   Art10/28/2024     Rest          96           50      63                      10:19:30                                                                      AM                                                          +----+----------+---------+------------+-------------+---+-----+-------+-------+    PA10/28/2024     Rest          37           22      28                      10:20:06                                                                      AM                                                          +----+----------+---------+------------+-------------+---+-----+-------+-------+   Art10/28/2024     Rest          93           50      61                      10:20:06                                                                      AM                                                          +----+----------+---------+------------+-------------+---+-----+-------+-------+    AO10/28/2024     Rest         102           62      77                      10:38:05                                                                       AM                                                          +----+----------+---------+------------+-------------+---+-----+-------+-------+   Art10/28/2024     Rest         112           52      67                      10:38:05                                                                      AM                                                          +----+----------+---------+------------+-------------+---+-----+-------+-------+   Art10/28/2024     Rest         128           60      79                      10:59:15                                                                      AM                                                          +----+----------+---------+------------+-------------+---+-----+-------+-------+   Art10/28/2024     Rest         123           58      77                      11:01:12                                                                      AM                                                          +----+----------+---------+------------+-------------+---+-----+-------+-------+        Oxygen Saturation %:  +-----------+----------+------------+  Sample SiteO2 Sat (%)HB (g/100ml)  +-----------+----------+------------+           FA        96        14.9  +-----------+----------+------------+           PA        71        14.9  +-----------+----------+------------+           FA        96        14.9  +-----------+----------+------------+           PA        71        14.9  +-----------+----------+------------+      Cardiac Outputs:  +---------------+------------------+-------+  ESTEBAN CO (l/min)ESTEBAN CI (l/min/m2)ESTEBAN SV  +---------------+------------------+-------+              5.2               2.5   60.8  +---------------+------------------+-------+      Vascular Resistance Calculated Values  (Wood Units):  +-----+----+----+---+----+----+----+-------+  PhaseSVR SVRITPRTPRITVR TVRITPR/TVR  +-----+----+----+---+----+----+----+-------+  0    [phone]        +-----+----+----+---+----+----+----+-------+      Complications:  No in-lab complications observed.    Cardiac Cath Post Procedure Notes:  Post Procedure Diagnosis: Diffuse severe native coronary artery disease.  Patent proximal LCx and ostial-prox OM 1 ARIELLE.  Patent LIMA-LAD, occluded SVG-OM, SVG-RI, SVG- RCA.  Elevated right and left sided filling pressures with  preserved cardiac output and index while on ionotropic  support with 0.375 mcg/kg/min of milrinone.  Blood Loss:               Estimated blood loss during the procedure was 5 cc  mls.  Specimens Removed:        Number of specimen(s) removed: none.    ____________________________________________________________________________________  CONCLUSIONS:  1. Diffuse severe native coronary artery disease.  2. Patent proximal LCx and ostial-prox OM 1 ARIELLE.  3. Patent LIMA-LAD. Other grafts are occluded: SVG-OM, SVG-RI, SVG- RCA.  4. Elevated right and left sided filling pressures with preserved cardiac output and index while on ionotropic support with 0.375 mcg/kg/min of milrinone .  5. Further work-up and management per advanced heart failure team.    ICD 10 Codes:  Ischemic cardiomyopathy-I25.5; Cardiogenic shock-R57.0    CPT Codes:  Left Heart Cath (visualization of coronaries) and LV-72707; Ultrasound guidance for vascular access-77293; Moderate Sedation Services initial 15 minutes patient >5 years-83787; Moderate Sedation Services 1st additional 15 minutes patient >5 years-02528    [id number] Jed Lindsay MD  Performing Physician  Electronically signed by [id number] Jed Lindsay MD on 11/5/2024 at 7:06:05 AM          ** Final **    Right Heart Cath: No results found for this or any previous visit from the past 1800 days.    Cardiac Scoring: No results found for this or any  previous visit from the past 1800 days.    Cardiac MRI: No results found for this or any previous visit from the past 1800 days.    Nuclear:No results found for this or any previous visit from the past 1800 days.    Metabolic Stress: No results found for this or any previous visit from the past 1800 days.      Transthoracic Echo (TTE) Limited    Result Date: 11/26/2024   [facility], [address] [place] [address]                Tel [phone] and Fax [phone] TRANSTHORACIC ECHOCARDIOGRAM REPORT  Patient Name:       RJ Webb Physician:    [id number] Parker Terrell MD Study Date:         11/26/2024          Ordering Provider:    [id number] KARRI CHAVEZ MRN/PID:            [id number]            Fellow: Accession#:         [id number]        Nurse: Date of Birth/Age:  1955 / 69     Sonographer:          Manuela estrada                                     [facility], [place] Gender assigned at                     Additional Staff:     Melida Kaye Birth:                                                        [facility] Height:             182.88 cm           Admit Date: Weight:             87.54 kg            Admission Status:     Inpatient -                                                               Routine BSA / BMI:          2.10 m2 / 26.18     Encounter#:           [id number]                     kg/m2 Blood Pressure:     /                   Department Location:  [facility]                                                               Non Invasive Study Type:    TRANSTHORACIC ECHO (TTE) LIMITED Diagnosis/ICD: Presence of heart assist device-Z95.811 Indication:    RAMP study CPT Code:      Echo Limited-80200; Doppler Limited-42544; Color Doppler-46031 Patient History: Pertinent History: CAD. CABG x4 2012, PCI 2019, ICM,  CHF, s/p HeartMate III                    LVAD, AF s/p RFA (PVI and CTI 4/2024). Study Detail: The following Echo studies were performed: 2D, M-Mode, Doppler and               color flow. Technically challenging study due to poor acoustic               windows.  PHYSICIAN INTERPRETATION: Left Ventricle: Left ventricular ejection fraction is severely decreased, by visual estimate at 15-20%. There is global hypokinesis of the left ventricle with minor regional variations. The left ventricular cavity size is severely dilated. Spectral Doppler shows a Grade II (pseudonormal pattern) of left ventricular diastolic filling with an elevated left atrial pressure. Left Atrium: The left atrium is enlarged. Right Ventricle: The right ventricle is severely enlarged. There is reduced right ventricular systolic function. Right Atrium: The right atrium was not well visualized. Aortic Valve: The aortic valve is probably trileaflet. There is mild aortic valve cusp calcification. There is mild aortic valve regurgitation. Mitral Valve: The mitral valve is mildly thickened. There is trace mitral valve regurgitation. Tricuspid Valve: The tricuspid valve is structurally normal. There is trace tricuspid regurgitation. The right ventricular systolic pressure is unable to be estimated. Pulmonic Valve: The pulmonic valve is structurally normal. Pulmonic valve regurgitation was not assessed. Pericardium: Trivial pericardial effusion. Aorta: The aortic root is abnormal. There is mild dilatation of the ascending aorta. There is mild dilatation of the aortic root. Pulmonary Artery: The pulmonary artery is not well visualized. Systemic Veins: The inferior vena cava was not well visualized. In comparison to the previous echocardiogram(s): Compared with study dated 11/18/2024, no significant change.  LEFT VENTRICULAR ASSIST DEVICE: LVAD: The patient has a(n) HeartMate 3 LVAD device present. Inflow Cannula: Visualization of the inflow cannula is  technically difficult. Outflow Cannula: Visualization of the outflow cannula is technically difficult. LVAD Comments: Ramp study. 5200 rpm at start and finish of limited echo.  CONCLUSIONS:  1. Left ventricular ejection fraction is severely decreased, by visual estimate at 15-20%.  2. There is global hypokinesis of the left ventricle with minor regional variations.  3. Spectral Doppler shows a Grade II (pseudonormal pattern) of left ventricular diastolic filling with an elevated left atrial pressure.  4. Left ventricular cavity size is severely dilated.  5. There is reduced right ventricular systolic function.  6. Severely enlarged right ventricle.  7. The left atrium is enlarged.  8. Mild aortic valve regurgitation.  9. The pulmonary artery is not well visualized. QUANTITATIVE DATA SUMMARY:  M-MODE MEASUREMENTS:         Normal Ranges: Ao Root:             4.10 cm (2.0-3.7cm)  AORTA MEASUREMENTS:         Normal Ranges: Asc Ao, d:          4.20 cm (2.1-3.4cm)  LV SYSTOLIC FUNCTION BY 2D PLANIMETRY (MOD):                      Normal Ranges: EF-Visual:      18 % LV EF Reported: 18 %  LV DIASTOLIC FUNCTION:          Normal Ranges: MV Peak E:             0.50 m/s (0.7-1.2 m/s) MV Peak A:             0.44 m/s (0.42-0.7 m/s) E/A Ratio:             1.14     (1.0-2.2)  [id number] Parker Terrell MD Electronically signed on 11/26/2024 at 3:08:03 PM  ** Final **     CT abdomen pelvis w IV contrast    Result Date: 11/25/2024  Interpreted By:  Danya Siddiqi,  and Shayla Araya STUDY: CT ABDOMEN PELVIS W IV CONTRAST;  11/25/2024 10:28 am   INDICATION: Signs/Symptoms:Copious serosanguinous drainage around driveline site. Eval for fluid collection..     COMPARISON: CT chest and pelvis without IV contrast 11/04/2024.   ACCESSION NUMBER(S): [id number]   ORDERING CLINICIAN: SOLIS DUEÑAS   TECHNIQUE: CT of the abdomen and pelvis was performed.  Standard contiguous axial images were obtained at 3 mm slice thickness through the abdomen and  pelvis. Coronal and sagittal reconstructions at 3 mm slice thickness were performed.  90 ML of Omnipaque 350 was administered intravenously without immediate complication.   FINDINGS: LOWER CHEST: New small to moderate left-sided loculated pleural effusion and left lower lobe atelectatic changes. Bilateral interlobular septal thickening of the visualized lungs that likely represents a component of edema and atelectasis secondary to postoperative changes. LVAD device in place along the left ventricle with the outflow tract connecting to the mid descending thoracic aorta and driveline connecting to external device outside of the right chest wall.   Patient is status post previous median sternotomy. The heart is moderately enlarged but similar in size to prior without pericardial effusion. 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 No pneumothorax. Visualized distal esophagus appears normal.   A hematoma adjacent to the drive line along the left chest wall measuring 9.1 x 3.7 x 4.9 cm (Series 201, Image 22) (Series 203, Image 61).   Additionally, there is a 5.2 x 2.4 x 2.2 cm fluid collection/loculated pleural effusion adjacent to the LVAD outflow tract in the posterior left lower lobe (series 201, image 12).   ABDOMEN:   LIVER: The liver is mildly enlarged in size measuring 19.3 cm in craniocaudal dimension. Large hypodense lesion in segment 6 of the liver measuring 4.2 x 2.3 x 2.8 cm (Series 203, Image 57) (Series 903, Image 78) that is overall indeterminate however appears similar to prior imaging.   BILE DUCTS: The intrahepatic and extrahepatic ducts are not dilated.   GALLBLADDER: The gallbladder is nondistended and without evidence of radiopaque stones.   PANCREAS: The pancreas appears unremarkable without evidence of ductal dilatation or masses.   SPLEEN: Splenomegaly measuring up to 19.5 cm craniocaudal dimension, not significantly changed compared to 11/04/2024 CT. Interval development of several  wedge-shaped hypodense lesions in the spleen compared to 11/04/2024 CT, compatible with infarcts.   ADRENAL GLANDS: Bilateral adrenal glands appear normal.   KIDNEYS AND URETERS: The kidneys are normal in size and enhance symmetrically. Multiple too small to characterize hypodense lesions in bilateral kidneys are statistically favored to represent simple renal cysts. A 2.1 cm cystic lesion in the mid pole of the right kidney, likely a simple renal cyst.  No hydroureteronephrosis or nephroureterolithiasis is identified.   PELVIS:   BLADDER: The urinary bladder is decompressed, limited for evaluation. There is intraluminal air in the anterior bladder that is likely secondary to recent catheterization.   REPRODUCTIVE ORGANS: Mild prostatomegaly.   BOWEL: The stomach is unremarkable. The small and large bowel are normal in caliber and demonstrate no wall thickening. Moderate colonic stool burden. Colonic diverticulosis without evidence of diverticulitis. The appendix is not definitely visualized. There is however no pericecal stranding or fluid.   VESSELS: There is no aneurysmal dilatation of the abdominal aorta. Moderate atherosclerotic disease of the abdominal aorta and its branches. The IVC appears normal. The splenic vasculature appears patent.   PERITONEUM/RETROPERITONEUM/LYMPH NODES: No ascites or free air, no fluid collection. No abdominopelvic lymphadenopathy is present.   BONES AND ABDOMINAL WALL: No suspicious osseous lesions are identified. Small acute left rib fracture that may be secondary to recent LVAD placement (Series 201, Image 2). Remote rib fractures of posterolateral left 9th through 11th ribs. Multilevel moderate degenerative discogenic disease is noted in the lower thoracic and lumbar spine particularly of the lower lumbar spine.   The abdominal wall soft tissues appear normal.       1.  Postoperative changes of recent LVAD placement. A 9.0 x 3.7 x 4.9 cm left chest wall hematoma adjacent to the  driveline which appears otherwise intact with no evidence of fracture. Additionally, there is a 5.2 x 2.4 x 2.2 cm fluid collection/loculated pleural effusion adjacent to the LVAD outflow tract in the posterior left lower lobe. 2. Splenomegaly with interval development of scattered peripheral hypoenhancing wedge-shaped areas, compatible with infarcts. 3. In the visualized lung segments there are mild bilateral lung findings suggestive of pulmonary edema and atelectatic changes that are likely secondary to recent procedure. 4. Stable moderate cardiomegaly. 5. Stable indeterminate 4.3 cm hypodense lesion in segment V of the liver, which was better evaluated on 11/02/2024 CT liver protocol. 6. Additional stable chronic findings as described above.   I personally reviewed the images/study and I agree with the findings as stated by Resident Dr. Quiana Mcguire MD. This study was interpreted at [place], [place].   MACRO: None   Signed by: Danya Siddiqi 11/25/2024 10:37 PM Dictation workstation:   [id number]    ECG 12 Lead    Result Date: 11/25/2024  Undetermined rhythm Nonspecific intraventricular block Cannot rule out Anterior infarct (cited on or before 24-OCT-2024) T wave abnormality, consider inferolateral ischemia Abnormal ECG When compared with ECG of 24-OCT-2024 23:03, Current undetermined rhythm precludes rhythm comparison, needs review QRS axis Shifted left Serial changes of Anterior infarct Present Confirmed by Lucas Kerns ([id number]) on 11/25/2024 12:12:55 PM    CT head wo IV contrast    Result Date: 11/24/2024  Interpreted By:  Des Thao, STUDY: CT HEAD WO IV CONTRAST;  11/24/2024 2:07 pm   INDICATION: Signs/Symptoms:LUE numbness in VAD patient.     COMPARISON: 11/17/2024   ACCESSION NUMBER(S): [id number]   ORDERING CLINICIAN: SOLIS DUEÑAS   TECHNIQUE: Noncontrast axial CT images of head were obtained with coronal and sagittal reconstructed images.    FINDINGS: BRAIN PARENCHYMA: Good infarct in the medial left cerebellar hemisphere. No acute intraparenchymal hemorrhage or parenchymal evidence of acute large territory ischemic infarct. Gray-white matter distinction is preserved. No mass-effect.   VENTRICLES and EXTRA-AXIAL SPACES:  No acute extra-axial or intraventricular hemorrhage. No effacement of cerebral sulci. The ventricles and sulci are age-concordant.   PARANASAL SINUSES/MASTOIDS:  No hemorrhage or air-fluid levels within the visualized paranasal sinuses. The mastoids are well aerated.   CALVARIUM/ORBITS:  No skull fracture.  The orbits and globes are intact to the extent visualized.   EXTRACRANIAL SOFT TISSUES: No discernible abnormality.       No acute intracranial abnormality.     MACRO: None.   Signed by: Des Thao 11/24/2024 3:07 PM Dictation workstation:   [id number]    XR chest 1 view    Result Date: 11/23/2024  Interpreted By:  Olvin Farias, STUDY: XR CHEST 1 VIEW; 11/23/2024 7:15 am   INDICATION: Signs/Symptoms:s/p LVAD implant, assess lung fields, devices.   COMPARISON: 11/22/2024   ACCESSION NUMBER(S): [id number]   ORDERING CLINICIAN: ANETA MANNING   FINDINGS:     CARDIOMEDIASTINAL SILHOUETTE: Cardiomegaly versus pericardial effusion. Post LVAD changes.   LUNGS: Mild perihilar interstitial edema and left-sided effusion. Continued left retrocardiac atelectasis/effusion. No pneumothorax.   ABDOMEN: No remarkable upper abdominal findings.   BONES: No acute osseous changes.       1.  Post LVAD changes with residual left basilar loculated fluid/atelectasis.     Signed by: Olvin Farias 11/23/2024 4:41 PM Dictation workstation:   [id number]    XR chest 1 view    Result Date: 11/22/2024  Interpreted By:  Olvin Farias, STUDY: XR CHEST 1 VIEW; 11/22/2024 8:22 am   INDICATION: Signs/Symptoms:s/p LVAD implant, assess lung fields, devices.   COMPARISON: 11/21/2024.   ACCESSION NUMBER(S): [id number]   ORDERING CLINICIAN: ANETA  NIGEL   FINDINGS:     CARDIOMEDIASTINAL SILHOUETTE: Cardiomegaly versus pericardial effusion. Patient is status post median sternotomy and LVAD.   LUNGS: There is mild perihilar interstitial edema. Left retrocardiac atelectasis versus loculated effusion. No pneumothorax.   ABDOMEN: No remarkable upper abdominal findings.   BONES: No acute osseous changes.       1.  Question left basilar consolidation/effusion status post LVAD. Consider follow-up with PA and lateral x-ray as clinically indicated.     Signed by: Olvin Farias 11/22/2024 4:44 PM Dictation workstation:   [id number]    XR chest 1 view    Result Date: 11/21/2024  Interpreted By:  Frank Connolly, STUDY: XR CHEST 1 VIEW;  11/21/2024 8:28 am   INDICATION: Signs/Symptoms:s/p LVAD implant, assess lung fields, devices.     COMPARISON: Exam dated 11/20/2024   ACCESSION NUMBER(S): [id number]   ORDERING CLINICIAN: ANETA MANNING   FINDINGS: AP radiograph of the chest was provided.   Status post median sternotomy. LVAD device is again visualized.   CARDIOMEDIASTINAL SILHOUETTE: Stable severe enlargement of the cardiac silhouette.   LUNGS: Left costophrenic angle is partially excluded from view due to LVAD device. Lungs otherwise demonstrate prominent interstitial markings. No new focal consolidation, right-sided pleural effusion, or evidence of pneumothorax. Focal lucency adjacent to the LVAD is similar to the recent exam.   ABDOMEN: No remarkable upper abdominal findings.   BONES: No acute osseous changes.       1.  Stable severe cardiomegaly with findings suggestive of pulmonary edema. 2. Additional stable focal lucency adjacent to the LVAD device may represent aerated lung versus postprocedural changes of LVAD placement.       MACRO: None   Signed by: Frank Connolly 11/21/2024 1:04 PM Dictation workstation:   [id number]    Electrocardiogram 12-lead PRN for arrhythmia    Result Date: 11/21/2024  Atrial fibrillation with rapid ventricular response with  premature ventricular or aberrantly conducted complexes Left bundle branch block Abnormal ECG When compared with ECG of 20-NOV-2024 03:52, Left bundle branch block has replaced Nonspecific intraventricular block Criteria for Septal infarct are no longer Present Criteria for Lateral infarct are no longer Present    XR chest 1 view    Result Date: 11/20/2024  Interpreted By:  Hemanth Rock  and Chemo Land STUDY: XR CHEST 1 VIEW;  11/20/2024 7:51 am   INDICATION: Signs/Symptoms:s/p LVAD implant, assess lung fields, devices.     COMPARISON: Chest x-ray 11/19/2024-11/14/2024   ACCESSION NUMBER(S): [id number]   ORDERING CLINICIAN: ANETA MANNING   FINDINGS: AP radiograph of the chest was provided.   LINES/TUBES: Unchanged positioning of LVAD. Postsurgical changes from median sternotomy with intact sternal cerclage wires. Multiple surgical clips again overlie left heart border.   CARDIOMEDIASTINAL SILHOUETTE: Cardiomediastinal silhouette is persistently enlarged, however stable in size and configuration when compared to prior..   LUNGS: Continued improvement in interstitial lung markings with patchy airspace opacities. Similar to slightly improved right perihilar and left mid basilar lung presumed atelectasis. Persistent blunting of the left costophrenic angle. No sizable pneumothorax.   ABDOMEN: No remarkable upper abdominal findings.   BONES: Partial visualization of left shoulder arthroplasty. No acute osseous changes.       1. Slight interval improvement in mild pulmonary edema. 2. Findings suggestive of persistent small left pleural effusion.   I personally reviewed the images/study and I agree with the findings as stated by Emery Mclain DO, PGY-3. This study was interpreted at [facility], [place], [place].   MACRO: None   Signed by: Hemanth Rock 11/20/2024 4:01 PM Dictation workstation:   [id number]    XR chest 1 view    Result Date: 11/19/2024  Interpreted By:  Mazin Blanco,   and [place]o [place] STUDY: XR CHEST 1 VIEW;  11/19/2024 3:50 am   INDICATION: Signs/Symptoms:s/p LVAD implant, assess lung fields, devices.   COMPARISON: Chest radiograph 11/18/2024-11/16/2024 CT chest, abdomen and pelvis 11/04/2024   ACCESSION NUMBER(S): [id number]   ORDERING CLINICIAN: ANETA MANNING   FINDINGS: AP supine radiograph of the chest was provided. There is slightly better patient centering on present study.   MEDICAL DEVICES/LINES: Unchanged positioning of LVAD. Postsurgical changes from median sternotomy with intact sternal cerclage wires. Multiple surgical clips again overlie left heart border.   CARDIOMEDIASTINAL SILHOUETTE: Cardiomediastinal silhouette is persistently enlarged, stable in size, but now with better delineation of bilateral heart borders as compared to prior radiograph from yesterday.   LUNGS: Improved interstitial lung markings with patchy airspace opacities. Improved but residual right perihilar and left mid basilar lung presumed atelectasis. Persistent blunting of the left costophrenic angle. No sizable pneumothorax.   ABDOMEN: No remarkable upper abdominal findings.   BONES: Partial visualization of left shoulder arthroplasty. No acute osseous changes.       1. Interval improvement in bilateral lung aeration with residual mild edema and left mid basilar lung predominant atelectasis with suggestion of small left pleural effusion   I personally reviewed the images/study and I agree with the findings as stated by Emery Mclain DO, PGY-3. This study was interpreted at [facility], [place], [place].   MACRO: None   Signed by: Mazin Blanco 11/19/2024 1:02 PM Dictation workstation:   [id number]    Electrocardiogram 12-lead PRN for arrhythmia    Result Date: 11/19/2024  Atrial fibrillation with rapid ventricular response with premature ventricular or aberrantly conducted complexes Low voltage QRS Inferior infarct (cited on or before 17-NOV-2024)  Abnormal ECG When compared with ECG of 17-NOV-2024 08:43, Previous ECG has undetermined rhythm, needs review Questionable change in QRS duration Borderline criteria for Lateral infarct are no longer Present Confirmed by Lucas Kerns ([id number]) on 11/19/2024 8:57:22 AM    Transthoracic Echo (TTE) Limited    Result Date: 11/18/2024   [facility], [address] [place] [address]                Tel [phone] and Fax [phone] TRANSTHORACIC ECHOCARDIOGRAM REPORT  Patient Name:       RJ Webb Physician:    [id number] Fawad Chicas MD Study Date:         11/18/2024          Ordering Provider:    [id number] HARRISON PALOMINO MRN/PID:            [id number]            Fellow: Accession#:         [id number]        Nurse: Date of Birth/Age:  1955 / 69     Sonographer:          Genesis Sinclair RDCS                     years Gender assigned at  M                   Additional Staff: Birth: Height:             182.88 cm           Admit Date:           10/24/2024 Weight:             93.44 kg            Admission Status:     Inpatient -                                                               Routine BSA / BMI:          2.16 m2 / 27.94     Encounter#:           [id number]                     kg/m2 Blood Pressure:     /                   Department Location:  [facility] Study Type:    TRANSTHORACIC ECHO (TTE) LIMITED Diagnosis/ICD: Presence of heart assist device-Z95.811 Indication:    Ramp study for LVAD CPT Code:      Echo Limited-12400; Doppler Limited-62511; Color Doppler-23613 Patient History: Pertinent History: Acute on chronic heart failure with reduced EF, Diastolic                    disfunction, Ischemic cardiiomyopathy, MI, CHF, LVAD, VIV. Study Detail: The following Echo studies were performed: 2D, Doppler, color flow               and M-Mode.  Technically challenging study due to patient lying in               supine position and postoperative dressings.  PHYSICIAN INTERPRETATION: Left Ventricle: Left ventricular ejection fraction is severely decreased, by visual estimate at 10%. There is global hypokinesis of the left ventricle with minor regional variations. The left ventricular cavity size is severely dilated. Abnormal (paradoxical) septal motion consistent with post-operative status. Left ventricular diastolic filling was not assessed. Left Atrium: The left atrium is moderate to severely dilated. Right Ventricle: The right ventricle is severely enlarged. There is severely reduced right ventricular systolic function. Right Atrium: The right atrium is moderately dilated. Aortic Valve: The aortic valve is structurally normal. There is mild aortic valve cusp calcification. There is mild aortic valve regurgitation. Mitral Valve: The mitral valve is normal in structure. There is mild mitral valve regurgitation. Tricuspid Valve: The tricuspid valve is structurally normal. There is mild tricuspid regurgitation. Pulmonic Valve: The pulmonic valve is not well visualized. Pulmonic valve regurgitation was not assessed. Pericardium: Trivial pericardial effusion. Aorta: The aortic root is normal. In comparison to the previous echocardiogram(s): Compared with study dated 11/15/2024, no significant change.  LEFT VENTRICULAR ASSIST DEVICE: LVAD: The patient has a(n) HeartMate 3 LVAD device present. Inflow Cannula: The inflow cannula is visualized in the left ventricular apex. AV Leaflet Mobility: . The aortic valve appears to be opening every 1 beats. LVAD Comments: Ramp study speed increased from 5200 to 5300. Aortic valve still opens every beat. Septum remains in the midline.  CONCLUSIONS:  1. Poorly visualized anatomical structures due to suboptimal image quality.  2. Left ventricular ejection fraction is severely decreased, by visual estimate at 10%.  3. There is  global hypokinesis of the left ventricle with minor regional variations.  4. Left ventricular cavity size is severely dilated.  5. Abnormal septal motion consistent with post-operative status.  6. There is severely reduced right ventricular systolic function.  7. Severely enlarged right ventricle.  8. The left atrium is moderate to severely dilated.  9. The right atrium is moderately dilated. 10. Mild aortic valve regurgitation. 11. . The aortic valve appears to be opening every 1 beats. 12. Compared with study dated 11/15/2024, no significant change. QUANTITATIVE DATA SUMMARY:  LV SYSTOLIC FUNCTION BY 2D PLANIMETRY (MOD):                      Normal Ranges: EF-Visual:      10 % LV EF Reported: 10 %  [id number] Fawad Chicas MD Electronically signed on 11/18/2024 at 12:33:46 PM  ** Final **     XR chest 1 view    Result Date: 11/18/2024  Interpreted By:  Elvis Ross, STUDY: XR CHEST 1 VIEW; 11/18/2024 6:47 am   INDICATION: Signs/Symptoms:s/p LVAD implant, assess lung fields, devices.   COMPARISON: Radiograph dated 11/17/2024   ACCESSION NUMBER(S): [id number]   ORDERING CLINICIAN: ANETA MANNING   FINDINGS: Interval removal of [place]-Faviola catheter. Unchanged positioning of LVAD.   Cardiac silhouette size is persistently enlarged.   Increased interstitial markings and ground-glass opacity, worse compared to prior study. Bilateral basilar pleural effusion and atelectasis. No sizable pneumothorax.   No acute osseous abnormality.       1. Worsening pulmonary edema. Correlate with volume status. 2. Persistent bibasilar pleural effusion and atelectasis with slight worsening of the aeration of the bases.       Signed by: Elvis Sagastume 11/18/2024 10:57 AM Dictation workstation:   [id number]    CT head wo IV contrast    Result Date: 11/18/2024  Interpreted By:  Amarilis Owen,  Louis Villanueva STUDY: CT HEAD WO IV CONTRAST;  11/17/2024 10:54 pm   INDICATION: Signs/Symptoms:tranient vision changes, s/p  LVAD.     COMPARISON: None.   ACCESSION NUMBER(S): [id number]   ORDERING CLINICIAN: ANETA MANNING   TECHNIQUE: Noncontrast axial CT scan of head was performed. Angled reformats in brain and bone windows were generated. The images were reviewed in bone, brain, blood and soft tissue windows.   FINDINGS: CSF Spaces: The ventricles, sulci and basal cisterns are within normal limits. There is no extraaxial fluid collection.   Parenchyma:  The grey-white differentiation is intact. Mild patchy nonspecific white matter hypodensity is compatible with microangiopathy. There is no mass effect or midline shift.  There is no intracranial hemorrhage.   Calvarium: The calvarium is unremarkable.   Paranasal sinuses and mastoids: Visualized paranasal sinuses and mastoids are clear.       No acute intracranial hemorrhage or mass effect.   I personally reviewed the image(s)/study and resident interpretation. I agree with the findings as stated by resident Rich Mccurdy. Data analyzed and images interpreted at [place], [place].   MACRO: None   Signed by: Amarilis Owen 11/18/2024 7:20 AM Dictation workstation:   [id number]    XR chest 1 view    Result Date: 11/17/2024  Interpreted By:  Elvis Ross, STUDY: XR CHEST 1 VIEW; 11/17/2024 4:04 am   INDICATION: Signs/Symptoms:s/p LVAD implant, assess lung fields, devices.   COMPARISON: Radiograph dated 11/16/2024   ACCESSION NUMBER(S): [id number]   ORDERING CLINICIAN: ANETA MANNING   FINDINGS: Right IJ [place]-Faviola catheter is in place with the tip projecting over the right main pulmonary artery. LVAD is stable in position.   Cardiac silhouette size is persistently enlarged. Status post median sternotomy.   Persistent bibasilar atelectasis and interstitial edema with slight improvement in the aeration of the lungs. Small bibasilar pleural effusion, left more than right. No sizable pneumothorax.   No acute osseous abnormality.       1.  Slight interval improvement in pulmonary edema and bibasilar atelectasis. Small bibasilar pleural effusion, left more than right. No pneumothorax.       Signed by: Elvis Sagastume 11/17/2024 9:44 AM Dictation workstation:   [id number]    XR chest 1 view    Result Date: 11/16/2024  Interpreted By:  Elvis Ross, STUDY: XR CHEST 1 VIEW; 11/16/2024 3:47 am   INDICATION: Signs/Symptoms:s/p LVAD implant, assess lung fields, devices.   COMPARISON: Radiograph dated 11/15/2024   ACCESSION NUMBER(S): [id number]   ORDERING CLINICIAN: ANETA MANNING   FINDINGS: Right IJ approach [place]-Faviola catheter is in place with the tip projecting over the proximal right main pulmonary artery. LVAD is unchanged in position.   Status post median sternotomy. The cardiac silhouette size is significantly enlarged, unchanged.   Persistent bibasilar pleural effusion and bibasilar atelectasis and mild perihilar edema, unchanged. No sizable pneumothorax.   No acute osseous abnormality.       No significant interval change in bilateral pleural effusions with bibasilar atelectasis and mild perihilar congestion/edema.     Signed by: Elvis Sagastume 11/16/2024 10:28 AM Dictation workstation:   [id number]    XR chest 1 view    Result Date: 11/15/2024  Interpreted By:  Tuan Platt, STUDY: XR CHEST 1 VIEW;  11/15/2024 3:29 am   INDICATION: Signs/Symptoms:s/p LVAD implant, assess lung fields, devices.   COMPARISON: Chest radiograph dated 11/14/2024   ACCESSION NUMBER(S): [id number]   ORDERING CLINICIAN: ANETA MANNING   FINDINGS: AP radiograph of the chest   The swans Faviola catheter is within the distal right pulmonary artery. VD projects overlying the left lower thorax.   Sternal sutures are noted. Vascular clips overlie the mediastinum. There is moderately severe cardiomegaly. Opacity obscures the left diaphragm and costophrenic sulcus.       1. Pulmonary aeration is similar to the prior study Opacity within left lower lobe  persists obscuring the left diaphragm laterally and the costophrenic sulcus       Signed by: Tuan Platt 11/15/2024 12:27 PM Dictation workstation:   [id number]    Transthoracic Echo (TTE) Limited    Result Date: 11/15/2024   [facility], [address] [place] [address]                Tel [phone] and Fax [phone] TRANSTHORACIC ECHOCARDIOGRAM REPORT  Patient Name:       RJ LEIJA     Reading Physician:    [id number] Fawad Chicas MD Study Date:         11/15/2024          Ordering Provider:    [id number] ANETA MANNING MRN/PID:            [id number]            Fellow:               [id number] Aramis Fitzpatrick MD Accession#:         [id number]        Nurse: Date of Birth/Age:  1955 / 69     Sonographer:          Jackson estrada RDCS Gender assigned at  M                   Additional Staff: Birth: Height:             182.88 cm           Admit Date: Weight:             97.07 kg            Admission Status:     Inpatient - STAT BSA / BMI:          2.19 m2 / 29.02     Encounter#:           [id number]                     kg/m2 Blood Pressure:     107/71 mmHg         Department Location:  [facility] Study Type:    TRANSTHORACIC ECHO (TTE) LIMITED Diagnosis/ICD: Presence of heart assist device-Z95.811 Indication:    LVAD ramp study CPT Code:      Echo Limited-91787; Doppler Limited-89498; Color Doppler-80321 Patient History: Pertinent History: S/p CABG x 4 (2012) and PCI (LCx/OM; 5/2019), Stage D ICM                    HFrEF LVEF 10-15%, AF s/p RFA (PVI and CTI; 4/2024), HTN,                    Dyslipidemia, 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 Study Detail: The following Echo studies  were performed: 2D, M-Mode, Doppler and               color flow. Technically challenging study due to poor acoustic               windows, prominent lung artifact, body habitus and patient lying               in supine position.  PHYSICIAN INTERPRETATION: Left Ventricle: The left ventricular systolic function is severely decreased, with a visually estimated ejection fraction of 10-15%. There is global hypokinesis of the left ventricle with minor regional variations. The left ventricular cavity size is severely dilated. There is normal septal and normal posterior left ventricular wall thickness. Abnormal (paradoxical) septal motion consistent with post-operative status. Left ventricular diastolic filling cannot be determined, due to left ventricular assist device. Left Atrium: The left atrium is mildly dilated. Right Ventricle: The right ventricle is severely enlarged. There is severely reduced right ventricular systolic function. A device is visualized in the right ventricle. Right Atrium: The right atrium is severely dilated. There is a device visualized in the right atrium. Aortic Valve: The aortic valve is structurally normal. There is mild to moderate aortic valve cusp calcification. There is mild aortic valve regurgitation. Mitral Valve: The mitral valve is normal in structure. There is mild mitral annular calcification. There is mild mitral valve regurgitation. Tricuspid Valve: The tricuspid valve is structurally normal. There is mild tricuspid regurgitation. The right ventricular systolic pressure is unable to be estimated. Pulmonic Valve: The pulmonic valve is not well visualized. Pulmonic valve regurgitation was not assessed. Pericardium: Trivial pericardial effusion. Aorta: The aortic root is abnormal. There is mild dilatation of the ascending aorta. There is mild dilatation of the aortic root. In comparison to the previous echocardiogram(s): Compared with study dated 11/13/2024, no significant change.   LEFT VENTRICULAR ASSIST DEVICE: Study Type: This study was performed while LVAD settings were optimized. LVAD: The patient has a(n) HeartMate 3 LVAD device present. Inflow Cannula: The inflow cannula is visualized in the left ventricular apex. Outflow Cannula: Visualization of the outflow cannula is technically difficult. AV Leaflet Mobility: . The aortic valve appears to be opening every 1 beats.  CONCLUSIONS:  1. The left ventricular systolic function is severely decreased, with a visually estimated ejection fraction of 10-15%.  2. There is global hypokinesis of the left ventricle with minor regional variations.  3. Left ventricular diastolic filling cannot be determined, due to left ventricular assist device.  4. Left ventricular cavity size is severely dilated.  5. Abnormal septal motion consistent with post-operative status.  6. There is severely reduced right ventricular systolic function.  7. Severely enlarged right ventricle.  8. The left atrium is mildly dilated.  9. The right atrium is severely dilated. 10. Mild aortic valve regurgitation. 11. . The aortic valve appears to be opening every 1 beats. 12. Compared with study dated 11/13/2024, no significant change. QUANTITATIVE DATA SUMMARY:  2D MEASUREMENTS:          Normal Ranges: IVSd:            1.00 cm  (0.6-1.1cm) LVPWd:           1.00 cm  (0.6-1.1cm) LVIDd:           5.90 cm  (3.9-5.9cm) LV Mass Index:   109 g/m2  LA VOLUME:                   Normal Ranges: LA Vol A4C:        86.2 ml   (22+/-6mL/m2) LA Vol Index A4C:  39.3ml/m2 LA Area A4C:       24.2 cm2 LA Major Axis A4C: 5.8 cm  AORTA MEASUREMENTS:         Normal Ranges: Ao Sinus, d:        4.30 cm (2.1-3.5cm)  LV SYSTOLIC FUNCTION BY 2D PLANIMETRY (MOD):                      Normal Ranges: EF-Visual:      13 % LV EF Reported: 13 %  [id number] Fawad Chicas MD Electronically signed on 11/15/2024 at 12:09:54 PM  ** Final **     XR chest 1 view    Result Date: 11/14/2024  Interpreted By:  Tuan Platt  STUDY: XR CHEST 1 VIEW;  11/14/2024 8:07 am   INDICATION: Signs/Symptoms:s/p LVAD implant, assess lung fields, devices.   COMPARISON: Chest radiograph dated 11/13/2024   ACCESSION NUMBER(S): [id number]   ORDERING CLINICIAN: ANETA MANNING   FINDINGS: AP radiograph of the chest   The endotracheal tube is above the carlo in satisfactory position. The swans Faviola catheter is positioned within the right pulmonary artery. The enteric tube traverses the esophagus. LVAD projects over lying the left lower thorax. Chest tubes are noted.   There is moderate cardiomegaly stable in comparison to previous study. Left lower lobe opacity is noted. Overall pulmonary aeration is similar previous study.       1. Left lower lobe opacity persists similar previous study 2. The pulmonary vascular distribution is similar.       Signed by: Tuan Platt 11/14/2024 12:33 PM Dictation workstation:   [id number]    XR chest 1 view    Result Date: 11/13/2024  Interpreted By:  Tuan Platt and Omar Mahmoud STUDY: XR CHEST 1 VIEW;  11/13/2024 4:41 am   INDICATION: Signs/Symptoms:Post op cardiac surgery.     COMPARISON: Chest x-ray 11/12/2024 6:15 p.m., CT chest 11/04/2024   ACCESSION NUMBER(S): [id number]   ORDERING CLINICIAN: ANETA MANNING   FINDINGS: AP radiograph of the chest was provided.   Patient is mildly rotated to the right which limits evaluation and comparison.   Postoperative findings from median sternotomy. Endotracheal tube tip projects approximately 6.2 cm from the carlo. Right IJ approach pulmonary arterial catheter tip projects over the distal right main pulmonary artery. Again seen LVAD projecting over the left lower thorax. 2 left chest tubes. Enteric tube tip projects over the gastric fundus with the proximal side port likely above the gastroesophageal junction. Again seen left shoulder arthroplasty.   CARDIOMEDIASTINAL SILHOUETTE: There is moderate cardiomegaly. Cardiomediastinal silhouette is stable in size and  configuration.   LUNGS: No pneumothorax. Decrease in diffuse interstitial opacities. There is no new focal consolidation. The bilateral costophrenic angles are clear.   ABDOMEN: No remarkable upper abdominal findings.   BONES: No acute osseous changes.       1. Similar positioning of an enteric tube with maximal side-port likely above the gastroesophageal junction. Consider advancement of the enteric tube. Stable positioning of other medical devices as described above. 2. Decrease in pulmonary interstitial edema. There are no new cardiopulmonary abnormalities.   I personally reviewed the images/study and I agree with the findings as stated by Zack Ortiz MD (PGY-2). This study was interpreted at [place], [place].   MACRO: None   Signed by: Tuan Platt 11/13/2024 2:00 PM Dictation workstation:   [id number]    Anesthesia Intraoperative Transesophageal Echocardiogram    Result Date: 11/13/2024  [facility] - Dept of Anesthesiology    [address] [place] [address]       Tel [phone] and Fax [phone] TRANSESOPHAGEAL ECHOCARDIOGRAM REPORT  Patient Name:       RJ LEIJA     Reading Physician:    [id number] Fred Ortiz MD Study Date:         11/12/2024          Ordering Provider:    [id number] BANKS MRN/PID:            [id number]            Fellow: Accession#:         [id number]        Nurse: Date of Birth/Age:  1955 / 69     Sonographer:                     natalie Gender assigned at  M                   Additional Staff: Birth: BSA / BMI:          m2 / kg/m2          Encounter#:           [id number] Blood Pressure:     /                   Department Location:  [facility]                                                               Anesthesia Study Type:    ANESTHESIA INTRAOPERATIVE BRIANA Diagnosis/ICD: Dilated  cardiomyopathy-I42.0 Indication:    Left Ventricular Assist Device CPT Code:      BRIANA Complete-54527; Doppler Limited-66616; Color Doppler-17485 PHYSICIAN INTERPRETATION: BRIANA Details: Technically adequate omniplane transesophageal echocardiogram performed. BRIANA Procedure: The probe was passed without difficulty. Left Ventricle: The left ventricular systolic function is severely decreased, with a visually estimated ejection fraction of 15-20%. The left ventricular cavity size is severely dilated. The left ventricular septal wall thickness is mildly increased. Left ventricular diastolic filling was not assessed. Left Atrium: The left atrium is severely dilated. The left atrial appendage Doppler velocities are mildly reduced and there is no thrombus visualized in the left atrial appendage. Right Ventricle: The right ventricle is severely enlarged. There is moderately reduced right ventricular systolic function. Right Atrium: The right atrium is moderately dilated. Aortic Valve: The aortic valve is trileaflet. There is evidence of mild aortic valve stenosis. There is mild aortic valve regurgitation. Calcified valve with fused right and left cusp. Central jet angled towards anterior leaflet of ana valve. Mitral Valve: The mitral valve is mildly thickened. There is no evidence of mitral valve stenosis. There is mild mitral valve regurgitation. Tricuspid Valve: The tricuspid valve is structurally normal. There is mild to moderate tricuspid regurgitation. Pulmonic Valve: The pulmonic valve is structurally normal. There is trace pulmonic valve regurgitation. Pericardium: There is no pericardial effusion noted. Aorta: The aortic root is normal. In comparison to the previous echocardiogram(s): Not performed on intraoperative study.  CONCLUSIONS:  1. The left ventricular systolic function is severely decreased, with a visually estimated ejection fraction of 15-20%.  2. Left ventricular cavity size is severely dilated.  3.  There is moderately reduced right ventricular systolic function.  4. Severely enlarged right ventricle.  5. The left atrium is severely dilated.  6. The right atrium is moderately dilated.  7. Mild to moderate tricuspid regurgitation visualized.  8. Mild aortic valve stenosis.  9. Mild aortic valve regurgitation. POST PROCEDURE REPORT: Patient is on epinephrine and milrinone drips. LV function is unchanged from pre-pump exam. LVAD inflow cannula in good position in LV apex with no obstructin to flow. RV function is mildly improved from pre-pump exam. There is mild aortic regurgitation. Aortic regurgitation is unchanged. No evidence of post aortic cannulation dissection. All transesophageal echocardiogram findings discussed with surgeon. LVAD outflow graft to descending aorta appears to have good flow.  QUANTITATIVE DATA SUMMARY:  LV SYSTOLIC FUNCTION BY 2D PLANIMETRY (MOD):                      Normal Ranges: EF-Visual:      18 % LV EF Reported: 18 %  [id number] Fred Ortiz MD Electronically signed on 11/13/2024 at 11:57:37 AM  ** Final **     XR abdomen 1 view    Result Date: 11/13/2024  Interpreted By:  Tuan Platt and Omar Mahmoud STUDY: XR ABDOMEN 1 VIEW;  11/13/2024 9:16 am   INDICATION: Signs/Symptoms:OG positioning.     COMPARISON: X-ray abdomen 11/12/2024   ACCESSION NUMBER(S): [id number]   ORDERING CLINICIAN: JO CASTILLO   FINDINGS: Enteric tube tip projects over the expected location of the gastric fundus, suggestion of a proximal side-port projects near the gastroesophageal junction. LVAD overlying the left lower chest. Median sternotomy wires again seen. Partially visualized pulmonary arterial catheter.   Nonobstructive bowel gas pattern. Limited evaluation of pneumoperitoneum on supine imaging, however no gross evidence of free air is noted.   Visualized lungs are clear.   Osseous structures demonstrate no acute bony changes.       Enteric tube tip projects over the gastric fundus with  proximal side port near the gastroesophageal junction. Recommend advancement.   I personally reviewed the images/study and I agree with the findings as stated by Zack Ortiz MD (PGY-2). This study was interpreted at [place], [place].   MACRO: None   Signed by: Tuan Platt 11/13/2024 11:17 AM Dictation workstation:   [id number]    XR abdomen 1 view    Result Date: 11/13/2024  Interpreted By:  Tuan Platt  and Cristhian Garcia STUDY: XR ABDOMEN 1 VIEW; XR CHEST 1 VIEW;  11/12/2024 6:42 pm; 11/12/2024 7:13 pm; 11/12/2024 7:04 pm   INDICATION: Signs/Symptoms:OG tube assessment; Signs/Symptoms:Feeding tube; Signs/Symptoms:Et tube placement.   COMPARISON: Chest radiograph 7:04 p.m. same day, abdominal radiograph same day 6:42 p.m.   ACCESSION NUMBER(S): [id number]; [id number]; [id number]   ORDERING CLINICIAN: ANETA MANNING; JAMEL LEDESMA; SHAYY PUENTE   FINDINGS: Chest radiograph 7:04 p.m. same day, and abdominal radiograph same day 6:42 p.m. were dictated together due to proximity of the exams.   AP radiographs of the chest and abdomen were provided. Endotracheal tube noted with tip projecting approximately 5.9 cm above the carlo. Postsurgical changes of LVAD placement. Postsurgical changes of median sternotomy. Right IJ [place]-Faviola catheter with tip projecting over the expected position of the right main pulmonary artery. On the most recent abdominal radiograph, enteric tube seen coursing below the level diaphragm with tip tip projecting over the expected position of the gastroesophageal junction. The side-hole of the enteric tube is within the lower thoracic esophagus. Heart is enlarged. Cardiomediastinal silhouette is stable in size.   No evidence of pneumothorax. Bilateral costophrenic angles are clear. No focal consolidation. Bilateral mild pulmonary interstitial edema.   Nonobstructive bowel gas pattern. Limited evaluation of pneumoperitoneum on supine  imaging, however no gross evidence of free air is noted.   Vizualized lungs are clear.   Osseous structures demonstrate no acute bony changes.       1. Enteric tube seen coursing below the level diaphragm with tip projecting over the expected position of the distal gastroesophageal junction. The side-hole of the enteric tube projects over the distal thoracic esophagus. Repositioning of the enteric tube can be considered based on patient position. 2. Additional medical devices as described above. 3. No pneumothorax, focal consolidation, or pleural effusion. Mild pulmonary interstitial edema. 4. Nonobstructive bowel gas pattern.   I personally reviewed the images/study and I agree with the findings as stated by Jose Morrow MD. This study was interpreted at [facility], [place], [place].   MACRO: None   Signed by: Tuan Platt 11/13/2024 10:53 AM Dictation workstation:   [id number]    XR chest 1 view    Result Date: 11/13/2024  Interpreted By:  Tuan Platt  and Cristhian Garcia STUDY: XR ABDOMEN 1 VIEW; XR CHEST 1 VIEW;  11/12/2024 6:42 pm; 11/12/2024 7:13 pm; 11/12/2024 7:04 pm   INDICATION: Signs/Symptoms:OG tube assessment; Signs/Symptoms:Feeding tube; Signs/Symptoms:Et tube placement.   COMPARISON: Chest radiograph 7:04 p.m. same day, abdominal radiograph same day 6:42 p.m.   ACCESSION NUMBER(S): [id number]; [id number]; [id number]   ORDERING CLINICIAN: ANETA MANNING; JAMEL PUENTE   FINDINGS: Chest radiograph 7:04 p.m. same day, and abdominal radiograph same day 6:42 p.m. were dictated together due to proximity of the exams.   AP radiographs of the chest and abdomen were provided. Endotracheal tube noted with tip projecting approximately 5.9 cm above the carlo. Postsurgical changes of LVAD placement. Postsurgical changes of median sternotomy. Right IJ [place]-Faviola catheter with tip projecting over the expected position of the right main pulmonary artery. On  the most recent abdominal radiograph, enteric tube seen coursing below the level diaphragm with tip tip projecting over the expected position of the gastroesophageal junction. The side-hole of the enteric tube is within the lower thoracic esophagus. Heart is enlarged. Cardiomediastinal silhouette is stable in size.   No evidence of pneumothorax. Bilateral costophrenic angles are clear. No focal consolidation. Bilateral mild pulmonary interstitial edema.   Nonobstructive bowel gas pattern. Limited evaluation of pneumoperitoneum on supine imaging, however no gross evidence of free air is noted.   Vizualized lungs are clear.   Osseous structures demonstrate no acute bony changes.       1. Enteric tube seen coursing below the level diaphragm with tip projecting over the expected position of the distal gastroesophageal junction. The side-hole of the enteric tube projects over the distal thoracic esophagus. Repositioning of the enteric tube can be considered based on patient position. 2. Additional medical devices as described above. 3. No pneumothorax, focal consolidation, or pleural effusion. Mild pulmonary interstitial edema. 4. Nonobstructive bowel gas pattern.   I personally reviewed the images/study and I agree with the findings as stated by Jose Morrow MD. This study was interpreted at [facility], [place], [place].   MACRO: None   Signed by: Tuan Platt 11/13/2024 10:53 AM Dictation workstation:   [id number]    XR abdomen 1 view    Result Date: 11/13/2024  Interpreted By:  Tuan Platt and Nakamoto Kent STUDY: XR ABDOMEN 1 VIEW; XR CHEST 1 VIEW;  11/12/2024 6:42 pm; 11/12/2024 7:13 pm; 11/12/2024 7:04 pm   INDICATION: Signs/Symptoms:OG tube assessment; Signs/Symptoms:Feeding tube; Signs/Symptoms:Et tube placement.   COMPARISON: Chest radiograph 7:04 p.m. same day, abdominal radiograph same day 6:42 p.m.   ACCESSION NUMBER(S): [id number]; [id number]; [id number]   ORDERING  CLINICIAN: ANETA MANNING; JAMEL PUENTE   FINDINGS: Chest radiograph 7:04 p.m. same day, and abdominal radiograph same day 6:42 p.m. were dictated together due to proximity of the exams.   AP radiographs of the chest and abdomen were provided. Endotracheal tube noted with tip projecting approximately 5.9 cm above the carlo. Postsurgical changes of LVAD placement. Postsurgical changes of median sternotomy. Right IJ [place]-Faviola catheter with tip projecting over the expected position of the right main pulmonary artery. On the most recent abdominal radiograph, enteric tube seen coursing below the level diaphragm with tip tip projecting over the expected position of the gastroesophageal junction. The side-hole of the enteric tube is within the lower thoracic esophagus. Heart is enlarged. Cardiomediastinal silhouette is stable in size.   No evidence of pneumothorax. Bilateral costophrenic angles are clear. No focal consolidation. Bilateral mild pulmonary interstitial edema.   Nonobstructive bowel gas pattern. Limited evaluation of pneumoperitoneum on supine imaging, however no gross evidence of free air is noted.   Vizualized lungs are clear.   Osseous structures demonstrate no acute bony changes.       1. Enteric tube seen coursing below the level diaphragm with tip projecting over the expected position of the distal gastroesophageal junction. The side-hole of the enteric tube projects over the distal thoracic esophagus. Repositioning of the enteric tube can be considered based on patient position. 2. Additional medical devices as described above. 3. No pneumothorax, focal consolidation, or pleural effusion. Mild pulmonary interstitial edema. 4. Nonobstructive bowel gas pattern.   I personally reviewed the images/study and I agree with the findings as stated by Jose Morrow MD. This study was interpreted at [facility], [place], [place].   MACRO: None   Signed by:  Tuan Platt 11/13/2024 10:53 AM Dictation workstation:   [id number]    Transthoracic Echo (TTE) Limited    Result Date: 11/13/2024   [facility], [address] [place] [address]                Tel [phone] and Fax [phone] TRANSTHORACIC ECHOCARDIOGRAM REPORT  Patient Name:       RJ LEIJA     Tracey Physician:    [id number] Raciel Casarez MD Study Date:         11/13/2024          Ordering Provider:    [id number] JO CASTILLO MRN/PID:            [id number]            Fellow: Accession#:         [id number]        Nurse: Date of Birth/Age:  1955 / 69     Sonographer:          Jackson estrada RDCS Gender assigned at                     Additional Staff: Birth: Height:             182.88 cm           Admit Date: Weight:             93.90 kg            Admission Status:     Inpatient - STAT BSA / BMI:          2.16 m2 / 28.07     Encounter#:           [id number]                     kg/m2 Blood Pressure:     94/74 mmHg          Department Location:  [facility] Study Type:    TRANSTHORACIC ECHO (TTE) LIMITED Diagnosis/ICD: Acute on chronic combined systolic (congestive) and diastolic                (congestive) heart failure (CHF)-I50.43 Indication:    eval RV fx CPT Code:      Echo Limited-25874; Doppler Limited-96499; Color Doppler-27043 Patient History: Pertinent History: CAD s/p CABGx4 in 2012 and PCI 2019 w/ ICM LVEF 10-15%, AF                    s/p RFA & PVI, HTN, CHF exacerbation, 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 Study Detail: The following Echo studies were performed: 2D, M-Mode, Doppler and               color flow. Technically challenging study due to body habitus,               patient lying in supine position,  postoperative dressings,               prominent lung artifact and poor acoustic windows. The patient is               intubated. Definity used as a contrast agent for endocardial               border definition. Total contrast used for this procedure was 2.0               mL via IV push.  PHYSICIAN INTERPRETATION: Left Ventricle: Left ventricular ejection fraction is severely decreased, by visual estimate at 15%. There is eccentric left ventricular hypertrophy. There is global hypokinesis of the left ventricle with minor regional variations. The left ventricular cavity size is moderate to severely dilated. Left ventricular diastolic filling was not assessed. Left Atrium: The left atrium is moderately dilated. Right Ventricle: The right ventricle is mild to moderately enlarged. There is severely reduced right ventricular systolic function. A device is visualized in the right ventricle. Right Atrium: The right atrium is moderately dilated. There is a device visualized in the right atrium. Aortic Valve: The aortic valve was not well visualized. There is mild to moderate aortic valve cusp calcification. There is mild aortic valve regurgitation. Mitral Valve: The mitral valve is normal in structure. There is mild mitral valve regurgitation. Tricuspid Valve: The tricuspid valve is structurally normal. There is mild tricuspid regurgitation. Pulmonic Valve: The pulmonic valve is not well visualized. There is trace pulmonic valve regurgitation. Pericardium: There is no pericardial effusion noted. Aorta: The aortic root is abnormal. There is mild dilatation of the aortic root. Pulmonary Artery: The tricuspid regurgitant velocity is 1.83 m/s, and with an estimated right atrial pressure of 10 mmHg, the estimated pulmonary artery pressure is normal with the RVSP at 23.4 mmHg. Systemic Veins: The inferior vena cava appears moderately dilated, on vent. In comparison to the previous echocardiogram(s): Compared with study dated  11/5/2024, no significant change.  LEFT VENTRICULAR ASSIST DEVICE: LVAD: The patient has a(n) HeartMate 3 LVAD device present. Inflow Cannula: The inflow cannula is visualized in the left ventricular apex.  CONCLUSIONS:  1. Poorly visualized anatomical structures due to suboptimal image quality.  2. Left ventricular cavity size is moderate to severely dilated.  3. Left ventricular ejection fraction is severely decreased, by visual estimate at 15%.  4. There is global hypokinesis of the left ventricle with minor regional variations.  5. There is severely reduced right ventricular systolic function.  6. Mild to moderately enlarged right ventricle.  7. The left atrium is moderately dilated.  8. The right atrium is moderately dilated.  9. Mild aortic valve regurgitation. 10. The inferior vena cava appears moderately dilated, on vent. QUANTITATIVE DATA SUMMARY:  2D MEASUREMENTS:          Normal Ranges: IVSd:            1.00 cm  (0.6-1.1cm) LVPWd:           1.20 cm  (0.6-1.1cm) LVIDd:           6.90 cm  (3.9-5.9cm) LVIDs:           6.60 cm LV Mass Index:   164 g/m2 LV % FS          4.3 %  AORTA MEASUREMENTS:         Normal Ranges: Ao Sinus, d:        4.00 cm (2.1-3.5cm)  LV SYSTOLIC FUNCTION BY 2D PLANIMETRY (MOD):                      Normal Ranges: EF-Visual:      15 % LV EF Reported: 15 %  RIGHT VENTRICLE: RV s' 0.07 m/s  TRICUSPID VALVE/RVSP:          Normal Ranges: Peak TR Velocity:     1.83 m/s RV Syst Pressure:     23 mmHg  (< 30mmHg) IVC Diam:             2.70 cm  [id number] Raciel Casarez MD Electronically signed on 11/13/2024 at 10:25:04 AM  ** Final **     XR chest 1 view    Result Date: 11/12/2024  Interpreted By:  Tuan Platt and Omar Mahmoud STUDY: XR CHEST 1 VIEW;  11/12/2024 1:38 pm   INDICATION: Signs/Symptoms:Post op cardiac surgery.     COMPARISON: Chest x-ray 11/11/2024 7:55 a.m., CT chest abdomen pelvis 11/04/2024   ACCESSION NUMBER(S): [id number]   ORDERING CLINICIAN: ANETA MANNING   FINDINGS: AP  radiograph of the chest was provided.   Interval demonstration of postsurgical changes of LVAD placement. Postsurgical changes from median sternotomy surgical clips overlying left hilum. Right IJ pulmonary arterial catheter tip projects over the distal right main pulmonary artery. Interval placement of an enteric tube with tip projecting over the expected location of the gastroesophageal junction. Interval placement of left chest tubes. Partially visualized left shoulder arthroplasty.   CARDIOMEDIASTINAL SILHOUETTE: The heart is enlarged. Cardiomediastinal silhouette is stable in size and configuration.   LUNGS: Mild perihilar interstitial opacities, grossly stable as compared to prior. The bilateral costophrenic angles are clear, within the limitations of the newly placed LVAD obscured the left lower lung field. There is no evidence of a pneumothorax. There is no new focal consolidation.   ABDOMEN: No remarkable upper abdominal findings.   BONES: No acute osseous changes.       1. Postsurgical changes and medical devices as described above. Recommend advancement of enteric tube. 2. Stable mild pulmonary interstitial edema.   I personally reviewed the images/study and I agree with the findings as stated by Zack Ortiz MD (PGY-2). This study was interpreted at [place], [place].   MACRO: None   Signed by: Tuan Platt 11/12/2024 3:19 PM Dictation workstation:   [id number]    XR chest 1 view    Result Date: 11/11/2024  Interpreted By:  Tuan Platt and Omar Mahmoud STUDY: XR CHEST 1 VIEW;  11/11/2024 8:02 am   INDICATION: Signs/Symptoms:SGC monitoring.     COMPARISON: Chest x-ray 11/10/2024, CT chest 1124   ACCESSION NUMBER(S): [id number]   ORDERING CLINICIAN: KHALIAD UP   FINDINGS: AP radiograph of the chest was provided.   Patient is mildly rotated to the right which limits evaluation and comparison.   Postoperative findings from median sternotomy. Right IJ  approach pulmonary arterial catheter with tip projecting over a proximal right lower lobe pulmonary artery, advanced as compared to prior.   CARDIOMEDIASTINAL SILHOUETTE: Heart is moderately enlarged. Small pericardial effusion not excluded. Cardiomediastinal silhouette is stable in size and configuration.   LUNGS: Mild pulmonary vascular congestion within the right lung is noted. No acute pulmonary consolidations or effusions are demonstrated.   ABDOMEN: No remarkable upper abdominal findings.   BONES: No acute osseous changes.       1. Interval advancement of a pulmonary arterial catheter with tip now projecting over a proximal right lower lobe pulmonary artery. 2. Similar enlargement of the cardiomediastinal silhouette. 3. No acute cardiopulmonary process.   I personally reviewed the images/study and I agree with the findings as stated by Zack Ortiz MD (PGY-2). This study was interpreted at [place], [place].   MACRO: None   Signed by: Tuan Platt 11/11/2024 2:18 PM Dictation workstation:   [id number]    XR chest 1 view    Result Date: 11/10/2024  Interpreted By:  Olvin Farias, STUDY: XR CHEST 1 VIEW; 11/10/2024 8:17 am   INDICATION: Signs/Symptoms:SGC monitoring.   COMPARISON: Prior day radiograph   ACCESSION NUMBER(S): [id number]   ORDERING CLINICIAN: KHALIDA UP   FINDINGS:     CARDIOMEDIASTINAL SILHOUETTE: Patient is status post median sternotomy. Megaly versus pericardial effusion. 9 [place]-Faviola catheter overlies the right main pulmonary artery.   LUNGS: Minimal prominence pulmonary interstitium but no focal airspace disease. No effusions.   ABDOMEN: No remarkable upper abdominal findings.   BONES: No acute osseous changes.       1.  [place]-Faviola catheter overlies the right main pulmonary artery.     Signed by: Olvin Farias 11/10/2024 5:29 PM Dictation workstation:   [id number]    XR chest 1 view    Result Date: 11/9/2024  Interpreted By:  Jarrett  Jose Bah STUDY: XR CHEST 1 VIEW;  11/9/2024 6:09 pm   INDICATION: Signs/Symptoms:PA catheter retracted then advanced to address coiling, now at 62 cm.   COMPARISON: Chest radiograph 11/09/2024   ACCESSION NUMBER(S): [id number]   ORDERING CLINICIAN: KHALIDA UP   FINDINGS: Semi-erect AP radiograph of the chest was provided.   Right internal jugular pulmonary artery catheter with distal tip projecting over the distal right main pulmonary artery. Status post median sternotomy.   CARDIOMEDIASTINAL SILHOUETTE: Stable cardiomegaly versus pericardial effusion.   LUNGS: No pneumothorax. No focal consolidation. No pleural effusion.   ABDOMEN: No remarkable upper abdominal findings.   BONES: No acute osseous changes.       1.  Right IJ pulmonary artery catheter with distal tip projecting over the distal main pulmonary artery. 2. Otherwise, stable cardiomegaly versus pericardial effusion. 3. Mild perihilar interstitial edema and correlate with fluid status.   I personally reviewed the images/study and I agree with the findings as stated by Aleks Merino MD (Radiology Resident).   MACRO: None   Signed by: Olvin Farias 11/9/2024 9:03 PM Dictation workstation:   [id number]    XR chest 1 view    Result Date: 11/9/2024  Interpreted By:  Olvin Farias and Tippareddy Charit STUDY: XR CHEST 1 VIEW; ;  11/9/2024 4:00 pm   INDICATION: Signs/Symptoms:retracted PA catheter 3 cm.   COMPARISON: XR CHEST 1 VIEW;  11/9/2024 3:11 pm   ACCESSION NUMBER(S): [id number]   ORDERING CLINICIAN: KHALIDA UP   FINDINGS: AP radiograph of the chest was provided.   Right IJ [place]-Faviola catheter that appears to coiled onto itself with tip projecting over the expected position of the right ventricle in similar positioning compared to prior examination. Postsurgical changes from median sternotomy. Redemonstration of surgical staples overlying the aortic knob.   CARDIOMEDIASTINAL  SILHOUETTE:  Cardiomediastinal silhouette is stable and enlarged in size with globular configuration.   LUNGS:  No pneumothorax. No focal consolidation or pleural effusion.   ABDOMEN:  No remarkable upper abdominal findings.   BONES:  No acute osseous changes. Status post left shoulder arthroplasty.       1. Right IJ [place]-Faviola catheter appears to be coiled onto itself with tip projecting over the expected position of the right ventricle. Repositioning of the right IJ [place]-Faviola catheter would be recommended with repeat radiographs. 2. Stable cardiomegaly versus pericardial effusion.   I personally reviewed the images/study and I agree with the findings as stated by Ellen Dial MD. This study was interpreted at [place], [place].   MACRO: None   Signed by: Olvin Farias 11/9/2024 9:02 PM Dictation workstation:   [id number]    XR chest 1 view    Result Date: 11/9/2024  Interpreted By:  Olvin Farias and Nakamoto Kent STUDY: XR CHEST 1 VIEW;  11/9/2024 3:11 pm   INDICATION: Signs/Symptoms:post PA catheter placement.   COMPARISON: Chest radiograph 11/06/2024   ACCESSION NUMBER(S): [id number]   ORDERING CLINICIAN: KHALIDA UP   FINDINGS: AP radiograph of the chest was provided.   Right IJ [place]-Faviola catheter that appears to coiled onto itself with tip projecting over the expected position of the right ventricle. Postsurgical changes from median sternotomy.   CARDIOMEDIASTINAL SILHOUETTE: Cardiomediastinal silhouette is stable and enlarged in size with globular configuration.   LUNGS: No pneumothorax. No focal consolidation or pleural effusion..   ABDOMEN: No remarkable upper abdominal findings.   BONES: No acute osseous changes.       1. Right IJ [place]-Faviola catheter appears to be coiled onto itself with tip projecting over the expected position of the right ventricle. Repositioning of the right IJ [place]-Faviola catheter would be recommended with repeat  radiographs. 2. Stable cardiomegaly.   I personally reviewed the images/study and I agree with the findings as stated by Jose Morrow MD. This study was interpreted at [facility], [place], [place].   The above findings were communicated with Dr. Christ Stanford by epic chat at 3:29 pm.   MACRO: Critical Finding:  See findings. Notification was initiated on 11/9/2024 at 3:53 pm by  Jose Morrow.  (**-OCF-**) Instructions:   Signed by: Olvin Farias 11/9/2024 9:01 PM Dictation workstation:   [id number]    XR chest 1 view    Result Date: 11/7/2024  Interpreted By:  Tuan Platt, STUDY: XR CHEST 1 VIEW;  11/6/2024 9:04 am   INDICATION: Signs/Symptoms:SGC.   COMPARISON: Chest radiograph dated 11/6/2024   ACCESSION NUMBER(S): [id number]   ORDERING CLINICIAN: PAYAL KOHLER   FINDINGS: AP radiograph of the chest     The swans Faviola catheter is positioned within the main pulmonary artery. There has been previous median sternotomy.   The heart is enlarged and stable. Pulmonary aeration is similar previous study. No new infiltrates, consolidations or effusions are noted.       1. No acute cardiopulmonary pathology       Signed by: Tuan Platt 11/7/2024 9:24 AM Dictation workstation:   [id number]    Vascular US Ankle Brachial Index (SITA) Without Exercise    Result Date: 11/6/2024            [place] [address]   Tel [phone] and Fax [phone]  Vascular Lab Report [facility] US ANKLE BRACHIAL INDEX (SITA) WITHOUT EXERCISE  Patient Name:     RJ LEIJA      Tracey           [id number] Nely Coffman MD                                        Physician: Study Date:       11/5/2024            Ordering          [id number] LANDON GARCES                                        Physician: MRN/PID:          [id number]             Technologist:     Pinky Wyatt RVT, RDMS Accession#:        [id number]         Technologist 2: Date of           1955 / 69      Encounter#:       [id number] Birth/Age:        years Gender:           M Admission Status: Inpatient            Location          [facility]                                        Performed:  Diagnosis/ICD: Peripheral vascular disease, unspecified-I73.9 CPT Codes:     57092 Peripheral artery Lower arterial Duplex complete  CONCLUSIONS: Right Lower PVR: No evidence of arterial occlusive disease in the right lower extremity at rest. Multiphasic flow is noted in the right common femoral artery, right posterior tibial artery and right dorsalis pedis artery. Brachial pressure not obtained due to lines. Left Lower PVR: No evidence of arterial occlusive disease in the left lower extremity at rest. Multiphasic flow is noted in the left common femoral artery, left dorsalis pedis artery and left posterior tibial artery.  Imaging & Doppler Findings:  RIGHT Lower PVR                Pressures Ratios Right Posterior Tibial (Ankle) 136 mmHg  1.30 Right Dorsalis Pedis (Ankle)   118 mmHg  1.12   LEFT Lower PVR                Pressures Ratios Left Posterior Tibial (Ankle) 117 mmHg  1.11 Left Dorsalis Pedis (Ankle)   126 mmHg  1.20                      Left Brachial Pressure 105 mmHg   [id number] Nely Coffman MD Electronically signed by [id number] Nely Coffman MD on 11/6/2024 at 7:55:34 PM  ** Final **     XR chest 1 view    Result Date: 11/6/2024  Interpreted By:  Tuan Platt, STUDY: XR CHEST 1 VIEW;  11/5/2024 7:42 am   INDICATION: Signs/Symptoms:SGC.   COMPARISON: Chest radiograph dated 11/5/2024   ACCESSION NUMBER(S): [id number]   ORDERING CLINICIAN: PAYAL KOHLER   FINDINGS: AP radiograph of the chest Medical devices: The swans Faviola catheter is positioned within the right main pulmonary artery similar previous study. Sternal sutures are intact.   The heart is moderately severely enlarged. Pulmonary aeration is similar to previous study.   The  left shoulder arthroplasty is partially visualized.       1. No evidence of acute cardiopulmonary process. 2. No significant changes noted in comparison to the prior study.       Signed by: Tuan Platt 11/6/2024 8:03 AM Dictation workstation:   [id number]    Transthoracic Echo (TTE) Limited    Result Date: 11/5/2024   [facility], [address] [place] [address]                Tel [phone] and Fax [phone] TRANSTHORACIC ECHOCARDIOGRAM REPORT  Patient Name:       RJ Webb Physician:    [id number] Mira Mayer MD Study Date:         11/5/2024           Ordering Provider:    [id number] PAYAL KOHLER MRN/PID:            [id number]            Fellow: Accession#:         [id number]        Nurse: Date of Birth/Age:  1955 / 69     Sonographer:          Maureen estrada RDCS Gender assigned at  M                   Additional Staff: Birth: Height:             182.88 cm           Admit Date:           10/24/2024 Weight:             93.44 kg            Admission Status:     Inpatient - STAT BSA / BMI:          2.16 m2 / 27.94     Encounter#:           [id number]                     kg/m2 Blood Pressure:     106/72 mmHg         Department Location:  [address] Study Type:    TRANSTHORACIC ECHO (TTE) LIMITED Diagnosis/ICD: Heart failure, unspecified-I50.9 Indication:    RV assessment CPT Code:      Echo Limited-61065; Doppler Limited-74876; Color Doppler-43871 Patient History: Pertinent History: Negative bubble peformed on prior echo; Known 15-20% EF;                    Cardiogenic shock; Acute on chronic heart failure with                    reduced EF and diastolic dysfunction; ICM; Hx of chronic                    A-Fib; VIV. Study Detail: The following Echo studies were performed: 2D,  M-Mode, Doppler and               color flow. Technically challenging study due to body habitus.               Definity used as a contrast agent for endocardial border               definition. Total contrast used for this procedure was 2 mL via IV               push.  PHYSICIAN INTERPRETATION: Left Ventricle: Left ventricular ejection fraction is severely decreased, calculated by Loera's biplane at 18%. There is severely increased eccentric left ventricular hypertrophy. There is global hypokinesis of the left ventricle with minor regional variations. The left ventricular cavity size is severely dilated. There is normal septal and normal posterior left ventricular wall thickness. Left ventricular diastolic filling was not assessed. There is no definite left ventricular thrombus visualized. Left Atrium: The left atrium is severely dilated. Right Ventricle: The right ventricle is severely enlarged. There is severely reduced right ventricular systolic function. A device is visualized in the right ventricle. Right Atrium: The right atrium is severely dilated. There is a device visualized in the right atrium. Aortic Valve: The aortic valve was not well visualized. There is mild aortic valve cusp calcification. Aortic valve regurgitation was not assessed. Mitral Valve: The mitral valve is normal in structure. Mitral valve regurgitation was not assessed. Tricuspid Valve: The tricuspid valve is structurally normal. There is mild tricuspid regurgitation. The Doppler estimated RVSP is mildly elevated at 42.9 mmHg. Pulmonic Valve: The pulmonic valve was not assessed. Pulmonic valve regurgitation was not assessed. Pericardium: Trivial pericardial effusion. Aorta: The aortic root is abnormal. The aortic root appears mildly dilated and measures 4.20 cm. There is mild dilatation of the ascending aorta. There is mild dilatation of the aortic root. Systemic Veins: The inferior vena cava appears dilated, with IVC inspiratory  collapse less than 50%. In comparison to the previous echocardiogram(s): Compared with the prior exam from 10/25/2024, there was already a severely dilated LV with severely reduced function. The RV remains dilated with severely reduced function. Valvular function not assessed on today's exam.  CONCLUSIONS:  1. Left ventricular ejection fraction is severely decreased, calculated by Loera's biplane at 18%.  2. There is global hypokinesis of the left ventricle with minor regional variations.  3. Left ventricular cavity size is severely dilated.  4. No left ventricular thrombus visualized.  5. There is severely increased eccentric left ventricular hypertrophy.  6. There is severely reduced right ventricular systolic function.  7. Severely enlarged right ventricle.  8. The left atrium is severely dilated.  9. The right atrium is severely dilated. 10. Mitral valve regurgitation was not assessed. 11. Mildly elevated right ventricular systolic pressure. 12. Mildly dilated aortic root. 13. Compared with the prior exam from 10/25/2024, there was already a severely dilated LV with severely reduced function. The RV remains dilated with severely reduced function. Valvular function not assessed on today's exam. QUANTITATIVE DATA SUMMARY:  2D MEASUREMENTS:           Normal Ranges: Ao Root d:       4.20 cm   (2.0-3.7cm) IVSd:            0.87 cm   (0.6-1.1cm) LVPWd:           0.78 cm   (0.6-1.1cm) LVIDd:           8.21 cm   (3.9-5.9cm) LVIDs:           8.04 cm LV Mass Index:   157 g/m2 LVEDV Index:     138 ml/m2 LV % FS          2.0 %  LA VOLUME:                  Normal Ranges: LA Volume Index: 59.9 ml/m2  RA VOLUME BY A/L METHOD:          Normal Ranges: RA Area A4C:             36.8 cm2  AORTA MEASUREMENTS:         Normal Ranges: Ao Sinus, d:        4.20 cm (2.1-3.5cm) Asc Ao, d:          4.20 cm (2.1-3.4cm)  LV SYSTOLIC FUNCTION BY 2D PLANIMETRY (MOD):                      Normal Ranges: EF-A4C View:    14 % (>=55%) EF-A2C View:     21 % EF-Biplane:     18 % LV EF Reported: 18 %  AORTIC VALVE:          Normal Ranges: LVOT Diameter: 2.31 cm (1.8-2.4cm)  RIGHT VENTRICLE: RV Basal 6.10 cm RV Mid   4.10 cm RV Major 9.1 cm TAPSE:   9.0 mm RV s'    0.06 m/s  TRICUSPID VALVE/RVSP:          Normal Ranges: Peak TR Velocity:     2.64 m/s RV Syst Pressure:     43 mmHg  (< 30mmHg) IVC Diam:             3.00 cm  AORTA: Asc Ao Diam 4.23 cm  [id number] Mira Mayer MD Electronically signed on 11/5/2024 at 2:36:55 PM  ** Final **     CT chest abdomen pelvis wo IV contrast    Result Date: 11/5/2024  Interpreted By:  Clay Weiss,  and Jose Manuel Cook STUDY: CT CHEST ABDOMEN PELVIS WO CONTRAST;  11/4/2024 5:45 pm   INDICATION: Signs/Symptoms:assess for aneurysm prior to transplant evaluation.   COMPARISON: CT chest 10/31/2024 and CT lower 11/02/2024   ACCESSION NUMBER(S): [id number]   ORDERING CLINICIAN: PAYAL KOHLER   TECHNIQUE: Contiguous axial images of the chest, abdomen, and pelvis were obtained in the absence of intravenous contrast. Coronal and sagittal reformatted images were reconstructed from the axial data.   FINDINGS: CT CHEST:   MEDIASTINUM AND LYMPH NODES: The visualized thyroid is within normal limits. No enlarged intrathoracic or axillary lymph nodes by imaging criteria, however evaluation is limited in the absence of contrast. No pneumomediastinum. The esophagus appears within normal limits.   HEART: The heart is markedly enlarged. Extensive coronary artery calcifications. No significant pericardial effusion.   VESSELS: There is dilation of the proximal ascending aorta measuring up to 4.4 cm in diameter. Moderate calcified atherosclerosis of the thoracic aorta. The main pulmonary artery is mildly prominent. [place]-Faviola catheter tips terminate at the distal right pulmonary artery.   LUNG, AIRWAYS, PLEURA: The trachea and proximal mainstem bronchi are patent. No consolidation, pleural effusion, or pneumothorax. Moderate dependent and  bandlike atelectasis bilaterally.   CHEST WALL SOFT TISSUES: No discernible abnormality.   OSSEOUS STRUCTURES: No acute osseous abnormality. Several remote appearing left-sided rib fractures. Status post median sternotomy.     CT ABDOMEN/PELVIS:   LIVER: Poor visualization of the previously described cystic lesion near the medial liver tip, better evaluated on 11/02/2024 exam. The liver is mildly enlarged measuring 19.6 cm in craniocaudal dimension.   BILE DUCTS: No significant intrahepatic or extrahepatic dilatation.   GALLBLADDER: Cholelithiasis without evidence of acute cholecystitis.   PANCREAS: No significant abnormality.   SPLEEN: Splenomegaly measuring 18.8 cm in anteroposterior dimension.   ADRENALS: No significant abnormality.   KIDNEYS, URETERS, BLADDER: Cyst of the right kidney measures up to 3.9 cm. No hydronephrosis or nephrolithiasis. High-density lesion of the left anterior kidney measuring 1.1 cm likely represents a hemorrhagic cyst. The urinary bladder is unremarkable.   REPRODUCTIVE ORGANS: Prostate is enlarged measuring 5.3 cm in transverse axis.   GI: No bowel obstruction. No significant bowel wall thickening or adjacent inflammatory change. Normal appendix.   VESSELS: Moderate to advanced vascular calcifications of the abdominal aorta. There is a chronic appearing and partially calcified focal dissection of the infrarenal abdominal aorta as seen on series 2, image 130. Mild multifocal fusiform aneurysmal dilatation of the abdominal aorta measuring up to a maximum of 2.6 cm in diameter at the level of the dissection flap.   PERITONEUM/RETROPERITONEUM: No ascites, free air, or fluid collection.   LYMPH NODES: No enlarged lymph nodes.   ABDOMINAL WALL: No significant abnormality.   OSSEOUS STRUCTURES: No acute osseous abnormality. Moderate degenerative changes of the thoracolumbar spine. Grade 1 anterolisthesis of L5 on S1 with chronic bilateral pars defects.       1. No acute abnormality within  the chest, abdomen, or pelvis. 2. 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 3. Aneurysm of the proximal ascending aorta measuring up to 4.4 cm and multifocal fusiform dilation of the abdominal aorta as above. 4. Marked cardiomegaly, unchanged from the prior exam. 5. Hepatosplenomegaly.   I personally reviewed the image(s)/study and resident interpretation as stated by Dr. Joyce Richard MD. I agree with the findings as stated. This study was interpreted at [facility], [place], [place].   Signed by: Clay Weiss 11/5/2024 9:31 AM Dictation workstation:   [id number]    Vascular US aorta iliac duplex limited    Result Date: 11/4/2024            [place] [address]   Tel [phone] and Fax [phone]  Vascular Lab Report VASC US AORTA ILIAC DUPLEX LIMITED  Patient Name:      RJ LEIJA       Reading Physician:  [id number] Yoandy Boo MD Study Date:        11/4/2024             Ordering Physician: [id number] GARCES MRN/PID:           [id number]              Technologist:       Octavio Silver RVT Accession#:        [id number]          Technologist 2: Date of Birth/Age: 1955 / 69 years Encounter#:         [id number] Gender:            M Admission Status:  Inpatient             Location Performed: [facility]  Diagnosis/ICD: Abdominal aortic aneurysm, without rupture, unspecified-I71.40;                Encounter for other preprocedural examination-Z01.818 CPT Codes:     67420 Duplex Aorta/IVC/Iliac/Bypass Graft  CONCLUSIONS: Aorta/Common Iliac Arteries/IVC: The abdominal aorta and bilateral common iliac arteries demonstrate no evidence of aneurysm. Moderate atherosclerotic  plaque noted throughout the abdominal aorta.  Imaging & Doppler Findings:   AORTA     AP       PSV Proximal 1.63 cm 57.0 cm/s   Mid    1.90 cm 99.0 cm/s  Distal  1.50 cm 83.0 cm/s    RIGHT       AP        PSV RICHARD Proximal 1.01 cm 114.00 cm/s     LEFT       AP       PSV RICHARD Proximal 1.09 cm 97.00 cm/s  [id number] Yoandy Boo MD Electronically signed by [id number] Boo MD on 11/4/2024 at 9:22:19 PM  ** Final **     Vascular US carotid artery duplex bilateral    Result Date: 11/4/2024            [place] [address]   Tel [phone] and Fax [phone]  Vascular Lab Report [facility] US CAROTID ARTERY DUPLEX BILATERAL  Patient Name:      RJ LEIJA       Tracey Physician:  [id number] Boo MD Study Date:        11/4/2024             Ordering Physician: Arnie GARCES MRN/PID:           [id number]              Technologist:       Octavio Silver RVT Accession#:        [id number]          Technologist 2: Date of Birth/Age: 1955 / 69 years Encounter#:         [id number] Gender:            M Admission Status:  Inpatient             Location Performed: [facility]  Diagnosis/ICD: Other specified symptoms and signs involving the circulatory and                respiratory systems-R09.89; Encounter for other preprocedural                examination-Z01.818 CPT Codes:     30608 Cerebrovascular Carotid Duplex scan complete  CONCLUSIONS: Right Carotid: Findings are consistent with less than 50% stenosis of the right proximal internal carotid artery. Right external carotid artery appears patent with no evidence of stenosis. The right vertebral artery is patent with antegrade flow. No evidence of hemodynamically significant stenosis in the right  subclavian artery. Left Carotid: Findings are consistent with less than 50% stenosis of the left proximal internal carotid artery. Left external carotid artery appears patent with no evidence of stenosis. The mid left vertebral artery demonstrates no flow. The origin of the vertebral artery appears patent. No evidence of hemodynamically significant stenosis in the left subclavian artery.  Imaging & Doppler Findings: Right Plaque Morph: The proximal right internal carotid artery demonstrates heterogenous and smooth plaque. Left Plaque Morph: The proximal left internal carotid artery demonstrates heterogenous and smooth plaque.   Right                       Left   PSV     EDV                PSV      EDV 57 cm/s           CCA P    95 cm/s 90 cm/s           CCA D    67 cm/s 38 cm/s 15 cm/s   ICA P    58 cm/s  22 cm/s 62 cm/s 20 cm/s   ICA M    70 cm/s  24 cm/s 58 cm/s 18 cm/s   ICA D    56 cm/s  22 cm/s 56 cm/s            ECA     61 cm/s 44 cm/s 13 cm/s Vertebral   0 cm/s 65 cm/s         Subclavian 136 cm/s               Right Left ICA/CCA Ratio  0.4  0.9   [id number] Yoandy Boo MD Electronically signed by [id number] Yoandy Boo MD on 11/4/2024 at 8:42:58 PM  ** Final **     Colonoscopy Diagnostic    Result Date: 11/4/2024  Table formatting from the original result was not included. Impression One Krystal Is polyp measuring 5-9 mm in the ascending colon; performed cold snare removal. Hemorrhoids. Rest of exam was normal. Findings One sessile Krystal Is polyp measuring 5-9 mm in the ascending colon; performed cold snare with complete en bloc removal and retrieved specimen. The specimen was placed in Jar 1. Hemorrhoids. Rest of exam was normal. Recommendation Await pathology results. Repeat colonoscopy in 7 years, due: 11/3/2031 Personal history of colon polyps - Defer resumption of diet and medications to the patient's primary team. No restrictions from a post-procedural standpoint. - Observe patient's  clinical course following today's procedure with therapeutic intervention. - Watch for bleeding, perforation, and infection. - The findings and recommendations were discussed with the referring physicians. - The signs and symptoms of potential delayed complications were discussed with the patient. - Patient has a contact number available for emergencies. - Follow up with Raimundo Dickey and James for ongoing inpatient gastroenterology consultation. Indication Acute on chronic heart failure with reduced ejection fraction and diastolic dysfunction Staff Staff Role Pedro Dickey MD                Attending Honey Ramirez MD             Fellow Medications Totals unavailable because the procedure time range is not set Preprocedure A history and physical has been performed, and patient medication allergies have been reviewed. The patient's tolerance of previous anesthesia has been reviewed. The risks and benefits of the procedure and the sedation options and risks were discussed with the patient. All questions were answered and informed consent obtained. Details of the Procedure The patient underwent moderate sedation, which was administered by the procedural nurse. The patient's blood pressure, ECG, ETCO2, heart rate, level of consciousness, oxygen and respirations were monitored throughout the procedure. A digital rectal exam was performed. A perianal exam was performed. The scope was introduced through the anus and advanced to the terminal ileum. Retroflexion was performed in the rectum. The quality of bowel preparation was evaluated using the [place] Bowel Preparation Scale with scores of: right colon = 3, transverse colon = 3, left colon = 3. The total BBPS score was 9. Bowel prep was adequate. The patient's estimated blood loss was minimal (<5 mL). The procedure was not difficult. The patient tolerated the procedure well. There were no apparent adverse events. Events Procedure Events Event Event Time Specimens  No specimens collected Procedure Location [facility] [address] [facility] [address] [phone] Referring Provider Jareth Vargas MD Procedure Provider Honey Ramirez MD     US renal complete    Result Date: 11/4/2024  Interpreted By:  Regulo Martel and Beyersdorf Conner STUDY: US RENAL COMPLETE;  11/1/2024 3:44 pm   INDICATION: Signs/Symptoms:Left renal cyst.     COMPARISON: None.   ACCESSION NUMBER(S): [id number]   ORDERING CLINICIAN: DINAH GONZALEZ   TECHNIQUE: Multiple images of the kidneys were obtained.   FINDINGS: RIGHT KIDNEY: The right kidney measures 12.4 cm in length. The renal cortical echogenicity and thickness are within normal limits. No hydronephrosis is present; no evidence of nephrolithiasis. There is a heterogeneous hypoechoic lesion without internal flow in the right kidney measuring 3.9 X 3.0 X 2.8 cm.   LEFT KIDNEY: The left kidney measures 10.3 cm in length. The renal cortical echogenicity and thickness are within normal limits. No hydronephrosis is present; no evidence of nephrolithiasis.   BLADDER: Mild thickening of the urinary bladder wall.   Homogeneous, hyperechoic lesion within the right hepatic lobe is incompletely evaluated.       1. Heterogeneous appearing cyst in the right kidney measures up to 3.9 cm, potentially a hemorrhagic cyst. Otherwise unremarkable sonographic appearance of the kidneys. 2. Homogeneous hyperechoic lesion within the right hepatic lobe is incompletely evaluated. This may represent a hemangioma. Consider further evaluation with dedicated multiphase contrast enhanced CT or MRI of the liver.     I personally reviewed the image(s)/study and resident interpretation. I agree with the findings as stated by resident Rich Mccurdy. Data analyzed and images interpreted at [facility], [place], [place].   MACRO: None     Signed by: Regulo  Tahmina 11/4/2024 10:46 AM Dictation workstation:   [id number]    XR chest 1 view    Result Date: 11/4/2024  Interpreted By:  Tuan Platt, STUDY: XR CHEST 1 VIEW;  11/4/2024 9:08 am   INDICATION: Signs/Symptoms:SGC placement.   COMPARISON: Chest radiograph dated 11/3/2024; CT chest 10/31/2024   ACCESSION NUMBER(S): [id number]   ORDERING CLINICIAN: DINAH GONZALEZ   FINDINGS: AP radiograph of the chest     Medical devices: The swans Faviola catheter is positioned within the right main pulmonary artery. Sternal sutures are intact.   The heart is moderately severely enlarged. Pulmonary aeration is similar previous study. No pneumothorax is noted.   The left shoulder arthroplasty is partially visualized.       1. No evidence of acute cardiopulmonary process. 2. No significant change from previous study.       Signed by: Tuan Platt 11/4/2024 9:38 AM Dictation workstation:   [id number]    CT liver w IV contrast    Result Date: 11/4/2024  Interpreted By:  Regulo Martel, STUDY: CT LIVER W IV CONTRAST; 11/2/2024 11:38 am   INDICATION: Signs/Symptoms:liver lesion.   COMPARISON: None   ACCESSION NUMBER(S): [id number]   ORDERING CLINICIAN: DINAH GONZALEZ   TECHNIQUE: Multiphase CT of the abdomen was performed utilizing intravenous contrast including arterial, portal venous and delayed postcontrast imaging. Coronal and sagittal reconstructions were also created. Intravenous contrast: 75 mL of Omnipaque 350   FINDINGS: Some images are mildly degraded by motion.   INCLUDED LOWER CHEST: There is mild dependent linear opacities, likely atelectasis or postinflammatory scarring. No consolidation or effusion. The heart is enlarged and a right-sided device lead is partially included.   LIVER: 4.4 cm ovoid heterogeneous hypodense lesion inferiorly within hepatic segments V/VI does not demonstrate change in appearance or attenuation on serial postcontrast imaging and is felt most likely to be a complex cyst. A couple  additional too small to characterize hypodense lesions are also likely cysts. No solid enhancing lesion is evident. No morphologic evidence of cirrhosis.   BILE DUCTS: No biliary dilation.   GALLBLADDER: Cholelithiasis is present.   PANCREAS: A 1.6 cm ovoid cystic lesion along the superior margin of the pancreatic body does not demonstrate internal complexity or enhancement. A couple additional too small to characterize hypodense foci are also noted in the pancreas, also likely cystic lesions. The main duct is not dilated.   SPLEEN: The spleen is enlarged up to 20 cm without focal abnormality evident.   ADRENAL GLANDS: Unremarkable.   KIDNEYS, URETERS AND BLADDER: A 1.4 cm hypodense lesion along the lateral inferior pole of the right kidney does not demonstrate change in attenuation over serial post-contrast imaging, likely a complex cystic lesion such as a hemorrhagic cyst. Additional simple appearing cystic lesions and too small to characterize hypodense lesions, also likely simple cysts, are present. No solid-appearing enhancing lesion is evident.   INCLUDED BOWEL: No evidence of obstruction or inflammation.   VESSELS: Calcific atherosclerosis is present. The abdominal aorta is not aneurysmal.   PERITONEUM/RETROPERITONEUM/LYMPH NODES: No free fluid or free air. No adenopathy is evident.   MUSCULOSKELETAL: No destructive osseous lesion is evident. The patient is status post sternotomy. Degenerative changes are present in the lower lumbar spine.       1. 4.4 cm ovoid heterogeneous hypodense lesion inferiorly within hepatic segments V/VI does not demonstrate change in appearance or attenuation on serial postcontrast imaging and is felt most likely to be a complex cyst. A couple additional too small to characterize hypodense lesions are also likely cysts. No solid enhancing lesion is evident. No morphologic evidence of cirrhosis. 2. Splenomegaly. 3. Incidental findings include pancreatic and renal simple and complex  attenuation lesions as well, for which initial follow-up by contrast MRI is recommended on a routine outpatient basis.   Signed by: Regulo Martel 11/4/2024 7:49 AM Dictation workstation:   [id number]    Pulmonary function testing    Result Date: 11/3/2024  Normal spirometry. Lung volumes are normal. The DLCO corrected for hemoglobin is normal.    XR chest 1 view    Result Date: 11/3/2024  Interpreted By:  Frank Connolly, STUDY: XR CHEST 1 VIEW;  11/3/2024 9:55 am   INDICATION: Signs/Symptoms:SGC placement.     COMPARISON: Exam dated 11/02/2024   ACCESSION NUMBER(S): [id number]   ORDERING CLINICIAN: DINAH GONZALEZ   FINDINGS: AP radiograph of the chest was provided. Exam is limited by rightward patient rotation. There is a partially visualized left shoulder arthroplasty.   Status post median sternotomy. Right IJ approach [place]-Faviola catheter tip projects over the right pulmonary artery.   CARDIOMEDIASTINAL SILHOUETTE: Stable diffuse enlargement of the cardiac silhouette.   LUNGS: Lungs are clear.   ABDOMEN: No remarkable upper abdominal findings.   BONES: No acute osseous changes.       1.  No evidence of acute pulmonary process. 2. Stable enlargement of the cardiac silhouette. 3. [place]-Faviola catheter tip projects over the right pulmonary artery.       MACRO: None   Signed by: Frank Connolly 11/3/2024 1:47 PM Dictation workstation:   [id number]    XR chest 1 view    Result Date: 11/2/2024  Interpreted By:  Frank Connolly, STUDY: XR CHEST 1 VIEW;  11/2/2024 7:57 am   INDICATION: Signs/Symptoms:SGC placement.     COMPARISON: Exam dated 11/01/2024   ACCESSION NUMBER(S): [id number]   ORDERING CLINICIAN: DINAH GONZALEZ   FINDINGS: AP radiograph of the chest was provided.   Right IJ approach [place]-Faviola catheter with tip projecting over the right pulmonary artery. Status post median sternotomy.Partially visualized left shoulder arthroplasty hardware.   CARDIOMEDIASTINAL SILHOUETTE: Stable enlargement of the cardiac  silhouette.   LUNGS: Lungs are clear.   ABDOMEN: No remarkable upper abdominal findings.   BONES: No acute osseous changes.       1.  Right IJ [place]-Faviola catheter tip projects over the right pulmonary artery. 2. Stable enlargement of the cardiac silhouette. 3. No radiographic evidence of acute pulmonary process.       MACRO: None   Signed by: Frank Connolly 11/2/2024 9:21 AM Dictation workstation:   [id number]    XR chest 1 view    Result Date: 11/2/2024  Interpreted By:  Frank Connolly, STUDY: XR CHEST 1 VIEW;  11/1/2024 6:38 am   INDICATION: Signs/Symptoms:PA catheter postition assessment.     COMPARISON: Exam dated 10/31/2024   ACCESSION NUMBER(S): [id number]   ORDERING CLINICIAN: LANDON GARCES   FINDINGS: AP radiograph of the chest was provided.   Right IJ approach [place]-Faviola catheter with tip projecting over the right pulmonary artery. Status post median sternotomy. Partially visualized left shoulder arthroplasty hardware.   CARDIOMEDIASTINAL SILHOUETTE: Stable enlargement of the cardiac silhouette.   LUNGS: Lungs are clear.   ABDOMEN: No remarkable upper abdominal findings.   BONES: No acute osseous changes.       1.  [place]-Faviola catheter tip projects over the right pulmonary artery. 2. No radiographic evidence of acute pulmonary process. 3. Stable enlargement of the cardiac silhouette.       MACRO: None   Signed by: Frank Connolly 11/2/2024 9:20 AM Dictation workstation:   [id number]    XR chest 2 views    Result Date: 11/1/2024  Interpreted By:  Frank Connolly, STUDY: XR CHEST 2 VIEWS;  10/31/2024 6:01 pm   INDICATION: Signs/Symptoms:LVAD / OHT evaluation.     COMPARISON: Exam dated earlier same day   ACCESSION NUMBER(S): [id number]   ORDERING CLINICIAN: LANDON GARCES   FINDINGS: PA and lateral radiographs of the chest were provided.  Additional PA dual energy images were also provided.   Right subclavian approach [place]-Faviola catheter with tip projecting over the distal right pulmonary artery.    CARDIOMEDIASTINAL SILHOUETTE: The cardiac silhouette is enlarged. Remaining mediastinal contours within normal limits.   LUNGS: Lungs are clear.   ABDOMEN: No remarkable upper abdominal findings.   BONES: No acute osseous changes.       1.  No evidence of acute cardiopulmonary process. 2. Stable enlargement of the cardiac silhouette. 3. [place]-Faviola catheter tip projects over the distal right pulmonary artery.       MACRO: None   Signed by: Frank Connolly 11/1/2024 4:38 PM Dictation workstation:   [id number]    XR chest 1 view    Result Date: 11/1/2024  Interpreted By:  Frank Connolly, STUDY: XR CHEST 1 VIEW;  10/31/2024 8:03 am   INDICATION: Signs/Symptoms:PA catheter assessment.     COMPARISON: Exam dated 10/30/2024   ACCESSION NUMBER(S): [id number]   ORDERING CLINICIAN: LANDON GARCES   FINDINGS: AP radiograph of the chest was provided.   Status post median sternotomy. Right IJ approach [place]-Faviola catheter with the tip projecting over the distal right pulmonary artery.   CARDIOMEDIASTINAL SILHOUETTE: Stable enlargement of the cardiac silhouette.   LUNGS: Lungs are clear.   ABDOMEN: No remarkable upper abdominal findings.   BONES: No acute osseous changes.       1.  [place]-Faviola catheter tip projects over the distal right pulmonary artery. 2. Stable enlargement of the cardiac silhouette.       MACRO: None   Signed by: Frank Connolly 11/1/2024 4:36 PM Dictation workstation:   [id number]    CT chest wo IV contrast    Result Date: 11/1/2024  Interpreted By:  Frank Connolly, STUDY: CT CHEST WO IV CONTRAST;  10/31/2024 12:37 pm   INDICATION: Signs/Symptoms:LVAD / OHT evaluation.     COMPARISON: None.   ACCESSION NUMBER(S): [id number]   ORDERING CLINICIAN: LANDON GARCES   TECHNIQUE: Helical data acquisition of the chest was obtained  without IV contrast material.  Images were reformatted in axial, coronal, and sagittal planes.   FINDINGS: LUNGS AND AIRWAYS: The trachea and central airways are patent. No endobronchial  lesion. Scattered secretions in the trachea. Nonspecific areas of traction bronchiolectasis in the lower lobes which is likely due to scarring.   Areas of linear parenchymal and ground-glass opacity in the dependent lungs likely represents atelectasis. No pleural effusion or pneumothorax. No lobar consolidation. Scattered punctate calcified granulomas. No suspicious pulmonary nodules.   MEDIASTINUM AND BEAU, LOWER NECK AND AXILLA: The visualized thyroid gland is within normal limits.   No evidence of thoracic lymphadenopathy by CT criteria.   Small hiatal hernia. The esophagus is otherwise within normal limits.   HEART AND VESSELS: The ascending aorta is dilated measuring 4.3 cm. There are moderate calcifications of the aorta and branch vessels. 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   Right IJ pulmonary artery catheter tip is in the right pulmonary artery. The main pulmonary artery is dilated measuring 3.4 cm.   Severe coronary artery calcifications. Left circumflex coronary stent is present. The study is not optimized for evaluation of coronary arteries.   Moderate cardiomegaly with multichamber involvement. Moderate aortic valve calcifications.   No evidence of pericardial effusion.   UPPER ABDOMEN: There is a partially visualized, incompletely characterized hypodense lesion in the inferior right hepatic lobe measuring up to 4.8 cm. Subcentimeter hypodensity in the left hepatic lobe is incompletely characterized but most likely represents a simple cyst or hemangioma. The spleen is enlarged measuring 18.1 cm. 3.6 cm likely right renal cyst is noted.   CHEST WALL AND OSSEOUS STRUCTURES: There are no suspicious osseous lesions. Multilevel degenerative changes are present. Status post left shoulder hemiarthroplasty. Status post median sternotomy.       1.  Dilated ascending aorta measuring up to 4.3 cm. Recommend continued imaging surveillance as per clinical guidelines. 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 Moderate  calcifications of the aorta and branch vessels. 2. Severe coronary artery calcifications. 3. Moderate cardiomegaly. 4. Moderate aortic valve calcifications and correlate with concern for aortic valve stenosis. 5. Incompletely characterized lesion in the inferior right hepatic lobe measuring up to 4.8 cm. Recommend initial evaluation with liver ultrasound. 6. [place]-Faviola catheter tip in the right pulmonary artery. The main pulmonary artery is mildly dilated and correlate with concern for pulmonary hypertension.   MACRO: Critical Finding:  See findings. Notification was initiated on 11/1/2024 at 1:18 pm by  Frank Connolly.  (**-OCF-**) Instructions:   Signed by: Frank Connolly 11/1/2024 1:18 PM Dictation workstation:   [id number]    XR panorex    Result Date: 11/1/2024  Interpreted By:  Rosita Beltran, STUDY: XR PANOREX   INDICATION: Signs/Symptoms:LVAD / OHT evaluation   COMPARISON: None.   ACCESSION NUMBER(S): [id number]   ORDERING CLINICIAN: LANDON GARCES   FINDINGS: Single panoramic radiograph of the mandible was obtained. Edentulous patient.   Punctate radiopaque densities projecting in the right aspect of the mouth of uncertain clinical significance and etiology. Bony defects projecting in the inferior aspect of the bilateral mandibular angles likely artifactual from technique.   Paranasal sinuses are clear. There is no evidence of mandibular dislocation or fracture.       Edentulous patient.   Bony defects projecting in the inferior aspect of the bilateral mandibular angles likely artifactual from technique.   Signed by: Rosita Beltran 11/1/2024 10:19 AM Dictation workstation:   [id number]    XR chest 1 view    Result Date: 10/31/2024  Interpreted By:  Olvin Farias, STUDY: XR CHEST 1 VIEW; 10/30/2024 7:54 am   INDICATION: Signs/Symptoms:ADHF.   COMPARISON: 10/29/2024.   ACCESSION NUMBER(S): [id number]   ORDERING CLINICIAN: EULA WRIGHT   FINDINGS:     CARDIOMEDIASTINAL SILHOUETTE: Cardiomegaly versus  pericardial effusion. Patient is status post median sternotomy. [place]-Faviola catheter overlies the right main pulmonary artery. LUNGS: Lungs are clear of focal airspace disease.   ABDOMEN: No remarkable upper abdominal findings.   BONES: No acute osseous changes. Patient is status post left TSA.       1.  Stable positioning of [place]-Faviola catheter with tip overlying the right main pulmonary artery.     Signed by: Olvin Farias 10/31/2024 9:08 AM Dictation workstation:   [id number]       LDA:  Ventricular Assist Device HeartMate 3 (Active)   Placement Date: 11/12/24   Placed by: Suhas Hill  Site Location: Abdomen left  VAD Type: Left ventricular assist device  VAD Brand: HeartMate 3   Number of days: 19     NUTRITION: Adult diet Regular  EMERGENCY CONTACT: Extended Emergency Contact Information  Primary Emergency Contact: Tanvi Meraz  Home Phone: [phone]  Relation: Spouse  Secondary Emergency Contact: Subhash Meraz  [employer] Phone: [phone]  Relation: Son  CODE STATUS: Full Code  DISPO: Discharge Planning  Living Arrangements: Spouse/significant other  Support Systems: Spouse/significant other  Assistance Needed: none  Type of Residence: Private residence  Number of Stairs to Enter Residence: 2  Number of Stairs Within Residence: 12  Do you have animals or pets at home?: Yes  Type of Animals or Pets: dog  Who is requesting discharge planning?: Provider  Home or Post Acute Services: None  Expected Discharge Disposition: Inpatient Rehab Facility (IRF)  Does the patient need discharge transport arranged?: Yes  RoundTrip coordination needed?: Yes  Has discharge transport been arranged?: Yes  What day is the transport expected?: 11/27/24  What time is the transport expected?: 1136  [facility] [address]: Daily Activity - Total Score: 22    FOLLOWUP:   Future Appointments   Date Time Provider Department Center   12/10/2024  2:10 PM Romelia Lomas MD [id number]

## 2024-12-01 NOTE — PROGRESS NOTES
ADVANCED HEART FAILURE LVAD PROGRESS NOTE     H VAD Cardiologist: Padmini      Type of VAD: HM3  Implant Date: 11/12/24  Reason for VAD: ICM  Intent: Long-Term (Significant PAD, age, patient preference)      Subjective   Interval events:  No overnight events, NSVT noted on tele. Longest was 11 beats. This morning, pt denies acute/new complaints.    HPI:  Fahad Meraz is a very pleasant 69 y.o. male w/ a PMHx sig for CAD s/p 4V CABG (2012) and PCI (LCx/OM; 5/2019), stage D systolic HF/ICM/HFrEF with LVEF 10-15%( 10/22/24 TTE), AF s/p RFA (PVI and CTI; 4/2024) on OAC therapy, HTN, dyslipidemia, depression, anxiety and VIV using CPAP who was admitted to OSH ICU, s/p RHC on 10/18/24.  Per patient, he had been experienced worsening SOB and fluids overload for 2-3 weeks. Patient was on milrinone drip and underwent SGC diuresis. However his KENYA worsened, and were not able to wean milrinone drip. Decision was made to transfer him to HF ICU [facility] for possible advanced therapy consideration. Advanced therapies evaluation was initiated on 10/30. Discussed in advanced therapeutics committee on 11/5 and was denied for transplantation, and approved for LVAD (significant PAD, Elevated PSA level, Age approaching 70, as well as patient verbalized he would be more in favor of LVAD vs Transplant). He was transferred to the floor on 11/6 to await VAD implantation. Readmitted to HFICU as of 11/09 for pre-OR swan guided optimization. Now presents to CTICU s/p LVAD HM3 implant via thoracotomy with descending outflow graft on 11/12/24 with Dr. Mclain. OR Course/Issues: pulseless VT requiring defib x 1 pre- CPB. Postop course c/b delirium, RV dysfunction, VT & malnutrition.       Principal Problem:    Acute on chronic heart failure with reduced ejection fraction and diastolic dysfunction  Active Problems:    Ischemic cardiomyopathy    Receiving inotropic medication    HTN (hypertension)    CAD (coronary artery disease)    MI  (myocardial infarction) (Multi)    CHF (congestive heart failure)    Gastroesophageal reflux disease    Acute kidney injury (nontraumatic) (CMS-HCC)    Delirium       LOS: 38 days     NYHA class pre-VAD implant: IV  NYHA class today (11/25): II        Objective   Vitals:   Vitals:    11/30/24 2343 12/01/24 0430 12/01/24 0749 12/01/24 1143   BP: 101/68 101/66 108/72    BP Location: Left arm Left arm Right arm    Patient Position: Lying Lying Lying    Pulse: 67 72 70    Resp: 15 14 17    Temp: 36.2 °C (97.2 °F) 37 °C (98.6 °F) 36.4 °C (97.5 °F) 36.3 °C (97.3 °F)   TempSrc: Temporal Temporal Temporal Temporal   SpO2: 97% 94% 94% 97%   Weight:  88.4 kg (194 lb 14.2 oz)     Height:         Wt Readings from Last 5 Encounters:   12/01/24 88.4 kg (194 lb 14.2 oz)   10/24/24 92.5 kg (204 lb)   08/05/24 122 kg (268 lb)   01/31/22 119 kg (262 lb)   02/18/20 123 kg (270 lb 2 oz)     Hemodynamic parameters for last 24 hours:     Intake/Output for last 24 hours:    Intake/Output Summary (Last 24 hours) at 12/1/2024 1546  Last data filed at 12/1/2024 0857  Gross per 24 hour   Intake 480 ml   Output 1250 ml   Net -770 ml           Physical exam:  Physical Exam  Constitutional:       General: He is not in acute distress.     Appearance: Normal appearance. He is normal weight. He is not ill-appearing or toxic-appearing.   HENT:      Head: Normocephalic and atraumatic.      Mouth/Throat:      Mouth: Mucous membranes are moist.      Pharynx: Oropharynx is clear. No oropharyngeal exudate or posterior oropharyngeal erythema.   Eyes:      Extraocular Movements: Extraocular movements intact.      Conjunctiva/sclera: Conjunctivae normal.      Pupils: Pupils are equal, round, and reactive to light.   Neck:      Vascular: No JVD.   Cardiovascular:      Rate and Rhythm: Normal rate and regular rhythm.      Comments: LVAD hum heard on auscultation. Sinus rhythm w/ intermittent PVCs and NSVT. Capillary refill < 2 seconds in all extremities.    Pulmonary:      Effort: Pulmonary effort is normal. No accessory muscle usage, respiratory distress or retractions.      Breath sounds: No stridor. No wheezing, rhonchi or rales.   Abdominal:      General: Abdomen is flat. Bowel sounds are normal. There is no distension.      Palpations: Abdomen is soft. There is no mass.      Tenderness: There is no abdominal tenderness. There is no guarding.      Hernia: No hernia is present.   Musculoskeletal:         General: No swelling or tenderness. Normal range of motion.      Cervical back: Full passive range of motion without pain and normal range of motion.      Right lower leg: No edema.      Left lower leg: No edema.   Skin:     General: Skin is warm and dry.      Capillary Refill: Capillary refill takes less than 2 seconds.      Coloration: Skin is not jaundiced.      Findings: Lesion present. No bruising, laceration, rash or wound.      Comments: Left thoracotomy well approximated w/o signs of infection. LVAD driveline site covered with weekly dressing. No drainage or tenderness on exam.    Neurological:      General: No focal deficit present.      Mental Status: He is alert and oriented to person, place, and time. Mental status is at baseline.   Psychiatric:         Mood and Affect: Mood normal.         Behavior: Behavior normal.         Thought Content: Thought content normal.         Judgment: Judgment normal.        Driveline:   C/d/i       Labs:   CMP:  Recent Labs     12/01/24  0635 11/30/24  0834 11/29/24  0702 11/27/24  0652 11/27/24  0227 11/26/24  0603 11/25/24  0736 11/25/24  0250 11/24/24  0556    139 138 138 138 139 135* 133* 136   K 4.5 4.3 4.2 4.4 4.8 4.3 4.4 8.0* 4.6    105 106 105 105 103 105 102 104   CO2 25 26 26 24 23 26 16* 26 22   ANIONGAP 16 12 10 13 15 14 18 13 15   BUN 17 17 16 20 22 19 20 22 20   CREATININE 1.46* 1.20 1.19 1.28 1.21 1.39* 1.10 1.22 1.24   EGFR 52* 65 66 61 65 55* 73 64 63   MG 2.02 2.02 2.05 2.18 2.12 2.13  --   2.47* 2.03     Recent Labs     12/01/24  0635 11/30/24  0834 11/29/24  0702 11/27/24  0652 11/26/24  0603 11/25/24  0736 11/25/24  0250 11/24/24  0556 11/19/24  1459 11/19/24  0322 11/18/24  0525 11/17/24  1323 11/17/24  0122 11/16/24  1358 11/16/24  0128 11/15/24  1255 11/15/24  0021 11/14/24  1224 11/14/24  0118 11/13/24  1228 11/13/24  0007 11/12/24  1307   ALBUMIN 3.5 3.5 3.3* 3.6 3.6 3.4 3.7 3.6   < > 3.1* 3.5   < > 3.4   < > 3.6   < > 3.7   < > 3.8 3.7   < > 3.7   ALT  --   --   --   --   --   --   --   --   --  6* 6*  --  3*  --  4*  --  5*  --  7* 11  --  15   AST  --   --   --   --   --   --   --   --   --  16 18  --  21  --  24  --  29  --  40* 42*  --  37   BILITOT  --   --   --   --   --   --   --   --   --  0.8 0.8  --  1.0  --  1.0  --  1.1  --  0.9 1.1  --  0.9    < > = values in this interval not displayed.     CBC:  Recent Labs     12/01/24  0635 11/30/24  0834 11/29/24  0755 11/27/24  0652 11/26/24  0603 11/25/24  0250 11/24/24  0556 11/23/24  0451   WBC 8.1 7.9 9.0 12.0* 11.8* 13.5* 15.4* 13.8*   HGB 11.1* 11.0* 10.5* 12.6* 12.4* 12.1* 12.3* 12.5*   HCT 33.9* 34.6* 33.1* 38.3* 38.6* 37.2* 39.1* 40.1*    381 401 503* 537* 313 545* 529*   MCV 83 85 86 84 85 83 86 87     COAG:   Recent Labs     12/01/24  0635 11/30/24  0834 11/29/24  0702 11/28/24  0542 11/27/24  2037 11/27/24  1605 11/26/24  0957 11/26/24  0603 11/25/24  2348 11/25/24  0736 11/25/24  0250 11/24/24  2156 11/24/24  0556 11/13/24  0008 11/12/24  1257   INR 2.0* 2.1* 2.1* 2.1*  --  1.8*  --  1.8*  --   --  1.8*  --  1.8*   < > 1.4*   HAUF  --   --   --  0.6 0.5  --  0.5 0.4 0.2   < > 0.1   < >  --    < >  --    FIBRINOGEN  --   --   --   --   --   --   --   --   --   --   --   --   --   --  301    < > = values in this interval not displayed.     ABO:   Recent Labs     11/20/24  0151   ABO A     HEME/ENDO:   Recent Labs     10/31/24  1249 10/24/24  2328   FERRITIN  --  76   IRONSAT  --  17*   TSH 4.39* 8.47*   HGBA1C  --  6.3*  "    CARDIAC:   Recent Labs     12/01/24  0635 11/30/24  0834 11/29/24  0755 11/29/24  0702 11/27/24  0652 11/26/24  0603 11/25/24  0736 11/25/24  0250 11/24/24  0556 11/12/24  1307 10/31/24  1249 10/24/24  2328    176  --  151 214 264* 426* 725* 247*   < > 187  --    BNP  --   --  478*  --   --   --   --   --   --   --  755* 1,995*    < > = values in this interval not displayed.     Recent Labs     11/27/24  0652   CHOL 126  126   HDL 24.6  24.6   TRIG 155*     TOX:  Recent Labs     10/31/24  1249   AMPHETAMINE Negative     MICRO: No results for input(s): \"ESR\", \"CRP\", \"PROCAL\" in the last 79231 hours.  No results found for the last 90 days.        Imaging:   No results found.     Notable Studies:   EKG:  Encounter Date: 10/24/24   Electrocardiogram 12-lead PRN for arrhythmia   Result Value    Ventricular Rate 111    Atrial Rate 81    QRS Duration 146    QT Interval 406    QTC Calculation(Bazett) 552    R Axis 24    T Axis 45    QRS Count 18    Q Onset 226    P Onset 209    P Offset 224    T Offset 429    QTC Fredericia 498    Narrative    Atrial fibrillation with rapid ventricular response with premature ventricular or aberrantly conducted complexes  Left bundle branch block  Abnormal ECG  When compared with ECG of 20-NOV-2024 03:52,  Left bundle branch block has replaced Nonspecific intraventricular block  Criteria for Septal infarct are no longer Present  Criteria for Lateral infarct are no longer Present       Echo:  Transthoracic Echo (TTE) Limited    Result Date: 11/26/2024   [facility], [address] [place] [address]                Tel [phone] and Fax [phone] TRANSTHORACIC ECHOCARDIOGRAM REPORT  Patient Name:       RJ Webb Physician:    [id number] Parker Terrell MD Study Date:         11/26/2024          Ordering Provider:    [id number] KARRI YANES"                SCOTT MRN/PID:            [id number]            Fellow: Accession#:         [id number]        Nurse: Date of Birth/Age:  1955 / 69     Sonographer:          Manuela estrada                                     RDCS, IRMA Gender assigned at  M                   Additional Staff:     Melida Kaye Birth:                                                        RDCS Height:             182.88 cm           Admit Date: Weight:             87.54 kg            Admission Status:     Inpatient -                                                               Routine BSA / BMI:          2.10 m2 / 26.18     Encounter#:           [id number]                     kg/m2 Blood Pressure:     /                   Department Location:  [facility]                                                               Non Invasive Study Type:    TRANSTHORACIC ECHO (TTE) LIMITED Diagnosis/ICD: Presence of heart assist device-Z95.811 Indication:    RAMP study CPT Code:      Echo Limited-89685; Doppler Limited-06482; Color Doppler-17887 Patient History: Pertinent History: CAD. CABG x4 2012, PCI 2019, ICM, CHF, s/p HeartMate III                    LVAD, AF s/p RFA (PVI and CTI 4/2024). Study Detail: The following Echo studies were performed: 2D, M-Mode, Doppler and               color flow. Technically challenging study due to poor acoustic               windows.  PHYSICIAN INTERPRETATION: Left Ventricle: Left ventricular ejection fraction is severely decreased, by visual estimate at 15-20%. There is global hypokinesis of the left ventricle with minor regional variations. The left ventricular cavity size is severely dilated. Spectral Doppler shows a Grade II (pseudonormal pattern) of left ventricular diastolic filling with an elevated left atrial pressure. Left Atrium: The left atrium is enlarged. Right Ventricle: The right ventricle is severely enlarged. There is reduced right ventricular systolic  function. Right Atrium: The right atrium was not well visualized. Aortic Valve: The aortic valve is probably trileaflet. There is mild aortic valve cusp calcification. There is mild aortic valve regurgitation. Mitral Valve: The mitral valve is mildly thickened. There is trace mitral valve regurgitation. Tricuspid Valve: The tricuspid valve is structurally normal. There is trace tricuspid regurgitation. The right ventricular systolic pressure is unable to be estimated. Pulmonic Valve: The pulmonic valve is structurally normal. Pulmonic valve regurgitation was not assessed. Pericardium: Trivial pericardial effusion. Aorta: The aortic root is abnormal. There is mild dilatation of the ascending aorta. There is mild dilatation of the aortic root. Pulmonary Artery: The pulmonary artery is not well visualized. Systemic Veins: The inferior vena cava was not well visualized. In comparison to the previous echocardiogram(s): Compared with study dated 11/18/2024, no significant change.  LEFT VENTRICULAR ASSIST DEVICE: LVAD: The patient has a(n) HeartMate 3 LVAD device present. Inflow Cannula: Visualization of the inflow cannula is technically difficult. Outflow Cannula: Visualization of the outflow cannula is technically difficult. LVAD Comments: Ramp study. 5200 rpm at start and finish of limited echo.  CONCLUSIONS:  1. Left ventricular ejection fraction is severely decreased, by visual estimate at 15-20%.  2. There is global hypokinesis of the left ventricle with minor regional variations.  3. Spectral Doppler shows a Grade II (pseudonormal pattern) of left ventricular diastolic filling with an elevated left atrial pressure.  4. Left ventricular cavity size is severely dilated.  5. There is reduced right ventricular systolic function.  6. Severely enlarged right ventricle.  7. The left atrium is enlarged.  8. Mild aortic valve regurgitation.  9. The pulmonary artery is not well visualized. QUANTITATIVE DATA SUMMARY:  M-MODE  MEASUREMENTS:         Normal Ranges: Ao Root:             4.10 cm (2.0-3.7cm)  AORTA MEASUREMENTS:         Normal Ranges: Asc Ao, d:          4.20 cm (2.1-3.4cm)  LV SYSTOLIC FUNCTION BY 2D PLANIMETRY (MOD):                      Normal Ranges: EF-Visual:      18 % LV EF Reported: 18 %  LV DIASTOLIC FUNCTION:          Normal Ranges: MV Peak E:             0.50 m/s (0.7-1.2 m/s) MV Peak A:             0.44 m/s (0.42-0.7 m/s) E/A Ratio:             1.14     (1.0-2.2)  [id number] Parker Terrell MD Electronically signed on 11/26/2024 at 3:08:03 PM  ** Final **     Transthoracic Echo (TTE) Limited    Result Date: 11/18/2024   [facility], [address] [place] [address]                Tel [phone] and Fax [phone] TRANSTHORACIC ECHOCARDIOGRAM REPORT  Patient Name:       RJ Webb Physician:    [id number] Fawad Chicas MD Study Date:         11/18/2024          Ordering Provider:    [id number] HARRISON PALOMINO MRN/PID:            [id number]            Fellow: Accession#:         [id number]        Nurse: Date of Birth/Age:  1955 / 69     Sonographer:          Genesis Sinclair RDCS                     years Gender assigned at  M                   Additional Staff: Birth: Height:             182.88 cm           Admit Date:           10/24/2024 Weight:             93.44 kg            Admission Status:     Inpatient -                                                               Routine BSA / BMI:          2.16 m2 / 27.94     Encounter#:           [id number]                     kg/m2 Blood Pressure:     /                   Department Location:  [facility] Study Type:    TRANSTHORACIC ECHO (TTE) LIMITED Diagnosis/ICD: Presence of heart assist device-Z95.811 Indication:    Ramp study for LVAD CPT Code:      Echo Limited-54320; Doppler Limited-28621; Color  Doppler-70795 Patient History: Pertinent History: Acute on chronic heart failure with reduced EF, Diastolic                    disfunction, Ischemic cardiiomyopathy, MI, CHF, LVAD, VIV. Study Detail: The following Echo studies were performed: 2D, Doppler, color flow               and M-Mode. Technically challenging study due to patient lying in               supine position and postoperative dressings.  PHYSICIAN INTERPRETATION: Left Ventricle: Left ventricular ejection fraction is severely decreased, by visual estimate at 10%. There is global hypokinesis of the left ventricle with minor regional variations. The left ventricular cavity size is severely dilated. Abnormal (paradoxical) septal motion consistent with post-operative status. Left ventricular diastolic filling was not assessed. Left Atrium: The left atrium is moderate to severely dilated. Right Ventricle: The right ventricle is severely enlarged. There is severely reduced right ventricular systolic function. Right Atrium: The right atrium is moderately dilated. Aortic Valve: The aortic valve is structurally normal. There is mild aortic valve cusp calcification. There is mild aortic valve regurgitation. Mitral Valve: The mitral valve is normal in structure. There is mild mitral valve regurgitation. Tricuspid Valve: The tricuspid valve is structurally normal. There is mild tricuspid regurgitation. Pulmonic Valve: The pulmonic valve is not well visualized. Pulmonic valve regurgitation was not assessed. Pericardium: Trivial pericardial effusion. Aorta: The aortic root is normal. In comparison to the previous echocardiogram(s): Compared with study dated 11/15/2024, no significant change.  LEFT VENTRICULAR ASSIST DEVICE: LVAD: The patient has a(n) HeartMate 3 LVAD device present. Inflow Cannula: The inflow cannula is visualized in the left ventricular apex. AV Leaflet Mobility: . The aortic valve appears to be opening every 1 beats. LVAD Comments: Ramp study  speed increased from 5200 to 5300. Aortic valve still opens every beat. Septum remains in the midline.  CONCLUSIONS:  1. Poorly visualized anatomical structures due to suboptimal image quality.  2. Left ventricular ejection fraction is severely decreased, by visual estimate at 10%.  3. There is global hypokinesis of the left ventricle with minor regional variations.  4. Left ventricular cavity size is severely dilated.  5. Abnormal septal motion consistent with post-operative status.  6. There is severely reduced right ventricular systolic function.  7. Severely enlarged right ventricle.  8. The left atrium is moderate to severely dilated.  9. The right atrium is moderately dilated. 10. Mild aortic valve regurgitation. 11. . The aortic valve appears to be opening every 1 beats. 12. Compared with study dated 11/15/2024, no significant change. QUANTITATIVE DATA SUMMARY:  LV SYSTOLIC FUNCTION BY 2D PLANIMETRY (MOD):                      Normal Ranges: EF-Visual:      10 % LV EF Reported: 10 %  [id number] Fawad Chicas MD Electronically signed on 11/18/2024 at 12:33:46 PM  ** Final **       Meds:  Scheduled medications  amiodarone, 400 mg, oral, BID  aspirin, 81 mg, oral, Daily  dapagliflozin propanediol, 10 mg, oral, Daily  digoxin, 250 mcg, oral, Daily  escitalopram, 10 mg, oral, Daily  icosapent ethyL, 2 g, oral, BID  lidocaine, 2 patch, transdermal, Daily  magnesium oxide, 800 mg, oral, BID  methocarbamol, 500 mg, oral, q6h  pantoprazole, 40 mg, oral, Daily before breakfast  pravastatin, 20 mg, oral, Nightly  sacubitriL-valsartan, 1 tablet, oral, BID  sennosides-docusate sodium, 1 tablet, oral, BID  sildenafil, 20 mg, oral, TID  spironolactone, 12.5 mg, oral, Daily  warfarin, 7.5 mg, oral, Daily      Continuous medications       PRN medications  PRN medications: acetaminophen, [DISCONTINUED] ondansetron **OR** ondansetron, polyethylene glycol     Assessment/Plan   Assessment & Plan     NEUROLOGICAL  Active issues:    #Acute post-operative pain  - Endorses pain at back near posterior portion of surgical incision. Pain musculoskeletal in nature & improved with robaxin   - PRN po Tylenol 975 mg q6 hrs   - Continue lidoderm patches   - Restart robaxin 500mg Q6 x 5 days (end date 12/04)    #Hx of anxiety and depression  - Continue home escitalopram 10mg daily    #Transient LUE weakness on 11/24  - CTH (11/24) without acute abnormalities  - Resolved with tylenol  - No focal deficits any further       CARDIAC  #PMHx CAD s/p CABG x 4 (2012) and PCI (LCx/OM; 5/2019), stage D ICM HFrEF LVEF 10-15%  #S/p HM3 LVAD via left thoracotomy 11/12 with Abu Angel and Kaitlin.     GDMT and RV support:   - BB: n/a - RV dysfunction   - ARNI/ACEi/ARB: Entresto 24-26mg BID   - MRA: continue spironolactone 12.5mg daily    - SGLT2i: Farxiga 10 mg daily     RV support:   - c/w digoxin to 250mcg daily -> 0.46 on 11/29  - Continue sildenafil 20mg TID     Anticoagulation:   - Continue Coumadin 7.5mg daily for goal INR 2-3.   - Continue ASA (primary indication - ICM, PCI)     Heart Mate III interrogation   Most Recent   Flow 4.7   Speed 5200   Power 3.7   PI 3.3   MAP (mmHg): 72    LVAD interrogation: stable flow, no sig PI events or power spikes.     RAMP study: Started study with RPM 5200. AV opening every beat, no MR, mild AI, septum midline. After review with Dr. Drummond, he appears well optimized at this speed. No speed changes made. dMAP 68.     Daily weight:   Vitals:    12/01/24 0430   Weight: 88.4 kg (194 lb 14.2 oz)      #Chronic AF s/p RFA (PVI and CTI; 4/2024),   #Sustained VT and NSVT  - Patient never required ICD as his EF remained > 30% prior to acute reduction and LVAD placement   - 11/26 patient noted to have sustained asymptomatic VT w/ self termination.   - EP consulted and recommend medical management with amio over ICD placement given patient's stability and lack of symptoms   - Since starting amio, patient's ectopy has improved;  however, now trending towards sinus bradycardia w/ HR in 60s   - Continue amiodarone 400mg BID x 7 days ( - ) followed by 400 mg daily for 1 week, and then 200 mg daily thereafter   - Maintain K > 4.5 & Mag > 2.0     #HLD  - Repeat lipid panel: HDL 24.6, Non-HDL cholesterol 101, . Cholesterol/HDL ratio 5.1  - Continue vascepa 2g BID  - Start pravastatin 20mg daily     RESPIRATORY   #VIV on CPAP at home  - Resume home nocturnal CPAP  - Maintaining SpO2 >92%.   - Encourage OOB and ambulation daily       GI:  #GERD  #Malnutrition d/t poor PO intake  - Continue PPI for GERD, LVAD bleeding risk   - Continue adult regular diet with supplements  - senna/colace BID and miralax to as needed      RENAL:  #CKD (baseline Cr 1.3-1.6)  - Strict I&Os  - Appears euvolemic on exam   - Avoid hypotension and nephrotoxic agents   Results from last 72 hours   Lab Units 24  0635 24  0834   BUN mg/dL 17 17   CREATININE mg/dL 1.46* 1.20         ENDO:  #PMH of pre-DM with recent A1c: 6.3  - Euglycemic   - Goal BG <180  - NO indication for SSI at this time     HEMATOLOGY:  #Acute blood loss anemia   #Iron deficiency anemia   Results from last 7 days   Lab Units 24  0635 24  0834   HEMOGLOBIN g/dL 11.1* 11.0*   HEMATOCRIT % 33.9* 34.6*   PLATELETS AUTO x10*3/uL 347 381   - Iron studies on  suggest iron deficiency   - Hg remains stable without active signs of bleeding  - Continue iron sucrose 200mg IV daily x 5 days ( - )         INFECTIOUS DISEASE  Active issues:   # No acute issues    Results from last 7 days   Lab Units 24  0635 24  0834   WBC AUTO x10*3/uL 8.1 7.9      Temp (24hrs), Av.5 °C (97.7 °F), Min:36.2 °C (97.2 °F), Max:37 °C (98.6 °F)      Skin/Musculoskeletal:  Active issues:   # no acute issues        PROPHYLAXIS:  - DVT: SCD's, Coumadin  - GI:  Protonix     CODE STATUS: Full code     DISPO PLANNING/ SOCIAL:  - Disposition expectation: Insurance approval for  transfer to [facility] (patient and family preference) obtained on 11/27/24. LVAD team to educated facility staff on 12/2/24 w/ discharge likely 12/3/24/     Patient examined and discussed with attending physician Dr. Bhanu Mcmahon MD, MS, FACP   Heart Failure Fellow,  [facility]

## 2024-12-02 VITALS
HEART RATE: 66 BPM | WEIGHT: 194.89 LBS | BODY MASS INDEX: 26.4 KG/M2 | OXYGEN SATURATION: 93 % | HEIGHT: 72 IN | TEMPERATURE: 98.8 F | RESPIRATION RATE: 12 BRPM | SYSTOLIC BLOOD PRESSURE: 90 MMHG | DIASTOLIC BLOOD PRESSURE: 63 MMHG

## 2024-12-02 LAB
APTT PPP: 42 SECONDS (ref 27–38)
DIGOXIN SERPL-MCNC: 1.03 NG/ML (ref 0.8–?)
ERYTHROCYTE [DISTWIDTH] IN BLOOD BY AUTOMATED COUNT: 17.2 % (ref 11.5–14.5)
HCT VFR BLD AUTO: 39.5 % (ref 41–52)
HGB BLD-MCNC: 11.8 G/DL (ref 13.5–17.5)
INR PPP: 2.2 (ref 0.9–1.1)
LDH SERPL L TO P-CCNC: 186 U/L (ref 84–246)
MAGNESIUM SERPL-MCNC: 2.12 MG/DL (ref 1.6–2.4)
MCH RBC QN AUTO: 27.1 PG (ref 26–34)
MCHC RBC AUTO-ENTMCNC: 29.9 G/DL (ref 32–36)
MCV RBC AUTO: 91 FL (ref 80–100)
NRBC BLD-RTO: 0 /100 WBCS (ref 0–0)
PLATELET # BLD AUTO: 336 X10*3/UL (ref 150–450)
PROTHROMBIN TIME: 24.9 SECONDS (ref 9.8–12.8)
RBC # BLD AUTO: 4.36 X10*6/UL (ref 4.5–5.9)
WBC # BLD AUTO: 8.5 X10*3/UL (ref 4.4–11.3)

## 2024-12-02 PROCEDURE — 2500000001 HC RX 250 WO HCPCS SELF ADMINISTERED DRUGS (ALT 637 FOR MEDICARE OP): Performed by: NURSE PRACTITIONER

## 2024-12-02 PROCEDURE — 2500000004 HC RX 250 GENERAL PHARMACY W/ HCPCS (ALT 636 FOR OP/ED): Performed by: NURSE PRACTITIONER

## 2024-12-02 PROCEDURE — 2500000001 HC RX 250 WO HCPCS SELF ADMINISTERED DRUGS (ALT 637 FOR MEDICARE OP)

## 2024-12-02 PROCEDURE — 97116 GAIT TRAINING THERAPY: CPT | Mod: GP | Performed by: STUDENT IN AN ORGANIZED HEALTH CARE EDUCATION/TRAINING PROGRAM

## 2024-12-02 PROCEDURE — 85610 PROTHROMBIN TIME: CPT | Performed by: NURSE PRACTITIONER

## 2024-12-02 PROCEDURE — 93750 INTERROGATION VAD IN PERSON: CPT | Performed by: REGISTERED NURSE

## 2024-12-02 PROCEDURE — 2500000001 HC RX 250 WO HCPCS SELF ADMINISTERED DRUGS (ALT 637 FOR MEDICARE OP): Performed by: REGISTERED NURSE

## 2024-12-02 PROCEDURE — 2500000002 HC RX 250 W HCPCS SELF ADMINISTERED DRUGS (ALT 637 FOR MEDICARE OP, ALT 636 FOR OP/ED): Performed by: NURSE PRACTITIONER

## 2024-12-02 PROCEDURE — 1200000002 HC GENERAL ROOM WITH TELEMETRY DAILY

## 2024-12-02 PROCEDURE — 97530 THERAPEUTIC ACTIVITIES: CPT | Mod: GP | Performed by: STUDENT IN AN ORGANIZED HEALTH CARE EDUCATION/TRAINING PROGRAM

## 2024-12-02 PROCEDURE — 83735 ASSAY OF MAGNESIUM: CPT | Performed by: NURSE PRACTITIONER

## 2024-12-02 PROCEDURE — 80162 ASSAY OF DIGOXIN TOTAL: CPT | Performed by: STUDENT IN AN ORGANIZED HEALTH CARE EDUCATION/TRAINING PROGRAM

## 2024-12-02 PROCEDURE — 2500000001 HC RX 250 WO HCPCS SELF ADMINISTERED DRUGS (ALT 637 FOR MEDICARE OP): Performed by: STUDENT IN AN ORGANIZED HEALTH CARE EDUCATION/TRAINING PROGRAM

## 2024-12-02 PROCEDURE — 2500000002 HC RX 250 W HCPCS SELF ADMINISTERED DRUGS (ALT 637 FOR MEDICARE OP, ALT 636 FOR OP/ED): Performed by: REGISTERED NURSE

## 2024-12-02 PROCEDURE — 2500000005 HC RX 250 GENERAL PHARMACY W/O HCPCS: Performed by: NURSE PRACTITIONER

## 2024-12-02 PROCEDURE — 83615 LACTATE (LD) (LDH) ENZYME: CPT | Performed by: NURSE PRACTITIONER

## 2024-12-02 PROCEDURE — 99233 SBSQ HOSP IP/OBS HIGH 50: CPT | Performed by: REGISTERED NURSE

## 2024-12-02 PROCEDURE — 85027 COMPLETE CBC AUTOMATED: CPT | Performed by: NURSE PRACTITIONER

## 2024-12-02 RX ORDER — AMIODARONE HYDROCHLORIDE 200 MG/1
400 TABLET ORAL DAILY
Status: DISCONTINUED | OUTPATIENT
Start: 2024-12-03 | End: 2024-12-03 | Stop reason: HOSPADM

## 2024-12-02 RX ORDER — DIGOXIN 250 MCG
250 TABLET ORAL
Status: DISCONTINUED | OUTPATIENT
Start: 2024-12-03 | End: 2024-12-03 | Stop reason: HOSPADM

## 2024-12-02 ASSESSMENT — COGNITIVE AND FUNCTIONAL STATUS - GENERAL
MOVING TO AND FROM BED TO CHAIR: A LITTLE
CLIMB 3 TO 5 STEPS WITH RAILING: A LITTLE
MOBILITY SCORE: 19
MOVING FROM LYING ON BACK TO SITTING ON SIDE OF FLAT BED WITH BEDRAILS: A LITTLE
WALKING IN HOSPITAL ROOM: A LITTLE
TURNING FROM BACK TO SIDE WHILE IN FLAT BAD: A LITTLE
MOVING TO AND FROM BED TO CHAIR: A LITTLE
STANDING UP FROM CHAIR USING ARMS: A LITTLE
CLIMB 3 TO 5 STEPS WITH RAILING: A LITTLE
WALKING IN HOSPITAL ROOM: A LITTLE
MOBILITY SCORE: 18
MOBILITY SCORE: 19
CLIMB 3 TO 5 STEPS WITH RAILING: A LITTLE
TURNING FROM BACK TO SIDE WHILE IN FLAT BAD: A LITTLE
MOVING TO AND FROM BED TO CHAIR: A LITTLE
WALKING IN HOSPITAL ROOM: A LITTLE
DRESSING REGULAR LOWER BODY CLOTHING: A LITTLE
STANDING UP FROM CHAIR USING ARMS: A LITTLE
DRESSING REGULAR LOWER BODY CLOTHING: A LITTLE
STANDING UP FROM CHAIR USING ARMS: A LITTLE
TURNING FROM BACK TO SIDE WHILE IN FLAT BAD: A LITTLE
DAILY ACTIVITIY SCORE: 23
DAILY ACTIVITIY SCORE: 23

## 2024-12-02 ASSESSMENT — PAIN SCALES - GENERAL
PAINLEVEL_OUTOF10: 0 - NO PAIN
PAINLEVEL_OUTOF10: 1

## 2024-12-02 ASSESSMENT — PAIN - FUNCTIONAL ASSESSMENT
PAIN_FUNCTIONAL_ASSESSMENT: 0-10
PAIN_FUNCTIONAL_ASSESSMENT: 0-10

## 2024-12-02 ASSESSMENT — PAIN DESCRIPTION - LOCATION: LOCATION: BACK

## 2024-12-02 NOTE — CARE PLAN
The clinical goals for the shift include pt will remain HDS and free from pain    Over the shift, the patient remained stable; pt reported 0/10 pain throughout the shift; pt removed CPAP in middle of the night but SpO2 was 93-94%; MAPs in 70s

## 2024-12-02 NOTE — PROGRESS NOTES
ADVANCED HEART FAILURE LVAD PROGRESS NOTE     H VAD Cardiologist: Padmini      Type of VAD: HM3  Implant Date: 11/12/24  Reason for VAD: ICM  Intent: Long-Term (Significant PAD, age, patient preference)      Subjective   Interval events:  This morning, pt denies acute/new complaints. Occasional PVC on tele, but no NSVT. States robaxin has improved his pain.     [place] facility LVAD educated.     HPI:  Fahad Meraz is a very pleasant 69 y.o. male w/ a PMHx sig for CAD s/p 4V CABG (2012) and PCI (LCx/OM; 5/2019), stage D systolic HF/ICM/HFrEF with LVEF 10-15%( 10/22/24 TTE), AF s/p RFA (PVI and CTI; 4/2024) on OAC therapy, HTN, dyslipidemia, depression, anxiety and VIV using CPAP who was admitted to OSH ICU, s/p RHC on 10/18/24.  Per patient, he had been experienced worsening SOB and fluids overload for 2-3 weeks. Patient was on milrinone drip and underwent SGC diuresis. However his KENYA worsened, and were not able to wean milrinone drip. Decision was made to transfer him to HF ICU [facility] for possible advanced therapy consideration. Advanced therapies evaluation was initiated on 10/30. Discussed in advanced therapeutics committee on 11/5 and was denied for transplantation, and approved for LVAD (significant PAD, Elevated PSA level, Age approaching 70, as well as patient verbalized he would be more in favor of LVAD vs Transplant). He was transferred to the floor on 11/6 to await VAD implantation. Readmitted to HFICU as of 11/09 for pre-OR swan guided optimization. Now presents to CTICU s/p LVAD HM3 implant via thoracotomy with descending outflow graft on 11/12/24 with Dr. Mclain. OR Course/Issues: pulseless VT requiring defib x 1 pre- CPB. Postop course c/b delirium, RV dysfunction, VT & malnutrition.       Principal Problem:    Acute on chronic heart failure with reduced ejection fraction and diastolic dysfunction  Active Problems:    Ischemic cardiomyopathy    Receiving inotropic medication    HTN  (hypertension)    CAD (coronary artery disease)    MI (myocardial infarction) (Multi)    CHF (congestive heart failure)    Gastroesophageal reflux disease    Acute kidney injury (nontraumatic) (CMS-HCC)    Delirium       LOS: 39 days     NYHA class pre-VAD implant: IV  NYHA class today (11/25): II        Objective   Vitals:   Vitals:    12/02/24 0000 12/02/24 0403 12/02/24 0733 12/02/24 1135   BP:  97/65 99/70 98/63   BP Location:  Left arm Left arm Left arm   Patient Position:  Lying Lying Lying   Pulse: 66      Resp: 12      Temp:  36.8 °C (98.2 °F) 36.2 °C (97.2 °F) 36.9 °C (98.4 °F)   TempSrc:  Temporal Temporal Temporal   SpO2:  94% 93% 93%   Weight:  88.5 kg (195 lb 1.7 oz)     Height:         Wt Readings from Last 5 Encounters:   12/02/24 88.5 kg (195 lb 1.7 oz)   10/24/24 92.5 kg (204 lb)   08/05/24 122 kg (268 lb)   01/31/22 119 kg (262 lb)   02/18/20 123 kg (270 lb 2 oz)     Hemodynamic parameters for last 24 hours:     Intake/Output for last 24 hours:    Intake/Output Summary (Last 24 hours) at 12/2/2024 1443  Last data filed at 12/2/2024 1200  Gross per 24 hour   Intake 840 ml   Output 1275 ml   Net -435 ml           Physical exam:  Physical Exam  Constitutional:       General: He is not in acute distress.     Appearance: Normal appearance. He is normal weight. He is not ill-appearing or toxic-appearing.   HENT:      Head: Normocephalic and atraumatic.      Mouth/Throat:      Mouth: Mucous membranes are moist.      Pharynx: Oropharynx is clear. No oropharyngeal exudate or posterior oropharyngeal erythema.   Eyes:      Extraocular Movements: Extraocular movements intact.      Conjunctiva/sclera: Conjunctivae normal.      Pupils: Pupils are equal, round, and reactive to light.   Neck:      Vascular: No JVD.   Cardiovascular:      Rate and Rhythm: Normal rate and regular rhythm.      Comments: LVAD hum heard on auscultation. Sinus rhythm w/ intermittent PVCs and NSVT. No JVP, no BLE edema. Euvolemic on  exam. Driveline CDI.   Pulmonary:      Effort: Pulmonary effort is normal. No accessory muscle usage, respiratory distress or retractions.      Breath sounds: No stridor. No wheezing, rhonchi or rales.   Abdominal:      General: Abdomen is flat. Bowel sounds are normal. There is no distension.      Palpations: Abdomen is soft. There is no mass.      Tenderness: There is no abdominal tenderness. There is no guarding.      Hernia: No hernia is present.   Musculoskeletal:         General: No swelling or tenderness. Normal range of motion.      Cervical back: Full passive range of motion without pain and normal range of motion.      Right lower leg: No edema.      Left lower leg: No edema.   Skin:     General: Skin is warm and dry.      Capillary Refill: Capillary refill takes less than 2 seconds.      Coloration: Skin is not jaundiced.      Findings: No bruising, laceration, rash or wound.      Comments: Left thoracotomy well approximated w/o signs of infection. LVAD driveline site covered with weekly dressing. No drainage or tenderness on exam.    Neurological:      General: No focal deficit present.      Mental Status: He is alert and oriented to person, place, and time. Mental status is at baseline.   Psychiatric:         Mood and Affect: Mood normal.         Behavior: Behavior normal.         Thought Content: Thought content normal.         Judgment: Judgment normal.        Driveline:          Labs:   CMP:  Recent Labs     12/02/24  0709 12/01/24  0635 11/30/24  0834 11/29/24  0702 11/27/24  0652 11/27/24  0227 11/26/24  0603 11/25/24  0736 11/25/24  0250   NA  --  141 139 138 138 138 139 135* 133*   K  --  4.5 4.3 4.2 4.4 4.8 4.3 4.4 8.0*   CL  --  105 105 106 105 105 103 105 102   CO2  --  25 26 26 24 23 26 16* 26   ANIONGAP  --  16 12 10 13 15 14 18 13   BUN  --  17 17 16 20 22 19 20 22   CREATININE  --  1.46* 1.20 1.19 1.28 1.21 1.39* 1.10 1.22   EGFR  --  52* 65 66 61 65 55* 73 64   MG 2.12 2.02 2.02 2.05 2.18  2.12 2.13  --  2.47*     Recent Labs     12/01/24  0635 11/30/24  0834 11/29/24  0702 11/27/24  0652 11/26/24  0603 11/25/24  0736 11/25/24  0250 11/24/24  0556 11/19/24  1459 11/19/24  0322 11/18/24  0525 11/17/24  1323 11/17/24  0122 11/16/24  1358 11/16/24  0128 11/15/24  1255 11/15/24  0021 11/14/24  1224 11/14/24  0118 11/13/24  1228 11/13/24  0007 11/12/24  1307   ALBUMIN 3.5 3.5 3.3* 3.6 3.6 3.4 3.7 3.6   < > 3.1* 3.5   < > 3.4   < > 3.6   < > 3.7   < > 3.8 3.7   < > 3.7   ALT  --   --   --   --   --   --   --   --   --  6* 6*  --  3*  --  4*  --  5*  --  7* 11  --  15   AST  --   --   --   --   --   --   --   --   --  16 18  --  21  --  24  --  29  --  40* 42*  --  37   BILITOT  --   --   --   --   --   --   --   --   --  0.8 0.8  --  1.0  --  1.0  --  1.1  --  0.9 1.1  --  0.9    < > = values in this interval not displayed.     CBC:  Recent Labs     12/02/24  0709 12/01/24  0635 11/30/24  0834 11/29/24  0755 11/27/24  0652 11/26/24  0603 11/25/24  0250 11/24/24  0556   WBC 8.5 8.1 7.9 9.0 12.0* 11.8* 13.5* 15.4*   HGB 11.8* 11.1* 11.0* 10.5* 12.6* 12.4* 12.1* 12.3*   HCT 39.5* 33.9* 34.6* 33.1* 38.3* 38.6* 37.2* 39.1*    347 381 401 503* 537* 313 545*   MCV 91 83 85 86 84 85 83 86     COAG:   Recent Labs     12/02/24  0709 12/01/24  0635 11/30/24  0834 11/29/24  0702 11/28/24  0542 11/27/24  2037 11/27/24  1605 11/26/24  0957 11/26/24  0603 11/25/24  2348 11/25/24  0736 11/25/24  0250 11/13/24  0008 11/12/24  1257   INR 2.2* 2.0* 2.1* 2.1* 2.1*  --  1.8*  --  1.8*  --   --  1.8*   < > 1.4*   HAUF  --   --   --   --  0.6 0.5  --  0.5 0.4 0.2   < > 0.1   < >  --    FIBRINOGEN  --   --   --   --   --   --   --   --   --   --   --   --   --  301    < > = values in this interval not displayed.     ABO:   Recent Labs     11/20/24  0151   ABO A     HEME/ENDO:   Recent Labs     10/31/24  1249 10/24/24  2328   FERRITIN  --  76   IRONSAT  --  17*   TSH 4.39* 8.47*   HGBA1C  --  6.3*     CARDIAC:   Recent Labs  "    12/02/24  0709 12/01/24  0635 11/30/24  0834 11/29/24  0755 11/29/24  0702 11/27/24  0652 11/26/24  0603 11/25/24  0736 11/25/24  0250 11/12/24  1307 10/31/24  1249 10/24/24  2328    223 176  --  151 214 264* 426* 725*   < > 187  --    BNP  --   --   --  478*  --   --   --   --   --   --  755* 1,995*    < > = values in this interval not displayed.     Recent Labs     11/27/24  0652   CHOL 126  126   HDL 24.6  24.6   TRIG 155*     TOX:  Recent Labs     10/31/24  1249   AMPHETAMINE Negative     MICRO: No results for input(s): \"ESR\", \"CRP\", \"PROCAL\" in the last 32807 hours.  No results found for the last 90 days.        Imaging:   No results found.     Notable Studies:   EKG:      Echo:  Transthoracic Echo (TTE) Limited    Result Date: 11/26/2024   [facility], [address] [place] [address]                Tel [phone] and Fax [phone] TRANSTHORACIC ECHOCARDIOGRAM REPORT  Patient Name:       RJ Webb Physician:    [id number] Parker Terrell MD Study Date:         11/26/2024          Ordering Provider:    [id number] KARRI CHAVEZ MRN/PID:            [id number]            Fellow: Accession#:         [id number]        Nurse: Date of Birth/Age:  1955 / 69     Sonographer:          Manuela estrada                                     [facility], [facility] Gender assigned at  M                   Additional Staff:     Melida Kaye Birth:                                                        [facility] Height:             182.88 cm           Admit Date: Weight:             87.54 kg            Admission Status:     Inpatient -                                                               Routine BSA / BMI:          2.10 m2 / 26.18     Encounter#:           [id number]                     kg/m2 Blood Pressure:     /                 "   Department Location:  [facility]                                                               Non Invasive Study Type:    TRANSTHORACIC ECHO (TTE) LIMITED Diagnosis/ICD: Presence of heart assist device-Z95.811 Indication:    RAMP study CPT Code:      Echo Limited-01046; Doppler Limited-57766; Color Doppler-65417 Patient History: Pertinent History: CAD. CABG x4 2012, PCI 2019, ICM, CHF, s/p HeartMate III                    LVAD, AF s/p RFA (PVI and CTI 4/2024). Study Detail: The following Echo studies were performed: 2D, M-Mode, Doppler and               color flow. Technically challenging study due to poor acoustic               windows.  PHYSICIAN INTERPRETATION: Left Ventricle: Left ventricular ejection fraction is severely decreased, by visual estimate at 15-20%. There is global hypokinesis of the left ventricle with minor regional variations. The left ventricular cavity size is severely dilated. Spectral Doppler shows a Grade II (pseudonormal pattern) of left ventricular diastolic filling with an elevated left atrial pressure. Left Atrium: The left atrium is enlarged. Right Ventricle: The right ventricle is severely enlarged. There is reduced right ventricular systolic function. Right Atrium: The right atrium was not well visualized. Aortic Valve: The aortic valve is probably trileaflet. There is mild aortic valve cusp calcification. There is mild aortic valve regurgitation. Mitral Valve: The mitral valve is mildly thickened. There is trace mitral valve regurgitation. Tricuspid Valve: The tricuspid valve is structurally normal. There is trace tricuspid regurgitation. The right ventricular systolic pressure is unable to be estimated. Pulmonic Valve: The pulmonic valve is structurally normal. Pulmonic valve regurgitation was not assessed. Pericardium: Trivial pericardial effusion. Aorta: The aortic root is abnormal. There is mild dilatation of the ascending aorta. There is mild dilatation of the aortic  root. Pulmonary Artery: The pulmonary artery is not well visualized. Systemic Veins: The inferior vena cava was not well visualized. In comparison to the previous echocardiogram(s): Compared with study dated 11/18/2024, no significant change.  LEFT VENTRICULAR ASSIST DEVICE: LVAD: The patient has a(n) HeartMate 3 LVAD device present. Inflow Cannula: Visualization of the inflow cannula is technically difficult. Outflow Cannula: Visualization of the outflow cannula is technically difficult. LVAD Comments: Ramp study. 5200 rpm at start and finish of limited echo.  CONCLUSIONS:  1. Left ventricular ejection fraction is severely decreased, by visual estimate at 15-20%.  2. There is global hypokinesis of the left ventricle with minor regional variations.  3. Spectral Doppler shows a Grade II (pseudonormal pattern) of left ventricular diastolic filling with an elevated left atrial pressure.  4. Left ventricular cavity size is severely dilated.  5. There is reduced right ventricular systolic function.  6. Severely enlarged right ventricle.  7. The left atrium is enlarged.  8. Mild aortic valve regurgitation.  9. The pulmonary artery is not well visualized. QUANTITATIVE DATA SUMMARY:  M-MODE MEASUREMENTS:         Normal Ranges: Ao Root:             4.10 cm (2.0-3.7cm)  AORTA MEASUREMENTS:         Normal Ranges: Asc Ao, d:          4.20 cm (2.1-3.4cm)  LV SYSTOLIC FUNCTION BY 2D PLANIMETRY (MOD):                      Normal Ranges: EF-Visual:      18 % LV EF Reported: 18 %  LV DIASTOLIC FUNCTION:          Normal Ranges: MV Peak E:             0.50 m/s (0.7-1.2 m/s) MV Peak A:             0.44 m/s (0.42-0.7 m/s) E/A Ratio:             1.14     (1.0-2.2)  [id number] Parker Terrell MD Electronically signed on 11/26/2024 at 3:08:03 PM  ** Final **       Meds:  Scheduled medications  [START ON 12/3/2024] amiodarone, 400 mg, oral, Daily  aspirin, 81 mg, oral, Daily  dapagliflozin propanediol, 10 mg, oral, Daily  [START ON 12/3/2024]  digoxin, 250 mcg, oral, Once per day on Sunday Tuesday Thursday Saturday  escitalopram, 10 mg, oral, Daily  icosapent ethyL, 2 g, oral, BID  lidocaine, 2 patch, transdermal, Daily  magnesium oxide, 800 mg, oral, BID  methocarbamol, 500 mg, oral, q6h  pantoprazole, 40 mg, oral, Daily before breakfast  pravastatin, 20 mg, oral, Nightly  sacubitriL-valsartan, 1 tablet, oral, BID  sennosides-docusate sodium, 1 tablet, oral, BID  sildenafil, 20 mg, oral, TID  spironolactone, 12.5 mg, oral, Daily  warfarin, 7.5 mg, oral, Daily      Continuous medications       PRN medications  PRN medications: acetaminophen, [DISCONTINUED] ondansetron **OR** ondansetron, polyethylene glycol     Assessment/Plan   Assessment & Plan     NEUROLOGICAL  Active issues:   #Acute post-operative pain  - Endorses pain at back near posterior portion of surgical incision. Pain musculoskeletal in nature & improved with robaxin   - PRN po Tylenol 975 mg q6 hrs   - Continue lidoderm patches   - C/w robaxin 500mg Q6 x 5 days (end date 12/04)    #Hx of anxiety and depression  - Continue home escitalopram 10mg daily    #Transient LUE weakness on 11/24  - CTH (11/24) without acute abnormalities  - Resolved with tylenol  - No focal deficits any further       CARDIAC  #PMHx CAD s/p CABG x 4 (2012) and PCI (LCx/OM; 5/2019), stage D ICM HFrEF LVEF 10-15%  #S/p HM3 LVAD via left thoracotomy 11/12 with Jas Briseno.     GDMT and RV support:   - BB: n/a - RV dysfunction   - ARNI/ACEi/ARB: Entresto 24-26mg BID   - MRA: continue spironolactone 12.5mg daily    - SGLT2i: Farxiga 10 mg daily     RV support:   - Decrease digoxin to 250mcg 4x week -> 0.99 on 12/2   - Continue sildenafil 20mg TID     Anticoagulation:   - Continue Coumadin 7.5mg daily for goal INR 2-3.   - Continue ASA (primary indication - ICM, PCI)     Heart Mate III interrogation   Most Recent   Flow 4.6   Speed 5200   Power 3.8   PI 3.5   MAP (mmHg): 75    LVAD interrogation: stable flow, no sig  PI events or power spikes.     RAMP study: Started study with RPM 5200. AV opening every beat, no MR, mild AI, septum midline. After review with Dr. Drummond, he appears well optimized at this speed. No speed changes made. dMAP 68.     Daily weight:   Vitals:    12/02/24 0403   Weight: 88.5 kg (195 lb 1.7 oz)      #Chronic AF s/p RFA (PVI and CTI; 4/2024),   #Sustained VT and NSVT  - Patient never required ICD as his EF remained > 30% prior to acute reduction and LVAD placement   - 11/26 patient noted to have sustained asymptomatic VT w/ self termination.   - EP consulted and recommend medical management with amio over ICD placement given patient's stability and lack of symptoms   - Since starting amio, patient's ectopy has improved; however, now trending towards sinus bradycardia w/ HR in 60s   - Continue amiodarone 400mg BID x 7 days (11/27 - 12/2) followed by 400 mg daily for 1 week, and then 200 mg daily thereafter   - Maintain K > 4.5 & Mag > 2.0     #HLD  - Repeat lipid panel: HDL 24.6, Non-HDL cholesterol 101, . Cholesterol/HDL ratio 5.1  - Continue vascepa 2g BID  - Start pravastatin 20mg daily     RESPIRATORY   #VIV on CPAP at home  - Resume home nocturnal CPAP  - Maintaining SpO2 >92%.   - Encourage OOB and ambulation daily       GI:  #GERD  #Malnutrition d/t poor PO intake  - Continue PPI for GERD, LVAD bleeding risk   - Continue adult regular diet with supplements  - senna/colace BID and miralax to as needed      RENAL:  #CKD (baseline Cr 1.3-1.6)  - Strict I&Os  - Appears euvolemic on exam   - Avoid hypotension and nephrotoxic agents   Results from last 72 hours   Lab Units 12/01/24  0635 11/30/24  0834   BUN mg/dL 17 17   CREATININE mg/dL 1.46* 1.20         ENDO:  #PMH of pre-DM with recent A1c: 6.3  - Euglycemic   - Goal BG <180  - NO indication for SSI at this time     HEMATOLOGY:  #Acute blood loss anemia   #Iron deficiency anemia   Results from last 7 days   Lab Units 12/02/24  0709  24  0635   HEMOGLOBIN g/dL 11.8* 11.1*   HEMATOCRIT % 39.5* 33.9*   PLATELETS AUTO x10*3/uL 336 347   - Iron studies on  suggest iron deficiency   - Hg remains stable without active signs of bleeding  - Continue iron sucrose 200mg IV daily x 5 days ( - )         INFECTIOUS DISEASE  Active issues:   # No acute issues    Results from last 7 days   Lab Units 24  0709 24  0635   WBC AUTO x10*3/uL 8.5 8.1      Temp (24hrs), Av.7 °C (98.1 °F), Min:36 °C (96.8 °F), Max:37.3 °C (99.1 °F)      Skin/Musculoskeletal:  Active issues:   # no acute issues        PROPHYLAXIS:  - DVT: SCD's, Coumadin  - GI:  Protonix     CODE STATUS: Full code     DISPO PLANNING/ SOCIAL:  - Disposition expectation: Insurance approval for transfer to [facility] (patient and family preference) obtained on 24. LVAD team to educated facility staff on 24 w/ discharge likely 12/3/24/     Patient examined and discussed with attending physician Dr. Cesilia Davis, MSN, [facility]  Advanced Heart Failure LVAD Nurse Practitioner   For floor patients please page HHVI team after 5pm- [phone]  LVAD  emergency pager [phone]

## 2024-12-02 NOTE — CARE PLAN
The patient's goals for the shift include      The clinical goals for the shift include HDS    Over the shift, the patient did  make progress toward the following goals. Barriers to progression include none. Recommendations to address these barriers include n/a.  INR 2..possible discharge tomorrow pending all equipment at the [place]

## 2024-12-03 ENCOUNTER — PHARMACY VISIT (OUTPATIENT)
Dept: PHARMACY | Facility: CLINIC | Age: 69
End: 2024-12-03
Payer: COMMERCIAL

## 2024-12-03 VITALS
OXYGEN SATURATION: 95 % | DIASTOLIC BLOOD PRESSURE: 76 MMHG | TEMPERATURE: 97.5 F | WEIGHT: 192.9 LBS | SYSTOLIC BLOOD PRESSURE: 113 MMHG | BODY MASS INDEX: 26.13 KG/M2 | HEART RATE: 66 BPM | RESPIRATION RATE: 15 BRPM | HEIGHT: 72 IN

## 2024-12-03 PROBLEM — N17.9 ACUTE KIDNEY INJURY (NONTRAUMATIC) (CMS-HCC): Status: RESOLVED | Noted: 2024-11-11 | Resolved: 2024-12-03

## 2024-12-03 PROBLEM — Z79.899 RECEIVING INOTROPIC MEDICATION: Status: RESOLVED | Noted: 2024-11-06 | Resolved: 2024-12-03

## 2024-12-03 PROBLEM — I21.9 MI (MYOCARDIAL INFARCTION) (MULTI): Status: RESOLVED | Noted: 2024-11-11 | Resolved: 2024-12-03

## 2024-12-03 LAB
APTT PPP: 44 SECONDS (ref 27–38)
ATRIAL RATE: 81 BPM
ERYTHROCYTE [DISTWIDTH] IN BLOOD BY AUTOMATED COUNT: 17.1 % (ref 11.5–14.5)
HCT VFR BLD AUTO: 37.9 % (ref 41–52)
HGB BLD-MCNC: 12 G/DL (ref 13.5–17.5)
INR PPP: 2.3 (ref 0.9–1.1)
LDH SERPL L TO P-CCNC: 219 U/L (ref 84–246)
MAGNESIUM SERPL-MCNC: 2.26 MG/DL (ref 1.6–2.4)
MCH RBC QN AUTO: 27 PG (ref 26–34)
MCHC RBC AUTO-ENTMCNC: 31.7 G/DL (ref 32–36)
MCV RBC AUTO: 85 FL (ref 80–100)
NRBC BLD-RTO: 0 /100 WBCS (ref 0–0)
P OFFSET: 224 MS
P ONSET: 209 MS
PLATELET # BLD AUTO: 370 X10*3/UL (ref 150–450)
PROTHROMBIN TIME: 25.8 SECONDS (ref 9.8–12.8)
Q ONSET: 226 MS
QRS COUNT: 18 BEATS
QRS DURATION: 146 MS
QT INTERVAL: 406 MS
QTC CALCULATION(BAZETT): 552 MS
QTC FREDERICIA: 498 MS
R AXIS: 24 DEGREES
RBC # BLD AUTO: 4.44 X10*6/UL (ref 4.5–5.9)
T AXIS: 45 DEGREES
T OFFSET: 429 MS
VENTRICULAR RATE: 111 BPM
WBC # BLD AUTO: 8.7 X10*3/UL (ref 4.4–11.3)

## 2024-12-03 PROCEDURE — 83735 ASSAY OF MAGNESIUM: CPT | Performed by: NURSE PRACTITIONER

## 2024-12-03 PROCEDURE — 85027 COMPLETE CBC AUTOMATED: CPT | Performed by: NURSE PRACTITIONER

## 2024-12-03 PROCEDURE — 93750 INTERROGATION VAD IN PERSON: CPT | Performed by: REGISTERED NURSE

## 2024-12-03 PROCEDURE — 2500000001 HC RX 250 WO HCPCS SELF ADMINISTERED DRUGS (ALT 637 FOR MEDICARE OP): Performed by: REGISTERED NURSE

## 2024-12-03 PROCEDURE — 2500000005 HC RX 250 GENERAL PHARMACY W/O HCPCS: Performed by: NURSE PRACTITIONER

## 2024-12-03 PROCEDURE — 2500000001 HC RX 250 WO HCPCS SELF ADMINISTERED DRUGS (ALT 637 FOR MEDICARE OP): Performed by: NURSE PRACTITIONER

## 2024-12-03 PROCEDURE — RXMED WILLOW AMBULATORY MEDICATION CHARGE

## 2024-12-03 PROCEDURE — 2500000001 HC RX 250 WO HCPCS SELF ADMINISTERED DRUGS (ALT 637 FOR MEDICARE OP): Performed by: STUDENT IN AN ORGANIZED HEALTH CARE EDUCATION/TRAINING PROGRAM

## 2024-12-03 PROCEDURE — 2500000002 HC RX 250 W HCPCS SELF ADMINISTERED DRUGS (ALT 637 FOR MEDICARE OP, ALT 636 FOR OP/ED): Performed by: NURSE PRACTITIONER

## 2024-12-03 PROCEDURE — 2500000001 HC RX 250 WO HCPCS SELF ADMINISTERED DRUGS (ALT 637 FOR MEDICARE OP)

## 2024-12-03 PROCEDURE — 99239 HOSP IP/OBS DSCHRG MGMT >30: CPT | Performed by: REGISTERED NURSE

## 2024-12-03 PROCEDURE — 2500000002 HC RX 250 W HCPCS SELF ADMINISTERED DRUGS (ALT 637 FOR MEDICARE OP, ALT 636 FOR OP/ED): Performed by: REGISTERED NURSE

## 2024-12-03 PROCEDURE — 85610 PROTHROMBIN TIME: CPT | Performed by: NURSE PRACTITIONER

## 2024-12-03 PROCEDURE — 2500000004 HC RX 250 GENERAL PHARMACY W/ HCPCS (ALT 636 FOR OP/ED): Performed by: NURSE PRACTITIONER

## 2024-12-03 PROCEDURE — 83615 LACTATE (LD) (LDH) ENZYME: CPT | Performed by: NURSE PRACTITIONER

## 2024-12-03 PROCEDURE — 36416 COLLJ CAPILLARY BLOOD SPEC: CPT | Performed by: NURSE PRACTITIONER

## 2024-12-03 RX ORDER — POLYETHYLENE GLYCOL 3350 17 G/17G
17 POWDER, FOR SOLUTION ORAL DAILY PRN
Qty: 7 PACKET | Refills: 3 | Status: SHIPPED | OUTPATIENT
Start: 2024-12-03 | End: 2024-12-31

## 2024-12-03 RX ORDER — SPIRONOLACTONE 25 MG/1
12.5 TABLET ORAL DAILY
Qty: 15 TABLET | Refills: 11 | Status: SHIPPED | OUTPATIENT
Start: 2024-12-04 | End: 2025-12-04

## 2024-12-03 RX ORDER — SILDENAFIL CITRATE 20 MG/1
20 TABLET ORAL 3 TIMES DAILY
Qty: 90 TABLET | Refills: 11 | Status: SHIPPED | OUTPATIENT
Start: 2024-12-03 | End: 2025-12-03

## 2024-12-03 RX ORDER — PRAVASTATIN SODIUM 20 MG/1
20 TABLET ORAL NIGHTLY
Qty: 30 TABLET | Refills: 11 | Status: SHIPPED | OUTPATIENT
Start: 2024-12-03 | End: 2025-12-03

## 2024-12-03 RX ORDER — AMIODARONE HYDROCHLORIDE 200 MG/1
200 TABLET ORAL DAILY
Qty: 30 TABLET | Refills: 11 | Status: SHIPPED | OUTPATIENT
Start: 2024-12-03 | End: 2025-12-03

## 2024-12-03 RX ORDER — NAPROXEN SODIUM 220 MG/1
81 TABLET, FILM COATED ORAL DAILY
Qty: 30 TABLET | Refills: 11 | Status: SHIPPED | OUTPATIENT
Start: 2024-12-04 | End: 2025-12-04

## 2024-12-03 RX ORDER — BUMETANIDE 1 MG/1
0.5 TABLET ORAL DAILY PRN
Qty: 30 TABLET | Refills: 3 | Status: SHIPPED | OUTPATIENT
Start: 2024-12-03

## 2024-12-03 RX ORDER — DIGOXIN 250 MCG
250 TABLET ORAL
Qty: 16 TABLET | Refills: 11 | Status: SHIPPED | OUTPATIENT
Start: 2024-12-05 | End: 2025-12-05

## 2024-12-03 RX ORDER — LANOLIN ALCOHOL/MO/W.PET/CERES
800 CREAM (GRAM) TOPICAL 2 TIMES DAILY
Qty: 120 TABLET | Refills: 11 | Status: SHIPPED | OUTPATIENT
Start: 2024-12-03 | End: 2025-12-03

## 2024-12-03 RX ORDER — ICOSAPENT ETHYL 1 G/1
2 CAPSULE ORAL
Qty: 120 CAPSULE | Refills: 11 | Status: SHIPPED | OUTPATIENT
Start: 2024-12-03 | End: 2025-12-03

## 2024-12-03 RX ORDER — LIDOCAINE 560 MG/1
2 PATCH PERCUTANEOUS; TOPICAL; TRANSDERMAL DAILY
Qty: 30 PATCH | Refills: 3 | Status: SHIPPED | OUTPATIENT
Start: 2024-12-04

## 2024-12-03 RX ORDER — AMIODARONE HYDROCHLORIDE 400 MG/1
400 TABLET ORAL DAILY
Qty: 7 TABLET | Refills: 0 | Status: SHIPPED | OUTPATIENT
Start: 2024-12-04 | End: 2024-12-11

## 2024-12-03 RX ORDER — DAPAGLIFLOZIN 10 MG/1
10 TABLET, FILM COATED ORAL DAILY
Qty: 30 TABLET | Refills: 11 | Status: SHIPPED | OUTPATIENT
Start: 2024-12-04 | End: 2025-12-04

## 2024-12-03 RX ORDER — WARFARIN SODIUM 5 MG/1
TABLET ORAL
Qty: 30 TABLET | Refills: 3 | Status: SHIPPED | OUTPATIENT
Start: 2024-12-03 | End: 2025-12-03

## 2024-12-03 RX ORDER — METHOCARBAMOL 500 MG/1
500 TABLET, FILM COATED ORAL EVERY 6 HOURS PRN
Qty: 15 TABLET | Refills: 0 | Status: SHIPPED | OUTPATIENT
Start: 2024-12-03 | End: 2024-12-08

## 2024-12-03 RX ORDER — PANTOPRAZOLE SODIUM 40 MG/1
40 TABLET, DELAYED RELEASE ORAL
Qty: 30 TABLET | Refills: 11 | Status: SHIPPED | OUTPATIENT
Start: 2024-12-03

## 2024-12-03 RX ORDER — ESCITALOPRAM OXALATE 10 MG/1
10 TABLET ORAL DAILY
Qty: 30 TABLET | Refills: 11 | Status: SHIPPED | OUTPATIENT
Start: 2024-12-03

## 2024-12-03 ASSESSMENT — COGNITIVE AND FUNCTIONAL STATUS - GENERAL
DAILY ACTIVITIY SCORE: 24
MOBILITY SCORE: 23
CLIMB 3 TO 5 STEPS WITH RAILING: A LITTLE

## 2024-12-03 ASSESSMENT — PAIN - FUNCTIONAL ASSESSMENT: PAIN_FUNCTIONAL_ASSESSMENT: 0-10

## 2024-12-03 ASSESSMENT — PAIN SCALES - GENERAL: PAINLEVEL_OUTOF10: 0 - NO PAIN

## 2024-12-03 NOTE — PROGRESS NOTES
Patient to discharge today at noon going to [facility] [place] being transported by [facility] ambulance patient nurse facility medical team all notified

## 2024-12-03 NOTE — NURSING NOTE
VAD Note   Name:  Fahad Meraz  Admission Date:  10/24/2024 10:52 PM  MRN: [id number]  Attending Provider: Romelia Lomas MD  Room/Bed:  5029/5029-A             Sex: male  : 1955       Age: 69 y.o.    VAD Readmission  Readmission Checklist  Readmission Checklist: Yes  Code Status Reviewed: Yes  Code Status Ordered: Yes  RPM Speed Set Point: 5200  Low Speed Limit: 4800  Event Monitor Checked: No  Driveline Secure: Yes  Driveline Site Assessed and Photographed: Yes  Dressing Change Karen: Frequent  ICD: Nurse to Check Device Upon Admission: Off    HeartMate Serial Numbers  HeartMate Equipment Tracking/Serial Numbers  Battery Charger: [id number]  Battery Clip 1: [id number]  Battery Clip 2: [id number]  Battery Clip 3: [id number]  Battery Clip 4: [id number]  Rechargeable Battery 1: [id number]  Rechargeable Battery 2: [id number]  Rechargeable Battery 3: [id number]  Rechargeable Battery 4: [id number]  Rechargeable Battery 5: [id number]  Rechargeable Battery 6: [id number]  Rechargeable Battery 7: [id number]  Rechargeable Battery 8: [id number]  Go Gear Consolidated Ba  Go Gear Vest: [id number]  Programmed Backup : [id number]  Primary Controller: [id number]  Mobile Power Unit: [id number]  Black Emergency Red Tag Ba  Apil Cuff: [id number]  Outflow graft: [id number]  Modular Cable: [id number]     HeartMate Tracking  HeartMate Equipment Transfer  Transfer From: [place] [id number]  Transfer To: Discharge facility  Battery Charger: Yes  Battery Clip 1: Yes  Battery Clip 2: Yes  Battery Clip 3: Yes  Battery Clip 4: Yes  Rechargeable Battery 1: Yes  Rechargeable Battery 2: Yes  Rechargeable Battery 3: Yes  Rechargeable Battery 4: Yes  Rechargeable Battery 5: Yes  Rechargeable Battery 6: Yes  Rechargeable Battery 7: Yes  Rechargeable Battery 8: Yes  Backup Battery 2: Yes  Go Gear Consolidated Bag: Yes  Go Gear Accessory Kit: Yes  Go Gear Vest: Yes  Programmed Backup : Yes  Primary Controller: Yes  Mobile  Power Unit: Yes  Black Emergency Red Tag Bag: Yes  Shower Bag: Yes  Apil Cuff: Yes  Outflow graft: Yes  Modular cable: Yes     HeartWare Serial Numbers        HeartWare Tracking        VAD Discharge  Discharge Documentation  HF Post Discharge Appointment Made: Yes  INR Referral and Management Arranged: Yes  INR Range: 2 to 3  AICD On: No  Equipment Checklist/Log Numbers Recorded: Yes  LDH Range Met for Dishcarge: yes    Educations  Education Documentation  Tricuspid Stenosis, taught by Alem Montenegro RN at 12/3/2024  7:44 AM.  Learner: Family, Patient  Readiness: Acceptance  Method: Explanation  Response: Needs Reinforcement    Left Ventricular Assist Device, taught by Alem Montenegro RN at 12/3/2024  7:44 AM.  Learner: Family, Patient  Readiness: Acceptance  Method: Explanation  Response: Needs Reinforcement    Ventricular Assist Device, taught by Alem Montenegro RN at 12/3/2024  7:44 AM.  Learner: Family, Patient  Readiness: Acceptance  Method: Explanation  Response: Needs Reinforcement    Your Heart: Overview, taught by Alem Montenegro RN at 12/3/2024  7:44 AM.  Learner: Family, Patient  Readiness: Acceptance  Method: Explanation  Response: Needs Reinforcement    VAD : Your Surgery, taught by Alem Montenegro RN at 12/3/2024  7:44 AM.  Learner: Family, Patient  Readiness: Acceptance  Method: Explanation  Response: Needs Reinforcement    VAD : Why You Need A VAD, taught by Alem Montenegro RN at 12/3/2024  7:44 AM.  Learner: Family, Patient  Readiness: Acceptance  Method: Explanation  Response: Needs Reinforcement    VAD : Risks And Benefits, taught by Alem Montenegro RN at 12/3/2024  7:44 AM.  Learner: Family, Patient  Readiness: Acceptance  Method: Explanation  Response: Needs Reinforcement    VAD : How A VAD Works, taught by Alem Montenegro RN at 12/3/2024  7:44 AM.  Learner: Family, Patient  Readiness: Acceptance  Method: Explanation  Response: Needs Reinforcement    VAD : Before Your Surgery, taught by Alem Montenegro  RN at 12/3/2024  7:44 AM.  Learner: Family, Patient  Readiness: Acceptance  Method: Explanation  Response: Needs Reinforcement    VAD : Alternatives, taught by Alem Montenegro RN at 12/3/2024  7:44 AM.  Learner: Family, Patient  Readiness: Acceptance  Method: Explanation  Response: Needs Reinforcement    VAD : After Your Surgery, taught by Alem Montenegro RN at 12/3/2024  7:44 AM.  Learner: Family, Patient  Readiness: Acceptance  Method: Explanation  Response: Needs Reinforcement    Goal setting, taught by Alem Montenegro RN at 12/3/2024  7:44 AM.  Learner: Family, Patient  Readiness: Acceptance  Method: Explanation  Response: Needs Reinforcement    Exercise, taught by Alem Montenegro RN at 12/3/2024  7:44 AM.  Learner: Family, Patient  Readiness: Acceptance  Method: Explanation  Response: Needs Reinforcement    Diet, taught by Alem Montenegro RN at 12/3/2024  7:44 AM.  Learner: Family, Patient  Readiness: Acceptance  Method: Explanation  Response: Needs Reinforcement    Sexual activity, taught by Alem Montenegro RN at 12/3/2024  7:44 AM.  Learner: Family, Patient  Readiness: Acceptance  Method: Explanation  Response: Needs Reinforcement    Exercise/rehab, taught by Alem Montenegro RN at 12/3/2024  7:44 AM.  Learner: Family, Patient  Readiness: Acceptance  Method: Explanation  Response: Needs Reinforcement    Energy management, taught by Alem Montenegro RN at 12/3/2024  7:44 AM.  Learner: Family, Patient  Readiness: Acceptance  Method: Explanation  Response: Needs Reinforcement    No heavy lifting, taught by Alem Montenegro RN at 12/3/2024  7:44 AM.  Learner: Family, Patient  Readiness: Acceptance  Method: Explanation  Response: Needs Reinforcement    Abdominal binder, taught by Alem Montenegro RN at 12/3/2024  7:44 AM.  Learner: Family, Patient  Readiness: Acceptance  Method: Explanation  Response: Needs Reinforcement    Daily weights, taught by Alem Montenegro RN at 12/3/2024  7:44 AM.  Learner: Family, Patient  Readiness:  Acceptance  Method: Explanation  Response: Needs Reinforcement    Sterile gloves, taught by Alem Montenegro RN at 12/3/2024  7:44 AM.  Learner: Family, Patient  Readiness: Acceptance  Method: Explanation  Response: Needs Reinforcement    VAD bag, taught by Alem Montenegro RN at 12/3/2024  7:44 AM.  Learner: Family, Patient  Readiness: Acceptance  Method: Explanation  Response: Needs Reinforcement    Emergency contacts, taught by Alem Montenegro RN at 12/3/2024  7:44 AM.  Learner: Family, Patient  Readiness: Acceptance  Method: Explanation  Response: Needs Reinforcement    Shower kit, taught by Alem Montenegro RN at 12/3/2024  7:44 AM.  Learner: Family, Patient  Readiness: Acceptance  Method: Explanation  Response: Needs Reinforcement    Patient pack set-up (HeartWare), taught by Alem Montenegro RN at 12/3/2024  7:44 AM.  Learner: Family, Patient  Readiness: Acceptance  Method: Explanation  Response: Needs Reinforcement    Go Gear wearable components, taught by Alem Montenegro RN at 12/3/2024  7:44 AM.  Learner: Family, Patient  Readiness: Acceptance  Method: Explanation  Response: Needs Reinforcement    Battery checks, taught by Alem Montenegro RN at 12/3/2024  7:44 AM.  Learner: Family, Patient  Readiness: Acceptance  Method: Explanation  Response: Needs Reinforcement     connections, taught by Alem Montenegro RN at 12/3/2024  7:44 AM.  Learner: Family, Patient  Readiness: Acceptance  Method: Explanation  Response: Needs Reinforcement    Red alarms, taught by Alem Montenegro RN at 12/3/2024  7:44 AM.  Learner: Family, Patient  Readiness: Acceptance  Method: Explanation  Response: Needs Reinforcement    Yellow alarms, taught by Alem Montengero RN at 12/3/2024  7:44 AM.  Learner: Family, Patient  Readiness: Acceptance  Method: Explanation  Response: Needs Reinforcement    Refer to VAD booklet for additional info, taught by Alem Montenegro RN at 12/3/2024  7:44 AM.  Learner: Family, Patient  Readiness:  Acceptance  Method: Explanation  Response: Needs Reinforcement    Monitor supplies and reorder in advance, taught by Alem Montenegro RN at 12/3/2024  7:44 AM.  Learner: Family, Patient  Readiness: Acceptance  Method: Explanation  Response: Needs Reinforcement    Check equipment for frayed tubing, taught by Alem Montenegro RN at 12/3/2024  7:44 AM.  Learner: Family, Patient  Readiness: Acceptance  Method: Explanation  Response: Needs Reinforcement    Visualization of site daily with mirror or with caregiver, note any changes, taught by Alem Montenegro RN at 12/3/2024  7:44 AM.  Learner: Family, Patient  Readiness: Acceptance  Method: Explanation  Response: Needs Reinforcement    Knowledge of solution for cleansing, taught by Alem Montenegro RN at 12/3/2024  7:44 AM.  Learner: Family, Patient  Readiness: Acceptance  Method: Explanation  Response: Needs Reinforcement    Cleanse area without site contamination, taught by Alem Montenegro RN at 12/3/2024  7:44 AM.  Learner: Family, Patient  Readiness: Acceptance  Method: Explanation  Response: Needs Reinforcement    Stat lock intact, taught by Alem Montenegro RN at 12/3/2024  7:44 AM.  Learner: Family, Patient  Readiness: Acceptance  Method: Explanation  Response: Needs Reinforcement    Symptoms to report: incision/wound site, taught by Alem Montenegro RN at 12/3/2024  7:44 AM.  Learner: Family, Patient  Readiness: Acceptance  Method: Explanation  Response: Needs Reinforcement    Donning of sterile gloves, taught by Alem Montenegro RN at 12/3/2024  7:44 AM.  Learner: Family, Patient  Readiness: Acceptance  Method: Explanation  Response: Needs Reinforcement    Understanding sterile technique, taught by Alem Montenegro RN at 12/3/2024  7:44 AM.  Learner: Family, Patient  Readiness: Acceptance  Method: Explanation  Response: Needs Reinforcement    Shower kit, taught by Alem Montenegro RN at 12/3/2024  7:44 AM.  Learner: Family, Patient  Readiness: Acceptance  Method:  Explanation  Response: Needs Reinforcement    Go Gear wearable components, taught by Alem Montenegro RN at 12/3/2024  7:44 AM.  Learner: Family, Patient  Readiness: Acceptance  Method: Explanation  Response: Needs Reinforcement    DC Adapter, taught by Alem Montenegro RN at 12/3/2024  7:44 AM.  Learner: Family, Patient  Readiness: Acceptance  Method: Explanation  Response: Needs Reinforcement    Universal battery charger, taught by Alem Montenegro RN at 12/3/2024  7:44 AM.  Learner: Family, Patient  Readiness: Acceptance  Method: Explanation  Response: Needs Reinforcement    Power module and display module, taught by Alem Montenegro RN at 12/3/2024  7:44 AM.  Learner: Family, Patient  Readiness: Acceptance  Method: Explanation  Response: Needs Reinforcement    Batteries and Battery Clips, taught by Alem Montenegro RN at 12/3/2024  7:44 AM.  Learner: Family, Patient  Readiness: Acceptance  Method: Explanation  Response: Needs Reinforcement    Controller, taught by Alem Montenegro RN at 12/3/2024  7:44 AM.  Learner: Family, Patient  Readiness: Acceptance  Method: Explanation  Response: Needs Reinforcement    Food diary, taught by Alem Montenegro RN at 12/3/2024  7:44 AM.  Learner: Family, Patient  Readiness: Acceptance  Method: Explanation  Response: Needs Reinforcement    High protein high calorie diet, taught by Alem Montenegro RN at 12/3/2024  7:44 AM.  Learner: Family, Patient  Readiness: Acceptance  Method: Explanation  Response: Needs Reinforcement    Fluid restriction, taught by Alem Montenegro RN at 12/3/2024  7:44 AM.  Learner: Family, Patient  Readiness: Acceptance  Method: Explanation  Response: Needs Reinforcement    Low sodium diet, taught by Alem Montenegro RN at 12/3/2024  7:44 AM.  Learner: Family, Patient  Readiness: Acceptance  Method: Explanation  Response: Needs Reinforcement    Post-operative expectations, taught by Alem Montenegro RN at 12/3/2024  7:44 AM.  Learner: Family, Patient  Readiness:  Acceptance  Method: Explanation  Response: Needs Reinforcement    Report to nurse, taught by Alem Montenegro RN at 12/3/2024  7:44 AM.  Learner: Family, Patient  Readiness: Acceptance  Method: Explanation  Response: Needs Reinforcement    DVT prophylaxis, taught by Alem Montenegro RN at 12/3/2024  7:44 AM.  Learner: Family, Patient  Readiness: Acceptance  Method: Explanation  Response: Needs Reinforcement    Exercise, taught by Alem Montenegro RN at 12/3/2024  7:44 AM.  Learner: Family, Patient  Readiness: Acceptance  Method: Explanation  Response: Needs Reinforcement    Blood management, taught by Alem Montenegro RN at 12/3/2024  7:44 AM.  Learner: Family, Patient  Readiness: Acceptance  Method: Explanation  Response: Needs Reinforcement    Diet, taught by Alem Montenegro RN at 12/3/2024  7:44 AM.  Learner: Family, Patient  Readiness: Acceptance  Method: Explanation  Response: Needs Reinforcement    Respiratory care, taught by Alem Montenegro RN at 12/3/2024  7:44 AM.  Learner: Family, Patient  Readiness: Acceptance  Method: Explanation  Response: Needs Reinforcement    Pain, taught by Alem Montenegro RN at 12/3/2024  7:44 AM.  Learner: Family, Patient  Readiness: Acceptance  Method: Explanation  Response: Needs Reinforcement    Carotid duplex, taught by Alem Montenegro RN at 12/3/2024  7:44 AM.  Learner: Family, Patient  Readiness: Acceptance  Method: Explanation  Response: Needs Reinforcement    Ankle-brachial index (SITA), taught by Alem Montenegro RN at 12/3/2024  7:44 AM.  Learner: Family, Patient  Readiness: Acceptance  Method: Explanation  Response: Needs Reinforcement    Ultrasound, taught by Alem Montenegro RN at 12/3/2024  7:44 AM.  Learner: Family, Patient  Readiness: Acceptance  Method: Explanation  Response: Needs Reinforcement    Six minute walk, taught by Alem Montenegro RN at 12/3/2024  7:44 AM.  Learner: Family, Patient  Readiness: Acceptance  Method: Explanation  Response: Needs  Reinforcement    Cardiopulmonary stress test, taught by Alem Montenegro RN at 12/3/2024  7:44 AM.  Learner: Family, Patient  Readiness: Acceptance  Method: Explanation  Response: Needs Reinforcement    PFT's (Pulmonary Function Tests), taught by Alem Montenegro RN at 12/3/2024  7:44 AM.  Learner: Family, Patient  Readiness: Acceptance  Method: Explanation  Response: Needs Reinforcement    Cardiac catheterization, taught by Alem Montenegro RN at 12/3/2024  7:44 AM.  Learner: Family, Patient  Readiness: Acceptance  Method: Explanation  Response: Needs Reinforcement    EKG, taught by Alem Montenegro RN at 12/3/2024  7:44 AM.  Learner: Family, Patient  Readiness: Acceptance  Method: Explanation  Response: Needs Reinforcement    Echocardiogram, taught by Alem Montenegro RN at 12/3/2024  7:44 AM.  Learner: Family, Patient  Readiness: Acceptance  Method: Explanation  Response: Needs Reinforcement    Driveline/Cannula care, taught by Alem Montenegro RN at 12/3/2024  7:44 AM.  Learner: Family, Patient  Readiness: Acceptance  Method: Explanation  Response: Needs Reinforcement    Activity precautions, taught by Alem Montenegro RN at 12/3/2024  7:44 AM.  Learner: Family, Patient  Readiness: Acceptance  Method: Explanation  Response: Needs Reinforcement    VAD Travel Recommendations, taught by Alem Montenegro RN at 12/3/2024  7:44 AM.  Learner: Family, Patient  Readiness: Acceptance  Method: Explanation  Response: Needs Reinforcement    VAD Electrical Precautions, taught by Alem Montenegro RN at 12/3/2024  7:44 AM.  Learner: Family, Patient  Readiness: Acceptance  Method: Explanation  Response: Needs Reinforcement    VAD Support Requirements, taught by Alem Montenegro RN at 12/3/2024  7:44 AM.  Learner: Family, Patient  Readiness: Acceptance  Method: Explanation  Response: Needs Reinforcement    VAD Emergency Management Procedures, taught by Alem Montenegro RN at 12/3/2024  7:44 AM.  Learner: Family, Patient  Readiness: Acceptance  Method:  Explanation  Response: Needs Reinforcement    VAD Sleep Precautions, taught by Alem Montenegro RN at 12/3/2024  7:44 AM.  Learner: Family, Patient  Readiness: Acceptance  Method: Explanation  Response: Needs Reinforcement    VAD Water Precautions, taught by Alem Montenegro RN at 12/3/2024  7:44 AM.  Learner: Family, Patient  Readiness: Acceptance  Method: Explanation  Response: Needs Reinforcement    VAD Driving Precautions, taught by Alem Montenegro RN at 12/3/2024  7:44 AM.  Learner: Family, Patient  Readiness: Acceptance  Method: Explanation  Response: Needs Reinforcement    KCCQ assessment, taught by Alem Montenegro RN at 12/3/2024  7:44 AM.  Learner: Family, Patient  Readiness: Acceptance  Method: Explanation  Response: Needs Reinforcement    EuroQol assessment, taught by Alem Montenegro RN at 12/3/2024  7:44 AM.  Learner: Family, Patient  Readiness: Acceptance  Method: Explanation  Response: Needs Reinforcement    Pre-op prep, taught by Alem Montenegro RN at 12/3/2024  7:44 AM.  Learner: Family, Patient  Readiness: Acceptance  Method: Explanation  Response: Needs Reinforcement    Preop work up: VAD, taught by Alem Montenegro RN at 12/3/2024  7:44 AM.  Learner: Family, Patient  Readiness: Acceptance  Method: Explanation  Response: Needs Reinforcement    Pre-op work-up, taught by Alem Montenegro RN at 12/3/2024  7:44 AM.  Learner: Family, Patient  Readiness: Acceptance  Method: Explanation  Response: Needs Reinforcement    VAD surgery, taught by Alem Montenegro RN at 12/3/2024  7:44 AM.  Learner: Family, Patient  Readiness: Acceptance  Method: Explanation  Response: Needs Reinforcement          Alem Montenegro RN  Date: 12/3/2024  Time: 9:50 AM

## 2024-12-03 NOTE — DISCHARGE SUMMARY
Discharge Diagnosis  Acute on chronic heart failure with reduced ejection fraction and diastolic dysfunction    Issues Requiring Follow-Up  Follow up in LVAD clinic w/ labs   Coumadin clinic at [facility] post facility discharge   Medications sent with patient to facility     Hospital Course   Fahad Meraz is a very pleasant 69 y.o. male w/ a PMHx sig for CAD s/p 4V CABG (2012) and PCI (LCx/OM; 5/2019), stage D systolic HF/ICM/HFrEF with LVEF 10-15%( 10/22/24 TTE), AF s/p RFA (PVI and CTI; 4/2024) on OAC therapy, HTN, dyslipidemia, depression, anxiety and VIV using CPAP who was admitted to OSH ICU, s/p RHC on 10/18/24.  Per patient, he had been experienced worsening SOB and fluids overload for 2-3 weeks. Patient was on milrinone drip and underwent SGC diuresis. However his KENYA worsened, and were not able to wean milrinone drip. Decision was made to transfer him to HF ICU [facility] for possible advanced therapy consideration. Advanced therapies evaluation was initiated on 10/30. Discussed in advanced therapeutics committee on 11/5 and was denied for transplantation, and approved for LVAD (significant PAD, Elevated PSA level, Age approaching 70, as well as patient verbalized he would be more in favor of LVAD vs Transplant). He was transferred to the floor on 11/6 to await VAD implantation. Readmitted to HFICU as of 11/09 for pre-OR swan guided optimization. Now presents to CTICU s/p LVAD HM3 implant via thoracotomy with descending outflow graft on 11/12/24 with Dr. Mclain. OR Course/Issues: pulseless VT requiring defib x 1 pre- CPB. Postop course c/b delirium, RV dysfunction, VT & malnutrition.     11/23- Milrinone stopped and patient transferred to LT5 in stable condition   11/24- Low dose Entresto started, transient LUE weakness/numbess - CTH negative   11/25 - Sildenafil started, dig restarted   11/26- Farxiga started   11/27- NSVT. Seen by EP - recommending amiodarone, pt asymptomatic and not recommending ICD at  this time   12/2- LVAD education completed     NEUROLOGICAL  Active issues:   #Acute post-operative pain  - Endorses pain at back near posterior portion of surgical incision. Pain musculoskeletal in nature & improved with robaxin   - PRN po Tylenol 975 mg q6 hrs   - Continue lidoderm patches   - Restart robaxin 500mg Q6 x 5 days (sent to facility)      #Hx of anxiety and depression  - Continue home escitalopram 10mg daily     #Transient LUE weakness on 11/24  - CTH (11/24) without acute abnormalities  - Resolved with tylenol  - No focal deficits any further     #Postoperative delirium - resolved          CARDIAC  #PMHx CAD s/p CABG x 4 (2012) and PCI (LCx/OM; 5/2019), stage D ICM HFrEF LVEF 10-15%  #S/p HM3 LVAD via left thoracotomy 11/12 with Abu Angel and [place].      GDMT and RV support:   - BB: n/a - RV dysfunction   - ARNI/ACEi/ARB: Entresto 24-26mg BID -> consider increase at clinic visit next week pending dMAP  - MRA: continue spironolactone 12.5mg daily    - SGLT2i: Farxiga 10 mg daily      RV support:   - Digoxin 250 4x week -> repeat dig level at next clinic visit   - Continue sildenafil 20mg TID      Anticoagulation:   - Continue Coumadin 7.5mg daily for goal INR 2-3. INR 12/3 2.3   - Continue ASA (primary indication - ICM, PCI)      Heart Mate III interrogation   Most Recent   Flow 4.5   Speed 5200   Power 3.7   PI 3.5   MAP (mmHg): 88    LVAD interrogation: stable flow, no sig PI events or power spikes.      RAMP study: Started study with RPM 5200. AV opening every beat, no MR, mild AI, septum midline. After review with Dr. Drummond, he appears well optimized at this speed. No speed changes made. dMAP 68.      Vitals:    12/03/24 0500   Weight: 87.5 kg (192 lb 14.4 oz)       #Chronic AF s/p RFA (PVI and CTI; 4/2024),   #Sustained VT and NSVT  - Patient never required ICD as his EF remained > 30% prior to acute reduction and LVAD placement   - 11/26 patient noted to have sustained asymptomatic VT w/  self termination.   - EP consulted and recommend medical management with amio over ICD placement given patient's stability and lack of symptoms   - Since starting amio, patient's ectopy has improved; however, now trending towards sinus bradycardia w/ HR in 60s   - Continue amiodarone 400mg BID x 7 days (11/27 - 12/2) and then reduce to 400mg daily for one week (12/3 -12/10), then reduce to 200mg daily (patient discharged with both 400mg and 200mg pills)   - Maintain K > 4.5 & Mag > 2.0      #HLD  - Repeat lipid panel: HDL 24.6, Non-HDL cholesterol 101, . Cholesterol/HDL ratio 5.1  - Continue vascepa 2g BID  - C/w pravastatin 20mg daily      RESPIRATORY   #VIV on CPAP at home  - Resume home nocturnal CPAP  - Maintaining SpO2 >92%.      GI:  #GERD  #Malnutrition d/t poor PO intake - resolving   - Continue PPI for GERD, LVAD bleeding risk   - Continue adult regular diet with supplements  - senna/colace BID and miralax to as needed        RENAL:  #CKD (baseline Cr 1.3-1.6)  - Strict I&Os  - Appears euvolemic on exam   - Avoid hypotension and nephrotoxic agents         ENDO:  #PMH of pre-DM with recent A1c: 6.3  - Euglycemic   - Goal BG <180  - NO indication for SSI at this time      HEMATOLOGY:  #Acute blood loss anemia   #Iron deficiency anemia   - Iron studies on 11/24 suggest iron deficiency   - Hg remains stable without active signs of bleeding  - Completed iron sucrose 200mg IV daily x 5 days (11/27 - 12/1)           INFECTIOUS DISEASE  Active issues:   # No acute issues         Skin/Musculoskeletal:  Active issues:   # no acute issues          PROPHYLAXIS:  - DVT: SCD's, Coumadin  - GI:  Protonix      CODE STATUS: Full code     Pertinent Physical Exam At Time of Discharge  Physical Exam  Constitutional:       General: He is not in acute distress.     Appearance: Normal appearance. He is not ill-appearing.   HENT:      Head: Normocephalic and atraumatic.      Mouth/Throat:      Mouth: Mucous membranes are  moist.      Pharynx: Oropharynx is clear.   Eyes:      Extraocular Movements: Extraocular movements intact.      Pupils: Pupils are equal, round, and reactive to light.   Cardiovascular:      Comments: LVAD hum heard on ascultation  No BLE edema   No JVP   Euvolemic on exam   Driveline CDI   Pulmonary:      Effort: Pulmonary effort is normal. No respiratory distress.      Breath sounds: Normal breath sounds. No wheezing.   Abdominal:      General: Abdomen is flat. There is no distension.      Palpations: Abdomen is soft.      Tenderness: There is no abdominal tenderness.   Musculoskeletal:         General: Normal range of motion.      Right lower leg: No edema.      Left lower leg: No edema.   Skin:     General: Skin is warm and dry.   Neurological:      General: No focal deficit present.      Mental Status: He is alert and oriented to person, place, and time.         Home Medications     Medication List      START taking these medications     * amiodarone 200 mg tablet; Commonly known as: Pacerone; Take 1 tablet   (200 mg) by mouth once daily. Starting 12/10/24 please take 200mg   amiodarone daily   * amiodarone 400 mg tablet; Commonly known as: Pacerone; Take 1 tablet   (400 mg) by mouth once daily for 7 days. Please take 400mg daily for seven   days and then reduce to 200mg daily; Start taking on: December 4, 2024   aspirin 81 mg chewable tablet; Chew 1 tablet (81 mg) once daily.; Start   taking on: December 4, 2024; Replaces: aspirin 81 mg EC tablet   dapagliflozin propanediol 10 mg; Commonly known as: Farxiga; Take 1   tablet (10 mg) by mouth once daily.; Start taking on: December 4, 2024   digoxin 250 MCG tab;et; Commonly known as: Lanoxin; Take 1 tablet (250   mcg) by mouth 4 times a week.; Start taking on: December 5, 2024   icosapent ethyL 1 gram capsule; Commonly known as: Vascepa; Take 2   capsules (2 g) by mouth 2 times daily (morning and late afternoon).   lidocaine 4 % patch; Place 2 patches over 12  hours on the skin once   daily. Remove & discard patch within 12 hours or as directed by MD.; Start   taking on: December 4, 2024   magnesium oxide 400 mg (241.3 mg magnesium) tablet; Commonly known as:   Mag-Ox; Take 2 tablets (800 mg) by mouth 2 times a day.   methocarbamol 500 mg tablet; Commonly known as: Robaxin; Take 1 tablet   (500 mg) by mouth every 6 hours if needed (muscle spasms and postoperative   pain) for up to 5 days.   polyethylene glycol 17 gram packet; Commonly known as: Glycolax,   Miralax; Take 17 g by mouth once daily as needed (constipation) for up to   28 days.   pravastatin 20 mg tablet; Commonly known as: Pravachol; Take 1 tablet   (20 mg) by mouth once daily at bedtime.   sacubitriL-valsartan 24-26 mg tablet; Commonly known as: Entresto; Take   1 tablet by mouth 2 times a day.; Replaces: Entresto  mg tablet   sildenafil 20 mg tablet; Commonly known as: Revatio; Take 1 tablet (20   mg) by mouth 3 times a day.  * This list has 2 medication(s) that are the same as other medications   prescribed for you. Read the directions carefully, and ask your doctor or   other care provider to review them with you.     CHANGE how you take these medications     bumetanide 1 mg tablet; Commonly known as: Bumex; Take 0.5 tablets (0.5   mg) by mouth once daily as needed (weight gain > 3 pounds in one day).;   What changed: when to take this, reasons to take this   spironolactone 25 mg tablet; Commonly known as: Aldactone; Take 0.5   tablets (12.5 mg) by mouth once daily.; Start taking on: December 4, 2024;   What changed: how much to take   warfarin 5 mg tablet; Commonly known as: Coumadin; Take as directed per   LVAD team and Coumadin clinic. Please take 7.5mg daily until instructed   otherwise.; What changed: how much to take, how to take this, when to take   this, additional instructions     CONTINUE taking these medications     cholecalciferol 50,000 unit capsule; Commonly known as: Vitamin D-3    escitalopram 10 mg tablet; Commonly known as: Lexapro; Take 1 tablet (10   mg) by mouth once daily.   multivitamin tablet   pantoprazole 40 mg EC tablet; Commonly known as: ProtoNix; Take 1 tablet   (40 mg) by mouth once daily in the morning. Take before meals. Do not   crush, chew, or split.     STOP taking these medications     aspirin 81 mg EC tablet; Replaced by: aspirin 81 mg chewable tablet   Entresto  mg tablet; Generic drug: sacubitriL-valsartan; Replaced   by: sacubitriL-valsartan 24-26 mg tablet   hydrALAZINE 25 mg tablet; Commonly known as: Apresoline   isosorbide mononitrate  mg 24 hr tablet; Commonly known as: Imdur   metoprolol succinate XL 50 mg 24 hr tablet; Commonly known as: Toprol-XL   nitroglycerin 0.4 mg SL tablet; Commonly known as: Nitrostat       Outpatient Follow-Up  Future Appointments   Date Time Provider Department Center   12/10/2024  2:10 PM Romelia Lomas MD [id number]       Stacie Davis, MSN, AGACNP-BC  Advanced Heart Failure LVAD Nurse Practitioner   For floor patients please page HHVI team after 5pm- [phone]  LVAD 24/7 emergency pager [phone]  LVAD 24/7 emergency phone [phone]

## 2024-12-03 NOTE — DISCHARGE INSTRUCTIONS
First postop visit will be 12/10 with Dr. Lomas. Please have labwork done before the appointment. Labs are ordered and can be collected at any  facility.     VAD Daily Care                                                        Change the exit site dressing, using the sterile technique shown to you at the hospital.   Inspect the exit site during dressing changes to look for signs of infection. Signs of infection include: redness, swelling, discharge, odor, or warmth.    Call your contact hospital person if your temperature is above 100.4 Fahrenheit or below 95 Fahrenheit. The number is listed below.   Check your weight each morning and record. Call your hospital  if you gain more than 3 pounds in 3 days (or 5 or more pounds in one week).  Call your doctor if you notice any swelling in your ankles or changes in your waistline.  Do NOT kink your VAD lines. Only disconnect one power cable at a time when straightening your cables. NEVER disconnect both power cables at the same time to untangle them.  Immediately call your hospital  if you notice any changes in how the VAD feels, operates, or sounds.     Activities   Showers are allowed only with approval from your VAD team. Your exit site must be completely healed before showering. Do NOT take showers without using a VAD shower kit issued by your VAD coordinator to protect your equipment from water. For a new VAD implant, showers are not permitted for six months. We will notify you when you are permitted to shower and will issue your shower bag at that time.   Do NOT take baths, go swimming or use hot tubs while implanted with the VAD.       Miscellaneous   Avoid strong static discharge (that might come from touching TV or computer screens or from scuffing your feet on carpet). A strong static discharge can cause the VAD to stop. Consider spraying the carpet with Static Guard or other similar product.  If you have any questions or concerns  about your home environment, call the on call VAD number. You may call the office number during business hours and utilize the on call pager for after hours emergencies. If you are uncomfortable testing your home's electrical system, you can hire a local  to do it for you.   Know and understand the warnings and cautions associated with having a VAD for safe operation (see your Patient Handbook for a complete list of warnings and cautions)     For any questions or concerns after discharge please notify:  VAD Coordinator Contact Numbers:   Emergency phone: [phone]  Office [phone]    Office number can be reached M-F 8am-5pm. For after hours emergencies, please call the emergency phone. A member of the VAD team always has the phone with them.     All laboratory test/lab results must be faxed to [phone] ATTN: VAD TEAM

## 2024-12-03 NOTE — PROGRESS NOTES
F/up from HM3 LVAD implant on 11/12/24 with surgeons Dr. Hill and Dr. Madrigal. SW reviewed, participated and is in agreement with multi-disciplinary plan of care. SW met with Pt at bedside.      Functional/Medical Status: Pt transferred to [place] [id number]. LVAD speed 5200 with no alarms.  Had some VT 11/26, EP consulted and recommend medical management with amio over ICD placement given patient's stability and lack of symptoms. Pt dx with malnutrition d/t poor PO intake, continue with regular diet + supplements, Pt educated by RD; Pt had some post-op delirium which has now resolved to his normal A+O state; Pt on coumadin, will follow with coumadin clinic at [facility]; per PT notes, Pt mobilizing within room with no DME, but would benefit from addt'l therapy.   Dialysis start date: N/A  Adaption to the Stress of Transplant: Pt continues to receive frequent family visits including over the Thanksgiving holiday and is in good spirits. Pt did share feeling a little overwhelmed with volume of recent instructions and wants to ensure his wife is included in communications/instructions as a backup to him.   Mental Health/Substance use: PMH of depression, on Lexapro, stable, no new issues since LVAD; never cigarette smoker, smokeless tobacco until 15 years ago, occasional/social alcohol use  Post Discharge Supports/Recovery Plan: Pt plans to DC to [facility] (local to Pt's home, where daughter lives); discussed alternatives of home with HHC, but Pt prefers SNF. Anticipate short SNF stay based on Pt's current LOF and then to return home with with and C therapies.   Benefits: Medicare + [employer] supplement; Pt and wife both receive social security; Pt stated no financial concerns.   Impressions: Pt progressing adequately; imminently planning for discharge as early as next week. Pt aware and agreeable.   Plan: Tentative DC to [facility] on DC. SW will continue to follow during inpatient stay and then on outpatient basis  including Intermacs questionnaires. Pt and family have SW contact info.      JOE Rm  Transplant/LVAD

## 2024-12-03 NOTE — CARE PLAN
The patient's goals for the shift include      The clinical goals for the shift include SNF    Over the shift, the patient did  make progress toward the following goals. Barriers to progression include none. Recommendations to address these barriers include n/a.  Will review all discharge instructions with wife and will call report to Zoila Morris. IV's and tele removed

## 2024-12-03 NOTE — CARE PLAN
The clinical goals for the shift include Patient will prepare for discharge on 12/3    Patient remained HDS throughout shift    Problem: Heart Failure  Goal: Improved gas exchange this shift  Outcome: Progressing  Goal: Improved urinary output this shift  Outcome: Progressing  Goal: Reduction in peripheral edema within 24 hours  Outcome: Progressing

## 2024-12-03 NOTE — NURSING NOTE
Patient VAD discharge paperwork reviewed today with patient and patient wife, Ira.  Overview of physical, mental, and emotional readiness discussed. Reviewed the following safety instructions:  - Necessity of waiting to drive until cleared by VAD team.  - No showering until 6 month alon and cleared by VAD team.  - No lifting >10 lbs.  - No jumping up and down.  - No contact sports.  - Referral to cardiac rehab at 8 weeks/when deemed appropriate by team.    Patient has VAD team contact information. No further education required.      Patient to be discharged to SNF today.

## 2024-12-06 LAB
ATRIAL RATE: 16 BPM
P OFFSET: 143 MS
P ONSET: 123 MS
Q ONSET: 213 MS
QRS COUNT: 14 BEATS
QRS DURATION: 158 MS
QT INTERVAL: 442 MS
QTC CALCULATION(BAZETT): 522 MS
QTC FREDERICIA: 494 MS
R AXIS: 107 DEGREES
T AXIS: 122 DEGREES
T OFFSET: 434 MS
VENTRICULAR RATE: 84 BPM

## 2024-12-09 ENCOUNTER — TELEPHONE (OUTPATIENT)
Dept: TRANSPLANT | Facility: HOSPITAL | Age: 69
End: 2024-12-09
Payer: MEDICARE

## 2024-12-10 ENCOUNTER — OFFICE VISIT (OUTPATIENT)
Dept: TRANSPLANT | Facility: HOSPITAL | Age: 69
End: 2024-12-10
Payer: MEDICARE

## 2024-12-10 VITALS — BODY MASS INDEX: 27.15 KG/M2 | OXYGEN SATURATION: 97 % | TEMPERATURE: 97.6 F | HEART RATE: 70 BPM | WEIGHT: 200.2 LBS

## 2024-12-10 DIAGNOSIS — I50.43 ACUTE ON CHRONIC HEART FAILURE WITH REDUCED EJECTION FRACTION AND DIASTOLIC DYSFUNCTION: ICD-10-CM

## 2024-12-10 DIAGNOSIS — I50.82 BIVENTRICULAR CONGESTIVE HEART FAILURE: ICD-10-CM

## 2024-12-10 DIAGNOSIS — I25.10 CORONARY ARTERY DISEASE INVOLVING NATIVE HEART WITHOUT ANGINA PECTORIS, UNSPECIFIED VESSEL OR LESION TYPE: ICD-10-CM

## 2024-12-10 PROCEDURE — 99215 OFFICE O/P EST HI 40 MIN: CPT | Performed by: INTERNAL MEDICINE

## 2024-12-10 PROCEDURE — 1159F MED LIST DOCD IN RCRD: CPT | Performed by: INTERNAL MEDICINE

## 2024-12-10 PROCEDURE — 1126F AMNT PAIN NOTED NONE PRSNT: CPT | Performed by: INTERNAL MEDICINE

## 2024-12-10 PROCEDURE — 1111F DSCHRG MED/CURRENT MED MERGE: CPT | Performed by: INTERNAL MEDICINE

## 2024-12-10 ASSESSMENT — PAIN SCALES - GENERAL: PAINLEVEL_OUTOF10: 0-NO PAIN

## 2024-12-10 NOTE — PATIENT INSTRUCTIONS
Patient Instructions:  -Please bring a list of your medications to every visit.  -If you have any questions or concerns, please contact the LVAD office at [phone], option 3 or the direct line at [phone]. Please state that you are an LVAD patient. If it is after hours and it is an emergency, please page the LVAD pager by calling [phone] #[phone]  - Continue current medications with the exception of:   --   - Labwork:   - Imaging/Procedures: Echo  - Referrals:   - Followup: 4 weeks with Stacie

## 2024-12-10 NOTE — PROGRESS NOTES
VAD Cardiologist: Dr. Steven Washington     VAD Coordinator: Lorenza Valerio     Type of Visit: LVAD initial visit     Type of VAD:  HM3  Implant Date: 11/12/2024  Reason for VAD: ICM  Intent: Long-Term (Age)     HPI / Summary:   Fahad Meraz is a very pleasant 69 y.o. male w/ a PMHx sig for CAD s/p 4V CABG (2012) and PCI (LCx/OM; 5/2019), stage D systolic HF/ICM/HFrEF with LVEF 10-15%( 10/22/24 TTE), AF s/p RFA (PVI and CTI; 4/2024) on OAC therapy, HTN, dyslipidemia, depression, anxiety and VIV using CPAP who was admitted to OSH ICU, s/p RHC on 10/18/24.  Per patient, he had been experienced worsening SOB and fluids overload for 2-3 weeks. Patient was on milrinone drip and underwent SGC diuresis. However his KENYA worsened, and were not able to wean milrinone drip. Decision was made to transfer him to HF ICU [facility] for possible advanced therapy consideration. Advanced therapies evaluation was initiated on 10/30. Discussed in advanced therapeutics committee on 11/5 and was denied for transplantation, and approved for LVAD (significant PAD, Elevated PSA level, Age approaching 70, as well as patient verbalized he would be more in favor of LVAD vs Transplant). He was transferred to the floor on 11/6 to await VAD implantation. Readmitted to HFICU as of 11/09 for pre-OR swan guided optimization. Now presents to CTICU s/p LVAD HM3 implant via thoracotomy with descending outflow graft on 11/12/24 with Dr. Mclain. OR Course/Issues: pulseless VT requiring defib x 1 pre- CPB. Postop course c/b delirium, RV dysfunction, VT & malnutrition.       Today:  Patient is at [facility] working with PT daily. Patient states he worked with the stairs today. Patient denies CP, palpitations, dizziness, RAMOS, orthopnea, PND, edema, LVAD alarms, N/V/D, hematochezia, hematuria, epistaxis.    Driveline is clean with no drainage. Dressing was last changed on 12/09. Dressing change performed in clinic today.   VAD equipment interrogated  Dr Reagan at bedside infant tolerated eye exam well eyes stage two zone three with plus bilaterally. Significant increase in Hem. Right eye. Temporal retina shows popcorn approaching stage 2. Stage 3 nasally has increased. Patient will need treatment. Dr Reagan called the father and obtained consent with this RN as the witness, no current questions. Avastin injection planned for today.   in clinic with all batteries and clips functioning properly. Patient denies sleeping on batteries at home. Patient has not dropped their controller since last visit.   Most recent six alarms interrogated from patient controller.     Patient  has not completed Cardiac Rehab. Patient will be referred once appropriate.     NYHA class at implant - IV.   NYHA class today -III        Full 10 pt review of symptoms of negative unless discussed above.     Objective   Medical History:   He has no past medical history on file.  Social Hx:   He reports that he has quit smoking. His smoking use included cigarettes. He has never used smokeless tobacco. No history on file for alcohol use and drug use.  Family Hx:   His family history is not on file.   Exam:   Vitals:   Vitals:    12/10/24 1434   Pulse: 70   Temp: 36.4 °C (97.6 °F)   SpO2: 97%       Wt Readings from Last 5 Encounters:   12/03/24 87.5 kg (192 lb 14.4 oz)   10/24/24 92.5 kg (204 lb)   08/05/24 122 kg (268 lb)   01/31/22 119 kg (262 lb)   02/18/20 123 kg (270 lb 2 oz)     GEN: Pleasant, well-appearing, no acute distress.  HEENT: JVP not elevated, no icterus.   CHEST: Clear to auscultation. Sternotomy well healed.  CV: LVAD hum  ABD: Soft, ND, NT.  Driveline: No drainage. Dressing c/d/I. Secured.  EXT: Warm, well perfused, No LE edema.   NEURO: Pleasant, BOWMAN, Oriented to plan     Labs:   CMP:  Recent Labs     12/03/24  0630 12/02/24  0709 12/01/24  0635 11/30/24  0834 11/29/24  0702 11/27/24  0652 11/27/24  0227   NA  --   --  141 139 138 138 138   K  --   --  4.5 4.3 4.2 4.4 4.8   CL  --   --  105 105 106 105 105   CO2  --   --  25 26 26 24 23   ANIONGAP  --   --  16 12 10 13 15   BUN  --   --  17 17 16 20 22   CREATININE  --   --  1.46* 1.20 1.19 1.28 1.21   EGFR  --   --  52* 65 66 61 65   MG 2.26 2.12 2.02 2.02 2.05 2.18 2.12     Recent Labs     12/01/24  0635 11/30/24  0834 11/29/24  0702 11/27/24  0652 11/26/24  0603 11/19/24  1459 11/19/24  0322 11/18/24  0525  11/17/24  1323 11/17/24  0122 11/16/24  1358 11/16/24  0128 11/15/24  1255 11/15/24  0021   ALBUMIN 3.5 3.5 3.3* 3.6 3.6   < > 3.1* 3.5   < > 3.4   < > 3.6   < > 3.7   ALKPHOS  --   --   --   --   --   --  55 54  --  52  --  51  --  53   ALT  --   --   --   --   --   --  6* 6*  --  3*  --  4*  --  5*   AST  --   --   --   --   --   --  16 18  --  21  --  24  --  29   BILITOT  --   --   --   --   --   --  0.8 0.8  --  1.0  --  1.0  --  1.1    < > = values in this interval not displayed.     CBC:  Recent Labs     12/03/24  0630 12/02/24  0709 12/01/24  0635 11/30/24  0834 11/29/24  0755   WBC 8.7 8.5 8.1 7.9 9.0   HGB 12.0* 11.8* 11.1* 11.0* 10.5*   HCT 37.9* 39.5* 33.9* 34.6* 33.1*    336 347 381 401   MCV 85 91 83 85 86     COAG:   Recent Labs     12/03/24  0630 12/02/24  0709 12/01/24  0635 11/29/24  0702 11/28/24  0542 11/27/24  2037 11/27/24  1605 11/26/24  0957 11/13/24  0008 11/12/24  1257   INR 2.3* 2.2* 2.0*   < > 2.1*  --    < >  --    < > 1.4*   HAUF  --   --   --   --  0.6 0.5  --  0.5   < >  --    FIBRINOGEN  --   --   --   --   --   --   --   --   --  301    < > = values in this interval not displayed.     ABO:   Recent Labs     11/20/24  0151   ABO A     HEME/ENDO:  Recent Labs     10/31/24  1249 10/24/24  2328   FERRITIN  --  76   IRONSAT  --  17*   TSH 4.39* 8.47*   HGBA1C  --  6.3*      CARDIAC:   Recent Labs     12/03/24  0630 12/02/24  0709 12/01/24  0635 11/30/24  0834 11/29/24  0755 11/29/24  0702 11/12/24  1307 10/31/24  1249 10/24/24  2328    186 223 176  --  151   < > 187  --    BNP  --   --   --   --  478*  --   --  755* 1,995*    < > = values in this interval not displayed.     Recent Labs     11/27/24  0652   CHOL 126  126   HDL 24.6  24.6   TRIG 155*           Notable Studies:   EKG:   Recent Labs     11/27/24  0030 11/21/24  0945 11/17/24  0855   VENTRATE 84 111 119   ATRRATE 16 81 138   QTCFRED 494 498 469   QRSDUR 158 146 112   QTCCALCB 522 552 526       Echocardiogram:    Recent Labs     11/26/24  1152 11/18/24  1144 11/15/24  0904 11/13/24  0851 11/12/24  0628 11/05/24  1154 10/25/24  1436   EF 18 10 13 15 18 18 18   LVIDD  --   --  5.90 6.90  --  8.21 7.70   RV  --   --   --  23.4  --  42.9 21.0   RVFRWALLPKSP  --   --   --  6.64  --  6.00  --    TAPSE  --   --   --   --   --  0.9 1.0     Echocardiogram ( 11/26/2024)    1. Left ventricular ejection fraction is severely decreased, by visual estimate at 15-20%.   2. There is global hypokinesis of the left ventricle with minor regional variations.   3. Spectral Doppler shows a Grade II (pseudonormal pattern) of left ventricular diastolic filling with an elevated left atrial pressure.   4. Left ventricular cavity size is severely dilated.   5. There is reduced right ventricular systolic function.   6. Severely enlarged right ventricle.   7. The left atrium is enlarged.   8. Mild aortic valve regurgitation.   9. The pulmonary artery is not well visualized.         Coronary Angiography:   Left & Right Heart Cath w Angiography & LV 10/28/2024  1. Diffuse severe native coronary artery disease.  2. Patent proximal LCx and ostial-prox OM 1 ARIELLE.  3. Patent LIMA-LAD. Other grafts are occluded: SVG-OM, SVG-RI, SVG- RCA.  4. Elevated right and left sided filling pressures with preserved cardiac output and index while on ionotropic support with 0.375 mcg/kg/min of milrinone .  5. Further work-up and management per advanced heart failure team.              Current Outpatient Medications   Medication Instructions    amiodarone (PACERONE) 400 mg, oral, Daily, Please take 400mg daily for seven days and then reduce to 200mg daily    amiodarone (PACERONE) 200 mg, oral, Daily, Starting 12/10/24 please take 200mg amiodarone daily    aspirin 81 mg, oral, Daily    bumetanide (BUMEX) 0.5 mg, oral, Daily PRN    cholecalciferol (VITAMIN D-3) 50,000 Units, oral, Once Weekly    digoxin (LANOXIN) 250 mcg, oral, 4 times weekly    escitalopram (LEXAPRO) 10 mg,  oral, Daily    Farxiga 10 mg, oral, Daily    lidocaine 4 % patch 2 patches, transdermal, Daily, Remove & discard patch within 12 hours or as directed by MD.    magnesium oxide (MAG-OX) 800 mg, oral, 2 times daily    methocarbamol (ROBAXIN) 500 mg, oral, Every 6 hours PRN    multivitamin tablet 1 tablet, oral, Daily    pantoprazole (PROTONIX) 40 mg, oral, Daily before breakfast, Do not crush, chew, or split.    polyethylene glycol (GLYCOLAX, MIRALAX) 17 g, oral, Daily PRN    pravastatin (PRAVACHOL) 20 mg, oral, Nightly    sacubitriL-valsartan (Entresto) 24-26 mg tablet 1 tablet, oral, 2 times daily    sildenafil (REVATIO) 20 mg, oral, 3 times daily    spironolactone (ALDACTONE) 12.5 mg, oral, Daily    Vascepa 2 g, oral, 2 times daily (morning and late afternoon)    warfarin (Coumadin) 5 mg tablet Take as directed per LVAD team and Coumadin clinic. Please take 7.5mg daily until instructed otherwise.          Heart Mate III interrogation (personally interrogated)- No significant alarms noted     Problems:   NEUROLOGICAL  Active issues:   #Acute post-operative pain  Seems to be under control at this point in time    #Hx of anxiety and depression  - Continue home escitalopram 10mg daily   #Transient LUE weakness on 11/24  - CTH (11/24) without acute abnormalities  - Resolved with tylenol  - No focal deficits any further    #Postoperative delirium - resolved          CARDIAC  #PMHx CAD s/p CABG x 4 (2012) and PCI (LCx/OM; 5/2019), stage D ICM HFrEF LVEF 10-15%  #S/p HM3 LVAD via left thoracotomy 11/12 with Jas Ortiz and Kaitlin.      GDMT and RV support:   - BB: n/a - RV dysfunction   - ARNI/ACEi/ARB: Entresto 24-26mg BID -MAP at goal on this regimen   - MRA: continue spironolactone 12.5mg daily    - SGLT2i: Farxiga 10 mg daily      RV support:   - Digoxin 250 4x week -> obtain a repeat level   - Continue sildenafil 20mg TID      Anticoagulation:   - Continue Coumadin 7.5mg daily for goal INR 2-3. INR 12/3 2.3   - Continue  ASA (primary indication - ICM, PCI)      RAMP study: Started study with RPM 5200. AV opening every beat, no MR, mild AI, septum midline. -will perform a repeat echocardiogram RAMP at the next visit especially considering the outflow graft that is connected to the descending aorta      #Chronic AF s/p RFA (PVI and CTI; 4/2024),   #Sustained VT and NSVT  - Patient never required ICD as his EF remained > 30% prior to acute reduction and LVAD placement   - 11/26 patient noted to have sustained asymptomatic VT w/ self termination.   - EP consulted and recommend medical management with amio over ICD placement given patient's stability and lack of symptoms   - Since starting amio, patient's ectopy has improved; however, now trending towards sinus bradycardia w/ HR in 60s   - Continue amiodarone 400mg BID x 7 days (11/27 - 12/2) and then reduce to 400mg daily for one week (12/3 -12/10), then reduce to 200mg daily (patient discharged with both 400mg and 200mg pills)   - Maintain K > 4.5 & Mag > 2.0      #HLD  - Repeat lipid panel: HDL 24.6, Non-HDL cholesterol 101, . Cholesterol/HDL ratio 5.1  - Continue vascepa 2g BID  - C/w pravastatin 20mg daily      RESPIRATORY   #VIV on CPAP at home  - Resume home nocturnal CPAP  - Maintaining SpO2 >92%.      GI:  #GERD  #Malnutrition d/t poor PO intake - resolving   - Continue PPI for GERD, LVAD bleeding risk   - senna/colace BID and miralax to as needed        RENAL:  #CKD (baseline Cr 1.3-1.6)  - Strict I&Os  - Appears euvolemic on exam   - Avoid hypotension and nephrotoxic agents          ENDO:  #PMH of pre-DM with recent A1c: 6.3  - Euglycemic   - Goal BG <180  - NO indication for SSI at this time      HEMATOLOGY:  #Acute blood loss anemia   #Iron deficiency anemia   - Iron studies on 11/24 suggest iron deficiency   - Hg remains stable without active signs of bleeding  - Completed iron sucrose 200mg IV daily x 5 days (11/27 - 12/1)         Patient Instructions:  -Please bring  a list of your medications to every visit.  -If you have any questions or concerns, please contact the LVAD office at [phone], option 3 or the direct line at [phone]. Please state that you are an LVAD patient. If it is after hours and it is an emergency, please page the LVAD pager by calling [phone] #[phone]  - follow up in 4 weeks with Stacie cruz

## 2024-12-11 DIAGNOSIS — Z86.79 HISTORY OF CHRONIC ATRIAL FIBRILLATION: ICD-10-CM

## 2024-12-11 DIAGNOSIS — I50.82 BIVENTRICULAR CONGESTIVE HEART FAILURE: ICD-10-CM

## 2024-12-11 DIAGNOSIS — I50.43 ACUTE ON CHRONIC HEART FAILURE WITH REDUCED EJECTION FRACTION AND DIASTOLIC DYSFUNCTION: ICD-10-CM

## 2024-12-19 ENCOUNTER — TELEPHONE (OUTPATIENT)
Age: 69
End: 2024-12-19

## 2024-12-19 NOTE — TELEPHONE ENCOUNTER
Spoke with Danielle Hoffman NP at [facility] [place].  She states she has been monitoring the patient's Coumadin while he has been at their facility. 12/18/24 INR was 1.9 and he is to take 9.5 mg daily and recheck in 1 week (She states she plans to send prescriptions for 7.5 mg and 2 mg tablets.)  She states 12/26 (8 days) is good for recheck.    Writer then contacted patient's LVAD team at [facility] (phone: [phone]).  Patient has not been seen by this clinic since before LVAD placement.  Spoke with Marie and she requests a referral form can be faxed ([phone]) to them, and they will fill out an updated referral form. CPA form also sent.  She states provider will be Sushma Rodriguez NP. Referral and CPA forms faxed.    Per [facility], patient will have [facility] care upon discharge.  Order entered and faxed to [facility] to check INR 12/26/24. Called and spoke to Maya at [facility] to let her know fax will be coming. She states she does not see referral for patient yet since he is not discharged, but will call this clinic next week if have not received it.    Lili Patton, PharmD, BCPS    For Pharmacy Admin Tracking Only  Time Spent (min): 45

## 2024-12-20 PROBLEM — Z95.811 LVAD (LEFT VENTRICULAR ASSIST DEVICE) PRESENT (MULTI): Status: ACTIVE | Noted: 2024-12-20

## 2024-12-20 NOTE — TELEPHONE ENCOUNTER
Called patient's LVAD team at [facility] (phone: [phone]). Spoke with Betsy Kasper regarding request to get CPA and referral faxed prior back to us prior to patient's appointment on 12/26.    Rosalinda Villa, PharmD 12/20/2024 11:47 AM

## 2024-12-20 NOTE — TELEPHONE ENCOUNTER
Pt new coumadin clinic called requesting a CPA and Referral that they faxed over yesterday. They need it prior to his first appointment on Thursday. Fax number [phone].

## 2024-12-23 ENCOUNTER — TELEPHONE (OUTPATIENT)
Dept: TRANSPLANT | Facility: HOSPITAL | Age: 69
End: 2024-12-23
Payer: MEDICARE

## 2024-12-23 NOTE — TELEPHONE ENCOUNTER
Patient wife called back in order to discuss LVAD driveline dressing kits. Patient states Oc said they just received the order today. Advised patient to please reach out to the team if anything else arises.     Patient wife states she was concerned that his A did not have a doppler machine. Explained to patient that now that the patient is not in an acute setting, not having his MAP checked daily is fine.     No further issues at this time.

## 2024-12-23 NOTE — TELEPHONE ENCOUNTER
Pt wife left  requesting a call back from [facility]. Pt wife stated that they have not received any dressing kits yet and also stated that they have some other questions.

## 2024-12-26 ENCOUNTER — APPOINTMENT (OUTPATIENT)
Age: 69
End: 2024-12-26
Payer: MEDICARE

## 2024-12-26 ENCOUNTER — ANTI-COAG VISIT (OUTPATIENT)
Age: 69
End: 2024-12-26

## 2024-12-26 ENCOUNTER — HOSPITAL ENCOUNTER (EMERGENCY)
Age: 69
Discharge: HOME | End: 2024-12-26
Payer: COMMERCIAL

## 2024-12-26 ENCOUNTER — TELEPHONE (OUTPATIENT)
Dept: RESPIRATORY THERAPY | Facility: HOSPITAL | Age: 69
End: 2024-12-26
Payer: MEDICARE

## 2024-12-26 VITALS — BODY MASS INDEX: 26.72 KG/M2 | WEIGHT: 197 LBS

## 2024-12-26 DIAGNOSIS — Z79.01 LONG TERM (CURRENT) USE OF ANTICOAGULANTS: ICD-10-CM

## 2024-12-26 DIAGNOSIS — I48.91 ATRIAL FIBRILLATION, UNSPECIFIED TYPE (HCC): Primary | ICD-10-CM

## 2024-12-26 DIAGNOSIS — Z51.81 ENCOUNTER FOR THERAPEUTIC DRUG MONITORING: ICD-10-CM

## 2024-12-26 DIAGNOSIS — Z95.811 LVAD (LEFT VENTRICULAR ASSIST DEVICE) PRESENT (HCC): ICD-10-CM

## 2024-12-26 LAB — INR BLD: 1.34 (ref 0.85–1.13)

## 2024-12-26 PROCEDURE — 85610 PROTHROMBIN TIME: CPT

## 2024-12-26 RX ORDER — PRAVASTATIN SODIUM 20 MG
20 TABLET ORAL DAILY
COMMUNITY

## 2024-12-26 RX ORDER — ICOSAPENT ETHYL 1 G/1
2 CAPSULE ORAL 2 TIMES DAILY
COMMUNITY

## 2024-12-26 RX ORDER — DAPAGLIFLOZIN 10 MG/1
10 TABLET, FILM COATED ORAL EVERY MORNING
COMMUNITY

## 2024-12-26 RX ORDER — DIGOXIN 250 MCG
250 TABLET ORAL DAILY
COMMUNITY

## 2024-12-26 RX ORDER — WARFARIN SODIUM 7.5 MG/1
7.5 TABLET ORAL
COMMUNITY

## 2024-12-26 RX ORDER — WARFARIN SODIUM 2 MG/1
2 TABLET ORAL
COMMUNITY

## 2024-12-26 RX ORDER — SACUBITRIL AND VALSARTAN 24; 26 MG/1; MG/1
1 TABLET, FILM COATED ORAL 2 TIMES DAILY
COMMUNITY

## 2024-12-26 RX ORDER — ACETAMINOPHEN 325 MG/1
650 TABLET ORAL EVERY 6 HOURS PRN
COMMUNITY

## 2024-12-26 RX ORDER — METHOCARBAMOL 500 MG/1
500 TABLET, FILM COATED ORAL EVERY 6 HOURS PRN
COMMUNITY

## 2024-12-26 RX ORDER — DOCUSATE SODIUM 100 MG/1
100 CAPSULE, LIQUID FILLED ORAL DAILY
COMMUNITY

## 2024-12-26 RX ORDER — SILDENAFIL CITRATE 20 MG/1
20 TABLET ORAL 3 TIMES DAILY
COMMUNITY

## 2024-12-26 RX ORDER — AMIODARONE HYDROCHLORIDE 200 MG/1
200 TABLET ORAL DAILY
COMMUNITY

## 2024-12-26 RX ORDER — ENOXAPARIN SODIUM 150 MG/ML
150 INJECTION SUBCUTANEOUS EVERY 24 HOURS
Qty: 10 ML | Refills: 0 | Status: SHIPPED | OUTPATIENT
Start: 2024-12-26

## 2024-12-26 RX ORDER — LIDOCAINE 50 MG/G
1 PATCH TOPICAL DAILY
COMMUNITY

## 2024-12-26 NOTE — TELEPHONE ENCOUNTER
Questions were answered regarding patients BP reading. Explained to nurse that a doppler and manual cuff would be needed if a BP reading were to be obtained.     Gave omar a brief explanation of the LVAD pump and the functions it provides for the patient. No further issues at this time.

## 2024-12-26 NOTE — TELEPHONE ENCOUNTER
Luiza from [facility] called. Requesting to speak with a coord regarding Fahad today. Call back number: [phone]

## 2024-12-26 NOTE — PROGRESS NOTES
Medication Management Clinic  [facility]  Anticoagulation Clinic  [phone] (phone)  [phone] (fax)        INR drawn by [facility].  Nursing assessment reviewed and appreciated.  Nursing assessment within the normal limits with the exception of the following:  not available at the time of this encounter    Anticoagulation encounter completed via telephone.     Mr. Kush Perea is a 69 y.o.  male with history of  LVAD .    LVAD placed at [facility] 11/12/24.  Was discharged to [facility] [place] for rehab, doses given .  Was then discharged home on 12/20.   Wife verifies current dosing regimen and tablet strength, now has warfarin 7.5mg and 2mg tablets at home. Has been taking Coumadin 9.5mg daily since 12/20, doses given at rehab facility are not available.   No missed or extra doses.  Patient denies s/s bleeding/bruising/swelling/SOB  No blood in urine or stool.  No dietary changes.  Many medication changes, see updated list. New meds include Amiodarone, decreased Entresto dose, Digoxin, Farxiga, Revatio. Hydralazine, Imdur, metoprolol were stopped.  No change in alcohol use or tobacco use.  Slowing getting more active since discharged from rehab facility.  Patient denies falls.  No vomiting/diarrhea or acute illness.   No Procedures scheduled in the future at this time.    Assessment:  Lab Results   Component Value Date    INR 1.34 (H) 12/26/2024    INR 3.00 (H) 10/24/2024    INR 2.96 (H) 10/23/2024     INR subtherapeutic   Recent Labs     12/26/24  1040   INR 1.34*     Called LVAD team at , spoke to Julisa Sevilla.  Received standing order to bridge with Lovenox for any INR<=1.5.    Weight:  89kg  Calculated CrCl:  48mlmin  Plt:  370  Lovenox dose:  150mg SQ Q24h    Plan:  Start Lovenox 150mg SQ Q24hr. Verbally explained how to inject Lovenox, also instructed wife to review video at [employer], she voiced understanding. Coumadin 15mg today, 11.25mg tomorrow, then continue

## 2024-12-30 ENCOUNTER — ANTI-COAG VISIT (OUTPATIENT)
Age: 69
End: 2024-12-30

## 2024-12-30 DIAGNOSIS — Z79.01 LONG TERM (CURRENT) USE OF ANTICOAGULANTS: Primary | ICD-10-CM

## 2024-12-30 DIAGNOSIS — Z51.81 ENCOUNTER FOR THERAPEUTIC DRUG MONITORING: ICD-10-CM

## 2024-12-30 DIAGNOSIS — Z95.811 LVAD (LEFT VENTRICULAR ASSIST DEVICE) PRESENT (HCC): ICD-10-CM

## 2024-12-30 NOTE — PROGRESS NOTES
Medication Management Clinic  [facility]  Anticoagulation Clinic  [phone] (phone)  [phone] (fax)    INR drawn by [facility].  Nursing assessment:  not available at the time of this encounter     Anticoagulation encounter completed via telephone.   Mr. Kush Perea is a 69 y.o.  male with history of  LVAD      Patient verifies current dosing regimen and tablet strength.  No missed or extra doses.  Patient denies s/s bleeding/bruising/swelling/SOB  No blood in urine or stool.  No dietary changes. Avoid greens   No changes in medication/OTC agents/Herbals.  No change in alcohol use or tobacco use.  No change in activity level. Started physical therapy.   Patient denies falls.  No vomiting/diarrhea or acute illness.   No Procedures scheduled in the future at this time.    Assessment:   Lab Results   Component Value Date    INR 2.12 (H) 12/30/2024    INR 1.34 (H) 12/26/2024    INR 3.00 (H) 10/24/2024     INR therapeutic   Recent Labs     12/30/24  1055   INR 2.12*     Plan:  Stop Lovenox.  Increase Coumadin 9.5 mg MWF, 11.5 mg all other days (12% increase from 9.5 mg daily). Recheck INR in 1 week(s). Going to [facility] appt 1/6/25, therefore scheduled next INR for 1/7/25 with Johanna XIONG to draw.  Patient reminded to call the Anticoagulation Clinic with signs or symptoms of bleeding or with any medication changes. Patient given instructions utilizing the teach back method.    Called Johanna XIONG LM with answering service to notify that INR needs drawn on 1/7/25 and will fax order to Johanna.      Jodie You PharmD 12/30/2024 4:25 PM

## 2025-01-06 ENCOUNTER — HOSPITAL ENCOUNTER (OUTPATIENT)
Dept: CARDIOLOGY | Facility: HOSPITAL | Age: 70
Discharge: HOME | End: 2025-01-06
Payer: MEDICARE

## 2025-01-06 ENCOUNTER — OFFICE VISIT (OUTPATIENT)
Dept: TRANSPLANT | Facility: HOSPITAL | Age: 70
End: 2025-01-06
Payer: MEDICARE

## 2025-01-06 VITALS
WEIGHT: 200 LBS | BODY MASS INDEX: 27.09 KG/M2 | DIASTOLIC BLOOD PRESSURE: 62 MMHG | TEMPERATURE: 97.9 F | HEIGHT: 72 IN | OXYGEN SATURATION: 95 % | HEART RATE: 75 BPM | SYSTOLIC BLOOD PRESSURE: 82 MMHG

## 2025-01-06 DIAGNOSIS — I25.5 ISCHEMIC CARDIOMYOPATHY: ICD-10-CM

## 2025-01-06 DIAGNOSIS — Z91.89 AT RISK FOR AMIODARONE TOXICITY WITH LONG TERM USE: ICD-10-CM

## 2025-01-06 DIAGNOSIS — I50.43 ACUTE ON CHRONIC HEART FAILURE WITH REDUCED EJECTION FRACTION AND DIASTOLIC DYSFUNCTION: ICD-10-CM

## 2025-01-06 DIAGNOSIS — I50.23 ACUTE ON CHRONIC SYSTOLIC HEART FAILURE: ICD-10-CM

## 2025-01-06 DIAGNOSIS — Z79.899 AT RISK FOR AMIODARONE TOXICITY WITH LONG TERM USE: ICD-10-CM

## 2025-01-06 DIAGNOSIS — I50.20 HFREF (HEART FAILURE WITH REDUCED EJECTION FRACTION): ICD-10-CM

## 2025-01-06 DIAGNOSIS — I50.82 BIVENTRICULAR CONGESTIVE HEART FAILURE: ICD-10-CM

## 2025-01-06 DIAGNOSIS — Z95.811 LVAD (LEFT VENTRICULAR ASSIST DEVICE) PRESENT (MULTI): ICD-10-CM

## 2025-01-06 DIAGNOSIS — Z86.79 HISTORY OF CHRONIC ATRIAL FIBRILLATION: ICD-10-CM

## 2025-01-06 DIAGNOSIS — I25.10 ATHEROSCLEROTIC HEART DISEASE OF NATIVE CORONARY ARTERY WITHOUT ANGINA PECTORIS: ICD-10-CM

## 2025-01-06 LAB
EJECTION FRACTION: 23 %
LEFT ATRIUM VOLUME AREA LENGTH INDEX BSA: 42.2 ML/M2
LEFT VENTRICLE INTERNAL DIMENSION DIASTOLE: 6.18 CM (ref 3.5–6)
MITRAL VALVE E/A RATIO: 0.81
RIGHT VENTRICLE FREE WALL PEAK S': 4 CM/S
TRICUSPID ANNULAR PLANE SYSTOLIC EXCURSION: 1 CM

## 2025-01-06 PROCEDURE — 93306 TTE W/DOPPLER COMPLETE: CPT

## 2025-01-06 PROCEDURE — 99215 OFFICE O/P EST HI 40 MIN: CPT | Mod: 25 | Performed by: REGISTERED NURSE

## 2025-01-06 PROCEDURE — 93750 INTERROGATION VAD IN PERSON: CPT | Performed by: REGISTERED NURSE

## 2025-01-06 PROCEDURE — 93306 TTE W/DOPPLER COMPLETE: CPT | Performed by: INTERNAL MEDICINE

## 2025-01-06 ASSESSMENT — PAIN SCALES - GENERAL: PAINLEVEL_OUTOF10: 0-NO PAIN

## 2025-01-06 NOTE — PATIENT INSTRUCTIONS
Patient Instructions:  -Please bring a list of your medications to every visit.  -If you have any questions or concerns, please contact the LVAD office at [phone], option 3 or the direct line at [phone]. Please state that you are an LVAD patient. If it is after hours and it is an emergency, please page the LVAD pager by calling [phone] #[phone]  - Continue current medications with the exception of:   --   - Labwork: CBC, CMP, LDH, TSH, DIG, BNP  - Imaging/Procedures:   - Referrals:   - Followup: March with social work and Stacie Davis

## 2025-01-06 NOTE — PROGRESS NOTES
VAD Cardiologist: Dr. Steven Washington     VAD Coordinator: Lorenza Valerio     Type of Visit: LVAD initial visit     Type of VAD:  HM3  Implant Date: 11/12/2024  Reason for VAD: ICM  Intent: Long-Term (Age)     HPI / Summary:   Fahad Meraz is a very pleasant 69 y.o. male w/ a PMHx sig for CAD s/p 4V CABG (2012) and PCI (LCx/OM; 5/2019), stage D systolic HF/ICM/HFrEF with LVEF 10-15%( 10/22/24 TTE), AF s/p RFA (PVI and CTI; 4/2024) on OAC therapy, HTN, dyslipidemia, depression, anxiety and VVI using CPAP who was admitted to OSH ICU, s/p RHC on 10/18/24.  Per patient, he had been experienced worsening SOB and fluids overload for 2-3 weeks. Patient was on milrinone drip and underwent SGC diuresis. However his KENYA worsened, and were not able to wean milrinone drip. Decision was made to transfer him to HF ICU [facility] for possible advanced therapy consideration. Advanced therapies evaluation was initiated on 10/30. Discussed in advanced therapeutics committee on 11/5 and was denied for transplantation, and approved for LVAD (significant PAD, Elevated PSA level, Age approaching 70, as well as patient verbalized he would be more in favor of LVAD vs Transplant). He was transferred to the floor on 11/6 to await VAD implantation. Readmitted to HFICU as of 11/09 for pre-OR swan guided optimization. Now presents to CTICU s/p LVAD HM3 implant via thoracotomy with descending outflow graft on 11/12/24 with Dr. Mclain. OR Course/Issues: pulseless VT requiring defib x 1 pre- CPB. Postop course c/b delirium, RV dysfunction, VT & malnutrition.       Previous Visit: no changes made    Today:  Patient denies CP, palpitations, dizziness, RAMOS, orthopnea, PND, edema, LVAD alarms, N/V/D, hematochezia, hematuria, epistaxis.  Most recent INR is 12/30/2024: 2.1 6:30 AM   Patient states he has staples in his groin with complaint of yeast infection in groin. Patient is using antifungal powder. Staples removed.   Driveline is clean with no  drainage. Dressing change performed in clinic today.   VAD equipment interrogated in clinic with all batteries and clips functioning properly. Patient denies sleeping on batteries at home. Patient  has not dropped their controller since last visit.   Most recent six alarms interrogated from patient controller.     Patient has not completed Cardiac Rehab as he has just completed a stay at SNF. Will refer.   0 / 36  sessions completed.     NYHA class at implant - IV.   NYHA class today -  I .     Personally reviewed echo, difficult to visualize AV however appears to be partially opening every beat. Patient is pulsatile every 2-3 beats. Patient inquiring if he could go ice fishing, he was told this activity is not recommended with his LVAD. Overall, he is doing well but did not some occasional confusion with placing himself back on battery. Would consider geriatrics     ROS:   Full 10 pt review of symptoms of negative unless discussed above.     Objective   Medical History:   He has no past medical history on file.  Social Hx:   He reports that he has quit smoking. His smoking use included cigarettes. He has never used smokeless tobacco. No history on file for alcohol use and drug use.  Family Hx:   His family history is not on file.   Exam:   Vitals:   Vitals:    01/06/25 1241   BP: 82/62   Pulse: 75   Temp: 36.6 °C (97.9 °F)   SpO2: 95%         Wt Readings from Last 5 Encounters:   01/06/25 90.7 kg (200 lb)   12/10/24 90.8 kg (200 lb 3.2 oz)   12/03/24 87.5 kg (192 lb 14.4 oz)   10/24/24 92.5 kg (204 lb)   08/05/24 122 kg (268 lb)     GEN: Pleasant, well-appearing, no acute distress.  HEENT: JVP not elevated, no icterus.   CHEST: Clear to auscultation. Sternotomy well healed.  CV: LVAD hum  ABD: Soft, ND, NT.  Driveline: No drainage. Dressing c/d/I. Secured.  EXT: Warm, well perfused, No LE edema.   NEURO: Tyrell, BOWMAN, Oriented to plan     Labs:   CMP:  Recent Labs     12/03/24  0630 12/02/24  0709 12/01/24  0635  11/30/24  0834 11/29/24 0702 11/27/24 0652 11/27/24  0227   NA  --   --  141 139 138 138 138   K  --   --  4.5 4.3 4.2 4.4 4.8   CL  --   --  105 105 106 105 105   CO2  --   --  25 26 26 24 23   ANIONGAP  --   --  16 12 10 13 15   BUN  --   --  17 17 16 20 22   CREATININE  --   --  1.46* 1.20 1.19 1.28 1.21   EGFR  --   --  52* 65 66 61 65   MG 2.26 2.12 2.02 2.02 2.05 2.18 2.12     Recent Labs     12/01/24 0635 11/30/24 0834 11/29/24  0702 11/27/24  0652 11/26/24  0603 11/19/24  1459 11/19/24  0322 11/18/24  0525 11/17/24  1323 11/17/24  0122 11/16/24  1358 11/16/24  0128 11/15/24  1255 11/15/24  0021   ALBUMIN 3.5 3.5 3.3* 3.6 3.6   < > 3.1* 3.5   < > 3.4   < > 3.6   < > 3.7   ALKPHOS  --   --   --   --   --   --  55 54  --  52  --  51  --  53   ALT  --   --   --   --   --   --  6* 6*  --  3*  --  4*  --  5*   AST  --   --   --   --   --   --  16 18  --  21  --  24  --  29   BILITOT  --   --   --   --   --   --  0.8 0.8  --  1.0  --  1.0  --  1.1    < > = values in this interval not displayed.     CBC:  Recent Labs     12/03/24 0630 12/02/24  0709 12/01/24 0635 11/30/24 0834 11/29/24  0755   WBC 8.7 8.5 8.1 7.9 9.0   HGB 12.0* 11.8* 11.1* 11.0* 10.5*   HCT 37.9* 39.5* 33.9* 34.6* 33.1*    336 347 381 401   MCV 85 91 83 85 86     COAG:   Recent Labs     12/03/24  0630 12/02/24  0709 12/01/24  0635 11/29/24  0702 11/28/24  0542 11/27/24  2037 11/27/24  1605 11/26/24  0957 11/13/24  0008 11/12/24  1257   INR 2.3* 2.2* 2.0*   < > 2.1*  --    < >  --    < > 1.4*   HAUF  --   --   --   --  0.6 0.5  --  0.5   < >  --    FIBRINOGEN  --   --   --   --   --   --   --   --   --  301    < > = values in this interval not displayed.     ABO:   Recent Labs     11/20/24  0151   ABO A     HEME/ENDO:  Recent Labs     10/31/24  1249 10/24/24  2328   FERRITIN  --  76   IRONSAT  --  17*   TSH 4.39* 8.47*   HGBA1C  --  6.3*      CARDIAC:   Recent Labs     12/03/24  0630 12/02/24  0709 12/01/24  0635 11/30/24  0834  11/29/24  0755 11/29/24  0702 11/12/24  1307 10/31/24  1249 10/24/24  2328    186 223 176  --  151   < > 187  --    BNP  --   --   --   --  478*  --   --  755* 1,995*    < > = values in this interval not displayed.     Recent Labs     11/27/24  0652   CHOL 126  126   HDL 24.6  24.6   TRIG 155*           Notable Studies:   EKG:   Recent Labs     11/27/24  0030 11/21/24  0945 11/17/24  0855   VENTRATE 84 111 119   ATRRATE 16 81 138   QTCFRED 494 498 469   QRSDUR 158 146 112   QTCCALCB 522 552 526       Echocardiogram:   Recent Labs     01/06/25  1224 11/26/24  1152 11/18/24  1144 11/15/24  0904 11/13/24  0851 11/12/24  0628 11/05/24  1154 10/25/24  1436   EF 23 18 10 13 15   < > 18 18   LVIDD 6.18  --   --  5.90 6.90  --  8.21 7.70   RV  --   --   --   --  23.4  --  42.9 21.0   RVFRWALLPKSP 4.00  --   --   --  6.64  --  6.00  --    TAPSE 1.0  --   --   --   --   --  0.9 1.0    < > = values in this interval not displayed.     Echocardiogram ( 11/26/2024)    1. Left ventricular ejection fraction is severely decreased, by visual estimate at 15-20%.   2. There is global hypokinesis of the left ventricle with minor regional variations.   3. Spectral Doppler shows a Grade II (pseudonormal pattern) of left ventricular diastolic filling with an elevated left atrial pressure.   4. Left ventricular cavity size is severely dilated.   5. There is reduced right ventricular systolic function.   6. Severely enlarged right ventricle.   7. The left atrium is enlarged.   8. Mild aortic valve regurgitation.   9. The pulmonary artery is not well visualized.         Coronary Angiography:   Left & Right Heart Cath w Angiography & LV 10/28/2024  1. Diffuse severe native coronary artery disease.  2. Patent proximal LCx and ostial-prox OM 1 ARIELLE.  3. Patent LIMA-LAD. Other grafts are occluded: SVG-OM, SVG-RI, SVG- RCA.  4. Elevated right and left sided filling pressures with preserved cardiac output and index while on ionotropic  support with 0.375 mcg/kg/min of milrinone .  5. Further work-up and management per advanced heart failure team.              Current Outpatient Medications   Medication Instructions    amiodarone (PACERONE) 200 mg, oral, Daily, Starting 12/10/24 please take 200mg amiodarone daily    aspirin 81 mg, oral, Daily    bumetanide (BUMEX) 0.5 mg, oral, Daily PRN    cholecalciferol (VITAMIN D-3) 50,000 Units, Once Weekly    digoxin (LANOXIN) 250 mcg, oral, 4 times weekly    escitalopram (LEXAPRO) 10 mg, oral, Daily    Farxiga 10 mg, oral, Daily    lidocaine 4 % patch 2 patches, transdermal, Daily, Remove & discard patch within 12 hours or as directed by MD.    magnesium oxide (MAG-OX) 800 mg, oral, 2 times daily    methocarbamol (ROBAXIN) 500 mg, oral, Every 6 hours PRN    multivitamin tablet 1 tablet, Daily    pantoprazole (PROTONIX) 40 mg, oral, Daily before breakfast, Do not crush, chew, or split.    pravastatin (PRAVACHOL) 20 mg, oral, Nightly    sacubitriL-valsartan (Entresto) 24-26 mg tablet 1 tablet, oral, 2 times daily    sildenafil (REVATIO) 20 mg, oral, 3 times daily    spironolactone (ALDACTONE) 12.5 mg, oral, Daily    Vascepa 2 g, oral, 2 times daily (morning and late afternoon)    warfarin (Coumadin) 5 mg tablet Take as directed per LVAD team and Coumadin clinic. Please take 7.5mg daily until instructed otherwise.          Heart Mate III interrogation (personally interrogated)- No significant alarms noted   LVAD  LVAD Flow (LPM): 4.8 LPM  LVAD Speed: 5200 BPM  LVAD PI: 3.1  LVAD POWER: 3.8  Arterial Extended Pressure Measurements  Arterial Extended Mean Pressure: 70 mmHg      Problems:   NEUROLOGICAL  Active issues:   #Acute post-operative pain - resolved    #Hx of anxiety and depression  - Continue home escitalopram 10mg daily   #Transient LUE weakness on 11/24  - CTH (11/24) without acute abnormalities  - Resolved with tylenol  - No focal deficits any further    #Postoperative delirium - resolved           CARDIAC  #PMHx CAD s/p CABG x 4 (2012) and PCI (LCx/OM; 5/2019), stage D ICM HFrEF LVEF 10-15%  #S/p HM3 LVAD via left thoracotomy 11/12 with Jas Ortiz and Kaitlin.      GDMT and RV support:   - BB: n/a - RV dysfunction   - ARNI/ACEi/ARB: Entresto 24-26mg BID -MAP at goal on this regimen   - MRA: continue spironolactone 12.5mg daily    - SGLT2i: Farxiga 10 mg daily      RV support:   - Digoxin 250 4x week -> obtain a repeat level (pt will have labs drawn closer to home)   - Continue sildenafil 20mg TID      Anticoagulation:   - Continue Coumadin 7.5mg daily for goal INR 2-3. INR 12/3 2.3   - Continue ASA (primary indication - ICM, PCI)      RAMP study 12/10/24: Started study with RPM 5200. AV opening every beat, no MR, mild AI, septum midline. -will perform a repeat echocardiogram RAMP at the next visit especially considering the outflow graft that is connected to the descending aorta      #Chronic AF s/p RFA (PVI and CTI; 4/2024),   #Sustained VT and NSVT  - Patient never required ICD as his EF remained > 30% prior to acute reduction and LVAD placement   - 11/26 patient noted to have sustained asymptomatic VT w/ self termination.   - EP consulted and recommend medical management with amio over ICD placement given patient's stability and lack of symptoms   - Since starting amio, patient's ectopy has improved  - Continue amiodarone 200mg daily, would be hesitant to wean any further without EP weigh in given that he does not have ICD     #HLD  - Repeat lipid panel: HDL 24.6, Non-HDL cholesterol 101, . Cholesterol/HDL ratio 5.1  - Continue vascepa 2g BID  - C/w pravastatin 20mg daily      RESPIRATORY   #VIV on CPAP at home     GI:  #GERD  #Malnutrition d/t poor PO intake - resolved   - Continue PPI for GERD, LVAD bleeding risk   - senna/colace BID and miralax to as needed        RENAL:  #CKD (baseline Cr 1.3-1.6)  - Strict I&Os  - Appears euvolemic on exam   - Avoid hypotension and nephrotoxic agents           ENDO:  #PMH of pre-DM with recent A1c: 6.3  - Euglycemic   - Goal BG <180  - NO indication for SSI at this time      HEMATOLOGY:  #Acute blood loss anemia   #Iron deficiency anemia   - Iron studies on 11/24 suggest iron deficiency   - Hg remains stable without active signs of bleeding  - Completed iron sucrose 200mg IV daily x 5 days (11/27 - 12/1)       Follow up: Two months

## 2025-01-07 ENCOUNTER — ANTI-COAG VISIT (OUTPATIENT)
Age: 70
End: 2025-01-07

## 2025-01-07 DIAGNOSIS — Z51.81 ENCOUNTER FOR THERAPEUTIC DRUG MONITORING: ICD-10-CM

## 2025-01-07 DIAGNOSIS — Z79.01 LONG TERM (CURRENT) USE OF ANTICOAGULANTS: Primary | ICD-10-CM

## 2025-01-07 DIAGNOSIS — Z95.811 LVAD (LEFT VENTRICULAR ASSIST DEVICE) PRESENT (HCC): ICD-10-CM

## 2025-01-07 NOTE — PROGRESS NOTES
Medication Management Clinic  [facility]  Anticoagulation Clinic  [phone] (phone)  [phone] (fax)        INR drawn by [facility].  Nursing assessment reviewed and appreciated.  Nursing assessment within the normal limits with the exception of the following:  not available at the time of this encounter.     Anticoagulation encounter completed via telephone.     Mr. Kush Perea is a 69 y.o.  male with history of  LVAD .    Spoke to wife, Amy.  Wife verifies current dosing regimen and tablet strength.  Did not take any Coumadin last night, there was some confusion with instructions as given at last encounter.   Patient denies s/s bleeding/bruising/swelling/SOB  No blood in urine or stool.  No dietary changes.  No changes in medication/OTC agents/Herbals.  No change in alcohol use or tobacco use.  Activity level slightly improved, working to get back to baseline.  Patient denies falls.  No vomiting/diarrhea or acute illness.   No Procedures scheduled in the future at this time.  Will be discharged from [facility] 1/13.      Assessment:  Lab Results   Component Value Date    INR 2.40 (H) 01/07/2025    INR 2.12 (H) 12/30/2024    INR 1.34 (H) 12/26/2024     INR therapeutic   Recent Labs     01/07/25  1140   INR 2.40*         Plan:  Coumadin 15mg today,  then continue Coumadin 9.5mg MWF and 11.5mg TThSaS.  Recheck INR in 1 week(s) in clinic.  Patient reminded to call the Anticoagulation Clinic with any signs or symptoms of bleeding or with any medication changes.  Patient given instructions utilizing the teach back method.    Alicia Fragoso, KunalD, BCPS, CACP, CTTS     For Pharmacy Admin Tracking Only    Intervention Detail: Dose Adjustment: 1, reason: Therapy Optimization  Total # of Interventions Recommended: 1  Total # of Interventions Accepted: 1  Time Spent (min): 20

## 2025-01-16 ENCOUNTER — OFFICE VISIT (OUTPATIENT)
Dept: CARDIOLOGY CLINIC | Age: 70
End: 2025-01-16
Payer: MEDICARE

## 2025-01-16 ENCOUNTER — ANTI-COAG VISIT (OUTPATIENT)
Age: 70
End: 2025-01-16
Payer: MEDICARE

## 2025-01-16 VITALS — BODY MASS INDEX: 28.85 KG/M2 | HEIGHT: 72 IN | WEIGHT: 213 LBS

## 2025-01-16 DIAGNOSIS — Z95.811 LVAD (LEFT VENTRICULAR ASSIST DEVICE) PRESENT (HCC): ICD-10-CM

## 2025-01-16 DIAGNOSIS — I25.5 ISCHEMIC CARDIOMYOPATHY: Primary | ICD-10-CM

## 2025-01-16 DIAGNOSIS — Z51.81 ENCOUNTER FOR THERAPEUTIC DRUG MONITORING: ICD-10-CM

## 2025-01-16 DIAGNOSIS — Z79.01 LONG TERM (CURRENT) USE OF ANTICOAGULANTS: Primary | ICD-10-CM

## 2025-01-16 LAB — POC INR: 2.9 (ref 0.8–1.2)

## 2025-01-16 PROCEDURE — G8417 CALC BMI ABV UP PARAM F/U: HCPCS | Performed by: INTERNAL MEDICINE

## 2025-01-16 PROCEDURE — 1036F TOBACCO NON-USER: CPT | Performed by: INTERNAL MEDICINE

## 2025-01-16 PROCEDURE — 1123F ACP DISCUSS/DSCN MKR DOCD: CPT | Performed by: INTERNAL MEDICINE

## 2025-01-16 PROCEDURE — 1160F RVW MEDS BY RX/DR IN RCRD: CPT | Performed by: INTERNAL MEDICINE

## 2025-01-16 PROCEDURE — 99214 OFFICE O/P EST MOD 30 MIN: CPT | Performed by: INTERNAL MEDICINE

## 2025-01-16 PROCEDURE — 99211 OFF/OP EST MAY X REQ PHY/QHP: CPT

## 2025-01-16 PROCEDURE — 1159F MED LIST DOCD IN RCRD: CPT | Performed by: INTERNAL MEDICINE

## 2025-01-16 PROCEDURE — 85610 PROTHROMBIN TIME: CPT

## 2025-01-16 PROCEDURE — G8427 DOCREV CUR MEDS BY ELIG CLIN: HCPCS | Performed by: INTERNAL MEDICINE

## 2025-01-16 PROCEDURE — 3017F COLORECTAL CA SCREEN DOC REV: CPT | Performed by: INTERNAL MEDICINE

## 2025-01-16 NOTE — PATIENT INSTRUCTIONS
Your nurses today were SEDA Dill and KATHY Knapp  Your provider today was Dr. Davis  Phone number: [phone]      You may receive a survey regarding the care you received during your visit.  Your input is valuable to us.  We encourage you to complete and return your survey.  We hope you will choose us in the future for your healthcare needs.

## 2025-01-16 NOTE — PROGRESS NOTES
Medication Management Clinic  [facility]  Anticoagulation Clinic  [phone] (phone)  [phone] (fax)    Mr. Kush Perea is a 69 y.o.  male with history of  LVAD  who presents today for anticoagulation monitoring and adjustment.    Patient verifies current dosing regimen and tablet strength.  No missed or extra doses.  Patient denies s/s bleeding/bruising/swelling/SOB  No blood in urine or stool.  No dietary changes.  No changes in medication/OTC agents/Herbals.  No change in alcohol use or tobacco use.  No change in activity level.  Patient denies falls.  No vomiting/diarrhea or acute illness.   No Procedures scheduled in the future at this time.    Assessment:   Lab Results   Component Value Date    INR 2.90 (H) 01/16/2025    INR 2.40 (H) 01/07/2025    INR 2.12 (H) 12/30/2024     INR therapeutic   Recent Labs     01/16/25  1415   INR 2.90*     Plan:  Continue Coumadin 9.5 mg MoWeFr and 11.5 mg SuTuThSa.  Recheck INR in 1 week(s).  Patient reminded to call the Anticoagulation Clinic with any signs or symptoms of bleeding or with any medication changes.  Patient given instructions utilizing the teach back method.    After visit summary printed and reviewed with patient.      Discharged ambulatory in no apparent distress.    For Pharmacy Admin Tracking Only  Time Spent (min): 15

## 2025-01-16 NOTE — PROGRESS NOTES
Follow-up, LVAD.   He has more fatigue.   
Patient here as a hospital follow up  Discharged from [facility] on LVAD  
AM    RBC 6.12 10/24/2024 06:00 AM    HGB 16.5 10/24/2024 06:00 AM    HCT 50.0 10/24/2024 06:00 AM    MCV 81.7 10/24/2024 06:00 AM    MCH 27.0 10/24/2024 06:00 AM    MCHC 33.0 10/24/2024 06:00 AM    RDW 17.3 10/04/2024 10:15 AM     10/24/2024 06:00 AM    MPV 10.5 10/24/2024 06:00 AM       Lab Results   Component Value Date/Time     10/24/2024 06:00 AM    K 3.7 10/24/2024 06:00 AM    K 4.3 10/23/2024 10:18 AM    CL 94 10/24/2024 06:00 AM    CO2 25 10/24/2024 06:00 AM    BUN 23 10/24/2024 06:00 AM    CREATININE 1.6 10/24/2024 06:00 AM    CALCIUM 9.8 10/24/2024 06:00 AM    LABGLOM 46 10/24/2024 06:00 AM    LABGLOM 82 04/15/2024 10:30 AM    GLUCOSE 114 10/24/2024 06:00 AM    GLUCOSE 125 10/04/2024 10:15 AM       Lab Results   Component Value Date/Time    ALKPHOS 62 10/04/2024 10:15 AM    ALKPHOS 63 04/19/2024 10:42 AM    ALT 10 10/04/2024 10:15 AM    AST 14 10/04/2024 10:15 AM    BILITOT 0.9 10/04/2024 10:15 AM       Lab Results   Component Value Date/Time    MG 2.0 10/24/2024 01:16 AM       Lab Results   Component Value Date    INR 2.40 (H) 01/07/2025    INR 2.12 (H) 12/30/2024    INR 1.34 (H) 12/26/2024         No results found for: \"LABA1C\"    Lab Results   Component Value Date/Time    TRIG 108 10/18/2024 11:31 AM    HDL 21 10/18/2024 11:31 AM       Lab Results   Component Value Date/Time    TSH 3.690 11/23/2022 03:55 PM         Testing Reviewed:      I have individually reviewed the cardiac test below:    ECHO: No results found for this or any previous visit.          S/p LVAD, 11/5/2024 - Destination Therapy  CKD  Ischemic CHF  S/p SVG-RI, 12/2020  EF 30% with LifeVest  PCI of the LCX/OM, 5/2019 - DAPT  Occluded SVG-OM  CABG x 4, 2012  HTN  Moderate stenosis by DSE    Assessment & Plan    His overall condition appears satisfactory. He has experienced significant weight loss, which is a positive sign. His skin is warm and pink, indicating good circulation. He reports no pain, infection, or bleeding. He

## 2025-01-17 ENCOUNTER — TELEPHONE (OUTPATIENT)
Dept: TRANSPLANT | Facility: HOSPITAL | Age: 70
End: 2025-01-17
Payer: MEDICARE

## 2025-01-17 NOTE — TELEPHONE ENCOUNTER
Called patient wife and let her know we do not have lab results in our system; they went to a local lab and they did not fax the results. Patient wife to call the office Monday. Advised her that the office can also call us in order to facilitate the fax.

## 2025-01-17 NOTE — TELEPHONE ENCOUNTER
PT wife called to ask when will the pt results show in mychart. She said she doesn't need a call back, just the results of his labs

## 2025-01-23 ENCOUNTER — ANTI-COAG VISIT (OUTPATIENT)
Age: 70
End: 2025-01-23
Payer: MEDICARE

## 2025-01-23 ENCOUNTER — TELEPHONE (OUTPATIENT)
Age: 70
End: 2025-01-23

## 2025-01-23 ENCOUNTER — TELEPHONE (OUTPATIENT)
Dept: RESPIRATORY THERAPY | Facility: HOSPITAL | Age: 70
End: 2025-01-23
Payer: MEDICARE

## 2025-01-23 DIAGNOSIS — Z95.811 LVAD (LEFT VENTRICULAR ASSIST DEVICE) PRESENT (HCC): ICD-10-CM

## 2025-01-23 DIAGNOSIS — Z51.81 ENCOUNTER FOR THERAPEUTIC DRUG MONITORING: ICD-10-CM

## 2025-01-23 DIAGNOSIS — Z79.01 LONG TERM (CURRENT) USE OF ANTICOAGULANTS: Primary | ICD-10-CM

## 2025-01-23 LAB — POC INR: 2.9 (ref 0.8–1.2)

## 2025-01-23 PROCEDURE — 85610 PROTHROMBIN TIME: CPT

## 2025-01-23 PROCEDURE — 99212 OFFICE O/P EST SF 10 MIN: CPT

## 2025-01-23 RX ORDER — WARFARIN SODIUM 2 MG/1
TABLET ORAL
Qty: 150 TABLET | Refills: 1 | Status: SHIPPED | OUTPATIENT
Start: 2025-01-23

## 2025-01-23 RX ORDER — WARFARIN SODIUM 7.5 MG/1
TABLET ORAL
Qty: 90 TABLET | Refills: 1 | Status: SHIPPED | OUTPATIENT
Start: 2025-01-23

## 2025-01-23 NOTE — PROGRESS NOTES
Medication Management Clinic  [facility]  Anticoagulation Clinic  [phone] (phone)  [phone] (fax)    Mr. Kush Perea is a 69 y.o.  male with history of  LVAD  who presents today for anticoagulation monitoring and adjustment.    Patient verifies current dosing regimen and tablet strength.  No missed or extra doses.  Patient denies s/s bleeding/bruising/swelling/SOB  No blood in urine or stool.  No dietary changes.  No changes in medication/OTC agents/Herbals.  No change in alcohol use or tobacco use.  No change in activity level.  Patient denies falls.  No vomiting/diarrhea or acute illness.   No Procedures scheduled in the future at this time.    Assessment:   Goal 2-3  Lab Results   Component Value Date    INR 2.90 (H) 01/23/2025    INR 2.90 (H) 01/16/2025    INR 2.40 (H) 01/07/2025     INR therapeutic   Recent Labs     01/23/25  1136   INR 2.90*     Plan:  Continue Coumadin 9.5 mg MoWeFr and 11.5 mg SuTuThSa.  Recheck INR in 2 weeks on 2/6/25. Patient is out of town 1/28-2/4.      Patient reminded to call the Anticoagulation Clinic with any signs or symptoms of bleeding or with any medication changes.  Patient given instructions utilizing the teach back method.    Refills for warfarin 2 mg and 7.5 mg to [facility] in [place] per request.    After visit summary printed and reviewed with patient.      Discharged ambulatory in no apparent distress.    For Pharmacy Admin Tracking Only    Intervention Detail: Refill(s) Provided  Total # of Interventions Recommended: 1  Total # of Interventions Accepted: 1  Time Spent (min): 20

## 2025-01-23 NOTE — TELEPHONE ENCOUNTER
Called to update Robina that patient should not go more than 2 weeks without INR check. Pharmacist to make note.

## 2025-01-23 NOTE — TELEPHONE ENCOUNTER
[facility] Coumadin [facility], Robina called. Wanted to know there is a max length allowed to go between INR checks. Requesting call back from coord. Number: [phone]

## 2025-01-23 NOTE — TELEPHONE ENCOUNTER
Spoke with Adeline from [facility] LVAD team. She states that they prefer that patient with LVAD go no more than 2 weeks between INR checks. She states could go 3 if absolutely need to, but prefer no more than two weeks.  Thanked her for the information!

## 2025-01-24 ENCOUNTER — TELEPHONE (OUTPATIENT)
Dept: TRANSPLANT | Facility: HOSPITAL | Age: 70
End: 2025-01-24
Payer: MEDICARE

## 2025-01-24 DIAGNOSIS — Z86.79 HISTORY OF CHRONIC ATRIAL FIBRILLATION: ICD-10-CM

## 2025-01-24 DIAGNOSIS — I50.84 HEART FAILURE, ACC/AHA STAGE D: ICD-10-CM

## 2025-01-24 DIAGNOSIS — I50.43 ACUTE ON CHRONIC HEART FAILURE WITH REDUCED EJECTION FRACTION AND DIASTOLIC DYSFUNCTION: ICD-10-CM

## 2025-01-24 DIAGNOSIS — I50.43 ACUTE ON CHRONIC COMBINED SYSTOLIC (CONGESTIVE) AND DIASTOLIC (CONGESTIVE) HEART FAILURE: ICD-10-CM

## 2025-01-24 DIAGNOSIS — I50.84 ACC/AHA STAGE D HEART FAILURE: ICD-10-CM

## 2025-01-24 DIAGNOSIS — I25.5 ISCHEMIC CARDIOMYOPATHY: ICD-10-CM

## 2025-01-24 DIAGNOSIS — I42.0 DILATED CARDIOMYOPATHY (MULTI): ICD-10-CM

## 2025-01-24 DIAGNOSIS — Z95.811 LVAD (LEFT VENTRICULAR ASSIST DEVICE) PRESENT (MULTI): ICD-10-CM

## 2025-01-24 RX ORDER — DIGOXIN 250 MCG
250 TABLET ORAL
Qty: 16 TABLET | Refills: 11 | Status: SHIPPED | OUTPATIENT
Start: 2025-01-25 | End: 2026-01-25

## 2025-01-24 NOTE — TELEPHONE ENCOUNTER
PT spouse called requested refill on digoxin (Lanoxin) 250 MCG tab;et. She stated he is just about out.

## 2025-01-24 NOTE — TELEPHONE ENCOUNTER
Medication refill requested from patient. Verified medication on med list. Sent to appropriate MD for authorization.

## 2025-01-29 ENCOUNTER — TELEPHONE (OUTPATIENT)
Dept: INTERNAL MEDICINE CLINIC | Age: 70
End: 2025-01-29

## 2025-01-29 DIAGNOSIS — I50.84 HEART FAILURE, ACC/AHA STAGE D: ICD-10-CM

## 2025-01-29 DIAGNOSIS — I50.43 ACUTE ON CHRONIC HEART FAILURE WITH REDUCED EJECTION FRACTION AND DIASTOLIC DYSFUNCTION: ICD-10-CM

## 2025-01-29 DIAGNOSIS — I50.84 ACC/AHA STAGE D HEART FAILURE: ICD-10-CM

## 2025-01-29 DIAGNOSIS — Z86.79 HISTORY OF CHRONIC ATRIAL FIBRILLATION: ICD-10-CM

## 2025-01-29 DIAGNOSIS — I25.5 ISCHEMIC CARDIOMYOPATHY: ICD-10-CM

## 2025-01-29 DIAGNOSIS — Z95.811 LVAD (LEFT VENTRICULAR ASSIST DEVICE) PRESENT (MULTI): ICD-10-CM

## 2025-01-29 DIAGNOSIS — I50.43 ACUTE ON CHRONIC COMBINED SYSTOLIC (CONGESTIVE) AND DIASTOLIC (CONGESTIVE) HEART FAILURE: ICD-10-CM

## 2025-01-29 DIAGNOSIS — I42.0 DILATED CARDIOMYOPATHY (MULTI): ICD-10-CM

## 2025-01-29 RX ORDER — LANOLIN ALCOHOL/MO/W.PET/CERES
800 CREAM (GRAM) TOPICAL 2 TIMES DAILY
Qty: 120 TABLET | Refills: 11 | Status: SHIPPED | OUTPATIENT
Start: 2025-01-29 | End: 2026-01-29

## 2025-01-29 RX ORDER — SPIRONOLACTONE 25 MG/1
12.5 TABLET ORAL DAILY
Qty: 15 TABLET | Refills: 11 | Status: SHIPPED | OUTPATIENT
Start: 2025-01-29 | End: 2026-01-29

## 2025-01-29 NOTE — TELEPHONE ENCOUNTER
Patient has been on both of these medications since Aug 2024.  Interaction is already accounted for and INR is already monitored closely.  Fax returned to [facility].

## 2025-02-06 ENCOUNTER — ANTI-COAG VISIT (OUTPATIENT)
Age: 70
End: 2025-02-06
Payer: MEDICARE

## 2025-02-06 DIAGNOSIS — Z51.81 ENCOUNTER FOR THERAPEUTIC DRUG MONITORING: ICD-10-CM

## 2025-02-06 DIAGNOSIS — Z95.811 LVAD (LEFT VENTRICULAR ASSIST DEVICE) PRESENT (HCC): ICD-10-CM

## 2025-02-06 DIAGNOSIS — Z79.01 ANTICOAGULATED ON COUMADIN: ICD-10-CM

## 2025-02-06 DIAGNOSIS — Z79.01 LONG TERM (CURRENT) USE OF ANTICOAGULANTS: Primary | ICD-10-CM

## 2025-02-06 LAB — POC INR: 2.8 (ref 0.8–1.2)

## 2025-02-06 PROCEDURE — 99212 OFFICE O/P EST SF 10 MIN: CPT

## 2025-02-06 PROCEDURE — 85610 PROTHROMBIN TIME: CPT

## 2025-02-06 RX ORDER — WARFARIN SODIUM 2 MG/1
TABLET ORAL
Qty: 150 TABLET | Refills: 1 | OUTPATIENT
Start: 2025-02-06

## 2025-02-06 RX ORDER — WARFARIN SODIUM 7.5 MG/1
TABLET ORAL
Qty: 90 TABLET | Refills: 1 | Status: SHIPPED | OUTPATIENT
Start: 2025-02-06 | End: 2025-02-06 | Stop reason: SDUPTHER

## 2025-02-06 RX ORDER — WARFARIN SODIUM 2 MG/1
TABLET ORAL
Qty: 150 TABLET | Refills: 1 | Status: SHIPPED | OUTPATIENT
Start: 2025-02-06 | End: 2025-02-06 | Stop reason: SDUPTHER

## 2025-02-06 RX ORDER — WARFARIN SODIUM 7.5 MG/1
TABLET ORAL
Qty: 90 TABLET | Refills: 1 | OUTPATIENT
Start: 2025-02-06

## 2025-02-06 NOTE — PROGRESS NOTES
Medication Management Clinic  [facility]  Anticoagulation Clinic  [phone] (phone)  [phone] (fax)    Mr. Kush Perea is a 69 y.o.  male with history of LVAD, per referral from TR Joseph, who presents today for Warfarin monitoring and adjustment (2 week visit).    Patient's wife sets up pill box according to printed instructions. He doesn't know which tablets he has.  No missed or extra doses.  Patient denies bleeding/bruising/SOB. States had a gout flare up of right foot about 10 days ago - first used Tylenol, then used a medicine twice that he hadn't used in at least a year (green capsule).  Advised to bring to next visit or, if needed again, call this clinic with name.  No blood in urine or stool.  No dietary changes.  No changes in medication/OTC agents/herbals.  No change in alcohol use or tobacco use.  No change in activity level.  Patient denies falls.  No vomiting/diarrhea or acute illness.   No procedures scheduled in the future at this time.    Assessment:   Lab Results   Component Value Date    INR 2.80 (H) 02/06/2025    INR 2.90 (H) 01/23/2025    INR 2.90 (H) 01/16/2025     INR therapeutic - goal 2-3.  Recent Labs     02/06/25  1117   INR 2.80*        Plan:  POCT INR performed/result reviewed.  Continue PO Coumadin 9.5 mg MWF, 11.5 mg TThSS.  Recheck INR in 2 week(s). (Report given - orders received from/verified with JAY Fragoso RPh., PharmD., who renewed both strengths under referring provider).  Patient reminded to call the Anticoagulation Clinic with any signs or symptoms of bleeding or with any medication changes.  Patient given instructions utilizing the teach back method.       After visit summary printed and reviewed with patient.    Advised patient of need to change prescriptions to referring provider.  Discharged ambulatory in no apparent distress.     For Pharmacy Admin Tracking Only    Intervention Detail: Refill(s) Provided  Total # of Interventions

## 2025-02-06 NOTE — PROGRESS NOTES
Updated Rx phoned in to pharmacy.      Alicia Fragoso, PharmD, BCPS, CACP, CTTS     For Pharmacy Admin Tracking Only    Intervention Detail: Refill(s) Provided  Total # of Interventions Recommended: 2  Total # of Interventions Accepted: 2  Time Spent (min): 5

## 2025-02-20 ENCOUNTER — ANTI-COAG VISIT (OUTPATIENT)
Age: 70
End: 2025-02-20
Payer: MEDICARE

## 2025-02-20 DIAGNOSIS — Z79.01 ANTICOAGULATED ON COUMADIN: ICD-10-CM

## 2025-02-20 DIAGNOSIS — Z95.811 LVAD (LEFT VENTRICULAR ASSIST DEVICE) PRESENT (HCC): ICD-10-CM

## 2025-02-20 DIAGNOSIS — Z51.81 ENCOUNTER FOR THERAPEUTIC DRUG MONITORING: Primary | ICD-10-CM

## 2025-02-20 LAB — POC INR: 3 (ref 0.8–1.2)

## 2025-02-20 PROCEDURE — 85610 PROTHROMBIN TIME: CPT

## 2025-02-20 PROCEDURE — 99211 OFF/OP EST MAY X REQ PHY/QHP: CPT

## 2025-02-20 NOTE — PROGRESS NOTES
Medication Management Clinic  [facility]  Anticoagulation Clinic  [phone] (phone)  [phone] (fax)    Mr. Kush Perea is a 69 y.o.  male with history of  LVAD  who presents today for anticoagulation monitoring and adjustment.    Patient states that his wife fills his pill box for him based on schedule provided and is not sure what dose/tablet strengths he takes  Reports missing Coumadin dose on 2/18  Patient denies s/s bleeding/bruising/swelling/SOB  No blood in urine or stool.  No dietary changes.  No changes in medication/OTC agents/Herbals.  No change in alcohol use or tobacco use.  No change in activity level.  Patient denies falls.  No vomiting/diarrhea or acute illness.   No Procedures scheduled in the future at this time.    Assessment:   Lab Results   Component Value Date    INR 3.00 (H) 02/20/2025    INR 2.80 (H) 02/06/2025    INR 2.90 (H) 01/23/2025     INR therapeutic   Recent Labs     02/20/25  1059   INR 3.00*     Patient presents with a therapeutic INR after missing a dose 2/18.     Plan:  Continue Coumadin 9.5 mg MWF and 11.5 mg TThSS.  Recheck INR in 1 week(s).  Patient reminded to call the Anticoagulation Clinic with any signs or symptoms of bleeding or with any medication changes.  Patient given instructions utilizing the teach back method.    Reviewed plan with Lili Patton PharmD    Refills sent at last appointment with referring provider    After visit summary printed and reviewed with patient.      Discharged ambulatory in no apparent distress.      For Pharmacy Admin Tracking Only    Intervention Detail:   Total # of Interventions Recommended: 0  Total # of Interventions Accepted: 0  Time Spent (min): 20    Rosalinda Villa PharmD 2/20/2025 11:16 AM

## 2025-02-26 ENCOUNTER — TELEPHONE (OUTPATIENT)
Dept: TRANSPLANT | Facility: HOSPITAL | Age: 70
End: 2025-02-26
Payer: MEDICARE

## 2025-02-26 DIAGNOSIS — Z95.811 LVAD (LEFT VENTRICULAR ASSIST DEVICE) PRESENT (MULTI): ICD-10-CM

## 2025-02-26 DIAGNOSIS — I42.0 DILATED CARDIOMYOPATHY (MULTI): ICD-10-CM

## 2025-02-26 DIAGNOSIS — I50.84 HEART FAILURE, ACC/AHA STAGE D: ICD-10-CM

## 2025-02-26 DIAGNOSIS — I50.43 ACUTE ON CHRONIC COMBINED SYSTOLIC (CONGESTIVE) AND DIASTOLIC (CONGESTIVE) HEART FAILURE: ICD-10-CM

## 2025-02-26 DIAGNOSIS — I50.84 ACC/AHA STAGE D HEART FAILURE: ICD-10-CM

## 2025-02-26 DIAGNOSIS — Z86.79 HISTORY OF CHRONIC ATRIAL FIBRILLATION: ICD-10-CM

## 2025-02-26 DIAGNOSIS — I50.43 ACUTE ON CHRONIC HEART FAILURE WITH REDUCED EJECTION FRACTION AND DIASTOLIC DYSFUNCTION: ICD-10-CM

## 2025-02-26 DIAGNOSIS — I25.5 ISCHEMIC CARDIOMYOPATHY: ICD-10-CM

## 2025-02-26 RX ORDER — SACUBITRIL AND VALSARTAN 24; 26 MG/1; MG/1
1 TABLET, FILM COATED ORAL 2 TIMES DAILY
Qty: 60 TABLET | Refills: 12 | Status: SHIPPED | OUTPATIENT
Start: 2025-02-26 | End: 2026-03-23

## 2025-02-26 RX ORDER — PRAVASTATIN SODIUM 20 MG/1
20 TABLET ORAL NIGHTLY
Qty: 30 TABLET | Refills: 11 | Status: SHIPPED | OUTPATIENT
Start: 2025-02-26 | End: 2026-02-26

## 2025-02-26 RX ORDER — DAPAGLIFLOZIN 10 MG/1
10 TABLET, FILM COATED ORAL DAILY
Qty: 30 TABLET | Refills: 11 | Status: SHIPPED | OUTPATIENT
Start: 2025-02-26 | End: 2026-02-26

## 2025-02-26 RX ORDER — ICOSAPENT ETHYL 1 G/1
2 CAPSULE ORAL
Qty: 120 CAPSULE | Refills: 11 | Status: SHIPPED | OUTPATIENT
Start: 2025-02-26 | End: 2026-02-26

## 2025-02-26 RX ORDER — NAPROXEN SODIUM 220 MG/1
81 TABLET, FILM COATED ORAL DAILY
Qty: 30 TABLET | Refills: 11 | Status: SHIPPED | OUTPATIENT
Start: 2025-02-26 | End: 2026-02-26

## 2025-02-26 RX ORDER — AMIODARONE HYDROCHLORIDE 200 MG/1
200 TABLET ORAL DAILY
Qty: 30 TABLET | Refills: 11 | Status: SHIPPED | OUTPATIENT
Start: 2025-02-26 | End: 2026-02-26

## 2025-02-26 RX ORDER — DIGOXIN 250 MCG
250 TABLET ORAL
Qty: 16 TABLET | Refills: 11 | Status: SHIPPED | OUTPATIENT
Start: 2025-02-27 | End: 2026-02-27

## 2025-02-26 NOTE — TELEPHONE ENCOUNTER
PT wife called requesting refill on sacubitriL-valsartan (Entresto) 24-26 mg tablet, aspirin 81 mg chewable tablet, pravastatin (Pravachol) 20 mg table, amiodarone (Pacerone) 200 mg tablet, dapagliflozin propanediol (Farxiga) 10 mg, digoxin (Lanoxin) 250 MCG tab;et, icosapent ethyL (Vascepa) 1 gram capsul        PT is going to [place] in the next 2 weeks and he needs letter to fly, they ask that the letter is ready before his next appt.

## 2025-02-26 NOTE — TELEPHONE ENCOUNTER
Danny from [facility] pharmacy called with questions regarding amiodarone (Pacerone) 200 mg tablet    Phone: [phone] option 2x

## 2025-02-27 ENCOUNTER — ANTI-COAG VISIT (OUTPATIENT)
Age: 70
End: 2025-02-27
Payer: MEDICARE

## 2025-02-27 DIAGNOSIS — Z95.811 LVAD (LEFT VENTRICULAR ASSIST DEVICE) PRESENT (HCC): ICD-10-CM

## 2025-02-27 DIAGNOSIS — Z79.01 ANTICOAGULATED ON COUMADIN: ICD-10-CM

## 2025-02-27 DIAGNOSIS — Z51.81 ENCOUNTER FOR THERAPEUTIC DRUG MONITORING: Primary | ICD-10-CM

## 2025-02-27 LAB — POC INR: 3 (ref 0.8–1.2)

## 2025-02-27 PROCEDURE — 99211 OFF/OP EST MAY X REQ PHY/QHP: CPT

## 2025-02-27 PROCEDURE — 85610 PROTHROMBIN TIME: CPT

## 2025-02-27 NOTE — PROGRESS NOTES
Medication Management Clinic  [facility]  Anticoagulation Clinic  [phone] (phone)  [phone] (fax)    Mr. Kush Perea is a 69 y.o.  male with history of  LVAD  who presents today for anticoagulation monitoring and adjustment (1 week visit).    Patient verifies current dosing regimen and tablet strength.  No missed or extra doses.  Patient denies s/s bleeding/bruising/swelling/SOB  No blood in urine or stool.  No dietary changes.  No changes in medication/OTC agents/Herbals.  No change in alcohol use or tobacco use.  No change in activity level.  Patient denies falls.  No vomiting/diarrhea or acute illness.   No Procedures scheduled in the future at this time.    Assessment:   Lab Results   Component Value Date    INR 3.00 (H) 02/27/2025    INR 3.00 (H) 02/20/2025    INR 2.80 (H) 02/06/2025     INR therapeutic   Recent Labs     02/27/25  1035   INR 3.00*      Goal INR: 2.0-3.0    Plan:  Continue Coumadin 9.5 mg every Mon, Wed, Fri; 11.5 mg all other days.  Recheck INR in 12 days, prior to patient's trip out of state starting 3/12/25.  Patient reminded to call the Anticoagulation Clinic with any signs or symptoms of bleeding or with any medication changes.  Patient given instructions utilizing the teach back method.     After visit summary printed and reviewed with patient.      Discharged ambulatory in no apparent distress.    For Pharmacy Admin Tracking Only    Time Spent (min): Luis Hilario PharmD  2/27/2025 at 10:45 AM

## 2025-02-27 NOTE — PROGRESS NOTES
VAD Cardiologist: Dr. Steven Washington     VAD Coordinator: Lorenza Valerio     Type of Visit: LVAD initial visit     Type of VAD:  HM3  Implant Date: 11/12/2024  Reason for VAD: ICM  Intent: Long-Term (Age)     HPI / Summary:   Fahad Meraz is a very pleasant 69 y.o. male w/ a PMHx sig for CAD s/p 4V CABG (2012) and PCI (LCx/OM; 5/2019), stage D systolic HF/ICM/HFrEF with LVEF 10-15%( 10/22/24 TTE), AF s/p RFA (PVI and CTI; 4/2024) on OAC therapy, HTN, dyslipidemia, depression, anxiety and VIV using CPAP who was admitted to OSH ICU, s/p RHC on 10/18/24.  Per patient, he had been experienced worsening SOB and fluids overload for 2-3 weeks. Patient was on milrinone drip and underwent SGC diuresis. However his KENYA worsened, and were not able to wean milrinone drip. Decision was made to transfer him to HF ICU [facility] for possible advanced therapy consideration. Advanced therapies evaluation was initiated on 10/30. Discussed in advanced therapeutics committee on 11/5 and was denied for transplantation, and approved for LVAD (significant PAD, Elevated PSA level, Age approaching 70, as well as patient verbalized he would be more in favor of LVAD vs Transplant). He was transferred to the floor on 11/6 to await VAD implantation. Readmitted to HFICU as of 11/09 for pre-OR swan guided optimization. Now presents to CTICU s/p LVAD HM3 implant via thoracotomy with descending outflow graft on 11/12/24 with Dr. Mclain. OR Course/Issues: pulseless VT requiring defib x 1 pre- CPB. Postop course c/b delirium, RV dysfunction, VT & malnutrition.       Previous Visit: no changes made    Today:  Patient is unable to take a deep breath without pain in his ribs. Patient taking his bumex every other day. Patient denies CP, palpitations, dizziness, RAMOS, orthopnea, PND, edema, LVAD alarms, N/V/D, hematochezia, hematuria, epistaxis.  Most recent INR is within range at [facility]. New order requested for home INR monitoring system.      Patient  given TSA letter for trip to [place], [place].     Driveline is clean with no drainage. Dressing was last changed on 2/24. Dressing change performed in clinic today.   VAD equipment interrogated in clinic with all batteries and clips functioning properly. Patient denies sleeping on batteries at home. Patient has not dropped their controller since last visit.   Most recent six alarms interrogated from patient controller.       NYHA class at implant - IV.   NYHA class today -  II.     Patient has not completed Cardiac Rehab as he has just completed a stay at SNF, will refer today.  0 / 36  sessions completed.     Preventative Health   - Vaccines:               Influenza: no               Pneumococcal: no               RSV (age > 60): no               Covid: no   - Dermatology: no   - Colonoscopy: 11/4/2024   - Vision: 2 years    - Dental: dentures      Review of Systems   Constitutional: Negative.   HENT: Negative.     Eyes: Negative.    Cardiovascular: Negative.    Respiratory: Negative.     Endocrine: Negative.    Skin: Negative.    Musculoskeletal: Negative.    Gastrointestinal: Negative.    Genitourinary: Negative.    Neurological: Negative.    Psychiatric/Behavioral: Negative.     Allergic/Immunologic: Negative.    :   Full 10 pt review of symptoms of negative unless discussed above.     Objective   Medical History:   He has no past medical history on file.  Social Hx:   He reports that he has quit smoking. His smoking use included cigarettes. He has never used smokeless tobacco. No history on file for alcohol use and drug use.  Family Hx:   His family history is not on file.   Exam:   Vitals:   There were no vitals filed for this visit.        Wt Readings from Last 5 Encounters:   03/03/25 94.5 kg (208 lb 6.4 oz)   01/06/25 90.7 kg (200 lb)   12/10/24 90.8 kg (200 lb 3.2 oz)   12/03/24 87.5 kg (192 lb 14.4 oz)   10/24/24 92.5 kg (204 lb)     GEN: Pleasant, well-appearing, no acute distress.  HEENT: JVP not  elevated, no icterus.   CHEST: Clear to auscultation. Sternotomy well healed.  CV: LVAD hum  ABD: Soft, ND, NT.  Driveline: No drainage. Dressing c/d/I. Secured.  EXT: Warm, well perfused, No LE edema.   NEURO: COLBY Santillan, Oriented to plan     Labs:   CMP:  Recent Labs     12/03/24  0630 12/02/24  0709 12/01/24  0635 11/30/24  0834 11/29/24  0702 11/27/24  0652 11/27/24  0227   NA  --   --  141 139 138 138 138   K  --   --  4.5 4.3 4.2 4.4 4.8   CL  --   --  105 105 106 105 105   CO2  --   --  25 26 26 24 23   ANIONGAP  --   --  16 12 10 13 15   BUN  --   --  17 17 16 20 22   CREATININE  --   --  1.46* 1.20 1.19 1.28 1.21   EGFR  --   --  52* 65 66 61 65   MG 2.26 2.12 2.02 2.02 2.05 2.18 2.12     Recent Labs     12/01/24  0635 11/30/24  0834 11/29/24  0702 11/27/24  0652 11/26/24  0603 11/19/24  1459 11/19/24  0322 11/18/24  0525 11/17/24  1323 11/17/24  0122 11/16/24  1358 11/16/24  0128 11/15/24  1255 11/15/24  0021   ALBUMIN 3.5 3.5 3.3* 3.6 3.6   < > 3.1* 3.5   < > 3.4   < > 3.6   < > 3.7   ALKPHOS  --   --   --   --   --   --  55 54  --  52  --  51  --  53   ALT  --   --   --   --   --   --  6* 6*  --  3*  --  4*  --  5*   AST  --   --   --   --   --   --  16 18  --  21  --  24  --  29   BILITOT  --   --   --   --   --   --  0.8 0.8  --  1.0  --  1.0  --  1.1    < > = values in this interval not displayed.     CBC:  Recent Labs     12/03/24  0630 12/02/24  0709 12/01/24  0635 11/30/24  0834 11/29/24  0755   WBC 8.7 8.5 8.1 7.9 9.0   HGB 12.0* 11.8* 11.1* 11.0* 10.5*   HCT 37.9* 39.5* 33.9* 34.6* 33.1*    336 347 381 401   MCV 85 91 83 85 86     COAG:   Recent Labs     12/03/24  0630 12/02/24  0709 12/01/24  0635 11/29/24  0702 11/28/24  0542 11/27/24  2037 11/27/24  1605 11/26/24  0957 11/13/24  0008 11/12/24  1257   INR 2.3* 2.2* 2.0*   < > 2.1*  --    < >  --    < > 1.4*   HAUF  --   --   --   --  0.6 0.5  --  0.5   < >  --    FIBRINOGEN  --   --   --   --   --   --   --   --   --  301    < > =  values in this interval not displayed.     ABO:   Recent Labs     11/20/24  0151   ABO A     HEME/ENDO:  Recent Labs     10/31/24  1249 10/24/24  2328   FERRITIN  --  76   IRONSAT  --  17*   TSH 4.39* 8.47*   HGBA1C  --  6.3*      CARDIAC:   Recent Labs     12/03/24  0630 12/02/24  0709 12/01/24  0635 11/30/24  0834 11/29/24  0755 11/29/24  0702 11/12/24  1307 10/31/24  1249 10/24/24  2328    186 223 176  --  151   < > 187  --    BNP  --   --   --   --  478*  --   --  755* 1,995*    < > = values in this interval not displayed.     Recent Labs     11/27/24  0652   CHOL 126  126   HDL 24.6  24.6   TRIG 155*           Notable Studies:   EKG:   Recent Labs     11/27/24  0030 11/21/24  0945 11/17/24  0855   VENTRATE 84 111 119   ATRRATE 16 81 138   QTCFRED 494 498 469   QRSDUR 158 146 112   QTCCALCB 522 552 526       Echocardiogram:   Recent Labs     01/06/25  1224 11/26/24  1152 11/18/24  1144 11/15/24  0904 11/13/24  0851 11/12/24  0628 11/05/24  1154 10/25/24  1436   EF 23 18 10 13 15   < > 18 18   LVIDD 6.18  --   --  5.90 6.90  --  8.21 7.70   RV  --   --   --   --  23.4  --  42.9 21.0   RVFRWALLPKSP 4.00  --   --   --  6.64  --  6.00  --    TAPSE 1.0  --   --   --   --   --  0.9 1.0    < > = values in this interval not displayed.     Echocardiogram ( 11/26/2024)    1. Left ventricular ejection fraction is severely decreased, by visual estimate at 15-20%.   2. There is global hypokinesis of the left ventricle with minor regional variations.   3. Spectral Doppler shows a Grade II (pseudonormal pattern) of left ventricular diastolic filling with an elevated left atrial pressure.   4. Left ventricular cavity size is severely dilated.   5. There is reduced right ventricular systolic function.   6. Severely enlarged right ventricle.   7. The left atrium is enlarged.   8. Mild aortic valve regurgitation.   9. The pulmonary artery is not well visualized.         Coronary Angiography:   Left & Right Heart Cath w  Angiography & LV 10/28/2024  1. Diffuse severe native coronary artery disease.  2. Patent proximal LCx and ostial-prox OM 1 ARIELLE.  3. Patent LIMA-LAD. Other grafts are occluded: SVG-OM, SVG-RI, SVG- RCA.  4. Elevated right and left sided filling pressures with preserved cardiac output and index while on ionotropic support with 0.375 mcg/kg/min of milrinone .  5. Further work-up and management per advanced heart failure team.              Current Outpatient Medications   Medication Instructions    amiodarone (PACERONE) 200 mg, oral, Daily, Starting 12/10/24 please take 200mg amiodarone daily    aspirin 81 mg, oral, Daily    bumetanide (BUMEX) 0.5 mg, oral, Daily PRN    cholecalciferol (VITAMIN D-3) 50,000 Units, Once Weekly    dapagliflozin propanediol (FARXIGA) 10 mg, oral, Daily    digoxin (LANOXIN) 250 mcg, oral, 4 times weekly    escitalopram (LEXAPRO) 10 mg, oral, Daily    ezetimibe (ZETIA) 10 mg, oral, Daily    icosapent ethyL (VASCEPA) 2 g, oral, 2 times daily (morning and late afternoon)    magnesium oxide (MAG-OX) 800 mg, oral, 2 times daily    methocarbamol (ROBAXIN) 500 mg, oral, Every 6 hours PRN    multivitamin tablet 1 tablet, Daily    pantoprazole (PROTONIX) 40 mg, oral, Daily before breakfast, Do not crush, chew, or split.    sacubitriL-valsartan (Entresto) 24-26 mg tablet 1 tablet, oral, 2 times daily    sildenafil (REVATIO) 20 mg, oral, 3 times daily    spironolactone (ALDACTONE) 12.5 mg, oral, Daily    warfarin (Coumadin) 5 mg tablet Take as directed per LVAD team and Coumadin clinic. Please take 7.5mg daily until instructed otherwise.          Heart Mate III interrogation (personally interrogated)- No significant alarms noted   LVAD  LVAD Flow (LPM): 4.6 LPM  LVAD Speed: 5300 BPM  LVAD PI: 3.8  LVAD POWER: 3.9  Arterial Extended Pressure Measurements  Arterial Extended Mean Pressure: 80 mmHg       CARDIAC  #PMHx CAD s/p CABG x 4 (2012) and PCI (LCx/OM; 5/2019), stage D ICM HFrEF LVEF 10-15%  #S/p  HM3 LVAD via left thoracotomy 11/12 with Jas Ortiz and Kaitlin.      GDMT and RV support:   - BB: n/a - RV dysfunction   - ARNI/ACEi/ARB: Entresto 24-26mg BID -MAP at goal on this regimen   - MRA: continue spironolactone 12.5mg daily    - SGLT2i: Farxiga 10 mg daily (pt states this is $400/month -> referred to clinical pharmacy)   Euvolemic on exam, not requiring loop diuretic      RV support:   - Digoxin 250 4x week -> 1/20/25 level 0.6 at OSH labs  - Continue sildenafil 20mg TID      Anticoagulation:   - Continue Coumadin 7.5mg daily for goal INR 2-3. Referred today for home INR monitor.   - Continue ASA (primary indication - ICM, PCI)      RAMP study 12/10/24: Started study with RPM 5200. AV opening every beat, no MR, mild AI, septum midline- may need frequent monitoring of AV especially considering the outflow graft that is connected to the descending aorta     AV not well visualized on previous echo; will order one for follow up appointment      #Chronic AF s/p RFA (PVI and CTI; 4/2024),   #Sustained VT and NSVT  - Patient never required ICD as his EF remained > 30% prior to acute reduction and LVAD placement   - 11/26 patient noted to have sustained asymptomatic VT w/ self termination.   - EP consulted and recommend medical management with amio over ICD placement given patient's stability and lack of symptoms   - Since starting amio, patient's ectopy has improved  - Continue amiodarone 200mg daily, would be hesitant to wean any further without EP weigh in given that he does not have ICD (TSH on 1/20 4.9, downtrend from 8)     #HLD  - Repeat lipid panel 1/20: HDL 23, Non-HDL cholesterol 84,   - Continue vascepa 2g BID  - Pt reports myalgias on statin, has trialed several statins. Will stop pravastain and start Zetia     #CKD (baseline Cr 1.3-1.6)  - Avoid hypotension and nephrotoxic agents           Follow up: Three months w/ physician for 6 month post VAD visit     Stacie Davis, MSN,  AGACNP-BC  Advanced Heart Failure LVAD Nurse Practitioner

## 2025-02-27 NOTE — PATIENT INSTRUCTIONS
Patient Instructions:  -Please bring a list of your medications to every visit.  -If you have any questions or concerns, please contact the LVAD office at [phone], option 3 or the direct line at [phone]. Please state that you are an LVAD patient. If it is after hours and it is an emergency, please call the emergency LVAD line at [phone]  - Continue current medications with the exception of:   -- stop Pravastatin due to body aches.   -- we will consult clinical pharmacy for your Farxiga.  -- continue use of muscle relaxer for muscle spasms.   - Labwork: CBC, CMP, LDH, BNP, DIG, TSH  - Imaging/Procedures: ECHO with next visit.   - Referrals: Home INR monitoring with [employer]. They will contact you once you're approved with insurance.   - Followup:  June with Dr. Washington and Social Work.

## 2025-03-03 ENCOUNTER — OFFICE VISIT (OUTPATIENT)
Dept: TRANSPLANT | Facility: HOSPITAL | Age: 70
End: 2025-03-03
Payer: MEDICARE

## 2025-03-03 ENCOUNTER — SOCIAL WORK (OUTPATIENT)
Dept: TRANSPLANT | Facility: HOSPITAL | Age: 70
End: 2025-03-03
Payer: MEDICARE

## 2025-03-03 VITALS
OXYGEN SATURATION: 96 % | HEART RATE: 70 BPM | HEIGHT: 72 IN | TEMPERATURE: 96.4 F | BODY MASS INDEX: 28.23 KG/M2 | WEIGHT: 208.4 LBS

## 2025-03-03 DIAGNOSIS — I25.5 ISCHEMIC CARDIOMYOPATHY: ICD-10-CM

## 2025-03-03 DIAGNOSIS — I50.43 ACUTE ON CHRONIC HEART FAILURE WITH REDUCED EJECTION FRACTION AND DIASTOLIC DYSFUNCTION: ICD-10-CM

## 2025-03-03 DIAGNOSIS — Z95.811 LVAD (LEFT VENTRICULAR ASSIST DEVICE) PRESENT (MULTI): ICD-10-CM

## 2025-03-03 PROCEDURE — 93750 INTERROGATION VAD IN PERSON: CPT | Performed by: REGISTERED NURSE

## 2025-03-03 PROCEDURE — 99215 OFFICE O/P EST HI 40 MIN: CPT | Mod: 25 | Performed by: REGISTERED NURSE

## 2025-03-03 RX ORDER — EZETIMIBE 10 MG/1
10 TABLET ORAL DAILY
Qty: 30 TABLET | Refills: 11 | Status: SHIPPED | OUTPATIENT
Start: 2025-03-03 | End: 2026-03-03

## 2025-03-03 ASSESSMENT — KANSAS CITY CARDIOMYOPATHY QUESTIONNAIRE (KCCQ12)
8B. OVER THE PAST 2 WEEKS, ON AVERAGE, HOW HAS HEART FAILURE LIMITED YOU ABILITY TO WORK OR DO HOUSEHOLD CHORES: MODERATELY LIMITED
2. OVER THE PAST 2 WEEKS, HOW MANY TIMES DID YOU HAVE SWELLING IN YOUR FEET, ANKLES OR LEGS WHEN YOU WOKE UP IN THE MORNING: NEVER OVER THE PAST 2 WEEKS
DID THE PATIENT COMPLETE A KCCQ FORM: YES
8C. OVER THE PAST 2 WEEKS, ON AVERAGE, HOW HAS HEART FAILURE LIMITED YOU ABILITY TO VISIT FAMILY AND FRIENDS OUR OF YOUR HOME: DID NOT LIMIT AT ALL
1B. OVER THE PAST 2 WEEKS, HOW MUCH WERE YOU LIMITED BY HEART FAILURE SYMPTOMS (SHORTNESS OF BREATH OR FATIGUE) WHEN WALKING 1 BLOCK ON LEVEL GROUND: NOT AT ALL LIMITED
6. OVER THE PAST 2 WEEKS, HOW MUCH HAS YOUR HEART FAILURE LIMITED YOUR ENJOYMENT OF LIFE: IT HAS MODERATELY LIMITED MY ENJOYMENT OF LIFE
4. OVER THE PAST 2 WEEKS, ON AVERAGE, HOW MANY TIMES HAS SHORTNESS OF BREATH LIMITED YOUR ABILITY TO DO WHAT YOU WANTED: NEVER OVER THE PAST 2 WEEKS
7. IF YOU HAD TO SPEND THE REST OF YOUR LIFE WITH YOUR HEART FAILURE THE WAY IT IS RIGHT NOW, HOW WOULD YOU FEEL ABOUT THIS?: NOT AT ALL SATISFIED
5. OVER THE PAST 2 WEEKS, ON AVERAGE, HOW MANY TIMES HAVE YOU BEEN FORCED TO SLEEP SITTING UP IN A CHAIR OR WITH AT LEAST 3 PILLOWS TO PROP YOU UP BECAUSE OF SHORTNESS OF BREATH?: NEVER OVER THE PAST 2 WEEKS
8A. OVER THE PAST 2 WEEKS, ON AVERAGE, HOW HAS HEART FAILURE LIMITED YOU ABILITY TO DO HOBBIES OR RECREATIONAL ACTIVITIES: LIMITED QUITE A BIT
1A. OVER THE PAST 2 WEEKS, HOW MUCH WERE YOU LIMITED BY HEART FAILURE SYMPTOMS (SHORTNESS OF BREATH OR FATIGUE) WHEN SHOWERING OR BATHING: NOT AT ALL LIMITED
3. OVER THE PAST 2 WEEKS, ON AVERAGE, HOW MANY TIMES HAS FATIGUE LIMITED YOUR ABILITY TO DO WHAT YOU WANTED: 3 OR MORE TIMES PER WEEK, BUT NOT EVERY DAY
1C. OVER THE PAST 2 WEEKS, HOW MUCH WERE YOU LIMITED BY HEART FAILURE SYMPTOMS (SHORTNESS OF BREATH OR FATIGUE) WHEN HURRYING OR JOGGING (AS IF TO CATCH A BUS): LIMITED FOR OTHER REASONS OF DID NOT DO ACTIVITY

## 2025-03-03 ASSESSMENT — ENCOUNTER SYMPTOMS
GASTROINTESTINAL NEGATIVE: 1
NEUROLOGICAL NEGATIVE: 1
ENDOCRINE NEGATIVE: 1
MUSCULOSKELETAL NEGATIVE: 1
PSYCHIATRIC NEGATIVE: 1
CONSTITUTIONAL NEGATIVE: 1
ALLERGIC/IMMUNOLOGIC NEGATIVE: 1
EYES NEGATIVE: 1
RESPIRATORY NEGATIVE: 1
CARDIOVASCULAR NEGATIVE: 1

## 2025-03-03 ASSESSMENT — PATIENT HEALTH QUESTIONNAIRE - PHQ9
4. FEELING TIRED OR HAVING LITTLE ENERGY: NOT AT ALL
9. THOUGHTS THAT YOU WOULD BE BETTER OFF DEAD, OR OF HURTING YOURSELF: NOT AT ALL
2. FEELING DOWN, DEPRESSED OR HOPELESS: SEVERAL DAYS
6. FEELING BAD ABOUT YOURSELF - OR THAT YOU ARE A FAILURE OR HAVE LET YOURSELF OR YOUR FAMILY DOWN: NOT AT ALL
SUM OF ALL RESPONSES TO PHQ QUESTIONS 1-9: 5
8. MOVING OR SPEAKING SO SLOWLY THAT OTHER PEOPLE COULD HAVE NOTICED. OR THE OPPOSITE, BEING SO FIGETY OR RESTLESS THAT YOU HAVE BEEN MOVING AROUND A LOT MORE THAN USUAL: SEVERAL DAYS
3. TROUBLE FALLING OR STAYING ASLEEP OR SLEEPING TOO MUCH: SEVERAL DAYS
SUM OF ALL RESPONSES TO PHQ9 QUESTIONS 1 & 2: 2
7. TROUBLE CONCENTRATING ON THINGS, SUCH AS READING THE NEWSPAPER OR WATCHING TELEVISION: SEVERAL DAYS
5. POOR APPETITE OR OVEREATING: NOT AT ALL
1. LITTLE INTEREST OR PLEASURE IN DOING THINGS: SEVERAL DAYS

## 2025-03-03 ASSESSMENT — ANXIETY QUESTIONNAIRES
3. WORRYING TOO MUCH ABOUT DIFFERENT THINGS: NOT AT ALL
6. BECOMING EASILY ANNOYED OR IRRITABLE: NOT AT ALL
1. FEELING NERVOUS, ANXIOUS, OR ON EDGE: SEVERAL DAYS
2. NOT BEING ABLE TO STOP OR CONTROL WORRYING: SEVERAL DAYS
7. FEELING AFRAID AS IF SOMETHING AWFUL MIGHT HAPPEN: SEVERAL DAYS
5. BEING SO RESTLESS THAT IT IS HARD TO SIT STILL: NOT AT ALL
4. TROUBLE RELAXING: NOT AT ALL
GAD7 TOTAL SCORE: 3

## 2025-03-03 ASSESSMENT — LIFESTYLE VARIABLES
CURRENT_SMOKER: NO
CURRENT_SMOKER_ECIG: NO

## 2025-03-03 ASSESSMENT — PAIN SCALES - GENERAL: PAINLEVEL_OUTOF10: 0-NO PAIN

## 2025-03-03 NOTE — PROGRESS NOTES
"JORGE L met with pt and pt wife Ira in [place] [address] for a follow up appt and to complete Euro[employer]ol and KCCQ-12 questions. Pt and pt wife were active and engaged during visit. Pt reflected on his home life and how he has been adjusting to the VAD post being in a facility. Pt described having some muscle and joint pain. Pt confirmed his information remains the same including address, phone and insurance. Pt shared he has been feeling a bit \"down in the dumps\" after thinking more about the VAD and what his typical response time would be if there was an emergency at the house. SW provided encouragement and support to pt, reflecting on how pt is still healing and he is learning and getting stronger every day. SW had pt complete PHQ-9 and BAM. Pt scored a 5 on PHQ-9 and 3 on BAM resulting in mild depression symptoms and no clinical anxiety symptoms at this time. Pt did reflect on positives such as weight loss and getting off of cpap machine. Pt reflected on his sleep, has been sleeping okay but has night terrors every night ever since his surgery. Pt shared he has had a better appetite lately. Pt shared he has been feeling very tired in his day to day, pt wife thinks it might be medication. SW to share with lvad team pt symptoms. Pt had questions about traveling, he is hoping to go to [place] with his family in the next few weeks. SW advised pt to talk to clinical team to confirm next steps. SW confirmed with pt he had no additional questions at this time. SW to follow up with pt routinely.       Elaine OROSCO  "

## 2025-03-04 ENCOUNTER — TELEPHONE (OUTPATIENT)
Dept: TRANSPLANT | Facility: HOSPITAL | Age: 70
End: 2025-03-04
Payer: MEDICARE

## 2025-03-11 ENCOUNTER — ANTI-COAG VISIT (OUTPATIENT)
Age: 70
End: 2025-03-11
Payer: MEDICARE

## 2025-03-11 DIAGNOSIS — Z79.01 ANTICOAGULATED ON COUMADIN: ICD-10-CM

## 2025-03-11 DIAGNOSIS — Z51.81 ENCOUNTER FOR THERAPEUTIC DRUG MONITORING: Primary | ICD-10-CM

## 2025-03-11 DIAGNOSIS — Z95.811 LVAD (LEFT VENTRICULAR ASSIST DEVICE) PRESENT (HCC): ICD-10-CM

## 2025-03-11 LAB — POC INR: 2.5 (ref 0.8–1.2)

## 2025-03-11 PROCEDURE — 99211 OFF/OP EST MAY X REQ PHY/QHP: CPT | Performed by: PHARMACIST

## 2025-03-11 PROCEDURE — 85610 PROTHROMBIN TIME: CPT | Performed by: PHARMACIST

## 2025-03-11 NOTE — PROGRESS NOTES
Medication Management Clinic  [facility]  Anticoagulation Clinic  [phone] (phone)  [phone] (fax)    Mr. Kush Perea is a 69 y.o.  male with history of  LVAD  who presents today for anticoagulation monitoring and adjustment.    Patient verifies current dosing regimen and tablet strength.  No missed or extra doses.  Patient denies s/s bleeding/bruising/swelling/SOB  No blood in urine or stool.  No dietary changes.  No changes in medication/OTC agents/Herbals.  No change in alcohol use or tobacco use.  No change in activity level.  Patient denies falls.  No vomiting/diarrhea or acute illness.   No Procedures scheduled in the future at this time.      Assessment:   Lab Results   Component Value Date    INR 2.50 (H) 03/11/2025    INR 3.00 (H) 02/27/2025    INR 3.00 (H) 02/20/2025     INR therapeutic   Recent Labs     03/11/25  1457   INR 2.50*         Plan:  Continue Coumadin 9.5 mg MWF, 11.5 mg TTHSS.  Recheck INR in 2 week(s). LVAD team prefers that patient does not go longer than 2 weeks between INR checks. Patient reminded to call the Anticoagulation Clinic with any signs or symptoms of bleeding or with any medication changes.  Patient given instructions utilizing the teach back method.    After visit summary printed and reviewed with patient.      Discharged ambulatory in no apparent distress.        For Pharmacy Admin Tracking Only  Time Spent (min): 20    Valerie Rodriguez, KunalD, BCPS  3/11/2025  3:05 PM

## 2025-03-27 ENCOUNTER — ANTI-COAG VISIT (OUTPATIENT)
Age: 70
End: 2025-03-27
Payer: MEDICARE

## 2025-03-27 DIAGNOSIS — Z95.811 LVAD (LEFT VENTRICULAR ASSIST DEVICE) PRESENT (HCC): ICD-10-CM

## 2025-03-27 DIAGNOSIS — Z51.81 ENCOUNTER FOR THERAPEUTIC DRUG MONITORING: Primary | ICD-10-CM

## 2025-03-27 DIAGNOSIS — Z79.01 ANTICOAGULATED ON COUMADIN: ICD-10-CM

## 2025-03-27 LAB — POC INR: 1.9 (ref 0.8–1.2)

## 2025-03-27 PROCEDURE — 99211 OFF/OP EST MAY X REQ PHY/QHP: CPT | Performed by: PHARMACIST

## 2025-03-27 PROCEDURE — 85610 PROTHROMBIN TIME: CPT | Performed by: PHARMACIST

## 2025-03-27 NOTE — PROGRESS NOTES
Medication Management Clinic  [facility]  Anticoagulation Clinic  [phone] (phone)  [phone] (fax)    Mr. Kush Perea is a 69 y.o.  male with history of  LVAD  who presents today for anticoagulation monitoring and adjustment.    Patient verifies current tablet strength. Patient states he follows the schedule provided at each visit.   No extra doses. Patient reports missing his dose Tuesday 3/25/25 (11.5 mg).   Patient denies s/s bleeding/bruising/swelling/SOB  No blood in urine or stool.  No dietary changes.  No changes in medication/OTC agents/Herbals.  No change in tobacco use. Patient states he had ~3 beers each of the past two days.   Patient has been more active lately.   Patient denies falls.  No vomiting/diarrhea or acute illness.   No Procedures scheduled in the future at this time.    Assessment:   Lab Results   Component Value Date    INR 1.90 (H) 03/27/2025    INR 2.50 (H) 03/11/2025    INR 3.00 (H) 02/27/2025     INR subtherapeutic   Recent Labs     03/27/25  1456   INR 1.90*     Patient is slightly subtherapeutic today which is likely due to recent missed dose. Previously, patient has been stable while on current regimen since January 2025.     Plan:  Coumadin 15 mg x 1 dose today 3/27/25 then continue Coumadin 9.5 mg MWF and 11.5 mg TuThSaSu.  Recheck INR in 1 week(s).  Patient reminded to call the Anticoagulation Clinic with any signs or symptoms of bleeding or with any medication changes.  Patient given instructions utilizing the teach back method.    After visit summary printed and reviewed with patient.      Discharged ambulatory in no apparent distress.    For Pharmacy Admin Tracking Only    Intervention Detail: Dose Adjustment: 1, reason: Therapy Optimization  Total # of Interventions Recommended: 1  Total # of Interventions Accepted: 1  Time Spent (min): 20    Carlos Cazares, KunalD, BCPS  3/27/2025  3:09 PM

## 2025-03-31 NOTE — PROGRESS NOTES
Pharmacist Clinic: Cardiology Management    Fahad Meraz is a 69 y.o. male was referred to Clinical Pharmacy Team for Heart Failure/Cardiomyopathy management.     Referring Provider: Stacie Davis A*    THIS IS A NEW PATIENT APPOINTMENT. PATIENT WILL BE ESTABLISHING CARE WITH CLINICAL PHARMACY.    Appointment was completed by Tanvi (Spouse) who was reached at primary number.    Allergies Reviewed? Yes    Allergies   Allergen Reactions    Eliquis [Apixaban] Dizziness    Jardiance [Empagliflozin] Dizziness       No past medical history on file.    Current Outpatient Medications on File Prior to Visit   Medication Sig Dispense Refill    amiodarone (Pacerone) 200 mg tablet Take 1 tablet (200 mg) by mouth once daily. Starting 12/10/24 please take 200mg amiodarone daily 30 tablet 11    amiodarone (Pacerone) 400 mg tablet Take 1 tablet (400 mg) by mouth once daily for 7 days. Please take 400mg daily for seven days and then reduce to 200mg daily (Patient not taking: Reported on 12/10/2024) 7 tablet 0    aspirin 81 mg chewable tablet Chew 1 tablet (81 mg) once daily. 30 tablet 11    bumetanide (Bumex) 1 mg tablet Take 0.5 tablets (0.5 mg) by mouth once daily as needed (weight gain > 3 pounds in one day). 30 tablet 3    cholecalciferol (Vitamin D-3) 50,000 unit capsule Take 1 capsule (50,000 Units) by mouth 1 (one) time per week.      dapagliflozin propanediol (Farxiga) 10 mg Take 1 tablet (10 mg) by mouth once daily. (Patient not taking: Reported on 4/1/2025) 30 tablet 11    digoxin (Lanoxin) 250 MCG tab;et Take 1 tablet (250 mcg) by mouth 4 times a week. 16 tablet 11    escitalopram (Lexapro) 10 mg tablet Take 1 tablet (10 mg) by mouth once daily. 30 tablet 11    ezetimibe (Zetia) 10 mg tablet Take 1 tablet (10 mg) by mouth once daily. 30 tablet 11    icosapent ethyL (Vascepa) 1 gram capsule Take 2 capsules (2 g) by mouth 2 times daily (morning and late afternoon). 120 capsule 11    magnesium oxide (Mag-Ox) 400  "mg (241.3 mg magnesium) tablet Take 2 tablets (800 mg) by mouth 2 times a day. 120 tablet 11    methocarbamol (Robaxin) 500 mg tablet Take 1 tablet (500 mg) by mouth every 6 hours if needed (muscle spasms and postoperative pain) for up to 5 days. 15 tablet 0    multivitamin tablet Take 1 tablet by mouth once daily.      pantoprazole (ProtoNix) 40 mg EC tablet Take 1 tablet (40 mg) by mouth once daily in the morning. Take before meals. Do not crush, chew, or split. 30 tablet 11    sacubitriL-valsartan (Entresto) 24-26 mg tablet Take 1 tablet by mouth 2 times a day. 60 tablet 12    sildenafil (Revatio) 20 mg tablet Take 1 tablet (20 mg) by mouth 3 times a day. 90 tablet 11    spironolactone (Aldactone) 25 mg tablet Take 0.5 tablets (12.5 mg) by mouth once daily. 15 tablet 11    warfarin (Coumadin) 5 mg tablet Take as directed per LVAD team and Coumadin clinic. Please take 7.5mg daily until instructed otherwise. 30 tablet 3     No current facility-administered medications on file prior to visit.         RELEVANT LAB RESULTS:  Lab Results   Component Value Date    BILITOT 0.8 11/19/2024    CALCIUM 9.5 12/01/2024    CO2 25 12/01/2024     12/01/2024    CREATININE 1.46 (H) 12/01/2024    GLUCOSE 93 12/01/2024    ALKPHOS 55 11/19/2024    K 4.5 12/01/2024    PROT 5.1 (L) 11/19/2024     12/01/2024    AST 16 11/19/2024    ALT 6 (L) 11/19/2024    BUN 17 12/01/2024    ANIONGAP 16 12/01/2024    MG 2.26 12/03/2024    PHOS 4.2 12/01/2024     12/03/2024    ALBUMIN 3.5 12/01/2024     Lab Results   Component Value Date    TRIG 155 (H) 11/27/2024    CHOL 126 11/27/2024    CHOL 126 11/27/2024    HDL 24.6 11/27/2024    HDL 24.6 11/27/2024     No results found for: \"BMCBC\", \"CBCDIF\"     PHARMACEUTICAL ASSESSMENT:    MEDICATION RECONCILIATION    Was a medication reconciliation completed at this visit? Yes  Home Pharmacy Reviewed? Yes, describe: [facility]    Added:  - none  Changed:  - not using Farxiga  Removed:  - " none    Drug Interactions? No    Medication Documentation Review Audit       Reviewed by BARBARA Helton (Nurse Practitioner) on 25 at 1435      Medication Order Taking? Sig Documenting Provider Last Dose Status   amiodarone (Pacerone) 200 mg tablet [id number] Yes Take 1 tablet (200 mg) by mouth once daily. Starting 12/10/24 please take 200mg amiodarone daily BARBARA Helton  Active   amiodarone (Pacerone) 400 mg tablet [id number]  Take 1 tablet (400 mg) by mouth once daily for 7 days. Please take 400mg daily for seven days and then reduce to 200mg daily   Patient not taking: Reported on 12/10/2024    BARBARA Helton   24 2352   aspirin 81 mg chewable tablet [id number] Yes Chew 1 tablet (81 mg) once daily. BARBARA Helton  Active   bumetanide (Bumex) 1 mg tablet [id number] Yes Take 0.5 tablets (0.5 mg) by mouth once daily as needed (weight gain > 3 pounds in one day). BARBARA Helton  Active   cholecalciferol (Vitamin D-3) 50,000 unit capsule [id number] Yes Take 1 capsule (50,000 Units) by mouth 1 (one) time per week. Historical Provider, MD  Active   dapagliflozin propanediol (Farxiga) 10 mg [id number] Yes Take 1 tablet (10 mg) by mouth once daily. BARBARA Helton  Active   digoxin (Lanoxin) 250 MCG tab;et [id number] Yes Take 1 tablet (250 mcg) by mouth 4 times a week. BARBARA Helton  Active   escitalopram (Lexapro) 10 mg tablet [id number] Yes Take 1 tablet (10 mg) by mouth once daily. BARBARA Helton  Active   icosapent ethyL (Vascepa) 1 gram capsule [id number] Yes Take 2 capsules (2 g) by mouth 2 times daily (morning and late afternoon). BARBARA Helton  Active     Discontinued 25 1316   magnesium oxide (Mag-Ox) 400 mg (241.3 mg magnesium) tablet [id number] Yes Take 2 tablets (800 mg) by mouth 2 times a day. Stacie Davis, APRN-CNP  Active   methocarbamol (Robaxin)  500 mg tablet [id number] Yes Take 1 tablet (500 mg) by mouth every 6 hours if needed (muscle spasms and postoperative pain) for up to 5 days. BARBARA Helton  Active   multivitamin tablet [id number] Yes Take 1 tablet by mouth once daily. Historical Provider, MD  Active   pantoprazole (ProtoNix) 40 mg EC tablet [id number] Yes Take 1 tablet (40 mg) by mouth once daily in the morning. Take before meals. Do not crush, chew, or split. BARBARA Helton  Active    Discontinued 03/03/25 1435   sacubitriL-valsartan (Entresto) 24-26 mg tablet [id number] Yes Take 1 tablet by mouth 2 times a day. BARBARA Helton  Active   sildenafil (Revatio) 20 mg tablet [id number] Yes Take 1 tablet (20 mg) by mouth 3 times a day. BARBARA Helton  Active   spironolactone (Aldactone) 25 mg tablet [id number] Yes Take 0.5 tablets (12.5 mg) by mouth once daily. BARBARA Helton  Active   warfarin (Coumadin) 5 mg tablet [id number] Yes Take as directed per LVAD team and Coumadin clinic. Please take 7.5mg daily until instructed otherwise. BARBARA Helton  Active                    DISEASE MANAGEMENT ASSESSMENT:     CHF ASSESSMENT     Symptom/Staging:  -Most recent ejection fraction: 20-25%  -NYHA Stage: IV    Results for orders placed during the hospital encounter of 01/06/25    Transthoracic Echo (TTE) Complete    Narrative  [facility], [address] [place] [address]  Tel [phone] and Fax [phone]    TRANSTHORACIC ECHOCARDIOGRAM REPORT      Patient Name:       RJ Webb Physician:    [id number] Jarek Sloan MD  Study Date:         1/6/2025            Ordering Provider:    [id number] KARRI CHAVEZ  MRN/PID:            [id number]            Fellow:  Accession#:         [id number]        Nurse:  Date of Birth/Age:  1955 / 69     Sonographer:          Mary Jo estrada                                     RDCS, FASE  Gender  assigned at                     Additional Staff:  Birth:  Height:             182.88 cm           Admit Date:  Weight:             90.72 kg            Admission Status:     Outpatient  BSA / BMI:          2.13 m2 / 27.12     Encounter#:           [id number]  kg/m2  Blood Pressure:     /                   Department Location:  [facility]  Non Invasive    Study Type:    TRANSTHORACIC ECHO (TTE) COMPLETE  Diagnosis/ICD: Atherosclerotic heart disease of native coronary artery without  angina pectoris-I25.10  Indication:    Left Ventricular Assist Device  CPT Code:      Echo Complete w Full Doppler-40652    Patient History:  Pertinent History: CHF and A-Fib. GERD, CAD s/p CABG x 4 in 2012, s/p PCI 2019,  ICM s/p HM III LVAD 11/12/24.    Study Detail: The following Echo studies were performed: 2D, M-Mode, Doppler and  color flow. Technically challenging study due to postoperative  dressings.      PHYSICIAN INTERPRETATION:  Left Ventricle: Left ventricular ejection fraction is severely decreased, by visual estimate at 20-25%. There is borderline eccentric left ventricular hypertrophy. There is global hypokinesis of the left ventricle with minor regional variations. The left ventricular cavity size is mild to moderately dilated. There is normal septal and normal posterior left ventricular wall thickness. Left ventricular diastolic filling cannot be determined, due to left ventricular assist device.  Left Atrium: The left atrium is moderately dilated.  Right Ventricle: The right ventricle is mildly enlarged. There is reduced right ventricular systolic function. TAPSE= 10 mm; RV S'= 4 cm/s.  Right Atrium: The right atrium is mildly dilated.  Aortic Valve: The aortic valve was not well visualized. There is mild aortic valve regurgitation.  Mitral Valve: The mitral valve is normal in structure. There is no evidence of mitral valve regurgitation.  Tricuspid Valve: The tricuspid valve is structurally normal. There is trace  tricuspid regurgitation. The right ventricular systolic pressure is unable to be estimated.  Pulmonic Valve: The pulmonic valve is structurally normal. There is physiologic pulmonic valve regurgitation.  Pericardium: There is no pericardial effusion noted.  Aorta: The aortic root is abnormal. There is mild dilatation of the aortic root.  Systemic Veins: The inferior vena cava appears normal in size.  In comparison to the previous echocardiogram(s): Compared with study dated 11/26/2024, no significant change.    LEFT VENTRICULAR ASSIST DEVICE:  LVAD: The patient has a(n) HeartMate 3 LVAD device present.  Inflow Cannula: The inflow cannula is visualized in the left ventricular apex.  Outflow Cannula: Visualization of the outflow cannula is technically difficult.  Inflow Velocities: The LVAD cannula inflow velocity is normal (.5-2msec).  LVAD Comments: Failure of the aortic valve leaflets to open cannot be  definitively excluded. The IVS is midline at end-diastole.        CONCLUSIONS:  1. Poorly visualized anatomical structures due to suboptimal image quality.  2. Left ventricular ejection fraction is severely decreased, by visual estimate at 20-25%.  3. There is global hypokinesis of the left ventricle with minor regional variations.  4. Left ventricular diastolic filling cannot be determined, due to left ventricular assist device.  5. Left ventricular cavity size is mild to moderately dilated.  6. There is reduced right ventricular systolic function.  7. Mildly enlarged right ventricle.  8. The left atrium is moderately dilated.  9. Mild aortic valve regurgitation.    QUANTITATIVE DATA SUMMARY:    2D MEASUREMENTS:          Normal Ranges:  Ao Root d:       3.90 cm  (2.0-3.7cm)  LAs:             4.58 cm  (2.7-4.0cm)  IVSd:            0.89 cm  (0.6-1.1cm)  LVPWd:           1.00 cm  (0.6-1.1cm)  LVIDd:           6.18 cm  (3.9-5.9cm)  LVIDs:           5.07 cm  LV Mass Index:   113 g/m2  LV % FS          18.0 %      LA  VOLUME:                    Normal Ranges:  LA Vol A4C:        80.3 ml    (22+/-6mL/m2)  LA Vol A2C:        95.8 ml  LA Vol BP:         90.0 ml  LA Vol Index A4C:  37.7ml/m2  LA Vol Index A2C:  45.0 ml/m2  LA Vol Index BP:   42.2 ml/m2  LA Area A4C:       23.2 cm2  LA Area A2C:       26.0 cm2  LA Major Axis A4C: 5.7 cm  LA Major Axis A2C: 6.0 cm  LA Volume Index:   42.2 ml/m2      RA VOLUME BY A/L METHOD:            Normal Ranges:  RA Vol A4C:              52.1 ml    (8.3-19.5ml)  RA Vol Index A4C:        24.5 ml/m2  RA Area A4C:             18.2 cm2  RA Major Axis A4C:       5.4 cm      LV SYSTOLIC FUNCTION BY 2D PLANIMETRY (MOD):  Normal Ranges:  EF-Visual:      23 %  LV EF Reported: 23 %      LV DIASTOLIC FUNCTION:             Normal Ranges:  MV Peak E:             0.51 m/s    (0.7-1.2 m/s)  MV Peak A:             0.63 m/s    (0.42-0.7 m/s)  E/A Ratio:             0.81        (1.0-2.2)  MV e'                  0.055 m/s   (>8.0)  MV lateral e'          0.06 m/s  MV medial e'           0.05 m/s  MV A Dur:              119.95 msec  E/e' Ratio:            9.20        (<8.0)  MV DT:                 196 msec    (150-240 msec)      MITRAL VALVE:          Normal Ranges:  MV DT:        196 msec (150-240msec)      RIGHT VENTRICLE:  RV Basal 3.50 cm  RV Mid   2.70 cm  RV Major 7.7 cm  TAPSE:   10.0 mm  RV s'    0.04 m/s      TRICUSPID VALVE/RVSP:         Normal Ranges:  IVC Diam:             1.80 cm      PULMONIC VALVE:          Normal Ranges:  PV Max Jude:     0.9 m/s  (0.6-0.9m/s)  PV Max PG:      3.0 mmHg      [id number] Jarek Sloan MD  Electronically signed on 1/6/2025 at 12:51:44 PM        ** Final **      Guideline-Directed Medical Therapy:  -ARNI: Yes, describe: Entresto 24-26mg twice daily  -Beta Blocker: No  -MRA: Yes, describe: spironolactone 12.5mg daily  -SGLT2i: No - rash when using Farxiga    Secondary Prevention:  -The ASCVD Risk score (Kwabena MILLAN, et al., 2019) failed to calculate for the following reasons:    Risk  score cannot be calculated because patient has a medical history suggesting prior/existing ASCVD   -Aspirin 81mg? yes  -Statin?: No  -HTN?: Yes, describe: controlled    CURRENT PHARMACOTHERAPY:   Entresto 24-26mg BID  Spironolactone 12.5mg daily    Affordability: using copay card for name brand medications  Adherence/Compliance: reports adherence  Adverse Effects: none reported    Monitoring Weights at Home: Yes  Home Weight Recordings: 212lbs    Past In Office Weight Readings:   Wt Readings from Last 6 Encounters:   03/03/25 94.5 kg (208 lb 6.4 oz)   01/06/25 90.7 kg (200 lb)   12/10/24 90.8 kg (200 lb 3.2 oz)   12/03/24 87.5 kg (192 lb 14.4 oz)   10/24/24 92.5 kg (204 lb)   08/05/24 122 kg (268 lb)       Monitoring Blood Pressure at Home: No  Home BP Recordings: NA    Past In Office BP Readings:   BP Readings from Last 6 Encounters:   01/06/25 82/62   12/03/24 113/76   10/24/24 103/57   08/05/24 112/65   01/31/22 132/76   02/18/20 102/50       HEALTH MANAGEMENT    Maintaining fluid restriction (<2 L/day): N/A  Edema/swelling: No  Shortness of breath: No  Trouble sleeping/lying down: No  Dry/hacking cough: No  Recent Hospitalizations: No    EDUCATION/COUNSELING:   - Counseled patient on MOA, expectations, duration of therapy, contraindications, administration, and monitoring parameters  - Counseled patient on lifestyle modifications that can decrease your risk of having complications (smoking cessation, losing weight, daily weights, vaccines)  - Counseled patient on fluid intake and weight management. Recommended to not consume more than 2 liters of fliuids per day. If they have gained more than 2-3 pounds within a 24 hours period (or 5 pounds in a week), contact their cardiologist  - Answered all patient questions and concerns       DISCUSSION/NOTES:   Prior to today's appointment Fahad stopped using Farxiga due to genital/urinary rash. Notes that this has cleared up since stopping the medication. Also noted  allergy with Jardiance.  Using copay card for high cost medications.  Of note will not be able to apply for  PAP in the future due to [place]  [place] at this time.    ASSESSMENT:    Assessment/Plan   Problem List Items Addressed This Visit       Acute on chronic heart failure with reduced ejection fraction and diastolic dysfunction     Tolerating 2 GDMT medications.  Unable to tolerate SGLT2 therapy. Will not rechallenge at this time.         Ischemic cardiomyopathy    LVAD (left ventricular assist device) present (Multi)         RECOMMENDATIONS/PLAN:    CONTINUE  Entresto 24-26mg BID  Metoprolol succinate 100mg daily    Last Appnt with Referring Provider: 3/3/25  Clinical Pharmacist follow up: as needed by patient or provider request  VAF/Application Expiration: No  Type of Encounter: Kellie Lord PharmD    Verbal consent to manage patient's drug therapy was obtained from the patient . They were informed they may decline to participate or withdraw from participation in pharmacy services at any time.    Continue all meds under the continuation of care with the referring provider and clinical pharmacy team.

## 2025-04-01 ENCOUNTER — APPOINTMENT (OUTPATIENT)
Dept: PHARMACY | Facility: HOSPITAL | Age: 70
End: 2025-04-01
Payer: MEDICARE

## 2025-04-01 DIAGNOSIS — I25.5 ISCHEMIC CARDIOMYOPATHY: ICD-10-CM

## 2025-04-01 DIAGNOSIS — I50.43 ACUTE ON CHRONIC HEART FAILURE WITH REDUCED EJECTION FRACTION AND DIASTOLIC DYSFUNCTION: ICD-10-CM

## 2025-04-01 DIAGNOSIS — Z95.811 LVAD (LEFT VENTRICULAR ASSIST DEVICE) PRESENT (MULTI): ICD-10-CM

## 2025-04-01 NOTE — Clinical Note
Fahad stopped Farxiga due  to genital rash. Notes this has cleared since he stopped. Will not request him to rechallenge the medication at this time. Also noted Jardiance allergy.

## 2025-04-01 NOTE — ASSESSMENT & PLAN NOTE
Tolerating 2 GDMT medications.  Unable to tolerate SGLT2 therapy. Will not rechallenge at this time.

## 2025-04-03 ENCOUNTER — TELEPHONE (OUTPATIENT)
Dept: RESPIRATORY THERAPY | Facility: HOSPITAL | Age: 70
End: 2025-04-03
Payer: MEDICARE

## 2025-04-03 ENCOUNTER — TELEPHONE (OUTPATIENT)
Age: 70
End: 2025-04-03

## 2025-04-03 ENCOUNTER — DOCUMENTATION (OUTPATIENT)
Dept: TRANSPLANT | Facility: HOSPITAL | Age: 70
End: 2025-04-03
Payer: MEDICARE

## 2025-04-03 PROBLEM — Z51.81 ENCOUNTER FOR THERAPEUTIC DRUG MONITORING: Status: RESOLVED | Noted: 2024-08-14 | Resolved: 2025-04-03

## 2025-04-03 NOTE — TELEPHONE ENCOUNTER
Nelda from [facility] called to confirm some information regarding Fahad. Requesting a call back. Number:  Opt 1

## 2025-04-03 NOTE — PROGRESS NOTES
Patient INR 1.8 today. Per Stacie Davis, Patient is to take 11.5mg 5 days a week and 9.5mg 2 days a week. Recheck INR next wednesday. Patient wife Tanvi confirmed instructions and verbalized understanding.

## 2025-04-03 NOTE — TELEPHONE ENCOUNTER
Patient's wife left a message to cancel appointment for 4/3/25. She states that they are going to try to do this from home.  If we have any questions please call the patient/wife.

## 2025-04-03 NOTE — TELEPHONE ENCOUNTER
Spoke to [facility] and confirmed that patient has a home INR monitor and we will now be managing his coumadin

## 2025-04-03 NOTE — TELEPHONE ENCOUNTER
Called pt's wife, she explains that patient's LVAD team at [facility] has him set with a home INR meter, he has all his supplies and received training yesterday.  LVAD team will be managing INRs. Will discharge from [facility].    Called  LVAD team at [phone], LM to confirm.

## 2025-04-16 ENCOUNTER — TELEPHONE (OUTPATIENT)
Age: 70
End: 2025-04-16

## 2025-04-16 NOTE — TELEPHONE ENCOUNTER
Patient's wife called and wanted to make an appointment for the patient to have INR's checked.  Patient has been discharged from the clinic due to them wanting to use home testing.   Wife states that it is not going well and wants to come back to the clinic.  I told her since he has been discharged from the clinic he would need a new referral from their MD and then we would call for follow-up appointment.   She understands and said ok.

## 2025-04-25 DIAGNOSIS — Z95.1 HX OF CABG: ICD-10-CM

## 2025-04-25 DIAGNOSIS — I25.5 ISCHEMIC CARDIOMYOPATHY: ICD-10-CM

## 2025-04-25 RX ORDER — PANTOPRAZOLE SODIUM 40 MG/1
40 TABLET, DELAYED RELEASE ORAL DAILY
Qty: 90 TABLET | Refills: 0 | Status: SHIPPED | OUTPATIENT
Start: 2025-04-25

## 2025-04-30 ENCOUNTER — TELEPHONE (OUTPATIENT)
Dept: TRANSPLANT | Facility: HOSPITAL | Age: 70
End: 2025-04-30
Payer: MEDICARE

## 2025-04-30 DIAGNOSIS — I50.84 ACC/AHA STAGE D HEART FAILURE: ICD-10-CM

## 2025-04-30 DIAGNOSIS — Z95.811 LVAD (LEFT VENTRICULAR ASSIST DEVICE) PRESENT (MULTI): ICD-10-CM

## 2025-04-30 DIAGNOSIS — I25.5 ISCHEMIC CARDIOMYOPATHY: ICD-10-CM

## 2025-04-30 DIAGNOSIS — I50.84 HEART FAILURE, ACC/AHA STAGE D: ICD-10-CM

## 2025-04-30 DIAGNOSIS — I50.43 ACUTE ON CHRONIC HEART FAILURE WITH REDUCED EJECTION FRACTION AND DIASTOLIC DYSFUNCTION: ICD-10-CM

## 2025-04-30 DIAGNOSIS — I50.43 ACUTE ON CHRONIC COMBINED SYSTOLIC (CONGESTIVE) AND DIASTOLIC (CONGESTIVE) HEART FAILURE: ICD-10-CM

## 2025-04-30 DIAGNOSIS — Z86.79 HISTORY OF CHRONIC ATRIAL FIBRILLATION: ICD-10-CM

## 2025-04-30 DIAGNOSIS — I42.0 DILATED CARDIOMYOPATHY (MULTI): ICD-10-CM

## 2025-04-30 DIAGNOSIS — Z79.01 CHRONIC ANTICOAGULATION: ICD-10-CM

## 2025-04-30 RX ORDER — WARFARIN 2 MG/1
TABLET ORAL
Qty: 90 TABLET | Refills: 3 | Status: SHIPPED | OUTPATIENT
Start: 2025-04-30 | End: 2026-04-30

## 2025-04-30 RX ORDER — WARFARIN SODIUM 5 MG/1
TABLET ORAL
Qty: 30 TABLET | Refills: 3 | Status: SHIPPED | OUTPATIENT
Start: 2025-04-30 | End: 2026-04-30

## 2025-04-30 RX ORDER — WARFARIN 7.5 MG/1
TABLET ORAL
Qty: 90 TABLET | Refills: 3 | Status: SHIPPED | OUTPATIENT
Start: 2025-04-30 | End: 2026-04-30

## 2025-04-30 NOTE — TELEPHONE ENCOUNTER
Tanvi informed staff that Fahad often forgets to take him Warfarin in the evening and missed a few doses. Team was not inclined to make any med changes at this time and advised Tanvi to give the Warfarin in the morning when she can ensure Fahad takes his pills. She will recheck on Friday to ensure we are making incrementing up and no need for adjustments.     Current regimen is 11.5 mg 4 days a week, 9.5 mg 3 days a week (alternating days.)     Tanvi verbalized understanding and agreed to plan.

## 2025-05-12 ENCOUNTER — TELEPHONE (OUTPATIENT)
Dept: TRANSPLANT | Facility: HOSPITAL | Age: 70
End: 2025-05-12
Payer: MEDICARE

## 2025-05-12 NOTE — TELEPHONE ENCOUNTER
Called and spoke with wife regarding new battery charger. Sent request via [employer] in order to have new battery charger shipped same day. Advised wife that [employer] will ship same day and it will come via [employer].     Patient wife had no further questions or concerns. Will continue to follow . Advised patient wife to please call VAD office if the  is not received by noon tomorrow.

## 2025-05-13 ENCOUNTER — TELEPHONE (OUTPATIENT)
Dept: TRANSPLANT | Facility: HOSPITAL | Age: 70
End: 2025-05-13
Payer: MEDICARE

## 2025-05-14 ENCOUNTER — TELEPHONE (OUTPATIENT)
Dept: TRANSPLANT | Facility: HOSPITAL | Age: 70
End: 2025-05-14
Payer: MEDICARE

## 2025-05-14 NOTE — TELEPHONE ENCOUNTER
Called patient wife to check in with new . Everything is working as expected. Patient to bring in old  to appt on 6/04.

## 2025-05-21 ENCOUNTER — TELEPHONE (OUTPATIENT)
Dept: TRANSPLANT | Facility: HOSPITAL | Age: 70
End: 2025-05-21
Payer: MEDICARE

## 2025-05-21 DIAGNOSIS — Z86.79 HISTORY OF CHRONIC ATRIAL FIBRILLATION: ICD-10-CM

## 2025-05-21 DIAGNOSIS — I42.0 DILATED CARDIOMYOPATHY (MULTI): ICD-10-CM

## 2025-05-21 DIAGNOSIS — I50.84 ACC/AHA STAGE D HEART FAILURE: ICD-10-CM

## 2025-05-21 DIAGNOSIS — I50.43 ACUTE ON CHRONIC COMBINED SYSTOLIC (CONGESTIVE) AND DIASTOLIC (CONGESTIVE) HEART FAILURE: ICD-10-CM

## 2025-05-21 DIAGNOSIS — I50.43 ACUTE ON CHRONIC HEART FAILURE WITH REDUCED EJECTION FRACTION AND DIASTOLIC DYSFUNCTION: ICD-10-CM

## 2025-05-21 DIAGNOSIS — I50.84 HEART FAILURE, ACC/AHA STAGE D: ICD-10-CM

## 2025-05-21 DIAGNOSIS — I25.5 ISCHEMIC CARDIOMYOPATHY: ICD-10-CM

## 2025-05-21 DIAGNOSIS — Z95.811 LVAD (LEFT VENTRICULAR ASSIST DEVICE) PRESENT (MULTI): ICD-10-CM

## 2025-05-21 RX ORDER — SPIRONOLACTONE 25 MG/1
12.5 TABLET ORAL DAILY
Qty: 15 TABLET | Refills: 11 | Status: SHIPPED | OUTPATIENT
Start: 2025-05-21 | End: 2026-05-21

## 2025-05-21 NOTE — TELEPHONE ENCOUNTER
PT spouse requested refill on spironolactone (Aldactone) 25 mg tablet. Please send to [facility] pharmacy in [place]

## 2025-05-27 ENCOUNTER — TELEPHONE (OUTPATIENT)
Dept: TRANSPLANT | Facility: HOSPITAL | Age: 70
End: 2025-05-27
Payer: MEDICARE

## 2025-05-27 NOTE — TELEPHONE ENCOUNTER
Patients wife wanted to inform the team that the local lab has a broken fax and they will be bringing in a copy of his most recent labs to the appointment next week.

## 2025-05-30 ASSESSMENT — ENCOUNTER SYMPTOMS
RESPIRATORY NEGATIVE: 1
NEUROLOGICAL NEGATIVE: 1
MUSCULOSKELETAL NEGATIVE: 1
CONSTITUTIONAL NEGATIVE: 1
CARDIOVASCULAR NEGATIVE: 1
ENDOCRINE NEGATIVE: 1
EYES NEGATIVE: 1
GASTROINTESTINAL NEGATIVE: 1
ALLERGIC/IMMUNOLOGIC NEGATIVE: 1
PSYCHIATRIC NEGATIVE: 1

## 2025-05-30 NOTE — PATIENT INSTRUCTIONS
Patient Instructions:  -Please bring a list of your medications to every visit.  -If you have any questions or concerns, please contact the LVAD office at [phone], option [phone] or the direct line at [phone]. Please state that you are an LVAD patient. If it is after hours and it is an emergency, please call the emergency LVAD line at [phone]  - Continue current medications with the exception of:   --   - Labwork: CBC CMP LDH BNP DIG  - Imaging/Procedures:   - Referrals:   - Followup:

## 2025-05-30 NOTE — PROGRESS NOTES
VAD Cardiologist: Dr. Steven Washington     VAD Coordinator: Lorenza Valerio     Type of Visit: LVAD initial visit     Type of VAD:  HM3  Implant Date: 11/12/2024  Reason for VAD: ICM  Intent: Long-Term (Age)     HPI / Summary:   Fahad Meraz is a very pleasant 69 y.o. male w/ a PMHx sig for CAD s/p 4V CABG (2012) and PCI (LCx/OM; 5/2019), stage D systolic HF/ICM/HFrEF with LVEF 10-15%( 10/22/24 TTE), AF s/p RFA (PVI and CTI; 4/2024) on OAC therapy, HTN, dyslipidemia, depression, anxiety and VIV using CPAP who was admitted to OSH ICU, s/p RHC on 10/18/24.  Per patient, he had been experienced worsening SOB and fluids overload for 2-3 weeks. Patient was on milrinone drip and underwent SGC diuresis. However his KENYA worsened, and were not able to wean milrinone drip. [place] [facility] [place]. Advanced therapies evaluation was initiated on 10/30. Discussed in advanced therapeutics committee on 11/5 and was denied for transplantation, and approved for LVAD (significant PAD, Elevated PSA level, Age approaching 70, as well as patient verbalized he would be more in favor of LVAD vs Transplant). He was transferred to the floor on 11/6 to await VAD implantation. Readmitted to HFICU as of 11/09 for pre-OR swan guided optimization. Now presents to CTICU s/p LVAD HM3 implant via thoracotomy with descending outflow graft on 11/12/24 with Dr. Mclain. OR Course/Issues: pulseless VT requiring defib x 1 pre- CPB. Postop course c/b delirium, RV dysfunction, VT & malnutrition.       Previous Visit: no changes made, referred to Cardiac Rehab .    Today:  Patient denies CP, palpitations, dizziness, RAMOS, orthopnea, PND, edema, LVAD alarms, N/V/D, hematochezia, hematuria, epistaxis.  Most recent INR is 12/3/2024:  6:30 AM     Driveline is clean with no drainage. Dressing was last changed on ***. Dressing change performed in clinic today.   VAD equipment interrogated  in clinic with all batteries and clips functioning properly. Patient denies sleeping on batteries at home. Patient has/ has not dropped their controller since last visit.   Most recent six alarms interrogated from patient controller.     Patient has/ has not completed Cardiac Rehab.   *** / 36  sessions completed.     NYHA class at implant - IV.   NYHA class today -  *** .      Preventative Health   - Vaccines:               Influenza: no               Pneumococcal: no               RSV (age > 60): no               Covid: no   - Dermatology: no   - Colonoscopy: 11/4/2024   - Vision: 2 years    - Dental: dentures      Review of Systems   Constitutional: Negative.   HENT: Negative.     Eyes: Negative.    Cardiovascular: Negative.    Respiratory: Negative.     Endocrine: Negative.    Skin: Negative.    Musculoskeletal: Negative.    Gastrointestinal: Negative.    Genitourinary: Negative.    Neurological: Negative.    Psychiatric/Behavioral: Negative.     Allergic/Immunologic: Negative.    :   Full 10 pt review of symptoms of negative unless discussed above.     Objective   Medical History:   He has no past medical history on file.  Social Hx:   He reports that he has quit smoking. His smoking use included cigarettes. He has never used smokeless tobacco. No history on file for alcohol use and drug use.  Family Hx:   His family history is not on file.   Exam:   Vitals:   There were no vitals filed for this visit.        Wt Readings from Last 5 Encounters:   03/03/25 94.5 kg (208 lb 6.4 oz)   01/06/25 90.7 kg (200 lb)   12/10/24 90.8 kg (200 lb 3.2 oz)   12/03/24 87.5 kg (192 lb 14.4 oz)   10/24/24 92.5 kg (204 lb)     GEN: Pleasant, well-appearing, no acute distress.  HEENT: JVP not elevated, no icterus.   CHEST: Clear to auscultation. Sternotomy well healed.  CV: LVAD hum  ABD: Soft, ND, NT.  Driveline: No drainage. Dressing c/d/I. Secured.  EXT: Warm, well perfused, No LE edema.   NEURO: Pleasant, BOWMAN, Oriented to  plan     Labs:   CMP:  Recent Labs     12/03/24  0630 12/02/24  0709 12/01/24  0635 11/30/24  0834 11/29/24  0702 11/27/24  0652 11/27/24  0227   NA  --   --  141 139 138 138 138   K  --   --  4.5 4.3 4.2 4.4 4.8   CL  --   --  105 105 106 105 105   CO2  --   --  25 26 26 24 23   ANIONGAP  --   --  16 12 10 13 15   BUN  --   --  17 17 16 20 22   CREATININE  --   --  1.46* 1.20 1.19 1.28 1.21   EGFR  --   --  52* 65 66 61 65   MG 2.26 2.12 2.02 2.02 2.05 2.18 2.12     Recent Labs     12/01/24  0635 11/30/24  0834 11/29/24  0702 11/27/24  0652 11/26/24  0603 11/19/24  1459 11/19/24  0322 11/18/24  0525 11/17/24  1323 11/17/24  0122 11/16/24  1358 11/16/24  0128 11/15/24  1255 11/15/24  0021   ALBUMIN 3.5 3.5 3.3* 3.6 3.6   < > 3.1* 3.5   < > 3.4   < > 3.6   < > 3.7   ALKPHOS  --   --   --   --   --   --  55 54  --  52  --  51  --  53   ALT  --   --   --   --   --   --  6* 6*  --  3*  --  4*  --  5*   AST  --   --   --   --   --   --  16 18  --  21  --  24  --  29   BILITOT  --   --   --   --   --   --  0.8 0.8  --  1.0  --  1.0  --  1.1    < > = values in this interval not displayed.     CBC:  Recent Labs     12/03/24 0630 12/02/24  0709 12/01/24  0635 11/30/24  0834 11/29/24  0755   WBC 8.7 8.5 8.1 7.9 9.0   HGB 12.0* 11.8* 11.1* 11.0* 10.5*   HCT 37.9* 39.5* 33.9* 34.6* 33.1*    336 347 381 401   MCV 85 91 83 85 86     COAG:   Recent Labs     12/03/24  0630 12/02/24  0709 12/01/24  0635 11/29/24  0702 11/28/24  0542 11/27/24  2037 11/27/24  1605 11/26/24  0957 11/13/24  0008 11/12/24  1257   INR 2.3* 2.2* 2.0*   < > 2.1*  --    < >  --    < > 1.4*   HAUF  --   --   --   --  0.6 0.5  --  0.5   < >  --    FIBRINOGEN  --   --   --   --   --   --   --   --   --  301    < > = values in this interval not displayed.     ABO:   Recent Labs     11/20/24  0151   ABO A     HEME/ENDO:  Recent Labs     10/31/24  1249 10/24/24  2328   FERRITIN  --  76   IRONSAT  --  17*   TSH 4.39* 8.47*   HGBA1C  --  6.3*      CARDIAC:    Recent Labs     12/03/24  0630 12/02/24  0709 12/01/24  0635 11/30/24  0834 11/29/24  0755 11/29/24  0702 11/12/24  1307 10/31/24  1249 10/24/24  2328    186 223 176  --  151   < > 187  --    BNP  --   --   --   --  478*  --   --  755* 1,995*    < > = values in this interval not displayed.     Recent Labs     11/27/24  0652   CHOL 126  126   HDL 24.6  24.6   TRIG 155*           Notable Studies:   EKG:   Recent Labs     11/27/24  0030 11/21/24  0945 11/17/24  0855   VENTRATE 84 111 119   ATRRATE 16 81 138   QTCFRED 494 498 469   QRSDUR 158 146 112   QTCCALCB 522 552 526       Echocardiogram:   Recent Labs     01/06/25  1224 11/26/24  1152 11/18/24  1144 11/15/24  0904 11/13/24  0851 11/12/24  0628 11/05/24  1154 10/25/24  1436   EF 23 18 10 13 15   < > 18 18   LVIDD 6.18  --   --  5.90 6.90  --  8.21 7.70   RV  --   --   --   --  23.4  --  42.9 21.0   RVFRWALLPKSP 4.00  --   --   --  6.64  --  6.00  --    TAPSE 1.0  --   --   --   --   --  0.9 1.0    < > = values in this interval not displayed.     Echocardiogram ( 11/26/2024)    1. Left ventricular ejection fraction is severely decreased, by visual estimate at 15-20%.   2. There is global hypokinesis of the left ventricle with minor regional variations.   3. Spectral Doppler shows a Grade II (pseudonormal pattern) of left ventricular diastolic filling with an elevated left atrial pressure.   4. Left ventricular cavity size is severely dilated.   5. There is reduced right ventricular systolic function.   6. Severely enlarged right ventricle.   7. The left atrium is enlarged.   8. Mild aortic valve regurgitation.   9. The pulmonary artery is not well visualized.         Coronary Angiography:   Left & Right Heart Cath w Angiography & LV 10/28/2024  1. Diffuse severe native coronary artery disease.  2. Patent proximal LCx and ostial-prox OM 1 ARIELLE.  3. Patent LIMA-LAD. Other grafts are occluded: SVG-OM, SVG-RI, SVG- RCA.  4. Elevated right and left sided  filling pressures with preserved cardiac output and index while on ionotropic support with 0.375 mcg/kg/min of milrinone .  5. Further work-up and management per advanced heart failure team.              Current Outpatient Medications   Medication Instructions    amiodarone (PACERONE) 200 mg, oral, Daily, Starting 12/10/24 please take 200mg amiodarone daily    aspirin 81 mg, oral, Daily    bumetanide (BUMEX) 0.5 mg, oral, Daily PRN    cholecalciferol (VITAMIN D-3) 50,000 Units, Once Weekly    dapagliflozin propanediol (FARXIGA) 10 mg, oral, Daily    digoxin (LANOXIN) 250 mcg, oral, 4 times weekly    escitalopram (LEXAPRO) 10 mg, oral, Daily    ezetimibe (ZETIA) 10 mg, oral, Daily    icosapent ethyL (VASCEPA) 2 g, oral, 2 times daily (morning and late afternoon)    magnesium oxide (MAG-OX) 800 mg, oral, 2 times daily    methocarbamol (ROBAXIN) 500 mg, oral, Every 6 hours PRN    multivitamin tablet 1 tablet, Daily    pantoprazole (PROTONIX) 40 mg, oral, Daily before breakfast, Do not crush, chew, or split.    sacubitriL-valsartan (Entresto) 24-26 mg tablet 1 tablet, oral, 2 times daily    sildenafil (REVATIO) 20 mg, oral, 3 times daily    spironolactone (ALDACTONE) 12.5 mg, oral, Daily    warfarin (Coumadin) 2 mg tablet Please take 11.5mg 4 days a week and 9.5mg 3 days a week.    warfarin (Coumadin) 5 mg tablet Take as directed per LVAD team and Coumadin clinic. Please take 7.5mg daily until instructed otherwise.    warfarin (Coumadin) 7.5 mg tablet Please take 11.5mg 4 days a week and 9.5mg 3 days a week.          Heart Mate III interrogation (personally interrogated)- No significant alarms noted           CARDIAC  #PMHx CAD s/p CABG x 4 (2012) and PCI (LCx/OM; 5/2019), stage D ICM HFrEF LVEF 10-15%  #S/p HM3 LVAD via left thoracotomy 11/12 with Jas Ortiz and Kaitlin.      GDMT and RV support:   - BB: n/a - RV dysfunction   - ARNI/ACEi/ARB: Entresto 24-26mg BID -MAP at goal on this regimen   - MRA: continue  spironolactone 12.5mg daily    - SGLT2i: Farxiga 10 mg daily (pt states this is $400/month -> referred to clinical pharmacy)   Euvolemic on exam, not requiring loop diuretic      RV support:   - Digoxin 250 4x week -> 1/20/25 level 0.6 at OSH labs  - Continue sildenafil 20mg TID      Anticoagulation:   - Continue Coumadin 7.5mg daily for goal INR 2-3. Referred today for home INR monitor.   - Continue ASA (primary indication - ICM, PCI)      RAMP study 12/10/24: Started study with RPM 5200. AV opening every beat, no MR, mild AI, septum midline- may need frequent monitoring of AV especially considering the outflow graft that is connected to the descending aorta     AV not well visualized on previous echo; will order one for follow up appointment      #Chronic AF s/p RFA (PVI and CTI; 4/2024),   #Sustained VT and NSVT  - Patient never required ICD as his EF remained > 30% prior to acute reduction and LVAD placement   - 11/26 patient noted to have sustained asymptomatic VT w/ self termination.   - EP consulted and recommend medical management with amio over ICD placement given patient's stability and lack of symptoms   - Since starting amio, patient's ectopy has improved  - Continue amiodarone 200mg daily, would be hesitant to wean any further without EP weigh in given that he does not have ICD (TSH on 1/20 4.9, downtrend from 8)     #HLD  - Repeat lipid panel 1/20: HDL 23, Non-HDL cholesterol 84,   - Continue vascepa 2g BID  - Pt reports myalgias on statin, has trialed several statins. Will stop pravastain and start Zetia     #CKD (baseline Cr 1.3-1.6)  - Avoid hypotension and nephrotoxic agents           Follow up: Three months w/ physician for 6 month post VAD visit     Stacie Davis, MSN, AGACNP-BC  Advanced Heart Failure LVAD Nurse Practitioner

## 2025-06-04 ENCOUNTER — APPOINTMENT (OUTPATIENT)
Dept: TRANSPLANT | Facility: HOSPITAL | Age: 70
End: 2025-06-04
Payer: MEDICARE

## 2025-06-04 ENCOUNTER — APPOINTMENT (OUTPATIENT)
Dept: CARDIOLOGY | Facility: HOSPITAL | Age: 70
End: 2025-06-04
Payer: MEDICARE

## 2025-06-04 ENCOUNTER — TELEPHONE (OUTPATIENT)
Dept: TRANSPLANT | Facility: HOSPITAL | Age: 70
End: 2025-06-04
Payer: MEDICARE

## 2025-06-04 DIAGNOSIS — I50.43 ACUTE ON CHRONIC HEART FAILURE WITH REDUCED EJECTION FRACTION AND DIASTOLIC DYSFUNCTION: ICD-10-CM

## 2025-06-04 DIAGNOSIS — I50.84 ACC/AHA STAGE D HEART FAILURE: ICD-10-CM

## 2025-06-04 DIAGNOSIS — I50.20 HFREF (HEART FAILURE WITH REDUCED EJECTION FRACTION): ICD-10-CM

## 2025-06-04 DIAGNOSIS — I25.5 ISCHEMIC CARDIOMYOPATHY: ICD-10-CM

## 2025-06-04 NOTE — PROGRESS NOTES
VAD Cardiologist: Dr. Steven Washington     VAD Coordinator: Lorenza Valerio   Referring cardiologist: Kojo ([facility])  Type of Visit: routine (6 mo)     Type of VAD:  HM3  Implant Date: 11/12/2024  Reason for VAD: ICM  Intent: Long-Term (Age)     HPI:   Mr. Fahad Meraz is 69M with a PMHx sig for CAD s/p 4V CABG (2012) and PCI (LCx/OM; 5/2019), stage D systolic HF/ICM/HFrEF currently without an ICD, AF s/p RFA (PVI and CTI; 4/2024), HTN, dyslipidemia, depression, anxiety and VIV using CPAP who is s/p HM3 LVAD (via lateral thoracotomy with descending anastomosis; 11/12/24; as long term support) who returns to the  LVAD clinic for ongoing evaluation and management.     Interval hx:   He reports some dizziness. He otherwise states that his dyspnea has dramatically improved since undergoing LVAD. He currently denies chest pain, pressure, SOB/RAMOS, PND, orthopnea, LE edema, palpitations, light headedness, dizziness, or syncope.     Most recent INR is 2.9 today.    Driveline is clean with no drainage. Dressing was last changed on 6/10. Dressing change performed in clinic today.   VAD equipment interrogated in clinic with all batteries and clips functioning properly. He denies sleeping on batteries at home.   Most recent six alarms interrogated from patient controller.       NYHA class at implant - IV.   NYHA class today -  I .    Shower bag and new MPU given to patient today.   MPU SN [id number]  MPU power cord REF [id number]          Medications Ordered Prior to Encounter[1]         Exam:   dMAP: 82    On exam Mr. Meraz appears his stated age, is alert and oriented x3, and in no acute distress. His sclera are anicteric and his oropharynx has moist mucous membranes. His neck is supple and without thyromegaly. The JVP is <5 cm of water above the right atrium. His cardiac exam has regular rhythm, normal S1, S2. No S3/4. There is an audible VAD hum. His lungs are clear to auscultation bilaterally and there is  no dullness to percussion. His abdomen is soft, nontender with normoactive bowel sounds. There is no HJR. There is no erythema/drainage/bleeding at the DL exit site. The extremities are warm and without edema. The skin is dry. There is no rash present. His mood and affect are appropriate for todays encounter.        Labs:   CMP:  Recent Labs     12/03/24  0630 12/02/24  0709 12/01/24  0635 11/30/24  0834 11/29/24  0702 11/27/24  0652 11/27/24  0227   NA  --   --  141 139 138 138 138   K  --   --  4.5 4.3 4.2 4.4 4.8   CL  --   --  105 105 106 105 105   CO2  --   --  25 26 26 24 23   ANIONGAP  --   --  16 12 10 13 15   BUN  --   --  17 17 16 20 22   CREATININE  --   --  1.46* 1.20 1.19 1.28 1.21   EGFR  --   --  52* 65 66 61 65   MG 2.26 2.12 2.02 2.02 2.05 2.18 2.12     Recent Labs     12/01/24  0635 11/30/24  0834 11/29/24  0702 11/27/24  0652 11/26/24  0603 11/19/24  1459 11/19/24  0322 11/18/24  0525 11/17/24  1323 11/17/24  0122 11/16/24  1358 11/16/24  0128 11/15/24  1255 11/15/24  0021   ALBUMIN 3.5 3.5 3.3* 3.6 3.6   < > 3.1* 3.5   < > 3.4   < > 3.6   < > 3.7   ALKPHOS  --   --   --   --   --   --  55 54  --  52  --  51  --  53   ALT  --   --   --   --   --   --  6* 6*  --  3*  --  4*  --  5*   AST  --   --   --   --   --   --  16 18  --  21  --  24  --  29   BILITOT  --   --   --   --   --   --  0.8 0.8  --  1.0  --  1.0  --  1.1    < > = values in this interval not displayed.     CBC:  Recent Labs     12/03/24  0630 12/02/24  0709 12/01/24  0635 11/30/24  0834 11/29/24  0755   WBC 8.7 8.5 8.1 7.9 9.0   HGB 12.0* 11.8* 11.1* 11.0* 10.5*   HCT 37.9* 39.5* 33.9* 34.6* 33.1*    336 347 381 401   MCV 85 91 83 85 86     COAG:   Recent Labs     12/03/24  0630 12/02/24  0709 12/01/24  0635 11/29/24  0702 11/28/24  0542 11/27/24  2037 11/27/24  1605 11/26/24  0957 11/13/24  0008 11/12/24  1257   INR 2.3* 2.2* 2.0*   < > 2.1*  --    < >  --    < > 1.4*   HAUF  --   --   --   --  0.6 0.5  --  0.5   < >  --     FIBRINOGEN  --   --   --   --   --   --   --   --   --  301    < > = values in this interval not displayed.     ABO:   Recent Labs     11/20/24  0151   ABO A     HEME/ENDO:  Recent Labs     10/31/24  1249 10/24/24  2328   FERRITIN  --  76   IRONSAT  --  17*   TSH 4.39* 8.47*   HGBA1C  --  6.3*      CARDIAC:   Recent Labs     12/03/24  0630 12/02/24  0709 12/01/24  0635 11/30/24  0834 11/29/24  0755 11/29/24  0702 11/12/24  1307 10/31/24  1249 10/24/24  2328    186 223 176  --  151   < > 187  --    BNP  --   --   --   --  478*  --   --  755* 1,995*    < > = values in this interval not displayed.     Recent Labs     11/27/24  0652   CHOL 126  126   HDL 24.6  24.6   TRIG 155*       Cardiac diagnostics:   Echo (1/6/25):  1. Poorly visualized anatomical structures due to suboptimal image quality.  2. Left ventricular ejection fraction is severely decreased, by visual estimate at 20-25%.  3. There is global hypokinesis of the left ventricle with minor regional variations.  4. Left ventricular diastolic filling cannot be determined, due to left ventricular assist device.  5. Left ventricular cavity size is mild to moderately dilated.  6. There is reduced right ventricular systolic function.  7. Mildly enlarged right ventricle.  8. The left atrium is moderately dilated.  9. Mild aortic valve regurgitation.       Impression/Plan:  Mr. Fahad Meraz is 69M with a PMHx sig for CAD s/p 4V CABG (2012) and PCI (LCx/OM; 5/2019), stage D systolic HF/ICM/HFrEF currently without an ICD, AF s/p RFA (PVI and CTI; 4/2024), HTN, dyslipidemia, depression, anxiety and VIV using CPAP who is s/p HM3 LVAD (via lateral thoracotomy with descending anastomosis; 11/12/24; as long term support) who returns to the  LVAD clinic for ongoing evaluation and management.     1) Stage D chronic systolic HF/ICM/HFrEF currently without an ICD s/p HM3 LVAD (via lateral thoracotomy with descending anastomosis; 11/12/24; as long term  support)  ABO: A  Strategy: Long term (age/comorbidities)  LVAD interrogation reveals no alarms or power spikes. There are significant fluctuations with PI upon standing, lying, and with leg raising. Review of the echo today has poor windows, but appears to show partial AoV opening and no sig AI.         GDMT and RV support:   - BB: RV appears improved on echo; can consider BB in the future if needed  - ARNI/ACEi/ARB: Entresto 24-26mg BID -MAP at goal on this regimen   - MRA: spironolactone 12.5mg daily    - SGLT2i: Farxiga 10mg daily  - bumex changed to PRN (was taking 0.5 mg daily) given exam and VAD interrogation findings    RV support:   - Digoxin 250mcg 4x week   - Sildenafil 20mg TID      Anticoagulation:   - Coumadin 7.5mg daily for goal INR 2-3.  - ASA (primary indication - ICM, PCI)      RAMP study 12/10/24: Started study with RPM 5200. AV opening every beat, no MR, mild AI, septum midline- may need frequent monitoring of AV especially considering the outflow graft that is connected to the descending aorta     2) Chronic AF s/p RFA (PVI and CTI; 4/2024), Sustained VT and NSVT  - continue amiodarone 200mg daily with monitoring of TFTs     3) Dyslipidemia  Statin stopped due to myalgias.   - Continue vascepa 2g BID, zetia 10mg daily     4) KENYA on CKD (baseline Cr 1.3-1.6)  Most recent Cr above baseline in the setting of overdiuresis.   -as above, will change bumex to PRN          Follow up: 3 months with VAD RACHEL      ____________________________________________________________  Steven Washington DO  Section of Advanced Heart Failure and Cardiac Transplantation  Division of Cardiovascular Medicine  [facility]  [facility]       [1]   Current Outpatient Medications on File Prior to Visit   Medication Sig Dispense Refill    amiodarone (Pacerone) 200 mg tablet Take 1 tablet (200 mg) by mouth once daily. Starting 12/10/24 please take 200mg amiodarone daily 30  tablet 11    aspirin 81 mg chewable tablet Chew 1 tablet (81 mg) once daily. 30 tablet 11    cholecalciferol (Vitamin D-3) 50,000 unit capsule Take 1 capsule (50,000 Units) by mouth 1 (one) time per week.      dapagliflozin propanediol (Farxiga) 10 mg Take 1 tablet (10 mg) by mouth once daily. 30 tablet 11    digoxin (Lanoxin) 250 MCG tab;et Take 1 tablet (250 mcg) by mouth 4 times a week. 16 tablet 11    escitalopram (Lexapro) 10 mg tablet Take 1 tablet (10 mg) by mouth once daily. 30 tablet 11    ezetimibe (Zetia) 10 mg tablet Take 1 tablet (10 mg) by mouth once daily. 30 tablet 11    icosapent ethyL (Vascepa) 1 gram capsule Take 2 capsules (2 g) by mouth 2 times daily (morning and late afternoon). 120 capsule 11    magnesium oxide (Mag-Ox) 400 mg (241.3 mg magnesium) tablet Take 2 tablets (800 mg) by mouth 2 times a day. 120 tablet 11    multivitamin tablet Take 1 tablet by mouth once daily.      pantoprazole (ProtoNix) 40 mg EC tablet Take 1 tablet (40 mg) by mouth once daily in the morning. Take before meals. Do not crush, chew, or split. 30 tablet 11    sacubitriL-valsartan (Entresto) 24-26 mg tablet Take 1 tablet by mouth 2 times a day. 60 tablet 12    sildenafil (Revatio) 20 mg tablet Take 1 tablet (20 mg) by mouth 3 times a day. 90 tablet 11    spironolactone (Aldactone) 25 mg tablet Take 0.5 tablets (12.5 mg) by mouth once daily. 15 tablet 11    warfarin (Coumadin) 2 mg tablet Please take 11.5mg 4 days a week and 9.5mg 3 days a week. 90 tablet 3    warfarin (Coumadin) 5 mg tablet Take as directed per LVAD team and Coumadin clinic. Please take 7.5mg daily until instructed otherwise. 30 tablet 3    warfarin (Coumadin) 7.5 mg tablet Please take 11.5mg 4 days a week and 9.5mg 3 days a week. 90 tablet 3    methocarbamol (Robaxin) 500 mg tablet Take 1 tablet (500 mg) by mouth every 6 hours if needed (muscle spasms and postoperative pain) for up to 5 days. 15 tablet 0     No current facility-administered  medications on file prior to visit.

## 2025-06-04 NOTE — PATIENT INSTRUCTIONS
Patient Instructions:  -Please bring a list of your medications to every visit.  -If you have any questions or concerns, please contact the LVAD office at [phone], option 3 or the direct line at [phone]. Please state that you are an LVAD patient. If it is after hours and it is an emergency, please call the emergency LVAD line at [phone]  - Continue current medications with the exception of:   -- Stop bumex daily. Use as needed   - Labwork: CBC, CMP, LDH, BNP, TSH, DIG  - Imaging/Procedures:   - Referrals:   - Followup:  Sept 2025 with  Stacie     You may begin showering at home. Please use the clear cover to protect your drive line exit site and black shower bag provided to you to protect your equipment.   -To wash your hair, try using a hand-held shower wand or sprayer while standing over the kitchen sink or bathtub.  -Be sure that all floor surfaces are dry when walking around after bathing, to avoid slipping.  -Do not use powder around the exit site dressing.  DO NOT ALLOW YOUR DRIVE LINE OR OTHER EQUIPMENT TO GET WET.   -If your dressing becomes damp, please immediately complete a sterile dressing change.

## 2025-06-04 NOTE — TELEPHONE ENCOUNTER
Rescheduled pt apt per his wife's request. Attempted to call pt to inform of new apt time, pt phone not going through. Will send pt message.

## 2025-06-04 NOTE — TELEPHONE ENCOUNTER
PT spouse called stating she is feverish and can not bring the pt to his appt. Requested a reschedule.

## 2025-06-11 ENCOUNTER — DOCUMENTATION (OUTPATIENT)
Dept: TRANSPLANT | Facility: HOSPITAL | Age: 70
End: 2025-06-11
Payer: MEDICARE

## 2025-06-11 ENCOUNTER — HOSPITAL ENCOUNTER (OUTPATIENT)
Dept: CARDIOLOGY | Facility: HOSPITAL | Age: 70
Discharge: HOME | End: 2025-06-11
Payer: MEDICARE

## 2025-06-11 ENCOUNTER — OFFICE VISIT (OUTPATIENT)
Dept: TRANSPLANT | Facility: HOSPITAL | Age: 70
End: 2025-06-11
Payer: MEDICARE

## 2025-06-11 ENCOUNTER — SOCIAL WORK (OUTPATIENT)
Dept: TRANSPLANT | Facility: HOSPITAL | Age: 70
End: 2025-06-11
Payer: MEDICARE

## 2025-06-11 DIAGNOSIS — I50.20 ACC/AHA STAGE D SYSTOLIC HEART FAILURE: Primary | ICD-10-CM

## 2025-06-11 DIAGNOSIS — Z95.811 LVAD (LEFT VENTRICULAR ASSIST DEVICE) PRESENT (MULTI): ICD-10-CM

## 2025-06-11 DIAGNOSIS — I25.5 ISCHEMIC CARDIOMYOPATHY: ICD-10-CM

## 2025-06-11 DIAGNOSIS — I25.5 ISCHEMIC CARDIOMYOPATHY: Primary | ICD-10-CM

## 2025-06-11 DIAGNOSIS — I50.43 ACUTE ON CHRONIC HEART FAILURE WITH REDUCED EJECTION FRACTION AND DIASTOLIC DYSFUNCTION: ICD-10-CM

## 2025-06-11 DIAGNOSIS — N17.9 AKI (ACUTE KIDNEY INJURY): ICD-10-CM

## 2025-06-11 PROCEDURE — 93306 TTE W/DOPPLER COMPLETE: CPT | Performed by: INTERNAL MEDICINE

## 2025-06-11 PROCEDURE — 1159F MED LIST DOCD IN RCRD: CPT | Performed by: INTERNAL MEDICINE

## 2025-06-11 PROCEDURE — 1036F TOBACCO NON-USER: CPT | Performed by: INTERNAL MEDICINE

## 2025-06-11 PROCEDURE — 99215 OFFICE O/P EST HI 40 MIN: CPT | Performed by: INTERNAL MEDICINE

## 2025-06-11 PROCEDURE — 93750 INTERROGATION VAD IN PERSON: CPT | Performed by: INTERNAL MEDICINE

## 2025-06-11 PROCEDURE — 2500000004 HC RX 250 GENERAL PHARMACY W/ HCPCS (ALT 636 FOR OP/ED): Performed by: INTERNAL MEDICINE

## 2025-06-11 PROCEDURE — 99215 OFFICE O/P EST HI 40 MIN: CPT | Mod: 25 | Performed by: INTERNAL MEDICINE

## 2025-06-11 PROCEDURE — C8929 TTE W OR WO FOL WCON,DOPPLER: HCPCS

## 2025-06-11 PROCEDURE — 1160F RVW MEDS BY RX/DR IN RCRD: CPT | Performed by: INTERNAL MEDICINE

## 2025-06-11 RX ADMIN — PERFLUTREN 4 ML OF DILUTION: 6.52 INJECTION, SUSPENSION INTRAVENOUS at 14:05

## 2025-06-11 ASSESSMENT — KANSAS CITY CARDIOMYOPATHY QUESTIONNAIRE (KCCQ12)
DID THE PATIENT COMPLETE A KCCQ FORM: YES
4. OVER THE PAST 2 WEEKS, ON AVERAGE, HOW MANY TIMES HAS SHORTNESS OF BREATH LIMITED YOUR ABILITY TO DO WHAT YOU WANTED: LESS THAN ONCE A WEEK
8C. OVER THE PAST 2 WEEKS, ON AVERAGE, HOW HAS HEART FAILURE LIMITED YOU ABILITY TO VISIT FAMILY AND FRIENDS OUR OF YOUR HOME: SLIGHTLY LIMITED
2. OVER THE PAST 2 WEEKS, HOW MANY TIMES DID YOU HAVE SWELLING IN YOUR FEET, ANKLES OR LEGS WHEN YOU WOKE UP IN THE MORNING: NEVER OVER THE PAST 2 WEEKS
3. OVER THE PAST 2 WEEKS, ON AVERAGE, HOW MANY TIMES HAS FATIGUE LIMITED YOUR ABILITY TO DO WHAT YOU WANTED: LESS THAN ONCE A WEEK
5. OVER THE PAST 2 WEEKS, ON AVERAGE, HOW MANY TIMES HAVE YOU BEEN FORCED TO SLEEP SITTING UP IN A CHAIR OR WITH AT LEAST 3 PILLOWS TO PROP YOU UP BECAUSE OF SHORTNESS OF BREATH?: NEVER OVER THE PAST 2 WEEKS
1C. OVER THE PAST 2 WEEKS, HOW MUCH WERE YOU LIMITED BY HEART FAILURE SYMPTOMS (SHORTNESS OF BREATH OR FATIGUE) WHEN HURRYING OR JOGGING (AS IF TO CATCH A BUS): LIMITED FOR OTHER REASONS OF DID NOT DO ACTIVITY
8A. OVER THE PAST 2 WEEKS, ON AVERAGE, HOW HAS HEART FAILURE LIMITED YOU ABILITY TO DO HOBBIES OR RECREATIONAL ACTIVITIES: MODERATELY LIMITED
8B. OVER THE PAST 2 WEEKS, ON AVERAGE, HOW HAS HEART FAILURE LIMITED YOU ABILITY TO WORK OR DO HOUSEHOLD CHORES: MODERATELY LIMITED
1B. OVER THE PAST 2 WEEKS, HOW MUCH WERE YOU LIMITED BY HEART FAILURE SYMPTOMS (SHORTNESS OF BREATH OR FATIGUE) WHEN WALKING 1 BLOCK ON LEVEL GROUND: NOT AT ALL LIMITED
1A. OVER THE PAST 2 WEEKS, HOW MUCH WERE YOU LIMITED BY HEART FAILURE SYMPTOMS (SHORTNESS OF BREATH OR FATIGUE) WHEN SHOWERING OR BATHING: NOT AT ALL LIMITED

## 2025-06-11 ASSESSMENT — LIFESTYLE VARIABLES
CURRENT_SMOKER: NO
CURRENT_SMOKER_ECIG: NO

## 2025-06-11 ASSESSMENT — PATIENT HEALTH QUESTIONNAIRE - PHQ9
SUM OF ALL RESPONSES TO PHQ9 QUESTIONS 1 AND 2: 0
2. FEELING DOWN, DEPRESSED OR HOPELESS: NOT AT ALL
1. LITTLE INTEREST OR PLEASURE IN DOING THINGS: NOT AT ALL
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
## 2025-06-12 ENCOUNTER — TELEPHONE (OUTPATIENT)
Dept: TRANSPLANT | Facility: HOSPITAL | Age: 70
End: 2025-06-12
Payer: MEDICARE

## 2025-06-12 LAB
EJECTION FRACTION APICAL 4 CHAMBER: 59.3
LEFT ATRIUM VOLUME AREA LENGTH INDEX BSA: 36.6 ML/M2
LEFT VENTRICLE INTERNAL DIMENSION DIASTOLE: 6.5 CM (ref 3.5–6)
MITRAL VALVE E/A RATIO: 1.38
RIGHT VENTRICLE FREE WALL PEAK S': 8.27 CM/S
TRICUSPID ANNULAR PLANE SYSTOLIC EXCURSION: 1.3 CM

## 2025-06-12 RX ORDER — BUMETANIDE 1 MG/1
0.5 TABLET ORAL DAILY PRN
Qty: 15 TABLET | Refills: 11 | Status: SHIPPED | OUTPATIENT
Start: 2025-06-12 | End: 2026-06-12

## 2025-06-30 ENCOUNTER — TELEPHONE (OUTPATIENT)
Dept: TRANSPLANT | Facility: HOSPITAL | Age: 70
End: 2025-06-30
Payer: MEDICARE

## 2025-06-30 DIAGNOSIS — I42.0 DILATED CARDIOMYOPATHY (MULTI): ICD-10-CM

## 2025-06-30 DIAGNOSIS — Z95.811 LVAD (LEFT VENTRICULAR ASSIST DEVICE) PRESENT (MULTI): ICD-10-CM

## 2025-06-30 NOTE — TELEPHONE ENCOUNTER
Patient wife called stating that the patient has been increasingly forgetful, needing reminders, and having a 'spaced out look' at times. Spoke with Stacie Davis and team; patient wife given option for referral to geriatric medicine vs neuro vs needing to go to the ED in order for an acute intervention.     Patient wife states that the patient has an appt with his PCP tomorrow and she will speak with the MD about our teams opinions as well. Wife, Ira, will call with updates on her decision.

## 2025-07-16 ENCOUNTER — TELEPHONE (OUTPATIENT)
Dept: TRANSPLANT | Facility: HOSPITAL | Age: 70
End: 2025-07-16
Payer: MEDICARE

## 2025-07-16 NOTE — TELEPHONE ENCOUNTER
Pt wife called requesting more dressing kits be sent to their house. PT wife stated that they have one left after just changing it today.

## 2025-07-23 NOTE — PATIENT INSTRUCTIONS
Your staff today were Ananya   Your provider today was Dr. Davis  Phone number: [phone]

## 2025-07-24 ENCOUNTER — OFFICE VISIT (OUTPATIENT)
Dept: CARDIOLOGY CLINIC | Age: 70
End: 2025-07-24
Payer: MEDICARE

## 2025-07-24 VITALS
SYSTOLIC BLOOD PRESSURE: 108 MMHG | HEART RATE: 65 BPM | BODY MASS INDEX: 28.88 KG/M2 | HEIGHT: 72 IN | WEIGHT: 213.2 LBS

## 2025-07-24 DIAGNOSIS — Z95.811 HISTORY OF LEFT VENTRICULAR ASSIST DEVICE (LVAD) (HCC): Primary | ICD-10-CM

## 2025-07-24 PROCEDURE — 99214 OFFICE O/P EST MOD 30 MIN: CPT | Performed by: INTERNAL MEDICINE

## 2025-07-24 PROCEDURE — 3017F COLORECTAL CA SCREEN DOC REV: CPT | Performed by: INTERNAL MEDICINE

## 2025-07-24 PROCEDURE — 1123F ACP DISCUSS/DSCN MKR DOCD: CPT | Performed by: INTERNAL MEDICINE

## 2025-07-24 PROCEDURE — 1159F MED LIST DOCD IN RCRD: CPT | Performed by: INTERNAL MEDICINE

## 2025-07-24 PROCEDURE — 1036F TOBACCO NON-USER: CPT | Performed by: INTERNAL MEDICINE

## 2025-07-24 PROCEDURE — G8427 DOCREV CUR MEDS BY ELIG CLIN: HCPCS | Performed by: INTERNAL MEDICINE

## 2025-07-24 PROCEDURE — G8417 CALC BMI ABV UP PARAM F/U: HCPCS | Performed by: INTERNAL MEDICINE

## 2025-07-24 NOTE — PROGRESS NOTES
[facility] [place] [facility]  [facility] CARDIOLOGY  [address]  [address]  [place] [address]  Dept: [phone]  Dept Fax: [phone]  Loc: [phone]    Visit Date: 7/24/2025    Mr. Perea is a 69 y.o. male  who presented for:  Chief Complaint   Patient presents with    6 Month Follow-Up     6 month follow up.    Last EKG done on 10/19/2024.    Reports severe dizziness for 3 months and joint pain.    Denies chest pain, palpitations, shortness of breath, and edema.        HPI:     History of Present Illness  The patient is a 69-year-old male who presents post LVAD.    He experiences daily episodes of dizziness, accompanied by eye fluttering. During these episodes, he is unable to walk due to a sensation of everything moving around him. He has consulted with the LVAD team regarding his dizziness, but they have not provided any significant insights. He was concerned that the right side of his heart might not be functioning optimally, leading to his dizziness. He does not experience any fainting spells. He occasionally consumes beer. He is scheduled to see a neurologist on 07/28/2025.    He also reports memory issues. He is scheduled to see a neurologist on 07/28/2025.    His driveline shows no signs of infection and dressings are changed periodically. He has been making efforts to maintain his weight. He recalls an incident where he overexerted himself while working in hot weather, which led to him feeling unwell. He is curious about the possibility of a heart transplant. He is planning a trip to [place] in 09/2025. He takes Bumex only when he gains 3 pounds.    SOCIAL HISTORY  Exercise: [place] whacking a fence for about 1/2 mile in hot weather.  Alcohol: Consumes a beer occasionally.             Current Outpatient Medications:     escitalopram (LEXAPRO) 10 MG tablet, Take 1 tablet by mouth once daily, Disp: 15 tablet, Rfl: 0    vitamin D (ERGOCALCIFEROL) 1.25 MG (97012 UT) CAPS

## 2025-07-27 DIAGNOSIS — I25.5 ISCHEMIC CARDIOMYOPATHY: ICD-10-CM

## 2025-07-27 DIAGNOSIS — Z95.1 HX OF CABG: ICD-10-CM

## 2025-07-28 ENCOUNTER — OFFICE VISIT (OUTPATIENT)
Dept: NEUROLOGY | Age: 70
End: 2025-07-28
Payer: MEDICARE

## 2025-07-28 VITALS — WEIGHT: 219.4 LBS | BODY MASS INDEX: 29.72 KG/M2 | HEIGHT: 72 IN

## 2025-07-28 DIAGNOSIS — R41.3 MEMORY CHANGE: ICD-10-CM

## 2025-07-28 DIAGNOSIS — R42 VERTIGO: ICD-10-CM

## 2025-07-28 DIAGNOSIS — G31.84 MILD COGNITIVE IMPAIRMENT: Primary | ICD-10-CM

## 2025-07-28 PROCEDURE — G8417 CALC BMI ABV UP PARAM F/U: HCPCS | Performed by: PHYSICIAN ASSISTANT

## 2025-07-28 PROCEDURE — 3017F COLORECTAL CA SCREEN DOC REV: CPT | Performed by: PHYSICIAN ASSISTANT

## 2025-07-28 PROCEDURE — 99204 OFFICE O/P NEW MOD 45 MIN: CPT | Performed by: PHYSICIAN ASSISTANT

## 2025-07-28 PROCEDURE — 1159F MED LIST DOCD IN RCRD: CPT | Performed by: PHYSICIAN ASSISTANT

## 2025-07-28 PROCEDURE — 1160F RVW MEDS BY RX/DR IN RCRD: CPT | Performed by: PHYSICIAN ASSISTANT

## 2025-07-28 PROCEDURE — 1036F TOBACCO NON-USER: CPT | Performed by: PHYSICIAN ASSISTANT

## 2025-07-28 PROCEDURE — 1123F ACP DISCUSS/DSCN MKR DOCD: CPT | Performed by: PHYSICIAN ASSISTANT

## 2025-07-28 PROCEDURE — G8427 DOCREV CUR MEDS BY ELIG CLIN: HCPCS | Performed by: PHYSICIAN ASSISTANT

## 2025-07-28 RX ORDER — PANTOPRAZOLE SODIUM 40 MG/1
40 TABLET, DELAYED RELEASE ORAL DAILY
Qty: 90 TABLET | Refills: 4 | Status: SHIPPED | OUTPATIENT
Start: 2025-07-28

## 2025-07-28 NOTE — PROGRESS NOTES
mouth once daily (Patient taking differently: Take 0.5 tablets by mouth daily) 90 tablet 2    aspirin 81 MG tablet Take 1 tablet by mouth daily      Multiple Vitamins-Minerals (THERAPEUTIC MULTIVITAMIN-MINERALS) tablet Take 1 tablet by mouth daily       No current facility-administered medications for this visit.        Allergies   Allergen Reactions    Amiodarone Dizziness or Vertigo     Was running into ngo    Eliquis [Apixaban] Dizziness or Vertigo    Jardiance [Empagliflozin] Nausea Only    Lipitor [Atorvastatin]      Hard to walk        REVIEW OF SYSTEMS:       All items selected indicate a positive finding.    Those items not selected are negative.  Constitutional [] Weight loss/gain   [] Fatigue  [] Fever/Chills   HEENT [] Hearing Loss  [] Visual Disturbance  [] Tinnitus  [] Eye pain  [] Vertigo   Respiratory [] Shortness of Breath  [] Cough  [] Snoring   Cardiovascular [] Chest Pain  [] Palpitations  [] Lightheaded   GI [] Constipation  [] Diarrhea  [] Swallowing change  [] Nausea/vomiting    [] Urinary Frequency  [] Urinary Urgency   Musculoskeletal [] Neck pain  [] Back pain  [] Muscle pain  [] Restless legs  [] Joint pain   Dermatologic [] Skin changes   Neurologic [x] Memory loss/confusion  [] Seizures  [x] Trouble walking or imbalance  [x] Dizziness  [] Sleep disturbance  [] Weakness  [] Numbness  [] Tremors  [] Speech Difficulty  [] Headaches  [] Light Sensitivity  [] Sound Sensitivity   Endocrinology []Excessive thirst  []Excessive hunger   Psychiatric [] Anxiety/Depression  [] Hallucination   Allergy/immunology []Hives/environmental allergies   Hematologic/lymph [] Abnormal bleeding  [] Abnormal bruising         NEUROLOGICAL EXAMINATION:   VITALS  There were no vitals taken for this visit.     General Appearance:  Alert, cooperative, no signs of distress, appears stated age   Head:  Normocephalic, no signs of trauma   Eyes:  Conjunctiva/corneas clear;  eyelids intact   Ears:  Normal external ear

## 2025-07-31 ENCOUNTER — HOSPITAL ENCOUNTER (OUTPATIENT)
Dept: CT IMAGING | Age: 70
Discharge: HOME | End: 2025-07-31
Payer: MEDICARE

## 2025-07-31 ENCOUNTER — RESULTS FOLLOW-UP (OUTPATIENT)
Dept: NEUROLOGY | Age: 70
End: 2025-07-31

## 2025-07-31 DIAGNOSIS — R42 VERTIGO: ICD-10-CM

## 2025-07-31 DIAGNOSIS — G31.84 MILD COGNITIVE IMPAIRMENT: ICD-10-CM

## 2025-07-31 DIAGNOSIS — R41.3 MEMORY CHANGE: ICD-10-CM

## 2025-07-31 PROCEDURE — 70450 CT HEAD/BRAIN W/O DYE: CPT

## 2025-08-13 ENCOUNTER — TELEPHONE (OUTPATIENT)
Dept: NEUROLOGY | Age: 70
End: 2025-08-13

## 2025-08-18 DIAGNOSIS — G31.84 MILD COGNITIVE IMPAIRMENT: ICD-10-CM

## 2025-08-18 DIAGNOSIS — R42 VERTIGO: ICD-10-CM

## 2025-08-18 DIAGNOSIS — R41.3 MEMORY CHANGE: ICD-10-CM

## 2025-08-21 ENCOUNTER — HOSPITAL ENCOUNTER (OUTPATIENT)
Dept: PHYSICAL THERAPY | Age: 70
Setting detail: THERAPIES SERIES
End: 2025-08-21
Payer: MEDICARE

## 2025-08-28 ENCOUNTER — HOSPITAL ENCOUNTER (OUTPATIENT)
Dept: PHYSICAL THERAPY | Age: 70
Setting detail: THERAPIES SERIES
Discharge: HOME | End: 2025-08-28
Payer: MEDICARE

## 2025-08-28 PROCEDURE — 97161 PT EVAL LOW COMPLEX 20 MIN: CPT

## 2025-08-28 PROCEDURE — 95992 CANALITH REPOSITIONING PROC: CPT

## 2025-09-05 ENCOUNTER — HOSPITAL ENCOUNTER (OUTPATIENT)
Dept: PHYSICAL THERAPY | Age: 70
Setting detail: THERAPIES SERIES
Discharge: HOME | End: 2025-09-05
Payer: MEDICARE

## 2025-09-05 PROCEDURE — 95992 CANALITH REPOSITIONING PROC: CPT

## 2025-09-05 PROCEDURE — 97112 NEUROMUSCULAR REEDUCATION: CPT

## (undated) DEVICE — CATHETER MAP D CRV 2-2-2-2-2 MM SPC PERSEID OCTARAY

## (undated) DEVICE — SPONGE GAUZE, XRAY SC+RFID, 4X4 16 PLY, STERILE

## (undated) DEVICE — MARKER, SKIN, DUAL TIP INK W/9 LABEL AND REMOVABLE TIME OUT SLEEVE

## (undated) DEVICE — CANNULA, AORTIC ROOT, LONG

## (undated) DEVICE — INTRODUCER, SHEATH, PINNACLE, 7 FR, 10 CM

## (undated) DEVICE — SUTURE, ETHIBOND, 5, 30 IN, V40, MULTIPACK, GREEN

## (undated) DEVICE — OSCILLATING TIP SAW CARTRIDGE: Brand: PRECISION FALCON

## (undated) DEVICE — 3M™ STERI-DRAPE™ U-DRAPE 1015: Brand: STERI-DRAPE™

## (undated) DEVICE — CANNULA, ARTERIAL, 20FR EOPA 3-D W/LUER, SOFTFLOW

## (undated) DEVICE — CATHETER US GE COMPATIBILITY SOUNDSTAR ECO 10FR

## (undated) DEVICE — TOWEL, SURGICAL, NEURO, O/R, 16 X 26, BLUE, STERILE

## (undated) DEVICE — CANNULA, MULTIPLE PERFUSION SET, 15"

## (undated) DEVICE — CLOSURE SYSTEM, DERMABOND, PRINEO, 22CM, STERILE

## (undated) DEVICE — INSERT, CLAMP, SURGICAL, SOFT/TRACTION, STEALTH, 5 MM

## (undated) DEVICE — COVER, TABLE, 44 X 75 IN, DISPOSABLE, LF, STERILE

## (undated) DEVICE — GLOVE ORANGE PI 7 1/2   MSG9075

## (undated) DEVICE — DRESSING, ADHESIVE, ISLAND, TELFA, 2 X 3.75 IN, LF

## (undated) DEVICE — SUTURE, SILK, 2-0, 30 IN, SH, CONTROL RELEASE, MULTIPACK, BLACK

## (undated) DEVICE — ELECTRODE PT RET AD L9FT HI MOIST COND ADH HYDRGEL CORDED

## (undated) DEVICE — MICROCOAGULATION TEST, ACT+ TEST CUVETTE

## (undated) DEVICE — SUTURE, MONOCRYL, 3-0, 18 IN, PS2, UNDYED

## (undated) DEVICE — DRAPE, INSTRUMENT, W/POUCH, STERI DRAPE, 7 X 11 IN, DISPOSABLE, STERILE

## (undated) DEVICE — SPONGE, HEMOSTATIC, CELLULOSE, SURGICEL, 2 X 14 IN

## (undated) DEVICE — DRAPE, SHEET, CARDIOVASCULAR, ANTIMICROBIAL, W/ANESTHESIA SCREEN, IOBAN 2, STERI DRAPE, 107 X 133 IN, DISPOSABLE, FABRIC, BLUE, STERILE

## (undated) DEVICE — KIT INTRO 5FR L7CM NDL 21GA L4CM 0018IN NIT COR PLAT TIP

## (undated) DEVICE — GLOVE ORANGE PI 8   MSG9080

## (undated) DEVICE — SUTURE, PROLENE, 6-0, 24 IN, C-1, BLUE

## (undated) DEVICE — ADAPTER, CARDIOPLEGIA, VENTING, Y, 19.1 CM

## (undated) DEVICE — T-MAX DISPOSABLE FACE MASK 8 PER BOX

## (undated) DEVICE — DRESSING,GAUZE,XEROFORM,CURAD,5"X9",ST: Brand: CURAD

## (undated) DEVICE — DRESSING, ISLAND, TELFA, 4 X 5 IN

## (undated) DEVICE — SYSTEM CLOSURE 6-12 FR VEN VASC VASCADE MVP

## (undated) DEVICE — Device

## (undated) DEVICE — LIQUIBAND RAPID ADHESIVE 36/CS 0.8ML: Brand: MEDLINE

## (undated) DEVICE — MANIFOLD, 4 PORT NEPTUNE STANDARD

## (undated) DEVICE — CONNECTOR, STRAIGHT, 0.5 X 0.5 IN

## (undated) DEVICE — CATHETER ABLAT 8FR L115CM 1-6-2MM SPC TIP 3.5MM DF CRV

## (undated) DEVICE — SUTURE, NUROLON, 0, 18 IN, CT1, DETACHABLE, MULTIPACK, BLACK

## (undated) DEVICE — SLING ARM L 33-38CM BLK MESH FAB EZ OPN FR PNL W/

## (undated) DEVICE — KIT, COR-KNOT MINI COMBO

## (undated) DEVICE — CASSETTE, BLOOD, PLEGIC SET

## (undated) DEVICE — PITCHER, GRADUATE, 32 OZ (1200CC), STERILE

## (undated) DEVICE — 4F (1.0MM ID) X 9CM STIFF MICRO-STICK® INTRODUCER SET WITH NITINOL GUIDEWIRE WITH RADIOPAQUE TIP: Brand: MICRO-STICK SETMICRO-STICK SET

## (undated) DEVICE — SHUNT, SENSOR

## (undated) DEVICE — CONNECTOR, STRAIGHT, 0.375 X 0.375 IN

## (undated) DEVICE — BAG, DECANTER

## (undated) DEVICE — SUTURE MCRYL + SZ 3-0 L27IN ABSRB UD PS1 L24MM 3/8 CIR REV MCP936H

## (undated) DEVICE — SUTURE, ETHIBOND, XTRA, 30 IN, 0, CT-1, GREEN

## (undated) DEVICE — SMITH & NEPHEW CEM MIX BOWL                                    W/SPATULA DISPOSABLE

## (undated) DEVICE — ELECTRODE, ELECTROSURGICAL, BLADE EXT 4 INCH, INSULATED

## (undated) DEVICE — KIT, CELL SAVER, W/COLLECTION SET, 225ML WASH SET

## (undated) DEVICE — APPLICATOR, CHLORAPREP, W/ORANGE TINT, 26ML

## (undated) DEVICE — CATHETER, THORACIC, STRAIGHT, ADULT, 28 FR, PVC

## (undated) DEVICE — SUTURE FIBERWIRE SZ 2 W/ TAPERED NEEDLE BLUE L38IN NONABSORB BLU L26.5MM 1/2 CIRCLE AR7200

## (undated) DEVICE — GUIDEWIRE, INQWIRE, 3MM J, .035, 260

## (undated) DEVICE — PERFUSION PACK, HEMOCONCENTRATOR, MINNTECH, W/TUBING

## (undated) DEVICE — SHEATH, PINNACLE, 10 CM,  5FR INTRODUCER, 5FR DIA, 2.5 CM DIALATOR

## (undated) DEVICE — SUTURE, VICRYL 0, TAPER POINT, CT-1 VIOLET 27 INCH

## (undated) DEVICE — SYRINGE MED 10ML LUERLOCK TIP W/O SFTY DISP

## (undated) DEVICE — DRAPE, MAGENTIC INSTRUMENT, 12X16

## (undated) DEVICE — COVER, TABLE, UHC

## (undated) DEVICE — COLLECTION UNIT, DRAINAGE, THORACIC, SINGLE TUBE, DRY SUCTION, ATS COMPATIBLE, OASIS 3600, LF

## (undated) DEVICE — CATHETER, THERMODILUTION, SWAN GANZ, VIP, 5 LUMEN, 7.5 FR, 110 CM

## (undated) DEVICE — SUTURE, PROLENE, 4-0, 36 IN, RB1, DA, BLUE

## (undated) DEVICE — DRESSING, MEPILEX, BORDER, SACRUM, 8.7 X 9.8 IN

## (undated) DEVICE — SUTURE VCRL + 1 L27IN ABSRB UD CT-1 L36MM 1/2 CIR TAPR PNT VCP261H

## (undated) DEVICE — SHOULDER STABILIZATION KIT,                                    DISPOSABLE 12 PER BOX

## (undated) DEVICE — SHOULDER: Brand: MEDLINE INDUSTRIES, INC.

## (undated) DEVICE — PINNACLE INTRODUCER SHEATH: Brand: PINNACLE

## (undated) DEVICE — CATHETER PULM ART 5FR INFL 0.75CC L110CM BAL DIA8MM SGL WDG

## (undated) DEVICE — SUTURE, VICRYL, 0, 27 IN, UR-6, VIOLET

## (undated) DEVICE — SUTURE, SURGICAL STEEL, STERNUM 7, 18 IN, KV40, SINGL-WIRE

## (undated) DEVICE — SUTURE, PROLENE 4-0, TAPER POINT, SH-1 BLUE 30 INCH

## (undated) DEVICE — GUIDEPIN ORTH 2.5X220 MM SHLDR AEQUALIS PERFORM+

## (undated) DEVICE — ELECTRODE, QUICK-COMBO, EDGE SYSTEM, REDI PACK

## (undated) DEVICE — CATHETER, ANGIO, IMPULSE, FL4, 5 FR X 100 CM

## (undated) DEVICE — SUTURE, P6 STERNUM DOUBLE WIRE, CCS P 2 NEEDLE

## (undated) DEVICE — PATCH REF EXT FOR CARTO 3 SYS (EA = 6 PACKS)

## (undated) DEVICE — INTRODUCER KIT, HEMOSTASIS, VALVE/SIDEPORT, W/GUIDEWIRE, 0.035 IN, 8.5 FR, 10 CM, LF

## (undated) DEVICE — HYPODERMIC SAFETY NEEDLE: Brand: MAGELLAN

## (undated) DEVICE — TAPE SURG W4INXL11YD 2IN PERF LINERLESS NONWOVEN MEDFIX EZ

## (undated) DEVICE — CLIPPER, SURGICAL BLADE ASSEMBLY, GENERAL PURPOSE, SINGLE USE

## (undated) DEVICE — ACCESS KIT, S-MAK MINI, 4FR 10CM 0.018IN 40CM, NT/PT, ECHO ENHANCE NEEDLE

## (undated) DEVICE — SUTURE, TICRON, 2-0, CV-300, DBL-ARMED, 30, BLUE"

## (undated) DEVICE — PROBE TEMP ESOPH MULT LEVEL SENSOR SENSITHERM

## (undated) DEVICE — 5FR X 100CM INFINITI TL DIAGNOSTIC CATHETER, INTERNAL MAMMORY, IM, FEMORAL SELECTIVE THRULUMEN, 0.038IN MAX GUIDEWIRE

## (undated) DEVICE — CLEANER, ELECTROSURGICAL, TIP, 5 X 5 CM, LF

## (undated) DEVICE — CANNULA, CARDIAC SUMP

## (undated) DEVICE — DRESSING, ADHESIVE, ISLAND, TELFA, 4 X 14 IN

## (undated) DEVICE — SYRINGE, 35 CC, LUER LOCK, MONOJECT, W/O CAP, LF

## (undated) DEVICE — NEEDLE PROC AD 18GA L798CM 50DEG S STL TRANSSEPTAL POINTER

## (undated) DEVICE — SUTURE, VICRYL, 2-0, 27 IN, CT-1, VIOLET

## (undated) DEVICE — SHEATH GUID 11.5X8.5FR L71MM M CRV L22MM BIDIR STEER CARTO

## (undated) DEVICE — SUTURE, PROLENE, 5-0, 36 IN, RB1, DA, BLUE

## (undated) DEVICE — 450 ML BOTTLE OF 0.05% CHLORHEXIDINE GLUCONATE IN 99.95% STERILE WATER FOR IRRIGATION, USP AND APPLICATOR.: Brand: IRRISEPT ANTIMICROBIAL WOUND LAVAGE

## (undated) DEVICE — CATHETER, ANGIO, IMPULSE, FR4, 5 FR X 100 CM

## (undated) DEVICE — SUTURE VCRL + SZ 2-0 L27IN ABSRB UD CP-1 1/2 CIR REV CUT VCP266H

## (undated) DEVICE — SPONGE GZ W4XL4IN COT 12 PLY TYP VII WVN C FLD DSGN STERILE

## (undated) DEVICE — BLADE, SAW STERNUM, STERILE

## (undated) DEVICE — EP RECORDING CATHETER 14 POLE, FIXED CURVE: Brand: EP RECORDING CATHETER

## (undated) DEVICE — COVER, CART, 45 X 27 X 48 IN, CLEAR

## (undated) DEVICE — CATHETER, URETHRAL, FOLEY, 2 WAY, BARDEX IC, 16 FR, 30 CC, SILVER, LATEX

## (undated) DEVICE — DRESSING HYDROFIBER AQUACEL AG ADVANTAGE 3.5X12 IN

## (undated) DEVICE — GOWN, SURGICAL, SMARTGOWN, XLARGE, STERILE

## (undated) DEVICE — GLOVE SURG SZ 75 L12IN THK75MIL DK GRN LTX FREE

## (undated) DEVICE — KIT, TOURNIQUET, 7"

## (undated) DEVICE — SUTURE, PROLENE, 3-0, 36 IN, SH, DA, BLUE

## (undated) DEVICE — CANNULA, BIOMEDICUS 25FR, FEMORAL VENOUS

## (undated) DEVICE — PACK-MAJOR

## (undated) DEVICE — TUBING, SUCTION, 1/4" X 20', STRAIGHT: Brand: MEDLINE INDUSTRIES, INC.

## (undated) DEVICE — PUNCH, AORTIC 4MM

## (undated) DEVICE — FILTER, IV, BLOOD, MICROAGGREGATE, 40 MIC, RBC TRANSFUSION

## (undated) DEVICE — CATHETER, DRAINAGE, NASOGASTRIC, DOUBLE LUMEN, FUNNEL END, SUMP, SALEM, 18 FR, 48 IN, PVC, STERILE

## (undated) DEVICE — PENCIL SMK EVAC ALL IN 1 DSGN ENH VISIBILITY IMPROVED AIR

## (undated) DEVICE — DRESSING, MEPILEX, BORDER, HEEL, 8.7 X 9.1 IN

## (undated) DEVICE — TIP, SUCTION, YANKAUER, FLEXIBLE

## (undated) DEVICE — SPONGE LAP W18XL18IN WHT COT 4 PLY FLD STRUNG RADPQ DISP ST 2 PER PACK

## (undated) DEVICE — CATHETER, INFINITI DIAGNOSTIC, JUDKINS, LEFT, 5 FR-JR 4.5

## (undated) DEVICE — YANKAUER, RIGID, STRAIGHT, OPEN TIP, NO VENT

## (undated) DEVICE — DEVICE, DRAIN/TUBE ATTACHMENT, HORIZONTAL, STERILE, LF

## (undated) DEVICE — SPONGE, HEMOSTAT, SURGICEL FIBRILLAR, ABS, 4 X 4, LF

## (undated) DEVICE — LOOP VES W13MM THK09MM MINI RED SIL FLD REPELLENT

## (undated) DEVICE — BLADE ES L6IN ELASTOMERIC COAT EXT DURABLE BEND UPTO 90DEG

## (undated) DEVICE — TUBING PACK, OXYGENATOR, ADULT

## (undated) DEVICE — COUNTER, NEEDLE, FOAM BLOCK, POP-N-COUNT, W/BLADEGUARD, W/ADHESIVE 40 COUNT, RED

## (undated) DEVICE — RETRACTOR, SUTURE, HOLDING, INSERT, OCTOBASE, DISPOSABLE

## (undated) DEVICE — OXYGENATOR FX 25, W/HR, ARTERIAL FILTER

## (undated) DEVICE — TRAY, SURESTEP, URINE METER, 14FR, SILICONE

## (undated) DEVICE — TUBING PMP FOR CARTO SYS SMARTABLATE

## (undated) DEVICE — DRAPE, FLUID WARMER

## (undated) DEVICE — GUIDEWIRE, INQWIRE, 3MM J, .035, 150

## (undated) DEVICE — CANNULA, AORTIC ROOT, 12G